# Patient Record
Sex: FEMALE | Race: BLACK OR AFRICAN AMERICAN | NOT HISPANIC OR LATINO | Employment: OTHER | URBAN - METROPOLITAN AREA
[De-identification: names, ages, dates, MRNs, and addresses within clinical notes are randomized per-mention and may not be internally consistent; named-entity substitution may affect disease eponyms.]

---

## 2017-01-02 ENCOUNTER — HOSPITAL ENCOUNTER (EMERGENCY)
Facility: HOSPITAL | Age: 62
Discharge: HOME | End: 2017-01-02
Attending: FAMILY MEDICINE
Payer: MEDICARE

## 2017-01-02 VITALS
RESPIRATION RATE: 18 BRPM | SYSTOLIC BLOOD PRESSURE: 120 MMHG | OXYGEN SATURATION: 99 % | DIASTOLIC BLOOD PRESSURE: 89 MMHG | HEIGHT: 60 IN | HEART RATE: 82 BPM | WEIGHT: 256 LBS | BODY MASS INDEX: 50.26 KG/M2 | TEMPERATURE: 98 F

## 2017-01-02 DIAGNOSIS — S92.911A CLOSED NONDISPLACED FRACTURE OF PHALANX OF TOE OF RIGHT FOOT, UNSPECIFIED TOE, INITIAL ENCOUNTER: Primary | ICD-10-CM

## 2017-01-02 DIAGNOSIS — S91.209A NAIL AVULSION, TOE, INITIAL ENCOUNTER: ICD-10-CM

## 2017-01-02 PROCEDURE — 99283 EMERGENCY DEPT VISIT LOW MDM: CPT | Mod: 25

## 2017-01-02 PROCEDURE — 63600175 PHARM REV CODE 636 W HCPCS: Performed by: FAMILY MEDICINE

## 2017-01-02 PROCEDURE — 63600175 PHARM REV CODE 636 W HCPCS

## 2017-01-02 PROCEDURE — 96372 THER/PROPH/DIAG INJ SC/IM: CPT

## 2017-01-02 RX ORDER — MORPHINE SULFATE 2 MG/ML
4 INJECTION, SOLUTION INTRAMUSCULAR; INTRAVENOUS
Status: COMPLETED | OUTPATIENT
Start: 2017-01-02 | End: 2017-01-02

## 2017-01-02 RX ORDER — ONDANSETRON 4 MG/1
4 TABLET, FILM COATED ORAL EVERY 8 HOURS PRN
Qty: 12 TABLET | Refills: 0 | Status: SHIPPED | OUTPATIENT
Start: 2017-01-02 | End: 2017-03-17 | Stop reason: SDUPTHER

## 2017-01-02 RX ORDER — CEFTRIAXONE 1 G/1
INJECTION, POWDER, FOR SOLUTION INTRAMUSCULAR; INTRAVENOUS
Status: COMPLETED
Start: 2017-01-02 | End: 2017-01-02

## 2017-01-02 RX ORDER — ONDANSETRON 2 MG/ML
4 INJECTION INTRAMUSCULAR; INTRAVENOUS
Status: COMPLETED | OUTPATIENT
Start: 2017-01-02 | End: 2017-01-02

## 2017-01-02 RX ORDER — CEFTRIAXONE 1 G/1
1 INJECTION, POWDER, FOR SOLUTION INTRAMUSCULAR; INTRAVENOUS
Status: COMPLETED | OUTPATIENT
Start: 2017-01-02 | End: 2017-01-02

## 2017-01-02 RX ORDER — CEPHALEXIN 500 MG/1
500 CAPSULE ORAL 4 TIMES DAILY
Qty: 40 CAPSULE | Refills: 0 | Status: SHIPPED | OUTPATIENT
Start: 2017-01-02 | End: 2017-01-12

## 2017-01-02 RX ORDER — HYDROCODONE BITARTRATE AND ACETAMINOPHEN 5; 325 MG/1; MG/1
1 TABLET ORAL EVERY 8 HOURS PRN
Qty: 18 TABLET | Refills: 0 | Status: SHIPPED | OUTPATIENT
Start: 2017-01-02 | End: 2017-03-16

## 2017-01-02 RX ADMIN — ONDANSETRON HYDROCHLORIDE 4 MG: 2 SOLUTION INTRAMUSCULAR; INTRAVENOUS at 07:01

## 2017-01-02 RX ADMIN — CEFTRIAXONE 1 G: 1 INJECTION, POWDER, FOR SOLUTION INTRAMUSCULAR; INTRAVENOUS at 07:01

## 2017-01-02 RX ADMIN — MORPHINE SULFATE 4 MG: 2 INJECTION, SOLUTION INTRAMUSCULAR; INTRAVENOUS at 07:01

## 2017-01-02 NOTE — ED AVS SNAPSHOT
[facility]  [address] [place] [address]               Christine Castro   2017  6:23 PM   ED    Description:  Female : 1955   Department:  [facility]           Your Care was Coordinated By:     Provider Role From To    David Rivas MD Attending Provider [phone] --      Reason for Visit     Toe Pain           Diagnoses this Visit        Comments    Closed nondisplaced fracture of phalanx of toe of right foot, unspecified toe, initial encounter    -  Primary     Nail avulsion, toe, initial encounter           ED Disposition     None           To Do List           Follow-up Information     Follow up with Alon Santiago MD In 2 days.    Specialty:  Family Medicine    Contact information:    [address] [facility] [address]  [phone]         These Medications        Disp Refills Start End    hydrocodone-acetaminophen 5-325mg (NORCO) 5-325 mg per tablet 18 tablet 0 2017     Take 1 tablet by mouth every 8 (eight) hours as needed for Pain. - Oral    [facility] [address] [place] [address] [address] Counts include 234 beds at the [facility] Ph #: [phone]       ondansetron (ZOFRAN) 4 MG tablet 12 tablet 0 2017     Take 1 tablet (4 mg total) by mouth every 8 (eight) hours as needed for Nausea. - Oral    [facility] [address] [place] [address] [facility] [address] [facility] Ph #: [phone]       cephALEXin (KEFLEX) 500 MG capsule 40 capsule 0 2017    Take 1 capsule (500 mg total) by mouth 4 (four) times daily. - Oral    [facility] [address] [place] [address] [address] Counts include 234 beds at the [facility] Ph #: [phone]         [facility]     [facility] On Call Nurse Care Line -  Assistance  Registered nurses in the [facility] provide clinical advisement, health education, appointment booking, and other advisory  services.  Call for this free service at [phone].             Medications           Message regarding Medications     Verify the changes and/or additions to your medication regime listed below are the same as discussed with your clinician today.  If any of these changes or additions are incorrect, please notify your healthcare provider.        START taking these NEW medications        Refills    hydrocodone-acetaminophen 5-325mg (NORCO) 5-325 mg per tablet 0    Sig: Take 1 tablet by mouth every 8 (eight) hours as needed for Pain.    Class: Print    Route: Oral    ondansetron (ZOFRAN) 4 MG tablet 0    Sig: Take 1 tablet (4 mg total) by mouth every 8 (eight) hours as needed for Nausea.    Class: Print    Route: Oral    cephALEXin (KEFLEX) 500 MG capsule 0    Sig: Take 1 capsule (500 mg total) by mouth 4 (four) times daily.    Class: Print    Route: Oral      These medications were administered today        Dose Freq    ondansetron injection 4 mg 4 mg ED 1 Time    Sig: Inject 4 mg into the muscle ED 1 Time.    Class: Normal    Route: Intramuscular    morphine injection 4 mg 4 mg ED 1 Time    Sig: Inject 2 mLs (4 mg total) into the muscle ED 1 Time.    Class: Normal    Route: Intramuscular    cefTRIAXone injection 1 g 1 g ED 1 Time    Sig: Inject 1 g into the muscle ED 1 Time.    Class: Normal    Route: Intramuscular    cefTRIAXone (ROCEPHIN) 1 gram injection      Notes to Pharmacy: Created by cabinet override      STOP taking these medications     oxycodone-acetaminophen (PERCOCET) 7.5-325 mg per tablet Take 1 tablet by mouth 4 (four) times daily as needed.           Verify that the below list of medications is an accurate representation of the medications you are currently taking.  If none reported, the list may be blank. If incorrect, please contact your healthcare provider. Carry this list with you in case of emergency.           Current Medications     albuterol 90 mcg/actuation inhaler Inhale 2 puffs into  the lungs every 4 (four) hours as needed for Wheezing.    buPROPion (WELLBUTRIN SR) 150 MG TBSR 12 hr tablet Take 1 tablet (150 mg total) by mouth once daily.    busPIRone (BUSPAR) 5 MG Tab Take 1 tablet (5 mg total) by mouth once daily.    cephALEXin (KEFLEX) 500 MG capsule Take 1 capsule (500 mg total) by mouth 4 (four) times daily.    diazePAM (VALIUM) 5 MG tablet TAKE 1 TABLET BY MOUTH EVERY 8 HOURS AS NEEDED    diclofenac sodium 1 % Gel APPLY 2 GRAMS EXTERNALLY TO THE AFFECTED AREA FOUR TIMES DAILY    escitalopram oxalate (LEXAPRO) 20 MG tablet TAKE 1 TABLET BY MOUTH ONCE DAILY    fluticasone-vilanterol (BREO ELLIPTA) 100-25 mcg/dose diskus inhaler Inhale 1 puff into the lungs once daily.    furosemide (LASIX) 20 MG tablet TAKE 1 TABLET BY MOUTH EVERY DAY CONTINUOUS AS NEEDED    gabapentin (NEURONTIN) 100 MG capsule Take 100 mg by mouth 3 (three) times daily.    glipiZIDE (GLUCOTROL) 2.5 MG TR24 TAKE 1 TABLET BY MOUTH DAILY WITH BREAKFAST    hydrocodone-acetaminophen 5-325mg (NORCO) 5-325 mg per tablet Take 1 tablet by mouth every 8 (eight) hours as needed for Pain.    levothyroxine (SYNTHROID) 88 MCG tablet TAKE 1 TABLET BY MOUTH BEFORE BREAKFAST    lidocaine (LIDODERM) 5 %     omeprazole (PRILOSEC) 40 MG capsule TAKE 1 CAPSULE BY MOUTH EVERY MORNING    ondansetron (ZOFRAN) 4 MG tablet Take 1 tablet (4 mg total) by mouth every 8 (eight) hours as needed for Nausea.    potassium chloride (KLOR-CON) 10 MEQ TbSR Take 2 tablets (20 mEq total) by mouth once daily.    predniSONE (DELTASONE) 10 MG tablet TAKE 1 TABLET(10 MG) BY MOUTH EVERY DAY    promethazine (PHENERGAN) 25 MG tablet TAKE 1 TABLET BY MOUTH EVERY 6 HOURS AS NEEDED FOR NAUSEA    promethazine (PHENERGAN) 25 MG tablet TAKE 1 TABLET BY MOUTH EVERY 6 HOURS AS NEEDED    quetiapine (SEROQUEL) 25 MG Tab TAKE 1 TABLET BY MOUTH EVERY EVENING    quetiapine (SEROQUEL) 50 MG tablet Take 1 tablet (50 mg total) by mouth every evening.    terbinafine HCl (LAMISIL) 1 %  cream Apply topically 2 (two) times daily.    tizanidine (ZANAFLEX) 4 MG tablet     zolpidem (AMBIEN) 10 mg Tab TAKE 1 TABLET BY MOUTH EVERY NIGHT AT BEDTIME    zolpidem (AMBIEN) 5 MG Tab TAKE 1 TABLET(5 MG) BY MOUTH EVERY EVENING           Clinical Reference Information           Your Vitals Were     BP Pulse Temp Resp Height Weight    132/101 93 98.7 °F (37.1 °C) (Oral) 18 5' (1.524 m) 116.1 kg (256 lb)    SpO2 BMI             99% 50 kg/m2         Allergies as of 1/2/2017        Reactions    Ibuprofen       Immunizations Administered on Date of Encounter - 1/2/2017     None      ED Micro, Lab, POCT     None      ED Imaging Orders     None        Discharge Instructions         Closed Toe Fracture  Your big toe is broken (fractured). This causes local pain, swelling, and bruising. This injury takes about 4 weeks to heal. Toe injuries are often treated by taping the injured toe to the next one (camelia taping). This protects the injured toe and holds it in position.    If the toenail has been severely injured, it may fall off in 1 to 2 weeks. It takes up to 12 months for a new toenail to grow back.  Home care  Follow these guidelines when caring for yourself at home:  · You may be given a cast shoe to wear to keep your toe from moving. If not, you can use a sandal or any shoe that doesnt put pressure on the injured toe until the swelling and pain go away. If using a sandal, be careful not to strike your foot against anything. Another injury could make the fracture worse. If you were given crutches, dont put full weight on the injured foot until you can do so without pain.  · Keep your foot elevated to reduce pain and swelling. When sleeping, put a pillow under the injured leg. When sitting, support the injured leg so it is level with your waist. This is very important during the first 2 days (48 hours).  · Put an ice pack on the injured area. Do this for 20 minutes every 1 to 2 hours the first day for pain relief.  You can make an ice pack by wrapping a plastic bag of ice cubes in a thin towel. As the ice melts, be careful that any cloth or paper tape doesnt get wet. Continue using the ice pack 3 to 4 times a day for the next 2 days. Then use the ice pack as needed to ease pain and swelling.  · If buddy tape was used and it becomes wet or dirty, change it. You may replace it with paper, plastic, or cloth tape. Cloth tape and paper tapes must be kept dry.  · You may use acetaminophen or ibuprofen to control pain, unless another pain medicine was prescribed. If you have chronic liver or kidney disease, talk with your health care provider before using these medicines. Also talk with your provider if youve had a stomach ulcer or GI bleeding.  · You may return to sports or physical education activities after 4 weeks, or when you can run without pain.  Follow-up care  Follow up with your health care provider in 1 week, or as advised. This is to make sure the bone is healing the way it should.  If X-rays were taken, a radiologist will look at them. You will be told of any new findings that may affect your care.  When to seek medical advice  Call your health care provider right away if any of these occur:  · Pain or swelling gets worse  · The cast cracks  · The cast and padding get wet and stays wet more than 24 hours  · Bad odor from the cast or wound fluid stains the cast  · Tightness or pressure under the cast gets worse  · Toe becomes cold, blue, numb, or tingly  · You cant move the toe  · Signs of infection: fever, redness, warmth, swelling, or drainage from the wound or cast  · Fever of 101ºF (38.3ºC) or higher, or as directed by your health care provider  © 0701-7642 [employer]. [address] [place] [address], [place] [address]. All rights reserved. This information is not intended as a substitute for professional medical care. Always follow your healthcare professional's instructions.          Your Scheduled  Appointments     Jan 04, 2017 11:00 AM CST   Physical with MD [place] [facility] ([place])    [address]  [address]  [place] [address] [phone]              [facility] Sign-Up     Activating your [facility] account is as easy as 1-2-3!     1) Visit [email or web address], select Sign Up Now, enter this activation code and your date of birth, then select Next.  [email or web address]  Expires: 1/26/2017  3:37 PM      2) Create a username and password to use when you visit [facility] in the future and select a security question in case you lose your password and select Next.    3) Enter your e-mail address and click Sign Up!    Additional Information  If you have questions, please e-mail [email or web address] or call [phone] to talk to our [facility] staff. Remember, [facility] is NOT to be used for urgent needs. For medical emergencies, dial 911.          [facility] complies with applicable Federal civil rights laws and does not discriminate on the basis of race, color, national origin, age, disability, or sex.        Language Assistance Services     ATTENTION: Language assistance services are available, free of charge. Please call [phone].      ATENCIÓN: Si habla español, tiene a lopez disposición servicios gratuitos de asistencia lingüística. Llame al [phone].     CHÚ Ý: N?u b?n nói Ti?ng Vi?t, có các d?ch v? h? tr? ngôn ng? mi?n phí dành cho b?n. G?i s? [phone].

## 2017-01-03 ENCOUNTER — TELEPHONE (OUTPATIENT)
Dept: FAMILY MEDICINE | Facility: CLINIC | Age: 62
End: 2017-01-03

## 2017-01-03 NOTE — DISCHARGE INSTRUCTIONS
Closed Toe Fracture  Your big toe is broken (fractured). This causes local pain, swelling, and bruising. This injury takes about 4 weeks to heal. Toe injuries are often treated by taping the injured toe to the next one (buddy taping). This protects the injured toe and holds it in position.    If the toenail has been severely injured, it may fall off in 1 to 2 weeks. It takes up to 12 months for a new toenail to grow back.  Home care  Follow these guidelines when caring for yourself at home:  · You may be given a cast shoe to wear to keep your toe from moving. If not, you can use a sandal or any shoe that doesnt put pressure on the injured toe until the swelling and pain go away. If using a sandal, be careful not to strike your foot against anything. Another injury could make the fracture worse. If you were given crutches, dont put full weight on the injured foot until you can do so without pain.  · Keep your foot elevated to reduce pain and swelling. When sleeping, put a pillow under the injured leg. When sitting, support the injured leg so it is level with your waist. This is very important during the first 2 days (48 hours).  · Put an ice pack on the injured area. Do this for 20 minutes every 1 to 2 hours the first day for pain relief. You can make an ice pack by wrapping a plastic bag of ice cubes in a thin towel. As the ice melts, be careful that any cloth or paper tape doesnt get wet. Continue using the ice pack 3 to 4 times a day for the next 2 days. Then use the ice pack as needed to ease pain and swelling.  · If buddy tape was used and it becomes wet or dirty, change it. You may replace it with paper, plastic, or cloth tape. Cloth tape and paper tapes must be kept dry.  · You may use acetaminophen or ibuprofen to control pain, unless another pain medicine was prescribed. If you have chronic liver or kidney disease, talk with your health care provider before using these medicines. Also talk with your  provider if youve had a stomach ulcer or GI bleeding.  · You may return to sports or physical education activities after 4 weeks, or when you can run without pain.  Follow-up care  Follow up with your health care provider in 1 week, or as advised. This is to make sure the bone is healing the way it should.  If X-rays were taken, a radiologist will look at them. You will be told of any new findings that may affect your care.  When to seek medical advice  Call your health care provider right away if any of these occur:  · Pain or swelling gets worse  · The cast cracks  · The cast and padding get wet and stays wet more than 24 hours  · Bad odor from the cast or wound fluid stains the cast  · Tightness or pressure under the cast gets worse  · Toe becomes cold, blue, numb, or tingly  · You cant move the toe  · Signs of infection: fever, redness, warmth, swelling, or drainage from the wound or cast  · Fever of 101ºF (38.3ºC) or higher, or as directed by your health care provider  © 1172-0065 [employer]. [address] [place] [address], [place] [address]. All rights reserved. This information is not intended as a substitute for professional medical care. Always follow your healthcare professional's instructions.

## 2017-01-03 NOTE — ED NOTES
Toe cleaned and covered with a bandaid. Pt provided with a small size post op shoe despite the fact that she was instructed it was too small per pt request

## 2017-01-03 NOTE — TELEPHONE ENCOUNTER
i called the pt but she could not here me, i think she answered by mistake,   She did not know i was on the line  Dr boykin advised that she probably does not need to be seen  i will discuss further with her  Pt needs to keep taped to the 2nd toe and elevate foot

## 2017-01-03 NOTE — ED PROVIDER NOTES
Encounter Date: 2017       History     Chief Complaint   Patient presents with    Toe Pain     sent from urgent care for a broken toe - right middle toe- hit it on a bedpost early this am      Review of patient's allergies indicates:   Allergen Reactions    Ibuprofen      HPI Comments: Patient had a injury to right foot and sustained fracture to right middle phalanx.  Patient also had partial nail avulsion.  Patient went to urgent care who did x-rays and send her over to ER for further management.  Patient also has been vomiting since she eat her lunch.  No abdominal pain no fever.    The history is provided by the patient.     Past Medical History   Diagnosis Date    Arthritis     Asthma     Cancer     CHF (congestive heart failure)      [facility] GENER[facility]    COPD (chronic obstructive pulmonary disease)     Coronary artery disease     Depression     Diabetes mellitus, type 2     GERD (gastroesophageal reflux disease)     Myocardial infarction      AGE 19 MIGUELANGEL WITHBABY DELIVERY    Thyroid disease      No past medical history pertinent negatives.  Past Surgical History   Procedure Laterality Date     section      Tonsillectomy      Colon surgery      Cholecystectomy      Ectopic pregnancy surgery      Tubal ligation      Ovary removed      Gastric bypass      Colonoscopy       --- repeat in 3-5 years     Family History   Problem Relation Age of Onset    Hyperlipidemia Mother     Hypertension Mother     Diabetes Mother     Hypertension Sister     Hypertension Brother     Diabetes Brother      Social History   Substance Use Topics    Smoking status: Never Smoker    Smokeless tobacco: None    Alcohol use Yes     Review of Systems   Constitutional: Negative for chills and fever.   HENT: Negative for congestion, ear pain and sore throat.    Eyes: Negative for pain.   Respiratory: Negative for cough, chest tightness, shortness of breath and wheezing.    Cardiovascular:  Negative for chest pain and palpitations.   Gastrointestinal: Positive for nausea and vomiting. Negative for abdominal distention, abdominal pain and diarrhea.   Genitourinary: Negative for dysuria and pelvic pain.   Musculoskeletal: Negative for back pain, gait problem, neck pain and neck stiffness.   Psychiatric/Behavioral: Negative for behavioral problems and confusion. The patient is not nervous/anxious.    All other systems reviewed and are negative.      Physical Exam   Initial Vitals   BP Pulse Resp Temp SpO2   01/02/17 1826 01/02/17 1826 01/02/17 1826 01/02/17 1826 01/02/17 1826   132/101 93 18 98.7 °F (37.1 °C) 99 %     Physical Exam    Nursing note and vitals reviewed.  Constitutional: She appears well-developed and well-nourished.   HENT:   Head: Normocephalic and atraumatic.   Eyes: Pupils are equal, round, and reactive to light.   Neck: Normal range of motion.   Cardiovascular: Normal rate, regular rhythm, normal heart sounds and intact distal pulses. Exam reveals no gallop and no friction rub.    No murmur heard.  Pulmonary/Chest: Breath sounds normal. No respiratory distress. She has no rhonchi. She has no rales. She exhibits no tenderness.   Abdominal: Soft. She exhibits no distension and no mass. There is no tenderness. There is no rebound and no guarding.   Musculoskeletal: Normal range of motion.        Feet:    Right middle phalanx swollen and tender.  Partial nail avulsion on the distal aspect.   Neurological: She is alert and oriented to person, place, and time. She has normal strength and normal reflexes. She displays normal reflexes. No cranial nerve deficit or sensory deficit.         ED Course   Procedures  Labs Reviewed - No data to display                            ED Course   Pt is advised to do dressing and tape toes- and continue Post -op shoe. Continue pain meds and a/b.  Clinical Impression:   The primary encounter diagnosis was Closed nondisplaced fracture of phalanx of toe of  right foot, unspecified toe, initial encounter. A diagnosis of Nail avulsion, toe, initial encounter was also pertinent to this visit.    Disposition:   Disposition: Discharged  Condition: Stable  Patient is advised to continue pain medication and postop shoe.  Advised to follow up with primary care physician and get referral to orthopedics.       David Rivas MD  01/05/17 1951

## 2017-01-06 NOTE — TELEPHONE ENCOUNTER
i left a message for the pt with details and advised her to return call if she has any further questions

## 2017-01-16 RX ORDER — DIAZEPAM 5 MG/1
TABLET ORAL
Qty: 30 TABLET | Refills: 0 | Status: SHIPPED | OUTPATIENT
Start: 2017-01-16 | End: 2017-03-30

## 2017-01-24 RX ORDER — DICLOFENAC SODIUM 10 MG/G
GEL TOPICAL
Qty: 100 G | Refills: 0 | Status: SHIPPED | OUTPATIENT
Start: 2017-01-24 | End: 2017-03-16 | Stop reason: SDUPTHER

## 2017-02-05 RX ORDER — GLIPIZIDE 2.5 MG/1
TABLET, EXTENDED RELEASE ORAL
Qty: 90 TABLET | Refills: 0 | Status: SHIPPED | OUTPATIENT
Start: 2017-02-05 | End: 2017-05-01 | Stop reason: SDUPTHER

## 2017-02-07 RX ORDER — POTASSIUM CHLORIDE 750 MG/1
TABLET, EXTENDED RELEASE ORAL
Qty: 180 TABLET | Refills: 0 | Status: SHIPPED | OUTPATIENT
Start: 2017-02-07 | End: 2017-07-09 | Stop reason: SDUPTHER

## 2017-02-07 RX ORDER — ZOLPIDEM TARTRATE 10 MG/1
TABLET ORAL
Qty: 30 TABLET | Refills: 2 | Status: SHIPPED | OUTPATIENT
Start: 2017-02-07 | End: 2017-10-18

## 2017-02-17 RX ORDER — PREDNISONE 10 MG/1
TABLET ORAL
Qty: 30 TABLET | Refills: 0 | Status: SHIPPED | OUTPATIENT
Start: 2017-02-17 | End: 2017-03-16

## 2017-03-09 ENCOUNTER — LAB VISIT (OUTPATIENT)
Dept: LAB | Facility: HOSPITAL | Age: 62
End: 2017-03-09
Attending: FAMILY MEDICINE
Payer: MEDICAID

## 2017-03-09 DIAGNOSIS — E11.9 TYPE 2 DIABETES MELLITUS WITHOUT COMPLICATION: ICD-10-CM

## 2017-03-09 LAB
CHOLEST/HDLC SERPL: 3.8 {RATIO}
HDL/CHOLESTEROL RATIO: 26.3 %
HDLC SERPL-MCNC: 205 MG/DL
HDLC SERPL-MCNC: 54 MG/DL
LDLC SERPL CALC-MCNC: 135.2 MG/DL
NONHDLC SERPL-MCNC: 151 MG/DL
TRIGL SERPL-MCNC: 79 MG/DL

## 2017-03-09 PROCEDURE — 36415 COLL VENOUS BLD VENIPUNCTURE: CPT | Mod: PO

## 2017-03-09 PROCEDURE — 80061 LIPID PANEL: CPT

## 2017-03-09 PROCEDURE — 83036 HEMOGLOBIN GLYCOSYLATED A1C: CPT | Mod: PO

## 2017-03-10 LAB
ESTIMATED AVG GLUCOSE: 108 MG/DL
HBA1C MFR BLD HPLC: 5.4 %

## 2017-03-10 RX ORDER — FUROSEMIDE 20 MG/1
TABLET ORAL
Qty: 90 TABLET | Refills: 0 | Status: SHIPPED | OUTPATIENT
Start: 2017-03-10 | End: 2017-06-08 | Stop reason: SDUPTHER

## 2017-03-13 RX ORDER — PROMETHAZINE HYDROCHLORIDE 25 MG/1
TABLET ORAL
Qty: 90 TABLET | Refills: 0 | Status: SHIPPED | OUTPATIENT
Start: 2017-03-13 | End: 2017-03-16 | Stop reason: SDUPTHER

## 2017-03-13 RX ORDER — ESCITALOPRAM OXALATE 20 MG/1
TABLET ORAL
Qty: 90 TABLET | Refills: 3 | Status: SHIPPED | OUTPATIENT
Start: 2017-03-13 | End: 2017-03-16 | Stop reason: SDUPTHER

## 2017-03-14 ENCOUNTER — OFFICE VISIT (OUTPATIENT)
Dept: GASTROENTEROLOGY | Facility: CLINIC | Age: 62
End: 2017-03-14
Payer: MEDICARE

## 2017-03-14 VITALS
WEIGHT: 243.38 LBS | SYSTOLIC BLOOD PRESSURE: 104 MMHG | HEART RATE: 95 BPM | BODY MASS INDEX: 47.78 KG/M2 | HEIGHT: 60 IN | DIASTOLIC BLOOD PRESSURE: 66 MMHG

## 2017-03-14 DIAGNOSIS — K21.9 GASTROESOPHAGEAL REFLUX DISEASE, ESOPHAGITIS PRESENCE NOT SPECIFIED: Primary | ICD-10-CM

## 2017-03-14 DIAGNOSIS — Z86.010 HISTORY OF COLON POLYPS: ICD-10-CM

## 2017-03-14 DIAGNOSIS — Z85.038 HISTORY OF COLON CANCER: ICD-10-CM

## 2017-03-14 DIAGNOSIS — R13.10 DYSPHAGIA, UNSPECIFIED TYPE: ICD-10-CM

## 2017-03-14 PROCEDURE — 99999 PR PBB SHADOW E&M-EST. PATIENT-LVL II: CPT | Mod: PBBFAC,,, | Performed by: NURSE PRACTITIONER

## 2017-03-14 PROCEDURE — 99212 OFFICE O/P EST SF 10 MIN: CPT | Mod: PBBFAC,PN | Performed by: NURSE PRACTITIONER

## 2017-03-14 PROCEDURE — 99203 OFFICE O/P NEW LOW 30 MIN: CPT | Mod: S$PBB,,, | Performed by: NURSE PRACTITIONER

## 2017-03-14 NOTE — PROGRESS NOTES
Subjective:       Patient ID: Christine Castro is a 61 y.o. female.    Chief Complaint: Other (Consult about Colonoscopy)    HPI  Has history of colon cancer and reports resection and chemotherapy in 2007.  She has had colon polyps on exam since that time.  Her last colonoscopy was over 5 years ago.  Reports rectal bleeding, bright red and dark red and noted on the bowel movement and on the toilet paper occurring over the last 3 months.    No diarrhea or constipation.    No abdominal pain today but episodic abdominal pain in the mid abdomen.    Omeprazole daily for reflux and heartburn complaints.  She reports she had surgery for reflux in the distant past with return over the last two years.  Describes nausea and vomiting and will use phenergan for relief.  She had gastric bypass in 2000.  She reports that she has never had EGD.    Reports dysphagia and has had to regurgitate for relief.      Review of Systems   Constitutional: Positive for activity change, appetite change and fatigue.   HENT: Positive for trouble swallowing.    Respiratory: Negative for shortness of breath.    Cardiovascular: Negative for chest pain.   Gastrointestinal: Positive for blood in stool and nausea. Negative for abdominal pain.   Genitourinary: Negative.    Musculoskeletal: Positive for arthralgias.   Neurological: Positive for weakness.   Psychiatric/Behavioral:        Depression       Objective:      Physical Exam   Constitutional: She is oriented to person, place, and time. She appears well-developed and well-nourished.   HENT:   Head: Normocephalic.   Eyes: No scleral icterus.   Cardiovascular: Normal rate.    Pulmonary/Chest: No respiratory distress.   Abdominal: Soft. Bowel sounds are normal. She exhibits no distension and no mass. There is no tenderness.   Musculoskeletal:   Uses a walker for assistance   Neurological: She is alert and oriented to person, place, and time.   Skin: Skin is warm and dry. She is not diaphoretic. No  pallor.   Psychiatric: She has a normal mood and affect. Her behavior is normal. Judgment and thought content normal.   Vitals reviewed.      Assessment:       1. Gastroesophageal reflux disease, esophagitis presence not specified    2. Dysphagia, unspecified type    3. History of colon cancer    4. History of colon polyps        Plan:         Christine RAMIREZ was seen today for other.    Diagnoses and all orders for this visit:    Gastroesophageal reflux disease, esophagitis presence not specified    Dysphagia, unspecified type    History of colon cancer    History of colon polyps    Other orders  -     Case request GI: ESOPHAGOGASTRODUODENOSCOPY (EGD), COLONOSCOPY         Continue omeprazole.    EGD and colonoscopy.

## 2017-03-16 ENCOUNTER — OFFICE VISIT (OUTPATIENT)
Dept: FAMILY MEDICINE | Facility: CLINIC | Age: 62
End: 2017-03-16
Payer: MEDICARE

## 2017-03-16 VITALS
SYSTOLIC BLOOD PRESSURE: 132 MMHG | HEART RATE: 96 BPM | DIASTOLIC BLOOD PRESSURE: 84 MMHG | BODY MASS INDEX: 44.06 KG/M2 | TEMPERATURE: 99 F | OXYGEN SATURATION: 98 % | HEIGHT: 60 IN | WEIGHT: 224.44 LBS

## 2017-03-16 DIAGNOSIS — G56.20 LESION OF ULNAR NERVE, UNSPECIFIED LATERALITY: Primary | ICD-10-CM

## 2017-03-16 DIAGNOSIS — E07.9 THYROID DISEASE: ICD-10-CM

## 2017-03-16 DIAGNOSIS — F32.A DEPRESSION, UNSPECIFIED DEPRESSION TYPE: ICD-10-CM

## 2017-03-16 DIAGNOSIS — I50.9 CONGESTIVE HEART FAILURE, UNSPECIFIED CONGESTIVE HEART FAILURE CHRONICITY, UNSPECIFIED CONGESTIVE HEART FAILURE TYPE: ICD-10-CM

## 2017-03-16 DIAGNOSIS — K21.9 GASTROESOPHAGEAL REFLUX DISEASE WITHOUT ESOPHAGITIS: ICD-10-CM

## 2017-03-16 DIAGNOSIS — E11.9 TYPE 2 DIABETES MELLITUS WITHOUT COMPLICATION, UNSPECIFIED LONG TERM INSULIN USE STATUS: ICD-10-CM

## 2017-03-16 DIAGNOSIS — M19.90 ARTHRITIS: ICD-10-CM

## 2017-03-16 DIAGNOSIS — M17.0 PRIMARY OSTEOARTHRITIS OF BOTH KNEES: ICD-10-CM

## 2017-03-16 PROCEDURE — 99214 OFFICE O/P EST MOD 30 MIN: CPT | Mod: S$GLB,,, | Performed by: FAMILY MEDICINE

## 2017-03-16 RX ORDER — OXYCODONE AND ACETAMINOPHEN 7.5; 325 MG/1; MG/1
TABLET ORAL
Refills: 0 | COMMUNITY
Start: 2017-03-02 | End: 2018-08-21

## 2017-03-16 NOTE — MR AVS SNAPSHOT
[place] [id number] [address]  Phone: [phone]  Fax: [phone]                  Christine Castro   3/16/2017 2:00 PM   Office Visit    Description:  Female : 1955   Provider:  Alon Santiago MD   Department:  [facility]           Reason for Visit     Follow-up           Diagnoses this Visit        Comments    Lesion of ulnar nerve, unspecified laterality    -  Primary     Depression, unspecified depression type         Thyroid disease         Congestive heart failure, unspecified congestive heart failure chronicity, unspecified congestive heart failure type         Type 2 diabetes mellitus without complication, unspecified long term insulin use status         Gastroesophageal reflux disease without esophagitis         Arthritis                To Do List           Future Appointments        Provider Department Dept Phone    3/23/2017 10:40 AM Estuardo Fortune MD [facility] [phone]      Your Future Surgeries/Procedures     2017   Surgery with Corrina Flores MD   [facility] [facility] [address] [place] [address] [phone]              Goals (5 Years of Data)     None      [facility]     [facility] Care [facility] -  Assistance  Registered nurses in the [facility] provide clinical advisement, health education, appointment booking, and other advisory services.  Call for this free service at [phone].             Medications           Message regarding Medications     Verify the changes and/or additions to your medication regime listed below are the same as discussed with your clinician today.  If any of these changes or additions are incorrect, please notify your healthcare provider.        STOP taking these medications     hydrocodone-acetaminophen 5-325mg (NORCO) 5-325 mg per tablet Take 1 tablet by mouth every 8 (eight) hours as needed  for Pain.    predniSONE (DELTASONE) 10 MG tablet TAKE 1 TABLET(10 MG) BY MOUTH EVERY DAY    terbinafine HCl (LAMISIL) 1 % cream Apply topically 2 (two) times daily.           Verify that the below list of medications is an accurate representation of the medications you are currently taking.  If none reported, the list may be blank. If incorrect, please contact your healthcare provider. Carry this list with you in case of emergency.           Current Medications     albuterol 90 mcg/actuation inhaler Inhale 2 puffs into the lungs every 4 (four) hours as needed for Wheezing.    buPROPion (WELLBUTRIN SR) 150 MG TBSR 12 hr tablet Take 1 tablet (150 mg total) by mouth once daily.    busPIRone (BUSPAR) 5 MG Tab Take 1 tablet (5 mg total) by mouth once daily.    diazePAM (VALIUM) 5 MG tablet TAKE 1 TABLET BY MOUTH EVERY 8 HOURS AS NEEDED    diclofenac sodium 1 % Gel APPLY 2 GRAMS EXTERNALLY TO THE AFFECTED AREA FOUR TIMES DAILY    escitalopram oxalate (LEXAPRO) 20 MG tablet TAKE 1 TABLET BY MOUTH ONCE DAILY    fluticasone-vilanterol (BREO ELLIPTA) 100-25 mcg/dose diskus inhaler Inhale 1 puff into the lungs once daily.    furosemide (LASIX) 20 MG tablet TAKE 1 TABLET BY MOUTH EVERY DAY CONTINUOUS AS NEEDED    gabapentin (NEURONTIN) 100 MG capsule Take 100 mg by mouth 3 (three) times daily.    glipiZIDE (GLUCOTROL) 2.5 MG TR24 TAKE 1 TABLET BY MOUTH DAILY WITH BREAKFAST    levothyroxine (SYNTHROID) 88 MCG tablet TAKE 1 TABLET BY MOUTH BEFORE BREAKFAST    lidocaine (LIDODERM) 5 % Place 1 patch onto the skin.     omeprazole (PRILOSEC) 40 MG capsule TAKE 1 CAPSULE BY MOUTH EVERY MORNING    ondansetron (ZOFRAN) 4 MG tablet Take 1 tablet (4 mg total) by mouth every 8 (eight) hours as needed for Nausea.    oxycodone-acetaminophen (PERCOCET) 7.5-325 mg per tablet TK 1 T PO  TID    potassium chloride (KLOR-CON) 10 MEQ TbSR TAKE 2 TABLETS(20 MEQ) BY MOUTH ONCE DAILY    promethazine (PHENERGAN) 25 MG tablet TAKE 1 TABLET BY MOUTH  EVERY 6 HOURS AS NEEDED FOR NAUSEA    quetiapine (SEROQUEL) 25 MG Tab TAKE 1 TABLET BY MOUTH EVERY EVENING    quetiapine (SEROQUEL) 50 MG tablet Take 1 tablet (50 mg total) by mouth every evening.    tizanidine (ZANAFLEX) 4 MG tablet Take 4 mg by mouth every 8 (eight) hours.     zolpidem (AMBIEN) 10 mg Tab TAKE 1 TABLET BY MOUTH EVERY NIGHT AT BEDTIME           Clinical Reference Information           Your Vitals Were     BP Pulse Temp Height Weight SpO2    132/84 96 98.9 °F (37.2 °C) (Oral) 5' (1.524 m) 101.8 kg (224 lb 6.9 oz) 98%    BMI                43.83 kg/m2          Blood Pressure          Most Recent Value    BP  132/84      Allergies as of 3/16/2017     Ibuprofen      Immunizations Administered on Date of Encounter - 3/16/2017     None      [facility] Sign-Up     Activating your [facility] account is as easy as 1-2-3!     1) Visit [email or web address], select Sign Up Now, enter this activation code and your date of birth, then select Next.  [email or web address]  Expires: 4/30/2017  3:19 PM      2) Create a username and password to use when you visit [facility] in the future and select a security question in case you lose your password and select Next.    3) Enter your e-mail address and click Sign Up!    Additional Information  If you have questions, please e-mail [email or web address] or call [phone] to talk to our [facility] staff. Remember, [facility] is NOT to be used for urgent needs. For medical emergencies, dial 911.         Language Assistance Services     ATTENTION: Language assistance services are available, free of charge. Please call [phone].      ATENCIÓN: Si habla yolanda, tiene a lopez disposición servicios gratuitos de asistencia lingüística. Llame al [phone].     CHÚ Ý: N?u b?n nói Ti?ng Vi?t, có các d?ch v? h? tr? ngôn ng? mi?n phí dành cho b?n. G?i s? [phone].         [facility] complies with applicable Federal civil rights laws and does not discriminate on the  basis of race, color, national origin, age, disability, or sex.

## 2017-03-17 RX ORDER — HYDROCODONE BITARTRATE AND ACETAMINOPHEN 5; 325 MG/1; MG/1
1 TABLET ORAL EVERY 6 HOURS PRN
Qty: 30 TABLET | Refills: 0 | Status: SHIPPED | OUTPATIENT
Start: 2017-03-17 | End: 2017-03-30

## 2017-03-17 RX ORDER — GABAPENTIN 100 MG/1
100 CAPSULE ORAL 3 TIMES DAILY
Qty: 90 CAPSULE | Refills: 2 | Status: SHIPPED | OUTPATIENT
Start: 2017-03-17 | End: 2017-06-03 | Stop reason: SDUPTHER

## 2017-03-17 RX ORDER — ONDANSETRON 4 MG/1
TABLET, FILM COATED ORAL
Qty: 12 TABLET | Refills: 0 | Status: SHIPPED | OUTPATIENT
Start: 2017-03-17 | End: 2018-08-31 | Stop reason: SDUPTHER

## 2017-03-19 PROCEDURE — 20610 DRAIN/INJ JOINT/BURSA W/O US: CPT | Mod: 50,S$GLB,, | Performed by: FAMILY MEDICINE

## 2017-03-19 RX ORDER — TRIAMCINOLONE ACETONIDE 40 MG/ML
40 INJECTION, SUSPENSION INTRA-ARTICULAR; INTRAMUSCULAR
Status: DISCONTINUED | OUTPATIENT
Start: 2017-03-19 | End: 2017-04-30 | Stop reason: HOSPADM

## 2017-03-19 RX ADMIN — TRIAMCINOLONE ACETONIDE 40 MG: 40 INJECTION, SUSPENSION INTRA-ARTICULAR; INTRAMUSCULAR at 01:03

## 2017-03-19 NOTE — PROCEDURES
Large Joint Aspiration/Injection  Date/Time: 3/19/2017 1:53 PM  Performed by: AMBER ROACH  Authorized by: AMBER ROACH.     Consent Done?:  Yes (Verbal)  Indications:  Pain  Procedure site marked: Yes    Timeout: Prior to procedure the correct patient, procedure, and site was verified      Location:  Knee  Site:  L knee  Needle size:  27 G  Approach:  Anterolateral  Medications:  40 mg triamcinolone acetonide 40 mg/mL  Patient tolerance:  Patient tolerated the procedure well with no immediate complications

## 2017-03-19 NOTE — PROGRESS NOTES
Patient ID: Christine Castro is a 61 y.o. female.    Chief Complaint: Follow-up (check-up)    HPI      Christine Castro is a 61 y.o. female here to discuss arthritis in her knees bilaterally.  Wishes to have injections in both knees.  Anxiety/stress-continues to use current medicines somewhat stable-having difficulty in life with multiple stressors.  CHF-no significant exacerbations-sodium discussed-not losing weight at this time  Reflux-stable  Co of left arm pain at elbow and radiating to hand in 4th and 5th finger with no loss of strength and no loss of sensation or skin changes.  No known trama.  bilat knees with pain and worse with ambulation and in the pm.                Review of Symptoms    Constitutional  Some decrease in  activity , No chills /fever   Resp  Neg hemoptysis, stridor, choking  CVS  Neg chest pain, palpitations    Physical Exam    Constitutional:  Oriented to person, place, and time.appears well-developed and well-nourished.  No distress.      HENT  Head: Normocephalic and atraumatic  Right Ear: External ear normal.   Left Ear: External ear normal.   Nose: External nose normal.   Mouth:  Moist mucus membranes.    Eyes:  Conjunctivae are normal. Right eye exhibits no discharge.  Left eye exhibits no discharge. No scleral icterus.  No periorbital edema    Cardiovascular:  Regular rate, Regular rhythm     No extrasystole  Normal S1-S2      Pulmonary/Chest:   Normal effort and breath sounds.  No wheezing, rhonchi or rales.    Musculoskeletal:  No edema. No obvious deformity No waisting       Neurological:  Alert and oriented to person, place, and time.   Coordination normal.     Skin:   Skin is warm and dry.  No diaphoresis.   No rash noted.     Psychiatric: Normal mood and affect. Behavior is normal.  Judgment and thought content normal.       Assessment / Plan:      ICD-10-CM ICD-9-CM   1. Lesion of ulnar nerve, unspecified laterality G56.20 354.2   2. Depression, unspecified depression type  F32.9 311   3. Thyroid disease E07.9 246.9   4. Congestive heart failure, unspecified congestive heart failure chronicity, unspecified congestive heart failure type I50.9 428.0   5. Type 2 diabetes mellitus without complication, unspecified long term insulin use status E11.9 250.00   6. Gastroesophageal reflux disease without esophagitis K21.9 530.81   7. Arthritis M19.90 716.90     Lesion of ulnar nerve, unspecified laterality    Depression, unspecified depression type    Thyroid disease    Congestive heart failure, unspecified congestive heart failure chronicity, unspecified congestive heart failure type    Type 2 diabetes mellitus without complication, unspecified long term insulin use status    Gastroesophageal reflux disease without esophagitis    Arthritis    Other orders  -     gabapentin (NEURONTIN) 100 MG capsule; Take 1 capsule (100 mg total) by mouth 3 (three) times daily.  Dispense: 90 capsule; Refill: 2  -     hydrocodone-acetaminophen 5-325mg (NORCO) 5-325 mg per tablet; Take 1 tablet by mouth every 6 (six) hours as needed for Pain.  Dispense: 30 tablet; Refill: 0

## 2017-03-19 NOTE — PROCEDURES
Large Joint Aspiration/Injection  Date/Time: 3/19/2017 1:52 PM  Performed by: AMBER ROACH  Authorized by: AMBER ROACH     Indications:  Pain  Procedure site marked: Yes    Timeout: Prior to procedure the correct patient, procedure, and site was verified      Location:  Knee  Site:  R knee  Prep: Patient was prepped and draped in usual sterile fashion    Needle size:  27 G  Approach:  Anteromedial  Medications:  40 mg triamcinolone acetonide 40 mg/mL  Patient tolerance:  Patient tolerated the procedure well with no immediate complications

## 2017-03-21 ENCOUNTER — TELEPHONE (OUTPATIENT)
Dept: FAMILY MEDICINE | Facility: CLINIC | Age: 62
End: 2017-03-21

## 2017-03-21 RX ORDER — PREDNISONE 5 MG/1
7.5 TABLET ORAL DAILY
Qty: 45 TABLET | Refills: 0 | Status: SHIPPED | OUTPATIENT
Start: 2017-03-21 | End: 2017-03-30

## 2017-03-21 RX ORDER — PREDNISONE 10 MG/1
TABLET ORAL
Qty: 30 TABLET | Refills: 0 | OUTPATIENT
Start: 2017-03-21

## 2017-03-21 NOTE — TELEPHONE ENCOUNTER
----- Message from Heike Tam sent at 3/21/2017  3:26 PM CDT -----  Patient is wanting test setup. Started experiencing twitching on left side, along with twitching of lips & mouth. Started on yesterday. Is still about the same today, but its worrying her

## 2017-03-23 ENCOUNTER — TELEPHONE (OUTPATIENT)
Dept: CARDIOLOGY | Facility: CLINIC | Age: 62
End: 2017-03-23

## 2017-03-23 NOTE — TELEPHONE ENCOUNTER
----- Message from Bernie Escobar sent at 3/23/2017 11:19 AM CDT -----  Contact: [phone]  Patient states she was on the phone with you and the phone disconnected, please call patient

## 2017-03-28 RX ORDER — POTASSIUM CHLORIDE 750 MG/1
TABLET, EXTENDED RELEASE ORAL
Qty: 90 TABLET | Refills: 0 | Status: SHIPPED | OUTPATIENT
Start: 2017-03-28 | End: 2017-03-30 | Stop reason: SDUPTHER

## 2017-03-30 ENCOUNTER — OFFICE VISIT (OUTPATIENT)
Dept: CARDIOLOGY | Facility: CLINIC | Age: 62
End: 2017-03-30
Payer: MEDICARE

## 2017-03-30 VITALS
HEART RATE: 85 BPM | DIASTOLIC BLOOD PRESSURE: 66 MMHG | WEIGHT: 285.5 LBS | BODY MASS INDEX: 56.05 KG/M2 | SYSTOLIC BLOOD PRESSURE: 100 MMHG | OXYGEN SATURATION: 98 % | HEIGHT: 60 IN

## 2017-03-30 DIAGNOSIS — I50.9 CONGESTIVE HEART FAILURE, UNSPECIFIED CONGESTIVE HEART FAILURE CHRONICITY, UNSPECIFIED CONGESTIVE HEART FAILURE TYPE: ICD-10-CM

## 2017-03-30 DIAGNOSIS — R07.9 CHEST PAIN, UNSPECIFIED TYPE: Primary | ICD-10-CM

## 2017-03-30 DIAGNOSIS — E07.9 THYROID DISEASE: ICD-10-CM

## 2017-03-30 DIAGNOSIS — K21.9 GASTROESOPHAGEAL REFLUX DISEASE WITHOUT ESOPHAGITIS: ICD-10-CM

## 2017-03-30 DIAGNOSIS — F32.A DEPRESSION, UNSPECIFIED DEPRESSION TYPE: ICD-10-CM

## 2017-03-30 PROCEDURE — 99213 OFFICE O/P EST LOW 20 MIN: CPT | Mod: PBBFAC,PO | Performed by: INTERNAL MEDICINE

## 2017-03-30 PROCEDURE — 99214 OFFICE O/P EST MOD 30 MIN: CPT | Mod: S$PBB,,, | Performed by: INTERNAL MEDICINE

## 2017-03-30 PROCEDURE — 99999 PR PBB SHADOW E&M-EST. PATIENT-LVL III: CPT | Mod: PBBFAC,,, | Performed by: INTERNAL MEDICINE

## 2017-03-30 NOTE — PROGRESS NOTES
Subjective:    Patient ID:  Christine Castro is a 61 y.o. female who presents for evaluation of Chest Pain      HPI  60 y/o female with hx of of asthma, colon CA, MI during delivery at 19 years old (states that she was on nitro for a time after), CHF (preserved EF) who presents for evaluation of CP.  Last seen in clinic 10/2016 after falling. She has had multiple falls and uses a walker. She denies orthopnea, PND, syncope. She has some chronic CASTANEDA. Normal 2DE earlier this year.  She has been having episode, intermittent, SS chest pressure. Pain is provoked by stress and worsens with exertion. Non radiating and resolves spontaneously.   .  Review of Systems   Constitution: Positive for weakness and malaise/fatigue.   HENT: Negative for congestion and headaches.    Eyes: Negative for blurred vision.   Cardiovascular: Positive for chest pain, dyspnea on exertion and leg swelling. Negative for claudication, cyanosis, irregular heartbeat, near-syncope, orthopnea, palpitations, paroxysmal nocturnal dyspnea and syncope.   Respiratory: Negative for shortness of breath.    Endocrine: Negative for polyuria.   Hematologic/Lymphatic: Negative for bleeding problem.   Skin: Negative for itching and rash.   Musculoskeletal: Positive for joint pain and joint swelling. Negative for muscle cramps and muscle weakness.   Gastrointestinal: Negative for abdominal pain, hematemesis, hematochezia, melena, nausea and vomiting.   Genitourinary: Negative for dysuria and hematuria.   Neurological: Negative for dizziness, focal weakness, light-headedness and loss of balance.   Psychiatric/Behavioral: Negative for depression. The patient is not nervous/anxious.         Objective:    Physical Exam   Constitutional: She is oriented to person, place, and time. She appears well-developed and well-nourished.   HENT:   Head: Normocephalic and atraumatic.   Neck: Neck supple. No JVD present.   Cardiovascular: Normal rate and regular rhythm.    Murmur  heard.   Systolic murmur is present with a grade of 2/6   Pulses:       Carotid pulses are 2+ on the right side, and 2+ on the left side.       Radial pulses are 2+ on the right side, and 2+ on the left side.        Femoral pulses are 2+ on the right side, and 2+ on the left side.       Dorsalis pedis pulses are 2+ on the right side, and 2+ on the left side.        Posterior tibial pulses are 2+ on the right side, and 2+ on the left side.   Pulmonary/Chest: Effort normal and breath sounds normal.   Abdominal: Soft. Bowel sounds are normal.   Musculoskeletal: She exhibits edema.   Neurological: She is alert and oriented to person, place, and time.   Skin: Skin is warm and dry.   Psychiatric: She has a normal mood and affect. Her behavior is normal. Thought content normal.         Assessment:       1. Chest pain, unspecified type    2. Depression, unspecified depression type    3. Congestive heart failure, unspecified congestive heart failure chronicity, unspecified congestive heart failure type    4. Thyroid disease    5. Gastroesophageal reflux disease without esophagitis      60 y/o female with hx and presentation as above. CP is new and has been increasing in frequency and based on medical hx, will order non invasive cardiac stress testing.      Plan:       -Nuclear SPECT  -f/u in 6 months

## 2017-04-06 ENCOUNTER — HOSPITAL ENCOUNTER (OUTPATIENT)
Dept: RADIOLOGY | Facility: HOSPITAL | Age: 62
Discharge: HOME | End: 2017-04-06
Attending: INTERNAL MEDICINE
Payer: COMMERCIAL

## 2017-04-06 ENCOUNTER — HOSPITAL ENCOUNTER (OUTPATIENT)
Dept: CARDIOLOGY | Facility: HOSPITAL | Age: 62
Discharge: HOME | End: 2017-04-06
Attending: INTERNAL MEDICINE
Payer: COMMERCIAL

## 2017-04-06 ENCOUNTER — HOSPITAL ENCOUNTER (OUTPATIENT)
Dept: RADIOLOGY | Facility: HOSPITAL | Age: 62
Discharge: HOME | End: 2017-04-06
Attending: INTERNAL MEDICINE
Payer: MEDICAID

## 2017-04-06 DIAGNOSIS — I50.9 CONGESTIVE HEART FAILURE, UNSPECIFIED CONGESTIVE HEART FAILURE CHRONICITY, UNSPECIFIED CONGESTIVE HEART FAILURE TYPE: ICD-10-CM

## 2017-04-06 DIAGNOSIS — R07.9 CHEST PAIN, UNSPECIFIED TYPE: ICD-10-CM

## 2017-04-06 LAB — DIASTOLIC DYSFUNCTION: NO

## 2017-04-06 PROCEDURE — 93016 CV STRESS TEST SUPVJ ONLY: CPT | Mod: ,,, | Performed by: INTERNAL MEDICINE

## 2017-04-06 PROCEDURE — 78452 HT MUSCLE IMAGE SPECT MULT: CPT | Mod: TC,PO

## 2017-04-06 PROCEDURE — 93018 CV STRESS TEST I&R ONLY: CPT | Mod: ,,, | Performed by: INTERNAL MEDICINE

## 2017-04-06 RX ORDER — REGADENOSON 0.08 MG/ML
INJECTION, SOLUTION INTRAVENOUS
Status: DISPENSED
Start: 2017-04-06 | End: 2017-04-07

## 2017-04-07 ENCOUNTER — TELEPHONE (OUTPATIENT)
Dept: CARDIOLOGY | Facility: CLINIC | Age: 62
End: 2017-04-07

## 2017-04-10 ENCOUNTER — TELEPHONE (OUTPATIENT)
Dept: CARDIOLOGY | Facility: CLINIC | Age: 62
End: 2017-04-10

## 2017-04-18 ENCOUNTER — ANESTHESIA EVENT (OUTPATIENT)
Dept: ENDOSCOPY | Facility: HOSPITAL | Age: 62
End: 2017-04-18
Payer: COMMERCIAL

## 2017-04-18 ENCOUNTER — ANESTHESIA (OUTPATIENT)
Dept: ENDOSCOPY | Facility: HOSPITAL | Age: 62
End: 2017-04-18
Payer: MEDICAID

## 2017-04-18 ENCOUNTER — HOSPITAL ENCOUNTER (OUTPATIENT)
Facility: HOSPITAL | Age: 62
Discharge: HOME | End: 2017-04-18
Attending: INTERNAL MEDICINE | Admitting: INTERNAL MEDICINE
Payer: COMMERCIAL

## 2017-04-18 ENCOUNTER — SURGERY (OUTPATIENT)
Age: 62
End: 2017-04-18

## 2017-04-18 DIAGNOSIS — Z85.038 HISTORY OF COLON CANCER: Primary | ICD-10-CM

## 2017-04-18 LAB
GLUCOSE SERPL-MCNC: 88 MG/DL (ref 70–110)
POCT GLUCOSE: 88 MG/DL (ref 70–110)

## 2017-04-18 PROCEDURE — 43239 EGD BIOPSY SINGLE/MULTIPLE: CPT | Performed by: INTERNAL MEDICINE

## 2017-04-18 PROCEDURE — 82962 GLUCOSE BLOOD TEST: CPT | Performed by: INTERNAL MEDICINE

## 2017-04-18 PROCEDURE — 88305 TISSUE EXAM BY PATHOLOGIST: CPT | Mod: 26,,, | Performed by: PATHOLOGY

## 2017-04-18 PROCEDURE — 88305 TISSUE EXAM BY PATHOLOGIST: CPT | Performed by: PATHOLOGY

## 2017-04-18 PROCEDURE — 37000008 HC ANESTHESIA 1ST 15 MINUTES: Performed by: INTERNAL MEDICINE

## 2017-04-18 PROCEDURE — 63600175 PHARM REV CODE 636 W HCPCS: Performed by: NURSE ANESTHETIST, CERTIFIED REGISTERED

## 2017-04-18 PROCEDURE — 25000003 PHARM REV CODE 250: Performed by: NURSE ANESTHETIST, CERTIFIED REGISTERED

## 2017-04-18 PROCEDURE — 43239 EGD BIOPSY SINGLE/MULTIPLE: CPT | Mod: ,,, | Performed by: INTERNAL MEDICINE

## 2017-04-18 PROCEDURE — 45380 COLONOSCOPY AND BIOPSY: CPT | Mod: PT,,, | Performed by: INTERNAL MEDICINE

## 2017-04-18 PROCEDURE — 27201012 HC FORCEPS, HOT/COLD, DISP: Performed by: INTERNAL MEDICINE

## 2017-04-18 PROCEDURE — 37000009 HC ANESTHESIA EA ADD 15 MINS: Performed by: INTERNAL MEDICINE

## 2017-04-18 PROCEDURE — 25000003 PHARM REV CODE 250: Performed by: INTERNAL MEDICINE

## 2017-04-18 PROCEDURE — 45380 COLONOSCOPY AND BIOPSY: CPT | Performed by: INTERNAL MEDICINE

## 2017-04-18 RX ORDER — LIDOCAINE HCL/PF 100 MG/5ML
SYRINGE (ML) INTRAVENOUS
Status: DISCONTINUED | OUTPATIENT
Start: 2017-04-18 | End: 2017-04-18

## 2017-04-18 RX ORDER — PROPOFOL 10 MG/ML
VIAL (ML) INTRAVENOUS CONTINUOUS PRN
Status: DISCONTINUED | OUTPATIENT
Start: 2017-04-18 | End: 2017-04-18

## 2017-04-18 RX ORDER — SODIUM CHLORIDE 9 MG/ML
INJECTION, SOLUTION INTRAVENOUS CONTINUOUS
Status: DISCONTINUED | OUTPATIENT
Start: 2017-04-18 | End: 2017-04-18 | Stop reason: HOSPADM

## 2017-04-18 RX ORDER — PROPOFOL 10 MG/ML
VIAL (ML) INTRAVENOUS
Status: DISCONTINUED | OUTPATIENT
Start: 2017-04-18 | End: 2017-04-18

## 2017-04-18 RX ADMIN — SODIUM CHLORIDE: 0.9 INJECTION, SOLUTION INTRAVENOUS at 09:04

## 2017-04-18 RX ADMIN — LIDOCAINE HYDROCHLORIDE 100 MG: 20 INJECTION, SOLUTION INTRAVENOUS at 09:04

## 2017-04-18 RX ADMIN — PROPOFOL 125 MCG/KG/MIN: 10 INJECTION, EMULSION INTRAVENOUS at 09:04

## 2017-04-18 RX ADMIN — PROPOFOL 50 MG: 10 INJECTION, EMULSION INTRAVENOUS at 09:04

## 2017-04-18 RX ADMIN — TOPICAL ANESTHETIC 1 EACH: 200 SPRAY DENTAL; PERIODONTAL at 09:04

## 2017-04-18 NOTE — ANESTHESIA POSTPROCEDURE EVALUATION
Anesthesia Post Evaluation    Patient: Christine Castro    Procedure(s) Performed: Procedure(s) (LRB):  ESOPHAGOGASTRODUODENOSCOPY (EGD) (N/A)  COLONOSCOPY (N/A)    Final Anesthesia Type: MAC  Patient location during evaluation: GI PACU  Patient participation: Yes- Able to Participate  Level of consciousness: awake and alert and oriented  Post-procedure vital signs: reviewed and stable  Pain management: adequate  Airway patency: patent  PONV status at discharge: No PONV  Anesthetic complications: no      Cardiovascular status: blood pressure returned to baseline, hemodynamically stable and stable  Respiratory status: room air, unassisted and spontaneous ventilation  Hydration status: euvolemic  Follow-up not needed.        Visit Vitals    /64 (BP Location: Left arm, Patient Position: Lying, BP Method: Automatic)    Pulse 70    Temp 37 °C (98.6 °F) (Oral)    Resp 18    Ht 5' (1.524 m)    Wt 108.9 kg (240 lb)    SpO2 100%    Breastfeeding No    BMI 46.87 kg/m2       Pain/Maggy Score: Pain Assessment Performed: Yes (4/18/2017  8:54 AM)  Presence of Pain: denies (4/18/2017  8:54 AM)

## 2017-04-18 NOTE — DISCHARGE INSTRUCTIONS
Discharge Summary/Instructions for EGD and Colonoscopy  Christine Castro  4/18/2017  Corrina Flores MD    Restrictions on Activity:    - Do not drive car or operate machinery until the day after the procedure.  - The following day: return to full activity including work.  - For 3 days: No heavy lifting, straining or running.  - Diet: Eat and drink normally unless instructed otherwise.    Treatment for Common Side Effects:  - Mild abdominal pain and bloating or excessive gas: rest, eat lightly and use a heating pad.   -Sore Throat - treat with throat lozenges, gargle with warm salt water.    Symptoms to watch for and report to your physician:  1. Severe abdominal pain.  2. Fever within 24 hours after a procedure.  3. A large amount of rectal bleeding. (A small amount of blood from the rectum is not serious, especially if hemorrhoids are present.)  4. Because air was put into your colon during the procedure, expelling large amount of air from your rectum is normal.  5. You may not have a bowel movement for 1-3 days because of the colonoscopy prep. This is normal.  6. Go directly to the emergency room if you notice any of the following:     Chills and/orfever over 101   Persistent vomiting   Severe abdominal pain, other than gas cramps   Severe chest pain   Black, tarry stools   Any bleeding - exceeding one tablespoon    If you have any questions or problems, please call your Physician:    Corrina Flores MD    Lab Results: Contact Physician's Office    If a complication or emergency situation arises and you are unable to reach your Physician - GO TO THE EMERGENCY ROOM.

## 2017-04-18 NOTE — PLAN OF CARE
PT SLEEPING. EASILY AROUSED. NO C/O PAIN, DISCOMFORT,OR N/V.  PT TO EKG MONITOR.  PT CARE ASSUMED.

## 2017-04-18 NOTE — ANESTHESIA PREPROCEDURE EVALUATION
2017  Christine Castro is a 62 y.o., female for EGD & Colonoscopy    Review of patient's allergies indicates:   Allergen Reactions    Ibuprofen      Past Medical History:   Diagnosis Date    Arthritis     Asthma     Cancer     CHF (congestive heart failure)     [facility]    COPD (chronic obstructive pulmonary disease)     Coronary artery disease     Depression     Diabetes mellitus, type 2     GERD (gastroesophageal reflux disease)     Myocardial infarction     AGE 19 MIGUELANGEL WITHBABY DELIVERY    Thyroid disease      Past Surgical History:   Procedure Laterality Date     SECTION      CHOLECYSTECTOMY      COLON SURGERY      COLONOSCOPY      --- repeat in 3-5 years    ECTOPIC PREGNANCY SURGERY      GASTRIC BYPASS      ovary removed      TONSILLECTOMY      TUBAL LIGATION       Patient Active Problem List   Diagnosis    Arthritis    GERD (gastroesophageal reflux disease)    Thyroid disease    CHF (congestive heart failure)    Depression       Anesthesia Evaluation         Review of Systems    Wt Readings from Last 3 Encounters:   17 129.5 kg (285 lb 8 oz)   17 101.8 kg (224 lb 6.9 oz)   17 110.4 kg (243 lb 6.2 oz)     Temp Readings from Last 3 Encounters:   17 37.2 °C (98.9 °F) (Oral)   17 36.7 °C (98 °F) (Oral)   16 37 °C (98.6 °F)     BP Readings from Last 3 Encounters:   17 100/66   17 132/84   17 104/66     Pulse Readings from Last 3 Encounters:   17 85   17 96   17 95     Lab Results   Component Value Date    WBC 6.02 2016    HGB 11.0 (L) 2016    HCT 34.9 (L) 2016    MCV 99 (H) 2016     2016         Physical Exam  General:  Well nourished    Airway/Jaw/Neck:  Airway Findings: Mouth Opening: Normal Tongue: Normal  General Airway Assessment: Adult   Mallampati: II  Improves to II with phonation.  TM Distance: Normal, at least 6 cm            Mental Status:  Mental Status Findings:  Cooperative         Anesthesia Plan  Type of Anesthesia, risks & benefits discussed:  Anesthesia Type:  MAC  Patient's Preference:   Intra-op Monitoring Plan:   Intra-op Monitoring Plan Comments:   Post Op Pain Control Plan:   Post Op Pain Control Plan Comments:   Induction:   IV  Beta Blocker:         Informed Consent: Patient understands risks and agrees with Anesthesia plan.  Questions answered. Anesthesia consent signed with patient.  ASA Score: 3     Day of Surgery Review of History & Physical: I have interviewed and examined the patient. I have reviewed the patient's H&P dated:  There are no significant changes.  H&P update referred to the provider.  H&P completed by Anesthesiologist.       Ready For Surgery From Anesthesia Perspective.

## 2017-04-18 NOTE — H&P
History    Present Illness: GERD, History of colon cancer    Past History:  Past Medical History:   Diagnosis Date    Arthritis     Asthma     Cancer     CHF (congestive heart failure)     [facility]    COPD (chronic obstructive pulmonary disease)     Coronary artery disease     Depression     Diabetes mellitus, type 2     GERD (gastroesophageal reflux disease)     Myocardial infarction     AGE 19 MIGUELANGEL WITHBABY DELIVERY    Thyroid disease      Past Surgical History:   Procedure Laterality Date     SECTION      CHOLECYSTECTOMY      COLON SURGERY      COLONOSCOPY      --- repeat in 3-5 years    ECTOPIC PREGNANCY SURGERY      GASTRIC BYPASS      ovary removed      TONSILLECTOMY      TUBAL LIGATION         Medcation    Current Facility-Administered Medications:     0.9%  NaCl infusion, , Intravenous, Continuous, Corrina Flores MD    Facility-Administered Medications Ordered in Other Encounters:     triamcinolone acetonide injection 40 mg, 40 mg, Intra-articular, , Alon Santiago MD, 40 mg at 17 1352    triamcinolone acetonide injection 40 mg, 40 mg, Intra-articular, , Alon Santiago MD, 40 mg at 17 1353    Allergies  Review of patient's allergies indicates:   Allergen Reactions    Ibuprofen        Physical Examination  General & Mental: NAD  Heart:regular rate and rhythm, S1, S2 normal, no murmur, click, rub or gallop  Lungs: clear to auscultation, no wheezes or rales and unlabored breathing  Abdomen: soft, non-tender, without masses or organomegaly  Extremities: extremities normal, atraumatic, no cyanosis or edema      Pre-Op Diagnosis  1. History of colon cancer        Plan  1. EGD  2. Surveillance colonoscopy, risks/benefits explained in detail

## 2017-04-18 NOTE — IP AVS SNAPSHOT
[facility]  [address]  [place] [address]  Phone: [phone]           Patient Discharge Instructions   Our goal is to set you up for success. This packet includes information on your condition, medications, and your home care.  It will help you care for yourself to prevent having to return to the hospital.     Please ask your nurse if you have any questions.      There are many details to remember when preparing to leave the hospital. Here is what you will need to do:    1. Take your medicine. If you are prescribed medications, review your Medication List on the following pages. You may have new medications to  at the pharmacy and others that you'll need to stop taking. Review the instructions for how and when to take your medications. Talk with your doctor or nurses if you are unsure of what to do.     2. Go to your follow-up appointments. Specific follow-up information is listed in the following pages. Your may be contacted by a nurse or clinical provider about future appointments. Be sure we have all of the phone numbers to reach you. Please contact your provider's office if you are unable to make an appointment.     3. Watch for warning signs. Your doctor or nurse will give you detailed warning signs to watch for and when to call for assistance. These instructions may also include educational information about your condition. If you experience any of warning signs to your health, call your doctor.               ** Verify the list of medication(s) below is accurate and up to date. Carry this with you in case of emergency. If your medications have changed, please notify your healthcare provider.             Medication List      ASK your doctor about these medications        Additional Info                      albuterol 90 mcg/actuation inhaler   Quantity:  1 Inhaler   Refills:  1   Dose:  2 puff    Instructions:  Inhale 2 puffs into the lungs every 4 (four) hours as needed for  Wheezing.     Begin Date    AM    Noon    PM    Bedtime       buPROPion 150 MG TBSR 12 hr tablet   Commonly known as:  WELLBUTRIN SR   Quantity:  90 tablet   Refills:  3   Dose:  150 mg    Instructions:  Take 1 tablet (150 mg total) by mouth once daily.     Begin Date    AM    Noon    PM    Bedtime       busPIRone 5 MG Tab   Commonly known as:  BUSPAR   Quantity:  90 tablet   Refills:  2   Dose:  5 mg    Instructions:  Take 1 tablet (5 mg total) by mouth once daily.     Begin Date    AM    Noon    PM    Bedtime       diclofenac sodium 1 % Gel   Quantity:  100 g   Refills:  3    Instructions:  APPLY 2 GRAMS EXTERNALLY TO THE AFFECTED AREA FOUR TIMES DAILY     Begin Date    AM    Noon    PM    Bedtime       escitalopram oxalate 20 MG tablet   Commonly known as:  LEXAPRO   Quantity:  90 tablet   Refills:  0    Instructions:  TAKE 1 TABLET BY MOUTH ONCE DAILY     Begin Date    AM    Noon    PM    Bedtime       fluticasone-vilanterol 100-25 mcg/dose diskus inhaler   Commonly known as:  BREO ELLIPTA   Quantity:  1 each   Refills:  3   Dose:  1 puff    Instructions:  Inhale 1 puff into the lungs once daily.     Begin Date    AM    Noon    PM    Bedtime       furosemide 20 MG tablet   Commonly known as:  LASIX   Quantity:  90 tablet   Refills:  0    Instructions:  TAKE 1 TABLET BY MOUTH EVERY DAY CONTINUOUS AS NEEDED     Begin Date    AM    Noon    PM    Bedtime       gabapentin 100 MG capsule   Commonly known as:  NEURONTIN   Quantity:  90 capsule   Refills:  2   Dose:  100 mg    Instructions:  Take 1 capsule (100 mg total) by mouth 3 (three) times daily.     Begin Date    AM    Noon    PM    Bedtime       glipiZIDE 2.5 MG Tr24   Commonly known as:  GLUCOTROL   Quantity:  90 tablet   Refills:  0    Instructions:  TAKE 1 TABLET BY MOUTH DAILY WITH BREAKFAST     Begin Date    AM    Noon    PM    Bedtime       levothyroxine 88 MCG tablet   Commonly known as:  SYNTHROID   Quantity:  90 tablet   Refills:  3    Instructions:   TAKE 1 TABLET BY MOUTH BEFORE BREAKFAST     Begin Date    AM    Noon    PM    Bedtime       lidocaine 5 %   Commonly known as:  LIDODERM   Refills:  0   Dose:  1 patch    Instructions:  Place 1 patch onto the skin.     Begin Date    AM    Noon    PM    Bedtime       omeprazole 40 MG capsule   Commonly known as:  PRILOSEC   Quantity:  90 capsule   Refills:  3    Instructions:  TAKE 1 CAPSULE BY MOUTH EVERY MORNING     Begin Date    AM    Noon    PM    Bedtime       ondansetron 4 MG tablet   Commonly known as:  ZOFRAN   Quantity:  12 tablet   Refills:  0    Instructions:  TAKE 1 TABLET BY MOUTH EVERY 8 HOURS AS NEEDED FOR NAUSEA     Begin Date    AM    Noon    PM    Bedtime       oxycodone-acetaminophen 7.5-325 mg per tablet   Commonly known as:  PERCOCET   Refills:  0    Instructions:  TK 1 T PO  TID     Begin Date    AM    Noon    PM    Bedtime       potassium chloride 10 MEQ Tbsr   Commonly known as:  KLOR-CON   Quantity:  180 tablet   Refills:  0   Comments:  **Patient requests 90 days supply**    Instructions:  TAKE 2 TABLETS(20 MEQ) BY MOUTH ONCE DAILY     Begin Date    AM    Noon    PM    Bedtime       promethazine 25 MG tablet   Commonly known as:  PHENERGAN   Quantity:  30 tablet   Refills:  0    Instructions:  TAKE 1 TABLET BY MOUTH EVERY 6 HOURS AS NEEDED FOR NAUSEA     Begin Date    AM    Noon    PM    Bedtime       quetiapine 25 MG Tab   Commonly known as:  SEROQUEL   Quantity:  30 tablet   Refills:  5    Instructions:  TAKE 1 TABLET BY MOUTH EVERY EVENING     Begin Date    AM    Noon    PM    Bedtime       zolpidem 10 mg Tab   Commonly known as:  AMBIEN   Quantity:  30 tablet   Refills:  2    Instructions:  TAKE 1 TABLET BY MOUTH EVERY NIGHT AT BEDTIME     Begin Date    AM    Noon    PM    Bedtime                  Please bring to all follow up appointments:    1. A copy of your discharge instructions.  2. All medicines you are currently taking in their original bottles.  3. Identification and insurance  card.    Please arrive 15 minutes ahead of scheduled appointment time.    Please call 24 hours in advance if you must reschedule your appointment and/or time.            Discharge Instructions       Discharge Summary/Instructions for EGD and Colonoscopy  Christine Castro  4/18/2017  Corrina Flores MD    Restrictions on Activity:    - Do not drive car or operate machinery until the day after the procedure.  - The following day: return to full activity including work.  - For 3 days: No heavy lifting, straining or running.  - Diet: Eat and drink normally unless instructed otherwise.    Treatment for Common Side Effects:  - Mild abdominal pain and bloating or excessive gas: rest, eat lightly and use a heating pad.   -Sore Throat - treat with throat lozenges, gargle with warm salt water.    Symptoms to watch for and report to your physician:  1. Severe abdominal pain.  2. Fever within 24 hours after a procedure.  3. A large amount of rectal bleeding. (A small amount of blood from the rectum is not serious, especially if hemorrhoids are present.)  4. Because air was put into your colon during the procedure, expelling large amount of air from your rectum is normal.  5. You may not have a bowel movement for 1-3 days because of the colonoscopy prep. This is normal.  6. Go directly to the emergency room if you notice any of the following:     Chills and/orfever over 101   Persistent vomiting   Severe abdominal pain, other than gas cramps   Severe chest pain   Black, tarry stools   Any bleeding - exceeding one tablespoon    If you have any questions or problems, please call your Physician:    Corrina Flores MD    Lab Results: Contact Physician's Office    If a complication or emergency situation arises and you are unable to reach your Physician - GO TO THE EMERGENCY ROOM.          Admission Information     Date & Time Provider Department CSN    4/18/2017  7:51 AM Corrina Flores MD [facility] [id number]       Care Providers     Provider Role Specialty Primary office phone    Corrina Flores MD Attending Provider Gastroenterology [phone]    Corrina Flores MD Surgeon  Gastroenterology [phone]      Your Vitals Were     BP Pulse Temp Resp Height Weight    137/63 (BP Location: Right leg, Patient Position: Lying, BP Method: Automatic) 76 98.6 °F (37 °C) (Oral) 20 5' (1.524 m) 108.9 kg (240 lb)    SpO2 BMI             100% 46.87 kg/m2         Recent Lab Values        1/7/2015 4/1/2015 12/4/2015 6/9/2016 3/9/2017              10:10 AM  2:20 PM 11:35 AM  5:57 PM  1:55 PM       A1C 5.3 5.4 5.2 5.3 5.4       Comment for A1C at  1:55 PM on 3/9/2017:  According to ADA guidelines, hemoglobin A1C <7.0% represents  optimal control in non-pregnant diabetic patients.  Different  metrics may apply to specific populations.   Standards of Medical Care in Diabetes - 2016.  For the purpose of screening for the presence of diabetes:  <5.7%     Consistent with the absence of diabetes  5.7-6.4%  Consistent with increasing risk for diabetes   (prediabetes)  >or=6.5%  Consistent with diabetes  Currently no consensus exists for use of hemoglobin A1C  for diagnosis of diabetes for children.        Pending Labs     Order Current Status    Specimen to Pathology - Surgery Collected (04/18/17 0944)      Allergies as of 4/18/2017        Reactions    Ibuprofen       [facility]     [facility] Nurse Care Line - 24/7 Assistance  Unless otherwise directed by your provider, please contact [facility], our nurse care line that is available for 24/7 assistance.     Registered nurses in the [facility] provide clinical advisement, health education, appointment booking, and other advisory services.  Call for this free service at [phone].        Advance Directives     An advance directive is a document which, in the event you are no longer able to make decisions for yourself, tells your healthcare team what kind of  treatment you do or do not want to receive, or who you would like to make those decisions for you.  If you do not currently have an advance directive, [facility] encourages you to create one.  For more information call:  [phone]), [phone] ([phone]),  or log on to [email or web address].        Language Assistance Services     ATTENTION: Language assistance services are available, free of charge. Please call [phone].      ATENCIÓN: Si habla español, tiene a lopez disposición servicios gratuitos de asistencia lingüística. Llame al [phone].     CHÚ Ý: N?u b?n nói Ti?ng Vi?t, có các d?ch v? h? tr? ngôn ng? mi?n phí dành cho b?n. G?i s? [phone].        Heart Failure Education       Heart Failure: Being Active  You have a condition called heart failure. Being active doesnt mean that you have to wear yourself out. Even a little movement each day helps to strengthen your heart. If you cant get out to exercise, you can do simple stretching and strengthening exercises at home. These are good ways to keep you well-conditioned and prevent you and your heart from becoming excessively weak.    Ideas to get you started  · Add a little movement to things you do now. Walk to mail letters. Park your car at the far end of the parking lot and walk to the store. Walk up a flight of stairs instead of taking the elevator.  · Choose activities you enjoy. You might walk, swim, or ride an exercise bike. Things like gardening and washing the car count, too. Other possibilities include: washing dishes, walking the dog, walking around the mall, and doing aerobic activities with friends.  · Join a group exercise program at a [facility], a senior center, or a community center. Or look into a hospital cardiac rehabilitation program. Ask your doctor if you qualify.  Tips to keep you going  · Get up and get dressed each day. Go to a coffee shop and read a newspaper or go somewhere that you'll be in  the presence of other active people. Youll feel more like being active.  · Make a plan. Choose one or more activities that you enjoy and that you can easily do. Then plan to do at least one each day. You might write your plan on a calendar.  · Go with a friend or a group if you like company. This can help you feel supported and stay motivated, too.  · Plan social events that you enjoy. This will keep you mentally engaged as well as physically motivated to do things you find pleasure in.  For your safety  · Talk with your healthcare provider before starting an exercise program.  · Exercise indoors when its too hot or too cold outside, or when the air quality is poor. Try walking at a shopping mall.  · Wear socks and sturdy shoes to maintain your balance and prevent falls.  · Start slowly. Do a few minutes several times a day at first. Increase your time and speed little by little.  · Stop and rest whenever you feel tired or get short of breath.  · Dont push yourself on days when you dont feel well.  Date Last Reviewed: 3/20/2016  © 8083-1287 [employer]. [address] [place] [address]. All rights reserved. This information is not intended as a substitute for professional medical care. Always follow your healthcare professional's instructions.              Heart Failure: Evaluating Your Heart  You have a condition called heart failure. To evaluate your condition, your doctor will examine you, ask questions, and do some tests. Along with looking for signs of heart failure, the doctor looks for any other health problems that may have led to heart failure. The results of your evaluation will help your doctor form a treatment plan.  Health history and physical exam  Your visit will start with a health history. Tell the doctor about any symptoms youve noticed and about all medicines you take. Then youll have a physical exam. This includes listening to your heartbeat and breathing. Yousocorro also  be checked for swelling (edema) in your legs and neck. When you have fluid buildup or fluid in the lungs, it may be called congestive heart failure.  Diagnosing heart failure     During an echocardiogram, sound waves bounce off the heart. These are converted into a picture on the screen.   The following may be done to help your doctor form a diagnosis:  · X-rays show the size and shape of your heart. These pictures can also show fluid in your lungs.  · An electrocardiogram (ECG or EKG) shows the pattern of your heartbeat. Small pads (electrodes) are placed on your chest, arms, and legs. Wires connect the pads to the ECG machine, which records your hearts electrical signals. This can give the doctor information about heart function.  · An echocardiogram uses ultrasound waves to show the structure and movement of your heart muscle. This shows how well the heart pumps. It also shows the thickness of the heart walls, and if the heart is enlarged. It is one of the most useful, non-invasive tests as it provides information about the heart's general function. This helps your doctor make treatment decisions.  · Lab tests evaluate small amounts of blood or urine for signs of problems. A BNP lab test can help diagnose and evaluate heart failure. BNP stands for B-type natriuretic peptide. The ventricles secrete more BNP when heart failure worsens. Lab tests can also provide information about metabolic dysfunction or heart dysfunction.  Your treatment plan  Based on the results of your evaluation and tests, your doctor will develop a treatment plan. This plan is designed to relieve some of your heart failure symptoms and help make you more comfortable. Your treatment plan may include:  · Medicine to help your heart work better and improve your quality of life  · Changes in what you eat and drink to help prevent fluid from backing up in your body  · Daily monitoring of your weight and heart failure symptoms to see how well your  treatment plan is working  · Exercise to help you stay healthy  · Help with quitting smoking  · Emotional and psychological support to help adjust to the changes  · Referrals to other specialists to make sure you are being treated comprehensively  Date Last Reviewed: 3/21/2016  © 6993-7711 [employer]. [address] [place] [address], [place] [address]. All rights reserved. This information is not intended as a substitute for professional medical care. Always follow your healthcare professional's instructions.              Heart Failure: Making Changes to Your Diet  You have a condition called heart failure. When you have heart failure, excess fluid is more likely to build up in your body because your heart isn't working well. This makes the heart work harder to pump blood. Fluid buildup causes symptoms such as shortness of breath and swelling (edema). This is often referred to as congestive heart failure or CHF. Controlling the amount of salt (sodium) you eat may help stop fluid from building up. Your doctor may also tell you to reduce the amount of fluid you drink.  Reading food labels    Your healthcare provider will tell you how much sodium you can eat each day. Read food labels to keep track. Keep in mind that certain foods are high in salt. These include canned, frozen, and processed foods. Check the amount of sodium in each serving. Watch out for high-sodium ingredients. These include MSG (monosodium glutamate), baking soda, and sodium phosphate.   Eating less salt  Give yourself time to get used to eating less salt. It may take a little while. Here are some tips to help:  · Take the saltshaker off the table. Replace it with salt-free herb mixes and spices.  · Eat fresh or plain frozen vegetables. These have much less salt than canned vegetables.  · Choose low-sodium snacks like sodium-free pretzels, crackers, or air-popped popcorn.  · Dont add salt to your food when youre cooking. Instead, season your  foods with pepper, lemon, garlic, or onion.  · When you eat out, ask that your food be cooked without added salt.  · Avoid eating fried foods as these often have a great deal of salt.  If youre told to limit fluids  You may need to limit how much fluid you have to help prevent swelling. This includes anything that is liquid at room temperature, such as ice cream and soup. If your doctor tells you to limit fluid, try these tips:  · Measure drinks in a measuring cup before you drink them. This will help you meet daily goals.  · Chill drinks to make them more refreshing.  · Suck on frozen lemon wedges to quench thirst.  · Only drink when youre thirsty.  · Chew sugarless gum or suck on hard candy to keep your mouth moist.  · Weigh yourself daily to know if your body's fluid content is rising.  My sodium goal  Your healthcare provider may give you a sodium goal to meet each day. This includes sodium found in food as well as salt that you add. My goal is to eat no more than ___________ mg of sodium per day.     When to call your doctor  Call your doctor right away if you have any symptoms of worsening heart failure. These can include:  · Sudden weight gain  · Increased swelling of your legs or ankles  · Trouble breathing when youre resting or at night  · Increase in the number of pillows you have to sleep on  · Chest pain, pressure, discomfort, or pain in the jaw, neck, or back   Date Last Reviewed: 3/21/2016  © 2166-6977 [employer]. [address] [place] [address], [place] [address]. All rights reserved. This information is not intended as a substitute for professional medical care. Always follow your healthcare professional's instructions.              Heart Failure: Medicines to Help Your Heart    You have a condition called heart failure (also known as congestive heart failure, or CHF). Your doctor will likely prescribe medicines for heart failure and any underlying health problems you have. Most heart  failure patients take one or more types of medicinen. Your healthcare provider will work to find the combination of medicines that works best for you.  Heart failure medicines  Here are the most common heart failure medicines:  · ACE inhibitors lower blood pressure and decrease strain on the heart. This makes it easier for the heart to pump. Angiotensin receptor blockers have similar effects. These are prescribed for some patients instead of ACE inhibitors.  · Beta-blockers relieve stress on the heart. They also improve symptoms. They may also improve the heart's pumping action over time.  · Diuretics (also called water pills) help rid your body of excess water. This can help rid your body of swelling (edema). Having less fluid to pump means your heart doesnt have to work as hard. Some diuretics make your body lose a mineral called potassium. Your doctor will tell you if you need to take supplements or eat more foods high in potassium.  · Digoxin helps your heart pump with more strength. This helps your heart pump more blood with each beat. So, more oxygen-rich blood travels to the rest of the body.  · Aldosterone antagonists help alter hormones and decrease strain on the heart.  · Hydralazine and nitrates are two separate medicines used together to treat heart failure. They may come in one combination pill. They lower blood pressure and decrease how hard the heart has to pump.  Medicines for related conditions  Controlling other heart problems helps keep heart failure under control, too. Depending on other heart problems you have, medicines may be prescribed to:  · Lower blood pressure (antihypertensives).  · Lower cholesterol levels (statins).  · Prevent blood clots (anticoagulants or aspirin).  · Keep the heartbeat steady (antiarrhythmics).  Date Last Reviewed: 3/5/2016  © 1952-3879 [employer]. [address] [place] [address], [place] [address]. All rights reserved. This information is not intended as  a substitute for professional medical care. Always follow your healthcare professional's instructions.              Heart Failure: Procedures That May Help    The heart is a muscle that pumps oxygen-rich blood to all parts of the body. When you have heart failure, the heart is not able to pump as well as it should. Blood and fluid may back up into the lungs (congestive heart failure), and some parts of the body dont get enough oxygen-rich blood to work normally. These problems lead to the symptoms of heart failure.     Certain procedures may help the heart pump better in some cases of heart failure. Some procedures are done to treat health problems that may have caused the heart failure such as coronary artery disease or heart rhythm problems. For more serious heart failure, other options are available.  Treating artery and valve problems  If you have coronary artery disease or valve disease, procedures may be done to improve blood flow. This helps the heart pump better, which can improve heart failure symptoms. First, your doctor may do a cardiac catheterization to help detect clogged blood vessels or valve damage. During this procedure, a  thin tube (catheter) in inserted into a blood vessel and guided to the heart. There a dye is injected and a special type of X-ray (angiogram) is taken of the blood vessels. Procedures to open a blocked artery or fix damaged valves can also be done using catheterization.  · Angioplasty uses a balloon-tipped instrument at the end of the catheter. The balloon is inflated to widen the narrowed artery. In many cases, a stent is expanded to further support the narrowed artery. A stent is a metal mesh tube.  · Valve surgery repairs or replacement of faulty valves can also be done during catheterization so blood can flow properly through the chambers of the heart.  Bypass surgery is another option to help treat blocked arteries. It uses a healthy blood vessel from elsewhere in the  body. The healthy blood vessel is attached above and below the blocked area so that blood can flow around the blocked artery.  Treating heart rhythm problems  A device may be placed in the chest to help a weak heart maintain a healthy, heartbeat so the heart can pump more effectively:  · Pacemaker. A pacemaker is an implanted device that regulates your heartbeat electronically. It monitors your heart's rhythm and generates a painless electric impulse that helps the heart beat in a regular rhythm. A pacemaker is programmed to meet your specific heart rhythm needs.  · Biventricular pacing/cardiac resynchronization therapy. A type of pacemaker that paces both pumping chambers of the heart at the same time to coordinate contractions and to improve the heart's function. Some people with heart failure are candidates for this therapy.  · Implantable cardioverter defibrillator. A device similar to a pacemaker that senses when the heart is beating too fast and delivers an electrical shock to convert the fast rhythm to a normal rhythm. This can be a life saving device.  In severe cases  In more serious cases of heart failure when other treatments no longer work, other options may include:  · Ventricular assist devices (VADs). These are mechanical devices used to take over the pumping function for one or both of the heart's ventricles, or pumping chambers. A VAD may be necessary when heart failure progresses to the point that medicines and other treatments no longer help. In some cases, a VAD may be used as a bridge to transplant.  · Heart transplant. This is replacing the diseased heart with a healthy one from a donor. This is an option for a few people who are very sick. A heart transplant is very serious and not an option for all patients. Your doctor can tell you more.  Date Last Reviewed: 3/20/2016  © 1765-9175 [employer]. [address] [place] [address], [place] [address]. All rights reserved. This information is  not intended as a substitute for professional medical care. Always follow your healthcare professional's instructions.              Heart Failure: Tracking Your Weight  You have a condition called heart failure. When you have heart failure, a sudden weight gain or a steady rise in weight is a warning sign that your body is retaining too much water and salt. This could mean your heart failure is getting worse. If left untreated, it can cause problems for your lungs and result in shortness of breath. Weighing yourself each day is the best way to know if youre retaining water. If your weight goes up quickly, call your doctor. You will be given instructions on how to get rid of the excess water. You will likely need medicines and to avoid salt. This will help your heart work better.  Call your doctor if you gain more than 2 pounds in 1 day, more than 5 pounds in 1 week, or whatever weight gain you were told to report by your doctor. This is often a sign of worsening heart failure and needs to be evaluated and treated. Your doctor will tell you what to do next.   Tips for weighing yourself    · Weigh yourself at the same time each morning, wearing the same clothes. Weigh yourself after urinating and before eating.  · Use the same scale each day. Make sure the numbers are easy to read. Put the scale on a flat, hard surface -- not on a rug or carpet.  · Do not stop weighing yourself. If you forget one day, weigh again the next morning.  How to use your weight chart  · Keep your weight chart near the scale. Write your weight on the chart as soon as you get off the scale.  · Fill in the month and the start date on the chart. Then write down your weight each day. Your chart will look like this:    · If you miss a day, leave the space blank. Weigh yourself the next day and write your weight in the next space.  · Take your weight chart with you when you go to see your doctor.  Date Last Reviewed: 3/20/2016  © 9074-5343 [employer]. [address] [place] [address], [place] [address]. All rights reserved. This information is not intended as a substitute for professional medical care. Always follow your healthcare professional's instructions.              Heart Failure: Warning Signs of a Flare-Up  You have a condition called heart failure. Once you have heart failure, flare-ups can happen. Below are signs that can mean your heart failure is getting worse. If you notice any of these warning signs, call your healthcare provider.  Swelling    · Your feet, ankles, or lower legs get puffier.  · You notice skin changes on your lower legs.  · Your shoes feel too tight.  · Your clothes are tighter in the waist.  · You have trouble getting rings on or off your fingers.  Shortness of breath  · You have to breathe harder even when youre doing your normal activities or when youre resting.  · You are short of breath walking up stairs or even short distances.  · You wake up at night short of breath or coughing.  · You need to use more pillows or sit up to sleep.  · You wake up tired or restless.  Other warning signs  · You feel weaker, dizzy, or more tired.  · You have chest pain or changes in your heartbeat.  · You have a cough that wont go away.  · You cant remember things or dont feel like eating.  Tracking your weight  Gaining weight is often the first warning sign that heart failure is getting worse. Gaining even a few pounds can be a sign that your body is retaining excess water and salt. Weighing yourself each day in the morning after you urinate and before you eat, is the best way to know if you're retaining water. Get a scale that is easy to read and make sure you wear the same clothes and use the same scale every time you weigh. Your healthcare provider will show you how to track your weight. Call your doctor if you gain more than 2 pounds in 1 day, 5 pounds in 1 week, or whatever weight gain you were told to report by your  doctor. This is often a sign of worsening heart failure and needs to be evaluated and treated before it compromises your breathing. Your doctor will tell you what to do next.    Date Last Reviewed: 3/15/2016  © 9599-9811 [employer]. [address] [place] [address], [place] [address]. All rights reserved. This information is not intended as a substitute for professional medical care. Always follow your healthcare professional's instructions.              [facility] Sign-Up     Activating your [facility] account is as easy as 1-2-3!     1) Visit [email or web address], select Sign Up Now, enter this activation code and your date of birth, then select Next.  [email or web address]  Expires: 4/30/2017  3:19 PM      2) Create a username and password to use when you visit [facility] in the future and select a security question in case you lose your password and select Next.    3) Enter your e-mail address and click Sign Up!    Additional Information  If you have questions, please e-mail [email or web address] or call [phone] to talk to our [facility] staff. Remember, [facility] is NOT to be used for urgent needs. For medical emergencies, dial 911.          [facility] complies with applicable Federal civil rights laws and does not discriminate on the basis of race, color, national origin, age, disability, or sex.

## 2017-04-18 NOTE — TRANSFER OF CARE
Anesthesia Transfer of Care Note    Patient: Christine Castro    Procedure(s) Performed: Procedure(s) (LRB):  ESOPHAGOGASTRODUODENOSCOPY (EGD) (N/A)  COLONOSCOPY (N/A)    Patient location: GI    Anesthesia Type: MAC    Transport from OR: Transported from OR on room air with adequate spontaneous ventilation    Post pain: adequate analgesia    Post assessment: no apparent anesthetic complications    Post vital signs: stable    Level of consciousness: awake, alert and oriented    Nausea/Vomiting: no nausea/vomiting    Complications: none          Last vitals:   Visit Vitals    /64 (BP Location: Left arm, Patient Position: Lying, BP Method: Automatic)    Pulse 70    Temp 37 °C (98.6 °F) (Oral)    Resp 18    Ht 5' (1.524 m)    Wt 108.9 kg (240 lb)    SpO2 100%    Breastfeeding No    BMI 46.87 kg/m2

## 2017-04-20 VITALS
BODY MASS INDEX: 47.12 KG/M2 | SYSTOLIC BLOOD PRESSURE: 119 MMHG | WEIGHT: 240 LBS | TEMPERATURE: 98 F | OXYGEN SATURATION: 99 % | HEART RATE: 64 BPM | RESPIRATION RATE: 20 BRPM | DIASTOLIC BLOOD PRESSURE: 99 MMHG | HEIGHT: 60 IN

## 2017-04-20 RX ORDER — ZOLPIDEM TARTRATE 5 MG/1
TABLET ORAL
Qty: 30 TABLET | Refills: 0 | Status: SHIPPED | OUTPATIENT
Start: 2017-04-20 | End: 2017-05-10 | Stop reason: SDUPTHER

## 2017-04-20 RX ORDER — PREDNISONE 5 MG/1
TABLET ORAL
Qty: 30 TABLET | Refills: 0 | OUTPATIENT
Start: 2017-04-20

## 2017-04-20 RX ORDER — BUPROPION HYDROCHLORIDE 150 MG/1
TABLET, EXTENDED RELEASE ORAL
Qty: 90 TABLET | Refills: 0 | Status: SHIPPED | OUTPATIENT
Start: 2017-04-20 | End: 2017-05-16 | Stop reason: SDUPTHER

## 2017-04-21 RX ORDER — ESCITALOPRAM OXALATE 20 MG/1
TABLET ORAL
Qty: 90 TABLET | Refills: 0 | Status: SHIPPED | OUTPATIENT
Start: 2017-04-21 | End: 2017-10-18

## 2017-04-23 RX ORDER — DICLOFENAC SODIUM 10 MG/G
GEL TOPICAL
Qty: 100 G | Refills: 0 | Status: SHIPPED | OUTPATIENT
Start: 2017-04-23 | End: 2018-02-15

## 2017-04-24 RX ORDER — PROMETHAZINE HYDROCHLORIDE 25 MG/1
TABLET ORAL
Qty: 90 TABLET | Refills: 0 | Status: SHIPPED | OUTPATIENT
Start: 2017-04-24 | End: 2017-05-16 | Stop reason: SDUPTHER

## 2017-04-24 RX ORDER — PREDNISONE 5 MG/1
5 TABLET ORAL DAILY
Qty: 30 TABLET | Refills: 1 | Status: SHIPPED | OUTPATIENT
Start: 2017-04-24 | End: 2017-07-06 | Stop reason: SDUPTHER

## 2017-05-01 RX ORDER — GLIPIZIDE 2.5 MG/1
TABLET, EXTENDED RELEASE ORAL
Qty: 90 TABLET | Refills: 2 | Status: SHIPPED | OUTPATIENT
Start: 2017-05-01 | End: 2018-01-23 | Stop reason: SDUPTHER

## 2017-05-10 RX ORDER — DICLOFENAC SODIUM 10 MG/G
GEL TOPICAL
Qty: 100 G | Refills: 2 | Status: SHIPPED | OUTPATIENT
Start: 2017-05-10 | End: 2017-05-16 | Stop reason: SDUPTHER

## 2017-05-10 RX ORDER — ZOLPIDEM TARTRATE 5 MG/1
TABLET ORAL
Qty: 30 TABLET | Refills: 0 | Status: SHIPPED | OUTPATIENT
Start: 2017-05-20 | End: 2017-05-16 | Stop reason: DRUGHIGH

## 2017-05-15 ENCOUNTER — TELEPHONE (OUTPATIENT)
Dept: GASTROENTEROLOGY | Facility: CLINIC | Age: 62
End: 2017-05-15

## 2017-05-15 NOTE — TELEPHONE ENCOUNTER
----- Message from Corrina Flores MD sent at 5/15/2017 11:34 AM CDT -----  Three tubular adenomas, 3 year colonoscopy recall. Biopsies in the distal esophagus consistent with reflux, no evidence of barretts esophagus. I would have her continue the prilosec for now, f/u in 6-8 weeks

## 2017-05-15 NOTE — TELEPHONE ENCOUNTER
Spoke with patient to inform her of results. Patient was scheduled appointment and was made aware of date, time, and location. Verbalized understanding.    ----- Message from Corrina Flores MD sent at 5/15/2017 11:34 AM CDT -----  Three tubular adenomas, 3 year colonoscopy recall. Biopsies in the distal esophagus consistent with reflux, no evidence of barretts esophagus. I would have her continue the prilosec for now, f/u in 6-8 weeks

## 2017-05-16 ENCOUNTER — OFFICE VISIT (OUTPATIENT)
Dept: FAMILY MEDICINE | Facility: CLINIC | Age: 62
End: 2017-05-16
Payer: COMMERCIAL

## 2017-05-16 VITALS
HEART RATE: 91 BPM | HEIGHT: 60 IN | BODY MASS INDEX: 47.22 KG/M2 | DIASTOLIC BLOOD PRESSURE: 78 MMHG | WEIGHT: 240.5 LBS | TEMPERATURE: 98 F | SYSTOLIC BLOOD PRESSURE: 136 MMHG | OXYGEN SATURATION: 97 %

## 2017-05-16 DIAGNOSIS — K21.9 GASTROESOPHAGEAL REFLUX DISEASE WITHOUT ESOPHAGITIS: ICD-10-CM

## 2017-05-16 DIAGNOSIS — G56.20 LESION OF ULNAR NERVE, UNSPECIFIED LATERALITY: ICD-10-CM

## 2017-05-16 DIAGNOSIS — M19.90 ARTHRITIS: ICD-10-CM

## 2017-05-16 DIAGNOSIS — E11.9 TYPE 2 DIABETES MELLITUS WITHOUT COMPLICATION, WITHOUT LONG-TERM CURRENT USE OF INSULIN: Primary | ICD-10-CM

## 2017-05-16 DIAGNOSIS — E66.9 OBESITY, UNSPECIFIED OBESITY SEVERITY, UNSPECIFIED OBESITY TYPE: ICD-10-CM

## 2017-05-16 DIAGNOSIS — M17.0 ARTHRITIS OF BOTH KNEES: ICD-10-CM

## 2017-05-16 DIAGNOSIS — F41.8 INSOMNIA SECONDARY TO DEPRESSION WITH ANXIETY: ICD-10-CM

## 2017-05-16 DIAGNOSIS — I50.9 CONGESTIVE HEART FAILURE, UNSPECIFIED CONGESTIVE HEART FAILURE CHRONICITY, UNSPECIFIED CONGESTIVE HEART FAILURE TYPE: ICD-10-CM

## 2017-05-16 DIAGNOSIS — E11.9 TYPE 2 DIABETES MELLITUS WITHOUT COMPLICATION: ICD-10-CM

## 2017-05-16 DIAGNOSIS — F51.05 INSOMNIA SECONDARY TO DEPRESSION WITH ANXIETY: ICD-10-CM

## 2017-05-16 LAB
CREAT UR-MCNC: 218 MG/DL
MICROALBUMIN UR DL<=1MG/L-MCNC: 16 UG/ML
MICROALBUMIN/CREATININE RATIO: 7.3 UG/MG

## 2017-05-16 PROCEDURE — 82570 ASSAY OF URINE CREATININE: CPT

## 2017-05-16 PROCEDURE — 99214 OFFICE O/P EST MOD 30 MIN: CPT | Mod: S$GLB,,, | Performed by: FAMILY MEDICINE

## 2017-05-16 NOTE — MR AVS SNAPSHOT
[address]  [place] [address]  Phone: [phone]  Fax: [phone]                  Christine Castro   2017 3:20 PM   Office Visit    Description:  Female : 1955   Provider:  Alon Santiago MD   Department:  [facility]           Reason for Visit     Follow-up           Diagnoses this Visit        Comments    Type 2 diabetes mellitus without complication, unspecified long term insulin use status    -  Primary     Depression, unspecified depression type         Gastroesophageal reflux disease without esophagitis         Arthritis of both knees         Congestive heart failure, unspecified congestive heart failure chronicity, unspecified congestive heart failure type         Lesion of ulnar nerve, unspecified laterality         Arthritis         Obesity, unspecified obesity severity, unspecified obesity type         Insomnia secondary to depression with anxiety                To Do List           Future Appointments        Provider Department Dept Phone    2017 1:00 PM Corrina Flores MD [facility] [phone]      Goals (5 Years of Data)     None      [facility]     [facility] On Call Nurse Care Line -  Assistance  Unless otherwise directed by your provider, please contact [facility] On-Call, our nurse care line that is available for  assistance.     Registered nurses in the [facility] On Call Center provide: appointment scheduling, clinical advisement, health education, and other advisory services.  Call: [phone] (toll free)               Medications           Message regarding Medications     Verify the changes and/or additions to your medication regime listed below are the same as discussed with your clinician today.  If any of these changes or additions are incorrect, please notify your healthcare provider.             Verify that the below list of medications is an accurate representation of the  medications you are currently taking.  If none reported, the list may be blank. If incorrect, please contact your healthcare provider. Carry this list with you in case of emergency.           Current Medications     albuterol 90 mcg/actuation inhaler Inhale 2 puffs into the lungs every 4 (four) hours as needed for Wheezing.    buPROPion (WELLBUTRIN SR) 150 MG TBSR 12 hr tablet Take 1 tablet (150 mg total) by mouth once daily.    busPIRone (BUSPAR) 5 MG Tab Take 1 tablet (5 mg total) by mouth once daily.    diclofenac sodium 1 % Gel APPLY 2 GRAMS EXTERNALLY TO AFFECTED AREA FOUR TIMES DAILY    escitalopram oxalate (LEXAPRO) 20 MG tablet TAKE 1 TABLET BY MOUTH EVERY DAY    fluticasone-vilanterol (BREO ELLIPTA) 100-25 mcg/dose diskus inhaler Inhale 1 puff into the lungs once daily.    furosemide (LASIX) 20 MG tablet TAKE 1 TABLET BY MOUTH EVERY DAY CONTINUOUS AS NEEDED    gabapentin (NEURONTIN) 100 MG capsule Take 1 capsule (100 mg total) by mouth 3 (three) times daily.    glipiZIDE (GLUCOTROL) 2.5 MG TR24 TAKE 1 TABLET BY MOUTH DAILY WITH BREAKFAST    levothyroxine (SYNTHROID) 88 MCG tablet TAKE 1 TABLET BY MOUTH BEFORE BREAKFAST    lidocaine (LIDODERM) 5 % Place 1 patch onto the skin.     omeprazole (PRILOSEC) 40 MG capsule TAKE 1 CAPSULE BY MOUTH EVERY MORNING    ondansetron (ZOFRAN) 4 MG tablet TAKE 1 TABLET BY MOUTH EVERY 8 HOURS AS NEEDED FOR NAUSEA    oxycodone-acetaminophen (PERCOCET) 7.5-325 mg per tablet TK 1 T PO  TID    potassium chloride (KLOR-CON) 10 MEQ TbSR TAKE 2 TABLETS(20 MEQ) BY MOUTH ONCE DAILY    predniSONE (DELTASONE) 5 MG tablet Take 1 tablet (5 mg total) by mouth once daily.    promethazine (PHENERGAN) 25 MG tablet TAKE 1 TABLET BY MOUTH EVERY 6 HOURS AS NEEDED FOR NAUSEA    quetiapine (SEROQUEL) 25 MG Tab TAKE 1 TABLET BY MOUTH EVERY EVENING    zolpidem (AMBIEN) 10 mg Tab TAKE 1 TABLET BY MOUTH EVERY NIGHT AT BEDTIME           Clinical Reference Information           Your Vitals Were     BP  Pulse Temp Height Weight SpO2    136/78 91 98.1 °F (36.7 °C) (Oral) 5' (1.524 m) 109.1 kg (240 lb 8.4 oz) 97%    BMI                46.97 kg/m2          Blood Pressure          Most Recent Value    BP  136/78      Allergies as of 5/16/2017     Ibuprofen      Immunizations Administered on Date of Encounter - 5/16/2017     None      Orders Placed During Today's Visit     Future Labs/Procedures Expected by Expires    Microalbumin/creatinine urine ratio  As directed 5/16/2018      [facility] Sign-Up     Activating your [facility] account is as easy as 1-2-3!     1) Visit [email or web address], select Sign Up Now, enter this activation code and your date of birth, then select Next.  [email or web address]  Expires: 6/30/2017  4:44 PM      2) Create a username and password to use when you visit [facility] in the future and select a security question in case you lose your password and select Next.    3) Enter your e-mail address and click Sign Up!    Additional Information  If you have questions, please e-mail [email or web address] or call [phone] to talk to our [facility] staff. Remember, [facility] is NOT to be used for urgent needs. For medical emergencies, dial 911.         Language Assistance Services     ATTENTION: Language assistance services are available, free of charge. Please call [phone].      ATENCIÓN: Si habla español, tiene a lopez disposición servicios gratuitos de asistencia lingüística. Llame al [phone].     [facility] Ý: N?u b?n nói Ti?ng Vi?t, có các d?ch v? h? tr? ngôn ng? mi?n phí dành cho b?n. G?i s? [phone].         [facility] complies with applicable Federal civil rights laws and does not discriminate on the basis of race, color, national origin, age, disability, or sex.

## 2017-05-17 RX ORDER — DIAZEPAM 5 MG/1
TABLET ORAL
Qty: 30 TABLET | Refills: 2 | Status: SHIPPED | OUTPATIENT
Start: 2017-05-17 | End: 2017-07-06 | Stop reason: SDUPTHER

## 2017-05-21 RX ORDER — ALBUTEROL SULFATE 90 UG/1
AEROSOL, METERED RESPIRATORY (INHALATION)
Qty: 8.5 G | Refills: 0 | Status: SHIPPED | OUTPATIENT
Start: 2017-05-21 | End: 2017-06-09 | Stop reason: SDUPTHER

## 2017-05-21 NOTE — PROGRESS NOTES
Patient ID: Christine Castro is a 62 y.o. female.    Chief Complaint: Follow-up (check-up)    HPI      Christine Castro is a 62 y.o. female here for follow-up regarding chronic diseases including depression, chronic lung disease, diabetes, hypothyroidism, chronic back pain, and recent bruise on right leg.  Patient with new bruise on right leg unsure of etiology.  No recent falls or problems.  Knees continue to bother her because of arthritis.  Patient is trying to lose weight and has done so.  It is relieving some pressure knees.  Recent injection about 2 months ago and is to follow-up with orthopedic surgeon later this month.            Review of Symptoms    Constitutional  Neg activity change, No chills /fever   Resp  Neg hemoptysis, stridor, choking  CVS  Neg chest pain, palpitations    Physical Exam    Constitutional:  Oriented to person, place, and time.appears well-developed and well-nourished.  No distress.      HENT  Head: Normocephalic and atraumatic  Right Ear: External ear normal.   Left Ear: External ear normal.   Nose: External nose normal.   Mouth:  Moist mucus membranes.    Eyes:  Conjunctivae are normal. Right eye exhibits no discharge.  Left eye exhibits no discharge. No scleral icterus.  No periorbital edema    Cardiovascular:  Regular rate, Regular rhythm     No extrasystole  Normal S1-S2      Pulmonary/Chest:   Normal effort and breath sounds.  No wheezing, rhonchi or rales.    Musculoskeletal:  No edema. No obvious deformity No waisting       Neurological:  Alert and oriented to person, place, and time.   Coordination normal.     Skin:   Skin is warm and dry.  No diaphoresis.   No rash noted.     Psychiatric: Normal mood and affect. Behavior is normal.  Judgment and thought content normal.       Assessment / Plan:      ICD-10-CM ICD-9-CM   1. Type 2 diabetes mellitus without complication, without long-term current use of insulin E11.9 250.00   2. Gastroesophageal reflux disease without  esophagitis K21.9 530.81   3. Arthritis of both knees M17.0 716.96   4. Congestive heart failure, unspecified congestive heart failure chronicity, unspecified congestive heart failure type I50.9 428.0   5. Lesion of ulnar nerve, unspecified laterality G56.20 354.2   6. Arthritis M19.90 716.90   7. Obesity, unspecified obesity severity, unspecified obesity type E66.9 278.00   8. Insomnia secondary to depression with anxiety F41.8 300.4    F51.05 327.02   9. Type 2 diabetes mellitus without complication E11.9 250.00     Type 2 diabetes mellitus without complication, without long-term current use of insulin    Gastroesophageal reflux disease without esophagitis    Arthritis of both knees    Congestive heart failure, unspecified congestive heart failure chronicity, unspecified congestive heart failure type    Lesion of ulnar nerve, unspecified laterality    Arthritis    Obesity, unspecified obesity severity, unspecified obesity type    Insomnia secondary to depression with anxiety  -     Cancel: Microalbumin/creatinine urine ratio; Future    Type 2 diabetes mellitus without complication  -     Microalbumin/creatinine urine ratio    spent over 25 min with pt most of it in discussion of her chronic diseases and management.  Also discussed back pain pain management  Anxiety-stable uses Valium periodically for anxiety

## 2017-05-23 RX ORDER — ESCITALOPRAM OXALATE 20 MG/1
TABLET ORAL
Qty: 90 TABLET | Refills: 0 | Status: SHIPPED | OUTPATIENT
Start: 2017-05-23 | End: 2017-10-05 | Stop reason: SDUPTHER

## 2017-05-25 ENCOUNTER — TELEPHONE (OUTPATIENT)
Dept: FAMILY MEDICINE | Facility: CLINIC | Age: 62
End: 2017-05-25

## 2017-05-25 PROBLEM — T85.698A MALFUNCTION OF DEVICE: Status: ACTIVE | Noted: 2017-05-25

## 2017-05-25 NOTE — TELEPHONE ENCOUNTER
----- Message from Manda Malik sent at 5/25/2017  3:41 PM CDT -----  Contact: Pt [phone]  Patient states she has a major problem, she lost 100.00 & her bottle of pain medication. Patient states she keep her pain medication and money in her buzzums in case of a robbery and some how they both fell out. Patient states she is in the process of finding another pain management doctor please send a new prescription for pain med's to [facility].

## 2017-05-28 RX ORDER — ZOLPIDEM TARTRATE 10 MG/1
TABLET ORAL
Qty: 30 TABLET | Refills: 0 | OUTPATIENT
Start: 2017-05-28

## 2017-05-30 ENCOUNTER — ANESTHESIA EVENT (OUTPATIENT)
Dept: SURGERY | Facility: HOSPITAL | Age: 62
End: 2017-05-30
Payer: COMMERCIAL

## 2017-05-30 ENCOUNTER — ANESTHESIA (OUTPATIENT)
Dept: SURGERY | Facility: HOSPITAL | Age: 62
End: 2017-05-30
Payer: COMMERCIAL

## 2017-05-30 ENCOUNTER — HOSPITAL ENCOUNTER (OUTPATIENT)
Facility: HOSPITAL | Age: 62
Discharge: HOME | End: 2017-05-30
Attending: SURGERY | Admitting: SURGERY
Payer: COMMERCIAL

## 2017-05-30 ENCOUNTER — SURGERY (OUTPATIENT)
Age: 62
End: 2017-05-30

## 2017-05-30 DIAGNOSIS — I50.9 CONGESTIVE HEART FAILURE, UNSPECIFIED CONGESTIVE HEART FAILURE CHRONICITY, UNSPECIFIED CONGESTIVE HEART FAILURE TYPE: Primary | ICD-10-CM

## 2017-05-30 DIAGNOSIS — Z85.038 HISTORY OF COLON CANCER: ICD-10-CM

## 2017-05-30 DIAGNOSIS — Z01.818 PRE-OP EXAMINATION: ICD-10-CM

## 2017-05-30 DIAGNOSIS — T85.698A MALFUNCTION OF DEVICE, INITIAL ENCOUNTER: ICD-10-CM

## 2017-05-30 LAB
ANION GAP SERPL CALC-SCNC: 9 MMOL/L
BASOPHILS # BLD AUTO: 0.02 K/UL
BASOPHILS NFR BLD: 0.3 %
BUN SERPL-MCNC: 8 MG/DL
CALCIUM SERPL-MCNC: 9 MG/DL
CHLORIDE SERPL-SCNC: 108 MMOL/L
CO2 SERPL-SCNC: 27 MMOL/L
CREAT SERPL-MCNC: 0.8 MG/DL
DIFFERENTIAL METHOD: ABNORMAL
EOSINOPHIL # BLD AUTO: 0.1 K/UL
EOSINOPHIL NFR BLD: 1.9 %
ERYTHROCYTE [DISTWIDTH] IN BLOOD BY AUTOMATED COUNT: 13.8 %
EST. GFR  (AFRICAN AMERICAN): >60 ML/MIN/1.73 M^2
EST. GFR  (NON AFRICAN AMERICAN): >60 ML/MIN/1.73 M^2
GLUCOSE SERPL-MCNC: 79 MG/DL
HCT VFR BLD AUTO: 34.8 %
HGB BLD-MCNC: 11.4 G/DL
LYMPHOCYTES # BLD AUTO: 2 K/UL
LYMPHOCYTES NFR BLD: 29.1 %
MCH RBC QN AUTO: 31.1 PG
MCHC RBC AUTO-ENTMCNC: 32.8 %
MCV RBC AUTO: 95 FL
MONOCYTES # BLD AUTO: 0.3 K/UL
MONOCYTES NFR BLD: 4.9 %
NEUTROPHILS # BLD AUTO: 4.4 K/UL
NEUTROPHILS NFR BLD: 63.5 %
PLATELET # BLD AUTO: 240 K/UL
PMV BLD AUTO: 9.2 FL
POCT GLUCOSE: 84 MG/DL (ref 70–110)
POTASSIUM SERPL-SCNC: 3.2 MMOL/L
RBC # BLD AUTO: 3.66 M/UL
SODIUM SERPL-SCNC: 144 MMOL/L
WBC # BLD AUTO: 6.88 K/UL

## 2017-05-30 PROCEDURE — 63600175 PHARM REV CODE 636 W HCPCS: Performed by: SURGERY

## 2017-05-30 PROCEDURE — 85025 COMPLETE CBC W/AUTO DIFF WBC: CPT

## 2017-05-30 PROCEDURE — 63600175 PHARM REV CODE 636 W HCPCS: Performed by: NURSE ANESTHETIST, CERTIFIED REGISTERED

## 2017-05-30 PROCEDURE — 37000009 HC ANESTHESIA EA ADD 15 MINS: Performed by: SURGERY

## 2017-05-30 PROCEDURE — 88300 SURGICAL PATH GROSS: CPT | Mod: 26,,, | Performed by: PATHOLOGY

## 2017-05-30 PROCEDURE — 36415 COLL VENOUS BLD VENIPUNCTURE: CPT

## 2017-05-30 PROCEDURE — 88300 SURGICAL PATH GROSS: CPT | Performed by: PATHOLOGY

## 2017-05-30 PROCEDURE — 36000707: Performed by: SURGERY

## 2017-05-30 PROCEDURE — 25000003 PHARM REV CODE 250: Performed by: SURGERY

## 2017-05-30 PROCEDURE — 80048 BASIC METABOLIC PNL TOTAL CA: CPT

## 2017-05-30 PROCEDURE — 36000706: Performed by: SURGERY

## 2017-05-30 PROCEDURE — 25000003 PHARM REV CODE 250: Performed by: NURSE ANESTHETIST, CERTIFIED REGISTERED

## 2017-05-30 PROCEDURE — 71000015 HC POSTOP RECOV 1ST HR: Performed by: SURGERY

## 2017-05-30 PROCEDURE — 37000008 HC ANESTHESIA 1ST 15 MINUTES: Performed by: SURGERY

## 2017-05-30 PROCEDURE — 93005 ELECTROCARDIOGRAM TRACING: CPT

## 2017-05-30 PROCEDURE — 71000016 HC POSTOP RECOV ADDL HR: Performed by: SURGERY

## 2017-05-30 RX ORDER — LIDOCAINE HYDROCHLORIDE 10 MG/ML
INJECTION, SOLUTION EPIDURAL; INFILTRATION; INTRACAUDAL; PERINEURAL
Status: DISCONTINUED | OUTPATIENT
Start: 2017-05-30 | End: 2017-05-30 | Stop reason: HOSPADM

## 2017-05-30 RX ORDER — PROPOFOL 10 MG/ML
VIAL (ML) INTRAVENOUS CONTINUOUS PRN
Status: DISCONTINUED | OUTPATIENT
Start: 2017-05-30 | End: 2017-05-30

## 2017-05-30 RX ORDER — HYDROCODONE BITARTRATE AND ACETAMINOPHEN 5; 325 MG/1; MG/1
1 TABLET ORAL EVERY 4 HOURS PRN
Status: DISCONTINUED | OUTPATIENT
Start: 2017-05-30 | End: 2017-05-30 | Stop reason: HOSPADM

## 2017-05-30 RX ORDER — MIDAZOLAM HYDROCHLORIDE 1 MG/ML
INJECTION, SOLUTION INTRAMUSCULAR; INTRAVENOUS
Status: DISCONTINUED | OUTPATIENT
Start: 2017-05-30 | End: 2017-05-30

## 2017-05-30 RX ORDER — SODIUM CHLORIDE, SODIUM LACTATE, POTASSIUM CHLORIDE, CALCIUM CHLORIDE 600; 310; 30; 20 MG/100ML; MG/100ML; MG/100ML; MG/100ML
INJECTION, SOLUTION INTRAVENOUS CONTINUOUS PRN
Status: DISCONTINUED | OUTPATIENT
Start: 2017-05-30 | End: 2017-05-30

## 2017-05-30 RX ORDER — BACITRACIN 50000 [IU]/1
INJECTION, POWDER, FOR SOLUTION INTRAMUSCULAR
Status: DISCONTINUED | OUTPATIENT
Start: 2017-05-30 | End: 2017-05-30 | Stop reason: HOSPADM

## 2017-05-30 RX ORDER — LIDOCAINE HCL/PF 100 MG/5ML
SYRINGE (ML) INTRAVENOUS
Status: DISCONTINUED | OUTPATIENT
Start: 2017-05-30 | End: 2017-05-30

## 2017-05-30 RX ORDER — CEFAZOLIN SODIUM 2 G/50ML
2 SOLUTION INTRAVENOUS
Status: COMPLETED | OUTPATIENT
Start: 2017-05-30 | End: 2017-05-30

## 2017-05-30 RX ORDER — PROPOFOL 10 MG/ML
VIAL (ML) INTRAVENOUS
Status: DISCONTINUED | OUTPATIENT
Start: 2017-05-30 | End: 2017-05-30

## 2017-05-30 RX ADMIN — BACITRACIN 50000 UNITS: 5000 INJECTION, POWDER, FOR SOLUTION INTRAMUSCULAR at 11:05

## 2017-05-30 RX ADMIN — LIDOCAINE HYDROCHLORIDE 80 MG: 20 INJECTION, SOLUTION INTRAVENOUS at 11:05

## 2017-05-30 RX ADMIN — CEFAZOLIN SODIUM 2 G: 2 SOLUTION INTRAVENOUS at 11:05

## 2017-05-30 RX ADMIN — MIDAZOLAM 2 MG: 1 INJECTION INTRAMUSCULAR; INTRAVENOUS at 11:05

## 2017-05-30 RX ADMIN — PROPOFOL 20 MG: 10 INJECTION, EMULSION INTRAVENOUS at 11:05

## 2017-05-30 RX ADMIN — LIDOCAINE HYDROCHLORIDE 20 ML: 10 INJECTION, SOLUTION EPIDURAL; INFILTRATION; INTRACAUDAL; PERINEURAL at 11:05

## 2017-05-30 RX ADMIN — PROPOFOL 100 MCG/KG/MIN: 10 INJECTION, EMULSION INTRAVENOUS at 11:05

## 2017-05-30 RX ADMIN — HYDROCODONE BITARTRATE AND ACETAMINOPHEN 1 TABLET: 5; 325 TABLET ORAL at 01:05

## 2017-05-30 RX ADMIN — SODIUM CHLORIDE, SODIUM LACTATE, POTASSIUM CHLORIDE, AND CALCIUM CHLORIDE: .6; .31; .03; .02 INJECTION, SOLUTION INTRAVENOUS at 11:05

## 2017-05-30 NOTE — DISCHARGE SUMMARY
Operative Note       Surgery Date: 5/30/2017     Surgeon(s) and Role:     * Mike Sandoval MD - Primary    Pre-op Diagnosis:  Malfunction of device, initial encounter [T85.618A]    Post-op Diagnosis: Post-Op Diagnosis Codes:     * Malfunction of device, initial encounter [T85.618A]    Procedure(s) (LRB):  REMOVAL-PORT-A-CATH (Left)    Anesthesia: Local MAC    Procedure in Detail/Findings:  dictated    Estimated Blood Loss: 5cc         Specimens     Start     Ordered    05/30/17 1121  Specimen to Pathology - Surgery  Once     port 05/30/17 1121        Implants: * No implants in log *           Disposition: PACU - hemodynamically stable.           Condition: Good    Attestation:  I performed the procedure.           Discharge Note    Admit Date: 5/30/2017    Attending Physician: Mike Sandoval MD     Discharge Physician: Mike Sandoval MD    Final Diagnosis: Post-Op Diagnosis Codes:     * Malfunction of device, initial encounter [T85.618A]    Disposition: Home or Self Care    Patient Instructions:   Current Discharge Medication List      CONTINUE these medications which have NOT CHANGED    Details   buPROPion (WELLBUTRIN SR) 150 MG TBSR 12 hr tablet Take 1 tablet (150 mg total) by mouth once daily.  Qty: 90 tablet, Refills: 3      busPIRone (BUSPAR) 5 MG Tab Take 1 tablet (5 mg total) by mouth once daily.  Qty: 90 tablet, Refills: 2      diazePAM (VALIUM) 5 MG tablet TAKE 1 TABLET BY MOUTH EVERY 8 HOURS AS NEEDED  Qty: 30 tablet, Refills: 2      !! escitalopram oxalate (LEXAPRO) 20 MG tablet TAKE 1 TABLET BY MOUTH EVERY DAY  Qty: 90 tablet, Refills: 0    Comments: **Patient requests 90 days supply**      !! escitalopram oxalate (LEXAPRO) 20 MG tablet TAKE 1 TABLET BY MOUTH EVERY DAY  Qty: 90 tablet, Refills: 0      fluticasone-vilanterol (BREO ELLIPTA) 100-25 mcg/dose diskus inhaler Inhale 1 puff into the lungs once daily.  Qty: 1 each, Refills: 3      furosemide (LASIX) 20 MG tablet TAKE 1 TABLET BY  MOUTH EVERY DAY CONTINUOUS AS NEEDED  Qty: 90 tablet, Refills: 0      gabapentin (NEURONTIN) 100 MG capsule Take 1 capsule (100 mg total) by mouth 3 (three) times daily.  Qty: 90 capsule, Refills: 2      glipiZIDE (GLUCOTROL) 2.5 MG TR24 TAKE 1 TABLET BY MOUTH DAILY WITH BREAKFAST  Qty: 90 tablet, Refills: 2      levothyroxine (SYNTHROID) 88 MCG tablet TAKE 1 TABLET BY MOUTH BEFORE BREAKFAST  Qty: 90 tablet, Refills: 3      omeprazole (PRILOSEC) 40 MG capsule TAKE 1 CAPSULE BY MOUTH EVERY MORNING  Qty: 90 capsule, Refills: 3      ondansetron (ZOFRAN) 4 MG tablet TAKE 1 TABLET BY MOUTH EVERY 8 HOURS AS NEEDED FOR NAUSEA  Qty: 12 tablet, Refills: 0      potassium chloride (KLOR-CON) 10 MEQ TbSR TAKE 2 TABLETS(20 MEQ) BY MOUTH ONCE DAILY  Qty: 180 tablet, Refills: 0    Comments: **Patient requests 90 days supply**      predniSONE (DELTASONE) 5 MG tablet Take 1 tablet (5 mg total) by mouth once daily.  Qty: 30 tablet, Refills: 1      PROAIR HFA 90 mcg/actuation inhaler INHALE 2 PUFFS BY MOUTH INTO LUNGS EVERY 4 HOURS AS NEEDED FOR WHEEZING  Qty: 8.5 g, Refills: 0      promethazine (PHENERGAN) 25 MG tablet TAKE 1 TABLET BY MOUTH EVERY 6 HOURS AS NEEDED FOR NAUSEA  Qty: 30 tablet, Refills: 0      quetiapine (SEROQUEL) 25 MG Tab TAKE 1 TABLET BY MOUTH EVERY EVENING  Qty: 30 tablet, Refills: 5      !! zolpidem (AMBIEN) 10 mg Tab TAKE 1 TABLET BY MOUTH EVERY NIGHT AT BEDTIME  Qty: 30 tablet, Refills: 2      !! zolpidem (AMBIEN) 5 MG Tab Refills: 0      diclofenac sodium 1 % Gel APPLY 2 GRAMS EXTERNALLY TO AFFECTED AREA FOUR TIMES DAILY  Qty: 100 g, Refills: 0      lidocaine (LIDODERM) 5 % Place 1 patch onto the skin.       oxycodone-acetaminophen (PERCOCET) 7.5-325 mg per tablet TK 1 T PO  TID  Refills: 0       !! - Potential duplicate medications found. Please discuss with provider.          Discharge Procedure Orders (must include Diet, Follow-up, Activity)  Regular diet , no heavy lifting , keep dressing dry ,   Discharge  Date:5/30/17

## 2017-05-30 NOTE — H&P
Patient is a 62 y.o. female presents with            Chief Complaint   Patient presents with    Follow-up       remove chemo port              Review of patient's allergies indicates:   Allergen Reactions    Ibuprofen            Current Medications           Current Outpatient Prescriptions   Medication Sig Dispense Refill    buPROPion (WELLBUTRIN SR) 150 MG TBSR 12 hr tablet Take 1 tablet (150 mg total) by mouth once daily. 90 tablet 3    busPIRone (BUSPAR) 5 MG Tab Take 1 tablet (5 mg total) by mouth once daily. 90 tablet 2    diazePAM (VALIUM) 5 MG tablet TAKE 1 TABLET BY MOUTH EVERY 8 HOURS AS NEEDED 30 tablet 2    diclofenac sodium 1 % Gel APPLY 2 GRAMS EXTERNALLY TO AFFECTED AREA FOUR TIMES DAILY 100 g 0    escitalopram oxalate (LEXAPRO) 20 MG tablet TAKE 1 TABLET BY MOUTH EVERY DAY 90 tablet 0    escitalopram oxalate (LEXAPRO) 20 MG tablet TAKE 1 TABLET BY MOUTH EVERY DAY 90 tablet 0    fluticasone-vilanterol (BREO ELLIPTA) 100-25 mcg/dose diskus inhaler Inhale 1 puff into the lungs once daily. 1 each 3    furosemide (LASIX) 20 MG tablet TAKE 1 TABLET BY MOUTH EVERY DAY CONTINUOUS AS NEEDED 90 tablet 0    gabapentin (NEURONTIN) 100 MG capsule Take 1 capsule (100 mg total) by mouth 3 (three) times daily. 90 capsule 2    glipiZIDE (GLUCOTROL) 2.5 MG TR24 TAKE 1 TABLET BY MOUTH DAILY WITH BREAKFAST 90 tablet 2    levothyroxine (SYNTHROID) 88 MCG tablet TAKE 1 TABLET BY MOUTH BEFORE BREAKFAST 90 tablet 3    lidocaine (LIDODERM) 5 % Place 1 patch onto the skin.         omeprazole (PRILOSEC) 40 MG capsule TAKE 1 CAPSULE BY MOUTH EVERY MORNING 90 capsule 3    ondansetron (ZOFRAN) 4 MG tablet TAKE 1 TABLET BY MOUTH EVERY 8 HOURS AS NEEDED FOR NAUSEA 12 tablet 0    oxycodone-acetaminophen (PERCOCET) 7.5-325 mg per tablet TK 1 T PO  TID   0    potassium chloride (KLOR-CON) 10 MEQ TbSR TAKE 2 TABLETS(20 MEQ) BY MOUTH ONCE DAILY 180 tablet 0    predniSONE (DELTASONE) 5 MG tablet Take 1 tablet (5 mg  total) by mouth once daily. 30 tablet 1    PROAIR HFA 90 mcg/actuation inhaler INHALE 2 PUFFS BY MOUTH INTO LUNGS EVERY 4 HOURS AS NEEDED FOR WHEEZING 8.5 g 0    promethazine (PHENERGAN) 25 MG tablet TAKE 1 TABLET BY MOUTH EVERY 6 HOURS AS NEEDED FOR NAUSEA 30 tablet 0    quetiapine (SEROQUEL) 25 MG Tab TAKE 1 TABLET BY MOUTH EVERY EVENING 30 tablet 5    zolpidem (AMBIEN) 10 mg Tab TAKE 1 TABLET BY MOUTH EVERY NIGHT AT BEDTIME 30 tablet 2    zolpidem (AMBIEN) 5 MG Tab     0      No current facility-administered medications for this visit.                  Past Medical History:   Diagnosis Date    Arthritis      Asthma      Cancer      CHF (congestive heart failure)       [facility]    COPD (chronic obstructive pulmonary disease)      Coronary artery disease      Depression      Diabetes mellitus, type 2      GERD (gastroesophageal reflux disease)      Myocardial infarction       AGE 19 [facility] WITHBABY DELIVERY    Thyroid disease              Past Surgical History:   Procedure Laterality Date     SECTION        CHOLECYSTECTOMY        COLON SURGERY        COLONOSCOPY         --- repeat in 3-5 years    COLONOSCOPY N/A 2017     Procedure: COLONOSCOPY;  Surgeon: Corrina Flores MD;  [facility];  Service: Endoscopy;  Laterality: N/A;    ECTOPIC PREGNANCY SURGERY        GASTRIC BYPASS        ovary removed        TONSILLECTOMY        TUBAL LIGATION                Family History   Problem Relation Age of Onset    Hyperlipidemia Mother      Hypertension Mother      Diabetes Mother      Hypertension Sister      Hypertension Brother      Diabetes Brother             Social History   Substance Use Topics    Smoking status: Never Smoker    Smokeless tobacco: Not on file    Alcohol use Yes         Review of Systems:     Review of Systems   Constitutional: Negative.    HENT: Negative.    Eyes: Negative.    Respiratory: Negative.    Cardiovascular: Negative.     Gastrointestinal: Negative.    Endocrine: Negative.    Genitourinary: Negative.    Musculoskeletal: Positive for arthralgias, gait problem, joint swelling and myalgias.   Skin: Negative.    Allergic/Immunologic: Negative.    Hematological: Negative.    Psychiatric/Behavioral: Negative.          OBJECTIVE:      Vital Signs (Most Recent)  Pulse: 73 (05/25/17 1414)  Resp: 17 (05/25/17 1414)  BP: 104/60 (05/25/17 1414)  5' (1.524 m)  109.1 kg (240 lb 8.4 oz)      Physical Exam:     Physical Exam   Constitutional: She is oriented to person, place, and time. She appears well-developed and well-nourished.   HENT:   Head: Normocephalic.   Eyes: Pupils are equal, round, and reactive to light.   Neck: Normal range of motion. Neck supple.   Cardiovascular: Normal rate, regular rhythm, normal heart sounds and intact distal pulses.  Exam reveals no gallop and no friction rub.    No murmur heard.  Pulmonary/Chest: Effort normal and breath sounds normal. No respiratory distress. She has no wheezes. She has no rales. She exhibits no tenderness.       Abdominal: Soft. Bowel sounds are normal.   Musculoskeletal: Normal range of motion. She exhibits edema and deformity.   Neurological: She is oriented to person, place, and time.   Skin: Skin is warm.         Laboratory        Diagnostic Results:        ASSESSMENT/PLAN:      Malfunction port  obesity  arthritis     PLAN:Plan   Removal today

## 2017-05-30 NOTE — ANESTHESIA PREPROCEDURE EVALUATION
2017  Christine Castro is a 62 y.o., female for EGD & Colonoscopy    Review of patient's allergies indicates:   Allergen Reactions    Ibuprofen      Past Medical History:   Diagnosis Date    Arthritis     Asthma     Cancer     CHF (congestive heart failure)     [facility]    COPD (chronic obstructive pulmonary disease)     Coronary artery disease     Depression     Diabetes mellitus, type 2     GERD (gastroesophageal reflux disease)     Myocardial infarction     AGE 19 [facility] WITHBABY DELIVERY    Thyroid disease      Past Surgical History:   Procedure Laterality Date     SECTION      CHOLECYSTECTOMY      COLON SURGERY      COLONOSCOPY      --- repeat in 3-5 years    COLONOSCOPY N/A 2017    Procedure: COLONOSCOPY;  Surgeon: Corrina Flores MD;  Location: [facility];  Service: Endoscopy;  Laterality: N/A;    ECTOPIC PREGNANCY SURGERY      GASTRIC BYPASS      ovary removed      TONSILLECTOMY      TUBAL LIGATION       Patient Active Problem List   Diagnosis    Arthritis    GERD (gastroesophageal reflux disease)    Thyroid disease    CHF (congestive heart failure)    Depression    History of colon cancer    Malfunction of device       Pre-op Assessment    I have reviewed the Patient Summary Reports.     I have reviewed the Medications.     Review of Systems  Anesthesia Hx:  No problems with previous Anesthesia   Denies Personal Hx of Anesthesia complications.   Hematology/Oncology:  Hematology Normal   Oncology Normal     Cardiovascular:   Past MI CAD   CHF    Pulmonary:   COPD    Renal/:  Renal/ Normal     Hepatic/GI:   GERD    Musculoskeletal:  Musculoskeletal Normal    Neurological:  Neurology Normal    Endocrine:   Diabetes, type 2    Psych:   Psychiatric History depression        Wt Readings from Last 3 Encounters:   17 109.1 kg (240 lb 8.4  oz)   05/16/17 109.1 kg (240 lb 8.4 oz)   04/18/17 108.9 kg (240 lb)     Temp Readings from Last 3 Encounters:   05/16/17 36.7 °C (98.1 °F) (Oral)   04/18/17 36.8 °C (98.2 °F)   03/16/17 37.2 °C (98.9 °F) (Oral)     BP Readings from Last 3 Encounters:   05/25/17 104/60   05/16/17 136/78   04/18/17 (!) 119/99     Pulse Readings from Last 3 Encounters:   05/25/17 73   05/16/17 91   04/18/17 64     Lab Results   Component Value Date    WBC 6.02 09/28/2016    HGB 11.0 (L) 09/28/2016    HCT 34.9 (L) 09/28/2016    MCV 99 (H) 09/28/2016     09/28/2016 4/6/17  EKG Conclusions:    1. The EKG portion of this study is negative for ischemia at a peak heart rate of 75 bpm (50% of predicted).   2. Blood pressure remained stable throughout the protocol  (Presenting BP: 108/74 Peak BP: 108/74).   3. No significant arrhythmias were present.   4. There were no symptoms of chest discomfort or significant dyspnea throughout the protocol.   5. Nuclear portion of this study will be reported seperately.    TTE 3/2016  CONCLUSIONS     1 - Normal left ventricular systolic function (EF 55-60%).     2 - Concentric remodeling.     3 - Normal left ventricular diastolic function.     4 - Normal right ventricular systolic function .     5 - The estimated PA systolic pressure is 28 mmHg.       Physical Exam  General:  Morbid Obesity    Airway/Jaw/Neck:  Airway Findings: Mouth Opening: Small, but > 3cm Tongue: Normal  General Airway Assessment: Adult  Mallampati: II  Improves to II with phonation.  TM Distance: Normal, at least 6 cm  Jaw/Neck Findings:  Neck ROM: Normal ROM  Neck Findings:  Short Neck      Dental:  Dental Findings: Lower Dentures, Upper Dentures        Mental Status:  Mental Status Findings:  Cooperative         Anesthesia Plan  Type of Anesthesia, risks & benefits discussed:  Anesthesia Type:  MAC, general  Patient's Preference:   Intra-op Monitoring Plan:   Intra-op Monitoring Plan Comments:   Post Op Pain Control  Plan:   Post Op Pain Control Plan Comments:   Induction:   IV  Beta Blocker:         Informed Consent: Patient understands risks and agrees with Anesthesia plan.  Questions answered. Anesthesia consent signed with patient.  ASA Score: 3     Day of Surgery Review of History & Physical: I have interviewed and examined the patient. I have reviewed the patient's H&P dated:

## 2017-05-30 NOTE — OP NOTE
DATE OF PROCEDURE:  05/30/2017    PREOPERATIVE DIAGNOSIS:  Malfunctioning port, left chest wall.    POSTOPERATIVE DIAGNOSIS:  Malfunctioning port, left chest wall.    PROCEDURE:  Removal of port, left chest wall.    SURGEON:  Mike Sandoval M.D.    ANESTHESIA:  Xylocaine 1% with IV sedation.    PROCEDURE IN DETAIL:  After satisfactory IV sedation, the patient was   positioned.  Left chest wall port area was prepped and draped in normal sterile   manner using ChloraPrep.  The area of the port was infiltrated using 1%   Xylocaine solution.  Incision was made in the same area, taken down to deep   subcutaneous tissue, subcuticular clamped and bovied, further taken down.  The   port was dissected out of the pocket, was sutured it through to the deep tissue,   which was incised.  Catheter was then retrieved from the subclavian vein.  The   vein was ligated using interrupted 3-0 Vicryl.  Wound was irrigated with   antibiotic solution.  Hemostasis satisfactorily maintained.  Wound was   approximated using 3-0 Vicryl for subcutaneous tissue.  The skin was closed   using running 4-0 nylon suture.  Sterile gauze dressing was applied.  The   instrument count, sponge count and needle count were correct.  The patient   tolerated it well.  The patient was sent to Recovery Room in stable condition.    Estimated blood loss was 10 mL and specimen removed was a port.      CALIXTO  dd: 05/30/2017 11:48:22 (CDT)  td: 05/30/2017 14:37:35 (CDT)  Doc ID   #[id number]  Job ID #[id number]    CC:

## 2017-05-30 NOTE — TRANSFER OF CARE
Anesthesia Transfer of Care Note    Patient: Christine Castro    Procedure(s) Performed: Procedure(s) (LRB):  REMOVAL-PORT-A-CATH (Left)    Patient location: OPS    Anesthesia Type: MAC    Transport from OR: Transported from OR on room air with adequate spontaneous ventilation    Post pain: adequate analgesia    Post assessment: no apparent anesthetic complications and tolerated procedure well    Post vital signs: stable    Level of consciousness: awake, alert and oriented    Nausea/Vomiting: no nausea/vomiting    Complications: none    Transfer of care protocol was followed      Last vitals:   Visit Vitals  BP (!) 146/90 (BP Location: Left arm, Patient Position: Sitting, BP Method: Automatic)   Pulse 99   Temp 37.4 °C (99.3 °F) (Oral)   Resp 18   Ht 5' (1.524 m)   Wt 109.1 kg (240 lb 8.4 oz)   SpO2 97%   Breastfeeding? No   BMI 46.97 kg/m²

## 2017-05-30 NOTE — ANESTHESIA POSTPROCEDURE EVALUATION
Anesthesia Post Evaluation    Patient: Christine Castro    Procedure(s) Performed: Procedure(s) (LRB):  REMOVAL-PORT-A-CATH (Left)    Final Anesthesia Type: MAC  Patient location during evaluation: OPS  Patient participation: Yes- Able to Participate  Level of consciousness: awake and alert and oriented  Post-procedure vital signs: reviewed and stable  Pain management: adequate  Airway patency: patent  PONV status at discharge: No PONV  Anesthetic complications: no      Cardiovascular status: blood pressure returned to baseline, hemodynamically stable and stable  Respiratory status: unassisted, spontaneous ventilation and room air  Hydration status: euvolemic  Follow-up not needed.        Visit Vitals  BP (!) 146/90 (BP Location: Left arm, Patient Position: Sitting, BP Method: Automatic)   Pulse 99   Temp 37.4 °C (99.3 °F) (Oral)   Resp 18   Ht 5' (1.524 m)   Wt 109.1 kg (240 lb 8.4 oz)   SpO2 97%   Breastfeeding? No   BMI 46.97 kg/m²       Pain/Maggy Score: Pain Assessment Performed: Yes (5/30/2017 11:13 AM)  Presence of Pain: complains of pain/discomfort (5/30/2017 11:13 AM)  Maggy Score: 10 (5/30/2017 11:13 AM)  Modified Maggy Score: 19 (5/30/2017 11:13 AM)

## 2017-05-30 NOTE — PLAN OF CARE
Discharge instructions given to patient; verbalized understanding. Discharge home via wheelchair in care of brother.

## 2017-05-31 VITALS
RESPIRATION RATE: 18 BRPM | WEIGHT: 240.5 LBS | OXYGEN SATURATION: 99 % | HEART RATE: 72 BPM | HEIGHT: 60 IN | DIASTOLIC BLOOD PRESSURE: 80 MMHG | BODY MASS INDEX: 47.22 KG/M2 | TEMPERATURE: 98 F | SYSTOLIC BLOOD PRESSURE: 142 MMHG

## 2017-06-04 RX ORDER — DIAZEPAM 5 MG/1
TABLET ORAL
Qty: 30 TABLET | Refills: 0 | OUTPATIENT
Start: 2017-06-04

## 2017-06-04 RX ORDER — PROMETHAZINE HYDROCHLORIDE 25 MG/1
TABLET ORAL
Qty: 90 TABLET | Refills: 0 | Status: SHIPPED | OUTPATIENT
Start: 2017-06-04 | End: 2018-04-17 | Stop reason: SDUPTHER

## 2017-06-04 RX ORDER — GABAPENTIN 100 MG/1
CAPSULE ORAL
Qty: 270 CAPSULE | Refills: 0 | Status: SHIPPED | OUTPATIENT
Start: 2017-06-04 | End: 2017-10-18

## 2017-06-04 RX ORDER — GABAPENTIN 100 MG/1
CAPSULE ORAL
Qty: 90 CAPSULE | Refills: 0 | Status: SHIPPED | OUTPATIENT
Start: 2017-06-04 | End: 2017-06-04 | Stop reason: SDUPTHER

## 2017-06-08 ENCOUNTER — TELEPHONE (OUTPATIENT)
Dept: FAMILY MEDICINE | Facility: CLINIC | Age: 62
End: 2017-06-08

## 2017-06-08 DIAGNOSIS — M17.0 PRIMARY OSTEOARTHRITIS OF BOTH KNEES: Primary | ICD-10-CM

## 2017-06-08 DIAGNOSIS — G89.29 OTHER CHRONIC PAIN: ICD-10-CM

## 2017-06-08 RX ORDER — FUROSEMIDE 20 MG/1
TABLET ORAL
Qty: 90 TABLET | Refills: 0 | Status: SHIPPED | OUTPATIENT
Start: 2017-06-08 | End: 2017-09-08 | Stop reason: SDUPTHER

## 2017-06-08 NOTE — TELEPHONE ENCOUNTER
----- Message from Manda Malik sent at 6/8/2017  1:20 PM CDT -----  Contact: Pt [phone]  Patient requesting a referral for pain management. Patient states she's at doctor office right now. Please fax as soon as possible         Dr. Jeff Vargas    Anesthesiology - pain medicine    [address], [place] [address]    [phone]  Fax # [phone]

## 2017-06-08 NOTE — TELEPHONE ENCOUNTER
i called pt and she requested i fax this referral to dr negro  i faxed with imaging and office notes to [phone]

## 2017-06-09 RX ORDER — ALBUTEROL SULFATE 90 UG/1
AEROSOL, METERED RESPIRATORY (INHALATION)
Qty: 8.5 G | Refills: 0 | Status: SHIPPED | OUTPATIENT
Start: 2017-06-09 | End: 2017-07-10 | Stop reason: SDUPTHER

## 2017-06-20 NOTE — TELEPHONE ENCOUNTER
----- Message from Manda Malik sent at 1/3/2017  3:37 PM CST -----  Contact: Pt [phone]  Patient states she has a broken busted toe. Patient states she went to the ER and was told she needs a follow up appointment with Dr. Beckman as soon as possible. Please advise    no

## 2017-06-30 ENCOUNTER — LAB VISIT (OUTPATIENT)
Dept: LAB | Facility: HOSPITAL | Age: 62
End: 2017-06-30
Attending: PHYSICAL MEDICINE & REHABILITATION
Payer: MEDICARE

## 2017-06-30 DIAGNOSIS — G89.4 CHRONIC PAIN SYNDROME: Primary | ICD-10-CM

## 2017-06-30 LAB
BASOPHILS # BLD AUTO: 0.02 K/UL
BASOPHILS NFR BLD: 0.3 %
CRP SERPL-MCNC: 1.28 MG/DL
DIFFERENTIAL METHOD: ABNORMAL
EOSINOPHIL # BLD AUTO: 0.3 K/UL
EOSINOPHIL NFR BLD: 3.3 %
ERYTHROCYTE [DISTWIDTH] IN BLOOD BY AUTOMATED COUNT: 12.8 %
ERYTHROCYTE [SEDIMENTATION RATE] IN BLOOD BY WESTERGREN METHOD: 54 MM/HR
HCT VFR BLD AUTO: 37.6 %
HGB BLD-MCNC: 12.4 G/DL
LYMPHOCYTES # BLD AUTO: 2.3 K/UL
LYMPHOCYTES NFR BLD: 30.7 %
MCH RBC QN AUTO: 31.3 PG
MCHC RBC AUTO-ENTMCNC: 33 %
MCV RBC AUTO: 95 FL
MONOCYTES # BLD AUTO: 0.6 K/UL
MONOCYTES NFR BLD: 7.6 %
NEUTROPHILS # BLD AUTO: 4.4 K/UL
NEUTROPHILS NFR BLD: 57.8 %
PLATELET # BLD AUTO: 293 K/UL
PMV BLD AUTO: 9.6 FL
RBC # BLD AUTO: 3.96 M/UL
WBC # BLD AUTO: 7.61 K/UL

## 2017-06-30 PROCEDURE — 86431 RHEUMATOID FACTOR QUANT: CPT | Mod: PO

## 2017-06-30 PROCEDURE — 85652 RBC SED RATE AUTOMATED: CPT

## 2017-06-30 PROCEDURE — 86140 C-REACTIVE PROTEIN: CPT | Mod: PO

## 2017-06-30 PROCEDURE — 85025 COMPLETE CBC W/AUTO DIFF WBC: CPT | Mod: PO

## 2017-06-30 PROCEDURE — 36415 COLL VENOUS BLD VENIPUNCTURE: CPT | Mod: PO

## 2017-06-30 PROCEDURE — 86038 ANTINUCLEAR ANTIBODIES: CPT | Mod: PO

## 2017-07-03 LAB
ANA SER QL IF: NORMAL
RHEUMATOID FACT SERPL-ACNC: <10 IU/ML

## 2017-07-06 RX ORDER — DIAZEPAM 5 MG/1
TABLET ORAL
Qty: 30 TABLET | Refills: 0 | Status: SHIPPED | OUTPATIENT
Start: 2017-07-06 | End: 2017-08-02 | Stop reason: SDUPTHER

## 2017-07-06 RX ORDER — ZOLPIDEM TARTRATE 5 MG/1
TABLET ORAL
Qty: 30 TABLET | Refills: 0 | Status: SHIPPED | OUTPATIENT
Start: 2017-07-06 | End: 2017-10-05 | Stop reason: SDUPTHER

## 2017-07-06 RX ORDER — PREDNISONE 5 MG/1
TABLET ORAL
Qty: 30 TABLET | Refills: 0 | Status: SHIPPED | OUTPATIENT
Start: 2017-07-06 | End: 2017-08-02 | Stop reason: SDUPTHER

## 2017-07-09 RX ORDER — POTASSIUM CHLORIDE 750 MG/1
TABLET, EXTENDED RELEASE ORAL
Qty: 180 TABLET | Refills: 0 | Status: SHIPPED | OUTPATIENT
Start: 2017-07-09 | End: 2018-02-26 | Stop reason: SDUPTHER

## 2017-07-10 RX ORDER — GABAPENTIN 100 MG/1
CAPSULE ORAL
Qty: 90 CAPSULE | Refills: 0 | Status: SHIPPED | OUTPATIENT
Start: 2017-07-10 | End: 2018-05-06 | Stop reason: SDUPTHER

## 2017-07-10 RX ORDER — ALBUTEROL SULFATE 90 UG/1
AEROSOL, METERED RESPIRATORY (INHALATION)
Qty: 8.5 G | Refills: 0 | Status: SHIPPED | OUTPATIENT
Start: 2017-07-10 | End: 2017-07-24 | Stop reason: SDUPTHER

## 2017-07-16 RX ORDER — BUPROPION HYDROCHLORIDE 150 MG/1
TABLET, EXTENDED RELEASE ORAL
Qty: 90 TABLET | Refills: 0 | Status: SHIPPED | OUTPATIENT
Start: 2017-07-16 | End: 2017-10-18 | Stop reason: SDUPTHER

## 2017-07-19 ENCOUNTER — OFFICE VISIT (OUTPATIENT)
Dept: FAMILY MEDICINE | Facility: CLINIC | Age: 62
End: 2017-07-19
Payer: MEDICARE

## 2017-07-19 ENCOUNTER — TELEPHONE (OUTPATIENT)
Dept: FAMILY MEDICINE | Facility: CLINIC | Age: 62
End: 2017-07-19

## 2017-07-19 VITALS
SYSTOLIC BLOOD PRESSURE: 134 MMHG | HEIGHT: 60 IN | OXYGEN SATURATION: 97 % | TEMPERATURE: 98 F | BODY MASS INDEX: 47.66 KG/M2 | DIASTOLIC BLOOD PRESSURE: 78 MMHG | WEIGHT: 242.75 LBS | HEART RATE: 96 BPM

## 2017-07-19 DIAGNOSIS — E07.9 THYROID DISEASE: ICD-10-CM

## 2017-07-19 DIAGNOSIS — M17.0 ARTHRITIS OF BOTH KNEES: Primary | ICD-10-CM

## 2017-07-19 DIAGNOSIS — F32.A DEPRESSION, UNSPECIFIED DEPRESSION TYPE: ICD-10-CM

## 2017-07-19 PROCEDURE — 99212 OFFICE O/P EST SF 10 MIN: CPT | Mod: S$GLB,,, | Performed by: FAMILY MEDICINE

## 2017-07-19 RX ORDER — METHYLPREDNISOLONE 4 MG/1
TABLET ORAL
Refills: 0 | COMMUNITY
Start: 2017-07-11 | End: 2017-07-19 | Stop reason: ALTCHOICE

## 2017-07-19 RX ORDER — TAPENTADOL HYDROCHLORIDE 100 MG/1
1 TABLET, FILM COATED, EXTENDED RELEASE ORAL EVERY 12 HOURS
Refills: 0 | COMMUNITY
Start: 2017-07-11 | End: 2018-05-10

## 2017-07-19 NOTE — TELEPHONE ENCOUNTER
----- Message from Manda Malik sent at 7/19/2017 11:34 AM CDT -----  Contact: Pt [phone]  Patient experiencing knee pain.  Patient requesting injection, because she will be going for the whole weekend to a religous convention and would like to be able to walk. Patient would like to come in today.

## 2017-07-23 PROCEDURE — 20610 DRAIN/INJ JOINT/BURSA W/O US: CPT | Mod: 50,S$GLB,, | Performed by: FAMILY MEDICINE

## 2017-07-23 RX ORDER — TRIAMCINOLONE ACETONIDE 40 MG/ML
40 INJECTION, SUSPENSION INTRA-ARTICULAR; INTRAMUSCULAR
Status: DISCONTINUED | OUTPATIENT
Start: 2017-07-23 | End: 2017-07-23 | Stop reason: HOSPADM

## 2017-07-23 RX ADMIN — TRIAMCINOLONE ACETONIDE 40 MG: 40 INJECTION, SUSPENSION INTRA-ARTICULAR; INTRAMUSCULAR at 12:07

## 2017-07-23 NOTE — PROGRESS NOTES
Patient ID: Christine Castro is a 62 y.o. female.    Chief Complaint: Knee Pain (both knees (wants injections))    HPI       Christine Castro is a 62 y.o. female patient with bilateral arthritis going out of town to a convention would like injections of her knees bilaterally.  Talked about her anxiety and how Valium helps reduce anxiety.  Patient without complaints of illness fever chills nausea vomiting diarrhea.  Throat stable state of health.  Patient does use Lasix periodically.    New pain management-prescribed Nucynta      Review of Symptoms    Constitutional  No change in activity, No chills fever   Resp  Neg hemoptysis, stridor, choking  CVS  Neg chest pain, palpitations    Physical Exam    Constitutional:   Oriented to person, place, and time.appears well-developed and well-nourished.   No distress.     HENT  Head: Normocephalic and atraumatic  Right Ear: External ear normal.   Left Ear: External ear normal.   Nose: External nose normal.   Mouth: Moist mucous membranes    Eyes:   Conjunctivae are normal. Right eye exhibits no discharge. Left eye exhibits no discharge. No scleral icterus. No periorbital edema    Musculoskeletal:  No edema. No obvious deformity No waisting     Neurological:  Alert and oriented to person, place, and time. Coordination normal.     Skin:   Skin is warm and dry.  No diaphoresis.   No rash noted.     Psychiatric: Normal mood and affect. Behavior is normal. Judgment and thought content normal.       Assessment / Plan:      ICD-10-CM ICD-9-CM   1. Arthritis of both knees M17.0 716.96   2. Thyroid disease E07.9 246.9   3. Depression, unspecified depression type F32.9 311     Arthritis of both knees    Thyroid disease    Depression, unspecified depression type

## 2017-07-23 NOTE — PROCEDURES
Large Joint Aspiration/Injection  Date/Time: 7/23/2017 12:04 AM  Performed by: AMBER ROACH  Authorized by: AMBER ROACH.     Consent Done?:  Yes (Verbal)  Indications:  Pain  Procedure site marked: Yes    Timeout: Prior to procedure the correct patient, procedure, and site was verified      Location:  Knee  Site:  R knee and L knee  Prep: Patient was prepped and draped in usual sterile fashion    Ultrasonic Guidance for needle placement: No  Needle size:  27 G  Medications:  40 mg triamcinolone acetonide 40 mg/mL; 40 mg triamcinolone acetonide 40 mg/mL  Aspirate amount (ml):  0  Patient tolerance:  Patient tolerated the procedure well with no immediate complications      Patient was prepped in the usual fashion-discussion of pros and cons of injected discussed-also had injections prior    Left knee first attempt laterally was unsuccessful-the anatomy is difficult to figure out.  Remove needle then reprepped and injected medially.    Right knee-first try to inject laterally (difficult-removed needle reprepped and injected medially successfully.

## 2017-07-24 RX ORDER — ALBUTEROL SULFATE 90 UG/1
AEROSOL, METERED RESPIRATORY (INHALATION)
Qty: 18 G | Refills: 0 | Status: SHIPPED | OUTPATIENT
Start: 2017-07-24 | End: 2018-09-24 | Stop reason: SDUPTHER

## 2017-08-02 ENCOUNTER — TELEPHONE (OUTPATIENT)
Dept: FAMILY MEDICINE | Facility: CLINIC | Age: 62
End: 2017-08-02

## 2017-08-02 RX ORDER — CEPHALEXIN 500 MG/1
1000 CAPSULE ORAL 2 TIMES DAILY
Qty: 28 CAPSULE | Refills: 0 | Status: SHIPPED | OUTPATIENT
Start: 2017-08-02 | End: 2017-10-18

## 2017-08-02 RX ORDER — PREDNISONE 5 MG/1
TABLET ORAL
Qty: 30 TABLET | Refills: 0 | Status: SHIPPED | OUTPATIENT
Start: 2017-08-02 | End: 2017-10-18 | Stop reason: SDUPTHER

## 2017-08-02 RX ORDER — DIAZEPAM 5 MG/1
TABLET ORAL
Qty: 30 TABLET | Refills: 0 | Status: SHIPPED | OUTPATIENT
Start: 2017-08-02 | End: 2017-09-08 | Stop reason: SDUPTHER

## 2017-08-02 NOTE — TELEPHONE ENCOUNTER
----- Message from Heike Tam sent at 8/2/2017  3:41 PM CDT -----  Patient is needing an antibiotic. Says she is starting to get those bumps again in her head. The ones with puss in them

## 2017-08-23 RX ORDER — LEVOTHYROXINE SODIUM 88 UG/1
TABLET ORAL
Qty: 90 TABLET | Refills: 3 | Status: SHIPPED | OUTPATIENT
Start: 2017-08-23 | End: 2018-03-06

## 2017-08-31 RX ORDER — QUETIAPINE FUMARATE 50 MG/1
TABLET, FILM COATED ORAL
Qty: 90 TABLET | Refills: 0 | Status: SHIPPED | OUTPATIENT
Start: 2017-08-31 | End: 2017-11-26 | Stop reason: SDUPTHER

## 2017-09-08 RX ORDER — FUROSEMIDE 20 MG/1
TABLET ORAL
Qty: 90 TABLET | Refills: 0 | Status: SHIPPED | OUTPATIENT
Start: 2017-09-08 | End: 2017-12-04 | Stop reason: SDUPTHER

## 2017-09-08 RX ORDER — DIAZEPAM 5 MG/1
5 TABLET ORAL EVERY 8 HOURS PRN
Qty: 30 TABLET | Refills: 0 | Status: SHIPPED | OUTPATIENT
Start: 2017-09-08 | End: 2017-10-05 | Stop reason: SDUPTHER

## 2017-09-29 DIAGNOSIS — E11.9 TYPE 2 DIABETES MELLITUS WITHOUT COMPLICATION: ICD-10-CM

## 2017-10-05 NOTE — TELEPHONE ENCOUNTER
Patient states her sister passed away and she's really having a hard time dealing with it.     Patient requesting prescription refills sent to [facility] [place]        diazePAM (VALIUM) 5 MG tablet  Take 1 tablet (5 mg total) by mouth every 8 (eight) hours as needed.   Normal, Disp-30 tablet, R-0    escitalopram oxalate (LEXAPRO) 20 MG tablet  Take 1 tablet (20 mg total) by mouth once daily.   Normal, Disp-90 tablet, R-0    zolpidem (AMBIEN) 5 MG Tab  Normal, Disp-30 tablet, R-0

## 2017-10-06 RX ORDER — DIAZEPAM 5 MG/1
5 TABLET ORAL EVERY 8 HOURS PRN
Qty: 30 TABLET | Refills: 0 | Status: SHIPPED | OUTPATIENT
Start: 2017-10-06 | End: 2017-11-06 | Stop reason: SDUPTHER

## 2017-10-06 RX ORDER — ZOLPIDEM TARTRATE 5 MG/1
TABLET ORAL
Qty: 30 TABLET | Refills: 0 | Status: SHIPPED | OUTPATIENT
Start: 2017-10-06 | End: 2017-11-12 | Stop reason: SDUPTHER

## 2017-10-06 RX ORDER — ESCITALOPRAM OXALATE 20 MG/1
20 TABLET ORAL DAILY
Qty: 90 TABLET | Refills: 0 | Status: SHIPPED | OUTPATIENT
Start: 2017-10-06 | End: 2018-04-17 | Stop reason: SDUPTHER

## 2017-10-18 ENCOUNTER — OFFICE VISIT (OUTPATIENT)
Dept: FAMILY MEDICINE | Facility: CLINIC | Age: 62
End: 2017-10-18
Payer: MEDICARE

## 2017-10-18 VITALS
HEART RATE: 106 BPM | SYSTOLIC BLOOD PRESSURE: 92 MMHG | BODY MASS INDEX: 44.1 KG/M2 | WEIGHT: 224.63 LBS | OXYGEN SATURATION: 97 % | HEIGHT: 60 IN | DIASTOLIC BLOOD PRESSURE: 78 MMHG | TEMPERATURE: 99 F

## 2017-10-18 DIAGNOSIS — E11.9 TYPE 2 DIABETES MELLITUS WITHOUT COMPLICATION, WITHOUT LONG-TERM CURRENT USE OF INSULIN: ICD-10-CM

## 2017-10-18 DIAGNOSIS — E07.9 THYROID DISEASE: ICD-10-CM

## 2017-10-18 DIAGNOSIS — F32.A DEPRESSION, UNSPECIFIED DEPRESSION TYPE: ICD-10-CM

## 2017-10-18 DIAGNOSIS — Z23 NEED FOR INFLUENZA VACCINATION: ICD-10-CM

## 2017-10-18 DIAGNOSIS — M17.0 PRIMARY OSTEOARTHRITIS OF BOTH KNEES: ICD-10-CM

## 2017-10-18 DIAGNOSIS — M17.0 ARTHRITIS OF BOTH KNEES: Primary | ICD-10-CM

## 2017-10-18 LAB — GLUCOSE SERPL-MCNC: 103 MG/DL (ref 70–110)

## 2017-10-18 PROCEDURE — 96372 THER/PROPH/DIAG INJ SC/IM: CPT | Mod: S$GLB,,, | Performed by: FAMILY MEDICINE

## 2017-10-18 PROCEDURE — 82948 REAGENT STRIP/BLOOD GLUCOSE: CPT | Mod: ,,, | Performed by: FAMILY MEDICINE

## 2017-10-18 PROCEDURE — 99499 UNLISTED E&M SERVICE: CPT | Mod: S$GLB,,, | Performed by: FAMILY MEDICINE

## 2017-10-18 PROCEDURE — 99214 OFFICE O/P EST MOD 30 MIN: CPT | Mod: 25,S$GLB,, | Performed by: FAMILY MEDICINE

## 2017-10-18 RX ORDER — MORPHINE SULFATE 15 MG/1
TABLET, FILM COATED, EXTENDED RELEASE ORAL
COMMUNITY
Start: 2017-10-03 | End: 2018-08-21

## 2017-10-18 RX ORDER — PREDNISONE 5 MG/1
TABLET ORAL
Qty: 30 TABLET | Refills: 0 | Status: SHIPPED | OUTPATIENT
Start: 2017-10-18 | End: 2017-11-06 | Stop reason: SDUPTHER

## 2017-10-18 RX ORDER — BUPROPION HYDROCHLORIDE 200 MG/1
200 TABLET, EXTENDED RELEASE ORAL DAILY
Qty: 60 TABLET | Refills: 2 | Status: SHIPPED | OUTPATIENT
Start: 2017-10-18 | End: 2018-03-26 | Stop reason: SDUPTHER

## 2017-10-18 RX ORDER — BUSPIRONE HYDROCHLORIDE 10 MG/1
5 TABLET ORAL DAILY
Qty: 60 TABLET | Refills: 3 | Status: SHIPPED | OUTPATIENT
Start: 2017-10-18 | End: 2018-11-26 | Stop reason: SDUPTHER

## 2017-10-18 RX ORDER — TRIAMCINOLONE ACETONIDE 40 MG/ML
40 INJECTION, SUSPENSION INTRA-ARTICULAR; INTRAMUSCULAR ONCE
Status: COMPLETED | OUTPATIENT
Start: 2017-10-18 | End: 2017-10-18

## 2017-10-18 RX ADMIN — TRIAMCINOLONE ACETONIDE 40 MG: 40 INJECTION, SUSPENSION INTRA-ARTICULAR; INTRAMUSCULAR at 04:10

## 2017-10-22 NOTE — PROGRESS NOTES
Patient ID: Christine Castro is a 62 y.o. female.    Chief Complaint: Follow-up (6 month)    HPI      Christine Castro is a 62 y.o. female here following up regarding arthritis which continues to cause her pain especially bilateral knees, CHF which is stable, reflux which is stable, depression with continues and anxiety.  Primary complaint bilateral arthritis- both knees        Review of Symptoms    Constitutional  no recent activity change, No chills /fever   Resp  Neg hemoptysis, stridor, choking  CVS  Neg chest pain, palpitations      Physical Exam    Constitutional:  Oriented to person, place, and time.appears well-developed and well-nourished.  No distress.   approximately 10 pound-weight loss     HENT  Head: Normocephalic and atraumatic  Right Ear: External ear normal.   Left Ear: External ear normal.   Nose: External nose normal.   Mouth:  Moist mucus membranes.    Eyes:  Conjunctivae are normal. Right eye exhibits no discharge.  Left eye exhibits no discharge. No scleral icterus.  No periorbital edema    Cardiovascular:  Regular rate, Regular rhythm     No extrasystole  Normal S1-S2      Pulmonary/Chest:   Normal effort and breath sounds.  No wheezing, rhonchi or rales.    Musculoskeletal:  No edema. No obvious deformity No wasting       Neurological:  Alert and oriented to person, place, and time.   Coordination normal walks with cane     Skin:   Skin is warm and dry.  No diaphoresis.   No rash noted.     Psychiatric: Normal mood and affect. Behavior is normal.  Judgment and thought content normal.       Assessment / Plan:      ICD-10-CM ICD-9-CM   1. Arthritis of both knees M17.0 716.96   2. Need for influenza vaccination Z23 V04.81   3. Thyroid disease E07.9 246.9   4. Depression, unspecified depression type F32.9 311   5. Primary osteoarthritis of both knees M17.0 715.16   6. Type 2 diabetes mellitus without complication, without long-term current use of insulin E11.9 250.00     Arthritis of both  knees  -     Comprehensive metabolic panel; Future  -     CBC auto differential; Future  -     Lipid panel; Future  -     TSH; Future  -     Hemoglobin A1c; Future  -     Microalbumin/creatinine urine ratio; Future  -     triamcinolone acetonide injection 40 mg; Inject 1 mL (40 mg total) into the muscle once.  -     predniSONE (DELTASONE) 5 MG tablet; TAKE 1 TABLET(5 MG) BY MOUTH EVERY DAY  Dispense: 30 tablet; Refill: 0    Need for influenza vaccination  -     Comprehensive metabolic panel; Future  -     CBC auto differential; Future  -     Lipid panel; Future  -     TSH; Future  -     Hemoglobin A1c; Future  -     Microalbumin/creatinine urine ratio; Future    Thyroid disease  -     Comprehensive metabolic panel; Future  -     CBC auto differential; Future  -     Lipid panel; Future  -     TSH; Future  -     Hemoglobin A1c; Future  -     Microalbumin/creatinine urine ratio; Future    Depression, unspecified depression type  -     Comprehensive metabolic panel; Future  -     CBC auto differential; Future  -     Lipid panel; Future  -     TSH; Future  -     Hemoglobin A1c; Future  -     Microalbumin/creatinine urine ratio; Future    Primary osteoarthritis of both knees  -     Comprehensive metabolic panel; Future  -     CBC auto differential; Future  -     Lipid panel; Future  -     TSH; Future  -     Hemoglobin A1c; Future  -     Microalbumin/creatinine urine ratio; Future    Type 2 diabetes mellitus without complication, without long-term current use of insulin  -     Comprehensive metabolic panel; Future  -     CBC auto differential; Future  -     Lipid panel; Future  -     TSH; Future  -     Hemoglobin A1c; Future  -     Microalbumin/creatinine urine ratio; Future  -     POCT Glucose    Other orders  -     buPROPion (WELLBUTRIN SR) 200 MG Tb12; Take 1 tablet (200 mg total) by mouth once daily.  Dispense: 60 tablet; Refill: 2  -     busPIRone (BUSPAR) 10 MG tablet; Take 0.5 tablets (5 mg total) by mouth once  daily.  Dispense: 60 tablet; Refill: 3     arthritis both knees-diclofenac gel- too early to inject nausea -- see ortho    Mood cont meds    Thyroid no changes    Dm stable  - no changes at this time

## 2017-11-06 DIAGNOSIS — M17.0 ARTHRITIS OF BOTH KNEES: ICD-10-CM

## 2017-11-06 RX ORDER — DIAZEPAM 5 MG/1
TABLET ORAL
Qty: 30 TABLET | Refills: 0 | Status: SHIPPED | OUTPATIENT
Start: 2017-11-06 | End: 2017-12-06 | Stop reason: SDUPTHER

## 2017-11-06 RX ORDER — PREDNISONE 5 MG/1
TABLET ORAL
Qty: 30 TABLET | Refills: 0 | Status: SHIPPED | OUTPATIENT
Start: 2017-11-06 | End: 2018-02-15 | Stop reason: SDUPTHER

## 2017-11-11 RX ORDER — DICLOFENAC SODIUM 10 MG/G
GEL TOPICAL
Qty: 100 G | Refills: 3 | Status: SHIPPED | OUTPATIENT
Start: 2017-11-11 | End: 2018-02-15 | Stop reason: SDUPTHER

## 2017-11-13 RX ORDER — ZOLPIDEM TARTRATE 5 MG/1
TABLET ORAL
Qty: 30 TABLET | Refills: 2 | Status: SHIPPED | OUTPATIENT
Start: 2017-11-13 | End: 2018-02-21 | Stop reason: SDUPTHER

## 2017-11-24 RX ORDER — DIAZEPAM 5 MG/1
TABLET ORAL
Qty: 30 TABLET | Refills: 0 | OUTPATIENT
Start: 2017-11-24

## 2017-11-25 DIAGNOSIS — M17.0 ARTHRITIS OF BOTH KNEES: ICD-10-CM

## 2017-11-26 RX ORDER — GABAPENTIN 100 MG/1
CAPSULE ORAL
Qty: 270 CAPSULE | Refills: 0 | Status: SHIPPED | OUTPATIENT
Start: 2017-11-26 | End: 2018-02-15

## 2017-11-26 RX ORDER — PREDNISONE 5 MG/1
TABLET ORAL
Qty: 30 TABLET | Refills: 0 | Status: SHIPPED | OUTPATIENT
Start: 2017-11-26 | End: 2017-12-21 | Stop reason: SDUPTHER

## 2017-11-26 RX ORDER — QUETIAPINE FUMARATE 50 MG/1
TABLET, FILM COATED ORAL
Qty: 90 TABLET | Refills: 0 | Status: SHIPPED | OUTPATIENT
Start: 2017-11-26 | End: 2018-02-25 | Stop reason: SDUPTHER

## 2017-12-04 RX ORDER — FUROSEMIDE 20 MG/1
TABLET ORAL
Qty: 90 TABLET | Refills: 0 | Status: SHIPPED | OUTPATIENT
Start: 2017-12-04 | End: 2018-03-01 | Stop reason: SDUPTHER

## 2017-12-06 ENCOUNTER — TELEPHONE (OUTPATIENT)
Dept: FAMILY MEDICINE | Facility: CLINIC | Age: 62
End: 2017-12-06

## 2017-12-06 RX ORDER — DIAZEPAM 5 MG/1
TABLET ORAL
Qty: 30 TABLET | Refills: 0 | Status: SHIPPED | OUTPATIENT
Start: 2017-12-06 | End: 2017-12-18 | Stop reason: SDUPTHER

## 2017-12-06 NOTE — TELEPHONE ENCOUNTER
I refilled her Valium and Ambien was filled in November 30 pills +2 refills so she should have refills

## 2017-12-06 NOTE — TELEPHONE ENCOUNTER
----- Message from Heike Tam sent at 12/6/2017  3:23 PM CST -----  Patient is requesting a medication refill.     1.  RX name:diazePAM (VALIUM) 5 MG tablet   Strength:   Quantity:   Directions: TAKE 1 TABLET(5 MG) BY MOUTH EVERY 8 HOURS AS NEEDED    2.  RX name: zolpidem (AMBIEN) 5 MG Tab  Strength:   Quantity:   Directions: TAKE 1 TABLET(5 MG) BY MOUTH EVERY EVENING    Pharmacy name: [facility]

## 2017-12-18 RX ORDER — DIAZEPAM 5 MG/1
TABLET ORAL
Qty: 30 TABLET | Refills: 0 | Status: SHIPPED | OUTPATIENT
Start: 2017-12-18 | End: 2018-01-17 | Stop reason: SDUPTHER

## 2017-12-18 NOTE — TELEPHONE ENCOUNTER
Please send to [facility]     diazePAM (VALIUM) 5 MG tablet  TAKE 1 TABLET(5 MG) BY MOUTH EVERY 8 HOURS AS NEEDED   Normal, Disp-30 tablet, R-0

## 2017-12-21 DIAGNOSIS — M17.0 ARTHRITIS OF BOTH KNEES: ICD-10-CM

## 2017-12-21 RX ORDER — PREDNISONE 5 MG/1
TABLET ORAL
Qty: 30 TABLET | Refills: 0 | Status: SHIPPED | OUTPATIENT
Start: 2017-12-21 | End: 2018-02-15

## 2017-12-21 NOTE — TELEPHONE ENCOUNTER
I cannot do it     She needs to see ortho   It is not a good idea just to inject her knee    If ortho is ok that is fine but  Not me

## 2017-12-21 NOTE — TELEPHONE ENCOUNTER
----- Message from Kavon Rajput sent at 12/21/2017  2:47 PM CST -----  Contact: self  Patient is requesting a refill on predniSONE (DELTASONE) 5 MG tablet. Also patient would like to get a steroid injection in her knee before the holidays but there is no appointments available until January.         ** do you want me to add her to tomorrow schedule?** AR

## 2017-12-26 RX ORDER — OMEPRAZOLE 40 MG/1
CAPSULE, DELAYED RELEASE ORAL
Qty: 90 CAPSULE | Refills: 0 | Status: SHIPPED | OUTPATIENT
Start: 2017-12-26 | End: 2018-03-17 | Stop reason: SDUPTHER

## 2018-01-08 RX ORDER — PREDNISONE 10 MG/1
TABLET ORAL
Qty: 30 TABLET | Refills: 0 | Status: SHIPPED | OUTPATIENT
Start: 2018-01-08 | End: 2018-02-15

## 2018-01-18 RX ORDER — DIAZEPAM 5 MG/1
TABLET ORAL
Qty: 30 TABLET | Refills: 0 | Status: SHIPPED | OUTPATIENT
Start: 2018-01-18 | End: 2018-02-03 | Stop reason: SDUPTHER

## 2018-01-23 RX ORDER — GLIPIZIDE 2.5 MG/1
TABLET, EXTENDED RELEASE ORAL
Qty: 90 TABLET | Refills: 0 | Status: SHIPPED | OUTPATIENT
Start: 2018-01-23 | End: 2018-05-06 | Stop reason: SDUPTHER

## 2018-01-31 ENCOUNTER — HOSPITAL ENCOUNTER (EMERGENCY)
Facility: HOSPITAL | Age: 63
Discharge: HOME | End: 2018-01-31
Attending: EMERGENCY MEDICINE
Payer: MEDICARE

## 2018-01-31 ENCOUNTER — TELEPHONE (OUTPATIENT)
Dept: FAMILY MEDICINE | Facility: CLINIC | Age: 63
End: 2018-01-31

## 2018-01-31 VITALS
DIASTOLIC BLOOD PRESSURE: 62 MMHG | WEIGHT: 240 LBS | SYSTOLIC BLOOD PRESSURE: 106 MMHG | HEART RATE: 64 BPM | RESPIRATION RATE: 20 BRPM | OXYGEN SATURATION: 99 % | TEMPERATURE: 98 F | BODY MASS INDEX: 47.12 KG/M2 | HEIGHT: 60 IN

## 2018-01-31 DIAGNOSIS — T50.901A ACCIDENTAL OVERDOSE, INITIAL ENCOUNTER: Primary | ICD-10-CM

## 2018-01-31 DIAGNOSIS — R41.82 ALTERED MENTAL STATUS: ICD-10-CM

## 2018-01-31 LAB
ALBUMIN SERPL BCP-MCNC: 3.5 G/DL
ALP SERPL-CCNC: 68 U/L
ALT SERPL W/O P-5'-P-CCNC: 19 U/L
AMPHET+METHAMPHET UR QL: NEGATIVE
ANION GAP SERPL CALC-SCNC: 8 MMOL/L
APAP SERPL-MCNC: <10 UG/ML
AST SERPL-CCNC: 17 U/L
BACTERIA #/AREA URNS AUTO: ABNORMAL /HPF
BARBITURATES UR QL SCN>200 NG/ML: NEGATIVE
BASOPHILS # BLD AUTO: 0.01 K/UL
BASOPHILS NFR BLD: 0.2 %
BENZODIAZ UR QL SCN>200 NG/ML: NORMAL
BILIRUB SERPL-MCNC: 0.2 MG/DL
BILIRUB UR QL STRIP: NEGATIVE
BUN SERPL-MCNC: 13 MG/DL
BZE UR QL SCN: NEGATIVE
CALCIUM SERPL-MCNC: 9.1 MG/DL
CANNABINOIDS UR QL SCN: NEGATIVE
CHLORIDE SERPL-SCNC: 108 MMOL/L
CLARITY UR REFRACT.AUTO: CLEAR
CO2 SERPL-SCNC: 28 MMOL/L
COLOR UR AUTO: YELLOW
CREAT SERPL-MCNC: 0.77 MG/DL
CREAT UR-MCNC: 48.7 MG/DL
DIFFERENTIAL METHOD: ABNORMAL
EOSINOPHIL # BLD AUTO: 0.2 K/UL
EOSINOPHIL NFR BLD: 3.6 %
ERYTHROCYTE [DISTWIDTH] IN BLOOD BY AUTOMATED COUNT: 14.1 %
EST. GFR  (AFRICAN AMERICAN): >60 ML/MIN/1.73 M^2
EST. GFR  (NON AFRICAN AMERICAN): >60 ML/MIN/1.73 M^2
ETHANOL SERPL-MCNC: <10 MG/DL
GLUCOSE SERPL-MCNC: 112 MG/DL
GLUCOSE UR QL STRIP: NEGATIVE
HCT VFR BLD AUTO: 35.3 %
HGB BLD-MCNC: 11 G/DL
HGB UR QL STRIP: NEGATIVE
KETONES UR QL STRIP: NEGATIVE
LEUKOCYTE ESTERASE UR QL STRIP: ABNORMAL
LYMPHOCYTES # BLD AUTO: 1.3 K/UL
LYMPHOCYTES NFR BLD: 27.7 %
MCH RBC QN AUTO: 30.9 PG
MCHC RBC AUTO-ENTMCNC: 31.2 G/DL
MCV RBC AUTO: 99 FL
METHADONE UR QL SCN>300 NG/ML: NEGATIVE
MICROSCOPIC COMMENT: ABNORMAL
MONOCYTES # BLD AUTO: 0.4 K/UL
MONOCYTES NFR BLD: 8.6 %
NEUTROPHILS # BLD AUTO: 2.9 K/UL
NEUTROPHILS NFR BLD: 59.7 %
NITRITE UR QL STRIP: NEGATIVE
OPIATES UR QL SCN: NORMAL
PCP UR QL SCN>25 NG/ML: NEGATIVE
PH UR STRIP: 6 [PH] (ref 5–8)
PLATELET # BLD AUTO: 162 K/UL
PMV BLD AUTO: 10.5 FL
POTASSIUM SERPL-SCNC: 3.5 MMOL/L
PROT SERPL-MCNC: 6.3 G/DL
PROT UR QL STRIP: NEGATIVE
RBC # BLD AUTO: 3.56 M/UL
RBC #/AREA URNS AUTO: 1 /HPF (ref 0–4)
SODIUM SERPL-SCNC: 144 MMOL/L
SP GR UR STRIP: 1.01 (ref 1–1.03)
TOXICOLOGY INFORMATION: NORMAL
URN SPEC COLLECT METH UR: ABNORMAL
UROBILINOGEN UR STRIP-ACNC: NEGATIVE EU/DL
WBC # BLD AUTO: 4.77 K/UL
WBC #/AREA URNS AUTO: 8 /HPF (ref 0–5)

## 2018-01-31 PROCEDURE — 99285 EMERGENCY DEPT VISIT HI MDM: CPT | Mod: 25

## 2018-01-31 PROCEDURE — 82962 GLUCOSE BLOOD TEST: CPT

## 2018-01-31 PROCEDURE — 85025 COMPLETE CBC W/AUTO DIFF WBC: CPT

## 2018-01-31 PROCEDURE — 93010 ELECTROCARDIOGRAM REPORT: CPT | Mod: ,,, | Performed by: INTERNAL MEDICINE

## 2018-01-31 PROCEDURE — 80320 DRUG SCREEN QUANTALCOHOLS: CPT

## 2018-01-31 PROCEDURE — 80053 COMPREHEN METABOLIC PANEL: CPT

## 2018-01-31 PROCEDURE — 63600175 PHARM REV CODE 636 W HCPCS: Performed by: EMERGENCY MEDICINE

## 2018-01-31 PROCEDURE — 25000003 PHARM REV CODE 250

## 2018-01-31 PROCEDURE — 96375 TX/PRO/DX INJ NEW DRUG ADDON: CPT

## 2018-01-31 PROCEDURE — 96374 THER/PROPH/DIAG INJ IV PUSH: CPT

## 2018-01-31 PROCEDURE — 93005 ELECTROCARDIOGRAM TRACING: CPT

## 2018-01-31 PROCEDURE — 80307 DRUG TEST PRSMV CHEM ANLYZR: CPT

## 2018-01-31 PROCEDURE — 81000 URINALYSIS NONAUTO W/SCOPE: CPT

## 2018-01-31 PROCEDURE — 80329 ANALGESICS NON-OPIOID 1 OR 2: CPT

## 2018-01-31 RX ORDER — NALOXONE HCL 0.4 MG/ML
0.4 VIAL (ML) INJECTION
Status: COMPLETED | OUTPATIENT
Start: 2018-01-31 | End: 2018-01-31

## 2018-01-31 RX ORDER — DEXTROSE 50 % IN WATER (D50W) INTRAVENOUS SYRINGE
25
Status: COMPLETED | OUTPATIENT
Start: 2018-01-31 | End: 2018-01-31

## 2018-01-31 RX ORDER — DEXTROSE 50 % IN WATER (D50W) INTRAVENOUS SYRINGE
Status: COMPLETED
Start: 2018-01-31 | End: 2018-01-31

## 2018-01-31 RX ADMIN — DEXTROSE 50 % IN WATER (D50W) INTRAVENOUS SYRINGE 25 G: at 02:01

## 2018-01-31 RX ADMIN — NALOXONE HYDROCHLORIDE 0.4 MG: 0.4 INJECTION, SOLUTION INTRAMUSCULAR; INTRAVENOUS; SUBCUTANEOUS at 02:01

## 2018-01-31 RX ADMIN — DEXTROSE MONOHYDRATE 25 G: 25 INJECTION, SOLUTION INTRAVENOUS at 02:01

## 2018-01-31 NOTE — ED NOTES
Pt arrival per EMS, pt was found in her car, drivers seat in a driveway, the person that called was the driveways owner, the owner denies knowing the pt. EMS found pills in the car, not labeled.  PT awakens and answers questions per a light mumble.

## 2018-01-31 NOTE — ED PROVIDER NOTES
"Encounter Date: 2018       History     Chief Complaint   Patient presents with    Altered Mental Status     Pt to ED per EMS, found with altered mental status in drivers seat of car in a driveway, call received from home owner, pt not known to homeowner per report.  EMS reports pt with "pills in the car", reports unknown pills.  Pt open eyes when spoken loudly to, but returns to sleeping state.  MD performed sternal rub.      62-year-old female brought into the emergency department by EMS after a bystander saw that she was unresponsive in her car.  She had pills all over her, and a bottle in her purse, that was identified as 50 mg of Seroquel.  Patient has prescriptions for MS Contin, Nucynta, as well as Percocet, and addition to diazepam, and Ambien.  She has a full bottle of Seroquel.  She has a history of diabetes, her blood sugar was 79 by EMS. Her blood pressure had dropped, by EMS, and she was given IV fluids.  No medications given.    On arrival here, she was given an amp of dextrose, without much improvement, however after Narcan, she became more awake. Still drowsy, but much more awake.       The history is provided by the EMS personnel and the patient.     Review of patient's allergies indicates:   Allergen Reactions    Ibuprofen      Past Medical History:   Diagnosis Date    Arthritis     Asthma     Cancer     CHF (congestive heart failure)     ST CLAUDE GENERAL    COPD (chronic obstructive pulmonary disease)     Coronary artery disease     Depression     Diabetes mellitus, type 2     GERD (gastroesophageal reflux disease)     Myocardial infarction     AGE 19 [facility] WITHBABY DELIVERY    Thyroid disease      Past Surgical History:   Procedure Laterality Date     SECTION      CHOLECYSTECTOMY      COLON SURGERY      COLONOSCOPY      --- repeat in 3-5 years    COLONOSCOPY N/A 2017    Procedure: COLONOSCOPY;  Surgeon: Corrina Flores MD;  Location: [facility];  Service: " Endoscopy;  Laterality: N/A;    ECTOPIC PREGNANCY SURGERY      GASTRIC BYPASS      ovary removed      TONSILLECTOMY      TUBAL LIGATION       Family History   Problem Relation Age of Onset    Hyperlipidemia Mother     Hypertension Mother     Diabetes Mother     Hypertension Sister     Hypertension Brother     Diabetes Brother      Social History   Substance Use Topics    Smoking status: Never Smoker    Smokeless tobacco: Not on file    Alcohol use Yes     Review of Systems   Unable to perform ROS: Mental status change   Allergic/Immunologic: Negative.    All other systems reviewed and are negative.      Physical Exam     Initial Vitals [01/31/18 1447]   BP Pulse Resp Temp SpO2   (!) 92/54 77 14 97.8 °F (36.6 °C) 100 %      MAP       66.67         Physical Exam    Nursing note and vitals reviewed.  Constitutional: She appears well-developed and well-nourished. She appears distressed.   HENT:   Head: Normocephalic and atraumatic.   Her pupils are 3 mm bilaterally.  She has no conjunctival injection.   Eyes: EOM are normal. Pupils are equal, round, and reactive to light.   Neck: Normal range of motion. Neck supple.   Cardiovascular: Normal rate.   Pulmonary/Chest: No respiratory distress.   Abdominal: Soft.   Musculoskeletal: Normal range of motion.   Neurological: She is alert and oriented to person, place, and time. She has normal strength. No cranial nerve deficit.   She is sleepy, she awakens to voice and to sternal rub.  She called asleep easily.  She does follow commands, lifts both legs, and squeezes hands, much more alert after Narcan.   Skin: Skin is warm and dry.   Her skin is warm and dry. No diaphoresis   Psychiatric: She has a normal mood and affect.         ED Course   Procedures  Labs Reviewed   CBC W/ AUTO DIFFERENTIAL - Abnormal; Notable for the following:        Result Value    RBC 3.56 (*)     Hemoglobin 11.0 (*)     Hematocrit 35.3 (*)     MCV 99 (*)     MCHC 31.2 (*)     All other  components within normal limits   COMPREHENSIVE METABOLIC PANEL - Abnormal; Notable for the following:     Glucose 112 (*)     All other components within normal limits   URINALYSIS - Abnormal; Notable for the following:     Leukocytes, UA 2+ (*)     All other components within normal limits   URINALYSIS MICROSCOPIC - Abnormal; Notable for the following:     WBC, UA 8 (*)     All other components within normal limits   DRUG SCREEN PANEL, URINE EMERGENCY   ALCOHOL,MEDICAL (ETHANOL)   ACETAMINOPHEN LEVEL             Medical Decision Making:   Initial Assessment:   61 yo F here with AMS/lethargy found in her car with many medications  Differential Diagnosis:    likely ingestion of multiple medications (has rx for BZD, narcotics, seroquel, ambien) possible intentional vs unintentional polypharmacy  She is maintaining her airway     Clinical Tests:   Lab Tests: Ordered and Reviewed  ED Management:  Patient was monitored, is still sleeping.  She is denying suicidal ideation, but still quite somnolent.  She does have a history of depression, and takes Seroquel.  Awaiting more sound a state of mind.  Patient will be signed out to Dr. Gillette, to ensure that she is alert, awake and understand the etiology of this overdose.                   ED Course as of Jan 31 1756   Wed Jan 31, 2018   1610 Still sleeping  [MG]   1631 Trying to get up and use the bedside commode  [MG]   1733 More awake, but still too sleepy to dc. Denies SI  [MG]   1751 Benzodiazepines: Presumptive Positive [MG]   1751 Opiate Scrn, Ur: Presumptive Positive [MG]      ED Course User Index  [MG] Jackie Sin MD     Clinical Impression:   The encounter diagnosis was Altered mental status.    Disposition:   Disposition: Discharged  Condition: Stable                        Kiesha Gillette MD  01/31/18 5602

## 2018-01-31 NOTE — ED NOTES
Pt ade, entered room, pt asked what time it was, informed pt of the time, pt states she was driving to  her friend. That the last thing pt stated she remembered, MD entered room to speak to the Pt

## 2018-02-01 NOTE — ED NOTES
MD does not want another IV placed. Patient AAO. Patient remains stable. No complaints at this time.

## 2018-02-01 NOTE — ED NOTES
MD stated to stop Normal Saline Fluids at this time as long a blood pressure stays above Systolic stays above 100.

## 2018-02-01 NOTE — ED NOTES
Patient told me to call the contact Maty Galvez on [employer]. Informed friend that patient was in the hospital. She is going to call brother to have him come up to the facility.

## 2018-02-01 NOTE — ED NOTES
Daughter called and was informed of the status of her mother. Mother stated it was okay to let daughter and Brother know what was going on with her.

## 2018-02-03 ENCOUNTER — TELEPHONE (OUTPATIENT)
Dept: FAMILY MEDICINE | Facility: CLINIC | Age: 63
End: 2018-02-03

## 2018-02-05 RX ORDER — DIAZEPAM 5 MG/1
TABLET ORAL
Qty: 30 TABLET | Refills: 0 | OUTPATIENT
Start: 2018-02-05

## 2018-02-05 RX ORDER — DIAZEPAM 2 MG/1
2 TABLET ORAL EVERY 12 HOURS PRN
Qty: 15 TABLET | Refills: 0 | Status: SHIPPED | OUTPATIENT
Start: 2018-02-05 | End: 2018-02-07 | Stop reason: SDUPTHER

## 2018-02-05 NOTE — TELEPHONE ENCOUNTER
Needs to come in for a visit    Saw that she went to the er for over sedation    I cut down on valium  medicine

## 2018-02-05 NOTE — TELEPHONE ENCOUNTER
I left message for the pt to rtn call to discuss meds and schedule appt for her to see dr boykin soon

## 2018-02-06 NOTE — TELEPHONE ENCOUNTER
I left another message for the pt to rtn call and I also mailed her a contact letter advising her that dr boykin would like her to call and schedule an appt soon to see him

## 2018-02-07 ENCOUNTER — TELEPHONE (OUTPATIENT)
Dept: FAMILY MEDICINE | Facility: CLINIC | Age: 63
End: 2018-02-07

## 2018-02-07 RX ORDER — DIAZEPAM 2 MG/1
2 TABLET ORAL EVERY 12 HOURS PRN
Qty: 15 TABLET | Refills: 0 | Status: SHIPPED | OUTPATIENT
Start: 2018-02-07 | End: 2018-03-19

## 2018-02-07 NOTE — TELEPHONE ENCOUNTER
I did notify the pt of this and scheduled her to see you.  It sounds like the pharmacy canceled the rx and is requesting a new on bc   You wrote   Take 1 tablet (2 mg total) by mouth every 12 (twelve) hours as needed for Anxiety. TAKE 1 TABLET(5 MG) BY MOUTH EVERY 8 HOURS AS NEEDED - Oral    Please resent with only one, the new set of instructions

## 2018-02-07 NOTE — TELEPHONE ENCOUNTER
----- Message from Heike Tam sent at 2/7/2018  1:54 PM CST -----  Pharmacy calling. Needs new Rx for diazePAM (VALIUM) 2 MG tablet. The Rx received has two sets of directions

## 2018-02-07 NOTE — TELEPHONE ENCOUNTER
----- Message from Carmelina Kraus sent at 2/7/2018 12:08 PM CST -----  Contact: The pt called  The pt returned your call from yesterday.  Stated she got in late yesterday evening.  She can be reached at [phone]

## 2018-02-07 NOTE — TELEPHONE ENCOUNTER
I want to decrease the amt of valium used bc you were found over sedated and went to the er   I am concerned about taking to much medicine.  I want to slowly wean off this medicine.

## 2018-02-15 ENCOUNTER — OFFICE VISIT (OUTPATIENT)
Dept: FAMILY MEDICINE | Facility: CLINIC | Age: 63
End: 2018-02-15
Payer: MEDICARE

## 2018-02-15 VITALS
DIASTOLIC BLOOD PRESSURE: 80 MMHG | HEART RATE: 105 BPM | SYSTOLIC BLOOD PRESSURE: 130 MMHG | BODY MASS INDEX: 40.64 KG/M2 | OXYGEN SATURATION: 96 % | TEMPERATURE: 99 F | WEIGHT: 207 LBS | HEIGHT: 60 IN

## 2018-02-15 DIAGNOSIS — I50.40 COMBINED SYSTOLIC AND DIASTOLIC CONGESTIVE HEART FAILURE, UNSPECIFIED CONGESTIVE HEART FAILURE CHRONICITY: ICD-10-CM

## 2018-02-15 DIAGNOSIS — R41.82 ALTERED MENTAL STATUS, UNSPECIFIED ALTERED MENTAL STATUS TYPE: ICD-10-CM

## 2018-02-15 DIAGNOSIS — M17.0 ARTHRITIS OF BOTH KNEES: ICD-10-CM

## 2018-02-15 DIAGNOSIS — K21.9 GASTROESOPHAGEAL REFLUX DISEASE WITHOUT ESOPHAGITIS: ICD-10-CM

## 2018-02-15 DIAGNOSIS — Z12.31 ENCOUNTER FOR SCREENING MAMMOGRAM FOR BREAST CANCER: ICD-10-CM

## 2018-02-15 DIAGNOSIS — Z00.00 ROUTINE HEALTH MAINTENANCE: Primary | ICD-10-CM

## 2018-02-15 PROCEDURE — 99396 PREV VISIT EST AGE 40-64: CPT | Mod: S$GLB,,, | Performed by: FAMILY MEDICINE

## 2018-02-15 RX ORDER — LANCETS 33 GAUGE
EACH MISCELLANEOUS
Refills: 3 | COMMUNITY
Start: 2018-01-17 | End: 2018-08-21

## 2018-02-15 RX ORDER — PREDNISONE 5 MG/1
TABLET ORAL
Qty: 30 TABLET | Refills: 3 | Status: SHIPPED | OUTPATIENT
Start: 2018-02-15 | End: 2018-05-15 | Stop reason: SDUPTHER

## 2018-02-15 RX ORDER — ATORVASTATIN CALCIUM 10 MG/1
TABLET, FILM COATED ORAL
Qty: 45 TABLET | Refills: 3 | Status: SHIPPED | OUTPATIENT
Start: 2018-02-15 | End: 2018-04-04 | Stop reason: SDUPTHER

## 2018-02-15 RX ORDER — DICLOFENAC SODIUM 10 MG/G
GEL TOPICAL
Qty: 100 G | Refills: 3 | Status: SHIPPED | OUTPATIENT
Start: 2018-02-15 | End: 2018-08-21

## 2018-02-15 NOTE — PROGRESS NOTES
Patient ID: Christine Castro is a 62 y.o. female.    Chief Complaint: Annual Exam and Medication Refill    HPI      Christine Castro is a 62 y.o. female here for annual exam.  Overall doing ok without new problems.  Arthritis to used to cause significant pain.  Use of prednisone 5 mg daily to help reduce pain of the medicines have not helped her cough side effects.  Sees rheumatologist.  Patient recently found somnolent taken to the emergency room-denies sleepiness-denies increased use of meds.  Patient with diabetes-has lost significant amount of weight intentionally.  Hypothyroidism-continue same medicines without problems.  Depression-stable no grave problems at this time.  Diabetes-wonders if she should take oral medicines IV            Review of Symptoms    Constitutional  Neg activity change, No chills /fever   Resp  Neg hemoptysis, stridor, choking  CVS  Neg chest pain, palpitations    Physical Exam    Constitutional:  Oriented to person, place, and time.appears well-developed and well-nourished.  No distress.   Obvious weight loss-carries it well  Does not fully grasp the gravity of her multiple diseases.     HENT  Head: Normocephalic and atraumatic  Right Ear: External ear normal.   Left Ear: External ear normal.   Nose: External nose normal.   Mouth:  Moist mucus membranes.    Eyes:  Conjunctivae are normal. Right eye exhibits no discharge.  Left eye exhibits no discharge. No scleral icterus.  No periorbital edema    Cardiovascular:  Regular rate, Regular rhythm     No extrasystole  Normal S1-S2      Pulmonary/Chest:   Normal effort and breath sounds.  No wheezing, rhonchi or rales.    Musculoskeletal:  No edema. No obvious deformity No wasting       Neurological:  Alert and oriented to person, place, and time.   Coordination normal uses wheelchair because of multiple skeletal problems knee pain     Skin:   Skin is warm and dry.  No diaphoresis.   No rash noted.     Psychiatric: Normal mood and affect.  Behavior is normal.  Judgment and thought content normal.       Assessment / Plan:      ICD-10-CM ICD-9-CM   1. Routine health maintenance Z00.00 V70.0   2. Encounter for screening mammogram for breast cancer Z12.31 V76.12   3. Altered mental status, unspecified altered mental status type R41.82 780.97   4. Gastroesophageal reflux disease without esophagitis K21.9 530.81   5. Arthritis of both knees M17.0 716.96     Routine health maintenance    Encounter for screening mammogram for breast cancer    Altered mental status, unspecified altered mental status type  Comments:  er visit following up  deny she was impaired      Gastroesophageal reflux disease without esophagitis    Arthritis of both knees  -     predniSONE (DELTASONE) 5 MG tablet; TAKE 1 TABLET(5 MG) BY MOUTH EVERY DAY  Dispense: 30 tablet; Refill: 3    Other orders  -     atorvastatin (LIPITOR) 10 MG tablet; One every other day  Dispense: 45 tablet; Refill: 3  -     diclofenac sodium 1 % Gel; APPLY 2 GRAMS EXTERNALLY TO THE AFFECTED AREA FOUR TIMES DAILY  Dispense: 100 g; Refill: 3     discussed reduction of Valium-mild free medicines including a medicine.    Discussed with full diabetes excellent discussed arthritis and use of prednisone sparingly  Discussed use of anxiety medicines-Valium    Recently met with oncologist- dr Moulton

## 2018-02-20 LAB
ALBUMIN SERPL-MCNC: 3.6 G/DL (ref 3.6–5.1)
ALBUMIN/GLOB SERPL: 1.3 (CALC) (ref 1–2.5)
ALP SERPL-CCNC: 67 U/L (ref 33–130)
ALT SERPL-CCNC: 8 U/L (ref 6–29)
AST SERPL-CCNC: 15 U/L (ref 10–35)
BASOPHILS # BLD AUTO: 39 CELLS/UL (ref 0–200)
BASOPHILS NFR BLD AUTO: 0.7 %
BILIRUB SERPL-MCNC: 0.3 MG/DL (ref 0.2–1.2)
BUN SERPL-MCNC: 10 MG/DL (ref 7–25)
BUN/CREAT SERPL: ABNORMAL (CALC) (ref 6–22)
CALCIUM SERPL-MCNC: 9 MG/DL (ref 8.6–10.4)
CHLORIDE SERPL-SCNC: 106 MMOL/L (ref 98–110)
CHOLEST SERPL-MCNC: 184 MG/DL
CHOLEST/HDLC SERPL: 3.6 (CALC)
CO2 SERPL-SCNC: 26 MMOL/L (ref 20–31)
CREAT SERPL-MCNC: 0.74 MG/DL (ref 0.5–0.99)
EOSINOPHIL # BLD AUTO: 160 CELLS/UL (ref 15–500)
EOSINOPHIL NFR BLD AUTO: 2.9 %
ERYTHROCYTE [DISTWIDTH] IN BLOOD BY AUTOMATED COUNT: 13.5 % (ref 11–15)
GFR SERPL CREATININE-BSD FRML MDRD: 86 ML/MIN/1.73M2
GLOBULIN SER CALC-MCNC: 2.8 G/DL (CALC) (ref 1.9–3.7)
GLUCOSE SERPL-MCNC: 71 MG/DL (ref 65–99)
HBA1C MFR BLD: 4.9 % OF TOTAL HGB
HCT VFR BLD AUTO: 34.1 % (ref 35–45)
HDLC SERPL-MCNC: 51 MG/DL
HGB BLD-MCNC: 11.2 G/DL (ref 11.7–15.5)
LDLC SERPL CALC-MCNC: 113 MG/DL (CALC)
LYMPHOCYTES # BLD AUTO: 1898 CELLS/UL (ref 850–3900)
LYMPHOCYTES NFR BLD AUTO: 34.5 %
MCH RBC QN AUTO: 30.3 PG (ref 27–33)
MCHC RBC AUTO-ENTMCNC: 32.8 G/DL (ref 32–36)
MCV RBC AUTO: 92.2 FL (ref 80–100)
MONOCYTES # BLD AUTO: 352 CELLS/UL (ref 200–950)
MONOCYTES NFR BLD AUTO: 6.4 %
NEUTROPHILS # BLD AUTO: 3053 CELLS/UL (ref 1500–7800)
NEUTROPHILS NFR BLD AUTO: 55.5 %
NONHDLC SERPL-MCNC: 133 MG/DL (CALC)
PLATELET # BLD AUTO: 294 THOUSAND/UL (ref 140–400)
PMV BLD REES-ECKER: 10 FL (ref 7.5–12.5)
POTASSIUM SERPL-SCNC: 3.1 MMOL/L (ref 3.5–5.3)
PROT SERPL-MCNC: 6.4 G/DL (ref 6.1–8.1)
RBC # BLD AUTO: 3.7 MILLION/UL (ref 3.8–5.1)
SODIUM SERPL-SCNC: 144 MMOL/L (ref 135–146)
TRIGL SERPL-MCNC: 94 MG/DL
TSH SERPL-ACNC: 5.51 MIU/L (ref 0.4–4.5)
WBC # BLD AUTO: 5.5 THOUSAND/UL (ref 3.8–10.8)

## 2018-02-21 LAB
ALBUMIN/CREAT UR: 4 MCG/MG CREAT
CREAT UR-MCNC: 234 MG/DL (ref 20–320)
MICROALBUMIN UR-MCNC: 1 MG/DL

## 2018-02-22 RX ORDER — ZOLPIDEM TARTRATE 5 MG/1
TABLET ORAL
Qty: 30 TABLET | Refills: 0 | Status: SHIPPED | OUTPATIENT
Start: 2018-02-22 | End: 2018-04-05 | Stop reason: SDUPTHER

## 2018-02-25 RX ORDER — QUETIAPINE FUMARATE 50 MG/1
TABLET, FILM COATED ORAL
Qty: 90 TABLET | Refills: 0 | Status: SHIPPED | OUTPATIENT
Start: 2018-02-25 | End: 2018-03-19

## 2018-02-26 RX ORDER — POTASSIUM CHLORIDE 750 MG/1
TABLET, EXTENDED RELEASE ORAL
Qty: 180 TABLET | Refills: 0 | Status: SHIPPED | OUTPATIENT
Start: 2018-02-26 | End: 2018-04-17

## 2018-03-01 ENCOUNTER — TELEPHONE (OUTPATIENT)
Dept: FAMILY MEDICINE | Facility: CLINIC | Age: 63
End: 2018-03-01

## 2018-03-01 DIAGNOSIS — E07.9 THYROID DISEASE: ICD-10-CM

## 2018-03-01 DIAGNOSIS — F32.A DEPRESSION, UNSPECIFIED DEPRESSION TYPE: Primary | ICD-10-CM

## 2018-03-01 DIAGNOSIS — D50.9 MICROCYTIC ANEMIA: ICD-10-CM

## 2018-03-01 RX ORDER — LEVOTHYROXINE SODIUM 100 UG/1
100 TABLET ORAL DAILY
Qty: 90 TABLET | Refills: 11 | Status: SHIPPED | OUTPATIENT
Start: 2018-03-01 | End: 2018-11-26 | Stop reason: SDUPTHER

## 2018-03-01 RX ORDER — FUROSEMIDE 20 MG/1
TABLET ORAL
Qty: 90 TABLET | Refills: 0 | Status: SHIPPED | OUTPATIENT
Start: 2018-03-01 | End: 2018-05-28 | Stop reason: SDUPTHER

## 2018-03-02 NOTE — TELEPHONE ENCOUNTER
Your lab work is good except for slight anemia and low thyroid hormone.  I want to increase her thyroid hormone to 100 µg from 88 µg.  I want to make sure you don't have any blood in her stool.  Last colonoscopy was in April 2017 normal.  I want to check your blood for iron levels ordered [facility].

## 2018-03-06 NOTE — TELEPHONE ENCOUNTER
Pt notified of lab results   She will come get fit kit and have labs drawn [facility]  I faxed lab results to dr brothers per the pt request

## 2018-03-08 ENCOUNTER — TELEPHONE (OUTPATIENT)
Dept: FAMILY MEDICINE | Facility: CLINIC | Age: 63
End: 2018-03-08

## 2018-03-08 LAB
FERRITIN SERPL-MCNC: 70 NG/ML (ref 20–288)
FERRITIN SERPL-MCNC: 72 NG/ML (ref 20–288)
IRON SATN MFR SERPL: 24 % (CALC) (ref 11–50)
IRON SERPL-MCNC: 64 MCG/DL (ref 45–160)
TIBC SERPL-MCNC: 270 MCG/DL (CALC) (ref 250–450)

## 2018-03-09 NOTE — TELEPHONE ENCOUNTER
I spoke with the pt   And advised of lab results  She completed stool sample today and will drop it off to us

## 2018-03-09 NOTE — TELEPHONE ENCOUNTER
Lab work shows normal iron levels.  Please return stool cards for occult blood.  They are time sensitive

## 2018-03-12 ENCOUNTER — HOSPITAL ENCOUNTER (EMERGENCY)
Facility: HOSPITAL | Age: 63
Discharge: HOME | End: 2018-03-12
Attending: EMERGENCY MEDICINE
Payer: MEDICARE

## 2018-03-12 VITALS
RESPIRATION RATE: 20 BRPM | WEIGHT: 198 LBS | OXYGEN SATURATION: 98 % | BODY MASS INDEX: 38.87 KG/M2 | DIASTOLIC BLOOD PRESSURE: 66 MMHG | HEART RATE: 77 BPM | SYSTOLIC BLOOD PRESSURE: 125 MMHG | HEIGHT: 60 IN | TEMPERATURE: 98 F

## 2018-03-12 DIAGNOSIS — M16.10: Primary | ICD-10-CM

## 2018-03-12 DIAGNOSIS — M17.10 ARTHRITIS OF KNEE: ICD-10-CM

## 2018-03-12 PROCEDURE — 99283 EMERGENCY DEPT VISIT LOW MDM: CPT | Mod: 25

## 2018-03-12 PROCEDURE — 96372 THER/PROPH/DIAG INJ SC/IM: CPT

## 2018-03-12 PROCEDURE — 63600175 PHARM REV CODE 636 W HCPCS: Performed by: EMERGENCY MEDICINE

## 2018-03-12 RX ORDER — KETOROLAC TROMETHAMINE 30 MG/ML
30 INJECTION, SOLUTION INTRAMUSCULAR; INTRAVENOUS
Status: COMPLETED | OUTPATIENT
Start: 2018-03-12 | End: 2018-03-12

## 2018-03-12 RX ADMIN — KETOROLAC TROMETHAMINE 30 MG: 30 INJECTION, SOLUTION INTRAMUSCULAR at 08:03

## 2018-03-13 ENCOUNTER — LAB VISIT (OUTPATIENT)
Dept: LAB | Facility: HOSPITAL | Age: 63
End: 2018-03-13
Attending: FAMILY MEDICINE
Payer: MEDICARE

## 2018-03-13 ENCOUNTER — PES CALL (OUTPATIENT)
Dept: ADMINISTRATIVE | Facility: CLINIC | Age: 63
End: 2018-03-13

## 2018-03-13 ENCOUNTER — TELEPHONE (OUTPATIENT)
Dept: FAMILY MEDICINE | Facility: CLINIC | Age: 63
End: 2018-03-13

## 2018-03-13 DIAGNOSIS — D50.9 MICROCYTIC ANEMIA: Primary | ICD-10-CM

## 2018-03-13 DIAGNOSIS — F32.A DEPRESSION, UNSPECIFIED DEPRESSION TYPE: ICD-10-CM

## 2018-03-13 DIAGNOSIS — D50.9 MICROCYTIC ANEMIA: ICD-10-CM

## 2018-03-13 DIAGNOSIS — E07.9 THYROID DISEASE: ICD-10-CM

## 2018-03-13 DIAGNOSIS — R19.5 HEME POSITIVE STOOL: ICD-10-CM

## 2018-03-13 LAB — HEMOCCULT STL QL IA: POSITIVE

## 2018-03-13 PROCEDURE — 82274 ASSAY TEST FOR BLOOD FECAL: CPT

## 2018-03-13 NOTE — ED NOTES
Pt here c/o right knee pain that radiates up lateral thigh-started today-denies any change in routine,denies trauma. No SOB observed, left eye is bloodshot-pt also concerned about this.

## 2018-03-13 NOTE — ED PROVIDER NOTES
Encounter Date: 3/12/2018       History     Chief Complaint   Patient presents with    Leg Pain     Pt to ER with c/o pain from right knee that travel up to her mid thigh on the lateral side of leg starting yesterday.  pt also has multiple other pain complaints to her joints.     62-year-old female presents emergency department complaining of right leg pain.  Patient reports gradually worsening chronic pain to the right leg.  She describes an aching and cramping pain that radiates from her knee to her right lateral hip.  It is worse with ambulation without alleviating factors.  While it is similar to pain she has experienced in the past with her chronic degenerative joint disease, she believes there is something wrong because the pain is worse.  No recent falls reported.  Denies any numbness or weakness.  No other symptoms at this time.          Review of patient's allergies indicates:   Allergen Reactions    Ibuprofen      Past Medical History:   Diagnosis Date    Arthritis     Asthma     Cancer     CHF (congestive heart failure)     ST CLAUDE GENERAL    COPD (chronic obstructive pulmonary disease)     Coronary artery disease     Depression     Diabetes mellitus, type 2     GERD (gastroesophageal reflux disease)     Myocardial infarction     AGE 19 [facility] WITHBABY DELIVERY    Thyroid disease      Past Surgical History:   Procedure Laterality Date     SECTION      CHOLECYSTECTOMY      COLON SURGERY      COLONOSCOPY      --- repeat in 3-5 years    COLONOSCOPY N/A 2017    Procedure: COLONOSCOPY;  Surgeon: Corrina Flores MD;  [facility];  Service: Endoscopy;  Laterality: N/A;    ECTOPIC PREGNANCY SURGERY      GASTRIC BYPASS      ovary removed      TONSILLECTOMY      TUBAL LIGATION       Family History   Problem Relation Age of Onset    Hyperlipidemia Mother     Hypertension Mother     Diabetes Mother     Hypertension Sister     Hypertension Brother     Diabetes  Brother      Social History   Substance Use Topics    Smoking status: Never Smoker    Smokeless tobacco: Never Used    Alcohol use Yes     Review of Systems   Constitutional: Negative for chills, fatigue and fever.   HENT: Negative for congestion, sore throat and voice change.    Eyes: Negative for photophobia, pain and redness.   Respiratory: Negative for cough, choking and shortness of breath.    Cardiovascular: Negative for chest pain, palpitations and leg swelling.   Gastrointestinal: Negative for abdominal pain, diarrhea, nausea and vomiting.   Genitourinary: Negative for dysuria, frequency and urgency.   Musculoskeletal: Negative for back pain, neck pain and neck stiffness.   Neurological: Negative for seizures, speech difficulty, light-headedness, numbness and headaches.   All other systems reviewed and are negative.      Physical Exam     Initial Vitals [03/12/18 1943]   BP Pulse Resp Temp SpO2   (!) 147/82 94 18 98.4 °F (36.9 °C) 98 %      MAP       103.67         Physical Exam    Nursing note and vitals reviewed.  Constitutional: She appears well-developed and well-nourished. No distress.   Obese   HENT:   Head: Normocephalic and atraumatic.   Oropharynx clear; dry mucous membranes   Eyes: Conjunctivae and EOM are normal. Pupils are equal, round, and reactive to light.   Neck: Normal range of motion. Neck supple. No tracheal deviation present.   Cardiovascular: Normal rate, regular rhythm, normal heart sounds and intact distal pulses.   Pulmonary/Chest: Breath sounds normal. No respiratory distress. She has no wheezes. She has no rhonchi. She has no rales.   Abdominal: Soft. Bowel sounds are normal. She exhibits no distension. There is no tenderness.   Musculoskeletal: Normal range of motion. She exhibits tenderness (Diffuse right lateral thigh tenderness including knee and hip; no point or bony tenderness, full range of motion with mild pain). She exhibits no edema.   Neurological: She is alert and  oriented to person, place, and time. She has normal strength. No cranial nerve deficit or sensory deficit.   Skin: Skin is warm and dry. Capillary refill takes less than 2 seconds.         ED Course   Procedures  Labs Reviewed - No data to display       X-Rays:   Independently Interpreted Readings:   Other Readings:  Imaging interpreted by radiologist and visualized by me:     Imaging Results          X-Ray Hip 2 or 3 views Right (Final result)  Result time 03/12/18 21:19:30   Procedure changed from X-Ray Pelvis Routine AP     Final result by Erik Demarco MD (03/12/18 21:19:30)                 Impression:       No evidence of acute fracture or malalignment the right hip.    Osteitis of the pubic symphysis.              Electronically signed by: ERIK DEMARCO MD  Date:     03/12/18  Time:    21:19              Narrative:    Exam: [id number]  03/12/18  20:54:42 [id number] ([facility]) : XR HIP 2 VIEW RIGHT    Technique:    Frontal and lateral radiographs of the right hip.    Comparison:    9/28/16    Findings:      There are postoperative changes in the left hemipelvis and in the right pelvic inlet. There is a change compared to the prior examination.    There is diffuse osteopenia. The pelvic ring appears intact. There is no evidence of fracture or dislocation of the right hip. There is osteitis and widening of the pubic symphysis. There is also joint space narrowing of the sacroiliac joints. There are degenerative changes in the lumbar spine. Similar findings were present on the prior examination.                              Medical Decision Making:   Initial Assessment:   61 y/o F presents to the ED c/o left hip and knee and leg pain  Differential Diagnosis:   Fx, dislocation, arthritis, sprain, strain, contusion  Independently Interpreted Test(s):   I have ordered and independently interpreted X-rays - see prior notes.  ED Management:  Patient given Toradol, reports feeling better at this time.  Instructed to follow-up with  primary care physician for further evaluation and management and have her primary care physician review her radiology work up, instructed on reasons to return to the emergency room.                      Clinical Impression:   The primary encounter diagnosis was Arthritis, pelvis. A diagnosis of Arthritis of knee was also pertinent to this visit.    Disposition:   Disposition: Discharged  Condition: Stable                        Lloyd Brand MD  03/12/18 8570

## 2018-03-13 NOTE — TELEPHONE ENCOUNTER
----- Message from Carmelina Kraus sent at 3/13/2018  1:49 PM CDT -----  Contact: The pt called  Went to hospital last night with leg and left eye bloodshot.  Please call her at [phone]

## 2018-03-14 RX ORDER — DIAZEPAM 5 MG/1
TABLET ORAL
Qty: 30 TABLET | Refills: 0 | OUTPATIENT
Start: 2018-03-14

## 2018-03-14 RX ORDER — DIAZEPAM 5 MG/1
TABLET ORAL
Qty: 30 TABLET | Refills: 0 | Status: SHIPPED | OUTPATIENT
Start: 2018-03-14 | End: 2018-04-05 | Stop reason: SDUPTHER

## 2018-03-14 NOTE — TELEPHONE ENCOUNTER
----- Message from Carmelina Kraus sent at 3/14/2018  3:11 PM CDT -----  Contact: The pt  Stated she was returning a call to Dr Santiago.  May be in regards to referring her to specialist.  She can be reached at [phone]

## 2018-03-14 NOTE — TELEPHONE ENCOUNTER
----- Message from Heike Tam sent at 3/14/2018  1:23 PM CDT -----  Patient is requesting a medication refill. Patient has appt scheduled next week    RX name: diazePAM (VALIUM) 2 MG tablet  Strength:   Quantity:   Directions:Take 1 tablet (2 mg total) by mouth every 12 (twelve) hours as needed for Anxiety. - Oral     Pharmacy name: [facility]

## 2018-03-15 DIAGNOSIS — Z13.5 DIABETIC RETINOPATHY SCREENING: ICD-10-CM

## 2018-03-15 NOTE — TELEPHONE ENCOUNTER
Discussed with patient heme positive stool follow-up with nurse practitioner Sim/SERENA      She has an appointment with me Monday

## 2018-03-18 RX ORDER — DIAZEPAM 5 MG/1
TABLET ORAL
Qty: 30 TABLET | Refills: 0 | OUTPATIENT
Start: 2018-03-18

## 2018-03-19 ENCOUNTER — OFFICE VISIT (OUTPATIENT)
Dept: FAMILY MEDICINE | Facility: CLINIC | Age: 63
End: 2018-03-19
Payer: MEDICARE

## 2018-03-19 VITALS
HEIGHT: 60 IN | WEIGHT: 201.63 LBS | HEART RATE: 102 BPM | DIASTOLIC BLOOD PRESSURE: 86 MMHG | TEMPERATURE: 99 F | SYSTOLIC BLOOD PRESSURE: 132 MMHG | OXYGEN SATURATION: 97 % | BODY MASS INDEX: 39.59 KG/M2

## 2018-03-19 DIAGNOSIS — R19.5 HEME POSITIVE STOOL: ICD-10-CM

## 2018-03-19 DIAGNOSIS — M19.90 ARTHRITIS: ICD-10-CM

## 2018-03-19 DIAGNOSIS — D50.9 MICROCYTIC ANEMIA: Primary | ICD-10-CM

## 2018-03-19 DIAGNOSIS — F32.A DEPRESSION, UNSPECIFIED DEPRESSION TYPE: ICD-10-CM

## 2018-03-19 DIAGNOSIS — E66.9 OBESITY, UNSPECIFIED OBESITY SEVERITY, UNSPECIFIED OBESITY TYPE: ICD-10-CM

## 2018-03-19 PROCEDURE — 99214 OFFICE O/P EST MOD 30 MIN: CPT | Mod: S$GLB,,, | Performed by: FAMILY MEDICINE

## 2018-03-19 RX ORDER — OMEPRAZOLE 40 MG/1
CAPSULE, DELAYED RELEASE ORAL
Qty: 90 CAPSULE | Refills: 0 | Status: SHIPPED | OUTPATIENT
Start: 2018-03-19 | End: 2018-06-13 | Stop reason: SDUPTHER

## 2018-03-22 ENCOUNTER — HOSPITAL ENCOUNTER (OUTPATIENT)
Dept: RADIOLOGY | Facility: HOSPITAL | Age: 63
Discharge: HOME | End: 2018-03-22
Attending: FAMILY MEDICINE
Payer: MEDICARE

## 2018-03-22 DIAGNOSIS — Z12.31 ENCOUNTER FOR SCREENING MAMMOGRAM FOR BREAST CANCER: ICD-10-CM

## 2018-03-22 PROCEDURE — 77067 SCR MAMMO BI INCL CAD: CPT | Mod: TC,PO

## 2018-03-24 NOTE — PROGRESS NOTES
Patient ID: Christine Castro is a 62 y.o. female.    Chief Complaint: Follow-up    HPI      Christine Castro is a 62 y.o. female here to discuss overall condition and disability.  Pt with cont chronic severe knee pain with use of creams and oral meds. She walks with a walker and finds it hard to do most daily activities.  She gets temporary relief with injection but short lived.  Pt mood has been elevated recently but she continues to struggle with depression and very little pleasure in life.  Dm super control.  Anemia- colonoscopy less than a year ago     Review of Symptoms    Constitutional  Neg activity change continued debility, No chills /fever   Resp  Neg hemoptysis, stridor, choking  CVS  Neg chest pain, palpitations    Physical Exam    Constitutional:  Oriented to person, place, and time.appears well-developed and well-nourished.  No distress.   Wt loss oblivious- intentional       HENT  Head: Normocephalic and atraumatic  Right Ear: External ear normal.   Left Ear: External ear normal.   Nose: External nose normal.   Mouth:  Moist mucus membranes.    Eyes:  Conjunctivae are normal. Right eye exhibits no discharge.  Left eye exhibits no discharge. No scleral icterus.  No periorbital edema    Cardiovascular:  Regular rate, Regular rhythm     No extrasystole  Normal S1-S2      Pulmonary/Chest:   Normal effort and breath sounds.  No wheezing, rhonchi or rales.    Musculoskeletal:  No edema. No obvious deformity No wasting       Neurological:  Alert and oriented to person, place, and time.   Coordination normal.     Skin:   Skin is warm and dry.  No diaphoresis.   No rash noted.     Psychiatric: Normal mood and affect. Behavior is normal.  Judgment and thought content normal.       Assessment / Plan:      ICD-10-CM ICD-9-CM   1. Microcytic anemia D50.9 280.9   2. Heme positive stool R19.5 792.1   3. Depression, unspecified depression type F32.9 311   4. Obesity, unspecified obesity severity, unspecified obesity  type E66.9 278.00   5. Arthritis M19.90 716.90     Microcytic anemia    Heme positive stool    Depression, unspecified depression type    Obesity, unspecified obesity severity, unspecified obesity type    Arthritis      Anemia discussed need to see GI  Arthritis discussed ortho and possible knee replacement since wt loss now  Depression cont meds.  Dm controled  Discussed getting wellness pap etc

## 2018-03-26 RX ORDER — BUPROPION HYDROCHLORIDE 200 MG/1
TABLET, EXTENDED RELEASE ORAL
Qty: 60 TABLET | Refills: 0 | Status: SHIPPED | OUTPATIENT
Start: 2018-03-26 | End: 2018-03-30 | Stop reason: SDUPTHER

## 2018-03-29 ENCOUNTER — OFFICE VISIT (OUTPATIENT)
Dept: GASTROENTEROLOGY | Facility: CLINIC | Age: 63
End: 2018-03-29
Payer: MEDICARE

## 2018-03-29 ENCOUNTER — LAB VISIT (OUTPATIENT)
Dept: LAB | Facility: HOSPITAL | Age: 63
End: 2018-03-29
Attending: NURSE PRACTITIONER
Payer: MEDICARE

## 2018-03-29 VITALS
DIASTOLIC BLOOD PRESSURE: 62 MMHG | HEIGHT: 60 IN | WEIGHT: 205 LBS | SYSTOLIC BLOOD PRESSURE: 102 MMHG | BODY MASS INDEX: 40.25 KG/M2 | HEART RATE: 80 BPM

## 2018-03-29 DIAGNOSIS — D64.9 ANEMIA, UNSPECIFIED TYPE: Primary | ICD-10-CM

## 2018-03-29 DIAGNOSIS — R13.10 DYSPHAGIA, UNSPECIFIED TYPE: ICD-10-CM

## 2018-03-29 DIAGNOSIS — K62.5 RECTAL BLEED: ICD-10-CM

## 2018-03-29 DIAGNOSIS — R10.12 ABDOMINAL PAIN, LUQ (LEFT UPPER QUADRANT): ICD-10-CM

## 2018-03-29 DIAGNOSIS — K21.9 GASTROESOPHAGEAL REFLUX DISEASE, ESOPHAGITIS PRESENCE NOT SPECIFIED: ICD-10-CM

## 2018-03-29 DIAGNOSIS — Z86.010 HISTORY OF COLON POLYPS: ICD-10-CM

## 2018-03-29 DIAGNOSIS — R63.4 WEIGHT LOSS: ICD-10-CM

## 2018-03-29 DIAGNOSIS — Z85.038 HISTORY OF COLON CANCER: ICD-10-CM

## 2018-03-29 DIAGNOSIS — D64.9 ANEMIA, UNSPECIFIED TYPE: ICD-10-CM

## 2018-03-29 DIAGNOSIS — R19.5 POSITIVE FIT (FECAL IMMUNOCHEMICAL TEST): ICD-10-CM

## 2018-03-29 LAB
BASOPHILS # BLD AUTO: 0.02 K/UL
BASOPHILS NFR BLD: 0.3 %
DIFFERENTIAL METHOD: ABNORMAL
EOSINOPHIL # BLD AUTO: 0.2 K/UL
EOSINOPHIL NFR BLD: 2.8 %
ERYTHROCYTE [DISTWIDTH] IN BLOOD BY AUTOMATED COUNT: 13.9 %
HCT VFR BLD AUTO: 34.2 %
HGB BLD-MCNC: 11 G/DL
LYMPHOCYTES # BLD AUTO: 1.6 K/UL
LYMPHOCYTES NFR BLD: 24.2 %
MCH RBC QN AUTO: 30.8 PG
MCHC RBC AUTO-ENTMCNC: 32.2 G/DL
MCV RBC AUTO: 96 FL
MONOCYTES # BLD AUTO: 0.5 K/UL
MONOCYTES NFR BLD: 7.2 %
NEUTROPHILS # BLD AUTO: 4.3 K/UL
NEUTROPHILS NFR BLD: 65.3 %
PLATELET # BLD AUTO: 213 K/UL
PMV BLD AUTO: 9.8 FL
RBC # BLD AUTO: 3.57 M/UL
WBC # BLD AUTO: 6.5 K/UL

## 2018-03-29 PROCEDURE — 99213 OFFICE O/P EST LOW 20 MIN: CPT | Mod: PBBFAC,PO | Performed by: NURSE PRACTITIONER

## 2018-03-29 PROCEDURE — 85025 COMPLETE CBC W/AUTO DIFF WBC: CPT

## 2018-03-29 PROCEDURE — 99213 OFFICE O/P EST LOW 20 MIN: CPT | Mod: S$PBB,,, | Performed by: NURSE PRACTITIONER

## 2018-03-29 PROCEDURE — 36415 COLL VENOUS BLD VENIPUNCTURE: CPT

## 2018-03-29 PROCEDURE — 99999 PR PBB SHADOW E&M-EST. PATIENT-LVL III: CPT | Mod: PBBFAC,,, | Performed by: NURSE PRACTITIONER

## 2018-03-29 RX ORDER — POTASSIUM CHLORIDE 750 MG/1
TABLET, EXTENDED RELEASE ORAL
Qty: 90 TABLET | Refills: 0 | Status: SHIPPED | OUTPATIENT
Start: 2018-03-29 | End: 2018-05-17 | Stop reason: SDUPTHER

## 2018-03-29 RX ORDER — POLYETHYLENE GLYCOL 3350, SODIUM SULFATE ANHYDROUS, SODIUM BICARBONATE, SODIUM CHLORIDE, POTASSIUM CHLORIDE 236; 22.74; 6.74; 5.86; 2.97 G/4L; G/4L; G/4L; G/4L; G/4L
4 POWDER, FOR SOLUTION ORAL SEE ADMIN INSTRUCTIONS
Qty: 4000 ML | Refills: 0 | Status: SHIPPED | OUTPATIENT
Start: 2018-03-29 | End: 2018-05-10

## 2018-03-29 RX ORDER — POTASSIUM CHLORIDE 750 MG/1
TABLET, EXTENDED RELEASE ORAL
Qty: 180 TABLET | Refills: 0 | OUTPATIENT
Start: 2018-03-29

## 2018-03-29 NOTE — LETTER
March 29, 2018      Alon Santiago MD  [address] [facility] [address]           [facility]  [address] [place] [id number]  Phone: [phone]          Patient: Christine Castro   MR Number: [id number]   YOB: 1955   Date of Visit: 3/29/2018       Dear Dr. Alon Santiago:    Thank you for referring Christine Castro to me for evaluation. Attached you will find relevant portions of my assessment and plan of care.    If you have questions, please do not hesitate to call me. I look forward to following Christine Castro along with you.    Sincerely,    Deepa Montemayor, [facility]    Enclosure  CC:  No Recipients    If you would like to receive this communication electronically, please contact [email or web address] or [phone] to request more information on [employer] access.    For providers and/or their staff who would like to refer a patient to [facility], please contact us through our one-stop-shop provider referral line, [facility] , at [phone].    If you feel you have received this communication in error or would no longer like to receive these types of communications, please e-mail [email or web address]

## 2018-03-29 NOTE — PROGRESS NOTES
Subjective:       Patient ID: Christine Castro is a 62 y.o. female.    Chief Complaint: Colonoscopy    HPI  Referred for anemia and positive FIT.  Of note, iron panel is negative.  Does not appear to be a microcytic anemia.   She does report noting bright red and dark red blood with bowel movements.  Will note this on the stool and on the toilet paper.    Reports irregular bowel habit with a bowel movement every 2-3 days with straining.    Left sided upper abdominal pain that is a stabbing pain.  Nausea and episodic vomiting.  Omeprazole daily with control of reflux.  Has dysphagia at the suprasternal notch that will pass with drinking water.   Has had 30 pound weight loss over the last 6 months.  She had EGD and colonoscopy last year.  EGD:  - Mildly severe chronic esophagitis. Rule out                         Camejo's esophagus. Biopsied.                        - A gastroenterostomy was found, characterized by                         healthy appearing mucosa.                        - Normal examined duodenum.  Colonoscopy:    - Diverticulosis in the sigmoid colon.                        - End-to-end colo-colonic anastomosis.                        - Three 2 to 4 mm polyps in the ascending colon,                         removed with a cold biopsy forceps. Resected and                         retrieved.  Pathology:  FINAL PATHOLOGIC DIAGNOSIS  1. Distal esophagus biopsy:  -Columnar mucosa with focal acute and chronic inflammation and reactive change  -Negative for intestinal metaplasia, dysplasia or malignancy  2. Ascending colon polyp:  -Tubular adenoma(s)    Her complaints and presentation today are similar to when I saw her last year.  She does have history of colon cancer and reports resection and chemotherapy in 2007.  She has had colon polyps on exam since that time.    She had gastric bypass in 2000.    Review of Systems   Constitutional: Positive for fatigue and unexpected weight change (30 pound weight  loss in last 6 months). Negative for activity change and appetite change.   HENT: Positive for trouble swallowing.    Respiratory: Negative for shortness of breath.    Cardiovascular: Negative for chest pain.   Gastrointestinal: Positive for abdominal pain, blood in stool, constipation and nausea.   Genitourinary: Negative.    Musculoskeletal: Positive for arthralgias.   Neurological: Positive for weakness.       Objective:      Physical Exam   Constitutional: She is oriented to person, place, and time. She appears well-developed and well-nourished. No distress.   Drowsy during exam   Cardiovascular: Normal rate.    Pulmonary/Chest: Effort normal. No respiratory distress.   Abdominal: Soft. Bowel sounds are normal. She exhibits no distension and no mass. There is tenderness in the epigastric area. There is no guarding.   Neurological: She is oriented to person, place, and time.   Skin: Skin is warm and dry. She is not diaphoretic.   Psychiatric: Judgment and thought content normal.       Assessment:       1. Anemia, unspecified type    2. Positive FIT (fecal immunochemical test)    3. Gastroesophageal reflux disease, esophagitis presence not specified    4. Abdominal pain, LUQ (left upper quadrant)    5. Rectal bleed    6. History of colon cancer    7. History of colon polyps    8. Dysphagia, unspecified type    9. Weight loss        Plan:         Christine RAMIREZ was seen today for colonoscopy.    Diagnoses and all orders for this visit:    Anemia, unspecified type  -     CBC auto differential; Future    Positive FIT (fecal immunochemical test)    Gastroesophageal reflux disease, esophagitis presence not specified    Abdominal pain, LUQ (left upper quadrant)    Rectal bleed    History of colon cancer    History of colon polyps    Dysphagia, unspecified type    Weight loss    Other orders  -     Case request GI: ESOPHAGOGASTRODUODENOSCOPY (EGD), COLONOSCOPY    I have explained the planned procedures to the patient.The  risks, benefits and alternatives of the procedure were also explained in detail. Patient verbalized understanding, all questions were answered. The patient agrees to proceed as planned.

## 2018-03-31 RX ORDER — BUPROPION HYDROCHLORIDE 200 MG/1
TABLET, EXTENDED RELEASE ORAL
Qty: 90 TABLET | Refills: 0 | Status: SHIPPED | OUTPATIENT
Start: 2018-03-31 | End: 2018-08-21

## 2018-04-03 ENCOUNTER — TELEPHONE (OUTPATIENT)
Dept: GASTROENTEROLOGY | Facility: CLINIC | Age: 63
End: 2018-04-03

## 2018-04-03 RX ORDER — DIAZEPAM 2 MG/1
TABLET ORAL
Qty: 15 TABLET | Refills: 0 | OUTPATIENT
Start: 2018-04-03

## 2018-04-04 ENCOUNTER — TELEPHONE (OUTPATIENT)
Dept: GASTROENTEROLOGY | Facility: CLINIC | Age: 63
End: 2018-04-04

## 2018-04-04 RX ORDER — ATORVASTATIN CALCIUM 10 MG/1
TABLET, FILM COATED ORAL
Qty: 45 TABLET | Refills: 0 | Status: SHIPPED | OUTPATIENT
Start: 2018-04-04 | End: 2018-11-26 | Stop reason: SDUPTHER

## 2018-04-05 RX ORDER — ZOLPIDEM TARTRATE 5 MG/1
TABLET ORAL
Qty: 30 TABLET | Refills: 0 | Status: SHIPPED | OUTPATIENT
Start: 2018-04-05 | End: 2018-08-28

## 2018-04-05 RX ORDER — DIAZEPAM 5 MG/1
TABLET ORAL
Qty: 30 TABLET | Refills: 0 | Status: SHIPPED | OUTPATIENT
Start: 2018-04-05 | End: 2018-05-07 | Stop reason: SDUPTHER

## 2018-04-05 NOTE — TELEPHONE ENCOUNTER
----- Message from Carmelina Kraus sent at 4/5/2018  4:45 PM CDT -----  Contact: Self / [phone]  Patient is requesting a medication refill.     RX name: zolpidem  Strength: 5mg  Quantity: 30 tablet  Directions:  TAKE 1 TABLET(5 MG) BY MOUTH EVERY EVENING    RX name: diazePAM  Strength: 5mg  Quantity: 30 pills  Directions: TAKE 1 TABLET(5 MG) BY MOUTH EVERY 8 HOURS AS NEEDED      [facility]      Phone number where patient can be reached: [phone]

## 2018-04-10 ENCOUNTER — SURGERY (OUTPATIENT)
Age: 63
End: 2018-04-10

## 2018-04-10 ENCOUNTER — ANESTHESIA EVENT (OUTPATIENT)
Dept: ENDOSCOPY | Facility: HOSPITAL | Age: 63
End: 2018-04-10
Payer: MEDICARE

## 2018-04-10 ENCOUNTER — HOSPITAL ENCOUNTER (OUTPATIENT)
Facility: HOSPITAL | Age: 63
Discharge: HOME | End: 2018-04-10
Attending: INTERNAL MEDICINE | Admitting: INTERNAL MEDICINE
Payer: MEDICARE

## 2018-04-10 ENCOUNTER — ANESTHESIA (OUTPATIENT)
Dept: ENDOSCOPY | Facility: HOSPITAL | Age: 63
End: 2018-04-10
Payer: MEDICARE

## 2018-04-10 DIAGNOSIS — Z85.038 HISTORY OF COLON CANCER: ICD-10-CM

## 2018-04-10 DIAGNOSIS — D64.9 ANEMIA: Primary | ICD-10-CM

## 2018-04-10 LAB — POCT GLUCOSE: 99 MG/DL (ref 70–110)

## 2018-04-10 PROCEDURE — 82962 GLUCOSE BLOOD TEST: CPT | Performed by: INTERNAL MEDICINE

## 2018-04-10 PROCEDURE — 37000008 HC ANESTHESIA 1ST 15 MINUTES: Performed by: INTERNAL MEDICINE

## 2018-04-10 PROCEDURE — 37000009 HC ANESTHESIA EA ADD 15 MINS: Performed by: INTERNAL MEDICINE

## 2018-04-10 PROCEDURE — 45380 COLONOSCOPY AND BIOPSY: CPT | Mod: ,,, | Performed by: INTERNAL MEDICINE

## 2018-04-10 PROCEDURE — 43235 EGD DIAGNOSTIC BRUSH WASH: CPT | Mod: 51,,, | Performed by: INTERNAL MEDICINE

## 2018-04-10 PROCEDURE — 45380 COLONOSCOPY AND BIOPSY: CPT | Performed by: INTERNAL MEDICINE

## 2018-04-10 PROCEDURE — 88305 TISSUE EXAM BY PATHOLOGIST: CPT | Performed by: PATHOLOGY

## 2018-04-10 PROCEDURE — 43235 EGD DIAGNOSTIC BRUSH WASH: CPT | Performed by: INTERNAL MEDICINE

## 2018-04-10 PROCEDURE — 27201012 HC FORCEPS, HOT/COLD, DISP: Performed by: INTERNAL MEDICINE

## 2018-04-10 PROCEDURE — 63600175 PHARM REV CODE 636 W HCPCS: Performed by: NURSE ANESTHETIST, CERTIFIED REGISTERED

## 2018-04-10 PROCEDURE — 88305 TISSUE EXAM BY PATHOLOGIST: CPT | Mod: 26,,, | Performed by: PATHOLOGY

## 2018-04-10 RX ORDER — SODIUM CHLORIDE 9 MG/ML
INJECTION, SOLUTION INTRAVENOUS CONTINUOUS
Status: DISCONTINUED | OUTPATIENT
Start: 2018-04-10 | End: 2018-04-10 | Stop reason: HOSPADM

## 2018-04-10 RX ORDER — LIDOCAINE HCL/PF 100 MG/5ML
SYRINGE (ML) INTRAVENOUS
Status: DISCONTINUED | OUTPATIENT
Start: 2018-04-10 | End: 2018-04-10

## 2018-04-10 RX ORDER — PROPOFOL 10 MG/ML
VIAL (ML) INTRAVENOUS CONTINUOUS PRN
Status: DISCONTINUED | OUTPATIENT
Start: 2018-04-10 | End: 2018-04-10

## 2018-04-10 RX ORDER — PROPOFOL 10 MG/ML
VIAL (ML) INTRAVENOUS
Status: DISCONTINUED | OUTPATIENT
Start: 2018-04-10 | End: 2018-04-10

## 2018-04-10 RX ADMIN — PROPOFOL 100 MG: 10 INJECTION, EMULSION INTRAVENOUS at 11:04

## 2018-04-10 RX ADMIN — LIDOCAINE HYDROCHLORIDE 100 MG: 20 INJECTION, SOLUTION INTRAVENOUS at 11:04

## 2018-04-10 RX ADMIN — PROPOFOL 150 MCG/KG/MIN: 10 INJECTION, EMULSION INTRAVENOUS at 11:04

## 2018-04-10 NOTE — ANESTHESIA PREPROCEDURE EVALUATION
04/10/2018  Christine Castro is a 63 y.o., female for EGD & Colonoscopy    Review of patient's allergies indicates:   Allergen Reactions    Ibuprofen      Past Medical History:   Diagnosis Date    Arthritis     Asthma     Cancer     CHF (congestive heart failure)     [facility]    Colon cancer     COPD (chronic obstructive pulmonary disease)     Coronary artery disease     Depression     Diabetes mellitus, type 2     GERD (gastroesophageal reflux disease)     Myocardial infarction     AGE 19 MIGUELANGEL WITHBABY DELIVERY    Thyroid disease      Past Surgical History:   Procedure Laterality Date     SECTION      CHOLECYSTECTOMY      COLON SURGERY      COLONOSCOPY      --- repeat in 3-5 years    COLONOSCOPY N/A 2017    Procedure: COLONOSCOPY;  Surgeon: Corrina Flores MD;  Location: [facility];  Service: Endoscopy;  Laterality: N/A;    ECTOPIC PREGNANCY SURGERY      GASTRIC BYPASS      ovary removed      TONSILLECTOMY      TUBAL LIGATION       Patient Active Problem List   Diagnosis    Arthritis    GERD (gastroesophageal reflux disease)    Thyroid disease    CHF (congestive heart failure)    Depression    History of colon cancer    Malfunction of device    Anemia       Anesthesia Evaluation    I have reviewed the Patient Summary Reports.     I have reviewed the Medications.     Review of Systems  Anesthesia Hx:  No problems with previous Anesthesia   Denies Personal Hx of Anesthesia complications.   Hematology/Oncology:  Hematology Normal   Oncology Normal     Cardiovascular:   Past MI CAD   CHF    Pulmonary:   COPD    Renal/:  Renal/ Normal     Hepatic/GI:   GERD    Musculoskeletal:  Musculoskeletal Normal    Neurological:  Neurology Normal    Endocrine:   Diabetes, type 2    Psych:   Psychiatric History depression        Wt Readings from Last 3  Encounters:   04/10/18 87.5 kg (193 lb)   03/29/18 93 kg (205 lb 0.4 oz)   03/19/18 91.4 kg (201 lb 9.6 oz)     Temp Readings from Last 3 Encounters:   04/10/18 37.1 °C (98.7 °F)   03/19/18 37.1 °C (98.7 °F)   03/12/18 36.9 °C (98.4 °F) (Oral)     BP Readings from Last 3 Encounters:   04/10/18 (!) 105/56   03/29/18 102/62   03/19/18 132/86     Pulse Readings from Last 3 Encounters:   04/10/18 69   03/29/18 80   03/19/18 102     Lab Results   Component Value Date    WBC 6.50 03/29/2018    HGB 11.0 (L) 03/29/2018    HCT 34.2 (L) 03/29/2018    MCV 96 03/29/2018     03/29/2018 4/6/17  EKG Conclusions:    1. The EKG portion of this study is negative for ischemia at a peak heart rate of 75 bpm (50% of predicted).   2. Blood pressure remained stable throughout the protocol  (Presenting BP: 108/74 Peak BP: 108/74).   3. No significant arrhythmias were present.   4. There were no symptoms of chest discomfort or significant dyspnea throughout the protocol.   5. Nuclear portion of this study will be reported seperately.    TTE 3/2016  CONCLUSIONS     1 - Normal left ventricular systolic function (EF 55-60%).     2 - Concentric remodeling.     3 - Normal left ventricular diastolic function.     4 - Normal right ventricular systolic function .     5 - The estimated PA systolic pressure is 28 mmHg.       Physical Exam  General:  Morbid Obesity    Airway/Jaw/Neck:  Airway Findings: Mouth Opening: Small, but > 3cm Tongue: Normal  General Airway Assessment: Adult  Mallampati: II  Improves to II with phonation.  TM Distance: Normal, at least 6 cm  Jaw/Neck Findings:  Neck ROM: Normal ROM  Neck Findings:  Short Neck      Dental:  Dental Findings: Lower Dentures, Upper Dentures        Mental Status:  Mental Status Findings:  Cooperative       31-JAN-2018 14:40:00 EKG  System- ERM ROUTINE RETRIEVAL  Normal sinus rhythm with sinus arrhythmia  Normal ECG  When compared with ECG of 30-MAY-2017  10:39,  Borderline criteria for Inferior infarct are no longer Present  Nonspecific T wave abnormality, improved in Inferior leads  Confirmed by Jose De Jesus HURTADO [facility] ([id number]) on 2/5/2018    Anesthesia Plan  Type of Anesthesia, risks & benefits discussed:  Anesthesia Type:  MAC, general  Patient's Preference:   Intra-op Monitoring Plan:   Intra-op Monitoring Plan Comments:   Post Op Pain Control Plan:   Post Op Pain Control Plan Comments:   Induction:   IV  Beta Blocker:         Informed Consent: Patient understands risks and agrees with Anesthesia plan.  Questions answered. Anesthesia consent signed with patient.  ASA Score: 3     Day of Surgery Review of History & Physical: I have interviewed and examined the patient. I have reviewed the patient's H&P dated:            Ready For Surgery From Anesthesia Perspective.

## 2018-04-10 NOTE — PROVATION PATIENT INSTRUCTIONS
Discharge Summary/Instructions after an Endoscopic Procedure  Patient Name: Christine Castro  Patient MRN: [id number]  Patient YOB: 1955  Tuesday, April 10, 2018  Corrina Flores MD  RESTRICTIONS:  During your procedure today, you received medications for sedation.  These   medications may affect your judgment, balance and coordination.  Therefore,   for 24 hours, you have the following restrictions:   - DO NOT drive a car, operate machinery, make legal/financial decisions,   sign important papers or drink alcohol.    ACTIVITY:  The following day: return to full activity including work, except no heavy   lifting, straining or running for 3 days if polyps were removed.  DIET:  Eat and drink normally unless instructed otherwise.     TREATMENT FOR COMMON SIDE EFFECTS:  - Mild abdominal pain, nausea, belching, bloating or excessive gas:  rest,   eat lightly and use a heating pad.  - Sore Throat: treat with throat lozenges and/or gargle with warm salt   water.  - Because air was used during the procedure, expelling large amounts of air   from your rectum or belching is normal.  - If a bowel prep was taken, you may not have a bowel movement for 1-3 days.    This is normal.  SYMPTOMS TO WATCH FOR AND REPORT TO YOUR PHYSICIAN:  1. Abdominal pain or bloating, other than gas cramps.  2. Chest pain.  3. Back pain.  4. Signs of infection such as: chills or fever occurring within 24 hours   after the procedure.  5. Rectal bleeding, which would show as bright red, maroon, or black stools.   (A tablespoon of blood from the rectum is not serious, especially if   hemorrhoids are present.)  6. Vomiting.  7. Weakness or dizziness.  GO DIRECTLY TO THE NEAREST EMERGENCY ROOM IF YOU HAVE ANY OF THE FOLLOWING:      Difficulty breathing              Chills and/or fever over 101 F   Persistent vomiting and/or vomiting blood   Severe abdominal pain   Severe chest pain   Black, tarry stools   Bleeding- more than one tablespoon   Any  other symptom or condition that you feel may need urgent attention  Your doctor recommends these additional instructions:  If any biopsies were taken, your doctors clinic will contact you in 1 to 2   weeks with any results.  - Discharge patient to home (via wheelchair).   - Patient has a contact number available for emergencies.  The signs and   symptoms of potential delayed complications were discussed with the   patient.  Return to normal activities tomorrow.  Written discharge   instructions were provided to the patient.   - Resume previous diet.   - Continue present medications.  For questions, problems or results please call your physician - Corrina Flores MD at Work:  [phone].  EMERGENCY PHONE NUMBER: [phone],  LAB RESULTS: [phone]  IF A COMPLICATION OR EMERGENCY SITUATION ARISES AND YOU ARE UNABLE TO REACH   YOUR PHYSICIAN - GO DIRECTLY TO THE EMERGENCY ROOM.  Corrina Flores MD  4/10/2018 11:54:34 AM  This report has been verified and signed electronically.

## 2018-04-10 NOTE — PROVATION PATIENT INSTRUCTIONS
Discharge Summary/Instructions after an Endoscopic Procedure  Patient Name: Christine Castro  Patient MRN: [id number]  Patient YOB: 1955  Tuesday, April 10, 2018  Corrina Flores MD  RESTRICTIONS:  During your procedure today, you received medications for sedation.  These   medications may affect your judgment, balance and coordination.  Therefore,   for 24 hours, you have the following restrictions:   - DO NOT drive a car, operate machinery, make legal/financial decisions,   sign important papers or drink alcohol.    ACTIVITY:  The following day: return to full activity including work, except no heavy   lifting, straining or running for 3 days if polyps were removed.  DIET:  Eat and drink normally unless instructed otherwise.     TREATMENT FOR COMMON SIDE EFFECTS:  - Mild abdominal pain, nausea, belching, bloating or excessive gas:  rest,   eat lightly and use a heating pad.  - Sore Throat: treat with throat lozenges and/or gargle with warm salt   water.  - Because air was used during the procedure, expelling large amounts of air   from your rectum or belching is normal.  - If a bowel prep was taken, you may not have a bowel movement for 1-3 days.    This is normal.  SYMPTOMS TO WATCH FOR AND REPORT TO YOUR PHYSICIAN:  1. Abdominal pain or bloating, other than gas cramps.  2. Chest pain.  3. Back pain.  4. Signs of infection such as: chills or fever occurring within 24 hours   after the procedure.  5. Rectal bleeding, which would show as bright red, maroon, or black stools.   (A tablespoon of blood from the rectum is not serious, especially if   hemorrhoids are present.)  6. Vomiting.  7. Weakness or dizziness.  GO DIRECTLY TO THE NEAREST EMERGENCY ROOM IF YOU HAVE ANY OF THE FOLLOWING:      Difficulty breathing              Chills and/or fever over 101 F   Persistent vomiting and/or vomiting blood   Severe abdominal pain   Severe chest pain   Black, tarry stools   Bleeding- more than one tablespoon   Any  other symptom or condition that you feel may need urgent attention  Your doctor recommends these additional instructions:  If any biopsies were taken, your doctors clinic will contact you in 1 to 2   weeks with any results.  - Discharge patient to home (via wheelchair).   - Patient has a contact number available for emergencies.  The signs and   symptoms of potential delayed complications were discussed with the   patient.  Return to normal activities tomorrow.  Written discharge   instructions were provided to the patient.   - Resume previous diet.   - Continue present medications.   - Await pathology results.   - Repeat colonoscopy in 5 years for surveillance.  For questions, problems or results please call your physician - Corrina Flores MD at Work:  [phone].  EMERGENCY PHONE NUMBER: [phone],  LAB RESULTS: [phone]  IF A COMPLICATION OR EMERGENCY SITUATION ARISES AND YOU ARE UNABLE TO REACH   YOUR PHYSICIAN - GO DIRECTLY TO THE EMERGENCY ROOM.  Corrina Flores MD  4/10/2018 12:20:47 PM  This report has been verified and signed electronically.

## 2018-04-10 NOTE — TRANSFER OF CARE
Anesthesia Transfer of Care Note    Patient: Christine Castro    Procedure(s) Performed: Procedure(s) (LRB):  ESOPHAGOGASTRODUODENOSCOPY (EGD) (N/A)  COLONOSCOPY golytely (N/A)    Patient location: GI    Anesthesia Type: MAC    Transport from OR: Transported from OR on room air with adequate spontaneous ventilation    Post pain: adequate analgesia    Post assessment: no apparent anesthetic complications    Post vital signs: stable    Level of consciousness: awake, alert and oriented    Nausea/Vomiting: no nausea/vomiting    Complications: none    Transfer of care protocol was followed      Last vitals:   Visit Vitals  BP (!) 105/56   Pulse 69   Temp 37.1 °C (98.7 °F)   Resp 16   Ht 5' (1.524 m)   Wt 87.5 kg (193 lb)   SpO2 100%   Breastfeeding? No   BMI 37.69 kg/m²

## 2018-04-10 NOTE — ANESTHESIA POSTPROCEDURE EVALUATION
Anesthesia Post Evaluation    Patient: Christine Castro    Procedure(s) Performed: Procedure(s) (LRB):  ESOPHAGOGASTRODUODENOSCOPY (EGD) (N/A)  COLONOSCOPY golytely (N/A)    Final Anesthesia Type: MAC  Patient location during evaluation: GI PACU  Patient participation: Yes- Able to Participate  Level of consciousness: awake and alert and oriented  Post-procedure vital signs: reviewed and stable  Pain management: adequate  Airway patency: patent  PONV status at discharge: No PONV  Anesthetic complications: no      Cardiovascular status: blood pressure returned to baseline  Respiratory status: unassisted, room air and spontaneous ventilation  Hydration status: euvolemic  Follow-up not needed.        Visit Vitals  BP (!) 105/56   Pulse 69   Temp 37.1 °C (98.7 °F)   Resp 16   Ht 5' (1.524 m)   Wt 87.5 kg (193 lb)   SpO2 100%   Breastfeeding? No   BMI 37.69 kg/m²       Pain/Maggy Score: Pain Assessment Performed: Yes (4/10/2018 11:08 AM)  Presence of Pain: complains of pain/discomfort (4/10/2018 11:08 AM)

## 2018-04-12 VITALS
HEART RATE: 70 BPM | RESPIRATION RATE: 16 BRPM | HEIGHT: 60 IN | DIASTOLIC BLOOD PRESSURE: 67 MMHG | OXYGEN SATURATION: 99 % | BODY MASS INDEX: 37.89 KG/M2 | TEMPERATURE: 99 F | WEIGHT: 193 LBS | SYSTOLIC BLOOD PRESSURE: 115 MMHG

## 2018-04-17 ENCOUNTER — TELEPHONE (OUTPATIENT)
Dept: GASTROENTEROLOGY | Facility: CLINIC | Age: 63
End: 2018-04-17

## 2018-04-17 ENCOUNTER — OFFICE VISIT (OUTPATIENT)
Dept: FAMILY MEDICINE | Facility: CLINIC | Age: 63
End: 2018-04-17
Payer: MEDICARE

## 2018-04-17 VITALS
WEIGHT: 198.88 LBS | SYSTOLIC BLOOD PRESSURE: 100 MMHG | DIASTOLIC BLOOD PRESSURE: 60 MMHG | BODY MASS INDEX: 39.05 KG/M2 | HEIGHT: 60 IN | TEMPERATURE: 98 F | OXYGEN SATURATION: 97 % | HEART RATE: 106 BPM

## 2018-04-17 DIAGNOSIS — L02.413 ABSCESS OF RIGHT ARM: Primary | ICD-10-CM

## 2018-04-17 PROCEDURE — 99212 OFFICE O/P EST SF 10 MIN: CPT | Mod: S$GLB,,, | Performed by: FAMILY MEDICINE

## 2018-04-17 RX ORDER — QUETIAPINE FUMARATE 50 MG/1
50 TABLET, FILM COATED ORAL NIGHTLY
COMMUNITY
End: 2018-05-10

## 2018-04-17 RX ORDER — LEVOTHYROXINE SODIUM 88 UG/1
TABLET ORAL
COMMUNITY
Start: 2018-04-16 | End: 2018-04-17 | Stop reason: ALTCHOICE

## 2018-04-17 RX ORDER — ESCITALOPRAM OXALATE 20 MG/1
20 TABLET ORAL DAILY
Qty: 90 TABLET | Refills: 3 | Status: SHIPPED | OUTPATIENT
Start: 2018-04-17 | End: 2018-11-26 | Stop reason: SDUPTHER

## 2018-04-17 RX ORDER — PROMETHAZINE HYDROCHLORIDE 25 MG/1
25 TABLET ORAL EVERY 6 HOURS PRN
Qty: 45 TABLET | Refills: 0 | Status: SHIPPED | OUTPATIENT
Start: 2018-04-17 | End: 2018-11-06

## 2018-04-17 NOTE — PROGRESS NOTES
Patient ID: Christine Castro is a 63 y.o. female.    Chief Complaint: Elbow Pain (right elbow)    HPI      Christine Castro is a 63 y.o. female with pain on right elbow with swelling and redness.  NO fever, chills or systemic co of acute illness.      Review of Symptoms    Constitutional  Neg activity change, No chills /fever   Resp  Neg hemoptysis, stridor, choking  CVS  Neg chest pain, palpitations    Physical Exam    Constitutional:  Oriented to person, place, and time.appears well-developed and well-nourished.  No distress.      HENT  Head: Normocephalic and atraumatic  Right Ear: External ear normal.   Left Ear: External ear normal.   Nose: External nose normal.   Mouth:  Moist mucus membranes.    Eyes:  Conjunctivae are normal. Right eye exhibits no discharge.  Left eye exhibits no discharge. No scleral icterus.  No periorbital edema    Musculoskeletal:  Walks with walker         Neurological:  Alert and oriented to person, place, and time.   Coordination normal.     Skin:   Skin is warm and dry.  No diaphoresis.   Tender raised indurated area on right lateral forearm.      Psychiatric: Normal mood and affect. Behavior is normal.  Judgment and thought content normal.       Assessment / Plan:      ICD-10-CM ICD-9-CM   1. Abscess of right arm L02.413 682.3     Abscess of right arm    Other orders  -     promethazine (PHENERGAN) 25 MG tablet; Take 1 tablet (25 mg total) by mouth every 6 (six) hours as needed.  Dispense: 45 tablet; Refill: 0  -     escitalopram oxalate (LEXAPRO) 20 MG tablet; Take 1 tablet (20 mg total) by mouth once daily.  Dispense: 90 tablet; Refill: 3

## 2018-04-17 NOTE — LETTER
April 17, 2018        Christine Castro  [address] [place] [id number]             [place] [facility]  [address]  [place] [address]  Phone: [phone] Dear Ms. Castro:    The final pathology report on the polyps removed during your recent colonoscopy did not show any cancerous growth. This type of polyp, if not removed, can potentially grow and become malignant. Fortunately, we identified and removed the polyp at a very early stage. I would recommend that you undergo a repeat colonoscopy in approximately five years.    In addition to the repeat tests indicated above, I suggest you adopt the following healthy habits to lower your risk of future polyps and colon cancer: exercise regularly, dont smoke, limit red meat in your diet, and include ample fruits and vegetables in your diet.    Follow up in clinic as needed.    Sincerely,    Corrina Flores MD

## 2018-04-17 NOTE — TELEPHONE ENCOUNTER
----- Message from Corrina Flores MD sent at 4/17/2018  1:03 PM CDT -----  Tubular adenoma, 5 year colonoscopy recall; f/u in Gi clinic

## 2018-04-18 ENCOUNTER — OFFICE VISIT (OUTPATIENT)
Dept: ORTHOPEDICS | Facility: CLINIC | Age: 63
End: 2018-04-18
Payer: MEDICARE

## 2018-04-18 ENCOUNTER — HOSPITAL ENCOUNTER (OUTPATIENT)
Dept: RADIOLOGY | Facility: HOSPITAL | Age: 63
Discharge: HOME | End: 2018-04-18
Attending: ORTHOPAEDIC SURGERY
Payer: MEDICARE

## 2018-04-18 VITALS — HEIGHT: 60 IN | WEIGHT: 198.88 LBS | BODY MASS INDEX: 39.05 KG/M2

## 2018-04-18 DIAGNOSIS — M17.0 PRIMARY OSTEOARTHRITIS OF BOTH KNEES: ICD-10-CM

## 2018-04-18 DIAGNOSIS — M25.561 PAIN IN BOTH KNEES, UNSPECIFIED CHRONICITY: Primary | ICD-10-CM

## 2018-04-18 DIAGNOSIS — M25.561 PAIN IN BOTH KNEES, UNSPECIFIED CHRONICITY: ICD-10-CM

## 2018-04-18 DIAGNOSIS — M25.562 PAIN IN BOTH KNEES, UNSPECIFIED CHRONICITY: Primary | ICD-10-CM

## 2018-04-18 DIAGNOSIS — M25.562 PAIN IN BOTH KNEES, UNSPECIFIED CHRONICITY: ICD-10-CM

## 2018-04-18 PROCEDURE — 99214 OFFICE O/P EST MOD 30 MIN: CPT | Mod: PBBFAC,25 | Performed by: ORTHOPAEDIC SURGERY

## 2018-04-18 PROCEDURE — 73564 X-RAY EXAM KNEE 4 OR MORE: CPT | Mod: 26,50,, | Performed by: RADIOLOGY

## 2018-04-18 PROCEDURE — 99999 PR PBB SHADOW E&M-EST. PATIENT-LVL IV: CPT | Mod: PBBFAC,,, | Performed by: ORTHOPAEDIC SURGERY

## 2018-04-18 PROCEDURE — 73564 X-RAY EXAM KNEE 4 OR MORE: CPT | Mod: TC,50

## 2018-04-18 PROCEDURE — 99203 OFFICE O/P NEW LOW 30 MIN: CPT | Mod: S$PBB,GC,, | Performed by: ORTHOPAEDIC SURGERY

## 2018-04-18 NOTE — PROGRESS NOTES
CHIEF COMPLAINT: Bilateral knee pain.                                                          HISTORY OF PRESENT ILLNESS:  The patient is a 63 y.o. female with DM II (HbA1c 4.9), CHF (ech from 2016 showing EF 55%), colon cancer, CVA who presents  for evaluation of her bilateral knee pain. Has difficulty ambulating and uses walker for ambulation.    Pain Duration: 8 years   Pain Quality: achy  Pain Context:unchanged  Pain Timing: constant  Pain Location:anterior  Pain Severity: severe  Modifying Factors: worse with activity, better with rest  Associated Signs and Symptoms:none    She  presents for further treatment recommendations.    She denies radicular pain or low back pain.  She denies distal paresthesias, lower extremity edema, or calf pain concerning for vascular claudication.  She has no history of discreet prior trauma.                                                                                                                       PAST MEDICAL HISTORY:    Past Medical History:   Diagnosis Date    Arthritis     Asthma     Cancer     CHF (congestive heart failure)     ST CLAUDE GENERAL    Colon cancer     COPD (chronic obstructive pulmonary disease)     Coronary artery disease     Depression     Diabetes mellitus, type 2     GERD (gastroesophageal reflux disease)     Myocardial infarction     AGE 19 [facility] WITHBABY DELIVERY    Thyroid disease                                                                                                             PAST SURGICAL HISTORY:    Past Surgical History:   Procedure Laterality Date     SECTION      CHOLECYSTECTOMY      COLON SURGERY      COLONOSCOPY      --- repeat in 3-5 years    COLONOSCOPY N/A 2017    Procedure: COLONOSCOPY;  Surgeon: Corrina Flores MD;  Location: [facility] ENDO;  Service: Endoscopy;  Laterality: N/A;    COLONOSCOPY N/A 4/10/2018    Procedure: COLONOSCOPY golytely;  Surgeon: Corrina Flores MD;  Location: [facility]  ENDO;  Service: Endoscopy;  Laterality: N/A;    ECTOPIC PREGNANCY SURGERY      GASTRIC BYPASS      ovary removed      TONSILLECTOMY      TUBAL LIGATION                                                                                                                 SOCIAL HISTORY:  Reviewed per EPIC history for tobacco or alcohol use and she   is an active  63 y.o.  female                                                                             FAMILY MEDICAL HISTORY:  family history includes Breast cancer in her cousin, maternal aunt, and maternal aunt; Diabetes in her brother and mother; Hyperlipidemia in her mother; Hypertension in her brother, mother, and sister.                                                                                                                                                           PHYSICAL EXAMINATION:                                                        GENERAL:  A well-developed, well-nourished 63 y.o. female who is alert and       oriented in no acute distress.      Gait: She comes in a wheelchair.                   EXTREMITIES:  Examination of lower extremities reveals there is no visible mass or deformity.    Left knee:  ROM     Ligamentously stable to varus/valgus stress.  Medial crepitation    Anterior and posterior drawers negative.    No pain over pes bursa.    No warmth    No erythema    Effusion Yes    Right knee:  ROM     Ligamentously stable to varus/valgus stress. Medial crepitation    Anterior and posterior drawers negative.    No pain over pes bursa.    No warmth    No erythema    Effusion Yes    The skin over both lower extremities is normal and unremarkable.  She has a  painless range of motion of the hips and ankles bilaterally.   Sensation is intact in both lower extremities.    There are no motor deficits in the lower extremities bilaterally.   Pedal pulses are palpable distally bilaterally.    She has no calf tenderness to palpation nor  edema.    AP standing, Merchant's, and lateral radiographs of the knees were ordered and personally reviewed with the patient today.  Radiographs show advanced DJD with joint space narrowing, sclerosis, and osteophytes.                                                                                 IMPRESSION: Osteoarthritis bilateral knees.                             The conservative options including NSAIDs, activity modification, physical therapy, corticosteroid injection, and viscosupplimentation were discussed.  PT ordered.  She is interested in knee replacement.  The right is more symptomatic.  The surgical process of knee replacement was discussed in detail with the patient including a detailed discussion of the procedure itself (including visual model, x-ray review, and literature review). The typical perioperative and post-operative course was discussed and perioperative risks were discussed to the patient's satisfaction.  Risks and complications discussed included but were not limited to the risks of anesthetic complications, infection, bleeding, wound healing complications, aseptic loosening, instability, limb length inequality, neurologic dysfunction including numbness,  DVT, pulmonary embolism, perioperative medical risks (cardiac, pulmonary, renal, neurologic), and death and the patient elects to proceed.   I have discussed anticoagulation with aspirin and coumadin and in low risk patients I have recommended aspirin twice a day. We will initiate pre-operative medical evaluation and clearance and set a provisional date for surgical intervention according to the patient's schedule on 7/6/18 for right total knee replacement.     Will refer to cardiology for clearance.    I have personally interviewed and evaluated the patient.  I have reviewed the resident physician's note and I concur with the findings.

## 2018-04-24 ENCOUNTER — OFFICE VISIT (OUTPATIENT)
Dept: CARDIOLOGY | Facility: CLINIC | Age: 63
End: 2018-04-24
Payer: MEDICARE

## 2018-04-24 VITALS
SYSTOLIC BLOOD PRESSURE: 102 MMHG | HEART RATE: 76 BPM | BODY MASS INDEX: 40.54 KG/M2 | OXYGEN SATURATION: 98 % | DIASTOLIC BLOOD PRESSURE: 69 MMHG | WEIGHT: 207.63 LBS

## 2018-04-24 DIAGNOSIS — E07.9 THYROID DISEASE: ICD-10-CM

## 2018-04-24 DIAGNOSIS — K21.9 GASTROESOPHAGEAL REFLUX DISEASE WITHOUT ESOPHAGITIS: ICD-10-CM

## 2018-04-24 DIAGNOSIS — F32.A DEPRESSION, UNSPECIFIED DEPRESSION TYPE: ICD-10-CM

## 2018-04-24 DIAGNOSIS — I50.9 CONGESTIVE HEART FAILURE, UNSPECIFIED HF CHRONICITY, UNSPECIFIED HEART FAILURE TYPE: Primary | ICD-10-CM

## 2018-04-24 DIAGNOSIS — D64.9 ANEMIA, UNSPECIFIED TYPE: ICD-10-CM

## 2018-04-24 DIAGNOSIS — Z85.038 HISTORY OF COLON CANCER: ICD-10-CM

## 2018-04-24 PROCEDURE — 99214 OFFICE O/P EST MOD 30 MIN: CPT | Mod: S$PBB,,, | Performed by: INTERNAL MEDICINE

## 2018-04-24 PROCEDURE — 99213 OFFICE O/P EST LOW 20 MIN: CPT | Mod: PBBFAC,PO | Performed by: INTERNAL MEDICINE

## 2018-04-24 PROCEDURE — 99999 PR PBB SHADOW E&M-EST. PATIENT-LVL III: CPT | Mod: PBBFAC,,, | Performed by: INTERNAL MEDICINE

## 2018-04-25 NOTE — PROGRESS NOTES
Subjective:    Patient ID:  Christine Castro is a 63 y.o. female who presents for follow-up of Chest Pain      HPI     62 y/o female with hx of of asthma, colon CA, MI during delivery at 19 years old (states that she was on nitro for a time after), CHF (preserved EF) who presents for f/u.   Last clinic visit was seen for evaluation of CP. Had normal nuclear SPECT.   Today she presents and is somnolent. It appears she took a pain med earlier and has slow speech and her eyes are closed.   She is doing well from a cardiac perspective. She continues to have intermittent CP which is not cardiac in origin. She denies orthopnea, PND, syncope. She has some chronic CASTANEDA. Normal 2DE in the past.  Compliant with meds. Able to accomplish ADL's (>4 METS) without significant cardiac issues, although she is limited in mobility due to leg and back pain.     Review of Systems   Constitution: Positive for weakness and malaise/fatigue.   HENT: Negative for congestion.    Eyes: Negative for blurred vision.   Cardiovascular: Positive for chest pain and dyspnea on exertion. Negative for claudication, cyanosis, irregular heartbeat, leg swelling, near-syncope, orthopnea, palpitations, paroxysmal nocturnal dyspnea and syncope.   Respiratory: Positive for shortness of breath.    Endocrine: Negative for polyuria.   Hematologic/Lymphatic: Negative for bleeding problem.   Skin: Negative for itching and rash.   Musculoskeletal: Positive for back pain, joint pain and joint swelling. Negative for muscle cramps and muscle weakness.   Gastrointestinal: Negative for abdominal pain, hematemesis, hematochezia, melena, nausea and vomiting.   Genitourinary: Negative for dysuria and hematuria.   Neurological: Negative for dizziness, focal weakness, headaches, light-headedness and loss of balance.   Psychiatric/Behavioral: Negative for depression. The patient is not nervous/anxious.         Objective:    Physical Exam   Constitutional: She is oriented to  person, place, and time. She appears well-developed and well-nourished.   HENT:   Head: Normocephalic and atraumatic.   Neck: Neck supple. No JVD present.   Cardiovascular: Normal rate, regular rhythm and normal heart sounds.    Pulses:       Carotid pulses are 2+ on the right side, and 2+ on the left side.       Radial pulses are 2+ on the right side, and 2+ on the left side.        Femoral pulses are 2+ on the right side, and 2+ on the left side.  Pulmonary/Chest: Effort normal and breath sounds normal.   Abdominal: Soft. Bowel sounds are normal.   Musculoskeletal: She exhibits no edema.   Neurological: She is alert and oriented to person, place, and time.   Skin: Skin is warm and dry.   Psychiatric: She has a normal mood and affect. Her behavior is normal. Thought content normal.         Assessment:       1. Congestive heart failure, unspecified HF chronicity, unspecified heart failure type    2. Anemia, unspecified type    3. History of colon cancer    4. Thyroid disease    5. Gastroesophageal reflux disease without esophagitis    6. Depression, unspecified depression type      62 y/o female with hx and presentation as above. Doing well from a cardiac perspective. No absolute contraindication for procedure.        Plan:       -The patient is at an acceptable risk from a cardiac perspective for upcoming noncardiac procedure

## 2018-04-26 ENCOUNTER — TELEPHONE (OUTPATIENT)
Dept: GASTROENTEROLOGY | Facility: CLINIC | Age: 63
End: 2018-04-26

## 2018-04-26 NOTE — LETTER
April 26, 2018        Christine Castro  [address] [place] [address]             [facility]  [address] [place] [address] [place]  [place] [address]  Phone: [phone] Dear Ms. Castro:    The final pathology report on the polyps removed during your recent colonoscopy did not show any cancerous growth. This type of polyp, if not removed, can potentially grow and become malignant. Fortunately, we identified and removed the polyp at a very early stage. I would recommend that you undergo a repeat colonoscopy in approximately 5 years    In addition to the repeat tests indicated above, I suggest you adopt the following healthy habits to lower your risk of future polyps and colon cancer: exercise regularly, dont smoke, limit red meat in your diet, and include ample fruits and vegetables in your diet.    Follow up in with Deepa Montemayor NP as needed.    Sincerely,    Corrina Flores MD

## 2018-04-27 PROCEDURE — 10060 I&D ABSCESS SIMPLE/SINGLE: CPT | Mod: S$GLB,,, | Performed by: FAMILY MEDICINE

## 2018-04-28 NOTE — PROCEDURES
"Incision & Drainage  Date/Time: 4/27/2018 10:19 PM  Performed by: AMBER ROACH  Authorized by: AMBER ROACH     Time out: Immediately prior to procedure a "time out" was called to verify the correct patient, procedure, equipment, support staff and site/side marked as required.    Consent Done?:  Yes (Verbal)    Type:  Abscess  Body area:  Upper extremity  Location details:  Right arm  Anesthesia:  Local infiltration  Local anesthetic: lidocaine 2% with epinephrine  Scalpel size:  11  Incision type:  Single straight  Complexity:  Simple  Drainage:  Bloody  Drainage amount:  Scant  Wound treatment:  Wound packed  Packing material:  1/4 in gauze      "

## 2018-05-05 RX ORDER — DIAZEPAM 5 MG/1
TABLET ORAL
Qty: 30 TABLET | Refills: 0 | Status: CANCELLED | OUTPATIENT
Start: 2018-05-05

## 2018-05-06 DIAGNOSIS — M17.0 ARTHRITIS OF BOTH KNEES: ICD-10-CM

## 2018-05-07 RX ORDER — PREDNISONE 10 MG/1
TABLET ORAL
Qty: 30 TABLET | Refills: 0 | OUTPATIENT
Start: 2018-05-07

## 2018-05-07 RX ORDER — ZOLPIDEM TARTRATE 5 MG/1
TABLET ORAL
Qty: 30 TABLET | Refills: 1 | OUTPATIENT
Start: 2018-05-07

## 2018-05-07 RX ORDER — ZOLPIDEM TARTRATE 5 MG/1
TABLET ORAL
Qty: 30 TABLET | Refills: 0 | Status: SHIPPED | OUTPATIENT
Start: 2018-05-07 | End: 2018-05-10

## 2018-05-07 RX ORDER — DIAZEPAM 5 MG/1
TABLET ORAL
Qty: 30 TABLET | Refills: 0 | Status: SHIPPED | OUTPATIENT
Start: 2018-05-07 | End: 2018-05-23 | Stop reason: SDUPTHER

## 2018-05-07 RX ORDER — GLIPIZIDE 2.5 MG/1
TABLET, EXTENDED RELEASE ORAL
Qty: 90 TABLET | Refills: 0 | Status: SHIPPED | OUTPATIENT
Start: 2018-05-07 | End: 2018-05-10

## 2018-05-07 RX ORDER — GABAPENTIN 100 MG/1
CAPSULE ORAL
Qty: 90 CAPSULE | Refills: 0 | Status: SHIPPED | OUTPATIENT
Start: 2018-05-07 | End: 2018-07-27 | Stop reason: SDUPTHER

## 2018-05-07 RX ORDER — PREDNISONE 5 MG/1
TABLET ORAL
Qty: 30 TABLET | Refills: 0 | Status: SHIPPED | OUTPATIENT
Start: 2018-05-07 | End: 2018-05-10

## 2018-05-07 NOTE — TELEPHONE ENCOUNTER
----- Message from Carmelina Kraus sent at 5/7/2018  3:54 PM CDT -----  Contact: Self / [phone]  Patient is requesting a medication refill.     RX name: diazePAM   Strength: 5mg  Quantity: 30 pills  Directions: TAKE 1 TABLET(5 MG) BY MOUTH EVERY 8 HOURS AS NEEDED    RX name: zolpidem  Strength: 5 mg  Quantity: 30 tablet  Directions: TAKE 1 TABLET(5 MG) BY MOUTH EVERY EVENING    Pharmacy name: [facility]      Phone number where patient can be reached: [phone]

## 2018-05-10 ENCOUNTER — OFFICE VISIT (OUTPATIENT)
Dept: FAMILY MEDICINE | Facility: CLINIC | Age: 63
End: 2018-05-10
Payer: MEDICARE

## 2018-05-10 ENCOUNTER — TELEPHONE (OUTPATIENT)
Dept: FAMILY MEDICINE | Facility: CLINIC | Age: 63
End: 2018-05-10

## 2018-05-10 VITALS
OXYGEN SATURATION: 98 % | BODY MASS INDEX: 38.52 KG/M2 | TEMPERATURE: 99 F | HEIGHT: 60 IN | WEIGHT: 196.19 LBS | HEART RATE: 100 BPM

## 2018-05-10 DIAGNOSIS — Z01.419 WELL WOMAN EXAM WITH ROUTINE GYNECOLOGICAL EXAM: ICD-10-CM

## 2018-05-10 DIAGNOSIS — M17.11 PRIMARY OSTEOARTHRITIS OF RIGHT KNEE: Primary | ICD-10-CM

## 2018-05-10 DIAGNOSIS — M17.12 PRIMARY OSTEOARTHRITIS OF LEFT KNEE: ICD-10-CM

## 2018-05-10 PROCEDURE — 99212 OFFICE O/P EST SF 10 MIN: CPT | Mod: S$GLB,,, | Performed by: FAMILY MEDICINE

## 2018-05-10 RX ORDER — LEVOTHYROXINE SODIUM 88 UG/1
TABLET ORAL
COMMUNITY
Start: 2018-05-06 | End: 2018-05-10

## 2018-05-10 NOTE — TELEPHONE ENCOUNTER
I called and left message for the pt advising her to rtn call - we can schedule her to see dr boykin to at 1140 or 120.

## 2018-05-10 NOTE — TELEPHONE ENCOUNTER
----- Message from Carmelina Kraus sent at 5/10/2018  8:19 AM CDT -----  Contact: Self / [phone]  Patient would like to be seen today.     Symptoms: Knee pain.  Would like a shot    How long has patient had these symptoms: Worse over the last couple of days.    If unable to be seen , can something be called into patient pharmacy?NO. Request appt    [facility]    Any drug allergies: None known        Are you willing to do this injection she is requesting? When to schedule her?

## 2018-05-15 DIAGNOSIS — M17.0 ARTHRITIS OF BOTH KNEES: ICD-10-CM

## 2018-05-15 RX ORDER — PREDNISONE 5 MG/1
TABLET ORAL
Qty: 30 TABLET | Refills: 3 | Status: SHIPPED | OUTPATIENT
Start: 2018-05-15 | End: 2018-10-05 | Stop reason: SDUPTHER

## 2018-05-15 NOTE — TELEPHONE ENCOUNTER
----- Message from Carmelina Efra sent at 5/15/2018 12:28 PM CDT -----  Contact: Self  Requesting a True Metrix meter kit be called in to [facility] for her.  She said the kit has everything she needs in it.      ALSO    Patient is requesting a medication refill.     RX name: predniSONE  Strength: 5 mg  Quantity: 30 pills  Directions: TAKE 1 TABLET(5 MG) BY MOUTH EVERY DAY    [facility]      Phone number where patient can be reached: [phone]

## 2018-05-16 DIAGNOSIS — M17.0 ARTHRITIS OF BOTH KNEES: ICD-10-CM

## 2018-05-17 RX ORDER — POTASSIUM CHLORIDE 750 MG/1
TABLET, EXTENDED RELEASE ORAL
Qty: 90 TABLET | Refills: 0 | Status: SHIPPED | OUTPATIENT
Start: 2018-05-17 | End: 2018-11-26 | Stop reason: SDUPTHER

## 2018-05-18 RX ORDER — PREDNISONE 5 MG/1
TABLET ORAL
Qty: 30 TABLET | Refills: 0 | OUTPATIENT
Start: 2018-05-18

## 2018-05-18 RX ORDER — ZOLPIDEM TARTRATE 5 MG/1
TABLET ORAL
Qty: 30 TABLET | Refills: 0 | Status: SHIPPED | OUTPATIENT
Start: 2018-05-18 | End: 2018-07-01 | Stop reason: SDUPTHER

## 2018-05-18 RX ORDER — PROMETHAZINE HYDROCHLORIDE 25 MG/1
25 TABLET ORAL EVERY 6 HOURS PRN
Qty: 25 TABLET | Refills: 0 | Status: SHIPPED | OUTPATIENT
Start: 2018-05-18 | End: 2018-08-21

## 2018-05-20 PROCEDURE — 20610 DRAIN/INJ JOINT/BURSA W/O US: CPT | Mod: 50,S$GLB,, | Performed by: FAMILY MEDICINE

## 2018-05-21 NOTE — PROGRESS NOTES
Patient ID: Christine Castro is a 63 y.o. female.    Chief Complaint: Knee Pain (Bilateral knee pain. )    HPI       Christine Castro is a 63 y.o. female here complaining of bilateral arthritis in her knees.  Would like injections of both knees.      Review of Symptoms    Constitutional  No change in activity, No chills fever   Resp  Neg hemoptysis, stridor, choking  CVS  Neg chest pain, palpitations    Physical Exam    Constitutional:   Oriented to person, place, and time.appears well-developed and well-nourished.   No distress.     HENT  Head: Normocephalic and atraumatic  Right Ear: External ear normal.   Left Ear: External ear normal.   Nose: External nose normal.   Mouth: Moist mucous membranes    Eyes:   Conjunctivae are normal. Right eye exhibits no discharge. Left eye exhibits no discharge. No scleral icterus. No periorbital edema    Musculoskeletal:  No edema. No obvious deformity No wasting     Neurological:  Alert and oriented to person, place, and time. Coordination normal.     Skin:   Skin is warm and dry.  No diaphoresis.   No rash noted.     Psychiatric: Normal mood and affect. Behavior is normal. Judgment and thought content normal.       Assessment / Plan:      ICD-10-CM ICD-9-CM   1. Well woman exam with routine gynecological exam Z01.419 V72.31     Well woman exam with routine gynecological exam  -     Ambulatory referral to Obstetrics / Gynecology    Other orders  -     Cancel: Ambulatory referral to Gynecology

## 2018-05-21 NOTE — PROCEDURES
Large Joint Aspiration/Injection  Date/Time: 5/20/2018 7:10 PM  Performed by: AMBER ROACH  Authorized by: AMBER ROACH     Consent Done?:  Yes (Verbal)  Indications:  Pain  Procedure site marked: Yes    Timeout: Prior to procedure the correct patient, procedure, and site was verified      Location:  Knee  Site:  L knee  Prep: Patient was prepped and draped in usual sterile fashion    Ultrasonic Guidance for needle placement: No  Needle size:  25 G  Approach:  Anterolateral

## 2018-05-21 NOTE — PROCEDURES
Large Joint Aspiration/Injection  Date/Time: 5/20/2018 7:09 PM  Performed by: AMBER ROACH  Authorized by: AMBER ROACH     Consent Done?:  Yes (Verbal)  Indications:  Pain  Procedure site marked: Yes    Timeout: Prior to procedure the correct patient, procedure, and site was verified      Location:  Knee  Site:  R knee  Prep: Patient was prepped and draped in usual sterile fashion    Ultrasonic Guidance for needle placement: No  Needle size:  25 G  Approach:  Anterolateral

## 2018-05-24 RX ORDER — DIAZEPAM 5 MG/1
TABLET ORAL
Qty: 30 TABLET | Refills: 0 | Status: SHIPPED | OUTPATIENT
Start: 2018-05-24 | End: 2018-06-23 | Stop reason: SDUPTHER

## 2018-05-28 RX ORDER — QUETIAPINE FUMARATE 50 MG/1
TABLET, FILM COATED ORAL
Qty: 90 TABLET | Refills: 0 | Status: SHIPPED | OUTPATIENT
Start: 2018-05-28 | End: 2018-08-21

## 2018-05-28 RX ORDER — FUROSEMIDE 20 MG/1
TABLET ORAL
Qty: 90 TABLET | Refills: 0 | Status: SHIPPED | OUTPATIENT
Start: 2018-05-28 | End: 2018-08-27 | Stop reason: SDUPTHER

## 2018-05-31 ENCOUNTER — TELEPHONE (OUTPATIENT)
Dept: CARDIOLOGY | Facility: CLINIC | Age: 63
End: 2018-05-31

## 2018-05-31 DIAGNOSIS — M79.606 PAIN OF LOWER EXTREMITY, UNSPECIFIED LATERALITY: Primary | ICD-10-CM

## 2018-05-31 NOTE — TELEPHONE ENCOUNTER
----- Message from Magui William RN sent at 5/31/2018  9:16 AM CDT -----  Patient is having a TKR 7/6 with Dr. Kidd under spinal anesthesia.   Anesthesia is requesting cardiac optimization.   Please advise.    Magui William RN  [facility] [place]  [id number]

## 2018-06-01 ENCOUNTER — OFFICE VISIT (OUTPATIENT)
Dept: OBSTETRICS AND GYNECOLOGY | Facility: CLINIC | Age: 63
End: 2018-06-01
Payer: MEDICARE

## 2018-06-01 VITALS
HEIGHT: 60 IN | WEIGHT: 191.13 LBS | BODY MASS INDEX: 37.53 KG/M2 | SYSTOLIC BLOOD PRESSURE: 120 MMHG | DIASTOLIC BLOOD PRESSURE: 75 MMHG

## 2018-06-01 DIAGNOSIS — Z12.4 PAP SMEAR FOR CERVICAL CANCER SCREENING: Primary | ICD-10-CM

## 2018-06-01 DIAGNOSIS — Z12.89 ENCOUNTER FOR PELVIC SCREENING FOR CANCER: ICD-10-CM

## 2018-06-01 PROCEDURE — 99999 PR PBB SHADOW E&M-EST. PATIENT-LVL IV: CPT | Mod: PBBFAC,,, | Performed by: OBSTETRICS & GYNECOLOGY

## 2018-06-01 PROCEDURE — 87624 HPV HI-RISK TYP POOLED RSLT: CPT

## 2018-06-01 PROCEDURE — 99214 OFFICE O/P EST MOD 30 MIN: CPT | Mod: PBBFAC,PO,25 | Performed by: OBSTETRICS & GYNECOLOGY

## 2018-06-01 PROCEDURE — 88175 CYTOPATH C/V AUTO FLUID REDO: CPT

## 2018-06-01 PROCEDURE — G0101 CA SCREEN;PELVIC/BREAST EXAM: HCPCS | Mod: S$PBB,,, | Performed by: OBSTETRICS & GYNECOLOGY

## 2018-06-01 PROCEDURE — G0101 CA SCREEN;PELVIC/BREAST EXAM: HCPCS | Mod: PBBFAC,PO | Performed by: OBSTETRICS & GYNECOLOGY

## 2018-06-01 NOTE — LETTER
June 1, 2018      Alon Santiago MD  [address] [facility] [address]           [facility]  [address] [facility], [address]  [address]th [address] [facility]  [place] [address]  Phone: [phone]          Patient: Christine Castro   MR Number: [id number]   YOB: 1955   Date of Visit: 6/1/2018       Dear Dr. Alon Santiago:    Thank you for referring Christine Castro to me for evaluation. Attached you will find relevant portions of my assessment and plan of care.    If you have questions, please do not hesitate to call me. I look forward to following Christine Castro along with you.    Sincerely,    Ayanna Fenton MD    Enclosure  CC:  No Recipients    If you would like to receive this communication electronically, please contact [email or web address] or [phone] to request more information on [employer] Link access.    For providers and/or their staff who would like to refer a patient to [facility], please contact us through our one-stop-shop provider referral line, [facility], at [phone].    If you feel you have received this communication in error or would no longer like to receive these types of communications, please e-mail [email or web address]

## 2018-06-01 NOTE — PROGRESS NOTES
Chief Complaint   Patient presents with    Well Woman       HISTORY OF PRESENT ILLNESS:   Christine Castro is a 63 y.o. female   () h/o colon cancer in  s/p chemo who presents for well woman exam.  No LMP recorded. Patient is postmenopausal.. Menopause at age 49  She has no complaints.   Declines STD testing.      Past Medical History:   Diagnosis Date    Arthritis     Asthma     Cancer     CHF (congestive heart failure)     ST CLAUDE GENERAL    Colon cancer     COPD (chronic obstructive pulmonary disease)     Coronary artery disease     Depression     Diabetes mellitus, type 2     GERD (gastroesophageal reflux disease)     Myocardial infarction     AGE 19 [facility] WITHBABY DELIVERY    Thyroid disease           Past Surgical History:   Procedure Laterality Date     SECTION      CHOLECYSTECTOMY      COLON SURGERY      COLONOSCOPY      --- repeat in 3-5 years    COLONOSCOPY N/A 2017    Procedure: COLONOSCOPY;  Surgeon: Corrina Flores MD;  Location: [facility] ENDO;  Service: Endoscopy;  Laterality: N/A;    COLONOSCOPY N/A 4/10/2018    Procedure: COLONOSCOPY golytely;  Surgeon: Corrina Flores MD;  Location: [facility] ENDO;  Service: Endoscopy;  Laterality: N/A;    ECTOPIC PREGNANCY SURGERY      GASTRIC BYPASS      ovary removed      TONSILLECTOMY      TUBAL LIGATION           Social History     Social History    Marital status:      Spouse name: N/A    Number of children: N/A    Years of education: N/A     Occupational History    Not on file.     Social History Main Topics    Smoking status: Never Smoker    Smokeless tobacco: Never Used    Alcohol use Yes      Comment: very rare    Drug use: No    Sexual activity: No     Other Topics Concern    Not on file     Social History Narrative    No narrative on file       Family History   Problem Relation Age of Onset    Hyperlipidemia Mother     Hypertension Mother     Diabetes Mother     Hypertension  Sister     Hypertension Brother     Diabetes Brother     Breast cancer Maternal Aunt     Breast cancer Maternal Aunt     Breast cancer Cousin          OB History    Para Term  AB Living   1 1 1         SAB TAB Ectopic Multiple Live Births                  # Outcome Date GA Lbr Walter/2nd Weight Sex Delivery Anes PTL Lv   1 Term                   COMPREHENSIVE GYN HISTORY:  PAP History: had abnormal pap at 18   Infection History: Denies STDs. Denies PID.  Benign History: Denies uterine fibroids. Denies ovarian cysts. Denies endometriosis Denies other conditions.  Cancer History: Denies cervical cancer. Denies uterine cancer or hyperplasia. Denies ovarian cancer. Denies vulvar cancer or pre-cancer. Denies vaginal cancer or pre-cancer. Denies breast cancer. Denies colon cancer.  Cycle:     ROS:  GENERAL: Denies weight gain or weight loss. Feeling well overall.   SKIN: Denies rash or lesions.   HEAD: Denies headache.   NODES: Denies enlarged lymph nodes.   CHEST: Denies shortness of breath.   ABDOMEN: No abdominal pain, constipation, diarrhea, nausea, vomiting or rectal bleeding.   URINARY: No frequency, dysuria, hematuria, or burning on urination.  REPRODUCTIVE: See HPI.   BREASTS: The patient denies pain, lumps, or nipple discharge.       /75   Ht 5' (1.524 m)   Wt 86.7 kg (191 lb 2.2 oz)   BMI 37.33 kg/m²     APPEARANCE: Well nourished, well developed, in no acute distress.  NECK: Neck symmetric without  thyromegaly.  NODES: No inguinal, cervical lymph node enlargement.  CHEST: Lungs clear to auscultation. Well healed scars from mediports   HEART: Regular rate and rhythm, no murmurs, rubs or gallops.  ABDOMEN: Soft. No tenderness or masses. No hernias. No hepatosplenomegaly. Well healed vertical incision   BREASTS: Symmetrical, large pendulous, a lot of extra tissue after weight loss,  no skin changes or visible lesions. No palpable masses, nipple discharge or adenopathy  bilaterally.  PELVIC:   VULVA: No lesions. Normal female genitalia.  URETHRAL MEATUS: Normal size and location, no lesions, no prolapse.  URETHRA: No masses, tenderness, prolapse or scarring.  VAGINA: atrophic, no discharge, no significant cystocele or rectocele.  CERVIX: No lesions and discharge.  UTERUS: Normal size, regular shape, mobile, non-tender, bladder base nontender.  ADNEXA: No masses or tenderness.  PERINEUM: Normal, mo masses    Data Reviewed:     Last MMG: Date: 3/2018 BIRADS2  Lipid profile: Date:   Lab Results   Component Value Date    CHOL 184 02/19/2018    CHOL 205 (H) 03/09/2017    CHOL 205 (H) 06/09/2016     Lab Results   Component Value Date    HDL 51 02/19/2018    HDL 54 03/09/2017    HDL 52 06/09/2016     Lab Results   Component Value Date    LDLCALC 113 (H) 02/19/2018    LDLCALC 135.2 03/09/2017    LDLCALC 137.0 06/09/2016     Lab Results   Component Value Date    TRIG 94 02/19/2018    TRIG 79 03/09/2017    TRIG 80 06/09/2016     Lab Results   Component Value Date    CHOLHDL 3.6 02/19/2018    CHOLHDL 26.3 03/09/2017    CHOLHDL 25.4 06/09/2016     TSH:   Lab Results   Component Value Date    TSH 5.51 (H) 02/19/2018     Colonoscopy Date:4/2018, repeat 5 years     1. Pap smear for cervical cancer screening    2. Encounter for pelvic screening for cancer        A/P 1. Routine gyn annual exam. s/p normal breast exam and MMG done.  Pap with HPV cotesting ordered. STD testing: GC/CT/trich, syphilis, HBV/HCV and HIV declined. Lipid Profile, needed every 5 years, up to date. Fasting glucose, needed every 3 years, up to date.   TSH, needed every 5 years, up to date.   Colonoscopy up to date.     F/u in 1 yr or PRN

## 2018-06-06 ENCOUNTER — HOSPITAL ENCOUNTER (OUTPATIENT)
Dept: PREADMISSION TESTING | Facility: HOSPITAL | Age: 63
Discharge: HOME | End: 2018-06-06

## 2018-06-06 NOTE — DISCHARGE INSTRUCTIONS
Your surgery has been scheduled for:__________________________________________    You should report to:  ____[facility], located on the [place] side of the first floor of the           [facility] ([phone])  ____[facility], located on the [facility] side of the            Second floor of the [facility] ([phone])  ____3rd Floor [facility] located on the [facility] side of the [facility] [phone]  Please Note   - Tell your doctor if you take Aspirin, products containing Aspirin, herbal medications  or blood thinners, such as Coumadin, Ticlid, or Plavix.  (Consult your provider regarding holding or stopping before surgery).  - Arrange for someone to drive you home following surgery.  You will not be allowed to leave the surgical facility alone or drive yourself home following sedation and anesthesia.  Before Surgery  - Stop taking all herbal medications 14days prior to surgery  - No Motrin/Advil (Ibuprofen) 7 days before surgery  - No Aleve (Naproxen) 7 days before surgery  - Stop Taking Asprin, products containing Asprin _____days before surgery  - Stop taking blood thinners_______days before surgery  - Refrain from drinking alcoholic beverages for 24hours before and after surgery  - Stop or limit smoking _________days before surgery  Night before Surgery  - DO NOT EAT OR DRINK ANYTHING AFTER MIDNIGHT, INCLUDING GUM, HARD CANDY, MINTS, OR CHEWING TOBACCO.  - Take a shower or bath (shower is recommended).  Bathe with Hibiclens soap or an antibacterial soap from the neck down.  If not supplied by your surgeon, hibiclens soap will need to be purchased over the counter in pharmacy.  Rinse soap off thoroughly.  - Shampoo your hair with your regular shampoo  The Day of Surgery  - Take another bath or shower with hibiclens or any antibacterial soap, to reduce the chance of infection.  - Take heart and blood  pressure medications with a small sip of water, as advised by the perioperative team.  - Do not take fluid pills  - You may brush your teeth and rinse your mouth, but do not swall any additional water.   - Do not apply perfumes, powder, body lotions or deodorant on the day of surgery.  - Nail polish should be removed.  - Do not wear makeup or moisturizer  - Wear comfortable clothes, such as a button front shirt and loose fitting pants.  - Leave all jewelry, including body piercings, and valuables at home.    - Bring any devices you will neeed after surgery such as crutches or canes.  - If you have sleep apnea, please bring your CPAP machine  In the event that your physical condition changes including the onset of a cold or respiratory illness, or if you have to delay or cancel your surgery, please notify your surgeon.Anesthesia: Regional Anesthesia    Youre scheduled for surgery. During surgery, youll receive medicine called anesthesia to keep you comfortable and pain-free. Your surgeon has decided that youll receive regional anesthesia. This sheet tells you what to expect with this type of anesthesia.  What is regional anesthesia?  Regional anesthesia numbs one region of your body. The anesthesia may be given around nerves or into veins in your arms, neck, or legs (nerve block or Whitney block). Or it may be sent into the spinal fluid (spinal anesthesia) or into the space just outside the spinal fluid (epidural anesthesia). You may also be given sedatives to help you relax.  Nerve block or [place] block  A small area of the body, such as an arm or leg, can be numbed using a nerve block or Whitney block.  · Nerve block. During a nerve block, your skin is numbed. A needle is then inserted near nerves that serve the area to be numbed. Anesthetic is sent through the needle.  · IV regional or Whitney block. For this type of block, an IV line is put into a vein. The blood flow to the area to be numbed is blocked for a short time.  Anesthetic is sent through the IV.  Spinal anesthesia  Spinal anesthesia numbs your body from about the waist down.  · Anesthetic is injected into the spinal fluid. This is a substance that surrounds the spinal cord in your spinal column. The anesthetic blocks pain traveling from the body to the brain.  · To receive the anesthetic, your skin is numbed at the injection site on your back.  · A needle is then inserted into the spinal space. Anesthetic is sent into the spinal fluid through the needle.  Epidural anesthesia  Epidural anesthesia is most commonly used during childbirth and may also be used after surgical procedures of the chest, belly, and legs.  · Anesthetic is injected into the epidural space. This is just outside the dural sac which contains the spinal fluid.  · To receive the anesthetic, your skin is numbed at the injection site on your back.  · A needle is then inserted into the epidural space. Anesthetic is sent into the epidural space through the needle.  · A small flexible catheter may be attached to the needle and left in place. This allows for continuous injections or infusions of anesthetic.  Anesthesia tools and medicines that might be near you during your procedure  · Local anesthetic. This medicine is given through a needle numbs one region of your body.  · Electrocardiography leads (electrodes). These are used to record your heart rate and rhythm.  · Blood pressure cuff. A cuff is placed on your arm to keep track of your blood pressure.  · Pulse oximeter. This small clip is placed on the end of the finger. It measures your blood oxygen level.  · Sedatives. These medicines may be given through an IV. They help to relax you and keep you comfortable. You may stay awake or sleep lightly.  · Oxygen. You may be given oxygen through a facemask.  Risks and possible complications  Regional anesthesia carries some risks. These include:  · Nausea and vomiting  · Headache  · Backache  · Decreased blood  pressure  · Allergic reaction to the anesthetic  · Ongoing numbness (rare)  · Irregular heartbeat (rare)  · Cardiac arrest (rare)   Date Last Reviewed: 12/1/2016  © 2129-2967 [employer]. [address] [place] [address], [place] [address]. All rights reserved. This information is not intended as a substitute for professional medical care. Always follow your healthcare professional's instructions.

## 2018-06-07 LAB
HPV16 AG SPEC QL: NEGATIVE
HPV16+18+H RISK 12 DNA CVX-IMP: NEGATIVE
HPV18 DNA SPEC QL NAA+PROBE: NEGATIVE

## 2018-06-07 NOTE — PROGRESS NOTES
Please send patient pap smear letter or call. Her pap was negative and negative for the HPV virus so since it has been over 20 years since she had an abnormal pap smear the new recommendations are that she will not need another one since she will be over 64 but we will still continue to see her for annuals. thanks.

## 2018-06-09 RX ORDER — BUPROPION HYDROCHLORIDE 200 MG/1
TABLET, EXTENDED RELEASE ORAL
Qty: 90 TABLET | Refills: 0 | OUTPATIENT
Start: 2018-06-09

## 2018-06-09 RX ORDER — BUPROPION HYDROCHLORIDE 200 MG/1
TABLET, EXTENDED RELEASE ORAL
Qty: 60 TABLET | Refills: 0 | Status: SHIPPED | OUTPATIENT
Start: 2018-06-09 | End: 2018-11-05

## 2018-06-09 RX ORDER — ESCITALOPRAM OXALATE 20 MG/1
TABLET ORAL
Qty: 90 TABLET | Refills: 0 | Status: SHIPPED | OUTPATIENT
Start: 2018-06-09 | End: 2018-08-21

## 2018-06-14 RX ORDER — OMEPRAZOLE 40 MG/1
CAPSULE, DELAYED RELEASE ORAL
Qty: 90 CAPSULE | Refills: 0 | Status: SHIPPED | OUTPATIENT
Start: 2018-06-14 | End: 2018-07-26 | Stop reason: SDUPTHER

## 2018-06-24 RX ORDER — DIAZEPAM 5 MG/1
TABLET ORAL
Qty: 30 TABLET | Refills: 0 | Status: SHIPPED | OUTPATIENT
Start: 2018-06-24 | End: 2018-07-07 | Stop reason: SDUPTHER

## 2018-06-25 ENCOUNTER — TELEPHONE (OUTPATIENT)
Dept: ORTHOPEDICS | Facility: CLINIC | Age: 63
End: 2018-06-25

## 2018-06-25 NOTE — TELEPHONE ENCOUNTER
----- Message from Dileep Kidd MD sent at 6/25/2018  7:30 AM CDT -----  Will need to be Sept.    ----- Message -----  From: Carla Mohr MA  Sent: 6/22/2018   1:49 PM  To: Dileep Kidd MD    Ok, because the date you gave me on your message was for her surgery was the 1st or 2nd week in August.    ----- Message -----  From: Dileep Kidd MD  Sent: 6/22/2018  12:21 PM  To: Carla Mohr MA    Correct    ----- Message -----  From: Carla Mohr MA  Sent: 6/22/2018  10:58 AM  To: Dileep Kidd MD    Hi, you are not here until the last week in August.

## 2018-06-25 NOTE — TELEPHONE ENCOUNTER
Surgery date and Ortho Pre op appointments have been change to 9/4/18 and patient has been notified as well as the Preop center.

## 2018-06-29 ENCOUNTER — OFFICE VISIT (OUTPATIENT)
Dept: FAMILY MEDICINE | Facility: CLINIC | Age: 63
End: 2018-06-29
Payer: MEDICARE

## 2018-06-29 VITALS
BODY MASS INDEX: 36.1 KG/M2 | WEIGHT: 183.88 LBS | OXYGEN SATURATION: 97 % | DIASTOLIC BLOOD PRESSURE: 58 MMHG | HEART RATE: 81 BPM | HEIGHT: 60 IN | SYSTOLIC BLOOD PRESSURE: 88 MMHG

## 2018-06-29 DIAGNOSIS — M70.61 TROCHANTERIC BURSITIS OF RIGHT HIP: Primary | ICD-10-CM

## 2018-06-29 PROCEDURE — 99213 OFFICE O/P EST LOW 20 MIN: CPT | Mod: 25,S$GLB,, | Performed by: FAMILY MEDICINE

## 2018-06-29 PROCEDURE — 20605 DRAIN/INJ JOINT/BURSA W/O US: CPT | Mod: RT,S$GLB,, | Performed by: FAMILY MEDICINE

## 2018-06-29 NOTE — PROGRESS NOTES
Chief Complaint  Chief Complaint   Patient presents with    Fall    low bp    Hip Pain    Fatigue       HPI  Christine Castro is a 63 y.o. female with multiple medical diagnoses as listed in the medical history and problem list that presents for right hip pain.    Ms. Castro fell in her kitchen 2 days ago and has been having right hip pain since then.  She has a history of osteoarthritis in both knees.  She did not hit her head and was able to get up and walk after the fall.        PAST MEDICAL HISTORY:  Past Medical History:   Diagnosis Date    Arthritis     Asthma     Cancer     CHF (congestive heart failure)     [facility]    Colon cancer     COPD (chronic obstructive pulmonary disease)     Coronary artery disease     Depression     Diabetes mellitus, type 2     GERD (gastroesophageal reflux disease)     Myocardial infarction     AGE 19 [facility]ABY DELIVERY    Thyroid disease        PAST SURGICAL HISTORY:  Past Surgical History:   Procedure Laterality Date     SECTION      CHOLECYSTECTOMY      COLON SURGERY      COLONOSCOPY      --- repeat in 3-5 years    COLONOSCOPY N/A 2017    Procedure: COLONOSCOPY;  Surgeon: Corrina Flores MD;  Location: [facility] ENDO;  Service: Endoscopy;  Laterality: N/A;    COLONOSCOPY N/A 4/10/2018    Procedure: COLONOSCOPY golytely;  Surgeon: Corrina Flores MD;  Location: [facility] ENDO;  Service: Endoscopy;  Laterality: N/A;    ECTOPIC PREGNANCY SURGERY      GASTRIC BYPASS      ovary removed      TONSILLECTOMY      TUBAL LIGATION         SOCIAL HISTORY:  Social History     Social History    Marital status:      Spouse name: N/A    Number of children: N/A    Years of education: N/A     Occupational History    Not on file.     Social History Main Topics    Smoking status: Never Smoker    Smokeless tobacco: Never Used    Alcohol use Yes      Comment: very rare    Drug use: No    Sexual activity: No     Other Topics Concern     Not on file     Social History Narrative    No narrative on file       FAMILY HISTORY:  Family History   Problem Relation Age of Onset    Hyperlipidemia Mother     Hypertension Mother     Diabetes Mother     Hypertension Sister     Hypertension Brother     Diabetes Brother     Breast cancer Maternal Aunt     Breast cancer Maternal Aunt     Breast cancer Cousin        ALLERGIES AND MEDICATIONS: updated and reviewed.  Review of patient's allergies indicates:   Allergen Reactions    Ibuprofen      Current Outpatient Prescriptions   Medication Sig Dispense Refill    atorvastatin (LIPITOR) 10 MG tablet TAKE 1 TABLET BY MOUTH EVERY OTHER DAY 45 tablet 0    buPROPion (WELLBUTRIN SR) 200 MG Tb12 TAKE 1 TABLET(200 MG) BY MOUTH EVERY DAY 90 tablet 0    busPIRone (BUSPAR) 10 MG tablet Take 0.5 tablets (5 mg total) by mouth once daily. 60 tablet 3    diazePAM (VALIUM) 5 MG tablet TAKE 1 TABLET(5 MG) BY MOUTH EVERY 8 HOURS AS NEEDED 30 tablet 0    escitalopram oxalate (LEXAPRO) 20 MG tablet Take 1 tablet (20 mg total) by mouth once daily. 90 tablet 3    furosemide (LASIX) 20 MG tablet TAKE 1 TABLET BY MOUTH DAILY AS NEEDED 90 tablet 0    gabapentin (NEURONTIN) 100 MG capsule TAKE 1 CAPSULE(100 MG) BY MOUTH THREE TIMES DAILY (Patient taking differently: TAKE 1 CAPSULE(100 MG) BY MOUTH DAILY) 90 capsule 0    L. RHAMNOSUS GG/INULIN (CULTURELLE PROBIOTICS ORAL) Take by mouth.      levothyroxine (SYNTHROID) 100 MCG tablet Take 1 tablet (100 mcg total) by mouth once daily. 90 tablet 11    morphine (MS CONTIN) 15 MG 12 hr tablet       omeprazole (PRILOSEC) 40 MG capsule TAKE 1 CAPSULE BY MOUTH EVERY MORNING 90 capsule 0    oxyCODONE-acetaminophen (PERCOCET)  mg per tablet Take 1 tablet by mouth every 8 (eight) hours as needed.  0    potassium chloride (KLOR-CON) 10 MEQ TbSR TAKE 2 TABLETS(20 MEQ) BY MOUTH EVERY DAY 90 tablet 0    predniSONE (DELTASONE) 5 MG tablet TAKE 1 TABLET(5 MG) BY MOUTH EVERY  DAY 30 tablet 3    promethazine (PHENERGAN) 25 MG tablet Take 1 tablet (25 mg total) by mouth every 6 (six) hours as needed. 45 tablet 0    TRUEPLUS LANCETS 33 gauge Misc USE TO TEST BLOOD QD  3    VENTOLIN HFA 90 mcg/actuation inhaler INHALE 2 PUFFS BY MOUTH INTO THE LUNGS EVERY 4 HOURS AS NEEDED FOR WHEEZING 18 g 0    zolpidem (AMBIEN) 5 MG Tab TAKE 1 TABLET(5 MG) BY MOUTH EVERY EVENING 30 tablet 0    buPROPion (WELLBUTRIN SR) 200 MG Tb12 TAKE 1 TABLET(200 MG) BY MOUTH EVERY DAY 60 tablet 0    diclofenac sodium 1 % Gel APPLY 2 GRAMS EXTERNALLY TO THE AFFECTED AREA FOUR TIMES DAILY 100 g 3    escitalopram oxalate (LEXAPRO) 20 MG tablet TAKE 1 TABLET BY MOUTH EVERY DAY 90 tablet 0    GAVILYTE-G 236-22.74-6.74 -5.86 gram suspension       ondansetron (ZOFRAN) 4 MG tablet TAKE 1 TABLET BY MOUTH EVERY 8 HOURS AS NEEDED FOR NAUSEA 12 tablet 0    oxycodone-acetaminophen (PERCOCET) 7.5-325 mg per tablet TK 1 T PO  TID  0    promethazine (PHENERGAN) 25 MG tablet Take 1 tablet (25 mg total) by mouth every 6 (six) hours as needed. 25 tablet 0    QUEtiapine (SEROQUEL) 50 MG tablet TAKE 1 TABLET(50 MG) BY MOUTH EVERY EVENING 90 tablet 0    zolpidem (AMBIEN) 5 MG Tab TAKE 1 TABLET(5 MG) BY MOUTH EVERY EVENING 30 tablet 0     No current facility-administered medications for this visit.          ROS  Review of Systems   Constitutional: Negative for activity change, appetite change, chills and fatigue.   HENT: Negative for congestion, ear discharge, ear pain, rhinorrhea, sinus pain, sore throat and trouble swallowing.    Eyes: Negative for photophobia, pain, redness, itching and visual disturbance.   Respiratory: Negative for cough, chest tightness, shortness of breath and wheezing.    Cardiovascular: Negative for chest pain, palpitations and leg swelling.   Gastrointestinal: Negative for abdominal distention, abdominal pain, blood in stool, diarrhea, nausea and vomiting.   Genitourinary: Negative for dysuria, pelvic  pain, vaginal bleeding, vaginal discharge and vaginal pain.   Musculoskeletal: Positive for arthralgias, back pain and myalgias. Negative for gait problem and neck pain.   Skin: Negative for color change, pallor and rash.   Neurological: Negative for dizziness, tremors, weakness, light-headedness, numbness and headaches.   Psychiatric/Behavioral: Negative for agitation, behavioral problems, confusion and sleep disturbance.           PHYSICAL EXAM  Vitals:    06/29/18 1115   BP: (!) 88/58   Pulse: 81   SpO2: 97%   Weight: 83.4 kg (183 lb 13.8 oz)   Height: 5' (1.524 m)    Body mass index is 35.91 kg/m².  Weight: 83.4 kg (183 lb 13.8 oz)   Height: 5' (152.4 cm)     Physical Exam   Constitutional: She appears well-developed and well-nourished.   HENT:   Head: Normocephalic.   Eyes: Pupils are equal, round, and reactive to light.   Neck: Normal range of motion. Neck supple.   Cardiovascular: Normal rate, regular rhythm, normal heart sounds and intact distal pulses.    Pulmonary/Chest: Effort normal and breath sounds normal.   Musculoskeletal:   Tenderness to palpation over the right trochanteric bursa.  No redness, swelling or deformity.  Able to bear weight.     Neurological: She is alert.   Skin: Skin is warm and dry.   Psychiatric: Her behavior is normal. Judgment normal.         Health Maintenance       Date Due Completion Date    TETANUS VACCINE 04/04/1972 ---    Zoster Vaccine 04/04/2014 ---    Eye Exam 07/05/2017 7/5/2016 (Done)    Override on 7/5/2016: Done    Override on 10/12/2015: Done    Influenza Vaccine 08/01/2018 10/18/2017 (Declined)    Override on 10/18/2017: Declined    Hemoglobin A1c 08/19/2018 2/19/2018    Foot Exam 02/15/2019 2/15/2018 (Done)    Override on 2/15/2018: Done    Override on 5/16/2017: Done    Lipid Panel 02/19/2019 2/19/2018    Urine Microalbumin 02/20/2019 2/20/2018    Low Dose Statin 06/14/2019 6/14/2018    Mammogram 03/22/2020 3/22/2018    Colonoscopy 04/10/2021 4/10/2018    Pap  Smear with HPV Cotest 06/01/2021 6/1/2018    Pneumococcal PPSV23 (Medium Risk) (2) 08/30/2021 8/30/2016        A steroid injection was performed at right tronchanteric bursa using no Lidocaine and 40mg of Kenalog. This was well tolerated.    Assessment & Plan      Christine RAMIREZ was seen today for fall, low bp, hip pain and fatigue.    Diagnoses and all orders for this visit:    Trochanteric bursitis of right hip     - Right trochanteric bursa injected with 40mg kenalog today.       Follow-up: No Follow-up on file.

## 2018-07-01 RX ORDER — ZOLPIDEM TARTRATE 5 MG/1
TABLET ORAL
Qty: 30 TABLET | Refills: 0 | Status: SHIPPED | OUTPATIENT
Start: 2018-07-01 | End: 2018-08-17 | Stop reason: SDUPTHER

## 2018-07-08 RX ORDER — DIAZEPAM 5 MG/1
TABLET ORAL
Qty: 30 TABLET | Refills: 0 | Status: SHIPPED | OUTPATIENT
Start: 2018-07-08 | End: 2018-07-25 | Stop reason: SDUPTHER

## 2018-07-09 ENCOUNTER — TELEPHONE (OUTPATIENT)
Dept: FAMILY MEDICINE | Facility: CLINIC | Age: 63
End: 2018-07-09

## 2018-07-09 NOTE — TELEPHONE ENCOUNTER
I left message with details  I don't see any record of anyone in the office calling Ms Castro - no recent results noted either    Dr Apodaca,   I see you saw Ms Castro on 6/29/18  Do you recall calling her last week ( see pt message below )?  Cecelia

## 2018-07-09 NOTE — TELEPHONE ENCOUNTER
----- Message from Yahaira Landeros sent at 7/9/2018  8:39 AM CDT -----  Pt states she received several calls from the office last week and is finally returning the call back. Wants to know what was it for [phone]

## 2018-07-26 RX ORDER — DIAZEPAM 5 MG/1
TABLET ORAL
Qty: 30 TABLET | Refills: 0 | Status: SHIPPED | OUTPATIENT
Start: 2018-07-26 | End: 2018-08-21

## 2018-07-26 RX ORDER — OMEPRAZOLE 40 MG/1
CAPSULE, DELAYED RELEASE ORAL
Qty: 90 CAPSULE | Refills: 0 | Status: SHIPPED | OUTPATIENT
Start: 2018-07-26 | End: 2018-09-24 | Stop reason: SDUPTHER

## 2018-07-27 NOTE — TELEPHONE ENCOUNTER
----- Message from Heike Tam sent at 7/27/2018  2:13 PM CDT -----  Patient is requesting a medication refill.     RX name: gabapentin (NEURONTIN) 100 MG capsule  Strength:   Quantity: 90 day supply (per insurance)  Directions: TAKE 1 CAPSULE(100 MG) BY MOUTH THREE TIMES DAILY    Pharmacy name: [facility]      Phone number where patient can be reached: [phone]

## 2018-07-28 RX ORDER — GABAPENTIN 100 MG/1
CAPSULE ORAL
Qty: 90 CAPSULE | Refills: 1 | Status: SHIPPED | OUTPATIENT
Start: 2018-07-28 | End: 2018-08-05

## 2018-07-29 ENCOUNTER — HOSPITAL ENCOUNTER (EMERGENCY)
Facility: HOSPITAL | Age: 63
Discharge: HOME | End: 2018-07-29
Attending: EMERGENCY MEDICINE
Payer: MEDICARE

## 2018-07-29 VITALS
HEART RATE: 64 BPM | BODY MASS INDEX: 35.34 KG/M2 | RESPIRATION RATE: 18 BRPM | DIASTOLIC BLOOD PRESSURE: 76 MMHG | OXYGEN SATURATION: 97 % | TEMPERATURE: 99 F | SYSTOLIC BLOOD PRESSURE: 113 MMHG | WEIGHT: 180 LBS | HEIGHT: 60 IN

## 2018-07-29 DIAGNOSIS — G89.29 OTHER CHRONIC PAIN: ICD-10-CM

## 2018-07-29 DIAGNOSIS — E87.6 HYPOKALEMIA: ICD-10-CM

## 2018-07-29 DIAGNOSIS — R53.83 FATIGUE: Primary | ICD-10-CM

## 2018-07-29 LAB
ALBUMIN SERPL BCP-MCNC: 2.9 G/DL
ALP SERPL-CCNC: 59 U/L
ALT SERPL W/O P-5'-P-CCNC: 15 U/L
ANION GAP SERPL CALC-SCNC: 6 MMOL/L
AST SERPL-CCNC: 19 U/L
BASOPHILS # BLD AUTO: 0.02 K/UL
BASOPHILS NFR BLD: 0.3 %
BILIRUB SERPL-MCNC: 0.3 MG/DL
BNP SERPL-MCNC: 63 PG/ML
BUN SERPL-MCNC: 7 MG/DL
CALCIUM SERPL-MCNC: 8.8 MG/DL
CHLORIDE SERPL-SCNC: 109 MMOL/L
CO2 SERPL-SCNC: 27 MMOL/L
CREAT SERPL-MCNC: 0.8 MG/DL
DIFFERENTIAL METHOD: ABNORMAL
EOSINOPHIL # BLD AUTO: 0.1 K/UL
EOSINOPHIL NFR BLD: 1.4 %
ERYTHROCYTE [DISTWIDTH] IN BLOOD BY AUTOMATED COUNT: 15 %
EST. GFR  (AFRICAN AMERICAN): >60 ML/MIN/1.73 M^2
EST. GFR  (NON AFRICAN AMERICAN): >60 ML/MIN/1.73 M^2
GLUCOSE SERPL-MCNC: 90 MG/DL
HCT VFR BLD AUTO: 33.1 %
HGB BLD-MCNC: 10.7 G/DL
LYMPHOCYTES # BLD AUTO: 1.3 K/UL
LYMPHOCYTES NFR BLD: 23.1 %
MCH RBC QN AUTO: 30.4 PG
MCHC RBC AUTO-ENTMCNC: 32.3 G/DL
MCV RBC AUTO: 94 FL
MONOCYTES # BLD AUTO: 0.3 K/UL
MONOCYTES NFR BLD: 5.4 %
NEUTROPHILS # BLD AUTO: 4 K/UL
NEUTROPHILS NFR BLD: 69.6 %
PLATELET # BLD AUTO: 219 K/UL
PMV BLD AUTO: 9.7 FL
POTASSIUM SERPL-SCNC: 3.2 MMOL/L
PROT SERPL-MCNC: 5.3 G/DL
RBC # BLD AUTO: 3.52 M/UL
SODIUM SERPL-SCNC: 142 MMOL/L
TROPONIN I SERPL DL<=0.01 NG/ML-MCNC: <0.006 NG/ML
TSH SERPL DL<=0.005 MIU/L-ACNC: 3.54 UIU/ML
WBC # BLD AUTO: 5.77 K/UL

## 2018-07-29 PROCEDURE — 85025 COMPLETE CBC W/AUTO DIFF WBC: CPT

## 2018-07-29 PROCEDURE — 84484 ASSAY OF TROPONIN QUANT: CPT

## 2018-07-29 PROCEDURE — 80053 COMPREHEN METABOLIC PANEL: CPT

## 2018-07-29 PROCEDURE — 84443 ASSAY THYROID STIM HORMONE: CPT

## 2018-07-29 PROCEDURE — 93005 ELECTROCARDIOGRAM TRACING: CPT

## 2018-07-29 PROCEDURE — 96372 THER/PROPH/DIAG INJ SC/IM: CPT

## 2018-07-29 PROCEDURE — 25000003 PHARM REV CODE 250: Performed by: NURSE PRACTITIONER

## 2018-07-29 PROCEDURE — 83880 ASSAY OF NATRIURETIC PEPTIDE: CPT

## 2018-07-29 PROCEDURE — 63600175 PHARM REV CODE 636 W HCPCS: Performed by: NURSE PRACTITIONER

## 2018-07-29 PROCEDURE — 99284 EMERGENCY DEPT VISIT MOD MDM: CPT | Mod: 25

## 2018-07-29 RX ORDER — POTASSIUM CHLORIDE 20 MEQ/1
20 TABLET, EXTENDED RELEASE ORAL
Status: COMPLETED | OUTPATIENT
Start: 2018-07-29 | End: 2018-07-29

## 2018-07-29 RX ORDER — KETOROLAC TROMETHAMINE 30 MG/ML
30 INJECTION, SOLUTION INTRAMUSCULAR; INTRAVENOUS
Status: COMPLETED | OUTPATIENT
Start: 2018-07-29 | End: 2018-07-29

## 2018-07-29 RX ADMIN — POTASSIUM CHLORIDE 20 MEQ: 1500 TABLET, EXTENDED RELEASE ORAL at 06:07

## 2018-07-29 RX ADMIN — KETOROLAC TROMETHAMINE 30 MG: 30 INJECTION, SOLUTION INTRAMUSCULAR at 06:07

## 2018-07-29 NOTE — ED NOTES
Right hip pain, left shoulder/arm/finger pain with hx of chronic pain.  Denies n/v but reports occasional loose bowels.  Reports normally takes percocet and MS contin for pain but nothing is working lately.  Pain increases with movement and ROM. Pt has had similar episodes in the past but this is worse than usual.

## 2018-07-29 NOTE — DISCHARGE INSTRUCTIONS
Take your potassium prescription as directed.  Return to the ED if your condition changes, progresses, or if you have any concerns.

## 2018-07-29 NOTE — ED PROVIDER NOTES
"Encounter Date: 2018       History     Chief Complaint   Patient presents with    Arm Pain     c/o tingling and pain to left arm for the past few days. Also c/o similar tingling to her right arm earlier today.     64yo female with pmhx of CHF, COPD, CAD, DM, GERD, thyroid disease, and history of cancer presents to the ED for fatigue.  She also reports generalized pain, especially in her left arm, for the past three days.  She denies fever, SOB, cough, CP, abd pain, and vomiting.  She is prescribed MS contin and percocet for chronic pain, but the medication is not working as well as usual.  Pt reports pain in her left arm and shoulder, worse with movement, that feels "like ants crawling."  She took her MS contin today without significant improvement.  Pt drove herself to the ED.  She reports chronic loose bowels, but no rectal bleeding. She's been drinking well.  No other complaints at this time.       The history is provided by the patient.     Review of patient's allergies indicates:   Allergen Reactions    Ibuprofen      Past Medical History:   Diagnosis Date    Arthritis     Asthma     Cancer     CHF (congestive heart failure)     [facility]    Colon cancer     COPD (chronic obstructive pulmonary disease)     Coronary artery disease     Depression     Diabetes mellitus, type 2     GERD (gastroesophageal reflux disease)     Myocardial infarction     AGE 19 MIGUELANGEL WITHBABY DELIVERY    Thyroid disease      Past Surgical History:   Procedure Laterality Date     SECTION      CHOLECYSTECTOMY      COLON SURGERY      COLONOSCOPY      --- repeat in 3-5 years    COLONOSCOPY N/A 2017    Procedure: COLONOSCOPY;  Surgeon: Corrina Flores MD;  Location: [facility];  Service: Endoscopy;  Laterality: N/A;    COLONOSCOPY N/A 4/10/2018    Procedure: COLONOSCOPY golytely;  Surgeon: Corrina Flores MD;  Location: [facility];  Service: Endoscopy;  Laterality: N/A;    ECTOPIC " PREGNANCY SURGERY      GASTRIC BYPASS      ovary removed      TONSILLECTOMY      TUBAL LIGATION       Family History   Problem Relation Age of Onset    Hyperlipidemia Mother     Hypertension Mother     Diabetes Mother     Hypertension Sister     Hypertension Brother     Diabetes Brother     Breast cancer Maternal Aunt     Breast cancer Maternal Aunt     Breast cancer Cousin      Social History   Substance Use Topics    Smoking status: Never Smoker    Smokeless tobacco: Never Used    Alcohol use Yes      Comment: very rare     Review of Systems   Constitutional: Positive for activity change and fatigue. Negative for appetite change, chills and fever.   HENT: Negative for congestion.    Respiratory: Negative for cough, shortness of breath and stridor.    Gastrointestinal: Positive for diarrhea. Negative for abdominal pain, blood in stool and vomiting.   Genitourinary: Negative for dysuria.   Musculoskeletal: Positive for arthralgias. Negative for myalgias and neck pain.   Skin: Negative for rash.   Allergic/Immunologic: Negative for immunocompromised state.   Neurological: Negative for dizziness, weakness, numbness and headaches.   Psychiatric/Behavioral: Negative for confusion.       Physical Exam     Initial Vitals [07/29/18 1556]   BP Pulse Resp Temp SpO2   124/70 95 18 99.4 °F (37.4 °C) 99 %      MAP       --         Physical Exam    Nursing note and vitals reviewed.  Constitutional: Vital signs are normal. She appears well-developed and well-nourished. She is Obese . She is active and cooperative. She is easily aroused.  Non-toxic appearance. She does not have a sickly appearance. She does not appear ill. No distress.   HENT:   Head: Normocephalic and atraumatic.   Eyes: Conjunctivae and EOM are normal.   Neck: Normal range of motion. Neck supple.   Cardiovascular: Normal rate and regular rhythm.   Pulmonary/Chest: Effort normal and breath sounds normal. She exhibits tenderness. She exhibits no  bony tenderness and no crepitus.       Abdominal: Soft. Normal appearance and bowel sounds are normal.   Neurological: She is alert, oriented to person, place, and time and easily aroused. She has normal strength. GCS eye subscore is 4. GCS verbal subscore is 5. GCS motor subscore is 6.   Skin: Skin is warm, dry and intact. No rash noted.   Psychiatric: She has a normal mood and affect. Her speech is normal and behavior is normal. Judgment and thought content normal. Cognition and memory are normal.         ED Course   Procedures  Labs Reviewed   CBC W/ AUTO DIFFERENTIAL - Abnormal; Notable for the following:        Result Value    RBC 3.52 (*)     Hemoglobin 10.7 (*)     Hematocrit 33.1 (*)     RDW 15.0 (*)     All other components within normal limits   COMPREHENSIVE METABOLIC PANEL - Abnormal; Notable for the following:     Potassium 3.2 (*)     BUN, Bld 7 (*)     Total Protein 5.3 (*)     Albumin 2.9 (*)     Anion Gap 6 (*)     All other components within normal limits   TROPONIN I   B-TYPE NATRIURETIC PEPTIDE   TSH   POCT GLUCOSE MONITORING CONTINUOUS          Imaging Results          X-Ray Chest 1 View (Final result)  Result time 07/29/18 16:45:06    Final result by Haley Fabian MD (07/29/18 16:45:06)                 Impression:      This exam is within normal limits.      Electronically signed by: Haley Fabian MD  Date:    07/29/2018  Time:    16:45             Narrative:    EXAMINATION:  XR CHEST 1 VIEW    CLINICAL HISTORY:  Other fatigue    TECHNIQUE:  Single frontal view of the chest was performed.    COMPARISON:  09/28/2016    FINDINGS:  The lungs are symmetrically inflated with no mass, nodule, pneumothorax, airspace consolidation or pleural effusion.  The cardiomediastinal silhouette, osseous and soft tissue structures are within normal limits.                                 Medical Decision Making:   Initial Assessment:   63-year-old female with past medical history of CHF, COPD, CAD,  "diabetes, chronic pain, and thyroid disease presents to the ER for generalized pain. She reports fatigue.  No fever or chills.  No chest pain or shortness of breath. No vomiting.  She is drinking without difficulty.  She reports worse pain than usual in her left arm/shoulder area with that is worse with movement.  Pt appears well, nontoxic.  Full AROM BUE with generalized tenderness to palpation. Reproducible anterior chest wall pain.   Differential Diagnosis:   Dehydration, dysrhythmia, electrolyte derangement, infection, anemia  Clinical Tests:   Lab Tests: Ordered and Reviewed  Radiological Study: Ordered and Reviewed  Medical Tests: Ordered and Reviewed  ED Management:  Labs, EKG, IM toradol  Pt reports to me that she can take toradol without any problems, and it does significantly help her chronic pain.     Pt states that she does not take her prescription potassium as prescribed.  "I just don't like taking so much medicine."    K mildly decreased, but consistent with pt's previous values.  CXR negative for acute changes.  Labs otherwise unremarkable.  Pt denies urinary symptoms and I do not suspect UTI.  I feel the pt's pain is due to an exacerbation of chronic pain.  I advised her to take her medications as prescribed.  Pt to follow up with PCP within 2 days.  I reviewed strict return precautions. In addition, pt is to return to the ED if condition changes, progresses, or if there are any concerns.  Pt verbalized understanding, compliance, and agreement with the treatment plan.                  Attending Attestation:     Physician Attestation Statement for NP/PA:   I have conducted a face to face encounter with this patient in addition to the NP/PA, due to NP/PA Request    Other NP/PA Attestation Additions:      Medical Decision Making: Patient is 64 y/o female here with left UE pain for several days.  Pain has been constant.  Denies recent injury.  She has h/o chronic pain and is on MS contin.  VSS, NAD, " nontoxic appearing.  No deformity to LUE, 2+ radial pulse and sensation intact.  ROM left shoulder elbow and wrist intact.  No edema to LUE.  LUE nontender  After complete ED evaluation, clinical impression is most consistent with LUE pain. Low suspicion for ACS as there are no ischemic EKG changes and troponin is negative.  Symptoms have been present >6 hours. Low suspicion for DVT.  At this time, I feel there is no emergent condition requiring further evaluation or admission. I believe the patient is stable for discharge from the ED. The patient and any additional family present were updated with test results, overall clinical impression, and recommended further plan of care. All questions were answered. The patient expressed understanding and agreed with current plan for discharge with recommendations to follow-up within 1 week with PCP. Strict return precautions were provided, including CP, SOB, paralysis, numbness, return/worsening of current symptoms or any other concerns.                    ED Course as of Jul 29 1856   Sun Jul 29, 2018   1638 EKG with NSR, rate 61 bpm, TWI in leads V1 and V2, Noormal ST segments. Normal conduction and normal intervals.  No STEMI  [LD]      ED Course User Index  [LD] Kimber Shea MD     Clinical Impression:   The primary encounter diagnosis was Fatigue. Diagnoses of Hypokalemia and Other chronic pain were also pertinent to this visit.      Disposition:   Disposition: Discharged  Condition: Stable                        NEIDA Muse  07/29/18 1809       Kimber Shea MD  07/29/18 2167

## 2018-07-31 ENCOUNTER — TELEPHONE (OUTPATIENT)
Dept: FAMILY MEDICINE | Facility: CLINIC | Age: 63
End: 2018-07-31

## 2018-07-31 NOTE — TELEPHONE ENCOUNTER
Discussed with dr boykin and the pt is requesting a f/u appt  I will call the pt tomorrow to schedule

## 2018-07-31 NOTE — TELEPHONE ENCOUNTER
----- Message from Linda Landeros sent at 7/31/2018  4:44 PM CDT -----  Contact: [phone]/ self  Patient would like call back regarding recent admission to ED. Please advise.

## 2018-08-04 RX ORDER — GLIPIZIDE 2.5 MG/1
TABLET, EXTENDED RELEASE ORAL
Qty: 90 TABLET | Refills: 0 | Status: SHIPPED | OUTPATIENT
Start: 2018-08-04 | End: 2018-09-24 | Stop reason: ALTCHOICE

## 2018-08-05 RX ORDER — GABAPENTIN 100 MG/1
CAPSULE ORAL
Qty: 90 CAPSULE | Refills: 0 | Status: SHIPPED | OUTPATIENT
Start: 2018-08-05 | End: 2018-09-24 | Stop reason: ALTCHOICE

## 2018-08-13 ENCOUNTER — TELEPHONE (OUTPATIENT)
Dept: CARDIOLOGY | Facility: CLINIC | Age: 63
End: 2018-08-13

## 2018-08-13 ENCOUNTER — TELEPHONE (OUTPATIENT)
Dept: FAMILY MEDICINE | Facility: CLINIC | Age: 63
End: 2018-08-13

## 2018-08-13 NOTE — TELEPHONE ENCOUNTER
Left pt message letting her know that her wei was r/s for this Thursday at 10:40. Pt advised to give a call back if she has any questions.

## 2018-08-13 NOTE — TELEPHONE ENCOUNTER
Appt has been scheduled for August 29 at 220   I left message with details for the pt - and advised her to rtn call to confirm this appt  And mailed contact letter

## 2018-08-13 NOTE — TELEPHONE ENCOUNTER
----- Message from Magui William RN sent at 8/13/2018 11:47 AM CDT -----  Patient's knee sx with Dr. Kidd was rescheduled to 9/5.   Anesthesia would like the patient to see you before surgery.  Please advise.      Magui William RN  [facility]  [phone]

## 2018-08-13 NOTE — TELEPHONE ENCOUNTER
----- Message from Magui William RN sent at 8/13/2018 11:43 AM CDT -----  Patient's knee sx with Dr. Kidd was rescheduled to 9/5.  Patient is schedule to see you for f/u. It was scheduled for 8/14 but patient cancelled and rescheduled for after sx 8/14.  Anesthesia would like the patient to see you before surgery.  Please advise.      Magui William RN  [facility]  [phone]

## 2018-08-15 ENCOUNTER — TELEPHONE (OUTPATIENT)
Dept: ORTHOPEDICS | Facility: CLINIC | Age: 63
End: 2018-08-15

## 2018-08-15 ENCOUNTER — TELEPHONE (OUTPATIENT)
Dept: PREADMISSION TESTING | Facility: HOSPITAL | Age: 63
End: 2018-08-15

## 2018-08-15 NOTE — TELEPHONE ENCOUNTER
----- Message from Rosario Schaeffer sent at 8/15/2018  2:29 PM CDT -----  Contact: Self/ [phone]  Patient would like a call back get get other date for her pre-op and surgery date.

## 2018-08-15 NOTE — TELEPHONE ENCOUNTER
----- Message from Magui William RN sent at 8/14/2018 12:59 PM CDT -----  POC appt and labs    DIANN

## 2018-08-18 RX ORDER — DIAZEPAM 2 MG/1
TABLET ORAL
Qty: 15 TABLET | Refills: 0 | Status: SHIPPED | OUTPATIENT
Start: 2018-08-18 | End: 2018-08-31 | Stop reason: SDUPTHER

## 2018-08-18 RX ORDER — DICLOFENAC SODIUM 10 MG/G
GEL TOPICAL
Qty: 100 G | Refills: 0 | Status: SHIPPED | OUTPATIENT
Start: 2018-08-18 | End: 2018-09-20 | Stop reason: SDUPTHER

## 2018-08-18 RX ORDER — ZOLPIDEM TARTRATE 5 MG/1
TABLET ORAL
Qty: 30 TABLET | Refills: 0 | Status: SHIPPED | OUTPATIENT
Start: 2018-08-18 | End: 2018-09-20 | Stop reason: SDUPTHER

## 2018-08-20 ENCOUNTER — TELEPHONE (OUTPATIENT)
Dept: ORTHOPEDICS | Facility: CLINIC | Age: 63
End: 2018-08-20

## 2018-08-20 NOTE — TELEPHONE ENCOUNTER
----- Message from Vika Soto sent at 8/20/2018 10:05 AM CDT -----  Contact: self   Pt is requesting a call back to reschedule pre-op appt. Pt can be reached at [phone].

## 2018-08-21 ENCOUNTER — HOSPITAL ENCOUNTER (OUTPATIENT)
Dept: PREADMISSION TESTING | Facility: HOSPITAL | Age: 63
Discharge: HOME | End: 2018-08-21
Attending: ANESTHESIOLOGY
Payer: MEDICARE

## 2018-08-21 RX ORDER — MORPHINE SULFATE 30 MG/1
30 TABLET, FILM COATED, EXTENDED RELEASE ORAL 2 TIMES DAILY
Status: ON HOLD | COMMUNITY
End: 2019-02-28 | Stop reason: HOSPADM

## 2018-08-21 NOTE — DISCHARGE INSTRUCTIONS
Your surgery has been scheduled for:__________________________________________    You should report to:  ____[facility], located on the [place] side of the first floor of the           [facility] ([phone])  ____[facility], located on the [facility] side of the            Second floor of the [facility] ([phone])  ____3rd Floor [facility] located on the [facility] side of the [facility] [phone]  Please Note   - Tell your doctor if you take Aspirin, products containing Aspirin, herbal medications  or blood thinners, such as Coumadin, Ticlid, or Plavix.  (Consult your provider regarding holding or stopping before surgery).  - Arrange for someone to drive you home following surgery.  You will not be allowed to leave the surgical facility alone or drive yourself home following sedation and anesthesia.  Before Surgery  - Stop taking all herbal medications 14days prior to surgery  - No Motrin/Advil (Ibuprofen) 7 days before surgery  - No Aleve (Naproxen) 7 days before surgery  - No Goody's/BC powder 7 days before surgery  - Stop Taking Asprin, products containing Asprin _____days before surgery  - Stop taking blood thinners_______days before surgery  - Refrain from drinking alcoholic beverages for 24hours before and after surgery  - Stop or limit smoking _________days before surgery  - You may take Tylenol for pain  Night before Surgery  - Take a shower or bath (shower is recommended).  Bathe with Hibiclens soap or an antibacterial soap from the neck down.  If not supplied by your surgeon, hibiclens soap will need to be purchased over the counter in pharmacy.  Rinse soap off thoroughly.  - Shampoo your hair with your regular shampoo                           Food and Beverage Instructions  1. Stop ALL solid food, gum, candy (including vitamins) 8 hours before surgery/procedure time.  2. The patient should be  ENCOURAGED to drink carbohydrate-rich clear liquids (sports drinks, clear juices) until 2 hours prior to surgery/procedure time.  3. CLEAR liquids include only water, black coffee NO creamer, clear oral rehydration drinks, clear sports drinks or clear fruit juices (no orange juice, no pulpy juices, no apple cider). Advise patients if they can read newsprint through the liquid, it qualifies as clear liquid.   4. IF IN DOUBT, drink water instead.   5. NOTHING  TO DRINK 2 hours before to arrival for surgery/procedure time. If you are told to take medication on the morning of surgery, it may be taken with a sip of water.     The Day of Surgery  - Take another bath or shower with hibiclens or any antibacterial soap, to reduce the chance of infection.  - Take heart and blood pressure medications with a small sip of water, as advised by the perioperative team.  - Do not take fluid pills  - You may brush your teeth and rinse your mouth, but do not swall any additional water.   - Do not apply perfumes, powder, body lotions or deodorant on the day of surgery.  - Nail polish should be removed.  - Do not wear makeup or moisturizer  - Wear comfortable clothes, such as a button front shirt and loose fitting pants.  - Leave all jewelry, including body piercings, and valuables at home.    - Bring any devices you will neeed after surgery such as crutches or canes.  - If you have sleep apnea, please bring your CPAP machine  In the event that your physical condition changes including the onset of a cold or respiratory illness, or if you have to delay or cancel your surgery, please notify your surgeon.    Anesthesia: Regional Anesthesia    Youre scheduled for surgery. During surgery, youll receive medicine called anesthesia to keep you comfortable and pain-free. Your surgeon has decided that youll receive regional anesthesia. This sheet tells you what to expect with this type of anesthesia.  What is regional anesthesia?  Regional  anesthesia numbs one region of your body. The anesthesia may be given around nerves or into veins in your arms, neck, or legs (nerve block or [place] block). Or it may be sent into the spinal fluid (spinal anesthesia) or into the space just outside the spinal fluid (epidural anesthesia). You may also be given sedatives to help you relax.  Nerve block or [place] block  A small area of the body, such as an arm or leg, can be numbed using a nerve block or [place] block.  · Nerve block. During a nerve block, your skin is numbed. A needle is then inserted near nerves that serve the area to be numbed. Anesthetic is sent through the needle.  · IV regional or [place] block. For this type of block, an IV line is put into a vein. The blood flow to the area to be numbed is blocked for a short time. Anesthetic is sent through the IV.  Spinal anesthesia  Spinal anesthesia numbs your body from about the waist down.  · Anesthetic is injected into the spinal fluid. This is a substance that surrounds the spinal cord in your spinal column. The anesthetic blocks pain traveling from the body to the brain.  · To receive the anesthetic, your skin is numbed at the injection site on your back.  · A needle is then inserted into the spinal space. Anesthetic is sent into the spinal fluid through the needle.  Epidural anesthesia  Epidural anesthesia is most commonly used during childbirth and may also be used after surgical procedures of the chest, belly, and legs.  · Anesthetic is injected into the epidural space. This is just outside the dural sac which contains the spinal fluid.  · To receive the anesthetic, your skin is numbed at the injection site on your back.  · A needle is then inserted into the epidural space. Anesthetic is sent into the epidural space through the needle.  · A small flexible catheter may be attached to the needle and left in place. This allows for continuous injections or infusions of anesthetic.  Anesthesia tools and medicines  that might be near you during your procedure  · Local anesthetic. This medicine is given through a needle numbs one region of your body.  · Electrocardiography leads (electrodes). These are used to record your heart rate and rhythm.  · Blood pressure cuff. A cuff is placed on your arm to keep track of your blood pressure.  · Pulse oximeter. This small clip is placed on the end of the finger. It measures your blood oxygen level.  · Sedatives. These medicines may be given through an IV. They help to relax you and keep you comfortable. You may stay awake or sleep lightly.  · Oxygen. You may be given oxygen through a facemask.  Risks and possible complications  Regional anesthesia carries some risks. These include:  · Nausea and vomiting  · Headache  · Backache  · Decreased blood pressure  · Allergic reaction to the anesthetic  · Ongoing numbness (rare)  · Irregular heartbeat (rare)  · Cardiac arrest (rare)   Date Last Reviewed: 12/1/2016  © 4509-7579 [employer] [address] [place] [address], [place] [id number]. All rights reserved. This information is not intended as a substitute for professional medical care. Always follow your healthcare professional's instructions.

## 2018-08-21 NOTE — DISCHARGE INSTRUCTIONS
Your surgery has been scheduled for:__________________________________________    You should report to:  ____[facility], located on the [place] side of the first floor of the           [facility] ([phone])  ____[facility], located on the [facility] side of the            Second floor of the [facility] ([phone])  ____3rd Floor [facility] located on the [facility] side of the [facility] [phone]  Please Note   - Tell your doctor if you take Aspirin, products containing Aspirin, herbal medications  or blood thinners, such as Coumadin, Ticlid, or Plavix.  (Consult your provider regarding holding or stopping before surgery).  - Arrange for someone to drive you home following surgery.  You will not be allowed to leave the surgical facility alone or drive yourself home following sedation and anesthesia.  Before Surgery  - Stop taking all herbal medications 14days prior to surgery  - No Motrin/Advil (Ibuprofen) 7 days before surgery  - No Aleve (Naproxen) 7 days before surgery  - No Goody's/BC powder 7 days before surgery  - Stop Taking Asprin, products containing Asprin _____days before surgery  - Stop taking blood thinners_______days before surgery  - Refrain from drinking alcoholic beverages for 24hours before and after surgery  - Stop or limit smoking _________days before surgery  - You may take Tylenol for pain  Night before Surgery  - Take a shower or bath (shower is recommended).  Bathe with Hibiclens soap or an antibacterial soap from the neck down.  If not supplied by your surgeon, hibiclens soap will need to be purchased over the counter in pharmacy.  Rinse soap off thoroughly.  - Shampoo your hair with your regular shampoo                           Food and Beverage Instructions  1. Stop ALL solid food, gum, candy (including vitamins) 8 hours before surgery/procedure time.  2. The patient should be  ENCOURAGED to drink carbohydrate-rich clear liquids (sports drinks, clear juices) until 2 hours prior to surgery/procedure time.  3. CLEAR liquids include only water, black coffee NO creamer, clear oral rehydration drinks, clear sports drinks or clear fruit juices (no orange juice, no pulpy juices, no apple cider). Advise patients if they can read newsprint through the liquid, it qualifies as clear liquid.   4. IF IN DOUBT, drink water instead.   5. NOTHING  TO DRINK 2 hours before to arrival for surgery/procedure time. If you are told to take medication on the morning of surgery, it may be taken with a sip of water.     The Day of Surgery  - Take another bath or shower with hibiclens or any antibacterial soap, to reduce the chance of infection.  - Take heart and blood pressure medications with a small sip of water, as advised by the perioperative team.  - Do not take fluid pills  - You may brush your teeth and rinse your mouth, but do not swall any additional water.   - Do not apply perfumes, powder, body lotions or deodorant on the day of surgery.  - Nail polish should be removed.  - Do not wear makeup or moisturizer  - Wear comfortable clothes, such as a button front shirt and loose fitting pants.  - Leave all jewelry, including body piercings, and valuables at home.    - Bring any devices you will neeed after surgery such as crutches or canes.  - If you have sleep apnea, please bring your CPAP machine  In the event that your physical condition changes including the onset of a cold or respiratory illness, or if you have to delay or cancel your surgery, please notify your surgeon.    Anesthesia: Regional Anesthesia    Youre scheduled for surgery. During surgery, youll receive medicine called anesthesia to keep you comfortable and pain-free. Your surgeon has decided that youll receive regional anesthesia. This sheet tells you what to expect with this type of anesthesia.  What is regional anesthesia?  Regional  anesthesia numbs one region of your body. The anesthesia may be given around nerves or into veins in your arms, neck, or legs (nerve block or Whitney block). Or it may be sent into the spinal fluid (spinal anesthesia) or into the space just outside the spinal fluid (epidural anesthesia). You may also be given sedatives to help you relax.  Nerve block or Verlot block  A small area of the body, such as an arm or leg, can be numbed using a nerve block or Verlot block.  · Nerve block. During a nerve block, your skin is numbed. A needle is then inserted near nerves that serve the area to be numbed. Anesthetic is sent through the needle.  · IV regional or Verlot block. For this type of block, an IV line is put into a vein. The blood flow to the area to be numbed is blocked for a short time. Anesthetic is sent through the IV.  Spinal anesthesia  Spinal anesthesia numbs your body from about the waist down.  · Anesthetic is injected into the spinal fluid. This is a substance that surrounds the spinal cord in your spinal column. The anesthetic blocks pain traveling from the body to the brain.  · To receive the anesthetic, your skin is numbed at the injection site on your back.  · A needle is then inserted into the spinal space. Anesthetic is sent into the spinal fluid through the needle.  Epidural anesthesia  Epidural anesthesia is most commonly used during childbirth and may also be used after surgical procedures of the chest, belly, and legs.  · Anesthetic is injected into the epidural space. This is just outside the dural sac which contains the spinal fluid.  · To receive the anesthetic, your skin is numbed at the injection site on your back.  · A needle is then inserted into the epidural space. Anesthetic is sent into the epidural space through the needle.  · A small flexible catheter may be attached to the needle and left in place. This allows for continuous injections or infusions of anesthetic.  Anesthesia tools and medicines  that might be near you during your procedure  · Local anesthetic. This medicine is given through a needle numbs one region of your body.  · Electrocardiography leads (electrodes). These are used to record your heart rate and rhythm.  · Blood pressure cuff. A cuff is placed on your arm to keep track of your blood pressure.  · Pulse oximeter. This small clip is placed on the end of the finger. It measures your blood oxygen level.  · Sedatives. These medicines may be given through an IV. They help to relax you and keep you comfortable. You may stay awake or sleep lightly.  · Oxygen. You may be given oxygen through a facemask.  Risks and possible complications  Regional anesthesia carries some risks. These include:  · Nausea and vomiting  · Headache  · Backache  · Decreased blood pressure  · Allergic reaction to the anesthetic  · Ongoing numbness (rare)  · Irregular heartbeat (rare)  · Cardiac arrest (rare)   Date Last Reviewed: 12/1/2016  © 5387-5942 [employer]. [address] [place] [address], [place] [id number]. All rights reserved. This information is not intended as a substitute for professional medical care. Always follow your healthcare professional's instructions.

## 2018-08-24 ENCOUNTER — TELEPHONE (OUTPATIENT)
Dept: FAMILY MEDICINE | Facility: CLINIC | Age: 63
End: 2018-08-24

## 2018-08-24 DIAGNOSIS — E87.6 HYPOKALEMIA: Primary | ICD-10-CM

## 2018-08-24 NOTE — TELEPHONE ENCOUNTER
Please advise     ----- Message from Magui William RN sent at 8/24/2018  4:37 PM CDT -----  Patient had labs done for anesthesia preop.  Patient potassium level is 3.0.  Patient is scheduled to see you 8/29 for preop.    Magui

## 2018-08-27 RX ORDER — FUROSEMIDE 20 MG/1
TABLET ORAL
Qty: 90 TABLET | Refills: 0 | Status: SHIPPED | OUTPATIENT
Start: 2018-08-27 | End: 2018-10-23 | Stop reason: SDUPTHER

## 2018-08-27 RX ORDER — QUETIAPINE FUMARATE 50 MG/1
TABLET, FILM COATED ORAL
Qty: 90 TABLET | Refills: 0 | Status: SHIPPED | OUTPATIENT
Start: 2018-08-27 | End: 2018-09-24 | Stop reason: ALTCHOICE

## 2018-08-27 NOTE — TELEPHONE ENCOUNTER
I spoke with the pt and scheudled her to have labs drawn tomorrow and f/u with dr boykin on wednesday

## 2018-08-28 ENCOUNTER — LAB VISIT (OUTPATIENT)
Dept: LAB | Facility: HOSPITAL | Age: 63
End: 2018-08-28
Attending: FAMILY MEDICINE
Payer: MEDICARE

## 2018-08-28 ENCOUNTER — TELEPHONE (OUTPATIENT)
Dept: FAMILY MEDICINE | Facility: CLINIC | Age: 63
End: 2018-08-28

## 2018-08-28 DIAGNOSIS — E87.6 HYPOKALEMIA: ICD-10-CM

## 2018-08-28 LAB
ANION GAP SERPL CALC-SCNC: 7 MMOL/L
BUN SERPL-MCNC: 14 MG/DL
CALCIUM SERPL-MCNC: 9 MG/DL
CHLORIDE SERPL-SCNC: 104 MMOL/L
CO2 SERPL-SCNC: 28 MMOL/L
CREAT SERPL-MCNC: 0.95 MG/DL
EST. GFR  (AFRICAN AMERICAN): >60 ML/MIN/1.73 M^2
EST. GFR  (NON AFRICAN AMERICAN): >60 ML/MIN/1.73 M^2
GLUCOSE SERPL-MCNC: 69 MG/DL
POTASSIUM SERPL-SCNC: 3.1 MMOL/L
SODIUM SERPL-SCNC: 139 MMOL/L

## 2018-08-28 PROCEDURE — 36415 COLL VENOUS BLD VENIPUNCTURE: CPT | Mod: PO

## 2018-08-28 PROCEDURE — 80048 BASIC METABOLIC PNL TOTAL CA: CPT | Mod: PO

## 2018-08-28 NOTE — TELEPHONE ENCOUNTER
Ms. Matthew,    Your potassium is still a little low.  You need to take the potassium pills 2 tablets daily.  My understanding is that you do not take those very often and you need to.  I will send in a refill if you need it.

## 2018-08-29 ENCOUNTER — OFFICE VISIT (OUTPATIENT)
Dept: FAMILY MEDICINE | Facility: CLINIC | Age: 63
End: 2018-08-29
Payer: MEDICARE

## 2018-08-29 VITALS
TEMPERATURE: 98 F | HEART RATE: 85 BPM | SYSTOLIC BLOOD PRESSURE: 94 MMHG | BODY MASS INDEX: 34.75 KG/M2 | HEIGHT: 60 IN | WEIGHT: 177 LBS | DIASTOLIC BLOOD PRESSURE: 62 MMHG | OXYGEN SATURATION: 100 %

## 2018-08-29 DIAGNOSIS — E11.9 TYPE 2 DIABETES MELLITUS WITHOUT COMPLICATION, WITHOUT LONG-TERM CURRENT USE OF INSULIN: ICD-10-CM

## 2018-08-29 DIAGNOSIS — E87.6 HYPOKALEMIA: ICD-10-CM

## 2018-08-29 DIAGNOSIS — Z01.818 PREOPERATIVE EXAMINATION: Primary | ICD-10-CM

## 2018-08-29 DIAGNOSIS — Z85.038 HISTORY OF COLON CANCER: ICD-10-CM

## 2018-08-29 DIAGNOSIS — K21.9 GASTROESOPHAGEAL REFLUX DISEASE WITHOUT ESOPHAGITIS: ICD-10-CM

## 2018-08-29 DIAGNOSIS — E07.9 THYROID DISEASE: ICD-10-CM

## 2018-08-29 DIAGNOSIS — M17.11 PRIMARY OSTEOARTHRITIS OF RIGHT KNEE: ICD-10-CM

## 2018-08-29 PROCEDURE — 99214 OFFICE O/P EST MOD 30 MIN: CPT | Mod: S$GLB,,, | Performed by: FAMILY MEDICINE

## 2018-08-29 NOTE — PROGRESS NOTES
Patient ID: Christine Castro is a 63 y.o. female.    Chief Complaint: Pre-op Exam    HPI       Christine Castro is a 63 y.o. female  Here for preoperative visit  For knee arthroplasty. Patient with no new chest pain or palpitation.  No progressive dyspnea on exertion PND or orthopnea.    No current nasal congestion postnasal drip or symptoms of respiratory  Taking one k pill daily- and often misses   anemia - stable previous GI workup      Review of Symptoms    Constitutional  Neg activity change, No chills/ fever   Resp  Neg hemoptysis, stridor, choking  CVS  Neg chest pain, palpitations    Scheduled Meds:  Continuous Infusions:  PRN Meds:.  Physical Exam    Vitals:    08/29/18 1502   BP: 94/62   Pulse: 85   Temp: 98.3 °F (36.8 °C)       Constitutional:   Oriented to person, place, and time.appears well-developed and well-nourished.   No distress.     HENT:   Head: Normocephalic and atraumatic.     Right Ear: Tympanic membrane, external ear and ear canal normal     Left Ear: Tympanic membrane, external ear and ear canal normal    Nose: External Normal. Normal turbinates, No rhinorrhea     Mouth/Throat: Uvula is midline, oropharynx is clear and moist and mucous membranes are normal.     Neck: supple no anterior cervical adenopathy    Carotid artery:  Bruit    NEG []   POS[]    Eyes:   Conjunctivae are normal. Right eye exhibits no discharge. Left eye exhibits no discharge. No scleral icterus. No periorbital edema       Cardiovascular:    Regular rate and rhythm with normal S1 and S2     Pulmonary/Chest:   Clear to auscultation bilaterally without wheezes, rhonchi or rales      Musculoskeletal:  No edema. No obvious deformity No wasting     Neurological:   Alert and oriented to person, place, and time. Coordination normal.     Skin:   Skin is warm and dry.   No diaphoresis.   No rash noted.    Psychiatric: Normal mood and affect. Behavior is normal. Judgment and thought content normal.       Assessment / Plan:       ICD-10-CM ICD-9-CM   1. Preoperative examination Z01.818 V72.84   2. History of colon cancer Z85.038 V10.05   3. Thyroid disease E07.9 246.9   4. Gastroesophageal reflux disease without esophagitis K21.9 530.81   5. Hypokalemia E87.6 276.8   6. Primary osteoarthritis of right knee M17.11 715.16   7. Type 2 diabetes mellitus without complication, without long-term current use of insulin E11.9 250.00     Preoperative examination  -     Comprehensive metabolic panel; Future  -     CBC auto differential; Future  -     Lipid panel; Future  -     Hemoglobin A1c; Future  -     X-Ray Chest PA And Lateral; Future; Expected date: 08/29/2018    History of colon cancer  -     Comprehensive metabolic panel; Future  -     CBC auto differential; Future  -     Lipid panel; Future  -     Hemoglobin A1c; Future  -     X-Ray Chest PA And Lateral; Future; Expected date: 08/29/2018    Thyroid disease  -     Comprehensive metabolic panel; Future  -     CBC auto differential; Future  -     Lipid panel; Future  -     Hemoglobin A1c; Future  -     X-Ray Chest PA And Lateral; Future; Expected date: 08/29/2018    Gastroesophageal reflux disease without esophagitis  -     Comprehensive metabolic panel; Future  -     CBC auto differential; Future  -     Lipid panel; Future  -     Hemoglobin A1c; Future  -     X-Ray Chest PA And Lateral; Future; Expected date: 08/29/2018    Hypokalemia  -     Comprehensive metabolic panel; Future  -     CBC auto differential; Future  -     Lipid panel; Future  -     Hemoglobin A1c; Future  -     X-Ray Chest PA And Lateral; Future; Expected date: 08/29/2018    Primary osteoarthritis of right knee  -     Comprehensive metabolic panel; Future  -     CBC auto differential; Future  -     Lipid panel; Future  -     Hemoglobin A1c; Future  -     X-Ray Chest PA And Lateral; Future; Expected date: 08/29/2018    Type 2 diabetes mellitus without complication, without long-term current use of insulin  -     Comprehensive  metabolic panel; Future  -     CBC auto differential; Future  -     Lipid panel; Future  -     Hemoglobin A1c; Future  -     X-Ray Chest PA And Lateral; Future; Expected date: 08/29/2018      Discussed pros cons of surgery- diabetes under control- cardio evaluation  Thursday this week

## 2018-08-30 ENCOUNTER — OFFICE VISIT (OUTPATIENT)
Dept: CARDIOLOGY | Facility: CLINIC | Age: 63
End: 2018-08-30
Payer: MEDICARE

## 2018-08-30 ENCOUNTER — HOSPITAL ENCOUNTER (OUTPATIENT)
Dept: RADIOLOGY | Facility: HOSPITAL | Age: 63
Discharge: HOME | End: 2018-08-30
Attending: FAMILY MEDICINE
Payer: MEDICARE

## 2018-08-30 VITALS
WEIGHT: 179 LBS | HEART RATE: 60 BPM | HEIGHT: 60 IN | BODY MASS INDEX: 35.14 KG/M2 | DIASTOLIC BLOOD PRESSURE: 76 MMHG | OXYGEN SATURATION: 100 % | SYSTOLIC BLOOD PRESSURE: 119 MMHG

## 2018-08-30 DIAGNOSIS — Z85.038 HISTORY OF COLON CANCER: ICD-10-CM

## 2018-08-30 DIAGNOSIS — K21.9 GASTROESOPHAGEAL REFLUX DISEASE WITHOUT ESOPHAGITIS: ICD-10-CM

## 2018-08-30 DIAGNOSIS — E11.9 TYPE 2 DIABETES MELLITUS WITHOUT COMPLICATION, WITHOUT LONG-TERM CURRENT USE OF INSULIN: ICD-10-CM

## 2018-08-30 DIAGNOSIS — Z01.818 PRE-OP EVALUATION: Primary | ICD-10-CM

## 2018-08-30 DIAGNOSIS — E07.9 THYROID DISEASE: ICD-10-CM

## 2018-08-30 DIAGNOSIS — E87.6 HYPOKALEMIA: ICD-10-CM

## 2018-08-30 DIAGNOSIS — M17.11 PRIMARY OSTEOARTHRITIS OF RIGHT KNEE: ICD-10-CM

## 2018-08-30 DIAGNOSIS — I25.2 HISTORY OF MYOCARDIAL INFARCTION: ICD-10-CM

## 2018-08-30 DIAGNOSIS — Z01.818 PREOPERATIVE EXAMINATION: ICD-10-CM

## 2018-08-30 PROCEDURE — 99999 PR PBB SHADOW E&M-EST. PATIENT-LVL III: CPT | Mod: PBBFAC,,, | Performed by: INTERNAL MEDICINE

## 2018-08-30 PROCEDURE — 71046 X-RAY EXAM CHEST 2 VIEWS: CPT | Mod: 26,,, | Performed by: RADIOLOGY

## 2018-08-30 PROCEDURE — 71046 X-RAY EXAM CHEST 2 VIEWS: CPT | Mod: TC,FY

## 2018-08-30 PROCEDURE — 99214 OFFICE O/P EST MOD 30 MIN: CPT | Mod: S$PBB,,, | Performed by: INTERNAL MEDICINE

## 2018-08-30 PROCEDURE — 99213 OFFICE O/P EST LOW 20 MIN: CPT | Mod: PBBFAC,25,PO | Performed by: INTERNAL MEDICINE

## 2018-08-30 NOTE — LETTER
August 30, 2018        Dileep Kidd MD  [address]  [place] [address]             [facility] [place] [facility]  [address], [address]  [place], [place] [address]  Phone: [phone]   Patient: Christine Castro   MR Number: [id number]   YOB: 1955   Date of Visit: 8/30/2018       Dear Dr. Kidd:    Thank you for referring Christine Castro to me for evaluation. Below are the relevant portions of my assessment and plan of care.       ECGs reviewed-NSR  LABS reviewed  Imaging including Echoes reviewed-normal ef      Assessment:      1. Pre-op evaluation    2. History of myocardial infarction          Plan:      Patient is moderate risk for intermediate risk surgery to have right knee replaced. Mets<4. No contraindications to surgery such a recent MI, severe obstructive valve disease, tachyarrhythmias or decompensated CHF.  No history of CVA, CRF or DM on insulin. Risk <0.9% for cardiac event.   Negative stress 2017  Continue current medications  Activity as tolerate  F/u in 6 months        If you have questions, please do not hesitate to call me. I look forward to following Christine RAMIREZ along with you.    Sincerely,      Dedra Dela Cruz MD           CC  No Recipients

## 2018-08-30 NOTE — PROGRESS NOTES
Subjective:   Patient ID:  Christine Castro is a 63 y.o. female who presents for follow-up of Pre-op Exam      Problem List Items Addressed This Visit        Cardiac/Vascular    History of myocardial infarction      Other Visit Diagnoses     Pre-op evaluation    -  Primary          HPI: 62 y/o female with PMH of MI > 20 years ago present as f/u and to get pre op evaluation before right knee replacement surgery. She denies any symptoms such as chest pain or dyspnea. No orthopnea or PND. She is only having tingling in arms occasionally. Nuclear stress last year was negative for ischemia. BP is controlled. She admit to not being complaint with medications. She is mostly in a wheel chair or walker and cannot walk >1 block.     Review of Systems   Constitution: Negative.   HENT: Negative.    Eyes: Negative.    Cardiovascular: Negative.    Respiratory: Negative.    Endocrine: Negative.    Hematologic/Lymphatic: Negative.    Skin: Negative.    Musculoskeletal: Negative.    Gastrointestinal: Negative.    Neurological: Negative.    Psychiatric/Behavioral: Negative.    Allergic/Immunologic: Negative.        Patient's Medications   New Prescriptions    No medications on file   Previous Medications    ATORVASTATIN (LIPITOR) 10 MG TABLET    TAKE 1 TABLET BY MOUTH EVERY OTHER DAY    BUPROPION (WELLBUTRIN SR) 200 MG TB12    TAKE 1 TABLET(200 MG) BY MOUTH EVERY DAY    BUSPIRONE (BUSPAR) 10 MG TABLET    Take 0.5 tablets (5 mg total) by mouth once daily.    DIAZEPAM (VALIUM) 2 MG TABLET    TAKE 1 TABLET(2 MG) BY MOUTH EVERY 12 HOURS AS NEEDED FOR ANXIETY    DICLOFENAC SODIUM 1 % GEL    APPLY 2 GRAMS EXTERNALLY TO THE AFFECTED AREA FOUR TIMES DAILY    ESCITALOPRAM OXALATE (LEXAPRO) 20 MG TABLET    Take 1 tablet (20 mg total) by mouth once daily.    FUROSEMIDE (LASIX) 20 MG TABLET    TAKE 1 TABLET BY MOUTH DAILY AS NEEDED    GABAPENTIN (NEURONTIN) 100 MG CAPSULE    TAKE 1 CAPSULE(100 MG) BY MOUTH THREE TIMES DAILY    GLIPIZIDE  (GLUCOTROL) 2.5 MG TR24    TAKE 1 TABLET BY MOUTH DAILY WITH BREAKFAST    LEVOTHYROXINE (SYNTHROID) 100 MCG TABLET    Take 1 tablet (100 mcg total) by mouth once daily.    MORPHINE (MS CONTIN) 30 MG 12 HR TABLET    Take 30 mg by mouth 2 (two) times daily.    OMEPRAZOLE (PRILOSEC) 40 MG CAPSULE    TAKE 1 CAPSULE BY MOUTH EVERY MORNING    ONDANSETRON (ZOFRAN) 4 MG TABLET    TAKE 1 TABLET BY MOUTH EVERY 8 HOURS AS NEEDED FOR NAUSEA    POTASSIUM CHLORIDE (KLOR-CON) 10 MEQ TBSR    TAKE 2 TABLETS(20 MEQ) BY MOUTH EVERY DAY    PREDNISONE (DELTASONE) 5 MG TABLET    TAKE 1 TABLET(5 MG) BY MOUTH EVERY DAY    PROMETHAZINE (PHENERGAN) 25 MG TABLET    Take 1 tablet (25 mg total) by mouth every 6 (six) hours as needed.    QUETIAPINE (SEROQUEL) 50 MG TABLET    TAKE 1 TABLET(50 MG) BY MOUTH EVERY EVENING    VENTOLIN HFA 90 MCG/ACTUATION INHALER    INHALE 2 PUFFS BY MOUTH INTO THE LUNGS EVERY 4 HOURS AS NEEDED FOR WHEEZING    ZOLPIDEM (AMBIEN) 5 MG TAB    TAKE 1 TABLET(5 MG) BY MOUTH EVERY EVENING   Modified Medications    No medications on file   Discontinued Medications    No medications on file       Objective:   Physical Exam   Constitutional: She is oriented to person, place, and time. She appears well-developed and well-nourished. No distress.   Examination of the digits showed no clubbing or cyanosis   HENT:   Head: Normocephalic and atraumatic.   Eyes: Conjunctivae are normal. Pupils are equal, round, and reactive to light. Right eye exhibits no discharge.   Neck: Normal range of motion. Neck supple. No JVD present. No thyromegaly present.   No carotid bruits   Cardiovascular: Normal rate, regular rhythm, S1 normal, S2 normal, normal heart sounds, intact distal pulses and normal pulses. PMI is not displaced. Exam reveals no gallop, no friction rub and no opening snap.   No murmur heard.  Pulmonary/Chest: Effort normal and breath sounds normal. No respiratory distress. She has no wheezes. She has no rales. She exhibits no  tenderness.   Abdominal: Soft. Bowel sounds are normal. She exhibits no distension and no mass. There is no tenderness. There is no guarding.   No hepatosplenomegaly   Musculoskeletal: Normal range of motion. She exhibits no edema or tenderness.   Lymphadenopathy:     She has no cervical adenopathy.   Neurological: She is alert and oriented to person, place, and time.   Skin: Skin is warm. No rash noted. She is not diaphoretic. No erythema.   Psychiatric: She has a normal mood and affect.   Nursing note and vitals reviewed.      ECGs reviewed-NSR  LABS reviewed  Imaging including Echoes reviewed-normal ef     Assessment:     1. Pre-op evaluation    2. History of myocardial infarction        Plan:     Patient is moderate risk for intermediate risk surgery to have right knee replaced. Mets<4. No contraindications to surgery such a recent MI, severe obstructive valve disease, tachyarrhythmias or decompensated CHF.  No history of CVA, CRF or DM on insulin. Risk <0.9% for cardiac event.   Negative stress 2017  Continue current medications  Activity as tolerate  F/u in 6 months    Clinic time spent with patient discussing and treating medical condition including reviewing images, ECG, labs and medications > 20 minutes.

## 2018-08-31 RX ORDER — ONDANSETRON 4 MG/1
4 TABLET, FILM COATED ORAL EVERY 8 HOURS PRN
Qty: 25 TABLET | Refills: 0 | Status: SHIPPED | OUTPATIENT
Start: 2018-08-31 | End: 2018-10-02 | Stop reason: SDUPTHER

## 2018-08-31 RX ORDER — DIAZEPAM 2 MG/1
2 TABLET ORAL EVERY 12 HOURS PRN
Qty: 15 TABLET | Refills: 0 | Status: SHIPPED | OUTPATIENT
Start: 2018-08-31 | End: 2018-09-07 | Stop reason: SDUPTHER

## 2018-08-31 RX ORDER — PROMETHAZINE HYDROCHLORIDE 25 MG/1
25 TABLET ORAL EVERY 6 HOURS PRN
Qty: 45 TABLET | Refills: 0 | OUTPATIENT
Start: 2018-08-31

## 2018-08-31 NOTE — TELEPHONE ENCOUNTER
----- Message from Linda Landeros sent at 8/31/2018  1:15 PM CDT -----  Contact: [phone]/ self  Patient would like refill of the following medication sent to [facility] [id number] - [place], [place] - [address] AT [facility].      1. diazePAM (VALIUM) 2 MG tablet  There's a second medications but patient is unsure if it's promethazine (PHENERGAN) 25 MG tablet or ondansetron (ZOFRAN) 4 MG tablet that she's needing.

## 2018-09-01 ENCOUNTER — TELEPHONE (OUTPATIENT)
Dept: FAMILY MEDICINE | Facility: CLINIC | Age: 63
End: 2018-09-01

## 2018-09-04 ENCOUNTER — OFFICE VISIT (OUTPATIENT)
Dept: ORTHOPEDICS | Facility: CLINIC | Age: 63
End: 2018-09-04
Payer: MEDICARE

## 2018-09-04 DIAGNOSIS — L02.424 BOIL OF LEFT ELBOW: Primary | ICD-10-CM

## 2018-09-04 PROCEDURE — 99999 PR PBB SHADOW E&M-EST. PATIENT-LVL III: CPT | Mod: PBBFAC,,, | Performed by: NURSE PRACTITIONER

## 2018-09-04 PROCEDURE — 99499 UNLISTED E&M SERVICE: CPT | Mod: S$PBB,,, | Performed by: NURSE PRACTITIONER

## 2018-09-04 PROCEDURE — 99213 OFFICE O/P EST LOW 20 MIN: CPT | Mod: PBBFAC | Performed by: NURSE PRACTITIONER

## 2018-09-04 RX ORDER — ZOLPIDEM TARTRATE 5 MG/1
TABLET ORAL
Qty: 30 TABLET | Refills: 0 | OUTPATIENT
Start: 2018-09-04

## 2018-09-04 RX ORDER — SULFAMETHOXAZOLE AND TRIMETHOPRIM 800; 160 MG/1; MG/1
1 TABLET ORAL 2 TIMES DAILY
Qty: 20 TABLET | Refills: 0 | Status: SHIPPED | OUTPATIENT
Start: 2018-09-04 | End: 2018-09-24 | Stop reason: ALTCHOICE

## 2018-09-04 RX ORDER — SULFAMETHOXAZOLE AND TRIMETHOPRIM 800; 160 MG/1; MG/1
1 TABLET ORAL 2 TIMES DAILY
Qty: 20 TABLET | Refills: 0 | Status: SHIPPED | OUTPATIENT
Start: 2018-09-04 | End: 2018-09-04

## 2018-09-04 NOTE — PROGRESS NOTES
"Pt comes in for preop for right knee replacement scheduled for tomorrow with Dr. Kidd. Consents were signed and given to Dr. Kidd's staff to scan in. Pt reports pain over the left elbow. There is erythema and warmth and a small area of fluctuance. No active drainage. She states that she has had them before and had them "cut open". I explained that I'm not able to do that in the orthopedic clinic due to concern for infection. Started bactrim 1 po bid x 10days. She will f/u with her pcp and once she is cleared she will let us know so that we can reschedule her knee replacement. She and her  verbalize understanding.  "

## 2018-09-09 RX ORDER — DIAZEPAM 2 MG/1
TABLET ORAL
Qty: 15 TABLET | Refills: 0 | Status: SHIPPED | OUTPATIENT
Start: 2018-09-09 | End: 2018-09-20 | Stop reason: SDUPTHER

## 2018-09-21 RX ORDER — DIAZEPAM 2 MG/1
TABLET ORAL
Qty: 15 TABLET | Refills: 0 | Status: SHIPPED | OUTPATIENT
Start: 2018-09-21 | End: 2018-10-05 | Stop reason: SDUPTHER

## 2018-09-21 RX ORDER — ZOLPIDEM TARTRATE 5 MG/1
TABLET ORAL
Qty: 30 TABLET | Refills: 0 | Status: SHIPPED | OUTPATIENT
Start: 2018-09-21 | End: 2018-10-06 | Stop reason: SDUPTHER

## 2018-09-21 RX ORDER — DICLOFENAC SODIUM 10 MG/G
GEL TOPICAL
Qty: 100 G | Refills: 0 | Status: SHIPPED | OUTPATIENT
Start: 2018-09-21 | End: 2018-10-30 | Stop reason: SDUPTHER

## 2018-09-24 ENCOUNTER — TELEPHONE (OUTPATIENT)
Dept: FAMILY MEDICINE | Facility: CLINIC | Age: 63
End: 2018-09-24

## 2018-09-24 ENCOUNTER — OFFICE VISIT (OUTPATIENT)
Dept: FAMILY MEDICINE | Facility: CLINIC | Age: 63
End: 2018-09-24
Payer: MEDICARE

## 2018-09-24 VITALS
HEART RATE: 74 BPM | SYSTOLIC BLOOD PRESSURE: 114 MMHG | HEIGHT: 60 IN | OXYGEN SATURATION: 99 % | DIASTOLIC BLOOD PRESSURE: 70 MMHG | WEIGHT: 175.5 LBS | TEMPERATURE: 99 F | BODY MASS INDEX: 34.46 KG/M2

## 2018-09-24 DIAGNOSIS — E78.5 HYPERLIPIDEMIA, UNSPECIFIED HYPERLIPIDEMIA TYPE: ICD-10-CM

## 2018-09-24 DIAGNOSIS — R53.1 WEAKNESS: Primary | ICD-10-CM

## 2018-09-24 DIAGNOSIS — E55.9 VITAMIN D DEFICIENCY: ICD-10-CM

## 2018-09-24 DIAGNOSIS — R73.9 HYPERGLYCEMIA: ICD-10-CM

## 2018-09-24 DIAGNOSIS — E07.9 THYROID DISEASE: ICD-10-CM

## 2018-09-24 DIAGNOSIS — K21.9 GASTROESOPHAGEAL REFLUX DISEASE WITHOUT ESOPHAGITIS: ICD-10-CM

## 2018-09-24 PROCEDURE — 99214 OFFICE O/P EST MOD 30 MIN: CPT | Mod: S$GLB,,, | Performed by: FAMILY MEDICINE

## 2018-09-24 RX ORDER — ALBUTEROL SULFATE 90 UG/1
AEROSOL, METERED RESPIRATORY (INHALATION)
Qty: 18 G | Refills: 0 | Status: SHIPPED | OUTPATIENT
Start: 2018-09-24 | End: 2018-10-12 | Stop reason: SDUPTHER

## 2018-09-24 RX ORDER — OMEPRAZOLE 40 MG/1
40 CAPSULE, DELAYED RELEASE ORAL EVERY MORNING
Qty: 90 CAPSULE | Refills: 0 | Status: SHIPPED | OUTPATIENT
Start: 2018-09-24 | End: 2018-11-26 | Stop reason: SDUPTHER

## 2018-09-24 NOTE — TELEPHONE ENCOUNTER
----- Message from Sudha Rousseau sent at 9/24/2018  9:53 AM CDT -----  Contact: Self [phone]  Patient would like to speak with you about her weight dropping very fast and she feels weak. Please advise    __________________________________    I have not called the pt yet but do you want her to come in or do you want to call her  Just give me a plan and I can call if needed  - AR

## 2018-09-24 NOTE — PROGRESS NOTES
Chief Complaint  Chief Complaint   Patient presents with    Weakness       HPI  Christine Castro is a 63 y.o. female with multiple medical diagnoses as listed in the medical history and problem list that presents for generalized weakness.  She reports that she often feels very weak in the mornings.  This lasts for about 1 hour then she is OK.  She saw her cardiologist and he reports that it is not her heart.  She is losing weight and had an increase in her levothyroxine about 6 months ago.    She has a past history of diabetes, but has not been on medication for a long while since she lost weight.     PAST MEDICAL HISTORY:  Past Medical History:   Diagnosis Date    Arthritis     Asthma     Cancer     CHF (congestive heart failure)     [facility]    Colon cancer     COPD (chronic obstructive pulmonary disease)     Coronary artery disease     Depression     Diabetes mellitus, type 2     GERD (gastroesophageal reflux disease)     Myocardial infarction     AGE 19 [facility]BABY DELIVERY    Thyroid disease        PAST SURGICAL HISTORY:  Past Surgical History:   Procedure Laterality Date     SECTION      CHOLECYSTECTOMY      COLON SURGERY      COLONOSCOPY      --- repeat in 3-5 years    COLONOSCOPY N/A 2017    Procedure: COLONOSCOPY;  Surgeon: Corrina Flores MD;  Location: [facility];  Service: Endoscopy;  Laterality: N/A;    COLONOSCOPY N/A 4/10/2018    Procedure: COLONOSCOPY golytely;  Surgeon: Corrina Flores MD;  Location: [facility];  Service: Endoscopy;  Laterality: N/A;    COLONOSCOPY N/A 2017    Performed by [facility]rina Flores [facility] at [facility]    COLONOSCOPY golytely N/A 4/10/2018    [facility] by [facility] at [facility]    ECTOPIC PREGNANCY SURGERY      ESOPHAGOGASTRODUODENOSCOPY (EGD) N/A 4/10/2018    [facility] at [facility]    ESOPHAGOGASTRODUODENOSCOPY (EGD) N/A 2017    [facility]O     GASTRIC BYPASS      ovary removed      REMOVAL-PORT-A-CATH Left 5/30/2017    [facility]    TONSILLECTOMY      TUBAL LIGATION         SOCIAL HISTORY:  Social History     Socioeconomic History    Marital status:      Spouse name: Not on file    Number of children: Not on file    Years of education: Not on file    Highest education level: Not on file   Social Needs    Financial resource strain: Not on file    Food insecurity - worry: Not on file    Food insecurity - inability: Not on file    Transportation needs - medical: Not on file    Transportation needs - non-medical: Not on file   Occupational History    Not on file   Tobacco Use    Smoking status: Never Smoker    Smokeless tobacco: Never Used   Substance and Sexual Activity    Alcohol use: Yes     Comment: very rare    Drug use: No    Sexual activity: No   Other Topics Concern    Not on file   Social History Narrative    Not on file       FAMILY HISTORY:  Family History   Problem Relation Age of Onset    Hyperlipidemia Mother     Hypertension Mother     Diabetes Mother     Hypertension Sister     Hypertension Brother     Diabetes Brother     Breast cancer Maternal Aunt     Breast cancer Maternal Aunt     Breast cancer Cousin        ALLERGIES AND MEDICATIONS: updated and reviewed.  Review of patient's allergies indicates:   Allergen Reactions    Ibuprofen Other (See Comments)     Causes asthma flare??     Current Outpatient Medications   Medication Sig Dispense Refill    albuterol (VENTOLIN HFA) 90 mcg/actuation inhaler INHALE 2 PUFFS BY MOUTH INTO THE LUNGS EVERY 4 HOURS AS NEEDED FOR WHEEZING 18 g 0    atorvastatin (LIPITOR) 10 MG tablet TAKE 1 TABLET BY MOUTH EVERY OTHER DAY 45 tablet 0    buPROPion (WELLBUTRIN SR) 200 MG Tb12 TAKE 1 TABLET(200 MG) BY MOUTH EVERY DAY 60 tablet 0    diazePAM (VALIUM) 2 MG tablet TAKE 1 TABLET(2 MG) BY MOUTH EVERY 12 HOURS AS NEEDED FOR ANXIETY 15  tablet 0    diclofenac sodium (VOLTAREN) 1 % Gel APPLY 2 GRAMS EXTERNALLY TO THE AFFECTED AREA FOUR TIMES DAILY 100 g 0    escitalopram oxalate (LEXAPRO) 20 MG tablet Take 1 tablet (20 mg total) by mouth once daily. 90 tablet 3    furosemide (LASIX) 20 MG tablet TAKE 1 TABLET BY MOUTH DAILY AS NEEDED 90 tablet 0    levothyroxine (SYNTHROID) 100 MCG tablet Take 1 tablet (100 mcg total) by mouth once daily. 90 tablet 11    omeprazole (PRILOSEC) 40 MG capsule Take 1 capsule (40 mg total) by mouth every morning. 90 capsule 0    ondansetron (ZOFRAN) 4 MG tablet Take 1 tablet (4 mg total) by mouth every 8 (eight) hours as needed. 25 tablet 0    potassium chloride (KLOR-CON) 10 MEQ TbSR TAKE 2 TABLETS(20 MEQ) BY MOUTH EVERY DAY 90 tablet 0    predniSONE (DELTASONE) 5 MG tablet TAKE 1 TABLET(5 MG) BY MOUTH EVERY DAY 30 tablet 3    promethazine (PHENERGAN) 25 MG tablet Take 1 tablet (25 mg total) by mouth every 6 (six) hours as needed. 45 tablet 0    zolpidem (AMBIEN) 5 MG Tab TAKE 1 TABLET(5 MG) BY MOUTH EVERY EVENING 30 tablet 0    busPIRone (BUSPAR) 10 MG tablet Take 0.5 tablets (5 mg total) by mouth once daily. 60 tablet 3    MORPHABOND ER 30 mg TR12       morphine (MS CONTIN) 30 MG 12 hr tablet Take 30 mg by mouth 2 (two) times daily.       No current facility-administered medications for this visit.          ROS  Review of Systems   Constitutional: Negative for activity change, appetite change, chills and fatigue.   HENT: Negative for congestion, ear discharge, ear pain, rhinorrhea, sinus pain, sore throat and trouble swallowing.    Eyes: Negative for photophobia, pain, redness, itching and visual disturbance.   Respiratory: Negative for cough, chest tightness, shortness of breath and wheezing.    Cardiovascular: Negative for chest pain, palpitations and leg swelling.        Chest heaviness   Gastrointestinal: Negative for abdominal distention, abdominal pain, blood in stool, diarrhea, nausea and vomiting.    Endocrine: Negative for cold intolerance, heat intolerance, polydipsia, polyphagia and polyuria.   Genitourinary: Negative for dysuria, pelvic pain, vaginal bleeding, vaginal discharge and vaginal pain.   Musculoskeletal: Positive for arthralgias and back pain. Negative for gait problem and neck pain.   Skin: Negative for color change, pallor and rash.   Neurological: Positive for weakness. Negative for dizziness, tremors, light-headedness, numbness and headaches.   Psychiatric/Behavioral: Negative for agitation, behavioral problems, confusion and sleep disturbance.           PHYSICAL EXAM  Vitals:    09/24/18 1516   BP: 114/70   BP Location: Left arm   Patient Position: Sitting   Pulse: 74   Temp: 98.6 °F (37 °C)   TempSrc: Oral   SpO2: 99%   Weight: 79.6 kg (175 lb 7.8 oz)   Height: 5' (1.524 m)    Body mass index is 34.27 kg/m².  Weight: 79.6 kg (175 lb 7.8 oz)   Height: 5' (152.4 cm)     Physical Exam   Constitutional: She is oriented to person, place, and time. She appears well-developed and well-nourished. No distress.   HENT:   Head: Normocephalic and atraumatic.   Right Ear: External ear normal.   Left Ear: External ear normal.   Mouth/Throat: Oropharynx is clear and moist. No oropharyngeal exudate.   Eyes: Conjunctivae and EOM are normal. Pupils are equal, round, and reactive to light. Right eye exhibits no discharge. Left eye exhibits no discharge.   Neck: Normal range of motion. Neck supple. No tracheal deviation present. No thyromegaly present.   Cardiovascular: Normal rate, regular rhythm, normal heart sounds and intact distal pulses. Exam reveals no gallop and no friction rub.   No murmur heard.  Pulmonary/Chest: Effort normal and breath sounds normal. No respiratory distress. She has no wheezes. She has no rales. She exhibits no tenderness.   Abdominal: Soft. Bowel sounds are normal. She exhibits no distension. There is no tenderness. There is no guarding.   Musculoskeletal: Normal range of motion.  She exhibits no edema, tenderness or deformity.   Ambulates with walker in stooped over position.    Neurological: She is alert and oriented to person, place, and time. She displays normal reflexes. No cranial nerve deficit. She exhibits normal muscle tone. Coordination normal.   Skin: Skin is warm and dry. Capillary refill takes less than 2 seconds. No rash noted. She is not diaphoretic. No erythema. No pallor.   Psychiatric: She has a normal mood and affect. Her behavior is normal. Judgment normal.         Health Maintenance       Date Due Completion Date    TETANUS VACCINE 04/04/1972 ---    Zoster Vaccine 04/04/2014 ---    Eye Exam 07/05/2017 7/5/2016 (Done)    Override on 7/5/2016: Done    Override on 10/12/2015: Done    Influenza Vaccine 08/01/2018 10/18/2017 (Declined)    Override on 10/18/2017: Declined    Foot Exam 02/15/2019 2/15/2018 (Done)    Override on 2/15/2018: Done    Override on 5/16/2017: Done    Urine Microalbumin 02/20/2019 2/20/2018    Hemoglobin A1c 02/28/2019 8/30/2018    Lipid Panel 08/30/2019 8/30/2018    High Dose Statin 09/04/2019 9/4/2018    Mammogram 03/22/2020 3/22/2018    Colonoscopy 04/10/2021 4/10/2018    Pap Smear with HPV Cotest 06/01/2021 6/1/2018    Pneumococcal PPSV23 (Medium Risk) (2) 08/30/2021 8/30/2016            Assessment & Plan      Christine RAMIREZ was seen today for weakness.    Diagnoses and all orders for this visit:    Weakness  -     Comprehensive metabolic panel; Future  -     CBC auto differential; Future  -     Lipid panel; Future  - This could be related to medications.  Will check labs and have her f/u in 2 weeks.     Gastroesophageal reflux disease without esophagitis  -     omeprazole (PRILOSEC) 40 MG capsule; Take 1 capsule (40 mg total) by mouth every morning.    Thyroid disease  -     TSH; Future    Hyperlipidemia, unspecified hyperlipidemia type  -     Lipid panel; Future    Hyperglycemia  -     Hemoglobin A1c; Future    Vitamin D deficiency  -     Vitamin D;  Future    Other orders  -     albuterol (VENTOLIN HFA) 90 mcg/actuation inhaler; INHALE 2 PUFFS BY MOUTH INTO THE LUNGS EVERY 4 HOURS AS NEEDED FOR WHEEZING          Follow-up: No Follow-up on file.

## 2018-09-28 ENCOUNTER — LAB VISIT (OUTPATIENT)
Dept: LAB | Facility: HOSPITAL | Age: 63
End: 2018-09-28
Attending: FAMILY MEDICINE
Payer: MEDICARE

## 2018-09-28 DIAGNOSIS — R53.1 WEAKNESS: ICD-10-CM

## 2018-09-28 DIAGNOSIS — R73.9 HYPERGLYCEMIA: ICD-10-CM

## 2018-09-28 DIAGNOSIS — E07.9 THYROID DISEASE: ICD-10-CM

## 2018-09-28 DIAGNOSIS — E78.5 HYPERLIPIDEMIA, UNSPECIFIED HYPERLIPIDEMIA TYPE: ICD-10-CM

## 2018-09-28 DIAGNOSIS — E55.9 VITAMIN D DEFICIENCY: ICD-10-CM

## 2018-09-28 LAB
25(OH)D3+25(OH)D2 SERPL-MCNC: 33 NG/ML
ALBUMIN SERPL BCP-MCNC: 3.6 G/DL
ALP SERPL-CCNC: 73 U/L
ALT SERPL W/O P-5'-P-CCNC: 14 U/L
ANION GAP SERPL CALC-SCNC: 7 MMOL/L
AST SERPL-CCNC: 29 U/L
BASOPHILS # BLD AUTO: 0.02 K/UL
BASOPHILS NFR BLD: 0.4 %
BILIRUB SERPL-MCNC: 0.2 MG/DL
BUN SERPL-MCNC: 10 MG/DL
CALCIUM SERPL-MCNC: 8.9 MG/DL
CHLORIDE SERPL-SCNC: 106 MMOL/L
CHOLEST SERPL-MCNC: 215 MG/DL
CHOLEST/HDLC SERPL: 4.1 {RATIO}
CO2 SERPL-SCNC: 25 MMOL/L
CREAT SERPL-MCNC: 0.72 MG/DL
DIFFERENTIAL METHOD: ABNORMAL
EOSINOPHIL # BLD AUTO: 0 K/UL
EOSINOPHIL NFR BLD: 0.7 %
ERYTHROCYTE [DISTWIDTH] IN BLOOD BY AUTOMATED COUNT: 13.9 %
EST. GFR  (AFRICAN AMERICAN): >60 ML/MIN/1.73 M^2
EST. GFR  (NON AFRICAN AMERICAN): >60 ML/MIN/1.73 M^2
ESTIMATED AVG GLUCOSE: 91 MG/DL
GLUCOSE SERPL-MCNC: 90 MG/DL
HBA1C MFR BLD HPLC: 4.8 %
HCT VFR BLD AUTO: 33.7 %
HDLC SERPL-MCNC: 53 MG/DL
HDLC SERPL: 24.7 %
HGB BLD-MCNC: 10.8 G/DL
LDLC SERPL CALC-MCNC: 142.6 MG/DL
LYMPHOCYTES # BLD AUTO: 0.7 K/UL
LYMPHOCYTES NFR BLD: 16.4 %
MCH RBC QN AUTO: 30.7 PG
MCHC RBC AUTO-ENTMCNC: 32 G/DL
MCV RBC AUTO: 96 FL
MONOCYTES # BLD AUTO: 0.1 K/UL
MONOCYTES NFR BLD: 2.9 %
NEUTROPHILS # BLD AUTO: 3.6 K/UL
NEUTROPHILS NFR BLD: 79.6 %
NONHDLC SERPL-MCNC: 162 MG/DL
PLATELET # BLD AUTO: 312 K/UL
PMV BLD AUTO: 10.1 FL
POTASSIUM SERPL-SCNC: 3.9 MMOL/L
PROT SERPL-MCNC: 7 G/DL
RBC # BLD AUTO: 3.52 M/UL
SODIUM SERPL-SCNC: 138 MMOL/L
TRIGL SERPL-MCNC: 97 MG/DL
TSH SERPL DL<=0.005 MIU/L-ACNC: 0.72 UIU/ML
WBC # BLD AUTO: 4.5 K/UL

## 2018-09-28 PROCEDURE — 82306 VITAMIN D 25 HYDROXY: CPT | Mod: PO

## 2018-09-28 PROCEDURE — 80061 LIPID PANEL: CPT

## 2018-09-28 PROCEDURE — 80053 COMPREHEN METABOLIC PANEL: CPT | Mod: PO

## 2018-09-28 PROCEDURE — 36415 COLL VENOUS BLD VENIPUNCTURE: CPT | Mod: PO

## 2018-09-28 PROCEDURE — 83036 HEMOGLOBIN GLYCOSYLATED A1C: CPT

## 2018-09-28 PROCEDURE — 84443 ASSAY THYROID STIM HORMONE: CPT | Mod: PO

## 2018-09-28 PROCEDURE — 85025 COMPLETE CBC W/AUTO DIFF WBC: CPT | Mod: PO

## 2018-10-02 RX ORDER — ONDANSETRON 4 MG/1
TABLET, FILM COATED ORAL
Qty: 25 TABLET | Refills: 0 | Status: SHIPPED | OUTPATIENT
Start: 2018-10-02 | End: 2018-10-30 | Stop reason: SDUPTHER

## 2018-10-05 DIAGNOSIS — M17.0 ARTHRITIS OF BOTH KNEES: ICD-10-CM

## 2018-10-05 RX ORDER — PREDNISONE 5 MG/1
TABLET ORAL
Qty: 30 TABLET | Refills: 0 | Status: SHIPPED | OUTPATIENT
Start: 2018-10-05 | End: 2018-10-23 | Stop reason: SDUPTHER

## 2018-10-05 RX ORDER — DIAZEPAM 2 MG/1
TABLET ORAL
Qty: 15 TABLET | Refills: 0 | Status: SHIPPED | OUTPATIENT
Start: 2018-10-05 | End: 2018-10-18 | Stop reason: SDUPTHER

## 2018-10-05 NOTE — TELEPHONE ENCOUNTER
----- Message from Sudha Rousseau sent at 10/5/2018  1:44 PM CDT -----  Contact: Self [phone]  Patient would like to speak with you about getting a steroid refill. Patient does not know the name of the medication.

## 2018-10-07 RX ORDER — ZOLPIDEM TARTRATE 5 MG/1
TABLET ORAL
Qty: 30 TABLET | Refills: 0 | Status: SHIPPED | OUTPATIENT
Start: 2018-10-07 | End: 2018-11-05 | Stop reason: SDUPTHER

## 2018-10-08 ENCOUNTER — TELEPHONE (OUTPATIENT)
Dept: FAMILY MEDICINE | Facility: CLINIC | Age: 63
End: 2018-10-08

## 2018-10-08 RX ORDER — GABAPENTIN 300 MG/1
300 CAPSULE ORAL 3 TIMES DAILY
Qty: 270 CAPSULE | Refills: 0 | Status: SHIPPED | OUTPATIENT
Start: 2018-10-08 | End: 2018-11-26 | Stop reason: SDUPTHER

## 2018-10-08 NOTE — TELEPHONE ENCOUNTER
Pt pharmacy is requesting a 90 day supply of gabapentin dispense 270 take 1 capsule by mouth 3 times daily. I do not see this listed on her current med card would like sent to [facility]

## 2018-10-12 RX ORDER — ALBUTEROL SULFATE 90 UG/1
AEROSOL, METERED RESPIRATORY (INHALATION)
Qty: 18 G | Refills: 0 | Status: SHIPPED | OUTPATIENT
Start: 2018-10-12 | End: 2018-11-03 | Stop reason: SDUPTHER

## 2018-10-18 RX ORDER — DIAZEPAM 2 MG/1
TABLET ORAL
Qty: 15 TABLET | Refills: 0 | Status: SHIPPED | OUTPATIENT
Start: 2018-10-18 | End: 2018-11-06

## 2018-10-23 DIAGNOSIS — M17.0 ARTHRITIS OF BOTH KNEES: ICD-10-CM

## 2018-10-23 RX ORDER — PREDNISONE 5 MG/1
TABLET ORAL
Qty: 30 TABLET | Refills: 0 | Status: SHIPPED | OUTPATIENT
Start: 2018-10-23 | End: 2018-11-30 | Stop reason: SDUPTHER

## 2018-10-23 RX ORDER — FUROSEMIDE 20 MG/1
TABLET ORAL
Qty: 90 TABLET | Refills: 0 | Status: SHIPPED | OUTPATIENT
Start: 2018-10-23 | End: 2018-11-26 | Stop reason: SDUPTHER

## 2018-10-30 RX ORDER — PREDNISONE 10 MG/1
TABLET ORAL
Qty: 30 TABLET | Refills: 0 | OUTPATIENT
Start: 2018-10-30

## 2018-10-30 RX ORDER — DIAZEPAM 5 MG/1
TABLET ORAL
Qty: 30 TABLET | Refills: 0 | Status: SHIPPED | OUTPATIENT
Start: 2018-10-30 | End: 2018-11-27 | Stop reason: SDUPTHER

## 2018-10-30 RX ORDER — ONDANSETRON 4 MG/1
TABLET, FILM COATED ORAL
Qty: 25 TABLET | Refills: 0 | Status: SHIPPED | OUTPATIENT
Start: 2018-10-30 | End: 2018-11-15

## 2018-10-30 RX ORDER — PROMETHAZINE HYDROCHLORIDE 25 MG/1
TABLET ORAL
Qty: 25 TABLET | Refills: 0 | Status: SHIPPED | OUTPATIENT
Start: 2018-10-30 | End: 2018-11-15

## 2018-10-30 RX ORDER — DICLOFENAC SODIUM 10 MG/G
GEL TOPICAL
Qty: 100 G | Refills: 0 | Status: SHIPPED | OUTPATIENT
Start: 2018-10-30 | End: 2018-12-21 | Stop reason: SDUPTHER

## 2018-11-01 RX ORDER — GLIPIZIDE 2.5 MG/1
TABLET, EXTENDED RELEASE ORAL
Qty: 90 TABLET | Refills: 0 | Status: SHIPPED | OUTPATIENT
Start: 2018-11-01 | End: 2018-11-26 | Stop reason: SDUPTHER

## 2018-11-05 RX ORDER — BUPROPION HYDROCHLORIDE 200 MG/1
TABLET, EXTENDED RELEASE ORAL
Qty: 90 TABLET | Refills: 0 | Status: SHIPPED | OUTPATIENT
Start: 2018-11-05 | End: 2018-11-26 | Stop reason: SDUPTHER

## 2018-11-06 ENCOUNTER — OFFICE VISIT (OUTPATIENT)
Dept: FAMILY MEDICINE | Facility: CLINIC | Age: 63
End: 2018-11-06
Payer: MEDICARE

## 2018-11-06 VITALS
HEART RATE: 86 BPM | DIASTOLIC BLOOD PRESSURE: 80 MMHG | OXYGEN SATURATION: 98 % | BODY MASS INDEX: 35.73 KG/M2 | HEIGHT: 60 IN | SYSTOLIC BLOOD PRESSURE: 120 MMHG | TEMPERATURE: 98 F | WEIGHT: 182 LBS

## 2018-11-06 DIAGNOSIS — M17.0 ARTHRITIS OF BOTH KNEES: Primary | ICD-10-CM

## 2018-11-06 DIAGNOSIS — F41.8 INSOMNIA SECONDARY TO DEPRESSION WITH ANXIETY: ICD-10-CM

## 2018-11-06 DIAGNOSIS — F51.05 INSOMNIA SECONDARY TO DEPRESSION WITH ANXIETY: ICD-10-CM

## 2018-11-06 PROCEDURE — 99213 OFFICE O/P EST LOW 20 MIN: CPT | Mod: S$GLB,,, | Performed by: FAMILY MEDICINE

## 2018-11-06 RX ORDER — ALBUTEROL SULFATE 90 UG/1
AEROSOL, METERED RESPIRATORY (INHALATION)
Qty: 18 G | Refills: 0 | Status: SHIPPED | OUTPATIENT
Start: 2018-11-06 | End: 2019-01-17 | Stop reason: SDUPTHER

## 2018-11-06 RX ORDER — ZOLPIDEM TARTRATE 5 MG/1
TABLET ORAL
Qty: 30 TABLET | Refills: 0 | Status: SHIPPED | OUTPATIENT
Start: 2018-11-06 | End: 2018-11-06 | Stop reason: SDUPTHER

## 2018-11-06 RX ORDER — ZOLPIDEM TARTRATE 5 MG/1
TABLET ORAL
Qty: 30 TABLET | Refills: 0 | Status: SHIPPED | OUTPATIENT
Start: 2018-11-06 | End: 2018-11-30 | Stop reason: SDUPTHER

## 2018-11-06 NOTE — PROGRESS NOTES
Subjective:      Patient ID: Christine Castro is a 63 y.o. female.    Chief Complaint: Knee Pain (bilt knee pain)      HPI   Multiple c/o legs hurt into knees as [facility] does; and ambien    Review of Systems   Constitutional: Negative.    HENT: Negative.    Respiratory: Negative.    Cardiovascular: Negative.    Gastrointestinal: Negative.    Endocrine: Negative.    Genitourinary: Negative.    Musculoskeletal: Negative.    Psychiatric/Behavioral: Negative.    All other systems reviewed and are negative.    Objective:     Physical Exam   Constitutional: She is oriented to person, place, and time. She appears well-developed and well-nourished.   HENT:   Head: Normocephalic.   Eyes: Conjunctivae and EOM are normal. Pupils are equal, round, and reactive to light.   Neck: Normal range of motion. Neck supple.   Cardiovascular: Normal rate, regular rhythm and normal heart sounds.   Pulmonary/Chest: Effort normal and breath sounds normal.   Musculoskeletal: Normal range of motion.   Neurological: She is alert and oriented to person, place, and time. She has normal reflexes.   Skin: Skin is warm and dry.   Nursing note and vitals reviewed.    Assessment:     1. Arthritis of both knees    2. Insomnia secondary to depression with anxiety      Plan:        Medication List           Accurate as of 11/6/18 11:59 PM. If you have any questions, ask your nurse or doctor.               CHANGE how you take these medications    diazePAM 5 MG tablet  Commonly known as:  VALIUM  TAKE 1 TABLET(5 MG) BY MOUTH EVERY 8 HOURS AS NEEDED  What changed:  Another medication with the same name was removed. Continue taking this medication, and follow the directions you see here.  Changed by:  Yordan Hernández MD     promethazine 25 MG tablet  Commonly known as:  PHENERGAN  TAKE 1 TABLET(25 MG) BY MOUTH EVERY 6 HOURS AS NEEDED  What changed:  Another medication with the same name was removed. Continue taking this medication, and follow the directions  you see here.  Changed by:  Yordan Hernández MD        CONTINUE taking these medications    albuterol 90 mcg/actuation inhaler  Commonly known as:  VENTOLIN HFA  INHALE 2 PUFFS INTO THE LUNGS EVERY 4 HOURS AS NEEDED FOR WHEEZING     atorvastatin 10 MG tablet  Commonly known as:  LIPITOR  TAKE 1 TABLET BY MOUTH EVERY OTHER DAY     buPROPion 200 MG Sr12  Commonly known as:  WELLBUTRIN SR  TAKE 1 TABLET BY MOUTH EVERY DAY     busPIRone 10 MG tablet  Commonly known as:  BUSPAR  Take 0.5 tablets (5 mg total) by mouth once daily.     diclofenac sodium 1 % Gel  Commonly known as:  VOLTAREN  APPLY 2 GRAMS EXTERNALLY TO THE AFFECTED AREA FOUR TIMES DAILY     escitalopram oxalate 20 MG tablet  Commonly known as:  LEXAPRO  Take 1 tablet (20 mg total) by mouth once daily.     furosemide 20 MG tablet  Commonly known as:  LASIX  TAKE 1 TABLET BY MOUTH DAILY AS NEEDED     gabapentin 300 MG capsule  Commonly known as:  NEURONTIN  Take 1 capsule (300 mg total) by mouth 3 (three) times daily.     glipiZIDE 2.5 MG Tr24  Commonly known as:  GLUCOTROL  TAKE 1 TABLET BY MOUTH DAILY WITH BREAKFAST     levothyroxine 100 MCG tablet  Commonly known as:  SYNTHROID  Take 1 tablet (100 mcg total) by mouth once daily.     * morphine 30 MG 12 hr tablet  Commonly known as:  MS CONTIN     * MORPHABOND ER 30 mg Tr12  Generic drug:  morphine     omeprazole 40 MG capsule  Commonly known as:  PRILOSEC  Take 1 capsule (40 mg total) by mouth every morning.     ondansetron 4 MG tablet  Commonly known as:  ZOFRAN  TAKE 1 TABLET(4 MG) BY MOUTH EVERY 8 HOURS AS NEEDED     potassium chloride 10 MEQ Tbsr  Commonly known as:  KLOR-CON  TAKE 2 TABLETS(20 MEQ) BY MOUTH EVERY DAY     predniSONE 5 MG tablet  Commonly known as:  DELTASONE  TAKE 1 TABLET(5 MG) BY MOUTH EVERY DAY     zolpidem 5 MG Tab  Commonly known as:  AMBIEN  TAKE 1 TABLET(5 MG) BY MOUTH EVERY EVENING         * This list has 2 medication(s) that are the same as other medications prescribed for you.  Read the directions carefully, and ask your doctor or other care provider to review them with you.               Where to Get Your Medications      These medications were sent to [id number] - [place], [place]  [address] [facility] AT [address]  [address] [facility] [address]    Phone:  [phone]   · zolpidem 5 MG Tab       Arthritis of both knees    Insomnia secondary to depression with anxiety    Other orders  -     zolpidem (AMBIEN) 5 MG Tab; TAKE 1 TABLET(5 MG) BY MOUTH EVERY EVENING  Dispense: 30 tablet; Refill: 0

## 2018-11-12 ENCOUNTER — TELEPHONE (OUTPATIENT)
Dept: FAMILY MEDICINE | Facility: CLINIC | Age: 63
End: 2018-11-12

## 2018-11-12 DIAGNOSIS — M17.12 PRIMARY OSTEOARTHRITIS OF LEFT KNEE: ICD-10-CM

## 2018-11-12 DIAGNOSIS — M17.11 PRIMARY OSTEOARTHRITIS OF RIGHT KNEE: ICD-10-CM

## 2018-11-12 DIAGNOSIS — M17.0 ARTHRITIS OF BOTH KNEES: Primary | ICD-10-CM

## 2018-11-12 DIAGNOSIS — M70.61 TROCHANTERIC BURSITIS OF RIGHT HIP: ICD-10-CM

## 2018-11-12 NOTE — TELEPHONE ENCOUNTER
----- Message from Heike Tam sent at 11/12/2018 10:01 AM CST -----  Patient stopped by office. Needs Rx for walker with seat.  Patient would like to get from [facility]

## 2018-11-13 ENCOUNTER — TELEPHONE (OUTPATIENT)
Dept: FAMILY MEDICINE | Facility: CLINIC | Age: 63
End: 2018-11-13

## 2018-11-13 NOTE — TELEPHONE ENCOUNTER
rx faxed to [facility]  I called and left message for the pt advising her that the rx was faxed

## 2018-11-13 NOTE — TELEPHONE ENCOUNTER
----- Message from Dena Ellison sent at 11/13/2018 12:37 PM CST -----  Contact: self/ [phone]  Patient asked to speak with you. She is at [facility] and they need you to fax a copy of her social security card and insurance card.    Please call.     Fax# [phone]

## 2018-11-15 ENCOUNTER — HOSPITAL ENCOUNTER (EMERGENCY)
Facility: HOSPITAL | Age: 63
Discharge: HOME | End: 2018-11-15
Attending: EMERGENCY MEDICINE
Payer: MEDICARE

## 2018-11-15 ENCOUNTER — TELEPHONE (OUTPATIENT)
Dept: FAMILY MEDICINE | Facility: CLINIC | Age: 63
End: 2018-11-15

## 2018-11-15 VITALS
HEART RATE: 72 BPM | BODY MASS INDEX: 35.34 KG/M2 | RESPIRATION RATE: 18 BRPM | WEIGHT: 180 LBS | TEMPERATURE: 98 F | HEIGHT: 60 IN | DIASTOLIC BLOOD PRESSURE: 58 MMHG | SYSTOLIC BLOOD PRESSURE: 108 MMHG | OXYGEN SATURATION: 98 %

## 2018-11-15 DIAGNOSIS — R11.2 NON-INTRACTABLE VOMITING WITH NAUSEA, UNSPECIFIED VOMITING TYPE: ICD-10-CM

## 2018-11-15 DIAGNOSIS — R74.8 ELEVATED LIPASE: ICD-10-CM

## 2018-11-15 DIAGNOSIS — R10.13 EPIGASTRIC PAIN: Primary | ICD-10-CM

## 2018-11-15 LAB
ALBUMIN SERPL BCP-MCNC: 3.4 G/DL
ALP SERPL-CCNC: 76 U/L
ALT SERPL W/O P-5'-P-CCNC: 19 U/L
ANION GAP SERPL CALC-SCNC: 8 MMOL/L
AST SERPL-CCNC: 20 U/L
BASOPHILS # BLD AUTO: 0.01 K/UL
BASOPHILS NFR BLD: 0.1 %
BILIRUB SERPL-MCNC: 0.2 MG/DL
BILIRUB UR QL STRIP: NEGATIVE
BUN SERPL-MCNC: 16 MG/DL
CALCIUM SERPL-MCNC: 9.2 MG/DL
CHLORIDE SERPL-SCNC: 108 MMOL/L
CLARITY UR: CLEAR
CO2 SERPL-SCNC: 23 MMOL/L
COLOR UR: YELLOW
CREAT SERPL-MCNC: 0.8 MG/DL
DIFFERENTIAL METHOD: ABNORMAL
EOSINOPHIL # BLD AUTO: 0.2 K/UL
EOSINOPHIL NFR BLD: 1.7 %
ERYTHROCYTE [DISTWIDTH] IN BLOOD BY AUTOMATED COUNT: 14.1 %
EST. GFR  (AFRICAN AMERICAN): >60 ML/MIN/1.73 M^2
EST. GFR  (NON AFRICAN AMERICAN): >60 ML/MIN/1.73 M^2
GLUCOSE SERPL-MCNC: 117 MG/DL
GLUCOSE UR QL STRIP: NEGATIVE
HCT VFR BLD AUTO: 33.5 %
HGB BLD-MCNC: 10.6 G/DL
HGB UR QL STRIP: NEGATIVE
KETONES UR QL STRIP: NEGATIVE
LEUKOCYTE ESTERASE UR QL STRIP: NEGATIVE
LIPASE SERPL-CCNC: 179 U/L
LYMPHOCYTES # BLD AUTO: 1.1 K/UL
LYMPHOCYTES NFR BLD: 11.2 %
MCH RBC QN AUTO: 30.5 PG
MCHC RBC AUTO-ENTMCNC: 31.6 G/DL
MCV RBC AUTO: 96 FL
MONOCYTES # BLD AUTO: 0.5 K/UL
MONOCYTES NFR BLD: 5 %
NEUTROPHILS # BLD AUTO: 7.7 K/UL
NEUTROPHILS NFR BLD: 82 %
NITRITE UR QL STRIP: NEGATIVE
PH UR STRIP: 6 [PH] (ref 5–8)
PLATELET # BLD AUTO: 198 K/UL
PMV BLD AUTO: 10.1 FL
POTASSIUM SERPL-SCNC: 4.1 MMOL/L
PROT SERPL-MCNC: 6.9 G/DL
PROT UR QL STRIP: NEGATIVE
RBC # BLD AUTO: 3.48 M/UL
SODIUM SERPL-SCNC: 139 MMOL/L
SP GR UR STRIP: >=1.03 (ref 1–1.03)
URN SPEC COLLECT METH UR: ABNORMAL
UROBILINOGEN UR STRIP-ACNC: NEGATIVE EU/DL
WBC # BLD AUTO: 9.43 K/UL

## 2018-11-15 PROCEDURE — 85025 COMPLETE CBC W/AUTO DIFF WBC: CPT

## 2018-11-15 PROCEDURE — 96375 TX/PRO/DX INJ NEW DRUG ADDON: CPT

## 2018-11-15 PROCEDURE — 99285 EMERGENCY DEPT VISIT HI MDM: CPT | Mod: 25

## 2018-11-15 PROCEDURE — 96374 THER/PROPH/DIAG INJ IV PUSH: CPT

## 2018-11-15 PROCEDURE — 80053 COMPREHEN METABOLIC PANEL: CPT

## 2018-11-15 PROCEDURE — 83690 ASSAY OF LIPASE: CPT

## 2018-11-15 PROCEDURE — 81003 URINALYSIS AUTO W/O SCOPE: CPT

## 2018-11-15 PROCEDURE — 25000003 PHARM REV CODE 250: Performed by: EMERGENCY MEDICINE

## 2018-11-15 PROCEDURE — 63600175 PHARM REV CODE 636 W HCPCS: Performed by: EMERGENCY MEDICINE

## 2018-11-15 PROCEDURE — 96361 HYDRATE IV INFUSION ADD-ON: CPT

## 2018-11-15 PROCEDURE — 25500020 PHARM REV CODE 255: Performed by: EMERGENCY MEDICINE

## 2018-11-15 RX ORDER — HYOSCYAMINE SULFATE 0.5 MG/ML
0.5 INJECTION, SOLUTION SUBCUTANEOUS
Status: COMPLETED | OUTPATIENT
Start: 2018-11-15 | End: 2018-11-15

## 2018-11-15 RX ORDER — KETOROLAC TROMETHAMINE 30 MG/ML
15 INJECTION, SOLUTION INTRAMUSCULAR; INTRAVENOUS
Status: COMPLETED | OUTPATIENT
Start: 2018-11-15 | End: 2018-11-15

## 2018-11-15 RX ORDER — MORPHINE SULFATE 4 MG/ML
4 INJECTION, SOLUTION INTRAMUSCULAR; INTRAVENOUS
Status: COMPLETED | OUTPATIENT
Start: 2018-11-15 | End: 2018-11-15

## 2018-11-15 RX ORDER — PROMETHAZINE HYDROCHLORIDE 25 MG/1
25 TABLET ORAL EVERY 6 HOURS PRN
Qty: 15 TABLET | Refills: 0 | Status: SHIPPED | OUTPATIENT
Start: 2018-11-15 | End: 2018-12-31

## 2018-11-15 RX ORDER — ONDANSETRON 4 MG/1
4 TABLET, FILM COATED ORAL EVERY 6 HOURS
Qty: 12 TABLET | Refills: 0 | Status: SHIPPED | OUTPATIENT
Start: 2018-11-15 | End: 2019-05-01 | Stop reason: CLARIF

## 2018-11-15 RX ORDER — HYOSCYAMINE SULFATE 0.12 MG/1
0.12 TABLET SUBLINGUAL EVERY 6 HOURS PRN
Qty: 30 TABLET | Refills: 0 | Status: SHIPPED | OUTPATIENT
Start: 2018-11-15 | End: 2019-05-01 | Stop reason: CLARIF

## 2018-11-15 RX ORDER — HYDROCODONE BITARTRATE AND ACETAMINOPHEN 5; 325 MG/1; MG/1
1 TABLET ORAL EVERY 6 HOURS PRN
Qty: 9 TABLET | Refills: 0 | Status: SHIPPED | OUTPATIENT
Start: 2018-11-15 | End: 2019-03-22

## 2018-11-15 RX ORDER — ONDANSETRON 2 MG/ML
4 INJECTION INTRAMUSCULAR; INTRAVENOUS
Status: COMPLETED | OUTPATIENT
Start: 2018-11-15 | End: 2018-11-15

## 2018-11-15 RX ADMIN — HYOSCYAMINE SULFATE 0.5 MG: 0.5 INJECTION, SOLUTION SUBCUTANEOUS at 03:11

## 2018-11-15 RX ADMIN — ONDANSETRON 4 MG: 2 INJECTION INTRAMUSCULAR; INTRAVENOUS at 04:11

## 2018-11-15 RX ADMIN — MORPHINE SULFATE 4 MG: 4 INJECTION INTRAVENOUS at 05:11

## 2018-11-15 RX ADMIN — KETOROLAC TROMETHAMINE 15 MG: 30 INJECTION, SOLUTION INTRAMUSCULAR at 05:11

## 2018-11-15 RX ADMIN — SODIUM CHLORIDE 1000 ML: 0.9 INJECTION, SOLUTION INTRAVENOUS at 04:11

## 2018-11-15 RX ADMIN — IOHEXOL 100 ML: 350 INJECTION, SOLUTION INTRAVENOUS at 04:11

## 2018-11-15 NOTE — TELEPHONE ENCOUNTER
----- Message from Heike Tam sent at 11/15/2018 10:44 AM CST -----  Patient would like a returned call from Dr Santiago. Was rushed to [facility] in [place] yesterday. Wants to discuss visit because pt has some concerns about diagnosis. Says thinks family member may have had same symptoms &  shortly thereafter

## 2018-11-15 NOTE — ED TRIAGE NOTES
Patient presents from home per [facility] with left upper abdominal pain w/ tenderness and reports multiple episodes of vomiting; symptoms began in afternoon hours; pt denies fever or diarrhea; pt rec'd zofran 8 mg pta per ems and cbg was 109; pt moaning and guarding left upper abdomen; pt reports ate brocolli and cauliflower w/ cheese yesterday and could not remember what else; last bm yesterday and reports wnl; pt had a clinic visit recently and did not have these symptoms; pt lives alone

## 2018-11-15 NOTE — ED NOTES
RX'S given to patient with med ed; pt discharged to home per ch; reviewed home care and follow up care instructions; pt stable; pt reports she feels much better

## 2018-11-15 NOTE — ED NOTES
Called by CT and notified that pt is dry heaving and vomiting, MD notified. Orders for zofran placed.

## 2018-11-15 NOTE — ED PROVIDER NOTES
Encounter Date: 11/15/2018    SCRIBE #1 NOTE: I, Brandan Singh, am scribing for, and in the presence of,  Dr. Sommer Landavrede. I have scribed the entire note.     I, Dr. Sommer Landaverde MD, personally performed the services described in this documentation. All medical record entries made by the scribe were at my direction and in my presence.  I have reviewed the chart and agree that the record reflects my personal performance and is accurate and complete. Sommer Landaverde MD.    History     Chief Complaint   Patient presents with    Abdominal Pain    Nausea    Vomiting     CHIEF COMPLAINT: Patient presents with: Abdominal Pain, N/V      HISTORY OF PRESENT ILLNESS: Christine Castro who is a 63 y.o. presents to the emergency department today via EMS with complaint of abdominal pain that began prior to arrival. Patient reports pressure and aching LUQ abdominal pain with multiple episodes of associated nausea and vomiting. She denies bloody emesis, diarrhea, fever, or sick contacts at home. Patient reports s/p colon cancer resection in .    ALLERGIES REVIEWED  MEDICATIONS REVIEWED  PMH/PSH/SOC/FH REVIEWED     Nursing/Ancillary staff note reviewed.      The history is provided by the patient.     Review of patient's allergies indicates:   Allergen Reactions    Ibuprofen Other (See Comments)     Causes asthma flare??     Past Medical History:   Diagnosis Date    Arthritis     Asthma     Cancer     CHF (congestive heart failure)     [facility]    Colon cancer     COPD (chronic obstructive pulmonary disease)     Coronary artery disease     Depression     Diabetes mellitus, type 2     GERD (gastroesophageal reflux disease)     Myocardial infarction     AGE 19 [facility] WITHBABY DELIVERY    Thyroid disease      Past Surgical History:   Procedure Laterality Date     SECTION      CHOLECYSTECTOMY      COLON SURGERY      COLONOSCOPY      --- repeat in 3-5 years    COLONOSCOPY N/A 2017     Procedure: COLONOSCOPY;  Surgeon: Corrina Flores MD;  Location: [facility];  Service: Endoscopy;  Laterality: N/A;    COLONOSCOPY N/A 4/10/2018    Procedure: COLONOSCOPY golytely;  Surgeon: Corrina Flores MD;  Location: [facility];  Service: Endoscopy;  Laterality: N/A;    COLONOSCOPY N/A 4/18/2017    Performed by Corrina Flores MD at [facility] ENDO    COLONOSCOPY golytely N/A 4/10/2018    Performed by Corrina Flores MD at [facility] ENDO    ECTOPIC PREGNANCY SURGERY      ESOPHAGOGASTRODUODENOSCOPY (EGD) N/A 4/10/2018    Performed by Corrina Flores MD at [facility] ENDO    ESOPHAGOGASTRODUODENOSCOPY (EGD) N/A 4/18/2017    Performed by Corrina Flores MD at [facility] ENDO    GASTRIC BYPASS      ovary removed      REMOVAL-PORT-A-CATH Left 5/30/2017    Performed by Mike Sandoval MD at [facility] OR    TONSILLECTOMY      TUBAL LIGATION       Family History   Problem Relation Age of Onset    Hyperlipidemia Mother     Hypertension Mother     Diabetes Mother     Hypertension Sister     Hypertension Brother     Diabetes Brother     Breast cancer Maternal Aunt     Breast cancer Maternal Aunt     Breast cancer Cousin      Social History     Tobacco Use    Smoking status: Never Smoker    Smokeless tobacco: Never Used   Substance Use Topics    Alcohol use: Yes     Comment: very rare    Drug use: No     Review of Systems   Constitutional: Negative for activity change, appetite change, chills, diaphoresis and fever.   HENT: Negative for congestion, drooling, ear pain, mouth sores, rhinorrhea, sinus pain, sore throat and trouble swallowing.    Eyes: Negative for pain and discharge.   Respiratory: Negative for cough, chest tightness, shortness of breath, wheezing and stridor.    Cardiovascular: Negative for chest pain, palpitations and leg swelling.   Gastrointestinal: Positive for abdominal pain, nausea and vomiting. Negative for abdominal distention, blood in stool, constipation and diarrhea.   Genitourinary: Negative  for difficulty urinating, dysuria, flank pain, frequency, hematuria and urgency.   Musculoskeletal: Negative for arthralgias, back pain and myalgias.   Skin: Negative for pallor, rash and wound.   Neurological: Negative for dizziness, syncope, weakness, light-headedness and numbness.   All other systems reviewed and are negative.      Physical Exam     Initial Vitals [11/15/18 0305]   BP Pulse Resp Temp SpO2   113/81 71 (!) 24 97.7 °F (36.5 °C) 96 %      MAP       --         Physical Exam    Nursing note and vitals reviewed.  Constitutional: She appears well-developed and well-nourished.   HENT:   Head: Normocephalic and atraumatic.   Right Ear: External ear normal.   Left Ear: External ear normal.   Nose: Nose normal.   Mouth/Throat: Mucous membranes are dry.   Eyes: Conjunctivae and EOM are normal. Pupils are equal, round, and reactive to light. No scleral icterus.   Neck: Normal range of motion. Neck supple. No JVD present.   Cardiovascular: Normal rate, regular rhythm, normal heart sounds and intact distal pulses. Exam reveals no gallop and no friction rub.    No murmur heard.  Pulmonary/Chest: Breath sounds normal. No stridor. No respiratory distress. She has no wheezes. She exhibits no tenderness.   Abdominal: Soft. Bowel sounds are normal. She exhibits no distension and no mass. There is tenderness in the epigastric area and left upper quadrant. There is no rebound and no guarding.   Midline scar along abdomen.   Musculoskeletal: Normal range of motion. She exhibits no edema or tenderness.   Back is nontender to palpation.    Neurological: She is alert and oriented to person, place, and time. She has normal strength. No cranial nerve deficit.   Skin: Skin is warm and dry. Capillary refill takes less than 2 seconds. No rash noted. No pallor.   Psychiatric: She has a normal mood and affect. Thought content normal.         ED Course   Procedures  Labs Reviewed   CBC W/ AUTO DIFFERENTIAL - Abnormal; Notable for  the following components:       Result Value    RBC 3.48 (*)     Hemoglobin 10.6 (*)     Hematocrit 33.5 (*)     MCHC 31.6 (*)     Gran% 82.0 (*)     Lymph% 11.2 (*)     All other components within normal limits   COMPREHENSIVE METABOLIC PANEL - Abnormal; Notable for the following components:    Glucose 117 (*)     Albumin 3.4 (*)     All other components within normal limits   LIPASE - Abnormal; Notable for the following components:    Lipase 179 (*)     All other components within normal limits   URINALYSIS - Abnormal; Notable for the following components:    Specific Gravity, UA >=1.030 (*)     All other components within normal limits           CT Abdomen Pelvis With Contrast   Final Result      1. No acute intra-abdominal/pelvic CT abnormalities to account for the reported history of epigastric pain.   2. Findings include:   *Postsurgical changes of gastric bypass and suspected partial colon resection.   *Hiatal hernia with fluid within the distal esophagus which may relate to reflux.   *Right renal cortical hypodensities, too small to accurately characterize but favored to represent cysts.   *Colonic diverticulosis without associated inflammatory changes.         Electronically signed by: Narinder Byers MD   Date:    11/15/2018   Time:    05:08        I discussed the radiology findings and the need for follow up for her incidental findings.        Medical Decision Making:   History:   Old Medical Records: I decided to obtain old medical records.  Initial Assessment:   Christine Castro presents to the emergency department today complaining of abdominal pain.  Will go ahead and obtain labs and given the extent of pain obtain imaging.  Treat symptoms and reassess.    Differential Diagnosis:   Gastroenteritis, gastritis, ulcer, cholecystitis, gallstones, pancreatitis, ileus, small bowel obstruction, appendicitis, constipation.   Clinical Tests:   Lab Tests: Ordered and Reviewed  Radiological Study: Ordered and  Reviewed  ED Management:  0415 Pt is feeling improved.     0600 I discussed the results of the workup with the pt. Christine Castro  presents to the emergency Department today with upper abdominal pain.  Their workup today does not show any signs of acute abnormality which hospitalization.  No sign of cholecystitis, by labs or imaging. Slightly elevated lipase but no signs of pancreatitis on imaging.   Unlikely to be an ileus or small bowel obstruction given the patient's presentation and physical exam and imaging studies.  The pt has remained afebrile here in the emergency department.  They are tolerating by mouth. I have discussed limited diet for the next few days and symptom control. Advancing diet as tolerated after 2 days.  At this time I'll discharge home to follow up with primary care physician for further evaluation.  If the pain continues the pt will need to see GI for further evaluation.  The patient is comfortable with this plan and comfortable going home at this time. After taking into careful account the historical factors and physical exam findings of the patient's presentation today, in conjunction with the empirical and objective data obtained on ED workup, no acute emergent medical condition has been identified. The patient appears to be low risk for an emergent medical condition and I feel it is safe and appropriate at this time for the patient to be discharged to follow-up as detailed in their discharge instructions for reevaluation and possible continued outpatient workup and management. I have discussed the specifics of the workup with the patient and the patient has verbalized understanding of the details of the workup, the diagnosis, the treatment plan, and the need for outpatient follow-up.  Although the patient has no emergent etiology today this does not preclude the development of an emergent condition so in addition, I have advised the patient that they can return to the ED and/or  activate EMS at any time with worsening of their symptoms, change of their symptoms, or with any other medical complaint.  The patient remained comfortable and stable during their visit in the ED.  Discharge and follow-up instructions discussed with the patient who expressed understanding and willingness to comply with my recommendations.     This medical record was prepared using voice dictation software. There may be phonetic errors.                            Clinical Impression:     1. Epigastric pain    2. Elevated lipase    3. Non-intractable vomiting with nausea, unspecified vomiting type                                   Sommer Landaverde MD  11/24/18 1289

## 2018-11-15 NOTE — DISCHARGE INSTRUCTIONS
Additional instructions  Followup with your primary care physician in 2-3 days. For the next few days stick to a liquid diet. Then advance your diet as tolerated.  Take all your medications as prescribed. Return to the emergency department if you have increasing pain, chest pain, difficulty breathing,  nonstop vomiting, or any other concerns. Be sure to drink plenty of fluids to stay hydrated. Get plenty of rest. Please refer to additional educational material for further instructions.

## 2018-11-20 DIAGNOSIS — E11.9 TYPE 2 DIABETES MELLITUS WITHOUT COMPLICATION, UNSPECIFIED WHETHER LONG TERM INSULIN USE: ICD-10-CM

## 2018-11-26 ENCOUNTER — OFFICE VISIT (OUTPATIENT)
Dept: FAMILY MEDICINE | Facility: CLINIC | Age: 63
End: 2018-11-26
Payer: MEDICARE

## 2018-11-26 VITALS
OXYGEN SATURATION: 99 % | WEIGHT: 184.63 LBS | BODY MASS INDEX: 36.25 KG/M2 | TEMPERATURE: 98 F | DIASTOLIC BLOOD PRESSURE: 60 MMHG | HEART RATE: 88 BPM | HEIGHT: 60 IN | SYSTOLIC BLOOD PRESSURE: 110 MMHG

## 2018-11-26 DIAGNOSIS — K85.90 ACUTE PANCREATITIS, UNSPECIFIED COMPLICATION STATUS, UNSPECIFIED PANCREATITIS TYPE: Primary | ICD-10-CM

## 2018-11-26 DIAGNOSIS — K21.9 GASTROESOPHAGEAL REFLUX DISEASE WITHOUT ESOPHAGITIS: ICD-10-CM

## 2018-11-26 DIAGNOSIS — M17.12 PRIMARY OSTEOARTHRITIS OF LEFT KNEE: ICD-10-CM

## 2018-11-26 DIAGNOSIS — E07.9 THYROID DISEASE: ICD-10-CM

## 2018-11-26 DIAGNOSIS — R53.1 WEAKNESS: ICD-10-CM

## 2018-11-26 DIAGNOSIS — E78.5 HYPERLIPIDEMIA, UNSPECIFIED HYPERLIPIDEMIA TYPE: ICD-10-CM

## 2018-11-26 DIAGNOSIS — R73.9 HYPERGLYCEMIA: ICD-10-CM

## 2018-11-26 PROCEDURE — 99214 OFFICE O/P EST MOD 30 MIN: CPT | Mod: S$GLB,,, | Performed by: FAMILY MEDICINE

## 2018-11-26 RX ORDER — BUPRENORPHINE 10 UG/H
PATCH, EXTENDED RELEASE TRANSDERMAL
COMMUNITY
Start: 2018-11-14 | End: 2019-05-01 | Stop reason: CLARIF

## 2018-11-26 RX ORDER — POTASSIUM CHLORIDE 750 MG/1
TABLET, EXTENDED RELEASE ORAL
Qty: 90 TABLET | Refills: 3 | Status: SHIPPED | OUTPATIENT
Start: 2018-11-26 | End: 2019-05-26 | Stop reason: SDUPTHER

## 2018-11-26 RX ORDER — OMEPRAZOLE 40 MG/1
40 CAPSULE, DELAYED RELEASE ORAL EVERY MORNING
Qty: 90 CAPSULE | Refills: 1 | Status: SHIPPED | OUTPATIENT
Start: 2018-11-26 | End: 2019-01-22 | Stop reason: SDUPTHER

## 2018-11-26 RX ORDER — BUSPIRONE HYDROCHLORIDE 10 MG/1
5 TABLET ORAL DAILY
Qty: 60 TABLET | Refills: 3 | Status: SHIPPED | OUTPATIENT
Start: 2018-11-26 | End: 2018-11-30

## 2018-11-26 RX ORDER — BUPROPION HYDROCHLORIDE 200 MG/1
200 TABLET, EXTENDED RELEASE ORAL DAILY
Qty: 90 TABLET | Refills: 2 | Status: SHIPPED | OUTPATIENT
Start: 2018-11-26 | End: 2019-07-02 | Stop reason: SDUPTHER

## 2018-11-26 RX ORDER — ATORVASTATIN CALCIUM 10 MG/1
10 TABLET, FILM COATED ORAL EVERY OTHER DAY
Qty: 45 TABLET | Refills: 3 | Status: ON HOLD | OUTPATIENT
Start: 2018-11-26 | End: 2019-05-20 | Stop reason: HOSPADM

## 2018-11-26 RX ORDER — OXYCODONE AND ACETAMINOPHEN 7.5; 325 MG/1; MG/1
TABLET ORAL
Status: ON HOLD | COMMUNITY
Start: 2018-11-14 | End: 2019-02-28 | Stop reason: HOSPADM

## 2018-11-26 RX ORDER — LEVOTHYROXINE SODIUM 100 UG/1
100 TABLET ORAL DAILY
Qty: 90 TABLET | Refills: 11 | Status: SHIPPED | OUTPATIENT
Start: 2018-11-26 | End: 2019-01-09 | Stop reason: SDUPTHER

## 2018-11-26 RX ORDER — GABAPENTIN 300 MG/1
300 CAPSULE ORAL 3 TIMES DAILY
Qty: 270 CAPSULE | Refills: 3 | Status: SHIPPED | OUTPATIENT
Start: 2018-11-26 | End: 2019-08-21

## 2018-11-26 RX ORDER — ESCITALOPRAM OXALATE 20 MG/1
20 TABLET ORAL DAILY
Qty: 90 TABLET | Refills: 3 | Status: SHIPPED | OUTPATIENT
Start: 2018-11-26 | End: 2019-05-28 | Stop reason: SDUPTHER

## 2018-11-26 RX ORDER — GLIPIZIDE 2.5 MG/1
2.5 TABLET, EXTENDED RELEASE ORAL DAILY
Qty: 90 TABLET | Refills: 3 | Status: ON HOLD | OUTPATIENT
Start: 2018-11-26 | End: 2019-02-28 | Stop reason: HOSPADM

## 2018-11-26 RX ORDER — FUROSEMIDE 20 MG/1
20 TABLET ORAL DAILY PRN
Qty: 90 TABLET | Refills: 1 | Status: SHIPPED | OUTPATIENT
Start: 2018-11-26 | End: 2019-05-26 | Stop reason: SDUPTHER

## 2018-11-27 RX ORDER — DIAZEPAM 5 MG/1
TABLET ORAL
Qty: 30 TABLET | Refills: 0 | Status: SHIPPED | OUTPATIENT
Start: 2018-11-27 | End: 2018-12-21 | Stop reason: SDUPTHER

## 2018-11-27 RX ORDER — DIAZEPAM 2 MG/1
TABLET ORAL
Qty: 15 TABLET | Refills: 0 | OUTPATIENT
Start: 2018-11-27

## 2018-11-30 DIAGNOSIS — M17.0 ARTHRITIS OF BOTH KNEES: ICD-10-CM

## 2018-11-30 RX ORDER — LANCETS 33 GAUGE
EACH MISCELLANEOUS
Qty: 100 EACH | Refills: 0 | Status: SHIPPED | OUTPATIENT
Start: 2018-11-30 | End: 2019-04-26

## 2018-11-30 RX ORDER — BUSPIRONE HYDROCHLORIDE 10 MG/1
TABLET ORAL
Qty: 60 TABLET | Refills: 0 | Status: SHIPPED | OUTPATIENT
Start: 2018-11-30 | End: 2019-07-22

## 2018-11-30 RX ORDER — ZOLPIDEM TARTRATE 5 MG/1
TABLET ORAL
Qty: 30 TABLET | Refills: 0 | Status: SHIPPED | OUTPATIENT
Start: 2018-11-30 | End: 2019-01-01 | Stop reason: SDUPTHER

## 2018-11-30 RX ORDER — PREDNISONE 5 MG/1
TABLET ORAL
Qty: 30 TABLET | Refills: 0 | Status: SHIPPED | OUTPATIENT
Start: 2018-11-30 | End: 2019-01-01 | Stop reason: SDUPTHER

## 2018-11-30 NOTE — TELEPHONE ENCOUNTER
----- Message from Carmelina Kraus sent at 11/30/2018  4:31 PM CST -----  Contact: Self / [phone]  Patient is requesting a medication refill.     RX name: zolpidem (AMBIEN)  Strength: 5mg  Quantity: 30 pills  Directions:  TAKE 1 TABLET(5 MG) BY MOUTH EVERY EVENING    RX name: predniSONE (DELTASONE)   Strength: 5 mg  Quantity: 30 pills  Directions: TAKE 1 TABLET(5 MG) BY MOUTH EVERY DAY    Pharm[facility] name[facility]      Phone number where patient can be reached: [phone]

## 2018-11-30 NOTE — TELEPHONE ENCOUNTER
----- Message from Dena Ellison sent at 11/30/2018  3:56 PM CST -----  Contact: Self/ [phone]  Patient called in to get prescription refill. Please call.    zolpidem (AMBIEN) 5 MG Tab    Steroid    [facility] [id number] [place] [address] [address] AT [facility]

## 2018-12-02 NOTE — PROGRESS NOTES
Patient ID: Christine Castro is a 63 y.o. female.    Chief Complaint: Follow-up (check up)    HPI      Christine Castro is a 63 y.o. female here following up on visit to the hospital with acute onset of pancreatitis which resolved fairly quickly.  Also here for continued management of chronic diseases.  Reflux-stable  Depression-stable on current medication  Arthritis-continues to suffer from diffuse arthritis but in particular bilateral knees     Review of Symptoms    Constitutional  Neg activity change, No chills /fever   Resp  Neg hemoptysis, stridor, choking  CVS  Neg chest pain, palpitations    Physical Exam    Constitutional:  Oriented to person, place, and time.appears well-developed and well-nourished.  No distress.      HENT  Head: Normocephalic and atraumatic  Right Ear: External ear normal.   Left Ear: External ear normal.   Nose: External nose normal.   Mouth:  Moist mucus membranes.    Eyes:  Conjunctivae are normal. Right eye exhibits no discharge.  Left eye exhibits no discharge. No scleral icterus.  No periorbital edema    Cardiovascular:  Regular rate and rhythm with normal S1 and S2     Pulmonary/Chest:   Clear to auscultation bilaterally without wheezes, rhonchi or rales    Abdomen-soft nontender nondistended    Musculoskeletal:  No edema. No obvious deformity No wasting       Neurological:  Alert and oriented to person, place, and time.   Coordination normal.     Skin:   Skin is warm and dry.  No diaphoresis.   No rash noted.     Psychiatric: Normal mood and affect. Behavior is normal.  Judgment and thought content normal.     Complete Blood Count  Lab Results   Component Value Date    RBC 3.48 (L) 11/15/2018    HGB 10.6 (L) 11/15/2018    HCT 33.5 (L) 11/15/2018    MCV 96 11/15/2018    MCH 30.5 11/15/2018    MCHC 31.6 (L) 11/15/2018    RDW 14.1 11/15/2018     11/15/2018    MPV 10.1 11/15/2018    GRAN 7.7 11/15/2018    GRAN 82.0 (H) 11/15/2018    LYMPH 1.1 11/15/2018    LYMPH 11.2 (L)  11/15/2018    MONO 0.5 11/15/2018    MONO 5.0 11/15/2018    EOS 0.2 11/15/2018    BASO 0.01 11/15/2018    EOSINOPHIL 1.7 11/15/2018    BASOPHIL 0.1 11/15/2018    DIFFMETHOD Automated 11/15/2018       Comprehensive Metabolic Panel  Lab Results   Component Value Date     (H) 11/15/2018    BUN 16 11/15/2018    CREATININE 0.8 11/15/2018     11/15/2018    K 4.1 11/15/2018     11/15/2018    PROT 6.9 11/15/2018    ALBUMIN 3.4 (L) 11/15/2018    BILITOT 0.2 11/15/2018    AST 20 11/15/2018    ALKPHOS 76 11/15/2018    CO2 23 11/15/2018    ALT 19 11/15/2018    ANIONGAP 8 11/15/2018    EGFRNONAA >60 11/15/2018    ESTGFRAFRICA >60 11/15/2018       TSH  Lab Results   Component Value Date    TSH 0.724 09/28/2018       Assessment / Plan:      ICD-10-CM ICD-9-CM   1. Acute pancreatitis, unspecified complication status, unspecified pancreatitis type K85.90 577.0   2. Thyroid disease E07.9 246.9   3. Hyperlipidemia, unspecified hyperlipidemia type E78.5 272.4   4. Weakness R53.1 780.79   5. Hyperglycemia R73.9 790.29   6. Primary osteoarthritis of left knee M17.12 715.16   7. Gastroesophageal reflux disease without esophagitis K21.9 530.81     Acute pancreatitis, unspecified complication status, unspecified pancreatitis type  -     Comprehensive metabolic panel; Future  -     CBC auto differential; Future  -     Lipid panel; Future  -     TSH; Future  -     Hemoglobin A1c; Future  -     Lipase; Future; Expected date: 11/26/2018    Thyroid disease  -     Comprehensive metabolic panel; Future  -     CBC auto differential; Future  -     Lipid panel; Future  -     TSH; Future  -     Hemoglobin A1c; Future  -     Lipase; Future; Expected date: 11/26/2018    Hyperlipidemia, unspecified hyperlipidemia type  -     Comprehensive metabolic panel; Future  -     CBC auto differential; Future  -     Lipid panel; Future  -     TSH; Future  -     Hemoglobin A1c; Future  -     Lipase; Future; Expected date: 11/26/2018    Weakness  -      Comprehensive metabolic panel; Future  -     CBC auto differential; Future  -     Lipid panel; Future  -     TSH; Future  -     Hemoglobin A1c; Future  -     Lipase; Future; Expected date: 11/26/2018    Hyperglycemia  -     Comprehensive metabolic panel; Future  -     CBC auto differential; Future  -     Lipid panel; Future  -     TSH; Future  -     Hemoglobin A1c; Future  -     Lipase; Future; Expected date: 11/26/2018    Primary osteoarthritis of left knee  -     Comprehensive metabolic panel; Future  -     CBC auto differential; Future  -     Lipid panel; Future  -     TSH; Future  -     Hemoglobin A1c; Future  -     Lipase; Future; Expected date: 11/26/2018    Gastroesophageal reflux disease without esophagitis  -     omeprazole (PRILOSEC) 40 MG capsule; Take 1 capsule (40 mg total) by mouth every morning.  Dispense: 90 capsule; Refill: 1    Other orders  -     Discontinue: busPIRone (BUSPAR) 10 MG tablet; Take 0.5 tablets (5 mg total) by mouth once daily.  Dispense: 60 tablet; Refill: 3  -     gabapentin (NEURONTIN) 300 MG capsule; Take 1 capsule (300 mg total) by mouth 3 (three) times daily.  Dispense: 270 capsule; Refill: 3  -     escitalopram oxalate (LEXAPRO) 20 MG tablet; Take 1 tablet (20 mg total) by mouth once daily.  Dispense: 90 tablet; Refill: 3  -     buPROPion (WELLBUTRIN SR) 200 MG SR12; Take 1 tablet (200 mg total) by mouth once daily.  Dispense: 90 tablet; Refill: 2  -     glipiZIDE (GLUCOTROL) 2.5 MG TR24; Take 1 tablet (2.5 mg total) by mouth once daily.  Dispense: 90 tablet; Refill: 3  -     atorvastatin (LIPITOR) 10 MG tablet; Take 1 tablet (10 mg total) by mouth every other day.  Dispense: 45 tablet; Refill: 3  -     furosemide (LASIX) 20 MG tablet; Take 1 tablet (20 mg total) by mouth daily as needed.  Dispense: 90 tablet; Refill: 1  -     potassium chloride (KLOR-CON) 10 MEQ TbSR; TAKE 2 TABLETS(20 MEQ) BY MOUTH EVERY DAY  Dispense: 90 tablet; Refill: 3  -     levothyroxine  (SYNTHROID) 100 MCG tablet; Take 1 tablet (100 mcg total) by mouth once daily.  Dispense: 90 tablet; Refill: 11      Pancreatitis- symptoms have resolved at this time.

## 2018-12-22 RX ORDER — DIAZEPAM 5 MG/1
TABLET ORAL
Qty: 30 TABLET | Refills: 0 | Status: SHIPPED | OUTPATIENT
Start: 2018-12-22 | End: 2019-01-10

## 2018-12-22 RX ORDER — DICLOFENAC SODIUM 10 MG/G
GEL TOPICAL
Qty: 100 G | Refills: 0 | Status: SHIPPED | OUTPATIENT
Start: 2018-12-22 | End: 2019-02-06 | Stop reason: SDUPTHER

## 2018-12-31 RX ORDER — PROMETHAZINE HYDROCHLORIDE 25 MG/1
TABLET ORAL
Qty: 25 TABLET | Refills: 0 | Status: SHIPPED | OUTPATIENT
Start: 2018-12-31 | End: 2019-01-14

## 2019-01-01 DIAGNOSIS — M17.0 ARTHRITIS OF BOTH KNEES: ICD-10-CM

## 2019-01-02 RX ORDER — ZOLPIDEM TARTRATE 5 MG/1
TABLET ORAL
Qty: 30 TABLET | Refills: 0 | Status: SHIPPED | OUTPATIENT
Start: 2019-01-02 | End: 2019-02-20 | Stop reason: SDUPTHER

## 2019-01-02 RX ORDER — PREDNISONE 5 MG/1
TABLET ORAL
Qty: 30 TABLET | Refills: 0 | Status: SHIPPED | OUTPATIENT
Start: 2019-01-02 | End: 2019-02-20 | Stop reason: SDUPTHER

## 2019-01-09 ENCOUNTER — TELEPHONE (OUTPATIENT)
Dept: FAMILY MEDICINE | Facility: CLINIC | Age: 64
End: 2019-01-09

## 2019-01-09 ENCOUNTER — LAB VISIT (OUTPATIENT)
Dept: LAB | Facility: HOSPITAL | Age: 64
End: 2019-01-09
Attending: FAMILY MEDICINE
Payer: MEDICARE

## 2019-01-09 DIAGNOSIS — Z85.038 HISTORY OF COLON CANCER: ICD-10-CM

## 2019-01-09 DIAGNOSIS — K85.90 ACUTE PANCREATITIS, UNSPECIFIED COMPLICATION STATUS, UNSPECIFIED PANCREATITIS TYPE: ICD-10-CM

## 2019-01-09 DIAGNOSIS — R53.1 WEAKNESS: ICD-10-CM

## 2019-01-09 DIAGNOSIS — K21.9 GASTROESOPHAGEAL REFLUX DISEASE WITHOUT ESOPHAGITIS: Primary | ICD-10-CM

## 2019-01-09 DIAGNOSIS — E07.9 THYROID DISEASE: ICD-10-CM

## 2019-01-09 DIAGNOSIS — R73.9 HYPERGLYCEMIA: ICD-10-CM

## 2019-01-09 DIAGNOSIS — E78.5 HYPERLIPIDEMIA, UNSPECIFIED HYPERLIPIDEMIA TYPE: ICD-10-CM

## 2019-01-09 DIAGNOSIS — M17.12 PRIMARY OSTEOARTHRITIS OF LEFT KNEE: ICD-10-CM

## 2019-01-09 LAB
ALBUMIN SERPL BCP-MCNC: 4.2 G/DL
ALP SERPL-CCNC: 86 U/L
ALT SERPL W/O P-5'-P-CCNC: 30 U/L
ANION GAP SERPL CALC-SCNC: 11 MMOL/L
AST SERPL-CCNC: 28 U/L
BASOPHILS # BLD AUTO: 0.01 K/UL
BASOPHILS NFR BLD: 0.2 %
BILIRUB SERPL-MCNC: 0.3 MG/DL
BUN SERPL-MCNC: 18 MG/DL
CALCIUM SERPL-MCNC: 9.5 MG/DL
CHLORIDE SERPL-SCNC: 104 MMOL/L
CHOLEST SERPL-MCNC: 198 MG/DL
CHOLEST/HDLC SERPL: 3.7 {RATIO}
CO2 SERPL-SCNC: 23 MMOL/L
CREAT SERPL-MCNC: 0.97 MG/DL
DIFFERENTIAL METHOD: ABNORMAL
EOSINOPHIL # BLD AUTO: 0 K/UL
EOSINOPHIL NFR BLD: 0.3 %
ERYTHROCYTE [DISTWIDTH] IN BLOOD BY AUTOMATED COUNT: 14 %
EST. GFR  (AFRICAN AMERICAN): >60 ML/MIN/1.73 M^2
EST. GFR  (NON AFRICAN AMERICAN): >60 ML/MIN/1.73 M^2
ESTIMATED AVG GLUCOSE: 97 MG/DL
GLUCOSE SERPL-MCNC: 108 MG/DL
HBA1C MFR BLD HPLC: 5 %
HCT VFR BLD AUTO: 33.9 %
HDLC SERPL-MCNC: 54 MG/DL
HDLC SERPL: 27.3 %
HGB BLD-MCNC: 10.8 G/DL
LDLC SERPL CALC-MCNC: 124.8 MG/DL
LIPASE SERPL-CCNC: 38 U/L
LYMPHOCYTES # BLD AUTO: 0.9 K/UL
LYMPHOCYTES NFR BLD: 14.6 %
MCH RBC QN AUTO: 30.6 PG
MCHC RBC AUTO-ENTMCNC: 31.9 G/DL
MCV RBC AUTO: 96 FL
MONOCYTES # BLD AUTO: 0.3 K/UL
MONOCYTES NFR BLD: 3.9 %
NEUTROPHILS # BLD AUTO: 5.2 K/UL
NEUTROPHILS NFR BLD: 80.8 %
NONHDLC SERPL-MCNC: 144 MG/DL
PLATELET # BLD AUTO: 224 K/UL
PMV BLD AUTO: 9.5 FL
POTASSIUM SERPL-SCNC: 3.9 MMOL/L
PROT SERPL-MCNC: 7.7 G/DL
RBC # BLD AUTO: 3.53 M/UL
SODIUM SERPL-SCNC: 138 MMOL/L
T4 FREE SERPL-MCNC: 1.11 NG/DL
TRIGL SERPL-MCNC: 96 MG/DL
TSH SERPL DL<=0.005 MIU/L-ACNC: <0.026 UIU/ML
WBC # BLD AUTO: 6.37 K/UL

## 2019-01-09 PROCEDURE — 36415 COLL VENOUS BLD VENIPUNCTURE: CPT | Mod: PO,ER

## 2019-01-09 PROCEDURE — 80053 COMPREHEN METABOLIC PANEL: CPT | Mod: PO,ER

## 2019-01-09 PROCEDURE — 83690 ASSAY OF LIPASE: CPT | Mod: PO,ER

## 2019-01-09 PROCEDURE — 84439 ASSAY OF FREE THYROXINE: CPT

## 2019-01-09 PROCEDURE — 84443 ASSAY THYROID STIM HORMONE: CPT | Mod: PO,ER

## 2019-01-09 PROCEDURE — 83036 HEMOGLOBIN GLYCOSYLATED A1C: CPT

## 2019-01-09 PROCEDURE — 80061 LIPID PANEL: CPT

## 2019-01-09 PROCEDURE — 85025 COMPLETE CBC W/AUTO DIFF WBC: CPT | Mod: PO,ER

## 2019-01-09 RX ORDER — LEVOTHYROXINE SODIUM 75 UG/1
75 TABLET ORAL DAILY
Qty: 90 TABLET | Refills: 11 | Status: SHIPPED | OUTPATIENT
Start: 2019-01-09 | End: 2019-04-26

## 2019-01-10 RX ORDER — DIAZEPAM 5 MG/1
TABLET ORAL
Qty: 30 TABLET | Refills: 0 | Status: SHIPPED | OUTPATIENT
Start: 2019-01-10 | End: 2019-01-24

## 2019-01-10 NOTE — TELEPHONE ENCOUNTER
Your blood work is normal except your thyroid hormone is elevated.  We should reduce the amount of Synthroid you take.  I will call in a new dose

## 2019-01-14 ENCOUNTER — HOSPITAL ENCOUNTER (EMERGENCY)
Facility: HOSPITAL | Age: 64
Discharge: HOME | End: 2019-01-14
Attending: EMERGENCY MEDICINE
Payer: MEDICARE

## 2019-01-14 VITALS
OXYGEN SATURATION: 98 % | RESPIRATION RATE: 22 BRPM | HEART RATE: 65 BPM | WEIGHT: 190 LBS | TEMPERATURE: 99 F | SYSTOLIC BLOOD PRESSURE: 105 MMHG | BODY MASS INDEX: 37.3 KG/M2 | DIASTOLIC BLOOD PRESSURE: 62 MMHG | HEIGHT: 60 IN

## 2019-01-14 DIAGNOSIS — R11.2 NAUSEA AND VOMITING, INTRACTABILITY OF VOMITING NOT SPECIFIED, UNSPECIFIED VOMITING TYPE: ICD-10-CM

## 2019-01-14 DIAGNOSIS — N30.90 CYSTITIS: Primary | ICD-10-CM

## 2019-01-14 DIAGNOSIS — R10.12: ICD-10-CM

## 2019-01-14 DIAGNOSIS — L98.9 SKIN LESION: ICD-10-CM

## 2019-01-14 LAB
ALBUMIN SERPL BCP-MCNC: 3.1 G/DL
ALP SERPL-CCNC: 77 U/L
ALT SERPL W/O P-5'-P-CCNC: 25 U/L
ANION GAP SERPL CALC-SCNC: 6 MMOL/L
AST SERPL-CCNC: 24 U/L
BACTERIA #/AREA URNS HPF: ABNORMAL /HPF
BASOPHILS # BLD AUTO: 0.01 K/UL
BASOPHILS NFR BLD: 0.1 %
BILIRUB SERPL-MCNC: 0.4 MG/DL
BILIRUB UR QL STRIP: NEGATIVE
BUN SERPL-MCNC: 8 MG/DL
CALCIUM SERPL-MCNC: 9 MG/DL
CHLORIDE SERPL-SCNC: 108 MMOL/L
CLARITY UR: CLEAR
CO2 SERPL-SCNC: 28 MMOL/L
COLOR UR: YELLOW
CREAT SERPL-MCNC: 0.8 MG/DL
DIFFERENTIAL METHOD: ABNORMAL
EOSINOPHIL # BLD AUTO: 0.1 K/UL
EOSINOPHIL NFR BLD: 1.3 %
ERYTHROCYTE [DISTWIDTH] IN BLOOD BY AUTOMATED COUNT: 14.3 %
EST. GFR  (AFRICAN AMERICAN): >60 ML/MIN/1.73 M^2
EST. GFR  (NON AFRICAN AMERICAN): >60 ML/MIN/1.73 M^2
GLUCOSE SERPL-MCNC: 93 MG/DL
GLUCOSE UR QL STRIP: NEGATIVE
HCT VFR BLD AUTO: 31.8 %
HGB BLD-MCNC: 10.2 G/DL
HGB UR QL STRIP: NEGATIVE
KETONES UR QL STRIP: NEGATIVE
LEUKOCYTE ESTERASE UR QL STRIP: ABNORMAL
LIPASE SERPL-CCNC: 9 U/L
LYMPHOCYTES # BLD AUTO: 1.1 K/UL
LYMPHOCYTES NFR BLD: 12.3 %
MCH RBC QN AUTO: 31 PG
MCHC RBC AUTO-ENTMCNC: 32.1 G/DL
MCV RBC AUTO: 97 FL
MICROSCOPIC COMMENT: ABNORMAL
MONOCYTES # BLD AUTO: 0.5 K/UL
MONOCYTES NFR BLD: 4.9 %
NEUTROPHILS # BLD AUTO: 7.5 K/UL
NEUTROPHILS NFR BLD: 81.2 %
NITRITE UR QL STRIP: POSITIVE
PH UR STRIP: 6 [PH] (ref 5–8)
PLATELET # BLD AUTO: 177 K/UL
PMV BLD AUTO: 9.3 FL
POTASSIUM SERPL-SCNC: 3.7 MMOL/L
PROT SERPL-MCNC: 6.2 G/DL
PROT UR QL STRIP: NEGATIVE
RBC # BLD AUTO: 3.29 M/UL
SODIUM SERPL-SCNC: 142 MMOL/L
SP GR UR STRIP: 1.01 (ref 1–1.03)
SQUAMOUS #/AREA URNS HPF: 0 /HPF
URN SPEC COLLECT METH UR: ABNORMAL
UROBILINOGEN UR STRIP-ACNC: NEGATIVE EU/DL
WBC # BLD AUTO: 9.26 K/UL
WBC #/AREA URNS HPF: 6 /HPF (ref 0–5)

## 2019-01-14 PROCEDURE — 99284 EMERGENCY DEPT VISIT MOD MDM: CPT | Mod: 25

## 2019-01-14 PROCEDURE — 25000003 PHARM REV CODE 250: Performed by: EMERGENCY MEDICINE

## 2019-01-14 PROCEDURE — 25000003 PHARM REV CODE 250: Performed by: PHYSICIAN ASSISTANT

## 2019-01-14 PROCEDURE — 96374 THER/PROPH/DIAG INJ IV PUSH: CPT

## 2019-01-14 PROCEDURE — 93010 EKG 12-LEAD: ICD-10-PCS | Mod: ,,, | Performed by: INTERNAL MEDICINE

## 2019-01-14 PROCEDURE — 93005 ELECTROCARDIOGRAM TRACING: CPT

## 2019-01-14 PROCEDURE — 93010 ELECTROCARDIOGRAM REPORT: CPT | Mod: ,,, | Performed by: INTERNAL MEDICINE

## 2019-01-14 PROCEDURE — 96361 HYDRATE IV INFUSION ADD-ON: CPT

## 2019-01-14 PROCEDURE — 83690 ASSAY OF LIPASE: CPT

## 2019-01-14 PROCEDURE — 85025 COMPLETE CBC W/AUTO DIFF WBC: CPT

## 2019-01-14 PROCEDURE — 81000 URINALYSIS NONAUTO W/SCOPE: CPT

## 2019-01-14 PROCEDURE — 63600175 PHARM REV CODE 636 W HCPCS: Performed by: PHYSICIAN ASSISTANT

## 2019-01-14 PROCEDURE — 80053 COMPREHEN METABOLIC PANEL: CPT

## 2019-01-14 RX ORDER — PROMETHAZINE HYDROCHLORIDE 25 MG/1
25 TABLET ORAL EVERY 6 HOURS PRN
Qty: 15 TABLET | Refills: 0 | Status: SHIPPED | OUTPATIENT
Start: 2019-01-14 | End: 2019-02-21

## 2019-01-14 RX ORDER — NITROFURANTOIN 25; 75 MG/1; MG/1
100 CAPSULE ORAL 2 TIMES DAILY
Qty: 10 CAPSULE | Refills: 0 | Status: SHIPPED | OUTPATIENT
Start: 2019-01-14 | End: 2019-01-19

## 2019-01-14 RX ORDER — ONDANSETRON 2 MG/ML
4 INJECTION INTRAMUSCULAR; INTRAVENOUS
Status: COMPLETED | OUTPATIENT
Start: 2019-01-14 | End: 2019-01-14

## 2019-01-14 RX ORDER — BACITRACIN ZINC 500 UNIT/G
OINTMENT (GRAM) TOPICAL 2 TIMES DAILY
Qty: 28 G | Refills: 0 | Status: ON HOLD | OUTPATIENT
Start: 2019-01-14 | End: 2019-05-10 | Stop reason: HOSPADM

## 2019-01-14 RX ORDER — OXYCODONE AND ACETAMINOPHEN 5; 325 MG/1; MG/1
1 TABLET ORAL
Status: COMPLETED | OUTPATIENT
Start: 2019-01-14 | End: 2019-01-14

## 2019-01-14 RX ADMIN — SODIUM CHLORIDE 1000 ML: 0.9 INJECTION, SOLUTION INTRAVENOUS at 02:01

## 2019-01-14 RX ADMIN — ONDANSETRON 4 MG: 2 INJECTION INTRAMUSCULAR; INTRAVENOUS at 02:01

## 2019-01-14 RX ADMIN — NEOMYCIN AND POLYMYXIN B SULFATES AND BACITRACIN ZINC 1 EACH: 400; 3.5; 5 OINTMENT TOPICAL at 06:01

## 2019-01-14 RX ADMIN — OXYCODONE HYDROCHLORIDE AND ACETAMINOPHEN 1 TABLET: 5; 325 TABLET ORAL at 04:01

## 2019-01-14 NOTE — ED PROVIDER NOTES
Encounter Date: 2019    SCRIBE #1 NOTE: I, Zonia Cherry, am scribing for, and in the presence of,  Dr. Lefort. I have scribed the entire note.       History     Chief Complaint   Patient presents with    Abdominal Pain     hx of pancretitis 1 mth ago, pt states she has had n/v every day for past mth      Christine Castro is a 63 y.o. female who  has a past medical history of Arthritis, Asthma, Cancer, CHF (congestive heart failure), Colon cancer, COPD (chronic obstructive pulmonary disease), Coronary artery disease, Depression, Diabetes mellitus, type 2, GERD (gastroesophageal reflux disease), Myocardial infarction, and Thyroid disease.    The patient presents to the ED due to abdominal pain. Associated symptoms include nausea, vomiting, and weakness. Per the family, the patient has not been able to keep food or water down for 4 days. She also complains of a skin ulcer on her right buttocks that formed due to the placement of a Morphine patch. Patient has a history of Pancreatitis 1 month ago.                The history is provided by the patient and a relative.     Review of patient's allergies indicates:   Allergen Reactions    Ibuprofen Other (See Comments)     Causes asthma flare??     Past Medical History:   Diagnosis Date    Arthritis     Asthma     Cancer     [facility]congestive heart failure)     [facility]    Colon cancer     COPD (chronic obstructive pulmonary disease)     Coronary artery disease     Depression     Diabetes mellitus, type 2     GERD (gastroesophageal reflux disease)     Myocardial infarction     AGE 19 MIGUELANGEL WITHBABY DELIVERY    Thyroid disease      Past Surgical History:   Procedure Laterality Date     SECTION      CHOLECYSTECTOMY      COLON SURGERY      COLONOSCOPY      --- repeat in 3-5 years    COLONOSCOPY N/A 2017    [facility]    COLONOSCOPY golytely N/A 4/10/2018    [facility] by Corrina Flores,  [facility] at [facility] ENDO    ECTOPIC PREGNANCY SURGERY      ESOPHAGOGASTRODUODENOSCOPY (EGD) N/A 4/10/2018    [facility] ENDO    ESOPHAGOGASTRODUODENOSCOPY (EGD) N/A 4/18/2017    [facility] ENDO    GASTRIC BYPASS      ovary removed      REMOVAL-PORT-A-CATH Left 5/30/2017    [facility]formed by Mike Sandoval MD at [facility] OR    TONSILLECTOMY      TUBAL LIGATION       Family History   Problem Relation Age of Onset    Hyperlipidemia Mother     Hypertension Mother     Diabetes Mother     Hypertension Sister     Hypertension Brother     Diabetes Brother     Breast cancer Maternal Aunt     Breast cancer Maternal Aunt     Breast cancer Cousin      Social History     Tobacco Use    Smoking status: Never Smoker    Smokeless tobacco: Never Used   Substance Use Topics    Alcohol use: Yes     Comment: very rare    Drug use: No     Review of Systems   Constitutional: Negative for chills and fever.   HENT: Negative for congestion, rhinorrhea and sore throat.    Eyes: Negative for redness and visual disturbance.   Respiratory: Negative for cough, shortness of breath and wheezing.    Cardiovascular: Negative for chest pain and palpitations.   Gastrointestinal: Positive for abdominal pain, nausea and vomiting. Negative for diarrhea.   Genitourinary: Negative for dysuria and hematuria.   Musculoskeletal: Negative for back pain, myalgias and neck pain.   Skin: Negative for rash.   Neurological: Negative for dizziness, weakness and light-headedness.   Psychiatric/Behavioral: Negative for confusion.       Physical Exam     Initial Vitals [01/14/19 1311]   BP Pulse Resp Temp SpO2   108/67 78 20 98.5 °F (36.9 °C) 97 %      MAP       --         Physical Exam    Nursing note and vitals reviewed.  Constitutional: She appears well-developed and well-nourished. She is not diaphoretic. No distress.   HENT:   Head: Normocephalic and atraumatic.   Mouth/Throat: Oropharynx is  clear and moist.   Eyes: Conjunctivae and EOM are normal. Pupils are equal, round, and reactive to light.   Neck: Normal range of motion. Neck supple.   Cardiovascular: Normal rate, regular rhythm and normal heart sounds. Exam reveals no gallop and no friction rub.    No murmur heard.  Pulmonary/Chest: Breath sounds normal. She has no wheezes. She has no rhonchi. She has no rales.   Abdominal: Soft. Bowel sounds are normal. There is no tenderness. There is no rebound and no guarding.   Mild lower abdominal tenderness, no CVAT   Musculoskeletal: Normal range of motion. She exhibits no edema or tenderness.   Lymphadenopathy:     She has no cervical adenopathy.   Neurological: She is alert and oriented to person, place, and time. She has normal strength.   Skin: Skin is warm and dry. Capillary refill takes less than 2 seconds. No rash noted.   1x2 superficial skin ulcer on the right buttocks.         ED Course   Procedures  Labs Reviewed   CBC W/ AUTO DIFFERENTIAL - Abnormal; Notable for the following components:       Result Value    RBC 3.29 (*)     Hemoglobin 10.2 (*)     Hematocrit 31.8 (*)     Gran% 81.2 (*)     Lymph% 12.3 (*)     All other components within normal limits   COMPREHENSIVE METABOLIC PANEL   LIPASE   URINALYSIS, REFLEX TO URINE CULTURE     EKG Readings: (Independently Interpreted)   Initial Reading: No STEMI. Rhythm: Normal Sinus Rhythm. Ectopy: No Ectopy. Conduction: Normal. ST Segments: Normal ST Segments.       Imaging Results    None          Medical Decision Making:   Differential Diagnosis:   Pancreatitis, gastritis, GP, AGE, UTI, pyelo, Aortic dz  Clinical Tests:   Lab Tests: Ordered and Reviewed  Medical Tests: Ordered and Reviewed  ED Management:  Tolerating PO, abd benign, labs reassuring, UTI present, will treat as outpatient, discussed continued follow up for chronic conditions, indications for ED return.                   ED Course as of Jan 14 1446   Mon Jan 14, 2019   1323 Sort note:  Christine Castro nontoxic/afebrile 63 y.o.  presented to the ED with c/o nausea, vomiting and abdominal pain.    Patient seen and medically screened by Physician assistant in Sort process due to ED crowding.  Appropriate tests and/or medications ordered.  Care transferred to an alternate provider when patient was placed in an Exam Room from the [facility] for physical exam, additional orders, and disposition. 1:23 PM. LC    [LC]      ED Course User Index  [LC] DOMENICO Bingham     Clinical Impression:     1. Cystitis    2. Abdominal distress in left upper quadrant    3. Nausea and vomiting, intractability of vomiting not specified, unspecified vomiting type    4. Skin lesion          Disposition:   Disposition: Discharged  Condition: Stable    Scribe attestation I, Dr. Guy Lefort, personally performed the services described in this documentation. All medical record entries made by the scribe were at my direction and in my presence. I have reviewed the chart and agree that the record reflects my personal performance and is accurate and complete. Guy Lefort, MD.  5:58 PM 01/14/2019                      Guy J. Lefort, MD  01/16/19 2055

## 2019-01-14 NOTE — ED NOTES
Pt resting on stretcher with Caregiver at bedside. On cardiac monitor. SR up and CB in reach. No distress noted. No complaints at this time.

## 2019-01-14 NOTE — ED NOTES
Assumed care of this patient.     APPEARANCE: Alert, oriented and in no acute distress.  CARDIAC: Normal rate and rhythm, no murmur heard.   PERIPHERAL VASCULAR: peripheral pulses present. Normal cap refill. No edema. Warm to touch.    RESPIRATORY:Normal rate and effort, breath sounds clear bilaterally throughout chest. Respirations are equal and unlabored no obvious signs of distress.  GASTRO: soft, bowel sounds normal, Tenderness to left upper and lower quadrant, no abdominal distention. Pt has history of obesity and lost weight. Loose skin noted to abdomen and hips.  MUSC: Limited ROM due to weakness and aging process. No bony tenderness or soft tissue tenderness. No obvious deformity.  SKIN: Skin is warm and dry, normal skin turgor, mucous membranes moist. Small stage 2 decubitus noted to right hip.   NEURO: 5/5 strength major flexors/extensors bilaterally. Sensory intact to light touch bilaterally. Kash coma scale: eyes open spontaneously-4, oriented & converses-5, obeys commands-6. No neurological abnormalities.   MENTAL STATUS: awake, alert and aware of environment.  EYE: PERRL, both eyes: pupils brisk and reactive to light. Normal size.  ENT: EARS: no obvious drainage. NOSE: no active bleeding.   Pt complains of abdominal pain with n/v. Pt has hx of Pancreatitis.

## 2019-01-16 NOTE — TELEPHONE ENCOUNTER
I called and notified the pt  But she wants to see an endocrine doctor - please put in a referral

## 2019-01-16 NOTE — TELEPHONE ENCOUNTER
I notified the pt and advised of lab results and that new rx for synthroid 75 mcg was sen tot the pharmacy    Pt was in ER on 1/14/19 for cystitis and abd pain and has hx of pancreatitis   She wants to know if you recommend she see a specialist for this

## 2019-01-17 RX ORDER — ALBUTEROL SULFATE 90 UG/1
AEROSOL, METERED RESPIRATORY (INHALATION)
Qty: 18 G | Refills: 0 | Status: SHIPPED | OUTPATIENT
Start: 2019-01-17 | End: 2019-02-08 | Stop reason: SDUPTHER

## 2019-01-17 NOTE — TELEPHONE ENCOUNTER
I LM with details for the pt   I advised referral was placed for her to see GI for the pancreatitis   I advised her to rtn call to discuss setting up this appt  I want to be sure she received the message and that she schedules this appt

## 2019-01-17 NOTE — TELEPHONE ENCOUNTER
For pancreatitis we refer people to the gastroenterologist NOT endo    I can make the referral to GI    done

## 2019-01-17 NOTE — TELEPHONE ENCOUNTER
Called patient to schedule referral to Gastroenterology, left message for patient to return my call

## 2019-01-22 DIAGNOSIS — K21.9 GASTROESOPHAGEAL REFLUX DISEASE WITHOUT ESOPHAGITIS: ICD-10-CM

## 2019-01-22 RX ORDER — OMEPRAZOLE 40 MG/1
40 CAPSULE, DELAYED RELEASE ORAL EVERY MORNING
Qty: 90 CAPSULE | Refills: 1 | Status: SHIPPED | OUTPATIENT
Start: 2019-01-22 | End: 2019-01-28 | Stop reason: SDUPTHER

## 2019-01-22 NOTE — TELEPHONE ENCOUNTER
I spoke with the pt   appt scheduled for her to see GI in [place] with dr gage   On 1/30/19 alex 401  I left copy of appt letter at the  for her to

## 2019-01-22 NOTE — TELEPHONE ENCOUNTER
Pt needs a new rx to [facility] for the omeprazole     She wasted her bottle   Of meds with some water

## 2019-01-24 RX ORDER — DIAZEPAM 2 MG/1
TABLET ORAL
Qty: 15 TABLET | Refills: 0 | Status: SHIPPED | OUTPATIENT
Start: 2019-01-24 | End: 2019-02-06 | Stop reason: SDUPTHER

## 2019-01-25 ENCOUNTER — TELEPHONE (OUTPATIENT)
Dept: FAMILY MEDICINE | Facility: CLINIC | Age: 64
End: 2019-01-25

## 2019-01-28 DIAGNOSIS — K21.9 GASTROESOPHAGEAL REFLUX DISEASE WITHOUT ESOPHAGITIS: ICD-10-CM

## 2019-01-28 RX ORDER — OMEPRAZOLE 40 MG/1
CAPSULE, DELAYED RELEASE ORAL
Qty: 90 CAPSULE | Refills: 0 | Status: SHIPPED | OUTPATIENT
Start: 2019-01-28 | End: 2019-07-02 | Stop reason: SDUPTHER

## 2019-01-29 NOTE — TELEPHONE ENCOUNTER
I Left message with details for the pt advising her that appt has been scheduled for this Thursday jan 31 at 2 pm for knee injection  I advised her to rtn call to confirm this appt

## 2019-01-30 ENCOUNTER — OFFICE VISIT (OUTPATIENT)
Dept: GASTROENTEROLOGY | Facility: CLINIC | Age: 64
End: 2019-01-30
Payer: MEDICARE

## 2019-01-30 DIAGNOSIS — R10.13 EPIGASTRIC ABDOMINAL PAIN: Primary | ICD-10-CM

## 2019-01-30 PROCEDURE — 99999 PR PBB SHADOW E&M-EST. PATIENT-LVL III: CPT | Mod: PBBFAC,,, | Performed by: INTERNAL MEDICINE

## 2019-01-30 PROCEDURE — 99214 OFFICE O/P EST MOD 30 MIN: CPT | Mod: S$PBB,,, | Performed by: INTERNAL MEDICINE

## 2019-01-30 PROCEDURE — 99999 PR PBB SHADOW E&M-EST. PATIENT-LVL III: ICD-10-PCS | Mod: PBBFAC,,, | Performed by: INTERNAL MEDICINE

## 2019-01-30 PROCEDURE — 99213 OFFICE O/P EST LOW 20 MIN: CPT | Mod: PBBFAC,PO | Performed by: INTERNAL MEDICINE

## 2019-01-30 PROCEDURE — 99214 PR OFFICE/OUTPT VISIT, EST, LEVL IV, 30-39 MIN: ICD-10-PCS | Mod: S$PBB,,, | Performed by: INTERNAL MEDICINE

## 2019-01-30 RX ORDER — DIAZEPAM 5 MG/1
TABLET ORAL
Qty: 30 TABLET | Refills: 0 | OUTPATIENT
Start: 2019-01-30

## 2019-01-30 NOTE — LETTER
January 31, 2019      Alon Santiago MD  [address] [facility] [place] [address]           [facility]  [address] [place] [id number]  Phone: [phone]          Patient: Christine Castro   MR Number: [id number]   YOB: 1955   Date of Visit: 1/30/2019       Dear Dr. Alon Santiago:    Thank you for referring Christine Castro to me for evaluation. Attached you will find relevant portions of my assessment and plan of care.    If you have questions, please do not hesitate to call me. I look forward to following Christine Castro along with you.    Sincerely,    Corrina Flores MD    Enclosure  CC:  No Recipients    If you would like to receive this communication electronically, please contact [email or web address] or [phone] to request more information on [employer] access.    For providers and/or their staff who would like to refer a patient to [facility], please contact us through our one-stop-shop provider referral line, [facility], at [phone].    If you feel you have received this communication in error or would no longer like to receive these types of communications, please e-mail [email or web address]

## 2019-01-30 NOTE — PATIENT INSTRUCTIONS
Endoscopy has been scheduled for 2/28/19 with Dr. Flores.  The lab will call two days before with arrival time.  The test will take place at [facility] [address]. Check in at the first floor at Hospital Admit Desk.  Nothing to eat or drink after midnight the night before.  Only take heart, blood pressure or seizure medications the morning of the procedure.  Do NOT take medications that contain aspirin or ibuprofen.  It is ok to take 81mg baby aspirin.  Please contact the office with any questions.

## 2019-01-30 NOTE — PROGRESS NOTES
Subjective:       Patient ID: Christine Castro is a 63 y.o. female.    Chief Complaint: Abdominal Pain    This is a 63-year-old female here for evaluation of abdominal pain.  She has noted pain intermittently for months described as episodic pain which is severe when it occurs.  His epigastric and slightly in the left upper quadrant. She presented to the emergency room and was noted to have elevated lipase.  CT did not have any evidence of peripancreatic stranding.  The pain lasts for hours at a time when it occurs.  No particular exacerbating or relieving factors or other associated symptoms.  Some difficulty with certain types of foods including breads.  She will vomit about 30 min after eating.  No chest pain, palpitations.  No unintentional weight loss.  No night sweats. No fevers or chills. No melena.    The following portions of the patient's history were reviewed and updated as appropriate: allergies, current medications, past family history, past medical history, past social history, past surgical history and problem list.    (Portions of this note were dictated using voice recognition software and may contain dictation related errors in spelling/grammar/syntax not found on text review)    HPI  Review of Systems   Constitutional: Positive for appetite change. Negative for unexpected weight change.   Respiratory: Negative for apnea and chest tightness.    Gastrointestinal: Positive for abdominal pain, nausea and vomiting.   Musculoskeletal: Positive for arthralgias and back pain.       Objective:      Physical Exam   Constitutional: She is oriented to person, place, and time. She appears well-developed and well-nourished. No distress.   Drowsy during exam   HENT:   Head: Normocephalic and atraumatic.   Eyes: Conjunctivae are normal. No scleral icterus.   Cardiovascular: Normal rate.   Pulmonary/Chest: Effort normal. No respiratory distress.   Abdominal: Soft. Bowel sounds are normal. She exhibits no  distension and no mass. There is no guarding.   Neurological: She is alert and oriented to person, place, and time.   Skin: Skin is warm and dry. She is not diaphoretic.   Psychiatric: Judgment and thought content normal.   Nursing note and vitals reviewed.      Assessment:       1. Epigastric abdominal pain        Plan:   1. EGD

## 2019-01-31 ENCOUNTER — OFFICE VISIT (OUTPATIENT)
Dept: FAMILY MEDICINE | Facility: CLINIC | Age: 64
End: 2019-01-31
Payer: MEDICARE

## 2019-01-31 VITALS
WEIGHT: 184.06 LBS | HEIGHT: 60 IN | TEMPERATURE: 99 F | BODY MASS INDEX: 36.14 KG/M2 | SYSTOLIC BLOOD PRESSURE: 120 MMHG | OXYGEN SATURATION: 99 % | HEART RATE: 94 BPM | DIASTOLIC BLOOD PRESSURE: 74 MMHG

## 2019-01-31 DIAGNOSIS — M13.862 ALLERGIC ARTHRITIS OF LEFT KNEE: ICD-10-CM

## 2019-01-31 DIAGNOSIS — M13.861 ALLERGIC ARTHRITIS OF RIGHT KNEE: Primary | ICD-10-CM

## 2019-01-31 PROCEDURE — 99212 PR OFFICE/OUTPT VISIT, EST, LEVL II, 10-19 MIN: ICD-10-PCS | Mod: 25,S$GLB,, | Performed by: FAMILY MEDICINE

## 2019-01-31 PROCEDURE — 20610 DRAIN/INJ JOINT/BURSA W/O US: CPT | Mod: 50,S$GLB,, | Performed by: FAMILY MEDICINE

## 2019-01-31 PROCEDURE — 99212 OFFICE O/P EST SF 10 MIN: CPT | Mod: 25,S$GLB,, | Performed by: FAMILY MEDICINE

## 2019-01-31 PROCEDURE — 20610 LARGE JOINT ASPIRATION/INJECTION: L KNEE: ICD-10-PCS | Mod: 50,S$GLB,, | Performed by: FAMILY MEDICINE

## 2019-01-31 RX ORDER — BACITRACIN 500 [USP'U]/G
OINTMENT TOPICAL 2 TIMES DAILY
Refills: 0 | COMMUNITY
Start: 2019-01-14 | End: 2019-03-22

## 2019-01-31 RX ORDER — OXYCODONE AND ACETAMINOPHEN 10; 325 MG/1; MG/1
TABLET ORAL EVERY 6 HOURS PRN
Status: ON HOLD | COMMUNITY
Start: 2019-01-08 | End: 2019-05-10 | Stop reason: SDUPTHER

## 2019-02-01 NOTE — PROCEDURES
Large Joint Aspiration/Injection: L knee  Date/Time: 1/31/2019 11:06 PM  Performed by: Alon Santiago MD  Authorized by: Alon Santiago MD     Consent Done?:  Yes (Verbal)  Indications:  Pain  Procedure site marked: Yes    Timeout: Prior to procedure the correct patient, procedure, and site was verified      Location:  Knee  Site:  L knee  Prep: Patient was prepped and draped in usual sterile fashion    Ultrasonic Guidance for needle placement: No  Needle size:  25 G  Approach:  Anterolateral

## 2019-02-01 NOTE — PROGRESS NOTES
Patient ID: Christine Castro is a 63 y.o. female.    Chief Complaint: Knee Pain (bilt knee pain )    HPI       Christine Castro is a 63 y.o. female here because of bilateral knee pain arthritis.  Would like injections in both knees to relieve the pain of arthritis.      Review of Symptoms    Constitutional  No change in activity, No chills fever   Resp  Neg hemoptysis, stridor, choking  CVS  Neg chest pain, palpitations    /74 (BP Location: Left arm, Patient Position: Sitting)   Pulse 94   Temp 99.3 °F (37.4 °C) (Oral)   Ht 5' (1.524 m)   Wt 83.5 kg (184 lb 1.4 oz)   SpO2 99%   BMI 35.95 kg/m²     Physical Exam    Constitutional:   Oriented to person, place, and time.appears well-developed and well-nourished.   No distress.  he    HENT  Head: Normocephalic and atraumatic  Right Ear: External ear normal.   Left Ear: External ear normal.   Nose: External nose normal.   Mouth: Moist mucous membranes      EComplete Blood Count  Lab Results   Component Value Date    RBC 3.29 (L) 01/14/2019    HGB 10.2 (L) 01/14/2019    HCT 31.8 (L) 01/14/2019    MCV 97 01/14/2019    MCH 31.0 01/14/2019    MCHC 32.1 01/14/2019    RDW 14.3 01/14/2019     01/14/2019    MPV 9.3 01/14/2019    GRAN 7.5 01/14/2019    GRAN 81.2 (H) 01/14/2019    LYMPH 1.1 01/14/2019    LYMPH 12.3 (L) 01/14/2019    MONO 0.5 01/14/2019    MONO 4.9 01/14/2019    EOS 0.1 01/14/2019    BASO 0.01 01/14/2019    EOSINOPHIL 1.3 01/14/2019    BASOPHIL 0.1 01/14/2019    DIFFMETHOD Automated 01/14/2019       Comprehensive Metabolic Panel  Lab Results   Component Value Date    GLU 93 01/14/2019    BUN 8 01/14/2019    CREATININE 0.8 01/14/2019     01/14/2019    K 3.7 01/14/2019     01/14/2019    PROT 6.2 01/14/2019    ALBUMIN 3.1 (L) 01/14/2019    BILITOT 0.4 01/14/2019    AST 24 01/14/2019    ALKPHOS 77 01/14/2019    CO2 28 01/14/2019    ALT 25 01/14/2019    ANIONGAP 6 (L) 01/14/2019    EGFRNONAA >60 01/14/2019    ESTGFRAFRICA >60 01/14/2019        TSH  Lab Results   Component Value Date    TSH <0.026 (L) 01/09/2019       Assessment / Plan:    No diagnosis found.  There are no diagnoses linked to this encounter.

## 2019-02-06 RX ORDER — DIAZEPAM 2 MG/1
TABLET ORAL
Qty: 30 TABLET | Refills: 0 | Status: SHIPPED | OUTPATIENT
Start: 2019-02-06 | End: 2019-02-20 | Stop reason: SDUPTHER

## 2019-02-06 RX ORDER — DICLOFENAC SODIUM 10 MG/G
GEL TOPICAL
Qty: 100 G | Refills: 0 | Status: SHIPPED | OUTPATIENT
Start: 2019-02-06 | End: 2019-03-04 | Stop reason: SDUPTHER

## 2019-02-08 RX ORDER — ALBUTEROL SULFATE 90 UG/1
AEROSOL, METERED RESPIRATORY (INHALATION)
Qty: 18 G | Refills: 3 | Status: SHIPPED | OUTPATIENT
Start: 2019-02-08 | End: 2022-07-08 | Stop reason: SDUPTHER

## 2019-02-20 DIAGNOSIS — M17.0 ARTHRITIS OF BOTH KNEES: ICD-10-CM

## 2019-02-20 RX ORDER — PREDNISONE 5 MG/1
TABLET ORAL
Qty: 30 TABLET | Refills: 0 | Status: SHIPPED | OUTPATIENT
Start: 2019-02-20 | End: 2019-03-31 | Stop reason: SDUPTHER

## 2019-02-20 RX ORDER — DIAZEPAM 2 MG/1
TABLET ORAL
Qty: 30 TABLET | Refills: 0 | Status: SHIPPED | OUTPATIENT
Start: 2019-02-20 | End: 2019-02-28

## 2019-02-20 RX ORDER — ZOLPIDEM TARTRATE 5 MG/1
TABLET ORAL
Qty: 30 TABLET | Refills: 0 | Status: SHIPPED | OUTPATIENT
Start: 2019-02-20 | End: 2019-03-31 | Stop reason: SDUPTHER

## 2019-02-21 RX ORDER — PROMETHAZINE HYDROCHLORIDE 25 MG/1
TABLET ORAL
Qty: 25 TABLET | Refills: 0 | Status: SHIPPED | OUTPATIENT
Start: 2019-02-21 | End: 2019-04-23 | Stop reason: SDUPTHER

## 2019-02-25 ENCOUNTER — NURSE TRIAGE (OUTPATIENT)
Dept: ADMINISTRATIVE | Facility: CLINIC | Age: 64
End: 2019-02-25

## 2019-02-25 NOTE — TELEPHONE ENCOUNTER
"Calling to Confirm appt. Then states she had a fall.    Reason for Disposition   General information question, no triage required and triager able to answer question   Shoulder pain    Answer Assessment - Initial Assessment Questions  1. MECHANISM: "How did the injury happen?"       Someone else jerked a basket  2. ONSET: "When did the injury happen?" (Minutes or hours ago)      3 days ago  3. LOCATION: "What part of the abdomen is injured?"      Left side of abdomen but all of body   4. APPEARANCE of INJURY: "What does the injury look like?"     Shoulder, back and spine and knee pain  5. PAIN: "Is there any pain?" If so, ask: "How bad is the pain?"  (e.g., Scale 1-10; or mild, moderate, severe)   - MILD - doesn't interfere with normal activities    - MODERATE - interferes with normal activities or awakens from sleep    - SEVERE - patient doesn't want to move (R/O peritonitis, internal bleeding)       Pain all over body rates 9  6. SIZE: For cuts, bruises, or swelling, ask: "How large is it?" (e.g., inches or centimeters)      Knot on her head  7. TETANUS: For any breaks in the skin, ask: "When was the last tetanus booster?"      None know  8. OTHER SYMPTOMS: "Do you have any other symptoms?"      Shoulder blade in front of chest and neck is clacking  9. PREGNANCY: "Is there any chance you are pregnant?" "When was your last menstrual period?"      no    Protocols used:  INFORMATION ONLY CALL-A-,  ABDOMINAL INJURY-A-      "

## 2019-02-26 ENCOUNTER — TELEPHONE (OUTPATIENT)
Dept: ENDOSCOPY | Facility: HOSPITAL | Age: 64
End: 2019-02-26

## 2019-02-26 ENCOUNTER — TELEPHONE (OUTPATIENT)
Dept: GASTROENTEROLOGY | Facility: CLINIC | Age: 64
End: 2019-02-26

## 2019-02-26 NOTE — TELEPHONE ENCOUNTER
----- Message from Gianna Ames sent at 2/26/2019  3:34 PM CST -----  Contact: Self  Pt is calling to provide a good telephone number to be reached at [phone].    Thank you.

## 2019-02-27 ENCOUNTER — TELEPHONE (OUTPATIENT)
Dept: ENDOSCOPY | Facility: HOSPITAL | Age: 64
End: 2019-02-27

## 2019-02-27 ENCOUNTER — TELEPHONE (OUTPATIENT)
Dept: GASTROENTEROLOGY | Facility: CLINIC | Age: 64
End: 2019-02-27

## 2019-02-27 NOTE — TELEPHONE ENCOUNTER
----- Message from Violeta Henley sent at 2/27/2019  1:03 PM CST -----  Contact: self / [phone]  Patient is returning a call from your office. Please advise

## 2019-02-27 NOTE — TELEPHONE ENCOUNTER
Left message instructing patient to call dept @ [phone] between 8am-4pm.    Arrival time given @ *[phone]

## 2019-02-28 ENCOUNTER — HOSPITAL ENCOUNTER (OUTPATIENT)
Facility: HOSPITAL | Age: 64
Discharge: HOME | End: 2019-02-28
Attending: INTERNAL MEDICINE | Admitting: INTERNAL MEDICINE
Payer: MEDICARE

## 2019-02-28 ENCOUNTER — ANESTHESIA (OUTPATIENT)
Dept: ENDOSCOPY | Facility: HOSPITAL | Age: 64
End: 2019-02-28
Payer: MEDICARE

## 2019-02-28 ENCOUNTER — ANESTHESIA EVENT (OUTPATIENT)
Dept: ENDOSCOPY | Facility: HOSPITAL | Age: 64
End: 2019-02-28
Payer: MEDICARE

## 2019-02-28 VITALS
WEIGHT: 175 LBS | SYSTOLIC BLOOD PRESSURE: 120 MMHG | BODY MASS INDEX: 35.28 KG/M2 | DIASTOLIC BLOOD PRESSURE: 73 MMHG | RESPIRATION RATE: 18 BRPM | TEMPERATURE: 99 F | OXYGEN SATURATION: 100 % | HEART RATE: 69 BPM | HEIGHT: 59 IN

## 2019-02-28 DIAGNOSIS — R10.13 EPIGASTRIC PAIN: ICD-10-CM

## 2019-02-28 PROCEDURE — 88305 TISSUE SPECIMEN TO PATHOLOGY - SURGERY: ICD-10-PCS | Mod: 26,,, | Performed by: PATHOLOGY

## 2019-02-28 PROCEDURE — 25000003 PHARM REV CODE 250: Performed by: INTERNAL MEDICINE

## 2019-02-28 PROCEDURE — 88305 TISSUE EXAM BY PATHOLOGIST: CPT | Performed by: PATHOLOGY

## 2019-02-28 PROCEDURE — 88342 TISSUE SPECIMEN TO PATHOLOGY - SURGERY: ICD-10-PCS | Mod: 26,,, | Performed by: PATHOLOGY

## 2019-02-28 PROCEDURE — 43239 EGD BIOPSY SINGLE/MULTIPLE: CPT | Performed by: INTERNAL MEDICINE

## 2019-02-28 PROCEDURE — 43239 EGD BIOPSY SINGLE/MULTIPLE: CPT | Mod: ,,, | Performed by: INTERNAL MEDICINE

## 2019-02-28 PROCEDURE — 88342 IMHCHEM/IMCYTCHM 1ST ANTB: CPT | Mod: 26,,, | Performed by: PATHOLOGY

## 2019-02-28 PROCEDURE — 88305 TISSUE EXAM BY PATHOLOGIST: CPT | Mod: 26,,, | Performed by: PATHOLOGY

## 2019-02-28 PROCEDURE — 25000003 PHARM REV CODE 250: Performed by: NURSE ANESTHETIST, CERTIFIED REGISTERED

## 2019-02-28 PROCEDURE — 63600175 PHARM REV CODE 636 W HCPCS: Performed by: NURSE ANESTHETIST, CERTIFIED REGISTERED

## 2019-02-28 PROCEDURE — 43239 PR EGD, FLEX, W/BIOPSY, SGL/MULTI: ICD-10-PCS | Mod: ,,, | Performed by: INTERNAL MEDICINE

## 2019-02-28 PROCEDURE — 37000009 HC ANESTHESIA EA ADD 15 MINS: Performed by: INTERNAL MEDICINE

## 2019-02-28 PROCEDURE — 27201012 HC FORCEPS, HOT/COLD, DISP: Performed by: INTERNAL MEDICINE

## 2019-02-28 PROCEDURE — 37000008 HC ANESTHESIA 1ST 15 MINUTES: Performed by: INTERNAL MEDICINE

## 2019-02-28 RX ORDER — SODIUM CHLORIDE 9 MG/ML
INJECTION, SOLUTION INTRAVENOUS CONTINUOUS PRN
Status: DISCONTINUED | OUTPATIENT
Start: 2019-02-28 | End: 2019-02-28

## 2019-02-28 RX ORDER — LIDOCAINE HCL/PF 100 MG/5ML
SYRINGE (ML) INTRAVENOUS
Status: DISCONTINUED | OUTPATIENT
Start: 2019-02-28 | End: 2019-02-28

## 2019-02-28 RX ORDER — DIAZEPAM 5 MG/1
TABLET ORAL
Qty: 30 TABLET | Refills: 0 | Status: SHIPPED | OUTPATIENT
Start: 2019-02-28 | End: 2019-04-28

## 2019-02-28 RX ORDER — SODIUM CHLORIDE 0.9 % (FLUSH) 0.9 %
3 SYRINGE (ML) INJECTION
Status: DISCONTINUED | OUTPATIENT
Start: 2019-02-28 | End: 2019-02-28 | Stop reason: HOSPADM

## 2019-02-28 RX ORDER — PROPOFOL 10 MG/ML
VIAL (ML) INTRAVENOUS
Status: DISCONTINUED | OUTPATIENT
Start: 2019-02-28 | End: 2019-02-28

## 2019-02-28 RX ORDER — SODIUM CHLORIDE 9 MG/ML
INJECTION, SOLUTION INTRAVENOUS CONTINUOUS
Status: DISCONTINUED | OUTPATIENT
Start: 2019-02-28 | End: 2019-02-28 | Stop reason: HOSPADM

## 2019-02-28 RX ADMIN — PROPOFOL 100 MG: 10 INJECTION, EMULSION INTRAVENOUS at 01:02

## 2019-02-28 RX ADMIN — SODIUM CHLORIDE: 9 INJECTION, SOLUTION INTRAVENOUS at 01:02

## 2019-02-28 RX ADMIN — LIDOCAINE HYDROCHLORIDE 50 MG: 20 INJECTION, SOLUTION INTRAVENOUS at 01:02

## 2019-02-28 RX ADMIN — SODIUM CHLORIDE: 0.9 INJECTION, SOLUTION INTRAVENOUS at 11:02

## 2019-02-28 NOTE — ANESTHESIA PREPROCEDURE EVALUATION
02/28/2019  Christine Castro is a 63 y.o., female for EGD under MAC    Review of patient's allergies indicates:   Allergen Reactions    Ibuprofen      Past Medical History:   Diagnosis Date    Arthritis     Asthma     Cancer     CH[facility] (congestive heart failure)     [facility]    Colon cancer     COPD (chronic obstructive pulmonary disease)     Coronary artery disease     Depression     Diabetes mellitus, type 2     GERD (gastroesophageal reflux disease)     Myocardial infarction     AGE 19 MIGUELANGEL WITHBABY DELIVERY    Thyroid disease        Patient Active Problem List   Diagnosis    Arthritis    GERD (gastroesophageal reflux disease)    Thyroid disease    CHF (congestive heart failure)    Depression    History of colon cancer    Malfunction of device    Anemia    History of myocardial infarction    Epigastric pain       Pre-op Assessment    I have reviewed the Patient Summary Reports.      I have reviewed the Medications.     Review of Systems  Anesthesia Hx:  No problems with previous Anesthesia   Denies Personal Hx of Anesthesia complications.   Hematology/Oncology:  Hematology Normal   Oncology Normal     Cardiovascular:   Past MI CAD   CHF    Pulmonary:   COPD    Renal/:  Renal/ Normal     Hepatic/GI:   GERD    Musculoskeletal:  Musculoskeletal Normal    Neurological:  Neurology Normal    Endocrine:   Diabetes, type 2    Psych:   Psychiatric History depression        Wt Readings from Last 3 Encounters:   02/28/19 79.4 kg (175 lb)   01/31/19 83.5 kg (184 lb 1.4 oz)   01/14/19 86.2 kg (190 lb)     Temp Readings from Last 3 Encounters:   02/28/19 37.1 °C (98.7 °F)   01/31/19 37.4 °C (99.3 °F) (Oral)   01/14/19 36.9 °C (98.5 °F) (Oral)     BP Readings from Last 3 Encounters:   02/28/19 119/60   01/31/19 120/74   01/14/19 105/62     Pulse Readings from Last 3 Encounters:    02/28/19 63   01/31/19 94   01/14/19 65     Lab Results   Component Value Date    WBC 9.26 01/14/2019    HGB 10.2 (L) 01/14/2019    HCT 31.8 (L) 01/14/2019    MCV 97 01/14/2019     01/14/2019       TTE 3/2016  CONCLUSIONS     1 - Normal left ventricular systolic function (EF 55-60%).     2 - Concentric remodeling.     3 - Normal left ventricular diastolic function.     4 - Normal right ventricular systolic function .     5 - The estimated PA systolic pressure is 28 mmHg.     4/2017 neg stress test      Physical Exam  General:  Obesity    Airway/Jaw/Neck:  Airway Findings: Mouth Opening: Small, but > 3cm Tongue: Normal  General Airway Assessment: Adult  Mallampati: II  Improves to II with phonation.  TM Distance: Normal, at least 6 cm  Jaw/Neck Findings:  Neck ROM: Normal ROM  Neck Findings:  Short Neck      Dental:  Dental Findings: Lower Dentures, Upper Dentures   Chest/Lungs:  Chest/Lungs Clear    Heart/Vascular:  Heart Findings: Normal       Mental Status:  Mental Status Findings:  Cooperative         Anesthesia Plan  Type of Anesthesia, risks & benefits discussed:  Anesthesia Type:  MAC, general  Patient's Preference:   Intra-op Monitoring Plan:   Intra-op Monitoring Plan Comments:   Post Op Pain Control Plan:   Post Op Pain Control Plan Comments:   Induction:   IV  Beta Blocker:         Informed Consent: Patient understands risks and agrees with Anesthesia plan.  Questions answered. Anesthesia consent signed with patient.  ASA Score: 3     Day of Surgery Review of History & Physical: I have interviewed and examined the patient. I have reviewed the patient's H&P dated:            Ready For Surgery From Anesthesia Perspective.

## 2019-02-28 NOTE — OR NURSING
Report received from SURJIT Chaves RN  Patient ready for discharge.  DC paperwork handed to patient

## 2019-02-28 NOTE — TRANSFER OF CARE
"Anesthesia Transfer of Care Note    Patient: Christine Castro    Procedure(s) Performed: Procedure(s) (LRB):  ESOPHAGOGASTRODUODENOSCOPY (EGD) (N/A)    Patient location: GI    Anesthesia Type: MAC    Transport from OR: Transported from OR on room air with adequate spontaneous ventilation    Post pain: adequate analgesia    Post assessment: no apparent anesthetic complications    Post vital signs: stable    Level of consciousness: awake and alert    Nausea/Vomiting: no nausea/vomiting    Complications: none    Transfer of care protocol was followed      Last vitals:   Visit Vitals  /60   Pulse 63   Temp 37.1 °C (98.7 °F)   Resp 16   Ht 4' 11" (1.499 m)   Wt 79.4 kg (175 lb)   SpO2 100%   Breastfeeding? No   BMI 35.35 kg/m²     "

## 2019-02-28 NOTE — PROVATION PATIENT INSTRUCTIONS
Discharge Summary/Instructions after an Endoscopic Procedure  Patient Name: Christine Castro  Patient MRN: [id number]  Patient YOB: 1955 Thursday, February 28, 2019  Corrina Flores MD  RESTRICTIONS:  During your procedure today, you received medications for sedation.  These   medications may affect your judgment, balance and coordination.  Therefore,   for 24 hours, you have the following restrictions:   - DO NOT drive a car, operate machinery, make legal/financial decisions,   sign important papers or drink alcohol.    ACTIVITY:  Today: no heavy lifting, straining or running due to procedural   sedation/anesthesia.  The following day: return to full activity including work.  DIET:  Eat and drink normally unless instructed otherwise.     TREATMENT FOR COMMON SIDE EFFECTS:  - Mild abdominal pain, nausea, belching, bloating or excessive gas:  rest,   eat lightly and use a heating pad.  - Sore Throat: treat with throat lozenges and/or gargle with warm salt   water.  - Because air was used during the procedure, expelling large amounts of air   from your rectum or belching is normal.  - If a bowel prep was taken, you may not have a bowel movement for 1-3 days.    This is normal.  SYMPTOMS TO WATCH FOR AND REPORT TO YOUR PHYSICIAN:  1. Abdominal pain or bloating, other than gas cramps.  2. Chest pain.  3. Back pain.  4. Signs of infection such as: chills or fever occurring within 24 hours   after the procedure.  5. Rectal bleeding, which would show as bright red, maroon, or black stools.   (A tablespoon of blood from the rectum is not serious, especially if   hemorrhoids are present.)  6. Vomiting.  7. Weakness or dizziness.  GO DIRECTLY TO THE NEAREST EMERGENCY ROOM IF YOU HAVE ANY OF THE FOLLOWING:      Difficulty breathing              Chills and/or fever over 101 F   Persistent vomiting and/or vomiting blood   Severe abdominal pain   Severe chest pain   Black, tarry stools   Bleeding- more than one  tablespoon   Any other symptom or condition that you feel may need urgent attention  Your doctor recommends these additional instructions:  If any biopsies were taken, your doctors clinic will contact you in 1 to 2   weeks with any results.  - Discharge patient to home (via wheelchair).   - Patient has a contact number available for emergencies.  The signs and   symptoms of potential delayed complications were discussed with the   patient.  Return to normal activities tomorrow.  Written discharge   instructions were provided to the patient.   - Resume previous diet.   - Continue present medications.   - Await pathology results.  For questions, problems or results please call your physician - Corrina Flores MD at Work:  [phone].  EMERGENCY PHONE NUMBER: [phone],  LAB RESULTS: [phone]  IF A COMPLICATION OR EMERGENCY SITUATION ARISES AND YOU ARE UNABLE TO REACH   YOUR PHYSICIAN - GO DIRECTLY TO THE EMERGENCY ROOM.  Corrina Flores MD  2/28/2019 1:30:56 PM  This report has been verified and signed electronically.  [employer]

## 2019-02-28 NOTE — ANESTHESIA POSTPROCEDURE EVALUATION
"Anesthesia Post Evaluation    Patient: Christine Castro    Procedure(s) Performed: Procedure(s) (LRB):  ESOPHAGOGASTRODUODENOSCOPY (EGD) (N/A)    Final Anesthesia Type: MAC  Patient location during evaluation: OPS  Patient participation: Yes- Able to Participate  Level of consciousness: awake and alert  Post-procedure vital signs: reviewed and stable  Airway patency: patent  PONV status at discharge: No PONV  Anesthetic complications: no      Cardiovascular status: blood pressure returned to baseline and hemodynamically stable  Respiratory status: spontaneous ventilation  Hydration status: euvolemic  Follow-up not needed.        Visit Vitals  /60   Pulse 63   Temp 37.1 °C (98.7 °F)   Resp 16   Ht 4' 11" (1.499 m)   Wt 79.4 kg (175 lb)   SpO2 100%   Breastfeeding? No   BMI 35.35 kg/m²       Pain/Maggy Score: No Data Recorded      "

## 2019-03-04 RX ORDER — DICLOFENAC SODIUM 10 MG/G
GEL TOPICAL
Qty: 100 G | Refills: 0 | Status: SHIPPED | OUTPATIENT
Start: 2019-03-04 | End: 2019-04-04 | Stop reason: SDUPTHER

## 2019-03-15 ENCOUNTER — TELEPHONE (OUTPATIENT)
Dept: GASTROENTEROLOGY | Facility: CLINIC | Age: 64
End: 2019-03-15

## 2019-03-15 NOTE — TELEPHONE ENCOUNTER
----- Message from Corrina Flores MD sent at 3/14/2019  8:59 PM CDT -----  Gastric bx with mild chronic inflammation. H.pylori negative. If still with symptoms happy to f/u in clinic

## 2019-03-19 ENCOUNTER — TELEPHONE (OUTPATIENT)
Dept: GASTROENTEROLOGY | Facility: CLINIC | Age: 64
End: 2019-03-19

## 2019-03-20 ENCOUNTER — TELEPHONE (OUTPATIENT)
Dept: FAMILY MEDICINE | Facility: CLINIC | Age: 64
End: 2019-03-20

## 2019-03-20 DIAGNOSIS — M17.0 ARTHRITIS OF BOTH KNEES: ICD-10-CM

## 2019-03-20 DIAGNOSIS — M19.90 ARTHRITIS: Primary | ICD-10-CM

## 2019-03-20 NOTE — TELEPHONE ENCOUNTER
----- Message from Laurita Ochoa sent at 3/20/2019  4:47 PM CDT -----  Contact: [phone] /self  Patient is requesting a call back regarding getting a cortisone injection on tomorrow. Both of her knees are locked and in pain. Thanks      Friday afternoon?

## 2019-03-20 NOTE — TELEPHONE ENCOUNTER
We just did that in January we need to wait a while.  This should be only a temporary fix. I suggest seeing ortho for more definitve treatment.  Frequent injections may worsen situation;

## 2019-03-22 ENCOUNTER — TELEPHONE (OUTPATIENT)
Dept: ORTHOPEDICS | Facility: CLINIC | Age: 64
End: 2019-03-22

## 2019-03-22 ENCOUNTER — TELEPHONE (OUTPATIENT)
Dept: GASTROENTEROLOGY | Facility: CLINIC | Age: 64
End: 2019-03-22

## 2019-03-22 ENCOUNTER — OFFICE VISIT (OUTPATIENT)
Dept: ORTHOPEDICS | Facility: CLINIC | Age: 64
End: 2019-03-22
Payer: MEDICARE

## 2019-03-22 VITALS — HEIGHT: 59 IN | WEIGHT: 175 LBS | BODY MASS INDEX: 35.28 KG/M2

## 2019-03-22 DIAGNOSIS — M17.0 PRIMARY OSTEOARTHRITIS OF BOTH KNEES: Primary | ICD-10-CM

## 2019-03-22 PROCEDURE — 99213 PR OFFICE/OUTPT VISIT, EST, LEVL III, 20-29 MIN: ICD-10-PCS | Mod: S$PBB,,, | Performed by: ORTHOPAEDIC SURGERY

## 2019-03-22 PROCEDURE — 99999 PR PBB SHADOW E&M-EST. PATIENT-LVL III: CPT | Mod: PBBFAC,,, | Performed by: ORTHOPAEDIC SURGERY

## 2019-03-22 PROCEDURE — 99999 PR PBB SHADOW E&M-EST. PATIENT-LVL III: ICD-10-PCS | Mod: PBBFAC,,, | Performed by: ORTHOPAEDIC SURGERY

## 2019-03-22 PROCEDURE — 99213 OFFICE O/P EST LOW 20 MIN: CPT | Mod: PBBFAC,PN | Performed by: ORTHOPAEDIC SURGERY

## 2019-03-22 PROCEDURE — 99213 OFFICE O/P EST LOW 20 MIN: CPT | Mod: S$PBB,,, | Performed by: ORTHOPAEDIC SURGERY

## 2019-03-22 NOTE — LETTER
March 22, 2019      Alon Santiago MD  [address] [facility] [address]           [facility]  [address] [place] [address]  [facility] [address]  Phone: [phone]          Patient: Christine Castro   MR Number: [id number]   YOB: 1955   Date of Visit: 3/22/2019       Dear Dr. Alon Santiago:    Thank you for referring Christine Castro to me for evaluation. Attached you will find relevant portions of my assessment and plan of care.    If you have questions, please do not hesitate to call me. I look forward to following Christine Castro along with you.    Sincerely,    Roldan Greenwood MD    Enclosure  CC:  No Recipients    If you would like to receive this communication electronically, please contact [email or web address] or [phone] to request more information on [facility] [facility] access.    For providers and/or their staff who would like to refer a patient to [facility], please contact us through our one-stop-shop provider referral line, [facility], at [phone].    If you feel you have received this communication in error or would no longer like to receive these types of communications, please e-mail [email or web address]

## 2019-03-22 NOTE — PROGRESS NOTES
Subjective:      Patient ID: Christine Castro is a 63 y.o. female.    Chief Complaint: Pain and Swelling of the Left Knee and Pain and Swelling of the Right Knee    HPI     They have experienced problems with their bilateral knee over the past Many years. The patient reports relevant history of injury/aggravation.  She has sustained a left lower extremity fractures a child.  She does not recall the details.  The left knee is more painful.  Pain is located medially and  anteriorly Associated symptoms include swelling and giving way.  Symptoms are aggravated by all walking. They have been treated with over the counter analgesics, NSAIDS, cane/walker, bracing, physitherapy, steroid injection(s) and activity modification.   Symptoms have recently worsened. Ambulation reportedly has been impaired. Self care ADLs are painful.     Review of Systems   Constitution: Negative for fever and weight loss.   HENT: Negative for congestion.    Eyes: Negative for visual disturbance.   Cardiovascular: Negative for chest pain.   Respiratory: Negative for shortness of breath.    Hematologic/Lymphatic: Negative for bleeding problem. Does not bruise/bleed easily.   Skin: Negative for poor wound healing.   Musculoskeletal: Positive for joint pain.   Gastrointestinal: Negative for abdominal pain.   Genitourinary: Negative for dysuria.   Neurological: Negative for seizures.   Psychiatric/Behavioral: Negative for altered mental status.   Allergic/Immunologic: Negative for persistent infections.         Objective:            Ortho/SPM Exam    Right knee    [unfilled]    The patient is not in acute distress.   Sclerae normal  Body habitus is normal.  Respiratory distress:  none   The patient walks with a limp.  Hip irritability  negative.   The skin over the knee is intact.  Knee effusion trace  Tendernes is located medially  Range of motion- Flexion 0 deg, Extension [id number] deg,   Ligament laxity exam:   MCL 2+   Lachman 0   Post sag  0     LCL 0  Patellar apprehension negative.  Popliteal cyst negative  Patellar crepitation present.  Flexion/pinch negative  Pulses DP present, PT present.  Motor normal 5/5 strength in all tested muscle groups.   Sensory normal    Left knee is identical    I reviewed the relevant radiographic images for the patient's condition:  Most recent knee films show complete loss of medial joint space with severe varus deformity and osteophytes bilaterally.          Assessment:       No diagnosis found.         The condition is very advanced objectively and refractory to nonsurgical care.  The left knee is more painful.  Plan:       There are no diagnoses linked to this encounter.    I explained my diagnostic impression and the reasoning behind it in detail, using layman's terms.  Models and/or pictures were used to help in the explanation.    I recommend reconstruction the more symptomatic left knee 1st.  I explained the right when can be done as soon as the patient feels comfortable after the initial procedure.    Treatment options were discussed. The surgical process of left knee replacement was discussed in detail with the patient including a detailed discussion of the procedure itself (including visual model, x-ray review, and literature review). The typical perioperative and post-operative course was discussed and perioperative risks were discussed to the patient's satisfaction.  Risks and complications discussed included but were not limited to the risks of anesthetic complications, infection, bleeding, wound healing complications, stiffness, aseptic loosening, instability, limb length inequality, neurologic dysfunction including numbness and weakness, additional surgery,  DVT, pulmonary embolism, perioperative medical risks (cardiac, pulmonary, renal, neurologic), and death and the patient elects to proceed.

## 2019-03-28 ENCOUNTER — TELEPHONE (OUTPATIENT)
Dept: FAMILY MEDICINE | Facility: CLINIC | Age: 64
End: 2019-03-28

## 2019-03-28 ENCOUNTER — HOSPITAL ENCOUNTER (OUTPATIENT)
Dept: RADIOLOGY | Facility: HOSPITAL | Age: 64
Discharge: HOME | End: 2019-03-28
Attending: ORTHOPAEDIC SURGERY
Payer: MEDICARE

## 2019-03-28 DIAGNOSIS — Z12.39 SCREENING FOR BREAST CANCER: Primary | ICD-10-CM

## 2019-03-28 DIAGNOSIS — M17.0 PRIMARY OSTEOARTHRITIS OF BOTH KNEES: ICD-10-CM

## 2019-03-28 PROCEDURE — 73564 X-RAY EXAM KNEE 4 OR MORE: CPT | Mod: TC,50,FY,PO

## 2019-03-28 NOTE — TELEPHONE ENCOUNTER
Juwan for the pt that mammogram has been ordered and she can call [phone] to schedule this appt  And / or rtn my call with any questions

## 2019-03-28 NOTE — TELEPHONE ENCOUNTER
----- Message from Laurita Ochoa sent at 3/28/2019 12:00 PM CDT -----  Contact: [phone]/self  Type:  Mammogram    Caller is requesting to schedule their annual mammogram appointment.  Order is not listed in EPIC.  Please enter order and contact patient to schedule.  Name of Caller:self  Where would they like the mammogram performed?[facility]  Would the patient rather a call back or a response via [facility]? call  Best Call Back Number:  Additional Information:

## 2019-03-31 DIAGNOSIS — M17.0 ARTHRITIS OF BOTH KNEES: ICD-10-CM

## 2019-03-31 RX ORDER — ZOLPIDEM TARTRATE 5 MG/1
TABLET ORAL
Qty: 30 TABLET | Refills: 0 | Status: SHIPPED | OUTPATIENT
Start: 2019-03-31 | End: 2019-04-24 | Stop reason: SDUPTHER

## 2019-03-31 RX ORDER — DIAZEPAM 2 MG/1
TABLET ORAL
Qty: 30 TABLET | Refills: 0 | Status: SHIPPED | OUTPATIENT
Start: 2019-03-31 | End: 2019-04-28

## 2019-03-31 RX ORDER — PREDNISONE 5 MG/1
TABLET ORAL
Qty: 30 TABLET | Refills: 0 | Status: SHIPPED | OUTPATIENT
Start: 2019-03-31 | End: 2019-04-23 | Stop reason: SDUPTHER

## 2019-04-02 ENCOUNTER — OFFICE VISIT (OUTPATIENT)
Dept: CARDIOLOGY | Facility: CLINIC | Age: 64
End: 2019-04-02
Payer: MEDICARE

## 2019-04-02 VITALS
SYSTOLIC BLOOD PRESSURE: 107 MMHG | OXYGEN SATURATION: 99 % | BODY MASS INDEX: 39.14 KG/M2 | DIASTOLIC BLOOD PRESSURE: 69 MMHG | HEART RATE: 75 BPM | WEIGHT: 193.81 LBS

## 2019-04-02 DIAGNOSIS — Z01.818 PRE-OP EVALUATION: ICD-10-CM

## 2019-04-02 DIAGNOSIS — I25.2 HISTORY OF MYOCARDIAL INFARCTION: Primary | ICD-10-CM

## 2019-04-02 DIAGNOSIS — M17.12 PRIMARY OSTEOARTHRITIS OF LEFT KNEE: Primary | ICD-10-CM

## 2019-04-02 PROCEDURE — 99213 OFFICE O/P EST LOW 20 MIN: CPT | Mod: PBBFAC,PO | Performed by: INTERNAL MEDICINE

## 2019-04-02 PROCEDURE — 99999 PR PBB SHADOW E&M-EST. PATIENT-LVL III: ICD-10-PCS | Mod: PBBFAC,,, | Performed by: INTERNAL MEDICINE

## 2019-04-02 PROCEDURE — 99214 PR OFFICE/OUTPT VISIT, EST, LEVL IV, 30-39 MIN: ICD-10-PCS | Mod: S$PBB,,, | Performed by: INTERNAL MEDICINE

## 2019-04-02 PROCEDURE — 99499 UNLISTED E&M SERVICE: CPT | Mod: S$PBB,,, | Performed by: INTERNAL MEDICINE

## 2019-04-02 PROCEDURE — 99999 PR PBB SHADOW E&M-EST. PATIENT-LVL III: CPT | Mod: PBBFAC,,, | Performed by: INTERNAL MEDICINE

## 2019-04-02 PROCEDURE — 99214 OFFICE O/P EST MOD 30 MIN: CPT | Mod: S$PBB,,, | Performed by: INTERNAL MEDICINE

## 2019-04-02 PROCEDURE — 99499 RISK ADDL DX/OHS AUDIT: ICD-10-PCS | Mod: S$PBB,,, | Performed by: INTERNAL MEDICINE

## 2019-04-02 RX ORDER — MUPIROCIN 20 MG/G
OINTMENT TOPICAL
Status: CANCELLED | OUTPATIENT
Start: 2019-04-02

## 2019-04-02 RX ORDER — ACETAMINOPHEN 325 MG/1
1000 TABLET ORAL
Status: CANCELLED | OUTPATIENT
Start: 2019-04-02 | End: 2019-04-02

## 2019-04-02 RX ORDER — SODIUM CHLORIDE 0.9 % (FLUSH) 0.9 %
10 SYRINGE (ML) INJECTION
Status: CANCELLED | OUTPATIENT
Start: 2019-04-02

## 2019-04-02 RX ORDER — PREGABALIN 50 MG/1
150 CAPSULE ORAL
Status: CANCELLED | OUTPATIENT
Start: 2019-04-02 | End: 2019-04-02

## 2019-04-02 NOTE — PROGRESS NOTES
Subjective:   Patient ID:  Christine Castro is a 63 y.o. female who presents for follow-up of No chief complaint on file.      Problem List Items Addressed This Visit        Cardiac/Vascular    History of myocardial infarction - Primary       Endocrine    BMI 39.0-39.9,adult      Other Visit Diagnoses     Pre-op evaluation              HPI: 62 y/o female with PMH of MI > 20 years ago present as f/u and to get pre op evaluation before left knee replacement surgery. She denies any symptoms such as chest pain or dyspnea. No orthopnea or PND.   Nuclear stress was negative for ischemia. BP is controlled. She admit to not being complaint with medications. She is mostly in a wheel chair or walker and cannot walk >1 block.     Review of Systems   Constitution: Negative.   HENT: Negative.    Eyes: Negative.    Cardiovascular: Negative.    Respiratory: Negative.    Endocrine: Negative.    Hematologic/Lymphatic: Negative.    Skin: Negative.    Musculoskeletal: Negative.    Gastrointestinal: Negative.    Neurological: Negative.    Psychiatric/Behavioral: Negative.    Allergic/Immunologic: Negative.        Patient's Medications   New Prescriptions    No medications on file   Previous Medications    ALBUTEROL (VENTOLIN HFA) 90 MCG/ACTUATION INHALER    INHALE 2 PUFFS INTO THE LUNGS EVERY 4 HOURS AS NEEDED FOR WHEEZING    ATORVASTATIN (LIPITOR) 10 MG TABLET    Take 1 tablet (10 mg total) by mouth every other day.    BACITRACIN 500 UNIT/GRAM OINT    Apply topically 2 (two) times daily.    BUPROPION (WELLBUTRIN SR) 200 MG SR12    Take 1 tablet (200 mg total) by mouth once daily.    BUSPIRONE (BUSPAR) 10 MG TABLET    TAKE 1/2 TABLET BY MOUTH ONCE DAILY    BUTRANS 10 MCG/HOUR PTWK        DIAZEPAM (VALIUM) 2 MG TABLET    TAKE 1 TABLET BY MOUTH EVERY 12 HOURS AS NEEDED FOR ANXIETY(DISCONTINUE 5 MG DOSE)    DIAZEPAM (VALIUM) 5 MG TABLET    TAKE 1 TABLET(5 MG) BY MOUTH EVERY 8 HOURS AS NEEDED    DICLOFENAC SODIUM (VOLTAREN) 1 % GEL     APPLY 2 GRAMS EXTERNALLY TO THE AFFECTED AREA FOUR TIMES DAILY    ESCITALOPRAM OXALATE (LEXAPRO) 20 MG TABLET    Take 1 tablet (20 mg total) by mouth once daily.    FUROSEMIDE (LASIX) 20 MG TABLET    Take 1 tablet (20 mg total) by mouth daily as needed.    GABAPENTIN (NEURONTIN) 300 MG CAPSULE    Take 1 capsule (300 mg total) by mouth 3 (three) times daily.    HYOSCYAMINE (LEVSIN/SL) 0.125 MG SUBL    Place 1 tablet (0.125 mg total) under the tongue every 6 (six) hours as needed (pain).    LEVOTHYROXINE (SYNTHROID) 75 MCG TABLET    Take 1 tablet (75 mcg total) by mouth once daily. Discontinue 100 mcg dose    OMEPRAZOLE (PRILOSEC) 40 MG CAPSULE    TAKE 1 CAPSULE(40 MG) BY MOUTH EVERY MORNING    ONDANSETRON (ZOFRAN) 4 MG TABLET    Take 1 tablet (4 mg total) by mouth every 6 (six) hours.    OXYCODONE-ACETAMINOPHEN (PERCOCET)  MG PER TABLET        POTASSIUM CHLORIDE (KLOR-CON) 10 MEQ TBSR    TAKE 2 TABLETS(20 MEQ) BY MOUTH EVERY DAY    PREDNISONE (DELTASONE) 5 MG TABLET    TAKE 1 TABLET BY MOUTH DAILY    PROMETHAZINE (PHENERGAN) 25 MG TABLET    TAKE 1 TABLET(25 MG) BY MOUTH EVERY 6 HOURS AS NEEDED    TRUEPLUS LANCETS 33 GAUGE MISC    USE TO TEST BLOOD EVERY DAY    ZOLPIDEM (AMBIEN) 5 MG TAB    TAKE 1 TABLET BY MOUTH EVERY NIGHT   Modified Medications    No medications on file   Discontinued Medications    No medications on file       Objective:   Physical Exam   Constitutional: She is oriented to person, place, and time. She appears well-developed and well-nourished. No distress.   Examination of the digits showed no clubbing or cyanosis   HENT:   Head: Normocephalic and atraumatic.   Eyes: Pupils are equal, round, and reactive to light. Conjunctivae are normal. Right eye exhibits no discharge.   Neck: Normal range of motion. Neck supple. No JVD present. No thyromegaly present.   No carotid bruits   Cardiovascular: Normal rate, regular rhythm, S1 normal, S2 normal, normal heart sounds, intact distal pulses and  normal pulses. PMI is not displaced. Exam reveals no gallop, no friction rub and no opening snap.   No murmur heard.  Pulmonary/Chest: Effort normal and breath sounds normal. No respiratory distress. She has no wheezes. She has no rales. She exhibits no tenderness.   Abdominal: Soft. Bowel sounds are normal. She exhibits no distension and no mass. There is no tenderness. There is no guarding.   No hepatosplenomegaly   Musculoskeletal: Normal range of motion. She exhibits no edema or tenderness.   Lymphadenopathy:     She has no cervical adenopathy.   Neurological: She is alert and oriented to person, place, and time.   Skin: Skin is warm. No rash noted. She is not diaphoretic. No erythema.   Psychiatric: She has a normal mood and affect.   Nursing note and vitals reviewed.      ECGs reviewed-NSR  LABS reviewed  Imaging including Echoes reviewed-normal ef     Assessment:     1. History of myocardial infarction    2. BMI 39.0-39.9,adult    3. Pre-op evaluation        Plan:     Patient is moderate risk for intermediate risk surgery to have left knee replaced. Mets<4. No contraindications to surgery such a recent MI, severe obstructive valve disease, tachyarrhythmias or decompensated CHF.  No history of CVA, CRF or DM on insulin. Risk <0.9% for cardiac event.     Negative stress 2017  Continue current medications  Activity as tolerate  F/u in 6 months

## 2019-04-02 NOTE — LETTER
April 2, 2019        Roldan Greenwood MD  [address]  [address]  [facility] [address]             [facility]  [address], [address]  [facility] [address]  Phone: [phone]   Patient: Christine Castro   MR Number: [id number]   YOB: 1955   Date of Visit: 4/2/2019       Dear Dr. Greenwood:    Thank you for referring Christine Castro to me for evaluation. Below are the relevant portions of my assessment and plan of care.     ECGs reviewed-NSR  LABS reviewed  Imaging including Echoes reviewed-normal ef      Assessment:      1. History of myocardial infarction    2. BMI 39.0-39.9,adult    3. Pre-op evaluation          Plan:      Patient is moderate risk for intermediate risk surgery to have left knee replaced. Mets<4. No contraindications to surgery such a recent MI, severe obstructive valve disease, tachyarrhythmias or decompensated CHF.  No history of CVA, CRF or DM on insulin. Risk <0.9% for cardiac event.      Negative stress 2017  Continue current medications  Activity as tolerate  F/u in 6 months         If you have questions, please do not hesitate to call me. I look forward to following Christine RAMIREZ along with you.    Sincerely,      Dedra Dela Cruz MD           CC  No Recipients

## 2019-04-03 ENCOUNTER — HOSPITAL ENCOUNTER (OUTPATIENT)
Dept: RADIOLOGY | Facility: HOSPITAL | Age: 64
Discharge: HOME | End: 2019-04-03
Attending: FAMILY MEDICINE
Payer: MEDICARE

## 2019-04-03 DIAGNOSIS — Z12.39 SCREENING FOR BREAST CANCER: ICD-10-CM

## 2019-04-03 PROCEDURE — 77067 SCR MAMMO BI INCL CAD: CPT | Mod: TC,PO

## 2019-04-04 ENCOUNTER — TELEPHONE (OUTPATIENT)
Dept: FAMILY MEDICINE | Facility: CLINIC | Age: 64
End: 2019-04-04

## 2019-04-04 RX ORDER — DICLOFENAC SODIUM 10 MG/G
GEL TOPICAL
Qty: 100 G | Refills: 0 | Status: SHIPPED | OUTPATIENT
Start: 2019-04-04 | End: 2019-04-28

## 2019-04-08 ENCOUNTER — TELEPHONE (OUTPATIENT)
Dept: FAMILY MEDICINE | Facility: CLINIC | Age: 64
End: 2019-04-08

## 2019-04-08 NOTE — TELEPHONE ENCOUNTER
----- Message from Leandra Rob sent at 4/8/2019  3:41 PM CDT -----  Contact: self, [phone]  Patient requests to be seen next week, states she has a surgery scheduled and needs to have a check up before. Requests to see her doctor only.

## 2019-04-09 ENCOUNTER — TELEPHONE (OUTPATIENT)
Dept: ORTHOPEDICS | Facility: CLINIC | Age: 64
End: 2019-04-09

## 2019-04-23 DIAGNOSIS — M17.0 ARTHRITIS OF BOTH KNEES: ICD-10-CM

## 2019-04-24 ENCOUNTER — TELEPHONE (OUTPATIENT)
Dept: INTERNAL MEDICINE | Facility: CLINIC | Age: 64
End: 2019-04-24

## 2019-04-24 DIAGNOSIS — N64.89 PENDULOUS BREAST: Primary | ICD-10-CM

## 2019-04-24 RX ORDER — PROMETHAZINE HYDROCHLORIDE 25 MG/1
TABLET ORAL
Qty: 25 TABLET | Refills: 0 | Status: SHIPPED | OUTPATIENT
Start: 2019-04-24 | End: 2019-05-28 | Stop reason: SDUPTHER

## 2019-04-24 RX ORDER — PREDNISONE 5 MG/1
TABLET ORAL
Qty: 30 TABLET | Refills: 0 | Status: SHIPPED | OUTPATIENT
Start: 2019-04-24 | End: 2019-06-18 | Stop reason: SDUPTHER

## 2019-04-24 NOTE — TELEPHONE ENCOUNTER
----- Message from Leandra Rob sent at 4/24/2019 11:37 AM CDT -----  Contact: self, [phone]  Patient requests to speak with you, states she needs you to fax referral and medical records to [phone]. States she is having cosmetic surgery. Does not have doctor information.

## 2019-04-24 NOTE — TELEPHONE ENCOUNTER
I spoke with ms betancur  She found a doctor to see her at [place] to discuss plastic surgery for breast tissue reduction  She needs a referral placed to  Dr Dileep Hood  Fax referral with medical records to [phone]  md ph # is [phone]

## 2019-04-26 ENCOUNTER — TELEPHONE (OUTPATIENT)
Dept: FAMILY MEDICINE | Facility: CLINIC | Age: 64
End: 2019-04-26

## 2019-04-26 ENCOUNTER — OFFICE VISIT (OUTPATIENT)
Dept: FAMILY MEDICINE | Facility: CLINIC | Age: 64
End: 2019-04-26
Payer: MEDICARE

## 2019-04-26 VITALS
HEART RATE: 93 BPM | HEIGHT: 59 IN | SYSTOLIC BLOOD PRESSURE: 100 MMHG | WEIGHT: 186.81 LBS | BODY MASS INDEX: 37.66 KG/M2 | TEMPERATURE: 99 F | DIASTOLIC BLOOD PRESSURE: 60 MMHG | OXYGEN SATURATION: 97 %

## 2019-04-26 DIAGNOSIS — G89.29 CHRONIC PAIN OF RIGHT KNEE: ICD-10-CM

## 2019-04-26 DIAGNOSIS — J45.909 ASTHMA, UNSPECIFIED ASTHMA SEVERITY, UNSPECIFIED WHETHER COMPLICATED, UNSPECIFIED WHETHER PERSISTENT: ICD-10-CM

## 2019-04-26 DIAGNOSIS — R11.0 NAUSEA: Primary | ICD-10-CM

## 2019-04-26 DIAGNOSIS — M25.561 CHRONIC PAIN OF RIGHT KNEE: ICD-10-CM

## 2019-04-26 PROCEDURE — 99213 OFFICE O/P EST LOW 20 MIN: CPT | Mod: 25,S$GLB,, | Performed by: FAMILY MEDICINE

## 2019-04-26 PROCEDURE — 99213 PR OFFICE/OUTPT VISIT, EST, LEVL III, 20-29 MIN: ICD-10-PCS | Mod: 25,S$GLB,, | Performed by: FAMILY MEDICINE

## 2019-04-26 PROCEDURE — 20610 LARGE JOINT ASPIRATION/INJECTION: R KNEE: ICD-10-PCS | Mod: RT,S$GLB,, | Performed by: FAMILY MEDICINE

## 2019-04-26 PROCEDURE — 20610 DRAIN/INJ JOINT/BURSA W/O US: CPT | Mod: RT,S$GLB,, | Performed by: FAMILY MEDICINE

## 2019-04-26 RX ORDER — TRIAMCINOLONE ACETONIDE 40 MG/ML
40 INJECTION, SUSPENSION INTRA-ARTICULAR; INTRAMUSCULAR
Status: DISCONTINUED | OUTPATIENT
Start: 2019-04-26 | End: 2019-04-26 | Stop reason: HOSPADM

## 2019-04-26 RX ORDER — LEVOTHYROXINE SODIUM 75 UG/1
75 TABLET ORAL DAILY
COMMUNITY
End: 2019-07-22

## 2019-04-26 RX ORDER — ONDANSETRON 4 MG/1
4 TABLET, ORALLY DISINTEGRATING ORAL EVERY 6 HOURS PRN
Qty: 20 TABLET | Refills: 1 | Status: SHIPPED | OUTPATIENT
Start: 2019-04-26 | End: 2021-03-05

## 2019-04-26 RX ADMIN — TRIAMCINOLONE ACETONIDE 40 MG: 40 INJECTION, SUSPENSION INTRA-ARTICULAR; INTRAMUSCULAR at 04:04

## 2019-04-26 NOTE — PROGRESS NOTES
Chief Complaint  Chief Complaint   Patient presents with    Pre-op Exam     lt knee pain        HPI  Christine Castro is a 64 y.o. female with multiple medical diagnoses as listed in the medical history and problem list that presents for surgical clearance and knee pain.  Christine is scheduled for left knee arthroplasty on May 8th.  She asks for steroid injection in both knees today.   I explained that we cannot inject her left knee as she is having surgery on it in 1 week.   She has a history of a Mi in the 70's that occurred during labor.  She sees Dr. Dela Cruz and he gave her cardiac clearance at her  visit.  She also has a history of asthma.  Recently she has been having more sinus symptoms and reports that she is using her albuterol inhaler more frequently.        Ms Castro complains of right knee pain as well.  She had an injection in this knee about 3 months ago and it helps.  She ambulates with a walker.      PAST MEDICAL HISTORY:  Past Medical History:   Diagnosis Date    Arthritis     Asthma     Cancer     CHF (congestive heart failure)     [facility]    Colon cancer     COPD (chronic obstructive pulmonary disease)     Coronary artery disease     Depression     Diabetes mellitus, type 2     GERD (gastroesophageal reflux disease)     Myocardial infarction     AGE 19 [facility]ABY DELIVERY    Thyroid disease        PAST SURGICAL HISTORY:  Past Surgical History:   Procedure Laterality Date     SECTION      CHOLECYSTECTOMY      COLON SURGERY      COLONOSCOPY      --- repeat in 3-5 years    COLONOSCOPY N/A 2017    [facility]    COLONOSCOPY golytely N/A 4/10/2018    [facility]    ECTOPIC PREGNANCY SURGERY      ESOPHAGOGASTRODUODENOSCOPY (EGD) N/A 2019    [facility]    ESOPHAGOGASTRODUODENOSCOPY (EGD) N/A 4/10/2018    [facility]    ESOPHAGOGASTRODUODENOSCOPY (EGD) N/A 4/18/2017    [facility]    GASTRIC BYPASS      HYSTERECTOMY      ovary removed      REMOVAL-PORT-A-CATH Left 5/30/2017    [facility] at [facility] OR    TONSILLECTOMY      TUBAL LIGATION         SOCIAL HISTORY:  Social History     Socioeconomic History    Marital status:      Spouse name: Not on file    Number of children: Not on file    Years of education: Not on file    Highest education level: Not on file   Occupational History    Not on file   Social Needs    Financial resource strain: Not on file    Food insecurity:     Worry: Not on file     Inability: Not on file    Transportation needs:     Medical: Not on file     Non-medical: Not on file   Tobacco Use    Smoking status: Never Smoker    Smokeless tobacco: Never Used   Substance and Sexual Activity    Alcohol use: Yes     Comment: very rare    Drug use: No    Sexual activity: Never   Lifestyle    Physical activity:     Days per week: Not on file     Minutes per session: Not on file    Stress: Not on file   Relationships    Social connections:     Talks on phone: Not on file     Gets together: Not on file     Attends Mosque service: Not on file     Active member of club or organization: Not on file     Attends meetings of clubs or organizations: Not on file     Relationship status: Not on file   Other Topics Concern    Not on file   Social History Narrative    Not on file       FAMILY HISTORY:  Family History   Problem Relation Age of Onset    Hyperlipidemia Mother     Hypertension Mother     Diabetes Mother     Hypertension Sister     Hypertension Brother     Diabetes Brother     Breast cancer Maternal Aunt     Breast cancer Maternal Aunt     Breast cancer Cousin        ALLERGIES AND MEDICATIONS: updated and reviewed.  Review of patient's allergies indicates:   Allergen Reactions    Ibuprofen Other (See  Comments)     Causes asthma flare??     Current Outpatient Medications   Medication Sig Dispense Refill    albuterol (VENTOLIN HFA) 90 mcg/actuation inhaler INHALE 2 PUFFS INTO THE LUNGS EVERY 4 HOURS AS NEEDED FOR WHEEZING 18 g 3    atorvastatin (LIPITOR) 10 MG tablet Take 1 tablet (10 mg total) by mouth every other day. 45 tablet 3    buPROPion (WELLBUTRIN SR) 200 MG SR12 Take 1 tablet (200 mg total) by mouth once daily. 90 tablet 2    busPIRone (BUSPAR) 10 MG tablet TAKE 1/2 TABLET BY MOUTH ONCE DAILY 60 tablet 0    BUTRANS 10 mcg/hour PTWK       diazePAM (VALIUM) 2 MG tablet TAKE 1 TABLET BY MOUTH EVERY 12 HOURS AS NEEDED FOR ANXIETY(DISCONTINUE 5 MG DOSE) 30 tablet 0    diazePAM (VALIUM) 5 MG tablet TAKE 1 TABLET(5 MG) BY MOUTH EVERY 8 HOURS AS NEEDED 30 tablet 0    diclofenac sodium (VOLTAREN) 1 % Gel APPLY 2 GRAMS EXTERNALLY TO THE AFFECTED AREA FOUR TIMES DAILY 100 g 0    escitalopram oxalate (LEXAPRO) 20 MG tablet Take 1 tablet (20 mg total) by mouth once daily. 90 tablet 3    furosemide (LASIX) 20 MG tablet Take 1 tablet (20 mg total) by mouth daily as needed. 90 tablet 1    gabapentin (NEURONTIN) 300 MG capsule Take 1 capsule (300 mg total) by mouth 3 (three) times daily. 270 capsule 3    hyoscyamine (LEVSIN/SL) 0.125 mg Subl Place 1 tablet (0.125 mg total) under the tongue every 6 (six) hours as needed (pain). 30 tablet 0    levothyroxine (SYNTHROID) 75 MCG tablet Take 75 mcg by mouth once daily.      omeprazole (PRILOSEC) 40 MG capsule TAKE 1 CAPSULE(40 MG) BY MOUTH EVERY MORNING 90 capsule 0    ondansetron (ZOFRAN) 4 MG tablet Take 1 tablet (4 mg total) by mouth every 6 (six) hours. 12 tablet 0    oxyCODONE-acetaminophen (PERCOCET)  mg per tablet       potassium chloride (KLOR-CON) 10 MEQ TbSR TAKE 2 TABLETS(20 MEQ) BY MOUTH EVERY DAY 90 tablet 3    predniSONE (DELTASONE) 5 MG tablet TAKE 1 TABLET BY MOUTH DAILY 30 tablet 0    promethazine (PHENERGAN) 25 MG tablet TAKE 1  "TABLET(25 MG) BY MOUTH EVERY 6 HOURS AS NEEDED 25 tablet 0    zolpidem (AMBIEN) 5 MG Tab TAKE 1 TABLET BY MOUTH EVERY NIGHT 30 tablet 0    bacitracin 500 unit/gram Oint Apply topically 2 (two) times daily. 28 g 0    budesonide (PULMICORT FLEXHALER) 90 mcg/actuation AePB Inhale 2 puffs (180 mcg total) into the lungs 2 (two) times daily. Controller 1 each 3    ondansetron (ZOFRAN-ODT) 4 MG TbDL Take 1 tablet (4 mg total) by mouth every 6 (six) hours as needed. 20 tablet 1     No current facility-administered medications for this visit.          ROS  Review of Systems   Constitutional: Negative for activity change, appetite change, chills and fatigue.   HENT: Negative for congestion, ear discharge, ear pain, rhinorrhea, sinus pain, sore throat and trouble swallowing.    Eyes: Negative for photophobia, pain, redness, itching and visual disturbance.   Respiratory: Negative for cough, chest tightness, shortness of breath and wheezing.    Cardiovascular: Negative for chest pain, palpitations and leg swelling.   Gastrointestinal: Negative for abdominal distention, abdominal pain, blood in stool, diarrhea, nausea and vomiting.   Genitourinary: Negative for dysuria, pelvic pain, vaginal bleeding, vaginal discharge and vaginal pain.   Musculoskeletal: Negative for arthralgias, back pain, gait problem and neck pain.   Skin: Negative for color change, pallor and rash.   Neurological: Negative for dizziness, tremors, weakness, light-headedness, numbness and headaches.   Psychiatric/Behavioral: Negative for agitation, behavioral problems, confusion and sleep disturbance.           PHYSICAL EXAM  Vitals:    04/26/19 1535   BP: 100/60   BP Location: Left arm   Patient Position: Sitting   Pulse: 93   Temp: 98.7 °F (37.1 °C)   TempSrc: Oral   SpO2: 97%   Weight: 84.8 kg (186 lb 13.4 oz)   Height: 4' 11" (1.499 m)    Body mass index is 37.74 kg/m².  Weight: 84.8 kg (186 lb 13.4 oz)   Height: 4' 11" (149.9 cm)     Physical Exam "   Constitutional: She appears well-developed and well-nourished.   HENT:   Head: Normocephalic.   Eyes: Conjunctivae are normal.   Neck: Normal range of motion. Neck supple.   Cardiovascular: Normal rate, regular rhythm and normal heart sounds.   Pulmonary/Chest: Effort normal and breath sounds normal.   Musculoskeletal:        Right knee: She exhibits decreased range of motion and deformity. She exhibits no swelling, no effusion and no ecchymosis. Tenderness found. No MCL and no LCL tenderness noted.   Neurological: She is alert.   Skin: Skin is warm and dry.   Psychiatric: Her behavior is normal.         Health Maintenance       Date Due Completion Date    Pneumococcal Vaccine (65+ Low/Medium Risk) (1 of 2 - PCV13) 05/08/2019 (Originally 4/4/2019) 8/30/2016    Foot Exam 05/08/2019 (Originally 2/15/2019) 2/15/2018 (Done)    Override on 2/15/2018: Done    Override on 5/16/2017: Done    DEXA SCAN 05/08/2019 (Originally 4/4/1994) ---    TETANUS VACCINE 06/28/2019 (Originally 4/4/1972) ---    Influenza Vaccine 07/01/2019 (Originally 8/1/2018) 10/18/2017 (Declined)    Override on 10/18/2017: Declined    Zoster Vaccine 07/31/2019 (Originally 4/4/2014) ---    Hemoglobin A1c 07/09/2019 1/9/2019    Lipid Panel 01/09/2020 1/9/2019    Urine Microalbumin 01/14/2020 1/14/2019    High Dose Statin 04/26/2020 4/26/2019    Mammogram 04/03/2021 4/3/2019    Colonoscopy 04/10/2021 4/10/2018            Assessment & Plan      Christine RAMIREZ was seen today for pre-op exam.    Diagnoses and all orders for this visit:    Nausea  -     ondansetron (ZOFRAN-ODT) 4 MG TbDL; Take 1 tablet (4 mg total) by mouth every 6 (six) hours as needed.    Asthma, unspecified asthma severity, unspecified whether complicated, unspecified whether persistent  -     budesonide (PULMICORT FLEXHALER) 90 mcg/actuation AePB; Inhale 2 puffs (180 mcg total) into the lungs 2 (two) times daily. Controller      Patient is an acceptable candidate for the proposed surgery.      Follow-up: No follow-ups on file.

## 2019-04-26 NOTE — TELEPHONE ENCOUNTER
----- Message from Laurita Ochoa sent at 4/26/2019  1:08 PM CDT -----  Contact: [phone]/self  Patient is requesting a call back. She wants to confirm if she can get steroid shots in both of her legs before she comes to the office. Thanks

## 2019-04-26 NOTE — PROCEDURES
Large Joint Aspiration/Injection: R knee  Date/Time: 4/26/2019 4:24 PM  Performed by: Francesca Apodaca DO  Authorized by: Francesca Apodaca, DO     Consent Done?:  Yes (Verbal)  Indications:  Pain  Procedure site marked: Yes    Timeout: Prior to procedure the correct patient, procedure, and site was verified      Location:  Knee  Site:  R knee  Needle size:  22 G  Medications:  40 mg triamcinolone acetonide 40 mg/mL  Patient tolerance:  Patient tolerated the procedure well with no immediate complications

## 2019-04-28 RX ORDER — DICLOFENAC SODIUM 10 MG/G
GEL TOPICAL
Qty: 100 G | Refills: 0 | Status: SHIPPED | OUTPATIENT
Start: 2019-04-28 | End: 2019-06-01 | Stop reason: SDUPTHER

## 2019-04-28 RX ORDER — DIAZEPAM 2 MG/1
TABLET ORAL
Qty: 30 TABLET | Refills: 0 | Status: ON HOLD | OUTPATIENT
Start: 2019-04-28 | End: 2019-05-10 | Stop reason: HOSPADM

## 2019-04-28 RX ORDER — ZOLPIDEM TARTRATE 5 MG/1
TABLET ORAL
Qty: 30 TABLET | Refills: 0 | Status: SHIPPED | OUTPATIENT
Start: 2019-04-28 | End: 2019-05-17 | Stop reason: SDUPTHER

## 2019-05-01 ENCOUNTER — HOSPITAL ENCOUNTER (OUTPATIENT)
Dept: PREADMISSION TESTING | Facility: HOSPITAL | Age: 64
Discharge: HOME | End: 2019-05-01
Attending: ORTHOPAEDIC SURGERY
Payer: MEDICARE

## 2019-05-01 ENCOUNTER — ANESTHESIA EVENT (OUTPATIENT)
Dept: SURGERY | Facility: HOSPITAL | Age: 64
End: 2019-05-01
Payer: MEDICARE

## 2019-05-01 RX ORDER — SODIUM CHLORIDE, SODIUM LACTATE, POTASSIUM CHLORIDE, CALCIUM CHLORIDE 600; 310; 30; 20 MG/100ML; MG/100ML; MG/100ML; MG/100ML
INJECTION, SOLUTION INTRAVENOUS CONTINUOUS
Status: CANCELLED | OUTPATIENT
Start: 2019-05-01

## 2019-05-01 RX ORDER — LIDOCAINE HYDROCHLORIDE 10 MG/ML
1 INJECTION, SOLUTION EPIDURAL; INFILTRATION; INTRACAUDAL; PERINEURAL ONCE
Status: CANCELLED | OUTPATIENT
Start: 2019-05-01 | End: 2019-05-01

## 2019-05-01 NOTE — DISCHARGE INSTRUCTIONS
Your surgery is scheduled for 5/8/19.    Please report to Outpatient Surgery Intake Office on the 2nd FLOOR at 8:15 a.m.          INSTRUCTIONS IMPORTANT!!!  ¨ Do not eat or drink after 12 midnight-including water. OK to brush teeth, no   gum, candy or mints!    ¨ Take only these medicines with a small swallow of water-morning of surgery: levothyroxine and omeprazole        ____  Do not wear makeup, including mascara.  ____  No powder, lotions or creams to surgical area.  ____  Please remove all jewelry, including piercings and leave at home.  ____  No money or valuables needed. Please leave at home.  ____  Please bring any documents given by your doctor.  ____  If going home the same day, arrange for a ride home. You will not be able to             drive if Anesthesia was used.  ____  Wear loose fitting clothing. Allow for dressings, bandages.  ____  Stop Aspirin, Ibuprofen, Motrin and Aleve at least 3-5 days before surgery, unless otherwise instructed by your doctor, or the nurse.   You MAY use Tylenol/acetaminophen until day of surgery.  ____  Wash the surgical area with Hibiclens the night before surgery, and again the             morning of surgery.  Be sure to rinse hibiclens off completely (if instructed by   nurse).  ____  If you take diabetic medication, do not take am of surgery unless instructed by Doctor.  ____  Call MD for temperature above 101 degrees or any other signs of infection such as Urinary (bladder) infection, Upper respiratory infection, skin boils, etc.  ____ Stop taking any Fish Oil supplement or any Vitamins that contain Vitamin E at least 5 days prior to surgery.  ____ Do Not wear your contact lenses the day of your procedure.  You may wear your glasses.      ____Do not shave surgical site for 3 days prior to surgery.  ____ Practice Good hand washing before, during, and after procedure.      I have read or had read and explained to me, and understand the above information.  Additional  comments or instructions:  For additional questions call [phone]      ANESTHESIA SIDE EFFECTS  -For the first 24 hours after surgery:  Do not drive, use heavy equipment, make important decisions, or drink alcohol  -It is normal to feel sleepy for several hours.  Rest until you are more awake.  -Have someone stay with you, if needed.  They can watch for problems and help keep you safe.  -Some possible post anesthesia side effects include: nausea and vomiting, sore throat and hoarseness, sleepiness, and dizziness.        Pre-Op Bathing Instructions    Before surgery, you can play an important role in your own health.    Because skin is not sterile, we need to be sure that your skin is as free of germs as possible. By following the instructions below, you can reduce the number of germs on your skin before surgery.    IMPORTANT: You will need to shower with a special soap called Hibiclens*, available at any pharmacy.  If you are allergic to Chlorhexidine (the antiseptic in Hibiclens), use an antibacterial soap such as Dial Soap for your preoperative shower.  You will shower with Hibiclens both the night before your surgery and the morning of your surgery.  Do not use Hibiclens on the head, face or genitals to avoid injury to those areas.    STEP #1: THE NIGHT BEFORE YOUR SURGERY     1. Do not shave the area of your body where your surgery will be performed.  2. Shower and wash your hair and body as usual with your normal soap and shampoo.  3. Rinse your hair and body thoroughly after you shower to remove all soap residue.  4. With your hand, apply one packet of Hibiclens soap to the surgical site.   5. Wash the site gently for five (5) minutes. Do not scrub your skin too hard.   6. Do not wash with your regular soap after Hibiclens is used.  7. Rinse your body thoroughly.  8. Pat yourself dry with a clean, soft towel.  9. Do not use lotion, cream, or powder.  10. Wear clean clothes.    STEP #2: THE MORNING OF YOUR  SURGERY     1. Repeat Step #1.    * Not to be used by people allergic to Chlorhexidine.          Total Knee Replacement  During total knee replacement surgery, your damaged knee joint is replaced with an artificial joint, called a prosthesis. This surgery almost always reduces joint pain and improves your quality of life.     The parts of the prosthesis are secured to the bones of the knee. Together they form the new joint.   Before your surgery  You will most likely arrive at the hospital on the morning of the surgery. Be sure to follow all of your doctors instructions on preparing for surgery:  · Follow any directions you are given for taking medicines or for not eating or drinking before surgery.  · At the hospital, your temperature, pulse, breathing, and blood pressure will be checked.  · An IV (intravenous) line will be started to provide fluids and medicines needed during surgery.  The surgical procedure  When the surgical team is ready, youll be taken to the operating room. There youll be given anesthesia to help you sleep through surgery, or to make you numb from the waist down. Then an incision is made on the front or side of your knee. Any damaged bone is cleaned away, and the new joint components are put into place. The incision is closed with surgical staples or stitches.  After your surgery  After surgery, youll be sent to the recovery room. When you are fully awake, youll be moved to your room. The nurses will give you medicines to ease your pain. You may have a catheter (small tube) in your bladder. A continuous passive motion machine may be used on your knee to keep it from getting stiff. A sequential compression machine may be used to prevent blood clots by gently squeezing then releasing your leg. You may be given medicine to prevent blood clots. Soon, healthcare providers will help you get up and moving.  When to call your doctor  Once at home, call your doctor if you have any of the symptoms  below:  · An increase in knee pain  · Pain or swelling in the calf or leg  · Unusual redness, heat, or drainage at the incision site  · Fever of 100.4°F (38°C) or higher  · Shaking chills  · Trouble breathing or chest pain (call 911)   Date Last Reviewed: 9/20/2015  © 6382-1044 [employer]. [address] [place] [address]. All rights reserved. This information is not intended as a substitute for professional medical care. Always follow your healthcare professional's instructions.

## 2019-05-01 NOTE — ANESTHESIA PREPROCEDURE EVALUATION
2019  Christine Castro is a 64 y.o., female scheduled for left TKA under spinal/MAC/gen on 19.    Cardiac risk stratification in Epic, 19.  PCP optimization pending.     Past Medical History:   Diagnosis Date    Arthritis     Asthma     Cancer     CHF (congestive heart failure)     [facility]    Colon cancer     COPD (chronic obstructive pulmonary disease)     Coronary artery disease     Depression     Diabetes mellitus, type 2     GERD (gastroesophageal reflux disease)     Myocardial infarction     AGE 19 [facility]BABY DELIVERY    Thyroid disease      Past Surgical History:   Procedure Laterality Date     SECTION      CHOLECYSTECTOMY      COLON SURGERY      COLONOSCOPY      --- repeat in 3-5 years    COLONOSCOPY N/A 2017    [facility]    COLONOSCOPY golytely N/A 4/10/2018    [facility]    ECTOPIC PREGNANCY SURGERY      ESOPHAGOGASTRODUODENOSCOPY (EGD) N/A 2019    [facility]    ESOPHAGOGASTRODUODENOSCOPY (EGD) N/A 4/10/2018    [facility]    ESOPHAGOGASTRODUODENOSCOPY (EGD) N/A 2017    [facility]    GASTRIC BYPASS      HYSTERECTOMY      ovary removed      REMOVAL-PORT-A-CATH Left 2017    [facility]oval [facility] at [facility] OR    TONSILLECTOMY      TUBAL LIGATION       Anesthesia Evaluation    I have reviewed the Patient Summary Reports.     I have reviewed the Medications.     Review of Systems  Anesthesia Hx:  No problems with previous Anesthesia  Denies Family Hx of Anesthesia complications.    Social:  Non-Smoker, No Alcohol Use    Hematology/Oncology:     Oncology Normal    -- Anemia:   Cardiovascular:   Past MI () CAD asymptomatic    Denies Angina.     hyperlipidemia        Pulmonary:  Chronic Obstructive Pulmonary Disease (COPD): Inhaler use is inhaled steroid use in the past, not currently. Oral/Intravenous steroid use is occasional steroid Rx. Current breathing status is optimal, free of wheezing.    Renal/:  Renal/ Normal     Hepatic/GI:   GERD Denies Liver Disease.    Musculoskeletal:  Musculoskeletal Normal    Neurological:  Neurology Normal Denies TIA.  Denies CVA. Denies Seizures.    Endocrine:   Diabetes, type 2    Psych:   depression          TTE 3/2016  CONCLUSIONS     1 - Normal left ventricular systolic function (EF 55-60%).     2 - Concentric remodeling.     3 - Normal left ventricular diastolic function.     4 - Normal right ventricular systolic function .     5 - The estimated PA systolic pressure is 28 mmHg.     4/2017 neg stress test      Physical Exam  General:  Obesity    Airway/Jaw/Neck:  Airway Findings: Mouth Opening: Small, but > 3cm Tongue: Normal  General Airway Assessment: Adult  Mallampati: II  Improves to II with phonation.  TM Distance: Normal, at least 6 cm  Jaw/Neck Findings:  Neck ROM: Normal ROM  Neck Findings:  Short Neck      Dental:  Dental Findings: Edentulous   Chest/Lungs:  Chest/Lungs Findings: Clear to auscultation, Normal Respiratory Rate     Heart/Vascular:  Heart Findings: Rate: Normal  Rhythm: Regular Rhythm  Sounds: Normal  Heart murmur: negative       Mental Status:  Mental Status Findings:  Cooperative         Anesthesia Plan  Type of Anesthesia, risks & benefits discussed:  Anesthesia Type:  MAC, general, spinal  Patient's Preference:   Intra-op Monitoring Plan: standard ASA monitors  Intra-op Monitoring Plan Comments:   Post Op Pain Control Plan: multimodal analgesia and per primary service following discharge from PACU  Post Op Pain Control Plan Comments:   Induction:   IV  Beta Blocker:         Informed Consent: Patient understands risks and agrees with Anesthesia plan.  Questions  answered. Anesthesia consent signed with patient.  ASA Score: 3     Day of Surgery Review of History & Physical:  There are no significant changes.      Anesthesia Plan Notes: Anesthesia consent to be signed prior to procedure on 5/8/19  Cardiac risk stratification in Epic, 4/2/19.  PCP optimization pending.         Ready For Surgery From Anesthesia Perspective.

## 2019-05-02 ENCOUNTER — LAB VISIT (OUTPATIENT)
Dept: LAB | Facility: HOSPITAL | Age: 64
End: 2019-05-02
Attending: NURSE PRACTITIONER
Payer: MEDICARE

## 2019-05-02 ENCOUNTER — OFFICE VISIT (OUTPATIENT)
Dept: ORTHOPEDICS | Facility: CLINIC | Age: 64
End: 2019-05-02
Payer: MEDICARE

## 2019-05-02 VITALS
BODY MASS INDEX: 37.5 KG/M2 | DIASTOLIC BLOOD PRESSURE: 67 MMHG | HEIGHT: 59 IN | SYSTOLIC BLOOD PRESSURE: 103 MMHG | WEIGHT: 186 LBS

## 2019-05-02 DIAGNOSIS — Z01.818 PREOPERATIVE TESTING: Primary | ICD-10-CM

## 2019-05-02 DIAGNOSIS — M17.12 PRIMARY OSTEOARTHRITIS OF LEFT KNEE: Primary | ICD-10-CM

## 2019-05-02 DIAGNOSIS — Z01.818 PREOPERATIVE TESTING: ICD-10-CM

## 2019-05-02 LAB
ANION GAP SERPL CALC-SCNC: 8 MMOL/L (ref 8–16)
BUN SERPL-MCNC: 17 MG/DL (ref 8–23)
CALCIUM SERPL-MCNC: 9.1 MG/DL (ref 8.7–10.5)
CHLORIDE SERPL-SCNC: 107 MMOL/L (ref 95–110)
CO2 SERPL-SCNC: 24 MMOL/L (ref 23–29)
CREAT SERPL-MCNC: 0.8 MG/DL (ref 0.5–1.4)
EST. GFR  (AFRICAN AMERICAN): >60 ML/MIN/1.73 M^2
EST. GFR  (NON AFRICAN AMERICAN): >60 ML/MIN/1.73 M^2
GLUCOSE SERPL-MCNC: 115 MG/DL (ref 70–110)
POTASSIUM SERPL-SCNC: 3.7 MMOL/L (ref 3.5–5.1)
SODIUM SERPL-SCNC: 139 MMOL/L (ref 136–145)

## 2019-05-02 PROCEDURE — 99499 UNLISTED E&M SERVICE: CPT | Mod: S$GLB,,, | Performed by: ORTHOPAEDIC SURGERY

## 2019-05-02 PROCEDURE — 36415 COLL VENOUS BLD VENIPUNCTURE: CPT

## 2019-05-02 PROCEDURE — 99999 PR PBB SHADOW E&M-EST. PATIENT-LVL III: CPT | Mod: PBBFAC,,, | Performed by: ORTHOPAEDIC SURGERY

## 2019-05-02 PROCEDURE — 99499 NO LOS: ICD-10-PCS | Mod: S$GLB,,, | Performed by: ORTHOPAEDIC SURGERY

## 2019-05-02 PROCEDURE — 80048 BASIC METABOLIC PNL TOTAL CA: CPT

## 2019-05-02 PROCEDURE — 99999 PR PBB SHADOW E&M-EST. PATIENT-LVL III: ICD-10-PCS | Mod: PBBFAC,,, | Performed by: ORTHOPAEDIC SURGERY

## 2019-05-02 NOTE — H&P
Subjective:       Patient ID: Christine Castro is a 64 y.o. female.    Chief Complaint: Pain of the Left Knee and Pre-op Exam (left knee)      Christine Castro is a 64 y.o. female with PMH significant for HTN, HLD, DM, history of MI, CHF, history of colon cancer, anemia, hypothyroidism, GERD, asthma, and depression.  She is here today for a pre-operative visit in preparation for a Left total knee arthroplasty to be performed by  Dr. Greenwood on 19.  Christine Castro has a >5 year history of Bilateral knee pain (L>R).  Pain is worse with activity and weight bearing. Patient has experienced interference of ADLs due to increased pain and decreased range of motion. Patient has failed non-operative treatment including NSAIDs, activity modification, and corticosteroid injections. Christine Castro ambulates with walker.  There has been no significant change in medical status since last visit.  She was seen and cleared by cardiology on 2019.  Additionally, she saw primary care doctor on 2019 who also cleared her for surgery.    Past Medical History:   Diagnosis Date    Arthritis     Asthma     Cancer     CHF (congestive heart failure)     [facility]    Colon cancer     COPD (chronic obstructive pulmonary disease)     Coronary artery disease     Depression     Diabetes mellitus, type 2     GERD (gastroesophageal reflux disease)     Myocardial infarction     AGE 19 MIGUELANGEL WITHBABY DELIVERY    Thyroid disease      Past Surgical History:   Procedure Laterality Date     SECTION      CHOLECYSTECTOMY      COLON SURGERY      COLONOSCOPY      --- repeat in 3-5 years    COLONOSCOPY N/A 2017    [facility]    COLONOSCOPY golytely N/A 4/10/2018    [facility]    ECTOPIC PREGNANCY SURGERY      ESOPHAGOGASTRODUODENOSCOPY (EGD) N/A 2019    [facility]     ESOPHAGOGASTRODUODENOSCOPY (EGD) N/A 4/10/2018    [facility]    ESOPHAGOGASTRODUODENOSCOPY (EGD) N/A 4/18/2017    [facility]    GASTRIC BYPASS      HYSTERECTOMY      ovary removed      REMOVAL-PORT-A-CATH Left 5/30/2017    [facility]    TONSILLECTOMY      TUBAL LIGATION       Family History   Problem Relation Age of Onset    Hyperlipidemia Mother     Hypertension Mother     Diabetes Mother     Hypertension Sister     Hypertension Brother     Diabetes Brother     Breast cancer Maternal Aunt     Breast cancer Maternal Aunt     Breast cancer Cousin      Social History     Socioeconomic History    Marital status:      Spouse name: Not on file    Number of children: Not on file    Years of education: Not on file    Highest education level: Not on file   Occupational History    Not on file   Social Needs    Financial resource strain: Not on file    Food insecurity:     Worry: Not on file     Inability: Not on file    Transportation needs:     Medical: Not on file     Non-medical: Not on file   Tobacco Use    Smoking status: Never Smoker    Smokeless tobacco: Never Used   Substance and Sexual Activity    Alcohol use: Yes     Comment: very rare    Drug use: No    Sexual activity: Never   Lifestyle    Physical activity:     Days per week: Not on file     Minutes per session: Not on file    Stress: Not on file   Relationships    Social connections:     Talks on phone: Not on file     Gets together: Not on file     Attends [employer] service: Not on file     Active member of club or organization: Not on file     Attends meetings of clubs or organizations: Not on file     Relationship status: Not on file   Other Topics Concern    Not on file   Social History Narrative    Not on file       Current Outpatient Medications   Medication Sig Dispense Refill    buPROPion (WELLBUTRIN SR)  200 MG SR12 Take 1 tablet (200 mg total) by mouth once daily. 90 tablet 2    busPIRone (BUSPAR) 10 MG tablet TAKE 1/2 TABLET BY MOUTH ONCE DAILY 60 tablet 0    diazePAM (VALIUM) 2 MG tablet TAKE 1 TABLET BY MOUTH EVERY 12 HOURS AS NEEDED FOR ANXIETY; DISCONTINUE 5 MG DOSE 30 tablet 0    diclofenac sodium (VOLTAREN) 1 % Gel APPLY 2 GRAMS EXTERNALLY TO THE AFFECTED AREA FOUR TIMES DAILY 100 g 0    escitalopram oxalate (LEXAPRO) 20 MG tablet Take 1 tablet (20 mg total) by mouth once daily. 90 tablet 3    furosemide (LASIX) 20 MG tablet Take 1 tablet (20 mg total) by mouth daily as needed. 90 tablet 1    gabapentin (NEURONTIN) 300 MG capsule Take 1 capsule (300 mg total) by mouth 3 (three) times daily. 270 capsule 3    levothyroxine (SYNTHROID) 75 MCG tablet Take 75 mcg by mouth once daily.      omeprazole (PRILOSEC) 40 MG capsule TAKE 1 CAPSULE(40 MG) BY MOUTH EVERY MORNING 90 capsule 0    ondansetron (ZOFRAN-ODT) 4 MG TbDL Take 1 tablet (4 mg total) by mouth every 6 (six) hours as needed. 20 tablet 1    oxyCODONE-acetaminophen (PERCOCET)  mg per tablet       potassium chloride (KLOR-CON) 10 MEQ TbSR TAKE 2 TABLETS(20 MEQ) BY MOUTH EVERY DAY 90 tablet 3    predniSONE (DELTASONE) 5 MG tablet TAKE 1 TABLET BY MOUTH DAILY 30 tablet 0    promethazine (PHENERGAN) 25 MG tablet TAKE 1 TABLET(25 MG) BY MOUTH EVERY 6 HOURS AS NEEDED 25 tablet 0    zolpidem (AMBIEN) 5 MG Tab TAKE 1 TABLET BY MOUTH EVERY NIGHT 30 tablet 0    albuterol (VENTOLIN HFA) 90 mcg/actuation inhaler INHALE 2 PUFFS INTO THE LUNGS EVERY 4 HOURS AS NEEDED FOR WHEEZING 18 g 3    atorvastatin (LIPITOR) 10 MG tablet Take 1 tablet (10 mg total) by mouth every other day. 45 tablet 3    bacitracin 500 unit/gram Oint Apply topically 2 (two) times daily. 28 g 0    budesonide (PULMICORT FLEXHALER) 90 mcg/actuation AePB Inhale 2 puffs (180 mcg total) into the lungs 2 (two) times daily. Controller 1 each 3     No current facility-administered  "medications for this visit.      Review of patient's allergies indicates:   Allergen Reactions    Ibuprofen Other (See Comments)     Causes asthma flare??       Review of Systems   Constitutional: Negative for chills and fever.   HENT: Negative for trouble swallowing.    Respiratory: Negative for chest tightness and shortness of breath.    Cardiovascular: Negative for chest pain and palpitations.   Gastrointestinal: Negative for abdominal pain and blood in stool.   Genitourinary: Negative for dysuria and hematuria.   Musculoskeletal: Positive for arthralgias, gait problem and joint swelling.   Skin: Negative for rash and wound.   Allergic/Immunologic: Negative for immunocompromised state.   Neurological: Positive for dizziness (orhtostatic). Negative for syncope and light-headedness.   Psychiatric/Behavioral: Negative for confusion.       Objective:      Vitals:    05/02/19 1322   BP: 103/67   BP Location: Left arm   Patient Position: Sitting   BP Method: Large (Automatic)   Weight: 84.4 kg (186 lb)   Height: 4' 11" (1.499 m)     Physical Exam   Constitutional: She is oriented to person, place, and time. She appears well-developed and well-nourished.   HENT:   Head: Normocephalic and atraumatic.   Eyes: Pupils are equal, round, and reactive to light.   Cardiovascular: Normal rate and regular rhythm. Exam reveals no gallop and no friction rub.   No murmur heard.  Pulmonary/Chest: Effort normal.   Abdominal: Soft. She exhibits no distension.   Neurological: She is alert and oriented to person, place, and time.   Skin: Skin is warm. Capillary refill takes less than 2 seconds. No rash noted. No erythema.   Psychiatric: She has a normal mood and affect.   Vitals reviewed.      Lab Review:  Pending BMP  Diagnostics Review: X-Ray: Reviewed     Assessment:       1. Primary osteoarthritis of left knee        Plan:       Patient was cleared by primary care and Cardiology.  Plan for left total knee replacement on 05/08/2019   "   Pre, gavin, and post operative procedures and expectations discussed. All questions were answered.   Christine Castro will contact us if there are any questions, concerns, or changes in medical status prior to surgery.

## 2019-05-02 NOTE — H&P (VIEW-ONLY)
Subjective:       Patient ID: Christine Castro is a 64 y.o. female.    Chief Complaint: Pain of the Left Knee and Pre-op Exam (left knee)      Christine Castro is a 64 y.o. female with PMH significant for HTN, HLD, DM, history of MI, CHF, history of colon cancer, anemia, hypothyroidism, GERD, asthma, and depression.  She is here today for a pre-operative visit in preparation for a Left total knee arthroplasty to be performed by  Dr. Greenwood on 19.  Christine Castro has a >5 year history of Bilateral knee pain (L>R).  Pain is worse with activity and weight bearing. Patient has experienced interference of ADLs due to increased pain and decreased range of motion. Patient has failed non-operative treatment including NSAIDs, activity modification, and corticosteroid injections. Christine Castro ambulates with walker.  There has been no significant change in medical status since last visit.  She was seen and cleared by cardiology on 2019.  Additionally, she saw primary care doctor on 2019 who also cleared her for surgery.    Past Medical History:   Diagnosis Date    Arthritis     Asthma     Cancer     CHF (congestive heart failure)     [facility]    Colon cancer     COPD (chronic obstructive pulmonary disease)     Coronary artery disease     Depression     Diabetes mellitus, type 2     GERD (gastroesophageal reflux disease)     Myocardial infarction     AGE 19 MIGUELANGEL WITHBABY DELIVERY    Thyroid disease      Past Surgical History:   Procedure Laterality Date     SECTION      CHOLECYSTECTOMY      COLON SURGERY      COLONOSCOPY      --- repeat in 3-5 years    COLONOSCOPY N/A 2017    [facility]    COLONOSCOPY golytely N/A 4/10/2018    [facility]    ECTOPIC PREGNANCY SURGERY      ESOPHAGOGASTRODUODENOSCOPY (EGD) N/A 2019    [facility]     ESOPHAGOGASTRODUODENOSCOPY (EGD) N/A 4/10/2018    [facility]    ESOPHAGOGASTRODUODENOSCOPY (EGD) N/A 4/18/2017    [facility]    GASTRIC BYPASS      HYSTERECTOMY      ovary removed      REMOVAL-PORT-A-CATH Left 5/30/2017    [facility]    TONSILLECTOMY      TUBAL LIGATION       Family History   Problem Relation Age of Onset    Hyperlipidemia Mother     Hypertension Mother     Diabetes Mother     Hypertension Sister     Hypertension Brother     Diabetes Brother     Breast cancer Maternal Aunt     Breast cancer Maternal Aunt     Breast cancer Cousin      Social History     Socioeconomic History    Marital status:      Spouse name: Not on file    Number of children: Not on file    Years of education: Not on file    Highest education level: Not on file   Occupational History    Not on file   Social Needs    Financial resource strain: Not on file    Food insecurity:     Worry: Not on file     Inability: Not on file    Transportation needs:     Medical: Not on file     Non-medical: Not on file   Tobacco Use    Smoking status: Never Smoker    Smokeless tobacco: Never Used   Substance and Sexual Activity    Alcohol use: Yes     Comment: very rare    Drug use: No    Sexual activity: Never   Lifestyle    Physical activity:     Days per week: Not on file     Minutes per session: Not on file    Stress: Not on file   Relationships    Social connections:     Talks on phone: Not on file     Gets together: Not on file     Attends Baptism service: Not on file     Active member of club or organization: Not on file     Attends meetings of clubs or organizations: Not on file     Relationship status: Not on file   Other Topics Concern    Not on file   Social History Narrative    Not on file       Current Outpatient Medications   Medication Sig Dispense Refill    buPROPion (WELLBUTRIN SR)  200 MG SR12 Take 1 tablet (200 mg total) by mouth once daily. 90 tablet 2    busPIRone (BUSPAR) 10 MG tablet TAKE 1/2 TABLET BY MOUTH ONCE DAILY 60 tablet 0    diazePAM (VALIUM) 2 MG tablet TAKE 1 TABLET BY MOUTH EVERY 12 HOURS AS NEEDED FOR ANXIETY; DISCONTINUE 5 MG DOSE 30 tablet 0    diclofenac sodium (VOLTAREN) 1 % Gel APPLY 2 GRAMS EXTERNALLY TO THE AFFECTED AREA FOUR TIMES DAILY 100 g 0    escitalopram oxalate (LEXAPRO) 20 MG tablet Take 1 tablet (20 mg total) by mouth once daily. 90 tablet 3    furosemide (LASIX) 20 MG tablet Take 1 tablet (20 mg total) by mouth daily as needed. 90 tablet 1    gabapentin (NEURONTIN) 300 MG capsule Take 1 capsule (300 mg total) by mouth 3 (three) times daily. 270 capsule 3    levothyroxine (SYNTHROID) 75 MCG tablet Take 75 mcg by mouth once daily.      omeprazole (PRILOSEC) 40 MG capsule TAKE 1 CAPSULE(40 MG) BY MOUTH EVERY MORNING 90 capsule 0    ondansetron (ZOFRAN-ODT) 4 MG TbDL Take 1 tablet (4 mg total) by mouth every 6 (six) hours as needed. 20 tablet 1    oxyCODONE-acetaminophen (PERCOCET)  mg per tablet       potassium chloride (KLOR-CON) 10 MEQ TbSR TAKE 2 TABLETS(20 MEQ) BY MOUTH EVERY DAY 90 tablet 3    predniSONE (DELTASONE) 5 MG tablet TAKE 1 TABLET BY MOUTH DAILY 30 tablet 0    promethazine (PHENERGAN) 25 MG tablet TAKE 1 TABLET(25 MG) BY MOUTH EVERY 6 HOURS AS NEEDED 25 tablet 0    zolpidem (AMBIEN) 5 MG Tab TAKE 1 TABLET BY MOUTH EVERY NIGHT 30 tablet 0    albuterol (VENTOLIN HFA) 90 mcg/actuation inhaler INHALE 2 PUFFS INTO THE LUNGS EVERY 4 HOURS AS NEEDED FOR WHEEZING 18 g 3    atorvastatin (LIPITOR) 10 MG tablet Take 1 tablet (10 mg total) by mouth every other day. 45 tablet 3    bacitracin 500 unit/gram Oint Apply topically 2 (two) times daily. 28 g 0    budesonide (PULMICORT FLEXHALER) 90 mcg/actuation AePB Inhale 2 puffs (180 mcg total) into the lungs 2 (two) times daily. Controller 1 each 3     No current facility-administered  "medications for this visit.      Review of patient's allergies indicates:   Allergen Reactions    Ibuprofen Other (See Comments)     Causes asthma flare??       Review of Systems   Constitutional: Negative for chills and fever.   HENT: Negative for trouble swallowing.    Respiratory: Negative for chest tightness and shortness of breath.    Cardiovascular: Negative for chest pain and palpitations.   Gastrointestinal: Negative for abdominal pain and blood in stool.   Genitourinary: Negative for dysuria and hematuria.   Musculoskeletal: Positive for arthralgias, gait problem and joint swelling.   Skin: Negative for rash and wound.   Allergic/Immunologic: Negative for immunocompromised state.   Neurological: Positive for dizziness (orhtostatic). Negative for syncope and light-headedness.   Psychiatric/Behavioral: Negative for confusion.       Objective:      Vitals:    05/02/19 1322   BP: 103/67   BP Location: Left arm   Patient Position: Sitting   BP Method: Large (Automatic)   Weight: 84.4 kg (186 lb)   Height: 4' 11" (1.499 m)     Physical Exam   Constitutional: She is oriented to person, place, and time. She appears well-developed and well-nourished.   HENT:   Head: Normocephalic and atraumatic.   Eyes: Pupils are equal, round, and reactive to light.   Cardiovascular: Normal rate and regular rhythm. Exam reveals no gallop and no friction rub.   No murmur heard.  Pulmonary/Chest: Effort normal.   Abdominal: Soft. She exhibits no distension.   Neurological: She is alert and oriented to person, place, and time.   Skin: Skin is warm. Capillary refill takes less than 2 seconds. No rash noted. No erythema.   Psychiatric: She has a normal mood and affect.   Vitals reviewed.      Lab Review:  Pending BMP  Diagnostics Review: X-Ray: Reviewed     Assessment:       1. Primary osteoarthritis of left knee        Plan:       Patient was cleared by primary care and Cardiology.  Plan for left total knee replacement on 05/08/2019   "   Pre, gavin, and post operative procedures and expectations discussed. All questions were answered.   Christine Castro will contact us if there are any questions, concerns, or changes in medical status prior to surgery.

## 2019-05-03 ENCOUNTER — TELEPHONE (OUTPATIENT)
Dept: FAMILY MEDICINE | Facility: CLINIC | Age: 64
End: 2019-05-03

## 2019-05-03 NOTE — TELEPHONE ENCOUNTER
Left a message for pt to call back in regards to her appointment today. Pt was cleared for sx last week with . Pt doesn't need the appointment.

## 2019-05-08 ENCOUNTER — HOSPITAL ENCOUNTER (OUTPATIENT)
Facility: HOSPITAL | Age: 64
Discharge: HOME | End: 2019-05-10
Attending: ORTHOPAEDIC SURGERY | Admitting: ORTHOPAEDIC SURGERY
Payer: MEDICARE

## 2019-05-08 ENCOUNTER — ANESTHESIA (OUTPATIENT)
Dept: SURGERY | Facility: HOSPITAL | Age: 64
End: 2019-05-08
Payer: MEDICARE

## 2019-05-08 DIAGNOSIS — M17.12 PRIMARY OSTEOARTHRITIS OF LEFT KNEE: ICD-10-CM

## 2019-05-08 DIAGNOSIS — Z96.652 HISTORY OF LEFT KNEE REPLACEMENT: Primary | ICD-10-CM

## 2019-05-08 LAB
GLUCOSE SERPL-MCNC: 86 MG/DL (ref 70–110)
POCT GLUCOSE: 86 MG/DL (ref 70–110)

## 2019-05-08 PROCEDURE — 27201423 OPTIME MED/SURG SUP & DEVICES STERILE SUPPLY: Performed by: ORTHOPAEDIC SURGERY

## 2019-05-08 PROCEDURE — 63600175 PHARM REV CODE 636 W HCPCS: Performed by: STUDENT IN AN ORGANIZED HEALTH CARE EDUCATION/TRAINING PROGRAM

## 2019-05-08 PROCEDURE — 36000711: Performed by: ORTHOPAEDIC SURGERY

## 2019-05-08 PROCEDURE — 94761 N-INVAS EAR/PLS OXIMETRY MLT: CPT | Mod: 59

## 2019-05-08 PROCEDURE — 01402 ANES OPN/ARTH TOT KNE ARTHRP: CPT | Performed by: ORTHOPAEDIC SURGERY

## 2019-05-08 PROCEDURE — 76942 ECHO GUIDE FOR BIOPSY: CPT | Performed by: ANESTHESIOLOGY

## 2019-05-08 PROCEDURE — 27447 TOTAL KNEE ARTHROPLASTY: CPT | Mod: AS,LT,, | Performed by: ORTHOPAEDIC SURGERY

## 2019-05-08 PROCEDURE — 25000003 PHARM REV CODE 250: Performed by: ORTHOPAEDIC SURGERY

## 2019-05-08 PROCEDURE — 25000003 PHARM REV CODE 250: Performed by: NURSE PRACTITIONER

## 2019-05-08 PROCEDURE — 71000033 HC RECOVERY, INTIAL HOUR: Performed by: ORTHOPAEDIC SURGERY

## 2019-05-08 PROCEDURE — 25000003 PHARM REV CODE 250: Performed by: STUDENT IN AN ORGANIZED HEALTH CARE EDUCATION/TRAINING PROGRAM

## 2019-05-08 PROCEDURE — 97165 OT EVAL LOW COMPLEX 30 MIN: CPT

## 2019-05-08 PROCEDURE — 97116 GAIT TRAINING THERAPY: CPT

## 2019-05-08 PROCEDURE — C1776 JOINT DEVICE (IMPLANTABLE): HCPCS | Performed by: ORTHOPAEDIC SURGERY

## 2019-05-08 PROCEDURE — 63600175 PHARM REV CODE 636 W HCPCS: Performed by: ORTHOPAEDIC SURGERY

## 2019-05-08 PROCEDURE — 64448 NJX AA&/STRD FEM NRV NFS IMG: CPT | Performed by: ANESTHESIOLOGY

## 2019-05-08 PROCEDURE — 27447 PR TOTAL KNEE ARTHROPLASTY: ICD-10-PCS | Mod: LT,,, | Performed by: ORTHOPAEDIC SURGERY

## 2019-05-08 PROCEDURE — 36000710: Performed by: ORTHOPAEDIC SURGERY

## 2019-05-08 PROCEDURE — 97535 SELF CARE MNGMENT TRAINING: CPT

## 2019-05-08 PROCEDURE — 71000039 HC RECOVERY, EACH ADD'L HOUR: Performed by: ORTHOPAEDIC SURGERY

## 2019-05-08 PROCEDURE — 27447 PR TOTAL KNEE ARTHROPLASTY: ICD-10-PCS | Mod: AS,LT,, | Performed by: ORTHOPAEDIC SURGERY

## 2019-05-08 PROCEDURE — 37000009 HC ANESTHESIA EA ADD 15 MINS: Performed by: ORTHOPAEDIC SURGERY

## 2019-05-08 PROCEDURE — 27447 TOTAL KNEE ARTHROPLASTY: CPT | Mod: LT,,, | Performed by: ORTHOPAEDIC SURGERY

## 2019-05-08 PROCEDURE — 97161 PT EVAL LOW COMPLEX 20 MIN: CPT

## 2019-05-08 PROCEDURE — 37000008 HC ANESTHESIA 1ST 15 MINUTES: Performed by: ORTHOPAEDIC SURGERY

## 2019-05-08 PROCEDURE — C1713 ANCHOR/SCREW BN/BN,TIS/BN: HCPCS | Performed by: ORTHOPAEDIC SURGERY

## 2019-05-08 DEVICE — COMPONENT PATELLA PFCSIG 32MM: Type: IMPLANTABLE DEVICE | Site: KNEE | Status: FUNCTIONAL

## 2019-05-08 DEVICE — COMPONENT FEM POST SZ 2.5 LEF: Type: IMPLANTABLE DEVICE | Site: KNEE | Status: FUNCTIONAL

## 2019-05-08 DEVICE — BASEPLATE TIBIAL CEM MOD SZ 2: Type: IMPLANTABLE DEVICE | Site: KNEE | Status: FUNCTIONAL

## 2019-05-08 DEVICE — IMPLANTABLE DEVICE: Type: IMPLANTABLE DEVICE | Site: KNEE | Status: FUNCTIONAL

## 2019-05-08 DEVICE — CEMENT BONE IMPLANT: Type: IMPLANTABLE DEVICE | Site: KNEE | Status: FUNCTIONAL

## 2019-05-08 RX ORDER — SODIUM CHLORIDE 0.9 % (FLUSH) 0.9 %
10 SYRINGE (ML) INJECTION
Status: DISCONTINUED | OUTPATIENT
Start: 2019-05-08 | End: 2019-05-10 | Stop reason: HOSPADM

## 2019-05-08 RX ORDER — BUSPIRONE HYDROCHLORIDE 5 MG/1
5 TABLET ORAL DAILY
Status: DISCONTINUED | OUTPATIENT
Start: 2019-05-08 | End: 2019-05-10 | Stop reason: HOSPADM

## 2019-05-08 RX ORDER — HYDROMORPHONE HYDROCHLORIDE 2 MG/ML
0.2 INJECTION, SOLUTION INTRAMUSCULAR; INTRAVENOUS; SUBCUTANEOUS EVERY 5 MIN PRN
Status: DISCONTINUED | OUTPATIENT
Start: 2019-05-08 | End: 2019-05-08 | Stop reason: HOSPADM

## 2019-05-08 RX ORDER — LACTULOSE 10 G/15ML
20 SOLUTION ORAL EVERY 6 HOURS PRN
Status: DISCONTINUED | OUTPATIENT
Start: 2019-05-08 | End: 2019-05-10 | Stop reason: HOSPADM

## 2019-05-08 RX ORDER — GABAPENTIN 300 MG/1
300 CAPSULE ORAL 3 TIMES DAILY
Status: DISCONTINUED | OUTPATIENT
Start: 2019-05-08 | End: 2019-05-10 | Stop reason: HOSPADM

## 2019-05-08 RX ORDER — LIDOCAINE HYDROCHLORIDE 10 MG/ML
1 INJECTION, SOLUTION EPIDURAL; INFILTRATION; INTRACAUDAL; PERINEURAL ONCE
Status: COMPLETED | OUTPATIENT
Start: 2019-05-08 | End: 2019-05-08

## 2019-05-08 RX ORDER — HYDROMORPHONE HYDROCHLORIDE 2 MG/ML
0.5 INJECTION, SOLUTION INTRAMUSCULAR; INTRAVENOUS; SUBCUTANEOUS EVERY 6 HOURS PRN
Status: DISCONTINUED | OUTPATIENT
Start: 2019-05-08 | End: 2019-05-09

## 2019-05-08 RX ORDER — MUPIROCIN 20 MG/G
OINTMENT TOPICAL
Status: DISCONTINUED | OUTPATIENT
Start: 2019-05-08 | End: 2019-05-08 | Stop reason: HOSPADM

## 2019-05-08 RX ORDER — PROPOFOL 10 MG/ML
INJECTION, EMULSION INTRAVENOUS CONTINUOUS PRN
Status: DISCONTINUED | OUTPATIENT
Start: 2019-05-08 | End: 2019-05-08

## 2019-05-08 RX ORDER — ACETAMINOPHEN 500 MG
1000 TABLET ORAL
Status: COMPLETED | OUTPATIENT
Start: 2019-05-08 | End: 2019-05-08

## 2019-05-08 RX ORDER — ALBUTEROL SULFATE 90 UG/1
1 AEROSOL, METERED RESPIRATORY (INHALATION) EVERY 4 HOURS PRN
Status: DISCONTINUED | OUTPATIENT
Start: 2019-05-08 | End: 2019-05-10 | Stop reason: HOSPADM

## 2019-05-08 RX ORDER — SODIUM CHLORIDE, SODIUM LACTATE, POTASSIUM CHLORIDE, CALCIUM CHLORIDE 600; 310; 30; 20 MG/100ML; MG/100ML; MG/100ML; MG/100ML
INJECTION, SOLUTION INTRAVENOUS CONTINUOUS PRN
Status: DISCONTINUED | OUTPATIENT
Start: 2019-05-08 | End: 2019-05-08

## 2019-05-08 RX ORDER — TRANEXAMIC ACID 100 MG/ML
1000 INJECTION, SOLUTION INTRAVENOUS
Status: COMPLETED | OUTPATIENT
Start: 2019-05-08 | End: 2019-05-08

## 2019-05-08 RX ORDER — BUPROPION HYDROCHLORIDE 100 MG/1
200 TABLET, EXTENDED RELEASE ORAL DAILY
Status: DISCONTINUED | OUTPATIENT
Start: 2019-05-08 | End: 2019-05-10 | Stop reason: HOSPADM

## 2019-05-08 RX ORDER — ESCITALOPRAM OXALATE 10 MG/1
20 TABLET ORAL DAILY
Status: DISCONTINUED | OUTPATIENT
Start: 2019-05-08 | End: 2019-05-10 | Stop reason: HOSPADM

## 2019-05-08 RX ORDER — ONDANSETRON 4 MG/1
4 TABLET, ORALLY DISINTEGRATING ORAL EVERY 6 HOURS PRN
Status: DISCONTINUED | OUTPATIENT
Start: 2019-05-08 | End: 2019-05-10 | Stop reason: HOSPADM

## 2019-05-08 RX ORDER — POLYETHYLENE GLYCOL 3350 17 G/17G
17 POWDER, FOR SOLUTION ORAL DAILY
Status: DISCONTINUED | OUTPATIENT
Start: 2019-05-08 | End: 2019-05-10 | Stop reason: HOSPADM

## 2019-05-08 RX ORDER — BISACODYL 10 MG
10 SUPPOSITORY, RECTAL RECTAL DAILY PRN
Status: DISCONTINUED | OUTPATIENT
Start: 2019-05-08 | End: 2019-05-10 | Stop reason: HOSPADM

## 2019-05-08 RX ORDER — TRANEXAMIC ACID 100 MG/ML
INJECTION, SOLUTION INTRAVENOUS
Status: DISCONTINUED | OUTPATIENT
Start: 2019-05-08 | End: 2019-05-08 | Stop reason: HOSPADM

## 2019-05-08 RX ORDER — SODIUM CHLORIDE 0.9 % (FLUSH) 0.9 %
10 SYRINGE (ML) INJECTION
Status: DISCONTINUED | OUTPATIENT
Start: 2019-05-08 | End: 2019-05-08

## 2019-05-08 RX ORDER — OXYCODONE HYDROCHLORIDE 5 MG/1
5 TABLET ORAL
Status: DISCONTINUED | OUTPATIENT
Start: 2019-05-08 | End: 2019-05-10 | Stop reason: HOSPADM

## 2019-05-08 RX ORDER — ACETAMINOPHEN 500 MG
1000 TABLET ORAL 3 TIMES DAILY
Status: DISCONTINUED | OUTPATIENT
Start: 2019-05-08 | End: 2019-05-10 | Stop reason: HOSPADM

## 2019-05-08 RX ORDER — SODIUM CHLORIDE 0.9 % (FLUSH) 0.9 %
10 SYRINGE (ML) INJECTION
Status: DISCONTINUED | OUTPATIENT
Start: 2019-05-08 | End: 2019-05-08 | Stop reason: HOSPADM

## 2019-05-08 RX ORDER — ATORVASTATIN CALCIUM 10 MG/1
10 TABLET, FILM COATED ORAL EVERY OTHER DAY
Status: DISCONTINUED | OUTPATIENT
Start: 2019-05-09 | End: 2019-05-10 | Stop reason: HOSPADM

## 2019-05-08 RX ORDER — ONDANSETRON 2 MG/ML
4 INJECTION INTRAMUSCULAR; INTRAVENOUS EVERY 6 HOURS PRN
Status: DISCONTINUED | OUTPATIENT
Start: 2019-05-08 | End: 2019-05-10 | Stop reason: HOSPADM

## 2019-05-08 RX ORDER — RAMELTEON 8 MG/1
8 TABLET ORAL NIGHTLY PRN
Status: DISCONTINUED | OUTPATIENT
Start: 2019-05-08 | End: 2019-05-10 | Stop reason: HOSPADM

## 2019-05-08 RX ORDER — SODIUM CHLORIDE, SODIUM LACTATE, POTASSIUM CHLORIDE, CALCIUM CHLORIDE 600; 310; 30; 20 MG/100ML; MG/100ML; MG/100ML; MG/100ML
INJECTION, SOLUTION INTRAVENOUS CONTINUOUS
Status: DISCONTINUED | OUTPATIENT
Start: 2019-05-08 | End: 2019-05-08

## 2019-05-08 RX ORDER — ROPIVACAINE HYDROCHLORIDE 2 MG/ML
INJECTION, SOLUTION EPIDURAL; INFILTRATION; PERINEURAL
Status: DISCONTINUED | OUTPATIENT
Start: 2019-05-08 | End: 2019-05-08

## 2019-05-08 RX ORDER — PREDNISONE 5 MG/1
5 TABLET ORAL DAILY
Status: DISCONTINUED | OUTPATIENT
Start: 2019-05-08 | End: 2019-05-10 | Stop reason: HOSPADM

## 2019-05-08 RX ORDER — SODIUM CHLORIDE 9 MG/ML
INJECTION, SOLUTION INTRAVENOUS CONTINUOUS
Status: DISCONTINUED | OUTPATIENT
Start: 2019-05-08 | End: 2019-05-08

## 2019-05-08 RX ORDER — HYDROMORPHONE HYDROCHLORIDE 2 MG/ML
0.5 INJECTION, SOLUTION INTRAMUSCULAR; INTRAVENOUS; SUBCUTANEOUS EVERY 5 MIN PRN
Status: DISCONTINUED | OUTPATIENT
Start: 2019-05-08 | End: 2019-05-08 | Stop reason: HOSPADM

## 2019-05-08 RX ORDER — PHENYLEPHRINE HYDROCHLORIDE 10 MG/ML
INJECTION INTRAVENOUS
Status: DISCONTINUED | OUTPATIENT
Start: 2019-05-08 | End: 2019-05-08

## 2019-05-08 RX ORDER — AMOXICILLIN 250 MG
1 CAPSULE ORAL 2 TIMES DAILY
Status: DISCONTINUED | OUTPATIENT
Start: 2019-05-08 | End: 2019-05-10 | Stop reason: HOSPADM

## 2019-05-08 RX ORDER — MIDAZOLAM HYDROCHLORIDE 1 MG/ML
INJECTION, SOLUTION INTRAMUSCULAR; INTRAVENOUS
Status: DISCONTINUED | OUTPATIENT
Start: 2019-05-08 | End: 2019-05-08

## 2019-05-08 RX ORDER — OXYCODONE HYDROCHLORIDE 5 MG/1
10 TABLET ORAL
Status: DISCONTINUED | OUTPATIENT
Start: 2019-05-08 | End: 2019-05-10 | Stop reason: HOSPADM

## 2019-05-08 RX ORDER — FUROSEMIDE 20 MG/1
20 TABLET ORAL DAILY
Status: DISCONTINUED | OUTPATIENT
Start: 2019-05-08 | End: 2019-05-10 | Stop reason: HOSPADM

## 2019-05-08 RX ORDER — LEVOTHYROXINE SODIUM 75 UG/1
75 TABLET ORAL
Status: DISCONTINUED | OUTPATIENT
Start: 2019-05-09 | End: 2019-05-10 | Stop reason: HOSPADM

## 2019-05-08 RX ORDER — BUPIVACAINE HYDROCHLORIDE 7.5 MG/ML
INJECTION, SOLUTION EPIDURAL; RETROBULBAR
Status: COMPLETED | OUTPATIENT
Start: 2019-05-08 | End: 2019-05-08

## 2019-05-08 RX ORDER — PANTOPRAZOLE SODIUM 40 MG/1
40 TABLET, DELAYED RELEASE ORAL DAILY
Status: DISCONTINUED | OUTPATIENT
Start: 2019-05-08 | End: 2019-05-10 | Stop reason: HOSPADM

## 2019-05-08 RX ORDER — CEFAZOLIN SODIUM 2 G/50ML
2 SOLUTION INTRAVENOUS
Status: COMPLETED | OUTPATIENT
Start: 2019-05-08 | End: 2019-05-08

## 2019-05-08 RX ORDER — ONDANSETRON 2 MG/ML
4 INJECTION INTRAMUSCULAR; INTRAVENOUS DAILY PRN
Status: DISCONTINUED | OUTPATIENT
Start: 2019-05-08 | End: 2019-05-08 | Stop reason: HOSPADM

## 2019-05-08 RX ORDER — ONDANSETRON 2 MG/ML
4 INJECTION INTRAMUSCULAR; INTRAVENOUS ONCE AS NEEDED
Status: DISCONTINUED | OUTPATIENT
Start: 2019-05-08 | End: 2019-05-08 | Stop reason: HOSPADM

## 2019-05-08 RX ORDER — SODIUM CHLORIDE 0.9 % (FLUSH) 0.9 %
5 SYRINGE (ML) INJECTION
Status: DISCONTINUED | OUTPATIENT
Start: 2019-05-08 | End: 2019-05-10 | Stop reason: HOSPADM

## 2019-05-08 RX ORDER — ASPIRIN 81 MG/1
81 TABLET ORAL DAILY
Status: DISCONTINUED | OUTPATIENT
Start: 2019-05-09 | End: 2019-05-08

## 2019-05-08 RX ORDER — POTASSIUM CHLORIDE 750 MG/1
10 TABLET, EXTENDED RELEASE ORAL 2 TIMES DAILY
Status: DISCONTINUED | OUTPATIENT
Start: 2019-05-08 | End: 2019-05-10 | Stop reason: HOSPADM

## 2019-05-08 RX ORDER — PREGABALIN 75 MG/1
150 CAPSULE ORAL
Status: COMPLETED | OUTPATIENT
Start: 2019-05-08 | End: 2019-05-08

## 2019-05-08 RX ORDER — BUDESONIDE 0.5 MG/2ML
0.25 INHALANT ORAL EVERY 12 HOURS
Status: DISCONTINUED | OUTPATIENT
Start: 2019-05-08 | End: 2019-05-10 | Stop reason: HOSPADM

## 2019-05-08 RX ADMIN — ACETAMINOPHEN 1000 MG: 500 TABLET ORAL at 09:05

## 2019-05-08 RX ADMIN — ACETAMINOPHEN 1000 MG: 500 TABLET ORAL at 04:05

## 2019-05-08 RX ADMIN — PANTOPRAZOLE SODIUM 40 MG: 40 TABLET, DELAYED RELEASE ORAL at 04:05

## 2019-05-08 RX ADMIN — BUSPIRONE HYDROCHLORIDE 5 MG: 5 TABLET ORAL at 04:05

## 2019-05-08 RX ADMIN — PROPOFOL 100 MCG/KG/MIN: 10 INJECTION, EMULSION INTRAVENOUS at 11:05

## 2019-05-08 RX ADMIN — HYDROMORPHONE HYDROCHLORIDE 0.5 MG: 2 INJECTION, SOLUTION INTRAMUSCULAR; INTRAVENOUS; SUBCUTANEOUS at 07:05

## 2019-05-08 RX ADMIN — ROPIVACAINE HYDROCHLORIDE: 2 INJECTION, SOLUTION EPIDURAL; INFILTRATION at 03:05

## 2019-05-08 RX ADMIN — STANDARDIZED SENNA CONCENTRATE AND DOCUSATE SODIUM 1 TABLET: 8.6; 5 TABLET, FILM COATED ORAL at 09:05

## 2019-05-08 RX ADMIN — ONDANSETRON 4 MG: 2 INJECTION INTRAMUSCULAR; INTRAVENOUS at 04:05

## 2019-05-08 RX ADMIN — CEFAZOLIN SODIUM 2 G: 2 SOLUTION INTRAVENOUS at 11:05

## 2019-05-08 RX ADMIN — POTASSIUM CHLORIDE 10 MEQ: 750 TABLET, EXTENDED RELEASE ORAL at 09:05

## 2019-05-08 RX ADMIN — MIDAZOLAM 2 MG: 1 INJECTION INTRAMUSCULAR; INTRAVENOUS at 10:05

## 2019-05-08 RX ADMIN — GABAPENTIN 300 MG: 300 CAPSULE ORAL at 04:05

## 2019-05-08 RX ADMIN — DEXTROSE 2 G: 50 INJECTION, SOLUTION INTRAVENOUS at 07:05

## 2019-05-08 RX ADMIN — OXYCODONE HYDROCHLORIDE 10 MG: 5 TABLET ORAL at 04:05

## 2019-05-08 RX ADMIN — MUPIROCIN: 20 OINTMENT TOPICAL at 09:05

## 2019-05-08 RX ADMIN — BUPROPION HYDROCHLORIDE 200 MG: 100 TABLET, EXTENDED RELEASE ORAL at 04:05

## 2019-05-08 RX ADMIN — PREGABALIN 150 MG: 75 CAPSULE ORAL at 09:05

## 2019-05-08 RX ADMIN — PREDNISONE 5 MG: 5 TABLET ORAL at 04:05

## 2019-05-08 RX ADMIN — SODIUM CHLORIDE, SODIUM LACTATE, POTASSIUM CHLORIDE, AND CALCIUM CHLORIDE: .6; .31; .03; .02 INJECTION, SOLUTION INTRAVENOUS at 01:05

## 2019-05-08 RX ADMIN — ESCITALOPRAM OXALATE 20 MG: 10 TABLET ORAL at 04:05

## 2019-05-08 RX ADMIN — PHENYLEPHRINE HYDROCHLORIDE 100 MCG: 10 INJECTION INTRAVENOUS at 11:05

## 2019-05-08 RX ADMIN — FUROSEMIDE 20 MG: 20 TABLET ORAL at 04:05

## 2019-05-08 RX ADMIN — LIDOCAINE HYDROCHLORIDE 10 MG: 10 INJECTION, SOLUTION EPIDURAL; INFILTRATION; INTRACAUDAL; PERINEURAL at 09:05

## 2019-05-08 RX ADMIN — TRANEXAMIC ACID 1000 MG: 100 INJECTION, SOLUTION INTRAVENOUS at 11:05

## 2019-05-08 RX ADMIN — PHENYLEPHRINE HYDROCHLORIDE 200 MCG: 10 INJECTION INTRAVENOUS at 11:05

## 2019-05-08 RX ADMIN — GABAPENTIN 300 MG: 300 CAPSULE ORAL at 09:05

## 2019-05-08 RX ADMIN — BUPIVACAINE HYDROCHLORIDE 1.6 ML: 7.5 INJECTION, SOLUTION EPIDURAL; RETROBULBAR at 11:05

## 2019-05-08 RX ADMIN — SODIUM CHLORIDE, SODIUM LACTATE, POTASSIUM CHLORIDE, AND CALCIUM CHLORIDE: .6; .31; .03; .02 INJECTION, SOLUTION INTRAVENOUS at 10:05

## 2019-05-08 RX ADMIN — OXYCODONE HYDROCHLORIDE 10 MG: 5 TABLET ORAL at 10:05

## 2019-05-08 RX ADMIN — ROPIVACAINE HYDROCHLORIDE 30 ML: 2 INJECTION, SOLUTION EPIDURAL; INFILTRATION at 01:05

## 2019-05-08 NOTE — NURSING
Anesthesia notified of patient having ON-Q pain pump ordered and needing to be attached. Anesthesia states that they will be coming to room to set up pump.

## 2019-05-08 NOTE — TRANSFER OF CARE
Anesthesia Transfer of Care Note    Patient: Christine Castro    Procedure(s) Performed: Procedure(s) (LRB):  ARTHROPLASTY, KNEE (Left)    Patient location: PACU    Anesthesia Type: spinal and MAC    Transport from OR: Transported from OR on 6-10 L/min O2 by face mask with adequate spontaneous ventilation    Post pain: adequate analgesia    Post assessment: no apparent anesthetic complications    Post vital signs: stable    Level of consciousness: awake and alert    Nausea/Vomiting: no nausea/vomiting    Complications: none    Transfer of care protocol was followed      Last vitals:   Visit Vitals  /69 (Patient Position: Sitting)   Pulse 62   Temp 36.9 °C (98.4 °F) (Skin)   Resp 18   Ht 5' (1.524 m)   Wt 79.4 kg (175 lb)   SpO2 99%   BMI 34.18 kg/m²

## 2019-05-08 NOTE — OP NOTE
INDICATION/PREOPERATIVE DIAGNOSIS: Osteoarthritis of the leftknee.    POSTOPERATIVE DIAGNOSIS: Same.    DATE OF SURGERY: 5/8/2019    NAME OF PROCEDURE:left total knee replacement.    SURGEON: Roldan Greenwood MD    ASSISTANT: DOMENICO Joaquin    IMPLANT SYSTEM: Depuy PFC Sigma PS     EBL: trace    DESCRIPTION OF OPERATION: The patient was taken to the Operating Room. spinal anesthesia was administered and preoperative antibiotics were given. A tourniquet was placed on the proximal thigh. The lower extremity was prepped and draped in the usual sterile fashion. It was exsanguinated with an Esmarch and the tourniquet was inflated to 300 mmHg. An anterior incision was made. Sharp dissection was carried down to the extensor mechanism. A medial parapatellar arthrotomy was performed. The proximal medial face of the tibia was exposed. The patella was everted. The knee was carefully flexed. The remnants of the anterior cruciate ligament and the patellofemoral ligament were released. The fat pad was resected with the anterior part of the lateral meniscus. Osteophytes around the femur were debrided. The femoral canal was entered with the drill. The alignment guide was inserted. The distal jig was applied in 7 degrees of valgus and the distal cut was made. The distal femur was measured as a size 2.5. The combination cutting block was applied in 3 degrees of external rotation. Anterior, posterior and chamfer cuts were made. The center box was then cut at the same size. The meniscal remnants were resected. The proximal tibia was exposed. The surrounding soft tissues were protected with retractors. The external jig was applied in neutral alignment and a neutral cut was made. This cut was checked and found to be satisfactory. A lamina  was inserted with the knee in flexion. The posterior compartment was debrided of osteophytes and synovitis. The tibia was then drilled and punched with the size 2 base plate in the appropriate  rotational position, aligned with the tibial tubercle. Trial implants were inserted. There was excellent range of motion and stability with a size 10 mm spacer. The patella was then exposed. It was resected using the guide leaving 15 mm of residual bone. It was then drilled for the size 32 mm button. The trial had central tracking. The trials were all removed. The knee was irrigated and dried. Antibiotic cement was mixed. The implants were cemented with the knee held in extension and the patella clamped. After the cement had dried a careful search for extra cement was made and all was removed. The knee was irrigated. The final spacer was snapped into place. The extensor mechanism was closed with interrupted #1 Vicryl. This was checked in flexion and found to be secure. The joint was injected with 1g of Transexemic acid. The subcutaneous tissue was closed with 2-0 Vicryl. The skin was closed with clips and an occlusive plastic dressing with an Ace wrap was applied. The tourniquet was released. There were no known complications. I was present for the entire procedure.    The assistance of a physician assistant was medically necessary to perform the procedure safely.

## 2019-05-08 NOTE — PLAN OF CARE
Pt meets PACU discharge criteria. pt signed out of PACU by Dr hernandez. Report called to ROSE Ochoa

## 2019-05-08 NOTE — OP NOTE
Certification of Assistant at Surgery       Surgery Date: 5/8/2019     Participating Surgeons:  Surgeon(s) and Role:     * Roldan Greenwood MD - Primary    Procedures:  Procedure(s) (LRB):  ARTHROPLASTY, KNEE (Left)    Assistant Surgeon's Certification of Necessity:  I understand that section 1842 (b) (6) (d) of the Social Security Act generally prohibits Medicare Part B reasonable charge payment for the services of assistants at surgery in teaching hospitals when qualified residents are available to furnish such services. I certify that the services for which payment is claimed were medically necessary, and that no qualified resident was available to perform the services. I further understand that these services are subject to post-payment review by the Medicare carrier.    **  Mayelin Joaquin PA-C    05/08/2019  1:22 PM

## 2019-05-08 NOTE — PLAN OF CARE
VN note: VN cued into patient's room for introduction. VN informed patient and family that VN would be working closely along side bedside nurse, PCT, and the rest of care team and making rounds throughout the shift. Patient verbalized understanding. Allowed time for questions. VN will continue to be available to patient and intervene prn.

## 2019-05-08 NOTE — PT/OT/SLP EVAL
Occupational Therapy   Evaluation/tx    Name: Christine Castro  MRN: [id number]  Admitting Diagnosis:  History of left knee replacement Day of Surgery    Recommendations:     Discharge Recommendations: home health OT, home health PT  Discharge Equipment Recommendations:  walker, rolling  Barriers to discharge:  None    Assessment:   Pt presents w/ decreased overall endurance/conditioning, balance/mobility & coordination w/ subsequent decline in (I)/safety w/ BADLs, fxnl mobility & fxnl t/f's.  OT 5x/wk to increase phys/fxnl status & maximize potential to achieve established goals for d/c-->home w/ HHOT/PT & Jr RW.    Christine Castro is a 64 y.o. female with a medical diagnosis of History of left knee replacement.  She presents with . Performance deficits affecting function: weakness, impaired endurance, gait instability, impaired functional mobilty, impaired self care skills, impaired balance, decreased lower extremity function, decreased safety awareness, pain, abnormal tone, decreased ROM, edema, impaired skin, impaired joint extensibility.      Rehab Prognosis: Good; patient would benefit from acute skilled OT services to address these deficits and reach maximum level of function.       Plan:     Patient to be seen 5 x/week to address the above listed problems via self-care/home management, therapeutic activities, therapeutic exercises  · Plan of Care Expires: 06/08/19  · Plan of Care Reviewed with: patient, spouse, daughter, grandchild(sonia), friend    Subjective     Chief Complaint: L knee OA pain  Patient/Family Comments/goals: return home    Occupational Profile:  Living Environment: w/ spouse in 2st apt w/ THE; temporary bedroom on 1st level; t/s combo w/ GBs on 2nd level  Previous level of function: MI via SPC/SW  Roles and Routines: sit/stand tub baths; light IADLs; driving  Equipment Used at Home:  cane, straight, bedside commode, bath bench, walker, standard, grab bar  Assistance upon Discharge: by fly &  neighbor    Pain/Comfort:  · Pain Rating 1: 6/10  · Location - Side 1: Left  · Location 1: knee  · Pain Addressed 1: Pre-medicate for activity, Reposition, Distraction  · Pain Rating Post-Intervention 1: 10/10    Patients cultural, spiritual, Jew conflicts given the current situation:      Objective:     Communicated with: nsg prior to session.  Patient found supine with bed alarm, SCD(Qball) upon OT entry to room.    General Precautions: Standard, fall   Orthopedic Precautions:LLE weight bearing as tolerated   Braces: N/A     Occupational Performance:    Bed Mobility:    · Patient completed Supine to Sit with stand by assistance  · Patient completed Sit to Supine with stand by assistance    Functional Mobility/Transfers:  · Patient completed Sit <> Stand Transfer with contact guard assistance  with  rolling walker   · Patient completed Toilet Transfer Step Transfer technique with contact guard assistance with  rolling walker and bedside commode over toilet  · Functional Mobility: via RW w/in room w/ CGA    Activities of Daily Living:  · Toileting: maximal assistance clothing mgmt--NO hygiene needed    Cognitive/Visual Perceptual:  Grossly WFL    Physical Exam:  BUEs WFL at 4/5    Sit balance: F+  Stand balance: F-    AMPAC 6 Click ADL:  AMPAC Total Score: 16    Treatment & Education:  Pt found in supine & agreeable to OT/PT eval/tx this PM. Pt lives w/ spouse in 2st apt w/ THE; temporary bedroom on 1st level w/ t/s combo w/ GBs on 2nd level. PLOF: MI via SPC/SW w/ all fxnl tasks incl sit/stand tub baths, light IADLs & driving. Currently, pt perf sup<>EOB w/ SBA; standing & amb via RW w/in room for ADLs w/ CGA; BSC over toilet t/f's w/ CGA & toileting w/ total A for clothing mgmt only. Edu/tx re: knee ext at rest, HEP, pain/edema mgmt, general safety techs. Pt/fly verbalized understanding.    Patient left HOB elevated with all lines intact, call button in reach, bed alarm on, nsg notified and fly  present    GOALS:   Multidisciplinary Problems     Occupational Therapy Goals        Problem: Occupational Therapy Goal    Goal Priority Disciplines Outcome Interventions   Occupational Therapy Goal     OT, PT/OT Ongoing (interventions implemented as appropriate)    Description:  Goals to be met by:      Patient will increase functional independence with ADLs by performing:    LE Dressing with Stand-by Assistance for pants/undergarments  Grooming while standing at sink with Supervision.  Toileting from toilet with Supervision for hygiene and clothing management.   Toilet transfer to toilet with Supervision.  Increased functional strength to WFL for ADLs.                      History:     Past Medical History:   Diagnosis Date    Arthritis     Asthma     Cancer     CHF (congestive heart failure)     [facility]    Colon cancer     COPD (chronic obstructive pulmonary disease)     Coronary artery disease     Depression     Diabetes mellitus, type 2     GERD (gastroesophageal reflux disease)     Myocardial infarction     AGE 19 MIGUELANGEL WITHBABY DELIVERY    Thyroid disease        Past Surgical History:   Procedure Laterality Date     SECTION      CHOLECYSTECTOMY      COLON SURGERY      COLONOSCOPY      --- repeat in 3-5 years    COLONOSCOPY N/A 2017    [facility]    COLONOSCOPY golytely N/A 4/10/2018    [facility]    ECTOPIC PREGNANCY SURGERY      ESOPHAGOGASTRODUODENOSCOPY (EGD) N/A 2019    [facility]    ESOPHAGOGASTRODUODENOSCOPY (EGD) N/A 4/10/2018    [facility]    ESOPHAGOGASTRODUODENOSCOPY (EGD) N/A 2017    [facility]    GASTRIC BYPASS      HYSTERECTOMY      ovary removed      REMOVAL-PORT-A-CATH Left 2017    [facility] at [facility] OR    TONSILLECTOMY       TUBAL LIGATION         Time Tracking:     OT Date of Treatment: 05/08/19  OT Start Time: 1520  OT Stop Time: 1604  OT Total Time (min): 44 min    Billable Minutes:Evaluation 10  Self Care/Home Management 17  Total Time 44--co-tx w/ PT    LATISHA Friedman  5/8/2019

## 2019-05-08 NOTE — PLAN OF CARE
Problem: Occupational Therapy Goal  Goal: Occupational Therapy Goal  Goals to be met by: 06/08     Patient will increase functional independence with ADLs by performing:    LE Dressing with Stand-by Assistance for pants/undergarments  Grooming while standing at sink with Supervision.  Toileting from toilet with Supervision for hygiene and clothing management.   Toilet transfer to toilet with Supervision.  Increased functional strength to WFL for ADLs.    Outcome: Ongoing (interventions implemented as appropriate)  Pt found in supine & agreeable to OT/PT eval/tx this PM. Pt lives w/ spouse in 2st apt w/ THE; temporary bedroom on 1st level w/ t/s combo w/ GBs on 2nd level. PLOF: MI via SPC/SW w/ all fxnl tasks incl sit/stand tub baths, light IADLs & driving. Currently, pt perf sup<>EOB w/ SBA; standing & amb via RW w/in room for ADLs w/ CGA; BSC over toilet t/f's w/ CGA & toileting w/ total A for clothing mgmt only. Edu/tx re: knee ext at rest, HEP, pain/edema mgmt, general safety techs. Pt/fly verbalized understanding.    Pt presents w/ decreased overall endurance/conditioning, balance/mobility & coordination w/ subsequent decline in (I)/safety w/ BADLs, fxnl mobility & fxnl t/f's.  OT 5x/wk to increase phys/fxnl status & maximize potential to achieve established goals for d/c-->home w/ HHOT/PT & Jr RW.

## 2019-05-08 NOTE — ANESTHESIA PROCEDURE NOTES
Spinal    Diagnosis: knee arthroplasty  Patient location during procedure: OR  Start time: 5/8/2019 11:05 AM  Timeout: 5/8/2019 11:05 AM  End time: 5/8/2019 11:10 AM  Staffing  Anesthesiologist: Jose Antonio Veliz MD  Resident/CRNA: Jun Espitia MD  Performed: resident/CRNA   Preanesthetic Checklist  Completed: patient identified, site marked, surgical consent, pre-op evaluation, timeout performed, IV checked, risks and benefits discussed and monitors and equipment checked  Spinal Block  Patient position: sitting  Prep: ChloraPrep  Patient monitoring: heart rate, cardiac monitor, continuous pulse ox, frequent blood pressure checks and continuous capnometry  Approach: midline  Location: L3-4  Injection technique: single shot  Needle  Needle type: pencil-tip   Needle gauge: 22 G  Needle length: 5 in  Needle localization: anatomical landmarks  Assessment  Sensory level: T10   Dermatomal levels determined by pinch or prick  Ease of block: moderate  Patient's tolerance of the procedure: comfortable throughout block and no complaints

## 2019-05-08 NOTE — PT/OT/SLP EVAL
Physical Therapy Evaluation    Patient Name:  Christine Castro   MRN:  [id number]    Recommendations:     Discharge Recommendations:  home health PT, home health OT   Discharge Equipment Recommendations: walker, rolling(arlen RW)   Barriers to discharge: None    Assessment:     Christine Castro is a 64 y.o. female admitted with a medical diagnosis of History of left knee replacement.  She presents with the following impairments/functional limitations:  weakness, impaired endurance, impaired self care skills, impaired functional mobilty, gait instability, impaired balance, decreased lower extremity function, decreased ROM, pain, edema, impaired skin. Pt ambulated 12 ft x 2 with RW and CGA. Recommending HH PT/OT upon d/c. DME needs: arlen RW.    Rehab Prognosis: Good; patient would benefit from acute skilled PT services to address these deficits and reach maximum level of function.    Recent Surgery: Procedure(s) (LRB):  ARTHROPLASTY, KNEE (Left) Day of Surgery    Plan:     During this hospitalization, patient to be seen BID to address the identified rehab impairments via gait training, therapeutic activities, therapeutic exercises and progress toward the following goals:    · Plan of Care Expires:  06/08/19    Subjective     Chief Complaint: L knee pain    Pain/Comfort:  · Pain Rating 1: 6/10  · Location - Side 1: Left  · Location - Orientation 1: generalized  · Location 1: knee  · Pain Addressed 1: Pre-medicate for activity, Reposition, Distraction, Cessation of Activity, Nurse notified  · Pain Rating Post-Intervention 1: 10/10    Patients cultural, spiritual, Yarsanism conflicts given the current situation: no    Living Environment:  Pt lives with her  in a 2 story apartment with no ESE and pt's bedroom/full bath upstairs but family has moved her bed downstairs, half bath downstairs, and pt reporting she will be taking sponge baths until she can negotiate steps safety.  Prior to admission, patients level of  "function was mod I/supervision with SPC or standard walker pending her knee pain, completed ADLs without assist, drives.  Equipment used at home: bedside commode, cane, straight, walker, standard, bath bench.  DME owned (not currently used): none.  Upon discharge, patient will have assistance from her  and her "adopted daughter" - reporting pt will have 24/7 care.    Objective:     Communicated with ROSE Ochoa prior to session.  Patient found HOB elevated with bed alarm, SCD(Q-ball)  upon PT entry to room.    General Precautions: Standard, fall   Orthopedic Precautions:LLE weight bearing as tolerated   Braces: N/A     Exams:  · Postural Exam:  Patient presented with the following abnormalities:    · -       Rounded shoulders  · Sensation:    · -       Intact  · Skin Integrity/Edema:      · -       Skin integrity: surgical incision L knee  · RLE ROM: WFL  · RLE Strength: WFL except hip flexion 4-/5  · LLE ROM: deficits: ankle DF limited (see below), knee AROM 0-60 deg  · LLE Strength: ankle DF ROM lacking ~10 deg and in IR and pt reporting this is her baseline 2/2 ankle fracture when she was 6 years old - ankle DF 2/5    Functional Mobility:  · Bed Mobility:     · Scooting: stand by assistance  · Supine to Sit: stand by assistance  · Sit to Supine: stand by assistance  · Transfers:     · Sit to Stand:  min A from bed, CGA from [facility] over toilet with rolling walker  · Toilet Transfer: contact guard assistance with  rolling walker  using  Step Transfer  · Gait: 12 ft x 2 with RW and CGA - cues for upright posture, 3-point gait pattern, and increased UE WB. Ambulates at slow hortensia but no overt LOB.      Therapeutic Activities and Exercises:  Pt ambulated to restroom then back to bed, unable to urinate at this time.  Pt and family educated on LLE positioning with elevation to promote L knee extension, LE supine exercises, gait training with RW, and safety with mobility.    AM-PAC 6 CLICK MOBILITY  Total " Score:16(Simultaneous filing. User may not have seen previous data.)     Patient left HOB elevated with all lines intact, call button in reach, bed alarm on, Rn notified and pt's family present.    GOALS:   Multidisciplinary Problems     Physical Therapy Goals        Problem: Physical Therapy Goal    Goal Priority Disciplines Outcome Goal Variances Interventions   Physical Therapy Goal     PT, PT/OT Ongoing (interventions implemented as appropriate)     Description:  Goals to be met by: 19     Patient will increase functional independence with mobility by performin. Supine <> sit with Modified [place]  2. Sit to stand transfer with Stand-by Assistance  3. Bed to chair transfer with Stand-by Assistance using Rolling Walker  4. Gait  x 50 feet with Stand-by Assistance using Rolling Walker.   5. Lower extremity exercise program x 10 reps per handout, with supervision                      History:     Past Medical History:   Diagnosis Date    Arthritis     Asthma     Cancer     CHF (congestive heart failure)     [facility]    Colon cancer     COPD (chronic obstructive pulmonary disease)     Coronary artery disease     Depression     Diabetes mellitus, type 2     GERD (gastroesophageal reflux disease)     Myocardial infarction     AGE 19 MIGUELANGEL WITHBABY DELIVERY    Thyroid disease        Past Surgical History:   Procedure Laterality Date     SECTION      CHOLECYSTECTOMY      COLON SURGERY      COLONOSCOPY      --- repeat in 3-5 years    COLONOSCOPY N/A 2017    [facility]    COLONOSCOPY golytely N/A 4/10/2018    [facility]    ECTOPIC PREGNANCY SURGERY      ESOPHAGOGASTRODUODENOSCOPY (EGD) N/A 2019    [facility]    ESOPHAGOGASTRODUODENOSCOPY (EGD) N/A 4/10/2018    [facility]    ESOPHAGOGASTRODUODENOSCOPY (EGD) N/A  4/18/2017    [facility]    GASTRIC BYPASS      HYSTERECTOMY      ovary removed      REMOVAL-PORT-A-CATH Left 5/30/2017    [facility] OR    TONSILLECTOMY      TUBAL LIGATION         Time Tracking:     PT Received On: 05/08/19  PT Start Time: 1527     PT Stop Time: 1605  PT Total Time (min): 38 min with OT    Billable Minutes: Evaluation 10 and Gait Training 13      Melvi Lo, PT  05/08/2019

## 2019-05-08 NOTE — BRIEF OP NOTE
[facility]  Brief Operative Note    SUMMARY     Surgery Date: 5/8/2019     Surgeon(s) and Role:     * Roldan Greenwood MD - Primary    Assisting Surgeon: None    Pre-op Diagnosis:  Primary osteoarthritis of left knee [M17.12]    Post-op Diagnosis:  Post-Op Diagnosis Codes:     * Primary osteoarthritis of left knee [M17.12]    Procedure(s) (LRB):  ARTHROPLASTY, KNEE (Left)    Anesthesia: Choice    Description of Procedure:  Left total knee replacement    Description of the findings of the procedure:  Severe osteoarthritis    Estimated Blood Loss: * No values recorded between 5/8/2019 11:38 AM and 5/8/2019 12:52 PM *         Specimens:   Specimen (12h ago, onward)    None

## 2019-05-08 NOTE — ANESTHESIA PROCEDURE NOTES
Peripheral Block    Patient location during procedure: post-op   Block not for primary anesthetic.  Reason for block: at surgeon's request and post-op pain management   Post-op Pain Location: L knee  Start time: 5/8/2019 1:35 PM  Timeout: 5/8/2019 1:34 PM   End time: 5/8/2019 1:45 PM  Staffing  Anesthesiologist: Jose Antonio Veliz MD  Resident/CRNA: Bekah Mehta MD  Performed: resident/CRNA   Preanesthetic Checklist  Completed: patient identified, site marked, surgical consent, pre-op evaluation, timeout performed, IV checked, risks and benefits discussed and monitors and equipment checked  Peripheral Block  Patient position: supine  Prep: ChloraPrep  Patient monitoring: heart rate, cardiac monitor, continuous pulse ox, continuous capnometry and frequent blood pressure checks  Block type: saphenous and lateral femoral cutaneous  Laterality: left  Injection technique: continuous  Needle  Needle type: Stimuplex   Needle gauge: 18 G  Needle length: 3.5 in  Needle localization: anatomical landmarks and ultrasound guidance  Catheter type: spring wound  Catheter size: 21G.   -ultrasound image captured on disc.  Assessment  Injection assessment: negative aspiration, negative parasthesia and local visualized surrounding nerve  Paresthesia pain: none  Heart rate change: no  Slow fractionated injection: yes  Additional Notes  VSS.  [facility] RN monitoring vitals throughout procedure.  Patient tolerated procedure well.       LFCN single shot + saphenous catheter

## 2019-05-08 NOTE — PLAN OF CARE
Problem: Physical Therapy Goal  Goal: Physical Therapy Goal  Goals to be met by: 19     Patient will increase functional independence with mobility by performin. Supine <> sit with Modified [place]  2. Sit to stand transfer with Stand-by Assistance  3. Bed to chair transfer with Stand-by Assistance using Rolling Walker  4. Gait  x 50 feet with Stand-by Assistance using Rolling Walker.   5. Lower extremity exercise program x 10 reps per handout, with supervision    Outcome: Ongoing (interventions implemented as appropriate)  PT evaluation completed, note to follow. Pt ambulated 12 ft x 2 with RW and CGA. Recommending HH PT/OT upon d/c. DME needs: arlen RW.

## 2019-05-08 NOTE — PLAN OF CARE
1331: timeout completed with Dr Veliz and Dr Mehta for left saphenous nerve block    1334: nerve block started; vss; no signs of discomfort noted    1338: nerve block finished; vss; pt tolerated well

## 2019-05-08 NOTE — ANESTHESIA POSTPROCEDURE EVALUATION
Anesthesia Post Evaluation    Patient: Christine Castro    Procedure(s) Performed: Procedure(s) (LRB):  ARTHROPLASTY, KNEE (Left)    Final Anesthesia Type: spinal  Patient location during evaluation: PACU  Patient participation: Yes- Able to Participate  Level of consciousness: awake and alert, oriented and awake  Post-procedure vital signs: reviewed and stable  Pain management: adequate  Airway patency: patent  PONV status at discharge: No PONV  Anesthetic complications: no      Cardiovascular status: blood pressure returned to baseline  Respiratory status: unassisted and room air  Hydration status: euvolemic  Follow-up not needed.          Vitals Value Taken Time   /75 5/8/2019  2:35 PM   Temp 36.2 °C (97.2 °F) 5/8/2019  1:30 PM   Pulse 54 5/8/2019  2:37 PM   Resp 14 5/8/2019  2:37 PM   SpO2 100 % 5/8/2019  2:37 PM   Vitals shown include unvalidated device data.      Event Time     Out of Recovery 14:45:08          Pain/Maggy Score: Pain Rating Prior to Med Admin: 7 (5/8/2019  9:26 AM)  Maggy Score: 9 (5/8/2019  2:28 PM)

## 2019-05-08 NOTE — NURSING
Patient arrived to room in stretcher with PACU nurse. Patient AAOx4 and denies any pain. IV clean, dry, and intact. VSS. Bed alarm on, bed locked and low, side rails up x2, and call bell in reach. [place] provided to patient. Family at bedside. Patient oriented to room and ellie bell usage. No acute distress noted.

## 2019-05-09 ENCOUNTER — ANESTHESIA (OUTPATIENT)
Dept: CARDIOLOGY | Facility: HOSPITAL | Age: 64
End: 2019-05-09

## 2019-05-09 ENCOUNTER — ANESTHESIA EVENT (OUTPATIENT)
Dept: CARDIOLOGY | Facility: HOSPITAL | Age: 64
End: 2019-05-09

## 2019-05-09 LAB
ANION GAP SERPL CALC-SCNC: 9 MMOL/L (ref 8–16)
BUN SERPL-MCNC: 13 MG/DL (ref 8–23)
CALCIUM SERPL-MCNC: 9.1 MG/DL (ref 8.7–10.5)
CHLORIDE SERPL-SCNC: 109 MMOL/L (ref 95–110)
CO2 SERPL-SCNC: 22 MMOL/L (ref 23–29)
CREAT SERPL-MCNC: 1 MG/DL (ref 0.5–1.4)
EST. GFR  (AFRICAN AMERICAN): >60 ML/MIN/1.73 M^2
EST. GFR  (NON AFRICAN AMERICAN): 59 ML/MIN/1.73 M^2
GLUCOSE SERPL-MCNC: 149 MG/DL (ref 70–110)
HCT VFR BLD AUTO: 35.2 % (ref 37–48.5)
HGB BLD-MCNC: 10.9 G/DL (ref 12–16)
POTASSIUM SERPL-SCNC: 3.9 MMOL/L (ref 3.5–5.1)
SODIUM SERPL-SCNC: 140 MMOL/L (ref 136–145)

## 2019-05-09 PROCEDURE — 97530 THERAPEUTIC ACTIVITIES: CPT

## 2019-05-09 PROCEDURE — 63600175 PHARM REV CODE 636 W HCPCS: Performed by: ORTHOPAEDIC SURGERY

## 2019-05-09 PROCEDURE — 97110 THERAPEUTIC EXERCISES: CPT

## 2019-05-09 PROCEDURE — 94640 AIRWAY INHALATION TREATMENT: CPT

## 2019-05-09 PROCEDURE — 25000003 PHARM REV CODE 250: Performed by: ORTHOPAEDIC SURGERY

## 2019-05-09 PROCEDURE — 36415 COLL VENOUS BLD VENIPUNCTURE: CPT

## 2019-05-09 PROCEDURE — 85014 HEMATOCRIT: CPT

## 2019-05-09 PROCEDURE — 85018 HEMOGLOBIN: CPT

## 2019-05-09 PROCEDURE — 25000242 PHARM REV CODE 250 ALT 637 W/ HCPCS: Performed by: ORTHOPAEDIC SURGERY

## 2019-05-09 PROCEDURE — 80048 BASIC METABOLIC PNL TOTAL CA: CPT

## 2019-05-09 RX ADMIN — ONDANSETRON 4 MG: 2 INJECTION INTRAMUSCULAR; INTRAVENOUS at 10:05

## 2019-05-09 RX ADMIN — LEVOTHYROXINE SODIUM 75 MCG: 75 TABLET ORAL at 06:05

## 2019-05-09 RX ADMIN — OXYCODONE HYDROCHLORIDE 10 MG: 5 TABLET ORAL at 07:05

## 2019-05-09 RX ADMIN — ONDANSETRON 4 MG: 4 TABLET, ORALLY DISINTEGRATING ORAL at 07:05

## 2019-05-09 RX ADMIN — ONDANSETRON 4 MG: 2 INJECTION INTRAMUSCULAR; INTRAVENOUS at 02:05

## 2019-05-09 RX ADMIN — PREDNISONE 5 MG: 5 TABLET ORAL at 10:05

## 2019-05-09 RX ADMIN — DEXTROSE 2 G: 50 INJECTION, SOLUTION INTRAVENOUS at 02:05

## 2019-05-09 RX ADMIN — POTASSIUM CHLORIDE 10 MEQ: 750 TABLET, EXTENDED RELEASE ORAL at 09:05

## 2019-05-09 RX ADMIN — ACETAMINOPHEN 1000 MG: 500 TABLET ORAL at 09:05

## 2019-05-09 RX ADMIN — ACETAMINOPHEN 1000 MG: 500 TABLET ORAL at 04:05

## 2019-05-09 RX ADMIN — BUDESONIDE 0.25 MG: 0.5 SUSPENSION RESPIRATORY (INHALATION) at 08:05

## 2019-05-09 RX ADMIN — OXYCODONE HYDROCHLORIDE 10 MG: 5 TABLET ORAL at 11:05

## 2019-05-09 RX ADMIN — PANTOPRAZOLE SODIUM 40 MG: 40 TABLET, DELAYED RELEASE ORAL at 09:05

## 2019-05-09 RX ADMIN — POLYETHYLENE GLYCOL 3350 17 G: 17 POWDER, FOR SOLUTION ORAL at 09:05

## 2019-05-09 RX ADMIN — BUPROPION HYDROCHLORIDE 200 MG: 100 TABLET, EXTENDED RELEASE ORAL at 09:05

## 2019-05-09 RX ADMIN — FUROSEMIDE 20 MG: 20 TABLET ORAL at 09:05

## 2019-05-09 RX ADMIN — HYDROMORPHONE HYDROCHLORIDE 0.5 MG: 2 INJECTION, SOLUTION INTRAMUSCULAR; INTRAVENOUS; SUBCUTANEOUS at 02:05

## 2019-05-09 RX ADMIN — OXYCODONE HYDROCHLORIDE 10 MG: 5 TABLET ORAL at 03:05

## 2019-05-09 RX ADMIN — RAMELTEON 8 MG: 8 TABLET, FILM COATED ORAL at 09:05

## 2019-05-09 RX ADMIN — OXYCODONE HYDROCHLORIDE 10 MG: 5 TABLET ORAL at 09:05

## 2019-05-09 RX ADMIN — STANDARDIZED SENNA CONCENTRATE AND DOCUSATE SODIUM 1 TABLET: 8.6; 5 TABLET, FILM COATED ORAL at 09:05

## 2019-05-09 RX ADMIN — GABAPENTIN 300 MG: 300 CAPSULE ORAL at 09:05

## 2019-05-09 RX ADMIN — BUSPIRONE HYDROCHLORIDE 5 MG: 5 TABLET ORAL at 09:05

## 2019-05-09 RX ADMIN — ESCITALOPRAM OXALATE 20 MG: 10 TABLET ORAL at 09:05

## 2019-05-09 RX ADMIN — OXYCODONE HYDROCHLORIDE 10 MG: 5 TABLET ORAL at 06:05

## 2019-05-09 RX ADMIN — ATORVASTATIN CALCIUM 10 MG: 10 TABLET, FILM COATED ORAL at 09:05

## 2019-05-09 RX ADMIN — GABAPENTIN 300 MG: 300 CAPSULE ORAL at 02:05

## 2019-05-09 NOTE — PT/OT/SLP PROGRESS
Physical Therapy      Patient Name:  Christine Castro   MRN:  [id number]    Patient with O.T. At this time.  Will return later.     Yuko Chinchilla, PTA

## 2019-05-09 NOTE — PT/OT/SLP PROGRESS
Physical Therapy Treatment    Patient Name:  Christine Castro   MRN:  [id number]    Recommendations:     Discharge Recommendations:  home health PT, home health OT   Discharge Equipment Recommendations: ( RW)   Barriers to discharge: None    Assessment:     Christine Castro is a 64 y.o. female admitted with a medical diagnosis of History of left knee replacement.  She presents with the following impairments/functional limitations:  weakness, impaired endurance, impaired self care skills, impaired functional mobilty, decreased lower extremity function, decreased safety awareness, pain, impaired skin, decreased ROM. Pt was attempting to lean over the left side of the bed to  a cup off the floor upon PTA entering the room.  Pt was instructed not to do this and to call nsg for assistance for increased safety.  Pt c/o increased nausea with a bout of vomiting, and nsg was notified.  Pt declined EOB secondary to nausea, but agreed to in bed therex.  Pt's  reported that pt has not rested all night or all day today, and that she has been fidgety moving constantly in bed along with not always making sense with questions she is asking him.  Pt would continue to benefit from P.T. To assist pt's return to PLOF.    Rehab Prognosis: Fair; patient would benefit from acute skilled PT services to address these deficits and reach maximum level of function.    Recent Surgery: Procedure(s) (LRB):  ARTHROPLASTY, KNEE (Left) 1 Day Post-Op    Plan:     During this hospitalization, patient to be seen BID to address the identified rehab impairments via gait training, therapeutic activities, therapeutic exercises and progress toward the following goals:    · Plan of Care Expires:  06/08/19    Subjective     Patient/Family Comments/goals: Pt agreed to tx.  Pain/Comfort:  · Pain Rating 1: 8/10  · Location - Side 1: Left  · Location - Orientation 1: generalized  · Location 1: knee  · Pain Addressed 1: Reposition, Distraction, Nurse  notified  · Pain Rating Post-Intervention 1: 10/10      Objective:     Communicated with RN (Nisha) prior to session.  Patient found supine with bed alarm, SCD upon PT entry to room.     General Precautions: Standard, fall   Orthopedic Precautions:LLE weight bearing as tolerated   Braces:       Functional Mobility:  · Bed Mobility:     · Rolling Left:  stand by assistance and contact guard assistance  · Rolling Right: stand by assistance and contact guard assistance  · Scooting: stand by assistance and up to HOB using BUEs with b/rs for assist and pushing up to HOB with RLE.      AM-PAC 6 CLICK MOBILITY  Turning over in bed (including adjusting bedclothes, sheets and blankets)?: 3  Sitting down on and standing up from a chair with arms (e.g., wheelchair, bedside commode, etc.): 3  Moving from lying on back to sitting on the side of the bed?: 3  Moving to and from a bed to a chair (including a wheelchair)?: 3  Need to walk in hospital room?: 3  Climbing 3-5 steps with a railing?: 1  Basic Mobility Total Score: 16       Therapeutic Activities and Exercises:   Supine BLE therex: AP, SAQ, heelslides, hip ABD/ADD, QS, SLRs 10 x 2 reps with Marly on L and CGA on R except for SLR Marly on R.  Ice was placed on pt's Left knee after tx.  Pt's  notified to remove ice after 15 mins.  Nsg also notified.    Patient left supine with all lines intact, call button in reach, bed alarm on and RN notified..    GOALS:   Multidisciplinary Problems     Physical Therapy Goals        Problem: Physical Therapy Goal    Goal Priority Disciplines Outcome Goal Variances Interventions   Physical Therapy Goal     PT, PT/OT Ongoing (interventions implemented as appropriate)     Description:  Goals to be met by: 19     Patient will increase functional independence with mobility by performin. Supine <> sit with Modified [place]  2. Sit to stand transfer with Stand-by Assistance  3. Bed to chair transfer with Stand-by Assistance  using Rolling Walker  4. Gait  x 50 feet with Stand-by Assistance using Rolling Walker.   5. Lower extremity exercise program x 10 reps per handout, with supervision                      Time Tracking:     PT Received On: 05/09/19  PT Start Time: 1400     PT Stop Time: 1423  PT Total Time (min): 23 min     Billable Minutes: Therapeutic Activity 8 and Therapeutic Exercise 15    Treatment Type: Treatment  PT/PTA: PTA     PTA Visit Number: 1     Yuko Chinchilla PTA  05/09/2019

## 2019-05-09 NOTE — PLAN OF CARE
Problem: Adult Inpatient Plan of Care  Goal: Plan of Care Review  Outcome: Ongoing (interventions implemented as appropriate)  The proper method of use, as well as anticipated side effects, of this aerosol treatment are discussed and demonstrated to the patient. Pt on RA with documented sats.96. Will continue to monitor.

## 2019-05-09 NOTE — PLAN OF CARE
Problem: Adult Inpatient Plan of Care  Goal: Plan of Care Review  Outcome: Ongoing (interventions implemented as appropriate)     05/09/19 0632   Plan of Care Review   Plan of Care Reviewed With patient;spouse   POC reviewed with the pt, verbalized understanding.  AAOx3, VSS.  Spouse at bedside.  Complaint of pain on left lower extremity, prn pain medication given.  No other distress or any other complaint of pain throughout night.  Surgical dressing d/c/i, pain pump in place.  PIV d/c/i.  IV antibiotic given.  Safety maintained, free of falls throughout shift.  Instructed to call for any assistance.  Will continue to monitor.

## 2019-05-09 NOTE — PLAN OF CARE
VN note: VN cued into patient's room for introduction. VN informed patient that VN would be working closely along side bedside nurse, PCT, and the rest of care team and making rounds throughout the shift. Patient verbalized understanding. Allowed time for questions. VN will continue to be available to patient and intervene prn.       05/09/19 1042   Type of Frequent Check   Type Patient Rounds;Other (see comments)  (VN Rounds)   Safety/Activity   Patient Rounds bed in low position;visualized patient   Safety Promotion/Fall Prevention instructed to call staff for mobility;side rails raised x 2   Activity Management activity adjusted per tolerance   Positioning   Body Position supine   Head of Bed (HOB) HOB at 60-90 degrees   Assessments (Pre/Post)   Level of Consciousness (AVPU) alert

## 2019-05-09 NOTE — PLAN OF CARE
POD 1 S/P left TKR    PT/OT recs- [facility]h and arlen RW  Tn informed MD    Pt reports she prefers [facility] or [facility] upon d/c. Tn will send referral when orders written.    Pt has supportive family and pt reports she has transporttaion home when d/c  Tn gave d/c folder and brochure and encouraged to call for any needs/concerns.    Future Appointments   Date Time Provider Department Center   5/30/2019  1:00 PM Roldan Greenwood MD [facility]        05/09/19 1205   Discharge Assessment   Assessment Type Discharge Planning Assessment   Confirmed/corrected address and phone number on facesheet? Yes   Assessment information obtained from? Patient   Expected Length of Stay (days) 1   Communicated expected length of stay with patient/caregiver yes   Prior to hospitilization cognitive status: Alert/Oriented   Prior to hospitalization functional status: Assistive Equipment   Current cognitive status: Alert/Oriented   Current Functional Status: Needs Assistance   Lives With spouse   Able to Return to Prior Arrangements yes   Is patient able to care for self after discharge? Unable to determine at this time (comments)   Who are your caregiver(s) and their phone number(s)? Fabian (spouse) [phone]   Patient's perception of discharge disposition home health   Readmission Within the Last 30 Days no previous admission in last 30 days   Patient currently being followed by outpatient case management? No   Patient currently receives any other outside agency services? No   Equipment Currently Used at Home bath bench;cane, straight;bedside commode;walker, standard   Do you have any problems affording any of your prescribed medications? No   Is the patient taking medications as prescribed? yes   Does the patient have transportation home? Yes   Transportation Anticipated family or friend will provide   Does the patient receive services at the [facility]? No   Discharge Plan A Home Health   Discharge Plan B Home Health;Home with  family   DME Needed Upon Discharge    ([facility]len walker)   Patient/Family in Agreement with Plan yes

## 2019-05-09 NOTE — PT/OT/SLP PROGRESS
Occupational Therapy   Treatment    Name: Christine Castro  MRN: [id number]  Admitting Diagnosis:  History of left knee replacement  1 Day Post-Op    Recommendations:     Discharge Recommendations: home health PT, home health OT  Discharge Equipment Recommendations:  (jr ANNE)  Barriers to discharge:  None    Assessment:   Pt w/ signs consistent w/ overmedication this AM as demo'ed by pt's inability to maint eye opening at all throughout course of tx. Pt also demo'ed impulsivity & extremely poor safety awareness as demo'ed by voluntarily leaning posteriorly at EOB regardless of mult v/tc's provided. Cont w/ OT per POC.    Christine Castro is a 64 y.o. female with a medical diagnosis of History of left knee replacement.  She presents with . Performance deficits affecting function are weakness, impaired endurance, gait instability, impaired functional mobilty, impaired self care skills, impaired balance, decreased coordination, decreased lower extremity function, decreased safety awareness, pain, decreased ROM, impaired skin, edema, impaired joint extensibility.     Rehab Prognosis:  Good; patient would benefit from acute skilled OT services to address these deficits and reach maximum level of function.       Plan:     Patient to be seen 5 x/week to address the above listed problems via self-care/home management, therapeutic exercises, therapeutic activities  · Plan of Care Expires: 06/08/19  · Plan of Care Reviewed with: patient, spouse, daughter, grandchild(sonia), friend    Subjective     Pain/Comfort:  · Pain Rating 1: 6/10  · Location - Side 1: Left  · Location 1: knee  · Pain Addressed 1: Pre-medicate for activity, Reposition, Distraction, Cessation of Activity, Nurse notified  · Pain Rating Post-Intervention 1: 8/10    Objective:     Communicated with: maricarmen prior to session.  Patient found HOB elevated with bed alarm(Qball) upon OT entry to room.    General Precautions: Standard, fall   Orthopedic Precautions:LLE  weight bearing as tolerated   Braces: N/A     Occupational Performance:     Bed Mobility:    · Patient completed Supine to Sit with minimum assistance  · Patient completed Sit to Supine with moderate assistance and 2 persons     Functional Mobility/Transfers:  · Patient completed Sit <> Stand Transfer with contact guard assistance and minimum assistance  with  rolling walker   · Functional Mobility: sidestep R via RW w/ Min A x 2    Activities of Daily Living:  ·       AMPA 6 Click ADL: 16    Treatment & Education:  Pt found in SF w/ foot of bed fully elevated & L knee flexed. Pt agreeable to OT this date & perf the following: sup-->EOB w/ Min A & capo'd ~15 min w/ inconsistent SB-Min A 2/2 med induced post lean & L lean. Standing via RW w/ CG-Min A to achieve & Min A to maint; sidestepping R via RW w/ Min A x 2. Pt c/o signif dizziness in standing & nsg called into room. Pt returned to supine w/ Mod A x 2. Edu/tx re: HEP, general safety techs & pain/edema mgmt. Pt verbalized understanding.     Patient left HOB elevated with all lines intact, call button in reach, bed alarm on, nsg notified and PTA presentEducation:      GOALS:   Multidisciplinary Problems     Occupational Therapy Goals        Problem: Occupational Therapy Goal    Goal Priority Disciplines Outcome Interventions   Occupational Therapy Goal     OT, PT/OT Ongoing (interventions implemented as appropriate)    Description:  Goals to be met by: 06/08     Patient will increase functional independence with ADLs by performing:    LE Dressing with Stand-by Assistance for pants/undergarments  Grooming while standing at sink with Supervision.  Toileting from toilet with Supervision for hygiene and clothing management.   Toilet transfer to toilet with Supervision.  Increased functional strength to WFL for ADLs.                      Time Tracking:     OT Date of Treatment: 05/09/19  OT Start Time: 1109  OT Stop Time: 1140  OT Total Time (min): 31 min    Billable  Minutes:Therapeutic Activity 29  Total Time 29    LATISHA Friedman  5/9/2019

## 2019-05-09 NOTE — PLAN OF CARE
Problem: Physical Therapy Goal  Goal: Physical Therapy Goal  Goals to be met by: 19     Patient will increase functional independence with mobility by performin. Supine <> sit with Modified [place]  2. Sit to stand transfer with Stand-by Assistance  3. Bed to chair transfer with Stand-by Assistance using Rolling Walker  4. Gait  x 50 feet with Stand-by Assistance using Rolling Walker.   5. Lower extremity exercise program x 10 reps per handout, with supervision     Outcome: Ongoing (interventions implemented as appropriate)  Continue working toward goals.

## 2019-05-09 NOTE — PLAN OF CARE
Problem: Occupational Therapy Goal  Goal: Occupational Therapy Goal  Goals to be met by: 06/08     Patient will increase functional independence with ADLs by performing:    LE Dressing with Stand-by Assistance for pants/undergarments  Grooming while standing at sink with Supervision.  Toileting from toilet with Supervision for hygiene and clothing management.   Toilet transfer to toilet with Supervision.  Increased functional strength to WFL for ADLs.     Outcome: Ongoing (interventions implemented as appropriate)  Pt found in SF w/ foot of bed fully elevated & L knee flexed. Pt agreeable to OT this date & perf the following: sup-->EOB w/ Min A & capo'd ~15 min w/ inconsistent SB-Min A 2/2 med induced post lean & L lean. Standing via RW w/ CG-Min A to achieve & Min A to maint; sidestepping R via RW w/ Min A x 2. Pt c/o signif dizziness in standing & nsg called into room. Pt returned to supine w/ Mod A x 2. Edu/tx re: HEP, general safety techs & pain/edema mgmt. Pt verbalized understanding.     Pt w/ signs consistent w/ overmedication this AM as demo'ed by pt's inability to maint eye opening at all throughout course of tx. Pt also demo'ed impulsivity & extremely poor safety awareness as demo'ed by voluntarily leaning posteriorly at EOB regardless of mult v/tc's provided. Cont w/ OT per POC.

## 2019-05-09 NOTE — PROGRESS NOTES
[facility]  Orthopedics  Progress Note    Patient Name: Christine Castro  MRN: [id number]  Admission Date: 5/8/2019  Hospital Length of Stay: 0 days  Attending Provider: Roldan Greenwood MD  Primary Care Provider: Alon Santiago MD  Follow-up For: Procedure(s) (LRB):  ARTHROPLASTY, KNEE (Left)    Post-Operative Day: 1 Day Post-Op  Subjective:     Principal Problem:History of left knee replacement    Principal Orthopedic Problem:  Same    Interval History:  Patient complains pain with operative site. She reports reasonable relieved after her medication.    Review of patient's allergies indicates:   Allergen Reactions    Ibuprofen Other (See Comments)     Causes asthma flare??       Current Facility-Administered Medications   Medication    acetaminophen tablet 1,000 mg    albuterol inhaler 1 puff    atorvastatin tablet 10 mg    bisacodyl suppository 10 mg    budesonide nebulizer solution 0.25 mg    buPROPion TBSR 12 hr tablet 200 mg    busPIRone tablet 5 mg    escitalopram oxalate tablet 20 mg    furosemide tablet 20 mg    gabapentin capsule 300 mg    hydromorphone (PF) injection 0.5 mg    lactulose 20 gram/30 mL solution Soln 20 g    levothyroxine tablet 75 mcg    ondansetron disintegrating tablet 4 mg    ondansetron injection 4 mg    oxyCODONE immediate release tablet 10 mg    oxyCODONE immediate release tablet 5 mg    pantoprazole EC tablet 40 mg    polyethylene glycol packet 17 g    potassium chloride SA CR tablet 10 mEq    predniSONE tablet 5 mg    ramelteon tablet 8 mg    ropivacaine (NAROPIN) 0.2% ON-Q C-BLOC 550mL (med + pump) (SAF)    senna-docusate 8.6-50 mg per tablet 1 tablet    sodium chloride 0.9% flush 10 mL    sodium chloride 0.9% flush 10 mL    sodium chloride 0.9% flush 5 mL     Objective:     Vital Signs (Most Recent):  Temp: 96.4 °F (35.8 °C) (05/09/19 0342)  Pulse: 96 (05/09/19 0829)  Resp: 14 (05/09/19 0829)  BP: 128/83 (05/09/19 0828)  SpO2: 96 %  (05/09/19 0829) Vital Signs (24h Range):  Temp:  [95.4 °F (35.2 °C)-98.4 °F (36.9 °C)] 96.4 °F (35.8 °C)  Pulse:  [48-97] 96  Resp:  [10-19] 14  SpO2:  [75 %-100 %] 96 %  BP: (104-162)/(57-89) 128/83     Weight: 87.3 kg (192 lb 7.4 oz)  Height: 5' (152.4 cm)  Body mass index is 37.59 kg/m².      Intake/Output Summary (Last 24 hours) at 5/9/2019 0916  Last data filed at 5/9/2019 0500  Gross per 24 hour   Intake 1225 ml   Output 150 ml   Net 1075 ml       Ortho/SPM Exam     Appears alert and comfortable  Dressing intact  Left lower extremity neurovascular intact    Significant Labs: All pertinent labs within the past 24 hours have been reviewed.    Significant Imaging: None    Assessment/Plan:     Active Diagnoses:    Diagnosis Date Noted POA    PRINCIPAL PROBLEM:  History of left knee replacement [Z96.652] 05/08/2019 Not Applicable    Primary osteoarthritis of left knee [M17.12] 05/08/2019 Yes      Problems Resolved During this Admission:     Normal postop day 1  Physical therapy  Discharge home when gait is stable    Roldan Greenwood MD  Orthopedics  [facility]

## 2019-05-09 NOTE — PT/OT/SLP PROGRESS
Physical Therapy Treatment    Patient Name:  Christine Castro   MRN:  [id number]    Recommendations:     Discharge Recommendations:  home health OT, home health PT   Discharge Equipment Recommendations: (arlen RW)   Barriers to discharge: None    Assessment:     Christine Castro is a 64 y.o. female admitted with a medical diagnosis of History of left knee replacement.  She presents with the following impairments/functional limitations:  weakness, impaired endurance, impaired functional mobilty, impaired self care skills, gait instability, impaired balance, decreased coordination, decreased lower extremity function, decreased safety awareness, pain, decreased upper extremity function, decreased ROM, impaired skin, orthopedic precautions. Pt was sitting EOB finishing up tx with O.T. Upon entering the room.  O.T. And RN present and stated that pt was not as stable today and c/o dizziness with standing.  Pt also had difficulty keeping eyes opened during tx, but communicated well.  Pt was no safe to leave up in a b/s chair at this time secondary to increased sleepiness with medication.  Pt would continue to benefit from P.T. To address impairments listed above.    Rehab Prognosis: Fair+; patient would benefit from acute skilled PT services to address these deficits and reach maximum level of function.    Recent Surgery: Procedure(s) (LRB):  ARTHROPLASTY, KNEE (Left) 1 Day Post-Op    Plan:     During this hospitalization, patient to be seen BID to address the identified rehab impairments via gait training, therapeutic activities, therapeutic exercises and progress toward the following goals:    · Plan of Care Expires:  06/08/19    Subjective       Patient/Family Comments/goals: Pt agreed to tx.  Pain/Comfort:  · Pain Rating 1: 8/10  · Location - Side 1: Left  · Location - Orientation 1: generalized  · Location 1: knee  · Pain Addressed 1: Pre-medicate for activity, Distraction, Nurse notified, Reposition  · Pain Rating  Post-Intervention 1: 8/10      Objective:     Communicated with RN prior to session.  Patient found sitting EOB with bed alarm upon PT entry to room.     General Precautions: Standard, fall   Orthopedic Precautions:LLE weight bearing as tolerated   Braces:       Functional Mobility:  · Bed Mobility:     · Scooting: stand by assistance and up to HOB using BUEs with b/rs and RLE to push.  · Sit to Supine: moderate assistance and of 2 persons  · Transfers:     · Sit to Stand:  contact guard assistance, minimum assistance and Luc of 1 and CGA of another for increased safety. with rolling walker  · Gait: 3 sidesteps up to HOB with Rw and Luc of 1 and CGA of 1.  Pt required assistance for RW management and stability.  VC's for sequencing.  · Balance: sitting fair/fair+, standing fair/fair- RW, gait fair- Rw      AM-PAC 6 CLICK MOBILITY  Turning over in bed (including adjusting bedclothes, sheets and blankets)?: 3  Sitting down on and standing up from a chair with arms (e.g., wheelchair, bedside commode, etc.): 3  Moving from lying on back to sitting on the side of the bed?: 3  Moving to and from a bed to a chair (including a wheelchair)?: 3  Need to walk in hospital room?: 3  Climbing 3-5 steps with a railing?: 1  Basic Mobility Total Score: 16       Therapeutic Activities and Exercises:   Supine BLE therex: AP, heelslides, SAQs, hip ABD/ADD, QS, and SLRs( Luc bilaterally).  Luc for LLE and CGA for R except for SLRs all x 10 to 15 reps.    Patient left supine with all lines intact, call button in reach, bed alarm on and RN notified..    GOALS:   Multidisciplinary Problems     Physical Therapy Goals        Problem: Physical Therapy Goal    Goal Priority Disciplines Outcome Goal Variances Interventions   Physical Therapy Goal     PT, PT/OT Ongoing (interventions implemented as appropriate)     Description:  Goals to be met by: 19     Patient will increase functional independence with mobility by performin.  Supine <> sit with Modified [place]  2. Sit to stand transfer with Stand-by Assistance  3. Bed to chair transfer with Stand-by Assistance using Rolling Walker  4. Gait  x 50 feet with Stand-by Assistance using Rolling Walker.   5. Lower extremity exercise program x 10 reps per handout, with supervision                      Time Tracking:     PT Received On: 05/09/19  PT Start Time: 1140     PT Stop Time: 1204  PT Total Time (min): 24 min     Billable Minutes: Therapeutic Activity 11 and Therapeutic Exercise 13    Treatment Type: Treatment  PT/PTA: PTA     PTA Visit Number: 1     Yuko Chinchilla PTA  05/09/2019

## 2019-05-09 NOTE — PLAN OF CARE
Cued into patient's room.  Permission received per patient to turn camera to view patient.  Introduced as VN for night shift that will be working with floor nurse and nursing assistant.  Simona RN at bedside.  Educated patient on VN's role in patient care. Plan of care reviewed with patient. Education per flowsheet.  Opportunity given for questions and questions answered.  Instructed to call for assistance.  Will cont to monitor.

## 2019-05-09 NOTE — ANESTHESIA POST-OP PAIN MANAGEMENT
Acute Pain Service Progress Note    Christine Castro is a 64 y.o., female, [id number].    Surgery:  L TKA    Post Op Day #: 1    Catheter type: on Q ball with sonoplex 21G    Infusion type: ropi 2%    Problem List:    Active Hospital Problems    Diagnosis  POA    *History of left knee replacement [Z96.652]  Not Applicable    Primary osteoarthritis of left knee [M17.12]  Yes      Resolved Hospital Problems   No resolved problems to display.       Subjective:     General appearance of NAD   Pain with rest: 6   Pain with movement: 8   Side Effects    1. Pruritis no    2. Nausea yes - improved with meds    3. Motor Blockade no    4. Sedation no    Objective:     Catheter level adductor canal   Catheter site clean, dry, intact        Vitals   Vitals:    05/09/19 0829   BP: 128/83   Pulse: 96   Resp: 14   Temp: 97        Labs    Admission on 05/08/2019   Component Date Value Ref Range Status    POCT Glucose 05/08/2019 86  70 - 110 mg/dL Final    POC Glucose 05/08/2019 86  70 - 110 MG/DL Final    Sodium 05/09/2019 140  136 - 145 mmol/L Final    Potassium 05/09/2019 3.9  3.5 - 5.1 mmol/L Final    Chloride 05/09/2019 109  95 - 110 mmol/L Final    CO2 05/09/2019 22* 23 - 29 mmol/L Final    Glucose 05/09/2019 149* 70 - 110 mg/dL Final    BUN, Bld 05/09/2019 13  8 - 23 mg/dL Final    Creatinine 05/09/2019 1.0  0.5 - 1.4 mg/dL Final    Calcium 05/09/2019 9.1  8.7 - 10.5 mg/dL Final    Anion Gap 05/09/2019 9  8 - 16 mmol/L Final    eGFR if African American 05/09/2019 >60  >60 mL/min/1.73 m^2 Final    eGFR if non African American 05/09/2019 59* >60 mL/min/1.73 m^2 Final    Hemoglobin 05/09/2019 10.9* 12.0 - 16.0 g/dL Final    Hematocrit 05/09/2019 35.2* 37.0 - 48.5 % Final        Meds   Current Facility-Administered Medications   Medication Dose Route Frequency Provider Last Rate Last Dose    acetaminophen tablet 1,000 mg  1,000 mg Oral TID Roldan Greenwood MD   1,000 mg at 05/09/19 0929    albuterol inhaler 1  puff  1 puff Inhalation Q4H PRN Roldan Greenwood MD        atorvastatin tablet 10 mg  10 mg Oral Every other day Roldan Greenwood MD   10 mg at 05/09/19 0926    bisacodyl suppository 10 mg  10 mg Rectal Daily PRN Roldan Greenwood MD        budesonide nebulizer solution 0.25 mg  0.25 mg Nebulization Q12H Roldan Greenwood MD   0.25 mg at 05/09/19 0829    buPROPion TBSR 12 hr tablet 200 mg  200 mg Oral Daily Roldan Greenwood MD   200 mg at 05/09/19 0927    busPIRone tablet 5 mg  5 mg Oral Daily Roldan Greenwood MD   5 mg at 05/09/19 0927    escitalopram oxalate tablet 20 mg  20 mg Oral Daily Roldan Greenwood MD   20 mg at 05/09/19 0927    furosemide tablet 20 mg  20 mg Oral Daily Roldan Greenwood MD   20 mg at 05/09/19 0926    gabapentin capsule 300 mg  300 mg Oral TID Roldan Greenwood MD   300 mg at 05/09/19 0927    lactulose 20 gram/30 mL solution Soln 20 g  20 g Oral Q6H PRN Roldan Greenwood MD        levothyroxine tablet 75 mcg  75 mcg Oral Before breakfast Roldan Greenwood MD   75 mcg at 05/09/19 0623    ondansetron disintegrating tablet 4 mg  4 mg Oral Q6H PRN Roldan Greenwood MD        ondansetron injection 4 mg  4 mg Intravenous Q6H PRN Roldan Greenwood MD   4 mg at 05/08/19 1646    oxyCODONE immediate release tablet 10 mg  10 mg Oral Q3H PRN Roldan Greenwood MD   10 mg at 05/09/19 0946    oxyCODONE immediate release tablet 5 mg  5 mg Oral Q3H PRN Roldan Greenwood MD        pantoprazole EC tablet 40 mg  40 mg Oral Daily Roldan Greenwood MD   40 mg at 05/09/19 0933    polyethylene glycol packet 17 g  17 g Oral Daily Roldan Greenwood MD   17 g at 05/09/19 0928    potassium chloride SA CR tablet 10 mEq  10 mEq Oral BID Roldan Greenwood MD   10 mEq at 05/09/19 0927    predniSONE tablet 5 mg  5 mg Oral Daily Roldan Greenwood MD   5 mg at 05/09/19 1014    ramelteon tablet 8 mg  8 mg Oral Nightly PRN Roldan Greenwood MD        ropivacaine (NAROPIN) 0.2% ON-Q C-BLOC 550mL (med +  pump) (SAF)   Perineural Continuous Bekah Mehta MD 8 mL/hr at 05/08/19 1540      senna-docusate 8.6-50 mg per tablet 1 tablet  1 tablet Oral BID Roldan Greenwood MD   1 tablet at 05/09/19 0969    sodium chloride 0.9% flush 10 mL  10 mL Intravenous PRN Kenan Laura MD        sodium chloride 0.9% flush 10 mL  10 mL Intravenous PRN Kenan Laura MD        sodium chloride 0.9% flush 5 mL  5 mL Intravenous PRN Roldan Greenwood MD               Assessment:     Pain control adequate    Plan:     Patient doing well, continue present treatment    Evaluator Bekah Mehta

## 2019-05-10 VITALS
HEART RATE: 101 BPM | DIASTOLIC BLOOD PRESSURE: 74 MMHG | TEMPERATURE: 99 F | HEIGHT: 60 IN | WEIGHT: 192.44 LBS | RESPIRATION RATE: 18 BRPM | SYSTOLIC BLOOD PRESSURE: 146 MMHG | BODY MASS INDEX: 37.78 KG/M2 | OXYGEN SATURATION: 98 %

## 2019-05-10 PROCEDURE — 97110 THERAPEUTIC EXERCISES: CPT

## 2019-05-10 PROCEDURE — 97116 GAIT TRAINING THERAPY: CPT

## 2019-05-10 PROCEDURE — 63600175 PHARM REV CODE 636 W HCPCS: Performed by: ORTHOPAEDIC SURGERY

## 2019-05-10 PROCEDURE — 25000003 PHARM REV CODE 250: Performed by: ORTHOPAEDIC SURGERY

## 2019-05-10 PROCEDURE — 94761 N-INVAS EAR/PLS OXIMETRY MLT: CPT

## 2019-05-10 PROCEDURE — 97530 THERAPEUTIC ACTIVITIES: CPT

## 2019-05-10 PROCEDURE — 94640 AIRWAY INHALATION TREATMENT: CPT

## 2019-05-10 RX ORDER — OXYCODONE AND ACETAMINOPHEN 10; 325 MG/1; MG/1
1 TABLET ORAL EVERY 6 HOURS PRN
Qty: 40 TABLET | Refills: 0 | Status: SHIPPED | OUTPATIENT
Start: 2019-05-10 | End: 2019-05-10 | Stop reason: SDUPTHER

## 2019-05-10 RX ORDER — OXYCODONE AND ACETAMINOPHEN 10; 325 MG/1; MG/1
1 TABLET ORAL EVERY 6 HOURS PRN
Qty: 40 TABLET | Refills: 0 | Status: SHIPPED | OUTPATIENT
Start: 2019-05-10 | End: 2019-05-23

## 2019-05-10 RX ADMIN — PREDNISONE 5 MG: 5 TABLET ORAL at 09:05

## 2019-05-10 RX ADMIN — OXYCODONE HYDROCHLORIDE 10 MG: 5 TABLET ORAL at 12:05

## 2019-05-10 RX ADMIN — FUROSEMIDE 20 MG: 20 TABLET ORAL at 09:05

## 2019-05-10 RX ADMIN — ESCITALOPRAM OXALATE 20 MG: 10 TABLET ORAL at 09:05

## 2019-05-10 RX ADMIN — POTASSIUM CHLORIDE 10 MEQ: 750 TABLET, EXTENDED RELEASE ORAL at 09:05

## 2019-05-10 RX ADMIN — BUPROPION HYDROCHLORIDE 200 MG: 100 TABLET, EXTENDED RELEASE ORAL at 09:05

## 2019-05-10 RX ADMIN — OXYCODONE HYDROCHLORIDE 10 MG: 5 TABLET ORAL at 02:05

## 2019-05-10 RX ADMIN — OXYCODONE HYDROCHLORIDE 10 MG: 5 TABLET ORAL at 09:05

## 2019-05-10 RX ADMIN — ONDANSETRON 4 MG: 4 TABLET, ORALLY DISINTEGRATING ORAL at 01:05

## 2019-05-10 RX ADMIN — STANDARDIZED SENNA CONCENTRATE AND DOCUSATE SODIUM 1 TABLET: 8.6; 5 TABLET, FILM COATED ORAL at 09:05

## 2019-05-10 RX ADMIN — OXYCODONE HYDROCHLORIDE 10 MG: 5 TABLET ORAL at 06:05

## 2019-05-10 RX ADMIN — LEVOTHYROXINE SODIUM 75 MCG: 75 TABLET ORAL at 06:05

## 2019-05-10 RX ADMIN — POLYETHYLENE GLYCOL 3350 17 G: 17 POWDER, FOR SOLUTION ORAL at 09:05

## 2019-05-10 RX ADMIN — GABAPENTIN 300 MG: 300 CAPSULE ORAL at 09:05

## 2019-05-10 RX ADMIN — PANTOPRAZOLE SODIUM 40 MG: 40 TABLET, DELAYED RELEASE ORAL at 09:05

## 2019-05-10 RX ADMIN — BUSPIRONE HYDROCHLORIDE 5 MG: 5 TABLET ORAL at 09:05

## 2019-05-10 NOTE — PLAN OF CARE
Problem: Adult Inpatient Plan of Care  Goal: Plan of Care Review  Outcome: Ongoing (interventions implemented as appropriate)  The proper method of use, as well as anticipated side effects, of this aerosol treatment are discussed and demonstrated to the patient.   Will continue to monitor.  Pt on RA with documented sats.

## 2019-05-10 NOTE — PLAN OF CARE
TN sent hh referral to [facility] and arlen walker order to [facility] via [facility] [employer].   Tn spoke to DOMENICO Glaser to inquire about d/c and Tn was informed awaitng pt to work with therapy again then will d/c.      05/10/19 1046   Post-Acute Status   Post-Acute Authorization HME;Home Health/Hospice   HME Status Referrals Sent

## 2019-05-10 NOTE — PT/OT/SLP PROGRESS
Occupational Therapy   Treatment    Name: Christine Castro  MRN: [id number]  Admitting Diagnosis:  History of left knee replacement  2 Days Post-Op    Recommendations:     Discharge Recommendations: home health OT, home health PT  Discharge Equipment Recommendations:  (jr ANNE)  Barriers to discharge:  None    Assessment:   Even though pt declined to get OOB for tx tasks this AM, she cont w/ decreased overall safety due to signif L knee pain, med induced lethargy & attempting to get up w/o phys A. Cont w/ OT per POC.    Christine Castro is a 64 y.o. female with a medical diagnosis of History of left knee replacement.  She presents with . Performance deficits affecting function are weakness, impaired endurance, gait instability, impaired functional mobilty, impaired self care skills, impaired balance, decreased coordination, decreased lower extremity function, pain, decreased safety awareness, decreased ROM, impaired skin, edema, impaired joint extensibility.     Rehab Prognosis:  Good; patient would benefit from acute skilled OT services to address these deficits and reach maximum level of function.       Plan:     Patient to be seen 5 x/week to address the above listed problems via self-care/home management, therapeutic activities, therapeutic exercises  · Plan of Care Expires: 06/08/19  · Plan of Care Reviewed with: spouse    Subjective     Pain/Comfort:  · Pain Rating 1: 10/10  · Location - Side 1: Left  · Location 1: knee  · Pain Addressed 1: Pre-medicate for activity, Nurse notified, Other (see comments)(ice pack)  · Pain Rating Post-Intervention 1: 7/10    Objective:     Communicated with: nsg prior to session.  Patient found HOB elevated with bed alarm upon OT entry to room.    General Precautions: Standard, fall   Orthopedic Precautions:LLE weight bearing as tolerated   Braces: N/A     Occupational Performance:     Bed Mobility:    · Pt declined to attempt     Functional Mobility/Transfers:  ·   · Functional  Mobility:     Activities of Daily Living:  · Pt declined to attempt      AMPAC 6 Click ADL: 16    Treatment & Education:  Pt found in SF & visibly unhappy about having to actively participate in OT this date. Pt recently returned BTB from b/s chair after PT session & declined to perf active tx tasks. Instruct/demo w/o return demo of safe/proper use of DME, edema/pain mgmt, pete LBD, car t/f's, HEP & general safety techs. Pt/spouse verbalized understanding.    Patient left HOB elevated with all lines intact, call button in reach, bed alarm on, nsg notified and spouse presentEducation:      GOALS:   Multidisciplinary Problems     Occupational Therapy Goals        Problem: Occupational Therapy Goal    Goal Priority Disciplines Outcome Interventions   Occupational Therapy Goal     OT, PT/OT Ongoing (interventions implemented as appropriate)    Description:  Goals to be met by: 06/08     Patient will increase functional independence with ADLs by performing:    LE Dressing with Stand-by Assistance for pants/undergarments  Grooming while standing at sink with Supervision.  Toileting from toilet with Supervision for hygiene and clothing management.   Toilet transfer to toilet with Supervision.  Increased functional strength to WFL for ADLs.                       Time Tracking:     OT Date of Treatment: 05/10/19  OT Start Time: 1047  OT Stop Time: 1112  OT Total Time (min): 25 min    Billable Minutes:Therapeutic Activity 25  Total Time 25    LATISHA Friedman  5/10/2019

## 2019-05-10 NOTE — NURSING
Cued into patient's room for evening rounds. Patient is alert and oriented. siderails x3 up with bed alarm activated and is on for patient safety. Patient education done on fall precautions and plan of care reviewed with patient. Verbalized understanding. Time allowed for questions and concerns about care. Will continue to be available to intervene as needed

## 2019-05-10 NOTE — NURSING
Pt discharged home with home health, transported downstairs in wheelchair with all belongings. Pt received pain meds from pharmacy. Pt has walker and requesting bedside commode to be delivered. Will call MD for order. Pt is in stable condition, ex- with her.

## 2019-05-10 NOTE — PROGRESS NOTES
.Pharmacy New Medication Education    Patient accepted medication education.    Pharmacy educated patient on name and purpose of medications and possible side effects, using the teach-back method.     Current Inpatient Medication Orders     D/C Current Inpatient Medication Orders Link Status Route Frequency PRN Reason Start End   D/C acetaminophen tablet 1,000 mg  Dispensed Oral 3 times daily  05/08 1500     D/C albuterol inhaler 1 puff  Dispensed Inhl Every 4 hours PRN Wheezing 05/08 1459     D/C atorvastatin tablet 10 mg  Dispensed Oral Every other day  05/09 0900     D/C bisacodyl suppository 10 mg  Verified Rect Daily PRN Constipation 05/08 1458     D/C budesonide nebulizer solution 0.25 mg  Dispensed NEBULIZATION Every 12 hours  05/08 1900     D/C buPROPion TBSR 12 hr tablet 200 mg  Dispensed Oral Daily  05/08 1500     D/C busPIRone tablet 5 mg  Dispensed Oral Daily  05/08 1500     D/C escitalopram oxalate tablet 20 mg  Dispensed Oral Daily  05/08 1500     D/C furosemide tablet 20 mg  Dispensed Oral Daily  05/08 1500     D/C gabapentin capsule 300 mg  Dispensed Oral 3 times daily  05/08 1500     D/C lactulose 20 gram/30 mL solution Soln 20 g  Verified Oral Every 6 hours PRN Constipation 05/08 1458     D/C levothyroxine tablet 75 mcg  Dispensed Oral Before breakfast  05/09 0600     D/C ondansetron disintegrating tablet 4 mg  Dispensed Oral Every 6 hours PRN Nausea/Vomiting (2nd choice) - use if first choice is not effective 05/08 1458     D/C ondansetron injection 4 mg  Dispensed IV Every 6 hours PRN Nausea/Vomiting (1st choice) - use as first treatment 05/08 1458     D/C oxyCODONE immediate release tablet 10 mg  Dispensed Oral Every 3 hours PRN severe pain 7-10/10 pain scale 05/08 1458     D/C oxyCODONE immediate release tablet 5 mg  Dispensed Oral Every 3 hours PRN moderate pain 4-6/10 pain scale 05/08 1458     D/C pantoprazole EC tablet 40 mg  Dispensed Oral Daily  05/08 1500     D/C polyethylene glycol  packet 17 g  Dispensed Oral Daily  05/08 1500     D/C potassium chloride SA CR tablet 10 mEq  Dispensed Oral 2 times daily  05/08 2100     D/C predniSONE tablet 5 mg  Dispensed Oral Daily  05/08 1500     D/C ramelteon tablet 8 mg  Dispensed Oral Nightly PRN Insomnia 05/08 1458     D/C ropivacaine (NAROPIN) 0.2% ON-Q C-BLOC 550mL (med + pump) (SAF)  Dispensed PN Continuous  05/08 1500     D/C senna-docusate 8.6-50 mg per tablet 1 tablet  Dispensed Oral 2 times daily  05/08 2100     D/C sodium chloride 0.9% flush 10 mL  Verified IV As needed (PRN) Line Care 05/08 1405     D/C sodium chloride 0.9% flush 10 mL  Verified IV As needed (PRN) Line Care 05/08 1405     D/C sodium chloride 0.9% flush 5 mL  Verified IV As needed (PRN) Line Care 05/08 1458        Learners of pharmacy medication education included:  Patient    Patient +/- learner response:  Verbalized Understanding, Teachback

## 2019-05-10 NOTE — PLAN OF CARE
Problem: Adult Inpatient Plan of Care  Goal: Plan of Care Review  Outcome: Ongoing (interventions implemented as appropriate)     05/10/19 0517   Plan of Care Review   Plan of Care Reviewed With patient   POC reviewed with the pt, verbalized understanding.  AAOx3, VSS.  Complaint of pain on left lower extremity, prn pain medication given.  Pt had 1x vomiting episode in the evening, prn zofran given.  No other distress or any other complaint of pain throughout night.  Surgical dressing d/c/i, pain pump in place.  PIV d/c/i.  IV antibiotic given.  Safety maintained, free of falls throughout shift.  Instructed to call for any assistance.  Will continue to monitor.

## 2019-05-10 NOTE — NURSING
"Cued into patient's room for evening VN rounds. Pt complaining of pain to left knee. States "I don't need any medicine at this time." Patient education on pain medication and frequency. Plan of care reviewed. Education done on fall precautions. Patient verbalized understanding to all instruction. Time allowed for questions and to verbalize concerns about care. Will conitnue to be available to nurse and patient to intervene as needed  "

## 2019-05-10 NOTE — DISCHARGE SUMMARY
DISCHARGE SUMMARY      Date and time of Admission: 5/8/2019  8:28 AM    Date of Discharge:5/10/2019    Discharge Diagnoses:    Active Hospital Problems    Diagnosis  POA    *History of left knee replacement [Z96.652]  Not Applicable    Primary osteoarthritis of left knee [M17.12]  Yes      Resolved Hospital Problems   No resolved problems to display.       Discharge Medications:       Medication List      CHANGE how you take these medications    oxyCODONE-acetaminophen  mg per tablet  Commonly known as:  PERCOCET  Take 1 tablet by mouth every 6 (six) hours as needed.  What changed:    · how much to take  · how to take this        CONTINUE taking these medications    albuterol 90 mcg/actuation inhaler  Commonly known as:  VENTOLIN HFA  INHALE 2 PUFFS INTO THE LUNGS EVERY 4 HOURS AS NEEDED FOR WHEEZING     atorvastatin 10 MG tablet  Commonly known as:  LIPITOR  Take 1 tablet (10 mg total) by mouth every other day.     budesonide 90 mcg/actuation Aepb  Commonly known as:  PULMICORT FLEXHALER  Inhale 2 puffs (180 mcg total) into the lungs 2 (two) times daily. Controller     buPROPion 200 MG Sr12  Commonly known as:  WELLBUTRIN SR  Take 1 tablet (200 mg total) by mouth once daily.     busPIRone 10 MG tablet  Commonly known as:  BUSPAR  TAKE 1/2 TABLET BY MOUTH ONCE DAILY     diclofenac sodium 1 % Gel  Commonly known as:  VOLTAREN  APPLY 2 GRAMS EXTERNALLY TO THE AFFECTED AREA FOUR TIMES DAILY     escitalopram oxalate 20 MG tablet  Commonly known as:  LEXAPRO  Take 1 tablet (20 mg total) by mouth once daily.     furosemide 20 MG tablet  Commonly known as:  LASIX  Take 1 tablet (20 mg total) by mouth daily as needed.     gabapentin 300 MG capsule  Commonly known as:  NEURONTIN  Take 1 capsule (300 mg total) by mouth 3 (three) times daily.     levothyroxine 75 MCG tablet  Commonly known as:  SYNTHROID     omeprazole 40 MG capsule  Commonly known as:  PRILOSEC  TAKE 1 CAPSULE(40 MG) BY MOUTH EVERY MORNING      ondansetron 4 MG Tbdl  Commonly known as:  ZOFRAN-ODT  Take 1 tablet (4 mg total) by mouth every 6 (six) hours as needed.     potassium chloride 10 MEQ Tbsr  Commonly known as:  KLOR-CON  TAKE 2 TABLETS(20 MEQ) BY MOUTH EVERY DAY     predniSONE 5 MG tablet  Commonly known as:  DELTASONE  TAKE 1 TABLET BY MOUTH DAILY     promethazine 25 MG tablet  Commonly known as:  PHENERGAN  TAKE 1 TABLET(25 MG) BY MOUTH EVERY 6 HOURS AS NEEDED     zolpidem 5 MG Tab  Commonly known as:  AMBIEN  TAKE 1 TABLET BY MOUTH EVERY NIGHT        STOP taking these medications    bacitracin 500 unit/gram Oint     diazePAM 2 MG tablet  Commonly known as:  VALIUM           Where to Get Your Medications      You can get these medications from any pharmacy    Bring a paper prescription for each of these medications  · oxyCODONE-acetaminophen  mg per tablet         Follow-up (including scheduled tests):    History of Present Illness: See H&P    Past Medical History:    Past Medical History:   Diagnosis Date    Arthritis     Asthma     Cancer     CHF (congestive heart failure)     [facility]    Colon cancer     COPD (chronic obstructive pulmonary disease)     Coronary artery disease     Depression     Diabetes mellitus, type 2     GERD (gastroesophageal reflux disease)     Myocardial infarction     AGE 19 [facility]AB[facility] DELIVERY    Thyroid disease        Allergies: Ibuprofen    Hospital Course:    The patient underwent surgery on the day of admission. The procedure was uneventful and the patient tolerated well. Post operatively the patient was hemodynamically stable and made appropriate progress in therapy. There were no evident acute postoperative complications.    Procedures:  Left total knee replacement    Discharge Physical Exam:    See progress note    Condition: Improved    Activity: WBAT    Diet: Resume preadmission diet    Disposition: Home with services

## 2019-05-10 NOTE — PLAN OF CARE
Problem: Physical Therapy Goal  Goal: Physical Therapy Goal  Goals to be met by: 19     Patient will increase functional independence with mobility by performin. Supine <> sit with Modified [place]  2. Sit to stand transfer with Stand-by Assistance  3. Bed to chair transfer with Stand-by Assistance using Rolling Walker  4. Gait  x 50 feet with Stand-by Assistance using Rolling Walker.   5. Lower extremity exercise program x 10 reps per handout, with supervision     Outcome: Ongoing (interventions implemented as appropriate)     Pt continues to work and slowly progress toward goals.

## 2019-05-10 NOTE — PLAN OF CARE
Problem: Adult Inpatient Plan of Care  Goal: Plan of Care Review  Outcome: Ongoing (interventions implemented as appropriate)     05/09/19 0944   Plan of Care Review   Plan of Care Reviewed With patient   Plan of care reviewed with patient. Verbal reported of understanding. AAOx4. Patient not on telemetry monitoring. Free of falls and fall precaution maintained. Complained of left knee pain throughout the shift. Pain medication given. Zofran IVP administer by RN for complained of N/V as ordered. No acute distress noted. Bed on lowest position. Bed alarm set. Head of bed elevated. Call bell within reach and educated for assistance. Side rails x 3 are up. Patient will be monitored overnight.

## 2019-05-10 NOTE — PLAN OF CARE
Problem: Adult Inpatient Plan of Care  Goal: Plan of Care Review  Outcome: Outcome(s) achieved Date Met: 05/10/19     05/10/19 1320   Plan of Care Review   Plan of Care Reviewed With patient;spouse   Progress improving   VSS, pt c/o left knee pain, pain med given as needed every 3 hours. Pt also c/o nausea, zofran given. Pt up to chair today with assistance, pt walked with PT today. Safety and fall precautions discussed, call light in reach. Plan for discharge home today with home health, walker and pain meds delivered to bedside.

## 2019-05-10 NOTE — DISCHARGE INSTRUCTIONS
GENERAL DISCHARGE INSTRUCTIONS:        FOLLOW UP APPOINTMENT:  Call your surgeon's office to schedule your post operative appointment, if one has not already been scheduled.     WEIGHTBEARING:  You may bear as much weight as you can tolerate on your operative leg.  Continue to use a walking device until d/c'd by your physical therapist.    PAIN MANAGEMENT:  Take your pain medication as prescribed.  Ween yourself off narcotic pain medication slowly, supplementing with acetominophen (Tylenol).  Do not exceed 3000 mg of Tylenol per day.    SURGICAL DRESSING: Do not change dressing unless if  falls off or your nurse feels that it is saturated. Replace with similar dressing, only if absolutely necessary.    DRIVING:  Do not drive until you have your follow up appointment with your surgeon.    SHOWERING:  You can shower as long as your dressing is intact and your physical therapist has instructed you on how to safely get in and out of your shower.    TO PREVENT BLOOD CLOTS: continue to take aspirin (or other medication) as instructed above.  Also continue to walk frequently throughout the day.    TO PREVENT CONSTIPATION:  continue to take stool softeners regularly for as long as you are on narcotic pain medication (oxycodone/hydrocodone). If you do not have normal bowel movement for 1-2 days, purchase an over the counter laxative, such as Milk of Magnesia, and follow the instructions on the label. Call your doctor for severe abdominal pain, vomiting or diarrhea.    To PREVENT SWELLING:  This is normal for at least 2 months after surgery. Spend time each day with your leg elevated on several pillows. Use ice as needed. KRISTIE stockings are helpful for swelling, but MAY be removed, if desired.    NOTIFY YOUR SURGEON IF: Unrelenting pain that does not improve, even after taking prescribed pain medication. Increasing drainage from the wound.      Knee Replacement Surgery, Exercise After (English) View Edit Remove  Knee  Replacement, After: Keeping Your Knee Healthy (English) View Edit Remove  Knee Replacement, After: The First Month (English) View Edit Remove  Total Knee Replacement, Discharge Instructions for (English) View Edit Remove  Acetaminophen; Oxycodone tablets (English) View Edit Remove

## 2019-05-10 NOTE — ANESTHESIA POST-OP PAIN MANAGEMENT
Acute Pain Service Progress Note    Christine Castro is a 64 y.o., female, [id number].    Surgery: L TKA    Post Op Day #: 2    Catheter type: on Q ball with sonoplex 21G    Infusion type: ropi 2%    Problem List:    Active Hospital Problems    Diagnosis  POA    *History of left knee replacement [Z96.652]  Not Applicable    Primary osteoarthritis of left knee [M17.12]  Yes      Resolved Hospital Problems   No resolved problems to display.       Subjective:     General appearance of NAD   Pain with rest: 7   Pain with movement: 9/10   Side Effects    1. Pruritis no    2. Nausea no    3. Motor Blockade no    4. Sedation no    Objective:     Catheter level adductor canal   Catheter site clean, dry, intact   Vitals   Vitals:    05/10/19 0728   BP: 131/77   Pulse: 83   Resp: 20   Temp: 37.7 °C (99.9 °F)        Labs    Admission on 05/08/2019   Component Date Value Ref Range Status    POCT Glucose 05/08/2019 86  70 - 110 mg/dL Final    POC Glucose 05/08/2019 86  70 - 110 MG/DL Final    Sodium 05/09/2019 140  136 - 145 mmol/L Final    Potassium 05/09/2019 3.9  3.5 - 5.1 mmol/L Final    Chloride 05/09/2019 109  95 - 110 mmol/L Final    CO2 05/09/2019 22* 23 - 29 mmol/L Final    Glucose 05/09/2019 149* 70 - 110 mg/dL Final    BUN, Bld 05/09/2019 13  8 - 23 mg/dL Final    Creatinine 05/09/2019 1.0  0.5 - 1.4 mg/dL Final    Calcium 05/09/2019 9.1  8.7 - 10.5 mg/dL Final    Anion Gap 05/09/2019 9  8 - 16 mmol/L Final    eGFR if African American 05/09/2019 >60  >60 mL/min/1.73 m^2 Final    eGFR if non African American 05/09/2019 59* >60 mL/min/1.73 m^2 Final    Hemoglobin 05/09/2019 10.9* 12.0 - 16.0 g/dL Final    Hematocrit 05/09/2019 35.2* 37.0 - 48.5 % Final        Meds   Current Facility-Administered Medications   Medication Dose Route Frequency Provider Last Rate Last Dose    acetaminophen tablet 1,000 mg  1,000 mg Oral TID Roldan Greenwood MD   Stopped at 05/10/19 0900    albuterol inhaler 1 puff  1 puff  Inhalation Q4H PRN Roldan Greenwood MD        atorvastatin tablet 10 mg  10 mg Oral Every other day Roldan Greenwood MD   10 mg at 05/09/19 0926    bisacodyl suppository 10 mg  10 mg Rectal Daily PRN Roldan Greenwood MD        budesonide nebulizer solution 0.25 mg  0.25 mg Nebulization Q12H Roldan Greenwood MD   0.25 mg at 05/09/19 2055    buPROPion TBSR 12 hr tablet 200 mg  200 mg Oral Daily Roldan Greenwood MD   200 mg at 05/10/19 0920    busPIRone tablet 5 mg  5 mg Oral Daily Roldan Greenwood MD   5 mg at 05/10/19 0919    escitalopram oxalate tablet 20 mg  20 mg Oral Daily Roldan Greenwood MD   20 mg at 05/10/19 0920    furosemide tablet 20 mg  20 mg Oral Daily Roldan Greenwood MD   20 mg at 05/10/19 0920    gabapentin capsule 300 mg  300 mg Oral TID Roldan Greenwood MD   300 mg at 05/10/19 0919    lactulose 20 gram/30 mL solution Soln 20 g  20 g Oral Q6H PRN Roldan Greenwood MD        levothyroxine tablet 75 mcg  75 mcg Oral Before breakfast Roldan Greenwood MD   75 mcg at 05/10/19 0625    ondansetron disintegrating tablet 4 mg  4 mg Oral Q6H PRN Roldan Greenwood MD   4 mg at 05/09/19 1920    ondansetron injection 4 mg  4 mg Intravenous Q6H PRN Roldan Greenwood MD   4 mg at 05/09/19 2205    oxyCODONE immediate release tablet 10 mg  10 mg Oral Q3H PRN Roldan Greenwood MD   10 mg at 05/10/19 0919    oxyCODONE immediate release tablet 5 mg  5 mg Oral Q3H PRN Roldan Greenwood MD        pantoprazole EC tablet 40 mg  40 mg Oral Daily Roldan Greenwood MD   40 mg at 05/10/19 0919    polyethylene glycol packet 17 g  17 g Oral Daily Roldan Greenwood MD   17 g at 05/10/19 0921    potassium chloride SA CR tablet 10 mEq  10 mEq Oral BID Roldan Greenwood MD   10 mEq at 05/10/19 0919    predniSONE tablet 5 mg  5 mg Oral Daily Roldan Greenwood MD   5 mg at 05/10/19 0927    ramelteon tablet 8 mg  8 mg Oral Nightly PRN Roldan Greenwood MD   8 mg at 05/09/19 2125    ropivacaine (NAROPIN)  0.2% ON-Q C-BLOC 550mL (med + pump) (SAF)   Perineural Continuous Bekah Mehta MD 8 mL/hr at 05/08/19 1540      senna-docusate 8.6-50 mg per tablet 1 tablet  1 tablet Oral BID Roldan Greenwood MD   1 tablet at 05/10/19 0919    sodium chloride 0.9% flush 10 mL  10 mL Intravenous PRN eKnan Laura MD        sodium chloride 0.9% flush 10 mL  10 mL Intravenous PRN Kenan Laura MD        sodium chloride 0.9% flush 5 mL  5 mL Intravenous PRN Roldan Greenwood MD             Assessment:     Pain control adequate    Plan:     Patient doing well, continue present treatment    Evaluator Bekah Mehta

## 2019-05-10 NOTE — PLAN OF CARE
Problem: Occupational Therapy Goal  Goal: Occupational Therapy Goal  Goals to be met by: 06/08     Patient will increase functional independence with ADLs by performing:    LE Dressing with Stand-by Assistance for pants/undergarments  Grooming while standing at sink with Supervision.  Toileting from toilet with Supervision for hygiene and clothing management.   Toilet transfer to toilet with Supervision.  Increased functional strength to WFL for ADLs.     Outcome: Ongoing (interventions implemented as appropriate)  Pt found in SF & visibly unhappy about having to actively participate in OT this date. Pt recently returned BTB from b/s chair after PT session & declined to perf active tx tasks. Instruct/demo w/o return demo of safe/proper use of DME, edema/pain mgmt, pete LBD, car t/f's, HEP & general safety techs. Pt/spouse verbalized understanding.    Even though pt declined to get OOB for tx tasks this AM, she cont w/ decreased overall safety due to signif L knee pain, med induced lethargy & attempting to get up w/o phys A. Cont w/ OT per POC.

## 2019-05-10 NOTE — PROGRESS NOTES
[facility]  Orthopedics  Progress Note    Patient Name: Christine Castro  MRN: [id number]  Admission Date: 5/8/2019  Hospital Length of Stay: 0 days  Attending Provider: Roldan Greenwood MD  Primary Care Provider: Alon Santiago MD  Follow-up For: Procedure(s) (LRB):  ARTHROPLASTY, KNEE (Left)    Post-Operative Day: 2 Days Post-Op  Subjective:     Principal Problem:History of left knee replacement    Principal Orthopedic Problem:  Same    Interval History:  The patient complains of pain at her operative site. Her current medications are providing for control of her symptoms according to her report.    Review of patient's allergies indicates:   Allergen Reactions    Ibuprofen Other (See Comments)     Causes asthma flare??       Current Facility-Administered Medications   Medication    acetaminophen tablet 1,000 mg    albuterol inhaler 1 puff    atorvastatin tablet 10 mg    bisacodyl suppository 10 mg    budesonide nebulizer solution 0.25 mg    buPROPion TBSR 12 hr tablet 200 mg    busPIRone tablet 5 mg    escitalopram oxalate tablet 20 mg    furosemide tablet 20 mg    gabapentin capsule 300 mg    lactulose 20 gram/30 mL solution Soln 20 g    levothyroxine tablet 75 mcg    ondansetron disintegrating tablet 4 mg    ondansetron injection 4 mg    oxyCODONE immediate release tablet 10 mg    oxyCODONE immediate release tablet 5 mg    pantoprazole EC tablet 40 mg    polyethylene glycol packet 17 g    potassium chloride SA CR tablet 10 mEq    predniSONE tablet 5 mg    ramelteon tablet 8 mg    ropivacaine (NAROPIN) 0.2% ON-Q C-BLOC 550mL (med + pump) (SAF)    senna-docusate 8.6-50 mg per tablet 1 tablet    sodium chloride 0.9% flush 10 mL    sodium chloride 0.9% flush 10 mL    sodium chloride 0.9% flush 5 mL     Objective:     Vital Signs (Most Recent):  Temp: 99.9 °F (37.7 °C) (05/10/19 0728)  Pulse: 83 (05/10/19 0728)  Resp: 20 (05/10/19 0728)  BP: 131/77 (05/10/19  0728)  SpO2: 95 % (05/10/19 0407) Vital Signs (24h Range):  Temp:  [97.1 °F (36.2 °C)-99.9 °F (37.7 °C)] 99.9 °F (37.7 °C)  Pulse:  [76-95] 83  Resp:  [16-20] 20  SpO2:  [94 %-96 %] 95 %  BP: (112-131)/(56-80) 131/77     Weight: 87.3 kg (192 lb 7.4 oz)  Height: 5' (152.4 cm)  Body mass index is 37.59 kg/m².      Intake/Output Summary (Last 24 hours) at 5/10/2019 0841  Last data filed at 5/9/2019 2300  Gross per 24 hour   Intake 625 ml   Output 300 ml   Net 325 ml       Ortho/SPM Exam   Appears comfortable  Few spots of blood and dressing  Neurovascular status intact in the left lower extremity  Significant Labs: All pertinent labs within the past 24 hours have been reviewed.  None    Significant Imaging: None    Assessment/Plan:     Active Diagnoses:    Diagnosis Date Noted POA    PRINCIPAL PROBLEM:  History of left knee replacement [Z96.652] 05/08/2019 Not Applicable    Primary osteoarthritis of left knee [M17.12] 05/08/2019 Yes      Problems Resolved During this Admission:    Given the patient's comorbidity and history of prior pain medication use, her condition is as expected. I explained that due to her tolerance to medication, complete analgesia is probably not achievable.  She may be discharged home when her gait is stable.      Roldan Greenwood MD  Orthopedics  [facility]

## 2019-05-10 NOTE — PT/OT/SLP PROGRESS
"Physical Therapy Treatment    Patient Name:  Christine Castro   MRN:  [id number]    Recommendations:     Discharge Recommendations:  home health PT, home health OT   Discharge Equipment Recommendations: (Arlen RW)   Barriers to discharge: None    Assessment:     Chritsine Castro is a 64 y.o. female admitted with a medical diagnosis of History of left knee replacement.  She presents with the following impairments/functional limitations:  weakness, impaired endurance, impaired self care skills, impaired functional mobilty, gait instability, impaired balance, decreased coordination, decreased upper extremity function, decreased lower extremity function, decreased ROM, decreased safety awareness, pain, impaired coordination, orthopedic precautions, impaired skin. Pt performed ambulation training ~25-30 ft with arlen RW requiring CGA plus constant and max verbal/tactile cueing secondary to pt demonstrating excessive trunk flexion. Pt still unsafe to ambulate without assistance. Would benefit from continued PT services to increase pt's safety awareness for all functional mobility and increase out of bed therapeutic activity and exercise addressing all impairments listed above.    Rehab Prognosis: Fair; patient would benefit from acute skilled PT services to address these deficits and reach maximum level of function.    Recent Surgery: Procedure(s) (LRB):  ARTHROPLASTY, KNEE (Left) 2 Days Post-Op    Plan:     During this hospitalization, patient to be seen BID(BID Mon thr Fri and daily Sat and Sun) to address the identified rehab impairments via gait training, therapeutic activities, therapeutic exercises and progress toward the following goals:    · Plan of Care Expires:  06/08/19    Subjective     Chief Complaint: None voiced  Patient/Family Comments/goals: "I am going home today".  Pain/Comfort:  · Pain Rating 1: 9/10  · Location - Side 1: Left  · Location - Orientation 1: generalized  · Location 1: knee  · Pain " Addressed 1: Reposition, Distraction, Cessation of Activity, Nurse notified  · Pain Rating Post-Intervention 1: 9/10      Objective:     Communicated with  nurse prior to session.  Patient found supine with bed alarm, peripheral IV, telemetry upon PT entry to room.     General Precautions: Standard, fall   Orthopedic Precautions:LLE weight bearing as tolerated   Braces: N/A     Functional Mobility:  · Bed Mobility:     · Rolling Right: stand by assistance and contact guard assistance  · Scooting: stand by assistance  · Supine to Sit: stand by assistance and  with use of bed rail  · Transfers:     · Sit to Stand:  contact guard assistance and minimum assistance with rolling walker  · Gait:  ~25-30 ft with arlen RW and CGA plus constant and max verbal cueing to decrease excessive trunk flexion.      AM-PAC 6 CLICK MOBILITY  Turning over in bed (including adjusting bedclothes, sheets and blankets)?: 3  Sitting down on and standing up from a chair with arms (e.g., wheelchair, bedside commode, etc.): 3  Moving from lying on back to sitting on the side of the bed?: 3  Moving to and from a bed to a chair (including a wheelchair)?: 3  Need to walk in hospital room?: 3  Climbing 3-5 steps with a railing?: 1  Basic Mobility Total Score: 16       Therapeutic Activities and Exercises:   Pt sat at EOB ~15 minutes with SBA/Sup. Performed seated therapeutic exercises 1 x 10 reps, LAQ's, hip add/abd, and heel/toe raises. Received gentle manual stretching to LLE hamstrings in sitting position 1 x 5 reps with an approximate 15 second hold. Ambulation training ~25-30 ft with RW, CGA, and constant/max verbal/tactile cueing to decrease excessive trunk flexion, with chair following. Pt remains unsafe to ambulate without assistance.     Patient left up in chair with all lines intact, call button in reach, chair alarm on and  nursing notified..    GOALS:   Multidisciplinary Problems     Physical Therapy Goals        Problem: Physical  Therapy Goal    Goal Priority Disciplines Outcome Goal Variances Interventions   Physical Therapy Goal     PT, PT/OT Ongoing (interventions implemented as appropriate)     Description:  Goals to be met by: 19     Patient will increase functional independence with mobility by performin. Supine <> sit with Modified [place]  2. Sit to stand transfer with Stand-by Assistance  3. Bed to chair transfer with Stand-by Assistance using Rolling Walker  4. Gait  x 50 feet with Stand-by Assistance using Rolling Walker.   5. Lower extremity exercise program x 10 reps per handout, with supervision                       Time Tracking:     PT Received On: 05/10/19  PT Start Time: 808     PT Stop Time: 838  PT Total Time (min): 30 min     Billable Minutes: Gait Training  15 and Therapeutic Exercise  15    Treatment Type: Treatment  PT/PTA: PTA     PTA Visit Number: 2     Darline Marquez, PTA  05/10/2019

## 2019-05-10 NOTE — PLAN OF CARE
Tn received call from Kaye at [facility] and arlen walker delivered to pt and paperwork completed. Pt accepted by [facility] and pt informed. Pt's spouse at bedside and can provide transportation home. Pt has d/c folder, brochure and encouraged to call for any further needs/concerns. Pt ready from CM standpoint.     05/10/19 1117   Final Note   Assessment Type Final Discharge Note   Anticipated Discharge Disposition Home-Health   What phone number can be called within the next 1-3 days to see how you are doing after discharge? [phone]   Hospital Follow Up  Appt(s) scheduled? Yes   Discharge plans and expectations educations in teach back method with documentation complete? Yes   Right Care Referral Info   Post Acute Recommendation Home-care   Referral Type    Facility Name [facility]

## 2019-05-13 ENCOUNTER — TELEPHONE (OUTPATIENT)
Dept: ORTHOPEDICS | Facility: CLINIC | Age: 64
End: 2019-05-13

## 2019-05-13 NOTE — TELEPHONE ENCOUNTER
Spoke with patient in regards to wanting pain med refill . Verbalized understanding   ----- Message from Maria De Jesus Pimentel sent at 5/13/2019  3:15 PM CDT -----  Contact: [phone]/Fannie (daughter)  Pt daughter would like a callback concerning patients knee bandage. States its urgent. Please call and advise.

## 2019-05-13 NOTE — TELEPHONE ENCOUNTER
Spoke with [phone] Dasha with [facility] in regards to patient . Gave verbal order to change dressing . Verbalized understanding     ----- Message from Trang Lara sent at 5/13/2019  1:38 PM CDT -----  Contact: [phone] Dasha with [facility]   Dasha with [facility] would like to speak with concerning pt's drainage reaching border of [facility]. Please advise.

## 2019-05-14 NOTE — TELEPHONE ENCOUNTER
The patient is not eligible for refill until her follow-up in my office.  She may leave her bandage off

## 2019-05-16 ENCOUNTER — TELEPHONE (OUTPATIENT)
Dept: ORTHOPEDICS | Facility: CLINIC | Age: 64
End: 2019-05-16

## 2019-05-16 NOTE — TELEPHONE ENCOUNTER
Spoke with Tiffany from [facility]/[phone] in regards to  stating patient has to come in for follow up before pain meds refill .  ----- Message from Mathieu Landeros sent at 5/16/2019 12:17 PM CDT -----  Contact: Tiffany from [facility]/[phone]  Tiffany called to state the patient is out of her pain medication and she just had knee surgery.    She wants to know if her caregiver can come  a prescription today.    Please call [phone] to discuss.

## 2019-05-16 NOTE — TELEPHONE ENCOUNTER
----- Message from Deepa Louise sent at 5/16/2019  1:47 PM CDT -----  Contact: Jonny Calling from [facility] [phone]  Home health nurse is calling to get orders 3 in 1 commode  fax to [phone].

## 2019-05-17 ENCOUNTER — TELEPHONE (OUTPATIENT)
Dept: ORTHOPEDICS | Facility: CLINIC | Age: 64
End: 2019-05-17

## 2019-05-17 ENCOUNTER — TELEPHONE (OUTPATIENT)
Dept: FAMILY MEDICINE | Facility: CLINIC | Age: 64
End: 2019-05-17

## 2019-05-17 RX ORDER — ZOLPIDEM TARTRATE 5 MG/1
5 TABLET ORAL NIGHTLY
Qty: 30 TABLET | Refills: 0 | Status: SHIPPED | OUTPATIENT
Start: 2019-05-17 | End: 2019-06-18 | Stop reason: SDUPTHER

## 2019-05-17 NOTE — TELEPHONE ENCOUNTER
Spoke with patient in regards to pain med refill .Verbalized understanding                                                             ----- Message from Linda Landeros sent at 5/17/2019 11:43 AM CDT -----  Contact: [phone]/ pts daughter Fannie   Called in requesting call back regarding status of refill for Rx of oxyCODONE-acetaminophen (PERCOCET)  mg per tablet. Please advise.

## 2019-05-17 NOTE — TELEPHONE ENCOUNTER
----- Message from Laurita Ochoa sent at 5/17/2019  1:01 PM CDT -----  Contact: [phone]/self  Patient had knee surgery and couldn't come in for her appointment. Would you refill  zolpidem (AMBIEN) 5 MG Tab. TAKE 1 TABLET BY MOUTH EVERY NIGHT    Her nerve medication needs to be a higher dose. It is not working. Diazepam was not in her chart. - she was taking valium 5 mg tablets       [id number] - [place], [place] [address] AT [facility] [address]    Thanks

## 2019-05-17 NOTE — TELEPHONE ENCOUNTER
I do not want to increase her dose of nerve medication    We can try other medications but do not want to increase dose

## 2019-05-18 ENCOUNTER — HOSPITAL ENCOUNTER (OUTPATIENT)
Facility: HOSPITAL | Age: 64
LOS: 1 days | Discharge: HOME HEALTH | End: 2019-05-20
Attending: FAMILY MEDICINE | Admitting: HOSPITALIST
Payer: MEDICARE

## 2019-05-18 DIAGNOSIS — I63.9 STROKE: ICD-10-CM

## 2019-05-18 DIAGNOSIS — I63.9 CEREBROVASCULAR ACCIDENT (CVA), UNSPECIFIED MECHANISM: Primary | ICD-10-CM

## 2019-05-18 DIAGNOSIS — G45.9 TIA (TRANSIENT ISCHEMIC ATTACK): ICD-10-CM

## 2019-05-18 LAB
ALBUMIN SERPL BCP-MCNC: 3 G/DL (ref 3.5–5.2)
ALP SERPL-CCNC: 68 U/L (ref 38–126)
ALT SERPL W/O P-5'-P-CCNC: 10 U/L (ref 10–44)
ANION GAP SERPL CALC-SCNC: 5 MMOL/L (ref 8–16)
AST SERPL-CCNC: 17 U/L (ref 15–46)
BASOPHILS # BLD AUTO: 0.02 K/UL (ref 0–0.2)
BASOPHILS NFR BLD: 0.3 % (ref 0–1.9)
BILIRUB SERPL-MCNC: 0.1 MG/DL (ref 0.1–1)
BUN SERPL-MCNC: 15 MG/DL (ref 7–17)
CALCIUM SERPL-MCNC: 8.7 MG/DL (ref 8.7–10.5)
CHLORIDE SERPL-SCNC: 110 MMOL/L (ref 95–110)
CO2 SERPL-SCNC: 26 MMOL/L (ref 23–29)
CREAT SERPL-MCNC: 0.9 MG/DL (ref 0.5–1.4)
DIFFERENTIAL METHOD: ABNORMAL
EOSINOPHIL # BLD AUTO: 0.2 K/UL (ref 0–0.5)
EOSINOPHIL NFR BLD: 3.8 % (ref 0–8)
ERYTHROCYTE [DISTWIDTH] IN BLOOD BY AUTOMATED COUNT: 13.3 % (ref 11.5–14.5)
EST. GFR  (AFRICAN AMERICAN): >60 ML/MIN/1.73 M^2
EST. GFR  (NON AFRICAN AMERICAN): >60 ML/MIN/1.73 M^2
GLUCOSE SERPL-MCNC: 92 MG/DL (ref 70–110)
GLUCOSE SERPL-MCNC: 99 MG/DL (ref 70–110)
HCT VFR BLD AUTO: 30.9 % (ref 37–48.5)
HGB BLD-MCNC: 9.5 G/DL (ref 12–16)
INR PPP: 1 (ref 0.8–1.2)
LYMPHOCYTES # BLD AUTO: 1.5 K/UL (ref 1–4.8)
LYMPHOCYTES NFR BLD: 25.1 % (ref 18–48)
MCH RBC QN AUTO: 30.9 PG (ref 27–31)
MCHC RBC AUTO-ENTMCNC: 30.7 G/DL (ref 32–36)
MCV RBC AUTO: 101 FL (ref 82–98)
MONOCYTES # BLD AUTO: 0.4 K/UL (ref 0.3–1)
MONOCYTES NFR BLD: 6.7 % (ref 4–15)
NEUTROPHILS # BLD AUTO: 3.9 K/UL (ref 1.8–7.7)
NEUTROPHILS NFR BLD: 63.6 % (ref 38–73)
PLATELET # BLD AUTO: 266 K/UL (ref 150–350)
PMV BLD AUTO: 8.8 FL (ref 9.2–12.9)
POTASSIUM SERPL-SCNC: 4 MMOL/L (ref 3.5–5.1)
PROT SERPL-MCNC: 5.9 G/DL (ref 6–8.4)
PROTHROMBIN TIME: 10.9 SEC (ref 9–12.5)
RBC # BLD AUTO: 3.07 M/UL (ref 4–5.4)
SODIUM SERPL-SCNC: 141 MMOL/L (ref 136–145)
TROPONIN I SERPL DL<=0.01 NG/ML-MCNC: <0.012 NG/ML (ref 0.01–0.03)
TSH SERPL DL<=0.005 MIU/L-ACNC: 0.34 UIU/ML (ref 0.4–4)
WBC # BLD AUTO: 6.09 K/UL (ref 3.9–12.7)

## 2019-05-18 PROCEDURE — 80053 COMPREHEN METABOLIC PANEL: CPT | Mod: ER

## 2019-05-18 PROCEDURE — 84439 ASSAY OF FREE THYROXINE: CPT

## 2019-05-18 PROCEDURE — 85025 COMPLETE CBC W/AUTO DIFF WBC: CPT | Mod: ER

## 2019-05-18 PROCEDURE — 84443 ASSAY THYROID STIM HORMONE: CPT | Mod: ER

## 2019-05-18 PROCEDURE — G0425 INPT/ED TELECONSULT30: HCPCS | Mod: GT,,, | Performed by: PSYCHIATRY & NEUROLOGY

## 2019-05-18 PROCEDURE — 93010 EKG 12-LEAD: ICD-10-PCS | Mod: ,,, | Performed by: INTERNAL MEDICINE

## 2019-05-18 PROCEDURE — 25000003 PHARM REV CODE 250: Mod: ER | Performed by: FAMILY MEDICINE

## 2019-05-18 PROCEDURE — 82962 GLUCOSE BLOOD TEST: CPT | Mod: ER

## 2019-05-18 PROCEDURE — G0425 PR INPT TELEHEALTH CONSULT 30M: ICD-10-PCS | Mod: GT,,, | Performed by: PSYCHIATRY & NEUROLOGY

## 2019-05-18 PROCEDURE — 99285 EMERGENCY DEPT VISIT HI MDM: CPT | Mod: 25,ER

## 2019-05-18 PROCEDURE — 84484 ASSAY OF TROPONIN QUANT: CPT | Mod: ER

## 2019-05-18 PROCEDURE — 85610 PROTHROMBIN TIME: CPT | Mod: ER

## 2019-05-18 PROCEDURE — 93010 ELECTROCARDIOGRAM REPORT: CPT | Mod: ,,, | Performed by: INTERNAL MEDICINE

## 2019-05-18 PROCEDURE — 93005 ELECTROCARDIOGRAM TRACING: CPT | Mod: ER

## 2019-05-18 RX ORDER — DIPHENHYDRAMINE HCL 25 MG
25 CAPSULE ORAL EVERY 6 HOURS PRN
Status: ON HOLD | COMMUNITY
End: 2019-05-19

## 2019-05-18 RX ORDER — ACETAMINOPHEN 500 MG
1000 TABLET ORAL
Status: COMPLETED | OUTPATIENT
Start: 2019-05-18 | End: 2019-05-18

## 2019-05-18 RX ORDER — ACETAMINOPHEN 500 MG
500 TABLET ORAL EVERY 6 HOURS PRN
Status: ON HOLD | COMMUNITY
End: 2021-12-21 | Stop reason: HOSPADM

## 2019-05-18 RX ADMIN — ACETAMINOPHEN 1000 MG: 500 TABLET ORAL at 11:05

## 2019-05-19 PROBLEM — N39.0 UTI (URINARY TRACT INFECTION): Status: ACTIVE | Noted: 2019-05-19

## 2019-05-19 PROBLEM — T85.698A MALFUNCTION OF DEVICE: Status: RESOLVED | Noted: 2017-05-25 | Resolved: 2019-05-19

## 2019-05-19 PROBLEM — Z85.038 HISTORY OF COLON CANCER: Chronic | Status: ACTIVE | Noted: 2017-04-18

## 2019-05-19 PROBLEM — Z96.652 HISTORY OF LEFT KNEE REPLACEMENT: Chronic | Status: ACTIVE | Noted: 2019-05-08

## 2019-05-19 PROBLEM — D64.9 NORMOCYTIC ANEMIA: Chronic | Status: ACTIVE | Noted: 2018-04-10

## 2019-05-19 PROBLEM — Z98.84 HISTORY OF GASTRIC BYPASS: Chronic | Status: ACTIVE | Noted: 2019-05-19

## 2019-05-19 PROBLEM — G45.9 TIA (TRANSIENT ISCHEMIC ATTACK): Status: ACTIVE | Noted: 2019-05-19

## 2019-05-19 LAB
APTT BLDCRRT: 25.6 SEC (ref 21–32)
BACTERIA #/AREA URNS HPF: ABNORMAL /HPF
BILIRUB UR QL STRIP: NEGATIVE
CHOLEST SERPL-MCNC: 164 MG/DL (ref 120–199)
CHOLEST/HDLC SERPL: 3.8 {RATIO} (ref 2–5)
CK MB SERPL-MCNC: 0.7 NG/ML (ref 0.1–6.5)
CK MB SERPL-RTO: 1.5 % (ref 0–5)
CK SERPL-CCNC: 48 U/L (ref 20–180)
CLARITY UR: ABNORMAL
COLOR UR: YELLOW
ESTIMATED AVG GLUCOSE: 103 MG/DL (ref 68–131)
FOLATE SERPL-MCNC: 10 NG/ML (ref 4–24)
GLUCOSE UR QL STRIP: NEGATIVE
HBA1C MFR BLD HPLC: 5.2 % (ref 4–5.6)
HDLC SERPL-MCNC: 43 MG/DL (ref 40–75)
HDLC SERPL: 26.2 % (ref 20–50)
HGB UR QL STRIP: NEGATIVE
INR PPP: 0.9 (ref 0.8–1.2)
KETONES UR QL STRIP: NEGATIVE
LDLC SERPL CALC-MCNC: 102.4 MG/DL (ref 63–159)
LEUKOCYTE ESTERASE UR QL STRIP: ABNORMAL
MICROSCOPIC COMMENT: ABNORMAL
NITRITE UR QL STRIP: POSITIVE
NONHDLC SERPL-MCNC: 121 MG/DL
PH UR STRIP: 6 [PH] (ref 5–8)
PROT UR QL STRIP: NEGATIVE
PROTHROMBIN TIME: 9.5 SEC (ref 9–12.5)
RBC #/AREA URNS HPF: 0 /HPF (ref 0–4)
SP GR UR STRIP: >=1.03 (ref 1–1.03)
SQUAMOUS #/AREA URNS HPF: 0 /HPF
T4 FREE SERPL-MCNC: 0.82 NG/DL (ref 0.71–1.51)
TRIGL SERPL-MCNC: 93 MG/DL (ref 30–150)
TROPONIN I SERPL DL<=0.01 NG/ML-MCNC: <0.006 NG/ML (ref 0–0.03)
URN SPEC COLLECT METH UR: ABNORMAL
UROBILINOGEN UR STRIP-ACNC: 1 EU/DL
VIT B12 SERPL-MCNC: >2000 PG/ML (ref 210–950)
WBC #/AREA URNS HPF: 10 /HPF (ref 0–5)

## 2019-05-19 PROCEDURE — 96372 THER/PROPH/DIAG INJ SC/IM: CPT

## 2019-05-19 PROCEDURE — 84484 ASSAY OF TROPONIN QUANT: CPT

## 2019-05-19 PROCEDURE — 25000003 PHARM REV CODE 250: Performed by: NURSE PRACTITIONER

## 2019-05-19 PROCEDURE — 63600175 PHARM REV CODE 636 W HCPCS: Performed by: NURSE PRACTITIONER

## 2019-05-19 PROCEDURE — 86592 SYPHILIS TEST NON-TREP QUAL: CPT

## 2019-05-19 PROCEDURE — 36415 COLL VENOUS BLD VENIPUNCTURE: CPT

## 2019-05-19 PROCEDURE — 85730 THROMBOPLASTIN TIME PARTIAL: CPT

## 2019-05-19 PROCEDURE — 82746 ASSAY OF FOLIC ACID SERUM: CPT

## 2019-05-19 PROCEDURE — 83036 HEMOGLOBIN GLYCOSYLATED A1C: CPT

## 2019-05-19 PROCEDURE — 99900035 HC TECH TIME PER 15 MIN (STAT)

## 2019-05-19 PROCEDURE — 82553 CREATINE MB FRACTION: CPT

## 2019-05-19 PROCEDURE — 92610 EVALUATE SWALLOWING FUNCTION: CPT

## 2019-05-19 PROCEDURE — 94761 N-INVAS EAR/PLS OXIMETRY MLT: CPT

## 2019-05-19 PROCEDURE — 82607 VITAMIN B-12: CPT

## 2019-05-19 PROCEDURE — 80061 LIPID PANEL: CPT

## 2019-05-19 PROCEDURE — 85610 PROTHROMBIN TIME: CPT

## 2019-05-19 PROCEDURE — 82550 ASSAY OF CK (CPK): CPT

## 2019-05-19 PROCEDURE — 97535 SELF CARE MNGMENT TRAINING: CPT

## 2019-05-19 PROCEDURE — 81000 URINALYSIS NONAUTO W/SCOPE: CPT

## 2019-05-19 PROCEDURE — 97161 PT EVAL LOW COMPLEX 20 MIN: CPT

## 2019-05-19 PROCEDURE — G0378 HOSPITAL OBSERVATION PER HR: HCPCS

## 2019-05-19 PROCEDURE — 25000003 PHARM REV CODE 250: Mod: ER | Performed by: FAMILY MEDICINE

## 2019-05-19 PROCEDURE — 97166 OT EVAL MOD COMPLEX 45 MIN: CPT

## 2019-05-19 RX ORDER — DIAZEPAM 2 MG/1
2 TABLET ORAL EVERY 12 HOURS PRN
COMMUNITY
End: 2019-05-29

## 2019-05-19 RX ORDER — BUPROPION HYDROCHLORIDE 100 MG/1
200 TABLET, EXTENDED RELEASE ORAL DAILY
Status: DISCONTINUED | OUTPATIENT
Start: 2019-05-19 | End: 2019-05-20 | Stop reason: HOSPADM

## 2019-05-19 RX ORDER — ASPIRIN 81 MG/1
81 TABLET ORAL DAILY
Status: DISCONTINUED | OUTPATIENT
Start: 2019-05-19 | End: 2019-05-20 | Stop reason: HOSPADM

## 2019-05-19 RX ORDER — OXYCODONE AND ACETAMINOPHEN 5; 325 MG/1; MG/1
1 TABLET ORAL
Status: COMPLETED | OUTPATIENT
Start: 2019-05-19 | End: 2019-05-19

## 2019-05-19 RX ORDER — PREDNISONE 5 MG/1
5 TABLET ORAL DAILY
Status: DISCONTINUED | OUTPATIENT
Start: 2019-05-19 | End: 2019-05-20 | Stop reason: HOSPADM

## 2019-05-19 RX ORDER — ESCITALOPRAM OXALATE 20 MG/1
20 TABLET ORAL DAILY
Status: DISCONTINUED | OUTPATIENT
Start: 2019-05-19 | End: 2019-05-20 | Stop reason: HOSPADM

## 2019-05-19 RX ORDER — OXYCODONE AND ACETAMINOPHEN 10; 325 MG/1; MG/1
1 TABLET ORAL EVERY 6 HOURS PRN
Status: DISCONTINUED | OUTPATIENT
Start: 2019-05-19 | End: 2019-05-19

## 2019-05-19 RX ORDER — LABETALOL HYDROCHLORIDE 5 MG/ML
10 INJECTION, SOLUTION INTRAVENOUS EVERY 4 HOURS PRN
Status: DISCONTINUED | OUTPATIENT
Start: 2019-05-19 | End: 2019-05-20 | Stop reason: HOSPADM

## 2019-05-19 RX ORDER — SODIUM CHLORIDE 0.9 % (FLUSH) 0.9 %
10 SYRINGE (ML) INJECTION
Status: DISCONTINUED | OUTPATIENT
Start: 2019-05-19 | End: 2019-05-20 | Stop reason: HOSPADM

## 2019-05-19 RX ORDER — GABAPENTIN 300 MG/1
300 CAPSULE ORAL NIGHTLY
Status: DISCONTINUED | OUTPATIENT
Start: 2019-05-19 | End: 2019-05-20 | Stop reason: HOSPADM

## 2019-05-19 RX ORDER — POLYETHYLENE GLYCOL 3350 17 G/17G
17 POWDER, FOR SOLUTION ORAL 2 TIMES DAILY PRN
Status: DISCONTINUED | OUTPATIENT
Start: 2019-05-19 | End: 2019-05-20 | Stop reason: HOSPADM

## 2019-05-19 RX ORDER — PANTOPRAZOLE SODIUM 40 MG/1
40 TABLET, DELAYED RELEASE ORAL DAILY
Status: DISCONTINUED | OUTPATIENT
Start: 2019-05-19 | End: 2019-05-20 | Stop reason: HOSPADM

## 2019-05-19 RX ORDER — CIPROFLOXACIN 500 MG/1
500 TABLET ORAL EVERY 12 HOURS
Status: DISCONTINUED | OUTPATIENT
Start: 2019-05-19 | End: 2019-05-20 | Stop reason: HOSPADM

## 2019-05-19 RX ORDER — LEVOTHYROXINE SODIUM 75 UG/1
75 TABLET ORAL
Status: DISCONTINUED | OUTPATIENT
Start: 2019-05-19 | End: 2019-05-20 | Stop reason: HOSPADM

## 2019-05-19 RX ORDER — OXYCODONE AND ACETAMINOPHEN 10; 325 MG/1; MG/1
1 TABLET ORAL EVERY 4 HOURS PRN
Status: DISCONTINUED | OUTPATIENT
Start: 2019-05-19 | End: 2019-05-20 | Stop reason: HOSPADM

## 2019-05-19 RX ORDER — BUSPIRONE HYDROCHLORIDE 5 MG/1
5 TABLET ORAL DAILY
Status: DISCONTINUED | OUTPATIENT
Start: 2019-05-19 | End: 2019-05-20 | Stop reason: HOSPADM

## 2019-05-19 RX ORDER — ONDANSETRON 8 MG/1
8 TABLET, ORALLY DISINTEGRATING ORAL EVERY 8 HOURS PRN
Status: DISCONTINUED | OUTPATIENT
Start: 2019-05-19 | End: 2019-05-20 | Stop reason: HOSPADM

## 2019-05-19 RX ORDER — RAMELTEON 8 MG/1
8 TABLET ORAL NIGHTLY PRN
Status: DISCONTINUED | OUTPATIENT
Start: 2019-05-19 | End: 2019-05-20 | Stop reason: HOSPADM

## 2019-05-19 RX ORDER — ATORVASTATIN CALCIUM 40 MG/1
40 TABLET, FILM COATED ORAL DAILY
Status: DISCONTINUED | OUTPATIENT
Start: 2019-05-19 | End: 2019-05-20 | Stop reason: HOSPADM

## 2019-05-19 RX ORDER — DIAZEPAM 2 MG/1
2 TABLET ORAL EVERY 12 HOURS PRN
Status: DISCONTINUED | OUTPATIENT
Start: 2019-05-19 | End: 2019-05-20 | Stop reason: HOSPADM

## 2019-05-19 RX ORDER — IPRATROPIUM BROMIDE AND ALBUTEROL SULFATE 2.5; .5 MG/3ML; MG/3ML
3 SOLUTION RESPIRATORY (INHALATION) EVERY 4 HOURS PRN
Status: DISCONTINUED | OUTPATIENT
Start: 2019-05-19 | End: 2019-05-20 | Stop reason: HOSPADM

## 2019-05-19 RX ORDER — ENOXAPARIN SODIUM 100 MG/ML
40 INJECTION SUBCUTANEOUS EVERY 24 HOURS
Status: DISCONTINUED | OUTPATIENT
Start: 2019-05-19 | End: 2019-05-20 | Stop reason: HOSPADM

## 2019-05-19 RX ADMIN — ENOXAPARIN SODIUM 40 MG: 100 INJECTION SUBCUTANEOUS at 06:05

## 2019-05-19 RX ADMIN — GABAPENTIN 300 MG: 300 CAPSULE ORAL at 08:05

## 2019-05-19 RX ADMIN — DIAZEPAM 2 MG: 2 TABLET ORAL at 03:05

## 2019-05-19 RX ADMIN — CIPROFLOXACIN HYDROCHLORIDE 500 MG: 500 TABLET, FILM COATED ORAL at 10:05

## 2019-05-19 RX ADMIN — OXYCODONE HYDROCHLORIDE AND ACETAMINOPHEN 1 TABLET: 10; 325 TABLET ORAL at 04:05

## 2019-05-19 RX ADMIN — CIPROFLOXACIN HYDROCHLORIDE 500 MG: 500 TABLET, FILM COATED ORAL at 08:05

## 2019-05-19 RX ADMIN — PANTOPRAZOLE SODIUM 40 MG: 40 TABLET, DELAYED RELEASE ORAL at 09:05

## 2019-05-19 RX ADMIN — BUSPIRONE HYDROCHLORIDE 5 MG: 5 TABLET ORAL at 09:05

## 2019-05-19 RX ADMIN — OXYCODONE HYDROCHLORIDE AND ACETAMINOPHEN 1 TABLET: 10; 325 TABLET ORAL at 08:05

## 2019-05-19 RX ADMIN — BUPROPION HYDROCHLORIDE 200 MG: 100 TABLET, EXTENDED RELEASE ORAL at 09:05

## 2019-05-19 RX ADMIN — ESCITALOPRAM OXALATE 20 MG: 20 TABLET, FILM COATED ORAL at 09:05

## 2019-05-19 RX ADMIN — ASPIRIN 81 MG: 81 TABLET, COATED ORAL at 09:05

## 2019-05-19 RX ADMIN — RAMELTEON 8 MG: 8 TABLET, FILM COATED ORAL at 08:05

## 2019-05-19 RX ADMIN — OXYCODONE HYDROCHLORIDE AND ACETAMINOPHEN 1 TABLET: 5; 325 TABLET ORAL at 12:05

## 2019-05-19 RX ADMIN — OXYCODONE HYDROCHLORIDE AND ACETAMINOPHEN 1 TABLET: 10; 325 TABLET ORAL at 03:05

## 2019-05-19 RX ADMIN — OXYCODONE HYDROCHLORIDE AND ACETAMINOPHEN 1 TABLET: 10; 325 TABLET ORAL at 10:05

## 2019-05-19 RX ADMIN — PREDNISONE 5 MG: 5 TABLET ORAL at 03:05

## 2019-05-19 RX ADMIN — LEVOTHYROXINE SODIUM 75 MCG: 75 TABLET ORAL at 05:05

## 2019-05-19 RX ADMIN — ATORVASTATIN CALCIUM 40 MG: 40 TABLET, FILM COATED ORAL at 09:05

## 2019-05-19 NOTE — ED NOTES
Patient c/o a headache 7 out of 10 pain and a 10 out of 10 left knee pain. Dr. Jeffery notified and will order PO Tylenol for patient.

## 2019-05-19 NOTE — ASSESSMENT & PLAN NOTE
Risk Factors: Age, HTN, hyperlipidemia, DM, Recent Knee surgery without chemical DVT prophy  --Neuro-checks Every 4 hours  --Telemetry Monitoring  --NPO until Speech Evaluation or passes VIANCA Swallow Screen  --PT/OT/ST Consults  --Transthoracic Echo with Bubble Study  --Labs: Lipid Panal, HgA1c, TSH,  --MRI/MRA of Brain and Neck  --Vascular Neurology Consult  --Permissive Hypertension

## 2019-05-19 NOTE — SUBJECTIVE & OBJECTIVE
Woke up with symptoms?: no    Recent bleeding noted: no  Does the patient take any Blood Thinners? no  Medications: Antiplatelets:  none      Past Medical History: OA, L knee surgery few days ago, HLD    Past Surgical History: any recent surgeries (within last month) L knee surgery    Family History: no relevant history    Social History: no smoking, no drinking, no drugs    Allergies: Adhesive  Latex, Natural Rubber  Sulfa (Sulfonamide Antibiotics)  Ibuprofen No known drug allergies    Review of Systems   Constitutional: Negative for diaphoresis and fever.   HENT: Negative for hearing loss, tinnitus and trouble swallowing.    Eyes: Negative for visual disturbance.   Respiratory: Negative for shortness of breath.    Cardiovascular: Negative for chest pain and palpitations.   Gastrointestinal: Negative for vomiting.   Endocrine: Negative for cold intolerance.   Genitourinary: Negative for hematuria.   Musculoskeletal: Negative for neck pain and neck stiffness.   Allergic/Immunologic: Negative for immunocompromised state.   Neurological: Positive for facial asymmetry, speech difficulty, weakness and headaches. Negative for dizziness and numbness.   Hematological: Does not bruise/bleed easily.   Psychiatric/Behavioral: Negative for agitation, behavioral problems and confusion.     Objective:   Vitals: Blood pressure 115/72, pulse 81, temperature 98.9 °F (37.2 °C), temperature source Oral, resp. rate 17, height 5' (1.524 m), weight 91 kg (200 lb 9.9 oz), SpO2 100 %. BP: 115/72, Respiratory Rate: 16 and Heart Rate: 82    CT READ: Yes  No hemmorhage. No mass effect. No early infarct signs.     Physical Exam   Constitutional: She is oriented to person, place, and time. She appears well-developed and well-nourished.   HENT:   Head: Normocephalic and atraumatic.   Eyes: Pupils are equal, round, and reactive to light. EOM are normal.   Neck: Normal range of motion.   Cardiovascular: Normal rate and regular rhythm.    Pulmonary/Chest: No respiratory distress.   Abdominal: She exhibits no mass.   Genitourinary:   Genitourinary Comments: No performed   Musculoskeletal: She exhibits no edema or deformity.   Neurological: She is alert and oriented to person, place, and time. A cranial nerve deficit is present. No sensory deficit. Coordination normal.   Skin: No rash noted. She is not diaphoretic. No erythema.   Psychiatric: She has a normal mood and affect. Her behavior is normal.   Nursing note and vitals reviewed.

## 2019-05-19 NOTE — CONSULTS
"NEUROLOGY FLOOR CONSULT    Reason for consult:  Slurred speech/TIA    Informant:  patient       Other sources of information : past medical records    CC:  "difficulty speaking/headache"    HPI:   Christine Castro is a 64 y.o. woman w/ hx of CAD, MI, DMII, colon cancer, obesity s/p gastric bypass, recent left knee replacement admitted to medicine service for TIA/stroke work up after she presented with difficulty speaking and headache, see HPI for complete details. Today she is back to near baseline with some residual subjective sensory changes, she denies prior CVA but does report similar episode previously. She was not taking any ASA prior to presentation. At the moment denies any headache or pain, still feels that her speech is not 100% back to normal, friend in the room feels that she sounds like herself. She denies tobacco use.     Patient admits to compliance most of the time    ROS: numbness, difficulty speaking, headache    Histories:     Allergies:  Adhesive; Latex, natural rubber; Sulfa (sulfonamide antibiotics); and Ibuprofen    Current Medications:    Current Facility-Administered Medications   Medication Dose Route Frequency Provider Last Rate Last Dose    albuterol-ipratropium 2.5 mg-0.5 mg/3 mL nebulizer solution 3 mL  3 mL Nebulization Q4H PRN Suma Ferrari NP        aspirin EC tablet 81 mg  81 mg Oral Daily Suma Ferrari NP   81 mg at 05/19/19 0916    atorvastatin tablet 40 mg  40 mg Oral Daily Suma Ferrari NP   40 mg at 05/19/19 0916    buPROPion TBSR 12 hr tablet 200 mg  200 mg Oral Daily Suma Ferrari NP   200 mg at 05/19/19 0915    busPIRone tablet 5 mg  5 mg Oral Daily Suma Ferrari NP   5 mg at 05/19/19 0916    ciprofloxacin HCl tablet 500 mg  500 mg Oral Q12H Betty Frausto NP   500 mg at 05/19/19 1051    enoxaparin injection 40 mg  40 mg Subcutaneous Daily Suma Ferrari NP        escitalopram oxalate tablet 20 mg  20 mg Oral Daily Suma BOWMAN" Vonda, NP   20 mg at 19 0916    gabapentin capsule 300 mg  300 mg Oral QHS Suma Ferrari NP        labetalol injection 10 mg  10 mg Intravenous Q4H PRN Suma Ferrari NP        levothyroxine tablet 75 mcg  75 mcg Oral Before breakfast Suma Ferrari NP   75 mcg at 19 0501    ondansetron disintegrating tablet 8 mg  8 mg Oral Q8H PRN Suma Ferrari NP        oxyCODONE-acetaminophen  mg per tablet 1 tablet  1 tablet Oral Q4H PRN Betty Frausto NP   1 tablet at 19 1051    pantoprazole EC tablet 40 mg  40 mg Oral Daily Suma Ferrari NP   40 mg at 19 0916    polyethylene glycol packet 17 g  17 g Oral BID PRN Suma Ferrari NP        ramelteon tablet 8 mg  8 mg Oral Nightly PRN Suma Ferrari NP        sodium chloride 0.9% flush 10 mL  10 mL Intravenous PRN Suma Ferrari NP           Past Medical/Surgical/Family History:  Medical:   Past Medical History:   Diagnosis Date    Arthritis     Asthma     CHF (congestive heart failure)     [facility]    Colon cancer     COPD (chronic obstructive pulmonary disease)     Coronary artery disease     Depression     Diabetes mellitus, type 2     GERD (gastroesophageal reflux disease)     Myocardial infarction     AGE 19 MIGUELANGEL WITHBABY DELIVERY    Thyroid disease       Surgeries:   Past Surgical History:   Procedure Laterality Date    ARTHROPLASTY, KNEE Left 2019    [facility]     SECTION      CHOLECYSTECTOMY      COLON SURGERY      COLONOSCOPY      --- repeat in 3-5 years    COLONOSCOPY N/A 2017    [facility] at [facility] ENDO    COLONOSCOPY golytely N/A 4/10/2018    [facility]    ECTOPIC PREGNANCY SURGERY      ESOPHAGOGASTRODUODENOSCOPY (EGD) N/A 2019    [facility]    ESOPHAGOGASTRODUODENOSCOPY (EGD) N/A 4/10/2018     [facility]    ESOPHAGOGASTRODUODENOSCOPY (EGD) N/A 4/18/2017    [facility] END[facility]    GASTRIC BYPASS      HYSTERECTOMY      OOPHORECTOMY      REMOVAL-PORT-A-CATH Left 5/30/2017    [facility]oval [facility] at [facility] OR    TONSILLECTOMY      TUBAL LIGATION        Family:   Family History   Problem Relation Age of Onset    Hyperlipidemia Mother     Hypertension Mother     Diabetes Mother     Stroke Mother     Hypertension Sister     Hypertension Brother     Diabetes Brother     Breast cancer Maternal Aunt     Breast cancer Maternal Aunt     Breast cancer Cousin      Social History:    Substance Abuse/Dependence History:  Lives alone, has caretaker  Denies tobacco abuse    Occupational/Employment History:  NA      Current Evaluation:     Vital Signs:   Vitals:    05/19/19 1153   BP:    Pulse: 63   Resp:    Temp:       Neurological Exam  Older woman, resting in bed, no distress, fully oriented, following requests  PERRL, EOMI, visual fields full, decreased sensation to light touch V1-V3, facial expression symmetric  5/5 motor strength with full effort  Sensation to light touch decreased left vs right arm/leg  Sensation to vibration intact at the toes   DTRs are brisk diffusely, down going toes  FTN WNL  No aphasia  No significant dysarthria appreciated  Normal affect    LABORATORY STUDIES:  Recent Results (from the past 24 hour(s))   POCT glucose    Collection Time: 05/18/19  9:49 PM   Result Value Ref Range    POC Glucose 99 70 - 110 MG/DL   CBC W/ AUTO DIFFERENTIAL    Collection Time: 05/18/19  9:53 PM   Result Value Ref Range    WBC 6.09 3.90 - 12.70 K/uL    RBC 3.07 (L) 4.00 - 5.40 M/uL    Hemoglobin 9.5 (L) 12.0 - 16.0 g/dL    Hematocrit 30.9 (L) 37.0 - 48.5 %    Mean Corpuscular Volume 101 (H) 82 - 98 fL    Mean Corpuscular Hemoglobin 30.9 27.0 - 31.0 pg    Mean Corpuscular Hemoglobin Conc 30.7 (L) 32.0 - 36.0 g/dL    RDW 13.3  11.5 - 14.5 %    Platelets 266 150 - 350 K/uL    MPV 8.8 (L) 9.2 - 12.9 fL    Gran # (ANC) 3.9 1.8 - 7.7 K/uL    Lymph # 1.5 1.0 - 4.8 K/uL    Mono # 0.4 0.3 - 1.0 K/uL    Eos # 0.2 0.0 - 0.5 K/uL    Baso # 0.02 0.00 - 0.20 K/uL    Gran% 63.6 38.0 - 73.0 %    Lymph% 25.1 18.0 - 48.0 %    Mono% 6.7 4.0 - 15.0 %    Eosinophil% 3.8 0.0 - 8.0 %    Basophil% 0.3 0.0 - 1.9 %    Differential Method Automated    Comprehensive metabolic panel    Collection Time: 05/18/19  9:53 PM   Result Value Ref Range    Sodium 141 136 - 145 mmol/L    Potassium 4.0 3.5 - 5.1 mmol/L    Chloride 110 95 - 110 mmol/L    CO2 26 23 - 29 mmol/L    Glucose 92 70 - 110 mg/dL    BUN, Bld 15 7 - 17 mg/dL    Creatinine 0.90 0.50 - 1.40 mg/dL    Calcium 8.7 8.7 - 10.5 mg/dL    Total Protein 5.9 (L) 6.0 - 8.4 g/dL    Albumin 3.0 (L) 3.5 - 5.2 g/dL    Total Bilirubin 0.1 0.1 - 1.0 mg/dL    Alkaline Phosphatase 68 38 - 126 U/L    AST 17 15 - 46 U/L    ALT 10 10 - 44 U/L    Anion Gap 5 (L) 8 - 16 mmol/L    eGFR if African American >60.0 >60 mL/min/1.73 m^2    eGFR if non African American >60.0 >60 mL/min/1.73 m^2   Protime-INR    Collection Time: 05/18/19  9:53 PM   Result Value Ref Range    Prothrombin Time 10.9 9.0 - 12.5 sec    INR 1.0 0.8 - 1.2   TSH    Collection Time: 05/18/19  9:53 PM   Result Value Ref Range    TSH 0.340 (L) 0.400 - 4.000 uIU/mL   Troponin I    Collection Time: 05/18/19  9:53 PM   Result Value Ref Range    Troponin I <0.012 0.012 - 0.034 ng/mL   T4, free    Collection Time: 05/18/19  9:53 PM   Result Value Ref Range    Free T4 0.82 0.71 - 1.51 ng/dL   Hemoglobin A1c    Collection Time: 05/19/19  5:50 AM   Result Value Ref Range    Hemoglobin A1C 5.2 4.0 - 5.6 %    Estimated Avg Glucose 103 68 - 131 mg/dL   Troponin I    Collection Time: 05/19/19  5:50 AM   Result Value Ref Range    Troponin I <0.006 0.000 - 0.026 ng/mL   CK-MB    Collection Time: 05/19/19  5:50 AM   Result Value Ref Range    CPK MB 0.7 0.1 - 6.5 ng/mL   APTT     Collection Time: 05/19/19  5:50 AM   Result Value Ref Range    aPTT 25.6 21.0 - 32.0 sec   Protime-INR    Collection Time: 05/19/19  5:50 AM   Result Value Ref Range    Prothrombin Time 9.5 9.0 - 12.5 sec    INR 0.9 0.8 - 1.2   Urinalysis, Reflex to Urine Culture Urine, Clean Catch    Collection Time: 05/19/19  9:08 AM   Result Value Ref Range    Specimen UA Urine, Clean Catch     Color, UA Yellow Yellow, Straw, Pascale    Appearance, UA Hazy (A) Clear    pH, UA 6.0 5.0 - 8.0    Specific Gravity, UA >=1.030 (A) 1.005 - 1.030    Protein, UA Negative Negative    Glucose, UA Negative Negative    Ketones, UA Negative Negative    Bilirubin (UA) Negative Negative    Occult Blood UA Negative Negative    Nitrite, UA Positive (A) Negative    Urobilinogen, UA 1.0 <2.0 EU/dL    Leukocytes, UA 1+ (A) Negative   Urinalysis Microscopic    Collection Time: 05/19/19  9:08 AM   Result Value Ref Range    RBC, UA 0 0 - 4 /hpf    WBC, UA 10 (H) 0 - 5 /hpf    Bacteria Many (A) None-Occ /hpf    Squam Epithel, UA 0 /hpf    Microscopic Comment SEE COMMENT        Thyroid normal  HgA1C%:  5.2  Vit B12: NA  Folate:  NA    RADIOLOGY STUDIES:  I have personally reviewed the images performed.     HEAD CT: normal result    BRAIN MRI not yet completed      A&P   65 y/o woman w/ hx of DMII, CAD, MI, obesity, colon cancer, left knee replacement, decreased mobility admitted for TIA work up. Nearly back to baseline, still with subjective deficits, headache resolved. Risk factors include HLD, CAD, sedentary lifestyle.    TIA vs lacunar infarct  - permissive HTN over, normotension, q 4 h neurochecks, BG<180  - continue ASA 81 mg + HIS; start plavix 75 mg for 21 days in addition if MRI brain w/ acute ischemia  - f/u MRI/MRA brain  - f/u echo  - A1c 5.2, follow up recent lipid panel, b12, folate, rpr  - avoid NSAIDs/triptans in the acute setting  - PT/OT/Speech therapy  - minimal use of sedating agents for pain  - continue treatment of any underlying  UTI/infection    We will follow with you while patient is admitted, please contact [facility] resident on call with any questions.     Patient discussed with Dr. Elam, staff MD.      Petr Oviedo MD  [facility]

## 2019-05-19 NOTE — ASSESSMENT & PLAN NOTE
Coronary artery disease involving native coronary artery of native heart  Risk Factors: Age, HTN, hyperlipidemia, DM, Recent Knee surgery without chemical DVT prophylaxis  --Neuro-checks Every 4 hours  --Telemetry Monitoring  --Passed Leoncio/Cardiac Diet  --PT/OT/ST Consults  --Transthoracic Echo with Bubble Study  --Labs: Lipid Panal, HgA1c, TSH, U/A, B-12, RPR, Folate  --MRI/MRA of Brain and Neck:Pending  --Neurology consult  --Permissive Hypertension for 48 hours  --Continue Aspirin and Atorvastatin

## 2019-05-19 NOTE — SUBJECTIVE & OBJECTIVE
Past Medical History:   Diagnosis Date    Arthritis     Asthma     Cancer     CHF (congestive heart failure)     [facility]    Colon cancer     COPD (chronic obstructive pulmonary disease)     Coronary artery disease     Depression     Diabetes mellitus, type 2     GERD (gastroesophageal reflux disease)     Myocardial infarction     AGE 19 MIGUELANGEL WITHBABY DELIVERY    Thyroid disease        Past Surgical History:   Procedure Laterality Date    ARTHROPLASTY, KNEE Left 2019    [facility]     SECTION      CHOLECYSTECTOMY      COLON SURGERY      COLONOSCOPY      --- repeat in 3-5 years    COLONOSCOPY N/A 2017    [facility] ENDO    COLONOSCOPY golytely N/A 4/10/2018    [facility] ENDO    ECTOPIC PREGNANCY SURGERY      ESOPHAGOGASTRODUODENOSCOPY (EGD) N/A 2019    [facility] END[facility]    ESOPHAGOGASTRODUODENOSCOPY (EGD) N/A 4/10/2018    [facility] END[facility]    ESOPHAGOGASTRODUODENOSCOPY (EGD) N/A 2017    [facility] at [facility] ENDO    GASTRIC BYPASS      HYSTERECTOMY      ovary removed      REMOVAL-PORT-A-CATH Left 2017    [facility] by [facility]oval [facility] at [facility] OR    TONSILLECTOMY      TUBAL LIGATION         Review of patient's allergies indicates:   Allergen Reactions    Adhesive      Adhesive tape    Latex, natural rubber      Adhesive from latex tape    Sulfa (sulfonamide antibiotics)      unknown    Ibuprofen Other (See Comments)     Causes asthma flare??       No current facility-administered medications on file prior to encounter.      Current Outpatient Medications on File Prior to Encounter   Medication Sig    acetaminophen (TYLENOL) 500 MG tablet Take 500 mg by mouth every 6 (six) hours as needed for Pain.    albuterol (VENTOLIN HFA) 90 mcg/actuation inhaler INHALE 2 PUFFS  INTO THE LUNGS EVERY 4 HOURS AS NEEDED FOR WHEEZING    atorvastatin (LIPITOR) 10 MG tablet Take 1 tablet (10 mg total) by mouth every other day.    budesonide (PULMICORT FLEXHALER) 90 mcg/actuation AePB Inhale 2 puffs (180 mcg total) into the lungs 2 (two) times daily. Controller    buPROPion (WELLBUTRIN SR) 200 MG SR12 Take 1 tablet (200 mg total) by mouth once daily.    busPIRone (BUSPAR) 10 MG tablet TAKE 1/2 TABLET BY MOUTH ONCE DAILY    diclofenac sodium (VOLTAREN) 1 % Gel APPLY 2 GRAMS EXTERNALLY TO THE AFFECTED AREA FOUR TIMES DAILY    diphenhydrAMINE (BENADRYL) 25 mg capsule Take 25 mg by mouth every 6 (six) hours as needed for Itching.    escitalopram oxalate (LEXAPRO) 20 MG tablet Take 1 tablet (20 mg total) by mouth once daily.    furosemide (LASIX) 20 MG tablet Take 1 tablet (20 mg total) by mouth daily as needed.    gabapentin (NEURONTIN) 300 MG capsule Take 1 capsule (300 mg total) by mouth 3 (three) times daily.    levothyroxine (SYNTHROID) 75 MCG tablet Take 75 mcg by mouth once daily.    omeprazole (PRILOSEC) 40 MG capsule TAKE 1 CAPSULE(40 MG) BY MOUTH EVERY MORNING    ondansetron (ZOFRAN-ODT) 4 MG TbDL Take 1 tablet (4 mg total) by mouth every 6 (six) hours as needed.    oxyCODONE-acetaminophen (PERCOCET)  mg per tablet Take 1 tablet by mouth every 6 (six) hours as needed.    potassium chloride (KLOR-CON) 10 MEQ TbSR TAKE 2 TABLETS(20 MEQ) BY MOUTH EVERY DAY (Patient taking differently: TAKE 2 TABLETS(20 MEQ) BY MOUTH EVERY DAY)    predniSONE (DELTASONE) 5 MG tablet TAKE 1 TABLET BY MOUTH DAILY    promethazine (PHENERGAN) 25 MG tablet TAKE 1 TABLET(25 MG) BY MOUTH EVERY 6 HOURS AS NEEDED    zolpidem (AMBIEN) 5 MG Tab Take 1 tablet (5 mg total) by mouth nightly.     Family History     Problem Relation (Age of Onset)    Breast cancer Maternal Aunt, Maternal Aunt, Cousin    Diabetes Mother, Brother    Hyperlipidemia Mother    Hypertension Mother, Sister, Brother    Stroke  Mother        Tobacco Use    Smoking status: Never Smoker    Smokeless tobacco: Never Used   Substance and Sexual Activity    Alcohol use: Yes     Comment: very rare    Drug use: No    Sexual activity: Never     Review of Systems   Constitutional: Negative for chills, diaphoresis and fatigue.   HENT: Negative for congestion, rhinorrhea and trouble swallowing.    Eyes: Negative for visual disturbance.   Respiratory: Negative for cough, chest tightness, shortness of breath and wheezing.    Cardiovascular: Negative for chest pain, palpitations and leg swelling.   Gastrointestinal: Negative for abdominal distention, constipation, diarrhea and nausea.   Endocrine: Negative for polyphagia and polyuria.   Genitourinary: Negative for difficulty urinating, dysuria and hematuria.   Musculoskeletal: Positive for arthralgias and gait problem (Recent Knee surgery. Uses Walker). Negative for myalgias.   Skin: Negative for color change and rash.   Neurological: Positive for speech difficulty, weakness (Left Sided) and headaches (Left sided). Negative for dizziness and numbness.   Hematological: Negative for adenopathy.   Psychiatric/Behavioral: Negative for confusion, hallucinations and suicidal ideas. The patient is not nervous/anxious.      Objective:     Vital Signs (Most Recent):  Temp: 97.1 °F (36.2 °C) (05/19/19 0447)  Pulse: 76 (05/19/19 0447)  Resp: 18 (05/19/19 0447)  BP: 99/62 (05/19/19 0447)  SpO2: 98 % (05/19/19 0033) Vital Signs (24h Range):  Temp:  [97.1 °F (36.2 °C)-99.2 °F (37.3 °C)] 97.1 °F (36.2 °C)  Pulse:  [76-82] 76  Resp:  [17-23] 18  SpO2:  [97 %-100 %] 98 %  BP: ()/(58-78) 99/62     Weight: 85.2 kg (187 lb 13.3 oz)  Body mass index is 36.68 kg/m².    Physical Exam   Constitutional: She is oriented to person, place, and time. She appears well-developed and well-nourished.   HENT:   Head: Normocephalic.   Mouth/Throat: Oropharynx is clear and moist.   Eyes: Conjunctivae are normal.   Neck: Neck  supple. No tracheal deviation present.   Cardiovascular: Normal rate, regular rhythm, normal heart sounds and intact distal pulses.   Pulmonary/Chest: Effort normal and breath sounds normal. No respiratory distress. She has no wheezes.   Abdominal: Soft. Bowel sounds are normal. She exhibits no distension. There is no tenderness.   Musculoskeletal: Normal range of motion. She exhibits tenderness (Both knees, and Left Shoulder). She exhibits no edema.   Left Knee Dressing clean, dry, and intact     Neurological: She is alert and oriented to person, place, and time.   Mild Dysarthria. No evidence of focal deficit.    Skin: Skin is warm and dry.   Psychiatric: She has a normal mood and affect. Her behavior is normal.   Nursing note and vitals reviewed.          Significant Labs:   A1C:   Recent Labs   Lab 01/09/19  1113   HGBA1C 5.0     CBC:   Recent Labs   Lab 05/18/19 2153   WBC 6.09   HGB 9.5*   HCT 30.9*        CMP:   Recent Labs   Lab 05/18/19 2153      K 4.0      CO2 26   GLU 92   BUN 15   CREATININE 0.90   CALCIUM 8.7   PROT 5.9*   ALBUMIN 3.0*   BILITOT 0.1   ALKPHOS 68   AST 17   ALT 10   ANIONGAP 5*   EGFRNONAA >60.0       Coagulation:   Recent Labs   Lab 05/18/19 2153   INR 1.0     Troponin:   Recent Labs   Lab 05/18/19 2153   TROPONINI <0.012     TSH:   Recent Labs   Lab 05/18/19 2153   TSH 0.340*       All pertinent labs within the past 24 hours have been reviewed.    Significant Imaging: I have reviewed all pertinent imaging results/findings within the past 24 hours.   Imaging Results          X-Ray Chest AP Portable (Final result)  Result time 05/18/19 22:13:30    Final result by Vu Zuniga MD (05/18/19 22:13:30)                 Impression:      No acute findings.    All CT scans at this facility use dose modulation, iterative reconstruction, and/or weight based dosing when appropriate to reduce radiation dose to as low as reasonably achievable.      Electronically signed  by: Vu Zuniga MD  Date:    05/18/2019  Time:    22:13             Narrative:    EXAMINATION:  XR CHEST AP PORTABLE    CLINICAL HISTORY:  Stroke;    COMPARISON:  08/30/2018    FINDINGS:  Lung fields are clear.    No pleural fluid or pneumothorax.  Aortic atherosclerosis.    No adenopathy is identified.    No significant osseous abnormality.                               CT Head Without Contrast (Final result)  Result time 05/18/19 21:48:03    Final result by Vu Zuniga MD (05/18/19 21:48:03)                 Impression:      No acute findings.  Findings called to Dr. Jeffery at 21:47 hours.    All CT scans at this facility are performed  using dose modulation techniques as appropriate to performed exam including the following:  automated exposure control; adjustment of mA and/or kV according to the patients size (this includes techniques or standardized protocols for targeted exams where dose is matched to indication/reason for exam: i.e. extremities or head);  iterative reconstruction technique.      Electronically signed by: Vu Zuniga MD  Date:    05/18/2019  Time:    21:48             Narrative:    EXAMINATION:  CT HEAD WITHOUT CONTRAST    CLINICAL HISTORY:  Stroke;    FINDINGS:  No intracranial hemorrhage or acute findings are demonstrated. The visualized paranasal sinuses are clear. The calvarium is intact.

## 2019-05-19 NOTE — SUBJECTIVE & OBJECTIVE
Interval History: Complaining of pain to left knee 2/2 knee surgery, notified nurse to administer medication.    Review of Systems   Constitutional: Negative for chills, fatigue and fever.   HENT: Negative for congestion, postnasal drip and rhinorrhea.    Eyes: Negative for visual disturbance.   Respiratory: Negative for chest tightness and shortness of breath.    Cardiovascular: Negative for chest pain, palpitations and leg swelling.   Gastrointestinal: Negative for abdominal distention and abdominal pain.   Genitourinary: Negative for difficulty urinating.   Musculoskeletal: Positive for arthralgias and myalgias.   Skin: Negative for color change.   Neurological: Positive for weakness (left upper extremity weakness). Negative for syncope, light-headedness and headaches.   Hematological: Does not bruise/bleed easily.   Psychiatric/Behavioral: Negative for agitation.     Objective:     Vital Signs (Most Recent):  Temp: 97.8 °F (36.6 °C) (05/19/19 1127)  Pulse: 63 (05/19/19 1153)  Resp: 18 (05/19/19 1127)  BP: (!) 106/54 (05/19/19 1127)  SpO2: 96 % (05/19/19 1224) Vital Signs (24h Range):  Temp:  [97.1 °F (36.2 °C)-99.2 °F (37.3 °C)] 97.8 °F (36.6 °C)  Pulse:  [63-82] 63  Resp:  [17-23] 18  SpO2:  [95 %-100 %] 96 %  BP: ()/(54-78) 106/54     Weight: 85.2 kg (187 lb 13.3 oz)  Body mass index is 36.68 kg/m².    Intake/Output Summary (Last 24 hours) at 5/19/2019 1318  Last data filed at 5/19/2019 1247  Gross per 24 hour   Intake 125 ml   Output 425 ml   Net -300 ml      Physical Exam    Significant Labs:   A1C:   Recent Labs   Lab 01/09/19  1113 05/19/19  0550   HGBA1C 5.0 5.2     BMP:   Recent Labs   Lab 05/18/19 2153   GLU 92      K 4.0      CO2 26   BUN 15   CREATININE 0.90   CALCIUM 8.7     CBC:   Recent Labs   Lab 05/18/19 2153   WBC 6.09   HGB 9.5*   HCT 30.9*        CMP:   Recent Labs   Lab 05/18/19  2153      K 4.0      CO2 26   GLU 92   BUN 15   CREATININE 0.90   CALCIUM  8.7   PROT 5.9*   ALBUMIN 3.0*   BILITOT 0.1   ALKPHOS 68   AST 17   ALT 10   ANIONGAP 5*   EGFRNONAA >60.0     Lipid Panel: No results for input(s): CHOL, HDL, LDLCALC, TRIG, CHOLHDL in the last 48 hours.  Magnesium: No results for input(s): MG in the last 48 hours.  Troponin:   Recent Labs   Lab 05/18/19 2153 05/19/19  0550   TROPONINI <0.012 <0.006     TSH:   Recent Labs   Lab 05/18/19 2153   TSH 0.340*     Urine Studies:   Recent Labs   Lab 05/19/19  0908   COLORU Yellow   APPEARANCEUA Hazy*   PHUR 6.0   SPECGRAV >=1.030*   PROTEINUA Negative   GLUCUA Negative   KETONESU Negative   BILIRUBINUA Negative   OCCULTUA Negative   NITRITE Positive*   UROBILINOGEN 1.0   LEUKOCYTESUR 1+*   RBCUA 0   WBCUA 10*   BACTERIA Many*   SQUAMEPITHEL 0       Significant Imaging: I have reviewed all pertinent imaging results/findings within the past 24 hours.

## 2019-05-19 NOTE — ASSESSMENT & PLAN NOTE
Primary osteoarthritis  S/P left knee arthroplasty on 5/9/19, Stables intact, no signs of infection.  Surgery done by Dr. Greenwood.    Change dressing daily  Pain Control  PT/OT  Patient has follow up appointment with Dr. Bradshaw this coming Thursday.  If patient still hospitalized will consult.

## 2019-05-19 NOTE — PLAN OF CARE
Problem: Adult Inpatient Plan of Care  Goal: Plan of Care Review  Outcome: Ongoing (interventions implemented as appropriate)  Pt received on RA.  SPO2  95%.  Pt in no apparent respiratory distress.  Will continue to monitor.

## 2019-05-19 NOTE — CONSULTS
[facility]  Vascular Neurology  Comprehensive Stroke Center  Tele-Consultation Note      Consults    Consulting Provider: SHYLA OSBORN  Current Providers  No providers found    Patient Location:  [facility] EMERGENCY DEPARTMENT Emergency Department  Spoke hospital nurse at bedside with patient assisting consultant.     Patient information was obtained from patient.         Assessment/Plan:   65 y/o with PMH significant for OA, recent L knee surgery, HLD, presents with complains of slurred speech and L sided weakness with L face droop that was noted around 1 pm today.  NIHSS 2, inconsistent exam. CTH without acute abnormality.  Unsure if she had sustained a small acute subcortical L brain infarct, but she is not a candidate for iv alteplase due to late presentation and recent L knee surgery.   Recommend MRI brain to better elucidate her syndrome.  ASA, atorvastatin. Neurology consult in am.        STROKE DOCUMENTATION     Acute Stroke Times:   Acute Stroke Times   Last Known Normal Date: 05/18/19  Last Known Normal Time: 1300  Symptom Onset Date: 05/18/19  Symptom Onset Time: 1300  Stroke Team Called Date: 05/18/19  Stroke Team Called Time: 2141  Stroke Team Arrival Date: 05/18/19  Stroke Team Arrival Time: 2147  CT Interpretation Time: 2147  Decision to Treat Time for Alteplase: (No iv alteplase)  Decision to Treat Time for IR: (No IR candidate)    NIH Scale:  Interval: baseline  1a. Level of Consciousness: 0-->Alert, keenly responsive  1b. LOC Questions: 0-->Answers both questions correctly  1c. LOC Commands: 0-->Performs both tasks correctly  2. Best Gaze: 0-->Normal  3. Visual: 0-->No visual loss  4. Facial Palsy: 0-->Normal symmetrical movements  5a. Motor Arm, Left: 0-->No drift, limb holds 90 (or 45) degrees for full 10 secs  5b. Motor Arm, Right: 0-->No drift, limb holds 90 (or 45) degrees for full 10 secs  6a. Motor Leg, Left: 0-->No drift, leg holds 30 degree position for  full 5 secs  6b. Motor Leg, Right: 1-->Drift, leg falls by the end of the 5-sec period but does not hit bed  7. Limb Ataxia: 0-->Absent  8. Sensory: 0-->Normal, no sensory loss  9. Best Language: 0-->No aphasia, normal  10. Dysarthria: 1-->Mild-to-moderate dysarthria, patient slurs at least some words and, at worst, can be understood with some difficulty  11. Extinction and Inattention (formerly Neglect): 0-->No abnormality  Total (NIH Stroke Scale): 2     Modified Talisheek Score: 0  Moriah Coma Scale:15   ABCD2 Score:    [id number] Score:   HAS -BLED Score:   ICH Score:   Hunt & Chaidez Classification:       Diagnoses: L sided weakness. L face droop. Dysarthria.   No new Assessment & Plan notes have been filed under this hospital service since the last note was generated.  Service: Vascular Neurology      Blood pressure 115/72, pulse 81, temperature 98.9 °F (37.2 °C), temperature source Oral, resp. rate 17, height 5' (1.524 m), weight 91 kg (200 lb 9.9 oz), SpO2 100 %.  Alteplase Eligible?: No  Alteplase Recommendation: Alteplase not recommended due to Outside of treatment window   Possible Interventional Revascularization Candidate? No; No significant neurological deficit    Disposition Recommendation: admit to inpatient    Subjective:     History of Present Illness: 65 y/o with PMH significant for OA, recent L knee surgery, HLD, presents with complains of slurred speech and L sided weakness with L face droop that was noted around 1 pm today. Never had similar symptoms before.  Improving.            No notes on file      Woke up with symptoms?: no    Recent bleeding noted: no  Does the patient take any Blood Thinners? no  Medications: Antiplatelets:  none      Past Medical History: OA, L knee surgery few days ago, HLD    Past Surgical History: any recent surgeries (within last month) L knee surgery    Family History: no relevant history    Social History: no smoking, no drinking, no drugs    Allergies:  Adhesive  Latex, Natural Rubber  Sulfa (Sulfonamide Antibiotics)  Ibuprofen No known drug allergies    Review of Systems   Constitutional: Negative for diaphoresis and fever.   HENT: Negative for hearing loss, tinnitus and trouble swallowing.    Eyes: Negative for visual disturbance.   Respiratory: Negative for shortness of breath.    Cardiovascular: Negative for chest pain and palpitations.   Gastrointestinal: Negative for vomiting.   Endocrine: Negative for cold intolerance.   Genitourinary: Negative for hematuria.   Musculoskeletal: Negative for neck pain and neck stiffness.   Allergic/Immunologic: Negative for immunocompromised state.   Neurological: Positive for facial asymmetry, speech difficulty, weakness and headaches. Negative for dizziness and numbness.   Hematological: Does not bruise/bleed easily.   Psychiatric/Behavioral: Negative for agitation, behavioral problems and confusion.     Objective:   Vitals: Blood pressure 115/72, pulse 81, temperature 98.9 °F (37.2 °C), temperature source Oral, resp. rate 17, height 5' (1.524 m), weight 91 kg (200 lb 9.9 oz), SpO2 100 %. BP: 115/72, Respiratory Rate: 16 and Heart Rate: 82    CT READ: Yes  No hemmorhage. No mass effect. No early infarct signs.     Physical Exam   Constitutional: She is oriented to person, place, and time. She appears well-developed and well-nourished.   HENT:   Head: Normocephalic and atraumatic.   Eyes: Pupils are equal, round, and reactive to light. EOM are normal.   Neck: Normal range of motion.   Cardiovascular: Normal rate and regular rhythm.   Pulmonary/Chest: No respiratory distress.   Abdominal: She exhibits no mass.   Genitourinary:   Genitourinary Comments: No performed   Musculoskeletal: She exhibits no edema or deformity.   Neurological: She is alert and oriented to person, place, and time. A cranial nerve deficit is present. No sensory deficit. Coordination normal.   Skin: No rash noted. She is not diaphoretic. No erythema.    Psychiatric: She has a normal mood and affect. Her behavior is normal.   Nursing note and vitals reviewed.            Recommended the emergency room physician to have a brief discussion with the patient and/or family if available regarding the risks and benefits of treatment, and to briefly document the occurrence of that discussion in his clinical encounter note.     The attending portion of this evaluation, treatment, and documentation was performed per Jose Juan Saeed MD via audiovisual.    Billing code:  (non-intervention mild to moderate stroke, TIA, some mimics)    · This patient has a critical neurological condition/illness, with some potential for high morbidity and mortality.  · There is a moderate probability for acute neurological change leading to clinical and possibly life-threatening deterioration requiring highest level of physician preparedness for urgent intervention.  · Care was coordinated with other physicians involved in the patient's care.  · Radiologic studies and laboratory data were reviewed and interpreted, and plan of care was re-assessed based on the results.  · Diagnosis, treatment options and prognosis may have been discussed with the patient and/or family members or caregiver.      In your opinion, this was a: Tier 2 Van Negative    Consult End Time: 10 pm    Jose Juan Saeed MD  Comprehensive Stroke Center  Vascular Neurology   [facility]

## 2019-05-19 NOTE — PT/OT/SLP EVAL
Speech Language Pathology Evaluation  Bedside Swallow    Patient Name:  Christine Castro   MRN:  [id number]  Admitting Diagnosis: TIA (transient ischemic attack)    Recommendations:                 General Recommendations:  Speech/language therapy  Diet recommendations:  Regular, Thin   Aspiration Precautions: Standard aspiration precautions   General Precautions: Standard, fall  Communication strategies:  none    History:     HPI: Christine Castro is a 64 year old female with a PMHx of NIDDM2, CHF (EF 55%), CAD, MI, Colon Cancer (s/p Colectomy), hypothyroid, Asthma, Arthritis, and Morbid Obesity (s/p Gastric Bypass).  She had a Left TKA with Dr. Greenwood on 5/8/2019 and was discharged home with Home Health.  She was not on chemical DVT prophylaxis.  Her PCP is Dr. Alon Beckman.      She presented to [facility] ED via EMS with left sided facial droop, slurred speech and aphasia and left sided weakness. Per EMS, s that patient's caregiver left the house at 1300 today and when she returned at 2015. Upon returning she gave patient 50mg of benadryl because patient was c/o a headache. Caregiver in room states that when she went to give patient a drink of water some of the water started draining from patient's mouth and she noticed a left sided facial droop. The patient is also complaining of left sided headache. Her symptoms improves, but may have the slightest dyrathria.  She had a negative head CT.  Her Chest X-Ray and Lab work up was unremarkable.  Tele-Stroke consult was done with Dr. Saeed who is concerned for possible  a small acute subcortical L brain infarct, but she is not a candidate for iv alteplase due to late presentation and recent L knee surgery.  She is admitted to [facility] Medicine for complete Stroke workup.     Past Medical History:   Diagnosis Date    Arthritis     Asthma     CHF (congestive heart failure)     [facility]    Colon cancer     COPD (chronic obstructive  "pulmonary disease)     Coronary artery disease     Depression     Diabetes mellitus, type 2     GERD (gastroesophageal reflux disease)     Myocardial infarction     AGE 19 MIGUELANGEL WITHBABY DELIVERY    Thyroid disease      Past Surgical History:   Procedure Laterality Date    ARTHROPLASTY, KNEE Left 2019    [facility]     SECTION      CHOLECYSTECTOMY      COLON SURGERY      COLONOSCOPY      --- repeat in 3-5 years    COLONOSCOPY N/A 2017    Performed by [facility]rina Flores [facility] at [facility] ENDO    COLONOSCOPY golytely N/A 4/10/2018    [facility]formed by [facility]rina [facility] at [facility] ENDO    ECTOPIC PREGNANCY SURGERY      ESOPHAGOGASTRODUODENOSCOPY (EGD) N/A 2019    [facility]formed by [facility] at [facility] ENDO    ESOPHAGOGASTRODUODENOSCOPY (EGD) N/A 4/10/2018    Performed by [facility] at [facility] ENDO    ESOPHAGOGASTRODUODENOSCOPY (EGD) N/A 2017    Performed by [facility] at [facility] ENDO    GASTRIC BYPASS      HYSTERECTOMY      OOPHORECTOMY      REMOVAL-PORT-A-CATH Left 2017    Performed by Mike Sandoval MD at [facility] OR    TONSILLECTOMY      TUBAL LIGATION       Chest X-Rays: Completed 19: "No acute findings."    Prior diet: regular solids/thin liquids.    Subjective     Pt awake, alert, oriented x4. Pt stated, "This place is a jailhouse." ST provided education re: stroke protocol and safety precautions being implemented. No swallowing difficulties reported with current regular solids/thin liquids PO diet. Pt's family at bedside confirms pt is at baseline for language, cognition, and swallowing. Pt and pt's family reports mild slurred speech is new.    Pain/Comfort:  · Pain Rating 1: 0/10    Objective:     Oral Musculature Evaluation  · Oral Musculature: WFL  · Dentition: upper and lower dentures, rarely or never uses dentures to eat  · Secretion Management: adequate  · Mucosal Quality: adequate  · Mandibular Strength " and Mobility: WFL  · Oral Labial Strength and Mobility: WFL  · Lingual Strength and Mobility: WFL  · Buccal Strength and Mobility: WFL  · Volitional Cough: WFL  · Volitional Swallow: WFL  · Voice Prior to PO Intake: WFL  · Oral Musculature Comments: Mild dysarthria c/b imprecise articulation of phonemes; speech intelligibility noted to be WFL    Bedside Swallow Eval:   Consistencies Assessed:  · Thin liquids, puree, regular solids     Oral Phase:   · WFL  · Prolonged mastication    Pharyngeal Phase:   · no overt clinical signs/symptoms of aspiration  · no overt clinical signs/symptoms of pharyngeal dysphagia    Treatment: SLP provided skilled education to pt re: rationale for BSE, role of SLP in POC, swallow study results, diet recs, and swallow precautions. Pt verbalized understanding and agreement of all information provided. Discussed swallow study results/recommendations with pt's RN.    Assessment:     Christine Castro is a 64 y.o. female is at baseline for cognition-language and swallowing. Pt is safe for continuation of regular solids/thin liquids PO diet. Pt does present with mild dysarthria at this time. ST will f/u.    Goals:   Multidisciplinary Problems     SLP Goals        Problem: SLP Goal    Goal Priority Disciplines Outcome   SLP Goal     SLP Ongoing (interventions implemented as appropriate)   Description:  SLP Short Term Goals:  1. Pt will repeat multisyllabic words utilizing speech strategies with 90% accuracy.  2. Pt will complete oral motor exercises x10 each to improve oral motor coordination.  3. Pt will complete speech drills with SLP with min cues to improve overall speech production.                     Plan:     · Patient to be seen:  2 x/week   · Plan of Care expires:  06/17/19  · Plan of Care reviewed with:  patient, family, other (see comments)(RN)   · SLP Follow-Up:  Yes       Discharge recommendations:  home health OT, home health speech therapy, home health PT     Time Tracking:      SLP Treatment Date:   05/19/19  Speech Start Time:  1443  Speech Stop Time:  1500     Speech Total Time (min):  17 min    Billable Minutes: Eval Swallow and Oral Function 17 minutes    Nilam Perez CCC-SLP  05/19/2019

## 2019-05-19 NOTE — PROGRESS NOTES
[facility] Medicine  Progress Note    Patient Name: Christine Castro  MRN: [id number]  Patient Class: OP- Observation   Admission Date: 5/18/2019  Length of Stay: 1 days  Attending Physician: Andrew Aguilar MD  Primary Care Provider: Alon Santiago MD        Subjective:     Principal Problem:TIA (transient ischemic attack)    HPI:  Christine Castro is a 64 year old female with a PMHx of NIDDM2, CHF (EF 55%), CAD, MI, Colon Cancer (s/p Colectomy), hypothyroid, Asthma, Arthritis, and Morbid Obesity (s/p Gastric Bypass).  She had a Left TKA with Dr. Greenwood on 5/8/2019 and was discharged home with Home Health.  She was not on chemical DVT prophylaxis.  Her PCP is Dr. Alon Beckman.     She presented to [facility] ED via EMS with left sided facial droop, slurred speech and aphasia and left sided weakness. Per EMS, s that patient's caregiver left the house at 1300 today and when she returned at 2015. Upon returning she gave patient 50mg of benadryl because patient was c/o a headache. Caregiver in room states that when she went to give patient a drink of water some of the water started draining from patient's mouth and she noticed a left sided facial droop. The patient is also complaining of left sided headache. Her symptoms improves, but may have the slightest dyrathria.  She had a negative head CT.  Her Chest X-Ray and Lab work up was unremarkable.  Tele-Stroke consult was done with Dr. Saeed who is concerned for possible  a small acute subcortical L brain infarct, but she is not a candidate for iv alteplase due to late presentation and recent L knee surgery.  She is admitted to [facility] Medicine for complete Stroke workup.     Hospital Course:  4/19/19 Pending MRI/MRA, neurology and speech consult.    Interval History: Complaining of pain to left knee 2/2 knee surgery, notified nurse to administer medication.    Review of Systems   Constitutional: Negative for chills,  fatigue and fever.   HENT: Negative for congestion, postnasal drip and rhinorrhea.    Eyes: Negative for visual disturbance.   Respiratory: Negative for chest tightness and shortness of breath.    Cardiovascular: Negative for chest pain, palpitations and leg swelling.   Gastrointestinal: Negative for abdominal distention and abdominal pain.   Genitourinary: Negative for difficulty urinating.   Musculoskeletal: Positive for arthralgias and myalgias.   Skin: Negative for color change.   Neurological: Positive for weakness (left upper extremity weakness). Negative for syncope, light-headedness and headaches.   Hematological: Does not bruise/bleed easily.   Psychiatric/Behavioral: Negative for agitation.     Objective:     Vital Signs (Most Recent):  Temp: 97.8 °F (36.6 °C) (05/19/19 1127)  Pulse: 63 (05/19/19 1153)  Resp: 18 (05/19/19 1127)  BP: (!) 106/54 (05/19/19 1127)  SpO2: 96 % (05/19/19 1224) Vital Signs (24h Range):  Temp:  [97.1 °F (36.2 °C)-99.2 °F (37.3 °C)] 97.8 °F (36.6 °C)  Pulse:  [63-82] 63  Resp:  [17-23] 18  SpO2:  [95 %-100 %] 96 %  BP: ()/(54-78) 106/54     Weight: 85.2 kg (187 lb 13.3 oz)  Body mass index is 36.68 kg/m².    Intake/Output Summary (Last 24 hours) at 5/19/2019 1318  Last data filed at 5/19/2019 1247  Gross per 24 hour   Intake 125 ml   Output 425 ml   Net -300 ml      Physical Exam    Significant Labs:   A1C:   Recent Labs   Lab 01/09/19  1113 05/19/19  0550   HGBA1C 5.0 5.2     BMP:   Recent Labs   Lab 05/18/19 2153   GLU 92      K 4.0      CO2 26   BUN 15   CREATININE 0.90   CALCIUM 8.7     CBC:   Recent Labs   Lab 05/18/19 2153   WBC 6.09   HGB 9.5*   HCT 30.9*        CMP:   Recent Labs   Lab 05/18/19 2153      K 4.0      CO2 26   GLU 92   BUN 15   CREATININE 0.90   CALCIUM 8.7   PROT 5.9*   ALBUMIN 3.0*   BILITOT 0.1   ALKPHOS 68   AST 17   ALT 10   ANIONGAP 5*   EGFRNONAA >60.0     Lipid Panel: No results for input(s): CHOL, HDL, LDLCALC,  TRIG, CHOLHDL in the last 48 hours.  Magnesium: No results for input(s): MG in the last 48 hours.  Troponin:   Recent Labs   Lab 05/18/19 2153 05/19/19  0550   TROPONINI <0.012 <0.006     TSH:   Recent Labs   Lab 05/18/19 2153   TSH 0.340*     Urine Studies:   Recent Labs   Lab 05/19/19  0908   COLORU Yellow   APPEARANCEUA Hazy*   PHUR 6.0   SPECGRAV >=1.030*   PROTEINUA Negative   GLUCUA Negative   KETONESU Negative   BILIRUBINUA Negative   OCCULTUA Negative   NITRITE Positive*   UROBILINOGEN 1.0   LEUKOCYTESUR 1+*   RBCUA 0   WBCUA 10*   BACTERIA Many*   SQUAMEPITHEL 0       Significant Imaging: I have reviewed all pertinent imaging results/findings within the past 24 hours.    Assessment/Plan:      * TIA (transient ischemic attack)  Coronary artery disease involving native coronary artery of native heart  Risk Factors: Age, HTN, hyperlipidemia, DM, Recent Knee surgery without chemical DVT prophylaxis  --Neuro-checks Every 4 hours  --Telemetry Monitoring  --Passed Leoncio/Cardiac Diet  --PT/OT/ST Consults  --Transthoracic Echo with Bubble Study  --Labs: Lipid Panal, HgA1c, TSH, U/A, B-12, RPR, Folate  --MRI/MRA of Brain and Neck:Pending  --Neurology consult  --Permissive Hypertension for 48 hours  --Continue Aspirin and Atorvastatin            History of left knee replacement  Primary osteoarthritis  S/P left knee arthroplasty on 5/9/19, Stables intact, no signs of infection.  Surgery done by Dr. Greenwood.    Change dressing daily  Pain Control  PT/OT  Patient has follow up appointment with Dr. Bradshaw this coming Thursday.  If patient still hospitalized will consult.        Normocytic anemia  Hgb 9.5  Stable. Monitor      History of colon cancer  History of gastric bypass  Noted      Depression  Mood Stable  --Continue home Lexapro, Wellbutrin and Buspar     History of congestive heart failure  Stable  No signs of decompensation.  Denies Chest Pain or Shortness of Breath.      Acquired hypothyroidism  TSH 0.340.    Continue home levothyroxine      Primary osteoarthritis          VTE Risk Mitigation (From admission, onward)        Ordered     enoxaparin injection 40 mg  Daily      05/19/19 0223     IP VTE HIGH RISK PATIENT  Once      05/19/19 0223     Place sequential compression device  Until discontinued      05/19/19 0223              Betty Frausto NP  Department of Hospital Medicine   [facility]

## 2019-05-19 NOTE — PLAN OF CARE
Problem: SLP Goal  Goal: SLP Goal  SLP Short Term Goals:  1. Pt will repeat multisyllabic words utilizing speech strategies with 90% accuracy.  2. Pt will complete oral motor exercises x10 each to improve oral motor coordination.  3. Pt will complete speech drills with SLP with min cues to improve overall speech production.   Outcome: Ongoing (interventions implemented as appropriate)  5/19/2019: Swallow study completed this PM. Pt is at baseline for cognition-language and swallowing. Pt is safe for continuation of regular solids/thin liquids PO diet. Pt does present with mild dysarthria at this time. ST will f/uKALE Ann. CCC-SLP  Speech-Language Pathologist

## 2019-05-19 NOTE — PLAN OF CARE
VN cued into patients room for rounding. VN role explained and informed pt that VN will be working alongside the bedside care team throughout the day. Pt verbalized understanding that VN is available for any questions and education, and nurse and PCT will continue hourly rounding at bedside. Fall education provided. Patient complains of knee pain, PRN medications reviewed - bedside nurse made aware. Patient family requesting bedside commode on DC - case management consult already in place. Allotted time given for questions - all questions answered. Will continue to monitor and intervene PRN.       05/19/19 1004   Type of Frequent Check   Type Other (see comments)  (VN rounds)   Safety/Activity   Patient Rounds bed in low position;visualized patient   Safety Promotion/Fall Prevention side rails raised x 2   Positioning   Body Position positioned/repositioned independently   Head of Bed (HOB) HOB elevated   Pain/Comfort/Sleep   Preferred Pain Scale number (Numeric Rating Pain Scale)   Pain Body Location knee   Pain Rating (0-10): Rest 8   Pain Rating (0-10): Activity 8   Sleep/Rest/Relaxation awake

## 2019-05-19 NOTE — PT/OT/SLP EVAL
Physical Therapy Evaluation    Patient Name:  Christine Castro   MRN:  [id number]    Recommendations:     Discharge Recommendations:  home health PT, home health OT(pt and pt's family refusing SNF placement upon d/c)   Discharge Equipment Recommendations: none   Barriers to discharge: decreased activity tolerance    Assessment:     Christine Castro is a 64 y.o. female admitted with a medical diagnosis of TIA (transient ischemic attack).  She presents with the following impairments/functional limitations:  weakness, impaired endurance, impaired self care skills, impaired functional mobilty, gait instability, impaired balance, impaired sensation, decreased lower extremity function, pain, decreased ROM, impaired skin, edema. Pt presenting with LLE pain rated 9-10/10 and weakness of L ankle DF. Pt ambulated 6 ft with further gait limited by pain. Pt and pt's family adamantly refusing SNF placement upon d/c therefore HH PT/OT upon d/c. No additional DME needs.    Rehab Prognosis: Good; patient would benefit from acute skilled PT services to address these deficits and reach maximum level of function.    Recent Surgery: * No surgery found *      Plan:     During this hospitalization, patient to be seen 6 x/week to address the identified rehab impairments via gait training, therapeutic activities, therapeutic exercises and progress toward the following goals:    · Plan of Care Expires:  06/19/19    Subjective     Chief Complaint: LLE pain  Patient/Family Comments/goals: pt reporting pain in entire LLE from thigh down  Pain/Comfort:  · Pain Rating 1: 10/10(reporting pain 9-10/10)  · Location - Side 1: Left  · Location - Orientation 1: generalized  · Location 1: knee  · Pain Addressed 1: Reposition, Distraction, Cessation of Activity, Nurse notified  · Pain Rating Post-Intervention 1: 10/10(9-10/10)    Patients cultural, spiritual, Advent conflicts given the current situation: no    Living Environment:  Pt lives with her   in a 2 story apartment with no ESE and pt's bedroom/full bathroom with tub/shower and TTB upstairs but pt has been staying on the 1st floor where there is a 1/2 bath.   Prior to admission, patients level of function was ambulating with RW with supervision and completing toileting without assist until yesterday whe she began to develop L sided weakness.  Equipment used at home: walker, rolling, bath bench, cane, straight, bedside commode, walker, standard.  DME owned (not currently used): only using RW and BSC.  Upon discharge, patient will have assistance from her family- pt's family reporting someone is almost always at home with her. When SNF was discussed pt and pt's family adamantly refusing.    Objective:     Communicated with ROSE Alonso prior to session.  Patient found sitting EOB with LLE in long sitting on bed with telemetry  upon PT entry to room.    General Precautions: Standard, fall   Orthopedic Precautions:LLE weight bearing as tolerated   Braces: N/A     Exams:  · Postural Exam:  Patient presented with the following abnormalities:    · -       Rounded shoulders  · Sensation:    · -       Impaired  pt reporting impaired sensation to LLE  · Skin Integrity/Edema:      · -       Skin integrity: surgical incision with bandage over L knee (L TKA 5/8/19)  · -       Edema: Moderate LLE  · RLE ROM: WFL  · RLE Strength: WFL except hip flexion 3+/5  · LLE ROM: WFL except knee AROm ~0-70 deg  · LLE Strength: ankle DF 3-/5 (and inverted), knee ext at least 3/5 (resistance not applied), hip flexion 3-/5    Functional Mobility:  · Bed Mobility:     · Scooting: stand by assistance  · Sit to Supine: stand by assistance  · Transfers:     · Sit to Stand:  contact guard assistance with rolling walker  · Gait: 6 ft with RW and CGA with max cues for 3-point gait pattern, upright posture, and to maintain LEs within boundaries of RW as pt ambulates with increased proximity from RW.      Therapeutic Activities and  Exercises:  Pt sitting EOB when PT entered as she just returned to [facility] with PCT assist.  Pt completed 1 stand and bout of ambulation as reported above with further gait and standing limited by pt's reports of LLE pain.  Pt requesting to return to supine.    AM-PAC 6 CLICK MOBILITY  Total Score:15     Patient left HOB elevated with all lines intact, call button in reach, bed alarm on and RN and pt's family present.    GOALS:   Multidisciplinary Problems     Physical Therapy Goals        Problem: Physical Therapy Goal    Goal Priority Disciplines Outcome Goal Variances Interventions   Physical Therapy Goal     PT, PT/OT Ongoing (interventions implemented as appropriate)     Description:  Goals to be met by: 19     Patient will increase functional independence with mobility by performin. Supine <> sit with Stand-by Assistance  2. Sit to stand transfer with Stand-by Assistance  3. Bed to chair transfer with Stand-by Assistance using Rolling Walker  4. Gait  x 50 feet with Stand-by Assistance using Rolling Walker.   5. Lower extremity exercise program x 10 reps per handout, with supervision                      History:     Past Medical History:   Diagnosis Date    Arthritis     Asthma     CHF (congestive heart failure)     [facility]    Colon cancer     COPD (chronic obstructive pulmonary disease)     Coronary artery disease     Depression     Diabetes mellitus, type 2     GERD (gastroesophageal reflux disease)     Myocardial infarction     AGE 19 MIGUELANGEL WITHBABY DELIVERY    Thyroid disease        Past Surgical History:   Procedure Laterality Date    ARTHROPLASTY, KNEE Left 2019    [facility]     SECTION      CHOLECYSTECTOMY      COLON SURGERY      COLONOSCOPY      --- repeat in 3-5 years    COLONOSCOPY N/A 2017    [facility] ENDO    COLONOSCOPY golytely N/A 4/10/2018    Performed by Corrina  CHATO [facility]O    ECTOPIC PREGNANCY SURGERY      ESOPHAGOGASTRODUODENOSCOPY (EGD) N/A 2/28/2019    [facility]    ESOPHAGOGASTRODUODENOSCOPY (EGD) N/A 4/10/2018    [facility]    ESOPHAGOGASTRODUODENOSCOPY (EGD) N/A 4/18/2017    [facility]    GASTRIC BYPASS      HYSTERECTOMY      OOPHORECTOMY      REMOVAL-PORT-A-CATH Left 5/30/2017    [facility]    TONSILLECTOMY      TUBAL LIGATION         Time Tracking:     PT Received On: 05/19/19  PT Start Time: 0855     PT Stop Time: 0914  PT Total Time (min): 19 min     Billable Minutes: Evaluation 19      Melvi Lo, PT  05/19/2019

## 2019-05-19 NOTE — ED PROVIDER NOTES
Encounter Date: 2019       History     Chief Complaint   Patient presents with    Weakness     Left sided weakness with L sided facial droop and slurred speech.  Last known normal 1300.     64-year-old female patient with weakness little lift some patient 2 weeks ago had left knee replacement surgery.  Patient was noted approximately at 1300 to be normal during that time up until about 30 min before arrival it was noted that patient had left-sided weakness left facial droop with slurred speech difficulty finding words patient otherwise had no complaints prior.  Including no chest pain no shortness of breath no dyspnea on exertion        Review of patient's allergies indicates:   Allergen Reactions    Ibuprofen Other (See Comments)     Causes asthma flare??     Past Medical History:   Diagnosis Date    Arthritis     Asthma     Cancer     CHF (congestive heart failure)     [facility]    Colon cancer     COPD (chronic obstructive pulmonary disease)     Coronary artery disease     Depression     Diabetes mellitus, type 2     GERD (gastroesophageal reflux disease)     Myocardial infarction     AGE 19 MIGUELANGEL WITHBABY DELIVERY    Thyroid disease      Past Surgical History:   Procedure Laterality Date    ARTHROPLASTY, KNEE Left 2019    [facility]     SECTION      CHOLECYSTECTOMY      COLON SURGERY      COLONOSCOPY      --- repeat in 3-5 years    COLONOSCOPY N/A 2017    [facility]    COLONOSCOPY golytely N/A 4/10/2018    [facility]    ECTOPIC PREGNANCY SURGERY      ESOPHAGOGASTRODUODENOSCOPY (EGD) N/A 2019    [facility]    ESOPHAGOGASTRODUODENOSCOPY (EGD) N/A 4/10/2018    [facility]    ESOPHAGOGASTRODUODENOSCOPY (EGD) N/A 2017    [facility]    GASTRIC  BYPASS      HYSTERECTOMY      ovary removed      REMOVAL-PORT-A-CATH Left 5/30/2017    [facility]    TONSILLECTOMY      TUBAL LIGATION       Family History   Problem Relation Age of Onset    Hyperlipidemia Mother     Hypertension Mother     Diabetes Mother     Hypertension Sister     Hypertension Brother     Diabetes Brother     Breast cancer Maternal Aunt     Breast cancer Maternal Aunt     Breast cancer Cousin      Social History     Tobacco Use    Smoking status: Never Smoker    Smokeless tobacco: Never Used   Substance Use Topics    Alcohol use: Yes     Comment: very rare    Drug use: No     Review of Systems   Constitutional: Negative for fever.   HENT: Negative for sore throat.    Respiratory: Negative for shortness of breath.    Cardiovascular: Negative for chest pain.   Gastrointestinal: Negative for nausea.   Genitourinary: Negative for dysuria.   Musculoskeletal: Negative for back pain.   Skin: Negative for rash.   Neurological: Positive for speech difficulty and weakness.   Hematological: Does not bruise/bleed easily.   All other systems reviewed and are negative.      Physical Exam     Initial Vitals   BP Pulse Resp Temp SpO2   -- -- -- -- --      MAP       --         Physical Exam    Nursing note and vitals reviewed.  Constitutional: She appears well-developed.   HENT:   Head: Normocephalic and atraumatic.   Right Ear: External ear normal.   Left Ear: External ear normal.   Nose: Nose normal.   Mouth/Throat: Oropharynx is clear and moist.   Eyes: Conjunctivae and EOM are normal. Pupils are equal, round, and reactive to light. Right eye exhibits no discharge. Left eye exhibits no discharge.   Neck: Normal range of motion. Neck supple. No tracheal deviation present.   Cardiovascular: Normal rate, regular rhythm and normal heart sounds.   No murmur heard.  Pulmonary/Chest: Breath sounds normal. No respiratory distress. She has no wheezes.   Abdominal:  Soft. Bowel sounds are normal.   Neurological: She is alert and oriented to person, place, and time.   Strength reduced left upper extremity 3/5 left lower extremity unable to test due to knee replacement 2 weeks ago right upper and lower extremity 5/5 strength patient has left-sided facial droop with aphase  and slurred speech.   Skin: Skin is warm and dry.         ED Course   Procedures  Labs Reviewed   CBC W/ AUTO DIFFERENTIAL   COMPREHENSIVE METABOLIC PANEL   PROTIME-INR   TSH   TROPONIN I   POCT GLUCOSE, HAND-HELD DEVICE          Imaging Results          CT Head Without Contrast (Final result)  Result time 05/18/19 21:48:03    Final result by Vu Zuniga MD (05/18/19 21:48:03)                 Impression:      No acute findings.  Findings called to Dr. Jeffery at 21:47 hours.    All CT scans at this facility are performed  using dose modulation techniques as appropriate to performed exam including the following:  automated exposure control; adjustment of mA and/or kV according to the patients size (this includes techniques or standardized protocols for targeted exams where dose is matched to indication/reason for exam: i.e. extremities or head);  iterative reconstruction technique.      Electronically signed by: Vu Zuniga MD  Date:    05/18/2019  Time:    21:48             Narrative:    EXAMINATION:  CT HEAD WITHOUT CONTRAST    CLINICAL HISTORY:  Stroke;    FINDINGS:  No intracranial hemorrhage or acute findings are demonstrated. The visualized paranasal sinuses are clear. The calvarium is intact.                                                      Clinical Impression:       ICD-10-CM ICD-9-CM   1. Cerebrovascular accident (CVA), unspecified mechanism I63.9 434.91   2. Stroke I63.9 434.91                                Shaun Jeffery MD  05/18/19 [id number]

## 2019-05-19 NOTE — HPI
Christine Castro is a 64 year old female with a PMHx of NIDDM2, CHF (EF 55%), CAD, MI, Colon Cancer (s/p Colectomy), hypothyroid, Asthma, Arthritis, and Morbid Obesity (s/p Gastric Bypass).  She had a Left TKA with Dr. Greenwood on 5/8/2019 and was discharged home with Home Health.  She was not on chemical DVT prophylaxis.  Her PCP is Dr. Alon Beckman.     She presented to [facility] ED via EMS with left sided facial droop, slurred speech and aphasia and left sided weakness. Per EMS, s that patient's caregiver left the house at 1300 today and when she returned at 2015. Upon returning she gave patient 50mg of benadryl because patient was c/o a headache. Caregiver in room states that when she went to give patient a drink of water some of the water started draining from patient's mouth and she noticed a left sided facial droop. The patient is also complaining of left sided headache. Her symptoms improves, but may have the slightest dyrathria.  She had a negative head CT.  Her Chest X-Ray and Lab work up was unremarkable.  Tele-Stroke consult was done with Dr. Saeed who is concerned for possible  a small acute subcortical L brain infarct, but she is not a candidate for iv alteplase due to late presentation and recent L knee surgery.  She is admitted to [facility] for complete Stroke workup.

## 2019-05-19 NOTE — HOSPITAL COURSE
4/19/19 Pending MRI/MRA, neurology and speech consult.  4/20/19 MRI/MRA showed no acute changes, echo stable, patient okay to discharge and follow up with neurology as outpatient.

## 2019-05-19 NOTE — PT/OT/SLP EVAL
Occupational Therapy   Evaluation    Name: Christine Castro  MRN: [id number]  Admitting Diagnosis:  TIA (transient ischemic attack)      Recommendations:     Discharge Recommendations: home health OT, home health PT  Discharge Equipment Recommendations:  none  Barriers to discharge:  None    Assessment:     Christine Castro is a 64 y.o. female with a medical diagnosis of TIA (transient ischemic attack).  She presents with deconditioning, LLE pain limiting LE dressing. Performance deficits affecting function: weakness, impaired endurance, impaired self care skills, impaired functional mobilty, gait instability, impaired balance, decreased coordination, decreased upper extremity function, decreased lower extremity function, decreased safety awareness, pain, impaired fine motor, edema, impaired skin, orthopedic precautions.      Rehab Prognosis: Good; patient would benefit from acute skilled OT services to address these deficits and reach maximum level of function.       Plan:     Patient to be seen 5 x/week to address the above listed problems via self-care/home management, therapeutic exercises, therapeutic activities  · Plan of Care Expires: 06/19/19  · Plan of Care Reviewed with: patient, family, caregiver    Subjective     Chief Complaint: Pt c/o L knee pain.  Patient/Family Comments/goals: To return home    Occupational Profile:  Living Environment: Pt lives with spouse in 2SH with spouse, 0STE, tub/sh with TTB on 2nd story, temporary bedroom and 1/2 bath on 1st floor.  Previous level of function: Caretaker states she provided assistance to pt for dressing, bathing, and toileting for clothing mgmt and hygiene. Pt drives though family has restricted her driving since L TKA for 6 weeks per MD orders.   Roles and Routines: light housework, cooks, shops caretaker to self, wife,   Equipment Used at Home:  walker, rolling, bath bench, cane, straight, bedside commode, walker, standard  Assistance upon Discharge:  Family, neighbor who acts as caregiver  Pain/Comfort:  · Pain Rating 1: 6/10  · Location - Side 1: Left  · Location - Orientation 1: generalized  · Location 1: knee  · Pain Rating Post-Intervention 1: (did not rate)    Patients cultural, spiritual, Buddhist conflicts given the current situation:      Objective:     Communicated with: maricarmen prior to session.  Patient found HOB elevated with bed alarm, peripheral IV, telemetry upon OT entry to room.    General Precautions: Standard, fall   Orthopedic Precautions:LLE weight bearing as tolerated   Braces: N/A     Occupational Performance:    Bed Mobility:    · Patient completed Rolling/Turning to Right with stand by assistance  · Patient completed Scooting/Bridging with stand by assistance  · Patient completed Supine to Sit with stand by assistance  · Patient completed Sit to Supine with stand by assistance    Functional Mobility/Transfers:  · Patient completed Sit <> Stand Transfer with contact guard assistance  with  rolling walker   · Patient completed Toilet Transfer Step Transfer technique with contact guard assistance with  rolling walker  Functional Mobility: Pt with fair dynamic seated and standing balance.    Activities of Daily Living:  · Lower Body Dressing: moderate assistance to don B socks and underpants  · Toileting: contact guard assistance for clothing mgmt and hygiene    Cognitive/Visual Perceptual:  Cognitive/Psychosocial Skills:     -       Oriented to: Person, Place, Time and Situation   -       Follows Commands/attention:Follows multistep  commands  -       Communication: clear/fluent  -       Memory: No Deficits noted  -       Safety awareness/insight to disability: impaired   -       Mood/Affect/Coping skills/emotional control: Appropriate to situation    Physical Exam:  Postural examination/scapula alignment:    -       Rounded shoulders  Skin integrity: Wound LLE surgical   Sensation:    -       Intact  Motor Planning:    -       WFL  Dominant  "hand:    -       R handed  Upper Extremity Range of Motion: RUE WFL; LUE minimally limited shoulder flexion ~0-150*  Upper Extremity Strength:  RUE grossly 4/5; LUE grossly 3+ to 4-/5   Strength: R hand 4/5; L hand 3+/5  Fine Motor Coordination:    -       Impaired thumb/digit opposition L hand requiring increased time   Gross motor coordination:   WFL    AMPAC 6 Click ADL:  AMPA Total Score: 19    Treatment & Education:  Pt educated on role of OT and POC.   Pt performing skills as listed above.  Pt impulsive but states that she is tryign to be safe, though she often feels like her family and medical staff "make her feel like a prisoner" and she states she does not like having a bed alarm set. Pt seems to have more capacity to perform dressing and toileting tasks but caregiver offering increased assistance stating, "Her whole left side is very weak." Pt able to perform toileting hygiene CGA with RUE, LUE grasping RW with no LOB and no stability concerns. Pt states that she sometimes drops items from L hand.  Education:    Patient left HOB elevated with all lines intact, call button in reach, bed alarm on, nsg notified and PCT and family present    GOALS:   Multidisciplinary Problems     Occupational Therapy Goals        Problem: Occupational Therapy Goal    Goal Priority Disciplines Outcome Interventions   Occupational Therapy Goal     OT, PT/OT Ongoing (interventions implemented as appropriate)    Description:  Goals to be met by: 06/19/2019      Patient will increase functional independence with ADLs by performing:    LE Dressing with Set-up Assistance.  Grooming while standing with Set-up Assistance.  Step transfer with Modified [place] and Supervision  Toilet transfer to toilet with Supervision.  Increased functional strength to WFL for self care.  Upper extremity exercise program x8 reps per handout, with assistance as needed.                      History:     Past Medical History:   Diagnosis Date "    Arthritis     Asthma     CHF (congestive heart failure)     [facility]    Colon cancer     COPD (chronic obstructive pulmonary disease)     Coronary artery disease     Depression     Diabetes mellitus, type 2     GERD (gastroesophageal reflux disease)     Myocardial infarction     AGE 19 MIGUELANGEL WITHBABY DELIVERY    Thyroid disease        Past Surgical History:   Procedure Laterality Date    ARTHROPLASTY, KNEE Left 2019    [facility] OR     SECTION      CHOLECYSTECTOMY      COLON SURGERY      COLONOSCOPY      --- repeat in 3-5 years    COLONOSCOPY N/A 2017    [facility] ENDO    COLONOSCOPY golytely N/A 4/10/2018    [facility]    ECTOPIC PREGNANCY SURGERY      ESOPHAGOGASTRODUODENOSCOPY (EGD) N/A 2019    [facility] END[facility]    ESOPHAGOGASTRODUODENOSCOPY (EGD) N/A 4/10/2018    [facility]    ESOPHAGOGASTRODUODENOSCOPY (EGD) N/A 2017    [facility] END[facility]    GASTRIC BYPASS      HYSTERECTOMY      OOPHORECTOMY      REMOVAL-PORT-A-CATH Left 2017    [facility]formed by [facility]oval [facility] at [facility] OR    TONSILLECTOMY      TUBAL LIGATION         Time Tracking:     OT Date of Treatment: 19  OT Start Time: 1050  OT Stop Time: 1126  OT Total Time (min): 36 min    Billable Minutes:Evaluation 15  Self Care/Home Management 21    Swapna Price OT  2019

## 2019-05-19 NOTE — PLAN OF CARE
Problem: Occupational Therapy Goal  Goal: Occupational Therapy Goal  Goals to be met by: 06/19/2019      Patient will increase functional independence with ADLs by performing:    LE Dressing with Set-up Assistance.  Grooming while standing with Set-up Assistance.  Step transfer with Modified [place] and Supervision  Toilet transfer to toilet with Supervision.  Increased functional strength to WFL for self care.  Upper extremity exercise program x8 reps per handout, with assistance as needed.    Outcome: Ongoing (interventions implemented as appropriate)  Pt would benefit from continued OT to address deficits in self care and functional mobility. Recommending HHOT/PT; DME needs likely none

## 2019-05-19 NOTE — H&P
[facility] Medicine  History & Physical    Patient Name: Christine Castro  MRN: [id number]  Admission Date: 5/18/2019  Attending Physician: No att. providers found   Primary Care Provider: Alon Santiago MD         Patient information was obtained from patient, caregiver / friend and ER records.     Subjective:     Principal Problem:TIA (transient ischemic attack)    Chief Complaint:   Chief Complaint   Patient presents with    Weakness     Left sided weakness with L sided facial droop and slurred speech.  Last known normal 1300.        HPI: Christine Castro is a 64 year old female with a PMHx of NIDDM2, CHF (EF 55%), CAD, MI, Colon Cancer (s/p Colectomy), hypothyroid, Asthma, Arthritis, and Morbid Obesity (s/p Gastric Bypass).  She had a Left TKA with Dr. Greenwood on 5/8/2019 and was discharged home with Home Health.  She was not on chemical DVT prophylaxis.  Her PCP is Dr. Alon Beckman.     She presented to [facility] ED via EMS with left sided facial droop, slurred speech and aphasia and left sided weakness. Per EMS, s that patient's caregiver left the house at 1300 today and when she returned at 2015. Upon returning she gave patient 50mg of benadryl because patient was c/o a headache. Caregiver in room states that when she went to give patient a drink of water some of the water started draining from patient's mouth and she noticed a left sided facial droop. The patient is also complaining of left sided headache. Her symptoms improves, but may have the slightest dyrathria.  She had a negative head CT.  Her Chest X-Ray and Lab work up was unremarkable.  Tele-Stroke consult was done with Dr. Saeed who is concerned for possible  a small acute subcortical L brain infarct, but she is not a candidate for iv alteplase due to late presentation and recent L knee surgery.  She is admitted to [facility] for complete Stroke workup.     Past Medical History:   Diagnosis  Date    Arthritis     Asthma     Cancer     CHF (congestive heart failure)     [facility]    Colon cancer     COPD (chronic obstructive pulmonary disease)     Coronary artery disease     Depression     Diabetes mellitus, type 2     GERD (gastroesophageal reflux disease)     Myocardial infarction     AGE 19 MIGUELANGEL WITHBABY DELIVERY    Thyroid disease        Past Surgical History:   Procedure Laterality Date    ARTHROPLASTY, KNEE Left 2019    [facility]     SECTION      CHOLECYSTECTOMY      COLON SURGERY      COLONOSCOPY      --- repeat in 3-5 years    COLONOSCOPY N/A 2017    [facility] ENDO    COLONOSCOPY golytely N/A 4/10/2018    [facility] at [facility] ENDO    ECTOPIC PREGNANCY SURGERY      ESOPHAGOGASTRODUODENOSCOPY (EGD) N/A 2019    [facility] ENDO    ESOPHAGOGASTRODUODENOSCOPY (EGD) N/A 4/10/2018    [facility] ENDO    ESOPHAGOGASTRODUODENOSCOPY (EGD) N/A 2017    [facility] at [facility] ENDO    GASTRIC BYPASS      HYSTERECTOMY      ovary removed      REMOVAL-PORT-A-CATH Left 2017    [facility]formed by [facility]oval [facility] at [facility] OR    TONSILLECTOMY      TUBAL LIGATION         Review of patient's allergies indicates:   Allergen Reactions    Adhesive      Adhesive tape    Latex, natural rubber      Adhesive from latex tape    Sulfa (sulfonamide antibiotics)      unknown    Ibuprofen Other (See Comments)     Causes asthma flare??       No current facility-administered medications on file prior to encounter.      Current Outpatient Medications on File Prior to Encounter   Medication Sig    acetaminophen (TYLENOL) 500 MG tablet Take 500 mg by mouth every 6 (six) hours as needed for Pain.    albuterol (VENTOLIN HFA) 90 mcg/actuation inhaler INHALE 2 PUFFS INTO THE LUNGS EVERY 4 HOURS AS  NEEDED FOR WHEEZING    atorvastatin (LIPITOR) 10 MG tablet Take 1 tablet (10 mg total) by mouth every other day.    budesonide (PULMICORT FLEXHALER) 90 mcg/actuation AePB Inhale 2 puffs (180 mcg total) into the lungs 2 (two) times daily. Controller    buPROPion (WELLBUTRIN SR) 200 MG SR12 Take 1 tablet (200 mg total) by mouth once daily.    busPIRone (BUSPAR) 10 MG tablet TAKE 1/2 TABLET BY MOUTH ONCE DAILY    diclofenac sodium (VOLTAREN) 1 % Gel APPLY 2 GRAMS EXTERNALLY TO THE AFFECTED AREA FOUR TIMES DAILY    diphenhydrAMINE (BENADRYL) 25 mg capsule Take 25 mg by mouth every 6 (six) hours as needed for Itching.    escitalopram oxalate (LEXAPRO) 20 MG tablet Take 1 tablet (20 mg total) by mouth once daily.    furosemide (LASIX) 20 MG tablet Take 1 tablet (20 mg total) by mouth daily as needed.    gabapentin (NEURONTIN) 300 MG capsule Take 1 capsule (300 mg total) by mouth 3 (three) times daily.    levothyroxine (SYNTHROID) 75 MCG tablet Take 75 mcg by mouth once daily.    omeprazole (PRILOSEC) 40 MG capsule TAKE 1 CAPSULE(40 MG) BY MOUTH EVERY MORNING    ondansetron (ZOFRAN-ODT) 4 MG TbDL Take 1 tablet (4 mg total) by mouth every 6 (six) hours as needed.    oxyCODONE-acetaminophen (PERCOCET)  mg per tablet Take 1 tablet by mouth every 6 (six) hours as needed.    potassium chloride (KLOR-CON) 10 MEQ TbSR TAKE 2 TABLETS(20 MEQ) BY MOUTH EVERY DAY (Patient taking differently: TAKE 2 TABLETS(20 MEQ) BY MOUTH EVERY DAY)    predniSONE (DELTASONE) 5 MG tablet TAKE 1 TABLET BY MOUTH DAILY    promethazine (PHENERGAN) 25 MG tablet TAKE 1 TABLET(25 MG) BY MOUTH EVERY 6 HOURS AS NEEDED    zolpidem (AMBIEN) 5 MG Tab Take 1 tablet (5 mg total) by mouth nightly.     Family History     Problem Relation (Age of Onset)    Breast cancer Maternal Aunt, Maternal Aunt, Cousin    Diabetes Mother, Brother    Hyperlipidemia Mother    Hypertension Mother, Sister, Brother    Stroke Mother        Tobacco Use    Smoking  status: Never Smoker    Smokeless tobacco: Never Used   Substance and Sexual Activity    Alcohol use: Yes     Comment: very rare    Drug use: No    Sexual activity: Never     Review of Systems   Constitutional: Negative for chills, diaphoresis and fatigue.   HENT: Negative for congestion, rhinorrhea and trouble swallowing.    Eyes: Negative for visual disturbance.   Respiratory: Negative for cough, chest tightness, shortness of breath and wheezing.    Cardiovascular: Negative for chest pain, palpitations and leg swelling.   Gastrointestinal: Negative for abdominal distention, constipation, diarrhea and nausea.   Endocrine: Negative for polyphagia and polyuria.   Genitourinary: Negative for difficulty urinating, dysuria and hematuria.   Musculoskeletal: Positive for arthralgias and gait problem (Recent Knee surgery. Uses Walker). Negative for myalgias.   Skin: Negative for color change and rash.   Neurological: Positive for speech difficulty, weakness (Left Sided) and headaches (Left sided). Negative for dizziness and numbness.   Hematological: Negative for adenopathy.   Psychiatric/Behavioral: Negative for confusion, hallucinations and suicidal ideas. The patient is not nervous/anxious.      Objective:     Vital Signs (Most Recent):  Temp: 97.1 °F (36.2 °C) (05/19/19 0447)  Pulse: 76 (05/19/19 0447)  Resp: 18 (05/19/19 0447)  BP: 99/62 (05/19/19 0447)  SpO2: 98 % (05/19/19 0033) Vital Signs (24h Range):  Temp:  [97.1 °F (36.2 °C)-99.2 °F (37.3 °C)] 97.1 °F (36.2 °C)  Pulse:  [76-82] 76  Resp:  [17-23] 18  SpO2:  [97 %-100 %] 98 %  BP: ()/(58-78) 99/62     Weight: 85.2 kg (187 lb 13.3 oz)  Body mass index is 36.68 kg/m².    Physical Exam   Constitutional: She is oriented to person, place, and time. She appears well-developed and well-nourished.   HENT:   Head: Normocephalic.   Mouth/Throat: Oropharynx is clear and moist.   Eyes: Conjunctivae are normal.   Neck: Neck supple. No tracheal deviation present.    Cardiovascular: Normal rate, regular rhythm, normal heart sounds and intact distal pulses.   Pulmonary/Chest: Effort normal and breath sounds normal. No respiratory distress. She has no wheezes.   Abdominal: Soft. Bowel sounds are normal. She exhibits no distension. There is no tenderness.   Musculoskeletal: Normal range of motion. She exhibits tenderness (Both knees, and Left Shoulder). She exhibits no edema.   Left Knee Dressing clean, dry, and intact     Neurological: She is alert and oriented to person, place, and time.   Mild Dysarthria. No evidence of focal deficit.    Skin: Skin is warm and dry.   Psychiatric: She has a normal mood and affect. Her behavior is normal.   Nursing note and vitals reviewed.          Significant Labs:   A1C:   Recent Labs   Lab 01/09/19  1113   HGBA1C 5.0     CBC:   Recent Labs   Lab 05/18/19 2153   WBC 6.09   HGB 9.5*   HCT 30.9*        CMP:   Recent Labs   Lab 05/18/19 2153      K 4.0      CO2 26   GLU 92   BUN 15   CREATININE 0.90   CALCIUM 8.7   PROT 5.9*   ALBUMIN 3.0*   BILITOT 0.1   ALKPHOS 68   AST 17   ALT 10   ANIONGAP 5*   EGFRNONAA >60.0       Coagulation:   Recent Labs   Lab 05/18/19 2153   INR 1.0     Troponin:   Recent Labs   Lab 05/18/19 2153   TROPONINI <0.012     TSH:   Recent Labs   Lab 05/18/19 2153   TSH 0.340*       All pertinent labs within the past 24 hours have been reviewed.    Significant Imaging: I have reviewed all pertinent imaging results/findings within the past 24 hours.   Imaging Results          X-Ray Chest AP Portable (Final result)  Result time 05/18/19 22:13:30    Final result by Vu Zuniga MD (05/18/19 22:13:30)                 Impression:      No acute findings.    All CT scans at this facility use dose modulation, iterative reconstruction, and/or weight based dosing when appropriate to reduce radiation dose to as low as reasonably achievable.      Electronically signed by: Vu Zuniga  MD  Date:    05/18/2019  Time:    22:13             Narrative:    EXAMINATION:  XR CHEST AP PORTABLE    CLINICAL HISTORY:  Stroke;    COMPARISON:  08/30/2018    FINDINGS:  Lung fields are clear.    No pleural fluid or pneumothorax.  Aortic atherosclerosis.    No adenopathy is identified.    No significant osseous abnormality.                               CT Head Without Contrast (Final result)  Result time 05/18/19 21:48:03    Final result by Vu Zuniga MD (05/18/19 21:48:03)                 Impression:      No acute findings.  Findings called to Dr. Jeffery at 21:47 hours.    All CT scans at this facility are performed  using dose modulation techniques as appropriate to performed exam including the following:  automated exposure control; adjustment of mA and/or kV according to the patients size (this includes techniques or standardized protocols for targeted exams where dose is matched to indication/reason for exam: i.e. extremities or head);  iterative reconstruction technique.      Electronically signed by: Vu Zuniga MD  Date:    05/18/2019  Time:    21:48             Narrative:    EXAMINATION:  CT HEAD WITHOUT CONTRAST    CLINICAL HISTORY:  Stroke;    FINDINGS:  No intracranial hemorrhage or acute findings are demonstrated. The visualized paranasal sinuses are clear. The calvarium is intact.                                  Assessment/Plan:     * TIA (transient ischemic attack)  Risk Factors: Age, HTN, hyperlipidemia, DM, Recent Knee surgery without chemical DVT prophy  --Neuro-checks Every 4 hours  --Telemetry Monitoring  --NPO until Speech Evaluation or passes VIANCA Swallow Screen  --PT/OT/ST Consults  --Transthoracic Echo with Bubble Study  --Labs: Lipid Panal, HgA1c, TSH,  --MRI/MRA of Brain and Neck  --Vascular Neurology Consult  --Permissive Hypertension            Anemia  Stable. Monitor      Depression  Mood Stable  --Continue Home Lexapro 20 mg daily, Wellbutrin  mg daily and Buspar 10 mg  daily      Thyroid disease  TSH 0.340. Continue home levothyroxine      Arthritis  S/P Left TKA 5/8/19 (Dr. Greenwood)  --Continue home pain regimen         VTE Risk Mitigation (From admission, onward)        Ordered     enoxaparin injection 40 mg  Daily      05/19/19 0223     IP VTE HIGH RISK PATIENT  Once      05/19/19 0223     Place sequential compression device  Until discontinued      05/19/19 0223             Suma Ferrari NP  Department of Hospital Medicine   [facility]

## 2019-05-19 NOTE — ED NOTES
Patient arrived to the ED via EMS c/o left sided facial droop, slurred speech and aphasia. EMS reports that patient's caregiver left the house at 1300 today and when she returned at 2015. Upon returning she gave patient 50mg of benadryl because patient was c/o a headache. Caregiver in room states that when she went to give patient a drink of water some of the water started draining from patient's mouth and she noticed a left sided facial droop. Was reported by EMS that patient had a Left knee replacement of May 8, 2019.

## 2019-05-19 NOTE — PLAN OF CARE
Problem: Physical Therapy Goal  Goal: Physical Therapy Goal  Goals to be met by: 19     Patient will increase functional independence with mobility by performin. Supine <> sit with Stand-by Assistance  2. Sit to stand transfer with Stand-by Assistance  3. Bed to chair transfer with Stand-by Assistance using Rolling Walker  4. Gait  x 50 feet with Stand-by Assistance using Rolling Walker.   5. Lower extremity exercise program x 10 reps per handout, with supervision    Outcome: Ongoing (interventions implemented as appropriate)  PT evaluation completed, note to follow. Pt presenting with LLE pain rated 9-10/10 and weakness of L ankle DF. Pt ambulated 6 ft with further gait limited by pain. Pt and pt's family adamantly refusing SNF placement upon d/c therefore HH PT/OT upon d/c. No additional DME needs.

## 2019-05-19 NOTE — PLAN OF CARE
Problem: Adult Inpatient Plan of Care  Goal: Plan of Care Review  Outcome: Ongoing (interventions implemented as appropriate)  Plan of care reviewed with patient. Voiced understanding. NSR on monitor with no red alarms noted. Valium and oxycodone administered today per orders. Worked with PT/OT/SPEECH. No new deficits noted. Weakness still noted to left side. Side rails x3, bed low, call bell within reach. Maintain bed alarm for patient safety. Patient will be monitored overnight.

## 2019-05-20 VITALS
HEIGHT: 60 IN | BODY MASS INDEX: 36.87 KG/M2 | TEMPERATURE: 98 F | OXYGEN SATURATION: 98 % | DIASTOLIC BLOOD PRESSURE: 55 MMHG | HEART RATE: 80 BPM | SYSTOLIC BLOOD PRESSURE: 104 MMHG | WEIGHT: 187.81 LBS | RESPIRATION RATE: 18 BRPM

## 2019-05-20 LAB
ALBUMIN SERPL BCP-MCNC: 2.7 G/DL (ref 3.5–5.2)
ALP SERPL-CCNC: 74 U/L (ref 55–135)
ALT SERPL W/O P-5'-P-CCNC: 7 U/L (ref 10–44)
ANION GAP SERPL CALC-SCNC: 7 MMOL/L (ref 8–16)
AORTIC ROOT ANNULUS: 2.92 CM
AORTIC VALVE CUSP SEPERATION: 1.77 CM
AST SERPL-CCNC: 16 U/L (ref 10–40)
AV INDEX (PROSTH): 0.82
AV MEAN GRADIENT: 5.23 MMHG
AV PEAK GRADIENT: 10.5 MMHG
AV VALVE AREA: 2.62 CM2
AV VELOCITY RATIO: 0.82
BASOPHILS # BLD AUTO: 0.02 K/UL (ref 0–0.2)
BASOPHILS NFR BLD: 0.4 % (ref 0–1.9)
BILIRUB SERPL-MCNC: 0.2 MG/DL (ref 0.1–1)
BSA FOR ECHO PROCEDURE: 1.96 M2
BUN SERPL-MCNC: 11 MG/DL (ref 8–23)
CALCIUM SERPL-MCNC: 9.4 MG/DL (ref 8.7–10.5)
CHLORIDE SERPL-SCNC: 105 MMOL/L (ref 95–110)
CO2 SERPL-SCNC: 27 MMOL/L (ref 23–29)
CREAT SERPL-MCNC: 0.9 MG/DL (ref 0.5–1.4)
CV ECHO LV RWT: 0.43 CM
DIFFERENTIAL METHOD: ABNORMAL
DOP CALC AO PEAK VEL: 1.62 M/S
DOP CALC AO VTI: 32.06 CM
DOP CALC LVOT AREA: 3.2 CM2
DOP CALC LVOT DIAMETER: 2.02 CM
DOP CALC LVOT PEAK VEL: 1.33 M/S
DOP CALC LVOT STROKE VOLUME: 84.15 CM3
DOP CALCLVOT PEAK VEL VTI: 26.27 CM
E WAVE DECELERATION TIME: 206.67 MSEC
E/A RATIO: 1.17
ECHO LV POSTERIOR WALL: 0.91 CM (ref 0.6–1.1)
EOSINOPHIL # BLD AUTO: 0.3 K/UL (ref 0–0.5)
EOSINOPHIL NFR BLD: 4.5 % (ref 0–8)
ERYTHROCYTE [DISTWIDTH] IN BLOOD BY AUTOMATED COUNT: 13.5 % (ref 11.5–14.5)
EST. GFR  (AFRICAN AMERICAN): >60 ML/MIN/1.73 M^2
EST. GFR  (NON AFRICAN AMERICAN): >60 ML/MIN/1.73 M^2
FRACTIONAL SHORTENING: 31 % (ref 28–44)
GLUCOSE SERPL-MCNC: 106 MG/DL (ref 70–110)
HCT VFR BLD AUTO: 30.5 % (ref 37–48.5)
HGB BLD-MCNC: 9.6 G/DL (ref 12–16)
INTERVENTRICULAR SEPTUM: 0.96 CM (ref 0.6–1.1)
IVC PROX: 1.1 CM
LA MAJOR: 5.36 CM
LA MINOR: 4.97 CM
LA WIDTH: 3.23 CM
LEFT ATRIUM SIZE: 3.54 CM
LEFT ATRIUM VOLUME INDEX: 27.6 ML/M2
LEFT ATRIUM VOLUME: 50.13 CM3
LEFT INTERNAL DIMENSION IN SYSTOLE: 2.88 CM (ref 2.1–4)
LEFT VENTRICLE DIASTOLIC VOLUME INDEX: 42.89 ML/M2
LEFT VENTRICLE DIASTOLIC VOLUME: 77.95 ML
LEFT VENTRICLE MASS INDEX: 68.5 G/M2
LEFT VENTRICLE SYSTOLIC VOLUME INDEX: 17.4 ML/M2
LEFT VENTRICLE SYSTOLIC VOLUME: 31.68 ML
LEFT VENTRICULAR INTERNAL DIMENSION IN DIASTOLE: 4.19 CM (ref 3.5–6)
LEFT VENTRICULAR MASS: 124.56 G
LYMPHOCYTES # BLD AUTO: 1.8 K/UL (ref 1–4.8)
LYMPHOCYTES NFR BLD: 32.9 % (ref 18–48)
MAGNESIUM SERPL-MCNC: 1.6 MG/DL (ref 1.6–2.6)
MCH RBC QN AUTO: 31.2 PG (ref 27–31)
MCHC RBC AUTO-ENTMCNC: 31.5 G/DL (ref 32–36)
MCV RBC AUTO: 99 FL (ref 82–98)
MONOCYTES # BLD AUTO: 0.4 K/UL (ref 0.3–1)
MONOCYTES NFR BLD: 6.5 % (ref 4–15)
MV PEAK A VEL: 0.53 M/S
MV PEAK E VEL: 0.62 M/S
NEUTROPHILS # BLD AUTO: 3 K/UL (ref 1.8–7.7)
NEUTROPHILS NFR BLD: 55 % (ref 38–73)
PHOSPHATE SERPL-MCNC: 4.3 MG/DL (ref 2.7–4.5)
PISA TR MAX VEL: 2.5 M/S
PLATELET # BLD AUTO: 272 K/UL (ref 150–350)
PMV BLD AUTO: 8.5 FL (ref 9.2–12.9)
POTASSIUM SERPL-SCNC: 4.4 MMOL/L (ref 3.5–5.1)
PROT SERPL-MCNC: 6 G/DL (ref 6–8.4)
PULM VEIN S/D RATIO: 1.24
PV PEAK D VEL: 0.55 M/S
PV PEAK S VEL: 0.68 M/S
PV PEAK VELOCITY: 0.89 CM/S
RA MAJOR: 4.29 CM
RA PRESSURE: 3 MMHG
RBC # BLD AUTO: 3.08 M/UL (ref 4–5.4)
RIGHT VENTRICULAR END-DIASTOLIC DIMENSION: 2.54 CM
RPR SER QL: NORMAL
SODIUM SERPL-SCNC: 139 MMOL/L (ref 136–145)
TR MAX PG: 25 MMHG
TV REST PULMONARY ARTERY PRESSURE: 28 MMHG
WBC # BLD AUTO: 5.53 K/UL (ref 3.9–12.7)

## 2019-05-20 PROCEDURE — 85025 COMPLETE CBC W/AUTO DIFF WBC: CPT

## 2019-05-20 PROCEDURE — 83735 ASSAY OF MAGNESIUM: CPT

## 2019-05-20 PROCEDURE — 80053 COMPREHEN METABOLIC PANEL: CPT

## 2019-05-20 PROCEDURE — 63600175 PHARM REV CODE 636 W HCPCS: Performed by: NURSE PRACTITIONER

## 2019-05-20 PROCEDURE — 97802 MEDICAL NUTRITION INDIV IN: CPT

## 2019-05-20 PROCEDURE — G0378 HOSPITAL OBSERVATION PER HR: HCPCS

## 2019-05-20 PROCEDURE — 25000003 PHARM REV CODE 250: Performed by: NURSE PRACTITIONER

## 2019-05-20 PROCEDURE — 92507 TX SP LANG VOICE COMM INDIV: CPT

## 2019-05-20 PROCEDURE — A9585 GADOBUTROL INJECTION: HCPCS | Performed by: HOSPITALIST

## 2019-05-20 PROCEDURE — 36415 COLL VENOUS BLD VENIPUNCTURE: CPT

## 2019-05-20 PROCEDURE — 84100 ASSAY OF PHOSPHORUS: CPT

## 2019-05-20 PROCEDURE — 94761 N-INVAS EAR/PLS OXIMETRY MLT: CPT

## 2019-05-20 PROCEDURE — 92523 SPEECH SOUND LANG COMPREHEN: CPT

## 2019-05-20 PROCEDURE — 25500020 PHARM REV CODE 255: Performed by: HOSPITALIST

## 2019-05-20 RX ORDER — CLOPIDOGREL BISULFATE 75 MG/1
75 TABLET ORAL DAILY
Status: DISCONTINUED | OUTPATIENT
Start: 2019-05-20 | End: 2019-05-20

## 2019-05-20 RX ORDER — GADOBUTROL 604.72 MG/ML
10 INJECTION INTRAVENOUS
Status: COMPLETED | OUTPATIENT
Start: 2019-05-20 | End: 2019-05-20

## 2019-05-20 RX ORDER — ASPIRIN 81 MG/1
81 TABLET ORAL DAILY
Refills: 0
Start: 2019-05-21 | End: 2023-07-20 | Stop reason: SDUPTHER

## 2019-05-20 RX ORDER — ATORVASTATIN CALCIUM 40 MG/1
40 TABLET, FILM COATED ORAL DAILY
Qty: 30 TABLET | Refills: 0 | Status: SHIPPED | OUTPATIENT
Start: 2019-05-21 | End: 2020-03-31 | Stop reason: SDUPTHER

## 2019-05-20 RX ORDER — CIPROFLOXACIN 500 MG/1
500 TABLET ORAL EVERY 12 HOURS
Qty: 10 TABLET | Refills: 0 | Status: SHIPPED | OUTPATIENT
Start: 2019-05-20 | End: 2019-05-25

## 2019-05-20 RX ADMIN — OXYCODONE HYDROCHLORIDE AND ACETAMINOPHEN 1 TABLET: 10; 325 TABLET ORAL at 08:05

## 2019-05-20 RX ADMIN — PREDNISONE 5 MG: 5 TABLET ORAL at 08:05

## 2019-05-20 RX ADMIN — ATORVASTATIN CALCIUM 40 MG: 40 TABLET, FILM COATED ORAL at 08:05

## 2019-05-20 RX ADMIN — LEVOTHYROXINE SODIUM 75 MCG: 75 TABLET ORAL at 05:05

## 2019-05-20 RX ADMIN — OXYCODONE HYDROCHLORIDE AND ACETAMINOPHEN 1 TABLET: 10; 325 TABLET ORAL at 02:05

## 2019-05-20 RX ADMIN — CIPROFLOXACIN HYDROCHLORIDE 500 MG: 500 TABLET, FILM COATED ORAL at 08:05

## 2019-05-20 RX ADMIN — BUPROPION HYDROCHLORIDE 200 MG: 100 TABLET, EXTENDED RELEASE ORAL at 08:05

## 2019-05-20 RX ADMIN — ASPIRIN 81 MG: 81 TABLET, COATED ORAL at 08:05

## 2019-05-20 RX ADMIN — ESCITALOPRAM OXALATE 20 MG: 20 TABLET, FILM COATED ORAL at 08:05

## 2019-05-20 RX ADMIN — BUSPIRONE HYDROCHLORIDE 5 MG: 5 TABLET ORAL at 08:05

## 2019-05-20 RX ADMIN — PANTOPRAZOLE SODIUM 40 MG: 40 TABLET, DELAYED RELEASE ORAL at 08:05

## 2019-05-20 RX ADMIN — DIAZEPAM 2 MG: 2 TABLET ORAL at 05:05

## 2019-05-20 RX ADMIN — OXYCODONE HYDROCHLORIDE AND ACETAMINOPHEN 1 TABLET: 10; 325 TABLET ORAL at 12:05

## 2019-05-20 RX ADMIN — CLOPIDOGREL BISULFATE 75 MG: 75 TABLET ORAL at 10:05

## 2019-05-20 RX ADMIN — OXYCODONE HYDROCHLORIDE AND ACETAMINOPHEN 1 TABLET: 10; 325 TABLET ORAL at 04:05

## 2019-05-20 RX ADMIN — GADOBUTROL 10 ML: 604.72 INJECTION INTRAVENOUS at 09:05

## 2019-05-20 NOTE — PT/OT/SLP EVAL
Speech Language Pathology Evaluation  Cognitive Communication  Dysarthria Therapy    Patient Name:  Christine Castro   MRN:  [id number]  Admitting Diagnosis: TIA (transient ischemic attack)    Recommendations:     Recommendations:                General Recommendations:  Speech/language therapy and Cognitive-linguistic therapy  Diet recommendations:  Regular, Thin   Aspiration Precautions: Standard aspiration precautions   General Precautions: Standard, fall  Communication strategies:  none    History:     Past Medical History:   Diagnosis Date    Arthritis     Asthma     CHF (congestive heart failure)     [facility]    Colon cancer     COPD (chronic obstructive pulmonary disease)     Coronary artery disease     Depression     Diabetes mellitus, type 2     GERD (gastroesophageal reflux disease)     Myocardial infarction     AGE 19 MIGUELANGEL WITHBABY DELIVERY    Thyroid disease        Past Surgical History:   Procedure Laterality Date    ARTHROPLASTY, KNEE Left 2019    [facility] OR     SECTION      CHOLECYSTECTOMY      COLON SURGERY      COLONOSCOPY      --- repeat in 3-5 years    COLONOSCOPY N/A 2017    [facility]formed by [facility] at [facility] ENDO    COLONOSCOPY golytely N/A 4/10/2018    [facility] by [facility] at [facility] ENDO    ECTOPIC PREGNANCY SURGERY      ESOPHAGOGASTRODUODENOSCOPY (EGD) N/A 2019    [facility] by [facility] at [facility] ENDO    ESOPHAGOGASTRODUODENOSCOPY (EGD) N/A 4/10/2018    [facility]formed by [facility] at [facility] ENDO    ESOPHAGOGASTRODUODENOSCOPY (EGD) N/A 2017    [facility]formed by [facility] at [facility] ENDO    GASTRIC BYPASS      HYSTERECTOMY      OOPHORECTOMY      REMOVAL-PORT-A-CATH Left 2017    Performed by Mike Sandoval [facility] at [facility] OR    TONSILLECTOMY      TUBAL LIGATION         Social History: Patient lives alone.     Occupation/hobbies/homemaking: retired elementary  "      Subjective     Patient see s/p arriving back from MRI. Neighbor present in room upon entry.     Objective:     Oral Musculature Evaluation  Oral Musculature: WFL  Dentition: upper and lower dentures, rarely or never uses dentures to eat  Secretion Management: adequate  Mucosal Quality: adequate  Mandibular Strength and Mobility: WFL  Oral Labial Strength and Mobility: WFL  Lingual Strength and Mobility: WFL  Buccal Strength and Mobility: WFL  Volitional Cough: WFL  Volitional Swallow: WFL  Voice Prior to PO Intake: [facility]  Oral Musculature Comments: Mild dysarthria c/b imprecise articulation of phonemes; speech intelligibility noted to be WFL     COGNITIVE STATUS:  Behavioral Observations: alert-  Memory:  · Immediate: WFL  · Short Term: impaired with functional information; patient reports increased difficulty with memory from baseline  · Long Term: WFL  Orientation: oriented x4  Attention: visitor present in room distracting patient throughout evalutation.  Problem Solving: wfl  Insight Awareness: wfl  Sequencing:   · Functional Events: wfl  · Mental Manipulation: wfl  Pragmatics: wfl  Simple Money/Time Management: wfl    LANGUAGE:   Receptive Language:  Simple y/n Questions: wfl  Complex y/n Questions: wfl  Identification: wfl  1 Step Directions: wfl  2 Step Directions: wfl  Complex Directions: wfl     Expressive Language:   Naming:   · Divergent: named 10 animals in 60 seconds with neighbor giving patient cues and talking to patient throughout trials  · Convergent: wfl  · Confrontational: wfl  Automatic Speech: wfl  Sentence Completion: wfl  Responsive Speech: wfl  Repetition: wfl  Conversation: patient reporting significant word finding difficulties in conversation; she states, "It's like the words are locked away in my head sometimes."    Motor Speech: mild dysarthria    Voice: wnl    Augmentative Alternative Communication: no    Treatment: Patietn initially seen for dysarthria. SLP provided " education on speech strategies (over articulation, slow rate, increased volume) to improve intelligibility. Patient demonstrated understanding of speech strategies in conversation. Oral motor exercises against resistance introduced, and patient demonstrated all exercises. Handout provided listing all exercises for independent completion.   Patient began reporting word finding and memory difficulties, so Ubkgmw-Jifykjxn-Zzolpirez Evaluation completed. SLP reviewed ST POC for 2-3 visits a week. Patient v/u of all discussed and is in agreement with POC.     Assessment:   Christine Castro is a 64 y.o. female with an SLP diagnosis of mild Dysarthria and mild cognitive-linguistic impairments. ST will continue to follow 2-3x a week.     Goals:   Multidisciplinary Problems     SLP Goals        Problem: SLP Goal    Goal Priority Disciplines Outcome   SLP Goal     SLP Ongoing (interventions implemented as appropriate)   Description:  SLP Short Term Goals:  1. Pt will repeat multisyllabic words utilizing speech strategies with 90% accuracy.  2. Pt will complete oral motor exercises x10 each to improve oral motor coordination.  3. Pt will complete speech drills with SLP with min cues to improve overall speech production.    4. Pt will name 13+ items in a concrete category in 60 seconds given min cues.   5. Pt will complete high level word finding activities utilizing word finding strategies with 85% acc given min cues.   6. Pt will utilize memory strategies to recall functional information with 85% acc after a 5+ minute delay given min cues.                     Plan:   · Patient to be seen:  2 x/week   · Plan of Care expires:  06/17/19  · Plan of Care reviewed with:  patient, friend   · SLP Follow-Up:  Yes       Discharge recommendations:  Discharge Facility/Level of Care Needs: home health speech therapy, home health OT, home health PT   Barriers to Discharge:  None    Time Tracking:   SLP Treatment Date:   05/20/19  Speech  Start Time:  0943  Speech Stop Time:  1000     Speech Total Time (min):  17 min    Billable Minutes: Eval 8  and Speech Therapy Individual 9    GREG Chauhan, CCC-SLP   Spectra: [phone]  05/20/2019

## 2019-05-20 NOTE — PLAN OF CARE
Recommendations    1. Continue current Cardiac diet.   2. Encourage good po intake.   Goals: Pt to meet >/= 75% of EEN/EPN.  Nutrition Goal Status: new

## 2019-05-20 NOTE — PLAN OF CARE
met with patient at bedside. Patient was accompanied by her neighbor/friend Gia Cantu [phone]. Patient was sleeping during visit.    Patient admitted for TIA. will continue to follow for any discharge needs.

## 2019-05-20 NOTE — ASSESSMENT & PLAN NOTE
Coronary artery disease involving native coronary artery of native heart  Risk Factors: Age, HTN, hyperlipidemia, DM, Recent Knee surgery without chemical DVT prophylaxis  --Neuro-checks Every 4 hours  --Transthoracic Echo with Bubble Study: Stable, EF 60%  --CT Scan: No acute changes  --MRI/MRA of Brain and Neck: No acute changes  --Neurology consult: ok to discharge on aspirin and statin, follow up in neurology clinic  --Continue Aspirin and Atorvastatin

## 2019-05-20 NOTE — PT/OT/SLP PROGRESS
Physical Therapy      Patient Name:  Christine Castro   MRN:  [id number]    Patient not seen today secondary to family member removing bandage on pt's knee and stated that PTA and OT could not work with pt until nsg comes to replace dressing.  Family also stated that pt is discharging soon. Will follow-up later if time permits.    Yuko Chinchilla, PTA

## 2019-05-20 NOTE — NURSING
VN went in and went over  the d/c instructions with the patient and her care giver.   Patient advised to start taking the newly prescribed medication.  Which were delivered to the bedside by [facility].  Patient will keep all follow up appointments and schedule any needed appointments.    Patient verbalized understanding of the D/C instructions. Education given, refer to clinical reference for education.   Waiting for her ride to get her to pick her up.

## 2019-05-20 NOTE — PLAN OF CARE
[facility] HOME HEALTH ORDERS  FACE TO FACE ENCOUNTER    Patient Name: Christine Castro  YOB: 1955    PCP: Alon Santiago MD   PCP Address: [address]  / [place] [address]  PCP Phone Number: [phone]  PCP Fax: [phone]    Encounter Date: 05/20/2019    Admit to Home Health    Diagnoses:  Active Hospital Problems    Diagnosis  POA    *TIA (transient ischemic attack) [G45.9]  Yes    History of gastric bypass [Z98.84]  Not Applicable     Chronic    UTI (urinary tract infection) [N39.0]  Yes    Coronary artery disease involving native coronary artery of native heart [I25.10]  Yes     Chronic    History of left knee replacement [Z96.652]  Not Applicable     Chronic    Normocytic anemia [D64.9]  Yes     Chronic    History of colon cancer [Z85.038]  Yes     Chronic    Depression [F32.9]  Yes    History of congestive heart failure [Z86.79]  Not Applicable     [facility]      Acquired hypothyroidism [E03.9]  Yes     Chronic    Primary osteoarthritis [M19.91]  Yes     Chronic      Resolved Hospital Problems   No resolved problems to display.       Future Appointments   Date Time Provider Department Center   5/23/2019 11:15 AM Roldan Greenwood MD [facility]     Follow-up Information     [facility].    Specialty:  Home Health Services  Why:  Home Health  Contact information:  [address]  [address]  [facility] [address]  [phone]             Roldan Greenwood MD On 5/23/2019.    Specialty:  Orthopedic Surgery  Why:  11:15 am Post-op  Contact information:  [address]  [address]  [facility] [address]  [phone]                     I have seen and examined this patient face to face today. My clinical findings that support the need for the home health skilled services and home bound status are the following:  Weakness/numbness causing balance and gait disturbance due to Weakness/Debility making it taxing to leave  home.    Allergies:  Review of patient's allergies indicates:   Allergen Reactions    Adhesive      Adhesive tape    Latex, natural rubber      Adhesive from latex tape    Sulfa (sulfonamide antibiotics)      unknown    Ibuprofen Other (See Comments)     Causes asthma flare??       Diet: cardiac diet    Activities: activity as tolerated    Labs: CBC, BMP, Mg, Phos Weekly faxed results to Alon Santiago MD    Nursing:   SN to complete comprehensive assessment including routine vital signs. Instruct on disease process and s/s of complications to report to MD. Review/verify medication list sent home with the patient at time of discharge  and instruct patient/caregiver as needed. Frequency may be adjusted depending on start of care date.    Notify MD if SBP > 160 or < 90; DBP > 90 or < 50; HR > 120 or < 50; Temp > 101;       CONSULTS:    Physical Therapy to evaluate and treat. Evaluate for home safety and equipment needs; Establish/upgrade home exercise program. Perform / instruct on therapeutic exercises, gait training, transfer training, and Range of Motion.  Occupational Therapy to evaluate and treat. Evaluate home environment for safety and equipment needs. Perform/Instruct on transfers, ADL training, ROM, and therapeutic exercises.  Aide to provide assistance with personal care, ADLs, and vital signs.        WOUND CARE ORDERS: Changed dressing to left knee daily, instruct patient and family on how to change dressings.        Medications: Review discharge medications with patient and family and provide education.      Current Discharge Medication List      START taking these medications    Details   ciprofloxacin HCl (CIPRO) 500 MG tablet Take 1 tablet (500 mg total) by mouth every 12 (twelve) hours. for 5 days  Qty: 10 tablet, Refills: 0         CONTINUE these medications which have CHANGED    Details   atorvastatin (LIPITOR) 40 MG tablet Take 1 tablet (40 mg total) by mouth once daily.  Qty: 30 tablet,  Refills: 0         CONTINUE these medications which have NOT CHANGED    Details   acetaminophen (TYLENOL) 500 MG tablet Take 500 mg by mouth every 6 (six) hours as needed for Pain.      albuterol (VENTOLIN HFA) 90 mcg/actuation inhaler INHALE 2 PUFFS INTO THE LUNGS EVERY 4 HOURS AS NEEDED FOR WHEEZING  Qty: 18 g, Refills: 3      budesonide (PULMICORT FLEXHALER) 90 mcg/actuation AePB Inhale 2 puffs (180 mcg total) into the lungs 2 (two) times daily. Controller  Qty: 1 each, Refills: 3    Associated Diagnoses: Asthma, unspecified asthma severity, unspecified whether complicated, unspecified whether persistent      buPROPion (WELLBUTRIN SR) 200 MG SR12 Take 1 tablet (200 mg total) by mouth once daily.  Qty: 90 tablet, Refills: 2      busPIRone (BUSPAR) 10 MG tablet TAKE 1/2 TABLET BY MOUTH ONCE DAILY  Qty: 60 tablet, Refills: 0      diazePAM (VALIUM) 2 MG tablet Take 2 mg by mouth every 12 (twelve) hours as needed for Anxiety.      diclofenac sodium (VOLTAREN) 1 % Gel APPLY 2 GRAMS EXTERNALLY TO THE AFFECTED AREA FOUR TIMES DAILY  Qty: 100 g, Refills: 0      escitalopram oxalate (LEXAPRO) 20 MG tablet Take 1 tablet (20 mg total) by mouth once daily.  Qty: 90 tablet, Refills: 3      furosemide (LASIX) 20 MG tablet Take 1 tablet (20 mg total) by mouth daily as needed.  Qty: 90 tablet, Refills: 1      gabapentin (NEURONTIN) 300 MG capsule Take 1 capsule (300 mg total) by mouth 3 (three) times daily.  Qty: 270 capsule, Refills: 3      levothyroxine (SYNTHROID) 75 MCG tablet Take 75 mcg by mouth once daily.      omeprazole (PRILOSEC) 40 MG capsule TAKE 1 CAPSULE(40 MG) BY MOUTH EVERY MORNING  Qty: 90 capsule, Refills: 0    Associated Diagnoses: Gastroesophageal reflux disease without esophagitis      ondansetron (ZOFRAN-ODT) 4 MG TbDL Take 1 tablet (4 mg total) by mouth every 6 (six) hours as needed.  Qty: 20 tablet, Refills: 1    Associated Diagnoses: Nausea      oxyCODONE-acetaminophen (PERCOCET)  mg per tablet Take  1 tablet by mouth every 6 (six) hours as needed.  Qty: 40 tablet, Refills: 0      potassium chloride (KLOR-CON) 10 MEQ TbSR TAKE 2 TABLETS(20 MEQ) BY MOUTH EVERY DAY  Qty: 90 tablet, Refills: 3      predniSONE (DELTASONE) 5 MG tablet TAKE 1 TABLET BY MOUTH DAILY  Qty: 30 tablet, Refills: 0    Associated Diagnoses: Arthritis of both knees      promethazine (PHENERGAN) 25 MG tablet TAKE 1 TABLET(25 MG) BY MOUTH EVERY 6 HOURS AS NEEDED  Qty: 25 tablet, Refills: 0      zolpidem (AMBIEN) 5 MG Tab Take 1 tablet (5 mg total) by mouth nightly.  Qty: 30 tablet, Refills: 0             I certify that this patient is confined to her home and needs intermittent skilled nursing care, physical therapy and occupational therapy.      SELENA Quiñones FNP-C   Nurse Practitioner- Hospitalist  Department of Hospital Medicine  [facility]

## 2019-05-20 NOTE — PT/OT/SLP PROGRESS
Occupational Therapy      Patient Name:  Christine Castro   MRN:  [id number]    Patient not seen today secondary to (caregiver removed pt dressing to L knee and asking for nurse to put on new dressing and doesnot want OT to see pt until after she gets a new dressing on her knee). Will follow-up as able.    Vickie Rashid, OT  5/20/2019

## 2019-05-20 NOTE — PLAN OF CARE
Problem: Adult Inpatient Plan of Care  Goal: Plan of Care Review  Outcome: Ongoing (interventions implemented as appropriate)  Plan of care reviewed with patient and family, understanding verbalized. Pt remains SR on tele.. L knee dressing intact. Left sided weakness and mild slurred speech noted. Neruro checks q4. BSC at bedside. Family remains at the bedside. Instructed to call for any assistance, understanding verbalized. Bed alarm on, call light in reach, fall precautions in place. Will continue to monitor.

## 2019-05-20 NOTE — CONSULTS
[facility]  Adult Nutrition  Consult Note    SUMMARY     Recommendations    1. Continue current Cardiac diet.   2. Encourage good po intake.   Goals: Pt to meet >/= 75% of EEN/EPN.  Nutrition Goal Status: new  Communication of RD Recs: (plan of care)    Reason for Assessment    Reason For Assessment: consult  Diagnosis: stroke/CVA  Relevant Medical History: weakness, CHF, GERD, CAD, MI, thyroid disease, cancer  General Information Comments: Pt with good appetite and intake while admitted as well as PTA.Regular texture per SLP recs. Denies any issues. NFPE 5/20-Pt appears nourished.  Nutrition Discharge Planning: Pt to d/c on a cardiac diet.     Nutrition Risk Screen    Nutrition Risk Screen: no indicators present    Nutrition/Diet History    Spiritual, Cultural Beliefs, Yazidi Practices, Values that Affect Care: no    Anthropometrics    Temp: 97.5 °F (36.4 °C)  Height: 5' (152.4 cm)  Height (inches): 60 in  Weight Method: Bed Scale  Weight: 85.2 kg (187 lb 13.3 oz)  Weight (lb): 187.83 lb  Ideal Body Weight (IBW), Female: 100 lb  % Ideal Body Weight, Female (lb): 200.62 lb  BMI (Calculated): 39.3  BMI Grade: 35 - 39.9 - obesity - grade II    Lab/Procedures/Meds    Pertinent Labs Reviewed: reviewed  Pertinent Labs Comments: Alb 2.7L  Pertinent Medications Reviewed: reviewed  Pertinent Medications Comments: statin, pantoprazole, prednisone    Estimated/Assessed Needs    Weight Used For Calorie Calculations: 85.2 kg (187 lb 13.3 oz)  Energy Calorie Requirements (kcal): 1647 kcal daily  Energy Need Method: [place]-St Jeor(x1.25)  Protein Requirements: 68 gm daily  Weight Used For Protein Calculations: 85.2 kg (187 lb 13.3 oz)(0.8 gm/kg)  Fluid Requirements (mL): 1 mL/kcal or per MD  Estimated Fluid Requirement Method: RDA Method  RDA Method (mL): 1647    Nutrition Prescription Ordered    Current Diet Order: Cardiac    Evaluation of Received Nutrient/Fluid Intake    I/O: -80 x 24 hrs, -280 since  admit  Comments: LBM 5/18  Tolerance: tolerating  % Intake of Estimated Energy Needs: 75 - 100 %  % Meal Intake: 75 - 100 %    Nutrition Risk    Level of Risk/Frequency of Follow-up: (f/u 1x/weekly)     Assessment and Plan    No nutrition diagnosis at this time.    Monitor and Evaluation    Food and Nutrient Intake: energy intake  Food and Nutrient Adminstration: diet order  Knowledge/Beliefs/Attitudes: food and nutrition knowledge/skill  Physical Activity and Function: nutrition-related ADLs and IADLs  Anthropometric Measurements: weight, weight change  Biochemical Data, Medical Tests and Procedures: gastrointestinal profile, lipid profile, electrolyte and renal panel, glucose/endocrine profile, inflammatory profile  Nutrition-Focused Physical Findings: overall appearance     Malnutrition Assessment    Subcutaneous Fat Loss (Final Summary): well nourished  Muscle Loss Evaluation (Final Summary): well nourished      Nutrition Follow-Up    RD Follow-up?: Yes

## 2019-05-20 NOTE — DISCHARGE SUMMARY
[facility] Hospital Medicine  Discharge Summary      Patient Name: Christine Castro  MRN: [id number]  Admission Date: 5/18/2019  Hospital Length of Stay: 1 days  Discharge Date and Time:  05/20/2019 2:58 PM  Attending Physician: Andrew Aguilar MD   Discharging Provider: Betty Frausto NP  Primary Care Provider: Alon Santiago MD      HPI:   Christine Castro is a 64 year old female with a PMHx of NIDDM2, CHF (EF 55%), CAD, MI, Colon Cancer (s/p Colectomy), hypothyroid, Asthma, Arthritis, and Morbid Obesity (s/p Gastric Bypass).  She had a Left TKA with Dr. Greenwood on 5/8/2019 and was discharged home with Home Health.  She was not on chemical DVT prophylaxis.  Her PCP is Dr. Alon Beckman.     She presented to [facility] ED via EMS with left sided facial droop, slurred speech and aphasia and left sided weakness. Per EMS, s that patient's caregiver left the house at 1300 today and when she returned at 2015. Upon returning she gave patient 50mg of benadryl because patient was c/o a headache. Caregiver in room states that when she went to give patient a drink of water some of the water started draining from patient's mouth and she noticed a left sided facial droop. The patient is also complaining of left sided headache. Her symptoms improves, but may have the slightest dyrathria.  She had a negative head CT.  Her Chest X-Ray and Lab work up was unremarkable.  Tele-Stroke consult was done with Dr. Saeed who is concerned for possible  a small acute subcortical L brain infarct, but she is not a candidate for iv alteplase due to late presentation and recent L knee surgery.  She is admitted to [facility] for complete Stroke workup.     * No surgery found *      Hospital Course:   4/19/19 Pending MRI/MRA, neurology and speech consult.  4/20/19 MRI/MRA showed no acute changes, echo stable, patient okay to discharge and follow up with neurology as outpatient.     Consults:    Consults (From admission, onward)        Status Ordering Provider     Inpatient consult to Neurology  Once     Provider:  (Not yet assigned)    Completed YAA LUCERO     Inpatient consult to Registered Dietitian/Nutritionist  Once     Provider:  (Not yet assigned)    Completed FREDRICK HOLLY     Inpatient consult to Social Work  Once     Provider:  (Not yet assigned)    Acknowledged FREDRICK HOLLY     Inpatient consult to Telemedicine-Stroke  Once     Provider:  (Not yet assigned)    Acknowledged SHYLA OSBORN     IP consult to case management/social work  Once     Provider:  (Not yet assigned)    Acknowledged FREDRICK HOLLY          * TIA (transient ischemic attack)  Coronary artery disease involving native coronary artery of native heart  Risk Factors: Age, HTN, hyperlipidemia, DM, Recent Knee surgery without chemical DVT prophylaxis  --Neuro-checks Every 4 hours  --Transthoracic Echo with Bubble Study: Stable, EF 60%  --CT Scan: No acute changes  --MRI/MRA of Brain and Neck: No acute changes  --Neurology consult: ok to discharge on aspirin and statin, follow up in neurology clinic  --Continue Aspirin and Atorvastatin            UTI (urinary tract infection)  U/A- positive  Continue cipro      History of left knee replacement  Primary osteoarthritis  S/P left knee arthroplasty on 5/9/19, Stables intact, no signs of infection.  Surgery done by Dr. Greenwood.    Change dressing daily  Pain Control  Case management to arrange home health  Patient has follow up appointment with Dr. Bradshaw this coming Thursday.     Normocytic anemia  Hgb 9.5>9.6, stable, no signs of active bleeding.      History of colon cancer  History of gastric bypass  Noted      Depression  Mood Stable  --Continue home Lexapro, Wellbutrin and Buspar     History of congestive heart failure  Stable  No signs of decompensation.  Denies Chest Pain or Shortness of Breath.      Acquired hypothyroidism  TSH 0.340.   Continue home  levothyroxine        Final Active Diagnoses:    Diagnosis Date Noted POA    PRINCIPAL PROBLEM:  TIA (transient ischemic attack) [G45.9] 05/19/2019 Yes    History of gastric bypass [Z98.84] 05/19/2019 Not Applicable     Chronic    UTI (urinary tract infection) [N39.0] 05/19/2019 Yes    Coronary artery disease involving native coronary artery of native heart [I25.10]  Yes     Chronic    History of left knee replacement [Z96.652] 05/08/2019 Not Applicable     Chronic    Normocytic anemia [D64.9] 04/10/2018 Yes     Chronic    History of colon cancer [Z85.038] 04/18/2017 Yes     Chronic    Depression [F32.9] 12/04/2015 Yes    History of congestive heart failure [Z86.79]  Not Applicable    Acquired hypothyroidism [E03.9]  Yes     Chronic    Primary osteoarthritis [M19.91]  Yes     Chronic      Problems Resolved During this Admission:       Discharged Condition: stable    Disposition: Home-Health Care [facility] [place]    Follow Up:  Follow-up Information     [facility].    Specialty:  Home Health Services  Why:  Home Health  Contact information:  [address]  [address]  [facility] [address]  [phone]             Roldan Greenwood MD On 5/23/2019.    Specialty:  Orthopedic Surgery  Why:  11:15 am Post-op  Contact information:  [address]  [address]  [facility] [address]  [phone]             Alon Santiago MD. Schedule an appointment as soon as possible for a visit in 1 week.    Specialty:  Family Medicine  Why:  For Hospital Follow-up  Contact information:  [address] [place] [address]  [phone]                 Patient Instructions:      COMMODE FOR HOME USE     Order Specific Question Answer Comments   Type: Standard    Height: 5' (1.524 m)    Weight: 85.2 kg (187 lb 13.3 oz)    Does patient have medical equipment at home? walker, rolling    Does patient have medical equipment at home? bath bench    Does patient have medical equipment at home? cane, straight    Does patient  have medical equipment at home? walker, standard    Length of need (1-99 months): 6      Ambulatory referral to Neurology   Referral Priority: Routine Referral Type: Consultation   Referral Reason: Specialty Services Required   Requested Specialty: Neurology   Number of Visits Requested: 1     Diet Cardiac     Notify your health care provider if you experience any of the following:  temperature >100.4     Notify your health care provider if you experience any of the following:  persistent nausea and vomiting or diarrhea     Notify your health care provider if you experience any of the following:  severe uncontrolled pain     Notify your health care provider if you experience any of the following:  redness, tenderness, or signs of infection (pain, swelling, redness, odor or green/yellow discharge around incision site)     Notify your health care provider if you experience any of the following:  difficulty breathing or increased cough     Notify your health care provider if you experience any of the following:  severe persistent headache     Notify your health care provider if you experience any of the following:  worsening rash     Notify your health care provider if you experience any of the following:  persistent dizziness, light-headedness, or visual disturbances     Notify your health care provider if you experience any of the following:  increased confusion or weakness     Activity as tolerated       Significant Diagnostic Studies: Labs:   BMP:   Recent Labs   Lab 05/18/19  2153 05/20/19  0617   GLU 92 106    139   K 4.0 4.4    105   CO2 26 27   BUN 15 11   CREATININE 0.90 0.9   CALCIUM 8.7 9.4   MG  --  1.6   , CMP   Recent Labs   Lab 05/18/19  2153 05/20/19  0617    139   K 4.0 4.4    105   CO2 26 27   GLU 92 106   BUN 15 11   CREATININE 0.90 0.9   CALCIUM 8.7 9.4   PROT 5.9* 6.0   ALBUMIN 3.0* 2.7*   BILITOT 0.1 0.2   ALKPHOS 68 74   AST 17 16   ALT 10 7*   ANIONGAP 5* 7*    ESTGFRAFRICA >60.0 >60   EGFRNONAA >60.0 >60   , CBC   Recent Labs   Lab 05/18/19  2153 05/20/19  0617   WBC 6.09 5.53   HGB 9.5* 9.6*   HCT 30.9* 30.5*    272   , Lipid Panel   Lab Results   Component Value Date    CHOL 164 05/19/2019    HDL 43 05/19/2019    LDLCALC 102.4 05/19/2019    TRIG 93 05/19/2019    CHOLHDL 26.2 05/19/2019    and A1C:   Recent Labs   Lab 01/09/19  1113 05/19/19  0550   HGBA1C 5.0 5.2       Pending Diagnostic Studies:     None         Medications:  Reconciled Home Medications:      Medication List      START taking these medications    ciprofloxacin HCl 500 MG tablet  Commonly known as:  CIPRO  Take 1 tablet (500 mg total) by mouth every 12 (twelve) hours. for 5 days        CHANGE how you take these medications    atorvastatin 40 MG tablet  Commonly known as:  LIPITOR  Take 1 tablet (40 mg total) by mouth once daily.  Start taking on:  5/21/2019  What changed:    · medication strength  · how much to take  · when to take this     gabapentin 300 MG capsule  Commonly known as:  NEURONTIN  Take 1 capsule (300 mg total) by mouth 3 (three) times daily.  What changed:  when to take this     potassium chloride 10 MEQ Tbsr  Commonly known as:  KLOR-CON  TAKE 2 TABLETS(20 MEQ) BY MOUTH EVERY DAY  What changed:  additional instructions        CONTINUE taking these medications    acetaminophen 500 MG tablet  Commonly known as:  TYLENOL  Take 500 mg by mouth every 6 (six) hours as needed for Pain.     albuterol 90 mcg/actuation inhaler  Commonly known as:  VENTOLIN HFA  INHALE 2 PUFFS INTO THE LUNGS EVERY 4 HOURS AS NEEDED FOR WHEEZING     budesonide 90 mcg/actuation Aepb  Commonly known as:  PULMICORT FLEXHALER  Inhale 2 puffs (180 mcg total) into the lungs 2 (two) times daily. Controller     buPROPion 200 MG Sr12  Commonly known as:  WELLBUTRIN SR  Take 1 tablet (200 mg total) by mouth once daily.     busPIRone 10 MG tablet  Commonly known as:  BUSPAR  TAKE 1/2 TABLET BY MOUTH ONCE DAILY      diazePAM 2 MG tablet  Commonly known as:  VALIUM  Take 2 mg by mouth every 12 (twelve) hours as needed for Anxiety.     diclofenac sodium 1 % Gel  Commonly known as:  VOLTAREN  APPLY 2 GRAMS EXTERNALLY TO THE AFFECTED AREA FOUR TIMES DAILY     escitalopram oxalate 20 MG tablet  Commonly known as:  LEXAPRO  Take 1 tablet (20 mg total) by mouth once daily.     furosemide 20 MG tablet  Commonly known as:  LASIX  Take 1 tablet (20 mg total) by mouth daily as needed.     levothyroxine 75 MCG tablet  Commonly known as:  SYNTHROID  Take 75 mcg by mouth once daily.     omeprazole 40 MG capsule  Commonly known as:  PRILOSEC  TAKE 1 CAPSULE(40 MG) BY MOUTH EVERY MORNING     ondansetron 4 MG Tbdl  Commonly known as:  ZOFRAN-ODT  Take 1 tablet (4 mg total) by mouth every 6 (six) hours as needed.     oxyCODONE-acetaminophen  mg per tablet  Commonly known as:  PERCOCET  Take 1 tablet by mouth every 6 (six) hours as needed.     predniSONE 5 MG tablet  Commonly known as:  DELTASONE  TAKE 1 TABLET BY MOUTH DAILY     promethazine 25 MG tablet  Commonly known as:  PHENERGAN  TAKE 1 TABLET(25 MG) BY MOUTH EVERY 6 HOURS AS NEEDED     zolpidem 5 MG Tab  Commonly known as:  AMBIEN  Take 1 tablet (5 mg total) by mouth nightly.            Indwelling Lines/Drains at time of discharge:   Lines/Drains/Airways     Epidural Line                 Perineural Analgesia/Anesthesia Assessment (using dermatomes) 05/08/19 1335 12 days                Time spent on the discharge of patient: 45 minutes  Patient was seen and examined on the date of discharge and determined to be suitable for discharge.         Betty Frausto NP  Department of Hospital Medicine  [facility]

## 2019-05-20 NOTE — ASSESSMENT & PLAN NOTE
Primary osteoarthritis  S/P left knee arthroplasty on 5/9/19, Stables intact, no signs of infection.  Surgery done by Dr. Greenwood.    Change dressing daily  Pain Control  Case management to arrange home health  Patient has follow up appointment with Dr. Bradshaw this coming Thursday.

## 2019-05-20 NOTE — NURSING
Problem: Adult Inpatient Plan of Care  Goal: Plan of Care Review  VN cued into patients room for rounding. Informed patient that I will be working alongside bedside nurse and PCT throughout the night shift. Pt verbalized understanding that VN is available for any questions and education. VN will continue to be available to patient and intervene prn. Bed Alarm in place. Call light within reach

## 2019-05-20 NOTE — PLAN OF CARE
Problem: SLP Goal  Goal: SLP Goal  SLP Short Term Goals:  1. Pt will repeat multisyllabic words utilizing speech strategies with 90% accuracy.  2. Pt will complete oral motor exercises x10 each to improve oral motor coordination.  3. Pt will complete speech drills with SLP with min cues to improve overall speech production.    4. Pt will name 13+ items in a concrete category in 60 seconds given min cues.   5. Pt will complete high level word finding activities utilizing word finding strategies with 85% acc given min cues.   6. Pt will utilize memory strategies to recall functional information with 85% acc after a 5+ minute delay given min cues.   Outcome: Ongoing (interventions implemented as appropriate)  Dnfzga-Ebnpyruw-Rjhewrhbp Evaluation completed. Goals updated.   KALE Ojeda., CCC-SLP  Spectra: [phone]  05/20/2019

## 2019-05-20 NOTE — PLAN OF CARE
BS commode delivered to room by this CM, education handout given, packet completed.  Pt has no additional needs, states she has transportation home.  [facility] accepted and will see pt tomorrow.       05/20/19 1527   Final Note   Assessment Type Final Discharge Note   Anticipated Discharge Disposition Home   Hospital Follow Up  Appt(s) scheduled? Yes   Discharge plans and expectations educations in teach back method with documentation complete? Yes   Right Care Referral Info   Post Acute Recommendation Home-care       Future Appointments   Date Time Provider Department Center   5/23/2019 11:15 AM Roldan Greenwood MD [facility] KATI Hernandez, RN    [phone]

## 2019-05-20 NOTE — PLAN OF CARE
VN note: VN cued into pt's room for introduction. Speech therapy is at the bedside.  VN informed pt that VN would be working closely along side bedside nurse, PCT, and the rest of care team and making rounds throughout the shift. Pt verbalized understanding. Allowed time for questions. Patient denies any pain or discomfort at this time.   Patient educated on safety to call before getting up, because we don't want the patient to fall and harm themselves in any way.  VN will continue to be available to patient and intervene prn.        05/20/19 0918   Type of Frequent Check   Type Patient Rounds  (VN rounding)   Safety/Activity   Patient Rounds visualized patient;call light in patient/parent reach   Safety Promotion/Fall Prevention side rails raised x 2   Safety Precautions emergency equipment at bedside   Positioning   Body Position supine   Head of Bed (HOB) HOB at 30-45 degrees   Pain/Comfort/Sleep   Preferred Pain Scale number (Numeric Rating Pain Scale)   Comfort/Acceptable Pain Level 5   Pain Body Location - Orientation lower   Pain Rating (0-10): Rest 3  (per the patient the nurse gave her meds for her pain)   Pain Rating (0-10): Activity 3        Cardiac/Telemetry Details / Alarms   Cardiac/Telemetry Monitor On Yes   Cardiac/Telemetry Audible Yes   Cardiac/Telemetry Alarms Set Yes   Assessments (Pre/Post)   Level of Consciousness (AVPU) alert

## 2019-05-20 NOTE — PLAN OF CARE
Visit with pt for d/c planning, friend present in room.  Pt lives at home with family, is current with [facility] after successful left knee replacement.  Pt still has dressing and staples, has appt Thursday for removal.  She has multiple DME at home but does not have a bedside commode.  States she needs one as she is not always able to make it to commode on time at night.  Equipment ordered, will deliver to bedside once approved.      Pt would like to make her PCP appt to accommodate with her schedule.  Aware she needs to be seen within 1 week.  Ambulatory referral to Neurology, pt will be contacted to schedule appt.       05/20/19 1193   Discharge Assessment   Assessment Type Discharge Planning Assessment   Confirmed/corrected address and phone number on facesheet? Yes   Assessment information obtained from? Patient   Communicated expected length of stay with patient/caregiver yes   Prior to hospitilization cognitive status: Alert/Oriented   Prior to hospitalization functional status: Assistive Equipment;Independent   Current cognitive status: Alert/Oriented   Lives With child(sonia), adult   Able to Return to Prior Arrangements yes   Is patient able to care for self after discharge? Yes   Readmission Within the Last 30 Days current reason for admission unrelated to previous admission   Patient currently being followed by outpatient case management? No   Patient currently receives any other outside agency services? No   Equipment Currently Used at Home walker, rolling;bath bench;cane, straight;walker, standard   Do you have any problems affording any of your prescribed medications? No   Is the patient taking medications as prescribed? yes   Does the patient have transportation home? Yes   Transportation Anticipated family or friend will provide   Does the patient receive services at the [facility]? No   Discharge Plan A Home with family;Home Health  ([facility])   DME Needed Upon Discharge  bedside commode   Patient/Family  in Agreement with Plan yes   Readmission Questionnaire   At the time of your discharge, did someone talk to you about what your health problems were? Yes   At the time of discharge, did someone talk to you about what to watch out for regarding worsening of your health problem? Yes   At the time of discharge, did someone talk to you about what to do if you experienced worsening of your health problem? Yes   At the time of discharge, did someone talk to you about which medication to take when you left the hospital and which ones to stop taking? Yes   At the time of discharge, did someone talk to you about when and where to follow up with a doctor after you left the hospital? Yes   How often do you need to have someone help you when you read instructions, pamphlets, or other written material from your doctor or pharmacy? Sometimes   Do you have problems taking your medications as prescribed? No   Do you have problems obtaining/receiving your medications? No   Does the patient have transportation to healthcare appointments? Yes   Does the patient have family/friends to help with healtcare needs after discharge? yes   Does your caregiver provide all the help you need? Yes   Are you currently feeling confused? No   Are you currently having problems thinking? No   Are you currently having memory problems? No       Future Appointments   Date Time Provider Department Center   5/23/2019 11:15 AM Roldan Greenwood MD [facility] KATI Hernandez, RN    [phone]

## 2019-05-21 NOTE — PT/OT/SLP DISCHARGE
Physical Therapy Discharge Summary    Name: Christine Castro  MRN: [id number]   Principal Problem: TIA (transient ischemic attack)     Patient Discharged from acute Physical Therapy on 19.  Please refer to prior PT noted date on 19 for functional status.     Assessment:     Patient appropriate for care in another setting.    Objective:     GOALS:   Multidisciplinary Problems     Physical Therapy Goals     Not on file          Multidisciplinary Problems (Resolved)        Problem: Physical Therapy Goal    Goal Priority Disciplines Outcome Goal Variances Interventions   Physical Therapy Goal   (Resolved)     PT, PT/OT Outcome(s) achieved     Description:  Goals to be met by: 19     Patient will increase functional independence with mobility by performin. Supine <> sit with Stand-by Assistance  2. Sit to stand transfer with Stand-by Assistance  3. Bed to chair transfer with Stand-by Assistance using Rolling Walker  4. Gait  x 50 feet with Stand-by Assistance using Rolling Walker.   5. Lower extremity exercise program x 10 reps per handout, with supervision                      Reasons for Discontinuation of Therapy Services  Transfer to alternate level of care.      Plan:     Patient Discharged to: Home with Home Health Service.    Melvi Lo, PT  2019

## 2019-05-22 ENCOUNTER — NURSE TRIAGE (OUTPATIENT)
Dept: ADMINISTRATIVE | Facility: CLINIC | Age: 64
End: 2019-05-22

## 2019-05-23 ENCOUNTER — TELEPHONE (OUTPATIENT)
Dept: ORTHOPEDICS | Facility: CLINIC | Age: 64
End: 2019-05-23

## 2019-05-23 ENCOUNTER — HOSPITAL ENCOUNTER (OUTPATIENT)
Dept: RADIOLOGY | Facility: HOSPITAL | Age: 64
Discharge: HOME | End: 2019-05-23
Attending: ORTHOPAEDIC SURGERY
Payer: MEDICARE

## 2019-05-23 ENCOUNTER — OFFICE VISIT (OUTPATIENT)
Dept: ORTHOPEDICS | Facility: CLINIC | Age: 64
End: 2019-05-23
Payer: MEDICARE

## 2019-05-23 VITALS — WEIGHT: 187 LBS | HEIGHT: 60 IN | BODY MASS INDEX: 36.71 KG/M2

## 2019-05-23 DIAGNOSIS — M17.12 PRIMARY OSTEOARTHRITIS OF LEFT KNEE: Primary | ICD-10-CM

## 2019-05-23 DIAGNOSIS — Z98.890 S/P LEFT KNEE SURGERY: ICD-10-CM

## 2019-05-23 DIAGNOSIS — M25.562 LEFT KNEE PAIN, UNSPECIFIED CHRONICITY: Primary | ICD-10-CM

## 2019-05-23 DIAGNOSIS — Z96.652 HISTORY OF LEFT KNEE REPLACEMENT: ICD-10-CM

## 2019-05-23 PROCEDURE — 99024 PR POST-OP FOLLOW-UP VISIT: ICD-10-PCS | Mod: S$GLB,,, | Performed by: ORTHOPAEDIC SURGERY

## 2019-05-23 PROCEDURE — 99024 POSTOP FOLLOW-UP VISIT: CPT | Mod: S$GLB,,, | Performed by: ORTHOPAEDIC SURGERY

## 2019-05-23 PROCEDURE — 73560 X-RAY EXAM OF KNEE 1 OR 2: CPT | Mod: 26,LT,, | Performed by: RADIOLOGY

## 2019-05-23 PROCEDURE — 99999 PR PBB SHADOW E&M-EST. PATIENT-LVL III: ICD-10-PCS | Mod: PBBFAC,,, | Performed by: ORTHOPAEDIC SURGERY

## 2019-05-23 PROCEDURE — 73560 XR KNEE 1 OR 2 VIEW LEFT: ICD-10-PCS | Mod: 26,LT,, | Performed by: RADIOLOGY

## 2019-05-23 PROCEDURE — 73560 X-RAY EXAM OF KNEE 1 OR 2: CPT | Mod: TC,PN,LT

## 2019-05-23 PROCEDURE — 99999 PR PBB SHADOW E&M-EST. PATIENT-LVL III: CPT | Mod: PBBFAC,,, | Performed by: ORTHOPAEDIC SURGERY

## 2019-05-23 RX ORDER — OXYCODONE AND ACETAMINOPHEN 10; 325 MG/1; MG/1
1 TABLET ORAL EVERY 8 HOURS PRN
Qty: 45 TABLET | Refills: 0 | Status: SHIPPED | OUTPATIENT
Start: 2019-05-23 | End: 2019-07-22

## 2019-05-23 NOTE — PROGRESS NOTES
Subjective:      Patient ID: Christine Castro is a 64 y.o. female.    Chief Complaint: Post-op Evaluation of the Left Knee      HPI:  Two weeks postop  The patient is seen for postop follow-up of left  TKA.  Pain control has been Fair  They feel that they are ambulating with a walker with minimal difficulty  Preoperative complaints include:  Postoperative pain      Current Outpatient Medications:     acetaminophen (TYLENOL) 500 MG tablet, Take 500 mg by mouth every 6 (six) hours as needed for Pain., Disp: , Rfl:     albuterol (VENTOLIN HFA) 90 mcg/actuation inhaler, INHALE 2 PUFFS INTO THE LUNGS EVERY 4 HOURS AS NEEDED FOR WHEEZING, Disp: 18 g, Rfl: 3    aspirin (ECOTRIN) 81 MG EC tablet, Take 1 tablet (81 mg total) by mouth once daily., Disp: , Rfl: 0    atorvastatin (LIPITOR) 40 MG tablet, Take 1 tablet (40 mg total) by mouth once daily., Disp: 30 tablet, Rfl: 0    buPROPion (WELLBUTRIN SR) 200 MG SR12, Take 1 tablet (200 mg total) by mouth once daily., Disp: 90 tablet, Rfl: 2    busPIRone (BUSPAR) 10 MG tablet, TAKE 1/2 TABLET BY MOUTH ONCE DAILY, Disp: 60 tablet, Rfl: 0    ciprofloxacin HCl (CIPRO) 500 MG tablet, Take 1 tablet (500 mg total) by mouth every 12 (twelve) hours. for 5 days, Disp: 10 tablet, Rfl: 0    diclofenac sodium (VOLTAREN) 1 % Gel, APPLY 2 GRAMS EXTERNALLY TO THE AFFECTED AREA FOUR TIMES DAILY, Disp: 100 g, Rfl: 0    escitalopram oxalate (LEXAPRO) 20 MG tablet, Take 1 tablet (20 mg total) by mouth once daily., Disp: 90 tablet, Rfl: 3    furosemide (LASIX) 20 MG tablet, Take 1 tablet (20 mg total) by mouth daily as needed., Disp: 90 tablet, Rfl: 1    levothyroxine (SYNTHROID) 75 MCG tablet, Take 75 mcg by mouth once daily., Disp: , Rfl:     omeprazole (PRILOSEC) 40 MG capsule, TAKE 1 CAPSULE(40 MG) BY MOUTH EVERY MORNING, Disp: 90 capsule, Rfl: 0    ondansetron (ZOFRAN-ODT) 4 MG TbDL, Take 1 tablet (4 mg total) by mouth every 6 (six) hours as needed., Disp: 20 tablet, Rfl: 1     oxyCODONE-acetaminophen (PERCOCET)  mg per tablet, Take 1 tablet by mouth every 6 (six) hours as needed., Disp: 40 tablet, Rfl: 0    potassium chloride (KLOR-CON) 10 MEQ TbSR, TAKE 2 TABLETS(20 MEQ) BY MOUTH EVERY DAY (Patient taking differently: TAKE 2 TABLETS(20 MEQ) BY MOUTH EVERY DAY), Disp: 90 tablet, Rfl: 3    predniSONE (DELTASONE) 5 MG tablet, TAKE 1 TABLET BY MOUTH DAILY, Disp: 30 tablet, Rfl: 0    promethazine (PHENERGAN) 25 MG tablet, TAKE 1 TABLET(25 MG) BY MOUTH EVERY 6 HOURS AS NEEDED, Disp: 25 tablet, Rfl: 0    zolpidem (AMBIEN) 5 MG Tab, Take 1 tablet (5 mg total) by mouth nightly., Disp: 30 tablet, Rfl: 0    budesonide (PULMICORT FLEXHALER) 90 mcg/actuation AePB, Inhale 2 puffs (180 mcg total) into the lungs 2 (two) times daily. Controller, Disp: 1 each, Rfl: 3    diazePAM (VALIUM) 2 MG tablet, Take 2 mg by mouth every 12 (twelve) hours as needed for Anxiety., Disp: , Rfl:     gabapentin (NEURONTIN) 300 MG capsule, Take 1 capsule (300 mg total) by mouth 3 (three) times daily. (Patient taking differently: Take 300 mg by mouth every evening. ), Disp: 270 capsule, Rfl: 3  Review of patient's allergies indicates:   Allergen Reactions    Adhesive      Adhesive tape    Latex, natural rubber      Adhesive from latex tape    Ibuprofen Other (See Comments)     Causes asthma flare??       Ht 5' (1.524 m)   Wt 84.8 kg (187 lb)   BMI 36.52 kg/m²     ROS        Objective:    Ortho Exam          Alert, oriented, no acute distress  Sclera-Normal  Respiratory distress-none  Gait not observed  Incision:  Clean and healing normally  Range of motion: extension- 5 degrees; flexion- 100 degrees  Valgus/varus stability- stable  Swelling-mild    Assessment:     Imaging:  Today's radiographs show well-positioned well-fixed left total knee replacement        1. Primary osteoarthritis of left knee    2. History of left knee replacement        The patient is progress is normal after the procedure. She had  severe degeneration and functional impairment in this knee prior to the procedure        Plan:          No follow-ups on file.    I explained my assessment the natural history of recovery    Continue therapy    Patient advised to use a walker at all times to prevent falls

## 2019-05-23 NOTE — TELEPHONE ENCOUNTER
Reason for Disposition   [1] Bleeding from incision AND [2] won't stop after 10 minutes of direct pressure    Protocols used: POST-OP INCISION SYMPTOMS-A-AH   Surg 5/8/admit 5/18   caretaker is calling... She had to change bandage 2x tonight .. Has drainage..   just out of hosp yest. had knee replacement on 5/8. Pt having seepage. Seeing ortho MD in am   drainage is bright red - saturated bandage and had to change bandage again. Caregiver states after holding gentle pressure incision still oozing bright red drainage. rec ED. Caller declined. Caller transferred to speak with MD on call.

## 2019-05-26 RX ORDER — POTASSIUM CHLORIDE 750 MG/1
TABLET, EXTENDED RELEASE ORAL
Qty: 90 TABLET | Refills: 3 | Status: ON HOLD | OUTPATIENT
Start: 2019-05-26 | End: 2019-11-22 | Stop reason: SDUPTHER

## 2019-05-26 RX ORDER — FUROSEMIDE 20 MG/1
TABLET ORAL
Qty: 90 TABLET | Refills: 3 | Status: SHIPPED | OUTPATIENT
Start: 2019-05-26 | End: 2020-04-18

## 2019-05-26 RX ORDER — LEVOTHYROXINE SODIUM 100 UG/1
TABLET ORAL
Qty: 90 TABLET | Refills: 3 | Status: SHIPPED | OUTPATIENT
Start: 2019-05-26 | End: 2019-08-21

## 2019-05-27 ENCOUNTER — HOSPITAL ENCOUNTER (EMERGENCY)
Facility: HOSPITAL | Age: 64
Discharge: HOME | End: 2019-05-27
Attending: EMERGENCY MEDICINE
Payer: MEDICARE

## 2019-05-27 VITALS
DIASTOLIC BLOOD PRESSURE: 66 MMHG | WEIGHT: 187 LBS | HEIGHT: 60 IN | HEART RATE: 72 BPM | RESPIRATION RATE: 16 BRPM | TEMPERATURE: 98 F | SYSTOLIC BLOOD PRESSURE: 125 MMHG | BODY MASS INDEX: 36.71 KG/M2 | OXYGEN SATURATION: 100 %

## 2019-05-27 DIAGNOSIS — T79.2XXA SEROMA DUE TO TRAUMA: Primary | ICD-10-CM

## 2019-05-27 LAB
ANION GAP SERPL CALC-SCNC: 9 MMOL/L (ref 8–16)
BASOPHILS # BLD AUTO: 0.02 K/UL (ref 0–0.2)
BASOPHILS NFR BLD: 0.6 % (ref 0–1.9)
BUN SERPL-MCNC: 11 MG/DL (ref 8–23)
CALCIUM SERPL-MCNC: 9.3 MG/DL (ref 8.7–10.5)
CHLORIDE SERPL-SCNC: 111 MMOL/L (ref 95–110)
CO2 SERPL-SCNC: 22 MMOL/L (ref 23–29)
CREAT SERPL-MCNC: 0.9 MG/DL (ref 0.5–1.4)
DIFFERENTIAL METHOD: ABNORMAL
EOSINOPHIL # BLD AUTO: 0.2 K/UL (ref 0–0.5)
EOSINOPHIL NFR BLD: 6.6 % (ref 0–8)
ERYTHROCYTE [DISTWIDTH] IN BLOOD BY AUTOMATED COUNT: 13.7 % (ref 11.5–14.5)
EST. GFR  (AFRICAN AMERICAN): >60 ML/MIN/1.73 M^2
EST. GFR  (NON AFRICAN AMERICAN): >60 ML/MIN/1.73 M^2
GLUCOSE SERPL-MCNC: 77 MG/DL (ref 70–110)
HCT VFR BLD AUTO: 32.1 % (ref 37–48.5)
HGB BLD-MCNC: 10 G/DL (ref 12–16)
LYMPHOCYTES # BLD AUTO: 1.2 K/UL (ref 1–4.8)
LYMPHOCYTES NFR BLD: 34 % (ref 18–48)
MCH RBC QN AUTO: 30.6 PG (ref 27–31)
MCHC RBC AUTO-ENTMCNC: 31.2 G/DL (ref 32–36)
MCV RBC AUTO: 98 FL (ref 82–98)
MONOCYTES # BLD AUTO: 0.3 K/UL (ref 0.3–1)
MONOCYTES NFR BLD: 9.1 % (ref 4–15)
NEUTROPHILS # BLD AUTO: 1.7 K/UL (ref 1.8–7.7)
NEUTROPHILS NFR BLD: 49.4 % (ref 38–73)
PLATELET # BLD AUTO: 321 K/UL (ref 150–350)
PMV BLD AUTO: 8.5 FL (ref 9.2–12.9)
POTASSIUM SERPL-SCNC: 3.6 MMOL/L (ref 3.5–5.1)
RBC # BLD AUTO: 3.27 M/UL (ref 4–5.4)
SODIUM SERPL-SCNC: 142 MMOL/L (ref 136–145)
WBC # BLD AUTO: 3.5 K/UL (ref 3.9–12.7)

## 2019-05-27 PROCEDURE — 63600175 PHARM REV CODE 636 W HCPCS: Performed by: EMERGENCY MEDICINE

## 2019-05-27 PROCEDURE — 96365 THER/PROPH/DIAG IV INF INIT: CPT

## 2019-05-27 PROCEDURE — 99284 EMERGENCY DEPT VISIT MOD MDM: CPT | Mod: 25

## 2019-05-27 PROCEDURE — 85025 COMPLETE CBC W/AUTO DIFF WBC: CPT

## 2019-05-27 PROCEDURE — 25000003 PHARM REV CODE 250: Performed by: EMERGENCY MEDICINE

## 2019-05-27 PROCEDURE — 80048 BASIC METABOLIC PNL TOTAL CA: CPT

## 2019-05-27 RX ORDER — CEPHALEXIN 500 MG/1
500 CAPSULE ORAL 4 TIMES DAILY
Qty: 20 CAPSULE | Refills: 0 | Status: SHIPPED | OUTPATIENT
Start: 2019-05-27 | End: 2019-06-01

## 2019-05-27 RX ORDER — HYDROCODONE BITARTRATE AND ACETAMINOPHEN 10; 325 MG/1; MG/1
1 TABLET ORAL
Status: COMPLETED | OUTPATIENT
Start: 2019-05-27 | End: 2019-05-27

## 2019-05-27 RX ADMIN — DEXTROSE 1 G: 50 INJECTION, SOLUTION INTRAVENOUS at 10:05

## 2019-05-27 RX ADMIN — HYDROCODONE BITARTRATE AND ACETAMINOPHEN 1 TABLET: 10; 325 TABLET ORAL at 10:05

## 2019-05-27 RX ADMIN — BACITRACIN ZINC, NEOMYCIN SULFATE, AND POLYMYXIN B SULFATE 1 EACH: 400; 3.5; 5 OINTMENT TOPICAL at 10:05

## 2019-05-28 NOTE — ED TRIAGE NOTES
"Pt presents to ED with caregiver complaining of "leaking" from knee after having surgery a couple weeks ago. Pt states there was small amt of bloody drainage from knee since Thursday, but today has gotten worse. Pt says the drainage is pink in color. No other complaints at this time   "

## 2019-05-28 NOTE — ED PROVIDER NOTES
Encounter Date: 2019    SCRIBE #1 NOTE: I, Michelle Ahumada, am scribing for, and in the presence of,  Dr. Phillips. I have scribed the entire note.       History     Chief Complaint   Patient presents with    Post-op Evaluation     pt had left knee surgery a couple of weeks ago. Has been having some bloody drainage from site for the past few days, but this worsened today.      Time seen by provider: 9:04 PM    This is a 64 y.o. female who presents to ED for a post-op evaluation. Patient had left knee surgery on 19 and had her david removed by Dr. Greenwood. She noticed some bloody drainage from the site since Thursday. Caregiver reports that the drainage was worse today. Patient also notes some swelling around the incision site. Denies any pus coming from site. Patient denies any nausea, vomiting, or fever. Patient does not state any concerning PMHx at this time.    The history is provided by the patient and a caregiver.     Review of patient's allergies indicates:   Allergen Reactions    Adhesive      Adhesive tape    Latex, natural rubber      Adhesive from latex tape    Ibuprofen Other (See Comments)     Causes asthma flare??     Past Medical History:   Diagnosis Date    Arthritis     Asthma     CHF (congestive heart failure)     [facility]    Colon cancer     COPD (chronic obstructive pulmonary disease)     Coronary artery disease     Depression     Diabetes mellitus, type 2     GERD (gastroesophageal reflux disease)     Myocardial infarction     AGE 19 MIGUELANGEL WITHBABY DELIVERY    Thyroid disease      Past Surgical History:   Procedure Laterality Date    ARTHROPLASTY, KNEE Left 2019    [facility]     SECTION      CHOLECYSTECTOMY      COLON SURGERY      COLONOSCOPY      --- repeat in 3-5 years    COLONOSCOPY N/A 2017    [facility] ENDO    COLONOSCOPY golytely N/A 4/10/2018     [facility]    ECTOPIC PREGNANCY SURGERY      ESOPHAGOGASTRODUODENOSCOPY (EGD) N/A 2/28/2019    [facility]O    ESOPHAGOGASTRODUODENOSCOPY (EGD) N/A 4/10/2018    [facility] ENDO    ESOPHAGOGASTRODUODENOSCOPY (EGD) N/A 4/18/2017    [facility] ENDO    GASTRIC BYPASS      HYSTERECTOMY      OOPHORECTOMY      REMOVAL-PORT-A-CATH Left 5/30/2017    [facility]    TONSILLECTOMY      TUBAL LIGATION       Family History   Problem Relation Age of Onset    Hyperlipidemia Mother     Hypertension Mother     Diabetes Mother     Stroke Mother     Hypertension Sister     Hypertension Brother     Diabetes Brother     Breast cancer Maternal Aunt     Breast cancer Maternal Aunt     Breast cancer Cousin      Social History     Tobacco Use    Smoking status: Never Smoker    Smokeless tobacco: Never Used   Substance Use Topics    Alcohol use: Yes     Comment: very rare    Drug use: No     Review of Systems   Constitutional: Negative for chills and fever.   HENT: Negative for congestion, rhinorrhea and sore throat.    Eyes: Negative for redness and visual disturbance.   Respiratory: Negative for cough, shortness of breath and wheezing.    Cardiovascular: Negative for chest pain and palpitations.   Gastrointestinal: Negative for abdominal pain, diarrhea, nausea and vomiting.   Genitourinary: Negative for dysuria and hematuria.   Musculoskeletal: Negative for back pain, myalgias and neck pain.   Skin: Negative for rash.   Neurological: Negative for dizziness, weakness and light-headedness.   Psychiatric/Behavioral: Negative for confusion.   All other systems reviewed and are negative.      Physical Exam     Initial Vitals [05/27/19 2108]   BP Pulse Resp Temp SpO2   125/66 82 20 99.1 °F (37.3 °C) 98 %      MAP       --         Physical Exam    Constitutional:  She appears well-developed and well-nourished.  Non-toxic appearance. No distress.   HENT:   Head: Normocephalic and atraumatic.   Mouth/Throat: Oropharynx is clear and moist.   Eyes: Conjunctivae and EOM are normal. Pupils are equal, round, and reactive to light.   Neck: Normal range of motion. Neck supple. No stridor present. No tracheal deviation present.   Cardiovascular: Normal rate, regular rhythm and normal heart sounds.   No murmur heard.  Pulmonary/Chest: Breath sounds normal. No respiratory distress. She has no wheezes. She has no rhonchi. She has no rales.   Abdominal: Soft. Bowel sounds are normal. There is no tenderness. There is no rebound and no guarding.   Musculoskeletal: Normal range of motion. She exhibits no edema or tenderness.   The incision looks clean dry and intact with exception of 1 area where the staple was removed with some serosanguineous drainage this is most likely a postop seroma does not look erythematous or purulence expressed   Neurological: She is alert and oriented to person, place, and time. No sensory deficit.   Skin: Skin is warm and dry. Capillary refill takes 2 to 3 seconds.   Psychiatric: She has a normal mood and affect. Her behavior is normal.         ED Course   Procedures  Labs Reviewed   CBC W/ AUTO DIFFERENTIAL - Abnormal; Notable for the following components:       Result Value    WBC 3.50 (*)     RBC 3.27 (*)     Hemoglobin 10.0 (*)     Hematocrit 32.1 (*)     Mean Corpuscular Hemoglobin Conc 31.2 (*)     MPV 8.5 (*)     Gran # (ANC) 1.7 (*)     All other components within normal limits   BASIC METABOLIC PANEL - Abnormal; Notable for the following components:    Chloride 111 (*)     CO2 22 (*)     All other components within normal limits                          Attending Attestation:             Attending ED Notes:   I, Dr. Narinder Mayo, personally performed the services described in this documentation. All medical record entries made by the scribe were at my  direction and in my presence.  I have reviewed the chart and agree that the record reflects my personal performance and is accurate and complete. Narinder Mayo MD.  9:16 PM 05/27/2019    64-year-old female status post left total knee arthroplasty on 05/08/2019 by Dr. Greenwood david were taken out a couple of days ago patient complains fluid draining from 1 of the staple sites denies any fevers sweats or chills nausea vomiting diarrhea constipation no other constitutional symptoms patient is able to fully flex and extend the knee             Clinical Impression:     1. Seroma due to trauma        Disposition:   Disposition: Discharged  Condition: Stable                        Narinder Phillips MD  05/27/19 [id number]

## 2019-05-28 NOTE — ED NOTES
Discharge delayed d/t waiting on abx to finish. Pt resting in stretcher, nadn. rr even and unlabored bilaterally, caregiver at bedside

## 2019-05-29 ENCOUNTER — TELEPHONE (OUTPATIENT)
Dept: ADMINISTRATIVE | Facility: CLINIC | Age: 64
End: 2019-05-29

## 2019-05-29 ENCOUNTER — TELEPHONE (OUTPATIENT)
Dept: ORTHOPEDICS | Facility: CLINIC | Age: 64
End: 2019-05-29

## 2019-05-29 ENCOUNTER — TELEPHONE (OUTPATIENT)
Dept: FAMILY MEDICINE | Facility: CLINIC | Age: 64
End: 2019-05-29

## 2019-05-29 RX ORDER — PROMETHAZINE HYDROCHLORIDE 25 MG/1
TABLET ORAL
Qty: 25 TABLET | Refills: 0 | Status: SHIPPED | OUTPATIENT
Start: 2019-05-29 | End: 2019-07-05 | Stop reason: SDUPTHER

## 2019-05-29 RX ORDER — ESCITALOPRAM OXALATE 20 MG/1
TABLET ORAL
Qty: 90 TABLET | Refills: 0 | Status: SHIPPED | OUTPATIENT
Start: 2019-05-29 | End: 2020-03-14

## 2019-05-29 RX ORDER — DIAZEPAM 2 MG/1
TABLET ORAL
Qty: 30 TABLET | Refills: 0 | Status: SHIPPED | OUTPATIENT
Start: 2019-05-29 | End: 2019-06-19 | Stop reason: SDUPTHER

## 2019-05-29 NOTE — TELEPHONE ENCOUNTER
Home Health SOC 05/11/2019 - 07/09/2019 with [facility] - Dr. Roldan Greenwood. Patient received SN, PT and OT services.

## 2019-05-29 NOTE — TELEPHONE ENCOUNTER
----- Message from Roldan Greenwood MD sent at 5/29/2019  4:17 PM CDT -----  Contact: Omar/Juma/[phone]  The patient should use an Ace wrap and a simple gauze dressing. This does not require prescription.  ----- Message -----  From: Monica Sanches MA  Sent: 5/29/2019   3:28 PM  To: Roldan Greenwood MD    Please advise  ----- Message -----  From: Laurita Ochoa  Sent: 5/29/2019   3:20 PM  To: Krystyna Noe Staff    [facility] Physical Therapist is reporting that patient has drainage and needs orders for island dressing and gauze pads. Thanks

## 2019-05-29 NOTE — TELEPHONE ENCOUNTER
The above pt stated that she's been having a cold with runny nose, headache and shortness and breath for a few days, stated that she's using her Asthma machine, but it's not working all together good, please advice    Thanks

## 2019-05-29 NOTE — TELEPHONE ENCOUNTER
----- Message from Violeta Henley sent at 5/29/2019  1:19 PM CDT -----  Contact: self / [phone]  Patient is requesting a call back regarding, what can she take for a cold?  Please advise

## 2019-05-30 ENCOUNTER — TELEPHONE (OUTPATIENT)
Dept: ORTHOPEDICS | Facility: CLINIC | Age: 64
End: 2019-05-30

## 2019-05-30 ENCOUNTER — OFFICE VISIT (OUTPATIENT)
Dept: ORTHOPEDICS | Facility: CLINIC | Age: 64
End: 2019-05-30
Payer: MEDICARE

## 2019-05-30 VITALS — WEIGHT: 187 LBS | HEIGHT: 60 IN | BODY MASS INDEX: 36.71 KG/M2

## 2019-05-30 DIAGNOSIS — M17.12 PRIMARY OSTEOARTHRITIS OF LEFT KNEE: ICD-10-CM

## 2019-05-30 DIAGNOSIS — Z98.890 S/P LEFT KNEE SURGERY: Primary | ICD-10-CM

## 2019-05-30 PROCEDURE — 99999 PR PBB SHADOW E&M-EST. PATIENT-LVL III: ICD-10-PCS | Mod: PBBFAC,,, | Performed by: ORTHOPAEDIC SURGERY

## 2019-05-30 PROCEDURE — 99999 PR PBB SHADOW E&M-EST. PATIENT-LVL III: CPT | Mod: PBBFAC,,, | Performed by: ORTHOPAEDIC SURGERY

## 2019-05-30 PROCEDURE — 99024 PR POST-OP FOLLOW-UP VISIT: ICD-10-PCS | Mod: S$GLB,,, | Performed by: ORTHOPAEDIC SURGERY

## 2019-05-30 PROCEDURE — 99024 POSTOP FOLLOW-UP VISIT: CPT | Mod: S$GLB,,, | Performed by: ORTHOPAEDIC SURGERY

## 2019-05-30 NOTE — PROGRESS NOTES
Subjective:      Patient ID: Christine Castro is a 64 y.o. female.    Chief Complaint: Post-op Evaluation (left knee )      HPI:  Three weeks postop  The patient is seen for postop follow-up of left  TKA.  Pain control has been satisfactory  They feel that they are ambulating easily  Preoperative complaints include:  Patient returns here for wound check.  She and her family feel that there is wound drainage.      Current Outpatient Medications:     acetaminophen (TYLENOL) 500 MG tablet, Take 500 mg by mouth every 6 (six) hours as needed for Pain., Disp: , Rfl:     aspirin (ECOTRIN) 81 MG EC tablet, Take 1 tablet (81 mg total) by mouth once daily., Disp: , Rfl: 0    atorvastatin (LIPITOR) 40 MG tablet, Take 1 tablet (40 mg total) by mouth once daily., Disp: 30 tablet, Rfl: 0    budesonide (PULMICORT FLEXHALER) 90 mcg/actuation AePB, Inhale 2 puffs (180 mcg total) into the lungs 2 (two) times daily. Controller, Disp: 1 each, Rfl: 3    buPROPion (WELLBUTRIN SR) 200 MG SR12, Take 1 tablet (200 mg total) by mouth once daily., Disp: 90 tablet, Rfl: 2    busPIRone (BUSPAR) 10 MG tablet, TAKE 1/2 TABLET BY MOUTH ONCE DAILY, Disp: 60 tablet, Rfl: 0    cephALEXin (KEFLEX) 500 MG capsule, Take 1 capsule (500 mg total) by mouth 4 (four) times daily. for 5 days, Disp: 20 capsule, Rfl: 0    diazePAM (VALIUM) 2 MG tablet, TAKE ONE TABLET BY MOUTH EVERY 12 HOURS AS NEEDED FOR ANXIETY, Disp: 30 tablet, Rfl: 0    diclofenac sodium (VOLTAREN) 1 % Gel, APPLY 2 GRAMS EXTERNALLY TO THE AFFECTED AREA FOUR TIMES DAILY, Disp: 100 g, Rfl: 0    escitalopram oxalate (LEXAPRO) 20 MG tablet, TAKE 1 TABLET(20 MG) BY MOUTH EVERY DAY, Disp: 90 tablet, Rfl: 0    furosemide (LASIX) 20 MG tablet, TAKE 1 TABLET(20 MG) BY MOUTH DAILY AS NEEDED, Disp: 90 tablet, Rfl: 3    gabapentin (NEURONTIN) 300 MG capsule, Take 1 capsule (300 mg total) by mouth 3 (three) times daily. (Patient taking differently: Take 300 mg by mouth every evening. ),  Disp: 270 capsule, Rfl: 3    levothyroxine (SYNTHROID) 100 MCG tablet, TAKE 1 TABLET BY MOUTH ONCE DAILY, Disp: 90 tablet, Rfl: 3    omeprazole (PRILOSEC) 40 MG capsule, TAKE 1 CAPSULE(40 MG) BY MOUTH EVERY MORNING, Disp: 90 capsule, Rfl: 0    oxyCODONE-acetaminophen (PERCOCET)  mg per tablet, Take 1 tablet by mouth every 8 (eight) hours as needed., Disp: 45 tablet, Rfl: 0    potassium chloride (KLOR-CON) 10 MEQ TbSR, TAKE 2 TABLETS(20 MEQ) BY MOUTH EVERY DAY, Disp: 90 tablet, Rfl: 3    predniSONE (DELTASONE) 5 MG tablet, TAKE 1 TABLET BY MOUTH DAILY, Disp: 30 tablet, Rfl: 0    zolpidem (AMBIEN) 5 MG Tab, Take 1 tablet (5 mg total) by mouth nightly., Disp: 30 tablet, Rfl: 0    albuterol (VENTOLIN HFA) 90 mcg/actuation inhaler, INHALE 2 PUFFS INTO THE LUNGS EVERY 4 HOURS AS NEEDED FOR WHEEZING, Disp: 18 g, Rfl: 3    levothyroxine (SYNTHROID) 75 MCG tablet, Take 75 mcg by mouth once daily., Disp: , Rfl:     ondansetron (ZOFRAN-ODT) 4 MG TbDL, Take 1 tablet (4 mg total) by mouth every 6 (six) hours as needed., Disp: 20 tablet, Rfl: 1    promethazine (PHENERGAN) 25 MG tablet, TAKE 1 TABLET(25 MG) BY MOUTH EVERY 6 HOURS AS NEEDED, Disp: 25 tablet, Rfl: 0  Review of patient's allergies indicates:   Allergen Reactions    Adhesive      Adhesive tape    Latex, natural rubber      Adhesive from latex tape    Ibuprofen Other (See Comments)     Causes asthma flare??       Ht 5' (1.524 m)   Wt 84.8 kg (187 lb)   BMI 36.52 kg/m²     ROS        Objective:    Ortho Exam          Alert, oriented, no acute distress  Sclera-Normal  Respiratory distress-none  Gait not observe  Incision:  Picture taken.  There are several tiny dry scabs along the incision. No redness or drainage.  Range of motion: extension- 3 degrees; flexion- 100 degrees  Valgus/varus stability- stable  Swelling-mild    Assessment:     Imaging:  None today        1. S/P left knee surgery    2. Primary osteoarthritis of left knee        The  patient's knee appears to be functioning normally for 3 weeks postop.  Her incision appears to be healing normally.  There is absolutely no evidence of any infection or wound healing problem at this time.        Plan:          No follow-ups on file.    I explained my diagnostic impression and the reasoning behind it in detail, using layman's terms.     Patient is advised not to take Keflex or other antibiotics for her knee.    Band-Aids may be used on any areas that feel irritated    Patient may shower normally    Fall precautions discussed

## 2019-06-01 ENCOUNTER — HOSPITAL ENCOUNTER (EMERGENCY)
Facility: HOSPITAL | Age: 64
Discharge: HOME | End: 2019-06-01
Attending: EMERGENCY MEDICINE
Payer: MEDICARE

## 2019-06-01 VITALS
WEIGHT: 179 LBS | OXYGEN SATURATION: 100 % | DIASTOLIC BLOOD PRESSURE: 79 MMHG | RESPIRATION RATE: 16 BRPM | BODY MASS INDEX: 35.14 KG/M2 | HEART RATE: 75 BPM | TEMPERATURE: 98 F | HEIGHT: 60 IN | SYSTOLIC BLOOD PRESSURE: 132 MMHG

## 2019-06-01 DIAGNOSIS — M25.469 KNEE SWELLING: ICD-10-CM

## 2019-06-01 LAB
ANION GAP SERPL CALC-SCNC: 12 MMOL/L (ref 8–16)
BASOPHILS # BLD AUTO: 0.02 K/UL (ref 0–0.2)
BASOPHILS NFR BLD: 0.4 % (ref 0–1.9)
BUN SERPL-MCNC: 10 MG/DL (ref 8–23)
CALCIUM SERPL-MCNC: 9.1 MG/DL (ref 8.7–10.5)
CHLORIDE SERPL-SCNC: 107 MMOL/L (ref 95–110)
CO2 SERPL-SCNC: 24 MMOL/L (ref 23–29)
CREAT SERPL-MCNC: 0.8 MG/DL (ref 0.5–1.4)
DIFFERENTIAL METHOD: ABNORMAL
EOSINOPHIL # BLD AUTO: 0.3 K/UL (ref 0–0.5)
EOSINOPHIL NFR BLD: 5.4 % (ref 0–8)
ERYTHROCYTE [DISTWIDTH] IN BLOOD BY AUTOMATED COUNT: 13.5 % (ref 11.5–14.5)
EST. GFR  (AFRICAN AMERICAN): >60 ML/MIN/1.73 M^2
EST. GFR  (NON AFRICAN AMERICAN): >60 ML/MIN/1.73 M^2
GLUCOSE SERPL-MCNC: 69 MG/DL (ref 70–110)
HCT VFR BLD AUTO: 35.9 % (ref 37–48.5)
HGB BLD-MCNC: 11.4 G/DL (ref 12–16)
LYMPHOCYTES # BLD AUTO: 1.8 K/UL (ref 1–4.8)
LYMPHOCYTES NFR BLD: 34.4 % (ref 18–48)
MCH RBC QN AUTO: 31 PG (ref 27–31)
MCHC RBC AUTO-ENTMCNC: 31.8 G/DL (ref 32–36)
MCV RBC AUTO: 98 FL (ref 82–98)
MONOCYTES # BLD AUTO: 0.3 K/UL (ref 0.3–1)
MONOCYTES NFR BLD: 6.2 % (ref 4–15)
NEUTROPHILS # BLD AUTO: 2.8 K/UL (ref 1.8–7.7)
NEUTROPHILS NFR BLD: 53.4 % (ref 38–73)
PLATELET # BLD AUTO: 275 K/UL (ref 150–350)
PMV BLD AUTO: 8.7 FL (ref 9.2–12.9)
POTASSIUM SERPL-SCNC: 3.1 MMOL/L (ref 3.5–5.1)
RBC # BLD AUTO: 3.68 M/UL (ref 4–5.4)
SODIUM SERPL-SCNC: 143 MMOL/L (ref 136–145)
WBC # BLD AUTO: 5.17 K/UL (ref 3.9–12.7)

## 2019-06-01 PROCEDURE — 85025 COMPLETE CBC W/AUTO DIFF WBC: CPT

## 2019-06-01 PROCEDURE — 99284 EMERGENCY DEPT VISIT MOD MDM: CPT

## 2019-06-01 PROCEDURE — 80048 BASIC METABOLIC PNL TOTAL CA: CPT

## 2019-06-01 RX ORDER — DICLOFENAC SODIUM 10 MG/G
GEL TOPICAL
Qty: 100 G | Refills: 0 | Status: SHIPPED | OUTPATIENT
Start: 2019-06-01 | End: 2019-07-05 | Stop reason: SDUPTHER

## 2019-06-01 NOTE — ED TRIAGE NOTES
Patient states she is 3 week post knee surgery and has noticed swelling to her knee. Patient also states there has been drainage from knee.

## 2019-06-01 NOTE — ED PROVIDER NOTES
Encounter Date: 2019    SCRIBE #1 NOTE: I, Bharathi Arthur, am scribing for, and in the presence of,  Dr. Young. I have scribed the entire note.       History     Chief Complaint   Patient presents with    Post-op Problem     Patient had left knee surgery 3 weeks ago and was seen by surgeon Dr. Greenwood this week, but family states the knee swelling has increased.     Time seen by provider: 6:23 PM    This is a 64 y.o. female who presents with complaint of wound check of left knee s/p left knee replacement on 19. She was seen in the ED on 19 for left knee swelling and prescribed Keflex; however, the patient states she was instructed by her surgeon to discontinue Keflex after a few days. Since that time, family reports increased swelling to the left knee with intermittent drainage. She also reports a low-grade fever at times during the past week. Patient does not report any other symptoms. She has a prior history of asthma, COPD, arthritis, DM, CAD, GERD, thyroid disease, MI, CHF, and colon cancer.    The history is provided by the patient and a relative.     Review of patient's allergies indicates:   Allergen Reactions    Adhesive      Adhesive tape    Latex, natural rubber      Adhesive from latex tape    Ibuprofen Other (See Comments)     Causes asthma flare??     Past Medical History:   Diagnosis Date    Arthritis     Asthma     CHF (congestive heart failure)     [facility]    Colon cancer     COPD (chronic obstructive pulmonary disease)     Coronary artery disease     Depression     Diabetes mellitus, type 2     GERD (gastroesophageal reflux disease)     Myocardial infarction     AGE 19 MIGUELANGEL WITHBABY DELIVERY    Thyroid disease      Past Surgical History:   Procedure Laterality Date    ARTHROPLASTY, KNEE Left 2019    [facility]     SECTION      CHOLECYSTECTOMY      COLON SURGERY      COLONOSCOPY      --- repeat in 3-5 years     COLONOSCOPY N/A 4/18/2017    [facility]O    COLONOSCOPY golytely N/A 4/10/2018    [facility]    ECTOPIC PREGNANCY SURGERY      ESOPHAGOGASTRODUODENOSCOPY (EGD) N/A 2/28/2019    [facility]    ESOPHAGOGASTRODUODENOSCOPY (EGD) N/A 4/10/2018    [facility] END[facility]    ESOPHAGOGASTRODUODENOSCOPY (EGD) N/A 4/18/2017    [facility] ENDO    GASTRIC BYPASS      HYSTERECTOMY      OOPHORECTOMY      REMOVAL-PORT-A-CATH Left 5/30/2017    [facility]oval [facility] OR    TONSILLECTOMY      TUBAL LIGATION       Family History   Problem Relation Age of Onset    Hyperlipidemia Mother     Hypertension Mother     Diabetes Mother     Stroke Mother     Hypertension Sister     Hypertension Brother     Diabetes Brother     Breast cancer Maternal Aunt     Breast cancer Maternal Aunt     Breast cancer Cousin      Social History     Tobacco Use    Smoking status: Never Smoker    Smokeless tobacco: Never Used   Substance Use Topics    Alcohol use: Yes     Comment: very rare    Drug use: No     Review of Systems   Constitutional: Positive for fever.   Musculoskeletal: Positive for arthralgias and joint swelling.        Left knee pain and swelling   All other systems reviewed and are negative.      Physical Exam     Initial Vitals [06/01/19 1757]   BP Pulse Resp Temp SpO2   107/65 91 18 98.5 °F (36.9 °C) 100 %      MAP       --         Physical Exam    Nursing note and vitals reviewed.  Constitutional: She appears well-developed and well-nourished. She is not diaphoretic. No distress.   No acute distress   HENT:   Head: Normocephalic and atraumatic.   Eyes: Conjunctivae and EOM are normal.   Neck: Normal range of motion. Neck supple.   Cardiovascular: Normal rate, regular rhythm, normal heart sounds and intact distal pulses.   2+ peripheral  pulses   Pulmonary/Chest: Breath sounds normal. No respiratory distress.   Abdominal:   Abdominal exam not performed.   Musculoskeletal: Normal range of motion. She exhibits no edema or tenderness.   Left knee: midline incision intact; no drainage  Full passive and active ROM  3 bandaids removed without significant wound or breakdown  There is fullness in the suprapatellar region without fluctuance.  No cellulitis or abscess  No pedal edema or calf edema   Neurological: She is alert and oriented to person, place, and time. She has normal strength. No sensory deficit.   Normal sensation  Normal strength   Skin: Skin is warm and dry. Capillary refill takes less than 2 seconds.       ED Course   Procedures  Labs Reviewed   CBC W/ AUTO DIFFERENTIAL - Abnormal; Notable for the following components:       Result Value    RBC 3.68 (*)     Hemoglobin 11.4 (*)     Hematocrit 35.9 (*)     Mean Corpuscular Hemoglobin Conc 31.8 (*)     MPV 8.7 (*)     All other components within normal limits   BASIC METABOLIC PANEL - Abnormal; Notable for the following components:    Potassium 3.1 (*)     Glucose 69 (*)     All other components within normal limits          X-Rays:   Independently Interpreted Readings:   Other Readings:  XR Left Knee: Hardware in place, Suprapatellar soft tissue edema    Imaging Results          X-Ray Knee 3 View Left (Final result)  Result time 06/01/19 19:02:09    Final result by Amarilys Jasmine MD (06/01/19 19:02:09)                 Impression:      Focal skin edema and swelling could represent hematoma or infection.    Total knee arthroplasty without fracture or migration.      Electronically signed by: Amarilys Jasmine  Date:    06/01/2019  Time:    19:02             Narrative:    EXAMINATION:  XR KNEE 3 VIEW LEFT    CLINICAL HISTORY:  Effusion, unspecified knee    TECHNIQUE:  AP, lateral, and Merchant views of the left knee were performed.    COMPARISON:  05/23/2019    FINDINGS:  Frontal, oblique and  lateral views presented.    There is an approximately 7 x 4 cm area of skin increased density and swelling in the suprapatellar region.  This likely is a skin contusion.  No soft tissue gas or foreign body or defect found.    There is a total knee arthroplasty with cemented tibial component and probably cemented femoral component without loosening or fracture or migration.  The synthetic joint spacer appears normal.  There is subjective mild decreased bone density.  No knee effusion.                              Medical Decision Making:   Initial Assessment:   The patient is concerned about an infection. She is afebrile. The wound appears well, although there may be a seroma or hematoma. I will obtain labs, XR, and discuss case with Dr. Greenwood.  Clinical Tests:   Lab Tests: Ordered and Reviewed  Radiological Study: Ordered and Reviewed                      Clinical Impression:       ICD-10-CM ICD-9-CM   1. Knee swelling M25.469 719.06     Disposition:   Disposition: Discharged  Condition: Stable      I, Dr. Madan Young, personally performed the services described in this documentation. All medical record entries made by the scribe were at my direction and in my presence.  I have reviewed the chart and agree that the record reflects my personal performance and is accurate and complete. Madan Young MD.     Donnie Young MD  06/02/19 0113

## 2019-06-03 ENCOUNTER — TELEPHONE (OUTPATIENT)
Dept: ORTHOPEDICS | Facility: CLINIC | Age: 64
End: 2019-06-03

## 2019-06-03 NOTE — TELEPHONE ENCOUNTER
----- Message from Nallely Ramos sent at 6/3/2019 12:10 PM CDT -----  Contact: Jessica with [facility]  Patient reports that she is having more swelling today compared to when she was in the ER Saturday. Says no increase in pain.  Wants guidance on PT (if they should hold off on therapy for now) and what the patient should do regarding swelling (ace bandage?)    Jessica can be reached at [phone]

## 2019-06-03 NOTE — TELEPHONE ENCOUNTER
I tried to call the number.  The voicemail is not working.      She may proceed with physical therapy without restrictions.  If the patient chooses to stop, this is fine too.

## 2019-06-07 ENCOUNTER — TELEPHONE (OUTPATIENT)
Dept: ORTHOPEDICS | Facility: CLINIC | Age: 64
End: 2019-06-07

## 2019-06-07 NOTE — TELEPHONE ENCOUNTER
----- Message from Isabela Hilliard sent at 6/7/2019  8:33 AM CDT -----  Contact: Jonny([facility])  Jonny stated that pt is ready for OPT and orders are needing to be sent to the [facility] in [place],   Jonny can be reached @ [phone]   Pt can be reached @[phone] or [phone]

## 2019-06-10 DIAGNOSIS — Z98.890 S/P LEFT KNEE SURGERY: Primary | ICD-10-CM

## 2019-06-18 DIAGNOSIS — M17.0 ARTHRITIS OF BOTH KNEES: ICD-10-CM

## 2019-06-19 RX ORDER — DIAZEPAM 2 MG/1
2 TABLET ORAL EVERY 12 HOURS PRN
Qty: 30 TABLET | Refills: 0 | Status: SHIPPED | OUTPATIENT
Start: 2019-06-19 | End: 2019-07-05 | Stop reason: SDUPTHER

## 2019-06-19 RX ORDER — DIAZEPAM 5 MG/1
TABLET ORAL
Qty: 30 TABLET | Refills: 0 | OUTPATIENT
Start: 2019-06-19

## 2019-06-19 RX ORDER — ZOLPIDEM TARTRATE 5 MG/1
TABLET ORAL
Qty: 30 TABLET | Refills: 0 | Status: SHIPPED | OUTPATIENT
Start: 2019-06-19 | End: 2019-07-05 | Stop reason: SDUPTHER

## 2019-06-19 RX ORDER — PREDNISONE 5 MG/1
TABLET ORAL
Qty: 30 TABLET | Refills: 0 | Status: SHIPPED | OUTPATIENT
Start: 2019-06-19 | End: 2019-07-02 | Stop reason: SDUPTHER

## 2019-06-19 NOTE — TELEPHONE ENCOUNTER
I spoke [facility]  Is It ok to fill the diazepam you refilled today?  bc she just filled #120 percocet on [address] from a dr azael Shoemaker - the pharmacist said he is a FP MD  She has filled percocet from him in the past but usually uses a different pharmacy to fill them    Please advise and rtn call to notify the pharmacist

## 2019-06-19 NOTE — TELEPHONE ENCOUNTER
----- Message from Sudha Rousseau sent at 6/19/2019  8:27 AM CDT -----  Contact: Lon [phone]  Pharmacy would like to speak about diazePAM (VALIUM) 2 MG tablet.

## 2019-07-02 ENCOUNTER — TELEPHONE (OUTPATIENT)
Dept: FAMILY MEDICINE | Facility: CLINIC | Age: 64
End: 2019-07-02

## 2019-07-02 ENCOUNTER — CLINICAL SUPPORT (OUTPATIENT)
Dept: REHABILITATION | Facility: HOSPITAL | Age: 64
End: 2019-07-02
Payer: MEDICARE

## 2019-07-02 ENCOUNTER — PATIENT OUTREACH (OUTPATIENT)
Dept: ADMINISTRATIVE | Facility: OTHER | Age: 64
End: 2019-07-02

## 2019-07-02 DIAGNOSIS — R26.89 GAIT, ANTALGIC: ICD-10-CM

## 2019-07-02 DIAGNOSIS — M25.662 DECREASED RANGE OF MOTION (ROM) OF LEFT KNEE: ICD-10-CM

## 2019-07-02 DIAGNOSIS — I69.398 IMPAIRED BALANCE AS LATE EFFECT OF CEREBROVASCULAR ACCIDENT (CVA): Primary | ICD-10-CM

## 2019-07-02 DIAGNOSIS — M47.896 OTHER SPONDYLOSIS, LUMBAR REGION: ICD-10-CM

## 2019-07-02 DIAGNOSIS — K21.9 GASTROESOPHAGEAL REFLUX DISEASE WITHOUT ESOPHAGITIS: ICD-10-CM

## 2019-07-02 DIAGNOSIS — R26.89 IMPAIRED BALANCE AS LATE EFFECT OF CEREBROVASCULAR ACCIDENT (CVA): Primary | ICD-10-CM

## 2019-07-02 DIAGNOSIS — M47.816 FACET HYPERTROPHY OF LUMBAR REGION: ICD-10-CM

## 2019-07-02 DIAGNOSIS — R29.898 WEAKNESS OF LEFT LOWER EXTREMITY: ICD-10-CM

## 2019-07-02 DIAGNOSIS — M17.0 ARTHRITIS OF BOTH KNEES: ICD-10-CM

## 2019-07-02 DIAGNOSIS — Z96.652 S/P TKR (TOTAL KNEE REPLACEMENT), LEFT: ICD-10-CM

## 2019-07-02 PROCEDURE — 97110 THERAPEUTIC EXERCISES: CPT | Mod: PO

## 2019-07-02 PROCEDURE — 97163 PT EVAL HIGH COMPLEX 45 MIN: CPT | Mod: PO

## 2019-07-02 RX ORDER — OMEPRAZOLE 40 MG/1
CAPSULE, DELAYED RELEASE ORAL
Qty: 90 CAPSULE | Refills: 1 | Status: SHIPPED | OUTPATIENT
Start: 2019-07-02 | End: 2020-02-24

## 2019-07-02 RX ORDER — PREDNISONE 5 MG/1
5 TABLET ORAL DAILY
Qty: 30 TABLET | Refills: 1 | Status: SHIPPED | OUTPATIENT
Start: 2019-07-02 | End: 2019-08-21

## 2019-07-02 RX ORDER — BUPROPION HYDROCHLORIDE 200 MG/1
200 TABLET, EXTENDED RELEASE ORAL DAILY
Qty: 90 TABLET | Refills: 2 | Status: SHIPPED | OUTPATIENT
Start: 2019-07-02 | End: 2020-03-02 | Stop reason: SDUPTHER

## 2019-07-02 NOTE — TELEPHONE ENCOUNTER
----- Message from Lauritanik Ochoa sent at 7/2/2019  4:23 PM CDT -----  Contact: [phone]/self  Type:  RX Refill Request    Who Called: self  Refill or New Rx:Refill  RX Name and Strength: diazePAM (VALIUM) 2 MG tablet  How is the patient currently taking it? (ex. 1XDay):Take 1 tablet (2 mg total) by mouth every 12 (twelve) hours as needed. - Oral  Is this a 30 day or 90 day RX: 30    Refill or New Rx:Refill  RX Name and Strength:zolpidem (AMBIEN) 5 MG Tab  How is the patient currently taking it? (ex. 1XDay):TAKE 1 TABLET(5 MG) BY MOUTH EVERY NIGHT  Is this a 30 day or 90 day RX:30    Refill or New Rx:  RX Name and Strength:omeprazole (PRILOSEC) 40 MG capsule  How is the patient currently taking it? (ex. 1XDay): TAKE 1 CAPSULE(40 MG) BY MOUTH EVERY MORNING  Is this a 30 day or 90 day RX:90    Refill or New Rx: Refill  RX Name and Strength:diclofenac sodium (VOLTAREN) 1 % Gel  How is the patient currently taking it? (ex. 1XDay):APPLY 2 GRAMS EXTERNALLY TO THE AFFECTED AREA FOUR TIMES DAILY  Is this a 30 day or 90 day RX:    Refill or New Rx:Refill  RX Name and Strength:predniSONE (DELTASONE) 5 MG tablet  How is the patient currently taking it? (ex. 1XDay): TAKE 1 TABLET BY MOUTH DAILY  Is this a 30 day or 90 day RX:30    buPROPion (WELLBUTRIN SR) 200 MG SR12  Take 1 tablet (200 mg total) by mouth once daily. - Oral  90/ 2 refills        Preferred Pharmacy with phone number:[facility] [id number] [facility]  Local or Mail Order: local  Ordering Provider:  Would the patient rather a call back or a response via [facility]? call  Best Call Back Number:  Additional Information:

## 2019-07-02 NOTE — PATIENT INSTRUCTIONS
Strengthening: Hip Adduction - Isometric        With ball or folded pillow between knees, squeeze knees together. Hold 5 seconds.  Repeat 10 times per set. Do 3 sets per session. Perform on days you do not attend therapy.      [email or web address].[email or web address]     Copyright © [employer]. All rights reserved.   Strengthening: Terminal Knee Extension (Supine)        Straighten left leg and bend right leg. Lift left leg 5-10 inches from surface keep the toes pointed upward. Perform 3 sets of 10 on days you do not attend therapy.   https://[employer]/626     Copyright © [employer]. All rights reserved.   Functional Quadriceps: Chair Squat        Keeping feet flat on floor, shoulder width apart, squat as low as is comfortable. Use support as necessary.  Repeat 10 times per set. Do 3 sets per session. Perform on days you do not attend therapy.      https://[employer]/736     Copyright © [employer]. All rights reserved.   Stretching: Calf - Towel        Sit with knee straight and towel or belt looped around left foot. Using the belt, flex the knee upwards towards your chest. Perform 3 sets of 10. Perform on days you do not attend therapy.      [email or web address]     Copyright © [employer]. All rights reserved.

## 2019-07-02 NOTE — PLAN OF CARE
[facility]  Physical Therapy Initial Evaluation    Name: Christine Castro  Clinic Number: [id number]    Therapy Diagnosis:   Encounter Diagnoses   Name Primary?    S/P TKR (total knee replacement), left     Weakness of left lower extremity     Gait, antalgic     Decreased range of motion (ROM) of left knee      Physician: Mayelin Joaquin PA-C    Physician Orders: PT Eval and Treat   Medical Diagnosis from Referral: S/P left knee surgery  Evaluation Date: 2019  Authorization Period Expiration: 2019  Plan of Care Expiration: 19  Visit # / Visits authorized:     Time In: 1:20 pm   Time Out: 2:10  Total Billable Time: 50 minutes    Precautions: Standard    Subjective   Date of onset: 19  History of current condition - Christine reports: having a chronic history of (B) knee pain that lead to current (L) TKR. Patient reports she was experiencing multiple falls due to poor balance and pain. Patient reports she received home health therapy following (L) TKR for 2 or 3 weeks. Christine states her (L) lateral leg feels numb at all times. Patient is currently taking tramadol/meloxicam for pain management. Patient reports using ice as needed for pain management. Christine is planning to have a TKR on her (B) knee later this year.       Medical History:   Past Medical History:   Diagnosis Date    Arthritis     Asthma     CHF (congestive heart failure)     [facility]    Colon cancer     COPD (chronic obstructive pulmonary disease)     Coronary artery disease     Depression     Diabetes mellitus, type 2     GERD (gastroesophageal reflux disease)     Myocardial infarction     AGE 19 [employer] DELIV[employer]    Thyroid disease        Surgical History:   Christine Castro  has a past surgical history that includes  section; Tonsillectomy; Colon surgery; Cholecystectomy; Ectopic pregnancy surgery; Tubal ligation; Gastric bypass; Colonoscopy; Colonoscopy  (N/A, 4/18/2017); Colonoscopy (N/A, 4/10/2018); Esophagogastroduodenoscopy (N/A, 2/28/2019); Hysterectomy; Knee Arthroplasty (Left, 5/8/2019); and Oophorectomy.    Medications:   Christine RAMIREZ has a current medication list which includes the following prescription(s): acetaminophen, albuterol, aspirin, atorvastatin, budesonide, bupropion, buspirone, diazepam, diclofenac sodium, escitalopram oxalate, furosemide, gabapentin, levothyroxine, levothyroxine, omeprazole, ondansetron, oxycodone-acetaminophen, potassium chloride, prednisone, promethazine, and zolpidem.    Allergies:   Review of patient's allergies indicates:   Allergen Reactions    Adhesive      Adhesive tape    Latex, natural rubber      Adhesive from latex tape    Ibuprofen Other (See Comments)     Causes asthma flare??        Imaging, Radiograph: Frontal, oblique and lateral views presented.    There is an approximately 7 x 4 cm area of skin increased density and swelling in the suprapatellar region.  This likely is a skin contusion.  No soft tissue gas or foreign body or defect found.    There is a total knee arthroplasty with cemented tibial component and probably cemented femoral component without loosening or fracture or migration.  The synthetic joint spacer appears normal.  There is subjective mild decreased bone density.  No knee effusion.    Prior Therapy: home health PT   Social History: lives alone  Occupation: retired  Prior Level of Function: independent   Current Level of Function: independent     Pain:  Current 8/10, worst 8/10, best 3/10   Location: left knee   Description: Aching  Aggravating Factors: Sitting, Standing, Bending, Walking, Extension and Flexing  Easing Factors: relaxation and pain medication    Pts goals: decrease pain, increase strength, ROM, and ambulation pattern.     Objective     Observation: Patient ambulated into clinic using RW and (B) UE support. Patient presents with anataglic gait pattern due to decrease flexion  during swing phase of gait. Patient R knee positioned in severe genu varus during ambulation. Incision site clean and healed. Small bright red blimished noted in the middle of the scar, however scar is intact.     Posture: rounded shoulders     Range of Motion:   Knee Left active Left Passive   Flexion 110 115   Extension -5 0       Lower Extremity Strength  Left LE    Knee extension: 4+/5   Knee flexion: 4-/5   Hip flexion: 4/5   Hip extension: NT   Hip abduction: 4/5   Hip adduction NT   Ankle dorsiflexion: 5/5   Ankle plantarflexion: 5/5     Step down test: able to step down (L)LE with (B) UE support using RW      Function:    - SLS R: unable   - SLS L: unable   - Squat: able to perform partial squat   - Sit <--> Stand: Patient unable to standing without (B) UE use   - Bed Mobility: independent     Joint Mobility: Patella mobility normal in all directions with normal capsule endfeel     Palpation: mild tenderness of incision site to moderate palpation     Sensation: intact     CMS Impairment/Limitation/Restriction for FOTO knee Survey    Therapist reviewed FOTO scores for Christine Castro on 7/2/2019.   FOTO documents entered into [employer] - see Media section.    Limitation Score: 75%  Category: Mobility    Current : CL = least 60% but < 80% impaired, limited or restricted  Goal: CK = at least 40% but < 60% impaired, limited or restricted         TREATMENT   Treatment Time In:1:20  Treatment Time Out: 2:10  Total Treatment time separate from Evaluation: 20/40 minutes    Christine received therapeutic exercises to develop strength, ROM and flexibility for 15 minutes including: review of HEP including SRL, hip adduction with pillow, heel slides, hamstring stretching, , and partial squats with walker.     Christine received the following manual therapy techniques: manual PORM were applied to the: (L) knee for 5 minutes, including: flexion and extension PROM      Home Exercises and Patient Education Provided    Education  provided:   - perform HEP and continue icing knee for swelling management    Written Home Exercises Provided: yes.  Exercises were reviewed and Christine was able to demonstrate them prior to the end of the session.  Christine demonstrated fair  understanding of the education provided.     See EMR under Patient Instructions for exercises provided 7/2/2019.    Assessment   Christine RAMIREZ is a 64 y.o. female referred to outpatient Physical Therapy with a medical diagnosis of (L) TKR . Pt presents s/p L TKR performed 5/30/19. Patient is currently ambulating using RW. Patient was ambulating with RW due to extreme pain and poor balance prior to procedure, and continues to require its use due to pain is R knee. Christine demonstrates deficits in L knee ROM, balance, strength, and flexibility. Overall pain levels are moderate at this time, however patient's main concern is her R knee. Patient presents with severe genu varus of the R knee which is increasing her pain. Gait pattern is anatalgic due to pain with ambulation of the R knee. Patient reported L knee was position in severe genu varus prior to procedure and plans to have the R TKR performed later this year. Patella mobility of the L was normal is all directions.     Pt prognosis is Fair.   Pt will benefit from skilled outpatient Physical Therapy to address the deficits stated above and in the chart below, provide pt/family education, and to maximize pt's level of independence.     Plan of care discussed with patient: Yes  Pt's spiritual, cultural and educational needs considered and patient is agreeable to the plan of care and goals as stated below:     Anticipated Barriers for therapy: patient compliance     Medical Necessity is demonstrated by the following  History  Co-morbidities and personal factors that may impact the plan of care Co-morbidities:   anxiety, CAD, depression, high BMI, history of cancer, recurrent urinary infections and GERD, hypothyroidism      Personal Factors:   education level  coping style  lifestyle     high   Examination  Body Structures and Functions, activity limitations and participation restrictions that may impact the plan of care Body Regions:   lower extremities    Body Systems:    ROM  strength  balance  gait  transfers    Participation Restrictions:   none    Activity limitations:   Learning and applying knowledge  watching  listening    General Tasks and Commands  undertaking multiple tasks    Communication  no deficits    Mobility  walking    Self care  washing oneself (bathing, drying, washing hands)  caring for body parts (brushing teeth, shaving, grooming)  dressing    Domestic Life  cooking  doing house work (cleaning house, washing dishes, laundry)    Interactions/Relationships  relating with strangers    Life Areas  informal education    Community and Social Life  recreation and leisure         high   Clinical Presentation stable and uncomplicated low   Decision Making/ Complexity Score: high     Goals:  Short Term Goals: 4 weeks   Increase PROM knee FLEX to 120 degrees in order decrease fluid and normalize gait pattern.   Increase AROM knee FLEX to 120 degrees in order to increase knee flexion during swing phase of gait and normalize gait ambulation.  Patient will be independent and complaint with HEP.     Long Term Goals: 8 weeks   Patient will be able to return to normal ADLs activity such as cooking, cleaning & laundry with min-no pain/difficulty, full knee AROM, and normal joint mobility.   Patient will be able to transfer in and out of bath tub with min-no difficulty in order to decrease use of shower chair.  Patient will be able to ascend stairs with min-no pain/difficulty using reciprocal gait pattern and 1 handrail.   Patient will be able to descend stairs with min-no pain/difficulty using reciprocal gait pattern and 1 handrail.    Patient will be able to ambulate 200+ feet for community distances with AD, 3 point gait  pattern on even and uneven surfaces.     Plan   Plan of care Certification: 7/2/2019 to 09/02/19.    Outpatient Physical Therapy 2 times weekly for 8 weeks to include the following interventions: Gait Training, Manual Therapy, Moist Heat/ Ice, Neuromuscular Re-ed, Patient Education, Therapeutic Activites and Therapeutic Exercise.     Odalys Escobar, PT, DPT

## 2019-07-02 NOTE — TELEPHONE ENCOUNTER
The Valium any Ambien or not due to be refilled why we taking messages for prescription medication when with a supposed to call the pharmacy

## 2019-07-05 ENCOUNTER — TELEPHONE (OUTPATIENT)
Dept: REHABILITATION | Facility: HOSPITAL | Age: 64
End: 2019-07-05

## 2019-07-05 RX ORDER — ZOLPIDEM TARTRATE 5 MG/1
TABLET ORAL
Qty: 30 TABLET | Refills: 0 | Status: SHIPPED | OUTPATIENT
Start: 2019-07-15 | End: 2019-08-21

## 2019-07-05 RX ORDER — DICLOFENAC SODIUM 10 MG/G
GEL TOPICAL
Qty: 100 G | Refills: 0 | Status: SHIPPED | OUTPATIENT
Start: 2019-07-05 | End: 2019-07-22 | Stop reason: SDUPTHER

## 2019-07-05 RX ORDER — DIAZEPAM 2 MG/1
2 TABLET ORAL EVERY 12 HOURS PRN
Qty: 30 TABLET | Refills: 0 | Status: SHIPPED | OUTPATIENT
Start: 2019-07-15 | End: 2019-07-22

## 2019-07-05 RX ORDER — PROMETHAZINE HYDROCHLORIDE 25 MG/1
TABLET ORAL
Qty: 25 TABLET | Refills: 0 | Status: SHIPPED | OUTPATIENT
Start: 2019-07-05 | End: 2019-08-04 | Stop reason: SDUPTHER

## 2019-07-05 NOTE — TELEPHONE ENCOUNTER
----- Message from Laurita Ochoa sent at 7/5/2019  2:15 PM CDT -----  Contact: [phone]/self  Type:  Request for PA      Who Called: self  Refill or New Rx:Refill  RX Name and Strength: diazePAM (VALIUM) 2 MG tablet  How is the patient currently taking it? (ex. 1XDay):Take 1 tablet (2 mg total) by mouth every 12 (twelve) hours as needed. - Oral  Is this a 30 day or 90 day RX: 30     Refill or New Rx:Refill  RX Name and Strength:zolpidem (AMBIEN) 5 MG Tab  How is the patient currently taking it? (ex. 1XDay):TAKE 1 TABLET(5 MG) BY MOUTH EVERY NIGHT  Is this a 30 day or 90 day RX:30     Preferred Pharmacy with phone number:[facility] [id number] [facility] [address] AT [facility]  Local or Mail Order: local  Ordering Provider:  Would the patient rather a call back or a response via [facility]? call  Best Call Back Number:  Additional Information: Pharmacy needs PA for these two rx

## 2019-07-08 ENCOUNTER — TELEPHONE (OUTPATIENT)
Dept: FAMILY MEDICINE | Facility: CLINIC | Age: 64
End: 2019-07-08

## 2019-07-08 DIAGNOSIS — G89.29 CHRONIC PAIN OF RIGHT KNEE: ICD-10-CM

## 2019-07-08 DIAGNOSIS — M17.12 PRIMARY OSTEOARTHRITIS OF LEFT KNEE: ICD-10-CM

## 2019-07-08 DIAGNOSIS — M17.0 ARTHRITIS OF BOTH KNEES: Primary | ICD-10-CM

## 2019-07-08 DIAGNOSIS — M25.561 CHRONIC PAIN OF RIGHT KNEE: ICD-10-CM

## 2019-07-08 NOTE — TELEPHONE ENCOUNTER
----- Message from Leandra Liane sent at 7/8/2019  4:11 PM CDT -----  Contact: self, [phone]  Patient requests to speak with you regarding order for walker with four wheels that has a seat.      I LM for the pt that I will fax to DME preferred by insurance  Please sign and send back

## 2019-07-09 ENCOUNTER — HOSPITAL ENCOUNTER (OUTPATIENT)
Dept: RADIOLOGY | Facility: HOSPITAL | Age: 64
Discharge: HOME | End: 2019-07-09
Attending: PHYSICAL MEDICINE & REHABILITATION
Payer: MEDICARE

## 2019-07-09 DIAGNOSIS — M47.816 FACET HYPERTROPHY OF LUMBAR REGION: ICD-10-CM

## 2019-07-09 DIAGNOSIS — M47.896 OTHER SPONDYLOSIS, LUMBAR REGION: ICD-10-CM

## 2019-07-09 DIAGNOSIS — R26.89 IMPAIRED BALANCE AS LATE EFFECT OF CEREBROVASCULAR ACCIDENT (CVA): ICD-10-CM

## 2019-07-09 DIAGNOSIS — I69.398 IMPAIRED BALANCE AS LATE EFFECT OF CEREBROVASCULAR ACCIDENT (CVA): ICD-10-CM

## 2019-07-09 PROCEDURE — 72131 CT LUMBAR SPINE W/O DYE: CPT | Mod: TC,PO

## 2019-07-16 ENCOUNTER — TELEPHONE (OUTPATIENT)
Dept: CARDIOLOGY | Facility: CLINIC | Age: 64
End: 2019-07-16

## 2019-07-16 NOTE — TELEPHONE ENCOUNTER
----- Message from Trang Lara sent at 7/15/2019  2:59 PM CDT -----  Contact: [phone]/self  Patient requesting to speak with you regarding getting a cardiac clearance for tomorrow. Please advise.

## 2019-07-16 NOTE — TELEPHONE ENCOUNTER
Claudia theodore/Dr. Nikhil Chatman  is requesting cardiac clearance for patient to have a Lumbar Ficet spine injection scheduled for today at 11:30am. Please advise fax number [phone]

## 2019-07-16 NOTE — TELEPHONE ENCOUNTER
I left a v/m for Claudia theodore/Dr. Nikhil Chatman  and asked her to call back with more info about the injection

## 2019-07-16 NOTE — TELEPHONE ENCOUNTER
----- Message from Michelle Davis sent at 7/16/2019  9:39 AM CDT -----  Contact: Claudia theodore/Dr. Nikhil Chatman  [phone] option 2  Claudia is requesting a cardiac clearance for the patient to receive an injection.  Fax# [phone]

## 2019-07-17 NOTE — TELEPHONE ENCOUNTER
----- Message from Michelle Davis sent at 7/17/2019  1:59 PM CDT -----  Contact: [phone]/self  Patient calling to speak with you about a Dr's excuse

## 2019-07-17 NOTE — TELEPHONE ENCOUNTER
I let patient know that cardiac clearance has been faxed over to the number that was provided. Patient understood

## 2019-07-17 NOTE — TELEPHONE ENCOUNTER
----- Message from Sudha Rousseau sent at 7/17/2019  3:36 PM CDT -----  Contact: Claudia [phone] option 2  Claudia would like to speak with you about needing the patient's medical clearance faxed to [phone] for surgery. Please advise.

## 2019-07-22 ENCOUNTER — DOCUMENTATION ONLY (OUTPATIENT)
Dept: REHABILITATION | Facility: HOSPITAL | Age: 64
End: 2019-07-22

## 2019-07-22 ENCOUNTER — OFFICE VISIT (OUTPATIENT)
Dept: FAMILY MEDICINE | Facility: CLINIC | Age: 64
End: 2019-07-22
Payer: MEDICARE

## 2019-07-22 VITALS
BODY MASS INDEX: 38.32 KG/M2 | SYSTOLIC BLOOD PRESSURE: 102 MMHG | HEART RATE: 113 BPM | WEIGHT: 195.19 LBS | TEMPERATURE: 98 F | DIASTOLIC BLOOD PRESSURE: 80 MMHG | OXYGEN SATURATION: 98 % | HEIGHT: 60 IN

## 2019-07-22 DIAGNOSIS — M17.12 PRIMARY OSTEOARTHRITIS OF LEFT KNEE: ICD-10-CM

## 2019-07-22 DIAGNOSIS — M17.0 ARTHRITIS OF BOTH KNEES: Primary | ICD-10-CM

## 2019-07-22 DIAGNOSIS — E03.9 HYPOTHYROIDISM (ACQUIRED): ICD-10-CM

## 2019-07-22 DIAGNOSIS — K21.9 GASTROESOPHAGEAL REFLUX DISEASE WITHOUT ESOPHAGITIS: ICD-10-CM

## 2019-07-22 DIAGNOSIS — E07.9 THYROID DISEASE: ICD-10-CM

## 2019-07-22 DIAGNOSIS — I25.10 CORONARY ARTERY DISEASE INVOLVING NATIVE CORONARY ARTERY OF NATIVE HEART WITHOUT ANGINA PECTORIS: ICD-10-CM

## 2019-07-22 DIAGNOSIS — N95.9 MENOPAUSAL AND POSTMENOPAUSAL DISORDER: ICD-10-CM

## 2019-07-22 DIAGNOSIS — E03.9 ACQUIRED HYPOTHYROIDISM: ICD-10-CM

## 2019-07-22 DIAGNOSIS — M13.862 ALLERGIC ARTHRITIS OF LEFT KNEE: ICD-10-CM

## 2019-07-22 DIAGNOSIS — M17.0 ARTHRITIS OF BOTH KNEES: ICD-10-CM

## 2019-07-22 DIAGNOSIS — F32.A DEPRESSION, UNSPECIFIED DEPRESSION TYPE: ICD-10-CM

## 2019-07-22 DIAGNOSIS — E55.9 VITAMIN D DEFICIENCY: ICD-10-CM

## 2019-07-22 DIAGNOSIS — R73.9 HYPERGLYCEMIA: ICD-10-CM

## 2019-07-22 PROCEDURE — 1101F PT FALLS ASSESS-DOCD LE1/YR: CPT | Mod: CPTII,S$GLB,, | Performed by: FAMILY MEDICINE

## 2019-07-22 PROCEDURE — 99214 OFFICE O/P EST MOD 30 MIN: CPT | Mod: S$GLB,,, | Performed by: FAMILY MEDICINE

## 2019-07-22 PROCEDURE — 99214 PR OFFICE/OUTPT VISIT, EST, LEVL IV, 30-39 MIN: ICD-10-PCS | Mod: S$GLB,,, | Performed by: FAMILY MEDICINE

## 2019-07-22 PROCEDURE — 1101F PR PT FALLS ASSESS DOC 0-1 FALLS W/OUT INJ PAST YR: ICD-10-PCS | Mod: CPTII,S$GLB,, | Performed by: FAMILY MEDICINE

## 2019-07-22 PROCEDURE — 3008F PR BODY MASS INDEX (BMI) DOCUMENTED: ICD-10-PCS | Mod: CPTII,S$GLB,, | Performed by: FAMILY MEDICINE

## 2019-07-22 PROCEDURE — 3008F BODY MASS INDEX DOCD: CPT | Mod: CPTII,S$GLB,, | Performed by: FAMILY MEDICINE

## 2019-07-22 RX ORDER — ZOLPIDEM TARTRATE 5 MG/1
TABLET ORAL
Qty: 30 TABLET | Refills: 0 | OUTPATIENT
Start: 2019-07-22

## 2019-07-22 RX ORDER — DIAZEPAM 2 MG/1
TABLET ORAL
Qty: 30 TABLET | Refills: 0 | OUTPATIENT
Start: 2019-07-22

## 2019-07-22 RX ORDER — PREDNISONE 5 MG/1
TABLET ORAL
Qty: 30 TABLET | Refills: 0 | Status: SHIPPED | OUTPATIENT
Start: 2019-07-22 | End: 2019-08-21 | Stop reason: SDUPTHER

## 2019-07-22 RX ORDER — ZOLPIDEM TARTRATE 5 MG/1
TABLET ORAL
Qty: 30 TABLET | Refills: 0 | Status: SHIPPED | OUTPATIENT
Start: 2019-08-15 | End: 2019-08-21 | Stop reason: SDUPTHER

## 2019-07-22 RX ORDER — DIAZEPAM 5 MG/1
5 TABLET ORAL EVERY 12 HOURS PRN
Qty: 30 TABLET | Refills: 0 | Status: SHIPPED | OUTPATIENT
Start: 2019-07-22 | End: 2019-08-21 | Stop reason: SDUPTHER

## 2019-07-22 RX ORDER — PREDNISONE 5 MG/1
TABLET ORAL
Qty: 30 TABLET | Refills: 0 | OUTPATIENT
Start: 2019-07-22

## 2019-07-22 RX ORDER — CALCIUM CITRATE/VITAMIN D3 200MG-6.25
TABLET ORAL
Qty: 100 STRIP | Refills: 0 | Status: SHIPPED | OUTPATIENT
Start: 2019-07-22 | End: 2021-11-17 | Stop reason: SDUPTHER

## 2019-07-22 RX ORDER — GABAPENTIN 100 MG/1
CAPSULE ORAL
Qty: 270 CAPSULE | Refills: 0 | Status: SHIPPED | OUTPATIENT
Start: 2019-07-22 | End: 2020-01-23 | Stop reason: SDUPTHER

## 2019-07-22 RX ORDER — DICLOFENAC SODIUM 10 MG/G
GEL TOPICAL
Qty: 100 G | Refills: 0 | Status: SHIPPED | OUTPATIENT
Start: 2019-07-22 | End: 2019-09-10 | Stop reason: SDUPTHER

## 2019-07-22 RX ORDER — LEVOTHYROXINE SODIUM 88 UG/1
TABLET ORAL
Qty: 90 TABLET | Refills: 0 | Status: SHIPPED | OUTPATIENT
Start: 2019-07-22 | End: 2020-03-31

## 2019-07-22 NOTE — PROGRESS NOTES
"Patient arrived on time for her 1:00 appointment and when asked how she was feeling she stated that she was having chest pains.  I asked her if she wanted me to call an ambulance and she stated "No, all I need is an aspirin and I"ll be fine."  I informed her that I could not treat her today if she was having chest pain and that she would need to go to her doctor or to the ED and she said that she was not going to do either.  Patient stated "I just came from my doctor's office and he said everything looked good".  I apologized for the fact that she came in for therapy (first visit since her initial evaluation on 7/02) but that I could not treat her if she was having chest pain.  She said she would just go home and take her aspirin.  I also informed her that she should not ignore the possible signs of a possible heart attack and again she said that she was not going to the ED but that she was going home.  "

## 2019-07-22 NOTE — PROGRESS NOTES
Patient ID: Christine Castro is a 64 y.o. female.    Chief Complaint: Follow-up    HPI      Christine Castro is a 64 y.o. female here following up on several chronic issues.  Insomnia-use of Ambien p.r.n..  Anxiety-use of Valium p.r.n..  Currently 2 mg is not relieving much of her symptoms.  Request increasing to 5 mg as she was previously.  Arthritis knees bilaterally left arthroplasty-pending right arthroplasty-use of prednisone to help with arthritic pain.  Will discontinue with right knee arthroplasty Coronary disease-stable    Vitals:    07/22/19 0925   BP: 102/80   Pulse: (!) 113   Temp: 98.4 °F (36.9 °C)   SpO2: 98%   Weight: 88.6 kg (195 lb 3.5 oz)   Height: 5' (1.524 m)            Review of Symptoms    Constitutional  positive activity change with left knee replacement, No chills /fever   Resp  Neg hemoptysis, stridor, choking  CVS  Neg chest pain, palpitations    Physical Exam    Constitutional:  Oriented to person, place, and time.appears well-developed and well-nourished.  No distress.      HENT  Head: Normocephalic and atraumatic  Right Ear: External ear normal.   Left Ear: External ear normal.   Nose: External nose normal.   Mouth:  Moist mucus membranes.    Eyes:  Conjunctivae are normal. Right eye exhibits no discharge.  Left eye exhibits no discharge. No scleral icterus.  No periorbital edema    Cardiovascular:  Regular rate and rhythm with normal S1 and S2     Pulmonary/Chest:   Clear to auscultation bilaterally without wheezes, rhonchi or rales      Musculoskeletal:  No edema. No obvious deformity No wasting  Anterior scar left knee    Ft exam-full sensation no ulcerations       Neurological:  Alert and oriented to person, place, and time.   Coordination normal.     Skin:   Skin is warm and dry.  No diaphoresis.   No rash noted.     Psychiatric: Normal mood and affect. Behavior is normal.  Judgment and thought content normal.     Complete Blood Count  Lab Results   Component Value Date    RBC  3.68 (L) 06/01/2019    HGB 11.4 (L) 06/01/2019    HCT 35.9 (L) 06/01/2019    MCV 98 06/01/2019    MCH 31.0 06/01/2019    MCHC 31.8 (L) 06/01/2019    RDW 13.5 06/01/2019     06/01/2019    MPV 8.7 (L) 06/01/2019    GRAN 2.8 06/01/2019    GRAN 53.4 06/01/2019    LYMPH 1.8 06/01/2019    LYMPH 34.4 06/01/2019    MONO 0.3 06/01/2019    MONO 6.2 06/01/2019    EOS 0.3 06/01/2019    BASO 0.02 06/01/2019    EOSINOPHIL 5.4 06/01/2019    BASOPHIL 0.4 06/01/2019    DIFFMETHOD Automated 06/01/2019       Comprehensive Metabolic Panel  Lab Results   Component Value Date    GLU 69 (L) 06/01/2019    BUN 10 06/01/2019    CREATININE 0.8 06/01/2019     06/01/2019    K 3.1 (L) 06/01/2019     06/01/2019    PROT 6.0 05/20/2019    ALBUMIN 2.7 (L) 05/20/2019    BILITOT 0.2 05/20/2019    AST 16 05/20/2019    ALKPHOS 74 05/20/2019    CO2 24 06/01/2019    ALT 7 (L) 05/20/2019    ANIONGAP 12 06/01/2019    EGFRNONAA >60 06/01/2019    ESTGFRAFRICA >60 06/01/2019       TSH  Lab Results   Component Value Date    TSH 0.340 (L) 05/18/2019       Assessment / Plan:      ICD-10-CM ICD-9-CM   1. Arthritis of both knees M17.0 716.96   2. Hyperglycemia R73.9 790.29   3. Vitamin D deficiency E55.9 268.9   4. Thyroid disease E07.9 246.9   5. Hypothyroidism (acquired) E03.9 244.9   6. Allergic arthritis of left knee M13.862 716.26   7. Acquired hypothyroidism E03.9 244.9   8. Gastroesophageal reflux disease without esophagitis K21.9 530.81   9. Primary osteoarthritis of left knee M17.12 715.16   10. Depression, unspecified depression type F32.9 311   11. Coronary artery disease involving native coronary artery of native heart without angina pectoris I25.10 414.01   12. Menopausal and postmenopausal disorder N95.9 627.9     Arthritis of both knees    Hyperglycemia  -     Comprehensive metabolic panel; Future; Expected date: 07/22/2019  -     CBC auto differential; Future; Expected date: 07/22/2019  -     Lipid panel; Future; Expected date:  07/22/2019  -     TSH; Future; Expected date: 07/22/2019  -     T4, free; Future; Expected date: 07/22/2019  -     Hemoglobin A1c; Future; Expected date: 07/22/2019  -     DXA Bone Density Spine And Hip; Future; Expected date: 07/22/2019    Vitamin D deficiency  -     Vitamin D; Future; Expected date: 07/22/2019    Thyroid disease  -     Comprehensive metabolic panel; Future; Expected date: 07/22/2019  -     CBC auto differential; Future; Expected date: 07/22/2019  -     Lipid panel; Future; Expected date: 07/22/2019  -     TSH; Future; Expected date: 07/22/2019  -     T4, free; Future; Expected date: 07/22/2019  -     Hemoglobin A1c; Future; Expected date: 07/22/2019  -     DXA Bone Density Spine And Hip; Future; Expected date: 07/22/2019    Hypothyroidism (acquired)  -     Comprehensive metabolic panel; Future; Expected date: 07/22/2019  -     CBC auto differential; Future; Expected date: 07/22/2019  -     Lipid panel; Future; Expected date: 07/22/2019  -     TSH; Future; Expected date: 07/22/2019  -     T4, free; Future; Expected date: 07/22/2019  -     Hemoglobin A1c; Future; Expected date: 07/22/2019  -     DXA Bone Density Spine And Hip; Future; Expected date: 07/22/2019    Allergic arthritis of left knee    Acquired hypothyroidism  -     Comprehensive metabolic panel; Future; Expected date: 07/22/2019  -     CBC auto differential; Future; Expected date: 07/22/2019  -     Lipid panel; Future; Expected date: 07/22/2019  -     TSH; Future; Expected date: 07/22/2019  -     T4, free; Future; Expected date: 07/22/2019  -     Hemoglobin A1c; Future; Expected date: 07/22/2019  -     DXA Bone Density Spine And Hip; Future; Expected date: 07/22/2019    Gastroesophageal reflux disease without esophagitis  -     Comprehensive metabolic panel; Future; Expected date: 07/22/2019  -     CBC auto differential; Future; Expected date: 07/22/2019  -     Lipid panel; Future; Expected date: 07/22/2019  -     TSH; Future; Expected  date: 07/22/2019  -     T4, free; Future; Expected date: 07/22/2019  -     Hemoglobin A1c; Future; Expected date: 07/22/2019  -     DXA Bone Density Spine And Hip; Future; Expected date: 07/22/2019    Primary osteoarthritis of left knee  -     Comprehensive metabolic panel; Future; Expected date: 07/22/2019  -     CBC auto differential; Future; Expected date: 07/22/2019  -     Lipid panel; Future; Expected date: 07/22/2019  -     TSH; Future; Expected date: 07/22/2019  -     T4, free; Future; Expected date: 07/22/2019  -     Hemoglobin A1c; Future; Expected date: 07/22/2019  -     DXA Bone Density Spine And Hip; Future; Expected date: 07/22/2019    Depression, unspecified depression type  -     Comprehensive metabolic panel; Future; Expected date: 07/22/2019  -     CBC auto differential; Future; Expected date: 07/22/2019  -     Lipid panel; Future; Expected date: 07/22/2019  -     TSH; Future; Expected date: 07/22/2019  -     T4, free; Future; Expected date: 07/22/2019  -     Hemoglobin A1c; Future; Expected date: 07/22/2019  -     DXA Bone Density Spine And Hip; Future; Expected date: 07/22/2019    Coronary artery disease involving native coronary artery of native heart without angina pectoris  -     Comprehensive metabolic panel; Future; Expected date: 07/22/2019  -     CBC auto differential; Future; Expected date: 07/22/2019  -     Lipid panel; Future; Expected date: 07/22/2019  -     TSH; Future; Expected date: 07/22/2019  -     T4, free; Future; Expected date: 07/22/2019  -     Hemoglobin A1c; Future; Expected date: 07/22/2019  -     DXA Bone Density Spine And Hip; Future; Expected date: 07/22/2019    Menopausal and postmenopausal disorder  -     DXA Bone Density Spine And Hip; Future; Expected date: 07/22/2019

## 2019-07-23 RX ORDER — ZOLPIDEM TARTRATE 5 MG/1
TABLET ORAL
Qty: 30 TABLET | Refills: 0 | OUTPATIENT
Start: 2019-07-23

## 2019-07-23 NOTE — TELEPHONE ENCOUNTER
Spoke with pharmacist and told them that Ms. is gypsy is no longer taking the opioid pain medication so I increased the mg of Valium at this time

## 2019-07-23 NOTE — TELEPHONE ENCOUNTER
Spoke with Ghazal [facility]odore[facility]; Patient is at pharmacy right now to  prescription for Valuim 5 mg but pharmacist states patient was prescribed Belbuca 150 mg  & Percocet by a pain management doctor recently. Pharmacist wants to know if its okay to fill Valium.Pharmacy also noted that patient was previously being prescribed valium 2.5 mg now the dosage has increased to   5 MG. Please advise       ----- Message from Maria De Jesus Pimentel sent at 7/23/2019  1:59 PM CDT -----  Contact: Ghazal @ [employer]  [phone]  Ghazal is requesting a callback, did not discuss reason. Please call.

## 2019-07-25 ENCOUNTER — CLINICAL SUPPORT (OUTPATIENT)
Dept: REHABILITATION | Facility: HOSPITAL | Age: 64
End: 2019-07-25
Payer: MEDICARE

## 2019-07-25 DIAGNOSIS — R29.898 WEAKNESS OF LEFT LOWER EXTREMITY: ICD-10-CM

## 2019-07-25 DIAGNOSIS — N28.89 OTHER SPECIFIED DISORDERS OF KIDNEY AND URETER: Primary | ICD-10-CM

## 2019-07-25 DIAGNOSIS — M25.662 DECREASED RANGE OF MOTION (ROM) OF LEFT KNEE: ICD-10-CM

## 2019-07-25 DIAGNOSIS — R26.89 GAIT, ANTALGIC: ICD-10-CM

## 2019-07-25 DIAGNOSIS — Z96.652 S/P TKR (TOTAL KNEE REPLACEMENT), LEFT: ICD-10-CM

## 2019-07-25 PROCEDURE — 97110 THERAPEUTIC EXERCISES: CPT | Mod: PO

## 2019-07-25 NOTE — PATIENT INSTRUCTIONS
Supine Hamstring Stretch    Raise your leg with belt around heel of foot. Raise leg until a stretch is felt in the back of the thigh. Keep knee straight. Hold 10 seconds.   Repeat 10 times each side per set. Do 1 sets per session. Do 2 sessions per day.        Bent Leg Lift (Hook-Lying)    Tighten stomach and slowly raise right leg from floor. Keep trunk rigid. Slowly lower and switch sides.  Repeat 10 times per set. Do 1 sets per session. Do 2 sessions per day.      Strengthening: Straight Leg Raise (Phase 1)        Tighten muscles on front of right thigh, then lift leg from surface, keeping knee locked.   Repeat 10 times per set. Do 1 sets per session. Do 2 sessions per day.     https://[employer]/614     Copyright © [employer]. All rights reserved.        Strengthening: Hip Abductor - Resisted    Lie flat with band looped around both legs above knees, and push thighs apart, ensuring right and left move together.  Repeat 10 times per set. Do 1 sets per session. Do 2 sessions per day.        Short Arc Quad        Place a large can or rolled towel under left leg. Straighten leg. Hold 1 seconds.  Repeat 10 times. Do 2 sessions per day.     https://[employer]/298       Knee  (LAQ)        Sit up tall, tighten abdominals. Straighten one leg and then relax it. Repeat with other leg.  Repeat 10 times. Do 2 sessions per day.     https://[employer]/605       Hamstring Curl: Resisted (Sitting)        Facing anchor with tubing on right ankle, leg straight out, bend knee.  Repeat 10 times per set. Do 1 sets per session. Do 2 sessions per day.      [email or web address]          Heel Raise: Bilateral (Standing)        Rise on balls of feet.  Repeat 10 times per set. Do 1 sets per session. Do 2 sessions per day.     [email or web address]         Heel Slide        Bend knee and pull heel toward buttocks. Hold 2 seconds. Return. Repeat with other knee.  Repeat 10 times. Do 2 sessions per day.      [email or web address]/371         Functional Quadriceps: Chair Squat        Keeping feet flat on floor, shoulder width apart, squat as low as is comfortable. Use support as necessary.  Repeat 10 times per set. Do 1 sets per session. Do 2 sessions per day.     https://[employer]/736     Copyright © [employer]. All rights reserved.

## 2019-07-25 NOTE — PROGRESS NOTES
"  Physical Therapy Daily Treatment Note     Name: Christine Castro  Clinic Number: [id number]    Therapy Diagnosis:   Encounter Diagnoses   Name Primary?    S/P TKR (total knee replacement), left     Weakness of left lower extremity     Gait, antalgic     Decreased range of motion (ROM) of left knee      Physician: Mayelin Joaquin PA-C    Visit Date: 7/25/2019     Physician Orders: PT Eval and Treat   Medical Diagnosis from Referral: S/P left knee surgery  Evaluation Date: 7/2/2019  Authorization Period Expiration: 12/21/2019  Plan of Care Expiration: 09/02/19  Visit # / Visits authorized: 2/ 20    Time In: 2:00  Time Out: 2:55  Total Billable Time: 25 minutes    Precautions: Standard and Fall    Subjective     Pt reports: having a lot of pain in her left knee.  Patient reports having more pain in her right knee than her left knee.  She was compliant with home exercise program.  Response to previous treatment: a little sore but no pain.  Functional change: not at this time.    Pain: 6/10  Location: left knee      Objective     Christine received therapeutic exercises to develop strength, ROM and flexibility for 45 minutes 1:1 with PTA including:      Date  07/25/19   VISIT 2/20   G CODE VISIT 2/10   POC EXP. DATE 09/02/19   VISIT AMOUNT  MEDICARE TOTAL 90.96  204.16   FACE-TO-FACE 08/02/19       TABLE:    HSS w/ strap 10 x 10"   Marching  1 x 10   SAQ 1 x 15   Heel slides 1 x 10   SLR 1 x 10   Hip abduction - supine 1 x 15 RTB   Hip adduction - supine ---   SEATED:    LAQs 1 x 15   Seated Hip Flex ---   HS curls 1 x 15 RTB   STANDING:    Heel raises 1 x 10   Mini squats 1 x 10   Hip Abd ---   Hip Flex ---   Hip Ext ---   HS Curls ---   Step Ups ---           Initials GWA 1/6       Christine received cold pack for 10 minutes to bilateral knees.      Home Exercises Provided and Patient Education Provided     Education provided:   - keep exercises in a pain free range.    Written Home Exercises Provided: " yes.  Exercises were reviewed and Christine was able to demonstrate them prior to the end of the session.  Christine demonstrated fair  understanding of the education provided.     See EMR under Patient Instructions for exercises provided 7/25/2019.    Assessment     Patient performed above exercise program with verbal cueing for proper technique and cues to keep exercises in a pain free range.    Christine is progressing well towards her goals.   Pt prognosis is Fair.     Pt will continue to benefit from skilled outpatient physical therapy to address the deficits listed in the problem list box on initial evaluation, provide pt/family education and to maximize pt's level of independence in the home and community environment.     Pt's spiritual, cultural and educational needs considered and pt agreeable to plan of care and goals.    Anticipated barriers to physical therapy: patient compliance     Goals:  Short Term Goals: 4 weeks   Increase PROM knee FLEX to 120 degrees in order decrease fluid and normalize gait pattern.   Increase AROM knee FLEX to 120 degrees in order to increase knee flexion during swing phase of gait and normalize gait ambulation.  Patient will be independent and complaint with HEP.     Long Term Goals: 8 weeks   Patient will be able to return to normal ADLs activity such as cooking, cleaning & laundry with min-no pain/difficulty, full knee AROM, and normal joint mobility.   Patient will be able to transfer in and out of bath tub with min-no difficulty in order to decrease use of shower chair.  Patient will be able to ascend stairs with min-no pain/difficulty using reciprocal gait pattern and 1 handrail.   Patient will be able to descend stairs with min-no pain/difficulty using reciprocal gait pattern and 1 handrail.    Patient will be able to ambulate 200+ feet for community distances with AD, 3 point gait pattern on even and uneven surfaces.     Plan     Continue with Plan Of Care and progress  toward PT goals.  Increase reps next visit.     Veto Camarena, PTA

## 2019-07-29 ENCOUNTER — TELEPHONE (OUTPATIENT)
Dept: REHABILITATION | Facility: HOSPITAL | Age: 64
End: 2019-07-29

## 2019-07-30 ENCOUNTER — HOSPITAL ENCOUNTER (OUTPATIENT)
Dept: RADIOLOGY | Facility: HOSPITAL | Age: 64
Discharge: HOME | End: 2019-07-30
Attending: PHYSICAL MEDICINE & REHABILITATION
Payer: MEDICARE

## 2019-07-30 DIAGNOSIS — N28.89 OTHER SPECIFIED DISORDERS OF KIDNEY AND URETER: ICD-10-CM

## 2019-07-30 PROCEDURE — 76770 US EXAM ABDO BACK WALL COMP: CPT | Mod: TC,PO

## 2019-08-01 ENCOUNTER — TELEPHONE (OUTPATIENT)
Dept: REHABILITATION | Facility: HOSPITAL | Age: 64
End: 2019-08-01

## 2019-08-04 RX ORDER — PROMETHAZINE HYDROCHLORIDE 25 MG/1
TABLET ORAL
Qty: 25 TABLET | Refills: 0 | Status: SHIPPED | OUTPATIENT
Start: 2019-08-04 | End: 2019-10-22 | Stop reason: SDUPTHER

## 2019-08-05 ENCOUNTER — TELEPHONE (OUTPATIENT)
Dept: REHABILITATION | Facility: HOSPITAL | Age: 64
End: 2019-08-05

## 2019-08-07 ENCOUNTER — PATIENT OUTREACH (OUTPATIENT)
Dept: ADMINISTRATIVE | Facility: HOSPITAL | Age: 64
End: 2019-08-07

## 2019-08-07 ENCOUNTER — TELEPHONE (OUTPATIENT)
Dept: FAMILY MEDICINE | Facility: CLINIC | Age: 64
End: 2019-08-07

## 2019-08-07 NOTE — TELEPHONE ENCOUNTER
Patient spoke with Parmjit today   She is requesting a sooner appt ( scheduled 8/21/19 )  C/o right hip and right leg pain and is requesting a joint injection  She is currently going to PT    She was given # 30 prednisone 5 mg on 7/2/19 and 7/22/19    Please advise

## 2019-08-21 ENCOUNTER — OFFICE VISIT (OUTPATIENT)
Dept: FAMILY MEDICINE | Facility: CLINIC | Age: 64
End: 2019-08-21
Payer: MEDICARE

## 2019-08-21 VITALS
HEART RATE: 94 BPM | DIASTOLIC BLOOD PRESSURE: 60 MMHG | OXYGEN SATURATION: 95 % | TEMPERATURE: 99 F | WEIGHT: 207 LBS | HEIGHT: 60 IN | BODY MASS INDEX: 40.64 KG/M2 | SYSTOLIC BLOOD PRESSURE: 110 MMHG

## 2019-08-21 DIAGNOSIS — M17.0 ARTHRITIS OF BOTH KNEES: ICD-10-CM

## 2019-08-21 PROCEDURE — 99214 OFFICE O/P EST MOD 30 MIN: CPT | Mod: S$GLB,,, | Performed by: FAMILY MEDICINE

## 2019-08-21 PROCEDURE — 3288F FALL RISK ASSESSMENT DOCD: CPT | Mod: CPTII,S$GLB,, | Performed by: FAMILY MEDICINE

## 2019-08-21 PROCEDURE — 99214 PR OFFICE/OUTPT VISIT, EST, LEVL IV, 30-39 MIN: ICD-10-PCS | Mod: S$GLB,,, | Performed by: FAMILY MEDICINE

## 2019-08-21 PROCEDURE — 3008F PR BODY MASS INDEX (BMI) DOCUMENTED: ICD-10-PCS | Mod: CPTII,S$GLB,, | Performed by: FAMILY MEDICINE

## 2019-08-21 PROCEDURE — 1100F PTFALLS ASSESS-DOCD GE2>/YR: CPT | Mod: CPTII,S$GLB,, | Performed by: FAMILY MEDICINE

## 2019-08-21 PROCEDURE — 3288F PR FALLS RISK ASSESSMENT DOCUMENTED: ICD-10-PCS | Mod: CPTII,S$GLB,, | Performed by: FAMILY MEDICINE

## 2019-08-21 PROCEDURE — 3008F BODY MASS INDEX DOCD: CPT | Mod: CPTII,S$GLB,, | Performed by: FAMILY MEDICINE

## 2019-08-21 PROCEDURE — 1100F PR PT FALLS ASSESS DOC 2+ FALLS/FALL W/INJURY/YR: ICD-10-PCS | Mod: CPTII,S$GLB,, | Performed by: FAMILY MEDICINE

## 2019-08-21 RX ORDER — DIAZEPAM 5 MG/1
5 TABLET ORAL EVERY 12 HOURS PRN
Qty: 30 TABLET | Refills: 1 | Status: SHIPPED | OUTPATIENT
Start: 2019-08-21 | End: 2019-10-10 | Stop reason: SDUPTHER

## 2019-08-21 RX ORDER — GLIPIZIDE 2.5 MG/1
TABLET, EXTENDED RELEASE ORAL
Refills: 3 | COMMUNITY
Start: 2019-07-22 | End: 2020-01-23 | Stop reason: SDUPTHER

## 2019-08-21 RX ORDER — QUETIAPINE FUMARATE 50 MG/1
TABLET, FILM COATED ORAL
Refills: 0 | COMMUNITY
Start: 2019-07-22 | End: 2019-08-21

## 2019-08-21 RX ORDER — PREDNISONE 5 MG/1
TABLET ORAL
Qty: 30 TABLET | Refills: 1 | Status: SHIPPED | OUTPATIENT
Start: 2019-08-21 | End: 2019-10-16 | Stop reason: SDUPTHER

## 2019-08-21 RX ORDER — ZOLPIDEM TARTRATE 5 MG/1
TABLET ORAL
Qty: 30 TABLET | Refills: 1 | Status: SHIPPED | OUTPATIENT
Start: 2019-08-21 | End: 2020-01-08

## 2019-08-26 NOTE — PROGRESS NOTES
Patient ID: Christine Castro is a 64 y.o. female.    Chief Complaint: Leg Pain (c/o R leg pain )    HPI       Christine Castro is a 64 y.o. female here because of right leg pain at knee.  Arthritis in the right knee future plans are to I have replacement of that right joint.  Complains of continued anxiety    Review of Symptoms    Constitutional  No change in activity, No chills fever   Resp  Neg hemoptysis, stridor, choking  CVS  Neg chest pain, palpitations    /60 (BP Location: Right arm, Patient Position: Sitting)   Pulse 94   Temp 98.5 °F (36.9 °C) (Oral)   Ht 5' (1.524 m)   Wt 93.9 kg (207 lb)   SpO2 95%   BMI 40.43 kg/m²     Physical Exam    Vitals:    08/21/19 1321   BP: 110/60   BP Location: Right arm   Patient Position: Sitting   Pulse: 94   Temp: 98.5 °F (36.9 °C)   TempSrc: Oral   SpO2: 95%   Weight: 93.9 kg (207 lb)   Height: 5' (1.524 m)       Constitutional:   Oriented to person, place, and time.appears well-developed and well-nourished.   No distress.  he    HENT  Head: Normocephalic and atraumatic  Right Ear: External ear normal.   Left Ear: External ear normal.   Nose: External nose normal.   Mouth: Moist mucous membranes    Eyes:   Conjunctivae are normal. Right eye exhibits no discharge. Left eye exhibits no discharge. No scleral icterus. No periorbital edema    Musculoskeletal:  No edema. No obvious deformity No wasting     Neurological:  Alert and oriented to person, place, and time. Coordination normal.     Skin:   Skin is warm and dry.  No diaphoresis.   No rash noted.     Psychiatric: Normal mood and affect. Behavior is normal. Judgment and thought content normal.     Complete Blood Count  Lab Results   Component Value Date    RBC 3.68 (L) 06/01/2019    HGB 11.4 (L) 06/01/2019    HCT 35.9 (L) 06/01/2019    MCV 98 06/01/2019    MCH 31.0 06/01/2019    MCHC 31.8 (L) 06/01/2019    RDW 13.5 06/01/2019     06/01/2019    MPV 8.7 (L) 06/01/2019    GRAN 2.8 06/01/2019    GRAN 53.4  06/01/2019    LYMPH 1.8 06/01/2019    LYMPH 34.4 06/01/2019    MONO 0.3 06/01/2019    MONO 6.2 06/01/2019    EOS 0.3 06/01/2019    BASO 0.02 06/01/2019    EOSINOPHIL 5.4 06/01/2019    BASOPHIL 0.4 06/01/2019    DIFFMETHOD Automated 06/01/2019       Comprehensive Metabolic Panel  Lab Results   Component Value Date    GLU 69 (L) 06/01/2019    BUN 10 06/01/2019    CREATININE 0.8 06/01/2019     06/01/2019    K 3.1 (L) 06/01/2019     06/01/2019    PROT 6.0 05/20/2019    ALBUMIN 2.7 (L) 05/20/2019    BILITOT 0.2 05/20/2019    AST 16 05/20/2019    ALKPHOS 74 05/20/2019    CO2 24 06/01/2019    ALT 7 (L) 05/20/2019    ANIONGAP 12 06/01/2019    EGFRNONAA >60 06/01/2019    ESTGFRAFRICA >60 06/01/2019       TSH  Lab Results   Component Value Date    TSH 0.340 (L) 05/18/2019       Assessment / Plan:      ICD-10-CM ICD-9-CM   1. Arthritis of both knees M17.0 716.96     Arthritis of both knees  -     predniSONE (DELTASONE) 5 MG tablet; One po daily  Dispense: 30 tablet; Refill: 1    Other orders  -     diazePAM (VALIUM) 5 MG tablet; Take 1 tablet (5 mg total) by mouth every 12 (twelve) hours as needed for Anxiety.  Dispense: 30 tablet; Refill: 1  -     zolpidem (AMBIEN) 5 MG Tab; One po qhs for sleep  Dispense: 30 tablet; Refill: 1     Arthritis right knee-left knee has been replaced-very little pain-discussed heat ice exercise-visit with orthopedic surgeon    Over 25 minutes spent in discussion-which include arthritis-use of anxiety medication-previous health issues.

## 2019-09-04 ENCOUNTER — TELEPHONE (OUTPATIENT)
Dept: ORTHOPEDICS | Facility: CLINIC | Age: 64
End: 2019-09-04

## 2019-09-04 NOTE — TELEPHONE ENCOUNTER
Returned call to pt she wanted to set up an appt with Dr Greenwood to talk about her other knee. Pt VU of appt time, date and location.

## 2019-09-04 NOTE — TELEPHONE ENCOUNTER
----- Message from Mathieu Landeros sent at 9/4/2019  8:48 AM CDT -----  Contact: patient  Patient called to speak with your office in regard to her right knee pain.    Please call [phone] to discuss today.

## 2019-09-10 RX ORDER — DICLOFENAC SODIUM 10 MG/G
GEL TOPICAL
Qty: 100 G | Refills: 0 | Status: SHIPPED | OUTPATIENT
Start: 2019-09-10 | End: 2019-10-17 | Stop reason: SDUPTHER

## 2019-09-19 ENCOUNTER — PATIENT OUTREACH (OUTPATIENT)
Dept: ADMINISTRATIVE | Facility: OTHER | Age: 64
End: 2019-09-19

## 2019-09-25 ENCOUNTER — PATIENT OUTREACH (OUTPATIENT)
Dept: ADMINISTRATIVE | Facility: OTHER | Age: 64
End: 2019-09-25

## 2019-09-26 ENCOUNTER — OFFICE VISIT (OUTPATIENT)
Dept: ORTHOPEDICS | Facility: CLINIC | Age: 64
End: 2019-09-26
Payer: MEDICARE

## 2019-09-26 ENCOUNTER — TELEPHONE (OUTPATIENT)
Dept: FAMILY MEDICINE | Facility: CLINIC | Age: 64
End: 2019-09-26

## 2019-09-26 ENCOUNTER — TELEPHONE (OUTPATIENT)
Dept: ORTHOPEDICS | Facility: CLINIC | Age: 64
End: 2019-09-26

## 2019-09-26 VITALS — HEIGHT: 60 IN | WEIGHT: 207 LBS | BODY MASS INDEX: 40.64 KG/M2

## 2019-09-26 DIAGNOSIS — M17.11 PRIMARY OSTEOARTHRITIS OF RIGHT KNEE: Primary | ICD-10-CM

## 2019-09-26 PROCEDURE — 3008F BODY MASS INDEX DOCD: CPT | Mod: CPTII,S$GLB,, | Performed by: ORTHOPAEDIC SURGERY

## 2019-09-26 PROCEDURE — 1101F PT FALLS ASSESS-DOCD LE1/YR: CPT | Mod: CPTII,S$GLB,, | Performed by: ORTHOPAEDIC SURGERY

## 2019-09-26 PROCEDURE — 99214 PR OFFICE/OUTPT VISIT, EST, LEVL IV, 30-39 MIN: ICD-10-PCS | Mod: S$GLB,,, | Performed by: ORTHOPAEDIC SURGERY

## 2019-09-26 PROCEDURE — 1101F PR PT FALLS ASSESS DOC 0-1 FALLS W/OUT INJ PAST YR: ICD-10-PCS | Mod: CPTII,S$GLB,, | Performed by: ORTHOPAEDIC SURGERY

## 2019-09-26 PROCEDURE — 3008F PR BODY MASS INDEX (BMI) DOCUMENTED: ICD-10-PCS | Mod: CPTII,S$GLB,, | Performed by: ORTHOPAEDIC SURGERY

## 2019-09-26 PROCEDURE — 99214 OFFICE O/P EST MOD 30 MIN: CPT | Mod: S$GLB,,, | Performed by: ORTHOPAEDIC SURGERY

## 2019-09-26 PROCEDURE — 99999 PR PBB SHADOW E&M-EST. PATIENT-LVL III: CPT | Mod: PBBFAC,,, | Performed by: ORTHOPAEDIC SURGERY

## 2019-09-26 PROCEDURE — 99999 PR PBB SHADOW E&M-EST. PATIENT-LVL III: ICD-10-PCS | Mod: PBBFAC,,, | Performed by: ORTHOPAEDIC SURGERY

## 2019-09-26 NOTE — TELEPHONE ENCOUNTER
----- Message from Carmelina Kraus sent at 9/26/2019  4:33 PM CDT -----  Contact: Self / [phone]  The pt just dropped in to speak personally with Dr Santiago.  States she needs to discuss a personal matter with him.  She would not disclose any further information.  Please call her at [phone]

## 2019-09-26 NOTE — PROGRESS NOTES
Subjective:      Patient ID: Christine Castro is a 64 y.o. female.    Chief Complaint: Follow-up of the Right Knee    HPI patient complains of increasing pain in her right knee. She uses a walker.  Over-the-counter analgesics her helpful to some degree.  She she feels that her left total knee feels great in her right knee is limiting her ability to walk normally.    Review of Systems   Constitution: Negative for fever and weight loss.   HENT: Negative for congestion.    Eyes: Negative for visual disturbance.   Cardiovascular: Negative for chest pain.   Respiratory: Negative for shortness of breath.    Hematologic/Lymphatic: Negative for bleeding problem. Does not bruise/bleed easily.   Skin: Negative for poor wound healing.   Musculoskeletal: Positive for joint pain.   Gastrointestinal: Negative for abdominal pain.   Genitourinary: Negative for dysuria.   Neurological: Negative for seizures.   Psychiatric/Behavioral: Negative for altered mental status.   Allergic/Immunologic: Negative for persistent infections.         Objective:      Ortho/SPM Exam      Right knee    [unfilled]    The patient is not in acute distress.   Sclerae normal  Body habitus is overweight.  Respiratory distress:  none   The patient walks with a limp.  Hip irritability  negative.   The skin over the knee is intact.  Knee effusion trace  Tendernes is located medially  Range of motion- Flexion 110 deg, Extension 5 deg,   Ligament laxity exam:   MCL 2+   Lachman 0   Post sag  0    LCL 0  Patellar apprehension negative.  Popliteal cyst negative  Patellar crepitation present.  Flexion/pinch negative  Pulses DP present, PT present.  Motor normal 5/5 strength in all tested muscle groups.   Sensory normal.    I reviewed the relevant radiographic images for the patient's condition:  Right knee radiographs early this year show complete loss of medial joint space with osteophytes and varus deformity          Assessment:       No diagnosis found.      Condition is objectively advanced with significant pain and functional impairment.  Even with a walker, the patient is uncomfortable.        Plan:       There are no diagnoses linked to this encounter.      I explained my diagnostic impression and the reasoning behind it in detail, using layman's terms.  Models and/or pictures were used to help in the explanation.    Treatment options were discussed. The surgical process of right knee replacement was discussed in detail with the patient including a detailed discussion of the procedure itself (including visual model, x-ray review, and literature review). The typical perioperative and post-operative course was discussed and perioperative risks were discussed to the patient's satisfaction.  Risks and complications discussed included but were not limited to the risks of anesthetic complications, infection, bleeding, wound healing complications, stiffness, aseptic loosening, instability, limb length inequality, neurologic dysfunction including numbness and weakness, additional surgery,  DVT, pulmonary embolism, perioperative medical risks (cardiac, pulmonary, renal, neurologic), and death and the patient elects to proceed.

## 2019-09-27 NOTE — TELEPHONE ENCOUNTER
----- Message from Swapna Galinod sent at 9/27/2019  3:10 PM CDT -----  Contact: CARYN GUEVARA [[id number]]  [phone]    Patient just missed a call from Dr. Santiago. She is waiting by the phone.

## 2019-10-02 ENCOUNTER — HOSPITAL ENCOUNTER (OUTPATIENT)
Dept: RADIOLOGY | Facility: HOSPITAL | Age: 64
Discharge: HOME | End: 2019-10-02
Attending: FAMILY MEDICINE
Payer: MEDICARE

## 2019-10-02 DIAGNOSIS — I25.10 CORONARY ARTERY DISEASE INVOLVING NATIVE CORONARY ARTERY OF NATIVE HEART WITHOUT ANGINA PECTORIS: ICD-10-CM

## 2019-10-02 DIAGNOSIS — E03.9 HYPOTHYROIDISM (ACQUIRED): ICD-10-CM

## 2019-10-02 DIAGNOSIS — K21.9 GASTROESOPHAGEAL REFLUX DISEASE WITHOUT ESOPHAGITIS: ICD-10-CM

## 2019-10-02 DIAGNOSIS — E03.9 ACQUIRED HYPOTHYROIDISM: ICD-10-CM

## 2019-10-02 DIAGNOSIS — F32.A DEPRESSION, UNSPECIFIED DEPRESSION TYPE: ICD-10-CM

## 2019-10-02 DIAGNOSIS — N95.9 MENOPAUSAL AND POSTMENOPAUSAL DISORDER: ICD-10-CM

## 2019-10-02 DIAGNOSIS — E07.9 THYROID DISEASE: ICD-10-CM

## 2019-10-02 DIAGNOSIS — M17.12 PRIMARY OSTEOARTHRITIS OF LEFT KNEE: ICD-10-CM

## 2019-10-02 DIAGNOSIS — R73.9 HYPERGLYCEMIA: ICD-10-CM

## 2019-10-02 DIAGNOSIS — M17.11 PRIMARY OSTEOARTHRITIS OF RIGHT KNEE: Primary | ICD-10-CM

## 2019-10-02 PROCEDURE — 77080 DXA BONE DENSITY AXIAL: CPT | Mod: TC,PO

## 2019-10-02 RX ORDER — MUPIROCIN 20 MG/G
OINTMENT TOPICAL
Status: CANCELLED | OUTPATIENT
Start: 2019-10-02

## 2019-10-02 RX ORDER — SODIUM CHLORIDE 0.9 % (FLUSH) 0.9 %
10 SYRINGE (ML) INJECTION
Status: CANCELLED | OUTPATIENT
Start: 2019-10-02

## 2019-10-02 RX ORDER — ACETAMINOPHEN 325 MG/1
1000 TABLET ORAL
Status: CANCELLED | OUTPATIENT
Start: 2019-10-02 | End: 2019-10-02

## 2019-10-02 RX ORDER — PREGABALIN 50 MG/1
150 CAPSULE ORAL
Status: CANCELLED | OUTPATIENT
Start: 2019-10-02 | End: 2019-10-02

## 2019-10-03 ENCOUNTER — TELEPHONE (OUTPATIENT)
Dept: FAMILY MEDICINE | Facility: CLINIC | Age: 64
End: 2019-10-03

## 2019-10-03 DIAGNOSIS — D64.9 ANEMIA, UNSPECIFIED TYPE: Primary | ICD-10-CM

## 2019-10-03 RX ORDER — ERGOCALCIFEROL 1.25 MG/1
50000 CAPSULE ORAL
Qty: 12 CAPSULE | Refills: 3 | Status: SHIPPED | OUTPATIENT
Start: 2019-10-03 | End: 2020-03-31

## 2019-10-03 RX ORDER — IBANDRONATE SODIUM 150 MG/1
150 TABLET, FILM COATED ORAL
Qty: 3 TABLET | Refills: 3 | Status: SHIPPED | OUTPATIENT
Start: 2019-10-03 | End: 2020-05-12 | Stop reason: SDUPTHER

## 2019-10-03 NOTE — TELEPHONE ENCOUNTER
LM advising patient to return call to discuss lab results and recommendations. Contact letter also mailed to patient.

## 2019-10-03 NOTE — TELEPHONE ENCOUNTER
Overall your lab work is very good.  You continue to be a little anemic.  I would like to recheck this in one month    -so lab work sent in to the [facility] system to be recheck in one month      Your DEXA scan or bone density scan shows that your bone density is low.  I would like you to take a medication once a month to help maintain your bone density-also sent in vitamin-D 99048 units to take each week-your vitamin-D levels are low-also need to take an calcium over 1200 milligrams each day.  You can get this in a supplement.

## 2019-10-03 NOTE — LETTER
October 3, 2019    Christine L Matthew  [address] [place] [address]             [facility]  [address] [facility] [address]  Phone: [phone]  Fax: [phone] Dear Mrs. Castro:    Your lab results overall is very good; but you continue to be a little anemic. I would like to recheck this in one month. Lab orders have already been sent to [facility].     Your bone density scan shows that your bone density is low.  I would like you to take a medication once a month to help maintain your bone density. I have sent in vitamin-D 69790 units to take each week. Also your vitamin-D levels are low; I recommend you start taking calcium 1200 milligrams each day. Calcium is available over-the-counter.    If you have any questions or concerns, please don't hesitate to call.    Sincerely,    Dr.Andrew Caballero

## 2019-10-10 RX ORDER — DIAZEPAM 5 MG/1
TABLET ORAL
Qty: 30 TABLET | Refills: 0 | Status: SHIPPED | OUTPATIENT
Start: 2019-10-10 | End: 2019-10-31 | Stop reason: SDUPTHER

## 2019-10-16 DIAGNOSIS — M17.0 ARTHRITIS OF BOTH KNEES: ICD-10-CM

## 2019-10-16 RX ORDER — PREDNISONE 5 MG/1
TABLET ORAL
Qty: 30 TABLET | Refills: 0 | Status: SHIPPED | OUTPATIENT
Start: 2019-10-16 | End: 2020-02-27 | Stop reason: ALTCHOICE

## 2019-10-18 RX ORDER — DICLOFENAC SODIUM 10 MG/G
GEL TOPICAL
Qty: 100 G | Refills: 5 | Status: SHIPPED | OUTPATIENT
Start: 2019-10-18 | End: 2020-05-17

## 2019-10-18 RX ORDER — GABAPENTIN 100 MG/1
CAPSULE ORAL
Qty: 270 CAPSULE | Refills: 0 | Status: SHIPPED | OUTPATIENT
Start: 2019-10-18 | End: 2020-02-12

## 2019-10-18 RX ORDER — QUETIAPINE FUMARATE 50 MG/1
TABLET, FILM COATED ORAL
Qty: 90 TABLET | Refills: 0 | Status: SHIPPED | OUTPATIENT
Start: 2019-10-18 | End: 2020-01-23 | Stop reason: SDUPTHER

## 2019-10-22 RX ORDER — PROMETHAZINE HYDROCHLORIDE 25 MG/1
TABLET ORAL
Qty: 25 TABLET | Refills: 0 | Status: SHIPPED | OUTPATIENT
Start: 2019-10-22 | End: 2019-12-29

## 2019-10-28 ENCOUNTER — PATIENT OUTREACH (OUTPATIENT)
Dept: ADMINISTRATIVE | Facility: OTHER | Age: 64
End: 2019-10-28

## 2019-10-31 ENCOUNTER — TELEPHONE (OUTPATIENT)
Dept: ORTHOPEDICS | Facility: CLINIC | Age: 64
End: 2019-10-31

## 2019-10-31 RX ORDER — DIAZEPAM 5 MG/1
TABLET ORAL
Qty: 30 TABLET | Refills: 0 | Status: SHIPPED | OUTPATIENT
Start: 2019-10-31 | End: 2019-11-30 | Stop reason: SDUPTHER

## 2019-10-31 NOTE — TELEPHONE ENCOUNTER
----- Message from Carol Ann Crawley sent at 10/31/2019  3:48 PM CDT -----  Contact: Patient   Type:  Patient Returning Call    Who Called: Christine  Who Left Message for Patient: not sure   Does the patient know what this is regarding?: Pre-op  Would the patient rather a call back or a response via [facility]? Call back  Best Call Back Number:[phone]   Additional Information: no

## 2019-11-07 ENCOUNTER — TELEPHONE (OUTPATIENT)
Dept: ORTHOPEDICS | Facility: CLINIC | Age: 64
End: 2019-11-07

## 2019-11-07 NOTE — TELEPHONE ENCOUNTER
Called pt to discuss changing her surgery day. I told her that the next available would be after the first of the year and she decided to keep the one she has. I sent a message to Gema so she can call her back to reschedule her pre op.

## 2019-11-07 NOTE — TELEPHONE ENCOUNTER
----- Message from Ciaran Gaines sent at 11/7/2019  1:05 PM CST -----  Goodafternoon ,       I spoke with Ms.Rachelle in regards for her pre-op , she stated she will like to be rescheduled maybe for December . She would like to speak with someone from the office .         Thanks,  Gema

## 2019-11-08 ENCOUNTER — TELEPHONE (OUTPATIENT)
Dept: ORTHOPEDICS | Facility: CLINIC | Age: 64
End: 2019-11-08

## 2019-11-08 ENCOUNTER — TELEPHONE (OUTPATIENT)
Dept: FAMILY MEDICINE | Facility: CLINIC | Age: 64
End: 2019-11-08

## 2019-11-08 NOTE — TELEPHONE ENCOUNTER
----- Message from Maria De Jesus Pimentel sent at 11/8/2019 11:20 AM CST -----  Contact: self  Patient is requesting a callback concerning a letter she needs from office for an extra bedroom to hold her wheelchair and other equipment. Please call [phone]-self

## 2019-11-08 NOTE — TELEPHONE ENCOUNTER
Spoke with patient; patient states she needs a letter a.s.a.p stating that she needs to stay living in her current home which is a 1 floor house with a 2   bedrooms to store her medical equipment in. Patient states she's being forced to move because housing authority states she doesn't need that much room. Patient states the section 8 authority is refusing to pay their half of her rent because of this. She was advised by [facility] to get a letter from her primary care doctor stating the need to stay in her current home. Patient state she will call back with a fax number.      ----- Message from Kinsey Masterson sent at 11/8/2019 11:30 AM CST -----  Contact: [phone] self   Pt is requesting to speak with you re: letter stating that she needs the extra room in her home. Please advise

## 2019-11-11 ENCOUNTER — TELEPHONE (OUTPATIENT)
Dept: ORTHOPEDICS | Facility: CLINIC | Age: 64
End: 2019-11-11

## 2019-11-11 NOTE — TELEPHONE ENCOUNTER
----- Message from Roldan Greenwood MD sent at 11/11/2019  8:53 AM CST -----  Contact: self   [phone]  This is not a reasonable request.  If there is a form to sign to support an application for better housing, that would be more reasonable.  ----- Message -----  From: Monica Sanches MA  Sent: 11/8/2019   1:01 PM CST  To: Roldan Greenwood MD     Ms Castro needs a letter stating that she needs a bigger place to live due to all of her medical equipment.   ----- Message -----  From: Mary Anne Ledezma  Sent: 11/8/2019  11:56 AM CST  To: Krystyna Sneed Staff    Pt is calling to request a call back, pt needs a letter for her landlord.  Pt is requesting the letter to say pt need another room to store medical equipment.    Please contact pt      Thank you!    Please send to Silvia Corado her fax is  [phone]

## 2019-11-13 ENCOUNTER — ANESTHESIA EVENT (OUTPATIENT)
Dept: SURGERY | Facility: HOSPITAL | Age: 64
End: 2019-11-13
Payer: MEDICARE

## 2019-11-14 ENCOUNTER — HOSPITAL ENCOUNTER (OUTPATIENT)
Dept: PREADMISSION TESTING | Facility: HOSPITAL | Age: 64
Discharge: HOME | End: 2019-11-14
Attending: ORTHOPAEDIC SURGERY
Payer: MEDICARE

## 2019-11-14 ENCOUNTER — CLINICAL SUPPORT (OUTPATIENT)
Dept: LAB | Facility: HOSPITAL | Age: 64
End: 2019-11-14
Attending: ORTHOPAEDIC SURGERY
Payer: MEDICARE

## 2019-11-14 VITALS
TEMPERATURE: 98 F | SYSTOLIC BLOOD PRESSURE: 130 MMHG | HEIGHT: 60 IN | WEIGHT: 207 LBS | DIASTOLIC BLOOD PRESSURE: 80 MMHG | RESPIRATION RATE: 18 BRPM | BODY MASS INDEX: 40.64 KG/M2 | OXYGEN SATURATION: 97 % | HEART RATE: 77 BPM

## 2019-11-14 DIAGNOSIS — Z01.818 PREOP EXAMINATION: ICD-10-CM

## 2019-11-14 DIAGNOSIS — Z01.818 PREOP EXAMINATION: Primary | ICD-10-CM

## 2019-11-14 PROCEDURE — 93005 ELECTROCARDIOGRAM TRACING: CPT

## 2019-11-14 RX ORDER — LIDOCAINE HYDROCHLORIDE 10 MG/ML
1 INJECTION, SOLUTION EPIDURAL; INFILTRATION; INTRACAUDAL; PERINEURAL ONCE
Status: CANCELLED | OUTPATIENT
Start: 2019-11-14 | End: 2019-11-14

## 2019-11-14 RX ORDER — SODIUM CHLORIDE, SODIUM LACTATE, POTASSIUM CHLORIDE, CALCIUM CHLORIDE 600; 310; 30; 20 MG/100ML; MG/100ML; MG/100ML; MG/100ML
INJECTION, SOLUTION INTRAVENOUS CONTINUOUS
Status: CANCELLED | OUTPATIENT
Start: 2019-11-14

## 2019-11-14 NOTE — ANESTHESIA PREPROCEDURE EVALUATION
2019  Christine Castro is a 64 y.o., female scheduled for right TKA under spinal/MAC/gen on 19.    Past Medical History:   Diagnosis Date    Arthritis     Asthma     CHF (congestive heart failure)     [facility]    Colon cancer     colon    COPD (chronic obstructive pulmonary disease)     Coronary artery disease     Depression     Diabetes mellitus, type 2     GERD (gastroesophageal reflux disease)     Myocardial infarction     AGE 19 MIGUELANGEL WITHBABY DELIVERY    Thyroid disease      Past Surgical History:   Procedure Laterality Date     SECTION      CHOLECYSTECTOMY      COLON SURGERY      COLONOSCOPY      --- repeat in 3-5 years    COLONOSCOPY N/A 2017    Procedure: COLONOSCOPY;  Surgeon: Corrina Flores MD;  Location: [facility];  Service: Endoscopy;  Laterality: N/A;    COLONOSCOPY N/A 4/10/2018    Procedure: COLONOSCOPY golytely;  Surgeon: Corrina Flores MD;  Location: [facility];  Service: Endoscopy;  Laterality: N/A;    ECTOPIC PREGNANCY SURGERY      ESOPHAGOGASTRODUODENOSCOPY N/A 2019    Procedure: ESOPHAGOGASTRODUODENOSCOPY (EGD);  Surgeon: Corrina Flores MD;  Location: [facility];  Service: Endoscopy;  Laterality: N/A;    GASTRIC BYPASS      KNEE ARTHROPLASTY Left 2019    Procedure: ARTHROPLASTY, KNEE;  Surgeon: Roldan Greenwood MD;  Location: [facility];  Service: Orthopedics;  Laterality: Left;  [place] notified    OOPHORECTOMY      TONSILLECTOMY      TUBAL LIGATION       Anesthesia Evaluation    I have reviewed the Patient Summary Reports.    I have reviewed the Nursing Notes.   I have reviewed the Medications.     Review of Systems  Anesthesia Hx:  No problems with previous Anesthesia  Denies Family Hx of Anesthesia complications.    Social:  Non-Smoker, No Alcohol Use    Hematology/Oncology:     Oncology Normal    -- Anemia:    Cardiovascular:   Past MI (1974) CAD asymptomatic   Denies Angina.     hyperlipidemia        Pulmonary:  Chronic Obstructive Pulmonary Disease (COPD): Inhaler use is inhaled steroid use in the past, not currently. Oral/Intravenous steroid use is occasional steroid Rx. Current breathing status is optimal, free of wheezing.    Renal/:  Renal/ Normal     Hepatic/GI:   GERD Denies Liver Disease.    Musculoskeletal:  Musculoskeletal Normal    Neurological:  Neurology Normal Denies TIA.  Denies CVA. Denies Seizures.    Endocrine:   Diabetes, type 2    Psych:   depression          TTE 3/2016  CONCLUSIONS     1 - Normal left ventricular systolic function (EF 55-60%).     2 - Concentric remodeling.     3 - Normal left ventricular diastolic function.     4 - Normal right ventricular systolic function .     5 - The estimated PA systolic pressure is 28 mmHg.     4/2017 neg stress test      Physical Exam  General:  Obesity    Airway/Jaw/Neck:  Airway Findings: Mouth Opening: Small, but > 3cm Tongue: Normal  General Airway Assessment: Adult  Mallampati: II  Improves to II with phonation.  TM Distance: Normal, at least 6 cm  Jaw/Neck Findings:  Neck ROM: Normal ROM  Neck Findings:  Short Neck      Dental:  Dental Findings: Edentulous   Chest/Lungs:  Chest/Lungs Findings: Clear to auscultation, Normal Respiratory Rate     Heart/Vascular:  Heart Findings: Rate: Normal  Rhythm: Regular Rhythm  Sounds: Normal  Heart murmur: negative       Mental Status:  Mental Status Findings:  Cooperative         Anesthesia Plan  Type of Anesthesia, risks & benefits discussed:  Anesthesia Type:  MAC, general, spinal  Patient's Preference:   Intra-op Monitoring Plan: standard ASA monitors  Intra-op Monitoring Plan Comments:   Post Op Pain Control Plan: multimodal analgesia  Post Op Pain Control Plan Comments:   Induction:   IV  Beta Blocker:         Informed Consent: Patient understands risks and agrees with Anesthesia plan.  Questions  answered. Anesthesia consent signed with patient.  ASA Score: 3     Day of Surgery Review of History & Physical:        Anesthesia Plan Notes: Anesthesia consent to be signed prior to procedure on 11/20/19        Ready For Surgery From Anesthesia Perspective.

## 2019-11-14 NOTE — DISCHARGE INSTRUCTIONS
Your surgery is scheduled for 11/20    Please report to Procedure Check In Room on the 2nd FLOOR at 7:30 a.m.     We will call  you 11/19 after 2pm to verify your arrival time.       INSTRUCTIONS IMPORTANT!!!  ¨ Do not eat or drink after 12 midnight-including water. OK to brush teeth, no   gum, candy or mints!    ¨ Take only these medicines with a small swallow of water-morning of surgery.  Synthroid.        ____  Proceed to [facility] on *** for additional testing.        ____  Do not wear makeup, including mascara.  ____  No powder, lotions or creams to surgical area.  ____  Please remove all jewelry, including piercings and leave at home.  ____  No money or valuables needed. Please leave at home.  ____  Please bring any documents given by your doctor.  ____  If going home the same day, arrange for a ride home. You will not be able to             drive if Anesthesia was used.  ____  Children under 18 years require a parent / guardian present the entire time             they are in surgery / recovery.  ____  Wear loose fitting clothing. Allow for dressings, bandages.  ____  Stop Aspirin, Ibuprofen, Motrin and Aleve at least 3-5 days before surgery, unless otherwise instructed by your doctor, or the nurse.   You MAY use Tylenol/acetaminophen until day of surgery.  ____  Wash the surgical area with Hibiclens the night before surgery, and again the             morning of surgery.  Be sure to rinse hibiclens off completely (if instructed by   nurse).  ____  If you take diabetic medication, do not take am of surgery unless instructed by Doctor.  ____  Call MD for temperature above 101 degrees or any other signs of infection such as Urinary (bladder) infection, Upper respiratory infection, skin boils, etc.  ____ Stop taking any Fish Oil supplement or any Vitamins that contain Vitamin E at least 5 days prior to surgery.  ____ Do Not wear your contact lenses the day of your procedure.  You may wear your  glasses.      ____Do not shave surgical site for 3 days prior to surgery.  ____ Practice Good hand washing before, during, and after procedure.      I have read or had read and explained to me, and understand the above information.  Additional comments or instructions:  For additional questions call [phone]      ANESTHESIA SIDE EFFECTS  -For the first 24 hours after surgery:  Do not drive, use heavy equipment, make important decisions, or drink alcohol  -It is normal to feel sleepy for several hours.  Rest until you are more awake.  -Have someone stay with you, if needed.  They can watch for problems and help keep you safe.  -Some possible post anesthesia side effects include: nausea and vomiting, sore throat and hoarseness, sleepiness, and dizziness.        Pre-Op Bathing Instructions    Before surgery, you can play an important role in your own health.    Because skin is not sterile, we need to be sure that your skin is as free of germs as possible. By following the instructions below, you can reduce the number of germs on your skin before surgery.    IMPORTANT: You will need to shower with a special soap called Hibiclens*, available at any pharmacy.  If you are allergic to Chlorhexidine (the antiseptic in Hibiclens), use an antibacterial soap such as Dial Soap for your preoperative shower.  You will shower with Hibiclens both the night before your surgery and the morning of your surgery.  Do not use Hibiclens on the head, face or genitals to avoid injury to those areas.    STEP #1: THE NIGHT BEFORE YOUR SURGERY     1. Do not shave the area of your body where your surgery will be performed.  2. Shower and wash your hair and body as usual with your normal soap and shampoo.  3. Rinse your hair and body thoroughly after you shower to remove all soap residue.  4. With your hand, apply one packet of Hibiclens soap to the surgical site.   5. Wash the site gently for five (5) minutes. Do not scrub your skin too hard.    6. Do not wash with your regular soap after Hibiclens is used.  7. Rinse your body thoroughly.  8. Pat yourself dry with a clean, soft towel.  9. Do not use lotion, cream, or powder.  10. Wear clean clothes.    STEP #2: THE MORNING OF YOUR SURGERY     1. Repeat Step #1.    * Not to be used by people allergic to Chlorhexidine.      Total Knee Replacement  During total knee replacement surgery, your damaged knee joint is replaced with an artificial joint, called a prosthesis. This surgery almost always reduces joint pain and improves your quality of life.     The parts of the prosthesis are secured to the bones of the knee. Together they form the new joint.   Before your surgery  You will most likely arrive at the hospital on the morning of the surgery. Be sure to follow all of your doctors instructions on preparing for surgery:  · Follow any directions you are given for taking medicines or for not eating or drinking before surgery.  · At the hospital, your temperature, pulse, breathing, and blood pressure will be checked.  · An IV (intravenous) line will be started to provide fluids and medicines needed during surgery.  The surgical procedure  When the surgical team is ready, youll be taken to the operating room. There youll be given anesthesia to help you sleep through surgery, or to make you numb from the waist down. Then an incision is made on the front or side of your knee. Any damaged bone is cleaned away, and the new joint components are put into place. The incision is closed with surgical staples or stitches.  After your surgery  After surgery, youll be sent to the recovery room. When you are fully awake, youll be moved to your room. The nurses will give you medicines to ease your pain. You may have a catheter (small tube) in your bladder. A continuous passive motion machine may be used on your knee to keep it from getting stiff. A sequential compression machine may be used to prevent blood clots by  gently squeezing then releasing your leg. You may be given medicine to prevent blood clots. Soon, healthcare providers will help you get up and moving.  When to call your doctor  Once at home, call your doctor if you have any of the symptoms below:  · An increase in knee pain  · Pain or swelling in the calf or leg  · Unusual redness, heat, or drainage at the incision site  · Fever of 100.4°F (38°C) or higher  · Shaking chills  · Trouble breathing or chest pain (call 911)   Date Last Reviewed: 9/20/2015  © 6697-8040 [employer]. [address] [place] [id number]. All rights reserved. This information is not intended as a substitute for professional medical care. Always follow your healthcare professional's instructions.        Types of Anesthesia  Your anesthesiologist is a key member of your surgical team. He or she gives you anesthetics (medications to keep you comfortable and decrease your awareness of surgery) and monitors your condition to keep you safe during surgery. You will have 1 of 3 kinds of anesthesia during your surgery.  Monitored anesthesia care (MAC)  · Often used for surgery that is short or not too invasive.  · Sedatives (medicines to relax you) are given through an IV (intravenous) line.  · The area around the surgical site is usually numbed with a local anesthetic.  · You may choose to remain awake or sleep lightly.  Regional anesthesia (sometimes called spinal epidural or yuko block)  · Often used for surgery on the arms, legs, and abdomen. It is also used during childbirth.  · A specific region of your body is numbed by injecting anesthetic near nerves, near your spine, or near the operative site.  · You may also be given sedatives through an IV line to relax you.  · With regional anesthesia, you may choose to remain awake or sleep lightly.  General anesthesia  · Often used for extensive surgery, such as on the heart, brain, or abdominal operation.  · Also used when the  patient wants to be totally asleep.  · May be given as a gas that you breathe and as medicines that are injected through an IV line.  · Because you are asleep, you feel no pain and remember nothing of the surgery.  The risks and complications of anesthesia depend on your overall health. If you are healthy, the risks are low. The risks are higher for patients with heart or lung problems. Your anesthesiologist or nurse anesthetist will discuss the risks with you.   Date Last Reviewed: 12/1/2016 © 2000-2017 [employer]. [address] [place] [id number]. All rights reserved. This information is not intended as a substitute for professional medical care. Always follow your healthcare professional's instructions.

## 2019-11-14 NOTE — PRE-PROCEDURE INSTRUCTIONS
Being admitted.    Call Gia, caretaker, with reminder. [phone].    Allergies, medical, surgical, family and psychosocial histories reviewed with patient. Periop plan of care reviewed. Preop instructions given, including medications to take and to hold. Hibiclens soap and instructions on use given. Time allotted for questions to be addressed.  Patient verbalized understanding.'

## 2019-11-15 NOTE — PRE-PROCEDURE INSTRUCTIONS
Spoke with patient regarding low calcium.  Patient had n/v day before lab draw with difficulty taking medications.  Symptoms have resolved and will continue taking supplements as directed by PCP.  Will recheck BMP DOS.

## 2019-11-20 ENCOUNTER — ANESTHESIA (OUTPATIENT)
Dept: SURGERY | Facility: HOSPITAL | Age: 64
End: 2019-11-20
Payer: MEDICARE

## 2019-11-20 ENCOUNTER — DOCUMENTATION ONLY (OUTPATIENT)
Dept: ORTHOPEDICS | Facility: HOSPITAL | Age: 64
End: 2019-11-20

## 2019-11-20 ENCOUNTER — HOSPITAL ENCOUNTER (OUTPATIENT)
Facility: HOSPITAL | Age: 64
Discharge: HOME | End: 2019-11-23
Attending: ORTHOPAEDIC SURGERY | Admitting: ORTHOPAEDIC SURGERY
Payer: MEDICARE

## 2019-11-20 DIAGNOSIS — M17.11 PRIMARY OSTEOARTHRITIS OF RIGHT KNEE: ICD-10-CM

## 2019-11-20 DIAGNOSIS — Z96.651 HISTORY OF RIGHT KNEE JOINT REPLACEMENT: ICD-10-CM

## 2019-11-20 DIAGNOSIS — M17.12 PRIMARY OSTEOARTHRITIS OF LEFT KNEE: Primary | ICD-10-CM

## 2019-11-20 PROBLEM — Z96.652 S/P TKR (TOTAL KNEE REPLACEMENT), LEFT: Status: RESOLVED | Noted: 2019-07-02 | Resolved: 2019-11-20

## 2019-11-20 PROBLEM — R29.898 WEAKNESS OF LEFT LOWER EXTREMITY: Status: RESOLVED | Noted: 2019-07-02 | Resolved: 2019-11-20

## 2019-11-20 PROBLEM — R26.89 GAIT, ANTALGIC: Status: RESOLVED | Noted: 2019-07-02 | Resolved: 2019-11-20

## 2019-11-20 PROBLEM — M25.662 DECREASED RANGE OF MOTION (ROM) OF LEFT KNEE: Status: RESOLVED | Noted: 2019-07-02 | Resolved: 2019-11-20

## 2019-11-20 LAB
ANION GAP SERPL CALC-SCNC: 7 MMOL/L (ref 8–16)
BUN SERPL-MCNC: 12 MG/DL (ref 8–23)
CALCIUM SERPL-MCNC: 9 MG/DL (ref 8.7–10.5)
CHLORIDE SERPL-SCNC: 109 MMOL/L (ref 95–110)
CO2 SERPL-SCNC: 24 MMOL/L (ref 23–29)
CREAT SERPL-MCNC: 0.8 MG/DL (ref 0.5–1.4)
EST. GFR  (AFRICAN AMERICAN): >60 ML/MIN/1.73 M^2
EST. GFR  (NON AFRICAN AMERICAN): >60 ML/MIN/1.73 M^2
GLUCOSE SERPL-MCNC: 79 MG/DL (ref 70–110)
POTASSIUM SERPL-SCNC: 4.5 MMOL/L (ref 3.5–5.1)
SODIUM SERPL-SCNC: 140 MMOL/L (ref 136–145)

## 2019-11-20 PROCEDURE — 63600175 PHARM REV CODE 636 W HCPCS: Performed by: NURSE ANESTHETIST, CERTIFIED REGISTERED

## 2019-11-20 PROCEDURE — 71000033 HC RECOVERY, INTIAL HOUR: Performed by: ORTHOPAEDIC SURGERY

## 2019-11-20 PROCEDURE — 36000710: Performed by: ORTHOPAEDIC SURGERY

## 2019-11-20 PROCEDURE — 27201423 OPTIME MED/SURG SUP & DEVICES STERILE SUPPLY: Performed by: ORTHOPAEDIC SURGERY

## 2019-11-20 PROCEDURE — 37000008 HC ANESTHESIA 1ST 15 MINUTES: Performed by: ORTHOPAEDIC SURGERY

## 2019-11-20 PROCEDURE — 25000003 PHARM REV CODE 250: Performed by: ORTHOPAEDIC SURGERY

## 2019-11-20 PROCEDURE — C1713 ANCHOR/SCREW BN/BN,TIS/BN: HCPCS | Performed by: ORTHOPAEDIC SURGERY

## 2019-11-20 PROCEDURE — 36000711: Performed by: ORTHOPAEDIC SURGERY

## 2019-11-20 PROCEDURE — 27447 PR TOTAL KNEE ARTHROPLASTY: ICD-10-PCS | Mod: AS,RT,, | Performed by: ORTHOPAEDIC SURGERY

## 2019-11-20 PROCEDURE — 27200688 HC TRAY, SPINAL-HYPER/ ISOBARIC: Performed by: HEALTH CARE PROVIDER

## 2019-11-20 PROCEDURE — 01402 ANES OPN/ARTH TOT KNE ARTHRP: CPT | Performed by: ORTHOPAEDIC SURGERY

## 2019-11-20 PROCEDURE — 94640 AIRWAY INHALATION TREATMENT: CPT

## 2019-11-20 PROCEDURE — 94761 N-INVAS EAR/PLS OXIMETRY MLT: CPT

## 2019-11-20 PROCEDURE — 63600175 PHARM REV CODE 636 W HCPCS: Performed by: HEALTH CARE PROVIDER

## 2019-11-20 PROCEDURE — C1776 JOINT DEVICE (IMPLANTABLE): HCPCS | Performed by: ORTHOPAEDIC SURGERY

## 2019-11-20 PROCEDURE — 63600175 PHARM REV CODE 636 W HCPCS: Performed by: ORTHOPAEDIC SURGERY

## 2019-11-20 PROCEDURE — 71000039 HC RECOVERY, EACH ADD'L HOUR: Performed by: ORTHOPAEDIC SURGERY

## 2019-11-20 PROCEDURE — 25000242 PHARM REV CODE 250 ALT 637 W/ HCPCS: Performed by: ORTHOPAEDIC SURGERY

## 2019-11-20 PROCEDURE — 36415 COLL VENOUS BLD VENIPUNCTURE: CPT

## 2019-11-20 PROCEDURE — 27447 TOTAL KNEE ARTHROPLASTY: CPT | Mod: RT,,, | Performed by: ORTHOPAEDIC SURGERY

## 2019-11-20 PROCEDURE — 27447 TOTAL KNEE ARTHROPLASTY: CPT | Mod: AS,RT,, | Performed by: ORTHOPAEDIC SURGERY

## 2019-11-20 PROCEDURE — 27447 PR TOTAL KNEE ARTHROPLASTY: ICD-10-PCS | Mod: RT,,, | Performed by: ORTHOPAEDIC SURGERY

## 2019-11-20 PROCEDURE — 63600175 PHARM REV CODE 636 W HCPCS: Performed by: ANESTHESIOLOGY

## 2019-11-20 PROCEDURE — 80048 BASIC METABOLIC PNL TOTAL CA: CPT

## 2019-11-20 PROCEDURE — 25000003 PHARM REV CODE 250: Performed by: HEALTH CARE PROVIDER

## 2019-11-20 PROCEDURE — 37000009 HC ANESTHESIA EA ADD 15 MINS: Performed by: ORTHOPAEDIC SURGERY

## 2019-11-20 DEVICE — IMPLANTABLE DEVICE: Type: IMPLANTABLE DEVICE | Site: KNEE | Status: FUNCTIONAL

## 2019-11-20 DEVICE — BASEPLATE TIBIAL CEM MOD SZ 2: Type: IMPLANTABLE DEVICE | Site: KNEE | Status: FUNCTIONAL

## 2019-11-20 DEVICE — CEMENT BONE IMPLANT: Type: IMPLANTABLE DEVICE | Site: KNEE | Status: FUNCTIONAL

## 2019-11-20 DEVICE — COMPONENT PATELLA PFCSIG 32MM: Type: IMPLANTABLE DEVICE | Site: KNEE | Status: FUNCTIONAL

## 2019-11-20 DEVICE — COMPONENT SIGMA FEM PS SZ2.5 R: Type: IMPLANTABLE DEVICE | Site: KNEE | Status: FUNCTIONAL

## 2019-11-20 RX ORDER — SODIUM CHLORIDE, SODIUM LACTATE, POTASSIUM CHLORIDE, CALCIUM CHLORIDE 600; 310; 30; 20 MG/100ML; MG/100ML; MG/100ML; MG/100ML
INJECTION, SOLUTION INTRAVENOUS CONTINUOUS PRN
Status: DISCONTINUED | OUTPATIENT
Start: 2019-11-20 | End: 2019-11-20

## 2019-11-20 RX ORDER — ASPIRIN 81 MG/1
81 TABLET ORAL DAILY
Status: DISCONTINUED | OUTPATIENT
Start: 2019-11-21 | End: 2019-11-23 | Stop reason: HOSPADM

## 2019-11-20 RX ORDER — LEVOTHYROXINE SODIUM 88 UG/1
88 TABLET ORAL DAILY
Status: DISCONTINUED | OUTPATIENT
Start: 2019-11-21 | End: 2019-11-23 | Stop reason: HOSPADM

## 2019-11-20 RX ORDER — SODIUM CHLORIDE, SODIUM LACTATE, POTASSIUM CHLORIDE, CALCIUM CHLORIDE 600; 310; 30; 20 MG/100ML; MG/100ML; MG/100ML; MG/100ML
INJECTION, SOLUTION INTRAVENOUS CONTINUOUS
Status: DISCONTINUED | OUTPATIENT
Start: 2019-11-20 | End: 2019-11-20

## 2019-11-20 RX ORDER — MUPIROCIN 20 MG/G
OINTMENT TOPICAL
Status: DISCONTINUED | OUTPATIENT
Start: 2019-11-20 | End: 2019-11-20 | Stop reason: HOSPADM

## 2019-11-20 RX ORDER — GABAPENTIN 100 MG/1
100 CAPSULE ORAL 3 TIMES DAILY
Status: DISCONTINUED | OUTPATIENT
Start: 2019-11-20 | End: 2019-11-23 | Stop reason: HOSPADM

## 2019-11-20 RX ORDER — HYDROMORPHONE HYDROCHLORIDE 2 MG/ML
0.5 INJECTION, SOLUTION INTRAMUSCULAR; INTRAVENOUS; SUBCUTANEOUS EVERY 5 MIN PRN
Status: DISCONTINUED | OUTPATIENT
Start: 2019-11-20 | End: 2019-11-20 | Stop reason: HOSPADM

## 2019-11-20 RX ORDER — LACTULOSE 10 G/15ML
20 SOLUTION ORAL EVERY 6 HOURS PRN
Status: DISCONTINUED | OUTPATIENT
Start: 2019-11-20 | End: 2019-11-23 | Stop reason: HOSPADM

## 2019-11-20 RX ORDER — LIDOCAINE HYDROCHLORIDE 10 MG/ML
1 INJECTION, SOLUTION EPIDURAL; INFILTRATION; INTRACAUDAL; PERINEURAL ONCE
Status: DISCONTINUED | OUTPATIENT
Start: 2019-11-20 | End: 2019-11-20 | Stop reason: HOSPADM

## 2019-11-20 RX ORDER — SODIUM CHLORIDE 9 MG/ML
INJECTION, SOLUTION INTRAVENOUS CONTINUOUS
Status: DISCONTINUED | OUTPATIENT
Start: 2019-11-20 | End: 2019-11-21

## 2019-11-20 RX ORDER — ACETAMINOPHEN 500 MG
1000 TABLET ORAL
Status: COMPLETED | OUTPATIENT
Start: 2019-11-20 | End: 2019-11-20

## 2019-11-20 RX ORDER — FENTANYL CITRATE 50 UG/ML
INJECTION, SOLUTION INTRAMUSCULAR; INTRAVENOUS
Status: DISCONTINUED | OUTPATIENT
Start: 2019-11-20 | End: 2019-11-20

## 2019-11-20 RX ORDER — HYDROMORPHONE HYDROCHLORIDE 2 MG/ML
0.2 INJECTION, SOLUTION INTRAMUSCULAR; INTRAVENOUS; SUBCUTANEOUS EVERY 5 MIN PRN
Status: DISCONTINUED | OUTPATIENT
Start: 2019-11-20 | End: 2019-11-20 | Stop reason: HOSPADM

## 2019-11-20 RX ORDER — PROPOFOL 10 MG/ML
INJECTION, EMULSION INTRAVENOUS CONTINUOUS PRN
Status: DISCONTINUED | OUTPATIENT
Start: 2019-11-20 | End: 2019-11-20

## 2019-11-20 RX ORDER — POLYETHYLENE GLYCOL 3350 17 G/17G
17 POWDER, FOR SOLUTION ORAL DAILY
Status: DISCONTINUED | OUTPATIENT
Start: 2019-11-21 | End: 2019-11-23 | Stop reason: HOSPADM

## 2019-11-20 RX ORDER — BISACODYL 10 MG
10 SUPPOSITORY, RECTAL RECTAL DAILY PRN
Status: DISCONTINUED | OUTPATIENT
Start: 2019-11-20 | End: 2019-11-23 | Stop reason: HOSPADM

## 2019-11-20 RX ORDER — ONDANSETRON 2 MG/ML
INJECTION INTRAMUSCULAR; INTRAVENOUS
Status: DISCONTINUED | OUTPATIENT
Start: 2019-11-20 | End: 2019-11-20

## 2019-11-20 RX ORDER — GLIPIZIDE 2.5 MG/1
2.5 TABLET, EXTENDED RELEASE ORAL
Status: DISCONTINUED | OUTPATIENT
Start: 2019-11-21 | End: 2019-11-23 | Stop reason: HOSPADM

## 2019-11-20 RX ORDER — PREDNISONE 5 MG/1
5 TABLET ORAL DAILY
Status: DISCONTINUED | OUTPATIENT
Start: 2019-11-21 | End: 2019-11-23 | Stop reason: HOSPADM

## 2019-11-20 RX ORDER — ESCITALOPRAM OXALATE 20 MG/1
20 TABLET ORAL DAILY
Status: DISCONTINUED | OUTPATIENT
Start: 2019-11-21 | End: 2019-11-23 | Stop reason: HOSPADM

## 2019-11-20 RX ORDER — SODIUM CHLORIDE 0.9 % (FLUSH) 0.9 %
5 SYRINGE (ML) INJECTION
Status: DISCONTINUED | OUTPATIENT
Start: 2019-11-20 | End: 2019-11-23 | Stop reason: HOSPADM

## 2019-11-20 RX ORDER — BUPROPION HYDROCHLORIDE 100 MG/1
200 TABLET, EXTENDED RELEASE ORAL DAILY
Status: DISCONTINUED | OUTPATIENT
Start: 2019-11-21 | End: 2019-11-23 | Stop reason: HOSPADM

## 2019-11-20 RX ORDER — OXYCODONE AND ACETAMINOPHEN 5; 325 MG/1; MG/1
1 TABLET ORAL
Status: DISCONTINUED | OUTPATIENT
Start: 2019-11-20 | End: 2019-11-20 | Stop reason: HOSPADM

## 2019-11-20 RX ORDER — PHENYLEPHRINE HYDROCHLORIDE 10 MG/ML
INJECTION INTRAVENOUS
Status: DISCONTINUED | OUTPATIENT
Start: 2019-11-20 | End: 2019-11-20

## 2019-11-20 RX ORDER — PREGABALIN 75 MG/1
150 CAPSULE ORAL
Status: COMPLETED | OUTPATIENT
Start: 2019-11-20 | End: 2019-11-20

## 2019-11-20 RX ORDER — ACETAMINOPHEN 500 MG
1000 TABLET ORAL 3 TIMES DAILY
Status: DISCONTINUED | OUTPATIENT
Start: 2019-11-20 | End: 2019-11-23

## 2019-11-20 RX ORDER — OXYCODONE HYDROCHLORIDE 5 MG/1
5 TABLET ORAL
Status: DISCONTINUED | OUTPATIENT
Start: 2019-11-20 | End: 2019-11-23 | Stop reason: HOSPADM

## 2019-11-20 RX ORDER — POTASSIUM CHLORIDE 750 MG/1
10 TABLET, EXTENDED RELEASE ORAL DAILY
Status: DISCONTINUED | OUTPATIENT
Start: 2019-11-21 | End: 2019-11-23 | Stop reason: HOSPADM

## 2019-11-20 RX ORDER — QUETIAPINE FUMARATE 25 MG/1
50 TABLET, FILM COATED ORAL NIGHTLY
Status: DISCONTINUED | OUTPATIENT
Start: 2019-11-20 | End: 2019-11-23 | Stop reason: HOSPADM

## 2019-11-20 RX ORDER — ONDANSETRON 2 MG/ML
4 INJECTION INTRAMUSCULAR; INTRAVENOUS EVERY 6 HOURS PRN
Status: DISCONTINUED | OUTPATIENT
Start: 2019-11-20 | End: 2019-11-23 | Stop reason: HOSPADM

## 2019-11-20 RX ORDER — AMOXICILLIN 250 MG
1 CAPSULE ORAL 2 TIMES DAILY
Status: DISCONTINUED | OUTPATIENT
Start: 2019-11-20 | End: 2019-11-23 | Stop reason: HOSPADM

## 2019-11-20 RX ORDER — PANTOPRAZOLE SODIUM 40 MG/1
40 TABLET, DELAYED RELEASE ORAL DAILY
Status: DISCONTINUED | OUTPATIENT
Start: 2019-11-21 | End: 2019-11-23 | Stop reason: HOSPADM

## 2019-11-20 RX ORDER — HYDROMORPHONE HYDROCHLORIDE 2 MG/ML
0.5 INJECTION, SOLUTION INTRAMUSCULAR; INTRAVENOUS; SUBCUTANEOUS EVERY 6 HOURS PRN
Status: DISCONTINUED | OUTPATIENT
Start: 2019-11-20 | End: 2019-11-23 | Stop reason: HOSPADM

## 2019-11-20 RX ORDER — ZOLPIDEM TARTRATE 5 MG/1
5 TABLET ORAL NIGHTLY PRN
Status: DISCONTINUED | OUTPATIENT
Start: 2019-11-20 | End: 2019-11-23 | Stop reason: HOSPADM

## 2019-11-20 RX ORDER — FUROSEMIDE 20 MG/1
20 TABLET ORAL DAILY
Status: DISCONTINUED | OUTPATIENT
Start: 2019-11-21 | End: 2019-11-23 | Stop reason: HOSPADM

## 2019-11-20 RX ORDER — TRANEXAMIC ACID 100 MG/ML
INJECTION, SOLUTION INTRAVENOUS
Status: DISCONTINUED | OUTPATIENT
Start: 2019-11-20 | End: 2019-11-20 | Stop reason: HOSPADM

## 2019-11-20 RX ORDER — ATORVASTATIN CALCIUM 40 MG/1
40 TABLET, FILM COATED ORAL DAILY
Status: DISCONTINUED | OUTPATIENT
Start: 2019-11-21 | End: 2019-11-23 | Stop reason: HOSPADM

## 2019-11-20 RX ORDER — ONDANSETRON 4 MG/1
4 TABLET, ORALLY DISINTEGRATING ORAL EVERY 6 HOURS PRN
Status: DISCONTINUED | OUTPATIENT
Start: 2019-11-20 | End: 2019-11-23 | Stop reason: HOSPADM

## 2019-11-20 RX ORDER — ALBUTEROL SULFATE 90 UG/1
1 AEROSOL, METERED RESPIRATORY (INHALATION) EVERY 4 HOURS PRN
Status: DISCONTINUED | OUTPATIENT
Start: 2019-11-20 | End: 2019-11-23 | Stop reason: HOSPADM

## 2019-11-20 RX ORDER — OXYCODONE HYDROCHLORIDE 5 MG/1
10 TABLET ORAL
Status: DISCONTINUED | OUTPATIENT
Start: 2019-11-20 | End: 2019-11-23 | Stop reason: HOSPADM

## 2019-11-20 RX ORDER — PROPOFOL 10 MG/ML
INJECTION, EMULSION INTRAVENOUS
Status: DISCONTINUED | OUTPATIENT
Start: 2019-11-20 | End: 2019-11-20

## 2019-11-20 RX ORDER — CEFAZOLIN SODIUM 2 G/50ML
2 SOLUTION INTRAVENOUS
Status: COMPLETED | OUTPATIENT
Start: 2019-11-20 | End: 2019-11-20

## 2019-11-20 RX ORDER — DIPHENHYDRAMINE HYDROCHLORIDE 50 MG/ML
25 INJECTION INTRAMUSCULAR; INTRAVENOUS EVERY 6 HOURS PRN
Status: DISCONTINUED | OUTPATIENT
Start: 2019-11-20 | End: 2019-11-20 | Stop reason: HOSPADM

## 2019-11-20 RX ORDER — CEFAZOLIN SODIUM 2 G/50ML
2 SOLUTION INTRAVENOUS
Status: COMPLETED | OUTPATIENT
Start: 2019-11-20 | End: 2019-11-21

## 2019-11-20 RX ORDER — BUPIVACAINE HYDROCHLORIDE 7.5 MG/ML
INJECTION, SOLUTION EPIDURAL; RETROBULBAR
Status: COMPLETED | OUTPATIENT
Start: 2019-11-20 | End: 2019-11-20

## 2019-11-20 RX ORDER — GLYCOPYRROLATE 0.2 MG/ML
INJECTION INTRAMUSCULAR; INTRAVENOUS
Status: DISCONTINUED | OUTPATIENT
Start: 2019-11-20 | End: 2019-11-20

## 2019-11-20 RX ORDER — SODIUM CHLORIDE 0.9 % (FLUSH) 0.9 %
10 SYRINGE (ML) INJECTION
Status: DISCONTINUED | OUTPATIENT
Start: 2019-11-20 | End: 2019-11-20

## 2019-11-20 RX ADMIN — TRANEXAMIC ACID 1000 MG: 100 INJECTION, SOLUTION INTRAVENOUS at 02:11

## 2019-11-20 RX ADMIN — PREGABALIN 150 MG: 75 CAPSULE ORAL at 11:11

## 2019-11-20 RX ADMIN — MUPIROCIN: 20 OINTMENT TOPICAL at 11:11

## 2019-11-20 RX ADMIN — QUETIAPINE 50 MG: 25 TABLET ORAL at 09:11

## 2019-11-20 RX ADMIN — SODIUM CHLORIDE, SODIUM LACTATE, POTASSIUM CHLORIDE, AND CALCIUM CHLORIDE: .6; .31; .03; .02 INJECTION, SOLUTION INTRAVENOUS at 01:11

## 2019-11-20 RX ADMIN — GLYCOPYRROLATE 0.2 MG: 0.2 INJECTION, SOLUTION INTRAMUSCULAR; INTRAVENOUS at 02:11

## 2019-11-20 RX ADMIN — BUDESONIDE 1 PUFF: 180 AEROSOL, POWDER RESPIRATORY (INHALATION) at 08:11

## 2019-11-20 RX ADMIN — SENNOSIDES AND DOCUSATE SODIUM 1 TABLET: 8.6; 5 TABLET ORAL at 09:11

## 2019-11-20 RX ADMIN — HYDROMORPHONE HYDROCHLORIDE 0.5 MG: 2 INJECTION, SOLUTION INTRAMUSCULAR; INTRAVENOUS; SUBCUTANEOUS at 09:11

## 2019-11-20 RX ADMIN — PHENYLEPHRINE HYDROCHLORIDE 100 MCG: 10 INJECTION INTRAVENOUS at 02:11

## 2019-11-20 RX ADMIN — PROPOFOL 65 MCG/KG/MIN: 10 INJECTION, EMULSION INTRAVENOUS at 02:11

## 2019-11-20 RX ADMIN — PROPOFOL 60 MG: 10 INJECTION, EMULSION INTRAVENOUS at 02:11

## 2019-11-20 RX ADMIN — BUPIVACAINE HYDROCHLORIDE 1.6 ML: 7.5 INJECTION, SOLUTION EPIDURAL; RETROBULBAR at 02:11

## 2019-11-20 RX ADMIN — ACETAMINOPHEN 1000 MG: 500 TABLET ORAL at 09:11

## 2019-11-20 RX ADMIN — ONDANSETRON 4 MG: 2 INJECTION, SOLUTION INTRAMUSCULAR; INTRAVENOUS at 04:11

## 2019-11-20 RX ADMIN — CEFAZOLIN SODIUM 2 G: 2 SOLUTION INTRAVENOUS at 02:11

## 2019-11-20 RX ADMIN — FENTANYL CITRATE 100 MCG: 50 INJECTION, SOLUTION INTRAMUSCULAR; INTRAVENOUS at 02:11

## 2019-11-20 RX ADMIN — ACETAMINOPHEN 1000 MG: 500 TABLET ORAL at 11:11

## 2019-11-20 RX ADMIN — GABAPENTIN 100 MG: 100 CAPSULE ORAL at 09:11

## 2019-11-20 RX ADMIN — HYDROMORPHONE HYDROCHLORIDE 0.5 MG: 2 INJECTION, SOLUTION INTRAMUSCULAR; INTRAVENOUS; SUBCUTANEOUS at 06:11

## 2019-11-20 RX ADMIN — CEFAZOLIN SODIUM 2 G: 2 SOLUTION INTRAVENOUS at 09:11

## 2019-11-20 RX ADMIN — SODIUM CHLORIDE: 0.9 INJECTION, SOLUTION INTRAVENOUS at 09:11

## 2019-11-20 NOTE — PROGRESS NOTES
Subjective:       Patient ID: Christine Castro is a 64 y.o. female.    Chief Complaint: No chief complaint on file.    Christine Castro is a 64 y.o. female with PMH significant for HTN, HLD, DM, history of MI, CHF, history of colon cancer, anemia, hypothyroidism, GERD, asthma, and depression. She is under going a Left total knee arthroplasty to be performed by  Dr. Greenwood on 19.  Christine Castro has a >5 year history of right knee pain.  Pain is worse with activity and weight bearing. Patient has experienced interference of ADLs due to increased pain and decreased range of motion. Patient has failed non-operative treatment including NSAIDs, activity modification, and corticosteroid injections. Christine Castro ambulates with walker.  She underwent previous TKA in May 2019. She was seen and cleared by cardiology on 2019 and primary care doctor on 2019. There has been no change in her medical status since then.     Past Medical History:   Diagnosis Date    Arthritis     Asthma     CHF (congestive heart failure)     [facility]    Colon cancer     colon    COPD (chronic obstructive pulmonary disease)     Coronary artery disease     Depression     Diabetes mellitus, type 2     GERD (gastroesophageal reflux disease)     Myocardial infarction     AGE 19 [facility]Y DELIVERY    Thyroid disease      Past Surgical History:   Procedure Laterality Date     SECTION      CHOLECYSTECTOMY      COLON SURGERY      COLONOSCOPY      --- repeat in 3-5 years    COLONOSCOPY N/A 2017    Procedure: COLONOSCOPY;  Surgeon: Corrina Flores MD;  Location: [facility] ENDO;  Service: Endoscopy;  Laterality: N/A;    COLONOSCOPY N/A 4/10/2018    Procedure: COLONOSCOPY golytely;  Surgeon: Corrina Flores MD;  Location: [facility] ENDO;  Service: Endoscopy;  Laterality: N/A;    ECTOPIC PREGNANCY SURGERY      ESOPHAGOGASTRODUODENOSCOPY N/A 2019    Procedure: ESOPHAGOGASTRODUODENOSCOPY  (EGD);  Surgeon: Corrina Flores MD;  Location: [facility] ENDO;  Service: Endoscopy;  Laterality: N/A;    GASTRIC BYPASS      KNEE ARTHROPLASTY Left 5/8/2019    Procedure: ARTHROPLASTY, KNEE;  Surgeon: Roldan Greenwood MD;  Location: [facility] OR;  Service: Orthopedics;  Laterality: Left;  Navid notified    OOPHORECTOMY      TONSILLECTOMY      TUBAL LIGATION       Family History   Problem Relation Age of Onset    Hyperlipidemia Mother     Hypertension Mother     Diabetes Mother     Stroke Mother     Hypertension Sister     Hypertension Brother     Diabetes Brother     Breast cancer Maternal Aunt     Breast cancer Maternal Aunt     Breast cancer Cousin      Social History     Socioeconomic History    Marital status:      Spouse name: Not on file    Number of children: Not on file    Years of education: Not on file    Highest education level: Not on file   Occupational History    Not on file   Social Needs    Financial resource strain: Not on file    Food insecurity:     Worry: Not on file     Inability: Not on file    Transportation needs:     Medical: Not on file     Non-medical: Not on file   Tobacco Use    Smoking status: Never Smoker    Smokeless tobacco: Never Used   Substance and Sexual Activity    Alcohol use: Yes     Comment: very rare    Drug use: No    Sexual activity: Never   Lifestyle    Physical activity:     Days per week: Not on file     Minutes per session: Not on file    Stress: Very much   Relationships    Social connections:     Talks on phone: Not on file     Gets together: Not on file     Attends Protestant service: Not on file     Active member of club or organization: Not on file     Attends meetings of clubs or organizations: Not on file     Relationship status: Not on file   Other Topics Concern    Not on file   Social History Narrative    Not on file       No current facility-administered medications for this visit.      No current outpatient medications on  file.     Facility-Administered Medications Ordered in Other Visits   Medication Dose Route Frequency Provider Last Rate Last Dose    cefazolin (ANCEF) 2 gram in dextrose 5% 50 mL IVPB (premix)  2 g Intravenous On Call Procedure Roldan Greenwood MD        lactated ringers infusion   Intravenous Continuous Cristiana Dockery NP        lidocaine (PF) 10 mg/ml (1%) injection 10 mg  1 mL Intradermal Once Cristiana Dockery NP        mupirocin 2 % ointment   Nasal On Call Procedure Roldan Greenwood MD        sodium chloride 0.9% flush 10 mL  10 mL Intravenous PRN Roldan Greenwood MD        tranexamic acid (CYKLOKAPRON) 1,000 mg in sodium chloride 0.9% 100 mL  1,000 mg Intravenous On Call Procedure Roldan Greenwood MD         Review of patient's allergies indicates:   Allergen Reactions    Adhesive      Adhesive tape    Latex, natural rubber      Adhesive from latex tape    Ibuprofen Other (See Comments)     Causes asthma flare??       Review of Systems    Objective:      There were no vitals filed for this visit.  Physical Exam   The patient is not in acute distress.   Sclerae normal  Body habitus is overweight.  Respiratory distress:  none   The patient walks with a limp.  Hip irritability  negative.   The skin over the knee is intact.  Knee effusion trace  Tendernes is located medially  Range of motion- Flexion 110 deg, Extension 5 deg,   Ligament laxity exam:   MCL 2+   Lachman 0   Post sag  0    LCL 0  Patellar apprehension negative.  Popliteal cyst negative  Patellar crepitation present.  Flexion/pinch negative  Pulses DP present, PT present.  Motor normal 5/5 strength in all tested muscle groups.   Sensory normal.GHT KNEE    Lab Review:   CBC:   Lab Results   Component Value Date    WBC 5.26 10/02/2019    RBC 3.28 (L) 10/02/2019    HGB 9.9 (L) 10/02/2019    HCT 32.3 (L) 10/02/2019     10/02/2019     BMP:   Lab Results   Component Value Date    GLU 94 11/14/2019     11/14/2019    K 4.0 11/14/2019      11/14/2019    CO2 21 (L) 11/14/2019    BUN 13 11/14/2019    CREATININE 0.8 11/14/2019    CALCIUM 7.7 (L) 11/14/2019     Diagnostics Review: X-Ray: Reviewed     Assessment:         Primary osteoarthritis of the right knee.     Plan:     Right TKA today

## 2019-11-20 NOTE — H&P (VIEW-ONLY)
Subjective:       Patient ID: Christine Castro is a 64 y.o. female.    Chief Complaint: No chief complaint on file.    Christine Castro is a 64 y.o. female with PMH significant for HTN, HLD, DM, history of MI, CHF, history of colon cancer, anemia, hypothyroidism, GERD, asthma, and depression. She is under going a Left total knee arthroplasty to be performed by  Dr. Greenwood on 19.  Christine Castro has a >5 year history of right knee pain.  Pain is worse with activity and weight bearing. Patient has experienced interference of ADLs due to increased pain and decreased range of motion. Patient has failed non-operative treatment including NSAIDs, activity modification, and corticosteroid injections. Christine Castro ambulates with walker.  She underwent previous TKA in May 2019. She was seen and cleared by cardiology on 2019 and primary care doctor on 2019. There has been no change in her medical status since then.     Past Medical History:   Diagnosis Date    Arthritis     Asthma     CHF (congestive heart failure)     [facility]    Colon cancer     colon    COPD (chronic obstructive pulmonary disease)     Coronary artery disease     Depression     Diabetes mellitus, type 2     GERD (gastroesophageal reflux disease)     Myocardial infarction     AGE 19 [facility] WITHBABY DELIVERY    Thyroid disease      Past Surgical History:   Procedure Laterality Date     SECTION      CHOLECYSTECTOMY      COLON SURGERY      COLONOSCOPY      --- repeat in 3-5 years    COLONOSCOPY N/A 2017    Procedure: COLONOSCOPY;  Surgeon: Corrina Flores MD;  Location: [facility] ENDO;  Service: Endoscopy;  Laterality: N/A;    COLONOSCOPY N/A 4/10/2018    Procedure: COLONOSCOPY golytely;  Surgeon: Corrina Flores MD;  Location: [facility] ENDO;  Service: Endoscopy;  Laterality: N/A;    ECTOPIC PREGNANCY SURGERY      ESOPHAGOGASTRODUODENOSCOPY N/A 2019    Procedure: ESOPHAGOGASTRODUODENOSCOPY  (EGD);  Surgeon: Corrina Flores MD;  Location: [facility] ENDO;  Service: Endoscopy;  Laterality: N/A;    GASTRIC BYPASS      KNEE ARTHROPLASTY Left 5/8/2019    Procedure: ARTHROPLASTY, KNEE;  Surgeon: Roldan Greenwood MD;  Location: [facility] OR;  Service: Orthopedics;  Laterality: Left;  Navid notified    OOPHORECTOMY      TONSILLECTOMY      TUBAL LIGATION       Family History   Problem Relation Age of Onset    Hyperlipidemia Mother     Hypertension Mother     Diabetes Mother     Stroke Mother     Hypertension Sister     Hypertension Brother     Diabetes Brother     Breast cancer Maternal Aunt     Breast cancer Maternal Aunt     Breast cancer Cousin      Social History     Socioeconomic History    Marital status:      Spouse name: Not on file    Number of children: Not on file    Years of education: Not on file    Highest education level: Not on file   Occupational History    Not on file   Social Needs    Financial resource strain: Not on file    Food insecurity:     Worry: Not on file     Inability: Not on file    Transportation needs:     Medical: Not on file     Non-medical: Not on file   Tobacco Use    Smoking status: Never Smoker    Smokeless tobacco: Never Used   Substance and Sexual Activity    Alcohol use: Yes     Comment: very rare    Drug use: No    Sexual activity: Never   Lifestyle    Physical activity:     Days per week: Not on file     Minutes per session: Not on file    Stress: Very much   Relationships    Social connections:     Talks on phone: Not on file     Gets together: Not on file     Attends Christianity service: Not on file     Active member of club or organization: Not on file     Attends meetings of clubs or organizations: Not on file     Relationship status: Not on file   Other Topics Concern    Not on file   Social History Narrative    Not on file       No current facility-administered medications for this visit.      No current outpatient medications on  file.     Facility-Administered Medications Ordered in Other Visits   Medication Dose Route Frequency Provider Last Rate Last Dose    cefazolin (ANCEF) 2 gram in dextrose 5% 50 mL IVPB (premix)  2 g Intravenous On Call Procedure Roldan Greenwood MD        lactated ringers infusion   Intravenous Continuous Cristiana Dockery NP        lidocaine (PF) 10 mg/ml (1%) injection 10 mg  1 mL Intradermal Once Cristiana Dockery NP        mupirocin 2 % ointment   Nasal On Call Procedure Roldan Greenwood MD        sodium chloride 0.9% flush 10 mL  10 mL Intravenous PRN Roldan Greenwood MD        tranexamic acid (CYKLOKAPRON) 1,000 mg in sodium chloride 0.9% 100 mL  1,000 mg Intravenous On Call Procedure Roldan Greenwood MD         Review of patient's allergies indicates:   Allergen Reactions    Adhesive      Adhesive tape    Latex, natural rubber      Adhesive from latex tape    Ibuprofen Other (See Comments)     Causes asthma flare??       Review of Systems    Objective:      There were no vitals filed for this visit.  Physical Exam   The patient is not in acute distress.   Sclerae normal  Body habitus is overweight.  Respiratory distress:  none   The patient walks with a limp.  Hip irritability  negative.   The skin over the knee is intact.  Knee effusion trace  Tendernes is located medially  Range of motion- Flexion 110 deg, Extension 5 deg,   Ligament laxity exam:   MCL 2+   Lachman 0   Post sag  0    LCL 0  Patellar apprehension negative.  Popliteal cyst negative  Patellar crepitation present.  Flexion/pinch negative  Pulses DP present, PT present.  Motor normal 5/5 strength in all tested muscle groups.   Sensory normal.GHT KNEE    Lab Review:   CBC:   Lab Results   Component Value Date    WBC 5.26 10/02/2019    RBC 3.28 (L) 10/02/2019    HGB 9.9 (L) 10/02/2019    HCT 32.3 (L) 10/02/2019     10/02/2019     BMP:   Lab Results   Component Value Date    GLU 94 11/14/2019     11/14/2019    K 4.0 11/14/2019      11/14/2019    CO2 21 (L) 11/14/2019    BUN 13 11/14/2019    CREATININE 0.8 11/14/2019    CALCIUM 7.7 (L) 11/14/2019     Diagnostics Review: X-Ray: Reviewed     Assessment:         Primary osteoarthritis of the right knee.     Plan:     Right TKA today

## 2019-11-20 NOTE — BRIEF OP NOTE
[facility]  Brief Operative Note    SUMMARY     Surgery Date: 11/20/2019     Surgeon(s) and Role:     * Roldan Greenwood MD - Primary    Assisting Surgeon:  tia Joaquin    Pre-op Diagnosis:  Primary osteoarthritis of right knee [M17.11]    Post-op Diagnosis:  Post-Op Diagnosis Codes:     * Primary osteoarthritis of right knee [M17.11]    Procedure(s) (LRB):  ARTHROPLASTY, KNEE (Right)    Anesthesia: Choice    Description of Procedure:  Right total knee replacement    Description of the findings of the procedure:  Severe osteoarthritis    Estimated Blood Loss: * No values recorded between 11/20/2019  2:30 PM and 11/20/2019  4:16 PM *         Specimens:   Specimen (12h ago, onward)    None

## 2019-11-20 NOTE — TRANSFER OF CARE
Anesthesia Transfer of Care Note    Patient: Christine Castro    Procedure(s) Performed: Procedure(s) (LRB):  ARTHROPLASTY, KNEE (Right)    Patient location: PACU    Anesthesia Type: MAC and spinal    Transport from OR: Transported from OR on room air with adequate spontaneous ventilation    Post pain: adequate analgesia    Post assessment: no apparent anesthetic complications    Post vital signs: stable    Level of consciousness: awake, alert and agitated    Nausea/Vomiting: no nausea/vomiting    Complications: none    Transfer of care protocol was followed      Last vitals:   Visit Vitals  /87 (BP Location: Right arm, Patient Position: Lying)   Pulse 64   Temp 36.8 °C (98.2 °F) (Skin)   Resp 16   Ht 5' (1.524 m)   Wt 93.9 kg (207 lb)   SpO2 99%   Breastfeeding? No   BMI 40.43 kg/m²

## 2019-11-20 NOTE — OP NOTE
INDICATION/PREOPERATIVE DIAGNOSIS: Osteoarthritis of the rightknee.    POSTOPERATIVE DIAGNOSIS: Same.    DATE OF SURGERY: 11/20/2019    NAME OF PROCEDURE:right total knee replacement.    SURGEON: Roldan Greenwood MD    ASSISTANT: DOMENICO Joaquin    IMPLANT SYSTEM: Depuy PFC Sigma PS     EBL: trace    DESCRIPTION OF OPERATION: The patient was taken to the Operating Room. spinal anesthesia was administered and preoperative antibiotics were given. A tourniquet was placed on the proximal thigh. The lower extremity was prepped and draped in the usual sterile fashion. It was exsanguinated with an Esmarch and the tourniquet was inflated to 300 mmHg. An anterior incision was made. Sharp dissection was carried down to the extensor mechanism. A medial parapatellar arthrotomy was performed. The proximal medial face of the tibia was exposed. The patella was everted. The knee was carefully flexed. The remnants of the anterior cruciate ligament and the patellofemoral ligament were released. The fat pad was resected with the anterior part of the lateral meniscus. Osteophytes around the femur were debrided. The femoral canal was entered with the drill. The alignment guide was inserted. The distal jig was applied in 7 degrees of valgus and the distal cut was made. The distal femur was measured as a size 2.5. The combination cutting block was applied in 3 degrees of external rotation. Anterior, posterior and chamfer cuts were made. The center box was then cut at the same size. The meniscal remnants were resected. The proximal tibia was exposed. The surrounding soft tissues were protected with retractors. The external jig was applied in neutral alignment and a neutral cut was made. This cut was checked and found to be satisfactory. A lamina  was inserted with the knee in flexion. The posterior compartment was debrided of osteophytes and synovitis. The tibia was then drilled and punched with the size 2 base plate in the appropriate  rotational position, aligned with the tibial tubercle. Trial implants were inserted. There was excellent range of motion and stability with a size 10 mm spacer. The patella was then exposed. It was resected using the guide leaving 15 mm of residual bone. It was then drilled for the size 32 mm button. The trial had central tracking. The trials were all removed. The knee was irrigated and dried. Antibiotic cement was mixed. The implants were cemented with the knee held in extension and the patella clamped. After the cement had dried a careful search for extra cement was made and all was removed. The knee was irrigated. The final spacer was snapped into place. The extensor mechanism was closed with interrupted #1 Vicryl. This was checked in flexion and found to be secure. The joint was injected with 1g of Transexemic acid. The subcutaneous tissue was closed with 2-0 Vicryl. The skin was closed with clips and an occlusive plastic dressing with an Ace wrap was applied. The tourniquet was released. There were no known complications. I was present for the entire procedure.

## 2019-11-20 NOTE — ANESTHESIA PROCEDURE NOTES
Spinal    Diagnosis: TKA surgery pain  Patient location during procedure: OR  Start time: 11/20/2019 1:51 PM  Timeout: 11/20/2019 1:50 PM  End time: 11/20/2019 2:02 PM    Staffing  Authorizing Provider: Vu Osei MD  Performing Provider: Loco Painting MD    Preanesthetic Checklist  Completed: patient identified, site marked, surgical consent, pre-op evaluation, timeout performed, IV checked, risks and benefits discussed and monitors and equipment checked  Spinal Block  Patient position: sitting  Prep: ChloraPrep  Patient monitoring: heart rate, frequent blood pressure checks and continuous pulse ox  Approach: left paramedian  Location: L3-4  Injection technique: single shot  CSF Fluid: clear free-flowing CSF  Needle  Needle type: pencil-tip   Needle gauge: 24 G  Needle length: 5 in  Additional Documentation: incremental injection  Needle localization: anatomical landmarks  Assessment  Sensory level: T8   Dermatomal levels determined by pinch or prick  Ease of block: moderate  Patient's tolerance of the procedure: comfortable throughout block

## 2019-11-20 NOTE — OP NOTE
Certification of Assistant at Surgery       Surgery Date: 11/20/2019     Participating Surgeons:  Surgeon(s) and Role:     * Roldan Greenwood MD - Primary    Procedures:  Procedure(s) (LRB):  ARTHROPLASTY, KNEE (Right)    Assistant Surgeon's Certification of Necessity:  I understand that section 1842 (b) (6) (d) of the Social Security Act generally prohibits Medicare Part B reasonable charge payment for the services of assistants at surgery in teaching hospitals when qualified residents are available to furnish such services. I certify that the services for which payment is claimed were medically necessary, and that no qualified resident was available to perform the services. I further understand that these services are subject to post-payment review by the Medicare carrier.      Alireza De Los Santos PA-C    11/20/2019  4:18 PM

## 2019-11-20 NOTE — OR NURSING
As the doctor was putting the tourniquet on in the right inguinal area the skin tore.   The patient skin is very fragile in that area.  The doctor put a Tegaderm over the area as well as a stockinet prior to putting the tourniquet on.

## 2019-11-20 NOTE — PLAN OF CARE
1629 TIME OUT PERFORMED AT  FOR RIGHT KNEE NERVE BLOCK. VSS SEE FLOW SHEET 1630 REYNALDO STARTED PROCEDURE. PATIENT TOLERATED WELL.VSS 1638 PROCEDURE COMPLETE.

## 2019-11-20 NOTE — OP NOTE
Certification of Assistant at Surgery       Surgery Date: 11/20/2019     Participating Surgeons:  Surgeon(s) and Role:     * Roldan Greenwood MD - Primary    Procedures:  Procedure(s) (LRB):  ARTHROPLASTY, KNEE (Right)    Assistant Surgeon's Certification of Necessity:  I understand that section 1842 (b) (6) (d) of the Social Security Act generally prohibits Medicare Part B reasonable charge payment for the services of assistants at surgery in teaching hospitals when qualified residents are available to furnish such services. I certify that the services for which payment is claimed were medically necessary, and that no qualified resident was available to perform the services. I further understand that these services are subject to post-payment review by the Medicare carrier.      Mayelin Joaquin PA-C    11/20/2019  4:18 PM

## 2019-11-21 LAB
ANION GAP SERPL CALC-SCNC: 10 MMOL/L (ref 8–16)
BUN SERPL-MCNC: 11 MG/DL (ref 8–23)
CALCIUM SERPL-MCNC: 9 MG/DL (ref 8.7–10.5)
CHLORIDE SERPL-SCNC: 108 MMOL/L (ref 95–110)
CO2 SERPL-SCNC: 19 MMOL/L (ref 23–29)
CREAT SERPL-MCNC: 0.9 MG/DL (ref 0.5–1.4)
EST. GFR  (AFRICAN AMERICAN): >60 ML/MIN/1.73 M^2
EST. GFR  (NON AFRICAN AMERICAN): >60 ML/MIN/1.73 M^2
GLUCOSE SERPL-MCNC: 171 MG/DL (ref 70–110)
HCT VFR BLD AUTO: 32.8 % (ref 37–48.5)
HGB BLD-MCNC: 10.5 G/DL (ref 12–16)
POTASSIUM SERPL-SCNC: 4.8 MMOL/L (ref 3.5–5.1)
SODIUM SERPL-SCNC: 137 MMOL/L (ref 136–145)

## 2019-11-21 PROCEDURE — 97165 OT EVAL LOW COMPLEX 30 MIN: CPT

## 2019-11-21 PROCEDURE — 80048 BASIC METABOLIC PNL TOTAL CA: CPT

## 2019-11-21 PROCEDURE — 36415 COLL VENOUS BLD VENIPUNCTURE: CPT

## 2019-11-21 PROCEDURE — 85014 HEMATOCRIT: CPT

## 2019-11-21 PROCEDURE — 97535 SELF CARE MNGMENT TRAINING: CPT | Mod: 59

## 2019-11-21 PROCEDURE — 97162 PT EVAL MOD COMPLEX 30 MIN: CPT

## 2019-11-21 PROCEDURE — 85018 HEMOGLOBIN: CPT

## 2019-11-21 PROCEDURE — 97530 THERAPEUTIC ACTIVITIES: CPT

## 2019-11-21 PROCEDURE — 25000003 PHARM REV CODE 250: Performed by: ORTHOPAEDIC SURGERY

## 2019-11-21 PROCEDURE — 99900035 HC TECH TIME PER 15 MIN (STAT)

## 2019-11-21 PROCEDURE — 94761 N-INVAS EAR/PLS OXIMETRY MLT: CPT

## 2019-11-21 PROCEDURE — 63600175 PHARM REV CODE 636 W HCPCS: Performed by: ORTHOPAEDIC SURGERY

## 2019-11-21 PROCEDURE — 94640 AIRWAY INHALATION TREATMENT: CPT

## 2019-11-21 RX ORDER — DIAZEPAM 5 MG/1
5 TABLET ORAL EVERY 12 HOURS PRN
Status: DISCONTINUED | OUTPATIENT
Start: 2019-11-21 | End: 2019-11-23 | Stop reason: HOSPADM

## 2019-11-21 RX ADMIN — OXYCODONE HYDROCHLORIDE 10 MG: 5 TABLET ORAL at 09:11

## 2019-11-21 RX ADMIN — ACETAMINOPHEN 1000 MG: 500 TABLET ORAL at 08:11

## 2019-11-21 RX ADMIN — CEFAZOLIN SODIUM 2 G: 2 SOLUTION INTRAVENOUS at 05:11

## 2019-11-21 RX ADMIN — ONDANSETRON 4 MG: 2 INJECTION INTRAMUSCULAR; INTRAVENOUS at 03:11

## 2019-11-21 RX ADMIN — BUDESONIDE 1 PUFF: 180 AEROSOL, POWDER RESPIRATORY (INHALATION) at 08:11

## 2019-11-21 RX ADMIN — HYDROMORPHONE HYDROCHLORIDE 0.5 MG: 2 INJECTION, SOLUTION INTRAMUSCULAR; INTRAVENOUS; SUBCUTANEOUS at 04:11

## 2019-11-21 RX ADMIN — GABAPENTIN 100 MG: 100 CAPSULE ORAL at 08:11

## 2019-11-21 RX ADMIN — ATORVASTATIN CALCIUM 40 MG: 40 TABLET, FILM COATED ORAL at 08:11

## 2019-11-21 RX ADMIN — BUPROPION HYDROCHLORIDE 200 MG: 100 TABLET, EXTENDED RELEASE ORAL at 08:11

## 2019-11-21 RX ADMIN — OXYCODONE HYDROCHLORIDE 10 MG: 5 TABLET ORAL at 04:11

## 2019-11-21 RX ADMIN — QUETIAPINE 50 MG: 25 TABLET ORAL at 08:11

## 2019-11-21 RX ADMIN — SENNOSIDES AND DOCUSATE SODIUM 1 TABLET: 8.6; 5 TABLET ORAL at 08:11

## 2019-11-21 RX ADMIN — OXYCODONE HYDROCHLORIDE 10 MG: 5 TABLET ORAL at 08:11

## 2019-11-21 RX ADMIN — PANTOPRAZOLE SODIUM 40 MG: 40 TABLET, DELAYED RELEASE ORAL at 09:11

## 2019-11-21 RX ADMIN — POTASSIUM CHLORIDE 10 MEQ: 10 TABLET, EXTENDED RELEASE ORAL at 09:11

## 2019-11-21 RX ADMIN — FUROSEMIDE 20 MG: 20 TABLET ORAL at 08:11

## 2019-11-21 RX ADMIN — OXYCODONE HYDROCHLORIDE 10 MG: 5 TABLET ORAL at 02:11

## 2019-11-21 RX ADMIN — POLYETHYLENE GLYCOL 3350 17 G: 17 POWDER, FOR SOLUTION ORAL at 09:11

## 2019-11-21 RX ADMIN — GABAPENTIN 100 MG: 100 CAPSULE ORAL at 04:11

## 2019-11-21 RX ADMIN — PREDNISONE 5 MG: 5 TABLET ORAL at 09:11

## 2019-11-21 RX ADMIN — ASPIRIN 81 MG: 81 TABLET, COATED ORAL at 08:11

## 2019-11-21 RX ADMIN — ESCITALOPRAM OXALATE 20 MG: 20 TABLET, FILM COATED ORAL at 08:11

## 2019-11-21 RX ADMIN — OXYCODONE HYDROCHLORIDE 10 MG: 5 TABLET ORAL at 11:11

## 2019-11-21 RX ADMIN — LEVOTHYROXINE SODIUM 88 MCG: 88 TABLET ORAL at 08:11

## 2019-11-21 RX ADMIN — ACETAMINOPHEN 1000 MG: 500 TABLET ORAL at 04:11

## 2019-11-21 NOTE — PT/OT/SLP EVAL
Physical Therapy Evaluation    Patient Name:  Christine Castro   MRN:  [id number]    Recommendations:     Discharge Recommendations:  nursing facility, skilled   Discharge Equipment Recommendations: (defer to SNF)   Barriers to discharge: Decreased caregiver support and impaired safety awareness    Assessment:     Christine Casrto is a 64 y.o. female admitted with a medical diagnosis of <principal problem not specified>.  She presents with the following impairments/functional limitations:  weakness, gait instability, decreased upper extremity function, decreased ROM, impaired cardiopulmonary response to activity, impaired endurance, impaired balance, decreased lower extremity function, impaired coordination, impaired joint extensibility, decreased safety awareness, impaired fine motor, impaired muscle length, impaired self care skills, impaired cognition, pain, impaired skin, orthopedic precautions, impaired functional mobilty, decreased coordination. PT initial evaluation completed. Plan of care and goals established and discussed with patient. Pt demonstrated impaired safety awareness; appears groggy c/ intermittent inappropriate speech; pt tangential c/ speech; unable to follow VC for safety c/ functional transfers and mobility.    Rehab Prognosis: Good; patient would benefit from acute skilled PT services to address these deficits and reach maximum level of function.    Recent Surgery: Procedure(s) (LRB):  ARTHROPLASTY, KNEE (Right) 1 Day Post-Op    Plan:     During this hospitalization, patient to be seen BID to address the identified rehab impairments via therapeutic activities, therapeutic exercises, gait training, neuromuscular re-education and progress toward the following goals:    · Plan of Care Expires:  12/21/19    Subjective     Chief Complaint: pain in R knee.   Patient/Family Comments/goals: pt agreed to PT POC  Pain/Comfort:  · Pain Rating 1: (did not rate)  · Location - Side 1: Right  · Location 1:  knee  · Pain Addressed 1: Pre-medicate for activity, Distraction, Reposition, Cessation of Activity, Nurse notified  · Pain Rating Post-Intervention 1: (did not rate)    Patients cultural, spiritual, Amish conflicts given the current situation: no    Living Environment:  Pt lives alone on 1st floor on 2SH; 1/2 bath access on 1st floor.  Prior to admission, patients level of function was Mod I c/ RW.  Equipment used at home: cane, straight, bedside commode, bath bench.  DME owned (not currently used): none.  Upon discharge, patient will have assistance from family.    Objective:     Communicated with RNRoman prior to session.  Patient found supine with bed alarm, telemetry  upon PT entry to room.    General Precautions: Standard, fall   Orthopedic Precautions:RLE weight bearing as tolerated   Braces: N/A     Exams:  · Cognitive Exam:  Patient is oriented to Person, Place, Time and tangential, inappropriate speech, poor safety awareness  · Gross Motor Coordination:  significantly impaired c/ functional mobility  · Postural Exam:  Patient presented with the following abnormalities:    · -       Rounded shoulders  · -       Lateral weight shift of hips  · -       Abnormal trunk flexion  · -       Habitus  · -   Reduced WB on R LE c/ functional transfers  · Skin Integrity/Edema:      · -       R TKR wound clean and dry  · RLE ROM: 0 deg ext to 90 deg; AROM  · RLE Strength: hip 2+/5; knee 3/5   · LLE ROM: WFL  · LLE Strength: 4/5    Functional Mobility:  · Bed Mobility:     · Rolling Right: stand by assistance  · Scooting: contact guard assistance  · Supine to Sit: contact guard assistance  · Transfers:     · Sit to Stand:  moderate assistance and of 2 persons with rolling walker  · Bed to Chair: moderate assistance and of 2 persons with  rolling walker  using  Step Transfer  · Gait: 2ft bed <> chair, Chair<>BSC; at mod A x2, pt demo poor WB in R LE c/ trunk flexion and impaired postural control c/ transfer/gait;  pt unable to ff VC for postural control; WBAT and safety c/ mobilityt  · Balance: dynamic gait- fair-      Therapeutic Activities and Exercises:  PT eval completed c/ progressive mobility as detailed above.   Pt educated on PT role/ POC.  Pt instructed in LE strength/AROM exercises to knee joint 0-90 deg; static quad ex in terminal extension; ankle pumps and glut squeezes c/ rest breaks as tolerated x15 reps.     AM-PAC 6 CLICK MOBILITY  Total Score:14     Patient left up in chair with all lines intact, call button in reach, chair alarm on, RN notified and . present.    GOALS:   Multidisciplinary Problems     Physical Therapy Goals        Problem: Physical Therapy Goal    Goal Priority Disciplines Outcome Goal Variances Interventions   Physical Therapy Goal     PT, PT/OT Ongoing, Progressing     Description:  Goals to be met by: 2019     Patient will increase functional independence with mobility by performin. Sit to stand transfer with Supervision  2. Bed to chair transfer with Supervision using Rolling Walker  3. Gait  x 50 feet with Modified [place] using Rolling Walker.   4. Lower extremity exercise program x15 reps per handout, with supervision                      History:     Past Medical History:   Diagnosis Date    Arthritis     Asthma     CHF (congestive heart failure)     [facility]    Colon cancer     colon    COPD (chronic obstructive pulmonary disease)     Coronary artery disease     Depression     Diabetes mellitus, type 2     GERD (gastroesophageal reflux disease)     Myocardial infarction     AGE 19 [facility] WITHBABY DELIVERY    Thyroid disease        Past Surgical History:   Procedure Laterality Date     SECTION      CHOLECYSTECTOMY      COLON SURGERY      COLONOSCOPY      --- repeat in 3-5 years    COLONOSCOPY N/A 2017    Procedure: COLONOSCOPY;  Surgeon: Corrina Flores MD;  [facility];  Service: Endoscopy;  Laterality: N/A;     COLONOSCOPY N/A 4/10/2018    Procedure: COLONOSCOPY golytely;  Surgeon: Corrina Flores MD;  Location: [facility] ENDO;  Service: Endoscopy;  Laterality: N/A;    ECTOPIC PREGNANCY SURGERY      ESOPHAGOGASTRODUODENOSCOPY N/A 2/28/2019    Procedure: ESOPHAGOGASTRODUODENOSCOPY (EGD);  Surgeon: Corrina Flores MD;  Location: [facility] ENDO;  Service: Endoscopy;  Laterality: N/A;    GASTRIC BYPASS      KNEE ARTHROPLASTY Left 5/8/2019    Procedure: ARTHROPLASTY, KNEE;  Surgeon: Roldan Greenwood MD;  Location: [facility] OR;  Service: Orthopedics;  Laterality: Left;  Navid notified    KNEE ARTHROPLASTY Right 11/20/2019    Procedure: ARTHROPLASTY, KNEE;  Surgeon: Roldan Greenwood MD;  Location: [facility] OR;  Service: Orthopedics;  Laterality: Right;  Navid notified, confirmed case Navid 11/19/19 KB [id number]    OOPHORECTOMY      TONSILLECTOMY      TUBAL LIGATION         Time Tracking:     PT Received On: 11/21/19  PT Start Time: 0956     PT Stop Time: 1043  PT Total Time (min): 47 min     Billable Minutes: Evaluation 10 and Therapeutic Activity 15      Cain Davis PT, DPT  11/21/2019

## 2019-11-21 NOTE — PLAN OF CARE
Problem: Occupational Therapy Goal  Goal: Occupational Therapy Goal  Description  Goals to be met by: 12/21     Patient will increase functional independence with ADLs by performing:    LE Dressing with Stand-by Assistance.  Grooming while standing at sink with Stand-by Assistance.  Toileting from toilet with Stand-by Assistance for hygiene and clothing management.   Toilet transfer to toilet with Stand-by Assistance.  Upper extremity exercise program x10 reps per handout, with independence.     Outcome: Ongoing, Progressing   Pt found in supine & agreeable to OT/PT co-eval/tx this AM. Pt lives alone in 2st apt w/ THE; t/s w/ GB on 2nd level, however pt sleeps on 1st level. PLOF: MI w/o amb DME w/ all fxnl tasks.  Currently, pt perf the following: sup-->CGA; standing via RW w/ Mod A x 2; t/f via RW-->b/s chair w/ Mod A x 2; t/f b/s chair<>BSC w/ Mod A x 2; toileting w/ Mod A for clothing mgmt & to maint balance. Edu/tx re: general safety techs, HEP, safe/proper RW use, pain/edema mgmt. Pt verbalized understanding. Pt left UIC w/ alarm & nsg notified.  **Pt w/ noticeable increasing lethargy, inability to maint eye opening & tangential/non-sensical talking as session progressed. Nsg notified 2/2 pt w/o recent pain med administration.     Pt presents w/ decreased overall endurance/conditioning, balance/mobility & coordination w/ subsequent decline in (I)/safety w/ BADLs, fxnl mobility & fxnl t/f's.  OT 5x/wk to increase phys/fxnl status & maximize potential to achieve established goals for d/c-->SNF.

## 2019-11-21 NOTE — PLAN OF CARE
Problem: Physical Therapy Goal  Goal: Physical Therapy Goal  Description  Goals to be met by: 2019     Patient will increase functional independence with mobility by performin. Sit to stand transfer with Supervision  2. Bed to chair transfer with Supervision using Rolling Walker  3. Gait  x 50 feet with Modified [place] using Rolling Walker.   4. Lower extremity exercise program x15 reps per handout, with supervision     Outcome: Ongoing, Progressing      PT initial evaluation completed. Plan of care and goals established and discussed with patient. Pt demonstrated impaired safety awareness; appears groggy c/ intermittent inappropriate speech; pt tangential c/ speech; unable to follow VC for safety c/ functional transfers and mobility.    Discharge Recommendation: Skilled Nursing Facility  DME Recommendation: Defer to SNF

## 2019-11-21 NOTE — PT/OT/SLP PROGRESS
"Physical Therapy Visit Attempt      Patient Name:  Christine Castro   MRN:  [id number]    120pm-125pm. PT PM visit attempted. Pt met in bed. Patient not seen today secondary to pt declining, she reports that she is just got back into bed; and that she is upset about her pain pill situation, r/o " " I feel like I've just been stripped searched". When asked abut her pain, she replied, " I'm not gonna tell you". . Will follow-up as available. ROSE Christina notified.     Cain Davis PT, DPT  11/21/2019        "

## 2019-11-21 NOTE — PLAN OF CARE
Pt vitals were stable when arrived from PACU. Pt. Was given 0.5 hydromorphone for breakthrough pain. Pain was moderately removed. Pt was started on IV fluids and antibiotics per MAR. Vitals are stable and safety maintained.

## 2019-11-21 NOTE — PT/OT/SLP EVAL
Occupational Therapy   Evaluation/tx    Name: Christine Castro  MRN: [id number]  Admitting Diagnosis:  The primary encounter diagnosis was Primary osteoarthritis of left knee. A diagnosis of Primary osteoarthritis of right knee was also pertinent to this visit. 1 Day Post-Op    Recommendations:     Discharge Recommendations: nursing facility, skilled  Discharge Equipment Recommendations:  (defer to SNF)  Barriers to discharge:  Decreased caregiver support, Inaccessible home environment    Assessment:   Pt presents w/ decreased overall endurance/conditioning, balance/mobility & coordination w/ subsequent decline in (I)/safety w/ BADLs, fxnl mobility & fxnl t/f's.  OT 5x/wk to increase phys/fxnl status & maximize potential to achieve established goals for d/c-->SNF.    Christine Castro is a 64 y.o. female with a medical diagnosis of The primary encounter diagnosis was Primary osteoarthritis of left knee. A diagnosis of Primary osteoarthritis of right knee was also pertinent to this visit.  .  She presents with . Performance deficits affecting function: weakness, impaired endurance, impaired self care skills, impaired balance, gait instability, impaired functional mobilty, decreased coordination, decreased lower extremity function, decreased ROM, pain, decreased safety awareness, impaired skin, edema, impaired joint extensibility.      Rehab Prognosis: Good; patient would benefit from acute skilled OT services to address these deficits and reach maximum level of function.       Plan:     Patient to be seen 5 x/week to address the above listed problems via self-care/home management, therapeutic activities, therapeutic exercises  · Plan of Care Expires: 12/21/19  · Plan of Care Reviewed with: patient    Subjective     Chief Complaint: R knee OA  Patient/Family Comments/goals: return home    Occupational Profile:  Living Environment: alone in 2st apt w/ THE; bedroom & 1/2 ba on 1st level; t/s on 2nd level  Previous level of  function: MI w/o amb DME  Roles and Routines: standing showers, IADLs  Equipment Used at Home:  cane, straight, walker, rolling, bedside commode, bath bench  Assistance upon Discharge: neighbor    Pain/Comfort:  · Pain Rating 1: (unstated)  · Location - Side 1: Right  · Location 1: knee  · Pain Addressed 1: Reposition, Distraction, Other (see comments)(ice pack)  · Pain Rating Post-Intervention 1: (unrated)    Patients cultural, spiritual, Scientology conflicts given the current situation:      Objective:     Communicated with: maricarmen prior to session.  Patient found supine with bed alarm upon OT entry to room.    General Precautions: Standard, fall   Orthopedic Precautions:RLE weight bearing as tolerated   Braces: N/A     Occupational Performance:    Bed Mobility:    · Patient completed Supine to Sit with contact guard assistance    Functional Mobility/Transfers:  · Patient completed Sit <> Stand Transfer with moderate assistance and of 2 persons  with  rolling walker   · Patient completed Bed <> Chair Transfer using Step Transfer technique with moderate assistance and of 2 persons with rolling walker  · Patient completed Toilet Transfer Stand Pivot technique with moderate assistance and of 2 persons with  rolling walker and bedside commode  · Functional Mobility:     Activities of Daily Living:  · Toileting: moderate assistance for clothing mgmt & static standing balance    Cognitive/Visual Perceptual:  AO4, however increasing non-sensical talking as session progressed    Physical Exam:  Deep WFL at 4+/5    Sit balance: F+  Stand balance: P    Safety awareness: POOR    AMPAC 6 Click ADL:  AMPAC Total Score: 16    Treatment & Education:  Pt found in supine & agreeable to OT/PT co-eval/tx this AM. Pt lives alone in 2st apt w/ THE; t/s w/ GB on 2nd level, however pt sleeps on 1st level. PLOF: MI w/o amb DME w/ all fxnl tasks.  Currently, pt perf the following: sup-->CGA; standing via RW w/ Mod A x 2; t/f via RW-->b/s  chair w/ Mod A x 2; t/f b/s chair<>BSC w/ Mod A x 2; toileting w/ Mod A for clothing mgmt & to maint balance. Edu/tx re: general safety techs, HEP, safe/proper RW use, pain/edema mgmt. Pt verbalized understanding. Pt left UIC w/ alarm & nsg notified.  **Pt w/ noticeable increasing lethargy, inability to maint eye opening & tangential/non-sensical talking as session progressed. Nsg notified 2/2 pt w/o recent pain med administration.     Patient left up in chair with all lines intact, call button in reach, chair alarm on and nsg notified    GOALS:   Multidisciplinary Problems     Occupational Therapy Goals        Problem: Occupational Therapy Goal    Goal Priority Disciplines Outcome Interventions   Occupational Therapy Goal     OT, PT/OT Ongoing, Progressing    Description:  Goals to be met by:      Patient will increase functional independence with ADLs by performing:    LE Dressing with Stand-by Assistance.  Grooming while standing at sink with Stand-by Assistance.  Toileting from toilet with Stand-by Assistance for hygiene and clothing management.   Toilet transfer to toilet with Stand-by Assistance.  Upper extremity exercise program x10 reps per handout, with independence.                      History:     Past Medical History:   Diagnosis Date    Arthritis     Asthma     CHF (congestive heart failure)     [facility]    Colon cancer     colon    COPD (chronic obstructive pulmonary disease)     Coronary artery disease     Depression     Diabetes mellitus, type 2     GERD (gastroesophageal reflux disease)     Myocardial infarction     AGE 19 [facility]BABY DELIVERY    Thyroid disease        Past Surgical History:   Procedure Laterality Date     SECTION      CHOLECYSTECTOMY      COLON SURGERY      COLONOSCOPY      --- repeat in 3-5 years    COLONOSCOPY N/A 2017    Procedure: COLONOSCOPY;  Surgeon: Corrina Flores MD;  Location: [facility];  Service: Endoscopy;   Laterality: N/A;    COLONOSCOPY N/A 4/10/2018    Procedure: COLONOSCOPY golytely;  Surgeon: Corrina Flores MD;  Location: [facility] ENDO;  Service: Endoscopy;  Laterality: N/A;    ECTOPIC PREGNANCY SURGERY      ESOPHAGOGASTRODUODENOSCOPY N/A 2/28/2019    Procedure: ESOPHAGOGASTRODUODENOSCOPY (EGD);  Surgeon: Corrina Flores MD;  Location: [facility] ENDO;  Service: Endoscopy;  Laterality: N/A;    GASTRIC BYPASS      KNEE ARTHROPLASTY Left 5/8/2019    Procedure: ARTHROPLASTY, KNEE;  Surgeon: Roldan Greenwood MD;  Location: [facility] OR;  Service: Orthopedics;  Laterality: Left;  Navid notified    KNEE ARTHROPLASTY Right 11/20/2019    Procedure: ARTHROPLASTY, KNEE;  Surgeon: Roldan Greenwood MD;  Location: [facility] OR;  Service: Orthopedics;  Laterality: Right;  Navid notified, confirmed case Navid 11/19/19 KB [id number]    OOPHORECTOMY      TONSILLECTOMY      TUBAL LIGATION         Time Tracking:     OT Date of Treatment: 11/21/19  OT Start Time: 0958  OT Stop Time: 1042  OT Total Time (min): 44 min    Billable Minutes:Evaluation 10  Self Care/Home Management 17  Total Time 44--co-tx w/ PT    LATISHA Friedman  11/21/2019

## 2019-11-21 NOTE — PROGRESS NOTES
[facility]  Orthopedics  Progress Note    Patient Name: Christine Castro  MRN: [id number]  Admission Date: 11/20/2019  Hospital Length of Stay: 0 days  Attending Provider: Roldan Greenwood MD  Primary Care Provider: Alon Santiago MD  Follow-up For: Procedure(s) (LRB):  ARTHROPLASTY, KNEE (Right)    Post-Operative Day: 1 Day Post-Op  Subjective:     Principal Problem:<principal problem not specified>    Principal Orthopedic Problem:  Right total knee replacement postop day 1    Interval History:  Patient indicates that she is comfortable.  Denies any specific complaints    Review of patient's allergies indicates:   Allergen Reactions    Adhesive      Adhesive tape    Latex, natural rubber      Adhesive from latex tape    Ibuprofen Other (See Comments)     Causes asthma flare??       Current Facility-Administered Medications   Medication    acetaminophen tablet 1,000 mg    albuterol inhaler 1 puff    aspirin EC tablet 81 mg    atorvastatin tablet 40 mg    bisacodyl suppository 10 mg    budesonide 180mcg inhaler 1 puff    buPROPion TBSR 12 hr tablet 200 mg    diazePAM tablet 5 mg    escitalopram oxalate tablet 20 mg    furosemide tablet 20 mg    gabapentin capsule 100 mg    glipiZIDE 24 hr tablet 2.5 mg    hydromorphone (PF) injection 0.5 mg    lactulose 20 gram/30 mL solution Soln 20 g    levothyroxine tablet 88 mcg    ondansetron disintegrating tablet 4 mg    ondansetron injection 4 mg    oxyCODONE immediate release tablet 10 mg    oxyCODONE immediate release tablet 5 mg    pantoprazole EC tablet 40 mg    polyethylene glycol packet 17 g    potassium chloride SA CR tablet 10 mEq    predniSONE tablet 5 mg    QUEtiapine tablet 50 mg    senna-docusate 8.6-50 mg per tablet 1 tablet    sodium chloride 0.9% flush 5 mL    zolpidem tablet 5 mg     Objective:     Vital Signs (Most Recent):  Temp: 96.9 °F (36.1 °C) (11/21/19 0753)  Pulse: 99 (11/21/19 0850)  Resp: 18  (11/21/19 0850)  BP: 124/89 (11/21/19 0753)  SpO2: (!) 92 % (11/21/19 0850) Vital Signs (24h Range):  Temp:  [96.9 °F (36.1 °C)-99.8 °F (37.7 °C)] 96.9 °F (36.1 °C)  Pulse:  [] 99  Resp:  [14-22] 18  SpO2:  [92 %-100 %] 92 %  BP: (102-140)/(57-89) 124/89     Weight: (!) 222.3 kg (490 lb 1.3 oz)  Height: 5' (152.4 cm)  Body mass index is 95.71 kg/m².      Intake/Output Summary (Last 24 hours) at 11/21/2019 0936  Last data filed at 11/21/2019 0510  Gross per 24 hour   Intake 500 ml   Output 1100 ml   Net -600 ml       Ortho/SPM Exam      alert and comfortable appearing   dressing was small spot of blood   right lower extremity neurovascular intact    Significant Labs: All pertinent labs within the past 24 hours have been reviewed.    Significant Imaging: I have reviewed all pertinent imaging results/findings.    Assessment/Plan:     Active Diagnoses:    Diagnosis Date Noted POA    Primary osteoarthritis of right knee [M17.11] 11/20/2019 Yes    History of right knee joint replacement [Z96.651] 11/20/2019 Not Applicable    Primary osteoarthritis [M19.91]  Yes     Chronic      Problems Resolved During this Admission:       Normal postop day 1   ambulate  with physical therapy   will evaluate suitability for discharge later today.  Given patient's general strength and mobility, she is likely to require 1 more day before safe discharge.    Roldan Greenwood MD  Orthopedics  [facility]

## 2019-11-21 NOTE — PLAN OF CARE
Therapy recs are SNF at this time. TN discussed SNF with pt in room. Pt alert and answering questions appropriately at this timePt's step daughter Monika at bedside as well but falling a sleep during SNF discussion. Pt refusing SNF upon discharge at this time. TN to follow up tomorrow in attempt to discuss again with pt and pt's step daughter. If pt continues to refuse SNF; pt can discharge with home health. Pt is still unable to provide address at this time of her sister Demi that she will be staying with upon discharge.

## 2019-11-21 NOTE — PLAN OF CARE
TN met with pt and pt's step daughter Monika Ruiz [phone] at bedside for discharge planning. Pt up to chair at this time. Pt disoriented . Per step daughter, pt will be staying with her sister Demi for 2 weeks upon discharge. Pt's step daughter Monika and sister Demi will provide assistance to pt upon discharge at friends house. Per Monika, pt has RW and BSC at home. Awaiting PT/OT eval. Pt anticipated to discharge with HH. Monika unable to recall the name of previous HH company at this time. TN to follow up with pt/pt's step daughter for updated address that she will be going to for HH purposes.     Discharge brochure and blue discharge folder given to pt. TN updated contact information on [address].       11/21/19 9930   Discharge Assessment   Assessment Type Discharge Planning Assessment   Confirmed/corrected address and phone number on facesheet? Yes   Assessment information obtained from? Patient;Caregiver   Expected Length of Stay (days) 2   Communicated expected length of stay with patient/caregiver yes   Prior to hospitilization cognitive status: Alert/Oriented   Prior to hospitalization functional status: Assistive Equipment   Current cognitive status: Not Oriented to Place;Not Oriented to Time   Current Functional Status: Assistive Equipment   Lives With alone   Able to Return to Prior Arrangements no   Is patient able to care for self after discharge? No   Who are your caregiver(s) and their phone number(s)? Monika Ruiz (step Daughter) [phone]   Patient's perception of discharge disposition home health   Readmission Within the Last 30 Days no previous admission in last 30 days   Patient currently being followed by outpatient case management? No   Patient currently receives any other outside agency services? No   Equipment Currently Used at Home bath bench;bedside commode;cane, straight;walker, rolling   Do you have any problems affording any of your prescribed medications? No   Is the  patient taking medications as prescribed? yes   Does the patient have transportation home? Yes   Transportation Anticipated family or friend will provide   Does the patient receive services at the [facility]? No   Discharge Plan A Home with family;Home Health   Discharge Plan B   (TBD)   Patient/Family in Agreement with Plan yes

## 2019-11-22 PROBLEM — M17.11 PRIMARY OSTEOARTHRITIS OF RIGHT KNEE: Status: RESOLVED | Noted: 2019-11-20 | Resolved: 2019-11-22

## 2019-11-22 PROCEDURE — 63600175 PHARM REV CODE 636 W HCPCS: Performed by: ORTHOPAEDIC SURGERY

## 2019-11-22 PROCEDURE — 97110 THERAPEUTIC EXERCISES: CPT

## 2019-11-22 PROCEDURE — 97530 THERAPEUTIC ACTIVITIES: CPT

## 2019-11-22 PROCEDURE — 94640 AIRWAY INHALATION TREATMENT: CPT

## 2019-11-22 PROCEDURE — 97535 SELF CARE MNGMENT TRAINING: CPT | Mod: 59

## 2019-11-22 PROCEDURE — 25000003 PHARM REV CODE 250: Performed by: ORTHOPAEDIC SURGERY

## 2019-11-22 PROCEDURE — 94761 N-INVAS EAR/PLS OXIMETRY MLT: CPT

## 2019-11-22 RX ORDER — OXYCODONE AND ACETAMINOPHEN 7.5; 325 MG/1; MG/1
1 TABLET ORAL EVERY 6 HOURS PRN
Qty: 40 TABLET | Refills: 0 | Status: SHIPPED | OUTPATIENT
Start: 2019-11-22 | End: 2019-12-05 | Stop reason: ALTCHOICE

## 2019-11-22 RX ORDER — POTASSIUM CHLORIDE 750 MG/1
TABLET, EXTENDED RELEASE ORAL
Qty: 90 TABLET | Refills: 0 | Status: SHIPPED | OUTPATIENT
Start: 2019-11-22 | End: 2020-01-15

## 2019-11-22 RX ADMIN — ONDANSETRON 4 MG: 4 TABLET, ORALLY DISINTEGRATING ORAL at 08:11

## 2019-11-22 RX ADMIN — HYDROMORPHONE HYDROCHLORIDE 2 MG: 2 INJECTION, SOLUTION INTRAMUSCULAR; INTRAVENOUS; SUBCUTANEOUS at 02:11

## 2019-11-22 RX ADMIN — FUROSEMIDE 20 MG: 20 TABLET ORAL at 09:11

## 2019-11-22 RX ADMIN — POTASSIUM CHLORIDE 10 MEQ: 10 TABLET, EXTENDED RELEASE ORAL at 09:11

## 2019-11-22 RX ADMIN — LEVOTHYROXINE SODIUM 88 MCG: 88 TABLET ORAL at 09:11

## 2019-11-22 RX ADMIN — HYDROMORPHONE HYDROCHLORIDE 0.5 MG: 2 INJECTION, SOLUTION INTRAMUSCULAR; INTRAVENOUS; SUBCUTANEOUS at 09:11

## 2019-11-22 RX ADMIN — ATORVASTATIN CALCIUM 40 MG: 40 TABLET, FILM COATED ORAL at 09:11

## 2019-11-22 RX ADMIN — ACETAMINOPHEN 1000 MG: 500 TABLET ORAL at 08:11

## 2019-11-22 RX ADMIN — HYDROMORPHONE HYDROCHLORIDE 0.5 MG: 2 INJECTION, SOLUTION INTRAMUSCULAR; INTRAVENOUS; SUBCUTANEOUS at 06:11

## 2019-11-22 RX ADMIN — ACETAMINOPHEN 1000 MG: 500 TABLET ORAL at 09:11

## 2019-11-22 RX ADMIN — SENNOSIDES AND DOCUSATE SODIUM 1 TABLET: 8.6; 5 TABLET ORAL at 09:11

## 2019-11-22 RX ADMIN — POLYETHYLENE GLYCOL 3350 17 G: 17 POWDER, FOR SOLUTION ORAL at 09:11

## 2019-11-22 RX ADMIN — ASPIRIN 81 MG: 81 TABLET, COATED ORAL at 09:11

## 2019-11-22 RX ADMIN — QUETIAPINE 50 MG: 25 TABLET ORAL at 08:11

## 2019-11-22 RX ADMIN — OXYCODONE HYDROCHLORIDE 10 MG: 5 TABLET ORAL at 06:11

## 2019-11-22 RX ADMIN — BUDESONIDE 1 PUFF: 180 AEROSOL, POWDER RESPIRATORY (INHALATION) at 07:11

## 2019-11-22 RX ADMIN — GABAPENTIN 100 MG: 100 CAPSULE ORAL at 08:11

## 2019-11-22 RX ADMIN — PREDNISONE 5 MG: 5 TABLET ORAL at 09:11

## 2019-11-22 RX ADMIN — BUPROPION HYDROCHLORIDE 200 MG: 100 TABLET, EXTENDED RELEASE ORAL at 09:11

## 2019-11-22 RX ADMIN — SENNOSIDES AND DOCUSATE SODIUM 1 TABLET: 8.6; 5 TABLET ORAL at 08:11

## 2019-11-22 RX ADMIN — GABAPENTIN 100 MG: 100 CAPSULE ORAL at 03:11

## 2019-11-22 RX ADMIN — BUDESONIDE 1 PUFF: 180 AEROSOL, POWDER RESPIRATORY (INHALATION) at 08:11

## 2019-11-22 RX ADMIN — ZOLPIDEM TARTRATE 5 MG: 5 TABLET ORAL at 08:11

## 2019-11-22 RX ADMIN — OXYCODONE HYDROCHLORIDE 5 MG: 5 TABLET ORAL at 08:11

## 2019-11-22 RX ADMIN — PANTOPRAZOLE SODIUM 40 MG: 40 TABLET, DELAYED RELEASE ORAL at 09:11

## 2019-11-22 RX ADMIN — ESCITALOPRAM OXALATE 20 MG: 20 TABLET, FILM COATED ORAL at 09:11

## 2019-11-22 RX ADMIN — GABAPENTIN 100 MG: 100 CAPSULE ORAL at 09:11

## 2019-11-22 RX ADMIN — OXYCODONE HYDROCHLORIDE 10 MG: 5 TABLET ORAL at 01:11

## 2019-11-22 RX ADMIN — OXYCODONE HYDROCHLORIDE 10 MG: 5 TABLET ORAL at 03:11

## 2019-11-22 NOTE — PLAN OF CARE
Pt complains of pain 10/10 but when rounding pt is asleep and snoring. Pt states that she has no relief from oxycodone 10 so hydromorphone 0.5 mg was given Pt still states pain is unbearable. Pt was given ice for right leg. Pt vitals are stable and safety maintained.

## 2019-11-22 NOTE — PLAN OF CARE
TN, DOMENICO Pandya, and Gregory ACUÑA discussed anticipated discharge plan with pt and pt's step daughter Monika. TN/Mumtaz informed pt that she is acquiring a bill the longer she remains in the hospital due to her OP Recovery status. Pt will be staying with sister Demi who has a room set up for her in her home and will be helping her upon discharge. TN spoke with pt's sister Demi Hou [phone] via speakerphone while in patient room. TN advised Demi that pt will need to walk with RW at all times and that pt is impulsive and has decreased safety awareness. TN also informed Demi that have patient follow simple commands due pt being impulsive when working with therapy at the hospital. Per LEODAN Joaquin, pt will discharge tomorrow afternoon with Home health after working with PT tomorrow morning. Pt and pt's step daughter in agreement with discharge plan.

## 2019-11-22 NOTE — PLAN OF CARE
Patient AAOx4, VSS, Patient c/o pain, managed with PRN oxycodone and IV dilaudid. Patient free from falls. Patient tolerating diet, no nausea or vomiting. Incision dressing to R knee clean, dry, and intact. Patient tolerating activity, ambulating to bedside commode with assistance. Call bell in reach. Family at bedside. Will continue to monitor.   Problem: Adult Inpatient Plan of Care  Goal: Plan of Care Review  Outcome: Ongoing, Progressing  Goal: Patient-Specific Goal (Individualization)  Outcome: Ongoing, Progressing  Goal: Absence of Hospital-Acquired Illness or Injury  Outcome: Ongoing, Progressing  Goal: Optimal Comfort and Wellbeing  Outcome: Ongoing, Progressing  Goal: Readiness for Transition of Care  Outcome: Ongoing, Progressing  Goal: Rounds/Family Conference  Outcome: Ongoing, Progressing     Problem: Fall Injury Risk  Goal: Absence of Fall and Fall-Related Injury  Outcome: Ongoing, Progressing     Problem: Infection  Goal: Infection Symptom Resolution  Outcome: Ongoing, Progressing     Problem: Bleeding (Knee Arthroplasty)  Goal: Absence of Bleeding  Outcome: Ongoing, Progressing     Problem: Bowel Elimination Impaired (Knee Arthroplasty)  Goal: Effective Bowel Elimination  Outcome: Ongoing, Progressing     Problem: Infection (Knee Arthroplasty)  Goal: Absence of Infection Signs/Symptoms  Outcome: Ongoing, Progressing     Problem: Joint Function Impaired (Knee Arthroplasty)  Goal: Optimal Functional Ability  Outcome: Ongoing, Progressing     Problem: Ongoing Anesthesia Effects (Knee Arthroplasty)  Goal: Anesthesia/Sedation Recovery  Outcome: Ongoing, Progressing     Problem: Postoperative Nausea and Vomiting (Knee Arthroplasty)  Goal: Nausea and Vomiting Relief  Outcome: Ongoing, Progressing     Problem: Pain (Knee Arthroplasty)  Goal: Acceptable Pain Control  Outcome: Ongoing, Progressing     Problem: Postoperative Urinary Retention (Knee Arthroplasty)  Goal: Effective Urinary Elimination  Outcome:  Ongoing, Progressing     Problem: Asthma Comorbidity  Goal: Maintenance of Asthma Control  Outcome: Ongoing, Progressing     Problem: COPD Comorbidity  Goal: Maintenance of COPD Symptom Control  Outcome: Ongoing, Progressing     Problem: Heart Failure Comorbidity  Goal: Maintenance of Heart Failure Symptom Control  Outcome: Ongoing, Progressing     Problem: Confusion Acute  Goal: Optimal Cognitive Function  Outcome: Ongoing, Progressing     Problem: Diabetes Comorbidity  Goal: Blood Glucose Level Within Desired Range  Outcome: Ongoing, Progressing

## 2019-11-22 NOTE — PT/OT/SLP PROGRESS
"Occupational Therapy   Treatment    Name: Christine Castro  MRN: [id number]  Admitting Diagnosis:  History of right knee joint replacement  2 Days Post-Op    Recommendations:     Discharge Recommendations: nursing facility, skilled  Discharge Equipment Recommendations:  (defer to SNF)  Barriers to discharge:  Decreased caregiver support    Assessment:   Pt agreeable to therapy /c encouragement. Pt reported "I walked with the short one already". Pt reported pain in R knee by yelling when moving. Pt noted /c decreased safety awareness and inattention to VC. Pt tolerated session well.      Christine Castro is a 64 y.o. female with a medical diagnosis of History of right knee joint replacement. Performance deficits affecting function are weakness, impaired endurance, impaired self care skills, impaired functional mobilty, gait instability, impaired balance, impaired cognition, decreased lower extremity function, pain, impaired skin, orthopedic precautions.     Rehab Prognosis:  Fair; patient would benefit from acute skilled OT services to address these deficits and reach maximum level of function.       Plan:     Patient to be seen 5 x/week to address the above listed problems via self-care/home management, therapeutic activities, therapeutic exercises  · Plan of Care Expires: 12/21/19  · Plan of Care Reviewed with: patient, friend(Simultaneous filing. User may not have seen previous data.)    Subjective     Pain/Comfort:  Pain Rating 1: (pt gave no number but expressed pain by yelling when moving R knee)    Objective:     Communicated with: nurse Jain prior to session.  Patient found up in chair with peripheral IV upon OT entry to room.    General Precautions: Standard, fall   Orthopedic Precautions:RLE WBAT  Braces: N/A     Occupational Performance:     Functional Mobility/Transfers:  · Patient completed Sit <> Stand Transfer with contact guard assistance  with  rolling walker   · Pt stance<>BSC /c CGA and VC for " "walker management  · Pt BSC<>chair /c CGA and RW    Activities of Daily Living:  · Grooming: stand by assistance pt completed G/H /c set up while seated  · Lower Body Dressing: moderate assistance pt donned mesh panties /c modA for disinterested in assisting therapy to adjust panties and pull up all the way  · Toileting: contact guard assistance pt completed pericare but required CGA for safety when transfering [place]      AMPA 6 Click ADL: 16    Treatment & Education:  Pt found up in chair. Pt willing to participate in therapy /c encouragement. Pt did not rate pain but did "express' pain by yelling when moving R knee. Pt expressed desire to use BSC. Pt sit>stand /c CGA for safety. Pt noted /c inability to maintain erect posture even when cued. Pt using RW ambulated to [facility], Pt did not heed therapy VC for safe management of RW during ambulation. Pt seated on BS successfully urinated. Pt donned mesh panties /c ModA for disinterested in assisting . Pt using RW ambulated back to chair requiring CGA for safety. Pt seated completed G/H /c set up. Pt noted /c lack of general safety awareness. Pt tolerated session well. Cont OT POC.  Patient left up in chair with chair alarm on and friend presentEducation:      GOALS:   Multidisciplinary Problems     Occupational Therapy Goals        Problem: Occupational Therapy Goal    Goal Priority Disciplines Outcome Interventions   Occupational Therapy Goal     OT, PT/OT Ongoing, Progressing    Description:  Goals to be met by: 12/21     Patient will increase functional independence with ADLs by performing:    LE Dressing with Stand-by Assistance.  Grooming while standing at sink with Stand-by Assistance.  Toileting from toilet with Stand-by Assistance for hygiene and clothing management.   Toilet transfer to toilet with Stand-by Assistance.  Upper extremity exercise program x10 reps per handout, with independence.                      Time Tracking:     OT Date of Treatment: " 11/22/19  OT Start Time: 1105  OT Stop Time: 1132  OT Total Time (min): 27 min    Billable Minutes:Self Care/Home Management 27    DONNELL Thacker  11/22/2019

## 2019-11-22 NOTE — DISCHARGE INSTRUCTIONS
GENERAL DISCHARGE INSTRUCTIONS:        FOLLOW UP APPOINTMENT:  Call your surgeon's office to schedule your post operative appointment, if one has not already been scheduled.     WEIGHTBEARING:  You may bear as much weight as you can tolerate on your operative leg.  Continue to use a walking device until d/c'd by your physical therapist.    PAIN MANAGEMENT:  Take your pain medication as prescribed.  Ween yourself off narcotic pain medication slowly, supplementing with acetominophen (Tylenol).  Do not exceed 3000 mg of Tylenol per day.    SURGICAL DRESSING: Do not change dressing unless it falls off or your nurse feels that it is saturated. Replace with similar dressing, only if absolutely necessary.    DRIVING:  Do not drive until you have your follow up appointment with your surgeon.    SHOWERING:  You can shower as long as your dressing is intact and your physical therapist has instructed you on how to safely get in and out of your shower.    TO PREVENT BLOOD CLOTS: continue to take aspirin (or other medication) as instructed above.  Also continue to walk frequently throughout the day.    TO PREVENT CONSTIPATION:  continue to take stool softeners regularly for as long as you are on narcotic pain medication (oxycodone/hydrocodone). If you do not have normal bowel movement for 1-2 days, purchase an over the counter laxative, such as Milk of Magnesia, and follow the instructions on the label. Call your doctor for severe abdominal pain, vomiting or diarrhea.    To PREVENT SWELLING:  This is normal for at least 2 months after surgery. Spend time each day with your leg elevated on several pillows. Use ice as needed. KRISTIE stockings are helpful for swelling, but MAY be removed, if desired.    NOTIFY YOUR SURGEON IF: Unrelenting pain that does not improve, even after taking prescribed pain medication. Increasing drainage from the wound.

## 2019-11-22 NOTE — PT/OT/SLP PROGRESS
Physical Therapy Treatment    Patient Name:  Christine aCstro   MRN:  [id number]    Recommendations:     Discharge Recommendations:  nursing facility, skilled   Discharge Equipment Recommendations: (defer to SNF)   Barriers to discharge: decreased mobility,endurance and strength    Assessment:     Christine Castro is a 64 y.o. female admitted with a medical diagnosis of History of right knee joint replacement.  She presents with the following impairments/functional limitations:  weakness, impaired endurance, impaired functional mobilty, gait instability, impaired balance, impaired cognition, decreased lower extremity function, impaired skin, orthopedic precautions,pt with increased sitting tolerance and requires assistance with all mobility,pt will benefit from continuing PT services upon discharge.    Rehab Prognosis: Fair; patient would benefit from acute skilled PT services to address these deficits and reach maximum level of function.    Recent Surgery: Procedure(s) (LRB):  ARTHROPLASTY, KNEE (Right) 2 Days Post-Op    Plan:     During this hospitalization, patient to be seen BID to address the identified rehab impairments via gait training, therapeutic activities, therapeutic exercises, neuromuscular re-education and progress toward the following goals:    · Plan of Care Expires:  12/21/19    Subjective     Chief Complaint: n/a  Patient/Family Comments/goals: pt states her arms are strong.  Pain/Comfort:  · Pain Rating 1: (no rating)  · Location - Side 1: Right  · Location - Orientation 1: generalized  · Location 1: knee  · Pain Addressed 1: Reposition, Distraction      Objective:     Communicated with nsg prior to session.  Patient found up in chair with (chair alarm) upon PT entry to room.     General Precautions: Standard, fall   Orthopedic Precautions:RLE weight bearing as tolerated   Braces: N/A     Functional Mobility:  · Bed Mobility:     · Sit to Supine: maximal assistance and at le's  · Transfers:      · Sit to Stand:  minimum assistance and moderate assistance with rolling walker  · Bed to Chair: moderate assistance and was from chair to bed with  rolling walker  using  Step Transfer  · Balance: fair standing balance with RW      AM-PAC 6 CLICK MOBILITY  Turning over in bed (including adjusting bedclothes, sheets and blankets)?: 3  Sitting down on and standing up from a chair with arms (e.g., wheelchair, bedside commode, etc.): 3  Moving from lying on back to sitting on the side of the bed?: 3  Moving to and from a bed to a chair (including a wheelchair)?: 2  Need to walk in hospital room?: 2  Climbing 3-5 steps with a railing?: 2  Basic Mobility Total Score: 15       Therapeutic Activities and Exercises: le supine ex's X 10-12 reps inc: ap,qs,hs,abd/add,slr with assistance on R.       Patient left supine with all lines intact, call button in reach, bed alarm on, nsg notified and friend present..    GOALS: see general POC  Multidisciplinary Problems     Physical Therapy Goals        Problem: Physical Therapy Goal    Goal Priority Disciplines Outcome Goal Variances Interventions   Physical Therapy Goal     PT, PT/OT Ongoing, Progressing     Description:  Goals to be met by: 2019     Patient will increase functional independence with mobility by performin. Sit to stand transfer with Supervision  2. Bed to chair transfer with Supervision using Rolling Walker  3. Gait  x 50 feet with Modified [place] using Rolling Walker.   4. Lower extremity exercise program x15 reps per handout, with supervision                      Time Tracking:     PT Received On: 19  PT Start Time: 1426     PT Stop Time: 1450  PT Total Time (min): 24 min     Billable Minutes: Therapeutic Activity 13 and Therapeutic Exercise 11    Treatment Type: Treatment  PT/PTA: PTA     PTA Visit Number: 1     Hasmukh Romero, PTA  2019

## 2019-11-22 NOTE — PLAN OF CARE
"  Problem: Occupational Therapy Goal  Goal: Occupational Therapy Goal  Description  Goals to be met by: 12/21     Patient will increase functional independence with ADLs by performing:    LE Dressing with Stand-by Assistance.  Grooming while standing at sink with Stand-by Assistance.  Toileting from toilet with Stand-by Assistance for hygiene and clothing management.   Toilet transfer to toilet with Stand-by Assistance.  Upper extremity exercise program x10 reps per handout, with independence.     Outcome: Ongoing, Progressing   Pt agreeable to therapy /c encouragement. Pt reported "I walked with the short one already". Pt reported pain in R knee by yelling when moving. Pt noted /c decreased safety awareness and inattention to VC. Pt tolerated session well.  "

## 2019-11-22 NOTE — NURSING
Bed remains low, locked and call bell within reach. Patient verbalized understanding to call for any needs or assistance. PT/Ot worked with patient per MD orders. PRN pain medication administered per MD orders. Will continue to monitor patient.

## 2019-11-22 NOTE — PLAN OF CARE
TN met with pt and pt's step daughter Monika in room. Pt up to chair at this time. TN discussed SNF with pt again. Pt refused. Pt wants to go home with home health. Pt still unable to provide address of the home she will be going too.

## 2019-11-22 NOTE — PLAN OF CARE
HH referral faxed to [facility]  (fax [phone].    HH referral also sent to [facility]  and [facility] .    [facility]  unable to admit new patients until after next Wednesday 11/22/19 1740   Post-Acute Status   Post-Acute Authorization Home Health/Hospice   Home Health/Hospice Status Referrals Sent

## 2019-11-22 NOTE — PT/OT/SLP PROGRESS
Physical Therapy Treatment    Patient Name:  Christine Castro   MRN:  [id number]    Recommendations:     Discharge Recommendations:  nursing facility, skilled   Discharge Equipment Recommendations: (defer to SNF)   Barriers to discharge: decreased mobility,endurance and strength    Assessment:     Christine Castro is a 64 y.o. female admitted with a medical diagnosis of History of right knee joint replacement.  She presents with the following impairments/functional limitations:  weakness, impaired endurance, impaired functional mobilty, gait instability, impaired balance, impaired cognition, decreased lower extremity function, pain, decreased ROM, impaired coordination, impaired skin, orthopedic precautions,pt with good participation and requires assistance with all mobility for safety,pt remains with decreased mobility and strength,pt will benefit from continuing PT services upon discharge.    Rehab Prognosis: Fair; patient would benefit from acute skilled PT services to address these deficits and reach maximum level of function.    Recent Surgery: Procedure(s) (LRB):  ARTHROPLASTY, KNEE (Right) 2 Days Post-Op    Plan:     During this hospitalization, patient to be seen BID to address the identified rehab impairments via gait training, therapeutic exercises, therapeutic activities, neuromuscular re-education and progress toward the following goals:    · Plan of Care Expires:  12/21/19    Subjective     Chief Complaint: n/a  Patient/Family Comments/goals: pt states she is doing her best.  Pain/Comfort:  · Pain Rating 1: (no rating)      Objective:     Communicated with nsg prior to session.  Patient found supine with bed alarm upon PT entry to room.     General Precautions: Standard, fall   Orthopedic Precautions:RLE partial weight bearing   Braces: N/A     Functional Mobility:  · Bed Mobility:     · Supine to Sit: minimum assistance and increased time  · Transfers:     · Sit to Stand:  minimum assistance with rolling  walker  · Toilet Transfer: minimum assistance with  rolling walker  using  Step Transfer  · Balance: fair standing balance with RW      AM-PAC 6 CLICK MOBILITY  Turning over in bed (including adjusting bedclothes, sheets and blankets)?: 3  Sitting down on and standing up from a chair with arms (e.g., wheelchair, bedside commode, etc.): 3  Moving from lying on back to sitting on the side of the bed?: 3  Moving to and from a bed to a chair (including a wheelchair)?: 2  Need to walk in hospital room?: 2  Climbing 3-5 steps with a railing?: 2  Basic Mobility Total Score: 15       Therapeutic Activities and Exercises: le supine ex's X 10-12 reps inc: ap,qs,hs,abd/add,slr with assistance on R. Bed-commode-chair with RW and Min/Mod A ~2-3 steps per t/f.       Patient left up in chair with all lines intact, call button in reach, chair alarm on and pct's present..    GOALS:  See general POC  Multidisciplinary Problems     Physical Therapy Goals        Problem: Physical Therapy Goal    Goal Priority Disciplines Outcome Goal Variances Interventions   Physical Therapy Goal     PT, PT/OT Ongoing, Progressing     Description:  Goals to be met by: 2019     Patient will increase functional independence with mobility by performin. Sit to stand transfer with Supervision  2. Bed to chair transfer with Supervision using Rolling Walker  3. Gait  x 50 feet with Modified [place] using Rolling Walker.   4. Lower extremity exercise program x15 reps per handout, with supervision                      Time Tracking:     PT Received On: 19  PT Start Time: 823     PT Stop Time: 08  PT Total Time (min): 34 min     Billable Minutes: Therapeutic Activity 15 and Therapeutic Exercise 9    Treatment Type: Treatment  PT/PTA: PTA     PTA Visit Number: 1     Hasmukh Romero, PTA  2019

## 2019-11-23 VITALS
DIASTOLIC BLOOD PRESSURE: 66 MMHG | OXYGEN SATURATION: 99 % | HEART RATE: 92 BPM | BODY MASS INDEX: 57.52 KG/M2 | TEMPERATURE: 98 F | SYSTOLIC BLOOD PRESSURE: 116 MMHG | WEIGHT: 293 LBS | RESPIRATION RATE: 18 BRPM | HEIGHT: 60 IN

## 2019-11-23 PROCEDURE — 25000003 PHARM REV CODE 250: Performed by: ORTHOPAEDIC SURGERY

## 2019-11-23 PROCEDURE — 97530 THERAPEUTIC ACTIVITIES: CPT

## 2019-11-23 PROCEDURE — 97116 GAIT TRAINING THERAPY: CPT | Mod: 59

## 2019-11-23 PROCEDURE — 63600175 PHARM REV CODE 636 W HCPCS: Performed by: ORTHOPAEDIC SURGERY

## 2019-11-23 PROCEDURE — 94640 AIRWAY INHALATION TREATMENT: CPT

## 2019-11-23 PROCEDURE — 94761 N-INVAS EAR/PLS OXIMETRY MLT: CPT

## 2019-11-23 RX ORDER — ACETAMINOPHEN 325 MG/1
650 TABLET ORAL EVERY 4 HOURS PRN
Status: DISCONTINUED | OUTPATIENT
Start: 2019-11-23 | End: 2019-11-23 | Stop reason: HOSPADM

## 2019-11-23 RX ADMIN — PANTOPRAZOLE SODIUM 40 MG: 40 TABLET, DELAYED RELEASE ORAL at 08:11

## 2019-11-23 RX ADMIN — OXYCODONE HYDROCHLORIDE 5 MG: 5 TABLET ORAL at 05:11

## 2019-11-23 RX ADMIN — GABAPENTIN 100 MG: 100 CAPSULE ORAL at 03:11

## 2019-11-23 RX ADMIN — LEVOTHYROXINE SODIUM 88 MCG: 88 TABLET ORAL at 08:11

## 2019-11-23 RX ADMIN — FUROSEMIDE 20 MG: 20 TABLET ORAL at 08:11

## 2019-11-23 RX ADMIN — GABAPENTIN 100 MG: 100 CAPSULE ORAL at 08:11

## 2019-11-23 RX ADMIN — POTASSIUM CHLORIDE 10 MEQ: 10 TABLET, EXTENDED RELEASE ORAL at 08:11

## 2019-11-23 RX ADMIN — ESCITALOPRAM OXALATE 20 MG: 20 TABLET, FILM COATED ORAL at 08:11

## 2019-11-23 RX ADMIN — ACETAMINOPHEN 650 MG: 325 TABLET ORAL at 06:11

## 2019-11-23 RX ADMIN — OXYCODONE HYDROCHLORIDE 5 MG: 5 TABLET ORAL at 11:11

## 2019-11-23 RX ADMIN — HYDROMORPHONE HYDROCHLORIDE 0.5 MG: 2 INJECTION, SOLUTION INTRAMUSCULAR; INTRAVENOUS; SUBCUTANEOUS at 12:11

## 2019-11-23 RX ADMIN — BUPROPION HYDROCHLORIDE 200 MG: 100 TABLET, EXTENDED RELEASE ORAL at 08:11

## 2019-11-23 RX ADMIN — HYDROMORPHONE HYDROCHLORIDE 0.5 MG: 2 INJECTION, SOLUTION INTRAMUSCULAR; INTRAVENOUS; SUBCUTANEOUS at 06:11

## 2019-11-23 RX ADMIN — PREDNISONE 5 MG: 5 TABLET ORAL at 08:11

## 2019-11-23 RX ADMIN — SENNOSIDES AND DOCUSATE SODIUM 1 TABLET: 8.6; 5 TABLET ORAL at 08:11

## 2019-11-23 RX ADMIN — ASPIRIN 81 MG: 81 TABLET, COATED ORAL at 08:11

## 2019-11-23 RX ADMIN — BUDESONIDE 1 PUFF: 180 AEROSOL, POWDER RESPIRATORY (INHALATION) at 07:11

## 2019-11-23 RX ADMIN — ATORVASTATIN CALCIUM 40 MG: 40 TABLET, FILM COATED ORAL at 08:11

## 2019-11-23 RX ADMIN — POLYETHYLENE GLYCOL 3350 17 G: 17 POWDER, FOR SOLUTION ORAL at 08:11

## 2019-11-23 NOTE — PLAN OF CARE
Patient on RA, no respiratory distress noted. The proper method of use, as well as anticipated side effects, of this metered-dose inhaler are discussed and demonstrated to the patient. Will continue to monitor.

## 2019-11-23 NOTE — PLAN OF CARE
4:50 pm, [place] received call from pt's nurse, she does not feel safe discharging the pt into the care of the friend in pt's room currently as she is supposed to drive the pt to [place], [place], the friend appears to be under the influence of medication or something. TN placed order with [facility] to provide transportation to address listed below.  time requested for as soon as possible.    · TN sent HH orders via [employer]. [facility] to see pt on Sunday 11/24/19 at relatives home: [address] [address].      No DME ordered    Future Appointments   Date Time Provider Department Center   12/5/2019  1:45 PM Roldan Greenwood MD [facility] [place]       Pt's nurse will go over medications/signs and symptoms prior to discharge       11/23/19 1641   Final Note   Assessment Type Final Discharge Note   Anticipated Discharge Disposition Home-Health  ([facility])   What phone number can be called within the next 1-3 days to see how you are doing after discharge? [phone]   Hospital Follow Up  Appt(s) scheduled? Yes   Right Care Referral Info   Post Acute Recommendation Home-care   Referral Type [facility]   [facility]     Jossy Lyons, RN Transitional Navigator  [phone]

## 2019-11-23 NOTE — NURSING
Dr. Heart notified of current vitals; new order to change tylenol from 1g po every 8 hours scheduled to 650mg po every 4 hours prn temp >100.0.       11/23/19 0524   Vital Signs   Temp (!) 101.4 °F (38.6 °C)   Temp src Oral   Pulse (!) 119   Heart Rate Source Monitor   Resp 18   /74   MAP (mmHg) 92   Patient Position Lying

## 2019-11-23 NOTE — PT/OT/SLP PROGRESS
"Physical Therapy Treatment    Patient Name:  Christine Castro   MRN:  [id number]    Recommendations:     Discharge Recommendations:  nursing facility, skilled   Discharge Equipment Recommendations: (Defer toSNF)   Barriers to discharge: impaired safety awareness and high risk for falls    Assessment:     Christine Castro is a 64 y.o. female admitted with a medical diagnosis of History of right knee joint replacement.  She presents with the following impairments/functional limitations:  impaired functional mobilty, weakness, impaired cognition, decreased safety awareness, impaired coordination, impaired endurance, gait instability, decreased coordination, pain, impaired fine motor, impaired joint extensibility, impaired balance, impaired self care skills, decreased lower extremity function, decreased ROM, orthopedic precautions, impaired muscle length. Patient is making progress towards goals; all goals addressed as appropriate. Continue with PT POC. Pt c/ poor safety awareness c/ functional mobility; Mod A for bed mobility; Min A for RW c/ gait for 15ft; again demo poor safety c/ almost 45 deg forward trunk flexion impulsively on RW to "rest her arms" multiple times. She will need 24hrs supervision/ assist if she is to go home.     Rehab Prognosis: Good; patient would benefit from acute skilled PT services to address these deficits and reach maximum level of function.    Recent Surgery: Procedure(s) (LRB):  ARTHROPLASTY, KNEE (Right) 3 Days Post-Op    Plan:     During this hospitalization, patient to be seen BID to address the identified rehab impairments via gait training, therapeutic activities, therapeutic exercises, neuromuscular re-education and progress toward the following goals:    · Plan of Care Expires:  12/21/19    Subjective     Chief Complaint: pain in R knee  Patient/Family Comments/goals: Pt agreed to PT POC  Pain/Comfort:  Pain Rating 1: 6/10  Location - Side 1: Right  Location 1: knee  Pain Addressed " "1: Pre-medicate for activity, Reposition, Distraction, Cessation of Activity, Nurse notified, Other (see comments)(ice pack)  Pain Rating Post-Intervention 1: 6/10      Objective:     Communicated with RNNathalia prior to session.  Patient found supine with bed alarm, telemetry upon PT entry to room.     General Precautions: Standard, fall   Orthopedic Precautions:RLE weight bearing as tolerated   Braces: N/A     Functional Mobility:  · Bed Mobility:     · Rolling Right: contact guard assistance  · Scooting: moderate assistance  · Supine to Sit: moderate assistance  · Transfers:     · Sit to Stand:  minimum assistance with rolling walker  · Bed to Chair: minimum assistance with  rolling walker  using  Step Transfer  · Gait:  Min A for RW c/ gait for 15ft; pt demo poor safety c/ almost 45 de forward trunk flexion impulsively on RW to "rest her arms" multiple times.   · Balance: dynmaic gait- poor+      AM-PAC 6 CLICK MOBILITY  Turning over in bed (including adjusting bedclothes, sheets and blankets)?: 3  Sitting down on and standing up from a chair with arms (e.g., wheelchair, bedside commode, etc.): 3  Moving from lying on back to sitting on the side of the bed?: 2  Moving to and from a bed to a chair (including a wheelchair)?: 3  Need to walk in hospital room?: 3  Climbing 3-5 steps with a railing?: 3  Basic Mobility Total Score: 17       Therapeutic Activities and Exercises:  Pt instructed in functional mobility as detailed above.  Pt instructed in  R LE AROM 0-90 deg via LAQ x5 2reps ea c/ rest breaks as tolerated.   Transferred to chair c/ OOB activity as tolerated 1-3hrs    Patient left up in chair with all lines intact, call button in reach, chair alarm on and RN notified..    GOALS:   Multidisciplinary Problems     Physical Therapy Goals        Problem: Physical Therapy Goal    Goal Priority Disciplines Outcome Goal Variances Interventions   Physical Therapy Goal     PT, PT/OT Ongoing, Progressing   "   Description:  Goals to be met by: 2019     Patient will increase functional independence with mobility by performin. Sit to stand transfer with Supervision  2. Bed to chair transfer with Supervision using Rolling Walker  3. Gait  x 50 feet with Modified [place] using Rolling Walker.   4. Lower extremity exercise program x15 reps per handout, with supervision                      Time Tracking:     PT Received On: 19  PT Start Time: 932     PT Stop Time: 959  PT Total Time (min): 27 min     Billable Minutes: Gait Training 17 and Therapeutic Activity 10    Treatment Type: Treatment  PT/PTA: PT     PTA Visit Number: 1     Cain Davis PT, DPT  2019

## 2019-11-23 NOTE — PLAN OF CARE
"  Problem: Physical Therapy Goal  Goal: Physical Therapy Goal  Description  Goals to be met by: 2019     Patient will increase functional independence with mobility by performin. Sit to stand transfer with Supervision  2. Bed to chair transfer with Supervision using Rolling Walker  3. Gait  x 50 feet with Modified [place] using Rolling Walker.   4. Lower extremity exercise program x15 reps per handout, with supervision     Outcome: Ongoing, Progressing     Patient is making progress towards goals; all goals addressed as appropriate. Continue with PT POC. Pt c/ poor safety awareness c/ functional mobility; Mod A for bed mobility; Min A for RW c/ gait for 15ft; again demo poor safety c/ almost 45 deg forward trunk flexion impulsively on RW to "rest her arms" multiple times. She will need 24hrs supervision/ assist if she is to go home.       "

## 2019-11-23 NOTE — PT/OT/SLP PROGRESS
"Physical Therapy Treatment    Patient Name:  Christine Castro   MRN:  [id number]    Recommendations:     Discharge Recommendations:  nursing facility, skilled   Discharge Equipment Recommendations: (Defer toSNF)   Barriers to discharge: impaired safety awareness and high risk for falls    Assessment:     Christine Castro is a 64 y.o. female admitted with a medical diagnosis of History of right knee joint replacement.  She presents with the following impairments/functional limitations:  impaired functional mobilty, weakness, impaired cognition, decreased safety awareness, impaired coordination, impaired endurance, gait instability, decreased coordination, pain, impaired fine motor, impaired joint extensibility, impaired balance, impaired self care skills, decreased lower extremity function, decreased ROM, orthopedic precautions, impaired muscle length. Patient is making progress towards goals; all goals addressed as appropriate. Continue with PT POC. Pt c/ poor safety awareness c/ functional mobility; Mod A for bed mobility; Min A for RW c/ gait for 15ft; again demo poor safety c/ almost 45 deg forward trunk flexion impulsively on RW to "rest her arms" multiple times. She will need 24hrs supervision/ assist if she is to go home.     Rehab Prognosis: {Rehab prognosis:56814}; patient would benefit from acute skilled PT services to address these deficits and reach maximum level of function.    Recent Surgery: Procedure(s) (LRB):  ARTHROPLASTY, KNEE (Right) 3 Days Post-Op    Plan:     During this hospitalization, patient to be seen BID to address the identified rehab impairments via gait training, therapeutic activities, therapeutic exercises, neuromuscular re-education and progress toward the following goals:    · Plan of Care Expires:  12/21/19    Subjective     Chief Complaint: ***  Patient/Family Comments/goals: ***  Pain/Comfort:  · Pain Rating 1: 6/10  · Location - Side 1: Right  · Location 1: knee  · Pain Addressed " 1: Pre-medicate for activity, Reposition, Distraction, Cessation of Activity, Nurse notified, Other (see comments)(ice pack)  · Pain Rating Post-Intervention 1: 6/10      Objective:     Communicated with *** prior to session.  Patient found {IP OT PATIENT LEFT POSITION [facility]:564336296} with bed alarm, telemetry upon PT entry to room.     General Precautions: Standard, fall   Orthopedic Precautions:RLE weight bearing as tolerated   Braces: N/A     Functional Mobility:  · {IP PT ACUTE FUNCTIONAL MOBILITY OHS:90647}      AM-PAC 6 CLICK MOBILITY  Turning over in bed (including adjusting bedclothes, sheets and blankets)?: 3  Sitting down on and standing up from a chair with arms (e.g., wheelchair, bedside commode, etc.): 3  Moving from lying on back to sitting on the side of the bed?: 2  Moving to and from a bed to a chair (including a wheelchair)?: 3  Need to walk in hospital room?: 3  Climbing 3-5 steps with a railing?: 3  Basic Mobility Total Score: 17       Therapeutic Activities and Exercises:   ***    Patient left {IP OT PATIENT LEFT POSITION OHS:249250774} with {Patient left with:12922}..    GOALS:   Multidisciplinary Problems     Physical Therapy Goals        Problem: Physical Therapy Goal    Goal Priority Disciplines Outcome Goal Variances Interventions   Physical Therapy Goal     PT, PT/OT Ongoing, Progressing     Description:  Goals to be met by: 2019     Patient will increase functional independence with mobility by performin. Sit to stand transfer with Supervision  2. Bed to chair transfer with Supervision using Rolling Walker  3. Gait  x 50 feet with Modified [place] using Rolling Walker.   4. Lower extremity exercise program x15 reps per handout, with supervision                      Time Tracking:     PT Received On: 19  PT Start Time: 932     PT Stop Time: 959  PT Total Time (min): 27 min     Billable Minutes: {IP PT TREATMENT BILLABLE MINUTES O[facility]:[id number]}    Treatment Type:  Treatment  PT/PTA: PT     PTA Visit Number: 1     Cain Davis, PT  11/23/2019

## 2019-11-23 NOTE — PLAN OF CARE
Pt AAOx4, friend at bedside throughout shift. Pt complains of knee pain and nausea but denies emesis and SOB. Diabetic diet tolerated well. Knee incision CDI, ice applied intermittently. Safety maintained, bed alarm on - AVASYS camera monitoring in place, will cont to monitor.

## 2019-11-23 NOTE — PROGRESS NOTES
HH orders sent via [employer] fax system to numerous HH agencies, awaiting acceptance    [facility]   Waiting on Facility  [facility]  Waiting on Facility  [facility] Declined  [facility] Declined  [facility]  Declined  [facility]  Declined  [facility]   Declined  [facility] Declined  [facility]  Waiting on Facility  [facility]   Waiting on Facility  [facility]  Declined  [facility]   Declined  [facility]   Waiting on Facility  [facility]   Waiting on Facility  [facility]   Waiting on Facility  [facility]   Declined  [facility]   Waiting on Facility

## 2019-11-24 NOTE — NURSING
"1640 Pt being discharged home in stable condition. Pt's friend at bedside. Friend states she will be driving pt to "Demi's house" who is the person who will be taking care of pt. Friend appears to be under the influence of some substance. This nurse contacted [facility] to arrange transportation for pt instead. AVS packet and script for percocet given to pt. NISHI Mesa to go over discharge instructions with pt and caregiver.   "

## 2019-11-25 PROCEDURE — G0180 MD CERTIFICATION HHA PATIENT: HCPCS | Mod: ,,, | Performed by: ORTHOPAEDIC SURGERY

## 2019-11-25 PROCEDURE — G0180 PR HOME HEALTH MD CERTIFICATION: ICD-10-PCS | Mod: ,,, | Performed by: ORTHOPAEDIC SURGERY

## 2019-11-25 NOTE — DISCHARGE SUMMARY
DISCHARGE SUMMARY      Date and time of Admission: 11/20/2019 11:05 AM    Date of Discharge:11/25/2019    Discharge Diagnoses:    Active Hospital Problems    Diagnosis  POA    *History of right knee joint replacement [Z96.651]  Not Applicable      Resolved Hospital Problems    Diagnosis Date Resolved POA    Primary osteoarthritis of right knee [M17.11] 11/22/2019 Yes    Primary osteoarthritis [M19.91] 11/22/2019 Yes     Chronic       Discharge Medications:       Medication List      START taking these medications    oxyCODONE-acetaminophen 7.5-325 mg per tablet  Commonly known as:  PERCOCET  Take 1 tablet by mouth every 6 (six) hours as needed for Pain.        CONTINUE taking these medications    acetaminophen 500 MG tablet  Commonly known as:  TYLENOL     albuterol 90 mcg/actuation inhaler  Commonly known as:  Ventolin HFA  INHALE 2 PUFFS INTO THE LUNGS EVERY 4 HOURS AS NEEDED FOR WHEEZING     aspirin 81 MG EC tablet  Commonly known as:  ECOTRIN  Take 1 tablet (81 mg total) by mouth once daily.     atorvastatin 40 MG tablet  Commonly known as:  LIPITOR  Take 1 tablet (40 mg total) by mouth once daily.     budesonide 90 mcg/actuation Aepb  Commonly known as:  PULMICORT FLEXHALER  Inhale 2 puffs (180 mcg total) into the lungs 2 (two) times daily. Controller     buPROPion 200 MG Sr12  Commonly known as:  WELLBUTRIN SR  Take 1 tablet (200 mg total) by mouth once daily.     diazePAM 5 MG tablet  Commonly known as:  VALIUM  TAKE 1 TABLET(5 MG) BY MOUTH EVERY 12 HOURS AS NEEDED FOR ANXIETY     diclofenac sodium 1 % Gel  Commonly known as:  VOLTAREN  APPLY 2 GRAMS EXTERNALLY TO THE AFFECTED AREA FOUR TIMES DAILY     ergocalciferol 50,000 unit Cap  Commonly known as:  ERGOCALCIFEROL  Take 1 capsule (50,000 Units total) by mouth every 7 days.     escitalopram oxalate 20 MG tablet  Commonly known as:  LEXAPRO  TAKE 1 TABLET(20 MG) BY MOUTH EVERY DAY     furosemide 20 MG tablet  Commonly known as:  LASIX  TAKE 1 TABLET(20  MG) BY MOUTH DAILY AS NEEDED     * gabapentin 100 MG capsule  Commonly known as:  NEURONTIN  TAKE 1 CAPSULE BY MOUTH THREE TIMES DAILY     * gabapentin 100 MG capsule  Commonly known as:  NEURONTIN  TAKE 1 CAPSULE BY MOUTH THREE TIMES DAILY     glipiZIDE 2.5 MG Tr24  Commonly known as:  GLUCOTROL     ibandronate 150 mg tablet  Commonly known as:  BONIVA  Take 1 tablet (150 mg total) by mouth every 30 days. For osteoporosis     levothyroxine 88 MCG tablet  Commonly known as:  SYNTHROID  TAKE 1 TABLET BY MOUTH DAILY BEFORE BREAKFAST     omeprazole 40 MG capsule  Commonly known as:  PRILOSEC  TAKE 1 CAPSULE(40 MG) BY MOUTH EVERY MORNING     ondansetron 4 MG Tbdl  Commonly known as:  ZOFRAN-ODT  Take 1 tablet (4 mg total) by mouth every 6 (six) hours as needed.     predniSONE 5 MG tablet  Commonly known as:  DELTASONE  TAKE 1 TABLET BY MOUTH DAILY     promethazine 25 MG tablet  Commonly known as:  PHENERGAN  TAKE 1 TABLET(25 MG) BY MOUTH EVERY 6 HOURS AS NEEDED     QUEtiapine 50 MG tablet  Commonly known as:  SEROQUEL  TAKE 1 TABLET(50 MG) BY MOUTH EVERY EVENING     True Metrix Glucose Test Strip Strp  Generic drug:  blood sugar diagnostic  USE TO TEST BLOOD SUGAR EVERY DAY     zolpidem 5 MG Tab  Commonly known as:  AMBIEN  One po qhs for sleep         * This list has 2 medication(s) that are the same as other medications prescribed for you. Read the directions carefully, and ask your doctor or other care provider to review them with you.            ASK your doctor about these medications    potassium chloride 10 MEQ Tbsr  Commonly known as:  KLOR-CON  TAKE 2 TABLETS(20 MEQ) BY MOUTH EVERY DAY  Ask about: Which instructions should I use?           Where to Get Your Medications      These medications were sent to [id number] - [facility] [address] [facility] AT [facility] [address]  [address] [facility] [address]    Phone:  [phone]   · potassium chloride 10 MEQ Tbsr     You can  get these medications from any pharmacy    Bring a paper prescription for each of these medications  · oxyCODONE-acetaminophen 7.5-325 mg per tablet         Follow-up (including scheduled tests):    History of Present Illness: See H&P    Past Medical History:    Past Medical History:   Diagnosis Date    Arthritis     Asthma     CHF (congestive heart failure)     [facility]    Colon cancer     colon    COPD (chronic obstructive pulmonary disease)     Coronary artery disease     Depression     Diabetes mellitus, type 2     GERD (gastroesophageal reflux disease)     Myocardial infarction     AGE 19 MIGUELANG[employer]BABY DELIVERY    Thyroid disease        Allergies: Adhesive; Latex, natural rubber; and Ibuprofen    Hospital Course:    The patient underwent surgery on the day of admission. The procedure was uneventful and the patient tolerated well. Post operatively the patient was hemodynamically stable and made appropriate progress in therapy. There were no evident acute postoperative complications.    Procedures:  Right total knee replacement    Discharge Physical Exam: See progress note    Condition: Improved    Activity: WBAT    Diet: Resume preadmission diet    DispositionHome with services

## 2019-11-26 ENCOUNTER — TELEPHONE (OUTPATIENT)
Dept: ORTHOPEDICS | Facility: CLINIC | Age: 64
End: 2019-11-26

## 2019-11-26 NOTE — TELEPHONE ENCOUNTER
Per Mayelin. It is too soon to write another prescription for pain. Patient just had pain mediation filled. Patient is aware.

## 2019-11-26 NOTE — TELEPHONE ENCOUNTER
----- Message from Belkis Pimentel sent at 11/26/2019 12:27 PM CST -----  Contact: Home health nurse [phone]  Home health nurse states is their anything else  can call in for patient pain medication states the script that he did write pharmacy wont let her get it until next month because she already had pain medication recently please call back to discuss

## 2019-11-28 ENCOUNTER — HOSPITAL ENCOUNTER (EMERGENCY)
Facility: HOSPITAL | Age: 64
Discharge: HOME | End: 2019-11-28
Attending: EMERGENCY MEDICINE
Payer: MEDICARE

## 2019-11-28 VITALS
HEART RATE: 92 BPM | BODY MASS INDEX: 54.97 KG/M2 | SYSTOLIC BLOOD PRESSURE: 125 MMHG | OXYGEN SATURATION: 95 % | TEMPERATURE: 99 F | HEIGHT: 60 IN | WEIGHT: 280 LBS | DIASTOLIC BLOOD PRESSURE: 58 MMHG | RESPIRATION RATE: 20 BRPM
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
## 2019-11-28 DIAGNOSIS — S80.01XA CONTUSION OF RIGHT KNEE, INITIAL ENCOUNTER: Primary | ICD-10-CM

## 2019-11-28 DIAGNOSIS — M25.561 KNEE PAIN, RIGHT: ICD-10-CM

## 2019-11-28 PROCEDURE — 99283 EMERGENCY DEPT VISIT LOW MDM: CPT | Mod: 25,ER

## 2019-11-28 PROCEDURE — 25000003 PHARM REV CODE 250: Mod: ER | Performed by: EMERGENCY MEDICINE

## 2019-11-28 RX ORDER — HYDROCODONE BITARTRATE AND ACETAMINOPHEN 5; 325 MG/1; MG/1
1 TABLET ORAL
Status: COMPLETED | OUTPATIENT
Start: 2019-11-28 | End: 2019-11-28

## 2019-11-28 RX ADMIN — HYDROCODONE BITARTRATE AND ACETAMINOPHEN 1 TABLET: 5; 325 TABLET ORAL at 09:11

## 2019-11-29 ENCOUNTER — TELEPHONE (OUTPATIENT)
Dept: ORTHOPEDICS | Facility: CLINIC | Age: 64
End: 2019-11-29

## 2019-11-29 ENCOUNTER — HOSPITAL ENCOUNTER (EMERGENCY)
Facility: HOSPITAL | Age: 64
Discharge: HOME | End: 2019-11-29
Attending: SURGERY
Payer: MEDICARE

## 2019-11-29 VITALS
BODY MASS INDEX: 53.01 KG/M2 | TEMPERATURE: 98 F | WEIGHT: 270 LBS | RESPIRATION RATE: 20 BRPM | OXYGEN SATURATION: 100 % | SYSTOLIC BLOOD PRESSURE: 112 MMHG | HEIGHT: 60 IN | HEART RATE: 89 BPM | DIASTOLIC BLOOD PRESSURE: 68 MMHG
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
## 2019-11-29 DIAGNOSIS — M25.551 PAIN OF RIGHT HIP JOINT: ICD-10-CM

## 2019-11-29 DIAGNOSIS — M25.559 HIP PAIN: ICD-10-CM

## 2019-11-29 PROCEDURE — 25000003 PHARM REV CODE 250: Mod: ER | Performed by: PHYSICIAN ASSISTANT

## 2019-11-29 PROCEDURE — 99283 EMERGENCY DEPT VISIT LOW MDM: CPT | Mod: 25,ER

## 2019-11-29 RX ORDER — HYDROCODONE BITARTRATE AND ACETAMINOPHEN 5; 325 MG/1; MG/1
1 TABLET ORAL
Status: COMPLETED | OUTPATIENT
Start: 2019-11-29 | End: 2019-11-29

## 2019-11-29 RX ADMIN — HYDROCODONE BITARTRATE AND ACETAMINOPHEN 1 TABLET: 5; 325 TABLET ORAL at 08:11

## 2019-11-29 NOTE — TELEPHONE ENCOUNTER
----- Message from Trang Lara sent at 11/29/2019 11:59 AM CST -----  Contact: Nano Woodruff   [facility] [phone]  Nano with [facility] would like to let you know pt's had a fall and experiencing some blood in her drainage. Please call.

## 2019-11-29 NOTE — ED NOTES
Pt had R knee replacement sx on 11/20/19. Pt fell yesterday and was seen in the ED. Pt reports she is now having pain from the R hip that radiates down the R leg. Pt with bandage in place to R knee. Bandage noted to have some blood under it. Bandage remains intact.

## 2019-11-29 NOTE — ED PROVIDER NOTES
Encounter Date: 2019       History     Chief Complaint   Patient presents with    Knee Pain     PT complaining of right knee pain. PT stated she fell on right knee 15 min PTA. Stated both her legs gave out. Denies hitting head.  Patient stated she had a total knee replacement  at [facility]. Patient right knee has swelling. No redness noted. Patient has dressing and knee is wrapped with ace wrap.      64-year-old female presents complaining of right knee pain. Patient reports she fell in the right knee approximately 50 min prior to arrival.  Patient states her legs just gave out.  Patient denies other injuries.  Patient recently had a total knee replacement on 2019.  No sign of infection noted to right knee surgical wound.        Review of patient's allergies indicates:   Allergen Reactions    Adhesive      Adhesive tape    Latex, natural rubber      Adhesive from latex tape    Ibuprofen Other (See Comments)     Causes asthma flare??     Past Medical History:   Diagnosis Date    Arthritis     Asthma     CHF (congestive heart failure)     [facility] GENERAL    Colon cancer     colon    COPD (chronic obstructive pulmonary disease)     Coronary artery disease     Depression     Diabetes mellitus, type 2     GERD (gastroesophageal reflux disease)     Myocardial infarction     AGE 19 MIGUELANGEL WITHBABY DELIVERY    Thyroid disease      Past Surgical History:   Procedure Laterality Date     SECTION      CHOLECYSTECTOMY      COLON SURGERY      COLONOSCOPY      --- repeat in 3-5 years    COLONOSCOPY N/A 2017    Procedure: COLONOSCOPY;  Surgeon: Corrina Flores MD;  Location: [facility] ENDO;  Service: Endoscopy;  Laterality: N/A;    COLONOSCOPY N/A 4/10/2018    Procedure: COLONOSCOPY golytely;  Surgeon: Corrina Flores MD;  Location: [facility] ENDO;  Service: Endoscopy;  Laterality: N/A;    ECTOPIC PREGNANCY SURGERY      ESOPHAGOGASTRODUODENOSCOPY N/A 2019     Procedure: ESOPHAGOGASTRODUODENOSCOPY (EGD);  Surgeon: Corrina Flores MD;  Location: [facility] ENDO;  Service: Endoscopy;  Laterality: N/A;    GASTRIC BYPASS      KNEE ARTHROPLASTY Left 5/8/2019    Procedure: ARTHROPLASTY, KNEE;  Surgeon: Roldan Greenwood MD;  Location: [facility] OR;  Service: Orthopedics;  Laterality: Left;  Navid notified    KNEE ARTHROPLASTY Right 11/20/2019    Procedure: ARTHROPLASTY, KNEE;  Surgeon: Roldan Greenwood MD;  Location: [facility] OR;  Service: Orthopedics;  Laterality: Right;  Navid notified, confirmed case Navid 11/19/19 [id number]    OOPHORECTOMY      TONSILLECTOMY      TUBAL LIGATION       Family History   Problem Relation Age of Onset    Hyperlipidemia Mother     Hypertension Mother     Diabetes Mother     Stroke Mother     Hypertension Sister     Hypertension Brother     Diabetes Brother     Breast cancer Maternal Aunt     Breast cancer Maternal Aunt     Breast cancer Cousin      Social History     Tobacco Use    Smoking status: Never Smoker    Smokeless tobacco: Never Used   Substance Use Topics    Alcohol use: Yes     Comment: very rare    Drug use: No     Comment: CBD oil sometimes     Review of Systems   Musculoskeletal: Positive for gait problem and joint swelling.   All other systems reviewed and are negative.      Physical Exam     Initial Vitals [11/28/19 2055]   BP Pulse Resp Temp SpO2   (!) 125/58 92 20 99.3 °F (37.4 °C) 95 %      MAP       --         Physical Exam    Nursing note and vitals reviewed.  Constitutional: She appears well-developed and well-nourished. She is not diaphoretic. No distress.   HENT:   Head: Normocephalic and atraumatic.   Mouth/Throat: Oropharynx is clear and moist.   Eyes: Conjunctivae and EOM are normal. Pupils are equal, round, and reactive to light.   Neck: Normal range of motion. Neck supple. No JVD present.   Cardiovascular: Normal rate, regular rhythm, normal heart sounds and intact distal pulses. Exam reveals no gallop and no  friction rub.    No murmur heard.  Pulmonary/Chest: Breath sounds normal. No respiratory distress. She has no wheezes. She has no rhonchi. She has no rales.   Abdominal: Soft. Bowel sounds are normal. She exhibits no distension and no mass. There is no tenderness. There is no rebound and no guarding.   Musculoskeletal: Normal range of motion. She exhibits tenderness. She exhibits no edema.   Right knee area tender to palpation and swollen.  No sign of infection noted.   Lymphadenopathy:     She has no cervical adenopathy.   Neurological: She is alert and oriented to person, place, and time. GCS score is 15. GCS eye subscore is 4. GCS verbal subscore is 5. GCS motor subscore is 6.   Skin: Skin is warm. Capillary refill takes less than 2 seconds. No rash noted. No erythema.   Right knee status post knee replacement with staples in place.  No sign of infection of the surgical wound.  Knee swelling is present.         ED Course   Procedures  Labs Reviewed - No data to display       Imaging Results          X-Ray Knee 3 View Right (Final result)  Result time 11/28/19 21:46:54    Final result by Keith Corado MD (11/28/19 21:46:54)                 Impression:      See above.      Electronically signed by: Keith Corado MD  Date:    11/28/2019  Time:    21:46             Narrative:    EXAMINATION:  XR KNEE 3 VIEW RIGHT    CLINICAL HISTORY:  Pain in right knee    FINDINGS:  Status post right total knee arthroplasty without complicating process.                                                                 Clinical Impression:       ICD-10-CM ICD-9-CM   1. Contusion of right knee, initial encounter S80.01XA 924.11   2. Knee pain, right M25.561 719.46   3. Accidental fall, initial encounter W19.XXXA E888.9                             Austen Leong MD  11/29/19 2001

## 2019-11-30 NOTE — ED NOTES
R knee with staples in place and incision edges intact, no drainage noted. Incision cleansed with Betadine, telfa and ABD pad placed, ace wrap applied.

## 2019-11-30 NOTE — ED NOTES
Received report from Shabnam RN  Care assumed.  Pt resting quietly on stretcher, bed low and locked with SR up. Dressing D/I.  NADN at this time  Pt states her ride is on their way.  Instruct to call with need or when her ride got here.  Pt verbalized understanding.  Will continue to monitor.

## 2019-11-30 NOTE — ED NOTES
Updated pt that I rounded on her dog which she left in her kennel in her car. I explained that her dog was fine and lifted head and was resting.   Pt thanked me for checking on her animal.

## 2019-11-30 NOTE — ED NOTES
Pt aware of discharge and is awaiting ride. Pt unsure if she can get a ride home at this time. Will administer medication once patient is able to find a ride home. Pt verbalized understanding.

## 2019-11-30 NOTE — ED PROVIDER NOTES
Encounter Date: 11/29/2019       History     Chief Complaint   Patient presents with    Hip Pain     pain to right thigh/hip s/t fall yesterday. No medications taken to tx. Pt reports sx to leg on 11/20/19. Dressing in place. +Drainage noted. Pt reports pain radiates from thigh to hip. Pt denies new pain to surgical incision.     64-year-old female presents to the emergency department for evaluation of right hip pain status post mechanical slip and fall.  She reports that she had a right total knee replacement on 11/20/2019 by Dr. Greenwood.  She reports that since that time she has had a mild achy pain in her leg.  She reports that yesterday she was getting up when she slipped and fell landing on her right side.  She reports that she was evaluated at this facility  yesterday and x-ray for any was taken.  She reports that they did not do an x-ray of her hip which is hurting her today.  She states that she misplaced her original prescription for pain medication provided by the surgeon.  She states that  evaluated her last night prescribed her some more pain medication.  She reports that the pharmacy would not fill it as it is to close to her previous prescriptions.  She reports that she would like a prescription that she can fill today.  She denies any numbness, tingling, weakness or swelling to the lower extremities. No treatment was attempted prior to arrival.        Review of patient's allergies indicates:   Allergen Reactions    Adhesive      Adhesive tape    Latex, natural rubber      Adhesive from latex tape    Ibuprofen Other (See Comments)     Causes asthma flare??     Past Medical History:   Diagnosis Date    Arthritis     Asthma     CHF (congestive heart failure)     ST CLAUDE GENERAL    Colon cancer     colon    COPD (chronic obstructive pulmonary disease)     Coronary artery disease     Depression     Diabetes mellitus, type 2     GERD (gastroesophageal reflux disease)     Myocardial  infarction     AGE 19 MIGUELANGEL WITHBABY DELIVERY    Thyroid disease      Past Surgical History:   Procedure Laterality Date     SECTION      CHOLECYSTECTOMY      COLON SURGERY      COLONOSCOPY      --- repeat in 3-5 years    COLONOSCOPY N/A 2017    Procedure: COLONOSCOPY;  Surgeon: Corrina Flores MD;  Location: [facility] ENDO;  Service: Endoscopy;  Laterality: N/A;    COLONOSCOPY N/A 4/10/2018    Procedure: COLONOSCOPY golytely;  Surgeon: Corrina Flores MD;  Location: [facility] ENDO;  Service: Endoscopy;  Laterality: N/A;    ECTOPIC PREGNANCY SURGERY      ESOPHAGOGASTRODUODENOSCOPY N/A 2019    Procedure: ESOPHAGOGASTRODUODENOSCOPY (EGD);  Surgeon: Corrina Flores MD;  Location: [facility] ENDO;  Service: Endoscopy;  Laterality: N/A;    GASTRIC BYPASS      KNEE ARTHROPLASTY Left 2019    Procedure: ARTHROPLASTY, KNEE;  Surgeon: Roldan Greenwood MD;  Location: [facility] OR;  Service: Orthopedics;  Laterality: Left;  Navid notified    KNEE ARTHROPLASTY Right 2019    Procedure: ARTHROPLASTY, KNEE;  Surgeon: Roldan Greenwood MD;  Location: [facility] OR;  Service: Orthopedics;  Laterality: Right;  Navid notified, confirmed case Navid [id number]    OOPHORECTOMY      TONSILLECTOMY      TUBAL LIGATION       Family History   Problem Relation Age of Onset    Hyperlipidemia Mother     Hypertension Mother     Diabetes Mother     Stroke Mother     Hypertension Sister     Hypertension Brother     Diabetes Brother     Breast cancer Maternal Aunt     Breast cancer Maternal Aunt     Breast cancer Cousin      Social History     Tobacco Use    Smoking status: Never Smoker    Smokeless tobacco: Never Used   Substance Use Topics    Alcohol use: Yes     Comment: very rare    Drug use: No     Comment: CBD oil sometimes     Review of Systems   Constitutional: Negative for activity change, appetite change and fever.   HENT: Negative for congestion, sore throat, trouble swallowing and voice  change.    Eyes: Negative for visual disturbance.   Respiratory: Negative for cough, chest tightness, shortness of breath and wheezing.    Cardiovascular: Negative for chest pain.   Gastrointestinal: Negative for abdominal pain, diarrhea, nausea and vomiting.   Genitourinary: Negative for decreased urine volume and dysuria.   Musculoskeletal: Positive for arthralgias. Negative for back pain, joint swelling and neck pain.   Skin: Negative for rash.   Neurological: Negative for dizziness, syncope, weakness, light-headedness and headaches.       Physical Exam     Initial Vitals [11/29/19 1657]   BP Pulse Resp Temp SpO2   116/67 89 20 98.3 °F (36.8 °C) 100 %      MAP       --         Physical Exam    Nursing note and vitals reviewed.  Constitutional: She appears well-developed and well-nourished. She is not diaphoretic. No distress.   HENT:   Head: Normocephalic and atraumatic.   Right Ear: External ear normal.   Left Ear: External ear normal.   Nose: Nose normal.   Mouth/Throat: Oropharynx is clear and moist.   Eyes: Conjunctivae and EOM are normal. Pupils are equal, round, and reactive to light.   Neck: Normal range of motion. Neck supple.   Cardiovascular: Normal rate, regular rhythm and normal heart sounds.   Pulmonary/Chest: Breath sounds normal. No respiratory distress. She has no wheezes. She has no rhonchi. She has no rales. She exhibits no tenderness.   Musculoskeletal:        Right hip: She exhibits tenderness and bony tenderness. She exhibits normal range of motion, no swelling, no crepitus, no deformity and no laceration.        Right knee: She exhibits normal range of motion, no swelling, no effusion, no ecchymosis, no erythema and no bony tenderness. Tenderness found.        Right ankle: She exhibits normal range of motion, no swelling and no ecchymosis. No tenderness.        Legs:       Right foot: There is normal range of motion and no tenderness.   Neurological: She is alert and oriented to person,  place, and time.   Skin: Skin is warm and dry.   Psychiatric: She has a normal mood and affect.         ED Course   Procedures  Labs Reviewed - No data to display       Imaging Results          X-Ray Hip 2 View Right (Final result)  Result time 11/29/19 17:42:46    Final result by Keith Corado MD (11/29/19 17:42:46)                 Impression:      See above.      Electronically signed by: Keith Corado MD  Date:    11/29/2019  Time:    17:42             Narrative:    EXAMINATION:  XR HIP 2 VIEW RIGHT    CLINICAL HISTORY:  Pain in right hip.    FINDINGS:  Comparison study 03/12/2018.    There is no hip fracture or dislocation.  Hip joint spaces are symmetric and well preserved without degenerative sequela.  The pelvic ring is intact.    Again, osteitis pubis sequela with widening of the pubic symphysis with sclerosis and subtle irregularity of the opposing margins with the left pubic body higher than the right, stable configuration.                                 Medical Decision Making:   Initial Assessment:   64-year-old female presents for evaluation of hip pain status post mechanical slip and fall.  Physical exam reveals a nontoxic-appearing male in no acute distress. Patient is afebrile vital signs within normal limits. Patient is alert and cooperative throughout exam.  Abdominal exam reveals soft abdomen, nontender to palpation.  Mild tenderness to palpation noted over the right hip.  No erythema, edema or ecchymosis noted. Large well-healing incision with sutures in place.  No wound dehiscence, surrounding erythema or edema noted. Full range of motion, sensation and peripheral pulses intact in lower extremities bilaterally.  Differential Diagnosis:   X-ray ordered to assess possible osseous injury including fracture or dislocation  Hip strain  ED Management:  X-ray report reveals no acute fracture dislocation.  No degenerative sequela noted. Wound was re bandaged in the emergency department as the bandage  was heavily soiled.  No evidence of wound dehiscence or infection noted. Instructed patient to follow up with her surgeon for re-evaluation within 3 days.  Instructed the patient to return to the emergency department immediately for any new or worsening symptoms.  Dr. Harris evaluated this patient and is in agreement with the course of treatment.                                 Clinical Impression:       ICD-10-CM ICD-9-CM   1. Fall, initial encounter W19.XXXA E888.9   2. Hip pain M25.559 719.45   3. Pain of right hip joint M25.551 719.45                             Charlee Lam PA-C  11/29/19 1812

## 2019-11-30 NOTE — ED NOTES
Rounded on pt. Pt informed me that her ride went to the wrong hospital. But they are currently on the way back here.  Instruct her to call with need. Pt verbalized understanding.

## 2019-11-30 NOTE — DISCHARGE INSTRUCTIONS
Your x-ray did not reveal any evidence of fracture dislocation.  You are instructed to follow up with Dr. Greenwood for re-evaluation within 3 days.  You are instructed to return to the emergency department immediately for any new or worsening symptoms right

## 2019-12-02 RX ORDER — DIAZEPAM 5 MG/1
TABLET ORAL
Qty: 30 TABLET | Refills: 0 | Status: SHIPPED | OUTPATIENT
Start: 2019-12-02 | End: 2020-02-12

## 2019-12-03 ENCOUNTER — EXTERNAL HOME HEALTH (OUTPATIENT)
Dept: HOME HEALTH SERVICES | Facility: HOSPITAL | Age: 64
End: 2019-12-03
Payer: MEDICARE

## 2019-12-04 ENCOUNTER — TELEPHONE (OUTPATIENT)
Dept: ORTHOPEDICS | Facility: CLINIC | Age: 64
End: 2019-12-04

## 2019-12-04 DIAGNOSIS — M17.11 PRIMARY OSTEOARTHRITIS OF RIGHT KNEE: Primary | ICD-10-CM

## 2019-12-05 ENCOUNTER — OFFICE VISIT (OUTPATIENT)
Dept: ORTHOPEDICS | Facility: CLINIC | Age: 64
End: 2019-12-05
Payer: MEDICARE

## 2019-12-05 ENCOUNTER — TELEPHONE (OUTPATIENT)
Dept: ORTHOPEDICS | Facility: CLINIC | Age: 64
End: 2019-12-05

## 2019-12-05 VITALS — HEIGHT: 60 IN | BODY MASS INDEX: 53.01 KG/M2 | WEIGHT: 270 LBS

## 2019-12-05 DIAGNOSIS — M17.11 PRIMARY OSTEOARTHRITIS OF RIGHT KNEE: Primary | ICD-10-CM

## 2019-12-05 PROCEDURE — 99999 PR PBB SHADOW E&M-EST. PATIENT-LVL IV: ICD-10-PCS | Mod: PBBFAC,,, | Performed by: ORTHOPAEDIC SURGERY

## 2019-12-05 PROCEDURE — 99024 PR POST-OP FOLLOW-UP VISIT: ICD-10-PCS | Mod: S$GLB,,, | Performed by: ORTHOPAEDIC SURGERY

## 2019-12-05 PROCEDURE — 99024 POSTOP FOLLOW-UP VISIT: CPT | Mod: S$GLB,,, | Performed by: ORTHOPAEDIC SURGERY

## 2019-12-05 PROCEDURE — 99999 PR PBB SHADOW E&M-EST. PATIENT-LVL IV: CPT | Mod: PBBFAC,,, | Performed by: ORTHOPAEDIC SURGERY

## 2019-12-05 RX ORDER — OXYCODONE AND ACETAMINOPHEN 5; 325 MG/1; MG/1
1 TABLET ORAL EVERY 8 HOURS PRN
Qty: 30 TABLET | Refills: 0 | Status: SHIPPED | OUTPATIENT
Start: 2019-12-05 | End: 2019-12-05 | Stop reason: SDUPTHER

## 2019-12-05 RX ORDER — OXYCODONE AND ACETAMINOPHEN 5; 325 MG/1; MG/1
1 TABLET ORAL EVERY 8 HOURS PRN
Qty: 30 TABLET | Refills: 0 | Status: SHIPPED | OUTPATIENT
Start: 2019-12-05 | End: 2020-02-12

## 2019-12-05 NOTE — PROGRESS NOTES
Subjective:      Patient ID: Christine Castro is a 64 y.o. female.    Chief Complaint: Post-op Evaluation of the Right Knee      HPI:  Two weeks postop  The patient is seen for postop follow-up of right  TKA.  Pain control has been unsatisfactory-patient reports losing her prescription  They feel that they are ambulating with difficulty.  Recent fall reported  Preoperative complaints include:  As above      Current Outpatient Medications:     acetaminophen (TYLENOL) 500 MG tablet, Take 500 mg by mouth every 6 (six) hours as needed for Pain., Disp: , Rfl:     albuterol (VENTOLIN HFA) 90 mcg/actuation inhaler, INHALE 2 PUFFS INTO THE LUNGS EVERY 4 HOURS AS NEEDED FOR WHEEZING, Disp: 18 g, Rfl: 3    aspirin (ECOTRIN) 81 MG EC tablet, Take 1 tablet (81 mg total) by mouth once daily., Disp: , Rfl: 0    atorvastatin (LIPITOR) 40 MG tablet, Take 1 tablet (40 mg total) by mouth once daily., Disp: 30 tablet, Rfl: 0    budesonide (PULMICORT FLEXHALER) 90 mcg/actuation AePB, Inhale 2 puffs (180 mcg total) into the lungs 2 (two) times daily. Controller, Disp: 1 each, Rfl: 3    buPROPion (WELLBUTRIN SR) 200 MG SR12, Take 1 tablet (200 mg total) by mouth once daily., Disp: 90 tablet, Rfl: 2    diazePAM (VALIUM) 5 MG tablet, TAKE 1 TABLET(5 MG) BY MOUTH EVERY 12 HOURS AS NEEDED FOR ANXIETY, Disp: 30 tablet, Rfl: 0    diclofenac sodium (VOLTAREN) 1 % Gel, APPLY 2 GRAMS EXTERNALLY TO THE AFFECTED AREA FOUR TIMES DAILY, Disp: 100 g, Rfl: 5    ergocalciferol (ERGOCALCIFEROL) 50,000 unit Cap, Take 1 capsule (50,000 Units total) by mouth every 7 days., Disp: 12 capsule, Rfl: 3    escitalopram oxalate (LEXAPRO) 20 MG tablet, TAKE 1 TABLET(20 MG) BY MOUTH EVERY DAY, Disp: 90 tablet, Rfl: 0    furosemide (LASIX) 20 MG tablet, TAKE 1 TABLET(20 MG) BY MOUTH DAILY AS NEEDED, Disp: 90 tablet, Rfl: 3    gabapentin (NEURONTIN) 100 MG capsule, TAKE 1 CAPSULE BY MOUTH THREE TIMES DAILY, Disp: 270 capsule, Rfl: 0    gabapentin  (NEURONTIN) 100 MG capsule, TAKE 1 CAPSULE BY MOUTH THREE TIMES DAILY, Disp: 270 capsule, Rfl: 0    glipiZIDE (GLUCOTROL) 2.5 MG TR24, , Disp: , Rfl: 3    ibandronate (BONIVA) 150 mg tablet, Take 1 tablet (150 mg total) by mouth every 30 days. For osteoporosis, Disp: 3 tablet, Rfl: 3    levothyroxine (SYNTHROID) 88 MCG tablet, TAKE 1 TABLET BY MOUTH DAILY BEFORE BREAKFAST, Disp: 90 tablet, Rfl: 0    omeprazole (PRILOSEC) 40 MG capsule, TAKE 1 CAPSULE(40 MG) BY MOUTH EVERY MORNING, Disp: 90 capsule, Rfl: 1    ondansetron (ZOFRAN-ODT) 4 MG TbDL, Take 1 tablet (4 mg total) by mouth every 6 (six) hours as needed., Disp: 20 tablet, Rfl: 1    oxyCODONE-acetaminophen (PERCOCET) 7.5-325 mg per tablet, Take 1 tablet by mouth every 6 (six) hours as needed for Pain., Disp: 40 tablet, Rfl: 0    potassium chloride (KLOR-CON) 10 MEQ TbSR, TAKE 2 TABLETS(20 MEQ) BY MOUTH EVERY DAY, Disp: 90 tablet, Rfl: 0    predniSONE (DELTASONE) 5 MG tablet, TAKE 1 TABLET BY MOUTH DAILY, Disp: 30 tablet, Rfl: 0    promethazine (PHENERGAN) 25 MG tablet, TAKE 1 TABLET(25 MG) BY MOUTH EVERY 6 HOURS AS NEEDED, Disp: 25 tablet, Rfl: 0    QUEtiapine (SEROQUEL) 50 MG tablet, TAKE 1 TABLET(50 MG) BY MOUTH EVERY EVENING, Disp: 90 tablet, Rfl: 0    TRUE METRIX GLUCOSE TEST STRIP Strp, USE TO TEST BLOOD SUGAR EVERY DAY, Disp: 100 strip, Rfl: 0    zolpidem (AMBIEN) 5 MG Tab, One po qhs for sleep, Disp: 30 tablet, Rfl: 1  Review of patient's allergies indicates:   Allergen Reactions    Adhesive      Adhesive tape    Latex, natural rubber      Adhesive from latex tape    Ibuprofen Other (See Comments)     Causes asthma flare??       Ht 5' (1.524 m)   Wt 122.5 kg (270 lb)   BMI 52.73 kg/m²     ROS        Objective:    Ortho Exam          Alert, oriented, no acute distress  Sclera-Normal  Respiratory distress-none  Incision:  Clean.  3-4 mm of separation in midportion.  Range of motion: extension- 0 degrees; flexion- 90 degrees  Valgus/varus  stability- stable  Swelling-mild  Distal perfusion-intact  Distal neurologic-intact    Imaging:  Recent radiographs show well-positioned well-fixed total knee replacement without fracture or complicating process    Assessment:             1. Primary osteoarthritis of right knee        There is no objective evidence of major mechanical damage to the patient's knee. Her skin is healing slowly as expected given her general health.        Plan:          No follow-ups on file.    Explained my impression.    I strongly advised the patient to use a walker at all times, avoid driving and avoid any squatting or lifting.    Percocet refill given-no phone refills    No further formal therapy is indicated at this time    Dermabond applied to the proximal portion of the incision.  Will repeat process next week in the lower part if the wound remains benign.      Wound check next week

## 2019-12-05 NOTE — TELEPHONE ENCOUNTER
----- Message from Maria De Jesus Rawls sent at 12/5/2019  2:55 PM CST -----  Contact: Pharmacy   Pharmacy was calling to verify prescriptions     Pharmacy can be reached at [phone]

## 2019-12-09 ENCOUNTER — TELEPHONE (OUTPATIENT)
Dept: ORTHOPEDICS | Facility: CLINIC | Age: 64
End: 2019-12-09

## 2019-12-09 NOTE — TELEPHONE ENCOUNTER
----- Message from Edward Nava sent at 12/9/2019 12:21 PM CST -----  Contact: Dariana theodore [facility] [phone]   Dariana is calling to report that the patient had a fall and the patient is experiencing drainage in the middle part of her knee. Please Advise.

## 2019-12-09 NOTE — TELEPHONE ENCOUNTER
Returned call to Dariana she let me know that Ms Castro fell again and there is drainage on her bandage. She said she was real dramatic and said she is in so much pain she still doesn't have her pain meds. I called told her I was going to call Mrs Castro to get her in to see Mayelin tomorrow.

## 2019-12-09 NOTE — TELEPHONE ENCOUNTER
Called Ms Castro to put her on Mayelin's schedule for tomorrow due to her falling LM on VM for her to call back ASAP.

## 2019-12-11 ENCOUNTER — TELEPHONE (OUTPATIENT)
Dept: ORTHOPEDICS | Facility: CLINIC | Age: 64
End: 2019-12-11

## 2019-12-11 NOTE — TELEPHONE ENCOUNTER
Called Ms Castro to check on her due to her falling and to remind her of her appt arben/Mayelin on tomorrow. LM on VM.

## 2019-12-12 ENCOUNTER — OFFICE VISIT (OUTPATIENT)
Dept: ORTHOPEDICS | Facility: CLINIC | Age: 64
End: 2019-12-12
Payer: MEDICARE

## 2019-12-12 VITALS — HEIGHT: 60 IN | WEIGHT: 270 LBS | TEMPERATURE: 98 F | BODY MASS INDEX: 53.01 KG/M2

## 2019-12-12 DIAGNOSIS — Z96.651 STATUS POST RIGHT KNEE REPLACEMENT: Primary | ICD-10-CM

## 2019-12-12 PROCEDURE — 99999 PR PBB SHADOW E&M-EST. PATIENT-LVL III: ICD-10-PCS | Mod: PBBFAC,,, | Performed by: ORTHOPAEDIC SURGERY

## 2019-12-12 PROCEDURE — 99999 PR PBB SHADOW E&M-EST. PATIENT-LVL III: CPT | Mod: PBBFAC,,, | Performed by: ORTHOPAEDIC SURGERY

## 2019-12-12 PROCEDURE — 99499 NO LOS: ICD-10-PCS | Mod: S$GLB,,, | Performed by: ORTHOPAEDIC SURGERY

## 2019-12-12 PROCEDURE — 99499 UNLISTED E&M SERVICE: CPT | Mod: S$GLB,,, | Performed by: ORTHOPAEDIC SURGERY

## 2019-12-12 RX ORDER — MELOXICAM 15 MG/1
15 TABLET ORAL DAILY
Refills: 0 | COMMUNITY
Start: 2019-11-13 | End: 2020-05-12

## 2019-12-12 NOTE — PROGRESS NOTES
Subjective:      Patient ID: Christine Castro is a 64 y.o. female.    Chief Complaint: Post-op Evaluation (right knee - TKA); Hip Pain (right ); and Leg Pain (right )      HPI: Christine Castro is here for a PO wound check. Pt is receiving  PT/OT and is making decent progress, but the agency has reported 2 more falls at home on to the operative knee. Overall pain control is satisfactory.     Past Medical History:   Diagnosis Date    Arthritis     Asthma     CHF (congestive heart failure)     [facility]    Colon cancer     colon    COPD (chronic obstructive pulmonary disease)     Coronary artery disease     Depression     Diabetes mellitus, type 2     GERD (gastroesophageal reflux disease)     Myocardial infarction     AGE 19 [facility]Y DELIVERY    Thyroid disease        Current Outpatient Medications:     acetaminophen (TYLENOL) 500 MG tablet, Take 500 mg by mouth every 6 (six) hours as needed for Pain., Disp: , Rfl:     aspirin (ECOTRIN) 81 MG EC tablet, Take 1 tablet (81 mg total) by mouth once daily., Disp: , Rfl: 0    atorvastatin (LIPITOR) 40 MG tablet, Take 1 tablet (40 mg total) by mouth once daily., Disp: 30 tablet, Rfl: 0    budesonide (PULMICORT FLEXHALER) 90 mcg/actuation AePB, Inhale 2 puffs (180 mcg total) into the lungs 2 (two) times daily. Controller, Disp: 1 each, Rfl: 3    buPROPion (WELLBUTRIN SR) 200 MG SR12, Take 1 tablet (200 mg total) by mouth once daily., Disp: 90 tablet, Rfl: 2    diazePAM (VALIUM) 5 MG tablet, TAKE 1 TABLET(5 MG) BY MOUTH EVERY 12 HOURS AS NEEDED FOR ANXIETY, Disp: 30 tablet, Rfl: 0    diclofenac sodium (VOLTAREN) 1 % Gel, APPLY 2 GRAMS EXTERNALLY TO THE AFFECTED AREA FOUR TIMES DAILY, Disp: 100 g, Rfl: 5    ergocalciferol (ERGOCALCIFEROL) 50,000 unit Cap, Take 1 capsule (50,000 Units total) by mouth every 7 days., Disp: 12 capsule, Rfl: 3    escitalopram oxalate (LEXAPRO) 20 MG tablet, TAKE 1 TABLET(20 MG) BY MOUTH EVERY DAY, Disp: 90  tablet, Rfl: 0    furosemide (LASIX) 20 MG tablet, TAKE 1 TABLET(20 MG) BY MOUTH DAILY AS NEEDED, Disp: 90 tablet, Rfl: 3    gabapentin (NEURONTIN) 100 MG capsule, TAKE 1 CAPSULE BY MOUTH THREE TIMES DAILY, Disp: 270 capsule, Rfl: 0    gabapentin (NEURONTIN) 100 MG capsule, TAKE 1 CAPSULE BY MOUTH THREE TIMES DAILY, Disp: 270 capsule, Rfl: 0    glipiZIDE (GLUCOTROL) 2.5 MG TR24, , Disp: , Rfl: 3    ibandronate (BONIVA) 150 mg tablet, Take 1 tablet (150 mg total) by mouth every 30 days. For osteoporosis, Disp: 3 tablet, Rfl: 3    levothyroxine (SYNTHROID) 88 MCG tablet, TAKE 1 TABLET BY MOUTH DAILY BEFORE BREAKFAST, Disp: 90 tablet, Rfl: 0    meloxicam (MOBIC) 15 MG tablet, Take 15 mg by mouth once daily., Disp: , Rfl: 0    omeprazole (PRILOSEC) 40 MG capsule, TAKE 1 CAPSULE(40 MG) BY MOUTH EVERY MORNING, Disp: 90 capsule, Rfl: 1    ondansetron (ZOFRAN-ODT) 4 MG TbDL, Take 1 tablet (4 mg total) by mouth every 6 (six) hours as needed., Disp: 20 tablet, Rfl: 1    oxyCODONE-acetaminophen (PERCOCET) 5-325 mg per tablet, Take 1 tablet by mouth every 8 (eight) hours as needed., Disp: 30 tablet, Rfl: 0    potassium chloride (KLOR-CON) 10 MEQ TbSR, TAKE 2 TABLETS(20 MEQ) BY MOUTH EVERY DAY, Disp: 90 tablet, Rfl: 0    predniSONE (DELTASONE) 5 MG tablet, TAKE 1 TABLET BY MOUTH DAILY, Disp: 30 tablet, Rfl: 0    promethazine (PHENERGAN) 25 MG tablet, TAKE 1 TABLET(25 MG) BY MOUTH EVERY 6 HOURS AS NEEDED, Disp: 25 tablet, Rfl: 0    QUEtiapine (SEROQUEL) 50 MG tablet, TAKE 1 TABLET(50 MG) BY MOUTH EVERY EVENING, Disp: 90 tablet, Rfl: 0    TRUE METRIX GLUCOSE TEST STRIP Strp, USE TO TEST BLOOD SUGAR EVERY DAY, Disp: 100 strip, Rfl: 0    zolpidem (AMBIEN) 5 MG Tab, One po qhs for sleep, Disp: 30 tablet, Rfl: 1    albuterol (VENTOLIN HFA) 90 mcg/actuation inhaler, INHALE 2 PUFFS INTO THE LUNGS EVERY 4 HOURS AS NEEDED FOR WHEEZING (Patient not taking: Reported on 12/12/2019), Disp: 18 g, Rfl: 3  Review of patient's  allergies indicates:   Allergen Reactions    Adhesive      Adhesive tape    Latex, natural rubber      Adhesive from latex tape    Ibuprofen Other (See Comments)     Causes asthma flare??       Temp 97.8 °F (36.6 °C) (Oral)   Ht 5' (1.524 m)   Wt 122.5 kg (270 lb)   BMI 52.73 kg/m²     Review of Systems   Constitution: Negative for chills and fever.   Cardiovascular: Negative for chest pain and palpitations.   Respiratory: Negative for shortness of breath and wheezing.    Skin: Negative for poor wound healing and rash.   Musculoskeletal: Positive for falls and joint pain.   Gastrointestinal: Negative for nausea and vomiting.   Neurological: Negative for seizures and tremors.   Psychiatric/Behavioral: Negative for altered mental status.         Objective:    Ortho Exam         Right KNEE:  The patient walks with walker.  Resisted SLR neg.  The skin over the knee is significant for midline incision. There is some superficial splitting at the mid incision. Healthy pink granulation tissue noted.   Knee effusion none.  Tendernes is located none.  Range of motion- Flexion 90 deg, Extension 0 deg,       Assessment:     Imaging:  No new imaging        1. Status post right knee replacement          Plan:           Patient is here for wound check after sustained 2 falls at home.  There is significant superficial splitting over the patella - there is no tracking. There is good granulation tissue noted.  Explained to the patient that this will heal fine from the inside out.  Steri-Strips applied. Aquacel applied.  Remove Aquacel at 2 weeks start showering regularly with soap and water.  Follow-up for regularly scheduled 6 week appointment.    No follow-ups on file.

## 2019-12-20 DIAGNOSIS — Z96.651 STATUS POST RIGHT KNEE REPLACEMENT: Primary | ICD-10-CM

## 2019-12-26 ENCOUNTER — TELEPHONE (OUTPATIENT)
Dept: ORTHOPEDICS | Facility: CLINIC | Age: 64
End: 2019-12-26

## 2019-12-26 NOTE — TELEPHONE ENCOUNTER
----- Message from Laurita Ochoa sent at 12/26/2019 11:44 AM CST -----  Contact: [phone]/self  Type:  Patient Returning Call    Who Called: self  Who Left Message for Patient: Monica Sanches MA    Does the patient know what this is regarding?: PT appointment  Would the patient rather a call back or a response via [facility]?  call  Best Call Back Number:   Additional Information:

## 2019-12-26 NOTE — TELEPHONE ENCOUNTER
Left message on pts vm to call us in reference to her PT. They have been trying to reach her and haven't been able to so they reached out to us to call her.

## 2019-12-26 NOTE — TELEPHONE ENCOUNTER
----- Message from Priya Carrillo sent at 12/26/2019  9:59 AM CST -----  Contact: Priya - Central Scheduling  Good Morning,     I have been trying to get in touch with Ms. Castro regarding her post surgical PT but we have been unable to reach her.  Do you have another way I can contact her?  Or could your staff possibly reach out to her so we can get her scheduled? We have already left 3 voicemails.     Thank you in advance for your help,     Priya

## 2019-12-27 DIAGNOSIS — M17.0 ARTHRITIS OF BOTH KNEES: ICD-10-CM

## 2019-12-28 RX ORDER — PREDNISONE 5 MG/1
TABLET ORAL
Qty: 30 TABLET | Refills: 0 | OUTPATIENT
Start: 2019-12-28

## 2019-12-29 RX ORDER — PROMETHAZINE HYDROCHLORIDE 25 MG/1
TABLET ORAL
Qty: 25 TABLET | Refills: 0 | Status: SHIPPED | OUTPATIENT
Start: 2019-12-29 | End: 2020-03-14

## 2019-12-29 RX ORDER — GABAPENTIN 300 MG/1
300 CAPSULE ORAL NIGHTLY
Qty: 90 CAPSULE | Refills: 2 | Status: SHIPPED | OUTPATIENT
Start: 2019-12-29 | End: 2020-05-12

## 2020-01-02 ENCOUNTER — OFFICE VISIT (OUTPATIENT)
Dept: ORTHOPEDICS | Facility: CLINIC | Age: 65
End: 2020-01-02
Payer: MEDICARE

## 2020-01-02 ENCOUNTER — HOSPITAL ENCOUNTER (OUTPATIENT)
Dept: RADIOLOGY | Facility: HOSPITAL | Age: 65
Discharge: HOME | End: 2020-01-02
Attending: ORTHOPAEDIC SURGERY
Payer: MEDICARE

## 2020-01-02 VITALS
HEIGHT: 60 IN | SYSTOLIC BLOOD PRESSURE: 118 MMHG | HEART RATE: 76 BPM | DIASTOLIC BLOOD PRESSURE: 79 MMHG | BODY MASS INDEX: 53.01 KG/M2 | WEIGHT: 270 LBS

## 2020-01-02 DIAGNOSIS — M19.019 SHOULDER ARTHRITIS: ICD-10-CM

## 2020-01-02 DIAGNOSIS — M25.512 CHRONIC LEFT SHOULDER PAIN: ICD-10-CM

## 2020-01-02 DIAGNOSIS — R29.6 FREQUENT FALLS: ICD-10-CM

## 2020-01-02 DIAGNOSIS — G89.29 CHRONIC LEFT SHOULDER PAIN: ICD-10-CM

## 2020-01-02 DIAGNOSIS — R29.898 LEG WEAKNESS, BILATERAL: ICD-10-CM

## 2020-01-02 DIAGNOSIS — M25.512 ACUTE PAIN OF LEFT SHOULDER: ICD-10-CM

## 2020-01-02 DIAGNOSIS — Z96.651 STATUS POST RIGHT KNEE REPLACEMENT: Primary | ICD-10-CM

## 2020-01-02 PROCEDURE — 99213 PR OFFICE/OUTPT VISIT, EST, LEVL III, 20-29 MIN: ICD-10-PCS | Mod: 24,25,S$GLB, | Performed by: ORTHOPAEDIC SURGERY

## 2020-01-02 PROCEDURE — 20610 DRAIN/INJ JOINT/BURSA W/O US: CPT | Mod: 79,LT,S$GLB, | Performed by: ORTHOPAEDIC SURGERY

## 2020-01-02 PROCEDURE — 99213 OFFICE O/P EST LOW 20 MIN: CPT | Mod: 24,25,S$GLB, | Performed by: ORTHOPAEDIC SURGERY

## 2020-01-02 PROCEDURE — 99024 POSTOP FOLLOW-UP VISIT: CPT | Mod: S$GLB,,, | Performed by: ORTHOPAEDIC SURGERY

## 2020-01-02 PROCEDURE — 20610 PR DRAIN/INJECT LARGE JOINT/BURSA: ICD-10-PCS | Mod: 79,LT,S$GLB, | Performed by: ORTHOPAEDIC SURGERY

## 2020-01-02 PROCEDURE — 73030 XR SHOULDER COMPLETE 2 OR MORE VIEWS LEFT: ICD-10-PCS | Mod: 26,LT,, | Performed by: RADIOLOGY

## 2020-01-02 PROCEDURE — 99999 PR PBB SHADOW E&M-EST. PATIENT-LVL III: CPT | Mod: PBBFAC,,, | Performed by: ORTHOPAEDIC SURGERY

## 2020-01-02 PROCEDURE — 99024 PR POST-OP FOLLOW-UP VISIT: ICD-10-PCS | Mod: S$GLB,,, | Performed by: ORTHOPAEDIC SURGERY

## 2020-01-02 PROCEDURE — 73030 X-RAY EXAM OF SHOULDER: CPT | Mod: 26,LT,, | Performed by: RADIOLOGY

## 2020-01-02 PROCEDURE — 73030 X-RAY EXAM OF SHOULDER: CPT | Mod: TC,PN,LT

## 2020-01-02 PROCEDURE — 99999 PR PBB SHADOW E&M-EST. PATIENT-LVL III: ICD-10-PCS | Mod: PBBFAC,,, | Performed by: ORTHOPAEDIC SURGERY

## 2020-01-02 RX ORDER — TRIAMCINOLONE ACETONIDE 40 MG/ML
40 INJECTION, SUSPENSION INTRA-ARTICULAR; INTRAMUSCULAR
Status: COMPLETED | OUTPATIENT
Start: 2020-01-02 | End: 2020-01-02

## 2020-01-02 RX ORDER — TIZANIDINE 4 MG/1
TABLET ORAL
COMMUNITY
Start: 2019-12-11 | End: 2021-03-05 | Stop reason: SDUPTHER

## 2020-01-02 RX ADMIN — TRIAMCINOLONE ACETONIDE 40 MG: 40 INJECTION, SUSPENSION INTRA-ARTICULAR; INTRAMUSCULAR at 04:01

## 2020-01-02 NOTE — PROGRESS NOTES
Subjective:      Patient ID: Christine Castro is a 64 y.o. female.    Chief Complaint: Post-op Evaluation (6 wk Right Knee s/p TKA ) and Leg Pain (bilateral )      HPI: Christine Castro is here for postoperative visit.  She is approximately 6 weeks status post right total knee replacement. Patient has sustained multiple falls since her joint replacement which has caused poor wound healing of the incision. Her last fall was yesterday.  The wound is looking very good today and healing without any sign of infection.  Patient reports knee pain is tolerable and ambulation is easy.  However, she tires easily and complains of bilateral leg weakness with associated posterior leg numbness.    Unrelated, patient also complains of chronic left shoulder pain. Pain is achy in nature and worse with certain movements.  She denies any relevant history of injury.  She denies any numbness and tingling.  She denies any significant decreased range of motion or strength. She has tried nothing for the symptoms.    Past Medical History:   Diagnosis Date    Arthritis     Asthma     CHF (congestive heart failure)     [facility]    Colon cancer     colon    COPD (chronic obstructive pulmonary disease)     Coronary artery disease     Depression     Diabetes mellitus, type 2     GERD (gastroesophageal reflux disease)     Myocardial infarction     AGE 19 [facility] DELIVERY    Thyroid disease        Current Outpatient Medications:     acetaminophen (TYLENOL) 500 MG tablet, Take 500 mg by mouth every 6 (six) hours as needed for Pain., Disp: , Rfl:     albuterol (VENTOLIN HFA) 90 mcg/actuation inhaler, INHALE 2 PUFFS INTO THE LUNGS EVERY 4 HOURS AS NEEDED FOR WHEEZING, Disp: 18 g, Rfl: 3    aspirin (ECOTRIN) 81 MG EC tablet, Take 1 tablet (81 mg total) by mouth once daily., Disp: , Rfl: 0    atorvastatin (LIPITOR) 40 MG tablet, Take 1 tablet (40 mg total) by mouth once daily., Disp: 30 tablet, Rfl: 0    budesonide  (PULMICORT FLEXHALER) 90 mcg/actuation AePB, Inhale 2 puffs (180 mcg total) into the lungs 2 (two) times daily. Controller, Disp: 1 each, Rfl: 3    buPROPion (WELLBUTRIN SR) 200 MG SR12, Take 1 tablet (200 mg total) by mouth once daily., Disp: 90 tablet, Rfl: 2    diazePAM (VALIUM) 5 MG tablet, TAKE 1 TABLET(5 MG) BY MOUTH EVERY 12 HOURS AS NEEDED FOR ANXIETY, Disp: 30 tablet, Rfl: 0    diclofenac sodium (VOLTAREN) 1 % Gel, APPLY 2 GRAMS EXTERNALLY TO THE AFFECTED AREA FOUR TIMES DAILY, Disp: 100 g, Rfl: 5    ergocalciferol (ERGOCALCIFEROL) 50,000 unit Cap, Take 1 capsule (50,000 Units total) by mouth every 7 days., Disp: 12 capsule, Rfl: 3    escitalopram oxalate (LEXAPRO) 20 MG tablet, TAKE 1 TABLET(20 MG) BY MOUTH EVERY DAY, Disp: 90 tablet, Rfl: 0    furosemide (LASIX) 20 MG tablet, TAKE 1 TABLET(20 MG) BY MOUTH DAILY AS NEEDED, Disp: 90 tablet, Rfl: 3    gabapentin (NEURONTIN) 100 MG capsule, TAKE 1 CAPSULE BY MOUTH THREE TIMES DAILY, Disp: 270 capsule, Rfl: 0    gabapentin (NEURONTIN) 100 MG capsule, TAKE 1 CAPSULE BY MOUTH THREE TIMES DAILY, Disp: 270 capsule, Rfl: 0    gabapentin (NEURONTIN) 300 MG capsule, Take 1 capsule (300 mg total) by mouth every evening., Disp: 90 capsule, Rfl: 2    glipiZIDE (GLUCOTROL) 2.5 MG TR24, , Disp: , Rfl: 3    ibandronate (BONIVA) 150 mg tablet, Take 1 tablet (150 mg total) by mouth every 30 days. For osteoporosis, Disp: 3 tablet, Rfl: 3    levothyroxine (SYNTHROID) 88 MCG tablet, TAKE 1 TABLET BY MOUTH DAILY BEFORE BREAKFAST, Disp: 90 tablet, Rfl: 0    meloxicam (MOBIC) 15 MG tablet, Take 15 mg by mouth once daily., Disp: , Rfl: 0    omeprazole (PRILOSEC) 40 MG capsule, TAKE 1 CAPSULE(40 MG) BY MOUTH EVERY MORNING, Disp: 90 capsule, Rfl: 1    oxyCODONE-acetaminophen (PERCOCET) 5-325 mg per tablet, Take 1 tablet by mouth every 8 (eight) hours as needed., Disp: 30 tablet, Rfl: 0    potassium chloride (KLOR-CON) 10 MEQ TbSR, TAKE 2 TABLETS(20 MEQ) BY MOUTH EVERY  DAY, Disp: 90 tablet, Rfl: 0    predniSONE (DELTASONE) 5 MG tablet, TAKE 1 TABLET BY MOUTH DAILY, Disp: 30 tablet, Rfl: 0    promethazine (PHENERGAN) 25 MG tablet, TAKE 1 TABLET(25 MG) BY MOUTH EVERY 6 HOURS AS NEEDED, Disp: 25 tablet, Rfl: 0    QUEtiapine (SEROQUEL) 50 MG tablet, TAKE 1 TABLET(50 MG) BY MOUTH EVERY EVENING, Disp: 90 tablet, Rfl: 0    tiZANidine (ZANAFLEX) 4 MG tablet, , Disp: , Rfl:     TRUE METRIX GLUCOSE TEST STRIP Strp, USE TO TEST BLOOD SUGAR EVERY DAY, Disp: 100 strip, Rfl: 0    zolpidem (AMBIEN) 5 MG Tab, One po qhs for sleep, Disp: 30 tablet, Rfl: 1    ondansetron (ZOFRAN-ODT) 4 MG TbDL, Take 1 tablet (4 mg total) by mouth every 6 (six) hours as needed. (Patient not taking: Reported on 1/2/2020), Disp: 20 tablet, Rfl: 1  No current facility-administered medications for this visit.   Review of patient's allergies indicates:   Allergen Reactions    Adhesive      Adhesive tape    Latex, natural rubber      Adhesive from latex tape    Ibuprofen Other (See Comments)     Causes asthma flare??       /79   Pulse 76   Ht 5' (1.524 m)   Wt 122.5 kg (270 lb)   BMI 52.73 kg/m²     Review of Systems   Constitution: Negative for chills and fever.   Cardiovascular: Negative for chest pain and palpitations.   Respiratory: Negative for shortness of breath and wheezing.    Skin: Negative for poor wound healing and rash.   Musculoskeletal: Positive for arthritis, falls, joint pain, muscle weakness and stiffness.   Gastrointestinal: Negative for nausea and vomiting.   Genitourinary: Negative for dysuria and hematuria.   Neurological: Positive for numbness. Negative for seizures and tremors.   Psychiatric/Behavioral: Negative for altered mental status.   Allergic/Immunologic: Negative for environmental allergies and persistent infections.         Objective:    Ortho Exam         RIGHT KNEE:  The patient walks with rolator.  Resisted SLR neg.  The skin over the knee is significant for healed  scar with the exception of a small punctate area that is still draining some serosanguineous fluid. Granulation tissue noted. No sign infection  Knee effusion none.  Tendernes is located none.  Range of motion- Flexion 125 deg, Extension 0 deg,   Ligament exam:              MCL intact              Lachman intact              Post sag intact              LCL intact  Patellar crepitation absent.  Pulses DP present, PT present  Motor normal 5/5 strength in all tested muscle groups.   Sensory normal    Left shoulder  Skin: No rashes or lesions on exposed areas.  Atrophy: none noted.  Tenderness to palpation:  Global.  AROM (deg): abduction- 160, flexion-170, rotation- unrestricted, painful rotation- present.  Rotator cuff: normal, Impingement test- negative.  Cross arm adduction test- negative.  Instability testing: negative.   Distal neuro: normal, normal resting muscle tone.  Pulses: Positive peripheral pulses.     Bilateral HIP EXAM  The patient is not in acute distress.   Body habitus is::obese.   The patient walks with a limp.   The skin over the hip is::intact.   There is::no local tenderness.  Range of motion- full, fluid and painless  Resisted SLR negative.  Pain with rotation negative  Sciatic tension findings negative.    GEN: Well developed, well nourished female. AAOX3. No acute distress.   Normocephalic, atraumatic.   YRIS  Breathing unlabored.  Mood and affect appropriate.       Assessment:     Imaging:  Left shoulder radiographs from today significant for osteoarthritis of the glenohumeral joint with spurring of the humeral head.        1. Status post right knee replacement    2. Chronic left shoulder pain    3. Frequent falls    4. Leg weakness, bilateral    5. Shoulder arthritis          Plan:       Patient is doing very well postop bilateral total knee replacements in terms of function and pain control.  However, she continues to experience frequent falls secondary to bilateral leg weakness and  numbness; exam is not consistent with sciatica.  I recommend evaluation with Neurology.  For the shoulder arthritis, I recommended and performed corticosteroid injection without complication. Patient tolerated well. Avoid activities that provoke pain.    Orders Placed This Encounter    X-Ray Shoulder 2 or More Views Left    Ambulatory Referral to Neurology    triamcinolone acetonide injection 40 mg     Follow up in about 6 weeks (around 2/13/2020) for Three month postop visit with Dr. Greenwood.

## 2020-01-05 DIAGNOSIS — M17.0 ARTHRITIS OF BOTH KNEES: ICD-10-CM

## 2020-01-06 RX ORDER — PREDNISONE 5 MG/1
TABLET ORAL
Qty: 30 TABLET | Refills: 0 | OUTPATIENT
Start: 2020-01-06

## 2020-01-08 RX ORDER — DIAZEPAM 2 MG/1
TABLET ORAL
Qty: 30 TABLET | Refills: 1 | Status: SHIPPED | OUTPATIENT
Start: 2020-01-08 | End: 2020-03-02 | Stop reason: SDUPTHER

## 2020-01-08 RX ORDER — ZOLPIDEM TARTRATE 5 MG/1
TABLET ORAL
Qty: 30 TABLET | Refills: 1 | Status: SHIPPED | OUTPATIENT
Start: 2020-01-08 | End: 2020-03-02 | Stop reason: SDUPTHER

## 2020-01-15 RX ORDER — POTASSIUM CHLORIDE 750 MG/1
TABLET, EXTENDED RELEASE ORAL
Qty: 180 TABLET | Refills: 0 | Status: SHIPPED | OUTPATIENT
Start: 2020-01-15 | End: 2021-03-05 | Stop reason: SDUPTHER

## 2020-01-15 RX ORDER — POTASSIUM CHLORIDE 750 MG/1
TABLET, EXTENDED RELEASE ORAL
Qty: 90 TABLET | Refills: 0 | Status: SHIPPED | OUTPATIENT
Start: 2020-01-15 | End: 2020-01-15

## 2020-01-22 ENCOUNTER — TELEPHONE (OUTPATIENT)
Dept: FAMILY MEDICINE | Facility: CLINIC | Age: 65
End: 2020-01-22

## 2020-01-22 NOTE — TELEPHONE ENCOUNTER
----- Message from Kinsey MORENODiego Masterson sent at 1/22/2020 12:07 PM CST -----  Contact: [phone] self   Pt is requesting to speak with you re: medication. Please advise         I called the pt and LM   For the pt to rtn call

## 2020-01-23 ENCOUNTER — TELEPHONE (OUTPATIENT)
Dept: ORTHOPEDICS | Facility: CLINIC | Age: 65
End: 2020-01-23

## 2020-01-23 RX ORDER — QUETIAPINE FUMARATE 50 MG/1
TABLET, FILM COATED ORAL
Qty: 90 TABLET | Refills: 0 | Status: SHIPPED | OUTPATIENT
Start: 2020-01-23 | End: 2020-03-02

## 2020-01-23 RX ORDER — GLIPIZIDE 2.5 MG/1
2.5 TABLET, EXTENDED RELEASE ORAL
Qty: 90 TABLET | Refills: 3 | Status: SHIPPED | OUTPATIENT
Start: 2020-01-23 | End: 2020-02-27

## 2020-01-23 RX ORDER — GABAPENTIN 100 MG/1
100 CAPSULE ORAL 3 TIMES DAILY
Qty: 270 CAPSULE | Refills: 0 | Status: SHIPPED | OUTPATIENT
Start: 2020-01-23 | End: 2020-02-27

## 2020-01-23 NOTE — TELEPHONE ENCOUNTER
----- Message from Laurita Ochoa sent at 1/23/2020  4:43 PM CST -----  Contact: [phone]/self  Patient is requesting a call back regarding scheduling physical therapy. Thanks

## 2020-01-26 DIAGNOSIS — M17.0 ARTHRITIS OF BOTH KNEES: ICD-10-CM

## 2020-01-26 RX ORDER — PREDNISONE 5 MG/1
TABLET ORAL
Qty: 30 TABLET | Refills: 0 | OUTPATIENT
Start: 2020-01-26

## 2020-02-10 ENCOUNTER — TELEPHONE (OUTPATIENT)
Dept: ADMINISTRATIVE | Facility: OTHER | Age: 65
End: 2020-02-10

## 2020-02-11 ENCOUNTER — PATIENT OUTREACH (OUTPATIENT)
Dept: ADMINISTRATIVE | Facility: OTHER | Age: 65
End: 2020-02-11

## 2020-02-12 ENCOUNTER — OFFICE VISIT (OUTPATIENT)
Dept: FAMILY MEDICINE | Facility: CLINIC | Age: 65
End: 2020-02-12
Payer: MEDICARE

## 2020-02-12 VITALS
SYSTOLIC BLOOD PRESSURE: 92 MMHG | TEMPERATURE: 98 F | BODY MASS INDEX: 40.6 KG/M2 | WEIGHT: 206.81 LBS | OXYGEN SATURATION: 95 % | HEIGHT: 60 IN | HEART RATE: 93 BPM | DIASTOLIC BLOOD PRESSURE: 64 MMHG

## 2020-02-12 DIAGNOSIS — E55.9 VITAMIN D DEFICIENCY: Primary | ICD-10-CM

## 2020-02-12 DIAGNOSIS — I25.10 CORONARY ARTERY DISEASE INVOLVING NATIVE CORONARY ARTERY OF NATIVE HEART WITHOUT ANGINA PECTORIS: ICD-10-CM

## 2020-02-12 DIAGNOSIS — E03.9 ACQUIRED HYPOTHYROIDISM: ICD-10-CM

## 2020-02-12 DIAGNOSIS — S49.92XS SHOULDER INJURY, LEFT, SEQUELA: ICD-10-CM

## 2020-02-12 DIAGNOSIS — J45.909 ASTHMA, UNSPECIFIED ASTHMA SEVERITY, UNSPECIFIED WHETHER COMPLICATED, UNSPECIFIED WHETHER PERSISTENT: ICD-10-CM

## 2020-02-12 DIAGNOSIS — F32.A DEPRESSION, UNSPECIFIED DEPRESSION TYPE: ICD-10-CM

## 2020-02-12 DIAGNOSIS — M13.862 ALLERGIC ARTHRITIS OF LEFT KNEE: ICD-10-CM

## 2020-02-12 PROCEDURE — 3008F PR BODY MASS INDEX (BMI) DOCUMENTED: ICD-10-PCS | Mod: CPTII,S$GLB,, | Performed by: FAMILY MEDICINE

## 2020-02-12 PROCEDURE — 1101F PT FALLS ASSESS-DOCD LE1/YR: CPT | Mod: CPTII,S$GLB,, | Performed by: FAMILY MEDICINE

## 2020-02-12 PROCEDURE — 3008F BODY MASS INDEX DOCD: CPT | Mod: CPTII,S$GLB,, | Performed by: FAMILY MEDICINE

## 2020-02-12 PROCEDURE — 1101F PR PT FALLS ASSESS DOC 0-1 FALLS W/OUT INJ PAST YR: ICD-10-PCS | Mod: CPTII,S$GLB,, | Performed by: FAMILY MEDICINE

## 2020-02-12 PROCEDURE — 99214 PR OFFICE/OUTPT VISIT, EST, LEVL IV, 30-39 MIN: ICD-10-PCS | Mod: 25,S$GLB,, | Performed by: FAMILY MEDICINE

## 2020-02-12 PROCEDURE — 96372 PR INJECTION,THERAP/PROPH/DIAG2ST, IM OR SUBCUT: ICD-10-PCS | Mod: S$GLB,,, | Performed by: FAMILY MEDICINE

## 2020-02-12 PROCEDURE — 99214 OFFICE O/P EST MOD 30 MIN: CPT | Mod: 25,S$GLB,, | Performed by: FAMILY MEDICINE

## 2020-02-12 PROCEDURE — 96372 THER/PROPH/DIAG INJ SC/IM: CPT | Mod: S$GLB,,, | Performed by: FAMILY MEDICINE

## 2020-02-12 RX ORDER — KETOROLAC TROMETHAMINE 30 MG/ML
60 INJECTION, SOLUTION INTRAMUSCULAR; INTRAVENOUS ONCE
Status: COMPLETED | OUTPATIENT
Start: 2020-02-12 | End: 2020-02-12

## 2020-02-12 RX ORDER — OXYCODONE AND ACETAMINOPHEN 7.5; 325 MG/1; MG/1
1 TABLET ORAL EVERY 12 HOURS PRN
COMMUNITY
Start: 2020-02-06 | End: 2020-08-18

## 2020-02-12 RX ADMIN — KETOROLAC TROMETHAMINE 60 MG: 30 INJECTION, SOLUTION INTRAMUSCULAR; INTRAVENOUS at 03:02

## 2020-02-12 NOTE — PROGRESS NOTES
Patient ID: Christine Castro is a 64 y.o. female.    Chief Complaint: Check up    HPI      Christine Castro is a 64 y.o. female here for follow-up on chronic diseases.  Patient with arthritis.  Arthritis is shoulders left shoulder hurting worse than right.  Patient with recent bilateral replacement of knees.  Coronary disease-stable with no chest pain or palpitations shortness of breath or dyspnea.  Hypothyroidism-no new problems.  History of TIA-no new problems  GERD-no reflux recently.  Depression-not active at this time.    Vitals:    02/12/20 1512   BP: 92/64   Pulse: 93   Temp: 98.4 °F (36.9 °C)   SpO2: 95%   Weight: 93.8 kg (206 lb 12.7 oz)   Height: 5' (1.524 m)            Review of Symptoms    Constitutional  Neg activity change, No chills /fever   Resp  Neg hemoptysis, stridor, choking  CVS  Neg chest pain, palpitations    Physical Exam    Constitutional:  Oriented to person, place, and time.appears well-developed and well-nourished.  No distress.      HENT  Head: Normocephalic and atraumatic  Right Ear: External ear normal.   Left Ear: External ear normal.   Nose: External nose normal.   Mouth:  Moist mucus membranes.    Eyes:  Conjunctivae are normal. Right eye exhibits no discharge.  Left eye exhibits no discharge. No scleral icterus.  No periorbital edema    Cardiovascular:  Regular rate and rhythm with normal S1 and S2     Pulmonary/Chest:   Clear to auscultation bilaterally without wheezes, rhonchi or rales      Musculoskeletal:  No edema. No obvious deformity No wasting       Neurological:  Alert and oriented to person, place, and time.   Coordination normal.     Skin:   Skin is warm and dry.  No diaphoresis.   No rash noted.     Psychiatric: Normal mood and affect. Behavior is normal.  Judgment and thought content normal.     Complete Blood Count  Lab Results   Component Value Date    RBC 3.28 (L) 10/02/2019    HGB 10.5 (L) 11/21/2019    HCT 32.8 (L) 11/21/2019    MCV 99 (H) 10/02/2019    MCH 30.2  10/02/2019    MCHC 30.7 (L) 10/02/2019    RDW 14.9 (H) 10/02/2019     10/02/2019    MPV 9.9 10/02/2019    GRAN 3.1 10/02/2019    GRAN 59.4 10/02/2019    LYMPH 1.4 10/02/2019    LYMPH 27.4 10/02/2019    MONO 0.3 10/02/2019    MONO 6.5 10/02/2019    EOS 0.3 10/02/2019    BASO 0.03 10/02/2019    EOSINOPHIL 6.1 10/02/2019    BASOPHIL 0.6 10/02/2019    DIFFMETHOD Automated 10/02/2019       Comprehensive Metabolic Panel  Lab Results   Component Value Date     (H) 11/21/2019    BUN 11 11/21/2019    CREATININE 0.9 11/21/2019     11/21/2019    K 4.8 11/21/2019     11/21/2019    PROT 6.1 10/02/2019    ALBUMIN 3.4 (L) 10/02/2019    BILITOT 0.2 10/02/2019    AST 23 10/02/2019    ALKPHOS 78 10/02/2019    CO2 19 (L) 11/21/2019    ALT 15 10/02/2019    ANIONGAP 10 11/21/2019    EGFRNONAA >60 11/21/2019    ESTGFRAFRICA >60 11/21/2019       TSH  Lab Results   Component Value Date    TSH 2.610 10/02/2019       Assessment / Plan:      ICD-10-CM ICD-9-CM   1. Vitamin D deficiency E55.9 268.9   2. Allergic arthritis of left knee M13.862 716.26   3. Coronary artery disease involving native coronary artery of native heart without angina pectoris I25.10 414.01   4. Depression, unspecified depression type F32.9 311   5. Acquired hypothyroidism E03.9 244.9   6. Asthma, unspecified asthma severity, unspecified whether complicated, unspecified whether persistent J45.909 493.90     Vitamin D deficiency  -     Vitamin D; Future; Expected date: 02/12/2020    Allergic arthritis of left knee    Coronary artery disease involving native coronary artery of native heart without angina pectoris  -     Comprehensive metabolic panel; Future; Expected date: 02/12/2020  -     CBC auto differential; Future; Expected date: 02/12/2020  -     Lipid panel; Future; Expected date: 02/12/2020  -     TSH; Future; Expected date: 02/12/2020  -     T4, free; Future; Expected date: 02/12/2020    Depression, unspecified depression type  -      Comprehensive metabolic panel; Future; Expected date: 02/12/2020  -     CBC auto differential; Future; Expected date: 02/12/2020  -     Lipid panel; Future; Expected date: 02/12/2020  -     TSH; Future; Expected date: 02/12/2020  -     T4, free; Future; Expected date: 02/12/2020    Acquired hypothyroidism  -     Comprehensive metabolic panel; Future; Expected date: 02/12/2020  -     CBC auto differential; Future; Expected date: 02/12/2020  -     Lipid panel; Future; Expected date: 02/12/2020  -     TSH; Future; Expected date: 02/12/2020  -     T4, free; Future; Expected date: 02/12/2020    Asthma, unspecified asthma severity, unspecified whether complicated, unspecified whether persistent  -     Comprehensive metabolic panel; Future; Expected date: 02/12/2020  -     CBC auto differential; Future; Expected date: 02/12/2020  -     Lipid panel; Future; Expected date: 02/12/2020  -     TSH; Future; Expected date: 02/12/2020  -     T4, free; Future; Expected date: 02/12/2020     Knees-bilateral discussed being active-  Shoulder bilaterally left greater than right-range-of-motion exercises heat ice-eval from orthopedic surgeon within two days already scheduled.

## 2020-02-13 ENCOUNTER — TELEPHONE (OUTPATIENT)
Dept: ORTHOPEDICS | Facility: CLINIC | Age: 65
End: 2020-02-13

## 2020-02-13 NOTE — TELEPHONE ENCOUNTER
----- Message from Violeta Henley sent at 2/13/2020  2:00 PM CST -----  Contact: self / [phone]  Patient is requesting a call back regarding, her appointment. Please advise

## 2020-02-20 ENCOUNTER — TELEPHONE (OUTPATIENT)
Dept: FAMILY MEDICINE | Facility: CLINIC | Age: 65
End: 2020-02-20

## 2020-02-20 ENCOUNTER — TELEPHONE (OUTPATIENT)
Dept: ORTHOPEDICS | Facility: CLINIC | Age: 65
End: 2020-02-20

## 2020-02-20 RX ORDER — PREDNISONE 20 MG/1
TABLET ORAL
Qty: 5 TABLET | Refills: 0 | Status: SHIPPED | OUTPATIENT
Start: 2020-02-20 | End: 2020-02-27 | Stop reason: ALTCHOICE

## 2020-02-20 NOTE — TELEPHONE ENCOUNTER
----- Message from Mathieu Landeros sent at 2/20/2020  3:02 PM CST -----  Contact: patient  Type:  Needs Medical Advice    Who Called: Christine  Would the patient rather a call back or a response via [facility]?  Call back  Best Call Back Number:  [phone]  Additional Information: This weather has her rotator cuff flaring up and she needs her steroids sent the pharmacy as soon as possible today or she can come get an injection at your office

## 2020-02-20 NOTE — TELEPHONE ENCOUNTER
----- Message from Mathieu Landeros sent at 2/20/2020  3:05 PM CST -----  Contact: patient  Type:  Needs Medical Advice    Who Called: Christine  Would the patient rather a call back or a response via [facility]?  Call back  Best Call Back Number:  [phone]  Additional Information: she just needs a call back

## 2020-02-20 NOTE — TELEPHONE ENCOUNTER
Spoke to pt and she was notified  sent a script to her pharmacy. Pt is scheduled for an appointment with her ortho doctor next week.

## 2020-02-20 NOTE — TELEPHONE ENCOUNTER
Returned call to pt she wanted to know if we had anything sooner for her to be seen. I explained to her that we don't but I can put her on a cancellation list if someone cancels she could be seen sooner.

## 2020-02-22 DIAGNOSIS — M17.0 ARTHRITIS OF BOTH KNEES: ICD-10-CM

## 2020-02-22 DIAGNOSIS — K21.9 GASTROESOPHAGEAL REFLUX DISEASE WITHOUT ESOPHAGITIS: ICD-10-CM

## 2020-02-22 RX ORDER — PREDNISONE 5 MG/1
TABLET ORAL
Qty: 30 TABLET | Refills: 0 | Status: CANCELLED | OUTPATIENT
Start: 2020-02-22

## 2020-02-22 RX ORDER — ZOLPIDEM TARTRATE 5 MG/1
TABLET ORAL
Qty: 30 TABLET | Status: CANCELLED | OUTPATIENT
Start: 2020-02-22

## 2020-02-24 RX ORDER — OMEPRAZOLE 40 MG/1
CAPSULE, DELAYED RELEASE ORAL
Qty: 90 CAPSULE | Refills: 1 | Status: SHIPPED | OUTPATIENT
Start: 2020-02-24 | End: 2020-07-30

## 2020-02-24 RX ORDER — PREDNISONE 5 MG/1
TABLET ORAL
Qty: 30 TABLET | Refills: 0 | OUTPATIENT
Start: 2020-02-24

## 2020-02-24 RX ORDER — ZOLPIDEM TARTRATE 5 MG/1
TABLET ORAL
Qty: 30 TABLET | OUTPATIENT
Start: 2020-02-24

## 2020-02-24 RX ORDER — BUSPIRONE HYDROCHLORIDE 10 MG/1
TABLET ORAL
Qty: 60 TABLET | Refills: 3 | Status: SHIPPED | OUTPATIENT
Start: 2020-02-24 | End: 2024-01-02 | Stop reason: SDUPTHER

## 2020-02-26 ENCOUNTER — LAB VISIT (OUTPATIENT)
Dept: LAB | Facility: HOSPITAL | Age: 65
End: 2020-02-26
Attending: FAMILY MEDICINE
Payer: MEDICARE

## 2020-02-26 DIAGNOSIS — E55.9 VITAMIN D DEFICIENCY: ICD-10-CM

## 2020-02-26 DIAGNOSIS — F32.A DEPRESSION, UNSPECIFIED DEPRESSION TYPE: ICD-10-CM

## 2020-02-26 DIAGNOSIS — E03.9 ACQUIRED HYPOTHYROIDISM: ICD-10-CM

## 2020-02-26 DIAGNOSIS — I25.10 CORONARY ARTERY DISEASE INVOLVING NATIVE CORONARY ARTERY OF NATIVE HEART WITHOUT ANGINA PECTORIS: ICD-10-CM

## 2020-02-26 DIAGNOSIS — J45.909 ASTHMA, UNSPECIFIED ASTHMA SEVERITY, UNSPECIFIED WHETHER COMPLICATED, UNSPECIFIED WHETHER PERSISTENT: ICD-10-CM

## 2020-02-26 LAB
25(OH)D3+25(OH)D2 SERPL-MCNC: 27 NG/ML (ref 30–96)
ALBUMIN SERPL BCP-MCNC: 4.1 G/DL (ref 3.5–5.2)
ALP SERPL-CCNC: 71 U/L (ref 38–126)
ALT SERPL W/O P-5'-P-CCNC: 30 U/L (ref 10–44)
ANION GAP SERPL CALC-SCNC: 10 MMOL/L (ref 8–16)
AST SERPL-CCNC: 51 U/L (ref 15–46)
BASOPHILS # BLD AUTO: 0.05 K/UL (ref 0–0.2)
BASOPHILS NFR BLD: 0.7 % (ref 0–1.9)
BILIRUB SERPL-MCNC: 0.2 MG/DL (ref 0.1–1)
BUN SERPL-MCNC: 20 MG/DL (ref 7–17)
CALCIUM SERPL-MCNC: 8.9 MG/DL (ref 8.7–10.5)
CHLORIDE SERPL-SCNC: 105 MMOL/L (ref 95–110)
CHOLEST SERPL-MCNC: 230 MG/DL (ref 120–199)
CHOLEST/HDLC SERPL: 4.6 {RATIO} (ref 2–5)
CO2 SERPL-SCNC: 23 MMOL/L (ref 23–29)
CREAT SERPL-MCNC: 0.93 MG/DL (ref 0.5–1.4)
DIFFERENTIAL METHOD: ABNORMAL
EOSINOPHIL # BLD AUTO: 0.5 K/UL (ref 0–0.5)
EOSINOPHIL NFR BLD: 6.6 % (ref 0–8)
ERYTHROCYTE [DISTWIDTH] IN BLOOD BY AUTOMATED COUNT: 13.3 % (ref 11.5–14.5)
EST. GFR  (AFRICAN AMERICAN): >60 ML/MIN/1.73 M^2
EST. GFR  (NON AFRICAN AMERICAN): >60 ML/MIN/1.73 M^2
GLUCOSE SERPL-MCNC: 87 MG/DL (ref 70–110)
HCT VFR BLD AUTO: 36.6 % (ref 37–48.5)
HDLC SERPL-MCNC: 50 MG/DL (ref 40–75)
HDLC SERPL: 21.7 % (ref 20–50)
HGB BLD-MCNC: 11.5 G/DL (ref 12–16)
IMM GRANULOCYTES # BLD AUTO: 0.03 K/UL (ref 0–0.04)
IMM GRANULOCYTES NFR BLD AUTO: 0.4 % (ref 0–0.5)
LDLC SERPL CALC-MCNC: 162.8 MG/DL (ref 63–159)
LYMPHOCYTES # BLD AUTO: 2.1 K/UL (ref 1–4.8)
LYMPHOCYTES NFR BLD: 30.2 % (ref 18–48)
MCH RBC QN AUTO: 30.9 PG (ref 27–31)
MCHC RBC AUTO-ENTMCNC: 31.4 G/DL (ref 32–36)
MCV RBC AUTO: 98 FL (ref 82–98)
MONOCYTES # BLD AUTO: 0.4 K/UL (ref 0.3–1)
MONOCYTES NFR BLD: 5.5 % (ref 4–15)
NEUTROPHILS # BLD AUTO: 3.9 K/UL (ref 1.8–7.7)
NEUTROPHILS NFR BLD: 56.6 % (ref 38–73)
NONHDLC SERPL-MCNC: 180 MG/DL
NRBC BLD-RTO: 0 /100 WBC
PLATELET # BLD AUTO: 255 K/UL (ref 150–350)
PMV BLD AUTO: 9.5 FL (ref 9.2–12.9)
POTASSIUM SERPL-SCNC: 4.3 MMOL/L (ref 3.5–5.1)
PROT SERPL-MCNC: 7.3 G/DL (ref 6–8.4)
RBC # BLD AUTO: 3.72 M/UL (ref 4–5.4)
SODIUM SERPL-SCNC: 138 MMOL/L (ref 136–145)
T4 FREE SERPL-MCNC: 1.03 NG/DL (ref 0.71–1.51)
TRIGL SERPL-MCNC: 86 MG/DL (ref 30–150)
TSH SERPL DL<=0.005 MIU/L-ACNC: 2.4 UIU/ML (ref 0.4–4)
WBC # BLD AUTO: 6.86 K/UL (ref 3.9–12.7)

## 2020-02-26 PROCEDURE — 36415 COLL VENOUS BLD VENIPUNCTURE: CPT | Mod: PO

## 2020-02-26 PROCEDURE — 82306 VITAMIN D 25 HYDROXY: CPT | Mod: PO

## 2020-02-26 PROCEDURE — 84439 ASSAY OF FREE THYROXINE: CPT

## 2020-02-26 PROCEDURE — 80053 COMPREHEN METABOLIC PANEL: CPT | Mod: PO

## 2020-02-26 PROCEDURE — 80061 LIPID PANEL: CPT

## 2020-02-26 PROCEDURE — 85025 COMPLETE CBC W/AUTO DIFF WBC: CPT | Mod: PO

## 2020-02-26 PROCEDURE — 84443 ASSAY THYROID STIM HORMONE: CPT | Mod: PO

## 2020-02-27 ENCOUNTER — TELEPHONE (OUTPATIENT)
Dept: FAMILY MEDICINE | Facility: CLINIC | Age: 65
End: 2020-02-27

## 2020-02-27 ENCOUNTER — OFFICE VISIT (OUTPATIENT)
Dept: ORTHOPEDICS | Facility: CLINIC | Age: 65
End: 2020-02-27
Payer: MEDICARE

## 2020-02-27 VITALS — DIASTOLIC BLOOD PRESSURE: 73 MMHG | HEART RATE: 84 BPM | SYSTOLIC BLOOD PRESSURE: 102 MMHG

## 2020-02-27 DIAGNOSIS — Z96.651 STATUS POST RIGHT KNEE REPLACEMENT: ICD-10-CM

## 2020-02-27 DIAGNOSIS — Z96.652 STATUS POST TOTAL KNEE REPLACEMENT, LEFT: ICD-10-CM

## 2020-02-27 DIAGNOSIS — M54.32 BILATERAL SCIATICA: ICD-10-CM

## 2020-02-27 DIAGNOSIS — M54.31 BILATERAL SCIATICA: ICD-10-CM

## 2020-02-27 DIAGNOSIS — M25.512 CHRONIC LEFT SHOULDER PAIN: Primary | ICD-10-CM

## 2020-02-27 DIAGNOSIS — G89.29 CHRONIC LEFT SHOULDER PAIN: Primary | ICD-10-CM

## 2020-02-27 PROCEDURE — 99214 PR OFFICE/OUTPT VISIT, EST, LEVL IV, 30-39 MIN: ICD-10-PCS | Mod: 25,S$GLB,, | Performed by: ORTHOPAEDIC SURGERY

## 2020-02-27 PROCEDURE — 20610 PR DRAIN/INJECT LARGE JOINT/BURSA: ICD-10-PCS | Mod: LT,S$GLB,, | Performed by: ORTHOPAEDIC SURGERY

## 2020-02-27 PROCEDURE — 20610 DRAIN/INJ JOINT/BURSA W/O US: CPT | Mod: LT,S$GLB,, | Performed by: ORTHOPAEDIC SURGERY

## 2020-02-27 PROCEDURE — 1101F PR PT FALLS ASSESS DOC 0-1 FALLS W/OUT INJ PAST YR: ICD-10-PCS | Mod: CPTII,S$GLB,, | Performed by: ORTHOPAEDIC SURGERY

## 2020-02-27 PROCEDURE — 1101F PT FALLS ASSESS-DOCD LE1/YR: CPT | Mod: CPTII,S$GLB,, | Performed by: ORTHOPAEDIC SURGERY

## 2020-02-27 PROCEDURE — 99999 PR PBB SHADOW E&M-EST. PATIENT-LVL III: ICD-10-PCS | Mod: PBBFAC,,, | Performed by: ORTHOPAEDIC SURGERY

## 2020-02-27 PROCEDURE — 99214 OFFICE O/P EST MOD 30 MIN: CPT | Mod: 25,S$GLB,, | Performed by: ORTHOPAEDIC SURGERY

## 2020-02-27 PROCEDURE — 99999 PR PBB SHADOW E&M-EST. PATIENT-LVL III: CPT | Mod: PBBFAC,,, | Performed by: ORTHOPAEDIC SURGERY

## 2020-02-27 RX ORDER — METHYLPREDNISOLONE 4 MG/1
TABLET ORAL
Qty: 1 PACKAGE | Refills: 1 | Status: SHIPPED | OUTPATIENT
Start: 2020-02-27 | End: 2020-03-31

## 2020-02-27 RX ORDER — TRIAMCINOLONE ACETONIDE 40 MG/ML
40 INJECTION, SUSPENSION INTRA-ARTICULAR; INTRAMUSCULAR
Status: COMPLETED | OUTPATIENT
Start: 2020-02-27 | End: 2020-02-27

## 2020-02-27 RX ADMIN — TRIAMCINOLONE ACETONIDE 40 MG: 40 INJECTION, SUSPENSION INTRA-ARTICULAR; INTRAMUSCULAR at 04:02

## 2020-02-27 NOTE — PROGRESS NOTES
Subjective:      Patient ID: Christine Castro is a 64 y.o. female.    Chief Complaint: Pain of the Right Knee      HPI: Christine Castro is here for postoperative visit.  She is approximately 3 months status post right total knee replacement. Patient has sustained multiple falls since her joint replacement which has caused poor wound healing of the incision. But today the incision looks good with only one area of scabbing and slow healing. No obvious sign of infection. She reports bilateral knee pain is significantly improved and she has returned to all activities of daily living without difficulty or pain. In fact, she participates in [employer] workup group few times a week.    At her last visit, patient complained of bilateral leg weakness and posterior leg numbness with associated falls. I referred her to Neurology for evaluation but patient did not follow-up with this.  She denies any falls since her last visit.  However, she reports symptoms have worsened.  Pain, numbness, and tingling begins lower back and radiates down the leg R>L and ends at the lateral calf.    Unrelated, she continues to complains of chronic left shoulder pain secondary to osteoarthritis. Pain is achy in nature and worse with certain movements.  She denies any relevant history of injury.  She denies any numbness and tingling.  She denies any significant decreased range of motion or strength. She was treated with corticosteroid injection almost 3 months ago with good benefit.    Past Medical History:   Diagnosis Date    Arthritis     Asthma     CHF (congestive heart failure)     [facility]    Colon cancer     colon    COPD (chronic obstructive pulmonary disease)     Coronary artery disease     Depression     Diabetes mellitus, type 2     GERD (gastroesophageal reflux disease)     Myocardial infarction     AGE 19 [facility]    Thyroid disease        Current Outpatient Medications:     acetaminophen  (TYLENOL) 500 MG tablet, Take 500 mg by mouth every 6 (six) hours as needed for Pain., Disp: , Rfl:     albuterol (VENTOLIN HFA) 90 mcg/actuation inhaler, INHALE 2 PUFFS INTO THE LUNGS EVERY 4 HOURS AS NEEDED FOR WHEEZING, Disp: 18 g, Rfl: 3    aspirin (ECOTRIN) 81 MG EC tablet, Take 1 tablet (81 mg total) by mouth once daily., Disp: , Rfl: 0    atorvastatin (LIPITOR) 40 MG tablet, Take 1 tablet (40 mg total) by mouth once daily., Disp: 30 tablet, Rfl: 0    budesonide (PULMICORT FLEXHALER) 90 mcg/actuation AePB, Inhale 2 puffs (180 mcg total) into the lungs 2 (two) times daily. Controller, Disp: 1 each, Rfl: 3    buPROPion (WELLBUTRIN SR) 200 MG SR12, Take 1 tablet (200 mg total) by mouth once daily., Disp: 90 tablet, Rfl: 2    busPIRone (BUSPAR) 10 MG tablet, TAKE 1/2 TABLET BY MOUTH ONCE DAILY, Disp: 60 tablet, Rfl: 3    diazePAM (VALIUM) 2 MG tablet, TAKE 1 TABLET(2 MG) BY MOUTH EVERY 12 HOURS AS NEEDED, Disp: 30 tablet, Rfl: 1    diclofenac sodium (VOLTAREN) 1 % Gel, APPLY 2 GRAMS EXTERNALLY TO THE AFFECTED AREA FOUR TIMES DAILY, Disp: 100 g, Rfl: 5    ergocalciferol (ERGOCALCIFEROL) 50,000 unit Cap, Take 1 capsule (50,000 Units total) by mouth every 7 days., Disp: 12 capsule, Rfl: 3    escitalopram oxalate (LEXAPRO) 20 MG tablet, TAKE 1 TABLET(20 MG) BY MOUTH EVERY DAY, Disp: 90 tablet, Rfl: 0    furosemide (LASIX) 20 MG tablet, TAKE 1 TABLET(20 MG) BY MOUTH DAILY AS NEEDED, Disp: 90 tablet, Rfl: 3    gabapentin (NEURONTIN) 300 MG capsule, Take 1 capsule (300 mg total) by mouth every evening., Disp: 90 capsule, Rfl: 2    levothyroxine (SYNTHROID) 88 MCG tablet, TAKE 1 TABLET BY MOUTH DAILY BEFORE BREAKFAST, Disp: 90 tablet, Rfl: 0    meloxicam (MOBIC) 15 MG tablet, Take 15 mg by mouth once daily., Disp: , Rfl: 0    omeprazole (PRILOSEC) 40 MG capsule, TAKE 1 CAPSULE(40 MG) BY MOUTH EVERY MORNING, Disp: 90 capsule, Rfl: 1    ondansetron (ZOFRAN-ODT) 4 MG TbDL, Take 1 tablet (4 mg total) by mouth  every 6 (six) hours as needed., Disp: 20 tablet, Rfl: 1    oxyCODONE-acetaminophen (PERCOCET) 7.5-325 mg per tablet, , Disp: , Rfl:     potassium chloride (KLOR-CON) 10 MEQ TbSR, TAKE 2 TABLETS(20 MEQ) BY MOUTH EVERY DAY, Disp: 180 tablet, Rfl: 0    promethazine (PHENERGAN) 25 MG tablet, TAKE 1 TABLET(25 MG) BY MOUTH EVERY 6 HOURS AS NEEDED, Disp: 25 tablet, Rfl: 0    QUEtiapine (SEROQUEL) 50 MG tablet, TAKE 1 TABLET(50 MG) BY MOUTH EVERY EVENING, Disp: 90 tablet, Rfl: 0    tiZANidine (ZANAFLEX) 4 MG tablet, , Disp: , Rfl:     TRUE METRIX GLUCOSE TEST STRIP Strp, USE TO TEST BLOOD SUGAR EVERY DAY, Disp: 100 strip, Rfl: 0    zolpidem (AMBIEN) 5 MG Tab, TAKE 1 TABLET BY MOUTH EVERY NIGHT AT BEDTIME FOR SLEEP, Disp: 30 tablet, Rfl: 1    gabapentin (NEURONTIN) 100 MG capsule, Take 1 capsule (100 mg total) by mouth 3 (three) times daily. (Patient not taking: Reported on 2/12/2020), Disp: 270 capsule, Rfl: 0    glipiZIDE (GLUCOTROL) 2.5 MG TR24, Take 1 tablet (2.5 mg total) by mouth daily with breakfast. (Patient not taking: Reported on 2/12/2020), Disp: 90 tablet, Rfl: 3    ibandronate (BONIVA) 150 mg tablet, Take 1 tablet (150 mg total) by mouth every 30 days. For osteoporosis (Patient not taking: Reported on 2/27/2020), Disp: 3 tablet, Rfl: 3    methylPREDNISolone (MEDROL DOSEPACK) 4 mg tablet, use as directed, Disp: 1 Package, Rfl: 1  No current facility-administered medications for this visit.   Review of patient's allergies indicates:   Allergen Reactions    Adhesive      Adhesive tape    Latex, natural rubber      Adhesive from latex tape    Ibuprofen Other (See Comments)     Causes asthma flare??       /73   Pulse 84     Review of Systems   Constitution: Negative for chills and fever.   Cardiovascular: Negative for chest pain and palpitations.   Respiratory: Negative for shortness of breath and wheezing.    Skin: Negative for poor wound healing and rash.   Musculoskeletal: Positive for  arthritis, falls, joint pain, muscle weakness and stiffness.   Gastrointestinal: Negative for nausea and vomiting.   Genitourinary: Negative for dysuria and hematuria.   Neurological: Positive for numbness. Negative for seizures and tremors.   Psychiatric/Behavioral: Negative for altered mental status.   Allergic/Immunologic: Negative for environmental allergies and persistent infections.         Objective:    Ortho Exam         RIGHT KNEE:  The patient walks slowly but without assistive devices  Resisted SLR neg.  The skin over the knee is significant for healed scar with the exception of a small area of scabbing.  No sign infection.  Knee effusion none.  Tendernes is located none.  Range of motion- Flexion 130 deg, Extension 0 deg,   Ligament exam:              MCL intact              Lachman intact              Post sag intact              LCL intact  Patellar crepitation absent.  Pulses DP present, PT present  Motor normal 5/5 strength in all tested muscle groups.   Sensory normal    Left shoulder  Skin: No rashes or lesions on exposed areas.  Atrophy: none noted.  Tenderness to palpation:  Global.  AROM (deg): abduction- 160, flexion-170, rotation- unrestricted, painful rotation- present.  Rotator cuff: normal, Impingement test- negative.  Cross arm adduction test- negative.  Instability testing: negative.   Distal neuro: normal, normal resting muscle tone.  Pulses: Positive peripheral pulses.   Unchanged    Bilateral HIP EXAM  The patient is not in acute distress.   Body habitus is::obese.   The patient walks with a limp.   The skin over the hip is::intact.   There is local tenderness over the lower spine  Range of motion- full, fluid and painless  Resisted SLR negative.  Pain with rotation negative  Sciatic tension findings negative.    GEN: Well developed, well nourished female. AAOX3. No acute distress.   Normocephalic, atraumatic.   YRIS  Breathing unlabored.  Mood and affect appropriate.        Assessment:     Imaging:  No new imaging.  Previous postop images of the bilateral knees were reviewed.  Previous left shoulder radiograph was reviewed.  Consider MRI of the lumbar spine if symptoms do not improve.        1. Chronic left shoulder pain    2. Status post right knee replacement    3. Status post total knee replacement, left    4. Bilateral sciatica          Plan:       Patient has progressed well postop bilateral total knee replacements in terms of function and pain control.  Activities as tolerated    She continues to experience bilateral leg weakness and numbness; exam is not consistent with sciatica.  I still recommend evaluation with Neurology.  But will treat her for symptomatic at this time. HEP provided explained. Medrol Dosepak with usage instructions. The symptoms persists would recommend MRI of the lumbar spine.  Again for the shoulder arthritis, I recommended and performed corticosteroid injection without complication. Patient tolerated well. Avoid activities that provoke pain.    Orders Placed This Encounter    methylPREDNISolone (MEDROL DOSEPACK) 4 mg tablet    triamcinolone acetonide injection 40 mg     Follow up in about 6 weeks (around 4/9/2020).

## 2020-02-28 RX ORDER — DIAZEPAM 2 MG/1
TABLET ORAL
Qty: 30 TABLET | OUTPATIENT
Start: 2020-02-28

## 2020-02-28 NOTE — TELEPHONE ENCOUNTER
Your labs are good overall but your vit d is low and your cholesterol is elevated.  Are you taking any vitamin-D supplements?  Are you taking the Lipitor medicine because your cholesterol is elevated more than it was last time.

## 2020-02-28 NOTE — TELEPHONE ENCOUNTER
I spoke with the pt     She has not been taking both meds  She agrees to take tem both  Send new rx to [facility] for vit d - once weekly and atorvastatin    Also she wants Ambien and valium so she can feel better and then she can do more and exercise. if she rest well and if her nerves are not bad then she can go more.

## 2020-02-29 NOTE — TELEPHONE ENCOUNTER
Looking at your medication  You have three that can be very sedating    Ambien, valium and Seroquel    You should be on either Sera quill or Ambien at night NOT both.  Seroquel will help with sleep and will increase your mood. It may contribute to weight gain.    Ambien will only help with sleep and not mood      I would consider continuing valium 2 mg with 30 every month or two for the really bad days.  YOU should NOT take it all the time.      Lastly we should consider increasing your Wellbutrin Sr to 200 mg two times a day

## 2020-03-02 RX ORDER — BUPROPION HYDROCHLORIDE 200 MG/1
200 TABLET, EXTENDED RELEASE ORAL 2 TIMES DAILY
Qty: 180 TABLET | Refills: 2 | Status: SHIPPED | OUTPATIENT
Start: 2020-03-02 | End: 2020-07-30

## 2020-03-02 RX ORDER — ZOLPIDEM TARTRATE 5 MG/1
TABLET ORAL
Qty: 30 TABLET | Refills: 1 | Status: SHIPPED | OUTPATIENT
Start: 2020-03-07 | End: 2020-04-23

## 2020-03-02 RX ORDER — DIAZEPAM 2 MG/1
TABLET ORAL
Qty: 30 TABLET | Refills: 1 | Status: SHIPPED | OUTPATIENT
Start: 2020-03-02 | End: 2020-04-03 | Stop reason: SDUPTHER

## 2020-03-02 NOTE — TELEPHONE ENCOUNTER
I spoke with the pt    She is not taking Seroquel - caused her to be frightened    I advised to increase wellbutrin 200 mg to BID ( send in new rx with the new directions )  Ambien at night   And valium prn for a couple of months for the bad days    She will call in refill for vit d to the pharmacy ( she has  Refills on her bottle )    Please refill atorvastatin

## 2020-03-02 NOTE — TELEPHONE ENCOUNTER
Message   Received: Today   Message Contents   Laurita BELLO [facility] Staff   Caller: [phone]/self (Today,  4:26 PM)             Type:  Patient Returning Call     Who Called: self   Who Left Message for Patient: Cecelia   Does the patient know what this is regarding?:   Would the patient rather a call back or a response via [facility]?  call   Best Call Back Number: [phone]/self   Additional Information:

## 2020-03-09 ENCOUNTER — TELEPHONE (OUTPATIENT)
Dept: FAMILY MEDICINE | Facility: CLINIC | Age: 65
End: 2020-03-09

## 2020-03-09 DIAGNOSIS — E11.9 TYPE 2 DIABETES MELLITUS WITHOUT COMPLICATION, WITHOUT LONG-TERM CURRENT USE OF INSULIN: Primary | ICD-10-CM

## 2020-03-09 DIAGNOSIS — R73.9 HYPERGLYCEMIA: ICD-10-CM

## 2020-03-09 RX ORDER — INSULIN PUMP SYRINGE, 3 ML
EACH MISCELLANEOUS
Qty: 1 EACH | Refills: 0 | Status: SHIPPED | OUTPATIENT
Start: 2020-03-09 | End: 2020-05-12

## 2020-03-09 NOTE — TELEPHONE ENCOUNTER
----- Message from Mathieu Landeros sent at 3/9/2020 12:37 PM CDT -----  Contact: patient  Type:  Needs Medical Advice    Who Called: Christine  Would the patient rather a call back or a response via [facility]? Call back  Best Call Back Number: [phone]  Additional Information: her sugar is over 200 and she needs to get a new testing meter;( I can send this order )  Please advise the rest -   she also has had headaches, dizzy spells and sweating

## 2020-03-10 NOTE — TELEPHONE ENCOUNTER
Reduce carbohydrates and do not eat concentrated sugars-    ordered lab work    If symptoms worsen see one of us

## 2020-03-10 NOTE — TELEPHONE ENCOUNTER
I spoke with the pt  The following symptoms have been on and off for a couple of weeks    c/o  shaky dizzy and sweating    A friend checked glucose yesterday when she got this feeling and the reading was 209    I advised her the glucometer was sent to the pharmacy and advised to start checking Bld sugar BID  And that would discuss with dr boykin and get back with her     I advised she may need to have labs drawn     She used to take DM meds

## 2020-03-11 NOTE — TELEPHONE ENCOUNTER
First time I called the patient picked up but somehow the call disconnected . I called back a second time; Left detailed message advising patient of Dr. Beckman's recommendation and lab orders.

## 2020-03-12 NOTE — TELEPHONE ENCOUNTER
I spoke with the pt   And advised her regarding her diet    She will do lab work next week    I would like to see diabetes education regarding her diet   Referral placed

## 2020-03-12 NOTE — TELEPHONE ENCOUNTER
Neal BELLO [facility] Staff   Caller: [phone]/ self (Today, 12:15 PM)             Patient called in returning your call. Please advise.

## 2020-03-14 RX ORDER — ESCITALOPRAM OXALATE 20 MG/1
TABLET ORAL
Qty: 90 TABLET | Refills: 0 | Status: SHIPPED | OUTPATIENT
Start: 2020-03-14 | End: 2020-06-16 | Stop reason: SDUPTHER

## 2020-03-14 RX ORDER — PROMETHAZINE HYDROCHLORIDE 25 MG/1
TABLET ORAL
Qty: 25 TABLET | Refills: 0 | Status: SHIPPED | OUTPATIENT
Start: 2020-03-14 | End: 2020-04-27

## 2020-03-17 ENCOUNTER — TELEPHONE (OUTPATIENT)
Dept: ORTHOPEDICS | Facility: CLINIC | Age: 65
End: 2020-03-17

## 2020-03-17 NOTE — TELEPHONE ENCOUNTER
Returned pts call she wanted to let us know that she fell a week ago and went to the . Nothing is broken but she is still having swelling. She has been elevating her leg. I told her to put ice on it also. And reminded her about her follow up appt.

## 2020-03-17 NOTE — TELEPHONE ENCOUNTER
----- Message from Steven Davalos sent at 3/17/2020 12:35 PM CDT -----  Contact: Patient  Patient called in and wanted to speak with the office regarding a previous appointment and would like a call back from the office. She can be reached at    [phone]

## 2020-03-17 NOTE — TELEPHONE ENCOUNTER
----- Message from Steven Davalos sent at 3/17/2020 12:35 PM CDT -----  Contact: Patient  Patient called in and wanted to speak with the office regarding a previous appointment and would like a call back from the office. She can be reached at    [phone]

## 2020-03-23 ENCOUNTER — TELEPHONE (OUTPATIENT)
Dept: DIABETES | Facility: CLINIC | Age: 65
End: 2020-03-23

## 2020-03-24 ENCOUNTER — TELEPHONE (OUTPATIENT)
Dept: DIABETES | Facility: CLINIC | Age: 65
End: 2020-03-24

## 2020-03-31 ENCOUNTER — OFFICE VISIT (OUTPATIENT)
Dept: FAMILY MEDICINE | Facility: CLINIC | Age: 65
End: 2020-03-31
Payer: MEDICARE

## 2020-03-31 ENCOUNTER — TELEPHONE (OUTPATIENT)
Dept: ORTHOPEDICS | Facility: CLINIC | Age: 65
End: 2020-03-31

## 2020-03-31 VITALS
OXYGEN SATURATION: 97 % | HEART RATE: 107 BPM | TEMPERATURE: 99 F | HEIGHT: 60 IN | BODY MASS INDEX: 40.52 KG/M2 | DIASTOLIC BLOOD PRESSURE: 60 MMHG | WEIGHT: 206.38 LBS | SYSTOLIC BLOOD PRESSURE: 104 MMHG

## 2020-03-31 DIAGNOSIS — F32.A DEPRESSION, UNSPECIFIED DEPRESSION TYPE: ICD-10-CM

## 2020-03-31 DIAGNOSIS — I25.10 CORONARY ARTERY DISEASE INVOLVING NATIVE CORONARY ARTERY OF NATIVE HEART WITHOUT ANGINA PECTORIS: ICD-10-CM

## 2020-03-31 DIAGNOSIS — E03.9 HYPOTHYROIDISM (ACQUIRED): ICD-10-CM

## 2020-03-31 DIAGNOSIS — E55.9 VITAMIN D DEFICIENCY: ICD-10-CM

## 2020-03-31 DIAGNOSIS — M13.862 ALLERGIC ARTHRITIS OF LEFT KNEE: ICD-10-CM

## 2020-03-31 DIAGNOSIS — E11.9 TYPE 2 DIABETES MELLITUS WITHOUT COMPLICATION, WITHOUT LONG-TERM CURRENT USE OF INSULIN: Primary | ICD-10-CM

## 2020-03-31 PROBLEM — Z85.038 HISTORY OF COLON CANCER: Chronic | Status: RESOLVED | Noted: 2017-04-18 | Resolved: 2020-03-31

## 2020-03-31 PROCEDURE — 3044F PR MOST RECENT HEMOGLOBIN A1C LEVEL <7.0%: ICD-10-PCS | Mod: CPTII,S$GLB,, | Performed by: FAMILY MEDICINE

## 2020-03-31 PROCEDURE — 1101F PT FALLS ASSESS-DOCD LE1/YR: CPT | Mod: CPTII,S$GLB,, | Performed by: FAMILY MEDICINE

## 2020-03-31 PROCEDURE — 1101F PR PT FALLS ASSESS DOC 0-1 FALLS W/OUT INJ PAST YR: ICD-10-PCS | Mod: CPTII,S$GLB,, | Performed by: FAMILY MEDICINE

## 2020-03-31 PROCEDURE — 99214 PR OFFICE/OUTPT VISIT, EST, LEVL IV, 30-39 MIN: ICD-10-PCS | Mod: S$GLB,,, | Performed by: FAMILY MEDICINE

## 2020-03-31 PROCEDURE — 3008F PR BODY MASS INDEX (BMI) DOCUMENTED: ICD-10-PCS | Mod: CPTII,S$GLB,, | Performed by: FAMILY MEDICINE

## 2020-03-31 PROCEDURE — 3008F BODY MASS INDEX DOCD: CPT | Mod: CPTII,S$GLB,, | Performed by: FAMILY MEDICINE

## 2020-03-31 PROCEDURE — 3044F HG A1C LEVEL LT 7.0%: CPT | Mod: CPTII,S$GLB,, | Performed by: FAMILY MEDICINE

## 2020-03-31 PROCEDURE — 99214 OFFICE O/P EST MOD 30 MIN: CPT | Mod: S$GLB,,, | Performed by: FAMILY MEDICINE

## 2020-03-31 RX ORDER — ERGOCALCIFEROL 1.25 MG/1
50000 CAPSULE ORAL
Qty: 12 CAPSULE | Refills: 3 | Status: SHIPPED | OUTPATIENT
Start: 2020-03-31 | End: 2022-01-03 | Stop reason: SDUPTHER

## 2020-03-31 RX ORDER — ATORVASTATIN CALCIUM 40 MG/1
40 TABLET, FILM COATED ORAL DAILY
Qty: 90 TABLET | Refills: 3 | Status: SHIPPED | OUTPATIENT
Start: 2020-03-31 | End: 2021-03-17

## 2020-03-31 RX ORDER — LEVOTHYROXINE SODIUM 100 UG/1
TABLET ORAL
COMMUNITY
Start: 2020-02-22 | End: 2020-05-24

## 2020-03-31 NOTE — PROGRESS NOTES
Patient ID: Christine Castro is a 64 y.o. female.    Chief Complaint: discuss medications    HPI      Christine Castro is a 64 y.o. female patient here to discuss medications.  Patient with very well controlled diabetes mellitus-no new problems  Patient with depression anxiety.  Continues to take medication for both.  Often has difficult time emotionally.  Vitamin-D deficiency-not taking vitamin-D  Lipidemia-not taking atorvastatin-does not know why  Reflux-controlled this time-history of bypass surgery    Complains of left shoulder pain-can get in to see orthopedic surgeon    Vitals:    03/31/20 1405   BP: 104/60   Pulse: 107   Temp: 99.1 °F (37.3 °C)   SpO2: 97%   Weight: 93.6 kg (206 lb 5.6 oz)   Height: 5' (1.524 m)            Review of Symptoms    Constitutional  Neg activity change, No chills /fever   Resp  Neg hemoptysis, stridor, choking  CVS  Neg chest pain, palpitations    Physical Exam    Constitutional:  Oriented to person, place, and time.appears well-developed and well-nourished.  No distress.      HENT  Head: Normocephalic and atraumatic  Right Ear: External ear normal.   Left Ear: External ear normal.   Nose: External nose normal.   Mouth:  Moist mucus membranes.    Eyes:  Conjunctivae are normal. Right eye exhibits no discharge.  Left eye exhibits no discharge. No scleral icterus.  No periorbital edema    Cardiovascular:  Regular rate and rhythm with normal S1 and S2     Pulmonary/Chest:   Clear to auscultation bilaterally without wheezes, rhonchi or rales      Musculoskeletal:  No edema. No obvious deformity No wasting  Holding left arm at side-mild tenderness palpation-does not me to examine it-abduction causes a lot of pain.       Neurological:  Alert and oriented to person, place, and time.   Coordination normal.     Skin:   Skin is warm and dry.  No diaphoresis.   No rash noted.     Psychiatric: Normal mood and affect. Behavior is normal.  Judgment and thought content normal.     Complete  Blood Count  Lab Results   Component Value Date    RBC 3.72 (L) 02/26/2020    HGB 11.5 (L) 02/26/2020    HCT 36.6 (L) 02/26/2020    MCV 98 02/26/2020    MCH 30.9 02/26/2020    MCHC 31.4 (L) 02/26/2020    RDW 13.3 02/26/2020     02/26/2020    MPV 9.5 02/26/2020    GRAN 3.9 02/26/2020    GRAN 56.6 02/26/2020    LYMPH 2.1 02/26/2020    LYMPH 30.2 02/26/2020    MONO 0.4 02/26/2020    MONO 5.5 02/26/2020    EOS 0.5 02/26/2020    BASO 0.05 02/26/2020    EOSINOPHIL 6.6 02/26/2020    BASOPHIL 0.7 02/26/2020    DIFFMETHOD Automated 02/26/2020       Comprehensive Metabolic Panel  Lab Results   Component Value Date    GLU 87 02/26/2020    BUN 20 (H) 02/26/2020    CREATININE 0.93 02/26/2020     02/26/2020    K 4.3 02/26/2020     02/26/2020    PROT 7.3 02/26/2020    ALBUMIN 4.1 02/26/2020    BILITOT 0.2 02/26/2020    AST 51 (H) 02/26/2020    ALKPHOS 71 02/26/2020    CO2 23 02/26/2020    ALT 30 02/26/2020    ANIONGAP 10 02/26/2020    EGFRNONAA >60.0 02/26/2020    ESTGFRAFRICA >60.0 02/26/2020       TSH  Lab Results   Component Value Date    TSH 2.400 02/26/2020       Assessment / Plan:      ICD-10-CM ICD-9-CM   1. Type 2 diabetes mellitus without complication, without long-term current use of insulin E11.9 250.00   2. Vitamin D deficiency E55.9 268.9   3. Allergic arthritis of left knee M13.862 716.26   4. Coronary artery disease involving native coronary artery of native heart without angina pectoris I25.10 414.01   5. Depression, unspecified depression type F32.9 311   6. Hypothyroidism (acquired) E03.9 244.9     Type 2 diabetes mellitus without complication, without long-term current use of insulin    Vitamin D deficiency    Allergic arthritis of left knee    Coronary artery disease involving native coronary artery of native heart without angina pectoris    Depression, unspecified depression type    Hypothyroidism (acquired)    Other orders  -     atorvastatin (LIPITOR) 40 MG tablet; Take 1 tablet (40 mg  total) by mouth once daily. For cholesterol  Dispense: 90 tablet; Refill: 3  -     ergocalciferol (ERGOCALCIFEROL) 50,000 unit Cap; Take 1 capsule (50,000 Units total) by mouth every 7 days.  Dispense: 12 capsule; Refill: 3    -vitamin-D-continue vitamin-D capsules-rewrote  Anemia-use atorvastatin-  Depression-suggested physical exercises-meditation prior-evaluation of  relationships-how she deals with problems-however family members have done so in the past-which emotions she has learned from her parents

## 2020-03-31 NOTE — TELEPHONE ENCOUNTER
----- Message from Gaby Fox sent at 3/31/2020  3:03 PM CDT -----  Type:  RX Refill Request    Who Called:  atricia    RX Name and Strength: methylPREDNISolone (MEDROL DOSEPACK) 4 mg tablet    How is the patient currently taking it? (ex. 1XDay):  Every day different     Is this a 30 day or 90 day RX:  30    Preferred Pharmacy with phone number:    [id number] [address] [facility] AT [facility] [address] [phone] (Phone)        Local or Mail Order: local    Ordering Provider: Dr Joaquin    Would the patient rather a call back or a response via [facility]?  call    Best Call Back Number: [phone]    Additional Information:  refill

## 2020-04-02 ENCOUNTER — LAB VISIT (OUTPATIENT)
Dept: LAB | Facility: HOSPITAL | Age: 65
End: 2020-04-02
Attending: FAMILY MEDICINE
Payer: MEDICARE

## 2020-04-02 ENCOUNTER — TELEPHONE (OUTPATIENT)
Dept: DIABETES | Facility: CLINIC | Age: 65
End: 2020-04-02

## 2020-04-02 DIAGNOSIS — R73.9 HYPERGLYCEMIA: ICD-10-CM

## 2020-04-02 DIAGNOSIS — E11.9 TYPE 2 DIABETES MELLITUS WITHOUT COMPLICATION, WITHOUT LONG-TERM CURRENT USE OF INSULIN: ICD-10-CM

## 2020-04-02 LAB
ANION GAP SERPL CALC-SCNC: 7 MMOL/L (ref 8–16)
BUN SERPL-MCNC: 18 MG/DL (ref 7–17)
CALCIUM SERPL-MCNC: 8.7 MG/DL (ref 8.7–10.5)
CHLORIDE SERPL-SCNC: 109 MMOL/L (ref 95–110)
CO2 SERPL-SCNC: 23 MMOL/L (ref 23–29)
CREAT SERPL-MCNC: 1.03 MG/DL (ref 0.5–1.4)
EST. GFR  (AFRICAN AMERICAN): >60 ML/MIN/1.73 M^2
EST. GFR  (NON AFRICAN AMERICAN): 57.2 ML/MIN/1.73 M^2
ESTIMATED AVG GLUCOSE: 103 MG/DL (ref 68–131)
GLUCOSE SERPL-MCNC: 160 MG/DL (ref 70–110)
HBA1C MFR BLD HPLC: 5.2 % (ref 4–5.6)
POTASSIUM SERPL-SCNC: 4.4 MMOL/L (ref 3.5–5.1)
SODIUM SERPL-SCNC: 139 MMOL/L (ref 136–145)

## 2020-04-02 PROCEDURE — 36415 COLL VENOUS BLD VENIPUNCTURE: CPT | Mod: PO

## 2020-04-02 PROCEDURE — 80048 BASIC METABOLIC PNL TOTAL CA: CPT | Mod: PO

## 2020-04-02 PROCEDURE — 83036 HEMOGLOBIN GLYCOSYLATED A1C: CPT

## 2020-04-03 ENCOUNTER — TELEPHONE (OUTPATIENT)
Dept: FAMILY MEDICINE | Facility: CLINIC | Age: 65
End: 2020-04-03

## 2020-04-03 ENCOUNTER — TELEPHONE (OUTPATIENT)
Dept: DIABETES | Facility: CLINIC | Age: 65
End: 2020-04-03

## 2020-04-03 RX ORDER — DIAZEPAM 2 MG/1
TABLET ORAL
Qty: 30 TABLET | Refills: 1 | Status: SHIPPED | OUTPATIENT
Start: 2020-04-03 | End: 2020-06-04 | Stop reason: SDUPTHER

## 2020-04-03 NOTE — TELEPHONE ENCOUNTER
Sent in medication refill-sent in 30 tablets-one month supply.  He is on several sedating medications as we talked about-including oxycodone, Ambien and Zanaflex

## 2020-04-03 NOTE — TELEPHONE ENCOUNTER
I spoke with the pt and notified results are good      The pt is requesting valium rx. Last refill 3/2/2020 #30

## 2020-04-08 ENCOUNTER — TELEPHONE (OUTPATIENT)
Dept: ORTHOPEDICS | Facility: CLINIC | Age: 65
End: 2020-04-08

## 2020-04-08 NOTE — TELEPHONE ENCOUNTER
----- Message from Edward Nava sent at 4/8/2020 10:02 AM CDT -----  Contact: [phone] / self   Patient is returning a call from your office. Please Advise.

## 2020-04-18 RX ORDER — FUROSEMIDE 20 MG/1
TABLET ORAL
Qty: 90 TABLET | Refills: 3 | Status: SHIPPED | OUTPATIENT
Start: 2020-04-18 | End: 2020-05-29 | Stop reason: SDUPTHER

## 2020-04-21 ENCOUNTER — TELEPHONE (OUTPATIENT)
Dept: DIABETES | Facility: CLINIC | Age: 65
End: 2020-04-21

## 2020-04-23 RX ORDER — ZOLPIDEM TARTRATE 5 MG/1
TABLET ORAL
Qty: 30 TABLET | Refills: 0 | Status: SHIPPED | OUTPATIENT
Start: 2020-04-23 | End: 2020-05-12 | Stop reason: SDUPTHER

## 2020-04-27 RX ORDER — PREDNISONE 20 MG/1
TABLET ORAL
Qty: 5 TABLET | Refills: 0 | OUTPATIENT
Start: 2020-04-27

## 2020-04-27 RX ORDER — PROMETHAZINE HYDROCHLORIDE 25 MG/1
TABLET ORAL
Qty: 25 TABLET | Refills: 0 | Status: SHIPPED | OUTPATIENT
Start: 2020-04-27 | End: 2020-06-04

## 2020-05-04 ENCOUNTER — TELEPHONE (OUTPATIENT)
Dept: DIABETES | Facility: CLINIC | Age: 65
End: 2020-05-04

## 2020-05-07 DIAGNOSIS — E11.9 TYPE 2 DIABETES MELLITUS WITHOUT COMPLICATION, WITHOUT LONG-TERM CURRENT USE OF INSULIN: ICD-10-CM

## 2020-05-07 RX ORDER — INSULIN PUMP SYRINGE, 3 ML
EACH MISCELLANEOUS
Qty: 1 EACH | Refills: 0 | Status: SHIPPED | OUTPATIENT
Start: 2020-05-07 | End: 2020-05-12

## 2020-05-07 NOTE — TELEPHONE ENCOUNTER
I spoke with patient. Patient agreed not to receive diabetic supplies from this company. Patient's insurance will not cover. Please send orders for a new diabetic meter and supplies to [facility]. Patient states her meter broke.       ----- Message from Mary Anne Ledezma sent at 5/7/2020 10:03 AM CDT -----  Cristian from [facility] can be reached at [phone]. Fax [phone]    Called to confirm receipt for diabetes supplies please give a call back    Thank you!

## 2020-05-08 DIAGNOSIS — E11.9 TYPE 2 DIABETES MELLITUS WITHOUT COMPLICATION: ICD-10-CM

## 2020-05-12 ENCOUNTER — OFFICE VISIT (OUTPATIENT)
Dept: FAMILY MEDICINE | Facility: CLINIC | Age: 65
End: 2020-05-12
Payer: MEDICARE

## 2020-05-12 VITALS
HEIGHT: 60 IN | DIASTOLIC BLOOD PRESSURE: 68 MMHG | WEIGHT: 200.38 LBS | BODY MASS INDEX: 39.34 KG/M2 | TEMPERATURE: 99 F | SYSTOLIC BLOOD PRESSURE: 92 MMHG | OXYGEN SATURATION: 95 % | HEART RATE: 103 BPM

## 2020-05-12 DIAGNOSIS — M19.90 ARTHRITIS: Primary | ICD-10-CM

## 2020-05-12 DIAGNOSIS — E11.9 TYPE 2 DIABETES MELLITUS WITHOUT COMPLICATION: ICD-10-CM

## 2020-05-12 DIAGNOSIS — E03.9 ACQUIRED HYPOTHYROIDISM: Chronic | ICD-10-CM

## 2020-05-12 DIAGNOSIS — Z98.84 HISTORY OF GASTRIC BYPASS: ICD-10-CM

## 2020-05-12 PROCEDURE — 99214 OFFICE O/P EST MOD 30 MIN: CPT | Mod: S$GLB,,, | Performed by: FAMILY MEDICINE

## 2020-05-12 PROCEDURE — 99499 RISK ADDL DX/OHS AUDIT: ICD-10-PCS | Mod: S$GLB,,, | Performed by: FAMILY MEDICINE

## 2020-05-12 PROCEDURE — 99499 UNLISTED E&M SERVICE: CPT | Mod: S$GLB,,, | Performed by: FAMILY MEDICINE

## 2020-05-12 PROCEDURE — 3044F HG A1C LEVEL LT 7.0%: CPT | Mod: CPTII,S$GLB,, | Performed by: FAMILY MEDICINE

## 2020-05-12 PROCEDURE — 1159F MED LIST DOCD IN RCRD: CPT | Mod: S$GLB,,, | Performed by: FAMILY MEDICINE

## 2020-05-12 PROCEDURE — 3044F PR MOST RECENT HEMOGLOBIN A1C LEVEL <7.0%: ICD-10-PCS | Mod: CPTII,S$GLB,, | Performed by: FAMILY MEDICINE

## 2020-05-12 PROCEDURE — 1101F PT FALLS ASSESS-DOCD LE1/YR: CPT | Mod: CPTII,S$GLB,, | Performed by: FAMILY MEDICINE

## 2020-05-12 PROCEDURE — 99214 PR OFFICE/OUTPT VISIT, EST, LEVL IV, 30-39 MIN: ICD-10-PCS | Mod: S$GLB,,, | Performed by: FAMILY MEDICINE

## 2020-05-12 PROCEDURE — 1159F PR MEDICATION LIST DOCUMENTED IN MEDICAL RECORD: ICD-10-PCS | Mod: S$GLB,,, | Performed by: FAMILY MEDICINE

## 2020-05-12 PROCEDURE — 1101F PR PT FALLS ASSESS DOC 0-1 FALLS W/OUT INJ PAST YR: ICD-10-PCS | Mod: CPTII,S$GLB,, | Performed by: FAMILY MEDICINE

## 2020-05-12 RX ORDER — GABAPENTIN 100 MG/1
100 CAPSULE ORAL 3 TIMES DAILY
COMMUNITY
Start: 2020-04-22 | End: 2020-07-30

## 2020-05-12 RX ORDER — BLOOD-GLUCOSE METER
EACH MISCELLANEOUS
COMMUNITY
Start: 2020-05-07 | End: 2021-11-17 | Stop reason: SDUPTHER

## 2020-05-12 RX ORDER — ZOLPIDEM TARTRATE 5 MG/1
5 TABLET ORAL NIGHTLY
Qty: 30 TABLET | Refills: 4 | Status: SHIPPED | OUTPATIENT
Start: 2020-05-12 | End: 2020-05-13 | Stop reason: SDUPTHER

## 2020-05-12 RX ORDER — PREDNISONE 5 MG/1
5 TABLET ORAL DAILY
Qty: 14 TABLET | Refills: 0 | Status: SHIPPED | OUTPATIENT
Start: 2020-05-12 | End: 2020-08-22

## 2020-05-12 RX ORDER — GLUCOSAM/CHON-MSM1/C/MANG/BOSW 500-416.6
TABLET ORAL
COMMUNITY
Start: 2020-05-04

## 2020-05-12 RX ORDER — IBANDRONATE SODIUM 150 MG/1
150 TABLET, FILM COATED ORAL
Qty: 3 TABLET | Refills: 3 | Status: SHIPPED | OUTPATIENT
Start: 2020-05-12 | End: 2020-12-17

## 2020-05-13 RX ORDER — ZOLPIDEM TARTRATE 5 MG/1
5 TABLET ORAL NIGHTLY
Qty: 30 TABLET | Refills: 4 | Status: SHIPPED | OUTPATIENT
Start: 2020-05-13 | End: 2020-07-30

## 2020-05-13 NOTE — TELEPHONE ENCOUNTER
----- Message from Mery Hernandez sent at 5/13/2020  3:54 PM CDT -----  Contact: self [phone]  Patient is calling with some problems with her medication. Please call

## 2020-05-17 RX ORDER — DICLOFENAC SODIUM 10 MG/G
GEL TOPICAL
Qty: 100 G | Refills: 5 | Status: SHIPPED | OUTPATIENT
Start: 2020-05-17 | End: 2021-01-25

## 2020-05-18 NOTE — PROGRESS NOTES
Patient ID: Christine Castro is a 65 y.o. female.    Chief Complaint: Joint Pain    HPI       Christine Castro is a 65 y.o. female here to discuss multiple joint pain.  No swelling-history of asked to arthritis.  Bilateral knee replacement due to arthritis.  Needs feeling better.  However pain in shoulders bilaterally.  Shoulder pain is the most concerning of her pain.  Plan to visit with orthopedic surgeon.  Has history of having injections which have been mild to moderately successful.  Would like to consider a course of steroids to help current pain.  She knows it is only temporary.      Review of Symptoms    Constitutional  No change in activity, No chills fever   Resp  Neg hemoptysis, stridor, choking  CVS  Neg chest pain, palpitations    BP 92/68   Pulse 103   Temp 98.8 °F (37.1 °C)   Ht 5' (1.524 m)   Wt 90.9 kg (200 lb 6.4 oz)   SpO2 95%   BMI 39.14 kg/m²     Physical Exam    Vitals:    05/12/20 1428   BP: 92/68   Pulse: 103   Temp: 98.8 °F (37.1 °C)   SpO2: 95%   Weight: 90.9 kg (200 lb 6.4 oz)   Height: 5' (1.524 m)       Constitutional:   Oriented to person, place, and time.appears well-developed and well-nourished.   No distress.      HENT  Head: Normocephalic and atraumatic  Right Ear: External ear normal.   Left Ear: External ear normal.   Nose: External nose normal.   Mouth: Moist mucous membranes    Eyes:   Conjunctivae are normal. Right eye exhibits no discharge. Left eye exhibits no discharge. No scleral icterus. No periorbital edema    Musculoskeletal:  No edema. No obvious deformity No wasting     Neurological:  Alert and oriented to person, place, and time. Coordination normal.     Skin:   Skin is warm and dry.  No diaphoresis.   No rash noted.     Psychiatric: Normal mood and affect. Behavior is normal. Judgment and thought content normal.     Complete Blood Count  Lab Results   Component Value Date    RBC 3.72 (L) 02/26/2020    HGB 11.5 (L) 02/26/2020    HCT 36.6 (L) 02/26/2020    MCV  98 02/26/2020    MCH 30.9 02/26/2020    MCHC 31.4 (L) 02/26/2020    RDW 13.3 02/26/2020     02/26/2020    MPV 9.5 02/26/2020    GRAN 3.9 02/26/2020    GRAN 56.6 02/26/2020    LYMPH 2.1 02/26/2020    LYMPH 30.2 02/26/2020    MONO 0.4 02/26/2020    MONO 5.5 02/26/2020    EOS 0.5 02/26/2020    BASO 0.05 02/26/2020    EOSINOPHIL 6.6 02/26/2020    BASOPHIL 0.7 02/26/2020    DIFFMETHOD Automated 02/26/2020       Comprehensive Metabolic Panel  Lab Results   Component Value Date     (H) 04/02/2020    BUN 18 (H) 04/02/2020    CREATININE 1.03 04/02/2020     04/02/2020    K 4.4 04/02/2020     04/02/2020    PROT 7.3 02/26/2020    ALBUMIN 4.1 02/26/2020    BILITOT 0.2 02/26/2020    AST 51 (H) 02/26/2020    ALKPHOS 71 02/26/2020    CO2 23 04/02/2020    ALT 30 02/26/2020    ANIONGAP 7 (L) 04/02/2020    EGFRNONAA 57.2 (A) 04/02/2020    ESTGFRAFRICA >60.0 04/02/2020       TSH  Lab Results   Component Value Date    TSH 2.400 02/26/2020       Assessment / Plan:      ICD-10-CM ICD-9-CM   1. Arthritis M19.90 716.90   2. Type 2 diabetes mellitus without complication, without long-term current use of insulin E11.9 250.00   3. Type 2 diabetes mellitus without complication E11.9 250.00     Arthritis    Type 2 diabetes mellitus without complication, without long-term current use of insulin    Type 2 diabetes mellitus without complication  -     Microalbumin/creatinine urine ratio    Other orders  -     Discontinue: zolpidem (AMBIEN) 5 MG Tab; Take 1 tablet (5 mg total) by mouth nightly.  Dispense: 30 tablet; Refill: 4  -     ibandronate (BONIVA) 150 mg tablet; Take 1 tablet (150 mg total) by mouth every 30 days. For osteoporosis  Dispense: 3 tablet; Refill: 3  -     predniSONE (DELTASONE) 5 MG tablet; Take 1 tablet (5 mg total) by mouth once daily.  Dispense: 14 tablet; Refill: 0     Insomnia-discussed the use of Ambien-also the concomitant use of multiple medications for sedation-patient is aware of possible  sedation-agrees to take judiciously

## 2020-05-21 DIAGNOSIS — E11.9 TYPE 2 DIABETES MELLITUS WITHOUT COMPLICATION, WITHOUT LONG-TERM CURRENT USE OF INSULIN: ICD-10-CM

## 2020-05-21 RX ORDER — CALCIUM CITRATE/VITAMIN D3 200MG-6.25
TABLET ORAL
Qty: 50 STRIP | Refills: 11 | Status: SHIPPED | OUTPATIENT
Start: 2020-05-21

## 2020-05-22 ENCOUNTER — TELEPHONE (OUTPATIENT)
Dept: ORTHOPEDICS | Facility: CLINIC | Age: 65
End: 2020-05-22

## 2020-05-22 DIAGNOSIS — E11.9 TYPE 2 DIABETES MELLITUS WITHOUT COMPLICATION: ICD-10-CM

## 2020-05-22 NOTE — TELEPHONE ENCOUNTER
I rtn miguel's call and spoke with another pharmacist    I advised I did not receive the fax  She will refax now

## 2020-05-22 NOTE — TELEPHONE ENCOUNTER
----- Message from Rachelle Smith sent at 5/22/2020  9:46 AM CDT -----  Contact: [phone]/Self   Patient is requesting to speak with nurse regarding rescheduling her appointment. Please advise.

## 2020-05-22 NOTE — TELEPHONE ENCOUNTER
----- Message from Edward Nava sent at 5/22/2020 11:43 AM CDT -----  Contact: Rashel bedoya [facility] pharm / 504#[phone]  Rashel would like to speak with your office regarding a fax he sent, states he would like to know if it was received. Please Advise.

## 2020-05-24 RX ORDER — LEVOTHYROXINE SODIUM 100 UG/1
TABLET ORAL
Qty: 90 TABLET | Refills: 3 | Status: SHIPPED | OUTPATIENT
Start: 2020-05-24 | End: 2021-03-17

## 2020-05-28 ENCOUNTER — HOSPITAL ENCOUNTER (OUTPATIENT)
Dept: RADIOLOGY | Facility: HOSPITAL | Age: 65
Discharge: HOME | End: 2020-05-28
Attending: ORTHOPAEDIC SURGERY
Payer: MEDICARE

## 2020-05-28 ENCOUNTER — OFFICE VISIT (OUTPATIENT)
Dept: ORTHOPEDICS | Facility: CLINIC | Age: 65
End: 2020-05-28
Payer: MEDICARE

## 2020-05-28 VITALS — HEIGHT: 60 IN | BODY MASS INDEX: 41.03 KG/M2 | WEIGHT: 209 LBS

## 2020-05-28 DIAGNOSIS — R29.6 FREQUENT FALLS: ICD-10-CM

## 2020-05-28 DIAGNOSIS — M25.512 CHRONIC LEFT SHOULDER PAIN: Primary | ICD-10-CM
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
## 2020-05-28 DIAGNOSIS — M19.019 SHOULDER ARTHRITIS: ICD-10-CM

## 2020-05-28 DIAGNOSIS — G89.29 CHRONIC LEFT SHOULDER PAIN: Primary | ICD-10-CM

## 2020-05-28 PROCEDURE — 99499 UNLISTED E&M SERVICE: CPT | Mod: HCNC,S$GLB,, | Performed by: ORTHOPAEDIC SURGERY

## 2020-05-28 PROCEDURE — 99499 RISK ADDL DX/OHS AUDIT: ICD-10-PCS | Mod: HCNC,S$GLB,, | Performed by: ORTHOPAEDIC SURGERY

## 2020-05-28 PROCEDURE — 1159F MED LIST DOCD IN RCRD: CPT | Mod: HCNC,S$GLB,, | Performed by: ORTHOPAEDIC SURGERY

## 2020-05-28 PROCEDURE — 1101F PR PT FALLS ASSESS DOC 0-1 FALLS W/OUT INJ PAST YR: ICD-10-PCS | Mod: HCNC,CPTII,S$GLB, | Performed by: ORTHOPAEDIC SURGERY

## 2020-05-28 PROCEDURE — 99999 PR PBB SHADOW E&M-EST. PATIENT-LVL III: ICD-10-PCS | Mod: PBBFAC,HCNC,, | Performed by: ORTHOPAEDIC SURGERY

## 2020-05-28 PROCEDURE — 99214 PR OFFICE/OUTPT VISIT, EST, LEVL IV, 30-39 MIN: ICD-10-PCS | Mod: HCNC,25,S$GLB, | Performed by: ORTHOPAEDIC SURGERY

## 2020-05-28 PROCEDURE — 99214 OFFICE O/P EST MOD 30 MIN: CPT | Mod: HCNC,25,S$GLB, | Performed by: ORTHOPAEDIC SURGERY

## 2020-05-28 PROCEDURE — 1101F PT FALLS ASSESS-DOCD LE1/YR: CPT | Mod: HCNC,CPTII,S$GLB, | Performed by: ORTHOPAEDIC SURGERY

## 2020-05-28 PROCEDURE — 73030 XR SHOULDER COMPLETE 2 OR MORE VIEWS LEFT: ICD-10-PCS | Mod: 26,HCNC,LT, | Performed by: RADIOLOGY

## 2020-05-28 PROCEDURE — 20610 DRAIN/INJ JOINT/BURSA W/O US: CPT | Mod: HCNC,LT,S$GLB, | Performed by: ORTHOPAEDIC SURGERY

## 2020-05-28 PROCEDURE — 73030 X-RAY EXAM OF SHOULDER: CPT | Mod: 26,HCNC,LT, | Performed by: RADIOLOGY

## 2020-05-28 PROCEDURE — 1159F PR MEDICATION LIST DOCUMENTED IN MEDICAL RECORD: ICD-10-PCS | Mod: HCNC,S$GLB,, | Performed by: ORTHOPAEDIC SURGERY

## 2020-05-28 PROCEDURE — 1125F PR PAIN SEVERITY QUANTIFIED, PAIN PRESENT: ICD-10-PCS | Mod: HCNC,S$GLB,, | Performed by: ORTHOPAEDIC SURGERY

## 2020-05-28 PROCEDURE — 99999 PR PBB SHADOW E&M-EST. PATIENT-LVL III: CPT | Mod: PBBFAC,HCNC,, | Performed by: ORTHOPAEDIC SURGERY

## 2020-05-28 PROCEDURE — 1125F AMNT PAIN NOTED PAIN PRSNT: CPT | Mod: HCNC,S$GLB,, | Performed by: ORTHOPAEDIC SURGERY

## 2020-05-28 PROCEDURE — 20610 PR DRAIN/INJECT LARGE JOINT/BURSA: ICD-10-PCS | Mod: HCNC,LT,S$GLB, | Performed by: ORTHOPAEDIC SURGERY

## 2020-05-28 PROCEDURE — 73030 X-RAY EXAM OF SHOULDER: CPT | Mod: TC,HCNC,PN,LT

## 2020-05-28 RX ORDER — TRIAMCINOLONE ACETONIDE 40 MG/ML
40 INJECTION, SUSPENSION INTRA-ARTICULAR; INTRAMUSCULAR
Status: COMPLETED | OUTPATIENT
Start: 2020-05-28 | End: 2020-05-28

## 2020-05-28 RX ADMIN — TRIAMCINOLONE ACETONIDE 40 MG: 40 INJECTION, SUSPENSION INTRA-ARTICULAR; INTRAMUSCULAR at 02:05

## 2020-05-28 NOTE — PROGRESS NOTES
Subjective:      Patient ID: Christine Castro is a 65 y.o. female.    Chief Complaint: Shoulder Pain (left )      HPI: Christine Castro is here in follow-up of chronic left shoulder pain secondary to advanced osteoarthritis.  Patient has been treated periodically with corticosteroid injections for symptomatic relief.  Her last injection was 3 months ago.  Patient reports injection was helpful for approximately 1 month duration.  She has pain with all ROM.  Associated symptoms include weakness and sleep disturbance.     Past Medical History:   Diagnosis Date    Arthritis     Asthma     CHF (congestive heart failure)     [facility]    Colon cancer     colon    COPD (chronic obstructive pulmonary disease)     Coronary artery disease     Depression     Diabetes mellitus, type 2     GERD (gastroesophageal reflux disease)     Myocardial infarction     AGE 19 [facility]BABY DELIVERY    Thyroid disease        Current Outpatient Medications:     acetaminophen (TYLENOL) 500 MG tablet, Take 500 mg by mouth every 6 (six) hours as needed for Pain., Disp: , Rfl:     aspirin (ECOTRIN) 81 MG EC tablet, Take 1 tablet (81 mg total) by mouth once daily., Disp: , Rfl: 0    atorvastatin (LIPITOR) 40 MG tablet, Take 1 tablet (40 mg total) by mouth once daily. For cholesterol, Disp: 90 tablet, Rfl: 3    buPROPion (WELLBUTRIN SR) 200 MG SR12, Take 1 tablet (200 mg total) by mouth 2 (two) times daily., Disp: 180 tablet, Rfl: 2    busPIRone (BUSPAR) 10 MG tablet, TAKE 1/2 TABLET BY MOUTH ONCE DAILY (Patient taking differently: 10 mg. Pt is taking 10 mg once daily), Disp: 60 tablet, Rfl: 3    diabetic supplies, miscellan. Misc, Lancets for 1-2 times a day testing    dispense brand covered by insurance t, Disp: 60 each, Rfl: 3    ergocalciferol (ERGOCALCIFEROL) 50,000 unit Cap, Take 1 capsule (50,000 Units total) by mouth every 7 days., Disp: 12 capsule, Rfl: 3    escitalopram oxalate (LEXAPRO) 20 MG tablet, TAKE 1  TABLET(20 MG) BY MOUTH EVERY DAY, Disp: 90 tablet, Rfl: 0    furosemide (LASIX) 20 MG tablet, TAKE 1 TABLET(20 MG) BY MOUTH DAILY AS NEEDED, Disp: 90 tablet, Rfl: 3    gabapentin (NEURONTIN) 100 MG capsule, Take 100 mg by mouth 3 (three) times daily. , Disp: , Rfl:     ibandronate (BONIVA) 150 mg tablet, Take 1 tablet (150 mg total) by mouth every 30 days. For osteoporosis, Disp: 3 tablet, Rfl: 3    levothyroxine (SYNTHROID) 100 MCG tablet, TAKE 1 TABLET BY MOUTH EVERY DAY, Disp: 90 tablet, Rfl: 3    omeprazole (PRILOSEC) 40 MG capsule, TAKE 1 CAPSULE(40 MG) BY MOUTH EVERY MORNING, Disp: 90 capsule, Rfl: 1    oxyCODONE-acetaminophen (PERCOCET) 7.5-325 mg per tablet, , Disp: , Rfl:     potassium chloride (KLOR-CON) 10 MEQ TbSR, TAKE 2 TABLETS(20 MEQ) BY MOUTH EVERY DAY (Patient taking differently: 10 mEq. TAKE 2 TABLETS(20 MEQ) BY MOUTH EVERY DAY), Disp: 180 tablet, Rfl: 0    tiZANidine (ZANAFLEX) 4 MG tablet, , Disp: , Rfl:     TRUE METRIX GLUCOSE METER Misc, U UTD, Disp: , Rfl:     TRUE METRIX GLUCOSE TEST STRIP Strp, USE TO TEST BLOOD SUGAR EVERY DAY, Disp: 100 strip, Rfl: 0    TRUE METRIX GLUCOSE TEST STRIP Strp, TO TEST ONCE TO TWICE DAILY, Disp: 50 strip, Rfl: 11    TRUEPLUS LANCETS 30 gauge Misc, USE TO TEST ONCE TO TWICE D, Disp: , Rfl:     zolpidem (AMBIEN) 5 MG Tab, Take 1 tablet (5 mg total) by mouth nightly., Disp: 30 tablet, Rfl: 4    albuterol (VENTOLIN HFA) 90 mcg/actuation inhaler, INHALE 2 PUFFS INTO THE LUNGS EVERY 4 HOURS AS NEEDED FOR WHEEZING (Patient not taking: Reported on 5/28/2020), Disp: 18 g, Rfl: 3    diazePAM (VALIUM) 2 MG tablet, TAKE 1 TABLET(2 MG) BY MOUTH EVERY 24 hours AS NEEDED for anxiety (Patient not taking: Reported on 5/28/2020), Disp: 30 tablet, Rfl: 1    diclofenac sodium (VOLTAREN) 1 % Gel, APPLY 2 GRAMS EXTERNALLY TO THE AFFECTED AREA FOUR TIMES DAILY (Patient not taking: Reported on 5/28/2020), Disp: 100 g, Rfl: 5    ondansetron (ZOFRAN-ODT) 4 MG TbDL, Take  1 tablet (4 mg total) by mouth every 6 (six) hours as needed. (Patient not taking: Reported on 5/28/2020), Disp: 20 tablet, Rfl: 1    predniSONE (DELTASONE) 5 MG tablet, Take 1 tablet (5 mg total) by mouth once daily. (Patient not taking: Reported on 5/28/2020), Disp: 14 tablet, Rfl: 0    promethazine (PHENERGAN) 25 MG tablet, TAKE 1 TABLET(25 MG) BY MOUTH EVERY 6 HOURS AS NEEDED (Patient not taking: Reported on 5/28/2020), Disp: 25 tablet, Rfl: 0  No current facility-administered medications for this visit.   Review of patient's allergies indicates:   Allergen Reactions    Adhesive      Adhesive tape    Latex, natural rubber      Adhesive from latex tape    Ibuprofen Other (See Comments)     Causes asthma flare??       Ht 5' (1.524 m)   Wt 94.8 kg (208 lb 15.9 oz)   BMI 40.82 kg/m²     Review of Systems   Constitution: Negative for chills and fever.   Cardiovascular: Negative for chest pain and palpitations.   Respiratory: Negative for shortness of breath and wheezing.    Skin: Negative for poor wound healing and rash.   Musculoskeletal: Positive for arthritis, joint pain, muscle weakness and stiffness.   Gastrointestinal: Negative for nausea and vomiting.   Genitourinary: Negative for dysuria and hematuria.   Neurological: Negative for seizures and tremors.   Psychiatric/Behavioral: Negative for altered mental status.   Allergic/Immunologic: Negative for environmental allergies and persistent infections.         Objective:    Ortho Exam       Left shoulder  Skin: No rashes or lesions on exposed areas.  Atrophy: none noted.  Tenderness to palpation:  Global.  AROM (deg): abduction- 90, flexion-115, rotation- unrestricted, painful rotation- present.  Rotator cuff:  Exam limited secondary to severe pain with ROM.  Cross arm adduction test- positive.  Instability testing:  Exam limited  Distal neuro: normal, normal resting muscle tone.  Pulses: Positive peripheral pulses.   Unchanged    GEN: Well developed,  well nourished elderly looking female. AAOX3. No acute distress.   Normocephalic, atraumatic.   YRIS  Breathing unlabored.  Thought content scattered.    Assessment:     Imaging:  Shoulder radiographs from today rule out any dislocation.  Again, severe osteoarthritis of the glenohumeral joint is noted.        1. Chronic left shoulder pain    2. Shoulder arthritis    3. Fall, subsequent encounter    4. Frequent falls          Plan:       There was deformity noted of left shoulder X-ray performed today to rule out any dislocation.  X-ray negative for dislocation or fracture.  I recommended for review corticosteroid injection in the left shoulder without complication.  Patient tolerated well.    Patient is not a candidate for total shoulder replacement. Her arthritis pain is complicated by frequent falls.  Patient has been urged multiple times to follow-up with neurology - she has an appointment in September.    I recommend follow-up with pain management for left shoulder nerve ablation.    Orders Placed This Encounter    X-Ray Shoulder 2 or More Views Left    Ambulatory referral/consult to Pain Clinic    triamcinolone acetonide injection 40 mg     Follow up in about 3 months (around 8/28/2020).

## 2020-05-29 ENCOUNTER — TELEPHONE (OUTPATIENT)
Dept: FAMILY MEDICINE | Facility: CLINIC | Age: 65
End: 2020-05-29

## 2020-05-29 RX ORDER — FUROSEMIDE 20 MG/1
TABLET ORAL
Qty: 90 TABLET | Refills: 0 | Status: SHIPPED | OUTPATIENT
Start: 2020-05-29 | End: 2020-06-26

## 2020-05-29 NOTE — TELEPHONE ENCOUNTER
[address] [address]    PATIENT NAME:  CHANTE MORAN  YOB: 1944  MRN:  [id number]  ATTENDING PHYSICIAN:    ROOM:  [id number]  DICTATING PHYSICIAN:  Darrin Cook M.D.  UNIT#/ACCOUNT#:  [id number]  DATE OF CONSULTATION:  11/26/2019        HISTORY OF PRESENT ILLNESS:  This is a 75-year-old female with history of  breast cancer, elevated cholesterol, osteoarthritis, TIA, hypertension,  hypothyroid, breast lumpectomy in the past, carpal tunnel release,  hysterectomy, tonsillectomy, knee replacement, possible COPD, ex-smoker.  She  was admitted in March of 2018 for pneumonia.  Saw Dr. Moy.  He noted  that she drank an ounce of alexx most nights.  Quit smoking in 1999.  Lactose intolerant.  Will not take a flu shot due to fear about allergy.  Appears, has been on prednisone before for breathing issues.  Is admitted  from the office.  Has had a cough for a week, took some antibiotics, the  patient does not know the name.  The patient does not have the pills with  her.  She said it was not a Z-Artur.  In any event, the patient is not better  with oral antibiotics and is admitted for IV antibiotics.  She was told by  Dr. Boo, whom she saw earlier today that she has \"pneumonia.\"  She is  coughing and has congestion.  She is not particularly short of breath.  No  hemoptysis.  Some white sputum.  No rigors.  Maybe, she had a feverish  feeling.  Did not take her temperature.  No rash.  She is allergic to  penicillin, this caused some type of diarrhea; and the chart says she is  allergic to ceftriaxone.  The patient does not know anything about that.  Did  not get a flu shot this year.  Did not get pneumonia shot.     LABORATORY DATA:  She had labs done here with a white count yesterday 5.9  today.  White count is 4.1, hemoglobin 12.6, and platelets 223, segs 75,  lymphs 11, BUN 13, creatinine 0.7.  Urine with  Sent in a prescription for Lasix-do not want her to take prednisone every day   2 red cells, 6-10 white cells,  6-10 epis, negative leukocyte esterase.  On her differential today, has 11  reactive lymphs.  Has a chest x-ray, 1 view portable upright, negative for  infiltrate.     Lives alone, however, lives in a community living situation, likes to play a  lot of cards and the patient states some of her card playing partners have  been coughing.  The patient also had some sore throat as well.     PHYSICAL EXAMINATION:  GENERAL:  Pleasant 75-year-old female, a bit toxic.  VITAL SIGNS:  Afebrile x5 temperatures taken.  Other vital signs stable.  HEENT:  Throat is okay.  No redness or exudates.   NECK:  No neck mass.   LUNGS:  Few scattered rhonchi and upper airway sounds.  No hypoxia.  Not on  oxygen, not visibly short of breath, not cyanotic, possibly a little bit of  prolonged expiratory phase.   HEART:  Regular S1 and S2 without murmur or tachycardia.   ABDOMINAL EXAM:  Soft, benign, nontender, nondistended.   EXTREMITIES:  No phlebitis, cords, or calf or thigh tenderness.    IMPRESSION:  Upper respiratory infection.  No evidence of pneumonia.  Probable viral.  Even 6 or 7-day duration, does not rule out viral causes.  Continue doxycycline.  Add aztreonam.  Try to get a sputum culture.  I doubt  she has aspiration, but this could be considered as well.  Respiratory  pathogen panel has been ordered.  I think it is quite possible this patient  has a respiratory virus.  We will follow in the a.m.  We will speak with Dr. Boo.       Darrin Cook M.D.    Author ID:  [id number]  MedLUCHO / RACHAELN: [id number] / Job#: [id number]  D: 11/26/2019 15:39:03  T: 11/26/2019 23:55:33

## 2020-06-03 DIAGNOSIS — Z12.31 ENCOUNTER FOR SCREENING MAMMOGRAM FOR BREAST CANCER: Primary | ICD-10-CM

## 2020-06-03 NOTE — TELEPHONE ENCOUNTER
----- Message from Michelle Davis sent at 6/3/2020  3:54 PM CDT -----  Contact: [phone]/self  Christine Castro calling requesting to speak with you regarding   Mammo orders, ( please order - I will call her to set up appt     and she's requesting refills for the followin. promethazine (PHENERGAN) 25 MG tablet  2. diazePAM (VALIUM) 2 MG tablet    PHARMACY: [id number] [place] [address] [address] [facility]

## 2020-06-04 ENCOUNTER — TELEPHONE (OUTPATIENT)
Dept: FAMILY MEDICINE | Facility: CLINIC | Age: 65
End: 2020-06-04

## 2020-06-04 RX ORDER — PROMETHAZINE HYDROCHLORIDE 25 MG/1
TABLET ORAL
Qty: 25 TABLET | Refills: 0 | Status: SHIPPED | OUTPATIENT
Start: 2020-06-04 | End: 2020-08-24

## 2020-06-04 RX ORDER — DIAZEPAM 2 MG/1
TABLET ORAL
Qty: 30 TABLET | Refills: 1 | Status: SHIPPED | OUTPATIENT
Start: 2020-06-04 | End: 2020-07-30

## 2020-06-04 NOTE — TELEPHONE ENCOUNTER
Neal BELLO [facility] Staff   Caller: [phone]/ self (Today,  3:55 PM)             Patient called in returning your call. Please advise.

## 2020-06-04 NOTE — TELEPHONE ENCOUNTER
Neal BELLO [facility] Staff   Caller: [phone]/ self (Today,  3:55 PM)             Patient called in returning your call. Please advise.

## 2020-06-10 ENCOUNTER — HOSPITAL ENCOUNTER (OUTPATIENT)
Dept: RADIOLOGY | Facility: HOSPITAL | Age: 65
Discharge: HOME | End: 2020-06-10
Attending: FAMILY MEDICINE
Payer: MEDICARE

## 2020-06-10 DIAGNOSIS — Z12.31 ENCOUNTER FOR SCREENING MAMMOGRAM FOR BREAST CANCER: ICD-10-CM

## 2020-06-10 PROCEDURE — 77067 SCR MAMMO BI INCL CAD: CPT | Mod: TC,HCNC,PO

## 2020-06-16 RX ORDER — ESCITALOPRAM OXALATE 20 MG/1
TABLET ORAL
Qty: 90 TABLET | Refills: 0 | Status: SHIPPED | OUTPATIENT
Start: 2020-06-16 | End: 2020-08-18 | Stop reason: SDUPTHER

## 2020-07-09 ENCOUNTER — PATIENT OUTREACH (OUTPATIENT)
Dept: ADMINISTRATIVE | Facility: OTHER | Age: 65
End: 2020-07-09

## 2020-07-14 ENCOUNTER — OFFICE VISIT (OUTPATIENT)
Dept: PAIN MEDICINE | Facility: CLINIC | Age: 65
End: 2020-07-14
Payer: MEDICARE

## 2020-07-14 ENCOUNTER — TELEPHONE (OUTPATIENT)
Dept: PAIN MEDICINE | Facility: CLINIC | Age: 65
End: 2020-07-14

## 2020-07-14 VITALS — WEIGHT: 209 LBS | BODY MASS INDEX: 40.82 KG/M2

## 2020-07-14 DIAGNOSIS — M19.012 PRIMARY OSTEOARTHRITIS OF LEFT SHOULDER: Primary | ICD-10-CM

## 2020-07-14 DIAGNOSIS — M19.019 SHOULDER ARTHRITIS: ICD-10-CM

## 2020-07-14 DIAGNOSIS — R29.2: ICD-10-CM

## 2020-07-14 DIAGNOSIS — M54.12 RADICULOPATHY, CERVICAL REGION: ICD-10-CM

## 2020-07-14 DIAGNOSIS — M25.612 DECREASED RANGE OF MOTION OF LEFT SHOULDER: Primary | ICD-10-CM

## 2020-07-14 PROCEDURE — 99499 UNLISTED E&M SERVICE: CPT | Mod: S$PBB,,, | Performed by: PHYSICAL MEDICINE & REHABILITATION

## 2020-07-14 PROCEDURE — 3008F BODY MASS INDEX DOCD: CPT | Mod: HCNC,CPTII,S$GLB, | Performed by: PHYSICAL MEDICINE & REHABILITATION

## 2020-07-14 PROCEDURE — 99999 PR PBB SHADOW E&M-EST. PATIENT-LVL V: CPT | Mod: PBBFAC,HCNC,, | Performed by: PHYSICAL MEDICINE & REHABILITATION

## 2020-07-14 PROCEDURE — 1101F PT FALLS ASSESS-DOCD LE1/YR: CPT | Mod: HCNC,CPTII,S$GLB, | Performed by: PHYSICAL MEDICINE & REHABILITATION

## 2020-07-14 PROCEDURE — 3008F PR BODY MASS INDEX (BMI) DOCUMENTED: ICD-10-PCS | Mod: HCNC,CPTII,S$GLB, | Performed by: PHYSICAL MEDICINE & REHABILITATION

## 2020-07-14 PROCEDURE — 1125F AMNT PAIN NOTED PAIN PRSNT: CPT | Mod: HCNC,S$GLB,, | Performed by: PHYSICAL MEDICINE & REHABILITATION

## 2020-07-14 PROCEDURE — 1159F PR MEDICATION LIST DOCUMENTED IN MEDICAL RECORD: ICD-10-PCS | Mod: HCNC,S$GLB,, | Performed by: PHYSICAL MEDICINE & REHABILITATION

## 2020-07-14 PROCEDURE — 1101F PR PT FALLS ASSESS DOC 0-1 FALLS W/OUT INJ PAST YR: ICD-10-PCS | Mod: HCNC,CPTII,S$GLB, | Performed by: PHYSICAL MEDICINE & REHABILITATION

## 2020-07-14 PROCEDURE — 1125F PR PAIN SEVERITY QUANTIFIED, PAIN PRESENT: ICD-10-PCS | Mod: HCNC,S$GLB,, | Performed by: PHYSICAL MEDICINE & REHABILITATION

## 2020-07-14 PROCEDURE — 99204 PR OFFICE/OUTPT VISIT, NEW, LEVL IV, 45-59 MIN: ICD-10-PCS | Mod: HCNC,S$GLB,, | Performed by: PHYSICAL MEDICINE & REHABILITATION

## 2020-07-14 PROCEDURE — 1159F MED LIST DOCD IN RCRD: CPT | Mod: HCNC,S$GLB,, | Performed by: PHYSICAL MEDICINE & REHABILITATION

## 2020-07-14 PROCEDURE — 99204 OFFICE O/P NEW MOD 45 MIN: CPT | Mod: HCNC,S$GLB,, | Performed by: PHYSICAL MEDICINE & REHABILITATION

## 2020-07-14 PROCEDURE — 99999 PR PBB SHADOW E&M-EST. PATIENT-LVL V: ICD-10-PCS | Mod: PBBFAC,HCNC,, | Performed by: PHYSICAL MEDICINE & REHABILITATION

## 2020-07-14 PROCEDURE — 99499 RISK ADDL DX/OHS AUDIT: ICD-10-PCS | Mod: S$PBB,,, | Performed by: PHYSICAL MEDICINE & REHABILITATION

## 2020-07-14 NOTE — TELEPHONE ENCOUNTER
Pt scheduled for 7/22/20 at 1100 am for Left Shoulder Articular Branch Block. Pt aware to check in at registration desk on the first floor of the hospital for 1000 am. Pt taking ASA and aware she can continue taking it for this procedure. Pt denied having pacemaker/ICD.

## 2020-07-14 NOTE — H&P (VIEW-ONLY)
[facility]  New Patient H&P    Referring Provider: Mayelin Joaquin Pa-c  [address] [place] [address]  [address] [place] [address]    Chief Complaint:   Chief Complaint   Patient presents with    Shoulder Pain       History of Present Illness: Christine Castro is a 65 y.o. female referred by Mayelin Joaquin for shoulder pain and potential articular nerve block and RFA.      Onset: Several months ago, pain started after a fall not clear exactly when pain started  Location: left shoulder   Radiation: left arm just past the elbow  Timing: constant  Quality: Aching, Sharp and Shooting  Exacerbating Factors: lifting  Alleviating Factors: massage  Associated Symptoms: (+) Neck pain. (+) weakness in the left arm. (+) numbness in the left arm. denies night fever/night sweats, urinary incontinence, bowel incontinence, significant weight loss, significant motor weakness and loss of sensations    Severity: Currently: 6/10   Typical Range: 6-//10     Exacerbation: 10/10     She had a steroid injection on 5/28 with orthopedics, but relief only lasted about 1 month. Left shoulder pain limits her from doing pretty much everything she states.     Pain Disability Index  Family/Home Responsibilities:: 6  Recreation:: 6  Social Activity:: 6  Occupation:: 6  Sexual Behavior:: 6  Self Care:: 6  Life-Support Activities:: 6  Pain Disability Index (PDI): 42    Previous Interventions:  - corticosteroid injections    Previous Therapies:  PT/OT: no   Surgery:    - right knee arthroplasty  Previous Medications:   - NSAIDS:  Ibuprofen (listed allergy, causes asthma flare), Tylenol  - Muscle Relaxants:  Valium, tizanidine    - TCAs:   - SNRIs:   - Topicals:   - Anticonvulsants:    - Opioids:  Percocet    Current Pain Medications:  1. Percocet 7.5/325 mg    Blood Thinners:  Aspirin    Full Medication List:    Current Outpatient Medications:     acetaminophen (TYLENOL) 500 MG tablet, Take 500 mg by mouth every 6 (six) hours as needed  for Pain., Disp: , Rfl:     albuterol (VENTOLIN HFA) 90 mcg/actuation inhaler, INHALE 2 PUFFS INTO THE LUNGS EVERY 4 HOURS AS NEEDED FOR WHEEZING, Disp: 18 g, Rfl: 3    atorvastatin (LIPITOR) 40 MG tablet, Take 1 tablet (40 mg total) by mouth once daily. For cholesterol, Disp: 90 tablet, Rfl: 3    buPROPion (WELLBUTRIN SR) 200 MG SR12, Take 1 tablet (200 mg total) by mouth 2 (two) times daily., Disp: 180 tablet, Rfl: 2    busPIRone (BUSPAR) 10 MG tablet, TAKE 1/2 TABLET BY MOUTH ONCE DAILY (Patient taking differently: 10 mg. Pt is taking 10 mg once daily), Disp: 60 tablet, Rfl: 3    diabetic supplies, miscellan. Misc, Lancets for 1-2 times a day testing    dispense brand covered by insurance t, Disp: 60 each, Rfl: 3    diazePAM (VALIUM) 2 MG tablet, TAKE 1 TABLET(2 MG) BY MOUTH EVERY 24 hours AS NEEDED for anxiety, Disp: 30 tablet, Rfl: 1    diclofenac sodium (VOLTAREN) 1 % Gel, APPLY 2 GRAMS EXTERNALLY TO THE AFFECTED AREA FOUR TIMES DAILY, Disp: 100 g, Rfl: 5    ergocalciferol (ERGOCALCIFEROL) 50,000 unit Cap, Take 1 capsule (50,000 Units total) by mouth every 7 days., Disp: 12 capsule, Rfl: 3    escitalopram oxalate (LEXAPRO) 20 MG tablet, TAKE 1 TABLET(20 MG) BY MOUTH EVERY DAY, Disp: 90 tablet, Rfl: 0    furosemide (LASIX) 20 MG tablet, TAKE 1 TABLET(20 MG) BY MOUTH DAILY AS NEEDED, Disp: 90 tablet, Rfl: 0    gabapentin (NEURONTIN) 100 MG capsule, Take 100 mg by mouth 3 (three) times daily. , Disp: , Rfl:     ibandronate (BONIVA) 150 mg tablet, Take 1 tablet (150 mg total) by mouth every 30 days. For osteoporosis, Disp: 3 tablet, Rfl: 3    levothyroxine (SYNTHROID) 100 MCG tablet, TAKE 1 TABLET BY MOUTH EVERY DAY, Disp: 90 tablet, Rfl: 3    omeprazole (PRILOSEC) 40 MG capsule, TAKE 1 CAPSULE(40 MG) BY MOUTH EVERY MORNING, Disp: 90 capsule, Rfl: 1    ondansetron (ZOFRAN-ODT) 4 MG TbDL, Take 1 tablet (4 mg total) by mouth every 6 (six) hours as needed., Disp: 20 tablet, Rfl: 1     oxyCODONE-acetaminophen (PERCOCET) 7.5-325 mg per tablet, , Disp: , Rfl:     potassium chloride (KLOR-CON) 10 MEQ TbSR, TAKE 2 TABLETS(20 MEQ) BY MOUTH EVERY DAY (Patient taking differently: 10 mEq. TAKE 2 TABLETS(20 MEQ) BY MOUTH EVERY DAY), Disp: 180 tablet, Rfl: 0    predniSONE (DELTASONE) 5 MG tablet, Take 1 tablet (5 mg total) by mouth once daily., Disp: 14 tablet, Rfl: 0    promethazine (PHENERGAN) 25 MG tablet, TAKE 1 TABLET(25 MG) BY MOUTH EVERY 6 HOURS AS NEEDED, Disp: 25 tablet, Rfl: 0    tiZANidine (ZANAFLEX) 4 MG tablet, , Disp: , Rfl:     TRUE METRIX GLUCOSE METER Misc, U UTD, Disp: , Rfl:     TRUE METRIX GLUCOSE TEST STRIP Strp, USE TO TEST BLOOD SUGAR EVERY DAY, Disp: 100 strip, Rfl: 0    TRUE METRIX GLUCOSE TEST STRIP Strp, TO TEST ONCE TO TWICE DAILY, Disp: 50 strip, Rfl: 11    TRUEPLUS LANCETS 30 gauge Misc, USE TO TEST ONCE TO TWICE D, Disp: , Rfl:     zolpidem (AMBIEN) 5 MG Tab, Take 1 tablet (5 mg total) by mouth nightly., Disp: 30 tablet, Rfl: 4    aspirin (ECOTRIN) 81 MG EC tablet, Take 1 tablet (81 mg total) by mouth once daily., Disp: , Rfl: 0     Review of Systems:  Review of Systems   Constitutional: Negative for fever and weight loss.   HENT: Negative for ear pain and tinnitus.    Eyes: Negative for pain and redness.   Respiratory: Positive for shortness of breath. Negative for cough.    Cardiovascular: Negative for chest pain and palpitations.   Gastrointestinal: Positive for heartburn. Negative for constipation.   Genitourinary: Negative.         Denies urinary incontinence. Denies urine retention.    Musculoskeletal: Positive for joint pain and neck pain. Negative for back pain.   Skin: Negative for itching and rash.   Neurological: Positive for tingling and weakness. Negative for focal weakness and seizures.   Endo/Heme/Allergies: Negative for environmental allergies. Does not bruise/bleed easily.   Psychiatric/Behavioral: Positive for depression. The patient is  nervous/anxious and has insomnia.        Allergies:  Adhesive; Latex, natural rubber; and Ibuprofen     Medical History:   has a past medical history of Arthritis, Asthma, CHF (congestive heart failure), Colon cancer, COPD (chronic obstructive pulmonary disease), Coronary artery disease, Depression, Diabetes mellitus, type 2, GERD (gastroesophageal reflux disease), Myocardial infarction, and Thyroid disease.    Surgical History:   has a past surgical history that includes  section; Tonsillectomy; Colon surgery; Cholecystectomy; Ectopic pregnancy surgery; Tubal ligation; Gastric bypass; Colonoscopy; Colonoscopy (N/A, 2017); Colonoscopy (N/A, 4/10/2018); Esophagogastroduodenoscopy (N/A, 2019); Knee Arthroplasty (Left, 2019); Oophorectomy; and Knee Arthroplasty (Right, 2019).    Family History:  family history includes Breast cancer in her cousin, maternal aunt, and maternal aunt; Diabetes in her brother and mother; Hyperlipidemia in her mother; Hypertension in her brother, mother, and sister; Stroke in her mother.    Social History:   reports that she has never smoked. She has never used smokeless tobacco. She reports current alcohol use. She reports that she does not use drugs.    Physical Exam:  Wt 94.8 kg (208 lb 15.9 oz)   BMI 40.82 kg/m²   GEN: No acute distress. Calm, comfortable  HENT: Normocephalic, atraumatic, moist mucous membranes  EYE: Anicteric sclera, non-injected.   CV: Non-diaphoretic. Regular Rate. Radial Pulses 2+.  RESP: Breathing comfortably. Chest expansion symmetric.  EXT: No clubbing, cyanosis.   SKIN: Warm, & dry to palpation. No visible rashes or lesions of exposed skin.   PSYCH: Pleasant mood and appropriate affect. Recent and remote memory intact.   GAIT:  Mod Independent, utilizing wheelchair for mobility  Neck Exam:       Inspection: No erythema, bruising. Poor posture      Palpation: (+) TTP of left cervical paraspinals      ROM: No signficant Limitation in  flexion, extension, lateral bending or rotation. Pain with left lateral rotation reproduces pain into shoulder and arm on the left      Provocative Maneuvers:  (+) Spurling's on the left  Shoulder Exam:       Inspection: No erythema, bruising.       Palpation: Diffuse TTP about left shoulder.       ROM: (+) Limited in abduction, internal rotation on the left significant. Difficult abducting to 90 today      Provocative Maneuvers: limited due to pain  Neurologic Exam:     Alert. Speech is fluent and appropriate.     Cranial Nerves: Extra-ocular movements intact. Pupils equal. No strabismus. Face Symmetric. Jaw opens midline. Uvula midline. Tongue midline. Shoulder shrug symmetric.       Strength: Limited manual muscle testing in LUE due to pain in shoulder.      Sensation:  Grossly intact to light touch in bilateral upper extremities     Reflexes: 1+ in b/l patella, biceps     Tone: No abnormality appreciated in bilateral upper or lower extremities     (+) Munoz on the left      Imagin20 X-Ray Shoulder 2 or More Views Left   The bones are intact.  There is no evidence for acute fracture or bone destruction.  There is no evidence for dislocation.  There are prominent degenerative changes of the glenohumeral joint with joint space narrowing, subchondral sclerosis, and osteophyte formation.  Soft tissues appear unremarkable.  Impression:  No evidence for acute fracture, bone destruction, or dislocation.  Prominent degenerative changes of the left glenohumeral joint.    - CT C-spine 1/21/15:  There are moderate multilevel degenerative changes.  No gross evidence of acute cervical fracture or significant subluxation.  No gross pneumothorax at the extreme lung apices.  There is a great deal of streak artifact limiting soft tissue detail.    Labs:  BMP  Lab Results   Component Value Date     2020    K 4.4 2020     2020    CO2 23 2020    BUN 18 (H) 2020    CREATININE 1.03  04/02/2020    CALCIUM 8.7 04/02/2020    ANIONGAP 7 (L) 04/02/2020    ESTGFRAFRICA >60.0 04/02/2020    EGFRNONAA 57.2 (A) 04/02/2020     Lab Results   Component Value Date    ALT 30 02/26/2020    AST 51 (H) 02/26/2020    ALKPHOS 71 02/26/2020    BILITOT 0.2 02/26/2020     Lab Results   Component Value Date     02/26/2020       Assessment:  Christine Castro is a 65 y.o. female with the following diagnoses based on history, exam, and imaging:    Problem List Items Addressed This Visit     None      Visit Diagnoses     Decreased range of motion of left shoulder    -  Primary    Relevant Orders    Ambulatory referral/consult to Physical/Occupational Therapy    Shoulder arthritis        Relevant Orders    Ambulatory referral/consult to Physical/Occupational Therapy    Radiculopathy, cervical region        Relevant Orders    MRI Cervical Spine Without Contrast    X-Ray Cervical Spine 5 View W Flex Extxt    Munoz's reflex positive        Relevant Orders    MRI Cervical Spine Without Contrast    X-Ray Cervical Spine 5 View W Flex Extxt          This is a pleasant 65 y.o. lady with history of gastric bypass, CAD with history of MRI and TIA, depression, anemia, hypothyroidism, GERD, COPD/asthma presenting with:     1. Chronic Left shoulder pain.  X-rays reveal severe osteoarthritis of the glenohumeral joint on the left.  She has significant decreased range of motion in the left shoulder.  She is not a candidate for total shoulder replacement.  She is only getting limited duration of relief from steroid injections.  2.  Neck pain and positive Munoz reflex on the left.  I do believe that the shoulder pain is primarily generated from her glenohumeral joint, but considering the positive Munoz (could be from previous TIA?), reproduction of some pain with left lateral rotation of the cervical spine, I think we should rule out cervical etiology is possibly complicating her pain    Treatment Plan:   - PT/OT/HEP:  Refer to  physical therapy for left shoulder pain  - Procedures:  Schedule for glenohumeral articular branch block possibly followed by RFA if she gets good relief of her shoulder pain  - Medications: No changes recommended at this time.  - Imaging: Reviewed.  Will order x-ray and MRI of the cervical spine to assess for etiology of positive Bobo reflex and cervical radiculopathy possibly contributing to her shoulder and arm pain  - Labs: Reviewed. Slightly elevated AST and BUN on recent labs.    Follow Up: RTC after procedure    Nilam Santiago M.D.  Interventional Pain Medicine / Physical Medicine & Rehabilitation    Disclaimer: This note was partly generated using dictation software which may occasionally result in transcription errors.

## 2020-07-14 NOTE — LETTER
July 14, 2020      Mayelin Joaquin PA-C  [address]  [address]  [facility] [address]           [place] [facility]  [address] [facility] [facility] [address]  [facility] [address]  Phone: [phone]          Patient: Christine Castro   MR Number: [id number]   YOB: 1955   Date of Visit: 7/14/2020       Dear Mayelin Joaquin:    Thank you for referring Christine Castro to me for evaluation. Attached you will find relevant portions of my assessment and plan of care.    If you have questions, please do not hesitate to call me. I look forward to following Christine Castro along with you.    Sincerely,    Nilam Santiago MD    Enclosure  CC:  No Recipients    If you would like to receive this communication electronically, please contact [email or web address] or [phone] to request more information on [employer] Link access.    For providers and/or their staff who would like to refer a patient to [facility], please contact us through our one-stop-shop provider referral line, [facility], at [phone].    If you feel you have received this communication in error or would no longer like to receive these types of communications, please e-mail [email or web address]

## 2020-07-14 NOTE — PROGRESS NOTES
[facility]  New Patient H&P    Referring Provider: Mayelin Joaquin Pa-c  [address] [place] [address]  [address] [place] [id number]    Chief Complaint:   Chief Complaint   Patient presents with    Shoulder Pain       History of Present Illness: Christine Castro is a 65 y.o. female referred by Mayelin Joaquin for shoulder pain and potential articular nerve block and RFA.      Onset: Several months ago, pain started after a fall not clear exactly when pain started  Location: left shoulder   Radiation: left arm just past the elbow  Timing: constant  Quality: Aching, Sharp and Shooting  Exacerbating Factors: lifting  Alleviating Factors: massage  Associated Symptoms: (+) Neck pain. (+) weakness in the left arm. (+) numbness in the left arm. denies night fever/night sweats, urinary incontinence, bowel incontinence, significant weight loss, significant motor weakness and loss of sensations    Severity: Currently: 6/10   Typical Range: 6-//10     Exacerbation: 10/10     She had a steroid injection on 5/28 with orthopedics, but relief only lasted about 1 month. Left shoulder pain limits her from doing pretty much everything she states.     Pain Disability Index  Family/Home Responsibilities:: 6  Recreation:: 6  Social Activity:: 6  Occupation:: 6  Sexual Behavior:: 6  Self Care:: 6  Life-Support Activities:: 6  Pain Disability Index (PDI): 42    Previous Interventions:  - corticosteroid injections    Previous Therapies:  PT/OT: no   Surgery:    - right knee arthroplasty  Previous Medications:   - NSAIDS:  Ibuprofen (listed allergy, causes asthma flare), Tylenol  - Muscle Relaxants:  Valium, tizanidine    - TCAs:   - SNRIs:   - Topicals:   - Anticonvulsants:    - Opioids:  Percocet    Current Pain Medications:  1. Percocet 7.5/325 mg    Blood Thinners:  Aspirin    Full Medication List:    Current Outpatient Medications:     acetaminophen (TYLENOL) 500 MG tablet, Take 500 mg by mouth every 6 (six) hours as needed  for Pain., Disp: , Rfl:     albuterol (VENTOLIN HFA) 90 mcg/actuation inhaler, INHALE 2 PUFFS INTO THE LUNGS EVERY 4 HOURS AS NEEDED FOR WHEEZING, Disp: 18 g, Rfl: 3    atorvastatin (LIPITOR) 40 MG tablet, Take 1 tablet (40 mg total) by mouth once daily. For cholesterol, Disp: 90 tablet, Rfl: 3    buPROPion (WELLBUTRIN SR) 200 MG SR12, Take 1 tablet (200 mg total) by mouth 2 (two) times daily., Disp: 180 tablet, Rfl: 2    busPIRone (BUSPAR) 10 MG tablet, TAKE 1/2 TABLET BY MOUTH ONCE DAILY (Patient taking differently: 10 mg. Pt is taking 10 mg once daily), Disp: 60 tablet, Rfl: 3    diabetic supplies, miscellan. Misc, Lancets for 1-2 times a day testing    dispense brand covered by insurance t, Disp: 60 each, Rfl: 3    diazePAM (VALIUM) 2 MG tablet, TAKE 1 TABLET(2 MG) BY MOUTH EVERY 24 hours AS NEEDED for anxiety, Disp: 30 tablet, Rfl: 1    diclofenac sodium (VOLTAREN) 1 % Gel, APPLY 2 GRAMS EXTERNALLY TO THE AFFECTED AREA FOUR TIMES DAILY, Disp: 100 g, Rfl: 5    ergocalciferol (ERGOCALCIFEROL) 50,000 unit Cap, Take 1 capsule (50,000 Units total) by mouth every 7 days., Disp: 12 capsule, Rfl: 3    escitalopram oxalate (LEXAPRO) 20 MG tablet, TAKE 1 TABLET(20 MG) BY MOUTH EVERY DAY, Disp: 90 tablet, Rfl: 0    furosemide (LASIX) 20 MG tablet, TAKE 1 TABLET(20 MG) BY MOUTH DAILY AS NEEDED, Disp: 90 tablet, Rfl: 0    gabapentin (NEURONTIN) 100 MG capsule, Take 100 mg by mouth 3 (three) times daily. , Disp: , Rfl:     ibandronate (BONIVA) 150 mg tablet, Take 1 tablet (150 mg total) by mouth every 30 days. For osteoporosis, Disp: 3 tablet, Rfl: 3    levothyroxine (SYNTHROID) 100 MCG tablet, TAKE 1 TABLET BY MOUTH EVERY DAY, Disp: 90 tablet, Rfl: 3    omeprazole (PRILOSEC) 40 MG capsule, TAKE 1 CAPSULE(40 MG) BY MOUTH EVERY MORNING, Disp: 90 capsule, Rfl: 1    ondansetron (ZOFRAN-ODT) 4 MG TbDL, Take 1 tablet (4 mg total) by mouth every 6 (six) hours as needed., Disp: 20 tablet, Rfl: 1     oxyCODONE-acetaminophen (PERCOCET) 7.5-325 mg per tablet, , Disp: , Rfl:     potassium chloride (KLOR-CON) 10 MEQ TbSR, TAKE 2 TABLETS(20 MEQ) BY MOUTH EVERY DAY (Patient taking differently: 10 mEq. TAKE 2 TABLETS(20 MEQ) BY MOUTH EVERY DAY), Disp: 180 tablet, Rfl: 0    predniSONE (DELTASONE) 5 MG tablet, Take 1 tablet (5 mg total) by mouth once daily., Disp: 14 tablet, Rfl: 0    promethazine (PHENERGAN) 25 MG tablet, TAKE 1 TABLET(25 MG) BY MOUTH EVERY 6 HOURS AS NEEDED, Disp: 25 tablet, Rfl: 0    tiZANidine (ZANAFLEX) 4 MG tablet, , Disp: , Rfl:     TRUE METRIX GLUCOSE METER Misc, U UTD, Disp: , Rfl:     TRUE METRIX GLUCOSE TEST STRIP Strp, USE TO TEST BLOOD SUGAR EVERY DAY, Disp: 100 strip, Rfl: 0    TRUE METRIX GLUCOSE TEST STRIP Strp, TO TEST ONCE TO TWICE DAILY, Disp: 50 strip, Rfl: 11    TRUEPLUS LANCETS 30 gauge Misc, USE TO TEST ONCE TO TWICE D, Disp: , Rfl:     zolpidem (AMBIEN) 5 MG Tab, Take 1 tablet (5 mg total) by mouth nightly., Disp: 30 tablet, Rfl: 4    aspirin (ECOTRIN) 81 MG EC tablet, Take 1 tablet (81 mg total) by mouth once daily., Disp: , Rfl: 0     Review of Systems:  Review of Systems   Constitutional: Negative for fever and weight loss.   HENT: Negative for ear pain and tinnitus.    Eyes: Negative for pain and redness.   Respiratory: Positive for shortness of breath. Negative for cough.    Cardiovascular: Negative for chest pain and palpitations.   Gastrointestinal: Positive for heartburn. Negative for constipation.   Genitourinary: Negative.         Denies urinary incontinence. Denies urine retention.    Musculoskeletal: Positive for joint pain and neck pain. Negative for back pain.   Skin: Negative for itching and rash.   Neurological: Positive for tingling and weakness. Negative for focal weakness and seizures.   Endo/Heme/Allergies: Negative for environmental allergies. Does not bruise/bleed easily.   Psychiatric/Behavioral: Positive for depression. The patient is  nervous/anxious and has insomnia.        Allergies:  Adhesive; Latex, natural rubber; and Ibuprofen     Medical History:   has a past medical history of Arthritis, Asthma, CHF (congestive heart failure), Colon cancer, COPD (chronic obstructive pulmonary disease), Coronary artery disease, Depression, Diabetes mellitus, type 2, GERD (gastroesophageal reflux disease), Myocardial infarction, and Thyroid disease.    Surgical History:   has a past surgical history that includes  section; Tonsillectomy; Colon surgery; Cholecystectomy; Ectopic pregnancy surgery; Tubal ligation; Gastric bypass; Colonoscopy; Colonoscopy (N/A, 2017); Colonoscopy (N/A, 4/10/2018); Esophagogastroduodenoscopy (N/A, 2019); Knee Arthroplasty (Left, 2019); Oophorectomy; and Knee Arthroplasty (Right, 2019).    Family History:  family history includes Breast cancer in her cousin, maternal aunt, and maternal aunt; Diabetes in her brother and mother; Hyperlipidemia in her mother; Hypertension in her brother, mother, and sister; Stroke in her mother.    Social History:   reports that she has never smoked. She has never used smokeless tobacco. She reports current alcohol use. She reports that she does not use drugs.    Physical Exam:  Wt 94.8 kg (208 lb 15.9 oz)   BMI 40.82 kg/m²   GEN: No acute distress. Calm, comfortable  HENT: Normocephalic, atraumatic, moist mucous membranes  EYE: Anicteric sclera, non-injected.   CV: Non-diaphoretic. Regular Rate. Radial Pulses 2+.  RESP: Breathing comfortably. Chest expansion symmetric.  EXT: No clubbing, cyanosis.   SKIN: Warm, & dry to palpation. No visible rashes or lesions of exposed skin.   PSYCH: Pleasant mood and appropriate affect. Recent and remote memory intact.   GAIT:  Mod Independent, utilizing wheelchair for mobility  Neck Exam:       Inspection: No erythema, bruising. Poor posture      Palpation: (+) TTP of left cervical paraspinals      ROM: No signficant Limitation in  flexion, extension, lateral bending or rotation. Pain with left lateral rotation reproduces pain into shoulder and arm on the left      Provocative Maneuvers:  (+) Spurling's on the left  Shoulder Exam:       Inspection: No erythema, bruising.       Palpation: Diffuse TTP about left shoulder.       ROM: (+) Limited in abduction, internal rotation on the left significant. Difficult abducting to 90 today      Provocative Maneuvers: limited due to pain  Neurologic Exam:     Alert. Speech is fluent and appropriate.     Cranial Nerves: Extra-ocular movements intact. Pupils equal. No strabismus. Face Symmetric. Jaw opens midline. Uvula midline. Tongue midline. Shoulder shrug symmetric.       Strength: Limited manual muscle testing in LUE due to pain in shoulder.      Sensation:  Grossly intact to light touch in bilateral upper extremities     Reflexes: 1+ in b/l patella, biceps     Tone: No abnormality appreciated in bilateral upper or lower extremities     (+) Munoz on the left      Imagin20 X-Ray Shoulder 2 or More Views Left   The bones are intact.  There is no evidence for acute fracture or bone destruction.  There is no evidence for dislocation.  There are prominent degenerative changes of the glenohumeral joint with joint space narrowing, subchondral sclerosis, and osteophyte formation.  Soft tissues appear unremarkable.  Impression:  No evidence for acute fracture, bone destruction, or dislocation.  Prominent degenerative changes of the left glenohumeral joint.    - CT C-spine 1/21/15:  There are moderate multilevel degenerative changes.  No gross evidence of acute cervical fracture or significant subluxation.  No gross pneumothorax at the extreme lung apices.  There is a great deal of streak artifact limiting soft tissue detail.    Labs:  BMP  Lab Results   Component Value Date     2020    K 4.4 2020     2020    CO2 23 2020    BUN 18 (H) 2020    CREATININE 1.03  04/02/2020    CALCIUM 8.7 04/02/2020    ANIONGAP 7 (L) 04/02/2020    ESTGFRAFRICA >60.0 04/02/2020    EGFRNONAA 57.2 (A) 04/02/2020     Lab Results   Component Value Date    ALT 30 02/26/2020    AST 51 (H) 02/26/2020    ALKPHOS 71 02/26/2020    BILITOT 0.2 02/26/2020     Lab Results   Component Value Date     02/26/2020       Assessment:  Christine Castro is a 65 y.o. female with the following diagnoses based on history, exam, and imaging:    Problem List Items Addressed This Visit     None      Visit Diagnoses     Decreased range of motion of left shoulder    -  Primary    Relevant Orders    Ambulatory referral/consult to Physical/Occupational Therapy    Shoulder arthritis        Relevant Orders    Ambulatory referral/consult to Physical/Occupational Therapy    Radiculopathy, cervical region        Relevant Orders    MRI Cervical Spine Without Contrast    X-Ray Cervical Spine 5 View W Flex Extxt    Munoz's reflex positive        Relevant Orders    MRI Cervical Spine Without Contrast    X-Ray Cervical Spine 5 View W Flex Extxt          This is a pleasant 65 y.o. lady with history of gastric bypass, CAD with history of MRI and TIA, depression, anemia, hypothyroidism, GERD, COPD/asthma presenting with:     1. Chronic Left shoulder pain.  X-rays reveal severe osteoarthritis of the glenohumeral joint on the left.  She has significant decreased range of motion in the left shoulder.  She is not a candidate for total shoulder replacement.  She is only getting limited duration of relief from steroid injections.  2.  Neck pain and positive Munoz reflex on the left.  I do believe that the shoulder pain is primarily generated from her glenohumeral joint, but considering the positive Munoz (could be from previous TIA?), reproduction of some pain with left lateral rotation of the cervical spine, I think we should rule out cervical etiology is possibly complicating her pain    Treatment Plan:   - PT/OT/HEP:  Refer to  physical therapy for left shoulder pain  - Procedures:  Schedule for glenohumeral articular branch block possibly followed by RFA if she gets good relief of her shoulder pain  - Medications: No changes recommended at this time.  - Imaging: Reviewed.  Will order x-ray and MRI of the cervical spine to assess for etiology of positive Bobo reflex and cervical radiculopathy possibly contributing to her shoulder and arm pain  - Labs: Reviewed. Slightly elevated AST and BUN on recent labs.    Follow Up: RTC after procedure    Nilam Santiago M.D.  Interventional Pain Medicine / Physical Medicine & Rehabilitation    Disclaimer: This note was partly generated using dictation software which may occasionally result in transcription errors.

## 2020-07-21 ENCOUNTER — HOSPITAL ENCOUNTER (OUTPATIENT)
Dept: RADIOLOGY | Facility: HOSPITAL | Age: 65
Discharge: HOME | End: 2020-07-21
Attending: PHYSICAL MEDICINE & REHABILITATION
Payer: MEDICARE

## 2020-07-21 DIAGNOSIS — R29.2: ICD-10-CM

## 2020-07-21 DIAGNOSIS — M54.12 RADICULOPATHY, CERVICAL REGION: ICD-10-CM

## 2020-07-21 PROCEDURE — 72052 X-RAY EXAM NECK SPINE 6/>VWS: CPT | Mod: 26,HCNC,, | Performed by: RADIOLOGY

## 2020-07-21 PROCEDURE — 72141 MRI NECK SPINE W/O DYE: CPT | Mod: 26,HCNC,, | Performed by: RADIOLOGY

## 2020-07-21 PROCEDURE — 72052 XR CERVICAL SPINE 5 VIEW WITH FLEX AND EXT: ICD-10-PCS | Mod: 26,HCNC,, | Performed by: RADIOLOGY

## 2020-07-21 PROCEDURE — 72052 X-RAY EXAM NECK SPINE 6/>VWS: CPT | Mod: TC,HCNC,FY

## 2020-07-21 PROCEDURE — 72141 MRI CERVICAL SPINE WITHOUT CONTRAST: ICD-10-PCS | Mod: 26,HCNC,, | Performed by: RADIOLOGY

## 2020-07-21 PROCEDURE — 72141 MRI NECK SPINE W/O DYE: CPT | Mod: TC,HCNC

## 2020-07-22 ENCOUNTER — TELEPHONE (OUTPATIENT)
Dept: PAIN MEDICINE | Facility: CLINIC | Age: 65
End: 2020-07-22

## 2020-07-22 NOTE — TELEPHONE ENCOUNTER
----- Message from Deepa Louise sent at 7/22/2020 10:07 AM CDT -----  Regarding: call back  Contact: [phone] or [phone]  Patient is calling to talk to nurse in regards to she is running late for her procedure. Please advice

## 2020-07-22 NOTE — TELEPHONE ENCOUNTER
----- Message from Gianna Ames sent at 7/22/2020  9:12 AM CDT -----  Regarding: Procedure  Pt is calling to inform Staff she will be a little late for her procedure this morning because she was unaware she had another appt prior to her procedure and has to drive from [place] to [place].  She wants the procedure to be pushed back some.    She can be reached at [phone].  Pt says her phone is not working properly, so please text her a message.    Thank you.

## 2020-07-22 NOTE — PROGRESS NOTES
Imaging reviewed. Multiple areas of stenosis and spondylosis. I do think this could be contributing to the shoulder and arm pain.

## 2020-07-27 ENCOUNTER — PATIENT OUTREACH (OUTPATIENT)
Dept: ADMINISTRATIVE | Facility: OTHER | Age: 65
End: 2020-07-27

## 2020-07-27 NOTE — PROGRESS NOTES
Requested updates within Care Everywhere.  Patient's chart was reviewed for overdue TIA topics.  Immunizations reconciled.    Orders placed:  Tasked appts:  Labs Linked:

## 2020-07-29 ENCOUNTER — HOSPITAL ENCOUNTER (OUTPATIENT)
Facility: HOSPITAL | Age: 65
Discharge: HOME | End: 2020-07-29
Attending: PHYSICAL MEDICINE & REHABILITATION | Admitting: PHYSICAL MEDICINE & REHABILITATION
Payer: MEDICARE

## 2020-07-29 VITALS
TEMPERATURE: 98 F | HEIGHT: 60 IN | HEART RATE: 75 BPM | SYSTOLIC BLOOD PRESSURE: 104 MMHG | RESPIRATION RATE: 18 BRPM | OXYGEN SATURATION: 100 % | BODY MASS INDEX: 39.27 KG/M2 | DIASTOLIC BLOOD PRESSURE: 63 MMHG | WEIGHT: 200 LBS

## 2020-07-29 DIAGNOSIS — G89.29 CHRONIC PAIN: ICD-10-CM

## 2020-07-29 DIAGNOSIS — K21.9 GASTROESOPHAGEAL REFLUX DISEASE WITHOUT ESOPHAGITIS: ICD-10-CM

## 2020-07-29 DIAGNOSIS — M19.012 PRIMARY OSTEOARTHRITIS OF LEFT SHOULDER: Primary | ICD-10-CM

## 2020-07-29 LAB — POCT GLUCOSE: 110 MG/DL (ref 70–110)

## 2020-07-29 PROCEDURE — 64450 PR NERVE BLOCK INJ, ANES/STEROID, OTHER PERIPHERAL: ICD-10-PCS | Mod: 59,HCNC,LT, | Performed by: PHYSICAL MEDICINE & REHABILITATION

## 2020-07-29 PROCEDURE — 25000003 PHARM REV CODE 250: Mod: HCNC | Performed by: PHYSICAL MEDICINE & REHABILITATION

## 2020-07-29 PROCEDURE — 25500020 PHARM REV CODE 255: Mod: HCNC | Performed by: PHYSICAL MEDICINE & REHABILITATION

## 2020-07-29 PROCEDURE — 64450 NJX AA&/STRD OTHER PN/BRANCH: CPT | Mod: 59,HCNC,LT, | Performed by: PHYSICAL MEDICINE & REHABILITATION

## 2020-07-29 PROCEDURE — 64417 NJX AA&/STRD AX NERVE IMG: CPT | Mod: HCNC,LT,, | Performed by: PHYSICAL MEDICINE & REHABILITATION

## 2020-07-29 PROCEDURE — 64418 PR NERVE BLOCK INJ, ANES/STEROID, SUPRASCAPULAR: ICD-10-PCS | Mod: 59,HCNC,LT, | Performed by: PHYSICAL MEDICINE & REHABILITATION

## 2020-07-29 PROCEDURE — 64418 NJX AA&/STRD SPRSCAP NRV: CPT | Mod: 59,HCNC,LT, | Performed by: PHYSICAL MEDICINE & REHABILITATION

## 2020-07-29 PROCEDURE — 64418 NJX AA&/STRD SPRSCAP NRV: CPT

## 2020-07-29 PROCEDURE — 64417 PR NERVE BLOCK INJ, ANES/STEROID, AXILLARY, INCL IMAG GUIDANCE: ICD-10-PCS | Mod: HCNC,LT,, | Performed by: PHYSICAL MEDICINE & REHABILITATION

## 2020-07-29 PROCEDURE — 64417 NJX AA&/STRD AX NERVE IMG: CPT

## 2020-07-29 PROCEDURE — 64450 NJX AA&/STRD OTHER PN/BRANCH: CPT | Mod: HCNC | Performed by: PHYSICAL MEDICINE & REHABILITATION

## 2020-07-29 RX ORDER — SODIUM CHLORIDE 9 MG/ML
500 INJECTION, SOLUTION INTRAVENOUS CONTINUOUS
Status: ACTIVE | OUTPATIENT
Start: 2020-07-29 | End: 2020-07-30

## 2020-07-29 RX ORDER — BUPIVACAINE HYDROCHLORIDE 2.5 MG/ML
INJECTION, SOLUTION EPIDURAL; INFILTRATION; INTRACAUDAL
Status: DISCONTINUED | OUTPATIENT
Start: 2020-07-29 | End: 2020-07-29 | Stop reason: HOSPADM

## 2020-07-29 RX ORDER — SODIUM BICARBONATE 1 MEQ/ML
SYRINGE (ML) INTRAVENOUS
Status: DISCONTINUED | OUTPATIENT
Start: 2020-07-29 | End: 2020-07-29 | Stop reason: HOSPADM

## 2020-07-29 RX ORDER — LIDOCAINE HYDROCHLORIDE 10 MG/ML
INJECTION INFILTRATION; PERINEURAL
Status: DISCONTINUED | OUTPATIENT
Start: 2020-07-29 | End: 2020-07-29 | Stop reason: HOSPADM

## 2020-07-29 RX ORDER — ZOLPIDEM TARTRATE 5 MG/1
TABLET ORAL
Qty: 30 TABLET | Status: CANCELLED | OUTPATIENT
Start: 2020-07-29

## 2020-07-29 RX ADMIN — SODIUM CHLORIDE 500 ML: 0.9 INJECTION, SOLUTION INTRAVENOUS at 01:07

## 2020-07-29 NOTE — DISCHARGE SUMMARY
[facility]  Discharge Note  Short Stay     Admit Date: 7/29/2020    Discharge Date: 7/29/2020     Attending Physician: Nilam Santiago M.D.    Diagnoses:  Active Hospital Problems    Diagnosis  POA    Primary osteoarthritis of left shoulder [M19.012]  Yes      Resolved Hospital Problems   No resolved problems to display.     Discharged Condition: Good     Hospital Course: Patient was admitted for an outpatient interventional pain management procedure and tolerated the procedure well with no complications.     Final Diagnoses: Same as principal problem.     Disposition: Home or Self Care     Follow up/Patient Instructions:        Reconciled Medications:     Medication List      CHANGE how you take these medications    busPIRone 10 MG tablet  Commonly known as: BUSPAR  TAKE 1/2 TABLET BY MOUTH ONCE DAILY  What changed:   · how much to take  · how to take this  · when to take this  · additional instructions     potassium chloride 10 MEQ Tbsr  Commonly known as: KLOR-CON  TAKE 2 TABLETS(20 MEQ) BY MOUTH EVERY DAY  What changed: See the new instructions.        CONTINUE taking these medications    acetaminophen 500 MG tablet  Commonly known as: TYLENOL  Take 500 mg by mouth every 6 (six) hours as needed for Pain.     albuterol 90 mcg/actuation inhaler  Commonly known as: VENTOLIN HFA  INHALE 2 PUFFS INTO THE LUNGS EVERY 4 HOURS AS NEEDED FOR WHEEZING     aspirin 81 MG EC tablet  Commonly known as: ECOTRIN  Take 1 tablet (81 mg total) by mouth once daily.     atorvastatin 40 MG tablet  Commonly known as: LIPITOR  Take 1 tablet (40 mg total) by mouth once daily. For cholesterol     buPROPion 200 MG Sr12  Commonly known as: WELLBUTRIN SR  Take 1 tablet (200 mg total) by mouth 2 (two) times daily.     diabetic supplies, miscellan. Misc  Lancets for 1-2 times a day testing    dispense brand covered by insurance t     diazePAM 2 MG tablet  Commonly known as: VALIUM  TAKE 1 TABLET(2 MG) BY MOUTH EVERY 24 hours AS  NEEDED for anxiety     diclofenac sodium 1 % Gel  Commonly known as: VOLTAREN  APPLY 2 GRAMS EXTERNALLY TO THE AFFECTED AREA FOUR TIMES DAILY     ergocalciferol 50,000 unit Cap  Commonly known as: ERGOCALCIFEROL  Take 1 capsule (50,000 Units total) by mouth every 7 days.     escitalopram oxalate 20 MG tablet  Commonly known as: LEXAPRO  TAKE 1 TABLET(20 MG) BY MOUTH EVERY DAY     furosemide 20 MG tablet  Commonly known as: LASIX  TAKE 1 TABLET(20 MG) BY MOUTH DAILY AS NEEDED     gabapentin 100 MG capsule  Commonly known as: NEURONTIN  Take 100 mg by mouth 3 (three) times daily.     ibandronate 150 mg tablet  Commonly known as: BONIVA  Take 1 tablet (150 mg total) by mouth every 30 days. For osteoporosis     levothyroxine 100 MCG tablet  Commonly known as: SYNTHROID  TAKE 1 TABLET BY MOUTH EVERY DAY     omeprazole 40 MG capsule  Commonly known as: PRILOSEC  TAKE 1 CAPSULE(40 MG) BY MOUTH EVERY MORNING     ondansetron 4 MG Tbdl  Commonly known as: ZOFRAN-ODT  Take 1 tablet (4 mg total) by mouth every 6 (six) hours as needed.     oxyCODONE-acetaminophen 7.5-325 mg per tablet  Commonly known as: PERCOCET  Take 1 tablet by mouth every 12 (twelve) hours as needed for Pain.     predniSONE 5 MG tablet  Commonly known as: DELTASONE  Take 1 tablet (5 mg total) by mouth once daily.     promethazine 25 MG tablet  Commonly known as: PHENERGAN  TAKE 1 TABLET(25 MG) BY MOUTH EVERY 6 HOURS AS NEEDED     tiZANidine 4 MG tablet  Commonly known as: ZANAFLEX     TRUE METRIX GLUCOSE METER Misc  Generic drug: blood-glucose meter  U UTD     * TRUE METRIX GLUCOSE TEST STRIP Strp  Generic drug: blood sugar diagnostic  USE TO TEST BLOOD SUGAR EVERY DAY     * TRUE METRIX GLUCOSE TEST STRIP Strp  Generic drug: blood sugar diagnostic  TO TEST ONCE TO TWICE DAILY     TRUEPLUS LANCETS 30 gauge Misc  Generic drug: lancets  USE TO TEST ONCE TO TWICE D     zolpidem 5 MG Tab  Commonly known as: AMBIEN  Take 1 tablet (5 mg total) by mouth nightly.          * This list has 2 medication(s) that are the same as other medications prescribed for you. Read the directions carefully, and ask your doctor or other care provider to review them with you.               Discharge Procedure Orders (must include Diet, Follow-up, Activity)   Ice to affected area   Order Comments: Limit to 20 minute intervals or until skin is numb to the touch.     No driving until:   Order Comments: Until following day     Notify your health care provider if you experience any of the following:  temperature >100.4     Notify your health care provider if you experience any of the following:  persistent nausea and vomiting or diarrhea     Notify your health care provider if you experience any of the following:  severe uncontrolled pain     Notify your health care provider if you experience any of the following:  redness, tenderness, or signs of infection (pain, swelling, redness, odor or green/yellow discharge around incision site)     Notify your health care provider if you experience any of the following:  difficulty breathing or increased cough     Notify your health care provider if you experience any of the following:  severe persistent headache     Notify your health care provider if you experience any of the following:  worsening rash     Notify your health care provider if you experience any of the following:  persistent dizziness, light-headedness, or visual disturbances     Notify your health care provider if you experience any of the following:  increased confusion or weakness     No dressing needed     Shower on day dressing removed (No bath)   Order Comments: Do not soak in bath/pool/hot tub day of procedure. Shower glenn Santiago M.D.  Interventional Pain Medicine / Physical Medicine & Rehabilitation

## 2020-07-29 NOTE — OP NOTE
"Suprascapular, axillary and lateral pectoral articular branch block Under Fluoroscopic Guidance:  I have reviewed the patient's medications, allergies and relevant histories prior to the procedure and no contraindications have been identified. The risks, benefits and alternatives to the procedure were discussed with the patient, and all questions regarding the procedure were answered to the patient's satisfaction. I personally obtained Christine's consent prior to the start of the procedure and the signed consent can be found in the patient's chart.                                                         Time-out was taken to identify patient, procedure, laterality, and allergies prior to starting the procedure.       Date of Service: 07/29/2020  Procedure: Left Suprascapular, axillary and lateral pectoral articular branch block   Pre-Operative Diagnosis: Chronic Left Shoulder Pain  Post-Operative Diagnosis: Chronic Left Shoulder Pain    Physician: Nilam Santiago M.D.  Assistants: None    Medications Injected:  Bupivacaine 0.25% (1 mL at each target site)  Local Anesthetic: Xylocaine 1% 10 mL with Sodium Bicarbonate 1ml.   Sedation Medications: None    Procedural Technique:   Laying in a right lateral posterior oblique position, the patient was prepped and draped in the usual sterile fashion using ChloraPrep and fenestrated drape.      The Left glenohumeral joint was determined under fluoroscopic guidance.  Local anesthetic was given by going down to the hub of the 27-gauge 1.25in needle and raising a wheel.  Next, a 25 G 3.5" spinal needle was introduced through the anesthetized skin directed to contact os inferior to the spinoglenoid notch on the most lateral border of the glenoid fossa rim.  Iodinated contrast was administered demonstrating local accumulation over the target area.  Anesthesia was produced by injecting 1 mL of bupivacaine 0.25%.     Next, the junction of the greater tubercle and the surgical neck of " "the humerus was identified and marked on the skin.  The skin and subcutaneous tissues were infiltrated with lidocaine 1% using a 25 G, 1.5" needle.  A 25 G 3.5"  spinal needle was introduced through the anesthetized skin and directed to contact os at the target location.  Iodinated contrast was administered demonstrated local accumulation of the target area.  Anesthesia was produced by injecting 1 mL of bupivacaine 0.25%.    The fluoroscope was then repositioned for an ipsilateral obliqued AP picture with cephalic tilt to increase the length of the coracoid process on the image. The Left coracoid process was visualized under fluoroscopic guidance. A 25-gauge 3.5inch needle was introduced to the anatomic location of the target site for the lateral pectoralis nerve utilizing live fluoroscopy. 1 mL of bupivacaine 0.25% was then injected slowly after negative aspiration. The patient tolerated the procedure well.  The needle was removed and bandage applied to the area.    Estimated Blood Loss:  None.  Complications:  None.     Disposition: The patient tolerated the procedure well, and there were no apparent complications. Vital signs remained stable throughout the procedure. The patient was taken to the recovery area and monitored. The patient was supplied with written discharge instructions for the procedure. If helpful, we can repeat as needed. The patient was discharged in a stable condition.    Follow-Up: We will see the patient back in 1-2 weeks or the patient may call to inform of status.     "

## 2020-07-29 NOTE — DISCHARGE INSTRUCTIONS
Home Care Instructions Pain Management:    1.  DIET:    You may resume your normal diet today.    2.  BATHING:    You may shower with luke warm water.    3.  DRESSING:    You may remove your bandage today.    4.  ACTIVITY LEVEL:      You may resume your normal activities 24 hours after your procedure.    5.  MEDICATIONS:    You may resume your normal medications today.    6.  SPECIAL INSTRUCTIONS:    No heat to the injection site for 24 hours including bath or shower, heating pad, moist heat or hot tubs.    Use an ice pack to the injection site for any pain or discomfort.  Apply ice packs for 20 minute intervals as needed.    If you have received any sedatives by mouth today, you can not drive for 12 hours.    If you have received sedation through an IV, you can not drive for 24 hours.    PLEASE CALL YOUR DOCTOR FOR THE FOLLOWIN.  Redness or swelling around the injection site.  2.  Fever of 101 degrees.  3.  Drainage (pus) from the injection site.  4.  For any continuous bleeding (some dried blood over the incision is normal.)    FOR EMERGENCIES:    If any unusual problems or difficulties occur during clinic hours, call [phone] or dial [phone].    Follow up with with your physician in 2-3 weeks.

## 2020-07-29 NOTE — PLAN OF CARE
Patient back to recovery. Denies any pain. See VS per flowsheet. Discharge instructions given to patient, verbalized understanding. Pain diary given to pt and instructed on how to use over the next 24 hours, verbalized understanding. PIV removed. 3 bandaids noted to left shoulder, all 3 clean, dry, and intact

## 2020-07-30 RX ORDER — BUPROPION HYDROCHLORIDE 200 MG/1
TABLET, EXTENDED RELEASE ORAL
Qty: 90 TABLET | Refills: 3 | Status: SHIPPED | OUTPATIENT
Start: 2020-07-30 | End: 2021-05-25 | Stop reason: SDUPTHER

## 2020-07-30 RX ORDER — GABAPENTIN 100 MG/1
CAPSULE ORAL
Qty: 270 CAPSULE | Refills: 1 | Status: SHIPPED | OUTPATIENT
Start: 2020-07-30 | End: 2021-03-10 | Stop reason: SDUPTHER

## 2020-07-30 RX ORDER — ZOLPIDEM TARTRATE 5 MG/1
TABLET ORAL
Qty: 30 TABLET | Refills: 0 | Status: SHIPPED | OUTPATIENT
Start: 2020-07-30 | End: 2020-11-11 | Stop reason: SDUPTHER

## 2020-07-30 RX ORDER — OMEPRAZOLE 40 MG/1
CAPSULE, DELAYED RELEASE ORAL
Qty: 90 CAPSULE | Refills: 1 | Status: SHIPPED | OUTPATIENT
Start: 2020-07-30 | End: 2020-12-21 | Stop reason: SDUPTHER

## 2020-07-30 RX ORDER — DIAZEPAM 2 MG/1
TABLET ORAL
Qty: 30 TABLET | Refills: 0 | Status: SHIPPED | OUTPATIENT
Start: 2020-07-30 | End: 2020-09-03

## 2020-07-31 ENCOUNTER — TELEPHONE (OUTPATIENT)
Dept: FAMILY MEDICINE | Facility: CLINIC | Age: 65
End: 2020-07-31

## 2020-07-31 NOTE — TELEPHONE ENCOUNTER
----- Message from Senia Shah sent at 7/31/2020  1:11 PM CDT -----  Type:  RX Refill Request    Who Called:  Christine   Refill or New Rx: refill  RX Name and Strength: zolpidem (AMBIEN) 5 MG Tab   How is the patient currently taking it? (ex. 1XDay): 1 x day   Is this a 30 day or 90 day RX: 30 day   Preferred Pharm[facility] with phone number: [id number] - [place], [place] - [address] [facility] AT [facility] [address] [phone] (Phone)  [phone] (Fax)  Local or Mail Order: local   Ordering Provider:    Would the patient rather a call back or a response via [employer]?  Call back   Best Call Back Number: [phone]  Additional Information:  pharm did not receive this medication     Type:  RX Refill Request    Who Called:  Christine   Refill or New Rx: refill  RX Name and Strength: diazePAM (VALIUM) 2 MG tablet  How is the patient currently taking it? (ex. 1XDay): 1 x day   Is this a 30 day or 90 day RX: 30 day   Preferred Pharmacy with phone number: [id number] - [place], [place] - [address] [facility] AT [facility] [address] [facility] [address] [phone] (Phone)  [phone] (Fax)  Local or Mail Order: local   Ordering Provider:    Would the patient rather a call back or a response via [employer]?  Call back   Best Call Back Number: [phone]  Additional Information:  pharm did not receive this medication

## 2020-08-04 ENCOUNTER — OFFICE VISIT (OUTPATIENT)
Dept: PAIN MEDICINE | Facility: CLINIC | Age: 65
End: 2020-08-04
Payer: MEDICARE

## 2020-08-04 ENCOUNTER — PATIENT OUTREACH (OUTPATIENT)
Dept: ADMINISTRATIVE | Facility: HOSPITAL | Age: 65
End: 2020-08-04

## 2020-08-04 VITALS
WEIGHT: 199.94 LBS | BODY MASS INDEX: 39.05 KG/M2 | SYSTOLIC BLOOD PRESSURE: 104 MMHG | HEART RATE: 76 BPM | DIASTOLIC BLOOD PRESSURE: 60 MMHG

## 2020-08-04 DIAGNOSIS — M19.012 PRIMARY OSTEOARTHRITIS OF LEFT SHOULDER: Primary | ICD-10-CM

## 2020-08-04 DIAGNOSIS — G89.4 CHRONIC PAIN SYNDROME: ICD-10-CM

## 2020-08-04 DIAGNOSIS — M25.612 DECREASED RANGE OF MOTION OF LEFT SHOULDER: ICD-10-CM

## 2020-08-04 DIAGNOSIS — M19.019 SHOULDER ARTHRITIS: ICD-10-CM

## 2020-08-04 DIAGNOSIS — M54.12 RADICULOPATHY, CERVICAL REGION: ICD-10-CM

## 2020-08-04 DIAGNOSIS — R29.2: ICD-10-CM

## 2020-08-04 PROCEDURE — 1125F AMNT PAIN NOTED PAIN PRSNT: CPT | Mod: HCNC,S$GLB,, | Performed by: NURSE PRACTITIONER

## 2020-08-04 PROCEDURE — 99499 UNLISTED E&M SERVICE: CPT | Mod: S$PBB,,, | Performed by: NURSE PRACTITIONER

## 2020-08-04 PROCEDURE — 1159F MED LIST DOCD IN RCRD: CPT | Mod: HCNC,S$GLB,, | Performed by: NURSE PRACTITIONER

## 2020-08-04 PROCEDURE — 1101F PR PT FALLS ASSESS DOC 0-1 FALLS W/OUT INJ PAST YR: ICD-10-PCS | Mod: HCNC,CPTII,S$GLB, | Performed by: NURSE PRACTITIONER

## 2020-08-04 PROCEDURE — 1159F PR MEDICATION LIST DOCUMENTED IN MEDICAL RECORD: ICD-10-PCS | Mod: HCNC,S$GLB,, | Performed by: NURSE PRACTITIONER

## 2020-08-04 PROCEDURE — 3008F PR BODY MASS INDEX (BMI) DOCUMENTED: ICD-10-PCS | Mod: HCNC,CPTII,S$GLB, | Performed by: NURSE PRACTITIONER

## 2020-08-04 PROCEDURE — 99499 RISK ADDL DX/OHS AUDIT: ICD-10-PCS | Mod: S$PBB,,, | Performed by: NURSE PRACTITIONER

## 2020-08-04 PROCEDURE — 1125F PR PAIN SEVERITY QUANTIFIED, PAIN PRESENT: ICD-10-PCS | Mod: HCNC,S$GLB,, | Performed by: NURSE PRACTITIONER

## 2020-08-04 PROCEDURE — 99213 PR OFFICE/OUTPT VISIT, EST, LEVL III, 20-29 MIN: ICD-10-PCS | Mod: HCNC,S$GLB,, | Performed by: NURSE PRACTITIONER

## 2020-08-04 PROCEDURE — 99213 OFFICE O/P EST LOW 20 MIN: CPT | Mod: HCNC,S$GLB,, | Performed by: NURSE PRACTITIONER

## 2020-08-04 PROCEDURE — 3008F BODY MASS INDEX DOCD: CPT | Mod: HCNC,CPTII,S$GLB, | Performed by: NURSE PRACTITIONER

## 2020-08-04 PROCEDURE — 1101F PT FALLS ASSESS-DOCD LE1/YR: CPT | Mod: HCNC,CPTII,S$GLB, | Performed by: NURSE PRACTITIONER

## 2020-08-04 PROCEDURE — 99999 PR PBB SHADOW E&M-EST. PATIENT-LVL V: ICD-10-PCS | Mod: PBBFAC,HCNC,, | Performed by: NURSE PRACTITIONER

## 2020-08-04 PROCEDURE — 99999 PR PBB SHADOW E&M-EST. PATIENT-LVL V: CPT | Mod: PBBFAC,HCNC,, | Performed by: NURSE PRACTITIONER

## 2020-08-04 NOTE — H&P (VIEW-ONLY)
[facility]  Established  H&P    Referring Provider: No referring provider defined for this encounter.    Chief Complaint:   Chief Complaint   Patient presents with    Shoulder Pain     8/4/20 - Ms. Castro returns to clinic for follow up visit reporting improved left shoulder pain.  Patient is s/p left shoulder block on 7/29/20 with 90% relief 2-3 days and now pain is returning.  Patient also reported improved functionality following the injection.  Pain intensity is currently 3/10.      History of Present Illness: Christine Castro is a 65 y.o. female referred by No ref. provider found for shoulder pain and potential articular nerve block and RFA.      Onset: Several months ago, pain started after a fall not clear exactly when pain started  Location: left shoulder   Radiation: left arm just past the elbow  Timing: constant  Quality: Aching, Sharp and Shooting  Exacerbating Factors: lifting  Alleviating Factors: massage  Associated Symptoms: (+) Neck pain. (+) weakness in the left arm. (+) numbness in the left arm. denies night fever/night sweats, urinary incontinence, bowel incontinence, significant weight loss, significant motor weakness and loss of sensations    Severity: Currently: 6/10   Typical Range: 6-//10     Exacerbation: 10/10     She had a steroid injection on 5/28 with orthopedics, but relief only lasted about 1 month. Left shoulder pain limits her from doing pretty much everything she states.     Pain Disability Index  Family/Home Responsibilities:: 3  Recreation:: 3  Social Activity:: 3  Occupation:: 3  Sexual Behavior:: 3  Self Care:: 3  Life-Support Activities:: 3  Pain Disability Index (PDI): 21    Previous Interventions:  - corticosteroid injections    Previous Therapies:  PT/OT: no   Surgery:    - right knee arthroplasty  Previous Medications:   - NSAIDS:  Ibuprofen (listed allergy, causes asthma flare), Tylenol  - Muscle Relaxants:  Valium, tizanidine    - TCAs:   - SNRIs:   - Topicals:    - Anticonvulsants:    - Opioids:  Percocet    Current Pain Medications:  1. Percocet 7.5/325 mg    Blood Thinners:  Aspirin    Full Medication List:    Current Outpatient Medications:     acetaminophen (TYLENOL) 500 MG tablet, Take 500 mg by mouth every 6 (six) hours as needed for Pain., Disp: , Rfl:     albuterol (VENTOLIN HFA) 90 mcg/actuation inhaler, INHALE 2 PUFFS INTO THE LUNGS EVERY 4 HOURS AS NEEDED FOR WHEEZING, Disp: 18 g, Rfl: 3    atorvastatin (LIPITOR) 40 MG tablet, Take 1 tablet (40 mg total) by mouth once daily. For cholesterol, Disp: 90 tablet, Rfl: 3    buPROPion (WELLBUTRIN SR) 200 MG SR12, TAKE 1 TABLET(200 MG) BY MOUTH EVERY DAY, Disp: 90 tablet, Rfl: 3    busPIRone (BUSPAR) 10 MG tablet, TAKE 1/2 TABLET BY MOUTH ONCE DAILY (Patient taking differently: 10 mg. Pt is taking 10 mg once daily), Disp: 60 tablet, Rfl: 3    diabetic supplies, miscellan. Misc, Lancets for 1-2 times a day testing    dispense brand covered by insurance t, Disp: 60 each, Rfl: 3    diazePAM (VALIUM) 2 MG tablet, TAKE 1 TABLET(2 MG) BY MOUTH EVERY 24 HOURS AS NEEDED FOR ANXIETY, Disp: 30 tablet, Rfl: 0    diclofenac sodium (VOLTAREN) 1 % Gel, APPLY 2 GRAMS EXTERNALLY TO THE AFFECTED AREA FOUR TIMES DAILY, Disp: 100 g, Rfl: 5    ergocalciferol (ERGOCALCIFEROL) 50,000 unit Cap, Take 1 capsule (50,000 Units total) by mouth every 7 days., Disp: 12 capsule, Rfl: 3    escitalopram oxalate (LEXAPRO) 20 MG tablet, TAKE 1 TABLET(20 MG) BY MOUTH EVERY DAY, Disp: 90 tablet, Rfl: 0    furosemide (LASIX) 20 MG tablet, TAKE 1 TABLET(20 MG) BY MOUTH DAILY AS NEEDED, Disp: 90 tablet, Rfl: 0    gabapentin (NEURONTIN) 100 MG capsule, TAKE 1 CAPSULE(100 MG) BY MOUTH THREE TIMES DAILY, Disp: 270 capsule, Rfl: 1    ibandronate (BONIVA) 150 mg tablet, Take 1 tablet (150 mg total) by mouth every 30 days. For osteoporosis, Disp: 3 tablet, Rfl: 3    levothyroxine (SYNTHROID) 100 MCG tablet, TAKE 1 TABLET BY MOUTH EVERY DAY, Disp: 90  tablet, Rfl: 3    omeprazole (PRILOSEC) 40 MG capsule, TAKE 1 CAPSULE(40 MG) BY MOUTH EVERY MORNING, Disp: 90 capsule, Rfl: 1    ondansetron (ZOFRAN-ODT) 4 MG TbDL, Take 1 tablet (4 mg total) by mouth every 6 (six) hours as needed., Disp: 20 tablet, Rfl: 1    oxyCODONE-acetaminophen (PERCOCET) 7.5-325 mg per tablet, Take 1 tablet by mouth every 12 (twelve) hours as needed for Pain. , Disp: , Rfl:     potassium chloride (KLOR-CON) 10 MEQ TbSR, TAKE 2 TABLETS(20 MEQ) BY MOUTH EVERY DAY (Patient taking differently: 10 mEq. TAKE 2 TABLETS(20 MEQ) BY MOUTH EVERY DAY), Disp: 180 tablet, Rfl: 0    predniSONE (DELTASONE) 5 MG tablet, Take 1 tablet (5 mg total) by mouth once daily., Disp: 14 tablet, Rfl: 0    promethazine (PHENERGAN) 25 MG tablet, TAKE 1 TABLET(25 MG) BY MOUTH EVERY 6 HOURS AS NEEDED, Disp: 25 tablet, Rfl: 0    tiZANidine (ZANAFLEX) 4 MG tablet, , Disp: , Rfl:     TRUE METRIX GLUCOSE METER Misc, U UTD, Disp: , Rfl:     TRUE METRIX GLUCOSE TEST STRIP Strp, USE TO TEST BLOOD SUGAR EVERY DAY, Disp: 100 strip, Rfl: 0    TRUE METRIX GLUCOSE TEST STRIP Strp, TO TEST ONCE TO TWICE DAILY, Disp: 50 strip, Rfl: 11    TRUEPLUS LANCETS 30 gauge Misc, USE TO TEST ONCE TO TWICE D, Disp: , Rfl:     zolpidem (AMBIEN) 5 MG Tab, TAKE 1 TABLET BY MOUTH EVERY NIGHT AT BEDTIME FOR SLEEP, Disp: 30 tablet, Rfl: 0    aspirin (ECOTRIN) 81 MG EC tablet, Take 1 tablet (81 mg total) by mouth once daily., Disp: , Rfl: 0     Review of Systems:  Review of Systems   Constitutional: Negative for fever and weight loss.   HENT: Negative for ear pain and tinnitus.    Eyes: Negative for pain and redness.   Respiratory: Positive for shortness of breath. Negative for cough.    Cardiovascular: Negative for chest pain and palpitations.   Gastrointestinal: Positive for heartburn. Negative for constipation.   Genitourinary: Negative.         Denies urinary incontinence. Denies urine retention.    Musculoskeletal: Positive for joint pain  and neck pain. Negative for back pain.   Skin: Negative for itching and rash.   Neurological: Positive for tingling and weakness. Negative for focal weakness and seizures.   Endo/Heme/Allergies: Negative for environmental allergies. Does not bruise/bleed easily.   Psychiatric/Behavioral: Positive for depression. The patient is nervous/anxious and has insomnia.        Allergies:  Adhesive; Latex, natural rubber; and Ibuprofen     Medical History:   has a past medical history of Anticoagulant long-term use, Arthritis, Asthma, CHF (congestive heart failure), Colon cancer, COPD (chronic obstructive pulmonary disease), Coronary artery disease, Depression, Diabetes mellitus, type 2, GERD (gastroesophageal reflux disease), Myocardial infarction, and Thyroid disease.    Surgical History:   has a past surgical history that includes  section; Tonsillectomy; Colon surgery; Cholecystectomy; Ectopic pregnancy surgery; Tubal ligation; Gastric bypass; Colonoscopy; Colonoscopy (N/A, 2017); Colonoscopy (N/A, 4/10/2018); Esophagogastroduodenoscopy (N/A, 2019); Knee Arthroplasty (Left, 2019); Oophorectomy; Knee Arthroplasty (Right, 2019); Abdominal surgery; Fracture surgery; Hernia repair; and Injection of anesthetic agent around genitofemoral nerve (Left, 2020).    Family History:  family history includes Breast cancer in her cousin, maternal aunt, and maternal aunt; Diabetes in her brother and mother; Hyperlipidemia in her mother; Hypertension in her brother, mother, and sister; Stroke in her mother.    Social History:   reports that she has never smoked. She has never used smokeless tobacco. She reports current alcohol use. She reports that she does not use drugs.    Physical Exam:  /60   Pulse 76   Wt 90.7 kg (199 lb 15.3 oz)   BMI 39.05 kg/m²   GEN: No acute distress. Calm, comfortable  HENT: Normocephalic, atraumatic, moist mucous membranes  EYE: Anicteric sclera, non-injected.   CV:  Non-diaphoretic. Regular Rate. Radial Pulses 2+.  RESP: Breathing comfortably. Chest expansion symmetric.  EXT: No clubbing, cyanosis.   SKIN: Warm, & dry to palpation. No visible rashes or lesions of exposed skin.   PSYCH: Pleasant mood and appropriate affect. Recent and remote memory intact.   GAIT:  Mod Independent, utilizing wheelchair for mobility  Neck Exam:       Inspection: No erythema, bruising. Poor posture      Palpation: (+) TTP of left cervical paraspinals      ROM: No signficant Limitation in flexion, extension, lateral bending or rotation. Pain with left lateral rotation reproduces pain into shoulder and arm on the left      Provocative Maneuvers:  (+) Spurling's on the left  Shoulder Exam:       Inspection: No erythema, bruising.       Palpation: Diffuse TTP about left shoulder.       ROM: (+) Limited in abduction, internal rotation on the left significant. Difficult abducting to 90 today      Provocative Maneuvers: limited due to pain  Neurologic Exam:     Alert. Speech is fluent and appropriate.     Cranial Nerves: Extra-ocular movements intact. Pupils equal. No strabismus. Face Symmetric. Jaw opens midline. Uvula midline. Tongue midline. Shoulder shrug symmetric.       Strength: Limited manual muscle testing in LUE due to pain in shoulder.      Sensation:  Grossly intact to light touch in bilateral upper extremities     Reflexes: 1+ in b/l patella, biceps     Tone: No abnormality appreciated in bilateral upper or lower extremities     (+) Munoz on the left      Imagin20 X-Ray Shoulder 2 or More Views Left   The bones are intact.  There is no evidence for acute fracture or bone destruction.  There is no evidence for dislocation.  There are prominent degenerative changes of the glenohumeral joint with joint space narrowing, subchondral sclerosis, and osteophyte formation.  Soft tissues appear unremarkable.  Impression:  No evidence for acute fracture, bone destruction, or  dislocation.  Prominent degenerative changes of the left glenohumeral joint.    - CT C-spine 1/21/15:  There are moderate multilevel degenerative changes.  No gross evidence of acute cervical fracture or significant subluxation.  No gross pneumothorax at the extreme lung apices.  There is a great deal of streak artifact limiting soft tissue detail.    Labs:  BMP  Lab Results   Component Value Date     04/02/2020    K 4.4 04/02/2020     04/02/2020    CO2 23 04/02/2020    BUN 18 (H) 04/02/2020    CREATININE 1.03 04/02/2020    CALCIUM 8.7 04/02/2020    ANIONGAP 7 (L) 04/02/2020    ESTGFRAFRICA >60.0 04/02/2020    EGFRNONAA 57.2 (A) 04/02/2020     Lab Results   Component Value Date    ALT 30 02/26/2020    AST 51 (H) 02/26/2020    ALKPHOS 71 02/26/2020    BILITOT 0.2 02/26/2020     Lab Results   Component Value Date     02/26/2020       Assessment:  Christine Castro is a 65 y.o. female with the following diagnoses based on history, exam, and imaging:    Problem List Items Addressed This Visit        Neuro    Chronic pain       Orthopedic    Primary osteoarthritis of left shoulder - Primary      Other Visit Diagnoses     Shoulder arthritis        Munoz's reflex positive        Decreased range of motion of left shoulder        Radiculopathy, cervical region              This is a pleasant 65 y.o. lady with history of gastric bypass, CAD with history of MRI and TIA, depression, anemia, hypothyroidism, GERD, COPD/asthma presenting with:     1. Chronic Left shoulder pain.  X-rays reveal severe osteoarthritis of the glenohumeral joint on the left.  She has significant decreased range of motion in the left shoulder.  She is not a candidate for total shoulder replacement.  She is only getting limited duration of relief from steroid injections.  2.  Neck pain and positive Munoz reflex on the left.  I do believe that the shoulder pain is primarily generated from her glenohumeral joint, but considering the  positive Munoz (could be from previous TIA?), reproduction of some pain with left lateral rotation of the cervical spine, I think we should rule out cervical etiology is possibly complicating her pain    08/04/[id number]-old female presents status post Left Suprascapular, axillary and lateral pectoral articular branch block for her chronic left shoulder pain patient reported 90% relief for 2-3 days she reports improved functionality and better range of motion following injection of pain score today is 3/10.  Recommended we scheduled for a little him air articular branch RFA in effort to provide her with sustained relief RFA is have been shown provide relief for 6-18 months.    Treatment Plan:   - PT/OT/HEP:  Refer to physical therapy for left shoulder pain  - Procedures:  Schedule for glenohumeral articular branch RFA.   - Medications: No changes recommended at this time.  - Imaging: Reviewed.  Will order x-ray and MRI of the cervical spine to assess for etiology of positive Bobo reflex and cervical radiculopathy possibly contributing to her shoulder and arm pain  - Labs: Reviewed. Slightly elevated AST and BUN on recent labs.    Follow Up: RTC after procedure    Grant Baker NP-C  Interventional Pain Management      Disclaimer: This note was partly generated using dictation software which may occasionally result in transcription errors.

## 2020-08-04 NOTE — PROGRESS NOTES
[facility]  Established  H&P    Referring Provider: No referring provider defined for this encounter.    Chief Complaint:   Chief Complaint   Patient presents with    Shoulder Pain     8/4/20 - Ms. Castro returns to clinic for follow up visit reporting improved left shoulder pain.  Patient is s/p left shoulder block on 7/29/20 with 90% relief 2-3 days and now pain is returning.  Patient also reported improved functionality following the injection.  Pain intensity is currently 3/10.      History of Present Illness: Christine Castro is a 65 y.o. female referred by No ref. provider found for shoulder pain and potential articular nerve block and RFA.      Onset: Several months ago, pain started after a fall not clear exactly when pain started  Location: left shoulder   Radiation: left arm just past the elbow  Timing: constant  Quality: Aching, Sharp and Shooting  Exacerbating Factors: lifting  Alleviating Factors: massage  Associated Symptoms: (+) Neck pain. (+) weakness in the left arm. (+) numbness in the left arm. denies night fever/night sweats, urinary incontinence, bowel incontinence, significant weight loss, significant motor weakness and loss of sensations    Severity: Currently: 6/10   Typical Range: 6-//10     Exacerbation: 10/10     She had a steroid injection on 5/28 with orthopedics, but relief only lasted about 1 month. Left shoulder pain limits her from doing pretty much everything she states.     Pain Disability Index  Family/Home Responsibilities:: 3  Recreation:: 3  Social Activity:: 3  Occupation:: 3  Sexual Behavior:: 3  Self Care:: 3  Life-Support Activities:: 3  Pain Disability Index (PDI): 21    Previous Interventions:  - corticosteroid injections    Previous Therapies:  PT/OT: no   Surgery:    - right knee arthroplasty  Previous Medications:   - NSAIDS:  Ibuprofen (listed allergy, causes asthma flare), Tylenol  - Muscle Relaxants:  Valium, tizanidine    - TCAs:   - SNRIs:   - Topicals:    - Anticonvulsants:    - Opioids:  Percocet    Current Pain Medications:  1. Percocet 7.5/325 mg    Blood Thinners:  Aspirin    Full Medication List:    Current Outpatient Medications:     acetaminophen (TYLENOL) 500 MG tablet, Take 500 mg by mouth every 6 (six) hours as needed for Pain., Disp: , Rfl:     albuterol (VENTOLIN HFA) 90 mcg/actuation inhaler, INHALE 2 PUFFS INTO THE LUNGS EVERY 4 HOURS AS NEEDED FOR WHEEZING, Disp: 18 g, Rfl: 3    atorvastatin (LIPITOR) 40 MG tablet, Take 1 tablet (40 mg total) by mouth once daily. For cholesterol, Disp: 90 tablet, Rfl: 3    buPROPion (WELLBUTRIN SR) 200 MG SR12, TAKE 1 TABLET(200 MG) BY MOUTH EVERY DAY, Disp: 90 tablet, Rfl: 3    busPIRone (BUSPAR) 10 MG tablet, TAKE 1/2 TABLET BY MOUTH ONCE DAILY (Patient taking differently: 10 mg. Pt is taking 10 mg once daily), Disp: 60 tablet, Rfl: 3    diabetic supplies, miscellan. Misc, Lancets for 1-2 times a day testing    dispense brand covered by insurance t, Disp: 60 each, Rfl: 3    diazePAM (VALIUM) 2 MG tablet, TAKE 1 TABLET(2 MG) BY MOUTH EVERY 24 HOURS AS NEEDED FOR ANXIETY, Disp: 30 tablet, Rfl: 0    diclofenac sodium (VOLTAREN) 1 % Gel, APPLY 2 GRAMS EXTERNALLY TO THE AFFECTED AREA FOUR TIMES DAILY, Disp: 100 g, Rfl: 5    ergocalciferol (ERGOCALCIFEROL) 50,000 unit Cap, Take 1 capsule (50,000 Units total) by mouth every 7 days., Disp: 12 capsule, Rfl: 3    escitalopram oxalate (LEXAPRO) 20 MG tablet, TAKE 1 TABLET(20 MG) BY MOUTH EVERY DAY, Disp: 90 tablet, Rfl: 0    furosemide (LASIX) 20 MG tablet, TAKE 1 TABLET(20 MG) BY MOUTH DAILY AS NEEDED, Disp: 90 tablet, Rfl: 0    gabapentin (NEURONTIN) 100 MG capsule, TAKE 1 CAPSULE(100 MG) BY MOUTH THREE TIMES DAILY, Disp: 270 capsule, Rfl: 1    ibandronate (BONIVA) 150 mg tablet, Take 1 tablet (150 mg total) by mouth every 30 days. For osteoporosis, Disp: 3 tablet, Rfl: 3    levothyroxine (SYNTHROID) 100 MCG tablet, TAKE 1 TABLET BY MOUTH EVERY DAY, Disp: 90  tablet, Rfl: 3    omeprazole (PRILOSEC) 40 MG capsule, TAKE 1 CAPSULE(40 MG) BY MOUTH EVERY MORNING, Disp: 90 capsule, Rfl: 1    ondansetron (ZOFRAN-ODT) 4 MG TbDL, Take 1 tablet (4 mg total) by mouth every 6 (six) hours as needed., Disp: 20 tablet, Rfl: 1    oxyCODONE-acetaminophen (PERCOCET) 7.5-325 mg per tablet, Take 1 tablet by mouth every 12 (twelve) hours as needed for Pain. , Disp: , Rfl:     potassium chloride (KLOR-CON) 10 MEQ TbSR, TAKE 2 TABLETS(20 MEQ) BY MOUTH EVERY DAY (Patient taking differently: 10 mEq. TAKE 2 TABLETS(20 MEQ) BY MOUTH EVERY DAY), Disp: 180 tablet, Rfl: 0    predniSONE (DELTASONE) 5 MG tablet, Take 1 tablet (5 mg total) by mouth once daily., Disp: 14 tablet, Rfl: 0    promethazine (PHENERGAN) 25 MG tablet, TAKE 1 TABLET(25 MG) BY MOUTH EVERY 6 HOURS AS NEEDED, Disp: 25 tablet, Rfl: 0    tiZANidine (ZANAFLEX) 4 MG tablet, , Disp: , Rfl:     TRUE METRIX GLUCOSE METER Misc, U UTD, Disp: , Rfl:     TRUE METRIX GLUCOSE TEST STRIP Strp, USE TO TEST BLOOD SUGAR EVERY DAY, Disp: 100 strip, Rfl: 0    TRUE METRIX GLUCOSE TEST STRIP Strp, TO TEST ONCE TO TWICE DAILY, Disp: 50 strip, Rfl: 11    TRUEPLUS LANCETS 30 gauge Misc, USE TO TEST ONCE TO TWICE D, Disp: , Rfl:     zolpidem (AMBIEN) 5 MG Tab, TAKE 1 TABLET BY MOUTH EVERY NIGHT AT BEDTIME FOR SLEEP, Disp: 30 tablet, Rfl: 0    aspirin (ECOTRIN) 81 MG EC tablet, Take 1 tablet (81 mg total) by mouth once daily., Disp: , Rfl: 0     Review of Systems:  Review of Systems   Constitutional: Negative for fever and weight loss.   HENT: Negative for ear pain and tinnitus.    Eyes: Negative for pain and redness.   Respiratory: Positive for shortness of breath. Negative for cough.    Cardiovascular: Negative for chest pain and palpitations.   Gastrointestinal: Positive for heartburn. Negative for constipation.   Genitourinary: Negative.         Denies urinary incontinence. Denies urine retention.    Musculoskeletal: Positive for joint pain  and neck pain. Negative for back pain.   Skin: Negative for itching and rash.   Neurological: Positive for tingling and weakness. Negative for focal weakness and seizures.   Endo/Heme/Allergies: Negative for environmental allergies. Does not bruise/bleed easily.   Psychiatric/Behavioral: Positive for depression. The patient is nervous/anxious and has insomnia.        Allergies:  Adhesive; Latex, natural rubber; and Ibuprofen     Medical History:   has a past medical history of Anticoagulant long-term use, Arthritis, Asthma, CHF (congestive heart failure), Colon cancer, COPD (chronic obstructive pulmonary disease), Coronary artery disease, Depression, Diabetes mellitus, type 2, GERD (gastroesophageal reflux disease), Myocardial infarction, and Thyroid disease.    Surgical History:   has a past surgical history that includes  section; Tonsillectomy; Colon surgery; Cholecystectomy; Ectopic pregnancy surgery; Tubal ligation; Gastric bypass; Colonoscopy; Colonoscopy (N/A, 2017); Colonoscopy (N/A, 4/10/2018); Esophagogastroduodenoscopy (N/A, 2019); Knee Arthroplasty (Left, 2019); Oophorectomy; Knee Arthroplasty (Right, 2019); Abdominal surgery; Fracture surgery; Hernia repair; and Injection of anesthetic agent around genitofemoral nerve (Left, 2020).    Family History:  family history includes Breast cancer in her cousin, maternal aunt, and maternal aunt; Diabetes in her brother and mother; Hyperlipidemia in her mother; Hypertension in her brother, mother, and sister; Stroke in her mother.    Social History:   reports that she has never smoked. She has never used smokeless tobacco. She reports current alcohol use. She reports that she does not use drugs.    Physical Exam:  /60   Pulse 76   Wt 90.7 kg (199 lb 15.3 oz)   BMI 39.05 kg/m²   GEN: No acute distress. Calm, comfortable  HENT: Normocephalic, atraumatic, moist mucous membranes  EYE: Anicteric sclera, non-injected.   CV:  Non-diaphoretic. Regular Rate. Radial Pulses 2+.  RESP: Breathing comfortably. Chest expansion symmetric.  EXT: No clubbing, cyanosis.   SKIN: Warm, & dry to palpation. No visible rashes or lesions of exposed skin.   PSYCH: Pleasant mood and appropriate affect. Recent and remote memory intact.   GAIT:  Mod Independent, utilizing wheelchair for mobility  Neck Exam:       Inspection: No erythema, bruising. Poor posture      Palpation: (+) TTP of left cervical paraspinals      ROM: No signficant Limitation in flexion, extension, lateral bending or rotation. Pain with left lateral rotation reproduces pain into shoulder and arm on the left      Provocative Maneuvers:  (+) Spurling's on the left  Shoulder Exam:       Inspection: No erythema, bruising.       Palpation: Diffuse TTP about left shoulder.       ROM: (+) Limited in abduction, internal rotation on the left significant. Difficult abducting to 90 today      Provocative Maneuvers: limited due to pain  Neurologic Exam:     Alert. Speech is fluent and appropriate.     Cranial Nerves: Extra-ocular movements intact. Pupils equal. No strabismus. Face Symmetric. Jaw opens midline. Uvula midline. Tongue midline. Shoulder shrug symmetric.       Strength: Limited manual muscle testing in LUE due to pain in shoulder.      Sensation:  Grossly intact to light touch in bilateral upper extremities     Reflexes: 1+ in b/l patella, biceps     Tone: No abnormality appreciated in bilateral upper or lower extremities     (+) Munoz on the left      Imagin20 X-Ray Shoulder 2 or More Views Left   The bones are intact.  There is no evidence for acute fracture or bone destruction.  There is no evidence for dislocation.  There are prominent degenerative changes of the glenohumeral joint with joint space narrowing, subchondral sclerosis, and osteophyte formation.  Soft tissues appear unremarkable.  Impression:  No evidence for acute fracture, bone destruction, or  dislocation.  Prominent degenerative changes of the left glenohumeral joint.    - CT C-spine 1/21/15:  There are moderate multilevel degenerative changes.  No gross evidence of acute cervical fracture or significant subluxation.  No gross pneumothorax at the extreme lung apices.  There is a great deal of streak artifact limiting soft tissue detail.    Labs:  BMP  Lab Results   Component Value Date     04/02/2020    K 4.4 04/02/2020     04/02/2020    CO2 23 04/02/2020    BUN 18 (H) 04/02/2020    CREATININE 1.03 04/02/2020    CALCIUM 8.7 04/02/2020    ANIONGAP 7 (L) 04/02/2020    ESTGFRAFRICA >60.0 04/02/2020    EGFRNONAA 57.2 (A) 04/02/2020     Lab Results   Component Value Date    ALT 30 02/26/2020    AST 51 (H) 02/26/2020    ALKPHOS 71 02/26/2020    BILITOT 0.2 02/26/2020     Lab Results   Component Value Date     02/26/2020       Assessment:  Christine Castro is a 65 y.o. female with the following diagnoses based on history, exam, and imaging:    Problem List Items Addressed This Visit        Neuro    Chronic pain       Orthopedic    Primary osteoarthritis of left shoulder - Primary      Other Visit Diagnoses     Shoulder arthritis        Munoz's reflex positive        Decreased range of motion of left shoulder        Radiculopathy, cervical region              This is a pleasant 65 y.o. lady with history of gastric bypass, CAD with history of MRI and TIA, depression, anemia, hypothyroidism, GERD, COPD/asthma presenting with:     1. Chronic Left shoulder pain.  X-rays reveal severe osteoarthritis of the glenohumeral joint on the left.  She has significant decreased range of motion in the left shoulder.  She is not a candidate for total shoulder replacement.  She is only getting limited duration of relief from steroid injections.  2.  Neck pain and positive Munoz reflex on the left.  I do believe that the shoulder pain is primarily generated from her glenohumeral joint, but considering the  positive Munoz (could be from previous TIA?), reproduction of some pain with left lateral rotation of the cervical spine, I think we should rule out cervical etiology is possibly complicating her pain    08/04/[id number]-bftw-old female presents status post Left Suprascapular, axillary and lateral pectoral articular branch block for her chronic left shoulder pain patient reported 90% relief for 2-3 days she reports improved functionality and better range of motion following injection of pain score today is 3/10.  Recommended we scheduled for a little him air articular branch RFA in effort to provide her with sustained relief RFA is have been shown provide relief for 6-18 months.    Treatment Plan:   - PT/OT/HEP:  Refer to physical therapy for left shoulder pain  - Procedures:  Schedule for glenohumeral articular branch RFA.   - Medications: No changes recommended at this time.  - Imaging: Reviewed.  Will order x-ray and MRI of the cervical spine to assess for etiology of positive Bobo reflex and cervical radiculopathy possibly contributing to her shoulder and arm pain  - Labs: Reviewed. Slightly elevated AST and BUN on recent labs.    Follow Up: RTC after procedure    Grant Baker NP-C  [facility]      Disclaimer: This note was partly generated using dictation software which may occasionally result in transcription errors.

## 2020-08-05 ENCOUNTER — TELEPHONE (OUTPATIENT)
Dept: PAIN MEDICINE | Facility: CLINIC | Age: 65
End: 2020-08-05

## 2020-08-05 DIAGNOSIS — M19.012 PRIMARY OSTEOARTHRITIS OF LEFT SHOULDER: Primary | ICD-10-CM

## 2020-08-05 DIAGNOSIS — M25.512 CHRONIC LEFT SHOULDER PAIN: ICD-10-CM

## 2020-08-05 DIAGNOSIS — G89.29 CHRONIC LEFT SHOULDER PAIN: ICD-10-CM

## 2020-08-05 NOTE — TELEPHONE ENCOUNTER
Pt scheduled for 8/12/20 at 0930 am for Left Suprascapular RFA. Pt aware to check in at registration desk on the first floor of the hospital for 0830 am. Pt is taking ASA and aware to hold 3 days prior to procedure. Pt denied having a pacemaker/ICD.

## 2020-08-10 ENCOUNTER — TELEPHONE (OUTPATIENT)
Dept: FAMILY MEDICINE | Facility: CLINIC | Age: 65
End: 2020-08-10

## 2020-08-10 NOTE — TELEPHONE ENCOUNTER
----- Message from Navjot Romero sent at 8/10/2020  8:26 AM CDT -----  Contact: Patient  Pt called and said that she would like to  her Medicare care this morning    Pt can be reached [phone]

## 2020-08-10 NOTE — DISCHARGE INSTRUCTIONS
Home Care Instructions Pain Management:    1.  DIET:    You may resume your normal diet today.    2.  BATHING:    You may shower with luke warm water.    3.  DRESSING:    You may remove your bandage today.    4.  ACTIVITY LEVEL:      You may resume your normal activities 24 hours after your procedure.    5.  MEDICATIONS:    You may resume your normal medications today.    6.  SPECIAL INSTRUCTIONS:    No heat to the injection site for 24 hours including bath or shower, heating pad, moist heat or hot tubs.    Use an ice pack to the injection site for any pain or discomfort.  Apply ice packs for 20 minute intervals as needed.    If you have received any sedatives by mouth today, you can not drive for 12 hours.    If you have received sedation through an IV, you can not drive for 24 hours.    PLEASE CALL YOUR DOCTOR FOR THE FOLLOWIN.  Redness or swelling around the injection site.  2.  Fever of 101 degrees.  3.  Drainage (pus) from the injection site.  4.  For any continuous bleeding (some dried blood over the incision is normal.)    FOR EMERGENCIES:    If any unusual problems or difficulties occur during clinic hours, call [phone] or dial [phone].    Follow up with with your physician in 2-3 weeks.

## 2020-08-12 ENCOUNTER — HOSPITAL ENCOUNTER (OUTPATIENT)
Facility: HOSPITAL | Age: 65
Discharge: HOME | End: 2020-08-12
Attending: PHYSICAL MEDICINE & REHABILITATION | Admitting: PHYSICAL MEDICINE & REHABILITATION
Payer: MEDICARE

## 2020-08-12 VITALS
BODY MASS INDEX: 38.68 KG/M2 | DIASTOLIC BLOOD PRESSURE: 77 MMHG | SYSTOLIC BLOOD PRESSURE: 135 MMHG | WEIGHT: 197 LBS | HEART RATE: 82 BPM | RESPIRATION RATE: 16 BRPM | HEIGHT: 60 IN | OXYGEN SATURATION: 100 % | TEMPERATURE: 98 F

## 2020-08-12 DIAGNOSIS — G89.29 CHRONIC LEFT SHOULDER PAIN: ICD-10-CM

## 2020-08-12 DIAGNOSIS — M19.012 PRIMARY OSTEOARTHRITIS OF LEFT SHOULDER: Primary | ICD-10-CM

## 2020-08-12 DIAGNOSIS — G89.29 CHRONIC PAIN: ICD-10-CM

## 2020-08-12 DIAGNOSIS — M25.512 CHRONIC LEFT SHOULDER PAIN: ICD-10-CM

## 2020-08-12 LAB — POCT GLUCOSE: 78 MG/DL (ref 70–110)

## 2020-08-12 PROCEDURE — A4649 SURGICAL SUPPLIES: HCPCS | Mod: HCNC | Performed by: PHYSICAL MEDICINE & REHABILITATION

## 2020-08-12 PROCEDURE — 99152 MOD SED SAME PHYS/QHP 5/>YRS: CPT | Mod: HCNC | Performed by: PHYSICAL MEDICINE & REHABILITATION

## 2020-08-12 PROCEDURE — 64640 INJECTION TREATMENT OF NERVE: CPT | Mod: HCNC | Performed by: PHYSICAL MEDICINE & REHABILITATION

## 2020-08-12 PROCEDURE — 64640 PR DESTRUCT BY NEURO AGENT; OTHER PERIPH NERVE: ICD-10-PCS | Mod: HCNC,LT,, | Performed by: PHYSICAL MEDICINE & REHABILITATION

## 2020-08-12 PROCEDURE — 25000003 PHARM REV CODE 250: Mod: HCNC | Performed by: PHYSICAL MEDICINE & REHABILITATION

## 2020-08-12 PROCEDURE — 63600175 PHARM REV CODE 636 W HCPCS: Mod: HCNC | Performed by: PHYSICAL MEDICINE & REHABILITATION

## 2020-08-12 PROCEDURE — 64640 INJECTION TREATMENT OF NERVE: CPT | Mod: HCNC,LT,, | Performed by: PHYSICAL MEDICINE & REHABILITATION

## 2020-08-12 PROCEDURE — 99153 MOD SED SAME PHYS/QHP EA: CPT | Mod: HCNC | Performed by: PHYSICAL MEDICINE & REHABILITATION

## 2020-08-12 RX ORDER — LIDOCAINE HYDROCHLORIDE 10 MG/ML
INJECTION INFILTRATION; PERINEURAL
Status: DISCONTINUED | OUTPATIENT
Start: 2020-08-12 | End: 2020-08-12 | Stop reason: HOSPADM

## 2020-08-12 RX ORDER — FENTANYL CITRATE 50 UG/ML
INJECTION, SOLUTION INTRAMUSCULAR; INTRAVENOUS
Status: DISCONTINUED | OUTPATIENT
Start: 2020-08-12 | End: 2020-08-12 | Stop reason: HOSPADM

## 2020-08-12 RX ORDER — SODIUM CHLORIDE 9 MG/ML
500 INJECTION, SOLUTION INTRAVENOUS CONTINUOUS
Status: ACTIVE | OUTPATIENT
Start: 2020-08-12 | End: 2020-08-13

## 2020-08-12 RX ORDER — BUPIVACAINE HYDROCHLORIDE 2.5 MG/ML
INJECTION, SOLUTION EPIDURAL; INFILTRATION; INTRACAUDAL
Status: DISCONTINUED | OUTPATIENT
Start: 2020-08-12 | End: 2020-08-12 | Stop reason: HOSPADM

## 2020-08-12 RX ORDER — LIDOCAINE HYDROCHLORIDE 20 MG/ML
INJECTION, SOLUTION EPIDURAL; INFILTRATION; INTRACAUDAL; PERINEURAL
Status: DISCONTINUED | OUTPATIENT
Start: 2020-08-12 | End: 2020-08-12 | Stop reason: HOSPADM

## 2020-08-12 RX ORDER — SODIUM BICARBONATE 1 MEQ/ML
SYRINGE (ML) INTRAVENOUS
Status: DISCONTINUED | OUTPATIENT
Start: 2020-08-12 | End: 2020-08-12 | Stop reason: HOSPADM

## 2020-08-12 RX ORDER — METHYLPREDNISOLONE ACETATE 40 MG/ML
INJECTION, SUSPENSION INTRA-ARTICULAR; INTRALESIONAL; INTRAMUSCULAR; SOFT TISSUE
Status: DISCONTINUED | OUTPATIENT
Start: 2020-08-12 | End: 2020-08-12 | Stop reason: HOSPADM

## 2020-08-12 RX ADMIN — SODIUM CHLORIDE 500 ML: 0.9 INJECTION, SOLUTION INTRAVENOUS at 09:08

## 2020-08-12 NOTE — OP NOTE
Cooled Radiofrequency Ablation of the Shoulder:  I have reviewed the patient's medications, allergies and relevant histories prior to the procedure and no contraindications have been identified. The risks, benefits and alternatives to the procedure were discussed with the patient, and all questions regarding the procedure were answered to the patient's satisfaction. I personally obtained Christine's consent prior to the start of the procedure and the signed consent can be found in the patient's chart.                                                         Time-out was taken to identify patient, procedure, laterality, and allergies prior to starting the procedure.       Date of Service: 08/12/2020  Procedure: Cooled Radiofreqiency Ablation of the articular branches of the  Left suprascapular, axillary and lateral pectoral nerves.  Pre-Operative Diagnosis: Osteoarthritis of the Left shoulder  Post-Operative Diagnosis: Osteoarthritis of the Left shoulder    Physician: Nilam Santiago M.D.  Assistants: None    Medications Injected:  2% Lidocaine (1 mL) at each site, followed by 4 mL of bupivacaine 0.25% + Depo-medrol 40 mg/mL (1 mL at each site after the ablation)  Local Anesthetic: Xylocaine 1% 10 mL with Sodium Bicarbonate 1ml.   Sedation Medications: Versed 1 mg, Fentanyl 100 mcg  Conscious sedation provided by MD and monitored by RN.   (See nurse documentation and case log for sedation time)    Procedural Technique:   Laying in a right lateral decubitus position, the patient was prepped and draped in the usual sterile fashion using ChloraPrep and fenestrated drape.  The glenohumeral joint of interest was visualized under fluoroscopic guidance.  Local anesthetic was utilized to anesthetize the skin and subcutaneous tissues in the area using a 25-gauge needle.  A 17-gauge 50 mm  Cooled RF needle with 2 mm active tip was introduced coaxially, and the needle was placed at the target sites for the above stated axillary and  suprascapular articular branches.  Placement was confirmed with a fluoroscopic view.  Motor stimulation [id number] was performed. No motor stimulation was elicited at any level at 2V with a frequency of 2Hz. After negative aspiration, 1cc of 2% lidocaine was injected at each level. Thermal RF was then conducted at each level at 80 degrees for 2:30 minutes. After the ablation, 1 cc of the mixture of 4 mL of 0.25 % bupivacaine and 40 mg depo-medrol was injected at each site.     The needle was removed and bandage applied to the area.    Then the coracoid process was visualized under fluoroscopy. Entry sites were marked over the skin and Xylocaine 1% was used to anesthetize the skin and subcutaneous tissues. A 17-gauge 50 mm  Cooled RF needle with 2 mm active tip was introduced coaxially, and the needle was placed target sites for the above stated lateral pectoral nerve articular branches.  Placement was confirmed with a fluoroscopic view     No motor stimulation was elicited at any level at 2V with a frequency of 2Hz. After negative aspiration, 1cc of 2% lidocaine was injected at the site. Thermal RF was then conducted at at 80 degrees for 2:30 minutes. 1.0 cc of the mixture of 0.25 % bupivacaine and 40 mg Depo-medrol was injected at each level.     Estimated Blood Loss:  None.  Complications:  None.     Disposition: The patient tolerated the procedure well, and there were no apparent complications. Vital signs remained stable throughout the procedure. The patient was taken to the recovery area and monitored. The patient was supplied with written discharge instructions for the procedure. If helpful, we can repeat as needed. The patient was discharged in a stable condition.    Follow-Up: We will see the patient back in 1-2 weeks or the patient may call to inform of status.

## 2020-08-12 NOTE — PLAN OF CARE
Pt OAx4. States pain 5 on a scale (0-1), but pain is much better than pre procedure. VS stable as per flowsheet. Pt transferred to lobby by tech. Dressing x2 L shoulder CDI.

## 2020-08-12 NOTE — DISCHARGE SUMMARY
[facility]  Discharge Note  Short Stay     Admit Date: 8/12/2020    Discharge Date: 8/12/2020     Attending Physician: Nilam Santiago M.D.    Diagnoses:  Active Hospital Problems    Diagnosis  POA    Chronic left shoulder pain [M25.512, G89.29]  Yes    Primary osteoarthritis of left shoulder [M19.012]  Yes      Resolved Hospital Problems   No resolved problems to display.     Discharged Condition: Good     Hospital Course: Patient was admitted for an outpatient interventional pain management procedure and tolerated the procedure well with no complications.     Final Diagnoses: Same as principal problem.     Disposition: Home or Self Care     Follow up/Patient Instructions:   Follow-up in 1-2 weeks unless otherwise instructed. May return sooner as needed.       Reconciled Medications:     Medication List      CHANGE how you take these medications    busPIRone 10 MG tablet  Commonly known as: BUSPAR  TAKE 1/2 TABLET BY MOUTH ONCE DAILY  What changed:   · how much to take  · how to take this  · when to take this  · additional instructions     potassium chloride 10 MEQ Tbsr  Commonly known as: KLOR-CON  TAKE 2 TABLETS(20 MEQ) BY MOUTH EVERY DAY  What changed: See the new instructions.        CONTINUE taking these medications    acetaminophen 500 MG tablet  Commonly known as: TYLENOL  Take 500 mg by mouth every 6 (six) hours as needed for Pain.     albuterol 90 mcg/actuation inhaler  Commonly known as: VENTOLIN HFA  INHALE 2 PUFFS INTO THE LUNGS EVERY 4 HOURS AS NEEDED FOR WHEEZING     aspirin 81 MG EC tablet  Commonly known as: ECOTRIN  Take 1 tablet (81 mg total) by mouth once daily.     atorvastatin 40 MG tablet  Commonly known as: LIPITOR  Take 1 tablet (40 mg total) by mouth once daily. For cholesterol     buPROPion 200 MG Sr12  Commonly known as: WELLBUTRIN SR  TAKE 1 TABLET(200 MG) BY MOUTH EVERY DAY     diabetic supplies, miscellan. Misc  Lancets for 1-2 times a day testing    dispense brand covered by  insurance t     diazePAM 2 MG tablet  Commonly known as: VALIUM  TAKE 1 TABLET(2 MG) BY MOUTH EVERY 24 HOURS AS NEEDED FOR ANXIETY     diclofenac sodium 1 % Gel  Commonly known as: VOLTAREN  APPLY 2 GRAMS EXTERNALLY TO THE AFFECTED AREA FOUR TIMES DAILY     ergocalciferol 50,000 unit Cap  Commonly known as: ERGOCALCIFEROL  Take 1 capsule (50,000 Units total) by mouth every 7 days.     escitalopram oxalate 20 MG tablet  Commonly known as: LEXAPRO  TAKE 1 TABLET(20 MG) BY MOUTH EVERY DAY     furosemide 20 MG tablet  Commonly known as: LASIX  TAKE 1 TABLET(20 MG) BY MOUTH DAILY AS NEEDED     gabapentin 100 MG capsule  Commonly known as: NEURONTIN  TAKE 1 CAPSULE(100 MG) BY MOUTH THREE TIMES DAILY     ibandronate 150 mg tablet  Commonly known as: BONIVA  Take 1 tablet (150 mg total) by mouth every 30 days. For osteoporosis     levothyroxine 100 MCG tablet  Commonly known as: SYNTHROID  TAKE 1 TABLET BY MOUTH EVERY DAY     omeprazole 40 MG capsule  Commonly known as: PRILOSEC  TAKE 1 CAPSULE(40 MG) BY MOUTH EVERY MORNING     ondansetron 4 MG Tbdl  Commonly known as: ZOFRAN-ODT  Take 1 tablet (4 mg total) by mouth every 6 (six) hours as needed.     oxyCODONE-acetaminophen 7.5-325 mg per tablet  Commonly known as: PERCOCET  Take 1 tablet by mouth every 12 (twelve) hours as needed for Pain.     predniSONE 5 MG tablet  Commonly known as: DELTASONE  Take 1 tablet (5 mg total) by mouth once daily.     promethazine 25 MG tablet  Commonly known as: PHENERGAN  TAKE 1 TABLET(25 MG) BY MOUTH EVERY 6 HOURS AS NEEDED     tiZANidine 4 MG tablet  Commonly known as: ZANAFLEX     TRUE METRIX GLUCOSE METER Misc  Generic drug: blood-glucose meter  U UTD     * TRUE METRIX GLUCOSE TEST STRIP Strp  Generic drug: blood sugar diagnostic  USE TO TEST BLOOD SUGAR EVERY DAY     * TRUE METRIX GLUCOSE TEST STRIP Strp  Generic drug: blood sugar diagnostic  TO TEST ONCE TO TWICE DAILY     TRUEPLUS LANCETS 30 gauge Misc  Generic drug: lancets  USE TO  TEST ONCE TO TWICE D     zolpidem 5 MG Tab  Commonly known as: AMBIEN  TAKE 1 TABLET BY MOUTH EVERY NIGHT AT BEDTIME FOR SLEEP         * This list has 2 medication(s) that are the same as other medications prescribed for you. Read the directions carefully, and ask your doctor or other care provider to review them with you.               Discharge Procedure Orders (must include Diet, Follow-up, Activity)   Ice to affected area   Order Comments: Limit to 20 minute intervals or until skin is numb to the touch.     No driving until:   Order Comments: Until following day     Notify your health care provider if you experience any of the following:  temperature >100.4     Notify your health care provider if you experience any of the following:  persistent nausea and vomiting or diarrhea     Notify your health care provider if you experience any of the following:  severe uncontrolled pain     Notify your health care provider if you experience any of the following:  redness, tenderness, or signs of infection (pain, swelling, redness, odor or green/yellow discharge around incision site)     Notify your health care provider if you experience any of the following:  difficulty breathing or increased cough     Notify your health care provider if you experience any of the following:  severe persistent headache     Notify your health care provider if you experience any of the following:  worsening rash     Notify your health care provider if you experience any of the following:  persistent dizziness, light-headedness, or visual disturbances     Notify your health care provider if you experience any of the following:  increased confusion or weakness     No dressing needed     Shower on day dressing removed (No bath)   Order Comments: Do not soak in bath/pool/hot tub day of procedure. Shower glenn Santiago M.D.  Interventional Pain Medicine / Physical Medicine & Rehabilitation

## 2020-08-12 NOTE — INTERVAL H&P NOTE
The patient has been examined and the H&P has been reviewed:     I concur with the findings and changes have been noted since the H&P was written: I had ordered the cervical MRI and x-ray previously.     The risks, benefits and alternative options to the procedure were discussed and understood by the patient/family.

## 2020-08-17 ENCOUNTER — PATIENT OUTREACH (OUTPATIENT)
Dept: ADMINISTRATIVE | Facility: OTHER | Age: 65
End: 2020-08-17

## 2020-08-17 NOTE — PROGRESS NOTES
Health Maintenance Due   Topic Date Due    Aspirin/Antiplatelet Therapy  04/04/1972    Pneumococcal Vaccine (65+ Low/Medium Risk) (1 of 2 - PCV13) 04/04/2019    Urine Microalbumin  01/14/2020    Foot Exam  07/22/2020     Updates were requested from care everywhere.  Chart was reviewed for overdue Proactive [facility] Encounters (TIA) topics (CRS, Breast Cancer Screening, Eye exam)  Health Maintenance has been updated.  LINKS immunization registry triggered.  Immunizations were reconciled.

## 2020-08-18 ENCOUNTER — OFFICE VISIT (OUTPATIENT)
Dept: FAMILY MEDICINE | Facility: CLINIC | Age: 65
End: 2020-08-18
Payer: MEDICARE

## 2020-08-18 VITALS
HEART RATE: 93 BPM | SYSTOLIC BLOOD PRESSURE: 102 MMHG | WEIGHT: 197 LBS | BODY MASS INDEX: 38.68 KG/M2 | DIASTOLIC BLOOD PRESSURE: 66 MMHG | HEIGHT: 60 IN | OXYGEN SATURATION: 98 %

## 2020-08-18 DIAGNOSIS — M19.90 ARTHRITIS: Primary | ICD-10-CM

## 2020-08-18 DIAGNOSIS — E03.9 HYPOTHYROIDISM (ACQUIRED): ICD-10-CM

## 2020-08-18 DIAGNOSIS — F32.A DEPRESSION, UNSPECIFIED DEPRESSION TYPE: ICD-10-CM

## 2020-08-18 DIAGNOSIS — E11.9 TYPE 2 DIABETES MELLITUS WITHOUT COMPLICATION, WITHOUT LONG-TERM CURRENT USE OF INSULIN: ICD-10-CM

## 2020-08-18 DIAGNOSIS — I25.10 CORONARY ARTERY DISEASE INVOLVING NATIVE CORONARY ARTERY OF NATIVE HEART WITHOUT ANGINA PECTORIS: ICD-10-CM

## 2020-08-18 DIAGNOSIS — S49.92XS SHOULDER INJURY, LEFT, SEQUELA: ICD-10-CM

## 2020-08-18 DIAGNOSIS — K21.9 GASTROESOPHAGEAL REFLUX DISEASE WITHOUT ESOPHAGITIS: ICD-10-CM

## 2020-08-18 PROCEDURE — 99214 OFFICE O/P EST MOD 30 MIN: CPT | Mod: 25,S$GLB,, | Performed by: FAMILY MEDICINE

## 2020-08-18 PROCEDURE — 99499 RISK ADDL DX/OHS AUDIT: ICD-10-PCS | Mod: S$GLB,,, | Performed by: FAMILY MEDICINE

## 2020-08-18 PROCEDURE — 99214 PR OFFICE/OUTPT VISIT, EST, LEVL IV, 30-39 MIN: ICD-10-PCS | Mod: 25,S$GLB,, | Performed by: FAMILY MEDICINE

## 2020-08-18 PROCEDURE — 1101F PR PT FALLS ASSESS DOC 0-1 FALLS W/OUT INJ PAST YR: ICD-10-PCS | Mod: CPTII,S$GLB,, | Performed by: FAMILY MEDICINE

## 2020-08-18 PROCEDURE — 1101F PT FALLS ASSESS-DOCD LE1/YR: CPT | Mod: CPTII,S$GLB,, | Performed by: FAMILY MEDICINE

## 2020-08-18 PROCEDURE — 3008F BODY MASS INDEX DOCD: CPT | Mod: CPTII,S$GLB,, | Performed by: FAMILY MEDICINE

## 2020-08-18 PROCEDURE — 3008F PR BODY MASS INDEX (BMI) DOCUMENTED: ICD-10-PCS | Mod: CPTII,S$GLB,, | Performed by: FAMILY MEDICINE

## 2020-08-18 PROCEDURE — 1159F MED LIST DOCD IN RCRD: CPT | Mod: S$GLB,,, | Performed by: FAMILY MEDICINE

## 2020-08-18 PROCEDURE — 96372 THER/PROPH/DIAG INJ SC/IM: CPT | Mod: S$GLB,,, | Performed by: FAMILY MEDICINE

## 2020-08-18 PROCEDURE — 3044F HG A1C LEVEL LT 7.0%: CPT | Mod: CPTII,S$GLB,, | Performed by: FAMILY MEDICINE

## 2020-08-18 PROCEDURE — 96372 PR INJECTION,THERAP/PROPH/DIAG2ST, IM OR SUBCUT: ICD-10-PCS | Mod: S$GLB,,, | Performed by: FAMILY MEDICINE

## 2020-08-18 PROCEDURE — 99499 UNLISTED E&M SERVICE: CPT | Mod: S$GLB,,, | Performed by: FAMILY MEDICINE

## 2020-08-18 PROCEDURE — 3044F PR MOST RECENT HEMOGLOBIN A1C LEVEL <7.0%: ICD-10-PCS | Mod: CPTII,S$GLB,, | Performed by: FAMILY MEDICINE

## 2020-08-18 PROCEDURE — 1159F PR MEDICATION LIST DOCUMENTED IN MEDICAL RECORD: ICD-10-PCS | Mod: S$GLB,,, | Performed by: FAMILY MEDICINE

## 2020-08-18 RX ORDER — ESCITALOPRAM OXALATE 20 MG/1
TABLET ORAL
Qty: 90 TABLET | Refills: 3 | Status: SHIPPED | OUTPATIENT
Start: 2020-08-18 | End: 2021-08-26

## 2020-08-18 RX ORDER — OXYCODONE AND ACETAMINOPHEN 10; 325 MG/1; MG/1
1 TABLET ORAL
COMMUNITY
Start: 2020-07-22 | End: 2021-01-20

## 2020-08-18 RX ORDER — MELOXICAM 15 MG/1
15 TABLET ORAL DAILY
COMMUNITY
Start: 2020-07-22 | End: 2021-04-19

## 2020-08-18 RX ORDER — TRIAMCINOLONE ACETONIDE 40 MG/ML
80 INJECTION, SUSPENSION INTRA-ARTICULAR; INTRAMUSCULAR ONCE
Status: COMPLETED | OUTPATIENT
Start: 2020-08-18 | End: 2020-08-18

## 2020-08-18 RX ORDER — QUETIAPINE FUMARATE 50 MG/1
TABLET, FILM COATED ORAL
COMMUNITY
Start: 2020-07-29 | End: 2021-01-08 | Stop reason: CLARIF

## 2020-08-18 RX ADMIN — TRIAMCINOLONE ACETONIDE 80 MG: 40 INJECTION, SUSPENSION INTRA-ARTICULAR; INTRAMUSCULAR at 02:08

## 2020-08-18 NOTE — PROGRESS NOTES
Patient ID: Christine Castro is a 65 y.o. female.    Chief Complaint: Shoulder Pain    HPI      Christine Castro is a 65 y.o. female patient with complaints of left shoulder pain.  She has been followed by pain management/ortho for left shoulder pain.  Given recent injection which did not help with her minutes month.  Patient arthritis in multiple joints including hands and knees bilaterally.  Knees have been replaced bilaterally.  Feels much relief since then.  Coronary disease-stable no chest pain or palpitations.  Reflux disease-stable  Hypothyroidism-no significant weight change  Diabetes mellitus no complaints at this time.    Vitals:    08/18/20 1352   BP: 102/66   Pulse: 93   SpO2: 98%   Weight: 89.4 kg (197 lb)   Height: 5' (1.524 m)            Review of Symptoms    Constitutional  Neg activity change, No chills /fever   Resp  Neg hemoptysis, stridor, choking  CVS  Neg chest pain, palpitations    Physical Exam    Constitutional:  Oriented to person, place, and time.appears well-developed and well-nourished.  No distress.      HENT  Head: Normocephalic and atraumatic  Right Ear: External ear normal.   Left Ear: External ear normal.   Nose: External nose normal.   Mouth:  Moist mucus membranes.    Eyes:  Conjunctivae are normal. Right eye exhibits no discharge.  Left eye exhibits no discharge. No scleral icterus.  No periorbital edema    Cardiovascular:  Regular rate and rhythm with normal S1 and S2     Pulmonary/Chest:   Clear to auscultation bilaterally without wheezes, rhonchi or rales      Musculoskeletal:  No edema. No obvious deformity No wasting  Arthritic changes hands bilaterally       Neurological:  Alert and oriented to person, place, and time.   Coordination normal.     Skin:   Skin is warm and dry.  No diaphoresis.   No rash noted.     Psychiatric: Normal mood and affect. Behavior is normal.  Judgment and thought content normal.     Complete Blood Count  Lab Results   Component Value Date    RBC  3.72 (L) 02/26/2020    HGB 11.5 (L) 02/26/2020    HCT 36.6 (L) 02/26/2020    MCV 98 02/26/2020    MCH 30.9 02/26/2020    MCHC 31.4 (L) 02/26/2020    RDW 13.3 02/26/2020     02/26/2020    MPV 9.5 02/26/2020    GRAN 3.9 02/26/2020    GRAN 56.6 02/26/2020    LYMPH 2.1 02/26/2020    LYMPH 30.2 02/26/2020    MONO 0.4 02/26/2020    MONO 5.5 02/26/2020    EOS 0.5 02/26/2020    BASO 0.05 02/26/2020    EOSINOPHIL 6.6 02/26/2020    BASOPHIL 0.7 02/26/2020    DIFFMETHOD Automated 02/26/2020       Comprehensive Metabolic Panel  Lab Results   Component Value Date     (H) 04/02/2020    BUN 18 (H) 04/02/2020    CREATININE 1.03 04/02/2020     04/02/2020    K 4.4 04/02/2020     04/02/2020    PROT 7.3 02/26/2020    ALBUMIN 4.1 02/26/2020    BILITOT 0.2 02/26/2020    AST 51 (H) 02/26/2020    ALKPHOS 71 02/26/2020    CO2 23 04/02/2020    ALT 30 02/26/2020    ANIONGAP 7 (L) 04/02/2020    EGFRNONAA 57.2 (A) 04/02/2020    ESTGFRAFRICA >60.0 04/02/2020       TSH  Lab Results   Component Value Date    TSH 2.400 02/26/2020       Assessment / Plan:      ICD-10-CM ICD-9-CM   1. Arthritis  M19.90 716.90   2. Type 2 diabetes mellitus without complication, without long-term current use of insulin  E11.9 250.00   3. Hypothyroidism (acquired)  E03.9 244.9   4. Depression, unspecified depression type  F32.9 311   5. Coronary artery disease involving native coronary artery of native heart without angina pectoris  I25.10 414.01   6. Shoulder injury, left, sequela  S49.92XS 908.9   7. Gastroesophageal reflux disease without esophagitis  K21.9 530.81     Arthritis  -     triamcinolone acetonide injection 80 mg    Type 2 diabetes mellitus without complication, without long-term current use of insulin    Hypothyroidism (acquired)    Depression, unspecified depression type    Coronary artery disease involving native coronary artery of native heart without angina pectoris    Shoulder injury, left, sequela    Gastroesophageal reflux  disease without esophagitis    Other orders  -     escitalopram oxalate (LEXAPRO) 20 MG tablet; TAKE 1 TABLET(20 MG) BY MOUTH EVERY DAY  Dispense: 90 tablet; Refill: 3      Anxiety stress depression-Lexapro  Arthritis multiple joints-steroid injection IM  Reflux-continue current medications  Coronary disease-stable

## 2020-08-24 NOTE — PROGRESS NOTES
Patient, Christine Castro (MRN #[id number]), presented with a recorded BMI of 38.47 kg/m^2 and a documented comorbidity(s):  - Diabetes Mellitus Type 2  to which the severe obesity is a contributing factor. This is consistent with the definition of severe obesity (BMI 35.0-39.9) with comorbidity (ICD-10 E66.01, Z68.35). The patient's severe obesity was monitored, evaluated, addressed and/or treated. This addendum to the medical record is made on 08/24/2020.

## 2020-09-03 RX ORDER — DIAZEPAM 2 MG/1
TABLET ORAL
Qty: 30 TABLET | Refills: 0 | Status: SHIPPED | OUTPATIENT
Start: 2020-09-03 | End: 2020-10-07

## 2020-09-28 ENCOUNTER — PATIENT OUTREACH (OUTPATIENT)
Dept: ADMINISTRATIVE | Facility: OTHER | Age: 65
End: 2020-09-28

## 2020-09-28 DIAGNOSIS — E11.9 DIABETES MELLITUS WITHOUT COMPLICATION: Primary | ICD-10-CM

## 2020-09-28 NOTE — PROGRESS NOTES
Health Maintenance Due   Topic Date Due    Aspirin/Antiplatelet Therapy  04/04/1972    Pneumococcal Vaccine (65+ Low/Medium Risk) (1 of 2 - PCV13) 04/04/2019    Urine Microalbumin  01/14/2020    Foot Exam  07/22/2020    Influenza Vaccine (1) 08/01/2020    Hemoglobin A1c  10/02/2020     Updates were requested from care everywhere.  Chart was reviewed for overdue Proactive [facility] Encounters (TIA) topics (CRS, Breast Cancer Screening, Eye exam)  Health Maintenance has been updated.  LINKS immunization registry triggered.  Immunizations were reconciled.  Order placed for A1c.

## 2020-09-29 ENCOUNTER — OFFICE VISIT (OUTPATIENT)
Dept: NEUROLOGY | Facility: CLINIC | Age: 65
End: 2020-09-29
Payer: MEDICARE

## 2020-09-29 VITALS
BODY MASS INDEX: 40.86 KG/M2 | HEART RATE: 88 BPM | HEIGHT: 60 IN | DIASTOLIC BLOOD PRESSURE: 80 MMHG | WEIGHT: 208.13 LBS | SYSTOLIC BLOOD PRESSURE: 124 MMHG

## 2020-09-29 DIAGNOSIS — M48.07 SPINAL STENOSIS, LUMBOSACRAL REGION: ICD-10-CM

## 2020-09-29 DIAGNOSIS — M54.9 DORSALGIA, UNSPECIFIED: ICD-10-CM

## 2020-09-29 PROCEDURE — 1101F PR PT FALLS ASSESS DOC 0-1 FALLS W/OUT INJ PAST YR: ICD-10-PCS | Mod: HCNC,CPTII,S$GLB, | Performed by: PSYCHIATRY & NEUROLOGY

## 2020-09-29 PROCEDURE — 1125F PR PAIN SEVERITY QUANTIFIED, PAIN PRESENT: ICD-10-PCS | Mod: HCNC,S$GLB,, | Performed by: PSYCHIATRY & NEUROLOGY

## 2020-09-29 PROCEDURE — 3008F PR BODY MASS INDEX (BMI) DOCUMENTED: ICD-10-PCS | Mod: HCNC,CPTII,S$GLB, | Performed by: PSYCHIATRY & NEUROLOGY

## 2020-09-29 PROCEDURE — 99999 PR PBB SHADOW E&M-EST. PATIENT-LVL III: CPT | Mod: PBBFAC,HCNC,, | Performed by: PSYCHIATRY & NEUROLOGY

## 2020-09-29 PROCEDURE — 99204 PR OFFICE/OUTPT VISIT, NEW, LEVL IV, 45-59 MIN: ICD-10-PCS | Mod: HCNC,S$GLB,, | Performed by: PSYCHIATRY & NEUROLOGY

## 2020-09-29 PROCEDURE — 99999 PR PBB SHADOW E&M-EST. PATIENT-LVL III: ICD-10-PCS | Mod: PBBFAC,HCNC,, | Performed by: PSYCHIATRY & NEUROLOGY

## 2020-09-29 PROCEDURE — 99204 OFFICE O/P NEW MOD 45 MIN: CPT | Mod: HCNC,S$GLB,, | Performed by: PSYCHIATRY & NEUROLOGY

## 2020-09-29 PROCEDURE — 1159F MED LIST DOCD IN RCRD: CPT | Mod: HCNC,S$GLB,, | Performed by: PSYCHIATRY & NEUROLOGY

## 2020-09-29 PROCEDURE — 1101F PT FALLS ASSESS-DOCD LE1/YR: CPT | Mod: HCNC,CPTII,S$GLB, | Performed by: PSYCHIATRY & NEUROLOGY

## 2020-09-29 PROCEDURE — 1125F AMNT PAIN NOTED PAIN PRSNT: CPT | Mod: HCNC,S$GLB,, | Performed by: PSYCHIATRY & NEUROLOGY

## 2020-09-29 PROCEDURE — 1159F PR MEDICATION LIST DOCUMENTED IN MEDICAL RECORD: ICD-10-PCS | Mod: HCNC,S$GLB,, | Performed by: PSYCHIATRY & NEUROLOGY

## 2020-09-29 PROCEDURE — 3008F BODY MASS INDEX DOCD: CPT | Mod: HCNC,CPTII,S$GLB, | Performed by: PSYCHIATRY & NEUROLOGY

## 2020-09-29 RX ORDER — GABAPENTIN 300 MG/1
CAPSULE ORAL
COMMUNITY
Start: 2020-09-08 | End: 2021-04-26

## 2020-09-29 NOTE — LETTER
September 29, 2020      Ketan Heart Jr., MD  [address]  [place] [address]           [facility]  [address],   [place] [address]  Phone: [phone]  Fax: [phone]          Patient: Christine Castro   MR Number: [id number]   YOB: 1955   Date of Visit: 9/29/2020       Dear Dr. Ketan Heart Jr.:    Thank you for referring Christine Castro to me for evaluation. Attached you will find relevant portions of my assessment and plan of care.    If you have questions, please do not hesitate to call me. I look forward to following Christine Castro along with you.    Sincerely,    Hiro Hinton MD    Enclosure  CC:  No Recipients    If you would like to receive this communication electronically, please contact [email or web address] or [phone] to request more information on [facility] Link access.    For providers and/or their staff who would like to refer a patient to [facility], please contact us through our one-stop-shop provider referral line, [facility], at [phone].    If you feel you have received this communication in error or would no longer like to receive these types of communications, please e-mail [email or web address]

## 2020-09-29 NOTE — PROGRESS NOTES
[facility]  [facility], [place]    Date: 9/29/20  Patient Name: Christine Castro   MRN: [id number]   PCP: Alon Santiago  Referring Provider: Ketan Heart Jr., *    Assessment:   Christine Castro is a 65 y.o. female  presenting for evaluation of bilateral lower extremity weakness, pain, gait changes and frequent falls with hyperreflexia on L and patchy sensory loss of RLE. Moving forward with MRI L spine as concern for lumbar stenosis as possible contributing cause. Further workup pending results.   Plan:     Problem List Items Addressed This Visit     None      Visit Diagnoses     Dorsalgia, unspecified        Relevant Orders    MRI Lumbar Spine Without Contrast    Spinal stenosis, lumbosacral region        Relevant Orders    MRI Lumbar Spine Without Contrast        Hiro Hinton MD  [facility]   Department of Neurology    Patient note was created using MModal Dictation.  Any errors in syntax or even information may not have been identified and edited on initial review prior to signing this note.  Subjective:   Patient seen in consultation at the request of Ketan Heart Jr., * for the evaluation of leg pain and weakness. A copy of this note will be sent to the referring physician.        HPI:   Ms. Christine Castro is a 65 y.o. female presenting for evaluation of lower extremity weakness and falls.  The patient reports that over the last several years, she has noted progressive weakness in her legs with associated burning and stinging pain radiating from her L-spine into her buttocks and down the posterior aspects of her legs bilaterally.  She states that she must lean forward when walking to partially alleviates the pain.  She notes that her legs have become weak and frequently give out on her leading to several falls.  She does note mild sensory loss largely over the dorsum of her right foot.  She denies any vertigo, loss of consciousness, or  mechanical falls.  She is now walking with a cane.  She does not feel that her gait has changed substantially since undergoing a bilateral knee replacement.  Of note, the patient has undergone a CT L-spine in 2019 which revealed grade 1 anterior listhesis of L4 on L5  (personally reviewed).    PAST MEDICAL HISTORY:  Past Medical History:   Diagnosis Date    Anticoagulant long-term use     Arthritis     Asthma     CHF (congestive heart failure)     [facility]    Colon cancer     colon    COPD (chronic obstructive pulmonary disease)     Coronary artery disease     Depression     Diabetes mellitus, type 2     GERD (gastroesophageal reflux disease)     Myocardial infarction     AGE 19 [facility]BABY DELIVERY    Thyroid disease        PAST SURGICAL HISTORY:  Past Surgical History:   Procedure Laterality Date    ABDOMINAL SURGERY       SECTION      CHOLECYSTECTOMY      COLON SURGERY      COLONOSCOPY      --- repeat in 3-5 years    COLONOSCOPY N/A 2017    Procedure: COLONOSCOPY;  Surgeon: Corrina Flores MD;  Location: [facility] ENDO;  Service: Endoscopy;  Laterality: N/A;    COLONOSCOPY N/A 4/10/2018    Procedure: COLONOSCOPY golytely;  Surgeon: Corrina Flores MD;  Location: [facility] ENDO;  Service: Endoscopy;  Laterality: N/A;    ECTOPIC PREGNANCY SURGERY      ESOPHAGOGASTRODUODENOSCOPY N/A 2019    Procedure: ESOPHAGOGASTRODUODENOSCOPY (EGD);  Surgeon: Corrina Flores MD;  Location: [facility] ENDO;  Service: Endoscopy;  Laterality: N/A;    FRACTURE SURGERY      left leg    GASTRIC BYPASS      HERNIA REPAIR      INJECTION OF ANESTHETIC AGENT AROUND GENITOFEMORAL NERVE Left 2020    Procedure: BLOCK, NERVE, LEFT SHOULDER ARTICULAR;  Surgeon: Nilam Santiago MD;  Location: [facility] PAIN MGT;  Service: Pain Management;  Laterality: Left;    KNEE ARTHROPLASTY Left 2019    Procedure: ARTHROPLASTY, KNEE;  Surgeon: Roldan Greenwood MD;  Location: [facility] OR;  Service:  Orthopedics;  Laterality: Left;  [facility] notified    KNEE ARTHROPLASTY Right 11/20/2019    Procedure: ARTHROPLASTY, KNEE;  Surgeon: Roldan Greenwood MD;  Location: [facility] OR;  Service: Orthopedics;  Laterality: Right;  Navid notified, confirmed case Navid 11/19/19 [id number]    OOPHORECTOMY      RADIOFREQUENCY THERMOCOAGULATION Left 8/12/2020    Procedure: RADIOFREQUENCY THERMAL COAGULATION--Left Suprascapular, Axillary, and Lateral Pectoral Articular Branch RFA;  Surgeon: Nilam Santiago MD;  Location: [facility] PAIN MGT;  Service: Pain Management;  Laterality: Left;    TONSILLECTOMY      TUBAL LIGATION         CURRENT MEDS:  Current Outpatient Medications   Medication Sig Dispense Refill    acetaminophen (TYLENOL) 500 MG tablet Take 500 mg by mouth every 6 (six) hours as needed for Pain.      albuterol (VENTOLIN HFA) 90 mcg/actuation inhaler INHALE 2 PUFFS INTO THE LUNGS EVERY 4 HOURS AS NEEDED FOR WHEEZING 18 g 3    atorvastatin (LIPITOR) 40 MG tablet Take 1 tablet (40 mg total) by mouth once daily. For cholesterol 90 tablet 3    buPROPion (WELLBUTRIN SR) 200 MG SR12 TAKE 1 TABLET(200 MG) BY MOUTH EVERY DAY 90 tablet 3    busPIRone (BUSPAR) 10 MG tablet TAKE 1/2 TABLET BY MOUTH ONCE DAILY (Patient taking differently: 10 mg. Pt is taking 10 mg once daily) 60 tablet 3    diabetic supplies, miscellan. Misc Lancets for 1-2 times a day testing    dispense brand covered by insurance t 60 each 3    diazePAM (VALIUM) 2 MG tablet TAKE 1 TABLET(2 MG) BY MOUTH EVERY 24 HOURS AS NEEDED FOR ANXIETY 30 tablet 0    diclofenac sodium (VOLTAREN) 1 % Gel APPLY 2 GRAMS EXTERNALLY TO THE AFFECTED AREA FOUR TIMES DAILY 100 g 5    ergocalciferol (ERGOCALCIFEROL) 50,000 unit Cap Take 1 capsule (50,000 Units total) by mouth every 7 days. 12 capsule 3    escitalopram oxalate (LEXAPRO) 20 MG tablet TAKE 1 TABLET(20 MG) BY MOUTH EVERY DAY 90 tablet 3    furosemide (LASIX) 20 MG tablet TAKE 1 TABLET(20 MG) BY MOUTH DAILY AS NEEDED 90  tablet 0    gabapentin (NEURONTIN) 100 MG capsule TAKE 1 CAPSULE(100 MG) BY MOUTH THREE TIMES DAILY 270 capsule 1    gabapentin (NEURONTIN) 300 MG capsule       ibandronate (BONIVA) 150 mg tablet Take 1 tablet (150 mg total) by mouth every 30 days. For osteoporosis 3 tablet 3    levothyroxine (SYNTHROID) 100 MCG tablet TAKE 1 TABLET BY MOUTH EVERY DAY 90 tablet 3    meloxicam (MOBIC) 15 MG tablet Take 15 mg by mouth once daily.      omeprazole (PRILOSEC) 40 MG capsule TAKE 1 CAPSULE(40 MG) BY MOUTH EVERY MORNING 90 capsule 1    ondansetron (ZOFRAN-ODT) 4 MG TbDL Take 1 tablet (4 mg total) by mouth every 6 (six) hours as needed. 20 tablet 1    oxyCODONE-acetaminophen (PERCOCET)  mg per tablet Take 1 tablet by mouth every 6 to 8 hours as needed.      potassium chloride (KLOR-CON) 10 MEQ TbSR TAKE 2 TABLETS(20 MEQ) BY MOUTH EVERY DAY (Patient taking differently: 10 mEq. TAKE 2 TABLETS(20 MEQ) BY MOUTH EVERY DAY) 180 tablet 0    promethazine (PHENERGAN) 25 MG tablet TAKE 1 TABLET(25 MG) BY MOUTH EVERY 6 HOURS AS NEEDED 25 tablet 0    QUEtiapine (SEROQUEL) 50 MG tablet       tiZANidine (ZANAFLEX) 4 MG tablet       TRUE METRIX GLUCOSE METER Misc U UTD      TRUE METRIX GLUCOSE TEST STRIP Strp USE TO TEST BLOOD SUGAR EVERY  strip 0    TRUE METRIX GLUCOSE TEST STRIP Strp TO TEST ONCE TO TWICE DAILY 50 strip 11    TRUEPLUS LANCETS 30 gauge Misc USE TO TEST ONCE TO TWICE D      zolpidem (AMBIEN) 5 MG Tab TAKE 1 TABLET BY MOUTH EVERY NIGHT AT BEDTIME FOR SLEEP 30 tablet 0    aspirin (ECOTRIN) 81 MG EC tablet Take 1 tablet (81 mg total) by mouth once daily.  0     No current facility-administered medications for this visit.        ALLERGIES:  Review of patient's allergies indicates:   Allergen Reactions    Adhesive      Adhesive tape    Latex, natural rubber      Adhesive from latex tape    Ibuprofen Other (See Comments)     Causes asthma flare??       FAMILY HISTORY:  Family History   Problem  Relation Age of Onset    Hyperlipidemia Mother     Hypertension Mother     Diabetes Mother     Stroke Mother     Hypertension Sister     Hypertension Brother     Diabetes Brother     Breast cancer Maternal Aunt     Breast cancer Maternal Aunt     Breast cancer Cousin        SOCIAL HISTORY:  Social History     Tobacco Use    Smoking status: Never Smoker    Smokeless tobacco: Never Used   Substance Use Topics    Alcohol use: Yes     Comment: very rare    Drug use: No     Comment: CBD oil sometimes       Review of Systems:  12 system review of systems is negative except for the symptoms mentioned in HPI.      Objective:     Vitals:    09/29/20 1043   BP: 124/80   BP Location: Right arm   Patient Position: Sitting   BP Method: Large (Automatic)   Pulse: 88   Weight: 94.4 kg (208 lb 1.8 oz)   Height: 5' (1.524 m)     General: NAD, well nourished   Eyes: no tearing, discharge, no erythema   ENT: moist mucous membranes of the oral cavity, nares patent    Neck: Supple, full range of motion  Cardiovascular: Warm and well perfused, pulses equal and symmetrical  Lungs: Normal work of breathing, normal chest wall excursions  Skin: No rash, lesions, or breakdown on exposed skin  Psychiatry: Mood and affect are appropriate   Abdomen: soft, non tender, non distended  Extremeties: No cyanosis, clubbing or edema.    Neurological   MENTAL STATUS: Alert and oriented to person, place, and time. Attention and concentration within normal limits. Speech without dysarthria, able to name and repeat without difficulty. Recent and remote memory within normal limits   CRANIAL NERVES: Visual fields intact. PERRL. EOMI. Facial sensation intact. Face symmetrical. Hearing grossly intact. Full shoulder shrug bilaterally. Tongue protrudes midline   SENSORY: Sensation is reduced to LT/ST over dosrum of R foot.    MOTOR: Normal bulk and tone.5/5 deltoid, biceps, triceps, interosseous, hand  bilaterally. 4/5 iliopsoas, 5/5 knee  extension/flexion, foot dorsi/plantarflexion bilaterally.    REFLEXES: Symmetric and absent on R, 2+ and brisk on L.   CEREBELLAR/COORDINATION/GAIT: Gait wide based with shortened stride, reduced arm swing on R.   Finger to nose intact. Gross motor movements smooth

## 2020-10-07 RX ORDER — DIAZEPAM 2 MG/1
TABLET ORAL
Qty: 30 TABLET | Refills: 0 | Status: SHIPPED | OUTPATIENT
Start: 2020-10-07 | End: 2020-11-11 | Stop reason: SDUPTHER

## 2020-10-07 NOTE — TELEPHONE ENCOUNTER
----- Message from Soni Soliman sent at 10/7/2020  4:04 PM CDT -----  Type:  RX Refill Request    Who Called: Christine  Refill or New Rx: refill  RX Name and Strength: diazePAM (VALIUM) 2 MG tablet  How is the patient currently taking it? (ex. 1XDay): 1XDay  Is this a 30 day or 90 day RX: 30  Preferred Pharmacy with phone number: [facility][id number] [address] AT [facility] [address] [phone] (Phone)  [phone] (Fax)  Local or Mail Order: local  Ordering Provider: Dr. Beckman  Would the patient rather a call back or a response via [facility]? Call back  Best Call Back Number: [phone]  Additional Information: pt says leave vm if she can't answer

## 2020-10-14 ENCOUNTER — HOSPITAL ENCOUNTER (OUTPATIENT)
Dept: RADIOLOGY | Facility: HOSPITAL | Age: 65
Discharge: HOME | End: 2020-10-14
Attending: PSYCHIATRY & NEUROLOGY
Payer: MEDICARE

## 2020-10-14 DIAGNOSIS — M54.9 DORSALGIA, UNSPECIFIED: ICD-10-CM

## 2020-10-14 DIAGNOSIS — M48.07 SPINAL STENOSIS, LUMBOSACRAL REGION: ICD-10-CM

## 2020-10-14 PROCEDURE — 72148 MRI LUMBAR SPINE W/O DYE: CPT | Mod: 26,HCNC,, | Performed by: RADIOLOGY

## 2020-10-14 PROCEDURE — 72148 MRI LUMBAR SPINE W/O DYE: CPT | Mod: TC,HCNC

## 2020-10-14 PROCEDURE — 72148 MRI LUMBAR SPINE WITHOUT CONTRAST: ICD-10-PCS | Mod: 26,HCNC,, | Performed by: RADIOLOGY

## 2020-10-19 ENCOUNTER — TELEPHONE (OUTPATIENT)
Dept: NEUROLOGY | Facility: CLINIC | Age: 65
End: 2020-10-19

## 2020-10-19 NOTE — TELEPHONE ENCOUNTER
----- Message from Deepa Louise sent at 10/19/2020  2:46 PM CDT -----  Regarding: call back  Contact: [phone]  Patient Returning Your Phone Call

## 2020-10-19 NOTE — TELEPHONE ENCOUNTER
----- Message from Senia Shah sent at 10/19/2020  2:06 PM CDT -----  Contact: CHRISTINE GUEVARA [[id number]]  Type:  Patient Returning Call    Who Called: Christine   Who Left Message for Patient: unknown  Does the patient know what this is regarding?: test results   Would the patient rather a call back or a response via [facility]?    Best Call Back Number: [phone]  Additional Information:  pt's cell is damaged and is unable to answer calls, no other number available

## 2020-10-20 DIAGNOSIS — M48.07 SPINAL STENOSIS, LUMBOSACRAL REGION: Primary | ICD-10-CM

## 2020-10-20 NOTE — TELEPHONE ENCOUNTER
Spoke with patient, stated she has upgraded phones and now able to accept calls.  Message sent to Dr Hinton.

## 2020-10-20 NOTE — TELEPHONE ENCOUNTER
----- Message from Deepa Louise sent at 10/20/2020  1:35 PM CDT -----  Regarding: call back  Contact: [phone]  Patient Returning Your Phone Call

## 2020-10-21 ENCOUNTER — TELEPHONE (OUTPATIENT)
Dept: NEUROSURGERY | Facility: CLINIC | Age: 65
End: 2020-10-21

## 2020-10-21 DIAGNOSIS — M54.9 DORSALGIA, UNSPECIFIED: ICD-10-CM

## 2020-10-22 ENCOUNTER — OFFICE VISIT (OUTPATIENT)
Dept: NEUROSURGERY | Facility: CLINIC | Age: 65
End: 2020-10-22
Payer: MEDICARE

## 2020-10-22 ENCOUNTER — HOSPITAL ENCOUNTER (OUTPATIENT)
Dept: RADIOLOGY | Facility: HOSPITAL | Age: 65
Discharge: HOME | End: 2020-10-22
Attending: PHYSICIAN ASSISTANT
Payer: MEDICARE

## 2020-10-22 DIAGNOSIS — M43.16 SPONDYLOLISTHESIS, LUMBAR REGION: ICD-10-CM

## 2020-10-22 DIAGNOSIS — M47.26 OTHER SPONDYLOSIS WITH RADICULOPATHY, LUMBAR REGION: Primary | ICD-10-CM

## 2020-10-22 DIAGNOSIS — M54.9 DORSALGIA, UNSPECIFIED: ICD-10-CM

## 2020-10-22 DIAGNOSIS — M54.16 LUMBAR RADICULOPATHY: ICD-10-CM

## 2020-10-22 DIAGNOSIS — M54.42 CHRONIC BILATERAL LOW BACK PAIN WITH BILATERAL SCIATICA: ICD-10-CM

## 2020-10-22 DIAGNOSIS — M51.36 DDD (DEGENERATIVE DISC DISEASE), LUMBAR: ICD-10-CM

## 2020-10-22 DIAGNOSIS — G89.29 CHRONIC BILATERAL LOW BACK PAIN WITH BILATERAL SCIATICA: ICD-10-CM

## 2020-10-22 DIAGNOSIS — M54.41 CHRONIC BILATERAL LOW BACK PAIN WITH BILATERAL SCIATICA: ICD-10-CM

## 2020-10-22 DIAGNOSIS — M48.061 SPINAL STENOSIS OF LUMBAR REGION WITHOUT NEUROGENIC CLAUDICATION: ICD-10-CM

## 2020-10-22 PROCEDURE — 72100 X-RAY EXAM L-S SPINE 2/3 VWS: CPT | Mod: 26,HCNC,, | Performed by: RADIOLOGY

## 2020-10-22 PROCEDURE — 1101F PT FALLS ASSESS-DOCD LE1/YR: CPT | Mod: HCNC,CPTII,S$GLB, | Performed by: PHYSICIAN ASSISTANT

## 2020-10-22 PROCEDURE — 72100 XR LUMBAR SPINE AP AND LAT WITH FLEX/EXT: ICD-10-PCS | Mod: 26,HCNC,, | Performed by: RADIOLOGY

## 2020-10-22 PROCEDURE — 1159F MED LIST DOCD IN RCRD: CPT | Mod: HCNC,S$GLB,, | Performed by: PHYSICIAN ASSISTANT

## 2020-10-22 PROCEDURE — 72120 X-RAY BEND ONLY L-S SPINE: CPT | Mod: TC,HCNC,PN

## 2020-10-22 PROCEDURE — 99204 PR OFFICE/OUTPT VISIT, NEW, LEVL IV, 45-59 MIN: ICD-10-PCS | Mod: HCNC,S$GLB,, | Performed by: PHYSICIAN ASSISTANT

## 2020-10-22 PROCEDURE — 99999 PR PBB SHADOW E&M-EST. PATIENT-LVL II: CPT | Mod: PBBFAC,HCNC,, | Performed by: PHYSICIAN ASSISTANT

## 2020-10-22 PROCEDURE — 72120 X-RAY BEND ONLY L-S SPINE: CPT | Mod: 26,HCNC,, | Performed by: RADIOLOGY

## 2020-10-22 PROCEDURE — 1101F PR PT FALLS ASSESS DOC 0-1 FALLS W/OUT INJ PAST YR: ICD-10-PCS | Mod: HCNC,CPTII,S$GLB, | Performed by: PHYSICIAN ASSISTANT

## 2020-10-22 PROCEDURE — 1125F AMNT PAIN NOTED PAIN PRSNT: CPT | Mod: HCNC,S$GLB,, | Performed by: PHYSICIAN ASSISTANT

## 2020-10-22 PROCEDURE — 1159F PR MEDICATION LIST DOCUMENTED IN MEDICAL RECORD: ICD-10-PCS | Mod: HCNC,S$GLB,, | Performed by: PHYSICIAN ASSISTANT

## 2020-10-22 PROCEDURE — 72120 XR LUMBAR SPINE AP AND LAT WITH FLEX/EXT: ICD-10-PCS | Mod: 26,HCNC,, | Performed by: RADIOLOGY

## 2020-10-22 PROCEDURE — 1125F PR PAIN SEVERITY QUANTIFIED, PAIN PRESENT: ICD-10-PCS | Mod: HCNC,S$GLB,, | Performed by: PHYSICIAN ASSISTANT

## 2020-10-22 PROCEDURE — 99204 OFFICE O/P NEW MOD 45 MIN: CPT | Mod: HCNC,S$GLB,, | Performed by: PHYSICIAN ASSISTANT

## 2020-10-22 PROCEDURE — 99999 PR PBB SHADOW E&M-EST. PATIENT-LVL II: ICD-10-PCS | Mod: PBBFAC,HCNC,, | Performed by: PHYSICIAN ASSISTANT

## 2020-10-22 NOTE — PROGRESS NOTES
Subjective:     Patient ID:  Christine Castro is a 65 y.o. female.    Phillip    Chief Complaint: Back pain and bilateral leg pain    HPI (Patient is not a good historian)    Christine Castro is a 65 y.o. female who presents with the above CC.  She has had low back pain for many years but it the last year it has gotten much worse.  Pain is across the low back with radiation down the bilateral posterior legs to the ankles.  Pain is constant in the low back and legs and is worse with walking and standing and better with sitting.    The back pain is worse than the leg pain.  The right leg pain is worse than the left leg pain.    Patient has had PT without relief.  States she thinks she had ESIs in the past but is not really sure.  No spine surgery.      Patient denies any recent accidents or trauma, no saddle anesthesias, and no bowel or bladder incontinence.      Review of Systems:    Review of Systems   Constitutional: Negative for chills, diaphoresis, fever, malaise/fatigue and weight loss.   HENT: Negative for congestion, ear discharge, ear pain, hearing loss, nosebleeds, sinus pain, sore throat and tinnitus.    Eyes: Negative for blurred vision, double vision, photophobia, pain, discharge and redness.   Respiratory: Negative for cough, hemoptysis, sputum production, shortness of breath, wheezing and stridor.    Cardiovascular: Negative for chest pain, palpitations, orthopnea, leg swelling and PND.   Gastrointestinal: Negative for abdominal pain, blood in stool, constipation, diarrhea, heartburn, melena, nausea and vomiting.   Genitourinary: Negative for dysuria, flank pain, frequency, hematuria and urgency.   Musculoskeletal: Positive for back pain and myalgias. Negative for falls, joint pain and neck pain.   Skin: Negative for itching and rash.   Neurological: Negative for dizziness, tingling, tremors, sensory change, speech change, seizures, loss of consciousness, weakness and headaches.   Endo/Heme/Allergies:  Negative for environmental allergies and polydipsia. Does not bruise/bleed easily.   Psychiatric/Behavioral: Negative for depression, hallucinations, memory loss and substance abuse. The patient is not nervous/anxious and does not have insomnia.          Past Medical History:   Diagnosis Date    Anticoagulant long-term use     Arthritis     Asthma     CHF (congestive heart failure)     [facility]    Colon cancer     colon    COPD (chronic obstructive pulmonary disease)     Coronary artery disease     Depression     Diabetes mellitus, type 2     GERD (gastroesophageal reflux disease)     Myocardial infarction     AGE 19 MIGUELANGEL WITHBABY DELIVERY    Thyroid disease      Past Surgical History:   Procedure Laterality Date    ABDOMINAL SURGERY       SECTION      CHOLECYSTECTOMY      COLON SURGERY      COLONOSCOPY      --- repeat in 3-5 years    COLONOSCOPY N/A 2017    Procedure: COLONOSCOPY;  Surgeon: Corrina Flores MD;  Location: [facility];  Service: Endoscopy;  Laterality: N/A;    COLONOSCOPY N/A 4/10/2018    Procedure: COLONOSCOPY golytely;  Surgeon: Corrina Flores MD;  Location: [facility] END[facility];  Service: Endoscopy;  Laterality: N/A;    ECTOPIC PREGNANCY SURGERY      ESOPHAGOGASTRODUODENOSCOPY N/A 2019    Procedure: ESOPHAGOGASTRODUODENOSCOPY (EGD);  Surgeon: Corrina Flores MD;  Location: [facility];  Service: Endoscopy;  Laterality: N/A;    FRACTURE SURGERY      left leg    GASTRIC BYPASS      HERNIA REPAIR      INJECTION OF ANESTHETIC AGENT AROUND GENITOFEMORAL NERVE Left 2020    Procedure: BLOCK, NERVE, LEFT SHOULDER ARTICULAR;  Surgeon: Nilam Santiago MD;  Location: [facility];  Service: Pain Management;  Laterality: Left;    KNEE ARTHROPLASTY Left 2019    Procedure: ARTHROPLASTY, KNEE;  Surgeon: Roldan Greenwood MD;  Location: [facility] OR;  Service: Orthopedics;  Laterality: Left;  [facility] notified    KNEE ARTHROPLASTY Right 2019    Procedure:  ARTHROPLASTY, KNEE;  Surgeon: Roldan Greenwood MD;  Location: [facility] OR;  Service: Orthopedics;  Laterality: Right;  Navid notified, confirmed case Navid 11/19/19 [id number]    OOPHORECTOMY      RADIOFREQUENCY THERMOCOAGULATION Left 8/12/2020    Procedure: RADIOFREQUENCY THERMAL COAGULATION--Left Suprascapular, Axillary, and Lateral Pectoral Articular Branch RFA;  Surgeon: Nilam Santiago MD;  Location: [facility];  Service: Pain Management;  Laterality: Left;    TONSILLECTOMY      TUBAL LIGATION       Current Outpatient Medications on File Prior to Visit   Medication Sig Dispense Refill    acetaminophen (TYLENOL) 500 MG tablet Take 500 mg by mouth every 6 (six) hours as needed for Pain.      albuterol (VENTOLIN HFA) 90 mcg/actuation inhaler INHALE 2 PUFFS INTO THE LUNGS EVERY 4 HOURS AS NEEDED FOR WHEEZING 18 g 3    atorvastatin (LIPITOR) 40 MG tablet Take 1 tablet (40 mg total) by mouth once daily. For cholesterol 90 tablet 3    buPROPion (WELLBUTRIN SR) 200 MG SR12 TAKE 1 TABLET(200 MG) BY MOUTH EVERY DAY 90 tablet 3    busPIRone (BUSPAR) 10 MG tablet TAKE 1/2 TABLET BY MOUTH ONCE DAILY (Patient taking differently: 10 mg. Pt is taking 10 mg once daily) 60 tablet 3    diabetic supplies, miscellan. Misc Lancets for 1-2 times a day testing    dispense brand covered by insurance t 60 each 3    diazePAM (VALIUM) 2 MG tablet TAKE 1 TABLET(2 MG) BY MOUTH EVERY 24 HOURS AS NEEDED FOR ANXIETY 30 tablet 0    diclofenac sodium (VOLTAREN) 1 % Gel APPLY 2 GRAMS EXTERNALLY TO THE AFFECTED AREA FOUR TIMES DAILY 100 g 5    ergocalciferol (ERGOCALCIFEROL) 50,000 unit Cap Take 1 capsule (50,000 Units total) by mouth every 7 days. 12 capsule 3    escitalopram oxalate (LEXAPRO) 20 MG tablet TAKE 1 TABLET(20 MG) BY MOUTH EVERY DAY 90 tablet 3    furosemide (LASIX) 20 MG tablet TAKE 1 TABLET(20 MG) BY MOUTH DAILY AS NEEDED 90 tablet 0    gabapentin (NEURONTIN) 100 MG capsule TAKE 1 CAPSULE(100 MG) BY MOUTH THREE TIMES  DAILY 270 capsule 1    gabapentin (NEURONTIN) 300 MG capsule       ibandronate (BONIVA) 150 mg tablet Take 1 tablet (150 mg total) by mouth every 30 days. For osteoporosis 3 tablet 3    levothyroxine (SYNTHROID) 100 MCG tablet TAKE 1 TABLET BY MOUTH EVERY DAY 90 tablet 3    meloxicam (MOBIC) 15 MG tablet Take 15 mg by mouth once daily.      omeprazole (PRILOSEC) 40 MG capsule TAKE 1 CAPSULE(40 MG) BY MOUTH EVERY MORNING 90 capsule 1    ondansetron (ZOFRAN-ODT) 4 MG TbDL Take 1 tablet (4 mg total) by mouth every 6 (six) hours as needed. 20 tablet 1    oxyCODONE-acetaminophen (PERCOCET)  mg per tablet Take 1 tablet by mouth every 6 to 8 hours as needed.      potassium chloride (KLOR-CON) 10 MEQ TbSR TAKE 2 TABLETS(20 MEQ) BY MOUTH EVERY DAY (Patient taking differently: 10 mEq. TAKE 2 TABLETS(20 MEQ) BY MOUTH EVERY DAY) 180 tablet 0    promethazine (PHENERGAN) 25 MG tablet TAKE 1 TABLET(25 MG) BY MOUTH EVERY 6 HOURS AS NEEDED 25 tablet 0    QUEtiapine (SEROQUEL) 50 MG tablet       tiZANidine (ZANAFLEX) 4 MG tablet       TRUE METRIX GLUCOSE METER Misc U UTD      TRUE METRIX GLUCOSE TEST STRIP Strp USE TO TEST BLOOD SUGAR EVERY  strip 0    TRUE METRIX GLUCOSE TEST STRIP Strp TO TEST ONCE TO TWICE DAILY 50 strip 11    TRUEPLUS LANCETS 30 gauge Misc USE TO TEST ONCE TO TWICE D      zolpidem (AMBIEN) 5 MG Tab TAKE 1 TABLET BY MOUTH EVERY NIGHT AT BEDTIME FOR SLEEP 30 tablet 0    aspirin (ECOTRIN) 81 MG EC tablet Take 1 tablet (81 mg total) by mouth once daily.  0     No current facility-administered medications on file prior to visit.      Review of patient's allergies indicates:   Allergen Reactions    Adhesive      Adhesive tape    Latex, natural rubber      Adhesive from latex tape    Ibuprofen Other (See Comments)     Causes asthma flare??     Social History     Socioeconomic History    Marital status:      Spouse name: Not on file    Number of children: Not on file    Years of  education: Not on file    Highest education level: Not on file   Occupational History    Not on file   Social Needs    Financial resource strain: Not on file    Food insecurity     Worry: Not on file     Inability: Not on file    Transportation needs     Medical: Not on file     Non-medical: Not on file   Tobacco Use    Smoking status: Never Smoker    Smokeless tobacco: Never Used   Substance and Sexual Activity    Alcohol use: Yes     Comment: very rare    Drug use: No     Comment: CBD oil sometimes    Sexual activity: Never   Lifestyle    Physical activity     Days per week: Not on file     Minutes per session: Not on file    Stress: Very much   Relationships    Social connections     Talks on phone: Not on file     Gets together: Not on file     Attends Hoahaoism service: Not on file     Active member of club or organization: Not on file     Attends meetings of clubs or organizations: Not on file     Relationship status: Not on file   Other Topics Concern    Not on file   Social History Narrative    Not on file     Family History   Problem Relation Age of Onset    Hyperlipidemia Mother     Hypertension Mother     Diabetes Mother     Stroke Mother     Hypertension Sister     Hypertension Brother     Diabetes Brother     Breast cancer Maternal Aunt     Breast cancer Maternal Aunt     Breast cancer Cousin        Objective:      Vitals:    10/22/20 1027   PainSc:   9         Physical Exam:      General:  Christine Castro is well-developed, well-nourished, appears stated age, in no acute distress, alert and oriented to person, place, and time.    Pulmonary/Chest:  Respiratory effort normal  Abdominal: Exhibits no distension  Psychiatric:  Normal mood and affect.  Behavior is normal.  Judgement and thought content normal      Musculoskeletal: (exam done in the wheelchair)    Cannot assess walking    Lumbar ROM:   No pain in lumbar flexion.  Pain in extension, left lateral bending, and right  lateral bending.    Lumbar Spine Inspection:  Normal with no surgical scars and no visible rashes.    Lumbar Spine Palpation:  Moderate tenderness to low back palpation.    SI Joint Palpation:  Moderate tenderness to bilateral SI Joint palpation.    Straight Leg Raise:  Cannot assess      Neurological:     Muscle strength against resistance:     Right Left   Hip flexion  5 / 5 5 / 5   Hip extension 5 / 5 5 / 5   Hip abduction 5 / 5 5 / 5   Hip adduction  5 / 5 5 / 5   Knee extension  5 / 5 5 / 5   Knee flexion 5 / 5 5 / 5   Dorsiflexion  5 / 5 5 / 5   EHL  5 / 5 5 / 5   Plantar flexion  5 / 5 5 / 5   Inversion of the feet 5 / 5 5 / 5   Eversion of the feet  5 / 5 5 / 5       Reflexes:     Right Left   Patellar 2+ 2+   Achilles 2+ 2+     Clonus:  Negative bilaterally    On gross examination of the bilateral upper extremities, patient has full painfree ROM with no signs of clubbing, cyanosis, edema, or weakness.       XRAY/MRI Interpretation:     Lumbar spine xrays were personally reviewed today.  No fractures.  Slight movement on flexion and extension at L3-4.  Grade one spondylolisthesis L3-4.   Grade 1-2 spondylolisthesis L4-5, and L5-S1.    Lumbar spine MRI was personally reviewed today.  Grade one spondylolisthesis L4-5 with severe central and bilateral neuroforaminal stenosis.  Bilatearl NFS L3-4.      Assessment:          1. Other spondylosis with radiculopathy, lumbar region    2. Spinal stenosis of lumbar region without neurogenic claudication    3. Chronic bilateral low back pain with bilateral sciatica    4. DDD (degenerative disc disease), lumbar    5. Lumbar radiculopathy    6. Spondylolisthesis, lumbar region            Plan:          Orders Placed This Encounter    Procedure Request Order for Pain Management     Grade 1-2 spondylolisthesis L4-5 with severe central and bilateral neuroforaminal stenosis.  Bilatearl NFS L3-4, L5-S1. Grade one spondylolisthesis L3-4.   Grade 1-2 spondylolisthesis  L5-S1.    -Patient with severe back and leg pain  -Will order L5-S1 [place] MEI with [facility] Dr. Paulk to see if we can give her some relief  -She has failed conservative management over the years  -Will refer her to Dr. Fisher to discuss surgical options      Follow-Up:  Follow up in about 1 month (around 11/22/2020). If there are any questions prior to this, the patient was instructed to contact the office.       Mayda Musa, [facility], PA-C  Neurosurgery  [facility]

## 2020-10-22 NOTE — LETTER
October 22, 2020      Hiro Hinton MD  [address]  [address]  [facility] [address]           [facility]  [address]   [facility] [address]  Phone: [phone]          Patient: Christine Castro   MR Number: [id number]   YOB: 1955   Date of Visit: 10/22/2020       Dear Dr. Hiro Hinton:    Thank you for referring Christine Castro to me for evaluation. Attached you will find relevant portions of my assessment and plan of care.    If you have questions, please do not hesitate to call me. I look forward to following Christine Castro along with you.    Sincerely,    Mayda Musa PA-C    Enclosure  CC:  No Recipients    If you would like to receive this communication electronically, please contact [email or web address] or [phone] to request more information on [facility] Link access.    For providers and/or their staff who would like to refer a patient to [facility], please contact us through our one-stop-shop provider referral line, [facility], at [phone].    If you feel you have received this communication in error or would no longer like to receive these types of communications, please e-mail [email or web address]

## 2020-10-22 NOTE — H&P (VIEW-ONLY)
Subjective:     Patient ID:  Christine Castro is a 65 y.o. female.    Phillip    Chief Complaint: Back pain and bilateral leg pain    HPI (Patient is not a good historian)    Christine Castro is a 65 y.o. female who presents with the above CC.  She has had low back pain for many years but it the last year it has gotten much worse.  Pain is across the low back with radiation down the bilateral posterior legs to the ankles.  Pain is constant in the low back and legs and is worse with walking and standing and better with sitting.    The back pain is worse than the leg pain.  The right leg pain is worse than the left leg pain.    Patient has had PT without relief.  States she thinks she had ESIs in the past but is not really sure.  No spine surgery.      Patient denies any recent accidents or trauma, no saddle anesthesias, and no bowel or bladder incontinence.      Review of Systems:    Review of Systems   Constitutional: Negative for chills, diaphoresis, fever, malaise/fatigue and weight loss.   HENT: Negative for congestion, ear discharge, ear pain, hearing loss, nosebleeds, sinus pain, sore throat and tinnitus.    Eyes: Negative for blurred vision, double vision, photophobia, pain, discharge and redness.   Respiratory: Negative for cough, hemoptysis, sputum production, shortness of breath, wheezing and stridor.    Cardiovascular: Negative for chest pain, palpitations, orthopnea, leg swelling and PND.   Gastrointestinal: Negative for abdominal pain, blood in stool, constipation, diarrhea, heartburn, melena, nausea and vomiting.   Genitourinary: Negative for dysuria, flank pain, frequency, hematuria and urgency.   Musculoskeletal: Positive for back pain and myalgias. Negative for falls, joint pain and neck pain.   Skin: Negative for itching and rash.   Neurological: Negative for dizziness, tingling, tremors, sensory change, speech change, seizures, loss of consciousness, weakness and headaches.   Endo/Heme/Allergies:  Negative for environmental allergies and polydipsia. Does not bruise/bleed easily.   Psychiatric/Behavioral: Negative for depression, hallucinations, memory loss and substance abuse. The patient is not nervous/anxious and does not have insomnia.          Past Medical History:   Diagnosis Date    Anticoagulant long-term use     Arthritis     Asthma     CHF (congestive heart failure)     [facility]    Colon cancer     colon    COPD (chronic obstructive pulmonary disease)     Coronary artery disease     Depression     Diabetes mellitus, type 2     GERD (gastroesophageal reflux disease)     Myocardial infarction     AGE 19 MIGUELANGEL WITHBABY DELIVERY    Thyroid disease      Past Surgical History:   Procedure Laterality Date    ABDOMINAL SURGERY       SECTION      CHOLECYSTECTOMY      COLON SURGERY      COLONOSCOPY      --- repeat in 3-5 years    COLONOSCOPY N/A 2017    Procedure: COLONOSCOPY;  Surgeon: Corrina Flores MD;  Location: [facility] ENDO;  Service: Endoscopy;  Laterality: N/A;    COLONOSCOPY N/A 4/10/2018    Procedure: COLONOSCOPY golytely;  Surgeon: Corrina Flores MD;  Location: [facility] ENDO;  Service: Endoscopy;  Laterality: N/A;    ECTOPIC PREGNANCY SURGERY      ESOPHAGOGASTRODUODENOSCOPY N/A 2019    Procedure: ESOPHAGOGASTRODUODENOSCOPY (EGD);  Surgeon: Corrina Flores MD;  Location: [facility] ENDO;  Service: Endoscopy;  Laterality: N/A;    FRACTURE SURGERY      left leg    GASTRIC BYPASS      HERNIA REPAIR      INJECTION OF ANESTHETIC AGENT AROUND GENITOFEMORAL NERVE Left 2020    Procedure: BLOCK, NERVE, LEFT SHOULDER ARTICULAR;  Surgeon: Nilam Santiago MD;  Location: [facility] PAIN MGT;  Service: Pain Management;  Laterality: Left;    KNEE ARTHROPLASTY Left 2019    Procedure: ARTHROPLASTY, KNEE;  Surgeon: Roldan Greenwood MD;  Location: [facility] OR;  Service: Orthopedics;  Laterality: Left;  [facility]id notified    KNEE ARTHROPLASTY Right 2019    Procedure:  ARTHROPLASTY, KNEE;  Surgeon: Roldan Greenwood MD;  Location: [facility] OR;  Service: Orthopedics;  Laterality: Right;  Navid notified, confirmed case Navid 11/19/19 [id number]    OOPHORECTOMY      RADIOFREQUENCY THERMOCOAGULATION Left 8/12/2020    Procedure: RADIOFREQUENCY THERMAL COAGULATION--Left Suprascapular, Axillary, and Lateral Pectoral Articular Branch RFA;  Surgeon: Nilam Santiago MD;  Location: [facility] PAIN MGT;  Service: Pain Management;  Laterality: Left;    TONSILLECTOMY      TUBAL LIGATION       Current Outpatient Medications on File Prior to Visit   Medication Sig Dispense Refill    acetaminophen (TYLENOL) 500 MG tablet Take 500 mg by mouth every 6 (six) hours as needed for Pain.      albuterol (VENTOLIN HFA) 90 mcg/actuation inhaler INHALE 2 PUFFS INTO THE LUNGS EVERY 4 HOURS AS NEEDED FOR WHEEZING 18 g 3    atorvastatin (LIPITOR) 40 MG tablet Take 1 tablet (40 mg total) by mouth once daily. For cholesterol 90 tablet 3    buPROPion (WELLBUTRIN SR) 200 MG SR12 TAKE 1 TABLET(200 MG) BY MOUTH EVERY DAY 90 tablet 3    busPIRone (BUSPAR) 10 MG tablet TAKE 1/2 TABLET BY MOUTH ONCE DAILY (Patient taking differently: 10 mg. Pt is taking 10 mg once daily) 60 tablet 3    diabetic supplies, miscellan. Misc Lancets for 1-2 times a day testing    dispense brand covered by insurance t 60 each 3    diazePAM (VALIUM) 2 MG tablet TAKE 1 TABLET(2 MG) BY MOUTH EVERY 24 HOURS AS NEEDED FOR ANXIETY 30 tablet 0    diclofenac sodium (VOLTAREN) 1 % Gel APPLY 2 GRAMS EXTERNALLY TO THE AFFECTED AREA FOUR TIMES DAILY 100 g 5    ergocalciferol (ERGOCALCIFEROL) 50,000 unit Cap Take 1 capsule (50,000 Units total) by mouth every 7 days. 12 capsule 3    escitalopram oxalate (LEXAPRO) 20 MG tablet TAKE 1 TABLET(20 MG) BY MOUTH EVERY DAY 90 tablet 3    furosemide (LASIX) 20 MG tablet TAKE 1 TABLET(20 MG) BY MOUTH DAILY AS NEEDED 90 tablet 0    gabapentin (NEURONTIN) 100 MG capsule TAKE 1 CAPSULE(100 MG) BY MOUTH THREE TIMES  DAILY 270 capsule 1    gabapentin (NEURONTIN) 300 MG capsule       ibandronate (BONIVA) 150 mg tablet Take 1 tablet (150 mg total) by mouth every 30 days. For osteoporosis 3 tablet 3    levothyroxine (SYNTHROID) 100 MCG tablet TAKE 1 TABLET BY MOUTH EVERY DAY 90 tablet 3    meloxicam (MOBIC) 15 MG tablet Take 15 mg by mouth once daily.      omeprazole (PRILOSEC) 40 MG capsule TAKE 1 CAPSULE(40 MG) BY MOUTH EVERY MORNING 90 capsule 1    ondansetron (ZOFRAN-ODT) 4 MG TbDL Take 1 tablet (4 mg total) by mouth every 6 (six) hours as needed. 20 tablet 1    oxyCODONE-acetaminophen (PERCOCET)  mg per tablet Take 1 tablet by mouth every 6 to 8 hours as needed.      potassium chloride (KLOR-CON) 10 MEQ TbSR TAKE 2 TABLETS(20 MEQ) BY MOUTH EVERY DAY (Patient taking differently: 10 mEq. TAKE 2 TABLETS(20 MEQ) BY MOUTH EVERY DAY) 180 tablet 0    promethazine (PHENERGAN) 25 MG tablet TAKE 1 TABLET(25 MG) BY MOUTH EVERY 6 HOURS AS NEEDED 25 tablet 0    QUEtiapine (SEROQUEL) 50 MG tablet       tiZANidine (ZANAFLEX) 4 MG tablet       TRUE METRIX GLUCOSE METER Misc U UTD      TRUE METRIX GLUCOSE TEST STRIP Strp USE TO TEST BLOOD SUGAR EVERY  strip 0    TRUE METRIX GLUCOSE TEST STRIP Strp TO TEST ONCE TO TWICE DAILY 50 strip 11    TRUEPLUS LANCETS 30 gauge Misc USE TO TEST ONCE TO TWICE D      zolpidem (AMBIEN) 5 MG Tab TAKE 1 TABLET BY MOUTH EVERY NIGHT AT BEDTIME FOR SLEEP 30 tablet 0    aspirin (ECOTRIN) 81 MG EC tablet Take 1 tablet (81 mg total) by mouth once daily.  0     No current facility-administered medications on file prior to visit.      Review of patient's allergies indicates:   Allergen Reactions    Adhesive      Adhesive tape    Latex, natural rubber      Adhesive from latex tape    Ibuprofen Other (See Comments)     Causes asthma flare??     Social History     Socioeconomic History    Marital status:      Spouse name: Not on file    Number of children: Not on file    Years of  education: Not on file    Highest education level: Not on file   Occupational History    Not on file   Social Needs    Financial resource strain: Not on file    Food insecurity     Worry: Not on file     Inability: Not on file    Transportation needs     Medical: Not on file     Non-medical: Not on file   Tobacco Use    Smoking status: Never Smoker    Smokeless tobacco: Never Used   Substance and Sexual Activity    Alcohol use: Yes     Comment: very rare    Drug use: No     Comment: CBD oil sometimes    Sexual activity: Never   Lifestyle    Physical activity     Days per week: Not on file     Minutes per session: Not on file    Stress: Very much   Relationships    Social connections     Talks on phone: Not on file     Gets together: Not on file     Attends Scientologist service: Not on file     Active member of club or organization: Not on file     Attends meetings of clubs or organizations: Not on file     Relationship status: Not on file   Other Topics Concern    Not on file   Social History Narrative    Not on file     Family History   Problem Relation Age of Onset    Hyperlipidemia Mother     Hypertension Mother     Diabetes Mother     Stroke Mother     Hypertension Sister     Hypertension Brother     Diabetes Brother     Breast cancer Maternal Aunt     Breast cancer Maternal Aunt     Breast cancer Cousin        Objective:      Vitals:    10/22/20 1027   PainSc:   9         Physical Exam:      General:  Christine Castro is well-developed, well-nourished, appears stated age, in no acute distress, alert and oriented to person, place, and time.    Pulmonary/Chest:  Respiratory effort normal  Abdominal: Exhibits no distension  Psychiatric:  Normal mood and affect.  Behavior is normal.  Judgement and thought content normal      Musculoskeletal: (exam done in the wheelchair)    Cannot assess walking    Lumbar ROM:   No pain in lumbar flexion.  Pain in extension, left lateral bending, and right  lateral bending.    Lumbar Spine Inspection:  Normal with no surgical scars and no visible rashes.    Lumbar Spine Palpation:  Moderate tenderness to low back palpation.    SI Joint Palpation:  Moderate tenderness to bilateral SI Joint palpation.    Straight Leg Raise:  Cannot assess      Neurological:     Muscle strength against resistance:     Right Left   Hip flexion  5 / 5 5 / 5   Hip extension 5 / 5 5 / 5   Hip abduction 5 / 5 5 / 5   Hip adduction  5 / 5 5 / 5   Knee extension  5 / 5 5 / 5   Knee flexion 5 / 5 5 / 5   Dorsiflexion  5 / 5 5 / 5   EHL  5 / 5 5 / 5   Plantar flexion  5 / 5 5 / 5   Inversion of the feet 5 / 5 5 / 5   Eversion of the feet  5 / 5 5 / 5       Reflexes:     Right Left   Patellar 2+ 2+   Achilles 2+ 2+     Clonus:  Negative bilaterally    On gross examination of the bilateral upper extremities, patient has full painfree ROM with no signs of clubbing, cyanosis, edema, or weakness.       XRAY/MRI Interpretation:     Lumbar spine xrays were personally reviewed today.  No fractures.  Slight movement on flexion and extension at L3-4.  Grade one spondylolisthesis L3-4.   Grade 1-2 spondylolisthesis L4-5, and L5-S1.    Lumbar spine MRI was personally reviewed today.  Grade one spondylolisthesis L4-5 with severe central and bilateral neuroforaminal stenosis.  Bilatearl NFS L3-4.      Assessment:          1. Other spondylosis with radiculopathy, lumbar region    2. Spinal stenosis of lumbar region without neurogenic claudication    3. Chronic bilateral low back pain with bilateral sciatica    4. DDD (degenerative disc disease), lumbar    5. Lumbar radiculopathy    6. Spondylolisthesis, lumbar region            Plan:          Orders Placed This Encounter    Procedure Request Order for Pain Management     Grade 1-2 spondylolisthesis L4-5 with severe central and bilateral neuroforaminal stenosis.  Bilatearl NFS L3-4, L5-S1. Grade one spondylolisthesis L3-4.   Grade 1-2 spondylolisthesis  L5-S1.    -Patient with severe back and leg pain  -Will order L5-S1 [place] MEI with [facility] Dr. Paulk to see if we can give her some relief  -She has failed conservative management over the years  -Will refer her to Dr. Fisher to discuss surgical options      Follow-Up:  Follow up in about 1 month (around 11/22/2020). If there are any questions prior to this, the patient was instructed to contact the office.       Mayda Musa, [facility], PA-C  Neurosurgery  [facility]

## 2020-10-26 ENCOUNTER — TELEPHONE (OUTPATIENT)
Dept: PAIN MEDICINE | Facility: CLINIC | Age: 65
End: 2020-10-26

## 2020-10-26 DIAGNOSIS — M54.16 LUMBAR RADICULOPATHY: Primary | ICD-10-CM

## 2020-10-26 NOTE — TELEPHONE ENCOUNTER
Pt scheduled for 11/4/20 at 1130am for [place] MEI. Pt aware to check in at registration desk on the first floor of the hospital for 1030 am. Pt is taking ASA and aware to hold of 3 days prior to procedure.  Pt denied having a pacemaker/ICD.

## 2020-10-27 ENCOUNTER — HOSPITAL ENCOUNTER (OUTPATIENT)
Dept: RADIOLOGY | Facility: HOSPITAL | Age: 65
Discharge: HOME | End: 2020-10-27
Attending: NEUROLOGICAL SURGERY
Payer: MEDICARE

## 2020-10-27 ENCOUNTER — OFFICE VISIT (OUTPATIENT)
Dept: NEUROSURGERY | Facility: CLINIC | Age: 65
End: 2020-10-27
Payer: MEDICARE

## 2020-10-27 VITALS — DIASTOLIC BLOOD PRESSURE: 77 MMHG | HEART RATE: 75 BPM | SYSTOLIC BLOOD PRESSURE: 120 MMHG

## 2020-10-27 DIAGNOSIS — M53.2X6 SPINAL INSTABILITY OF LUMBAR REGION: ICD-10-CM

## 2020-10-27 DIAGNOSIS — M48.062 LUMBAR STENOSIS WITH NEUROGENIC CLAUDICATION: ICD-10-CM

## 2020-10-27 DIAGNOSIS — M43.16 SPONDYLOLISTHESIS AT L4-L5 LEVEL: Primary | ICD-10-CM

## 2020-10-27 DIAGNOSIS — M43.8X9 SAGITTAL PLANE IMBALANCE: ICD-10-CM

## 2020-10-27 DIAGNOSIS — M43.16 SPONDYLOLISTHESIS AT L4-L5 LEVEL: ICD-10-CM

## 2020-10-27 PROCEDURE — 99999 PR PBB SHADOW E&M-EST. PATIENT-LVL II: ICD-10-PCS | Mod: PBBFAC,HCNC,, | Performed by: NEUROLOGICAL SURGERY

## 2020-10-27 PROCEDURE — 1101F PT FALLS ASSESS-DOCD LE1/YR: CPT | Mod: HCNC,CPTII,S$GLB, | Performed by: NEUROLOGICAL SURGERY

## 2020-10-27 PROCEDURE — 99214 PR OFFICE/OUTPT VISIT, EST, LEVL IV, 30-39 MIN: ICD-10-PCS | Mod: HCNC,S$GLB,, | Performed by: NEUROLOGICAL SURGERY

## 2020-10-27 PROCEDURE — 72082 X-RAY EXAM ENTIRE SPI 2/3 VW: CPT | Mod: TC,HCNC,FY

## 2020-10-27 PROCEDURE — 1101F PR PT FALLS ASSESS DOC 0-1 FALLS W/OUT INJ PAST YR: ICD-10-PCS | Mod: HCNC,CPTII,S$GLB, | Performed by: NEUROLOGICAL SURGERY

## 2020-10-27 PROCEDURE — 72082 X-RAY EXAM ENTIRE SPI 2/3 VW: CPT | Mod: 26,HCNC,, | Performed by: RADIOLOGY

## 2020-10-27 PROCEDURE — 1125F AMNT PAIN NOTED PAIN PRSNT: CPT | Mod: HCNC,S$GLB,, | Performed by: NEUROLOGICAL SURGERY

## 2020-10-27 PROCEDURE — 72082 XR SCOLIOSIS COMPLETE: ICD-10-PCS | Mod: 26,HCNC,, | Performed by: RADIOLOGY

## 2020-10-27 PROCEDURE — 1159F PR MEDICATION LIST DOCUMENTED IN MEDICAL RECORD: ICD-10-PCS | Mod: HCNC,S$GLB,, | Performed by: NEUROLOGICAL SURGERY

## 2020-10-27 PROCEDURE — 1125F PR PAIN SEVERITY QUANTIFIED, PAIN PRESENT: ICD-10-PCS | Mod: HCNC,S$GLB,, | Performed by: NEUROLOGICAL SURGERY

## 2020-10-27 PROCEDURE — 1159F MED LIST DOCD IN RCRD: CPT | Mod: HCNC,S$GLB,, | Performed by: NEUROLOGICAL SURGERY

## 2020-10-27 PROCEDURE — 99214 OFFICE O/P EST MOD 30 MIN: CPT | Mod: HCNC,S$GLB,, | Performed by: NEUROLOGICAL SURGERY

## 2020-10-27 PROCEDURE — 99499 UNLISTED E&M SERVICE: CPT | Mod: S$GLB,,, | Performed by: NEUROLOGICAL SURGERY

## 2020-10-27 PROCEDURE — 99999 PR PBB SHADOW E&M-EST. PATIENT-LVL II: CPT | Mod: PBBFAC,HCNC,, | Performed by: NEUROLOGICAL SURGERY

## 2020-10-27 PROCEDURE — 99499 RISK ADDL DX/OHS AUDIT: ICD-10-PCS | Mod: S$GLB,,, | Performed by: NEUROLOGICAL SURGERY

## 2020-10-27 NOTE — PROGRESS NOTES
NEUROSURGICAL PROGRESS NOTE    DATE OF SERVICE:  10/27/2020    ATTENDING PHYSICIAN:  Ishaan Fisher MD    SUBJECTIVE:    INTERIM HISTORY:    This is a very pleasant 65 y.o. female, who has been complaining of worsening low back pain, difficulty walking.  She reports chronic low back pain for several years.  Her symptoms have been worsening progressively over the years.  The pain has been severe and started to significantly affect her ability to walk in January 2020 after a fall while shopping.  Head she is unable to walk for prolonged period of time and has to sit otherwise her legs and buttock start hurting.  The pain is now constant but worse when she stands up.  When she stands up she has to lean forward in order to keep her balance she has to flexor hip and knees.  She has urinary urgency.  Pain is associated with numbness in bilateral buttock and posterior leg.  She is taking gabapentin with minimal pain relief.  She does not tolerate gabapentin because she is drowsy when she takes it    Low Back Pain Scale  R Low Back-Pain Score: 5  R Low Back-Pain Intensity: Pain killers give moderate relief from pain  Personal Care : It is painful to look after myself and I am slow and careful.  Lifting: Pain prevents me from lifting heavy weights, but I can manage light weights if they are conveniently positioned.  Walking: Pain prevents me walking more than 1/4 mile.  Sitting: Pain prevents me from sitting more than thirty minutes.   Low Back-Standing: I avoid standing because it increases the pain immediately   Low Back-Sleeping: Because of pain my normal nights sleep is reduced by less than three quarters  Social Life: Pain has restricted my social life, and I do not go out as often.   Low Back-Traveling: Pain restricts me to short necessary journeys under 30 minutes   Low Back-Changing Degree of Pain: My pain is gradually worsening         PAST MEDICAL HISTORY:  Active Ambulatory Problems     Diagnosis Date Noted     GERD (gastroesophageal reflux disease)     Acquired hypothyroidism     History of congestive heart failure     Depression 2015    Normocytic anemia 04/10/2018    History of myocardial infarction 2018    Epigastric pain 2019    BMI 39.0-39.9,adult 2019    Primary osteoarthritis of left knee 2019    TIA (transient ischemic attack) 2019    History of gastric bypass 2019    Coronary artery disease involving native coronary artery of native heart     UTI (urinary tract infection) 2019    History of right knee joint replacement 2019    Chronic left shoulder pain 2020    Primary osteoarthritis of left shoulder 2020     Resolved Ambulatory Problems     Diagnosis Date Noted    Primary osteoarthritis     Diabetes mellitus, type 2     History of colon cancer 2017    Malfunction of device 2017    S/P TKR (total knee replacement), left 2019    Weakness of left lower extremity 2019    Gait, antalgic 2019    Decreased range of motion (ROM) of left knee 2019    Primary osteoarthritis of right knee 2019     Past Medical History:   Diagnosis Date    Anticoagulant long-term use     Arthritis     Asthma     CHF (congestive heart failure)     Colon cancer     COPD (chronic obstructive pulmonary disease)     Coronary artery disease     Myocardial infarction     Thyroid disease        PAST SURGICAL HISTORY:  Past Surgical History:   Procedure Laterality Date    ABDOMINAL SURGERY       SECTION      CHOLECYSTECTOMY      COLON SURGERY      COLONOSCOPY      --- repeat in 3-5 years    COLONOSCOPY N/A 2017    Procedure: COLONOSCOPY;  Surgeon: Corrina Flores MD;  Location: [facility] ENDO;  Service: Endoscopy;  Laterality: N/A;    COLONOSCOPY N/A 4/10/2018    Procedure: COLONOSCOPY golytely;  Surgeon: Corrina Flores MD;  Location: [facility] ENDO;  Service: Endoscopy;  Laterality: N/A;     ECTOPIC PREGNANCY SURGERY      ESOPHAGOGASTRODUODENOSCOPY N/A 2/28/2019    Procedure: ESOPHAGOGASTRODUODENOSCOPY (EGD);  Surgeon: Corrina Flores MD;  Location: [facility] ENDO;  Service: Endoscopy;  Laterality: N/A;    FRACTURE SURGERY      left leg    GASTRIC BYPASS      HERNIA REPAIR      INJECTION OF ANESTHETIC AGENT AROUND GENITOFEMORAL NERVE Left 7/29/2020    Procedure: BLOCK, NERVE, LEFT SHOULDER ARTICULAR;  Surgeon: Nilam Santiago MD;  Location: [facility] PAIN MGT;  Service: Pain Management;  Laterality: Left;    KNEE ARTHROPLASTY Left 5/8/2019    Procedure: ARTHROPLASTY, KNEE;  Surgeon: Roldan Greenwood MD;  Location: [facility] OR;  Service: Orthopedics;  Laterality: Left;  [facility] notified    KNEE ARTHROPLASTY Right 11/20/2019    Procedure: ARTHROPLASTY, KNEE;  Surgeon: Roldan Greenwood MD;  Location: [facility] OR;  Service: Orthopedics;  Laterality: Right;  [facility] notified, confirmed case Navid 11/19/19 [id number]    OOPHORECTOMY      RADIOFREQUENCY THERMOCOAGULATION Left 8/12/2020    Procedure: RADIOFREQUENCY THERMAL COAGULATION--Left Suprascapular, Axillary, and Lateral Pectoral Articular Branch RFA;  Surgeon: Nilam Santiago MD;  Location: [facility] PAIN MGT;  Service: Pain Management;  Laterality: Left;    TONSILLECTOMY      TUBAL LIGATION         SOCIAL HISTORY:   Social History     Socioeconomic History    Marital status:      Spouse name: Not on file    Number of children: Not on file    Years of education: Not on file    Highest education level: Not on file   Occupational History    Not on file   Social Needs    Financial resource strain: Not on file    Food insecurity     Worry: Not on file     Inability: Not on file    Transportation needs     Medical: Not on file     Non-medical: Not on file   Tobacco Use    Smoking status: Never Smoker    Smokeless tobacco: Never Used   Substance and Sexual Activity    Alcohol use: Yes     Comment: very rare    Drug use: No     Comment: CBD oil sometimes     Sexual activity: Never   Lifestyle    Physical activity     Days per week: Not on file     Minutes per session: Not on file    Stress: Very much   Relationships    Social connections     Talks on phone: Not on file     Gets together: Not on file     Attends [employer] service: Not on file     Active member of club or organization: Not on file     Attends meetings of clubs or organizations: Not on file     Relationship status: Not on file   Other Topics Concern    Not on file   Social History Narrative    Not on file       FAMILY HISTORY:  Family History   Problem Relation Age of Onset    Hyperlipidemia Mother     Hypertension Mother     Diabetes Mother     Stroke Mother     Hypertension Sister     Hypertension Brother     Diabetes Brother     Breast cancer Maternal Aunt     Breast cancer Maternal Aunt     Breast cancer Cousin        CURRENTS MEDICATIONS:  Current Outpatient Medications on File Prior to Visit   Medication Sig Dispense Refill    acetaminophen (TYLENOL) 500 MG tablet Take 500 mg by mouth every 6 (six) hours as needed for Pain.      albuterol (VENTOLIN HFA) 90 mcg/actuation inhaler INHALE 2 PUFFS INTO THE LUNGS EVERY 4 HOURS AS NEEDED FOR WHEEZING 18 g 3    atorvastatin (LIPITOR) 40 MG tablet Take 1 tablet (40 mg total) by mouth once daily. For cholesterol 90 tablet 3    buPROPion (WELLBUTRIN SR) 200 MG SR12 TAKE 1 TABLET(200 MG) BY MOUTH EVERY DAY 90 tablet 3    busPIRone (BUSPAR) 10 MG tablet TAKE 1/2 TABLET BY MOUTH ONCE DAILY (Patient taking differently: 10 mg. Pt is taking 10 mg once daily) 60 tablet 3    diabetic supplies, miscellan. Misc Lancets for 1-2 times a day testing    dispense brand covered by insurance t 60 each 3    diazePAM (VALIUM) 2 MG tablet TAKE 1 TABLET(2 MG) BY MOUTH EVERY 24 HOURS AS NEEDED FOR ANXIETY 30 tablet 0    diclofenac sodium (VOLTAREN) 1 % Gel APPLY 2 GRAMS EXTERNALLY TO THE AFFECTED AREA FOUR TIMES DAILY 100 g 5    ergocalciferol  (ERGOCALCIFEROL) 50,000 unit Cap Take 1 capsule (50,000 Units total) by mouth every 7 days. 12 capsule 3    escitalopram oxalate (LEXAPRO) 20 MG tablet TAKE 1 TABLET(20 MG) BY MOUTH EVERY DAY 90 tablet 3    furosemide (LASIX) 20 MG tablet TAKE 1 TABLET(20 MG) BY MOUTH DAILY AS NEEDED 90 tablet 0    gabapentin (NEURONTIN) 100 MG capsule TAKE 1 CAPSULE(100 MG) BY MOUTH THREE TIMES DAILY 270 capsule 1    gabapentin (NEURONTIN) 300 MG capsule       ibandronate (BONIVA) 150 mg tablet Take 1 tablet (150 mg total) by mouth every 30 days. For osteoporosis 3 tablet 3    levothyroxine (SYNTHROID) 100 MCG tablet TAKE 1 TABLET BY MOUTH EVERY DAY 90 tablet 3    meloxicam (MOBIC) 15 MG tablet Take 15 mg by mouth once daily.      omeprazole (PRILOSEC) 40 MG capsule TAKE 1 CAPSULE(40 MG) BY MOUTH EVERY MORNING 90 capsule 1    ondansetron (ZOFRAN-ODT) 4 MG TbDL Take 1 tablet (4 mg total) by mouth every 6 (six) hours as needed. 20 tablet 1    oxyCODONE-acetaminophen (PERCOCET)  mg per tablet Take 1 tablet by mouth every 6 to 8 hours as needed.      potassium chloride (KLOR-CON) 10 MEQ TbSR TAKE 2 TABLETS(20 MEQ) BY MOUTH EVERY DAY (Patient taking differently: 10 mEq. TAKE 2 TABLETS(20 MEQ) BY MOUTH EVERY DAY) 180 tablet 0    promethazine (PHENERGAN) 25 MG tablet TAKE 1 TABLET(25 MG) BY MOUTH EVERY 6 HOURS AS NEEDED 25 tablet 0    QUEtiapine (SEROQUEL) 50 MG tablet       tiZANidine (ZANAFLEX) 4 MG tablet       TRUE METRIX GLUCOSE METER [facility] U UTD      TRUE METRIX GLUCOSE TEST STRIP Strp USE TO TEST BLOOD SUGAR EVERY  strip 0    TRUE METRIX GLUCOSE TEST STRIP Strp TO TEST ONCE TO TWICE DAILY 50 strip 11    TRUEPLUS LANCETS 30 gauge Misc USE TO TEST ONCE TO TWICE D      zolpidem (AMBIEN) 5 MG Tab TAKE 1 TABLET BY MOUTH EVERY NIGHT AT BEDTIME FOR SLEEP 30 tablet 0    aspirin (ECOTRIN) 81 MG EC tablet Take 1 tablet (81 mg total) by mouth once daily.  0     No current facility-administered medications on  file prior to visit.        ALLERGIES:  Review of patient's allergies indicates:   Allergen Reactions    Adhesive      Adhesive tape    Latex, natural rubber      Adhesive from latex tape    Ibuprofen Other (See Comments)     Causes asthma flare??       REVIEW OF SYSTEMS:  Review of Systems   Constitutional: Negative for diaphoresis, fever and weight loss.   Respiratory: Negative for shortness of breath.    Cardiovascular: Negative for chest pain.   Gastrointestinal: Negative for blood in stool.   Genitourinary: Negative for hematuria.   Endo/Heme/Allergies: Does not bruise/bleed easily.   All other systems reviewed and are negative.        OBJECTIVE:    PHYSICAL EXAMINATION:   Vitals:    10/27/20 1118   BP: 120/77   Pulse: 75       Physical Exam:  Vitals reviewed.    Constitutional: She appears well-developed and well-nourished.     Eyes: Pupils are equal, round, and reactive to light. Conjunctivae and EOM are normal.     Cardiovascular: Normal distal pulses and no edema.     Abdominal: Soft.     Skin: Skin displays no rash on trunk and no rash on extremities. Skin displays no lesions on trunk and no lesions on extremities.     Psych/Behavior: She is alert. She is oriented to person, place, and time. She has a normal mood and affect.     Musculoskeletal:        Neck: Range of motion is full.     Neurological:        DTRs: Tricep reflexes are 2+ on the right side and 2+ on the left side. Bicep reflexes are 2+ on the right side and 2+ on the left side. Brachioradialis reflexes are 2+ on the right side and 2+ on the left side. Patellar reflexes are 2+ on the right side and 2+ on the left side. Achilles reflexes are 0 on the right side and 0 on the left side.       Back Exam     Tenderness   The patient is experiencing tenderness in the lumbar.    Range of Motion   Extension: abnormal   Flexion: abnormal   Lateral bend right: abnormal   Lateral bend left: abnormal   Rotation right: abnormal   Rotation left: abnormal      Muscle Strength   Right Quadriceps:  5/5   Left Quadriceps:  5/5   Right Hamstrings:  5/5   Left Hamstrings:  5/5     Tests   Straight leg raise right: negative  Straight leg raise left: negative    Other   Toe walk: normal  Heel walk: normal                Neurologic Exam     Mental Status   Oriented to person, place, and time.   Speech: speech is normal   Level of consciousness: alert    Cranial Nerves   Cranial nerves II through XII intact.     CN III, IV, VI   Pupils are equal, round, and reactive to light.  Extraocular motions are normal.     Motor Exam   Muscle bulk: normal  Overall muscle tone: normal    Strength   Right deltoid: 5/5  Left deltoid: 5/5  Right biceps: 5/5  Left biceps: 5/5  Right triceps: 5/5  Left triceps: 5/5  Right wrist flexion: 5/5  Left wrist flexion: 5/5  Right wrist extension: 5/5  Left wrist extension: 5/5  Right interossei: 5/5  Left interossei: 5/5  Right iliopsoas: 5/5  Left iliopsoas: 5/5  Right quadriceps: 5/5  Left quadriceps: 5/5  Right hamstrin/5  Left hamstrin/5  Right anterior tibial: 5/5  Left anterior tibial: 5/5  Right posterior tibial: 5/5  Left posterior tibial: 5/5  Right peroneal: 5/5  Left peroneal: 5/5  Right gastroc: 5/5  Left gastroc: 5/5    Sensory Exam   Light touch normal.   Pinprick normal.     Gait, Coordination, and Reflexes     Gait  Gait: (Leaning forward with hip and knee flexion)    Coordination   Finger to nose coordination: normal    Reflexes   Right brachioradialis: 2+  Left brachioradialis: 2+  Right biceps: 2+  Left biceps: 2+  Right triceps: 2+  Left triceps: 2+  Right patellar: 2+  Left patellar: 2+  Right achilles: 0  Left achilles: 0  Right plantar: normal  Left plantar: normal  Right Munoz: absent  Left Munoz: absent  Right ankle clonus: absent  Left ankle clonus: absent        DIAGNOSTIC DATA:  I personally interpreted the following imaging:   Lumbar spine MRI shows diffuse spondylosis worse at L4-5 with grade 1  spondylolisthesis and severe spinal stenosis, anterolisthesis at L4-5 measured at 5 mm, small L3-4 and L5-S1 degenerative spondylolisthesis without significant spinal stenosis  Scoliosis film shows positive  mm, L4-5 grade 2 spondylolisthesis with 10 mm of anterolisthesis, stable L3-4 and L5-S1 spondylolisthesis compared to MRI  Pelvic incidence measured at 76°, global lumbar lordosis measured at 56 °    ASSESMENT:  This is a 65 y.o. female with     Problem List Items Addressed This Visit     None      Visit Diagnoses     Spondylolisthesis at L4-L5 level    -  Primary    Relevant Orders    X-Ray Scoliosis Complete Including Supine And Erect    Sagittal plane imbalance        Spinal instability of lumbar region        Lumbar stenosis with neurogenic claudication                PLAN:  I explained the natural history of the disease and all treatment options. I recommended a left L4-5 oblique interbody fusion with placement of hyperlordotic spacer correct sagittal plane imbalance and lumbar lordosis/pelvic incidence mismatch, L4-5 laminectomy, medial facetectomy, foraminotomy, L4-5 posterior instrumentation.     We have discussed the risks of surgery including death, coma, bleeding, infection, failure of surgery, CSF leak, nerve root injury, spinal cord injury, ureter injury, weakness, paralysis, peripheral neuropathy, malplaced hardware, migration of hardware, non-union, need for reoperation. Patient understands the risks and would like to proceed with surgery.      The patient has increase perioperative risks because of these comorbidities:  Morbid obesity with BMI of 40.64, type 2 diabetes.         Ishaan Fisher MD  Cell:[phone]

## 2020-10-29 ENCOUNTER — TELEPHONE (OUTPATIENT)
Dept: NEUROSURGERY | Facility: CLINIC | Age: 65
End: 2020-10-29

## 2020-10-29 DIAGNOSIS — M53.2X6 LUMBAR SPINE INSTABILITY: ICD-10-CM

## 2020-10-29 DIAGNOSIS — Z41.9 SURGERY, ELECTIVE: ICD-10-CM

## 2020-10-29 DIAGNOSIS — M43.16 SPONDYLOLISTHESIS OF LUMBAR REGION: Primary | ICD-10-CM

## 2020-10-29 DIAGNOSIS — M48.061 SPINAL STENOSIS, LUMBAR REGION, WITHOUT NEUROGENIC CLAUDICATION: ICD-10-CM

## 2020-10-29 DIAGNOSIS — M43.27 FUSION OF SPINE, LUMBOSACRAL REGION: ICD-10-CM

## 2020-11-03 NOTE — DISCHARGE INSTRUCTIONS
Home Care Instructions Pain Management:    1.  DIET:    You may resume your normal diet today.    2.  BATHING:    You may shower with luke warm water.    3.  DRESSING:    You may remove your bandage today.    4.  ACTIVITY LEVEL:      You may resume your normal activities 24 hours after your procedure.    5.  MEDICATIONS:    You may resume your normal medications today.    6.  SPECIAL INSTRUCTIONS:    No heat to the injection site for 24 hours including bath or shower, heating pad, moist heat or hot tubs.    Use an ice pack to the injection site for any pain or discomfort.  Apply ice packs for 20 minute intervals as needed.    If you have received any sedatives by mouth today, you can not drive for 12 hours.    If you have received sedation through an IV, you can not drive for 24 hours.    PLEASE CALL YOUR DOCTOR FOR THE FOLLOWIN.  Redness or swelling around the injection site.  2.  Fever of 101 degrees.  3.  Drainage (pus) from the injection site.  4.  For any continuous bleeding (some dried blood over the incision is normal.)    FOR EMERGENCIES:    If any unusual problems or difficulties occur during clinic hours, call [phone] or dial [phone].    Follow up with with your physician in 2-3 weeks.

## 2020-11-04 ENCOUNTER — HOSPITAL ENCOUNTER (OUTPATIENT)
Facility: HOSPITAL | Age: 65
Discharge: HOME | End: 2020-11-04
Attending: PHYSICAL MEDICINE & REHABILITATION | Admitting: ANESTHESIOLOGY
Payer: MEDICARE

## 2020-11-04 VITALS
TEMPERATURE: 98 F | BODY MASS INDEX: 36.8 KG/M2 | SYSTOLIC BLOOD PRESSURE: 122 MMHG | HEIGHT: 62 IN | OXYGEN SATURATION: 95 % | DIASTOLIC BLOOD PRESSURE: 72 MMHG | RESPIRATION RATE: 15 BRPM | HEART RATE: 68 BPM | WEIGHT: 200 LBS

## 2020-11-04 DIAGNOSIS — G89.29 CHRONIC PAIN: ICD-10-CM

## 2020-11-04 DIAGNOSIS — M54.16 LUMBAR RADICULOPATHY: Primary | ICD-10-CM

## 2020-11-04 PROCEDURE — 63600175 PHARM REV CODE 636 W HCPCS: Mod: HCNC | Performed by: PHYSICAL MEDICINE & REHABILITATION

## 2020-11-04 PROCEDURE — 62323 PR INJ LUMBAR/SACRAL, W/IMAGING GUIDANCE: ICD-10-PCS | Mod: HCNC,,, | Performed by: PHYSICAL MEDICINE & REHABILITATION

## 2020-11-04 PROCEDURE — 99152 MOD SED SAME PHYS/QHP 5/>YRS: CPT | Mod: HCNC | Performed by: PHYSICAL MEDICINE & REHABILITATION

## 2020-11-04 PROCEDURE — 25000003 PHARM REV CODE 250: Mod: HCNC | Performed by: PHYSICAL MEDICINE & REHABILITATION

## 2020-11-04 PROCEDURE — 62323 NJX INTERLAMINAR LMBR/SAC: CPT | Mod: HCNC,,, | Performed by: PHYSICAL MEDICINE & REHABILITATION

## 2020-11-04 PROCEDURE — 62323 NJX INTERLAMINAR LMBR/SAC: CPT | Mod: HCNC | Performed by: PHYSICAL MEDICINE & REHABILITATION

## 2020-11-04 PROCEDURE — 25500020 PHARM REV CODE 255: Mod: HCNC | Performed by: PHYSICAL MEDICINE & REHABILITATION

## 2020-11-04 RX ORDER — SODIUM CHLORIDE 9 MG/ML
500 INJECTION, SOLUTION INTRAVENOUS CONTINUOUS
Status: DISCONTINUED | OUTPATIENT
Start: 2020-11-04 | End: 2020-11-04 | Stop reason: HOSPADM

## 2020-11-04 RX ORDER — LIDOCAINE HYDROCHLORIDE 10 MG/ML
INJECTION INFILTRATION; PERINEURAL
Status: DISCONTINUED | OUTPATIENT
Start: 2020-11-04 | End: 2020-11-04 | Stop reason: HOSPADM

## 2020-11-04 RX ORDER — LIDOCAINE HYDROCHLORIDE 10 MG/ML
INJECTION, SOLUTION EPIDURAL; INFILTRATION; INTRACAUDAL; PERINEURAL
Status: DISCONTINUED | OUTPATIENT
Start: 2020-11-04 | End: 2020-11-04 | Stop reason: HOSPADM

## 2020-11-04 RX ORDER — FENTANYL CITRATE 50 UG/ML
INJECTION, SOLUTION INTRAMUSCULAR; INTRAVENOUS
Status: DISCONTINUED | OUTPATIENT
Start: 2020-11-04 | End: 2020-11-04 | Stop reason: HOSPADM

## 2020-11-04 RX ORDER — MIDAZOLAM HYDROCHLORIDE 1 MG/ML
INJECTION INTRAMUSCULAR; INTRAVENOUS
Status: DISCONTINUED | OUTPATIENT
Start: 2020-11-04 | End: 2020-11-04 | Stop reason: HOSPADM

## 2020-11-04 RX ORDER — SODIUM BICARBONATE 1 MEQ/ML
SYRINGE (ML) INTRAVENOUS
Status: DISCONTINUED | OUTPATIENT
Start: 2020-11-04 | End: 2020-11-04 | Stop reason: HOSPADM

## 2020-11-04 RX ORDER — SODIUM CHLORIDE 9 MG/ML
500 INJECTION, SOLUTION INTRAVENOUS CONTINUOUS
Status: ACTIVE | OUTPATIENT
Start: 2020-11-04 | End: 2021-01-12

## 2020-11-04 RX ORDER — DEXAMETHASONE SODIUM PHOSPHATE 10 MG/ML
INJECTION INTRAMUSCULAR; INTRAVENOUS
Status: DISCONTINUED | OUTPATIENT
Start: 2020-11-04 | End: 2020-11-04 | Stop reason: HOSPADM

## 2020-11-04 NOTE — PLAN OF CARE
Procedure is canceled because patient does not have a ride home.   Patient will be rescheduled and patient verbalized understanding of importance of having a ride home.

## 2020-11-04 NOTE — DISCHARGE SUMMARY
[facility]  Discharge Note  Short Stay     Admit Date: 11/4/2020    Discharge Date: 11/4/2020     Attending Physician: Nilam Santiago M.D.    Diagnoses:  Active Hospital Problems    Diagnosis  POA    Lumbar radiculopathy [M54.16]  Yes      Resolved Hospital Problems   No resolved problems to display.     Discharged Condition: Good     Hospital Course: Patient was admitted for an outpatient interventional pain management procedure and tolerated the procedure well with no complications.     Final Diagnoses: Same as principal problem.     Disposition: Home or Self Care     Follow up/Patient Instructions:   Follow-up in 1-2 weeks unless otherwise instructed. May return sooner as needed.       Reconciled Medications:     Medication List      CHANGE how you take these medications    busPIRone 10 MG tablet  Commonly known as: BUSPAR  TAKE 1/2 TABLET BY MOUTH ONCE DAILY  What changed:   · how much to take  · how to take this  · when to take this  · additional instructions     potassium chloride 10 MEQ Tbsr  Commonly known as: KLOR-CON  TAKE 2 TABLETS(20 MEQ) BY MOUTH EVERY DAY  What changed: See the new instructions.        CONTINUE taking these medications    acetaminophen 500 MG tablet  Commonly known as: TYLENOL  Take 500 mg by mouth every 6 (six) hours as needed for Pain.     albuterol 90 mcg/actuation inhaler  Commonly known as: VENTOLIN HFA  INHALE 2 PUFFS INTO THE LUNGS EVERY 4 HOURS AS NEEDED FOR WHEEZING     aspirin 81 MG EC tablet  Commonly known as: ECOTRIN  Take 1 tablet (81 mg total) by mouth once daily.     atorvastatin 40 MG tablet  Commonly known as: LIPITOR  Take 1 tablet (40 mg total) by mouth once daily. For cholesterol     buPROPion 200 MG Sr12  Commonly known as: WELLBUTRIN SR  TAKE 1 TABLET(200 MG) BY MOUTH EVERY DAY     diabetic supplies, miscellan. Misc  Lancets for 1-2 times a day testing    dispense brand covered by insurance t     diazePAM 2 MG tablet  Commonly known as: VALIUM  TAKE 1  TABLET(2 MG) BY MOUTH EVERY 24 HOURS AS NEEDED FOR ANXIETY     diclofenac sodium 1 % Gel  Commonly known as: VOLTAREN  APPLY 2 GRAMS EXTERNALLY TO THE AFFECTED AREA FOUR TIMES DAILY     ergocalciferol 50,000 unit Cap  Commonly known as: ERGOCALCIFEROL  Take 1 capsule (50,000 Units total) by mouth every 7 days.     escitalopram oxalate 20 MG tablet  Commonly known as: LEXAPRO  TAKE 1 TABLET(20 MG) BY MOUTH EVERY DAY     furosemide 20 MG tablet  Commonly known as: LASIX  TAKE 1 TABLET(20 MG) BY MOUTH DAILY AS NEEDED     * gabapentin 100 MG capsule  Commonly known as: NEURONTIN  TAKE 1 CAPSULE(100 MG) BY MOUTH THREE TIMES DAILY     * gabapentin 300 MG capsule  Commonly known as: NEURONTIN     ibandronate 150 mg tablet  Commonly known as: BONIVA  Take 1 tablet (150 mg total) by mouth every 30 days. For osteoporosis     levothyroxine 100 MCG tablet  Commonly known as: SYNTHROID  TAKE 1 TABLET BY MOUTH EVERY DAY     meloxicam 15 MG tablet  Commonly known as: MOBIC  Take 15 mg by mouth once daily.     omeprazole 40 MG capsule  Commonly known as: PRILOSEC  TAKE 1 CAPSULE(40 MG) BY MOUTH EVERY MORNING     ondansetron 4 MG Tbdl  Commonly known as: ZOFRAN-ODT  Take 1 tablet (4 mg total) by mouth every 6 (six) hours as needed.     oxyCODONE-acetaminophen  mg per tablet  Commonly known as: PERCOCET  Take 1 tablet by mouth every 6 to 8 hours as needed.     promethazine 25 MG tablet  Commonly known as: PHENERGAN  TAKE 1 TABLET(25 MG) BY MOUTH EVERY 6 HOURS AS NEEDED     QUEtiapine 50 MG tablet  Commonly known as: SEROQUEL     tiZANidine 4 MG tablet  Commonly known as: ZANAFLEX     TRUE METRIX GLUCOSE METER Misc  Generic drug: blood-glucose meter  U UTD     * TRUE METRIX GLUCOSE TEST STRIP Strp  Generic drug: blood sugar diagnostic  USE TO TEST BLOOD SUGAR EVERY DAY     * TRUE METRIX GLUCOSE TEST STRIP Strp  Generic drug: blood sugar diagnostic  TO TEST ONCE TO TWICE DAILY     TRUEPLUS LANCETS 30 gauge Misc  Generic drug:  lancets  USE TO TEST ONCE TO TWICE D     zolpidem 5 MG Tab  Commonly known as: AMBIEN  TAKE 1 TABLET BY MOUTH EVERY NIGHT AT BEDTIME FOR SLEEP         * This list has 4 medication(s) that are the same as other medications prescribed for you. Read the directions carefully, and ask your doctor or other care provider to review them with you.               Discharge Procedure Orders (must include Diet, Follow-up, Activity)   No driving until:   Order Comments: Until following day     Notify your health care provider if you experience any of the following:  temperature >100.4     Notify your health care provider if you experience any of the following:  persistent nausea and vomiting or diarrhea     Notify your health care provider if you experience any of the following:  severe uncontrolled pain     Notify your health care provider if you experience any of the following:  redness, tenderness, or signs of infection (pain, swelling, redness, odor or green/yellow discharge around incision site)     Notify your health care provider if you experience any of the following:  difficulty breathing or increased cough     Notify your health care provider if you experience any of the following:  severe persistent headache     Notify your health care provider if you experience any of the following:  worsening rash     Notify your health care provider if you experience any of the following:  persistent dizziness, light-headedness, or visual disturbances     Notify your health care provider if you experience any of the following:  increased confusion or weakness     No dressing needed       Nilam Santiago M.D.  Interventional Pain Medicine / Physical Medicine & Rehabilitation

## 2020-11-09 ENCOUNTER — TELEPHONE (OUTPATIENT)
Dept: FAMILY MEDICINE | Facility: CLINIC | Age: 65
End: 2020-11-09

## 2020-11-09 NOTE — TELEPHONE ENCOUNTER
----- Message from Mery Hernandez sent at 11/9/2020 10:16 AM CST -----  Contact: self [phone]  Patient is calling to get a appointment she said as soon as possible for personal reasons. Please call

## 2020-11-10 ENCOUNTER — TELEPHONE (OUTPATIENT)
Dept: FAMILY MEDICINE | Facility: CLINIC | Age: 65
End: 2020-11-10

## 2020-11-10 NOTE — TELEPHONE ENCOUNTER
I do not want to increase the Valium.  I do not see whether any other physicians prescribing narcotic medications in the last several months.-your on oxycodone 10 milligrams-+ Ambien 5 milligrams each night and Valium

## 2020-11-10 NOTE — TELEPHONE ENCOUNTER
Patient states she was threatened by her Pain management doctor   (Dr. Langford) because she's been seeing a [facility] doctor related to her back issues and the doctor wants to do surgery. Patient states Dr. Langford told her he would stop seeing her if she see's another doctor related to her pain issues, she better let him do all her surgery's and drop all other doctors who suggest that they should do her surgery's. Patient states she's under a lot of stress lately, her anxiety has gotten so bad until she's starting to shake. She's having a lot of financial problems and her  just went on hospice. Patient requesting to up the dosage back up to 5 mg's of valium daily.         ----- Message from Brea Hernandez sent at 11/10/2020 12:42 PM CST -----  Contact: [phone]/self  Who Called: PT  Regarding: question about surgery   Would the patient rather a call back or a response via [facility]? Call back  Best Call Back Number: [phone]  Additional Information: wants rx sent to pharmacy [facility] asked if the dosage can be upped a little

## 2020-11-11 RX ORDER — ZOLPIDEM TARTRATE 5 MG/1
5 TABLET ORAL NIGHTLY
Qty: 30 TABLET | Refills: 0 | Status: SHIPPED | OUTPATIENT
Start: 2020-11-11 | End: 2020-12-16 | Stop reason: SDUPTHER

## 2020-11-11 RX ORDER — DIAZEPAM 2 MG/1
TABLET ORAL
Qty: 30 TABLET | Refills: 0 | Status: CANCELLED | OUTPATIENT
Start: 2020-11-11

## 2020-11-11 RX ORDER — DIAZEPAM 2 MG/1
TABLET ORAL
Qty: 30 TABLET | Refills: 0 | Status: SHIPPED | OUTPATIENT
Start: 2020-11-11 | End: 2020-12-10 | Stop reason: SDUPTHER

## 2020-11-16 ENCOUNTER — PATIENT OUTREACH (OUTPATIENT)
Dept: ADMINISTRATIVE | Facility: OTHER | Age: 65
End: 2020-11-16

## 2020-11-16 ENCOUNTER — TELEPHONE (OUTPATIENT)
Dept: FAMILY MEDICINE | Facility: CLINIC | Age: 65
End: 2020-11-16

## 2020-11-16 NOTE — TELEPHONE ENCOUNTER
----- Message from Michelle Davis sent at 11/16/2020 10:06 AM CST -----  Contact: [phone]/patient  Patient requesting to speak with Dr Santiago today.   Patient had a 10 am appointment today but she would like the Dr to call her.           Thank you

## 2020-11-16 NOTE — PROGRESS NOTES
Health Maintenance Due   Topic Date Due    Aspirin/Antiplatelet Therapy  04/04/1973    Pneumococcal Vaccine (65+ Low/Medium Risk) (1 of 2 - PCV13) 04/04/2019    Urine Microalbumin  01/14/2020    Foot Exam  07/22/2020    Influenza Vaccine (1) 08/01/2020    Hemoglobin A1c  10/02/2020     Updates were requested from care everywhere.  Chart was reviewed for overdue Proactive [facility] Encounters (TIA) topics (CRS, Breast Cancer Screening, Eye exam)  Health Maintenance has been updated.  LINKS immunization registry triggered.  Immunizations were reconciled.

## 2020-11-16 NOTE — TELEPHONE ENCOUNTER
To get 's opinion about having procedure done on back. Also anxiety issue  on hospice she's very stressed out.     ----- Message from Michelle Davis sent at 11/16/2020 10:06 AM CST -----  Contact: [phone]/patient  Patient requesting to speak with Dr Santiago today.   Patient had a 10 am appointment today but she would like the Dr to call her.           Thank you

## 2020-11-18 ENCOUNTER — TELEPHONE (OUTPATIENT)
Dept: PAIN MEDICINE | Facility: CLINIC | Age: 65
End: 2020-11-18

## 2020-11-18 ENCOUNTER — OFFICE VISIT (OUTPATIENT)
Dept: PAIN MEDICINE | Facility: CLINIC | Age: 65
End: 2020-11-18
Payer: MEDICARE

## 2020-11-18 VITALS
DIASTOLIC BLOOD PRESSURE: 77 MMHG | HEART RATE: 68 BPM | SYSTOLIC BLOOD PRESSURE: 120 MMHG | BODY MASS INDEX: 36.57 KG/M2 | WEIGHT: 199.94 LBS

## 2020-11-18 DIAGNOSIS — M25.512 CHRONIC LEFT SHOULDER PAIN: ICD-10-CM

## 2020-11-18 DIAGNOSIS — R29.2: ICD-10-CM

## 2020-11-18 DIAGNOSIS — M25.612 DECREASED RANGE OF MOTION OF LEFT SHOULDER: ICD-10-CM

## 2020-11-18 DIAGNOSIS — M54.16 LUMBAR RADICULOPATHY: ICD-10-CM

## 2020-11-18 DIAGNOSIS — M54.12 RADICULOPATHY, CERVICAL REGION: ICD-10-CM

## 2020-11-18 DIAGNOSIS — G89.4 CHRONIC PAIN SYNDROME: ICD-10-CM

## 2020-11-18 DIAGNOSIS — M19.019 SHOULDER ARTHRITIS: ICD-10-CM

## 2020-11-18 DIAGNOSIS — M43.16 SPONDYLOLISTHESIS AT L4-L5 LEVEL: Primary | ICD-10-CM

## 2020-11-18 DIAGNOSIS — M51.36 DDD (DEGENERATIVE DISC DISEASE), LUMBAR: ICD-10-CM

## 2020-11-18 DIAGNOSIS — M19.012 PRIMARY OSTEOARTHRITIS OF LEFT SHOULDER: ICD-10-CM

## 2020-11-18 DIAGNOSIS — G89.29 CHRONIC LEFT SHOULDER PAIN: ICD-10-CM

## 2020-11-18 PROCEDURE — 1159F PR MEDICATION LIST DOCUMENTED IN MEDICAL RECORD: ICD-10-PCS | Mod: HCNC,S$GLB,, | Performed by: NURSE PRACTITIONER

## 2020-11-18 PROCEDURE — 3008F BODY MASS INDEX DOCD: CPT | Mod: HCNC,CPTII,S$GLB, | Performed by: NURSE PRACTITIONER

## 2020-11-18 PROCEDURE — 99999 PR PBB SHADOW E&M-EST. PATIENT-LVL IV: CPT | Mod: PBBFAC,HCNC,, | Performed by: NURSE PRACTITIONER

## 2020-11-18 PROCEDURE — 99999 PR PBB SHADOW E&M-EST. PATIENT-LVL IV: ICD-10-PCS | Mod: PBBFAC,HCNC,, | Performed by: NURSE PRACTITIONER

## 2020-11-18 PROCEDURE — 1157F PR ADVANCE CARE PLAN OR EQUIV PRESENT IN MEDICAL RECORD: ICD-10-PCS | Mod: HCNC,S$GLB,, | Performed by: NURSE PRACTITIONER

## 2020-11-18 PROCEDURE — 3008F PR BODY MASS INDEX (BMI) DOCUMENTED: ICD-10-PCS | Mod: HCNC,CPTII,S$GLB, | Performed by: NURSE PRACTITIONER

## 2020-11-18 PROCEDURE — 1159F MED LIST DOCD IN RCRD: CPT | Mod: HCNC,S$GLB,, | Performed by: NURSE PRACTITIONER

## 2020-11-18 PROCEDURE — 1157F ADVNC CARE PLAN IN RCRD: CPT | Mod: HCNC,S$GLB,, | Performed by: NURSE PRACTITIONER

## 2020-11-18 PROCEDURE — 1125F AMNT PAIN NOTED PAIN PRSNT: CPT | Mod: HCNC,S$GLB,, | Performed by: NURSE PRACTITIONER

## 2020-11-18 PROCEDURE — 99213 PR OFFICE/OUTPT VISIT, EST, LEVL III, 20-29 MIN: ICD-10-PCS | Mod: HCNC,S$GLB,, | Performed by: NURSE PRACTITIONER

## 2020-11-18 PROCEDURE — 99213 OFFICE O/P EST LOW 20 MIN: CPT | Mod: HCNC,S$GLB,, | Performed by: NURSE PRACTITIONER

## 2020-11-18 PROCEDURE — 1125F PR PAIN SEVERITY QUANTIFIED, PAIN PRESENT: ICD-10-PCS | Mod: HCNC,S$GLB,, | Performed by: NURSE PRACTITIONER

## 2020-11-18 NOTE — TELEPHONE ENCOUNTER
----- Message from Soni Soliman sent at 11/18/2020 10:53 AM CST -----  Type:  Needs Medical Advice    Who Called: Christine  Symptoms (please be specific): pt is running late to appt, passed the exit up. Can pt be seen today or does she need to reschedule   How long has patient had these symptoms:  unknown  Pharmacy name and phone #:  n/a  Would the patient rather a call back or a response via [facility]? Call back  Best Call Back Number:  [phone]  Additional Information: none

## 2020-11-18 NOTE — PROGRESS NOTES
[facility]  Established  H&P    Referring Provider: No referring provider defined for this encounter.    Chief Complaint:   Chief Complaint   Patient presents with    Low-back Pain     20 - Ms. Castro returns to clinic for follow up visit reporting improved left shoulder pain.  Patient is s/p left shoulder block on 20 with 90% relief 2-3 days and now pain is returning.  Patient also reported improved functionality following the injection.  Pain intensity is currently 3/10.      History of Present Illness: Christine Castro is a 65 y.o. female referred by No ref. provider found for shoulder pain and potential articular nerve block and RFA.      Onset: Several months ago, pain started after a fall not clear exactly when pain started  Location: left shoulder   Radiation: left arm just past the elbow  Timing: constant  Quality: Aching, Sharp and Shooting  Exacerbating Factors: lifting  Alleviating Factors: massage  Associated Symptoms: (+) Neck pain. (+) weakness in the left arm. (+) numbness in the left arm. denies night fever/night sweats, urinary incontinence, bowel incontinence, significant weight loss, significant motor weakness and loss of sensations    Severity: Currently: 6/10   Typical Range: 6-//10     Exacerbation: 1010     She had a steroid injection on  with orthopedics, but relief only lasted about 1 month. Left shoulder pain limits her from doing pretty much everything she states.     Pain Disability Index  Family/Home Responsibilities:: 5  Recreation:: 5  Social Activity:: 5  Occupation:: 5  Sexual Behavior:: 0  Self Care:: 5  Life-Support Activities:: 5  Pain Disability Index (PDI): 30       Interval updates:    20 - Ms. Castro returns to clinic for follow up visit reporting worse back pain.  Patient is s/p L5-S1 Lumbar MEI  on 20 with 50% relief for 5 days. Social: she reports to me that her   3-4 days ago from Cancer. She presents looking very tired and  sleep she was an hour late for her appt today, she presented in a W/C. Pain intensity is currently 5/10.      Previous Interventions:  - corticosteroid injections  -L5-S1 Interlaminar Epidural Steroid -11/4 50%     Previous Therapies:  PT/OT: no   Surgery:    - right knee arthroplasty  Previous Medications:   - NSAIDS:  Ibuprofen (listed allergy, causes asthma flare), Tylenol  - Muscle Relaxants:  Valium, tizanidine    - TCAs:   - SNRIs:   - Topicals:   - Anticonvulsants:    - Opioids:  Percocet    Current Pain Medications:  1. Percocet 7.5/325 mg    Blood Thinners:  Aspirin    Full Medication List:    Current Outpatient Medications:     acetaminophen (TYLENOL) 500 MG tablet, Take 500 mg by mouth every 6 (six) hours as needed for Pain., Disp: , Rfl:     albuterol (VENTOLIN HFA) 90 mcg/actuation inhaler, INHALE 2 PUFFS INTO THE LUNGS EVERY 4 HOURS AS NEEDED FOR WHEEZING, Disp: 18 g, Rfl: 3    atorvastatin (LIPITOR) 40 MG tablet, Take 1 tablet (40 mg total) by mouth once daily. For cholesterol, Disp: 90 tablet, Rfl: 3    buPROPion (WELLBUTRIN SR) 200 MG SR12, TAKE 1 TABLET(200 MG) BY MOUTH EVERY DAY, Disp: 90 tablet, Rfl: 3    busPIRone (BUSPAR) 10 MG tablet, TAKE 1/2 TABLET BY MOUTH ONCE DAILY (Patient taking differently: 10 mg. Pt is taking 10 mg once daily), Disp: 60 tablet, Rfl: 3    diabetic supplies, miscellan. Misc, Lancets for 1-2 times a day testing    dispense brand covered by insurance t, Disp: 60 each, Rfl: 3    diazePAM (VALIUM) 2 MG tablet, TAKE 1 TABLET(2 MG) BY MOUTH EVERY 24 HOURS AS NEEDED FOR ANXIETY, Disp: 30 tablet, Rfl: 0    diclofenac sodium (VOLTAREN) 1 % Gel, APPLY 2 GRAMS EXTERNALLY TO THE AFFECTED AREA FOUR TIMES DAILY, Disp: 100 g, Rfl: 5    ergocalciferol (ERGOCALCIFEROL) 50,000 unit Cap, Take 1 capsule (50,000 Units total) by mouth every 7 days., Disp: 12 capsule, Rfl: 3    escitalopram oxalate (LEXAPRO) 20 MG tablet, TAKE 1 TABLET(20 MG) BY MOUTH EVERY DAY, Disp: 90 tablet,  Rfl: 3    furosemide (LASIX) 20 MG tablet, TAKE 1 TABLET(20 MG) BY MOUTH DAILY AS NEEDED, Disp: 90 tablet, Rfl: 0    gabapentin (NEURONTIN) 100 MG capsule, TAKE 1 CAPSULE(100 MG) BY MOUTH THREE TIMES DAILY, Disp: 270 capsule, Rfl: 1    gabapentin (NEURONTIN) 300 MG capsule, , Disp: , Rfl:     ibandronate (BONIVA) 150 mg tablet, Take 1 tablet (150 mg total) by mouth every 30 days. For osteoporosis, Disp: 3 tablet, Rfl: 3    levothyroxine (SYNTHROID) 100 MCG tablet, TAKE 1 TABLET BY MOUTH EVERY DAY, Disp: 90 tablet, Rfl: 3    meloxicam (MOBIC) 15 MG tablet, Take 15 mg by mouth once daily., Disp: , Rfl:     omeprazole (PRILOSEC) 40 MG capsule, TAKE 1 CAPSULE(40 MG) BY MOUTH EVERY MORNING, Disp: 90 capsule, Rfl: 1    ondansetron (ZOFRAN-ODT) 4 MG TbDL, Take 1 tablet (4 mg total) by mouth every 6 (six) hours as needed., Disp: 20 tablet, Rfl: 1    oxyCODONE-acetaminophen (PERCOCET)  mg per tablet, Take 1 tablet by mouth every 6 to 8 hours as needed., Disp: , Rfl:     potassium chloride (KLOR-CON) 10 MEQ TbSR, TAKE 2 TABLETS(20 MEQ) BY MOUTH EVERY DAY (Patient taking differently: 10 mEq. TAKE 2 TABLETS(20 MEQ) BY MOUTH EVERY DAY), Disp: 180 tablet, Rfl: 0    promethazine (PHENERGAN) 25 MG tablet, TAKE 1 TABLET(25 MG) BY MOUTH EVERY 6 HOURS AS NEEDED, Disp: 25 tablet, Rfl: 0    QUEtiapine (SEROQUEL) 50 MG tablet, , Disp: , Rfl:     tiZANidine (ZANAFLEX) 4 MG tablet, , Disp: , Rfl:     TRUE METRIX GLUCOSE METER Misc, U UTD, Disp: , Rfl:     TRUE METRIX GLUCOSE TEST STRIP Strp, USE TO TEST BLOOD SUGAR EVERY DAY, Disp: 100 strip, Rfl: 0    TRUE METRIX GLUCOSE TEST STRIP Strp, TO TEST ONCE TO TWICE DAILY, Disp: 50 strip, Rfl: 11    TRUEPLUS LANCETS 30 gauge Misc, USE TO TEST ONCE TO TWICE D, Disp: , Rfl:     zolpidem (AMBIEN) 5 MG Tab, Take 1 tablet (5 mg total) by mouth nightly., Disp: 30 tablet, Rfl: 0    aspirin (ECOTRIN) 81 MG EC tablet, Take 1 tablet (81 mg total) by mouth once daily., Disp: , Rfl:  0  No current facility-administered medications for this visit.     Facility-Administered Medications Ordered in Other Visits:     0.9%  NaCl infusion, 500 mL, Intravenous, Continuous, Nilam Santiago MD     Review of Systems:  Review of Systems   Constitutional: Negative for fever and weight loss.   HENT: Negative for ear pain and tinnitus.    Eyes: Negative for pain and redness.   Respiratory: Positive for shortness of breath. Negative for cough.    Cardiovascular: Negative for chest pain and palpitations.   Gastrointestinal: Positive for heartburn. Negative for constipation.   Genitourinary: Negative.         Denies urinary incontinence. Denies urine retention.    Musculoskeletal: Positive for joint pain and neck pain. Negative for back pain.   Skin: Negative for itching and rash.   Neurological: Positive for tingling and weakness. Negative for focal weakness and seizures.   Endo/Heme/Allergies: Negative for environmental allergies. Does not bruise/bleed easily.   Psychiatric/Behavioral: Positive for depression. The patient is nervous/anxious and has insomnia.        Allergies:  Adhesive; Latex, natural rubber; and Ibuprofen     Medical History:   has a past medical history of Anticoagulant long-term use, Arthritis, Asthma, CHF (congestive heart failure), Colon cancer, COPD (chronic obstructive pulmonary disease), Coronary artery disease, Depression, Diabetes mellitus, type 2, GERD (gastroesophageal reflux disease), Myocardial infarction, and Thyroid disease.    Surgical History:   has a past surgical history that includes  section; Tonsillectomy; Colon surgery; Cholecystectomy; Ectopic pregnancy surgery; Tubal ligation; Gastric bypass; Colonoscopy; Colonoscopy (N/A, 2017); Colonoscopy (N/A, 4/10/2018); Esophagogastroduodenoscopy (N/A, 2019); Knee Arthroplasty (Left, 2019); Oophorectomy; Knee Arthroplasty (Right, 2019); Abdominal surgery; Fracture surgery; Hernia repair; Injection of  anesthetic agent around genitofemoral nerve (Left, 7/29/2020); Radiofrequency thermocoagulation (Left, 8/12/2020); and Epidural steroid injection into lumbar spine (N/A, 11/4/2020).    Family History:  family history includes Breast cancer in her cousin, maternal aunt, and maternal aunt; Diabetes in her brother and mother; Hyperlipidemia in her mother; Hypertension in her brother, mother, and sister; Stroke in her mother.    Social History:   reports that she has never smoked. She has never used smokeless tobacco. She reports current alcohol use. She reports that she does not use drugs.    Physical Exam:  /77   Pulse 68   Wt 90.7 kg (199 lb 15.3 oz)   BMI 36.57 kg/m²   GEN: No acute distress. Calm, comfortable  HENT: Normocephalic, atraumatic, moist mucous membranes  EYE: Anicteric sclera, non-injected.   CV: Non-diaphoretic. Regular Rate. Radial Pulses 2+.  RESP: Breathing comfortably. Chest expansion symmetric.  EXT: No clubbing, cyanosis.   SKIN: Warm, & dry to palpation. No visible rashes or lesions of exposed skin.   PSYCH: Pleasant mood and appropriate affect. Recent and remote memory intact.   GAIT:  Mod Independent, utilizing wheelchair for mobility  Neck Exam:       Inspection: No erythema, bruising. Poor posture      Palpation: (+) TTP of left cervical paraspinals      ROM: No signficant Limitation in flexion, extension, lateral bending or rotation. Pain with left lateral rotation reproduces pain into shoulder and arm on the left      Provocative Maneuvers:  (+) Spurling's on the left  Shoulder Exam:       Inspection: No erythema, bruising.       Palpation: Diffuse TTP about left shoulder.       ROM: (+) Limited in abduction, internal rotation on the left significant. Difficult abducting to 90 today      Provocative Maneuvers: limited due to pain  Neurologic Exam:     Alert. Speech is fluent and appropriate.     Cranial Nerves: Extra-ocular movements intact. Pupils equal. No strabismus. Face  Symmetric. Jaw opens midline. Uvula midline. Tongue midline. Shoulder shrug symmetric.       Strength: Limited manual muscle testing in LUE due to pain in shoulder.      Sensation:  Grossly intact to light touch in bilateral upper extremities     Reflexes: 1+ in b/l patella, biceps     Tone: No abnormality appreciated in bilateral upper or lower extremities     (+) Munoz on the left      Imagin20 X-Ray Shoulder 2 or More Views Left   The bones are intact.  There is no evidence for acute fracture or bone destruction.  There is no evidence for dislocation.  There are prominent degenerative changes of the glenohumeral joint with joint space narrowing, subchondral sclerosis, and osteophyte formation.  Soft tissues appear unremarkable.  Impression:  No evidence for acute fracture, bone destruction, or dislocation.  Prominent degenerative changes of the left glenohumeral joint.    - CT C-spine 1/21/15:  There are moderate multilevel degenerative changes.  No gross evidence of acute cervical fracture or significant subluxation.  No gross pneumothorax at the extreme lung apices.  There is a great deal of streak artifact limiting soft tissue detail.    Labs:  BMP  Lab Results   Component Value Date     2020    K 4.4 2020     2020    CO2 23 2020    BUN 18 (H) 2020    CREATININE 1.03 2020    CALCIUM 8.7 2020    ANIONGAP 7 (L) 2020    ESTGFRAFRICA >60.0 2020    EGFRNONAA 57.2 (A) 2020     Lab Results   Component Value Date    ALT 30 2020    AST 51 (H) 2020    ALKPHOS 71 2020    BILITOT 0.2 2020     Lab Results   Component Value Date     2020       Assessment:  Christine Castro is a 65 y.o. female with the following diagnoses based on history, exam, and imaging:    Problem List Items Addressed This Visit        Neuro    Chronic pain    Lumbar radiculopathy       Orthopedic    Chronic left shoulder pain     Primary osteoarthritis of left shoulder      Other Visit Diagnoses     Spondylolisthesis at L4-L5 level    -  Primary    DDD (degenerative disc disease), lumbar        Decreased range of motion of left shoulder        Munoz's reflex positive        Shoulder arthritis        Radiculopathy, cervical region              This is a pleasant 65 y.o. lady with history of gastric bypass, CAD with history of MRI and TIA, depression, anemia, hypothyroidism, GERD, COPD/asthma presenting with:     1. Chronic Left shoulder pain.  X-rays reveal severe osteoarthritis of the glenohumeral joint on the left.  She has significant decreased range of motion in the left shoulder.  She is not a candidate for total shoulder replacement.  She is only getting limited duration of relief from steroid injections.  2.  Neck pain and positive Munoz reflex on the left.  I do believe that the shoulder pain is primarily generated from her glenohumeral joint, but considering the positive Munoz (could be from previous TIA?), reproduction of some pain with left lateral rotation of the cervical spine, I think we should rule out cervical etiology is possibly complicating her pain    08/04/[id number]-xpli-old female presents status post Left Suprascapular, axillary and lateral pectoral articular branch block for her chronic left shoulder pain patient reported 90% relief for 2-3 days she reports improved functionality and better range of motion following injection of pain score today is 3/10.  Recommended we scheduled for a little him air articular branch RFA in effort to provide her with sustained relief RFA is have been shown provide relief for 6-18 months.    11/18/2020- 66 y/o female presents s/p  s/p L5-S1 Lumbar MEI  on 11/4/20 with 50% relief for 5 days and then pain returned slowly, she is s/f a left L4-5 oblique interbody fusion with placement of hyperlordotic spacer correct sagittal plane imbalance and lumbar lordosis/pelvic incidence  mismatch, L4-5 laminectomy, medial facetectomy, foraminotomy, and L4-5 posterior instrumentation with Dr. Fisher in [place]. We provided her with this injection per NSGY request to help with pain.     Treatment Plan:   - PT/OT/HEP:  None at the moment, she is doing therapy at home.   - Procedures:  None at this time.   - Medications: No changes recommended at this time.  - Imaging: Reviewed.    - Labs: Reviewed.    Follow Up: RTC after procedure    [facility]  Interventional Pain Management      Disclaimer: This note was partly generated using dictation software which may occasionally result in transcription errors.

## 2020-12-01 RX ORDER — FUROSEMIDE 20 MG/1
TABLET ORAL
Qty: 90 TABLET | Refills: 3 | Status: SHIPPED | OUTPATIENT
Start: 2020-12-01 | End: 2021-03-05 | Stop reason: SDUPTHER

## 2020-12-02 RX ORDER — ZOLPIDEM TARTRATE 5 MG/1
TABLET ORAL
Qty: 30 TABLET | OUTPATIENT
Start: 2020-12-02

## 2020-12-10 RX ORDER — PROMETHAZINE HYDROCHLORIDE 25 MG/1
TABLET ORAL
Qty: 25 TABLET | Refills: 0 | Status: SHIPPED | OUTPATIENT
Start: 2020-12-10 | End: 2020-12-11 | Stop reason: SDUPTHER

## 2020-12-10 RX ORDER — DIAZEPAM 2 MG/1
TABLET ORAL
Qty: 30 TABLET | Refills: 0 | Status: SHIPPED | OUTPATIENT
Start: 2020-12-10 | End: 2021-01-08 | Stop reason: CLARIF

## 2020-12-11 RX ORDER — PROMETHAZINE HYDROCHLORIDE 25 MG/1
TABLET ORAL
Qty: 25 TABLET | Refills: 0 | Status: SHIPPED | OUTPATIENT
Start: 2020-12-11 | End: 2021-01-29 | Stop reason: SDUPTHER

## 2020-12-14 ENCOUNTER — TELEPHONE (OUTPATIENT)
Dept: NEUROSURGERY | Facility: CLINIC | Age: 65
End: 2020-12-14

## 2020-12-14 NOTE — TELEPHONE ENCOUNTER
----- Message from Mery Hernandez sent at 12/14/2020  3:05 PM CST -----  Contact: self [phone]  Patient is calling to speak with you concerning the medication that was prescribed for her. Please call

## 2020-12-15 ENCOUNTER — TELEPHONE (OUTPATIENT)
Dept: NEUROSURGERY | Facility: CLINIC | Age: 65
End: 2020-12-15

## 2020-12-15 NOTE — TELEPHONE ENCOUNTER
Spoke to the patient she is unaware to the pharmacy that called her. Instructed this is a pre op kit and should be mailed to her. Instructed I will get number and call her back,

## 2020-12-15 NOTE — TELEPHONE ENCOUNTER
----- Message from Soni Soliman sent at 12/15/2020  3:31 PM CST -----  Type:  Needs Medical Advice    Who Called: Christine  Symptoms (please be specific): pt is requesting a call back regarding which pharmacy she has to  her medication from  How long has patient had these symptoms:  unknown  Pharmacy name and phone #:  n/a  Would the patient rather a call back or a response via [facility]? Call back  Best Call Back Number:  [phone]  Additional Information: if she does not answer, leave  with name and number of pharmacy

## 2020-12-16 ENCOUNTER — TELEPHONE (OUTPATIENT)
Dept: FAMILY MEDICINE | Facility: CLINIC | Age: 65
End: 2020-12-16

## 2020-12-16 RX ORDER — ZOLPIDEM TARTRATE 5 MG/1
5 TABLET ORAL NIGHTLY
Qty: 30 TABLET | Refills: 2 | Status: SHIPPED | OUTPATIENT
Start: 2020-12-16 | End: 2021-01-08 | Stop reason: CLARIF

## 2020-12-16 NOTE — OP NOTE
Physical Therapy Daily Progress Note      Patient: Salud Gama   : 1946  Diagnosis/ICD-10 Code:  History of total knee arthroplasty, right [Z96.651]  Referring practitioner: Panda Michel MD  Date of Initial Visit: Type: THERAPY  Noted: 2020  Today's Date: 2020  Patient seen for 22 sessions         Salud Gama reports: she is doing very well with her R knee however states the L knee has been bothering her more recently due to the cold and rainy weather that has been persistent.    Objective   See Exercise, Manual, and Modality Logs for complete treatment.     Assessment/Plan   Pt. Continues to show progressed strength and stability and minimal deviation during balance activities is noted. Pt. Still has irregular gait however is showing improved mechanics with activities.    Progress per Plan of Care           Timed:            Therapeutic Exercise:    15     mins  [id number];     Therapeutic Activity:     25     mins  [id number];       Timed Treatment:   40   mins   Total Treatment:     40   mins    Vee Bedoya PTA  Physical Therapist Assistant License #[id number]       Lumbar Interlaminar Epidural Steroid Injection Under Fluoroscopic Guidance:  I have reviewed the patient's medications, allergies and relevant histories prior to the procedure and no contraindications have been identified. The risks, benefits and alternatives to the procedure were discussed with the patient, and all questions regarding the procedure were answered to the patient's satisfaction. I personally obtained Christine's consent prior to the start of the procedure and the signed consent can be found in the patient's chart.                                                         Time-out was taken to identify patient, procedure, laterality, and allergies prior to starting the procedure.       Date of Service: 11/04/2020  Procedure: L5-S1 Interlaminar Epidural Steroid Injection under fluoroscopic guidance  Pre-Operative Diagnosis: Lumbar Radiculopathy  Post-Operative Diagnosis: Lumbar Radiculopathy    Physician: Nilam Santiago M.D.  Assistants: None    Medications Injected: Preservative-free dexamethasone 10 mg/mL with 4 mL of sterile Xylocaine-MPF 1%.  Local Anesthetic: Xylocaine 1% 10 mL with Sodium Bicarbonate 1ml.   Sedation Medications: Versed 2 mg IV, Fentanyl 50 mcg IV. Under my direct observation, intravenous moderate sedation was administered during the course of this procedure, with continuous hemodynamic monitoring including blood pressure, EKG, and pulse oximetry. Please see RN documentation of total intraservice time for moderate sedation.    Procedural Technique:   Laying in a prone position, the patient was prepped and draped in the usual sterile fashion using ChloraPrep and fenestrated drape.  Continuous hemodynamic monitoring was initiated including blood pressure, EKG, and pulse oximetry.  The interlaminar area of interest was determined under fluoroscopic guidance.  Local anesthetic was utilized to anesthetize the skin and subcutaneous tissues in the area using a 25-gauge needle.  A 4.5 inch  20-gauge Touhy needle was introduced under fluoroscopic guidance.  It met the inferior lamina. The needle was then hinged superiorly above the lamina.  Loss of resistance technique was employed while advancing the needle.  Once in the desired position, contrast dye, Omnipaque, was injected to confirm placement and that there was no vascular runoff. The medication was then injected slowly.  The needle was removed and bandage applied to the area.    Estimated Blood Loss:  None.  Complications:  None.     Disposition: The patient tolerated the procedure well, and there were no apparent complications. Vital signs remained stable throughout the procedure. The patient was taken to the recovery area and monitored. The patient was supplied with written discharge instructions for the procedure. If helpful, we can repeat as needed. The patient was discharged in a stable condition.    Follow-Up: We will see the patient back 2 weeks.

## 2020-12-17 ENCOUNTER — ANESTHESIA EVENT (OUTPATIENT)
Dept: SURGERY | Facility: HOSPITAL | Age: 65
DRG: 460 | End: 2020-12-17
Payer: MEDICARE

## 2020-12-17 RX ORDER — IBANDRONATE SODIUM 150 MG/1
TABLET, FILM COATED ORAL
Qty: 3 TABLET | Refills: 3 | Status: SHIPPED | OUTPATIENT
Start: 2020-12-17 | End: 2021-03-05 | Stop reason: SDUPTHER

## 2020-12-21 DIAGNOSIS — K21.9 GASTROESOPHAGEAL REFLUX DISEASE WITHOUT ESOPHAGITIS: ICD-10-CM

## 2020-12-21 RX ORDER — OMEPRAZOLE 40 MG/1
40 CAPSULE, DELAYED RELEASE ORAL EVERY MORNING
Qty: 90 CAPSULE | Refills: 1 | Status: SHIPPED | OUTPATIENT
Start: 2020-12-21 | End: 2021-03-05 | Stop reason: SDUPTHER

## 2021-01-05 ENCOUNTER — TELEPHONE (OUTPATIENT)
Dept: NEUROSURGERY | Facility: CLINIC | Age: 66
End: 2021-01-05

## 2021-01-07 PROCEDURE — G0180 MD CERTIFICATION HHA PATIENT: HCPCS | Mod: ,,, | Performed by: NEUROLOGICAL SURGERY

## 2021-01-07 PROCEDURE — G0180 PR HOME HEALTH MD CERTIFICATION: ICD-10-PCS | Mod: ,,, | Performed by: NEUROLOGICAL SURGERY

## 2021-01-08 ENCOUNTER — LAB VISIT (OUTPATIENT)
Dept: FAMILY MEDICINE | Facility: CLINIC | Age: 66
End: 2021-01-08
Payer: MEDICARE

## 2021-01-08 ENCOUNTER — HOSPITAL ENCOUNTER (OUTPATIENT)
Dept: PREADMISSION TESTING | Facility: HOSPITAL | Age: 66
Discharge: HOME | End: 2021-01-08
Attending: NEUROLOGICAL SURGERY
Payer: MEDICARE

## 2021-01-08 ENCOUNTER — OFFICE VISIT (OUTPATIENT)
Dept: FAMILY MEDICINE | Facility: CLINIC | Age: 66
End: 2021-01-08
Payer: MEDICARE

## 2021-01-08 VITALS
DIASTOLIC BLOOD PRESSURE: 70 MMHG | HEART RATE: 77 BPM | WEIGHT: 212 LBS | OXYGEN SATURATION: 97 % | TEMPERATURE: 98 F | HEIGHT: 60 IN | BODY MASS INDEX: 41.62 KG/M2 | SYSTOLIC BLOOD PRESSURE: 106 MMHG | RESPIRATION RATE: 16 BRPM

## 2021-01-08 VITALS
WEIGHT: 216.25 LBS | HEART RATE: 88 BPM | OXYGEN SATURATION: 96 % | BODY MASS INDEX: 42.46 KG/M2 | DIASTOLIC BLOOD PRESSURE: 60 MMHG | SYSTOLIC BLOOD PRESSURE: 120 MMHG | TEMPERATURE: 98 F | HEIGHT: 60 IN

## 2021-01-08 DIAGNOSIS — Z41.9 SURGERY, ELECTIVE: ICD-10-CM

## 2021-01-08 DIAGNOSIS — I25.10 CORONARY ARTERY DISEASE INVOLVING NATIVE CORONARY ARTERY OF NATIVE HEART WITHOUT ANGINA PECTORIS: Primary | Chronic | ICD-10-CM

## 2021-01-08 DIAGNOSIS — Z01.818 PRE-OP EXAMINATION: ICD-10-CM

## 2021-01-08 DIAGNOSIS — Z01.818 PREOP EXAMINATION: Primary | ICD-10-CM

## 2021-01-08 PROCEDURE — 1159F PR MEDICATION LIST DOCUMENTED IN MEDICAL RECORD: ICD-10-PCS | Mod: S$GLB,,, | Performed by: STUDENT IN AN ORGANIZED HEALTH CARE EDUCATION/TRAINING PROGRAM

## 2021-01-08 PROCEDURE — 93005 EKG 12-LEAD: ICD-10-PCS | Mod: S$GLB,,, | Performed by: STUDENT IN AN ORGANIZED HEALTH CARE EDUCATION/TRAINING PROGRAM

## 2021-01-08 PROCEDURE — 93010 ELECTROCARDIOGRAM REPORT: CPT | Mod: S$GLB,,, | Performed by: INTERNAL MEDICINE

## 2021-01-08 PROCEDURE — 1157F ADVNC CARE PLAN IN RCRD: CPT | Mod: S$GLB,,, | Performed by: STUDENT IN AN ORGANIZED HEALTH CARE EDUCATION/TRAINING PROGRAM

## 2021-01-08 PROCEDURE — 1157F PR ADVANCE CARE PLAN OR EQUIV PRESENT IN MEDICAL RECORD: ICD-10-PCS | Mod: S$GLB,,, | Performed by: STUDENT IN AN ORGANIZED HEALTH CARE EDUCATION/TRAINING PROGRAM

## 2021-01-08 PROCEDURE — 1101F PR PT FALLS ASSESS DOC 0-1 FALLS W/OUT INJ PAST YR: ICD-10-PCS | Mod: CPTII,S$GLB,, | Performed by: STUDENT IN AN ORGANIZED HEALTH CARE EDUCATION/TRAINING PROGRAM

## 2021-01-08 PROCEDURE — 3288F FALL RISK ASSESSMENT DOCD: CPT | Mod: CPTII,S$GLB,, | Performed by: STUDENT IN AN ORGANIZED HEALTH CARE EDUCATION/TRAINING PROGRAM

## 2021-01-08 PROCEDURE — 3008F BODY MASS INDEX DOCD: CPT | Mod: CPTII,S$GLB,, | Performed by: STUDENT IN AN ORGANIZED HEALTH CARE EDUCATION/TRAINING PROGRAM

## 2021-01-08 PROCEDURE — 1125F PR PAIN SEVERITY QUANTIFIED, PAIN PRESENT: ICD-10-PCS | Mod: S$GLB,,, | Performed by: STUDENT IN AN ORGANIZED HEALTH CARE EDUCATION/TRAINING PROGRAM

## 2021-01-08 PROCEDURE — 1125F AMNT PAIN NOTED PAIN PRSNT: CPT | Mod: S$GLB,,, | Performed by: STUDENT IN AN ORGANIZED HEALTH CARE EDUCATION/TRAINING PROGRAM

## 2021-01-08 PROCEDURE — 3008F PR BODY MASS INDEX (BMI) DOCUMENTED: ICD-10-PCS | Mod: CPTII,S$GLB,, | Performed by: STUDENT IN AN ORGANIZED HEALTH CARE EDUCATION/TRAINING PROGRAM

## 2021-01-08 PROCEDURE — 99212 PR OFFICE/OUTPT VISIT, EST, LEVL II, 10-19 MIN: ICD-10-PCS | Mod: S$GLB,,, | Performed by: STUDENT IN AN ORGANIZED HEALTH CARE EDUCATION/TRAINING PROGRAM

## 2021-01-08 PROCEDURE — 1101F PT FALLS ASSESS-DOCD LE1/YR: CPT | Mod: CPTII,S$GLB,, | Performed by: STUDENT IN AN ORGANIZED HEALTH CARE EDUCATION/TRAINING PROGRAM

## 2021-01-08 PROCEDURE — 93010 EKG 12-LEAD: ICD-10-PCS | Mod: S$GLB,,, | Performed by: INTERNAL MEDICINE

## 2021-01-08 PROCEDURE — U0003 INFECTIOUS AGENT DETECTION BY NUCLEIC ACID (DNA OR RNA); SEVERE ACUTE RESPIRATORY SYNDROME CORONAVIRUS 2 (SARS-COV-2) (CORONAVIRUS DISEASE [COVID-19]), AMPLIFIED PROBE TECHNIQUE, MAKING USE OF HIGH THROUGHPUT TECHNOLOGIES AS DESCRIBED BY CMS-2020-01-R: HCPCS | Mod: HCNC

## 2021-01-08 PROCEDURE — 93005 ELECTROCARDIOGRAM TRACING: CPT | Mod: S$GLB,,, | Performed by: STUDENT IN AN ORGANIZED HEALTH CARE EDUCATION/TRAINING PROGRAM

## 2021-01-08 PROCEDURE — 3288F PR FALLS RISK ASSESSMENT DOCUMENTED: ICD-10-PCS | Mod: CPTII,S$GLB,, | Performed by: STUDENT IN AN ORGANIZED HEALTH CARE EDUCATION/TRAINING PROGRAM

## 2021-01-08 PROCEDURE — 99212 OFFICE O/P EST SF 10 MIN: CPT | Mod: S$GLB,,, | Performed by: STUDENT IN AN ORGANIZED HEALTH CARE EDUCATION/TRAINING PROGRAM

## 2021-01-08 PROCEDURE — 1159F MED LIST DOCD IN RCRD: CPT | Mod: S$GLB,,, | Performed by: STUDENT IN AN ORGANIZED HEALTH CARE EDUCATION/TRAINING PROGRAM

## 2021-01-08 RX ORDER — GLIPIZIDE 2.5 MG/1
TABLET, EXTENDED RELEASE ORAL
COMMUNITY
Start: 2020-12-31 | End: 2021-03-17

## 2021-01-08 RX ORDER — LIDOCAINE HYDROCHLORIDE 10 MG/ML
1 INJECTION, SOLUTION EPIDURAL; INFILTRATION; INTRACAUDAL; PERINEURAL ONCE
Status: CANCELLED | OUTPATIENT
Start: 2021-01-08 | End: 2021-01-08

## 2021-01-08 RX ORDER — SODIUM CHLORIDE, SODIUM LACTATE, POTASSIUM CHLORIDE, CALCIUM CHLORIDE 600; 310; 30; 20 MG/100ML; MG/100ML; MG/100ML; MG/100ML
INJECTION, SOLUTION INTRAVENOUS CONTINUOUS
Status: CANCELLED | OUTPATIENT
Start: 2021-01-08

## 2021-01-08 RX ORDER — SCOLOPAMINE TRANSDERMAL SYSTEM 1 MG/1
1 PATCH, EXTENDED RELEASE TRANSDERMAL
Status: CANCELLED | OUTPATIENT
Start: 2021-01-08

## 2021-01-08 RX ORDER — LIDOCAINE AND PRILOCAINE 25; 25 MG/G; MG/G
CREAM TOPICAL
COMMUNITY
Start: 2020-12-30 | End: 2021-11-17

## 2021-01-10 LAB — SARS-COV-2 RNA RESP QL NAA+PROBE: NOT DETECTED

## 2021-01-11 ENCOUNTER — HOSPITAL ENCOUNTER (INPATIENT)
Facility: HOSPITAL | Age: 66
LOS: 5 days | Discharge: HOME HEALTH | DRG: 460 | End: 2021-01-16
Attending: NEUROLOGICAL SURGERY | Admitting: NEUROLOGICAL SURGERY
Payer: MEDICARE

## 2021-01-11 ENCOUNTER — ANESTHESIA (OUTPATIENT)
Dept: SURGERY | Facility: HOSPITAL | Age: 66
DRG: 460 | End: 2021-01-11
Payer: MEDICARE

## 2021-01-11 DIAGNOSIS — M43.27 FUSION OF SPINE, LUMBOSACRAL REGION: ICD-10-CM

## 2021-01-11 DIAGNOSIS — M51.36 DDD (DEGENERATIVE DISC DISEASE), LUMBAR: ICD-10-CM

## 2021-01-11 DIAGNOSIS — Z98.1 S/P LUMBAR FUSION: Primary | ICD-10-CM

## 2021-01-11 DIAGNOSIS — R05.9 COUGH: ICD-10-CM

## 2021-01-11 PROBLEM — M51.369 DDD (DEGENERATIVE DISC DISEASE), LUMBAR: Status: ACTIVE | Noted: 2021-01-11

## 2021-01-11 LAB
ANION GAP SERPL CALC-SCNC: 10 MMOL/L (ref 8–16)
BASOPHILS # BLD AUTO: ABNORMAL K/UL (ref 0–0.2)
BASOPHILS NFR BLD: 0 % (ref 0–1.9)
BUN SERPL-MCNC: 23 MG/DL (ref 8–23)
CALCIUM SERPL-MCNC: 9 MG/DL (ref 8.7–10.5)
CHLORIDE SERPL-SCNC: 105 MMOL/L (ref 95–110)
CO2 SERPL-SCNC: 23 MMOL/L (ref 23–29)
CREAT SERPL-MCNC: 1.2 MG/DL (ref 0.5–1.4)
DIFFERENTIAL METHOD: ABNORMAL
EOSINOPHIL # BLD AUTO: ABNORMAL K/UL (ref 0–0.5)
EOSINOPHIL NFR BLD: 1 % (ref 0–8)
ERYTHROCYTE [DISTWIDTH] IN BLOOD BY AUTOMATED COUNT: 13.5 % (ref 11.5–14.5)
EST. GFR  (AFRICAN AMERICAN): 54 ML/MIN/1.73 M^2
EST. GFR  (NON AFRICAN AMERICAN): 47 ML/MIN/1.73 M^2
GLUCOSE SERPL-MCNC: 101 MG/DL (ref 70–110)
HCT VFR BLD AUTO: 31.4 % (ref 37–48.5)
HGB BLD-MCNC: 9.9 G/DL (ref 12–16)
IMM GRANULOCYTES # BLD AUTO: ABNORMAL K/UL (ref 0–0.04)
IMM GRANULOCYTES NFR BLD AUTO: ABNORMAL % (ref 0–0.5)
LYMPHOCYTES # BLD AUTO: ABNORMAL K/UL (ref 1–4.8)
LYMPHOCYTES NFR BLD: 10 % (ref 18–48)
MCH RBC QN AUTO: 31.3 PG (ref 27–31)
MCHC RBC AUTO-ENTMCNC: 31.5 G/DL (ref 32–36)
MCV RBC AUTO: 99 FL (ref 82–98)
MONOCYTES # BLD AUTO: ABNORMAL K/UL (ref 0.3–1)
MONOCYTES NFR BLD: 0 % (ref 4–15)
NEUTROPHILS NFR BLD: 80 % (ref 38–73)
NEUTS BAND NFR BLD MANUAL: 9 %
NRBC BLD-RTO: 0 /100 WBC
PLATELET # BLD AUTO: 178 K/UL (ref 150–350)
PLATELET BLD QL SMEAR: ABNORMAL
PMV BLD AUTO: 9.8 FL (ref 9.2–12.9)
POCT GLUCOSE: 133 MG/DL (ref 70–110)
POTASSIUM SERPL-SCNC: 3.9 MMOL/L (ref 3.5–5.1)
RBC # BLD AUTO: 3.16 M/UL (ref 4–5.4)
SODIUM SERPL-SCNC: 138 MMOL/L (ref 136–145)
WBC # BLD AUTO: 7.08 K/UL (ref 3.9–12.7)

## 2021-01-11 PROCEDURE — C1729 CATH, DRAINAGE: HCPCS | Mod: HCNC | Performed by: NEUROLOGICAL SURGERY

## 2021-01-11 PROCEDURE — 25000003 PHARM REV CODE 250: Mod: HCNC | Performed by: PHYSICAL MEDICINE & REHABILITATION

## 2021-01-11 PROCEDURE — 11000001 HC ACUTE MED/SURG PRIVATE ROOM: Mod: HCNC

## 2021-01-11 PROCEDURE — 63600175 PHARM REV CODE 636 W HCPCS: Mod: HCNC | Performed by: NEUROLOGICAL SURGERY

## 2021-01-11 PROCEDURE — 27201423 OPTIME MED/SURG SUP & DEVICES STERILE SUPPLY: Mod: HCNC | Performed by: NEUROLOGICAL SURGERY

## 2021-01-11 PROCEDURE — 25000003 PHARM REV CODE 250: Performed by: NURSE ANESTHETIST, CERTIFIED REGISTERED

## 2021-01-11 PROCEDURE — 20930 SP BONE ALGRFT MORSEL ADD-ON: CPT | Mod: ,,, | Performed by: NEUROLOGICAL SURGERY

## 2021-01-11 PROCEDURE — 85027 COMPLETE CBC AUTOMATED: CPT | Mod: HCNC

## 2021-01-11 PROCEDURE — 25000003 PHARM REV CODE 250: Mod: HCNC | Performed by: NURSE PRACTITIONER

## 2021-01-11 PROCEDURE — 63600175 PHARM REV CODE 636 W HCPCS: Mod: HCNC | Performed by: ANESTHESIOLOGY

## 2021-01-11 PROCEDURE — 71000039 HC RECOVERY, EACH ADD'L HOUR: Mod: HCNC | Performed by: NEUROLOGICAL SURGERY

## 2021-01-11 PROCEDURE — 27800903 OPTIME MED/SURG SUP & DEVICES OTHER IMPLANTS: Mod: HCNC | Performed by: NEUROLOGICAL SURGERY

## 2021-01-11 PROCEDURE — 71000033 HC RECOVERY, INTIAL HOUR: Mod: HCNC | Performed by: NEUROLOGICAL SURGERY

## 2021-01-11 PROCEDURE — 22558 ARTHRD ANT NTRBD MIN DSC LUM: CPT | Mod: 22,,, | Performed by: NEUROLOGICAL SURGERY

## 2021-01-11 PROCEDURE — 22853 INSJ BIOMECHANICAL DEVICE: CPT | Mod: ,,, | Performed by: NEUROLOGICAL SURGERY

## 2021-01-11 PROCEDURE — C1713 ANCHOR/SCREW BN/BN,TIS/BN: HCPCS | Performed by: NEUROLOGICAL SURGERY

## 2021-01-11 PROCEDURE — 36000711: Mod: HCNC | Performed by: NEUROLOGICAL SURGERY

## 2021-01-11 PROCEDURE — 37000009 HC ANESTHESIA EA ADD 15 MINS: Mod: HCNC | Performed by: NEUROLOGICAL SURGERY

## 2021-01-11 PROCEDURE — 63047 PR LAMINEC/FACETECT/FORAMIN,LUMBAR 1 SEG: ICD-10-PCS | Mod: 51,,, | Performed by: NEUROLOGICAL SURGERY

## 2021-01-11 PROCEDURE — 85007 BL SMEAR W/DIFF WBC COUNT: CPT | Mod: HCNC

## 2021-01-11 PROCEDURE — 22558 PR ARTHRODESIS ANT INTERBODY MIN DISCECTOMY,LUMBAR: ICD-10-PCS | Mod: 22,,, | Performed by: NEUROLOGICAL SURGERY

## 2021-01-11 PROCEDURE — 20930 PR ALLOGRAFT FOR SPINE SURGERY ONLY MORSELIZED: ICD-10-PCS | Mod: ,,, | Performed by: NEUROLOGICAL SURGERY

## 2021-01-11 PROCEDURE — 80048 BASIC METABOLIC PNL TOTAL CA: CPT | Mod: HCNC

## 2021-01-11 PROCEDURE — 99900035 HC TECH TIME PER 15 MIN (STAT): Mod: HCNC

## 2021-01-11 PROCEDURE — 25000003 PHARM REV CODE 250: Mod: HCNC | Performed by: NEUROLOGICAL SURGERY

## 2021-01-11 PROCEDURE — 37000008 HC ANESTHESIA 1ST 15 MINUTES: Mod: HCNC | Performed by: NEUROLOGICAL SURGERY

## 2021-01-11 PROCEDURE — 36415 COLL VENOUS BLD VENIPUNCTURE: CPT | Mod: HCNC

## 2021-01-11 PROCEDURE — 63600175 PHARM REV CODE 636 W HCPCS: Mod: HCNC | Performed by: NURSE PRACTITIONER

## 2021-01-11 PROCEDURE — 25000003 PHARM REV CODE 250: Mod: HCNC | Performed by: ANESTHESIOLOGY

## 2021-01-11 PROCEDURE — 63600175 PHARM REV CODE 636 W HCPCS: Performed by: NURSE ANESTHETIST, CERTIFIED REGISTERED

## 2021-01-11 PROCEDURE — 22840 INSERT SPINE FIXATION DEVICE: CPT | Mod: ,,, | Performed by: NEUROLOGICAL SURGERY

## 2021-01-11 PROCEDURE — 63047 LAM FACETEC & FORAMOT LUMBAR: CPT | Mod: 51,,, | Performed by: NEUROLOGICAL SURGERY

## 2021-01-11 PROCEDURE — 22853 PR INSERT BIOMECH DEV W/INTERBODY ARTHRODESIS, EA CONTIGUOUS DEFECT: ICD-10-PCS | Mod: ,,, | Performed by: NEUROLOGICAL SURGERY

## 2021-01-11 PROCEDURE — 22840 PR POSTERIOR NON-SEGMENTAL INSTRUMENTATION: ICD-10-PCS | Mod: ,,, | Performed by: NEUROLOGICAL SURGERY

## 2021-01-11 PROCEDURE — 36000710: Mod: HCNC | Performed by: NEUROLOGICAL SURGERY

## 2021-01-11 DEVICE — IMPLANTABLE DEVICE: Type: IMPLANTABLE DEVICE | Site: ABDOMEN | Status: FUNCTIONAL

## 2021-01-11 DEVICE — SCREW SET SPINAL SINGLE INNER: Type: IMPLANTABLE DEVICE | Site: BACK | Status: FUNCTIONAL

## 2021-01-11 RX ORDER — PREGABALIN 75 MG/1
75 CAPSULE ORAL
Status: COMPLETED | OUTPATIENT
Start: 2021-01-11 | End: 2021-01-11

## 2021-01-11 RX ORDER — HYDROMORPHONE HYDROCHLORIDE 2 MG/ML
0.5 INJECTION, SOLUTION INTRAMUSCULAR; INTRAVENOUS; SUBCUTANEOUS EVERY 5 MIN PRN
Status: DISCONTINUED | OUTPATIENT
Start: 2021-01-11 | End: 2021-01-11 | Stop reason: HOSPADM

## 2021-01-11 RX ORDER — CEFAZOLIN SODIUM 2 G/50ML
2 SOLUTION INTRAVENOUS ONCE
Status: COMPLETED | OUTPATIENT
Start: 2021-01-11 | End: 2021-01-11

## 2021-01-11 RX ORDER — TRAMADOL HYDROCHLORIDE 50 MG/1
50 TABLET ORAL EVERY 6 HOURS
Status: DISCONTINUED | OUTPATIENT
Start: 2021-01-12 | End: 2021-01-12

## 2021-01-11 RX ORDER — PROPOFOL 10 MG/ML
VIAL (ML) INTRAVENOUS
Status: DISCONTINUED | OUTPATIENT
Start: 2021-01-11 | End: 2021-01-11

## 2021-01-11 RX ORDER — LIDOCAINE HYDROCHLORIDE 20 MG/ML
INJECTION INTRAVENOUS
Status: DISCONTINUED | OUTPATIENT
Start: 2021-01-11 | End: 2021-01-11

## 2021-01-11 RX ORDER — BISACODYL 10 MG
10 SUPPOSITORY, RECTAL RECTAL DAILY
Status: DISCONTINUED | OUTPATIENT
Start: 2021-01-12 | End: 2021-01-16 | Stop reason: HOSPADM

## 2021-01-11 RX ORDER — ESCITALOPRAM OXALATE 10 MG/1
20 TABLET ORAL DAILY
Status: DISCONTINUED | OUTPATIENT
Start: 2021-01-12 | End: 2021-01-16 | Stop reason: HOSPADM

## 2021-01-11 RX ORDER — ACETAMINOPHEN 325 MG/1
650 TABLET ORAL
Status: COMPLETED | OUTPATIENT
Start: 2021-01-11 | End: 2021-01-11

## 2021-01-11 RX ORDER — MUPIROCIN 20 MG/G
OINTMENT TOPICAL 2 TIMES DAILY
Status: DISCONTINUED | OUTPATIENT
Start: 2021-01-11 | End: 2021-01-13

## 2021-01-11 RX ORDER — DIPHENHYDRAMINE HYDROCHLORIDE 50 MG/ML
12.5 INJECTION INTRAMUSCULAR; INTRAVENOUS EVERY 6 HOURS PRN
Status: DISCONTINUED | OUTPATIENT
Start: 2021-01-11 | End: 2021-01-11 | Stop reason: HOSPADM

## 2021-01-11 RX ORDER — FUROSEMIDE 20 MG/1
20 TABLET ORAL DAILY
Status: DISCONTINUED | OUTPATIENT
Start: 2021-01-12 | End: 2021-01-16 | Stop reason: HOSPADM

## 2021-01-11 RX ORDER — MIDAZOLAM HYDROCHLORIDE 1 MG/ML
INJECTION, SOLUTION INTRAMUSCULAR; INTRAVENOUS
Status: DISCONTINUED | OUTPATIENT
Start: 2021-01-11 | End: 2021-01-11

## 2021-01-11 RX ORDER — AMOXICILLIN 250 MG
2 CAPSULE ORAL NIGHTLY PRN
Status: DISCONTINUED | OUTPATIENT
Start: 2021-01-11 | End: 2021-01-16 | Stop reason: HOSPADM

## 2021-01-11 RX ORDER — BUPROPION HYDROCHLORIDE 100 MG/1
200 TABLET, EXTENDED RELEASE ORAL DAILY
Status: DISCONTINUED | OUTPATIENT
Start: 2021-01-12 | End: 2021-01-16 | Stop reason: HOSPADM

## 2021-01-11 RX ORDER — GABAPENTIN 100 MG/1
100 CAPSULE ORAL 3 TIMES DAILY
Status: DISCONTINUED | OUTPATIENT
Start: 2021-01-12 | End: 2021-01-16 | Stop reason: HOSPADM

## 2021-01-11 RX ORDER — DEXAMETHASONE SODIUM PHOSPHATE 4 MG/ML
INJECTION, SOLUTION INTRA-ARTICULAR; INTRALESIONAL; INTRAMUSCULAR; INTRAVENOUS; SOFT TISSUE
Status: DISCONTINUED | OUTPATIENT
Start: 2021-01-11 | End: 2021-01-11

## 2021-01-11 RX ORDER — ONDANSETRON 2 MG/ML
INJECTION INTRAMUSCULAR; INTRAVENOUS
Status: DISCONTINUED | OUTPATIENT
Start: 2021-01-11 | End: 2021-01-11

## 2021-01-11 RX ORDER — SUCCINYLCHOLINE CHLORIDE 20 MG/ML
INJECTION INTRAMUSCULAR; INTRAVENOUS
Status: DISCONTINUED | OUTPATIENT
Start: 2021-01-11 | End: 2021-01-11

## 2021-01-11 RX ORDER — SODIUM CHLORIDE, SODIUM LACTATE, POTASSIUM CHLORIDE, CALCIUM CHLORIDE 600; 310; 30; 20 MG/100ML; MG/100ML; MG/100ML; MG/100ML
INJECTION, SOLUTION INTRAVENOUS CONTINUOUS
Status: DISCONTINUED | OUTPATIENT
Start: 2021-01-11 | End: 2021-01-12

## 2021-01-11 RX ORDER — OXYCODONE HCL 10 MG/1
10 TABLET, FILM COATED, EXTENDED RELEASE ORAL
Status: COMPLETED | OUTPATIENT
Start: 2021-01-11 | End: 2021-01-11

## 2021-01-11 RX ORDER — POTASSIUM CHLORIDE 750 MG/1
10 TABLET, EXTENDED RELEASE ORAL DAILY
Status: DISCONTINUED | OUTPATIENT
Start: 2021-01-12 | End: 2021-01-16 | Stop reason: HOSPADM

## 2021-01-11 RX ORDER — ONDANSETRON 2 MG/ML
4 INJECTION INTRAMUSCULAR; INTRAVENOUS DAILY PRN
Status: DISCONTINUED | OUTPATIENT
Start: 2021-01-11 | End: 2021-01-11 | Stop reason: HOSPADM

## 2021-01-11 RX ORDER — FENTANYL CITRATE 50 UG/ML
INJECTION, SOLUTION INTRAMUSCULAR; INTRAVENOUS
Status: DISCONTINUED | OUTPATIENT
Start: 2021-01-11 | End: 2021-01-11

## 2021-01-11 RX ORDER — ONDANSETRON 4 MG/1
4 TABLET, ORALLY DISINTEGRATING ORAL EVERY 6 HOURS PRN
Status: DISCONTINUED | OUTPATIENT
Start: 2021-01-11 | End: 2021-01-16 | Stop reason: HOSPADM

## 2021-01-11 RX ORDER — LEVOTHYROXINE SODIUM 50 UG/1
100 TABLET ORAL
Status: DISCONTINUED | OUTPATIENT
Start: 2021-01-12 | End: 2021-01-16 | Stop reason: HOSPADM

## 2021-01-11 RX ORDER — BUSPIRONE HYDROCHLORIDE 5 MG/1
5 TABLET ORAL DAILY
Status: DISCONTINUED | OUTPATIENT
Start: 2021-01-12 | End: 2021-01-16 | Stop reason: HOSPADM

## 2021-01-11 RX ORDER — CYCLOBENZAPRINE HCL 10 MG
10 TABLET ORAL
Status: COMPLETED | OUTPATIENT
Start: 2021-01-11 | End: 2021-01-11

## 2021-01-11 RX ORDER — SUCRALFATE 1 G/10ML
1 SUSPENSION ORAL EVERY 6 HOURS
Status: DISCONTINUED | OUTPATIENT
Start: 2021-01-12 | End: 2021-01-16 | Stop reason: HOSPADM

## 2021-01-11 RX ORDER — MAG HYDROX/ALUMINUM HYD/SIMETH 200-200-20
30 SUSPENSION, ORAL (FINAL DOSE FORM) ORAL
Status: DISCONTINUED | OUTPATIENT
Start: 2021-01-12 | End: 2021-01-16 | Stop reason: HOSPADM

## 2021-01-11 RX ORDER — PANTOPRAZOLE SODIUM 40 MG/1
40 TABLET, DELAYED RELEASE ORAL DAILY
Status: DISCONTINUED | OUTPATIENT
Start: 2021-01-12 | End: 2021-01-16 | Stop reason: HOSPADM

## 2021-01-11 RX ORDER — ACETAMINOPHEN 325 MG/1
650 TABLET ORAL EVERY 6 HOURS
Status: DISCONTINUED | OUTPATIENT
Start: 2021-01-12 | End: 2021-01-16 | Stop reason: HOSPADM

## 2021-01-11 RX ORDER — TIZANIDINE 4 MG/1
4 TABLET ORAL EVERY 8 HOURS PRN
Status: DISCONTINUED | OUTPATIENT
Start: 2021-01-11 | End: 2021-01-16 | Stop reason: HOSPADM

## 2021-01-11 RX ORDER — SODIUM CHLORIDE 0.9 % (FLUSH) 0.9 %
3 SYRINGE (ML) INJECTION
Status: DISCONTINUED | OUTPATIENT
Start: 2021-01-11 | End: 2021-01-11 | Stop reason: HOSPADM

## 2021-01-11 RX ORDER — BUPIVACAINE HCL/EPINEPHRINE 0.25-.0005
VIAL (ML) INJECTION
Status: DISCONTINUED | OUTPATIENT
Start: 2021-01-11 | End: 2021-01-11 | Stop reason: HOSPADM

## 2021-01-11 RX ORDER — ONDANSETRON 8 MG/1
8 TABLET, ORALLY DISINTEGRATING ORAL EVERY 6 HOURS PRN
Status: DISCONTINUED | OUTPATIENT
Start: 2021-01-11 | End: 2021-01-16 | Stop reason: HOSPADM

## 2021-01-11 RX ORDER — LIDOCAINE HYDROCHLORIDE 10 MG/ML
1 INJECTION, SOLUTION EPIDURAL; INFILTRATION; INTRACAUDAL; PERINEURAL ONCE
Status: DISCONTINUED | OUTPATIENT
Start: 2021-01-11 | End: 2021-01-11 | Stop reason: HOSPADM

## 2021-01-11 RX ORDER — HYDROMORPHONE HYDROCHLORIDE 1 MG/ML
1 INJECTION, SOLUTION INTRAMUSCULAR; INTRAVENOUS; SUBCUTANEOUS
Status: DISCONTINUED | OUTPATIENT
Start: 2021-01-11 | End: 2021-01-13

## 2021-01-11 RX ORDER — ROCURONIUM BROMIDE 10 MG/ML
INJECTION, SOLUTION INTRAVENOUS
Status: DISCONTINUED | OUTPATIENT
Start: 2021-01-11 | End: 2021-01-11

## 2021-01-11 RX ORDER — NEOSTIGMINE METHYLSULFATE 1 MG/ML
INJECTION, SOLUTION INTRAVENOUS
Status: DISCONTINUED | OUTPATIENT
Start: 2021-01-11 | End: 2021-01-11

## 2021-01-11 RX ORDER — ALBUTEROL SULFATE 90 UG/1
1 AEROSOL, METERED RESPIRATORY (INHALATION) EVERY 6 HOURS PRN
Status: DISCONTINUED | OUTPATIENT
Start: 2021-01-11 | End: 2021-01-16 | Stop reason: HOSPADM

## 2021-01-11 RX ORDER — SCOLOPAMINE TRANSDERMAL SYSTEM 1 MG/1
1 PATCH, EXTENDED RELEASE TRANSDERMAL
Status: DISCONTINUED | OUTPATIENT
Start: 2021-01-11 | End: 2021-01-11 | Stop reason: HOSPADM

## 2021-01-11 RX ORDER — VANCOMYCIN HYDROCHLORIDE 1 G/20ML
INJECTION, POWDER, LYOPHILIZED, FOR SOLUTION INTRAVENOUS
Status: DISCONTINUED | OUTPATIENT
Start: 2021-01-11 | End: 2021-01-11 | Stop reason: HOSPADM

## 2021-01-11 RX ORDER — ATORVASTATIN CALCIUM 40 MG/1
40 TABLET, FILM COATED ORAL DAILY
Status: DISCONTINUED | OUTPATIENT
Start: 2021-01-12 | End: 2021-01-16 | Stop reason: HOSPADM

## 2021-01-11 RX ORDER — MAG HYDROX/ALUMINUM HYD/SIMETH 200-200-20
30 SUSPENSION, ORAL (FINAL DOSE FORM) ORAL EVERY 4 HOURS PRN
Status: DISCONTINUED | OUTPATIENT
Start: 2021-01-11 | End: 2021-01-16 | Stop reason: HOSPADM

## 2021-01-11 RX ORDER — HEPARIN SODIUM 5000 [USP'U]/ML
5000 INJECTION, SOLUTION INTRAVENOUS; SUBCUTANEOUS EVERY 8 HOURS
Status: DISCONTINUED | OUTPATIENT
Start: 2021-01-11 | End: 2021-01-16 | Stop reason: HOSPADM

## 2021-01-11 RX ORDER — PHENYLEPHRINE HYDROCHLORIDE 10 MG/ML
INJECTION INTRAVENOUS
Status: DISCONTINUED | OUTPATIENT
Start: 2021-01-11 | End: 2021-01-11

## 2021-01-11 RX ADMIN — SUCCINYLCHOLINE CHLORIDE 120 MG: 20 INJECTION, SOLUTION INTRAMUSCULAR; INTRAVENOUS at 12:01

## 2021-01-11 RX ADMIN — PHENYLEPHRINE HYDROCHLORIDE 50 MCG: 10 INJECTION INTRAVENOUS at 02:01

## 2021-01-11 RX ADMIN — CYCLOBENZAPRINE 10 MG: 10 TABLET, FILM COATED ORAL at 10:01

## 2021-01-11 RX ADMIN — HYDROMORPHONE HYDROCHLORIDE 0.5 MG: 2 INJECTION, SOLUTION INTRAMUSCULAR; INTRAVENOUS; SUBCUTANEOUS at 05:01

## 2021-01-11 RX ADMIN — OXYCODONE HYDROCHLORIDE 10 MG: 10 TABLET, FILM COATED, EXTENDED RELEASE ORAL at 10:01

## 2021-01-11 RX ADMIN — PROMETHAZINE HYDROCHLORIDE 6.25 MG: 25 INJECTION INTRAMUSCULAR; INTRAVENOUS at 06:01

## 2021-01-11 RX ADMIN — PREGABALIN 75 MG: 75 CAPSULE ORAL at 10:01

## 2021-01-11 RX ADMIN — GLYCOPYRROLATE 0.2 MG: 0.2 INJECTION, SOLUTION INTRAMUSCULAR; INTRAVITREAL at 04:01

## 2021-01-11 RX ADMIN — HEPARIN SODIUM 5000 UNITS: 5000 INJECTION INTRAVENOUS; SUBCUTANEOUS at 11:01

## 2021-01-11 RX ADMIN — MIDAZOLAM 1 MG: 1 INJECTION INTRAMUSCULAR; INTRAVENOUS at 11:01

## 2021-01-11 RX ADMIN — PHENYLEPHRINE HYDROCHLORIDE 200 MCG: 10 INJECTION INTRAVENOUS at 12:01

## 2021-01-11 RX ADMIN — DEXAMETHASONE SODIUM PHOSPHATE 8 MG: 4 INJECTION, SOLUTION INTRA-ARTICULAR; INTRALESIONAL; INTRAMUSCULAR; INTRAVENOUS; SOFT TISSUE at 12:01

## 2021-01-11 RX ADMIN — FENTANYL CITRATE 50 MCG: 50 INJECTION, SOLUTION INTRAMUSCULAR; INTRAVENOUS at 12:01

## 2021-01-11 RX ADMIN — CEFAZOLIN SODIUM 2 G: 2 SOLUTION INTRAVENOUS at 12:01

## 2021-01-11 RX ADMIN — LIDOCAINE HYDROCHLORIDE 100 MG: 20 INJECTION, SOLUTION INTRAVENOUS at 12:01

## 2021-01-11 RX ADMIN — FENTANYL CITRATE 25 MCG: 50 INJECTION, SOLUTION INTRAMUSCULAR; INTRAVENOUS at 04:01

## 2021-01-11 RX ADMIN — ROCURONIUM BROMIDE 10 MG: 10 INJECTION, SOLUTION INTRAVENOUS at 12:01

## 2021-01-11 RX ADMIN — PHENYLEPHRINE HYDROCHLORIDE 100 MCG: 10 INJECTION INTRAVENOUS at 03:01

## 2021-01-11 RX ADMIN — PHENYLEPHRINE HYDROCHLORIDE 100 MCG: 10 INJECTION INTRAVENOUS at 12:01

## 2021-01-11 RX ADMIN — FENTANYL CITRATE 50 MCG: 50 INJECTION, SOLUTION INTRAMUSCULAR; INTRAVENOUS at 01:01

## 2021-01-11 RX ADMIN — ROCURONIUM BROMIDE 30 MG: 10 INJECTION, SOLUTION INTRAVENOUS at 12:01

## 2021-01-11 RX ADMIN — SCOPALAMINE 1 PATCH: 1 PATCH, EXTENDED RELEASE TRANSDERMAL at 10:01

## 2021-01-11 RX ADMIN — SODIUM CHLORIDE, SODIUM LACTATE, POTASSIUM CHLORIDE, AND CALCIUM CHLORIDE: .6; .31; .03; .02 INJECTION, SOLUTION INTRAVENOUS at 11:01

## 2021-01-11 RX ADMIN — SODIUM CHLORIDE, SODIUM LACTATE, POTASSIUM CHLORIDE, AND CALCIUM CHLORIDE: .6; .31; .03; .02 INJECTION, SOLUTION INTRAVENOUS at 12:01

## 2021-01-11 RX ADMIN — SODIUM CHLORIDE: 0.9 INJECTION, SOLUTION INTRAVENOUS at 12:01

## 2021-01-11 RX ADMIN — ACETAMINOPHEN 650 MG: 325 TABLET ORAL at 10:01

## 2021-01-11 RX ADMIN — PHENYLEPHRINE HYDROCHLORIDE 100 MCG: 10 INJECTION INTRAVENOUS at 01:01

## 2021-01-11 RX ADMIN — NEOSTIGMINE METHYLSULFATE 1 MG: 1 INJECTION INTRAVENOUS at 04:01

## 2021-01-11 RX ADMIN — PHENYLEPHRINE HYDROCHLORIDE 50 MCG: 10 INJECTION INTRAVENOUS at 03:01

## 2021-01-11 RX ADMIN — MUPIROCIN: 20 OINTMENT TOPICAL at 11:01

## 2021-01-11 RX ADMIN — MIDAZOLAM 1 MG: 1 INJECTION INTRAMUSCULAR; INTRAVENOUS at 12:01

## 2021-01-11 RX ADMIN — ONDANSETRON 4 MG: 2 INJECTION, SOLUTION INTRAMUSCULAR; INTRAVENOUS at 04:01

## 2021-01-11 RX ADMIN — FENTANYL CITRATE 50 MCG: 50 INJECTION, SOLUTION INTRAMUSCULAR; INTRAVENOUS at 03:01

## 2021-01-11 RX ADMIN — SUCRALFATE 1 G: 1 SUSPENSION ORAL at 11:01

## 2021-01-11 RX ADMIN — ACETAMINOPHEN 650 MG: 325 TABLET ORAL at 11:01

## 2021-01-11 RX ADMIN — SODIUM CHLORIDE, SODIUM LACTATE, POTASSIUM CHLORIDE, AND CALCIUM CHLORIDE: .6; .31; .03; .02 INJECTION, SOLUTION INTRAVENOUS at 10:01

## 2021-01-11 RX ADMIN — FENTANYL CITRATE 50 MCG: 50 INJECTION, SOLUTION INTRAMUSCULAR; INTRAVENOUS at 02:01

## 2021-01-11 RX ADMIN — SODIUM CHLORIDE: 0.9 INJECTION, SOLUTION INTRAVENOUS at 03:01

## 2021-01-11 RX ADMIN — PROPOFOL 180 MG: 10 INJECTION, EMULSION INTRAVENOUS at 12:01

## 2021-01-11 RX ADMIN — TRAMADOL HYDROCHLORIDE 50 MG: 50 TABLET, COATED ORAL at 11:01

## 2021-01-12 LAB
ANION GAP SERPL CALC-SCNC: 9 MMOL/L (ref 8–16)
BASOPHILS # BLD AUTO: 0 K/UL (ref 0–0.2)
BASOPHILS NFR BLD: 0 % (ref 0–1.9)
BUN SERPL-MCNC: 13 MG/DL (ref 8–23)
CALCIUM SERPL-MCNC: 8.4 MG/DL (ref 8.7–10.5)
CHLORIDE SERPL-SCNC: 109 MMOL/L (ref 95–110)
CO2 SERPL-SCNC: 21 MMOL/L (ref 23–29)
CREAT SERPL-MCNC: 0.8 MG/DL (ref 0.5–1.4)
DIFFERENTIAL METHOD: ABNORMAL
EOSINOPHIL # BLD AUTO: 0 K/UL (ref 0–0.5)
EOSINOPHIL NFR BLD: 0 % (ref 0–8)
ERYTHROCYTE [DISTWIDTH] IN BLOOD BY AUTOMATED COUNT: 13.6 % (ref 11.5–14.5)
EST. GFR  (AFRICAN AMERICAN): >60 ML/MIN/1.73 M^2
EST. GFR  (NON AFRICAN AMERICAN): >60 ML/MIN/1.73 M^2
GLUCOSE SERPL-MCNC: 102 MG/DL (ref 70–110)
HCT VFR BLD AUTO: 27.1 % (ref 37–48.5)
HGB BLD-MCNC: 8.4 G/DL (ref 12–16)
IMM GRANULOCYTES # BLD AUTO: 0.02 K/UL (ref 0–0.04)
IMM GRANULOCYTES NFR BLD AUTO: 0.4 % (ref 0–0.5)
LYMPHOCYTES # BLD AUTO: 0.8 K/UL (ref 1–4.8)
LYMPHOCYTES NFR BLD: 14.3 % (ref 18–48)
MCH RBC QN AUTO: 31 PG (ref 27–31)
MCHC RBC AUTO-ENTMCNC: 31 G/DL (ref 32–36)
MCV RBC AUTO: 100 FL (ref 82–98)
MONOCYTES # BLD AUTO: 0.3 K/UL (ref 0.3–1)
MONOCYTES NFR BLD: 6.2 % (ref 4–15)
NEUTROPHILS # BLD AUTO: 4.3 K/UL (ref 1.8–7.7)
NEUTROPHILS NFR BLD: 79.1 % (ref 38–73)
NRBC BLD-RTO: 0 /100 WBC
PLATELET # BLD AUTO: 154 K/UL (ref 150–350)
PMV BLD AUTO: 10.2 FL (ref 9.2–12.9)
POCT GLUCOSE: 101 MG/DL (ref 70–110)
POCT GLUCOSE: 105 MG/DL (ref 70–110)
POCT GLUCOSE: 89 MG/DL (ref 70–110)
POCT GLUCOSE: 94 MG/DL (ref 70–110)
POTASSIUM SERPL-SCNC: 4.4 MMOL/L (ref 3.5–5.1)
RBC # BLD AUTO: 2.71 M/UL (ref 4–5.4)
SODIUM SERPL-SCNC: 139 MMOL/L (ref 136–145)
WBC # BLD AUTO: 5.45 K/UL (ref 3.9–12.7)

## 2021-01-12 PROCEDURE — 97530 THERAPEUTIC ACTIVITIES: CPT

## 2021-01-12 PROCEDURE — 97535 SELF CARE MNGMENT TRAINING: CPT

## 2021-01-12 PROCEDURE — 97165 OT EVAL LOW COMPLEX 30 MIN: CPT

## 2021-01-12 PROCEDURE — 85025 COMPLETE CBC W/AUTO DIFF WBC: CPT

## 2021-01-12 PROCEDURE — 94799 UNLISTED PULMONARY SVC/PX: CPT

## 2021-01-12 PROCEDURE — 25000003 PHARM REV CODE 250: Performed by: NEUROLOGICAL SURGERY

## 2021-01-12 PROCEDURE — 36415 COLL VENOUS BLD VENIPUNCTURE: CPT

## 2021-01-12 PROCEDURE — 97161 PT EVAL LOW COMPLEX 20 MIN: CPT

## 2021-01-12 PROCEDURE — 94761 N-INVAS EAR/PLS OXIMETRY MLT: CPT | Mod: HCNC

## 2021-01-12 PROCEDURE — 63600175 PHARM REV CODE 636 W HCPCS: Mod: HCNC | Performed by: NEUROLOGICAL SURGERY

## 2021-01-12 PROCEDURE — 11000001 HC ACUTE MED/SURG PRIVATE ROOM

## 2021-01-12 PROCEDURE — 99900035 HC TECH TIME PER 15 MIN (STAT): Mod: HCNC

## 2021-01-12 PROCEDURE — 80048 BASIC METABOLIC PNL TOTAL CA: CPT

## 2021-01-12 RX ORDER — OXYCODONE HYDROCHLORIDE 5 MG/1
5 TABLET ORAL EVERY 6 HOURS PRN
Status: DISCONTINUED | OUTPATIENT
Start: 2021-01-12 | End: 2021-01-13

## 2021-01-12 RX ADMIN — GABAPENTIN 100 MG: 100 CAPSULE ORAL at 09:01

## 2021-01-12 RX ADMIN — HEPARIN SODIUM 5000 UNITS: 5000 INJECTION INTRAVENOUS; SUBCUTANEOUS at 02:01

## 2021-01-12 RX ADMIN — ALUMINUM HYDROXIDE, MAGNESIUM HYDROXIDE, AND SIMETHICONE 30 ML: 200; 200; 20 SUSPENSION ORAL at 09:01

## 2021-01-12 RX ADMIN — LEVOTHYROXINE SODIUM 100 MCG: 50 TABLET ORAL at 06:01

## 2021-01-12 RX ADMIN — ALUMINUM HYDROXIDE, MAGNESIUM HYDROXIDE, AND SIMETHICONE 30 ML: 200; 200; 20 SUSPENSION ORAL at 04:01

## 2021-01-12 RX ADMIN — PANTOPRAZOLE SODIUM 40 MG: 40 TABLET, DELAYED RELEASE ORAL at 09:01

## 2021-01-12 RX ADMIN — HEPARIN SODIUM 5000 UNITS: 5000 INJECTION INTRAVENOUS; SUBCUTANEOUS at 06:01

## 2021-01-12 RX ADMIN — GABAPENTIN 100 MG: 100 CAPSULE ORAL at 02:01

## 2021-01-12 RX ADMIN — BUSPIRONE HYDROCHLORIDE 5 MG: 5 TABLET ORAL at 09:01

## 2021-01-12 RX ADMIN — ALUMINUM HYDROXIDE, MAGNESIUM HYDROXIDE, AND SIMETHICONE 30 ML: 200; 200; 20 SUSPENSION ORAL at 11:01

## 2021-01-12 RX ADMIN — ESCITALOPRAM OXALATE 20 MG: 10 TABLET, FILM COATED ORAL at 09:01

## 2021-01-12 RX ADMIN — BUPROPION HYDROCHLORIDE 200 MG: 100 TABLET, EXTENDED RELEASE ORAL at 09:01

## 2021-01-12 RX ADMIN — SUCRALFATE 1 G: 1 SUSPENSION ORAL at 11:01

## 2021-01-12 RX ADMIN — OXYCODONE HYDROCHLORIDE 5 MG: 5 TABLET ORAL at 09:01

## 2021-01-12 RX ADMIN — TIZANIDINE 4 MG: 4 TABLET ORAL at 09:01

## 2021-01-12 RX ADMIN — HEPARIN SODIUM 5000 UNITS: 5000 INJECTION INTRAVENOUS; SUBCUTANEOUS at 09:01

## 2021-01-12 RX ADMIN — SUCRALFATE 1 G: 1 SUSPENSION ORAL at 06:01

## 2021-01-12 RX ADMIN — MUPIROCIN: 20 OINTMENT TOPICAL at 09:01

## 2021-01-12 RX ADMIN — POTASSIUM CHLORIDE 10 MEQ: 750 TABLET, EXTENDED RELEASE ORAL at 09:01

## 2021-01-12 RX ADMIN — ONDANSETRON 8 MG: 8 TABLET, ORALLY DISINTEGRATING ORAL at 04:01

## 2021-01-12 RX ADMIN — HYDROMORPHONE HYDROCHLORIDE 1 MG: 1 INJECTION, SOLUTION INTRAMUSCULAR; INTRAVENOUS; SUBCUTANEOUS at 01:01

## 2021-01-12 RX ADMIN — ALUMINUM HYDROXIDE, MAGNESIUM HYDROXIDE, AND SIMETHICONE 30 ML: 200; 200; 20 SUSPENSION ORAL at 06:01

## 2021-01-12 RX ADMIN — ACETAMINOPHEN 650 MG: 325 TABLET ORAL at 11:01

## 2021-01-12 RX ADMIN — FUROSEMIDE 20 MG: 20 TABLET ORAL at 09:01

## 2021-01-12 RX ADMIN — TRAMADOL HYDROCHLORIDE 50 MG: 50 TABLET, COATED ORAL at 06:01

## 2021-01-12 RX ADMIN — ACETAMINOPHEN 650 MG: 325 TABLET ORAL at 06:01

## 2021-01-12 RX ADMIN — ATORVASTATIN CALCIUM 40 MG: 40 TABLET, FILM COATED ORAL at 09:01

## 2021-01-13 LAB
POCT GLUCOSE: 100 MG/DL (ref 70–110)
POCT GLUCOSE: 104 MG/DL (ref 70–110)
POCT GLUCOSE: 125 MG/DL (ref 70–110)
POCT GLUCOSE: 87 MG/DL (ref 70–110)

## 2021-01-13 PROCEDURE — 94799 UNLISTED PULMONARY SVC/PX: CPT

## 2021-01-13 PROCEDURE — 94761 N-INVAS EAR/PLS OXIMETRY MLT: CPT

## 2021-01-13 PROCEDURE — 63600175 PHARM REV CODE 636 W HCPCS: Performed by: NEUROLOGICAL SURGERY

## 2021-01-13 PROCEDURE — 25000003 PHARM REV CODE 250: Performed by: NEUROLOGICAL SURGERY

## 2021-01-13 PROCEDURE — 97530 THERAPEUTIC ACTIVITIES: CPT | Mod: CQ

## 2021-01-13 PROCEDURE — 11000001 HC ACUTE MED/SURG PRIVATE ROOM

## 2021-01-13 PROCEDURE — 97110 THERAPEUTIC EXERCISES: CPT | Mod: CQ

## 2021-01-13 RX ORDER — OXYCODONE HYDROCHLORIDE 5 MG/1
10 TABLET ORAL EVERY 6 HOURS PRN
Status: DISCONTINUED | OUTPATIENT
Start: 2021-01-13 | End: 2021-01-16 | Stop reason: HOSPADM

## 2021-01-13 RX ORDER — INSULIN ASPART 100 [IU]/ML
1-10 INJECTION, SOLUTION INTRAVENOUS; SUBCUTANEOUS
Status: DISCONTINUED | OUTPATIENT
Start: 2021-01-13 | End: 2021-01-16 | Stop reason: HOSPADM

## 2021-01-13 RX ORDER — GLUCAGON 1 MG
1 KIT INJECTION
Status: DISCONTINUED | OUTPATIENT
Start: 2021-01-13 | End: 2021-01-16 | Stop reason: HOSPADM

## 2021-01-13 RX ORDER — IBUPROFEN 200 MG
24 TABLET ORAL
Status: DISCONTINUED | OUTPATIENT
Start: 2021-01-13 | End: 2021-01-16 | Stop reason: HOSPADM

## 2021-01-13 RX ORDER — IBUPROFEN 200 MG
16 TABLET ORAL
Status: DISCONTINUED | OUTPATIENT
Start: 2021-01-13 | End: 2021-01-16 | Stop reason: HOSPADM

## 2021-01-13 RX ADMIN — BISACODYL 10 MG: 10 SUPPOSITORY RECTAL at 08:01

## 2021-01-13 RX ADMIN — LEVOTHYROXINE SODIUM 100 MCG: 50 TABLET ORAL at 06:01

## 2021-01-13 RX ADMIN — SUCRALFATE 1 G: 1 SUSPENSION ORAL at 06:01

## 2021-01-13 RX ADMIN — ESCITALOPRAM OXALATE 20 MG: 10 TABLET, FILM COATED ORAL at 08:01

## 2021-01-13 RX ADMIN — PANTOPRAZOLE SODIUM 40 MG: 40 TABLET, DELAYED RELEASE ORAL at 08:01

## 2021-01-13 RX ADMIN — SUCRALFATE 1 G: 1 SUSPENSION ORAL at 05:01

## 2021-01-13 RX ADMIN — HEPARIN SODIUM 5000 UNITS: 5000 INJECTION INTRAVENOUS; SUBCUTANEOUS at 02:01

## 2021-01-13 RX ADMIN — POTASSIUM CHLORIDE 10 MEQ: 750 TABLET, EXTENDED RELEASE ORAL at 08:01

## 2021-01-13 RX ADMIN — TIZANIDINE 4 MG: 4 TABLET ORAL at 09:01

## 2021-01-13 RX ADMIN — HEPARIN SODIUM 5000 UNITS: 5000 INJECTION INTRAVENOUS; SUBCUTANEOUS at 06:01

## 2021-01-13 RX ADMIN — OXYCODONE HYDROCHLORIDE 10 MG: 5 TABLET ORAL at 09:01

## 2021-01-13 RX ADMIN — ATORVASTATIN CALCIUM 40 MG: 40 TABLET, FILM COATED ORAL at 08:01

## 2021-01-13 RX ADMIN — HEPARIN SODIUM 5000 UNITS: 5000 INJECTION INTRAVENOUS; SUBCUTANEOUS at 09:01

## 2021-01-13 RX ADMIN — BUSPIRONE HYDROCHLORIDE 5 MG: 5 TABLET ORAL at 08:01

## 2021-01-13 RX ADMIN — FUROSEMIDE 20 MG: 20 TABLET ORAL at 08:01

## 2021-01-13 RX ADMIN — ALUMINUM HYDROXIDE, MAGNESIUM HYDROXIDE, AND SIMETHICONE 30 ML: 200; 200; 20 SUSPENSION ORAL at 11:01

## 2021-01-13 RX ADMIN — ACETAMINOPHEN 650 MG: 325 TABLET ORAL at 12:01

## 2021-01-13 RX ADMIN — SUCRALFATE 1 G: 1 SUSPENSION ORAL at 12:01

## 2021-01-13 RX ADMIN — GABAPENTIN 100 MG: 100 CAPSULE ORAL at 02:01

## 2021-01-13 RX ADMIN — OXYCODONE HYDROCHLORIDE 10 MG: 5 TABLET ORAL at 03:01

## 2021-01-13 RX ADMIN — GABAPENTIN 100 MG: 100 CAPSULE ORAL at 08:01

## 2021-01-13 RX ADMIN — OXYCODONE HYDROCHLORIDE 5 MG: 5 TABLET ORAL at 08:01

## 2021-01-13 RX ADMIN — ALUMINUM HYDROXIDE, MAGNESIUM HYDROXIDE, AND SIMETHICONE 30 ML: 200; 200; 20 SUSPENSION ORAL at 05:01

## 2021-01-13 RX ADMIN — GABAPENTIN 100 MG: 100 CAPSULE ORAL at 09:01

## 2021-01-13 RX ADMIN — ALUMINUM HYDROXIDE, MAGNESIUM HYDROXIDE, AND SIMETHICONE 30 ML: 200; 200; 20 SUSPENSION ORAL at 06:01

## 2021-01-13 RX ADMIN — ALUMINUM HYDROXIDE, MAGNESIUM HYDROXIDE, AND SIMETHICONE 30 ML: 200; 200; 20 SUSPENSION ORAL at 09:01

## 2021-01-13 RX ADMIN — ACETAMINOPHEN 650 MG: 325 TABLET ORAL at 06:01

## 2021-01-13 RX ADMIN — SUCRALFATE 1 G: 1 SUSPENSION ORAL at 11:01

## 2021-01-13 RX ADMIN — OXYCODONE HYDROCHLORIDE 5 MG: 5 TABLET ORAL at 10:01

## 2021-01-13 RX ADMIN — BUPROPION HYDROCHLORIDE 200 MG: 100 TABLET, EXTENDED RELEASE ORAL at 08:01

## 2021-01-13 RX ADMIN — ACETAMINOPHEN 650 MG: 325 TABLET ORAL at 11:01

## 2021-01-14 LAB
POCT GLUCOSE: 100 MG/DL (ref 70–110)
POCT GLUCOSE: 101 MG/DL (ref 70–110)
POCT GLUCOSE: 126 MG/DL (ref 70–110)
POCT GLUCOSE: 284 MG/DL (ref 70–110)

## 2021-01-14 PROCEDURE — 97530 THERAPEUTIC ACTIVITIES: CPT | Mod: CO

## 2021-01-14 PROCEDURE — 11000001 HC ACUTE MED/SURG PRIVATE ROOM

## 2021-01-14 PROCEDURE — 99900035 HC TECH TIME PER 15 MIN (STAT)

## 2021-01-14 PROCEDURE — 94799 UNLISTED PULMONARY SVC/PX: CPT

## 2021-01-14 PROCEDURE — 25000003 PHARM REV CODE 250: Performed by: NEUROLOGICAL SURGERY

## 2021-01-14 PROCEDURE — 63600175 PHARM REV CODE 636 W HCPCS: Performed by: NEUROLOGICAL SURGERY

## 2021-01-14 PROCEDURE — 97535 SELF CARE MNGMENT TRAINING: CPT | Mod: CO

## 2021-01-14 PROCEDURE — 94761 N-INVAS EAR/PLS OXIMETRY MLT: CPT

## 2021-01-14 RX ORDER — DIAZEPAM 2 MG/1
2 TABLET ORAL ONCE
Status: COMPLETED | OUTPATIENT
Start: 2021-01-14 | End: 2021-01-14

## 2021-01-14 RX ADMIN — SUCRALFATE 1 G: 1 SUSPENSION ORAL at 12:01

## 2021-01-14 RX ADMIN — GABAPENTIN 100 MG: 100 CAPSULE ORAL at 08:01

## 2021-01-14 RX ADMIN — HEPARIN SODIUM 5000 UNITS: 5000 INJECTION INTRAVENOUS; SUBCUTANEOUS at 09:01

## 2021-01-14 RX ADMIN — FUROSEMIDE 20 MG: 20 TABLET ORAL at 09:01

## 2021-01-14 RX ADMIN — OXYCODONE HYDROCHLORIDE 10 MG: 5 TABLET ORAL at 05:01

## 2021-01-14 RX ADMIN — POTASSIUM CHLORIDE 10 MEQ: 750 TABLET, EXTENDED RELEASE ORAL at 09:01

## 2021-01-14 RX ADMIN — DIAZEPAM 2 MG: 2 TABLET ORAL at 08:01

## 2021-01-14 RX ADMIN — ALUMINUM HYDROXIDE, MAGNESIUM HYDROXIDE, AND SIMETHICONE 30 ML: 200; 200; 20 SUSPENSION ORAL at 05:01

## 2021-01-14 RX ADMIN — GABAPENTIN 100 MG: 100 CAPSULE ORAL at 09:01

## 2021-01-14 RX ADMIN — BUSPIRONE HYDROCHLORIDE 5 MG: 5 TABLET ORAL at 09:01

## 2021-01-14 RX ADMIN — SUCRALFATE 1 G: 1 SUSPENSION ORAL at 06:01

## 2021-01-14 RX ADMIN — LEVOTHYROXINE SODIUM 100 MCG: 50 TABLET ORAL at 06:01

## 2021-01-14 RX ADMIN — SUCRALFATE 1 G: 1 SUSPENSION ORAL at 05:01

## 2021-01-14 RX ADMIN — ACETAMINOPHEN 650 MG: 325 TABLET ORAL at 05:01

## 2021-01-14 RX ADMIN — ALUMINUM HYDROXIDE, MAGNESIUM HYDROXIDE, AND SIMETHICONE 30 ML: 200; 200; 20 SUSPENSION ORAL at 08:01

## 2021-01-14 RX ADMIN — ESCITALOPRAM OXALATE 20 MG: 10 TABLET, FILM COATED ORAL at 09:01

## 2021-01-14 RX ADMIN — GABAPENTIN 100 MG: 100 CAPSULE ORAL at 03:01

## 2021-01-14 RX ADMIN — PANTOPRAZOLE SODIUM 40 MG: 40 TABLET, DELAYED RELEASE ORAL at 09:01

## 2021-01-14 RX ADMIN — ALUMINUM HYDROXIDE, MAGNESIUM HYDROXIDE, AND SIMETHICONE 30 ML: 200; 200; 20 SUSPENSION ORAL at 10:01

## 2021-01-14 RX ADMIN — BUPROPION HYDROCHLORIDE 200 MG: 100 TABLET, EXTENDED RELEASE ORAL at 09:01

## 2021-01-14 RX ADMIN — ATORVASTATIN CALCIUM 40 MG: 40 TABLET, FILM COATED ORAL at 09:01

## 2021-01-14 RX ADMIN — HEPARIN SODIUM 5000 UNITS: 5000 INJECTION INTRAVENOUS; SUBCUTANEOUS at 03:01

## 2021-01-14 RX ADMIN — ACETAMINOPHEN 650 MG: 325 TABLET ORAL at 06:01

## 2021-01-14 RX ADMIN — ALUMINUM HYDROXIDE, MAGNESIUM HYDROXIDE, AND SIMETHICONE 30 ML: 200; 200; 20 SUSPENSION ORAL at 06:01

## 2021-01-14 RX ADMIN — HEPARIN SODIUM 5000 UNITS: 5000 INJECTION INTRAVENOUS; SUBCUTANEOUS at 06:01

## 2021-01-14 RX ADMIN — ACETAMINOPHEN 650 MG: 325 TABLET ORAL at 12:01

## 2021-01-14 RX ADMIN — INSULIN ASPART 6 UNITS: 100 INJECTION, SOLUTION INTRAVENOUS; SUBCUTANEOUS at 12:01

## 2021-01-15 LAB
POCT GLUCOSE: 101 MG/DL (ref 70–110)
POCT GLUCOSE: 122 MG/DL (ref 70–110)
POCT GLUCOSE: 132 MG/DL (ref 70–110)
POCT GLUCOSE: 79 MG/DL (ref 70–110)
POCT GLUCOSE: 92 MG/DL (ref 70–110)

## 2021-01-15 PROCEDURE — 25000003 PHARM REV CODE 250: Performed by: NEUROLOGICAL SURGERY

## 2021-01-15 PROCEDURE — 97530 THERAPEUTIC ACTIVITIES: CPT | Mod: CQ

## 2021-01-15 PROCEDURE — 94761 N-INVAS EAR/PLS OXIMETRY MLT: CPT

## 2021-01-15 PROCEDURE — 63600175 PHARM REV CODE 636 W HCPCS: Performed by: NEUROLOGICAL SURGERY

## 2021-01-15 PROCEDURE — 97535 SELF CARE MNGMENT TRAINING: CPT | Mod: CO

## 2021-01-15 PROCEDURE — 97116 GAIT TRAINING THERAPY: CPT | Mod: CQ

## 2021-01-15 PROCEDURE — 94799 UNLISTED PULMONARY SVC/PX: CPT

## 2021-01-15 PROCEDURE — 11000001 HC ACUTE MED/SURG PRIVATE ROOM

## 2021-01-15 RX ADMIN — GABAPENTIN 100 MG: 100 CAPSULE ORAL at 09:01

## 2021-01-15 RX ADMIN — BISACODYL 10 MG: 10 SUPPOSITORY RECTAL at 09:01

## 2021-01-15 RX ADMIN — HEPARIN SODIUM 5000 UNITS: 5000 INJECTION INTRAVENOUS; SUBCUTANEOUS at 06:01

## 2021-01-15 RX ADMIN — HEPARIN SODIUM 5000 UNITS: 5000 INJECTION INTRAVENOUS; SUBCUTANEOUS at 10:01

## 2021-01-15 RX ADMIN — POTASSIUM CHLORIDE 10 MEQ: 750 TABLET, EXTENDED RELEASE ORAL at 09:01

## 2021-01-15 RX ADMIN — HEPARIN SODIUM 5000 UNITS: 5000 INJECTION INTRAVENOUS; SUBCUTANEOUS at 02:01

## 2021-01-15 RX ADMIN — SUCRALFATE 1 G: 1 SUSPENSION ORAL at 05:01

## 2021-01-15 RX ADMIN — ALUMINUM HYDROXIDE, MAGNESIUM HYDROXIDE, AND SIMETHICONE 30 ML: 200; 200; 20 SUSPENSION ORAL at 06:01

## 2021-01-15 RX ADMIN — ATORVASTATIN CALCIUM 40 MG: 40 TABLET, FILM COATED ORAL at 09:01

## 2021-01-15 RX ADMIN — ACETAMINOPHEN 325 MG: 325 TABLET ORAL at 11:01

## 2021-01-15 RX ADMIN — TIZANIDINE 4 MG: 4 TABLET ORAL at 10:01

## 2021-01-15 RX ADMIN — ACETAMINOPHEN 650 MG: 325 TABLET ORAL at 06:01

## 2021-01-15 RX ADMIN — OXYCODONE HYDROCHLORIDE 10 MG: 5 TABLET ORAL at 11:01

## 2021-01-15 RX ADMIN — SUCRALFATE 1 G: 1 SUSPENSION ORAL at 06:01

## 2021-01-15 RX ADMIN — BUSPIRONE HYDROCHLORIDE 5 MG: 5 TABLET ORAL at 09:01

## 2021-01-15 RX ADMIN — FUROSEMIDE 20 MG: 20 TABLET ORAL at 09:01

## 2021-01-15 RX ADMIN — ESCITALOPRAM OXALATE 20 MG: 10 TABLET, FILM COATED ORAL at 09:01

## 2021-01-15 RX ADMIN — SUCRALFATE 1 G: 1 SUSPENSION ORAL at 11:01

## 2021-01-15 RX ADMIN — LEVOTHYROXINE SODIUM 100 MCG: 50 TABLET ORAL at 06:01

## 2021-01-15 RX ADMIN — ALUMINUM HYDROXIDE, MAGNESIUM HYDROXIDE, AND SIMETHICONE 30 ML: 200; 200; 20 SUSPENSION ORAL at 11:01

## 2021-01-15 RX ADMIN — GABAPENTIN 100 MG: 100 CAPSULE ORAL at 10:01

## 2021-01-15 RX ADMIN — PANTOPRAZOLE SODIUM 40 MG: 40 TABLET, DELAYED RELEASE ORAL at 09:01

## 2021-01-15 RX ADMIN — ALUMINUM HYDROXIDE, MAGNESIUM HYDROXIDE, AND SIMETHICONE 30 ML: 200; 200; 20 SUSPENSION ORAL at 10:01

## 2021-01-15 RX ADMIN — OXYCODONE HYDROCHLORIDE 10 MG: 5 TABLET ORAL at 02:01

## 2021-01-15 RX ADMIN — ACETAMINOPHEN 650 MG: 325 TABLET ORAL at 12:01

## 2021-01-15 RX ADMIN — BUPROPION HYDROCHLORIDE 200 MG: 100 TABLET, EXTENDED RELEASE ORAL at 09:01

## 2021-01-15 RX ADMIN — SUCRALFATE 1 G: 1 SUSPENSION ORAL at 12:01

## 2021-01-15 RX ADMIN — ALUMINUM HYDROXIDE, MAGNESIUM HYDROXIDE, AND SIMETHICONE 30 ML: 200; 200; 20 SUSPENSION ORAL at 04:01

## 2021-01-15 RX ADMIN — GABAPENTIN 100 MG: 100 CAPSULE ORAL at 02:01

## 2021-01-16 VITALS
WEIGHT: 218.5 LBS | DIASTOLIC BLOOD PRESSURE: 59 MMHG | OXYGEN SATURATION: 95 % | SYSTOLIC BLOOD PRESSURE: 107 MMHG | TEMPERATURE: 99 F | BODY MASS INDEX: 42.9 KG/M2 | RESPIRATION RATE: 18 BRPM | HEIGHT: 60 IN | HEART RATE: 73 BPM

## 2021-01-16 LAB
POCT GLUCOSE: 91 MG/DL (ref 70–110)
POCT GLUCOSE: 98 MG/DL (ref 70–110)

## 2021-01-16 PROCEDURE — 63600175 PHARM REV CODE 636 W HCPCS: Performed by: NEUROLOGICAL SURGERY

## 2021-01-16 PROCEDURE — 97116 GAIT TRAINING THERAPY: CPT | Mod: CQ

## 2021-01-16 PROCEDURE — 94799 UNLISTED PULMONARY SVC/PX: CPT

## 2021-01-16 PROCEDURE — 25000003 PHARM REV CODE 250: Performed by: NEUROLOGICAL SURGERY

## 2021-01-16 PROCEDURE — 97530 THERAPEUTIC ACTIVITIES: CPT | Mod: CQ

## 2021-01-16 PROCEDURE — 94761 N-INVAS EAR/PLS OXIMETRY MLT: CPT

## 2021-01-16 RX ORDER — OXYCODONE AND ACETAMINOPHEN 10; 325 MG/1; MG/1
1 TABLET ORAL EVERY 8 HOURS PRN
Qty: 60 TABLET | Refills: 0 | Status: SHIPPED | OUTPATIENT
Start: 2021-01-16 | End: 2021-01-20 | Stop reason: SDUPTHER

## 2021-01-16 RX ADMIN — SUCRALFATE 1 G: 1 SUSPENSION ORAL at 06:01

## 2021-01-16 RX ADMIN — OXYCODONE HYDROCHLORIDE 10 MG: 5 TABLET ORAL at 06:01

## 2021-01-16 RX ADMIN — ALUMINUM HYDROXIDE, MAGNESIUM HYDROXIDE, AND SIMETHICONE 30 ML: 200; 200; 20 SUSPENSION ORAL at 06:01

## 2021-01-16 RX ADMIN — ALUMINUM HYDROXIDE, MAGNESIUM HYDROXIDE, AND SIMETHICONE 30 ML: 200; 200; 20 SUSPENSION ORAL at 10:01

## 2021-01-16 RX ADMIN — BUPROPION HYDROCHLORIDE 200 MG: 100 TABLET, EXTENDED RELEASE ORAL at 10:01

## 2021-01-16 RX ADMIN — ATORVASTATIN CALCIUM 40 MG: 40 TABLET, FILM COATED ORAL at 10:01

## 2021-01-16 RX ADMIN — OXYCODONE HYDROCHLORIDE 10 MG: 5 TABLET ORAL at 02:01

## 2021-01-16 RX ADMIN — ACETAMINOPHEN 650 MG: 325 TABLET ORAL at 06:01

## 2021-01-16 RX ADMIN — BISACODYL 10 MG: 10 SUPPOSITORY RECTAL at 10:01

## 2021-01-16 RX ADMIN — PANTOPRAZOLE SODIUM 40 MG: 40 TABLET, DELAYED RELEASE ORAL at 10:01

## 2021-01-16 RX ADMIN — BUSPIRONE HYDROCHLORIDE 5 MG: 5 TABLET ORAL at 10:01

## 2021-01-16 RX ADMIN — LEVOTHYROXINE SODIUM 100 MCG: 50 TABLET ORAL at 06:01

## 2021-01-16 RX ADMIN — ACETAMINOPHEN 650 MG: 325 TABLET ORAL at 11:01

## 2021-01-16 RX ADMIN — FUROSEMIDE 20 MG: 20 TABLET ORAL at 10:01

## 2021-01-16 RX ADMIN — SUCRALFATE 1 G: 1 SUSPENSION ORAL at 11:01

## 2021-01-16 RX ADMIN — TIZANIDINE 4 MG: 4 TABLET ORAL at 10:01

## 2021-01-16 RX ADMIN — ESCITALOPRAM OXALATE 20 MG: 10 TABLET, FILM COATED ORAL at 10:01

## 2021-01-16 RX ADMIN — POTASSIUM CHLORIDE 10 MEQ: 750 TABLET, EXTENDED RELEASE ORAL at 10:01

## 2021-01-16 RX ADMIN — HEPARIN SODIUM 5000 UNITS: 5000 INJECTION INTRAVENOUS; SUBCUTANEOUS at 06:01

## 2021-01-16 RX ADMIN — GABAPENTIN 100 MG: 100 CAPSULE ORAL at 10:01

## 2021-01-20 ENCOUNTER — TELEPHONE (OUTPATIENT)
Dept: NEUROSURGERY | Facility: CLINIC | Age: 66
End: 2021-01-20

## 2021-01-20 RX ORDER — OXYCODONE AND ACETAMINOPHEN 10; 325 MG/1; MG/1
1 TABLET ORAL EVERY 8 HOURS PRN
Qty: 60 TABLET | Refills: 0 | Status: SHIPPED | OUTPATIENT
Start: 2021-01-20 | End: 2021-02-03 | Stop reason: SDUPTHER

## 2021-01-25 ENCOUNTER — EXTERNAL HOME HEALTH (OUTPATIENT)
Dept: HOME HEALTH SERVICES | Facility: HOSPITAL | Age: 66
End: 2021-01-25
Payer: MEDICARE

## 2021-01-25 RX ORDER — DICLOFENAC SODIUM 10 MG/G
GEL TOPICAL
Qty: 100 G | Refills: 5 | Status: SHIPPED | OUTPATIENT
Start: 2021-01-25 | End: 2022-01-03 | Stop reason: SDUPTHER

## 2021-01-26 ENCOUNTER — OFFICE VISIT (OUTPATIENT)
Dept: NEUROSURGERY | Facility: CLINIC | Age: 66
End: 2021-01-26
Payer: MEDICARE

## 2021-01-26 ENCOUNTER — TELEPHONE (OUTPATIENT)
Dept: NEUROSURGERY | Facility: CLINIC | Age: 66
End: 2021-01-26

## 2021-01-26 ENCOUNTER — HOSPITAL ENCOUNTER (OUTPATIENT)
Dept: RADIOLOGY | Facility: HOSPITAL | Age: 66
Discharge: HOME | End: 2021-01-26
Attending: NEUROLOGICAL SURGERY
Payer: MEDICARE

## 2021-01-26 VITALS — HEART RATE: 86 BPM | DIASTOLIC BLOOD PRESSURE: 78 MMHG | SYSTOLIC BLOOD PRESSURE: 134 MMHG

## 2021-01-26 DIAGNOSIS — M43.27 FUSION OF SPINE, LUMBOSACRAL REGION: ICD-10-CM

## 2021-01-26 DIAGNOSIS — Z98.1 S/P LUMBAR SPINAL FUSION: Primary | ICD-10-CM

## 2021-01-26 PROCEDURE — 3288F PR FALLS RISK ASSESSMENT DOCUMENTED: ICD-10-PCS | Mod: CPTII,S$GLB,, | Performed by: PHYSICIAN ASSISTANT

## 2021-01-26 PROCEDURE — 72100 XR LUMBAR SPINE AP AND LATERAL: ICD-10-PCS | Mod: 26,,, | Performed by: RADIOLOGY

## 2021-01-26 PROCEDURE — 99999 PR PBB SHADOW E&M-EST. PATIENT-LVL III: ICD-10-PCS | Mod: PBBFAC,,, | Performed by: PHYSICIAN ASSISTANT

## 2021-01-26 PROCEDURE — 1101F PR PT FALLS ASSESS DOC 0-1 FALLS W/OUT INJ PAST YR: ICD-10-PCS | Mod: CPTII,S$GLB,, | Performed by: PHYSICIAN ASSISTANT

## 2021-01-26 PROCEDURE — 72100 X-RAY EXAM L-S SPINE 2/3 VWS: CPT | Mod: TC,PN

## 2021-01-26 PROCEDURE — 99024 PR POST-OP FOLLOW-UP VISIT: ICD-10-PCS | Mod: S$GLB,,, | Performed by: PHYSICIAN ASSISTANT

## 2021-01-26 PROCEDURE — 72100 X-RAY EXAM L-S SPINE 2/3 VWS: CPT | Mod: 26,,, | Performed by: RADIOLOGY

## 2021-01-26 PROCEDURE — 99024 POSTOP FOLLOW-UP VISIT: CPT | Mod: S$GLB,,, | Performed by: PHYSICIAN ASSISTANT

## 2021-01-26 PROCEDURE — 1125F AMNT PAIN NOTED PAIN PRSNT: CPT | Mod: S$GLB,,, | Performed by: PHYSICIAN ASSISTANT

## 2021-01-26 PROCEDURE — 3288F FALL RISK ASSESSMENT DOCD: CPT | Mod: CPTII,S$GLB,, | Performed by: PHYSICIAN ASSISTANT

## 2021-01-26 PROCEDURE — 1125F PR PAIN SEVERITY QUANTIFIED, PAIN PRESENT: ICD-10-PCS | Mod: S$GLB,,, | Performed by: PHYSICIAN ASSISTANT

## 2021-01-26 PROCEDURE — 1157F PR ADVANCE CARE PLAN OR EQUIV PRESENT IN MEDICAL RECORD: ICD-10-PCS | Mod: S$GLB,,, | Performed by: PHYSICIAN ASSISTANT

## 2021-01-26 PROCEDURE — 1101F PT FALLS ASSESS-DOCD LE1/YR: CPT | Mod: CPTII,S$GLB,, | Performed by: PHYSICIAN ASSISTANT

## 2021-01-26 PROCEDURE — 1157F ADVNC CARE PLAN IN RCRD: CPT | Mod: S$GLB,,, | Performed by: PHYSICIAN ASSISTANT

## 2021-01-26 PROCEDURE — 99999 PR PBB SHADOW E&M-EST. PATIENT-LVL III: CPT | Mod: PBBFAC,,, | Performed by: PHYSICIAN ASSISTANT

## 2021-01-28 RX ORDER — ZOLPIDEM TARTRATE 5 MG/1
TABLET ORAL
Qty: 30 TABLET | Refills: 1 | Status: SHIPPED | OUTPATIENT
Start: 2021-01-28 | End: 2021-07-29

## 2021-01-29 ENCOUNTER — TELEPHONE (OUTPATIENT)
Dept: FAMILY MEDICINE | Facility: CLINIC | Age: 66
End: 2021-01-29

## 2021-01-29 ENCOUNTER — TELEPHONE (OUTPATIENT)
Dept: NEUROSURGERY | Facility: CLINIC | Age: 66
End: 2021-01-29

## 2021-01-29 RX ORDER — PROMETHAZINE HYDROCHLORIDE 25 MG/1
TABLET ORAL
Qty: 25 TABLET | Refills: 0 | Status: SHIPPED | OUTPATIENT
Start: 2021-01-29 | End: 2021-03-10 | Stop reason: SDUPTHER

## 2021-02-01 RX ORDER — DIAZEPAM 2 MG/1
2 TABLET ORAL EVERY 12 HOURS PRN
Qty: 25 TABLET | Refills: 0 | Status: SHIPPED | OUTPATIENT
Start: 2021-02-01 | End: 2021-03-10 | Stop reason: SDUPTHER

## 2021-02-02 ENCOUNTER — TELEPHONE (OUTPATIENT)
Dept: NEUROSURGERY | Facility: CLINIC | Age: 66
End: 2021-02-02

## 2021-02-03 ENCOUNTER — TELEPHONE (OUTPATIENT)
Dept: NEUROSURGERY | Facility: CLINIC | Age: 66
End: 2021-02-03

## 2021-02-03 RX ORDER — OXYCODONE AND ACETAMINOPHEN 10; 325 MG/1; MG/1
1 TABLET ORAL EVERY 8 HOURS PRN
Qty: 60 TABLET | Refills: 0 | Status: SHIPPED | OUTPATIENT
Start: 2021-02-03 | End: 2021-02-24 | Stop reason: SDUPTHER

## 2021-02-04 ENCOUNTER — OFFICE VISIT (OUTPATIENT)
Dept: NEUROSURGERY | Facility: CLINIC | Age: 66
End: 2021-02-04
Payer: MEDICARE

## 2021-02-04 ENCOUNTER — TELEPHONE (OUTPATIENT)
Dept: NEUROSURGERY | Facility: CLINIC | Age: 66
End: 2021-02-04

## 2021-02-04 VITALS — WEIGHT: 218.5 LBS | BODY MASS INDEX: 42.9 KG/M2 | TEMPERATURE: 99 F | HEIGHT: 60 IN

## 2021-02-04 DIAGNOSIS — Z98.1 S/P LUMBAR SPINAL FUSION: Primary | ICD-10-CM

## 2021-02-04 PROCEDURE — 1100F PTFALLS ASSESS-DOCD GE2>/YR: CPT | Mod: CPTII,S$GLB,, | Performed by: PHYSICIAN ASSISTANT

## 2021-02-04 PROCEDURE — 99999 PR PBB SHADOW E&M-EST. PATIENT-LVL V: ICD-10-PCS | Mod: PBBFAC,,, | Performed by: PHYSICIAN ASSISTANT

## 2021-02-04 PROCEDURE — 3288F FALL RISK ASSESSMENT DOCD: CPT | Mod: CPTII,S$GLB,, | Performed by: PHYSICIAN ASSISTANT

## 2021-02-04 PROCEDURE — 3008F PR BODY MASS INDEX (BMI) DOCUMENTED: ICD-10-PCS | Mod: CPTII,S$GLB,, | Performed by: PHYSICIAN ASSISTANT

## 2021-02-04 PROCEDURE — 1157F PR ADVANCE CARE PLAN OR EQUIV PRESENT IN MEDICAL RECORD: ICD-10-PCS | Mod: S$GLB,,, | Performed by: PHYSICIAN ASSISTANT

## 2021-02-04 PROCEDURE — 99024 PR POST-OP FOLLOW-UP VISIT: ICD-10-PCS | Mod: S$GLB,,, | Performed by: PHYSICIAN ASSISTANT

## 2021-02-04 PROCEDURE — 1125F AMNT PAIN NOTED PAIN PRSNT: CPT | Mod: S$GLB,,, | Performed by: PHYSICIAN ASSISTANT

## 2021-02-04 PROCEDURE — 1157F ADVNC CARE PLAN IN RCRD: CPT | Mod: S$GLB,,, | Performed by: PHYSICIAN ASSISTANT

## 2021-02-04 PROCEDURE — 1125F PR PAIN SEVERITY QUANTIFIED, PAIN PRESENT: ICD-10-PCS | Mod: S$GLB,,, | Performed by: PHYSICIAN ASSISTANT

## 2021-02-04 PROCEDURE — 1100F PR PT FALLS ASSESS DOC 2+ FALLS/FALL W/INJURY/YR: ICD-10-PCS | Mod: CPTII,S$GLB,, | Performed by: PHYSICIAN ASSISTANT

## 2021-02-04 PROCEDURE — 3008F BODY MASS INDEX DOCD: CPT | Mod: CPTII,S$GLB,, | Performed by: PHYSICIAN ASSISTANT

## 2021-02-04 PROCEDURE — 3288F PR FALLS RISK ASSESSMENT DOCUMENTED: ICD-10-PCS | Mod: CPTII,S$GLB,, | Performed by: PHYSICIAN ASSISTANT

## 2021-02-04 PROCEDURE — 99024 POSTOP FOLLOW-UP VISIT: CPT | Mod: S$GLB,,, | Performed by: PHYSICIAN ASSISTANT

## 2021-02-04 PROCEDURE — 99999 PR PBB SHADOW E&M-EST. PATIENT-LVL V: CPT | Mod: PBBFAC,,, | Performed by: PHYSICIAN ASSISTANT

## 2021-02-05 ENCOUNTER — TELEPHONE (OUTPATIENT)
Dept: NEUROSURGERY | Facility: CLINIC | Age: 66
End: 2021-02-05

## 2021-02-09 ENCOUNTER — DOCUMENT SCAN (OUTPATIENT)
Dept: HOME HEALTH SERVICES | Facility: HOSPITAL | Age: 66
End: 2021-02-09
Payer: MEDICARE

## 2021-02-10 ENCOUNTER — TELEPHONE (OUTPATIENT)
Dept: NEUROSURGERY | Facility: CLINIC | Age: 66
End: 2021-02-10

## 2021-02-11 ENCOUNTER — TELEPHONE (OUTPATIENT)
Dept: NEUROSURGERY | Facility: CLINIC | Age: 66
End: 2021-02-11

## 2021-02-11 ENCOUNTER — DOCUMENT SCAN (OUTPATIENT)
Dept: HOME HEALTH SERVICES | Facility: HOSPITAL | Age: 66
End: 2021-02-11
Payer: MEDICARE

## 2021-02-12 ENCOUNTER — CLINICAL SUPPORT (OUTPATIENT)
Dept: NEUROSURGERY | Facility: CLINIC | Age: 66
End: 2021-02-12
Payer: MEDICARE

## 2021-02-12 ENCOUNTER — TELEPHONE (OUTPATIENT)
Dept: FAMILY MEDICINE | Facility: CLINIC | Age: 66
End: 2021-02-12

## 2021-02-12 VITALS — SYSTOLIC BLOOD PRESSURE: 129 MMHG | HEART RATE: 72 BPM | DIASTOLIC BLOOD PRESSURE: 71 MMHG

## 2021-02-12 PROCEDURE — 99999 PR PBB SHADOW E&M-EST. PATIENT-LVL IV: ICD-10-PCS | Mod: PBBFAC,,,

## 2021-02-12 PROCEDURE — 99999 PR PBB SHADOW E&M-EST. PATIENT-LVL IV: CPT | Mod: PBBFAC,,,

## 2021-02-17 ENCOUNTER — TELEPHONE (OUTPATIENT)
Dept: NEUROSURGERY | Facility: CLINIC | Age: 66
End: 2021-02-17

## 2021-02-18 ENCOUNTER — TELEPHONE (OUTPATIENT)
Dept: NEUROSURGERY | Facility: CLINIC | Age: 66
End: 2021-02-18

## 2021-02-18 ENCOUNTER — DOCUMENT SCAN (OUTPATIENT)
Dept: HOME HEALTH SERVICES | Facility: HOSPITAL | Age: 66
End: 2021-02-18
Payer: MEDICARE

## 2021-02-24 ENCOUNTER — HOSPITAL ENCOUNTER (OUTPATIENT)
Dept: RADIOLOGY | Facility: HOSPITAL | Age: 66
Discharge: HOME | End: 2021-02-24
Attending: NEUROLOGICAL SURGERY
Payer: MEDICARE

## 2021-02-24 ENCOUNTER — TELEPHONE (OUTPATIENT)
Dept: NEUROSURGERY | Facility: CLINIC | Age: 66
End: 2021-02-24

## 2021-02-24 ENCOUNTER — OFFICE VISIT (OUTPATIENT)
Dept: NEUROSURGERY | Facility: CLINIC | Age: 66
End: 2021-02-24
Payer: MEDICARE

## 2021-02-24 VITALS — WEIGHT: 218.5 LBS | HEIGHT: 60 IN | BODY MASS INDEX: 42.9 KG/M2

## 2021-02-24 DIAGNOSIS — M43.27 FUSION OF SPINE, LUMBOSACRAL REGION: ICD-10-CM

## 2021-02-24 DIAGNOSIS — T81.89XD DELAYED SURGICAL WOUND HEALING, SUBSEQUENT ENCOUNTER: Primary | ICD-10-CM

## 2021-02-24 DIAGNOSIS — Z74.09 POOR MOBILITY: ICD-10-CM

## 2021-02-24 PROCEDURE — 3288F PR FALLS RISK ASSESSMENT DOCUMENTED: ICD-10-PCS | Mod: CPTII,S$GLB,, | Performed by: NEUROLOGICAL SURGERY

## 2021-02-24 PROCEDURE — 1101F PR PT FALLS ASSESS DOC 0-1 FALLS W/OUT INJ PAST YR: ICD-10-PCS | Mod: CPTII,S$GLB,, | Performed by: NEUROLOGICAL SURGERY

## 2021-02-24 PROCEDURE — 1125F PR PAIN SEVERITY QUANTIFIED, PAIN PRESENT: ICD-10-PCS | Mod: S$GLB,,, | Performed by: NEUROLOGICAL SURGERY

## 2021-02-24 PROCEDURE — 3288F FALL RISK ASSESSMENT DOCD: CPT | Mod: CPTII,S$GLB,, | Performed by: NEUROLOGICAL SURGERY

## 2021-02-24 PROCEDURE — 99999 PR PBB SHADOW E&M-EST. PATIENT-LVL III: CPT | Mod: PBBFAC,,, | Performed by: NEUROLOGICAL SURGERY

## 2021-02-24 PROCEDURE — 99499 RISK ADDL DX/OHS AUDIT: ICD-10-PCS | Mod: S$GLB,,, | Performed by: NEUROLOGICAL SURGERY

## 2021-02-24 PROCEDURE — 1101F PT FALLS ASSESS-DOCD LE1/YR: CPT | Mod: CPTII,S$GLB,, | Performed by: NEUROLOGICAL SURGERY

## 2021-02-24 PROCEDURE — 99999 PR PBB SHADOW E&M-EST. PATIENT-LVL III: ICD-10-PCS | Mod: PBBFAC,,, | Performed by: NEUROLOGICAL SURGERY

## 2021-02-24 PROCEDURE — 3008F PR BODY MASS INDEX (BMI) DOCUMENTED: ICD-10-PCS | Mod: CPTII,S$GLB,, | Performed by: NEUROLOGICAL SURGERY

## 2021-02-24 PROCEDURE — 99024 POSTOP FOLLOW-UP VISIT: CPT | Mod: S$GLB,,, | Performed by: NEUROLOGICAL SURGERY

## 2021-02-24 PROCEDURE — 72100 X-RAY EXAM L-S SPINE 2/3 VWS: CPT | Mod: TC,FY

## 2021-02-24 PROCEDURE — 99024 PR POST-OP FOLLOW-UP VISIT: ICD-10-PCS | Mod: S$GLB,,, | Performed by: NEUROLOGICAL SURGERY

## 2021-02-24 PROCEDURE — 72100 XR LUMBAR SPINE AP AND LATERAL: ICD-10-PCS | Mod: 26,,, | Performed by: INTERNAL MEDICINE

## 2021-02-24 PROCEDURE — 1125F AMNT PAIN NOTED PAIN PRSNT: CPT | Mod: S$GLB,,, | Performed by: NEUROLOGICAL SURGERY

## 2021-02-24 PROCEDURE — 99499 UNLISTED E&M SERVICE: CPT | Mod: S$GLB,,, | Performed by: NEUROLOGICAL SURGERY

## 2021-02-24 PROCEDURE — 1157F PR ADVANCE CARE PLAN OR EQUIV PRESENT IN MEDICAL RECORD: ICD-10-PCS | Mod: S$GLB,,, | Performed by: NEUROLOGICAL SURGERY

## 2021-02-24 PROCEDURE — 3008F BODY MASS INDEX DOCD: CPT | Mod: CPTII,S$GLB,, | Performed by: NEUROLOGICAL SURGERY

## 2021-02-24 PROCEDURE — 72100 X-RAY EXAM L-S SPINE 2/3 VWS: CPT | Mod: 26,,, | Performed by: INTERNAL MEDICINE

## 2021-02-24 PROCEDURE — 1157F ADVNC CARE PLAN IN RCRD: CPT | Mod: S$GLB,,, | Performed by: NEUROLOGICAL SURGERY

## 2021-02-24 RX ORDER — OXYCODONE AND ACETAMINOPHEN 10; 325 MG/1; MG/1
1 TABLET ORAL EVERY 8 HOURS PRN
Qty: 90 TABLET | Refills: 0 | Status: SHIPPED | OUTPATIENT
Start: 2021-02-24 | End: 2021-03-22 | Stop reason: SDUPTHER

## 2021-02-25 ENCOUNTER — TELEPHONE (OUTPATIENT)
Dept: NEUROSURGERY | Facility: CLINIC | Age: 66
End: 2021-02-25

## 2021-02-27 PROCEDURE — G0180 MD CERTIFICATION HHA PATIENT: HCPCS | Mod: ,,, | Performed by: NEUROLOGICAL SURGERY

## 2021-02-27 PROCEDURE — G0180 PR HOME HEALTH MD CERTIFICATION: ICD-10-PCS | Mod: ,,, | Performed by: NEUROLOGICAL SURGERY

## 2021-02-27 PROCEDURE — G0180 MD CERTIFICATION HHA PATIENT: HCPCS | Mod: ,,, | Performed by: FAMILY MEDICINE

## 2021-02-27 PROCEDURE — G0180 PR HOME HEALTH MD CERTIFICATION: ICD-10-PCS | Mod: ,,, | Performed by: FAMILY MEDICINE

## 2021-03-04 ENCOUNTER — TELEPHONE (OUTPATIENT)
Dept: FAMILY MEDICINE | Facility: CLINIC | Age: 66
End: 2021-03-04

## 2021-03-04 DIAGNOSIS — E11.9 TYPE 2 DIABETES MELLITUS WITHOUT COMPLICATION: ICD-10-CM

## 2021-03-05 ENCOUNTER — OFFICE VISIT (OUTPATIENT)
Dept: FAMILY MEDICINE | Facility: CLINIC | Age: 66
End: 2021-03-05
Payer: MEDICARE

## 2021-03-05 VITALS
OXYGEN SATURATION: 98 % | HEIGHT: 60 IN | WEIGHT: 205.13 LBS | TEMPERATURE: 98 F | HEART RATE: 89 BPM | SYSTOLIC BLOOD PRESSURE: 94 MMHG | BODY MASS INDEX: 40.27 KG/M2 | DIASTOLIC BLOOD PRESSURE: 72 MMHG

## 2021-03-05 DIAGNOSIS — E11.9 TYPE 2 DIABETES MELLITUS WITHOUT COMPLICATION, WITHOUT LONG-TERM CURRENT USE OF INSULIN: ICD-10-CM

## 2021-03-05 DIAGNOSIS — D64.9 NORMOCYTIC ANEMIA: ICD-10-CM

## 2021-03-05 DIAGNOSIS — K21.9 GASTROESOPHAGEAL REFLUX DISEASE WITHOUT ESOPHAGITIS: ICD-10-CM

## 2021-03-05 DIAGNOSIS — E03.9 HYPOTHYROIDISM (ACQUIRED): ICD-10-CM

## 2021-03-05 DIAGNOSIS — I25.10 CORONARY ARTERY DISEASE INVOLVING NATIVE CORONARY ARTERY OF NATIVE HEART WITHOUT ANGINA PECTORIS: Primary | ICD-10-CM

## 2021-03-05 PROCEDURE — 1159F PR MEDICATION LIST DOCUMENTED IN MEDICAL RECORD: ICD-10-PCS | Mod: S$GLB,,, | Performed by: FAMILY MEDICINE

## 2021-03-05 PROCEDURE — 3288F FALL RISK ASSESSMENT DOCD: CPT | Mod: CPTII,S$GLB,, | Performed by: FAMILY MEDICINE

## 2021-03-05 PROCEDURE — 1157F ADVNC CARE PLAN IN RCRD: CPT | Mod: S$GLB,,, | Performed by: FAMILY MEDICINE

## 2021-03-05 PROCEDURE — 3008F BODY MASS INDEX DOCD: CPT | Mod: CPTII,S$GLB,, | Performed by: FAMILY MEDICINE

## 2021-03-05 PROCEDURE — 3008F PR BODY MASS INDEX (BMI) DOCUMENTED: ICD-10-PCS | Mod: CPTII,S$GLB,, | Performed by: FAMILY MEDICINE

## 2021-03-05 PROCEDURE — 1100F PR PT FALLS ASSESS DOC 2+ FALLS/FALL W/INJURY/YR: ICD-10-PCS | Mod: CPTII,S$GLB,, | Performed by: FAMILY MEDICINE

## 2021-03-05 PROCEDURE — 3044F PR MOST RECENT HEMOGLOBIN A1C LEVEL <7.0%: ICD-10-PCS | Mod: CPTII,S$GLB,, | Performed by: FAMILY MEDICINE

## 2021-03-05 PROCEDURE — 99213 PR OFFICE/OUTPT VISIT, EST, LEVL III, 20-29 MIN: ICD-10-PCS | Mod: S$GLB,,, | Performed by: FAMILY MEDICINE

## 2021-03-05 PROCEDURE — 1100F PTFALLS ASSESS-DOCD GE2>/YR: CPT | Mod: CPTII,S$GLB,, | Performed by: FAMILY MEDICINE

## 2021-03-05 PROCEDURE — 3288F PR FALLS RISK ASSESSMENT DOCUMENTED: ICD-10-PCS | Mod: CPTII,S$GLB,, | Performed by: FAMILY MEDICINE

## 2021-03-05 PROCEDURE — 99213 OFFICE O/P EST LOW 20 MIN: CPT | Mod: S$GLB,,, | Performed by: FAMILY MEDICINE

## 2021-03-05 PROCEDURE — 1157F PR ADVANCE CARE PLAN OR EQUIV PRESENT IN MEDICAL RECORD: ICD-10-PCS | Mod: S$GLB,,, | Performed by: FAMILY MEDICINE

## 2021-03-05 PROCEDURE — 1159F MED LIST DOCD IN RCRD: CPT | Mod: S$GLB,,, | Performed by: FAMILY MEDICINE

## 2021-03-05 PROCEDURE — 3044F HG A1C LEVEL LT 7.0%: CPT | Mod: CPTII,S$GLB,, | Performed by: FAMILY MEDICINE

## 2021-03-05 RX ORDER — OMEPRAZOLE 40 MG/1
40 CAPSULE, DELAYED RELEASE ORAL EVERY MORNING
Qty: 90 CAPSULE | Refills: 1 | Status: SHIPPED | OUTPATIENT
Start: 2021-03-05 | End: 2021-05-25 | Stop reason: SDUPTHER

## 2021-03-05 RX ORDER — FUROSEMIDE 20 MG/1
TABLET ORAL
Qty: 30 TABLET | Refills: 0 | Status: SHIPPED | OUTPATIENT
Start: 2021-03-05 | End: 2021-06-19

## 2021-03-05 RX ORDER — POTASSIUM CHLORIDE 750 MG/1
TABLET, EXTENDED RELEASE ORAL
Qty: 30 TABLET | Refills: 0 | Status: SHIPPED | OUTPATIENT
Start: 2021-03-05 | End: 2021-08-15 | Stop reason: SDUPTHER

## 2021-03-05 RX ORDER — TIZANIDINE 2 MG/1
2 TABLET ORAL
Qty: 30 TABLET | Refills: 1 | Status: ON HOLD | OUTPATIENT
Start: 2021-03-05 | End: 2021-12-21 | Stop reason: HOSPADM

## 2021-03-05 RX ORDER — IBANDRONATE SODIUM 150 MG/1
TABLET, FILM COATED ORAL
Qty: 3 TABLET | Refills: 3 | Status: SHIPPED | OUTPATIENT
Start: 2021-03-05 | End: 2021-11-17 | Stop reason: SDUPTHER

## 2021-03-10 RX ORDER — DIAZEPAM 2 MG/1
2 TABLET ORAL EVERY 12 HOURS PRN
Qty: 25 TABLET | Refills: 0 | Status: SHIPPED | OUTPATIENT
Start: 2021-03-10 | End: 2021-05-04 | Stop reason: SDUPTHER

## 2021-03-10 RX ORDER — GABAPENTIN 300 MG/1
CAPSULE ORAL
Status: CANCELLED | OUTPATIENT
Start: 2021-03-10

## 2021-03-10 RX ORDER — GABAPENTIN 100 MG/1
100 CAPSULE ORAL 3 TIMES DAILY
Qty: 270 CAPSULE | Refills: 1 | Status: SHIPPED | OUTPATIENT
Start: 2021-03-10 | End: 2021-04-26

## 2021-03-10 RX ORDER — PROMETHAZINE HYDROCHLORIDE 25 MG/1
TABLET ORAL
Qty: 25 TABLET | Refills: 0 | Status: SHIPPED | OUTPATIENT
Start: 2021-03-10 | End: 2021-06-15 | Stop reason: SDUPTHER

## 2021-03-11 ENCOUNTER — EXTERNAL HOME HEALTH (OUTPATIENT)
Dept: HOME HEALTH SERVICES | Facility: HOSPITAL | Age: 66
End: 2021-03-11
Payer: MEDICARE

## 2021-03-17 RX ORDER — LEVOTHYROXINE SODIUM 100 UG/1
TABLET ORAL
Qty: 90 TABLET | Refills: 3 | Status: SHIPPED | OUTPATIENT
Start: 2021-03-17 | End: 2021-06-15

## 2021-03-17 RX ORDER — GLIPIZIDE 2.5 MG/1
TABLET, EXTENDED RELEASE ORAL
Qty: 90 TABLET | Refills: 3 | Status: SHIPPED | OUTPATIENT
Start: 2021-03-17 | End: 2023-06-18

## 2021-03-17 RX ORDER — ATORVASTATIN CALCIUM 40 MG/1
TABLET, FILM COATED ORAL
Qty: 90 TABLET | Refills: 3 | Status: SHIPPED | OUTPATIENT
Start: 2021-03-17 | End: 2022-05-26

## 2021-03-19 ENCOUNTER — LAB VISIT (OUTPATIENT)
Dept: LAB | Facility: HOSPITAL | Age: 66
End: 2021-03-19
Attending: FAMILY MEDICINE
Payer: MEDICARE

## 2021-03-19 DIAGNOSIS — E11.9 TYPE 2 DIABETES MELLITUS WITHOUT COMPLICATION: ICD-10-CM

## 2021-03-19 DIAGNOSIS — E11.9 DIABETES MELLITUS WITHOUT COMPLICATION: ICD-10-CM

## 2021-03-19 LAB
CHOLEST SERPL-MCNC: 142 MG/DL (ref 120–199)
CHOLEST/HDLC SERPL: 3.3 {RATIO} (ref 2–5)
ESTIMATED AVG GLUCOSE: 88 MG/DL (ref 68–131)
HBA1C MFR BLD: 4.7 % (ref 4–5.6)
HDLC SERPL-MCNC: 43 MG/DL (ref 40–75)
HDLC SERPL: 30.3 % (ref 20–50)
LDLC SERPL CALC-MCNC: 84 MG/DL (ref 63–159)
NONHDLC SERPL-MCNC: 99 MG/DL
TRIGL SERPL-MCNC: 75 MG/DL (ref 30–150)

## 2021-03-19 PROCEDURE — 36415 COLL VENOUS BLD VENIPUNCTURE: CPT | Mod: PO | Performed by: FAMILY MEDICINE

## 2021-03-19 PROCEDURE — 80061 LIPID PANEL: CPT | Performed by: FAMILY MEDICINE

## 2021-03-19 PROCEDURE — 83036 HEMOGLOBIN GLYCOSYLATED A1C: CPT | Performed by: FAMILY MEDICINE

## 2021-03-22 ENCOUNTER — TELEPHONE (OUTPATIENT)
Dept: NEUROSURGERY | Facility: CLINIC | Age: 66
End: 2021-03-22

## 2021-03-23 RX ORDER — OXYCODONE AND ACETAMINOPHEN 10; 325 MG/1; MG/1
1 TABLET ORAL EVERY 8 HOURS PRN
Qty: 90 TABLET | Refills: 0 | Status: SHIPPED | OUTPATIENT
Start: 2021-03-23 | End: 2021-03-23 | Stop reason: SDUPTHER

## 2021-03-23 RX ORDER — OXYCODONE AND ACETAMINOPHEN 10; 325 MG/1; MG/1
1 TABLET ORAL EVERY 8 HOURS PRN
Qty: 45 TABLET | Refills: 0 | Status: SHIPPED | OUTPATIENT
Start: 2021-03-23 | End: 2021-04-08 | Stop reason: SDUPTHER

## 2021-03-25 ENCOUNTER — TELEPHONE (OUTPATIENT)
Dept: NEUROSURGERY | Facility: CLINIC | Age: 66
End: 2021-03-25

## 2021-03-26 ENCOUNTER — HOSPITAL ENCOUNTER (OUTPATIENT)
Dept: RADIOLOGY | Facility: HOSPITAL | Age: 66
Discharge: HOME | End: 2021-03-26
Attending: NEUROLOGICAL SURGERY
Payer: MEDICARE

## 2021-03-26 DIAGNOSIS — M43.27 FUSION OF SPINE, LUMBOSACRAL REGION: ICD-10-CM

## 2021-03-26 PROCEDURE — 72131 CT LUMBAR SPINE W/O DYE: CPT | Mod: TC,PO

## 2021-03-30 ENCOUNTER — TELEPHONE (OUTPATIENT)
Dept: NEUROSURGERY | Facility: CLINIC | Age: 66
End: 2021-03-30

## 2021-03-30 DIAGNOSIS — M54.9 DORSALGIA, UNSPECIFIED: ICD-10-CM

## 2021-03-31 ENCOUNTER — TELEPHONE (OUTPATIENT)
Dept: NEUROSURGERY | Facility: CLINIC | Age: 66
End: 2021-03-31

## 2021-04-06 ENCOUNTER — DOCUMENT SCAN (OUTPATIENT)
Dept: HOME HEALTH SERVICES | Facility: HOSPITAL | Age: 66
End: 2021-04-06
Payer: MEDICARE

## 2021-04-08 ENCOUNTER — DOCUMENT SCAN (OUTPATIENT)
Dept: HOME HEALTH SERVICES | Facility: HOSPITAL | Age: 66
End: 2021-04-08
Payer: MEDICARE

## 2021-04-08 DIAGNOSIS — M43.27 FUSION OF SPINE OF LUMBOSACRAL REGION: Primary | ICD-10-CM

## 2021-04-08 RX ORDER — OXYCODONE AND ACETAMINOPHEN 10; 325 MG/1; MG/1
1 TABLET ORAL EVERY 8 HOURS PRN
Qty: 45 TABLET | Refills: 0 | Status: SHIPPED | OUTPATIENT
Start: 2021-04-08 | End: 2021-04-19 | Stop reason: SDUPTHER

## 2021-04-15 ENCOUNTER — PATIENT OUTREACH (OUTPATIENT)
Dept: ADMINISTRATIVE | Facility: OTHER | Age: 66
End: 2021-04-15

## 2021-04-19 ENCOUNTER — HOSPITAL ENCOUNTER (OUTPATIENT)
Dept: RADIOLOGY | Facility: HOSPITAL | Age: 66
Discharge: HOME | End: 2021-04-19
Attending: NEUROLOGICAL SURGERY
Payer: MEDICARE

## 2021-04-19 ENCOUNTER — OFFICE VISIT (OUTPATIENT)
Dept: NEUROSURGERY | Facility: CLINIC | Age: 66
End: 2021-04-19
Payer: MEDICARE

## 2021-04-19 VITALS — DIASTOLIC BLOOD PRESSURE: 73 MMHG | SYSTOLIC BLOOD PRESSURE: 129 MMHG | HEART RATE: 76 BPM

## 2021-04-19 DIAGNOSIS — M43.27 FUSION OF SPINE, LUMBOSACRAL REGION: ICD-10-CM

## 2021-04-19 DIAGNOSIS — M43.27 FUSION OF SPINE OF LUMBOSACRAL REGION: ICD-10-CM

## 2021-04-19 DIAGNOSIS — M47.12 CERVICAL SPONDYLOSIS WITH MYELOPATHY AND RADICULOPATHY: Primary | ICD-10-CM

## 2021-04-19 DIAGNOSIS — M47.22 CERVICAL SPONDYLOSIS WITH MYELOPATHY AND RADICULOPATHY: Primary | ICD-10-CM

## 2021-04-19 DIAGNOSIS — M43.12 ACQUIRED SPONDYLOLISTHESIS OF CERVICAL VERTEBRA: ICD-10-CM

## 2021-04-19 DIAGNOSIS — M54.9 DORSALGIA, UNSPECIFIED: ICD-10-CM

## 2021-04-19 PROCEDURE — 99999 PR PBB SHADOW E&M-EST. PATIENT-LVL IV: CPT | Mod: PBBFAC,,, | Performed by: NEUROLOGICAL SURGERY

## 2021-04-19 PROCEDURE — 1101F PT FALLS ASSESS-DOCD LE1/YR: CPT | Mod: CPTII,S$GLB,, | Performed by: NEUROLOGICAL SURGERY

## 2021-04-19 PROCEDURE — 1125F PR PAIN SEVERITY QUANTIFIED, PAIN PRESENT: ICD-10-PCS | Mod: S$GLB,,, | Performed by: NEUROLOGICAL SURGERY

## 2021-04-19 PROCEDURE — 99213 PR OFFICE/OUTPT VISIT, EST, LEVL III, 20-29 MIN: ICD-10-PCS | Mod: S$GLB,,, | Performed by: NEUROLOGICAL SURGERY

## 2021-04-19 PROCEDURE — 99213 OFFICE O/P EST LOW 20 MIN: CPT | Mod: S$GLB,,, | Performed by: NEUROLOGICAL SURGERY

## 2021-04-19 PROCEDURE — 1157F PR ADVANCE CARE PLAN OR EQUIV PRESENT IN MEDICAL RECORD: ICD-10-PCS | Mod: S$GLB,,, | Performed by: NEUROLOGICAL SURGERY

## 2021-04-19 PROCEDURE — 3288F PR FALLS RISK ASSESSMENT DOCUMENTED: ICD-10-PCS | Mod: CPTII,S$GLB,, | Performed by: NEUROLOGICAL SURGERY

## 2021-04-19 PROCEDURE — 72100 X-RAY EXAM L-S SPINE 2/3 VWS: CPT | Mod: TC,PN

## 2021-04-19 PROCEDURE — 1101F PR PT FALLS ASSESS DOC 0-1 FALLS W/OUT INJ PAST YR: ICD-10-PCS | Mod: CPTII,S$GLB,, | Performed by: NEUROLOGICAL SURGERY

## 2021-04-19 PROCEDURE — 3288F FALL RISK ASSESSMENT DOCD: CPT | Mod: CPTII,S$GLB,, | Performed by: NEUROLOGICAL SURGERY

## 2021-04-19 PROCEDURE — 1159F PR MEDICATION LIST DOCUMENTED IN MEDICAL RECORD: ICD-10-PCS | Mod: S$GLB,,, | Performed by: NEUROLOGICAL SURGERY

## 2021-04-19 PROCEDURE — 72100 XR LUMBAR SPINE AP AND LATERAL: ICD-10-PCS | Mod: 26,,, | Performed by: RADIOLOGY

## 2021-04-19 PROCEDURE — 99999 PR PBB SHADOW E&M-EST. PATIENT-LVL IV: ICD-10-PCS | Mod: PBBFAC,,, | Performed by: NEUROLOGICAL SURGERY

## 2021-04-19 PROCEDURE — 72100 X-RAY EXAM L-S SPINE 2/3 VWS: CPT | Mod: 26,,, | Performed by: RADIOLOGY

## 2021-04-19 PROCEDURE — 1159F MED LIST DOCD IN RCRD: CPT | Mod: S$GLB,,, | Performed by: NEUROLOGICAL SURGERY

## 2021-04-19 PROCEDURE — 1125F AMNT PAIN NOTED PAIN PRSNT: CPT | Mod: S$GLB,,, | Performed by: NEUROLOGICAL SURGERY

## 2021-04-19 PROCEDURE — 1157F ADVNC CARE PLAN IN RCRD: CPT | Mod: S$GLB,,, | Performed by: NEUROLOGICAL SURGERY

## 2021-04-19 RX ORDER — OXYCODONE AND ACETAMINOPHEN 10; 325 MG/1; MG/1
1 TABLET ORAL EVERY 8 HOURS PRN
Qty: 90 TABLET | Refills: 0 | Status: SHIPPED | OUTPATIENT
Start: 2021-04-19 | End: 2021-05-20 | Stop reason: SDUPTHER

## 2021-04-23 ENCOUNTER — HOSPITAL ENCOUNTER (OUTPATIENT)
Dept: RADIOLOGY | Facility: HOSPITAL | Age: 66
Discharge: HOME | End: 2021-04-23
Attending: NEUROLOGICAL SURGERY
Payer: MEDICARE

## 2021-04-23 PROCEDURE — 72131 CT LUMBAR SPINE WITHOUT CONTRAST: ICD-10-PCS | Mod: 26,,, | Performed by: RADIOLOGY

## 2021-04-23 PROCEDURE — 72131 CT LUMBAR SPINE W/O DYE: CPT | Mod: TC

## 2021-04-23 PROCEDURE — 72131 CT LUMBAR SPINE W/O DYE: CPT | Mod: 26,,, | Performed by: RADIOLOGY

## 2021-04-26 ENCOUNTER — TELEPHONE (OUTPATIENT)
Dept: NEUROSURGERY | Facility: CLINIC | Age: 66
End: 2021-04-26

## 2021-04-26 DIAGNOSIS — Z98.1 ARTHRODESIS STATUS: ICD-10-CM

## 2021-04-26 RX ORDER — GABAPENTIN 600 MG/1
600 TABLET ORAL 3 TIMES DAILY
Qty: 90 TABLET | Refills: 11 | Status: SHIPPED | OUTPATIENT
Start: 2021-04-26 | End: 2022-03-22

## 2021-04-29 ENCOUNTER — DOCUMENT SCAN (OUTPATIENT)
Dept: HOME HEALTH SERVICES | Facility: HOSPITAL | Age: 66
End: 2021-04-29
Payer: MEDICARE

## 2021-04-29 ENCOUNTER — TELEPHONE (OUTPATIENT)
Dept: NEUROSURGERY | Facility: CLINIC | Age: 66
End: 2021-04-29

## 2021-04-29 DIAGNOSIS — R52 PAIN: Primary | ICD-10-CM

## 2021-04-30 ENCOUNTER — HOSPITAL ENCOUNTER (OUTPATIENT)
Dept: RADIOLOGY | Facility: HOSPITAL | Age: 66
Discharge: HOME | End: 2021-04-30
Attending: PHYSICIAN ASSISTANT
Payer: MEDICARE

## 2021-04-30 ENCOUNTER — OFFICE VISIT (OUTPATIENT)
Dept: NEUROSURGERY | Facility: CLINIC | Age: 66
End: 2021-04-30
Payer: MEDICARE

## 2021-04-30 VITALS — HEIGHT: 61 IN | BODY MASS INDEX: 38.75 KG/M2 | WEIGHT: 205.25 LBS

## 2021-04-30 DIAGNOSIS — M43.12 SPONDYLOLISTHESIS OF CERVICAL REGION: ICD-10-CM

## 2021-04-30 DIAGNOSIS — M47.812 CERVICAL SPONDYLOSIS WITHOUT MYELOPATHY: ICD-10-CM

## 2021-04-30 DIAGNOSIS — M50.20 HNP (HERNIATED NUCLEUS PULPOSUS), CERVICAL: ICD-10-CM

## 2021-04-30 DIAGNOSIS — M54.2 NECK PAIN: Primary | ICD-10-CM

## 2021-04-30 DIAGNOSIS — M54.12 CERVICAL RADICULOPATHY: ICD-10-CM

## 2021-04-30 DIAGNOSIS — M50.30 DDD (DEGENERATIVE DISC DISEASE), CERVICAL: ICD-10-CM

## 2021-04-30 DIAGNOSIS — R52 PAIN: ICD-10-CM

## 2021-04-30 PROCEDURE — 1157F PR ADVANCE CARE PLAN OR EQUIV PRESENT IN MEDICAL RECORD: ICD-10-PCS | Mod: S$GLB,,, | Performed by: PHYSICIAN ASSISTANT

## 2021-04-30 PROCEDURE — 3008F PR BODY MASS INDEX (BMI) DOCUMENTED: ICD-10-PCS | Mod: CPTII,S$GLB,, | Performed by: PHYSICIAN ASSISTANT

## 2021-04-30 PROCEDURE — 99999 PR PBB SHADOW E&M-EST. PATIENT-LVL IV: CPT | Mod: PBBFAC,,, | Performed by: PHYSICIAN ASSISTANT

## 2021-04-30 PROCEDURE — 72050 X-RAY EXAM NECK SPINE 4/5VWS: CPT | Mod: 26,,, | Performed by: RADIOLOGY

## 2021-04-30 PROCEDURE — 99214 OFFICE O/P EST MOD 30 MIN: CPT | Mod: S$GLB,,, | Performed by: PHYSICIAN ASSISTANT

## 2021-04-30 PROCEDURE — 99999 PR PBB SHADOW E&M-EST. PATIENT-LVL IV: ICD-10-PCS | Mod: PBBFAC,,, | Performed by: PHYSICIAN ASSISTANT

## 2021-04-30 PROCEDURE — 3288F FALL RISK ASSESSMENT DOCD: CPT | Mod: CPTII,S$GLB,, | Performed by: PHYSICIAN ASSISTANT

## 2021-04-30 PROCEDURE — 99214 PR OFFICE/OUTPT VISIT, EST, LEVL IV, 30-39 MIN: ICD-10-PCS | Mod: S$GLB,,, | Performed by: PHYSICIAN ASSISTANT

## 2021-04-30 PROCEDURE — 3008F BODY MASS INDEX DOCD: CPT | Mod: CPTII,S$GLB,, | Performed by: PHYSICIAN ASSISTANT

## 2021-04-30 PROCEDURE — 72050 XR CERVICAL SPINE AP LAT WITH FLEX EXTEN: ICD-10-PCS | Mod: 26,,, | Performed by: RADIOLOGY

## 2021-04-30 PROCEDURE — 1100F PR PT FALLS ASSESS DOC 2+ FALLS/FALL W/INJURY/YR: ICD-10-PCS | Mod: CPTII,S$GLB,, | Performed by: PHYSICIAN ASSISTANT

## 2021-04-30 PROCEDURE — 72050 X-RAY EXAM NECK SPINE 4/5VWS: CPT | Mod: TC,PN

## 2021-04-30 PROCEDURE — 1159F PR MEDICATION LIST DOCUMENTED IN MEDICAL RECORD: ICD-10-PCS | Mod: S$GLB,,, | Performed by: PHYSICIAN ASSISTANT

## 2021-04-30 PROCEDURE — 1125F PR PAIN SEVERITY QUANTIFIED, PAIN PRESENT: ICD-10-PCS | Mod: S$GLB,,, | Performed by: PHYSICIAN ASSISTANT

## 2021-04-30 PROCEDURE — 1159F MED LIST DOCD IN RCRD: CPT | Mod: S$GLB,,, | Performed by: PHYSICIAN ASSISTANT

## 2021-04-30 PROCEDURE — 1100F PTFALLS ASSESS-DOCD GE2>/YR: CPT | Mod: CPTII,S$GLB,, | Performed by: PHYSICIAN ASSISTANT

## 2021-04-30 PROCEDURE — 1125F AMNT PAIN NOTED PAIN PRSNT: CPT | Mod: S$GLB,,, | Performed by: PHYSICIAN ASSISTANT

## 2021-04-30 PROCEDURE — 3288F PR FALLS RISK ASSESSMENT DOCUMENTED: ICD-10-PCS | Mod: CPTII,S$GLB,, | Performed by: PHYSICIAN ASSISTANT

## 2021-04-30 PROCEDURE — 1157F ADVNC CARE PLAN IN RCRD: CPT | Mod: S$GLB,,, | Performed by: PHYSICIAN ASSISTANT

## 2021-05-02 NOTE — ED NOTES
Patient arrived to the room from CT.   Received call with patient INR at 3.8   Advised to hold coumadin on 5/1  Take 7 mg on 5/2  Resume usual dose 5/3  Will need repeat INR this week

## 2021-05-04 RX ORDER — DIAZEPAM 2 MG/1
2 TABLET ORAL EVERY 12 HOURS PRN
Qty: 25 TABLET | Refills: 0 | Status: SHIPPED | OUTPATIENT
Start: 2021-05-04 | End: 2021-06-11

## 2021-05-17 ENCOUNTER — HOSPITAL ENCOUNTER (OUTPATIENT)
Dept: RADIOLOGY | Facility: HOSPITAL | Age: 66
Discharge: HOME | End: 2021-05-17
Attending: NEUROLOGICAL SURGERY
Payer: MEDICARE

## 2021-05-17 DIAGNOSIS — Z98.1 ARTHRODESIS STATUS: ICD-10-CM

## 2021-05-17 PROCEDURE — 72141 MRI NECK SPINE W/O DYE: CPT | Mod: TC,PO

## 2021-05-18 ENCOUNTER — TELEPHONE (OUTPATIENT)
Dept: NEUROSURGERY | Facility: CLINIC | Age: 66
End: 2021-05-18

## 2021-05-20 ENCOUNTER — TELEPHONE (OUTPATIENT)
Dept: NEUROSURGERY | Facility: CLINIC | Age: 66
End: 2021-05-20

## 2021-05-20 DIAGNOSIS — M43.27 FUSION OF SPINE OF LUMBOSACRAL REGION: ICD-10-CM

## 2021-05-20 RX ORDER — OXYCODONE AND ACETAMINOPHEN 10; 325 MG/1; MG/1
1 TABLET ORAL EVERY 8 HOURS PRN
Qty: 90 TABLET | Refills: 0 | Status: SHIPPED | OUTPATIENT
Start: 2021-05-20 | End: 2021-06-07 | Stop reason: SDUPTHER

## 2021-05-25 ENCOUNTER — OFFICE VISIT (OUTPATIENT)
Dept: FAMILY MEDICINE | Facility: CLINIC | Age: 66
End: 2021-05-25
Payer: MEDICARE

## 2021-05-25 VITALS
BODY MASS INDEX: 37.82 KG/M2 | HEIGHT: 61 IN | TEMPERATURE: 98 F | DIASTOLIC BLOOD PRESSURE: 66 MMHG | WEIGHT: 200.31 LBS | HEART RATE: 80 BPM | SYSTOLIC BLOOD PRESSURE: 100 MMHG | OXYGEN SATURATION: 96 %

## 2021-05-25 DIAGNOSIS — E11.9 TYPE 2 DIABETES MELLITUS WITHOUT COMPLICATION, WITHOUT LONG-TERM CURRENT USE OF INSULIN: Primary | ICD-10-CM

## 2021-05-25 DIAGNOSIS — G45.9 TIA (TRANSIENT ISCHEMIC ATTACK): ICD-10-CM

## 2021-05-25 DIAGNOSIS — E03.9 HYPOTHYROIDISM (ACQUIRED): ICD-10-CM

## 2021-05-25 DIAGNOSIS — Z12.31 ENCOUNTER FOR SCREENING MAMMOGRAM FOR BREAST CANCER: ICD-10-CM

## 2021-05-25 DIAGNOSIS — K21.9 GASTROESOPHAGEAL REFLUX DISEASE WITHOUT ESOPHAGITIS: ICD-10-CM

## 2021-05-25 DIAGNOSIS — D64.9 NORMOCYTIC ANEMIA: ICD-10-CM

## 2021-05-25 PROCEDURE — 1101F PT FALLS ASSESS-DOCD LE1/YR: CPT | Mod: CPTII,S$GLB,, | Performed by: FAMILY MEDICINE

## 2021-05-25 PROCEDURE — 1157F ADVNC CARE PLAN IN RCRD: CPT | Mod: S$GLB,,, | Performed by: FAMILY MEDICINE

## 2021-05-25 PROCEDURE — 1159F PR MEDICATION LIST DOCUMENTED IN MEDICAL RECORD: ICD-10-PCS | Mod: S$GLB,,, | Performed by: FAMILY MEDICINE

## 2021-05-25 PROCEDURE — 3008F BODY MASS INDEX DOCD: CPT | Mod: CPTII,S$GLB,, | Performed by: FAMILY MEDICINE

## 2021-05-25 PROCEDURE — 1159F MED LIST DOCD IN RCRD: CPT | Mod: S$GLB,,, | Performed by: FAMILY MEDICINE

## 2021-05-25 PROCEDURE — 3288F PR FALLS RISK ASSESSMENT DOCUMENTED: ICD-10-PCS | Mod: CPTII,S$GLB,, | Performed by: FAMILY MEDICINE

## 2021-05-25 PROCEDURE — 99499 RISK ADDL DX/OHS AUDIT: ICD-10-PCS | Mod: S$GLB,,, | Performed by: FAMILY MEDICINE

## 2021-05-25 PROCEDURE — 99499 UNLISTED E&M SERVICE: CPT | Mod: S$GLB,,, | Performed by: FAMILY MEDICINE

## 2021-05-25 PROCEDURE — 1157F PR ADVANCE CARE PLAN OR EQUIV PRESENT IN MEDICAL RECORD: ICD-10-PCS | Mod: S$GLB,,, | Performed by: FAMILY MEDICINE

## 2021-05-25 PROCEDURE — 99214 OFFICE O/P EST MOD 30 MIN: CPT | Mod: S$GLB,,, | Performed by: FAMILY MEDICINE

## 2021-05-25 PROCEDURE — 1125F AMNT PAIN NOTED PAIN PRSNT: CPT | Mod: S$GLB,,, | Performed by: FAMILY MEDICINE

## 2021-05-25 PROCEDURE — 3288F FALL RISK ASSESSMENT DOCD: CPT | Mod: CPTII,S$GLB,, | Performed by: FAMILY MEDICINE

## 2021-05-25 PROCEDURE — 1101F PR PT FALLS ASSESS DOC 0-1 FALLS W/OUT INJ PAST YR: ICD-10-PCS | Mod: CPTII,S$GLB,, | Performed by: FAMILY MEDICINE

## 2021-05-25 PROCEDURE — 99214 PR OFFICE/OUTPT VISIT, EST, LEVL IV, 30-39 MIN: ICD-10-PCS | Mod: S$GLB,,, | Performed by: FAMILY MEDICINE

## 2021-05-25 PROCEDURE — 1125F PR PAIN SEVERITY QUANTIFIED, PAIN PRESENT: ICD-10-PCS | Mod: S$GLB,,, | Performed by: FAMILY MEDICINE

## 2021-05-25 PROCEDURE — 3008F PR BODY MASS INDEX (BMI) DOCUMENTED: ICD-10-PCS | Mod: CPTII,S$GLB,, | Performed by: FAMILY MEDICINE

## 2021-05-25 RX ORDER — OMEPRAZOLE 40 MG/1
40 CAPSULE, DELAYED RELEASE ORAL EVERY MORNING
Qty: 90 CAPSULE | Refills: 1 | Status: SHIPPED | OUTPATIENT
Start: 2021-05-25 | End: 2021-09-04 | Stop reason: SDUPTHER

## 2021-05-25 RX ORDER — TRIAMCINOLONE ACETONIDE 1 MG/G
CREAM TOPICAL 2 TIMES DAILY
Qty: 80 G | Refills: 1 | Status: SHIPPED | OUTPATIENT
Start: 2021-05-25 | End: 2021-11-17

## 2021-05-25 RX ORDER — BUPROPION HYDROCHLORIDE 200 MG/1
200 TABLET, EXTENDED RELEASE ORAL 2 TIMES DAILY
Qty: 180 TABLET | Refills: 3 | Status: SHIPPED | OUTPATIENT
Start: 2021-05-25 | End: 2022-04-20

## 2021-06-07 ENCOUNTER — OFFICE VISIT (OUTPATIENT)
Dept: NEUROSURGERY | Facility: CLINIC | Age: 66
End: 2021-06-07
Payer: MEDICARE

## 2021-06-07 ENCOUNTER — TELEPHONE (OUTPATIENT)
Dept: NEUROSURGERY | Facility: CLINIC | Age: 66
End: 2021-06-07

## 2021-06-07 VITALS — DIASTOLIC BLOOD PRESSURE: 86 MMHG | HEART RATE: 74 BPM | SYSTOLIC BLOOD PRESSURE: 130 MMHG

## 2021-06-07 DIAGNOSIS — M48.07 SPINAL STENOSIS, LUMBOSACRAL REGION: ICD-10-CM

## 2021-06-07 DIAGNOSIS — M54.12 RADICULOPATHY, CERVICAL: ICD-10-CM

## 2021-06-07 DIAGNOSIS — Z98.1 S/P CERVICAL SPINAL FUSION: ICD-10-CM

## 2021-06-07 DIAGNOSIS — M43.27 FUSION OF SPINE OF LUMBOSACRAL REGION: ICD-10-CM

## 2021-06-07 DIAGNOSIS — S39.93XA TRAUMA OF PELVIS: ICD-10-CM

## 2021-06-07 DIAGNOSIS — M13.0 POLYARTHRITIS: Primary | ICD-10-CM

## 2021-06-07 DIAGNOSIS — M47.816 SPONDYLOSIS OF LUMBAR SPINE: Primary | ICD-10-CM

## 2021-06-07 DIAGNOSIS — M47.12 CERVICAL SPONDYLOSIS WITH MYELOPATHY: ICD-10-CM

## 2021-06-07 DIAGNOSIS — M54.16 LUMBAR RADICULOPATHY: ICD-10-CM

## 2021-06-07 PROCEDURE — 99999 PR PBB SHADOW E&M-EST. PATIENT-LVL IV: ICD-10-PCS | Mod: PBBFAC,,, | Performed by: NEUROLOGICAL SURGERY

## 2021-06-07 PROCEDURE — 99215 PR OFFICE/OUTPT VISIT, EST, LEVL V, 40-54 MIN: ICD-10-PCS | Mod: S$GLB,,, | Performed by: NEUROLOGICAL SURGERY

## 2021-06-07 PROCEDURE — 1100F PTFALLS ASSESS-DOCD GE2>/YR: CPT | Mod: CPTII,S$GLB,, | Performed by: NEUROLOGICAL SURGERY

## 2021-06-07 PROCEDURE — 3288F FALL RISK ASSESSMENT DOCD: CPT | Mod: CPTII,S$GLB,, | Performed by: NEUROLOGICAL SURGERY

## 2021-06-07 PROCEDURE — 99499 RISK ADDL DX/OHS AUDIT: ICD-10-PCS | Mod: S$GLB,,, | Performed by: NEUROLOGICAL SURGERY

## 2021-06-07 PROCEDURE — 1157F ADVNC CARE PLAN IN RCRD: CPT | Mod: S$GLB,,, | Performed by: NEUROLOGICAL SURGERY

## 2021-06-07 PROCEDURE — 3288F PR FALLS RISK ASSESSMENT DOCUMENTED: ICD-10-PCS | Mod: CPTII,S$GLB,, | Performed by: NEUROLOGICAL SURGERY

## 2021-06-07 PROCEDURE — 99999 PR PBB SHADOW E&M-EST. PATIENT-LVL IV: CPT | Mod: PBBFAC,,, | Performed by: NEUROLOGICAL SURGERY

## 2021-06-07 PROCEDURE — 1125F PR PAIN SEVERITY QUANTIFIED, PAIN PRESENT: ICD-10-PCS | Mod: S$GLB,,, | Performed by: NEUROLOGICAL SURGERY

## 2021-06-07 PROCEDURE — 1159F PR MEDICATION LIST DOCUMENTED IN MEDICAL RECORD: ICD-10-PCS | Mod: S$GLB,,, | Performed by: NEUROLOGICAL SURGERY

## 2021-06-07 PROCEDURE — 99499 UNLISTED E&M SERVICE: CPT | Mod: S$GLB,,, | Performed by: NEUROLOGICAL SURGERY

## 2021-06-07 PROCEDURE — 1125F AMNT PAIN NOTED PAIN PRSNT: CPT | Mod: S$GLB,,, | Performed by: NEUROLOGICAL SURGERY

## 2021-06-07 PROCEDURE — 1100F PR PT FALLS ASSESS DOC 2+ FALLS/FALL W/INJURY/YR: ICD-10-PCS | Mod: CPTII,S$GLB,, | Performed by: NEUROLOGICAL SURGERY

## 2021-06-07 PROCEDURE — 99215 OFFICE O/P EST HI 40 MIN: CPT | Mod: S$GLB,,, | Performed by: NEUROLOGICAL SURGERY

## 2021-06-07 PROCEDURE — 1159F MED LIST DOCD IN RCRD: CPT | Mod: S$GLB,,, | Performed by: NEUROLOGICAL SURGERY

## 2021-06-07 PROCEDURE — 1157F PR ADVANCE CARE PLAN OR EQUIV PRESENT IN MEDICAL RECORD: ICD-10-PCS | Mod: S$GLB,,, | Performed by: NEUROLOGICAL SURGERY

## 2021-06-07 RX ORDER — OXYCODONE AND ACETAMINOPHEN 10; 325 MG/1; MG/1
1 TABLET ORAL EVERY 6 HOURS PRN
Qty: 120 TABLET | Refills: 0 | Status: SHIPPED | OUTPATIENT
Start: 2021-06-07 | End: 2021-07-09

## 2021-06-11 ENCOUNTER — HOSPITAL ENCOUNTER (OUTPATIENT)
Dept: RADIOLOGY | Facility: HOSPITAL | Age: 66
Discharge: HOME | End: 2021-06-11
Attending: FAMILY MEDICINE
Payer: MEDICARE

## 2021-06-11 DIAGNOSIS — Z12.31 ENCOUNTER FOR SCREENING MAMMOGRAM FOR BREAST CANCER: ICD-10-CM

## 2021-06-11 PROCEDURE — 77067 SCR MAMMO BI INCL CAD: CPT | Mod: TC,PO

## 2021-06-15 RX ORDER — LEVOTHYROXINE SODIUM 100 UG/1
TABLET ORAL
Qty: 90 TABLET | Refills: 3 | Status: SHIPPED | OUTPATIENT
Start: 2021-06-15 | End: 2022-03-22 | Stop reason: SDUPTHER

## 2021-06-15 RX ORDER — PROMETHAZINE HYDROCHLORIDE 25 MG/1
TABLET ORAL
Qty: 25 TABLET | Refills: 0 | Status: SHIPPED | OUTPATIENT
Start: 2021-06-15 | End: 2021-07-29

## 2021-06-21 ENCOUNTER — TELEPHONE (OUTPATIENT)
Dept: NEUROSURGERY | Facility: CLINIC | Age: 66
End: 2021-06-21

## 2021-06-23 ENCOUNTER — TELEPHONE (OUTPATIENT)
Dept: NEUROSURGERY | Facility: CLINIC | Age: 66
End: 2021-06-23

## 2021-07-09 DIAGNOSIS — M43.27 FUSION OF SPINE OF LUMBOSACRAL REGION: ICD-10-CM

## 2021-07-09 RX ORDER — OXYCODONE AND ACETAMINOPHEN 10; 325 MG/1; MG/1
1 TABLET ORAL EVERY 8 HOURS PRN
Qty: 90 TABLET | Refills: 0 | Status: SHIPPED | OUTPATIENT
Start: 2021-07-09 | End: 2021-07-21

## 2021-07-09 RX ORDER — OXYCODONE AND ACETAMINOPHEN 10; 325 MG/1; MG/1
1 TABLET ORAL EVERY 6 HOURS PRN
Qty: 120 TABLET | Refills: 0 | Status: CANCELLED | OUTPATIENT
Start: 2021-07-09

## 2021-07-12 ENCOUNTER — OFFICE VISIT (OUTPATIENT)
Dept: RHEUMATOLOGY | Facility: CLINIC | Age: 66
End: 2021-07-12
Payer: MEDICARE

## 2021-07-12 ENCOUNTER — TELEPHONE (OUTPATIENT)
Dept: NEUROSURGERY | Facility: CLINIC | Age: 66
End: 2021-07-12

## 2021-07-12 VITALS
BODY MASS INDEX: 37.19 KG/M2 | DIASTOLIC BLOOD PRESSURE: 78 MMHG | HEART RATE: 73 BPM | WEIGHT: 197 LBS | HEIGHT: 61 IN | SYSTOLIC BLOOD PRESSURE: 126 MMHG

## 2021-07-12 DIAGNOSIS — M25.50 POLYARTHRALGIA: Primary | ICD-10-CM

## 2021-07-12 DIAGNOSIS — M13.0 POLYARTHRITIS: ICD-10-CM

## 2021-07-12 DIAGNOSIS — M48.07 FORAMINAL STENOSIS OF LUMBOSACRAL REGION: ICD-10-CM

## 2021-07-12 PROCEDURE — 1100F PTFALLS ASSESS-DOCD GE2>/YR: CPT | Mod: CPTII,S$GLB,, | Performed by: INTERNAL MEDICINE

## 2021-07-12 PROCEDURE — 99205 PR OFFICE/OUTPT VISIT, NEW, LEVL V, 60-74 MIN: ICD-10-PCS | Mod: S$GLB,,, | Performed by: INTERNAL MEDICINE

## 2021-07-12 PROCEDURE — 99499 UNLISTED E&M SERVICE: CPT | Mod: HCNC,S$GLB,, | Performed by: INTERNAL MEDICINE

## 2021-07-12 PROCEDURE — 1159F PR MEDICATION LIST DOCUMENTED IN MEDICAL RECORD: ICD-10-PCS | Mod: S$GLB,,, | Performed by: INTERNAL MEDICINE

## 2021-07-12 PROCEDURE — 1157F ADVNC CARE PLAN IN RCRD: CPT | Mod: S$GLB,,, | Performed by: INTERNAL MEDICINE

## 2021-07-12 PROCEDURE — 99999 PR PBB SHADOW E&M-EST. PATIENT-LVL III: ICD-10-PCS | Mod: PBBFAC,,, | Performed by: INTERNAL MEDICINE

## 2021-07-12 PROCEDURE — 1125F PR PAIN SEVERITY QUANTIFIED, PAIN PRESENT: ICD-10-PCS | Mod: S$GLB,,, | Performed by: INTERNAL MEDICINE

## 2021-07-12 PROCEDURE — 1159F MED LIST DOCD IN RCRD: CPT | Mod: S$GLB,,, | Performed by: INTERNAL MEDICINE

## 2021-07-12 PROCEDURE — 1157F PR ADVANCE CARE PLAN OR EQUIV PRESENT IN MEDICAL RECORD: ICD-10-PCS | Mod: S$GLB,,, | Performed by: INTERNAL MEDICINE

## 2021-07-12 PROCEDURE — 3008F PR BODY MASS INDEX (BMI) DOCUMENTED: ICD-10-PCS | Mod: CPTII,S$GLB,, | Performed by: INTERNAL MEDICINE

## 2021-07-12 PROCEDURE — 99205 OFFICE O/P NEW HI 60 MIN: CPT | Mod: S$GLB,,, | Performed by: INTERNAL MEDICINE

## 2021-07-12 PROCEDURE — 99999 PR PBB SHADOW E&M-EST. PATIENT-LVL III: CPT | Mod: PBBFAC,,, | Performed by: INTERNAL MEDICINE

## 2021-07-12 PROCEDURE — 1100F PR PT FALLS ASSESS DOC 2+ FALLS/FALL W/INJURY/YR: ICD-10-PCS | Mod: CPTII,S$GLB,, | Performed by: INTERNAL MEDICINE

## 2021-07-12 PROCEDURE — 3008F BODY MASS INDEX DOCD: CPT | Mod: CPTII,S$GLB,, | Performed by: INTERNAL MEDICINE

## 2021-07-12 PROCEDURE — 3288F FALL RISK ASSESSMENT DOCD: CPT | Mod: CPTII,S$GLB,, | Performed by: INTERNAL MEDICINE

## 2021-07-12 PROCEDURE — 99499 RISK ADDL DX/OHS AUDIT: ICD-10-PCS | Mod: HCNC,S$GLB,, | Performed by: INTERNAL MEDICINE

## 2021-07-12 PROCEDURE — 1125F AMNT PAIN NOTED PAIN PRSNT: CPT | Mod: S$GLB,,, | Performed by: INTERNAL MEDICINE

## 2021-07-12 PROCEDURE — 3288F PR FALLS RISK ASSESSMENT DOCUMENTED: ICD-10-PCS | Mod: CPTII,S$GLB,, | Performed by: INTERNAL MEDICINE

## 2021-07-13 ENCOUNTER — TELEPHONE (OUTPATIENT)
Dept: NEUROSURGERY | Facility: CLINIC | Age: 66
End: 2021-07-13

## 2021-07-16 ENCOUNTER — HOSPITAL ENCOUNTER (OUTPATIENT)
Dept: RADIOLOGY | Facility: HOSPITAL | Age: 66
Discharge: HOME | End: 2021-07-16
Attending: INTERNAL MEDICINE
Payer: MEDICARE

## 2021-07-16 DIAGNOSIS — M13.0 POLYARTHRITIS: ICD-10-CM

## 2021-07-16 DIAGNOSIS — M25.50 POLYARTHRALGIA: ICD-10-CM

## 2021-07-16 PROCEDURE — 77077 JOINT SURVEY SINGLE VIEW: CPT | Mod: TC,PO

## 2021-07-16 PROCEDURE — 73030 X-RAY EXAM OF SHOULDER: CPT | Mod: TC,50,FY,PO

## 2021-07-21 ENCOUNTER — OFFICE VISIT (OUTPATIENT)
Dept: NEUROSURGERY | Facility: CLINIC | Age: 66
End: 2021-07-21
Payer: MEDICARE

## 2021-07-21 ENCOUNTER — HOSPITAL ENCOUNTER (OUTPATIENT)
Dept: RADIOLOGY | Facility: HOSPITAL | Age: 66
Discharge: HOME | End: 2021-07-21
Attending: NEUROLOGICAL SURGERY
Payer: MEDICARE

## 2021-07-21 VITALS — WEIGHT: 197.06 LBS | HEIGHT: 61 IN | BODY MASS INDEX: 37.2 KG/M2

## 2021-07-21 DIAGNOSIS — M47.12 CERVICAL SPONDYLOSIS WITH MYELOPATHY: ICD-10-CM

## 2021-07-21 DIAGNOSIS — M54.16 LUMBAR RADICULOPATHY: ICD-10-CM

## 2021-07-21 DIAGNOSIS — M43.17 SPONDYLOLISTHESIS AT L5-S1 LEVEL: ICD-10-CM

## 2021-07-21 DIAGNOSIS — M43.8X9 SAGITTAL PLANE IMBALANCE: ICD-10-CM

## 2021-07-21 DIAGNOSIS — M43.27 FUSION OF SPINE OF LUMBOSACRAL REGION: ICD-10-CM

## 2021-07-21 DIAGNOSIS — Z98.1 STATUS POST LUMBAR AND LUMBOSACRAL FUSION BY ANTERIOR TECHNIQUE: Primary | ICD-10-CM

## 2021-07-21 PROCEDURE — 99999 PR PBB SHADOW E&M-EST. PATIENT-LVL IV: ICD-10-PCS | Mod: PBBFAC,,, | Performed by: NEUROLOGICAL SURGERY

## 2021-07-21 PROCEDURE — 72082 X-RAY EXAM ENTIRE SPI 2/3 VW: CPT | Mod: TC,PN

## 2021-07-21 PROCEDURE — 99214 OFFICE O/P EST MOD 30 MIN: CPT | Mod: S$GLB,,, | Performed by: NEUROLOGICAL SURGERY

## 2021-07-21 PROCEDURE — 72082 XR SCOLIOSIS COMPLETE: ICD-10-PCS | Mod: 26,,, | Performed by: RADIOLOGY

## 2021-07-21 PROCEDURE — 99214 PR OFFICE/OUTPT VISIT, EST, LEVL IV, 30-39 MIN: ICD-10-PCS | Mod: S$GLB,,, | Performed by: NEUROLOGICAL SURGERY

## 2021-07-21 PROCEDURE — 1157F PR ADVANCE CARE PLAN OR EQUIV PRESENT IN MEDICAL RECORD: ICD-10-PCS | Mod: CPTII,S$GLB,, | Performed by: NEUROLOGICAL SURGERY

## 2021-07-21 PROCEDURE — 99214 OFFICE O/P EST MOD 30 MIN: CPT | Mod: PBBFAC,PN | Performed by: NEUROLOGICAL SURGERY

## 2021-07-21 PROCEDURE — 1157F ADVNC CARE PLAN IN RCRD: CPT | Mod: CPTII,S$GLB,, | Performed by: NEUROLOGICAL SURGERY

## 2021-07-21 PROCEDURE — 99999 PR PBB SHADOW E&M-EST. PATIENT-LVL IV: CPT | Mod: PBBFAC,,, | Performed by: NEUROLOGICAL SURGERY

## 2021-07-21 PROCEDURE — 72082 X-RAY EXAM ENTIRE SPI 2/3 VW: CPT | Mod: 26,,, | Performed by: RADIOLOGY

## 2021-07-21 RX ORDER — OXYCODONE AND ACETAMINOPHEN 10; 325 MG/1; MG/1
1 TABLET ORAL EVERY 6 HOURS PRN
Qty: 120 TABLET | Refills: 0 | Status: SHIPPED | OUTPATIENT
Start: 2021-07-21 | End: 2021-08-28 | Stop reason: SDUPTHER

## 2021-07-22 DIAGNOSIS — E11.9 TYPE 2 DIABETES MELLITUS WITHOUT COMPLICATION: ICD-10-CM

## 2021-07-23 ENCOUNTER — HOSPITAL ENCOUNTER (OUTPATIENT)
Dept: RADIOLOGY | Facility: HOSPITAL | Age: 66
Discharge: HOME | End: 2021-07-23
Attending: NEUROLOGICAL SURGERY
Payer: MEDICARE

## 2021-07-23 DIAGNOSIS — S39.93XA TRAUMA OF PELVIS: ICD-10-CM

## 2021-07-23 DIAGNOSIS — M48.07 SPINAL STENOSIS, LUMBOSACRAL REGION: ICD-10-CM

## 2021-07-23 PROCEDURE — A9585 GADOBUTROL INJECTION: HCPCS | Mod: PO | Performed by: NEUROLOGICAL SURGERY

## 2021-07-23 PROCEDURE — 25500020 PHARM REV CODE 255: Mod: PO | Performed by: NEUROLOGICAL SURGERY

## 2021-07-23 PROCEDURE — 72192 CT PELVIS W/O DYE: CPT | Mod: TC,PO

## 2021-07-23 PROCEDURE — 72158 MRI LUMBAR SPINE W/O & W/DYE: CPT | Mod: TC,PO

## 2021-07-23 RX ORDER — GADOBUTROL 604.72 MG/ML
10 INJECTION INTRAVENOUS
Status: COMPLETED | OUTPATIENT
Start: 2021-07-23 | End: 2021-07-23

## 2021-07-23 RX ADMIN — GADOBUTROL 10 ML: 604.72 INJECTION INTRAVENOUS at 02:07

## 2021-07-26 ENCOUNTER — TELEPHONE (OUTPATIENT)
Dept: UROLOGY | Facility: CLINIC | Age: 66
End: 2021-07-26

## 2021-07-26 RX ORDER — FUROSEMIDE 20 MG/1
TABLET ORAL
Qty: 30 TABLET | Refills: 0 | Status: SHIPPED | OUTPATIENT
Start: 2021-07-26 | End: 2021-08-26

## 2021-07-27 ENCOUNTER — TELEPHONE (OUTPATIENT)
Dept: NEUROSURGERY | Facility: CLINIC | Age: 66
End: 2021-07-27

## 2021-08-03 ENCOUNTER — DOCUMENTATION ONLY (OUTPATIENT)
Dept: NEUROSURGERY | Facility: CLINIC | Age: 66
End: 2021-08-03

## 2021-08-04 ENCOUNTER — TELEPHONE (OUTPATIENT)
Dept: PAIN MEDICINE | Facility: CLINIC | Age: 66
End: 2021-08-04

## 2021-08-04 DIAGNOSIS — M54.16 LUMBAR RADICULOPATHY: Primary | ICD-10-CM

## 2021-08-08 DIAGNOSIS — M25.50 POLYARTHRALGIA: Primary | ICD-10-CM

## 2021-08-09 ENCOUNTER — TELEPHONE (OUTPATIENT)
Dept: RHEUMATOLOGY | Facility: CLINIC | Age: 66
End: 2021-08-09

## 2021-08-11 ENCOUNTER — TELEPHONE (OUTPATIENT)
Dept: FAMILY MEDICINE | Facility: CLINIC | Age: 66
End: 2021-08-11

## 2021-08-11 ENCOUNTER — TELEPHONE (OUTPATIENT)
Dept: PAIN MEDICINE | Facility: CLINIC | Age: 66
End: 2021-08-11

## 2021-08-13 ENCOUNTER — OFFICE VISIT (OUTPATIENT)
Dept: ORTHOPEDICS | Facility: CLINIC | Age: 66
End: 2021-08-13
Payer: MEDICARE

## 2021-08-13 ENCOUNTER — HOSPITAL ENCOUNTER (OUTPATIENT)
Dept: RADIOLOGY | Facility: HOSPITAL | Age: 66
Discharge: HOME | End: 2021-08-13
Attending: ORTHOPAEDIC SURGERY
Payer: MEDICARE

## 2021-08-13 ENCOUNTER — TELEPHONE (OUTPATIENT)
Dept: ORTHOPEDICS | Facility: CLINIC | Age: 66
End: 2021-08-13

## 2021-08-13 VITALS — HEIGHT: 61 IN | WEIGHT: 197.06 LBS | BODY MASS INDEX: 37.2 KG/M2

## 2021-08-13 DIAGNOSIS — M17.0 PRIMARY OSTEOARTHRITIS OF BOTH KNEES: ICD-10-CM

## 2021-08-13 DIAGNOSIS — M79.604 RIGHT LEG PAIN: Primary | ICD-10-CM

## 2021-08-13 DIAGNOSIS — M25.569 KNEE PAIN, UNSPECIFIED CHRONICITY, UNSPECIFIED LATERALITY: ICD-10-CM

## 2021-08-13 DIAGNOSIS — M25.569 KNEE PAIN, UNSPECIFIED CHRONICITY, UNSPECIFIED LATERALITY: Primary | ICD-10-CM

## 2021-08-13 PROCEDURE — 73590 X-RAY EXAM OF LOWER LEG: CPT | Mod: TC,PN,RT

## 2021-08-13 PROCEDURE — 3008F BODY MASS INDEX DOCD: CPT | Mod: CPTII,S$GLB,, | Performed by: ORTHOPAEDIC SURGERY

## 2021-08-13 PROCEDURE — 1160F RVW MEDS BY RX/DR IN RCRD: CPT | Mod: CPTII,S$GLB,, | Performed by: ORTHOPAEDIC SURGERY

## 2021-08-13 PROCEDURE — 3288F PR FALLS RISK ASSESSMENT DOCUMENTED: ICD-10-PCS | Mod: CPTII,S$GLB,, | Performed by: ORTHOPAEDIC SURGERY

## 2021-08-13 PROCEDURE — 1160F PR REVIEW ALL MEDS BY PRESCRIBER/CLIN PHARMACIST DOCUMENTED: ICD-10-PCS | Mod: CPTII,S$GLB,, | Performed by: ORTHOPAEDIC SURGERY

## 2021-08-13 PROCEDURE — 1125F AMNT PAIN NOTED PAIN PRSNT: CPT | Mod: CPTII,S$GLB,, | Performed by: ORTHOPAEDIC SURGERY

## 2021-08-13 PROCEDURE — 99213 PR OFFICE/OUTPT VISIT, EST, LEVL III, 20-29 MIN: ICD-10-PCS | Mod: S$GLB,,, | Performed by: ORTHOPAEDIC SURGERY

## 2021-08-13 PROCEDURE — 1100F PR PT FALLS ASSESS DOC 2+ FALLS/FALL W/INJURY/YR: ICD-10-PCS | Mod: CPTII,S$GLB,, | Performed by: ORTHOPAEDIC SURGERY

## 2021-08-13 PROCEDURE — 3288F FALL RISK ASSESSMENT DOCD: CPT | Mod: CPTII,S$GLB,, | Performed by: ORTHOPAEDIC SURGERY

## 2021-08-13 PROCEDURE — 99999 PR PBB SHADOW E&M-EST. PATIENT-LVL III: ICD-10-PCS | Mod: PBBFAC,,, | Performed by: ORTHOPAEDIC SURGERY

## 2021-08-13 PROCEDURE — 3044F HG A1C LEVEL LT 7.0%: CPT | Mod: CPTII,S$GLB,, | Performed by: ORTHOPAEDIC SURGERY

## 2021-08-13 PROCEDURE — 1100F PTFALLS ASSESS-DOCD GE2>/YR: CPT | Mod: CPTII,S$GLB,, | Performed by: ORTHOPAEDIC SURGERY

## 2021-08-13 PROCEDURE — 73590 XR TIBIA FIBULA 2 VIEW RIGHT: ICD-10-PCS | Mod: 26,RT,, | Performed by: RADIOLOGY

## 2021-08-13 PROCEDURE — 1159F PR MEDICATION LIST DOCUMENTED IN MEDICAL RECORD: ICD-10-PCS | Mod: CPTII,S$GLB,, | Performed by: ORTHOPAEDIC SURGERY

## 2021-08-13 PROCEDURE — 1159F MED LIST DOCD IN RCRD: CPT | Mod: CPTII,S$GLB,, | Performed by: ORTHOPAEDIC SURGERY

## 2021-08-13 PROCEDURE — 1157F ADVNC CARE PLAN IN RCRD: CPT | Mod: CPTII,S$GLB,, | Performed by: ORTHOPAEDIC SURGERY

## 2021-08-13 PROCEDURE — 1125F PR PAIN SEVERITY QUANTIFIED, PAIN PRESENT: ICD-10-PCS | Mod: CPTII,S$GLB,, | Performed by: ORTHOPAEDIC SURGERY

## 2021-08-13 PROCEDURE — 73590 X-RAY EXAM OF LOWER LEG: CPT | Mod: 26,RT,, | Performed by: RADIOLOGY

## 2021-08-13 PROCEDURE — 3044F PR MOST RECENT HEMOGLOBIN A1C LEVEL <7.0%: ICD-10-PCS | Mod: CPTII,S$GLB,, | Performed by: ORTHOPAEDIC SURGERY

## 2021-08-13 PROCEDURE — 3008F PR BODY MASS INDEX (BMI) DOCUMENTED: ICD-10-PCS | Mod: CPTII,S$GLB,, | Performed by: ORTHOPAEDIC SURGERY

## 2021-08-13 PROCEDURE — 1157F PR ADVANCE CARE PLAN OR EQUIV PRESENT IN MEDICAL RECORD: ICD-10-PCS | Mod: CPTII,S$GLB,, | Performed by: ORTHOPAEDIC SURGERY

## 2021-08-13 PROCEDURE — 99213 OFFICE O/P EST LOW 20 MIN: CPT | Mod: S$GLB,,, | Performed by: ORTHOPAEDIC SURGERY

## 2021-08-13 PROCEDURE — 99999 PR PBB SHADOW E&M-EST. PATIENT-LVL III: CPT | Mod: PBBFAC,,, | Performed by: ORTHOPAEDIC SURGERY

## 2021-08-15 RX ORDER — POTASSIUM CHLORIDE 750 MG/1
TABLET, EXTENDED RELEASE ORAL
Qty: 90 TABLET | Refills: 0 | Status: SHIPPED | OUTPATIENT
Start: 2021-08-15 | End: 2023-06-14 | Stop reason: SDUPTHER

## 2021-08-16 ENCOUNTER — OFFICE VISIT (OUTPATIENT)
Dept: PAIN MEDICINE | Facility: CLINIC | Age: 66
End: 2021-08-16
Payer: MEDICARE

## 2021-08-16 VITALS
BODY MASS INDEX: 37.24 KG/M2 | DIASTOLIC BLOOD PRESSURE: 68 MMHG | HEART RATE: 72 BPM | WEIGHT: 197.06 LBS | SYSTOLIC BLOOD PRESSURE: 94 MMHG

## 2021-08-16 DIAGNOSIS — M54.16 LUMBAR RADICULOPATHY: Primary | ICD-10-CM

## 2021-08-16 DIAGNOSIS — M43.16 SPONDYLOLISTHESIS AT L4-L5 LEVEL: ICD-10-CM

## 2021-08-16 DIAGNOSIS — G89.4 CHRONIC PAIN SYNDROME: ICD-10-CM

## 2021-08-16 DIAGNOSIS — M25.612 DECREASED RANGE OF MOTION OF LEFT SHOULDER: ICD-10-CM

## 2021-08-16 DIAGNOSIS — M51.36 DDD (DEGENERATIVE DISC DISEASE), LUMBAR: ICD-10-CM

## 2021-08-16 PROCEDURE — 3008F BODY MASS INDEX DOCD: CPT | Mod: CPTII,S$GLB,, | Performed by: NURSE PRACTITIONER

## 2021-08-16 PROCEDURE — 1157F ADVNC CARE PLAN IN RCRD: CPT | Mod: CPTII,S$GLB,, | Performed by: NURSE PRACTITIONER

## 2021-08-16 PROCEDURE — 1159F MED LIST DOCD IN RCRD: CPT | Mod: CPTII,S$GLB,, | Performed by: NURSE PRACTITIONER

## 2021-08-16 PROCEDURE — 3078F PR MOST RECENT DIASTOLIC BLOOD PRESSURE < 80 MM HG: ICD-10-PCS | Mod: CPTII,S$GLB,, | Performed by: NURSE PRACTITIONER

## 2021-08-16 PROCEDURE — 1125F PR PAIN SEVERITY QUANTIFIED, PAIN PRESENT: ICD-10-PCS | Mod: CPTII,S$GLB,, | Performed by: NURSE PRACTITIONER

## 2021-08-16 PROCEDURE — 3008F PR BODY MASS INDEX (BMI) DOCUMENTED: ICD-10-PCS | Mod: CPTII,S$GLB,, | Performed by: NURSE PRACTITIONER

## 2021-08-16 PROCEDURE — 1125F AMNT PAIN NOTED PAIN PRSNT: CPT | Mod: CPTII,S$GLB,, | Performed by: NURSE PRACTITIONER

## 2021-08-16 PROCEDURE — 99999 PR PBB SHADOW E&M-EST. PATIENT-LVL III: ICD-10-PCS | Mod: PBBFAC,,, | Performed by: NURSE PRACTITIONER

## 2021-08-16 PROCEDURE — 99213 OFFICE O/P EST LOW 20 MIN: CPT | Mod: S$GLB,,, | Performed by: NURSE PRACTITIONER

## 2021-08-16 PROCEDURE — 99213 PR OFFICE/OUTPT VISIT, EST, LEVL III, 20-29 MIN: ICD-10-PCS | Mod: S$GLB,,, | Performed by: NURSE PRACTITIONER

## 2021-08-16 PROCEDURE — 3044F PR MOST RECENT HEMOGLOBIN A1C LEVEL <7.0%: ICD-10-PCS | Mod: CPTII,S$GLB,, | Performed by: NURSE PRACTITIONER

## 2021-08-16 PROCEDURE — 3044F HG A1C LEVEL LT 7.0%: CPT | Mod: CPTII,S$GLB,, | Performed by: NURSE PRACTITIONER

## 2021-08-16 PROCEDURE — 99499 UNLISTED E&M SERVICE: CPT | Mod: HCNC,S$GLB,, | Performed by: NURSE PRACTITIONER

## 2021-08-16 PROCEDURE — 99999 PR PBB SHADOW E&M-EST. PATIENT-LVL III: CPT | Mod: PBBFAC,,, | Performed by: NURSE PRACTITIONER

## 2021-08-16 PROCEDURE — 99499 RISK ADDL DX/OHS AUDIT: ICD-10-PCS | Mod: HCNC,S$GLB,, | Performed by: NURSE PRACTITIONER

## 2021-08-16 PROCEDURE — 3074F SYST BP LT 130 MM HG: CPT | Mod: CPTII,S$GLB,, | Performed by: NURSE PRACTITIONER

## 2021-08-16 PROCEDURE — 1157F PR ADVANCE CARE PLAN OR EQUIV PRESENT IN MEDICAL RECORD: ICD-10-PCS | Mod: CPTII,S$GLB,, | Performed by: NURSE PRACTITIONER

## 2021-08-16 PROCEDURE — 1160F PR REVIEW ALL MEDS BY PRESCRIBER/CLIN PHARMACIST DOCUMENTED: ICD-10-PCS | Mod: CPTII,S$GLB,, | Performed by: NURSE PRACTITIONER

## 2021-08-16 PROCEDURE — 1160F RVW MEDS BY RX/DR IN RCRD: CPT | Mod: CPTII,S$GLB,, | Performed by: NURSE PRACTITIONER

## 2021-08-16 PROCEDURE — 3074F PR MOST RECENT SYSTOLIC BLOOD PRESSURE < 130 MM HG: ICD-10-PCS | Mod: CPTII,S$GLB,, | Performed by: NURSE PRACTITIONER

## 2021-08-16 PROCEDURE — 1159F PR MEDICATION LIST DOCUMENTED IN MEDICAL RECORD: ICD-10-PCS | Mod: CPTII,S$GLB,, | Performed by: NURSE PRACTITIONER

## 2021-08-16 PROCEDURE — 3078F DIAST BP <80 MM HG: CPT | Mod: CPTII,S$GLB,, | Performed by: NURSE PRACTITIONER

## 2021-08-17 ENCOUNTER — TELEPHONE (OUTPATIENT)
Dept: PAIN MEDICINE | Facility: CLINIC | Age: 66
End: 2021-08-17

## 2021-08-24 ENCOUNTER — TELEPHONE (OUTPATIENT)
Dept: PAIN MEDICINE | Facility: CLINIC | Age: 66
End: 2021-08-24

## 2021-08-25 ENCOUNTER — HOSPITAL ENCOUNTER (OUTPATIENT)
Facility: HOSPITAL | Age: 66
Discharge: HOME | End: 2021-08-25
Attending: PHYSICAL MEDICINE & REHABILITATION | Admitting: PHYSICAL MEDICINE & REHABILITATION
Payer: MEDICARE

## 2021-08-25 VITALS
SYSTOLIC BLOOD PRESSURE: 104 MMHG | DIASTOLIC BLOOD PRESSURE: 63 MMHG | TEMPERATURE: 97 F | OXYGEN SATURATION: 100 % | HEART RATE: 67 BPM | RESPIRATION RATE: 16 BRPM

## 2021-08-25 DIAGNOSIS — R52 PAIN: ICD-10-CM

## 2021-08-25 DIAGNOSIS — M54.16 LUMBAR RADICULOPATHY: Primary | ICD-10-CM

## 2021-08-25 LAB — POCT GLUCOSE: 81 MG/DL (ref 70–110)

## 2021-08-25 PROCEDURE — 64483 PR EPIDURAL INJ, ANES/STEROID, TRANSFORAMINAL, LUMB/SACR, SNGL LEVL: ICD-10-PCS | Mod: RT,,, | Performed by: PHYSICAL MEDICINE & REHABILITATION

## 2021-08-25 PROCEDURE — 64483 NJX AA&/STRD TFRM EPI L/S 1: CPT | Mod: RT,,, | Performed by: PHYSICAL MEDICINE & REHABILITATION

## 2021-08-25 PROCEDURE — 63600175 PHARM REV CODE 636 W HCPCS: Performed by: PHYSICAL MEDICINE & REHABILITATION

## 2021-08-25 PROCEDURE — 64483 NJX AA&/STRD TFRM EPI L/S 1: CPT | Performed by: PHYSICAL MEDICINE & REHABILITATION

## 2021-08-25 PROCEDURE — 99152 MOD SED SAME PHYS/QHP 5/>YRS: CPT | Performed by: PHYSICAL MEDICINE & REHABILITATION

## 2021-08-25 PROCEDURE — 25000003 PHARM REV CODE 250: Performed by: PHYSICAL MEDICINE & REHABILITATION

## 2021-08-25 PROCEDURE — 25500020 PHARM REV CODE 255: Performed by: PHYSICAL MEDICINE & REHABILITATION

## 2021-08-25 RX ORDER — LIDOCAINE HYDROCHLORIDE 10 MG/ML
INJECTION, SOLUTION EPIDURAL; INFILTRATION; INTRACAUDAL; PERINEURAL
Status: DISCONTINUED | OUTPATIENT
Start: 2021-08-25 | End: 2021-08-25 | Stop reason: HOSPADM

## 2021-08-25 RX ORDER — LIDOCAINE HYDROCHLORIDE 10 MG/ML
INJECTION INFILTRATION; PERINEURAL
Status: DISCONTINUED | OUTPATIENT
Start: 2021-08-25 | End: 2021-08-25 | Stop reason: HOSPADM

## 2021-08-25 RX ORDER — DEXAMETHASONE SODIUM PHOSPHATE 10 MG/ML
INJECTION INTRAMUSCULAR; INTRAVENOUS
Status: DISCONTINUED | OUTPATIENT
Start: 2021-08-25 | End: 2021-08-25 | Stop reason: HOSPADM

## 2021-08-25 RX ORDER — MIDAZOLAM HYDROCHLORIDE 1 MG/ML
INJECTION, SOLUTION INTRAMUSCULAR; INTRAVENOUS
Status: DISCONTINUED | OUTPATIENT
Start: 2021-08-25 | End: 2021-08-25 | Stop reason: HOSPADM

## 2021-08-25 RX ORDER — FENTANYL CITRATE 50 UG/ML
INJECTION, SOLUTION INTRAMUSCULAR; INTRAVENOUS
Status: DISCONTINUED | OUTPATIENT
Start: 2021-08-25 | End: 2021-08-25 | Stop reason: HOSPADM

## 2021-08-25 RX ORDER — SODIUM CHLORIDE 9 MG/ML
INJECTION, SOLUTION INTRAVENOUS CONTINUOUS
Status: DISCONTINUED | OUTPATIENT
Start: 2021-08-25 | End: 2021-08-25 | Stop reason: HOSPADM

## 2021-08-27 ENCOUNTER — TELEPHONE (OUTPATIENT)
Dept: NEUROSURGERY | Facility: CLINIC | Age: 66
End: 2021-08-27

## 2021-08-28 ENCOUNTER — E-VISIT (OUTPATIENT)
Dept: NEUROSURGERY | Facility: CLINIC | Age: 66
End: 2021-08-28
Payer: MEDICARE

## 2021-08-28 DIAGNOSIS — M43.27 FUSION OF SPINE OF LUMBOSACRAL REGION: ICD-10-CM

## 2021-08-28 PROCEDURE — 99443 PR PHYSICIAN TELEPHONE EVALUATION 21-30 MIN: ICD-10-PCS | Mod: 95,,, | Performed by: NEUROLOGICAL SURGERY

## 2021-08-28 PROCEDURE — 99443 PR PHYSICIAN TELEPHONE EVALUATION 21-30 MIN: CPT | Mod: 95,,, | Performed by: NEUROLOGICAL SURGERY

## 2021-08-28 RX ORDER — NALOXONE HYDROCHLORIDE 4 MG/.1ML
1 SPRAY NASAL ONCE AS NEEDED
Qty: 1 EACH | Refills: 11 | Status: SHIPPED | OUTPATIENT
Start: 2021-08-28 | End: 2021-08-28

## 2021-08-28 RX ORDER — OXYCODONE AND ACETAMINOPHEN 10; 325 MG/1; MG/1
1 TABLET ORAL EVERY 6 HOURS PRN
Qty: 120 TABLET | Refills: 0 | Status: SHIPPED | OUTPATIENT
Start: 2021-08-28 | End: 2021-09-03

## 2021-09-01 ENCOUNTER — NURSE TRIAGE (OUTPATIENT)
Dept: ADMINISTRATIVE | Facility: CLINIC | Age: 66
End: 2021-09-01

## 2021-09-02 ENCOUNTER — NURSE TRIAGE (OUTPATIENT)
Dept: ADMINISTRATIVE | Facility: CLINIC | Age: 66
End: 2021-09-02

## 2021-09-03 ENCOUNTER — NURSE TRIAGE (OUTPATIENT)
Dept: ADMINISTRATIVE | Facility: CLINIC | Age: 66
End: 2021-09-03

## 2021-09-03 DIAGNOSIS — M43.27 FUSION OF SPINE OF LUMBOSACRAL REGION: ICD-10-CM

## 2021-09-03 DIAGNOSIS — K21.9 GASTROESOPHAGEAL REFLUX DISEASE WITHOUT ESOPHAGITIS: ICD-10-CM

## 2021-09-03 RX ORDER — OXYCODONE AND ACETAMINOPHEN 10; 325 MG/1; MG/1
1 TABLET ORAL EVERY 6 HOURS PRN
Qty: 120 TABLET | Refills: 0 | Status: SHIPPED | OUTPATIENT
Start: 2021-09-03 | End: 2021-09-14 | Stop reason: SDUPTHER

## 2021-09-04 RX ORDER — OMEPRAZOLE 40 MG/1
40 CAPSULE, DELAYED RELEASE ORAL EVERY MORNING
Qty: 90 CAPSULE | Refills: 1 | Status: SHIPPED | OUTPATIENT
Start: 2021-09-04 | End: 2022-01-03 | Stop reason: SDUPTHER

## 2021-09-07 ENCOUNTER — TELEPHONE (OUTPATIENT)
Dept: NEUROSURGERY | Facility: CLINIC | Age: 66
End: 2021-09-07

## 2021-09-13 DIAGNOSIS — M43.27 FUSION OF SPINE OF LUMBOSACRAL REGION: ICD-10-CM

## 2021-09-13 RX ORDER — OXYCODONE AND ACETAMINOPHEN 10; 325 MG/1; MG/1
1 TABLET ORAL EVERY 6 HOURS PRN
Qty: 120 TABLET | Refills: 0 | Status: CANCELLED | OUTPATIENT
Start: 2021-09-13

## 2021-09-14 ENCOUNTER — TELEPHONE (OUTPATIENT)
Dept: NEUROSURGERY | Facility: CLINIC | Age: 66
End: 2021-09-14

## 2021-09-14 DIAGNOSIS — M43.27 FUSION OF SPINE OF LUMBOSACRAL REGION: ICD-10-CM

## 2021-09-14 RX ORDER — OXYCODONE AND ACETAMINOPHEN 10; 325 MG/1; MG/1
1 TABLET ORAL EVERY 6 HOURS PRN
Qty: 120 TABLET | Refills: 0 | Status: SHIPPED | OUTPATIENT
Start: 2021-09-14 | End: 2021-10-14 | Stop reason: SDUPTHER

## 2021-09-15 RX ORDER — OXYCODONE AND ACETAMINOPHEN 10; 325 MG/1; MG/1
1 TABLET ORAL EVERY 6 HOURS PRN
Qty: 120 TABLET | Refills: 0 | Status: ON HOLD | OUTPATIENT
Start: 2021-09-15 | End: 2021-12-21 | Stop reason: SDUPTHER

## 2021-10-12 ENCOUNTER — TELEPHONE (OUTPATIENT)
Dept: NEUROSURGERY | Facility: CLINIC | Age: 66
End: 2021-10-12

## 2021-10-14 DIAGNOSIS — M43.27 FUSION OF SPINE OF LUMBOSACRAL REGION: ICD-10-CM

## 2021-10-14 RX ORDER — OXYCODONE AND ACETAMINOPHEN 10; 325 MG/1; MG/1
1 TABLET ORAL EVERY 6 HOURS PRN
Qty: 120 TABLET | Refills: 0 | Status: SHIPPED | OUTPATIENT
Start: 2021-10-14 | End: 2021-11-10 | Stop reason: SDUPTHER

## 2021-10-22 ENCOUNTER — EXTERNAL HOME HEALTH (OUTPATIENT)
Dept: HOME HEALTH SERVICES | Facility: HOSPITAL | Age: 66
End: 2021-10-22
Payer: MEDICARE

## 2021-11-01 RX ORDER — ZOLPIDEM TARTRATE 5 MG/1
TABLET ORAL
Qty: 30 TABLET | Refills: 0 | Status: SHIPPED | OUTPATIENT
Start: 2021-11-01 | End: 2021-12-05

## 2021-11-01 RX ORDER — DIAZEPAM 2 MG/1
TABLET ORAL
Qty: 30 TABLET | Refills: 0 | Status: SHIPPED | OUTPATIENT
Start: 2021-11-01 | End: 2021-12-07 | Stop reason: SDUPTHER

## 2021-11-04 RX ORDER — PROMETHAZINE HYDROCHLORIDE 25 MG/1
TABLET ORAL
Qty: 25 TABLET | Refills: 0 | Status: SHIPPED | OUTPATIENT
Start: 2021-11-04 | End: 2022-01-03 | Stop reason: SDUPTHER

## 2021-11-10 DIAGNOSIS — M43.27 FUSION OF SPINE OF LUMBOSACRAL REGION: ICD-10-CM

## 2021-11-10 RX ORDER — OXYCODONE AND ACETAMINOPHEN 10; 325 MG/1; MG/1
1 TABLET ORAL EVERY 6 HOURS PRN
Qty: 120 TABLET | Refills: 0 | Status: SHIPPED | OUTPATIENT
Start: 2021-11-10 | End: 2021-12-03 | Stop reason: SDUPTHER

## 2021-11-15 ENCOUNTER — LAB VISIT (OUTPATIENT)
Dept: LAB | Facility: HOSPITAL | Age: 66
End: 2021-11-15
Attending: FAMILY MEDICINE
Payer: MEDICARE

## 2021-11-15 DIAGNOSIS — G45.9 TIA (TRANSIENT ISCHEMIC ATTACK): ICD-10-CM

## 2021-11-15 DIAGNOSIS — E11.9 TYPE 2 DIABETES MELLITUS WITHOUT COMPLICATION, WITHOUT LONG-TERM CURRENT USE OF INSULIN: ICD-10-CM

## 2021-11-15 DIAGNOSIS — E03.9 HYPOTHYROIDISM (ACQUIRED): ICD-10-CM

## 2021-11-15 DIAGNOSIS — D64.9 NORMOCYTIC ANEMIA: ICD-10-CM

## 2021-11-15 LAB
ALBUMIN SERPL BCP-MCNC: 4 G/DL (ref 3.5–5.2)
ALP SERPL-CCNC: 68 U/L (ref 38–126)
ALT SERPL W/O P-5'-P-CCNC: 14 U/L (ref 10–44)
ANION GAP SERPL CALC-SCNC: 9 MMOL/L (ref 8–16)
AST SERPL-CCNC: 26 U/L (ref 15–46)
BASOPHILS # BLD AUTO: 0.04 K/UL (ref 0–0.2)
BASOPHILS NFR BLD: 0.7 % (ref 0–1.9)
BILIRUB SERPL-MCNC: 0.3 MG/DL (ref 0.1–1)
CALCIUM SERPL-MCNC: 8.5 MG/DL (ref 8.7–10.5)
CHLORIDE SERPL-SCNC: 107 MMOL/L (ref 95–110)
CHOLEST SERPL-MCNC: 179 MG/DL (ref 120–199)
CHOLEST/HDLC SERPL: 3.8 {RATIO} (ref 2–5)
CO2 SERPL-SCNC: 26 MMOL/L (ref 23–29)
CREAT SERPL-MCNC: 1.04 MG/DL (ref 0.5–1.4)
DIFFERENTIAL METHOD: ABNORMAL
EOSINOPHIL # BLD AUTO: 0.4 K/UL (ref 0–0.5)
EOSINOPHIL NFR BLD: 7.2 % (ref 0–8)
ERYTHROCYTE [DISTWIDTH] IN BLOOD BY AUTOMATED COUNT: 13.7 % (ref 11.5–14.5)
EST. GFR  (AFRICAN AMERICAN): >60 ML/MIN/1.73 M^2
EST. GFR  (NON AFRICAN AMERICAN): 55.7 ML/MIN/1.73 M^2
GLUCOSE SERPL-MCNC: 77 MG/DL (ref 70–110)
HCT VFR BLD AUTO: 31.9 % (ref 37–48.5)
HDLC SERPL-MCNC: 47 MG/DL (ref 40–75)
HDLC SERPL: 26.3 % (ref 20–50)
HGB BLD-MCNC: 10.2 G/DL (ref 12–16)
IMM GRANULOCYTES # BLD AUTO: 0.01 K/UL (ref 0–0.04)
IMM GRANULOCYTES NFR BLD AUTO: 0.2 % (ref 0–0.5)
LDLC SERPL CALC-MCNC: 115.4 MG/DL (ref 63–159)
LYMPHOCYTES # BLD AUTO: 1.6 K/UL (ref 1–4.8)
LYMPHOCYTES NFR BLD: 29.2 % (ref 18–48)
MCH RBC QN AUTO: 31.4 PG (ref 27–31)
MCHC RBC AUTO-ENTMCNC: 32 G/DL (ref 32–36)
MCV RBC AUTO: 98 FL (ref 82–98)
MONOCYTES # BLD AUTO: 0.3 K/UL (ref 0.3–1)
MONOCYTES NFR BLD: 5.9 % (ref 4–15)
NEUTROPHILS # BLD AUTO: 3.1 K/UL (ref 1.8–7.7)
NEUTROPHILS NFR BLD: 56.8 % (ref 38–73)
NONHDLC SERPL-MCNC: 132 MG/DL
NRBC BLD-RTO: 0 /100 WBC
PLATELET # BLD AUTO: 203 K/UL (ref 150–450)
PMV BLD AUTO: 9.9 FL (ref 9.2–12.9)
POTASSIUM SERPL-SCNC: 4.2 MMOL/L (ref 3.5–5.1)
PROT SERPL-MCNC: 7.1 G/DL (ref 6–8.4)
RBC # BLD AUTO: 3.25 M/UL (ref 4–5.4)
SODIUM SERPL-SCNC: 142 MMOL/L (ref 136–145)
T4 FREE SERPL-MCNC: 0.84 NG/DL (ref 0.71–1.51)
TRIGL SERPL-MCNC: 83 MG/DL (ref 30–150)
TSH SERPL DL<=0.005 MIU/L-ACNC: 4.17 UIU/ML (ref 0.4–4)
UUN UR-MCNC: 19 MG/DL (ref 7–17)
WBC # BLD AUTO: 5.42 K/UL (ref 3.9–12.7)

## 2021-11-15 PROCEDURE — 36415 COLL VENOUS BLD VENIPUNCTURE: CPT | Mod: HCNC,PO | Performed by: FAMILY MEDICINE

## 2021-11-15 PROCEDURE — 85025 COMPLETE CBC W/AUTO DIFF WBC: CPT | Mod: HCNC,PO | Performed by: FAMILY MEDICINE

## 2021-11-15 PROCEDURE — 80061 LIPID PANEL: CPT | Mod: HCNC | Performed by: FAMILY MEDICINE

## 2021-11-15 PROCEDURE — 84443 ASSAY THYROID STIM HORMONE: CPT | Mod: HCNC,PO | Performed by: FAMILY MEDICINE

## 2021-11-15 PROCEDURE — 80053 COMPREHEN METABOLIC PANEL: CPT | Mod: HCNC,PO | Performed by: FAMILY MEDICINE

## 2021-11-15 PROCEDURE — 84439 ASSAY OF FREE THYROXINE: CPT | Mod: HCNC | Performed by: FAMILY MEDICINE

## 2021-11-17 ENCOUNTER — TELEPHONE (OUTPATIENT)
Dept: FAMILY MEDICINE | Facility: CLINIC | Age: 66
End: 2021-11-17
Payer: MEDICARE

## 2021-11-17 ENCOUNTER — OFFICE VISIT (OUTPATIENT)
Dept: FAMILY MEDICINE | Facility: CLINIC | Age: 66
End: 2021-11-17
Payer: MEDICARE

## 2021-11-17 VITALS
TEMPERATURE: 99 F | SYSTOLIC BLOOD PRESSURE: 98 MMHG | DIASTOLIC BLOOD PRESSURE: 60 MMHG | HEIGHT: 61 IN | HEART RATE: 74 BPM | BODY MASS INDEX: 39.06 KG/M2 | OXYGEN SATURATION: 95 % | WEIGHT: 206.88 LBS

## 2021-11-17 DIAGNOSIS — D64.9 NORMOCYTIC ANEMIA: ICD-10-CM

## 2021-11-17 DIAGNOSIS — Z01.818 PREOPERATIVE EXAMINATION: Primary | ICD-10-CM

## 2021-11-17 DIAGNOSIS — E03.9 HYPOTHYROIDISM (ACQUIRED): ICD-10-CM

## 2021-11-17 DIAGNOSIS — E03.9 ACQUIRED HYPOTHYROIDISM: ICD-10-CM

## 2021-11-17 DIAGNOSIS — G45.9 TIA (TRANSIENT ISCHEMIC ATTACK): ICD-10-CM

## 2021-11-17 DIAGNOSIS — Z00.00 ROUTINE HEALTH MAINTENANCE: ICD-10-CM

## 2021-11-17 DIAGNOSIS — M17.12 PRIMARY OSTEOARTHRITIS OF LEFT KNEE: ICD-10-CM

## 2021-11-17 DIAGNOSIS — I25.2 HISTORY OF MYOCARDIAL INFARCTION: ICD-10-CM

## 2021-11-17 DIAGNOSIS — I25.10 CORONARY ARTERY DISEASE INVOLVING NATIVE CORONARY ARTERY OF NATIVE HEART WITHOUT ANGINA PECTORIS: ICD-10-CM

## 2021-11-17 DIAGNOSIS — Z86.79 HISTORY OF CONGESTIVE HEART FAILURE: ICD-10-CM

## 2021-11-17 DIAGNOSIS — R73.9 HYPERGLYCEMIA: ICD-10-CM

## 2021-11-17 PROCEDURE — 1157F ADVNC CARE PLAN IN RCRD: CPT | Mod: CPTII,S$GLB,, | Performed by: FAMILY MEDICINE

## 2021-11-17 PROCEDURE — 99214 OFFICE O/P EST MOD 30 MIN: CPT | Mod: S$GLB,,, | Performed by: FAMILY MEDICINE

## 2021-11-17 PROCEDURE — 99499 RISK ADDL DX/OHS AUDIT: ICD-10-PCS | Mod: S$GLB,,, | Performed by: FAMILY MEDICINE

## 2021-11-17 PROCEDURE — 99214 PR OFFICE/OUTPT VISIT, EST, LEVL IV, 30-39 MIN: ICD-10-PCS | Mod: S$GLB,,, | Performed by: FAMILY MEDICINE

## 2021-11-17 PROCEDURE — 99499 UNLISTED E&M SERVICE: CPT | Mod: S$GLB,,, | Performed by: FAMILY MEDICINE

## 2021-11-17 PROCEDURE — 1157F PR ADVANCE CARE PLAN OR EQUIV PRESENT IN MEDICAL RECORD: ICD-10-PCS | Mod: CPTII,S$GLB,, | Performed by: FAMILY MEDICINE

## 2021-11-17 RX ORDER — FUROSEMIDE 20 MG/1
20 TABLET ORAL DAILY PRN
Qty: 30 TABLET | Refills: 3 | Status: SHIPPED | OUTPATIENT
Start: 2021-11-17 | End: 2022-07-29 | Stop reason: SDUPTHER

## 2021-11-17 RX ORDER — BLOOD-GLUCOSE METER
EACH MISCELLANEOUS
Qty: 1 EACH | Refills: 0 | Status: SHIPPED | OUTPATIENT
Start: 2021-11-17 | End: 2022-01-04

## 2021-11-17 RX ORDER — IBANDRONATE SODIUM 150 MG/1
TABLET, FILM COATED ORAL
Qty: 3 TABLET | Refills: 3 | Status: SHIPPED | OUTPATIENT
Start: 2021-11-17 | End: 2022-01-03 | Stop reason: SDUPTHER

## 2021-11-23 ENCOUNTER — ANESTHESIA EVENT (OUTPATIENT)
Dept: SURGERY | Facility: HOSPITAL | Age: 66
DRG: 471 | End: 2021-11-23
Payer: MEDICARE

## 2021-11-23 ENCOUNTER — OUTPATIENT CASE MANAGEMENT (OUTPATIENT)
Dept: ADMINISTRATIVE | Facility: OTHER | Age: 66
End: 2021-11-23
Payer: MEDICARE

## 2021-11-23 ENCOUNTER — CLINICAL SUPPORT (OUTPATIENT)
Dept: LAB | Facility: HOSPITAL | Age: 66
End: 2021-11-23
Attending: NEUROLOGICAL SURGERY
Payer: MEDICARE

## 2021-11-23 ENCOUNTER — HOSPITAL ENCOUNTER (OUTPATIENT)
Dept: RADIOLOGY | Facility: HOSPITAL | Age: 66
Discharge: HOME | End: 2021-11-23
Attending: FAMILY MEDICINE
Payer: MEDICARE

## 2021-11-23 ENCOUNTER — HOSPITAL ENCOUNTER (OUTPATIENT)
Dept: PREADMISSION TESTING | Facility: HOSPITAL | Age: 66
Discharge: HOME | End: 2021-11-23
Attending: NEUROLOGICAL SURGERY
Payer: MEDICARE

## 2021-11-23 ENCOUNTER — TELEPHONE (OUTPATIENT)
Dept: FAMILY MEDICINE | Facility: CLINIC | Age: 66
End: 2021-11-23
Payer: MEDICARE

## 2021-11-23 VITALS — OXYGEN SATURATION: 98 % | DIASTOLIC BLOOD PRESSURE: 62 MMHG | SYSTOLIC BLOOD PRESSURE: 97 MMHG | HEART RATE: 75 BPM

## 2021-11-23 DIAGNOSIS — M17.12 PRIMARY OSTEOARTHRITIS OF LEFT KNEE: ICD-10-CM

## 2021-11-23 DIAGNOSIS — G45.9 TIA (TRANSIENT ISCHEMIC ATTACK): ICD-10-CM

## 2021-11-23 DIAGNOSIS — I25.2 HISTORY OF MYOCARDIAL INFARCTION: ICD-10-CM

## 2021-11-23 DIAGNOSIS — E03.9 HYPOTHYROIDISM (ACQUIRED): ICD-10-CM

## 2021-11-23 DIAGNOSIS — R73.9 HYPERGLYCEMIA: ICD-10-CM

## 2021-11-23 DIAGNOSIS — I25.10 CORONARY ARTERY DISEASE INVOLVING NATIVE CORONARY ARTERY OF NATIVE HEART WITHOUT ANGINA PECTORIS: ICD-10-CM

## 2021-11-23 DIAGNOSIS — Z01.818 PREOPERATIVE EXAMINATION: ICD-10-CM

## 2021-11-23 DIAGNOSIS — D64.9 NORMOCYTIC ANEMIA: ICD-10-CM

## 2021-11-23 DIAGNOSIS — E03.9 ACQUIRED HYPOTHYROIDISM: ICD-10-CM

## 2021-11-23 DIAGNOSIS — Z86.79 HISTORY OF CONGESTIVE HEART FAILURE: ICD-10-CM

## 2021-11-23 PROCEDURE — 71046 X-RAY EXAM CHEST 2 VIEWS: CPT | Mod: TC,HCNC,FY

## 2021-11-23 PROCEDURE — 93010 EKG 12-LEAD: ICD-10-PCS | Mod: HCNC,,, | Performed by: INTERNAL MEDICINE

## 2021-11-23 PROCEDURE — 71046 X-RAY EXAM CHEST 2 VIEWS: CPT | Mod: 26,HCNC,, | Performed by: RADIOLOGY

## 2021-11-23 PROCEDURE — 93010 ELECTROCARDIOGRAM REPORT: CPT | Mod: HCNC,,, | Performed by: INTERNAL MEDICINE

## 2021-11-23 PROCEDURE — 93005 ELECTROCARDIOGRAM TRACING: CPT | Mod: HCNC

## 2021-11-23 PROCEDURE — 71046 XR CHEST PA AND LATERAL: ICD-10-PCS | Mod: 26,HCNC,, | Performed by: RADIOLOGY

## 2021-11-23 RX ORDER — LIDOCAINE HYDROCHLORIDE 10 MG/ML
1 INJECTION, SOLUTION EPIDURAL; INFILTRATION; INTRACAUDAL; PERINEURAL ONCE
Status: CANCELLED | OUTPATIENT
Start: 2021-11-23 | End: 2021-11-23

## 2021-11-23 RX ORDER — SODIUM CHLORIDE, SODIUM LACTATE, POTASSIUM CHLORIDE, CALCIUM CHLORIDE 600; 310; 30; 20 MG/100ML; MG/100ML; MG/100ML; MG/100ML
INJECTION, SOLUTION INTRAVENOUS CONTINUOUS
Status: CANCELLED | OUTPATIENT
Start: 2021-11-23

## 2021-11-27 ENCOUNTER — TELEPHONE (OUTPATIENT)
Dept: FAMILY MEDICINE | Facility: CLINIC | Age: 66
End: 2021-11-27
Payer: MEDICARE

## 2021-11-27 DIAGNOSIS — D64.9 NORMOCYTIC ANEMIA: Primary | ICD-10-CM

## 2021-11-27 DIAGNOSIS — I25.10 CORONARY ARTERY DISEASE INVOLVING NATIVE CORONARY ARTERY OF NATIVE HEART WITHOUT ANGINA PECTORIS: ICD-10-CM

## 2021-11-27 DIAGNOSIS — Z86.79 HISTORY OF CONGESTIVE HEART FAILURE: ICD-10-CM

## 2021-11-29 ENCOUNTER — TELEPHONE (OUTPATIENT)
Dept: FAMILY MEDICINE | Facility: CLINIC | Age: 66
End: 2021-11-29

## 2021-11-29 ENCOUNTER — TELEPHONE (OUTPATIENT)
Dept: NEUROSURGERY | Facility: CLINIC | Age: 66
End: 2021-11-29
Payer: MEDICARE

## 2021-12-02 ENCOUNTER — OUTPATIENT CASE MANAGEMENT (OUTPATIENT)
Dept: ADMINISTRATIVE | Facility: OTHER | Age: 66
End: 2021-12-02
Payer: MEDICARE

## 2021-12-03 ENCOUNTER — TELEPHONE (OUTPATIENT)
Dept: FAMILY MEDICINE | Facility: CLINIC | Age: 66
End: 2021-12-03
Payer: MEDICARE

## 2021-12-03 DIAGNOSIS — M43.27 FUSION OF SPINE OF LUMBOSACRAL REGION: ICD-10-CM

## 2021-12-03 NOTE — TELEPHONE ENCOUNTER
----- Message from Rachelle Ventura RN sent at 12/3/2021  4:33 PM CST -----  Regarding: Outpatient Care Management  I work with [facility]. I have enrolled Ms. Castro into our OPCM program. A medication reconciliation was completed. Patient reported she is not taking the following meds    Ergocalciferol 50,000 unit cap q 7 days - taking other OTC  Glipizide (Glucotrol) 2.5 mg TR 24 1 tab before breakfast - pt stated she was advised to stop med  Ibandronate (Boniva) 150 mg tab every 30 days- pt stated she would be picking up from pharmacy  Tizanidine (Zanaflex) 2 mg q 6-8 hrs prn- could not confirm taking     If you feel this is appropriate please contact the patient to further discuss      Respectfully,    Rachelle Ventura RN  [facility]  [email or web address]  Tel:[phone]

## 2021-12-05 RX ORDER — OXYCODONE AND ACETAMINOPHEN 10; 325 MG/1; MG/1
1 TABLET ORAL EVERY 6 HOURS PRN
Qty: 120 TABLET | Refills: 0 | Status: ON HOLD | OUTPATIENT
Start: 2021-12-05 | End: 2021-12-21 | Stop reason: HOSPADM

## 2021-12-07 ENCOUNTER — TELEPHONE (OUTPATIENT)
Dept: NEUROSURGERY | Facility: CLINIC | Age: 66
End: 2021-12-07
Payer: MEDICARE

## 2021-12-07 ENCOUNTER — LAB VISIT (OUTPATIENT)
Dept: LAB | Facility: HOSPITAL | Age: 66
End: 2021-12-07
Attending: FAMILY MEDICINE
Payer: MEDICARE

## 2021-12-07 DIAGNOSIS — D64.9 NORMOCYTIC ANEMIA: ICD-10-CM

## 2021-12-07 DIAGNOSIS — I25.10 CORONARY ARTERY DISEASE INVOLVING NATIVE CORONARY ARTERY OF NATIVE HEART WITHOUT ANGINA PECTORIS: ICD-10-CM

## 2021-12-07 DIAGNOSIS — Z86.79 HISTORY OF CONGESTIVE HEART FAILURE: ICD-10-CM

## 2021-12-07 LAB
ALBUMIN SERPL BCP-MCNC: 4 G/DL (ref 3.5–5.2)
ALP SERPL-CCNC: 73 U/L (ref 38–126)
ALT SERPL W/O P-5'-P-CCNC: 13 U/L (ref 10–44)
ANION GAP SERPL CALC-SCNC: 12 MMOL/L (ref 8–16)
AST SERPL-CCNC: 21 U/L (ref 15–46)
BASOPHILS # BLD AUTO: 0.05 K/UL (ref 0–0.2)
BASOPHILS NFR BLD: 1 % (ref 0–1.9)
BILIRUB SERPL-MCNC: 0.4 MG/DL (ref 0.1–1)
CALCIUM SERPL-MCNC: 9 MG/DL (ref 8.7–10.5)
CHLORIDE SERPL-SCNC: 110 MMOL/L (ref 95–110)
CO2 SERPL-SCNC: 23 MMOL/L (ref 23–29)
CREAT SERPL-MCNC: 0.86 MG/DL (ref 0.5–1.4)
DIFFERENTIAL METHOD: ABNORMAL
EOSINOPHIL # BLD AUTO: 0.3 K/UL (ref 0–0.5)
EOSINOPHIL NFR BLD: 6.3 % (ref 0–8)
ERYTHROCYTE [DISTWIDTH] IN BLOOD BY AUTOMATED COUNT: 13.7 % (ref 11.5–14.5)
EST. GFR  (AFRICAN AMERICAN): >60 ML/MIN/1.73 M^2
EST. GFR  (NON AFRICAN AMERICAN): >60 ML/MIN/1.73 M^2
GLUCOSE SERPL-MCNC: 102 MG/DL (ref 70–110)
HCT VFR BLD AUTO: 33.2 % (ref 37–48.5)
HGB BLD-MCNC: 10.5 G/DL (ref 12–16)
IMM GRANULOCYTES # BLD AUTO: 0 K/UL (ref 0–0.04)
IMM GRANULOCYTES NFR BLD AUTO: 0 % (ref 0–0.5)
LYMPHOCYTES # BLD AUTO: 1.5 K/UL (ref 1–4.8)
LYMPHOCYTES NFR BLD: 29.8 % (ref 18–48)
MCH RBC QN AUTO: 31.1 PG (ref 27–31)
MCHC RBC AUTO-ENTMCNC: 31.6 G/DL (ref 32–36)
MCV RBC AUTO: 98 FL (ref 82–98)
MONOCYTES # BLD AUTO: 0.2 K/UL (ref 0.3–1)
MONOCYTES NFR BLD: 4.1 % (ref 4–15)
NEUTROPHILS # BLD AUTO: 3 K/UL (ref 1.8–7.7)
NEUTROPHILS NFR BLD: 58.8 % (ref 38–73)
NRBC BLD-RTO: 0 /100 WBC
PLATELET # BLD AUTO: 231 K/UL (ref 150–450)
PMV BLD AUTO: 9.7 FL (ref 9.2–12.9)
POTASSIUM SERPL-SCNC: 3.4 MMOL/L (ref 3.5–5.1)
PROT SERPL-MCNC: 7.2 G/DL (ref 6–8.4)
RBC # BLD AUTO: 3.38 M/UL (ref 4–5.4)
SODIUM SERPL-SCNC: 145 MMOL/L (ref 136–145)
UUN UR-MCNC: 12 MG/DL (ref 7–17)
WBC # BLD AUTO: 5.1 K/UL (ref 3.9–12.7)

## 2021-12-07 PROCEDURE — 80053 COMPREHEN METABOLIC PANEL: CPT | Mod: HCNC,PO | Performed by: FAMILY MEDICINE

## 2021-12-07 PROCEDURE — 85025 COMPLETE CBC W/AUTO DIFF WBC: CPT | Mod: HCNC,PO | Performed by: FAMILY MEDICINE

## 2021-12-07 PROCEDURE — 36415 COLL VENOUS BLD VENIPUNCTURE: CPT | Mod: HCNC,PO | Performed by: FAMILY MEDICINE

## 2021-12-07 RX ORDER — DIAZEPAM 2 MG/1
TABLET ORAL
Qty: 30 TABLET | Refills: 0 | Status: SHIPPED | OUTPATIENT
Start: 2021-12-07 | End: 2021-12-23 | Stop reason: SDUPTHER

## 2021-12-08 ENCOUNTER — OUTPATIENT CASE MANAGEMENT (OUTPATIENT)
Dept: ADMINISTRATIVE | Facility: OTHER | Age: 66
End: 2021-12-08
Payer: MEDICARE

## 2021-12-08 ENCOUNTER — TELEPHONE (OUTPATIENT)
Dept: NEUROSURGERY | Facility: CLINIC | Age: 66
End: 2021-12-08
Payer: MEDICARE

## 2021-12-09 ENCOUNTER — TELEPHONE (OUTPATIENT)
Dept: FAMILY MEDICINE | Facility: CLINIC | Age: 66
End: 2021-12-09
Payer: MEDICARE

## 2021-12-09 RX ORDER — DIAZEPAM 2 MG/1
TABLET ORAL
Qty: 30 TABLET | Refills: 0 | OUTPATIENT
Start: 2021-12-09

## 2021-12-13 ENCOUNTER — TELEPHONE (OUTPATIENT)
Dept: NEUROSURGERY | Facility: CLINIC | Age: 66
End: 2021-12-13
Payer: MEDICARE

## 2021-12-13 ENCOUNTER — TELEPHONE (OUTPATIENT)
Dept: FAMILY MEDICINE | Facility: CLINIC | Age: 66
End: 2021-12-13
Payer: MEDICARE

## 2021-12-14 ENCOUNTER — HOSPITAL ENCOUNTER (INPATIENT)
Facility: HOSPITAL | Age: 66
LOS: 7 days | Discharge: HOME HEALTH | DRG: 471 | End: 2021-12-21
Attending: NEUROLOGICAL SURGERY | Admitting: NEUROLOGICAL SURGERY
Payer: MEDICARE

## 2021-12-14 ENCOUNTER — ANESTHESIA (OUTPATIENT)
Dept: SURGERY | Facility: HOSPITAL | Age: 66
DRG: 471 | End: 2021-12-14
Payer: MEDICARE

## 2021-12-14 ENCOUNTER — TELEPHONE (OUTPATIENT)
Dept: FAMILY MEDICINE | Facility: CLINIC | Age: 66
End: 2021-12-14
Payer: MEDICARE

## 2021-12-14 DIAGNOSIS — R05.9 COUGHING: ICD-10-CM

## 2021-12-14 DIAGNOSIS — M47.12 CERVICAL SPONDYLOSIS WITH MYELOPATHY: ICD-10-CM

## 2021-12-14 DIAGNOSIS — M43.27 FUSION OF SPINE OF LUMBOSACRAL REGION: ICD-10-CM

## 2021-12-14 DIAGNOSIS — M47.12 CERVICAL SPONDYLOSIS WITH MYELOPATHY AND RADICULOPATHY: Primary | ICD-10-CM

## 2021-12-14 DIAGNOSIS — R09.02 HYPOXIA: ICD-10-CM

## 2021-12-14 DIAGNOSIS — M47.22 CERVICAL SPONDYLOSIS WITH MYELOPATHY AND RADICULOPATHY: Primary | ICD-10-CM

## 2021-12-14 DIAGNOSIS — R05.8 SPUTUM PRODUCTION: ICD-10-CM

## 2021-12-14 LAB
ABO + RH BLD: NORMAL
BLD GP AB SCN CELLS X3 SERPL QL: NORMAL
POCT GLUCOSE: 105 MG/DL (ref 70–110)
POCT GLUCOSE: 85 MG/DL (ref 70–110)

## 2021-12-14 PROCEDURE — 36415 COLL VENOUS BLD VENIPUNCTURE: CPT | Mod: HCNC | Performed by: NEUROLOGICAL SURGERY

## 2021-12-14 PROCEDURE — 37000008 HC ANESTHESIA 1ST 15 MINUTES: Mod: HCNC | Performed by: NEUROLOGICAL SURGERY

## 2021-12-14 PROCEDURE — 36000711: Mod: HCNC | Performed by: NEUROLOGICAL SURGERY

## 2021-12-14 PROCEDURE — 22552 ARTHRD ANT NTRBD CERVICAL EA: CPT | Mod: HCNC,,, | Performed by: NEUROLOGICAL SURGERY

## 2021-12-14 PROCEDURE — 25000003 PHARM REV CODE 250: Mod: HCNC | Performed by: NEUROLOGICAL SURGERY

## 2021-12-14 PROCEDURE — 63600175 PHARM REV CODE 636 W HCPCS: Mod: HCNC | Performed by: NEUROLOGICAL SURGERY

## 2021-12-14 PROCEDURE — 86850 RBC ANTIBODY SCREEN: CPT | Mod: HCNC | Performed by: NEUROLOGICAL SURGERY

## 2021-12-14 PROCEDURE — 63600175 PHARM REV CODE 636 W HCPCS: Mod: HCNC | Performed by: NURSE ANESTHETIST, CERTIFIED REGISTERED

## 2021-12-14 PROCEDURE — 36000710: Mod: HCNC | Performed by: NEUROLOGICAL SURGERY

## 2021-12-14 PROCEDURE — 99900035 HC TECH TIME PER 15 MIN (STAT): Mod: HCNC

## 2021-12-14 PROCEDURE — 63600175 PHARM REV CODE 636 W HCPCS: Mod: HCNC | Performed by: NURSE PRACTITIONER

## 2021-12-14 PROCEDURE — 20930 PR ALLOGRAFT FOR SPINE SURGERY ONLY MORSELIZED: ICD-10-PCS | Mod: HCNC,,, | Performed by: NEUROLOGICAL SURGERY

## 2021-12-14 PROCEDURE — 94761 N-INVAS EAR/PLS OXIMETRY MLT: CPT | Mod: HCNC

## 2021-12-14 PROCEDURE — 22846 INSERT SPINE FIXATION DEVICE: CPT | Mod: 59,HCNC,, | Performed by: NEUROLOGICAL SURGERY

## 2021-12-14 PROCEDURE — 22552 PR ARTHRODESIS ANT INTERBODY INC DISCECTOMY, CERVICAL BELOW C2 EACH ADDL: ICD-10-PCS | Mod: HCNC,,, | Performed by: NEUROLOGICAL SURGERY

## 2021-12-14 PROCEDURE — 22551 PR ARTHRODESIS ANT INTERBODY INC DISCECTOMY, CERVICAL BELOW C2: ICD-10-PCS | Mod: HCNC,,, | Performed by: NEUROLOGICAL SURGERY

## 2021-12-14 PROCEDURE — 20000000 HC ICU ROOM: Mod: HCNC

## 2021-12-14 PROCEDURE — 27000221 HC OXYGEN, UP TO 24 HOURS: Mod: HCNC

## 2021-12-14 PROCEDURE — 22846 PR ANTERIOR INSTRUMENTATION 4-7 VERTEBRAL SEGMENTS: ICD-10-PCS | Mod: 59,HCNC,, | Performed by: NEUROLOGICAL SURGERY

## 2021-12-14 PROCEDURE — 20930 SP BONE ALGRFT MORSEL ADD-ON: CPT | Mod: HCNC,,, | Performed by: NEUROLOGICAL SURGERY

## 2021-12-14 PROCEDURE — 37000009 HC ANESTHESIA EA ADD 15 MINS: Mod: HCNC | Performed by: NEUROLOGICAL SURGERY

## 2021-12-14 PROCEDURE — 22853 PR INSERT BIOMECH DEV W/INTERBODY ARTHRODESIS, EA CONTIGUOUS DEFECT: ICD-10-PCS | Mod: HCNC,,, | Performed by: NEUROLOGICAL SURGERY

## 2021-12-14 PROCEDURE — 27800903 OPTIME MED/SURG SUP & DEVICES OTHER IMPLANTS: Mod: HCNC | Performed by: NEUROLOGICAL SURGERY

## 2021-12-14 PROCEDURE — 22551 ARTHRD ANT NTRBDY CERVICAL: CPT | Mod: HCNC,,, | Performed by: NEUROLOGICAL SURGERY

## 2021-12-14 PROCEDURE — C1713 ANCHOR/SCREW BN/BN,TIS/BN: HCPCS | Mod: HCNC | Performed by: NEUROLOGICAL SURGERY

## 2021-12-14 PROCEDURE — 63600175 PHARM REV CODE 636 W HCPCS: Mod: HCNC

## 2021-12-14 PROCEDURE — 22853 INSJ BIOMECHANICAL DEVICE: CPT | Mod: HCNC,,, | Performed by: NEUROLOGICAL SURGERY

## 2021-12-14 PROCEDURE — 25000003 PHARM REV CODE 250: Mod: HCNC | Performed by: NURSE ANESTHETIST, CERTIFIED REGISTERED

## 2021-12-14 PROCEDURE — 94799 UNLISTED PULMONARY SVC/PX: CPT | Mod: HCNC

## 2021-12-14 DEVICE — SCREW BONE SPINAL ANT 16MM: Type: IMPLANTABLE DEVICE | Site: SPINE CERVICAL | Status: FUNCTIONAL

## 2021-12-14 DEVICE — IMPLANTABLE DEVICE: Type: IMPLANTABLE DEVICE | Site: SPINE CERVICAL | Status: FUNCTIONAL

## 2021-12-14 DEVICE — SCREW BONE ANT CERV 16MM: Type: IMPLANTABLE DEVICE | Site: SPINE CERVICAL | Status: FUNCTIONAL

## 2021-12-14 RX ORDER — ACETAMINOPHEN 325 MG/1
650 TABLET ORAL
Status: COMPLETED | OUTPATIENT
Start: 2021-12-14 | End: 2021-12-14

## 2021-12-14 RX ORDER — DIPHENHYDRAMINE HYDROCHLORIDE 50 MG/ML
25 INJECTION INTRAMUSCULAR; INTRAVENOUS EVERY 6 HOURS PRN
Status: CANCELLED | OUTPATIENT
Start: 2021-12-14

## 2021-12-14 RX ORDER — ONDANSETRON 2 MG/ML
INJECTION INTRAMUSCULAR; INTRAVENOUS
Status: DISCONTINUED | OUTPATIENT
Start: 2021-12-14 | End: 2021-12-14

## 2021-12-14 RX ORDER — IBUPROFEN 200 MG
24 TABLET ORAL
Status: DISCONTINUED | OUTPATIENT
Start: 2021-12-14 | End: 2021-12-21 | Stop reason: HOSPADM

## 2021-12-14 RX ORDER — BUPROPION HYDROCHLORIDE 100 MG/1
200 TABLET, EXTENDED RELEASE ORAL 2 TIMES DAILY
Status: DISCONTINUED | OUTPATIENT
Start: 2021-12-14 | End: 2021-12-21 | Stop reason: HOSPADM

## 2021-12-14 RX ORDER — CEFAZOLIN SODIUM 2 G/50ML
2 SOLUTION INTRAVENOUS ONCE
Status: COMPLETED | OUTPATIENT
Start: 2021-12-14 | End: 2021-12-14

## 2021-12-14 RX ORDER — SODIUM CHLORIDE, SODIUM LACTATE, POTASSIUM CHLORIDE, CALCIUM CHLORIDE 600; 310; 30; 20 MG/100ML; MG/100ML; MG/100ML; MG/100ML
INJECTION, SOLUTION INTRAVENOUS CONTINUOUS
Status: DISCONTINUED | OUTPATIENT
Start: 2021-12-14 | End: 2021-12-15

## 2021-12-14 RX ORDER — ESCITALOPRAM OXALATE 10 MG/1
20 TABLET ORAL DAILY
Status: DISCONTINUED | OUTPATIENT
Start: 2021-12-15 | End: 2021-12-21 | Stop reason: HOSPADM

## 2021-12-14 RX ORDER — PROPOFOL 10 MG/ML
VIAL (ML) INTRAVENOUS CONTINUOUS PRN
Status: DISCONTINUED | OUTPATIENT
Start: 2021-12-14 | End: 2021-12-14

## 2021-12-14 RX ORDER — MUPIROCIN 20 MG/G
OINTMENT TOPICAL 2 TIMES DAILY
Status: DISPENSED | OUTPATIENT
Start: 2021-12-14 | End: 2021-12-19

## 2021-12-14 RX ORDER — GLUCAGON 1 MG
1 KIT INJECTION
Status: DISCONTINUED | OUTPATIENT
Start: 2021-12-14 | End: 2021-12-21 | Stop reason: HOSPADM

## 2021-12-14 RX ORDER — POTASSIUM CHLORIDE 750 MG/1
10 TABLET, EXTENDED RELEASE ORAL DAILY
Status: DISCONTINUED | OUTPATIENT
Start: 2021-12-15 | End: 2021-12-21 | Stop reason: HOSPADM

## 2021-12-14 RX ORDER — ACETAMINOPHEN 325 MG/1
650 TABLET ORAL EVERY 6 HOURS
Status: DISCONTINUED | OUTPATIENT
Start: 2021-12-15 | End: 2021-12-21 | Stop reason: HOSPADM

## 2021-12-14 RX ORDER — ALBUTEROL SULFATE 90 UG/1
1 AEROSOL, METERED RESPIRATORY (INHALATION) EVERY 4 HOURS PRN
Status: DISCONTINUED | OUTPATIENT
Start: 2021-12-14 | End: 2021-12-21 | Stop reason: HOSPADM

## 2021-12-14 RX ORDER — FENTANYL CITRATE 50 UG/ML
INJECTION, SOLUTION INTRAMUSCULAR; INTRAVENOUS
Status: DISCONTINUED | OUTPATIENT
Start: 2021-12-14 | End: 2021-12-14

## 2021-12-14 RX ORDER — ZOLPIDEM TARTRATE 5 MG/1
5 TABLET ORAL NIGHTLY
Status: DISCONTINUED | OUTPATIENT
Start: 2021-12-14 | End: 2021-12-17

## 2021-12-14 RX ORDER — HEPARIN SODIUM 5000 [USP'U]/ML
5000 INJECTION, SOLUTION INTRAVENOUS; SUBCUTANEOUS EVERY 12 HOURS
Status: DISCONTINUED | OUTPATIENT
Start: 2021-12-14 | End: 2021-12-21 | Stop reason: HOSPADM

## 2021-12-14 RX ORDER — PROPOFOL 10 MG/ML
VIAL (ML) INTRAVENOUS
Status: DISCONTINUED | OUTPATIENT
Start: 2021-12-14 | End: 2021-12-14

## 2021-12-14 RX ORDER — TIZANIDINE 2 MG/1
2 TABLET ORAL EVERY 8 HOURS PRN
Status: DISCONTINUED | OUTPATIENT
Start: 2021-12-14 | End: 2021-12-21 | Stop reason: HOSPADM

## 2021-12-14 RX ORDER — ATORVASTATIN CALCIUM 40 MG/1
40 TABLET, FILM COATED ORAL NIGHTLY
Status: DISCONTINUED | OUTPATIENT
Start: 2021-12-14 | End: 2021-12-21 | Stop reason: HOSPADM

## 2021-12-14 RX ORDER — FUROSEMIDE 20 MG/1
20 TABLET ORAL DAILY
Status: DISCONTINUED | OUTPATIENT
Start: 2021-12-15 | End: 2021-12-21 | Stop reason: HOSPADM

## 2021-12-14 RX ORDER — KETAMINE HCL IN 0.9 % NACL 50 MG/5 ML
SYRINGE (ML) INTRAVENOUS
Status: DISCONTINUED | OUTPATIENT
Start: 2021-12-14 | End: 2021-12-14

## 2021-12-14 RX ORDER — LIDOCAINE HYDROCHLORIDE 10 MG/ML
1 INJECTION, SOLUTION EPIDURAL; INFILTRATION; INTRACAUDAL; PERINEURAL ONCE
Status: DISCONTINUED | OUTPATIENT
Start: 2021-12-14 | End: 2021-12-14

## 2021-12-14 RX ORDER — PANTOPRAZOLE SODIUM 40 MG/1
40 TABLET, DELAYED RELEASE ORAL DAILY
Status: DISCONTINUED | OUTPATIENT
Start: 2021-12-15 | End: 2021-12-21 | Stop reason: HOSPADM

## 2021-12-14 RX ORDER — PROCHLORPERAZINE EDISYLATE 5 MG/ML
5 INJECTION INTRAMUSCULAR; INTRAVENOUS EVERY 6 HOURS PRN
Status: DISCONTINUED | OUTPATIENT
Start: 2021-12-14 | End: 2021-12-21 | Stop reason: HOSPADM

## 2021-12-14 RX ORDER — GABAPENTIN 300 MG/1
300 CAPSULE ORAL 2 TIMES DAILY
Status: DISCONTINUED | OUTPATIENT
Start: 2021-12-14 | End: 2021-12-21 | Stop reason: HOSPADM

## 2021-12-14 RX ORDER — HYDROMORPHONE HYDROCHLORIDE 2 MG/ML
2 INJECTION, SOLUTION INTRAMUSCULAR; INTRAVENOUS; SUBCUTANEOUS
Status: DISCONTINUED | OUTPATIENT
Start: 2021-12-14 | End: 2021-12-19

## 2021-12-14 RX ORDER — OXYCODONE HYDROCHLORIDE 5 MG/1
10 TABLET ORAL EVERY 6 HOURS PRN
Status: DISCONTINUED | OUTPATIENT
Start: 2021-12-14 | End: 2021-12-21 | Stop reason: HOSPADM

## 2021-12-14 RX ORDER — OXYCODONE HCL 10 MG/1
10 TABLET, FILM COATED, EXTENDED RELEASE ORAL
Status: COMPLETED | OUTPATIENT
Start: 2021-12-14 | End: 2021-12-14

## 2021-12-14 RX ORDER — MAG HYDROX/ALUMINUM HYD/SIMETH 200-200-20
30 SUSPENSION, ORAL (FINAL DOSE FORM) ORAL EVERY 4 HOURS PRN
Status: DISCONTINUED | OUTPATIENT
Start: 2021-12-14 | End: 2021-12-21 | Stop reason: HOSPADM

## 2021-12-14 RX ORDER — PREGABALIN 75 MG/1
75 CAPSULE ORAL
Status: COMPLETED | OUTPATIENT
Start: 2021-12-14 | End: 2021-12-14

## 2021-12-14 RX ORDER — ONDANSETRON 8 MG/1
8 TABLET, ORALLY DISINTEGRATING ORAL EVERY 6 HOURS PRN
Status: DISCONTINUED | OUTPATIENT
Start: 2021-12-14 | End: 2021-12-21 | Stop reason: HOSPADM

## 2021-12-14 RX ORDER — SUCCINYLCHOLINE CHLORIDE 20 MG/ML
INJECTION INTRAMUSCULAR; INTRAVENOUS
Status: DISCONTINUED | OUTPATIENT
Start: 2021-12-14 | End: 2021-12-14

## 2021-12-14 RX ORDER — DEXAMETHASONE SODIUM PHOSPHATE 4 MG/ML
INJECTION, SOLUTION INTRA-ARTICULAR; INTRALESIONAL; INTRAMUSCULAR; INTRAVENOUS; SOFT TISSUE
Status: DISCONTINUED | OUTPATIENT
Start: 2021-12-14 | End: 2021-12-14

## 2021-12-14 RX ORDER — SODIUM CHLORIDE, SODIUM LACTATE, POTASSIUM CHLORIDE, CALCIUM CHLORIDE 600; 310; 30; 20 MG/100ML; MG/100ML; MG/100ML; MG/100ML
INJECTION, SOLUTION INTRAVENOUS CONTINUOUS
Status: DISCONTINUED | OUTPATIENT
Start: 2021-12-14 | End: 2021-12-14

## 2021-12-14 RX ORDER — HYDROMORPHONE HYDROCHLORIDE 2 MG/ML
0.2 INJECTION, SOLUTION INTRAMUSCULAR; INTRAVENOUS; SUBCUTANEOUS EVERY 5 MIN PRN
Status: CANCELLED | OUTPATIENT
Start: 2021-12-14

## 2021-12-14 RX ORDER — HYDROMORPHONE HYDROCHLORIDE 2 MG/ML
0.5 INJECTION, SOLUTION INTRAMUSCULAR; INTRAVENOUS; SUBCUTANEOUS EVERY 5 MIN PRN
Status: CANCELLED | OUTPATIENT
Start: 2021-12-14

## 2021-12-14 RX ORDER — BUPIVACAINE HCL/EPINEPHRINE 0.5-1:200K
VIAL (ML) INJECTION
Status: DISCONTINUED | OUTPATIENT
Start: 2021-12-14 | End: 2021-12-14 | Stop reason: HOSPADM

## 2021-12-14 RX ORDER — INSULIN ASPART 100 [IU]/ML
1-10 INJECTION, SOLUTION INTRAVENOUS; SUBCUTANEOUS
Status: DISCONTINUED | OUTPATIENT
Start: 2021-12-14 | End: 2021-12-21 | Stop reason: HOSPADM

## 2021-12-14 RX ORDER — LEVOTHYROXINE SODIUM 50 UG/1
100 TABLET ORAL
Status: DISCONTINUED | OUTPATIENT
Start: 2021-12-15 | End: 2021-12-21 | Stop reason: HOSPADM

## 2021-12-14 RX ORDER — MIDAZOLAM HYDROCHLORIDE 1 MG/ML
INJECTION, SOLUTION INTRAMUSCULAR; INTRAVENOUS
Status: DISCONTINUED | OUTPATIENT
Start: 2021-12-14 | End: 2021-12-14

## 2021-12-14 RX ORDER — HYDROMORPHONE HYDROCHLORIDE 2 MG/ML
INJECTION, SOLUTION INTRAMUSCULAR; INTRAVENOUS; SUBCUTANEOUS
Status: COMPLETED
Start: 2021-12-14 | End: 2021-12-14

## 2021-12-14 RX ORDER — IBUPROFEN 200 MG
16 TABLET ORAL
Status: DISCONTINUED | OUTPATIENT
Start: 2021-12-14 | End: 2021-12-21 | Stop reason: HOSPADM

## 2021-12-14 RX ORDER — OXYCODONE AND ACETAMINOPHEN 5; 325 MG/1; MG/1
1 TABLET ORAL
Status: CANCELLED | OUTPATIENT
Start: 2021-12-14

## 2021-12-14 RX ORDER — BUSPIRONE HYDROCHLORIDE 5 MG/1
5 TABLET ORAL DAILY
Status: DISCONTINUED | OUTPATIENT
Start: 2021-12-15 | End: 2021-12-21 | Stop reason: HOSPADM

## 2021-12-14 RX ORDER — CYCLOBENZAPRINE HCL 10 MG
10 TABLET ORAL
Status: COMPLETED | OUTPATIENT
Start: 2021-12-14 | End: 2021-12-14

## 2021-12-14 RX ADMIN — KETAMINE HYDROCHLORIDE 5 MCG/KG/MIN: 50 INJECTION, SOLUTION, CONCENTRATE INTRAMUSCULAR; INTRAVENOUS at 04:12

## 2021-12-14 RX ADMIN — Medication 40 MG: at 05:12

## 2021-12-14 RX ADMIN — CEFAZOLIN SODIUM 2 G: 2 SOLUTION INTRAVENOUS at 04:12

## 2021-12-14 RX ADMIN — SODIUM CHLORIDE, SODIUM LACTATE, POTASSIUM CHLORIDE, AND CALCIUM CHLORIDE: .6; .31; .03; .02 INJECTION, SOLUTION INTRAVENOUS at 08:12

## 2021-12-14 RX ADMIN — DEXAMETHASONE SODIUM PHOSPHATE 8 MG: 4 INJECTION, SOLUTION INTRA-ARTICULAR; INTRALESIONAL; INTRAMUSCULAR; INTRAVENOUS; SOFT TISSUE at 04:12

## 2021-12-14 RX ADMIN — HYDROMORPHONE HYDROCHLORIDE 2 MG: 2 INJECTION, SOLUTION INTRAMUSCULAR; INTRAVENOUS; SUBCUTANEOUS at 09:12

## 2021-12-14 RX ADMIN — HEPARIN SODIUM 5000 UNITS: 5000 INJECTION INTRAVENOUS; SUBCUTANEOUS at 09:12

## 2021-12-14 RX ADMIN — OXYCODONE HYDROCHLORIDE 10 MG: 10 TABLET, FILM COATED, EXTENDED RELEASE ORAL at 12:12

## 2021-12-14 RX ADMIN — ONDANSETRON 8 MG: 2 INJECTION, SOLUTION INTRAMUSCULAR; INTRAVENOUS at 07:12

## 2021-12-14 RX ADMIN — PROPOFOL 150 MCG/KG/MIN: 10 INJECTION, EMULSION INTRAVENOUS at 04:12

## 2021-12-14 RX ADMIN — OXYCODONE 10 MG: 5 TABLET ORAL at 09:12

## 2021-12-14 RX ADMIN — ACETAMINOPHEN 650 MG: 325 TABLET, FILM COATED ORAL at 11:12

## 2021-12-14 RX ADMIN — SODIUM CHLORIDE, SODIUM LACTATE, POTASSIUM CHLORIDE, AND CALCIUM CHLORIDE 10 ML/HR: .6; .31; .03; .02 INJECTION, SOLUTION INTRAVENOUS at 11:12

## 2021-12-14 RX ADMIN — PROPOFOL 60 MG: 10 INJECTION, EMULSION INTRAVENOUS at 04:12

## 2021-12-14 RX ADMIN — MUPIROCIN: 20 OINTMENT TOPICAL at 09:12

## 2021-12-14 RX ADMIN — CYCLOBENZAPRINE 10 MG: 10 TABLET, FILM COATED ORAL at 12:12

## 2021-12-14 RX ADMIN — FENTANYL CITRATE 100 MCG: 50 INJECTION, SOLUTION INTRAMUSCULAR; INTRAVENOUS at 04:12

## 2021-12-14 RX ADMIN — BUPROPION HYDROCHLORIDE 200 MG: 100 TABLET, EXTENDED RELEASE ORAL at 10:12

## 2021-12-14 RX ADMIN — GABAPENTIN 300 MG: 300 CAPSULE ORAL at 10:12

## 2021-12-14 RX ADMIN — ATORVASTATIN CALCIUM 40 MG: 40 TABLET, FILM COATED ORAL at 09:12

## 2021-12-14 RX ADMIN — PREGABALIN 75 MG: 75 CAPSULE ORAL at 12:12

## 2021-12-14 RX ADMIN — SODIUM CHLORIDE, SODIUM LACTATE, POTASSIUM CHLORIDE, AND CALCIUM CHLORIDE: .6; .31; .03; .02 INJECTION, SOLUTION INTRAVENOUS at 05:12

## 2021-12-14 RX ADMIN — ACETAMINOPHEN 650 MG: 325 TABLET ORAL at 12:12

## 2021-12-14 RX ADMIN — MIDAZOLAM 2 MG: 1 INJECTION INTRAMUSCULAR; INTRAVENOUS at 04:12

## 2021-12-14 RX ADMIN — SUCCINYLCHOLINE CHLORIDE 120 MG: 20 INJECTION, SOLUTION INTRAMUSCULAR; INTRAVENOUS at 04:12

## 2021-12-14 RX ADMIN — ZOLPIDEM TARTRATE 5 MG: 5 TABLET ORAL at 09:12

## 2021-12-14 RX ADMIN — SODIUM CHLORIDE, SODIUM LACTATE, POTASSIUM CHLORIDE, AND CALCIUM CHLORIDE: .6; .31; .03; .02 INJECTION, SOLUTION INTRAVENOUS at 03:12

## 2021-12-14 NOTE — H&P
NEUROSURGICAL PROGRESS NOTE     DATE OF SERVICE:  12/14/21     ATTENDING PHYSICIAN:  Ishaan Fisher MD     SUBJECTIVE:     INTERIM HISTORY:     This is a very pleasant 66 y.o. female, who is status post left L4-5 oblique interbody fusion with posterior laminectomy and instrumentation in 01/21 for spondylolisthesis with severe stenosis. She recovered well from her surgery.  Was found to have worsening gait imbalance, bilateral hand weakness, bilateral hand numbness, left shoulder and arm pain.  Cervical spine MRI was ordered.  A few days ago she fell in the kitchen on her right hip.  She has been complaining of worsening right leg pain and hip pain since the fall.  She is not seeing a rheumatologist for her polyarthritis.             PAST MEDICAL HISTORY:       Active Ambulatory Problems     Diagnosis Date Noted    GERD (gastroesophageal reflux disease)      Acquired hypothyroidism      History of congestive heart failure      Depression 12/04/2015    Normocytic anemia 04/10/2018    History of myocardial infarction 08/30/2018    Epigastric pain 02/28/2019    BMI 39.0-39.9,adult 04/02/2019    Primary osteoarthritis of left knee 05/08/2019    TIA (transient ischemic attack) 05/19/2019    History of gastric bypass 05/19/2019    Coronary artery disease involving native coronary artery of native heart      UTI (urinary tract infection) 05/19/2019    History of right knee joint replacement 11/20/2019    Chronic left shoulder pain 07/29/2020    Primary osteoarthritis of left shoulder 07/29/2020    Chronic pain 11/04/2020    Lumbar radiculopathy 11/04/2020    DDD (degenerative disc disease), lumbar 01/11/2021           Resolved Ambulatory Problems     Diagnosis Date Noted    Primary osteoarthritis      Diabetes mellitus, type 2      History of colon cancer 04/18/2017    Malfunction of device 05/25/2017    S/P TKR (total knee replacement), left 07/02/2019    Weakness of left lower extremity  2019    Gait, antalgic 2019    Decreased range of motion (ROM) of left knee 2019    Primary osteoarthritis of right knee 2019           Past Medical History:   Diagnosis Date    Anticoagulant long-term use      Arthritis      Asthma      CHF (congestive heart failure)      Colon cancer      COPD (chronic obstructive pulmonary disease)      Coronary artery disease      Myocardial infarction      Thyroid disease           PAST SURGICAL HISTORY:        Past Surgical History:   Procedure Laterality Date    ABDOMINAL SURGERY         SECTION        CHOLECYSTECTOMY        COLON SURGERY        COLONOSCOPY         --- repeat in 3-5 years    COLONOSCOPY N/A 2017     Procedure: COLONOSCOPY;  Surgeon: Corrina Flores MD;  Location: [facility] ENDO;  Service: Endoscopy;  Laterality: N/A;    COLONOSCOPY N/A 4/10/2018     Procedure: COLONOSCOPY golytely;  Surgeon: Corrina Flores MD;  Location: [facility] ENDO;  Service: Endoscopy;  Laterality: N/A;    ECTOPIC PREGNANCY SURGERY        EPIDURAL STEROID INJECTION INTO LUMBAR SPINE N/A 2020     Procedure: Injection-steroid-epidural-lumbar--L5-S1 InterLaminar;  Surgeon: Nilam Santiago MD;  Location: [facility] PAIN MGT;  Service: Pain Management;  Laterality: N/A;    ESOPHAGOGASTRODUODENOSCOPY N/A 2019     Procedure: ESOPHAGOGASTRODUODENOSCOPY (EGD);  Surgeon: Corrina Flores MD;  Location: [facility] ENDO;  Service: Endoscopy;  Laterality: N/A;    FRACTURE SURGERY         left leg    FUSION OF SPINE WITH INSTRUMENTATION Left 2021     Procedure: FUSION, SPINE, WITH INSTRUMENTATION Stage 1 Left L4-5 OLIF DORA Stage 2 L4-5 Laminectomy, medial Facetectomy, foraminotomy, L4-5 MIS Instrumentation;  Surgeon: Ishaan Fisher MD;  Location: [facility] OR;  Service: Neurosurgery;  Laterality: Left;  Procedure: Stage 1 Left L4-5 OLIF DORA  Stage 2 L4-5 Laminectomy, medial Facetectomy, foraminotomy, L4-5 MIS Instrumentation  Surgery TIme: 4  Hrs    GASTRIC BYPASS        HERNIA REPAIR        INJECTION OF ANESTHETIC AGENT AROUND GENITOFEMORAL NERVE Left 7/29/2020     Procedure: BLOCK, NERVE, LEFT SHOULDER ARTICULAR;  Surgeon: Nilam Santiago MD;  Location: [facility] PAIN MGT;  Service: Pain Management;  Laterality: Left;    JOINT REPLACEMENT        KNEE ARTHROPLASTY Left 5/8/2019     Procedure: ARTHROPLASTY, KNEE;  Surgeon: Roldan Greenwood MD;  Location: [facility] OR;  Service: Orthopedics;  Laterality: Left;  Navid notified    KNEE ARTHROPLASTY Right 11/20/2019     Procedure: ARTHROPLASTY, KNEE;  Surgeon: Roldan Greenwood MD;  Location: [facility] OR;  Service: Orthopedics;  Laterality: Right;  Navid notified, confirmed case Navid 11/19/19 KB [id number]    OOPHORECTOMY        RADIOFREQUENCY THERMOCOAGULATION Left 8/12/2020     Procedure: RADIOFREQUENCY THERMAL COAGULATION--Left Suprascapular, Axillary, and Lateral Pectoral Articular Branch RFA;  Surgeon: Nilam Santiago MD;  Location: [facility] PAIN MGT;  Service: Pain Management;  Laterality: Left;    TONSILLECTOMY        TUBAL LIGATION             SOCIAL HISTORY:   Social History               Socioeconomic History    Marital status:        Spouse name: Not on file    Number of children: Not on file    Years of education: Not on file    Highest education level: Not on file   Occupational History    Not on file   Tobacco Use    Smoking status: Never Smoker    Smokeless tobacco: Never Used   Substance and Sexual Activity    Alcohol use: Yes       Comment: very rare    Drug use: No       Comment: CBD oil sometimes    Sexual activity: Never   Other Topics Concern    Not on file   Social History Narrative    Not on file      Social Determinants of Health          Financial Resource Strain:     Difficulty of Paying Living Expenses:    Food Insecurity:     Worried About Running Out of Food in the Last Year:     Ran Out of Food in the Last Year:    Transportation Needs:     Lack of Transportation  (Medical):     Lack of Transportation (Non-Medical):    Physical Activity:     Days of Exercise per Week:     Minutes of Exercise per Session:    Stress:     Feeling of Stress :    Social Connections:     Frequency of Communication with Friends and Family:     Frequency of Social Gatherings with Friends and Family:     Attends [employer] Services:     Active Member of Clubs or Organizations:     Attends Club or Organization Meetings:     Marital Status:             FAMILY HISTORY:        Family History   Problem Relation Age of Onset    Hyperlipidemia Mother      Hypertension Mother      Diabetes Mother      Stroke Mother      Hypertension Sister      Hypertension Brother      Diabetes Brother      Breast cancer Maternal Aunt      Breast cancer Maternal Aunt      Breast cancer Cousin           CURRENTS MEDICATIONS:         Current Outpatient Medications on File Prior to Visit   Medication Sig Dispense Refill    acetaminophen (TYLENOL) 500 MG tablet Take 500 mg by mouth every 6 (six) hours as needed for Pain.        albuterol (VENTOLIN HFA) 90 mcg/actuation inhaler INHALE 2 PUFFS INTO THE LUNGS EVERY 4 HOURS AS NEEDED FOR WHEEZING 18 g 3    atorvastatin (LIPITOR) 40 MG tablet TAKE 1 TABLET(40 MG) BY MOUTH EVERY DAY FOR CHOLESTEROL 90 tablet 3    buPROPion (WELLBUTRIN SR) 200 MG SR12 Take 1 tablet (200 mg total) by mouth 2 (two) times daily. To help with mood and pain relief 180 tablet 3    busPIRone (BUSPAR) 10 MG tablet TAKE 1/2 TABLET BY MOUTH ONCE DAILY (Patient taking differently: 10 mg. Pt is taking 10 mg once daily) 60 tablet 3    diabetic supplies, miscellan. Misc Lancets for 1-2 times a day testing    dispense brand covered by insurance t 60 each 3    diazePAM (VALIUM) 2 MG tablet Take 1 tablet (2 mg total) by mouth every 12 (twelve) hours as needed for Anxiety. 25 tablet 0    diclofenac sodium (VOLTAREN) 1 % Gel APPLY 2 GRAMS EXTERNALLY TO THE AFFECTED AREA FOUR TIMES DAILY 100 g 5     ergocalciferol (ERGOCALCIFEROL) 50,000 unit Cap Take 1 capsule (50,000 Units total) by mouth every 7 days. 12 capsule 3    escitalopram oxalate (LEXAPRO) 20 MG tablet TAKE 1 TABLET(20 MG) BY MOUTH EVERY DAY 90 tablet 3    furosemide (LASIX) 20 MG tablet Take 1 tablet by mouth daily as needed 30 tablet 0    gabapentin (NEURONTIN) 600 MG tablet Take 1 tablet (600 mg total) by mouth 3 (three) times daily. 90 tablet 11    glipiZIDE (GLUCOTROL) 2.5 MG TR24 TAKE 1 TABLET(2.5 MG) BY MOUTH DAILY WITH BREAKFAST 90 tablet 3    ibandronate (BONIVA) 150 mg tablet TAKE 1 TABLET BY MOUTH EVERY 30 DAYS FOR OSTEOPOROSIS 3 tablet 3    levothyroxine (SYNTHROID) 100 MCG tablet TAKE 1 TABLET BY MOUTH EVERY DAY 90 tablet 3    lidocaine-prilocaine (EMLA) cream          omeprazole (PRILOSEC) 40 MG capsule Take 1 capsule (40 mg total) by mouth every morning. 90 capsule 1    oxyCODONE-acetaminophen (PERCOCET)  mg per tablet Take 1 tablet by mouth every 8 (eight) hours as needed for Pain. 90 tablet 0    potassium chloride (KLOR-CON) 10 MEQ TbSR The day you take lasix 30 tablet 0    promethazine (PHENERGAN) 25 MG tablet TAKE 1 TABLET(25 MG) BY MOUTH EVERY 6 HOURS AS NEEDED 25 tablet 0    tiZANidine (ZANAFLEX) 2 MG tablet Take 1 tablet (2 mg total) by mouth every 6 to 8 hours as needed (muscle spasm). 30 tablet 1    triamcinolone acetonide 0.1% (KENALOG) 0.1 % cream Apply topically 2 (two) times daily. 80 g 1    TRUE METRIX GLUCOSE METER [facility] U UTD        TRUE METRIX GLUCOSE TEST STRIP Strp USE TO TEST BLOOD SUGAR EVERY  strip 0    TRUE METRIX GLUCOSE TEST STRIP Strp TO TEST ONCE TO TWICE DAILY 50 strip 11    TRUEPLUS LANCETS 30 gauge Misc USE TO TEST ONCE TO TWICE D        zolpidem (AMBIEN) 5 MG Tab TAKE 1 TABLET BY MOUTH EVERY DAY AT NIGHT 30 tablet 1    aspirin (ECOTRIN) 81 MG EC tablet Take 1 tablet (81 mg total) by mouth once daily.   0      No current facility-administered medications on file prior to  visit.         ALLERGIES:        Review of patient's allergies indicates:   Allergen Reactions    Adhesive         Adhesive tape    Latex, natural rubber         Adhesive from latex tape    Ibuprofen Other (See Comments)       Causes asthma flare??         REVIEW OF SYSTEMS:  Review of Systems   Constitutional: Positive for weight loss. Negative for diaphoresis and fever.   Respiratory: Negative for shortness of breath.    Cardiovascular: Negative for chest pain.   Gastrointestinal: Negative for blood in stool.   Genitourinary: Negative for hematuria.   Endo/Heme/Allergies: Does not bruise/bleed easily.   All other systems reviewed and are negative.           OBJECTIVE:     PHYSICAL EXAMINATION:       Vitals:     06/07/21 1129   BP: 130/86   Pulse: 74         Physical Exam:  Vitals reviewed.     Constitutional: She appears well-developed and well-nourished.      Eyes: Pupils are equal, round, and reactive to light. Conjunctivae and EOM are normal.      Cardiovascular: Normal distal pulses and no edema.      Abdominal: Soft.      Skin: Skin displays no rash on trunk and no rash on extremities. Skin displays no lesions on trunk and no lesions on extremities.     Psych/Behavior: She is alert. She is oriented to person, place, and time. She has a normal mood and affect.     Musculoskeletal:        Neck: Range of motion is limited.     Neurological:        DTRs: Tricep reflexes are 1+ on the right side and 0 on the left side. Bicep reflexes are 3+ on the right side and 3+ on the left side. Brachioradialis reflexes are 3+ on the right side and 3+ on the left side. Patellar reflexes are 1+ on the right side and 1+ on the left side. Achilles reflexes are 1+ on the right side and 1+ on the left side.         Back Exam      Muscle Strength   Right Quadriceps:  5/5   Left Quadriceps:  5/5   Right Hamstrings:  5/5   Left Hamstrings:  5/5                        Neurologic Exam      Mental Status   Oriented to person, place,  and time.   Speech: speech is normal   Level of consciousness: alert     Cranial Nerves   Cranial nerves II through XII intact.      CN III, IV, VI   Pupils are equal, round, and reactive to light.  Extraocular motions are normal.      Motor Exam   Muscle bulk: normal  Overall muscle tone: increased     Strength   Right deltoid: 5/5  Left deltoid: 5/5  Right biceps: 5/5  Left biceps: 5/5  Right triceps: 5/5  Left triceps: 4/5  Right wrist flexion: 5/5  Left wrist flexion: 5/5  Right wrist extension: 5/5  Left wrist extension: 5/5  Right interossei: 4/5  Left interossei: 4/5  Right iliopsoas: 4/5  Left iliopsoas: 4/5  Right quadriceps: 5/5  Left quadriceps: 5/5  Right hamstrin/5  Left hamstrin/5  Right anterior tibial: 5/5  Left anterior tibial: 5/5  Right posterior tibial: 5/5  Left posterior tibial: 5/5  Right peroneal: 5/5  Left peroneal: 5/5  Right gastroc: 5/5  Left gastroc: 5/5     Sensory Exam   Light touch normal.   Pinprick normal.      Gait, Coordination, and Reflexes      Gait  Gait: spastic     Coordination   Finger to nose coordination: normal     Reflexes   Right brachioradialis: 3+  Left brachioradialis: 3+  Right biceps: 3+  Left biceps: 3+  Right triceps: 1+  Left triceps: 0  Right patellar: 1+  Left patellar: 1+  Right achilles: 1+  Left achilles: 1+  Right plantar: normal  Left plantar: normal  Right Munoz: present  Left Munoz: present  Right ankle clonus: absent  Left ankle clonus: absent           DIAGNOSTIC DATA:  I personally interpreted the following imaging:   X-ray of the cervical spine shows C3-4 spondylolisthesis, C4-5 insitu fusion of the vertebral body  Cervical spine MRI  shows C3-4 severe spinal stenosis, C5-6 moderate spinal stenosis, C6-7 left severe foraminal stenosis caused by disc herniation  CT of the lumbar spine 2021 shows good positioning of hardware, good decompression at L4-5, SI joint degenerative changes bilaterally, degenerative changes involving the  SI joint with sclerosis and vacuum phenomenon worse on the left side     ASSESMENT:  This is a 66 y.o. female with          Problem List Items Addressed This Visit      None               Visit Diagnoses      Polyarthritis    -  Primary     Relevant Orders     Ambulatory referral/consult to Rheumatology     Creatinine, serum     Spinal stenosis, lumbosacral region         Relevant Orders     MRI Lumbar Spine W WO Contrast     Creatinine, serum     Trauma of pelvis         Relevant Orders     CT Pelvis Without Contrast     Creatinine, serum                PLAN:  I explained the natural history of the disease and all treatment options. I recommended a C3-4, C5-6, C6-7 anterior diskectomy and instrumented fusion to prevent worsening myelopathy and radiculopathy.  She has left C7 radiculopathy with motor weakness.  She has worsening gait imbalance and hand function weakness.          We have discussed the risks of surgery including death, coma, bleeding, infection, failure of surgery, CSF leak, nerve root injury, spinal cord injury, ureter injury, weakness, paralysis, peripheral neuropathy, malplaced hardware, migration of hardware, non-union, need for reoperation. Patient understands the risks and would like to proceed with surgery.        The patient has increase perioperative risks because of these comorbidities: type 2 diabetes.

## 2021-12-15 LAB
ANION GAP SERPL CALC-SCNC: 8 MMOL/L (ref 8–16)
BASOPHILS # BLD AUTO: 0 K/UL (ref 0–0.2)
BASOPHILS NFR BLD: 0 % (ref 0–1.9)
BUN SERPL-MCNC: 11 MG/DL (ref 8–23)
CALCIUM SERPL-MCNC: 8.5 MG/DL (ref 8.7–10.5)
CHLORIDE SERPL-SCNC: 110 MMOL/L (ref 95–110)
CO2 SERPL-SCNC: 20 MMOL/L (ref 23–29)
CREAT SERPL-MCNC: 0.8 MG/DL (ref 0.5–1.4)
DIFFERENTIAL METHOD: ABNORMAL
EOSINOPHIL # BLD AUTO: 0 K/UL (ref 0–0.5)
EOSINOPHIL NFR BLD: 0 % (ref 0–8)
ERYTHROCYTE [DISTWIDTH] IN BLOOD BY AUTOMATED COUNT: 13.8 % (ref 11.5–14.5)
EST. GFR  (AFRICAN AMERICAN): >60 ML/MIN/1.73 M^2
EST. GFR  (NON AFRICAN AMERICAN): >60 ML/MIN/1.73 M^2
GLUCOSE SERPL-MCNC: 127 MG/DL (ref 70–110)
HCT VFR BLD AUTO: 29.7 % (ref 37–48.5)
HGB BLD-MCNC: 9.4 G/DL (ref 12–16)
IMM GRANULOCYTES # BLD AUTO: 0.01 K/UL (ref 0–0.04)
IMM GRANULOCYTES NFR BLD AUTO: 0.2 % (ref 0–0.5)
LYMPHOCYTES # BLD AUTO: 0.4 K/UL (ref 1–4.8)
LYMPHOCYTES NFR BLD: 8.7 % (ref 18–48)
MCH RBC QN AUTO: 30.9 PG (ref 27–31)
MCHC RBC AUTO-ENTMCNC: 31.6 G/DL (ref 32–36)
MCV RBC AUTO: 98 FL (ref 82–98)
MONOCYTES # BLD AUTO: 0.1 K/UL (ref 0.3–1)
MONOCYTES NFR BLD: 1.2 % (ref 4–15)
NEUTROPHILS # BLD AUTO: 4.4 K/UL (ref 1.8–7.7)
NEUTROPHILS NFR BLD: 89.9 % (ref 38–73)
NRBC BLD-RTO: 0 /100 WBC
PLATELET # BLD AUTO: 181 K/UL (ref 150–450)
PMV BLD AUTO: 9.1 FL (ref 9.2–12.9)
POCT GLUCOSE: 116 MG/DL (ref 70–110)
POCT GLUCOSE: 119 MG/DL (ref 70–110)
POCT GLUCOSE: 129 MG/DL (ref 70–110)
POCT GLUCOSE: 136 MG/DL (ref 70–110)
POCT GLUCOSE: 138 MG/DL (ref 70–110)
POTASSIUM SERPL-SCNC: 4.3 MMOL/L (ref 3.5–5.1)
RBC # BLD AUTO: 3.04 M/UL (ref 4–5.4)
SODIUM SERPL-SCNC: 138 MMOL/L (ref 136–145)
WBC # BLD AUTO: 4.94 K/UL (ref 3.9–12.7)

## 2021-12-15 PROCEDURE — 92610 EVALUATE SWALLOWING FUNCTION: CPT | Mod: HCNC

## 2021-12-15 PROCEDURE — 94761 N-INVAS EAR/PLS OXIMETRY MLT: CPT | Mod: HCNC

## 2021-12-15 PROCEDURE — 99024 POSTOP FOLLOW-UP VISIT: CPT | Mod: HCNC,,, | Performed by: NEUROLOGICAL SURGERY

## 2021-12-15 PROCEDURE — 27000221 HC OXYGEN, UP TO 24 HOURS: Mod: HCNC

## 2021-12-15 PROCEDURE — 80048 BASIC METABOLIC PNL TOTAL CA: CPT | Mod: HCNC | Performed by: NEUROLOGICAL SURGERY

## 2021-12-15 PROCEDURE — 94799 UNLISTED PULMONARY SVC/PX: CPT | Mod: HCNC

## 2021-12-15 PROCEDURE — 63600175 PHARM REV CODE 636 W HCPCS: Mod: HCNC | Performed by: NEUROLOGICAL SURGERY

## 2021-12-15 PROCEDURE — 97161 PT EVAL LOW COMPLEX 20 MIN: CPT | Mod: HCNC

## 2021-12-15 PROCEDURE — 11000001 HC ACUTE MED/SURG PRIVATE ROOM: Mod: HCNC

## 2021-12-15 PROCEDURE — 99900035 HC TECH TIME PER 15 MIN (STAT): Mod: HCNC

## 2021-12-15 PROCEDURE — 97530 THERAPEUTIC ACTIVITIES: CPT | Mod: HCNC

## 2021-12-15 PROCEDURE — 36415 COLL VENOUS BLD VENIPUNCTURE: CPT | Mod: HCNC | Performed by: NEUROLOGICAL SURGERY

## 2021-12-15 PROCEDURE — 25000003 PHARM REV CODE 250: Mod: HCNC | Performed by: NEUROLOGICAL SURGERY

## 2021-12-15 PROCEDURE — 25000003 PHARM REV CODE 250: Mod: HCNC | Performed by: PHYSICIAN ASSISTANT

## 2021-12-15 PROCEDURE — 97165 OT EVAL LOW COMPLEX 30 MIN: CPT | Mod: HCNC

## 2021-12-15 PROCEDURE — 99024 PR POST-OP FOLLOW-UP VISIT: ICD-10-PCS | Mod: HCNC,,, | Performed by: NEUROLOGICAL SURGERY

## 2021-12-15 PROCEDURE — 63600175 PHARM REV CODE 636 W HCPCS: Mod: HCNC | Performed by: PHYSICIAN ASSISTANT

## 2021-12-15 PROCEDURE — 97535 SELF CARE MNGMENT TRAINING: CPT | Mod: HCNC

## 2021-12-15 PROCEDURE — 85025 COMPLETE CBC W/AUTO DIFF WBC: CPT | Mod: HCNC | Performed by: NEUROLOGICAL SURGERY

## 2021-12-15 RX ADMIN — HYDROMORPHONE HYDROCHLORIDE 2 MG: 2 INJECTION, SOLUTION INTRAMUSCULAR; INTRAVENOUS; SUBCUTANEOUS at 04:12

## 2021-12-15 RX ADMIN — BUSPIRONE HYDROCHLORIDE 5 MG: 5 TABLET ORAL at 08:12

## 2021-12-15 RX ADMIN — BUPROPION HYDROCHLORIDE 200 MG: 100 TABLET, EXTENDED RELEASE ORAL at 09:12

## 2021-12-15 RX ADMIN — ATORVASTATIN CALCIUM 40 MG: 40 TABLET, FILM COATED ORAL at 09:12

## 2021-12-15 RX ADMIN — LEVOTHYROXINE SODIUM 100 MCG: 50 TABLET ORAL at 05:12

## 2021-12-15 RX ADMIN — PANTOPRAZOLE SODIUM 40 MG: 40 TABLET, DELAYED RELEASE ORAL at 08:12

## 2021-12-15 RX ADMIN — ACETAMINOPHEN 650 MG: 325 TABLET, FILM COATED ORAL at 11:12

## 2021-12-15 RX ADMIN — SODIUM CHLORIDE, SODIUM LACTATE, POTASSIUM CHLORIDE, AND CALCIUM CHLORIDE: .6; .31; .03; .02 INJECTION, SOLUTION INTRAVENOUS at 08:12

## 2021-12-15 RX ADMIN — POTASSIUM CHLORIDE 10 MEQ: 750 TABLET, EXTENDED RELEASE ORAL at 08:12

## 2021-12-15 RX ADMIN — GABAPENTIN 300 MG: 300 CAPSULE ORAL at 11:12

## 2021-12-15 RX ADMIN — GABAPENTIN 300 MG: 300 CAPSULE ORAL at 09:12

## 2021-12-15 RX ADMIN — ESCITALOPRAM OXALATE 20 MG: 10 TABLET ORAL at 08:12

## 2021-12-15 RX ADMIN — ZOLPIDEM TARTRATE 5 MG: 5 TABLET ORAL at 09:12

## 2021-12-15 RX ADMIN — HYDROMORPHONE HYDROCHLORIDE 2 MG: 2 INJECTION, SOLUTION INTRAMUSCULAR; INTRAVENOUS; SUBCUTANEOUS at 03:12

## 2021-12-15 RX ADMIN — ACETAMINOPHEN 650 MG: 325 TABLET, FILM COATED ORAL at 05:12

## 2021-12-15 RX ADMIN — HEPARIN SODIUM 5000 UNITS: 5000 INJECTION INTRAVENOUS; SUBCUTANEOUS at 09:12

## 2021-12-15 RX ADMIN — OXYCODONE 10 MG: 5 TABLET ORAL at 11:12

## 2021-12-15 RX ADMIN — MUPIROCIN: 20 OINTMENT TOPICAL at 08:12

## 2021-12-15 RX ADMIN — HYDROMORPHONE HYDROCHLORIDE 2 MG: 2 INJECTION, SOLUTION INTRAMUSCULAR; INTRAVENOUS; SUBCUTANEOUS at 11:12

## 2021-12-15 RX ADMIN — OXYCODONE 10 MG: 5 TABLET ORAL at 03:12

## 2021-12-15 RX ADMIN — FUROSEMIDE 20 MG: 20 TABLET ORAL at 08:12

## 2021-12-15 RX ADMIN — MUPIROCIN: 20 OINTMENT TOPICAL at 09:12

## 2021-12-15 RX ADMIN — OXYCODONE 10 MG: 5 TABLET ORAL at 05:12

## 2021-12-15 RX ADMIN — BUPROPION HYDROCHLORIDE 200 MG: 100 TABLET, EXTENDED RELEASE ORAL at 08:12

## 2021-12-15 RX ADMIN — HYDROMORPHONE HYDROCHLORIDE 2 MG: 2 INJECTION, SOLUTION INTRAMUSCULAR; INTRAVENOUS; SUBCUTANEOUS at 08:12

## 2021-12-15 RX ADMIN — HEPARIN SODIUM 5000 UNITS: 5000 INJECTION INTRAVENOUS; SUBCUTANEOUS at 08:12

## 2021-12-15 NOTE — PLAN OF CARE
Problem: Physical Therapy Goal  Goal: Physical Therapy Goal  Description: Goals to be met by: 1/15/2022     Patient will increase functional independence with mobility by performin. Supine <> sit with Minimal Assistance  2. Sit to stand transfer with Minimal Assistance  3. Bed to chair transfer with Minimal Assistance using Rolling Walker  4. Gait  x 50 feet with Minimal Assistance using Rolling Walker.   5. Lower extremity exercise program x 10 reps per handout, with supervision    Outcome: Ongoing, Progressing   Patient tolerated bed level evaluation; full report to follow; pt hypotensive throughout session; will cont with POC; ongoing assessment for d/c recommendations-most likely post acute placement.

## 2021-12-15 NOTE — OP NOTE
Date of surgery 12/14/2021    Preop diagnosis  1. Cervical spondylosis with myelopathy and radiculopathy at C3-4, C5-6, C6-7  2. C4-5 in Situ fusion     Postop diagnosis  Same    Surgery  1. C3-4, C5-6, C6-7 anterior diskectomy and interbody fusion using interbody spacer, Depuis, and allograft DBM  2. C3-C7 anterior instrumentation using a separate plate Depuis  3. Neural monitoring using MEP, SSEP, EMG  4. Fluoroscopy    Surgeon    Ishaan Fisher MD    Assistance surgeon    None    Indication      This is a very pleasant 66 y.o. female, who is status post left L4-5 oblique interbody fusion with posterior laminectomy and instrumentation in 01/21 for spondylolisthesis with severe stenosis. She recovered well from her surgery.  Was found to have worsening gait imbalance, bilateral hand weakness, bilateral hand numbness, left shoulder and arm pain.  Cervical spine MRI was ordered.  A few days ago she fell in the kitchen on her right hip.  She has been complaining of worsening right leg pain and hip pain since the fall.  She is not seeing a rheumatologist for her polyarthritis.      Procedure  The patient was intubated under general anesthesia and positioned supine on the Slider bed, the head resting on the horseshoe head millard.  Alarcon-Wells tongs were placed and 5 lb of traction.  All pressure points were carefully padded.  The anterior cervical area was prepped and draped in typical sterile fashion.  Using fluoroscopy we planned a oblique incision from C3-C7 on the right side.  Local anesthesia with 0.5 Marcaine with epi.  The skin was incised an orthostatic retractor was positioned.  The platysma muscle was divided sharply.  We then dissected the corridor between the carotid artery and the esophagus medially.  The prevertebral space we dissected bluntly between the 2 longus colli.  We then placed a retractor at C6-7.  Good level was confirmed with fluoroscopy.  We divided the annulus under microscopic  visualization.  All disc material was scraped from the endplates.  The uncovertebral joints as well as the posterior osteophytes were drilled.  The PLL was divided and resected the piecemeal fashion to decompress the dura.  After using serial trial we placed a final 8 mm large spacer filled with DBM in the disc space.  The same procedure was performed at C5-6 with a 9 mm spacer.  The same procedure was performed at C3-4 with a 8 mm spacer.  All spacer were filled with DBM.  We then placed a anterior plate and fixated the plate with 16 mm variable screws in C3, C4, C6 and 16 mm fixed screws in C7.  The locking mechanism was engaged.  Irrigation.  Hemostasis.  We left a Hemovac drain in the surgical side and all the drain inferiorly to the left side.  The drain was fixated with 2 nylon.  The platysma muscle was closed with interrupted 3-0 Vicryl.  The dermal layer was closed with inverted interrupted 3-0 Vicryl.  The skin was closed with staples.  The incision was dressed.  No complication.  Blood loss was estimated at 100 cc.

## 2021-12-15 NOTE — PT/OT/SLP EVAL
Speech Language Pathology Evaluation  Bedside Swallow  Patient Education       Patient Name:  Christine Castro   MRN:  [id number]  Admitting Diagnosis: Cervical spondylosis with myelopathy    Recommendations:                 General Recommendations:  monitor PO intake and ensure safety and modify diet textures as able.   Diet recommendations:  Puree, Thin   Swallow Precautions:  1. UPRIGHT for all meals at 90 degree and for all PO solid intake   2. Pureed Tray/ Thin Liquids  3. Straws ok/Cup swallows preferred to regular volume   4. Alternate sips/bites  5. Eat  slowly  6. Assist for feeding set-up   7. Whole medications one by one should follow with 3 - 6oz fluid, then STAY upright at least 30min  8. Close monitoring needed for any overt s/s of aspiration (coughing/choking, throat clearing, wet/gurgly vocal quality, nasal emission, watery eyes, spike in fever, reddened face, reduced O2 sats, etc)     General Precautions: Standard, fall  Communication strategies:  none    History:   INTERIM HISTORY:     This is a very pleasant 66 y.o. female, who is status post left L4-5 oblique interbody fusion with posterior laminectomy and instrumentation in 01/21 for spondylolisthesis with severe stenosis. She recovered well from her surgery.  Was found to have worsening gait imbalance, bilateral hand weakness, bilateral hand numbness, left shoulder and arm pain.  Cervical spine MRI was ordered.  A few days ago she fell in the kitchen on her right hip.  She has been complaining of worsening right leg pain and hip pain since the fall.  She is not seeing a rheumatologist for her polyarthritis.        Past Medical History:   Diagnosis Date    Anticoagulant long-term use     Arthritis     Asthma     CHF (congestive heart failure)     ST CLAUDE GENERAL    Colon cancer     colon    COPD (chronic obstructive pulmonary disease)     Coronary artery disease     Depression     Diabetes mellitus, type 2     GERD (gastroesophageal  reflux disease)     Myocardial infarction     AGE 19 [facility] WITHBABY DELIVERY    Thyroid disease        Past Surgical History:   Procedure Laterality Date    ABDOMINAL SURGERY       SECTION      CHOLECYSTECTOMY      COLON SURGERY      COLONOSCOPY      --- repeat in 3-5 years    COLONOSCOPY N/A 2017    Procedure: COLONOSCOPY;  Surgeon: Corrina Flores MD;  Location: [facility] ENDO;  Service: Endoscopy;  Laterality: N/A;    COLONOSCOPY N/A 4/10/2018    Procedure: COLONOSCOPY golytely;  Surgeon: Corrina Flores MD;  Location: [facility] ENDO;  Service: Endoscopy;  Laterality: N/A;    ECTOPIC PREGNANCY SURGERY      EPIDURAL STEROID INJECTION INTO LUMBAR SPINE N/A 2020    Procedure: Injection-steroid-epidural-lumbar--L5-S1 InterLaminar;  Surgeon: Nilam Santiago MD;  Location: [facility] PAIN T;  Service: Pain Management;  Laterality: N/A;    ESOPHAGOGASTRODUODENOSCOPY N/A 2019    Procedure: ESOPHAGOGASTRODUODENOSCOPY (EGD);  Surgeon: Corrina Flores MD;  Location: [facility] ENDO;  Service: Endoscopy;  Laterality: N/A;    FRACTURE SURGERY      left leg    FUSION OF SPINE WITH INSTRUMENTATION Left 2021    Procedure: FUSION, SPINE, WITH INSTRUMENTATION Stage 1 Left L4-5 OLIF DORA Stage 2 L4-5 Laminectomy, medial Facetectomy, foraminotomy, L4-5 MIS Instrumentation;  Surgeon: Ishaan Fisher MD;  Location: [facility] OR;  Service: Neurosurgery;  Laterality: Left;  Procedure: Stage 1 Left L4-5 OLIF DORA  Stage 2 L4-5 Laminectomy, medial Facetectomy, foraminotomy, L4-5 MIS Instrumentation  Surgery TIme: 4 Hrs    GASTRIC BYPASS      HERNIA REPAIR      INJECTION OF ANESTHETIC AGENT AROUND GENITOFEMORAL NERVE Left 2020    Procedure: BLOCK, NERVE, LEFT SHOULDER ARTICULAR;  Surgeon: Nilam Santiago MD;  Location: [facility] PAIN MGT;  Service: Pain Management;  Laterality: Left;    JOINT REPLACEMENT      KNEE ARTHROPLASTY Left 2019    Procedure: ARTHROPLASTY, KNEE;  Surgeon: Roldan Greenwood MD;   "Location: [facility] OR;  Service: Orthopedics;  Laterality: Left;  Navid notified    KNEE ARTHROPLASTY Right 11/20/2019    Procedure: ARTHROPLASTY, KNEE;  Surgeon: Roldan Greenwood MD;  Location: [facility] OR;  Service: Orthopedics;  Laterality: Right;  Navid notified, confirmed case Navid 11/19/19 [id number]    OOPHORECTOMY      RADIOFREQUENCY THERMOCOAGULATION Left 8/12/2020    Procedure: RADIOFREQUENCY THERMAL COAGULATION--Left Suprascapular, Axillary, and Lateral Pectoral Articular Branch RFA;  Surgeon: Nilam Santiago MD;  Location: [facility] PAIN MGT;  Service: Pain Management;  Laterality: Left;    TONSILLECTOMY      TRANSFORAMINAL EPIDURAL INJECTION OF STEROID Right 8/25/2021    Procedure: Injection,steroid,epidural,transforaminal approach; Levels: L5;  Surgeon: Nilam Santiago MD;  Location: [facility] PAIN MGT;  Service: Pain Management;  Laterality: Right;  No pacemaker. Patient is diabetic. Patient is taking Aspirin but can continue per Dr. Santiago.     TUBAL LIGATION         Social History: Patient lives at home.     Prior Intubation HX:  Intubated for surgery only     Modified Barium Swallow: none per EMR     Chest X-Rays:The lungs are well expanded and clear.   No focal airspace disease.   No pleural effusion, no pneumothorax.    Prior diet: pt reports no difficulty swallowing prior to surgery    Subjective   ST orders received this date, pt is s/p ACDF procedure.   Pt found in ICU, wearing aspen collar and eating jello and lemon ice from tray.  Pt is awake and oriented to self and place.   She is inquiring about her purse, money and cell phone  cord.   RN is aware and making phone calls to find out location of her  Belongings.    Patient goals: "I just need to find my stuff."     Pain/Comfort:  · Pain Rating 1: 6/10  · Location 1:  (neck pain)  · Pain Addressed 1: Nurse notified,Reposition    Respiratory Status: Nasal cannula    Objective:   Pt seen this date for swallow eval in ICU..  Pt was cooperative for PO " trials.     Oral Musculature Evaluation  · Oral Musculature: general weakness  · Dentition: uses dentures to eat,edentulous  · Secretion Management: adequate  · Mucosal Quality: good  · Mandibular Strength and Mobility:  (fair)  · Oral Labial Strength and Mobility:  (fair)  · Lingual Strength and Mobility:  (fair)  · Buccal Strength and Mobility: decreased tone  · Volitional Cough: elicited  · Volitional Swallow: multiple swallows present  · Voice Prior to PO Intake: raspy/low volume noted    Bedside Swallow Eval:   Consistencies Assessed:  · Thin liquids tsp of water, cup and straw sips   · Puree pudding by tsp  · Soft solids diced peaches      Oral Phase:   · Slow oral transit time   · Poor mastication of soft solids due to missing dentition  · Reports needs dentures for soft solids  · Good labial seal  · Adequate oral motor control     Pharyngeal Phase:   · no overt clinical signs/symptoms of aspiration  · no overt clinical signs/symptoms of pharyngeal dysphagia  · multiple spontaneous swallows   · No audible coughing or choking present with PO trials     Compensatory Strategies  · Slow rate  · Alternate sips and bites  · Whole meds one by one     Treatment: Provided education to pt on ACDF and effect on swallowing anatomy, discussed multiple swallows, slow rate of intake and not talking while eating. Discussed swelling associated post surgery. Pt verbalized understanding of information.     Assessment:     Christine Castro is a 66 y.o. female admitted with Cervical spondylosis with myelopathy with an SLP diagnosis of mild dysphagia related to recent ACDF.       Goals:   Multidisciplinary Problems     SLP Goals     Not on file                Plan:     · Patient to be seen:  3 x/week   · Plan of Care expires:  01/13/22  · Plan of Care reviewed with:  patient   · SLP Follow-Up:  Yes       Discharge recommendations:   (pending therapy recs)   Barriers to Discharge:  none    Time Tracking:     SLP Treatment Date:    12/15/21  Speech Start Time:  0920  Speech Stop Time:  0938     Speech Total Time (min):  18 min    Billable Minutes: Eval Swallow and Oral Function 9 and Self Care/Home Management Training 9    12/15/2021

## 2021-12-15 NOTE — PT/OT/SLP EVAL
Occupational Therapy   Evaluation    Name: Christine Castro  MRN: [id number]  Admitting Diagnosis:  Cervical spondylosis with myelopathy  Recent Surgery: Procedure(s) (LRB):  DECOMPRESSION AND FUSION, SPINE, CERVICAL, ANTERIOR APPROACH C3-4 C5- 6 C6-7 ACDF (N/A) 1 Day Post-Op    Recommendations:     Discharge Recommendations: other (see comments) (TBD)  Discharge Equipment Recommendations:  other (see comments) (TBD)  Barriers to discharge:  Decreased caregiver support    Assessment:     Christine Castro is a 66 y.o. female with a medical diagnosis of Cervical spondylosis with myelopathy.  She presents with performance deficits affecting function: weakness,impaired endurance,impaired sensation,impaired self care skills,impaired functional mobilty,gait instability,impaired balance,decreased coordination,pain,decreased safety awareness,decreased lower extremity function,decreased upper extremity function,decreased ROM,impaired coordination,impaired fine motor,impaired skin,orthopedic precautions.      Rehab Prognosis: Good; patient would benefit from acute skilled OT services to address these deficits and reach maximum level of function.       Plan:     Patient to be seen 5 x/week to address the above listed problems via self-care/home management,therapeutic activities,therapeutic exercises  · Plan of Care Expires:    · Plan of Care Reviewed with: patient    Subjective     Chief Complaint: weakness, neck pain  Patient/Family Comments/goals: get stronger    Occupational Profile:  Living Environment: Lives alone in [facility] ramp access, T/S   Previous level of function: mod A from neighbor for ADLs and mobility  Roles and Routines:   Equipment Used at Home:  cane, quad,cane, straight,bedside commode,bath bench,walker, rolling  Assistance upon Discharge: neighbor Kelsey    Pain/Comfort:  · Pain Rating 1: 6/10  · Location - Orientation 1: anterior  · Location 1: neck  · Pain Addressed 1: Reposition,Distraction,Cessation of  Activity,Nurse notified  · Pain Rating Post-Intervention 1: other (see comments) (did not rate)    Patients cultural, spiritual, Zoroastrianism conflicts given the current situation: no    Objective:     Communicated with: nurse prior to session.  Patient found HOB elevated with blood pressure cuff,cuellar catheter,hemovac,oxygen,telemetry,SCD,pulse ox (continuous),peripheral IV upon OT entry to room.    General Precautions: Standard, fall   Orthopedic Precautions:spinal precautions   Braces: Aspen collar  Respiratory Status: Nasal cannula, flow 1 L/min    Occupational Performance:    Bed Mobility:    · Patient completed Rolling/Turning to Left with  minimum assistance  · Patient completed Rolling/Turning to Right with minimum assistance  · Patient completed Scooting/Bridging with minimum assistance of 2 to HOB    Functional Mobility/Transfers:  · NT  · Functional Mobility: NT    Activities of Daily Living:  · NT    Cognitive/Visual Perceptual:  Drowsy, decreased command following  Oriented x self, month, year, situation  Confused on location  Visual hallucinations of dog in the room    Physical Exam:  BUE sensation grossly intact  RUE AROM grossly WFL, strength grossly 3/5 to 3+/5  LUE upon formal assessment-no functional AROM of shoulder, elbow flex ~10, elbow ext ~30, no wrist ext, wrist flex WFL , however pt able to move LUE volitionally. By obervation LUE grossly 2/5  Decreased  strength bilaterally  Impaired coordination    AMPAC 6 Click ADL:  AMPAC Total Score: 8    Treatment & Education:  OT eval performed, report to follow.  Pt with decreased command following limiting eval. Pt noted to have BP of 90/56 at start of session with lowest bp noted of 86/55. Performed rolling L and R and scooted up to HOB. Required increased stimulation to maintain wakefulness. Encouraged pt to perform AROM as able, reviewed spinal precautions.  Education:    Patient left HOB elevated with all lines intact, call button in reach  and nurse notified    GOALS:   Multidisciplinary Problems     Occupational Therapy Goals        Problem: Occupational Therapy Goal    Goal Priority Disciplines Outcome Interventions   Occupational Therapy Goal     OT, PT/OT Ongoing, Progressing    Description: Goals to be met by: 1/15/22     Patient will increase functional independence with ADLs by performing:    Feeding with Modified [place].  UE Dressing with Minimal Assistance.  LE Dressing with Moderate Assistance.  Grooming while seated with Stand-by Assistance.  Toileting from toilet with Minimal Assistance for hygiene and clothing management.   Supine to sit with Minimal Assistance.  Toilet transfer to toilet with Minimal Assistance.  Upper extremity exercise program x10 reps per handout, with supervision.                     History:     Past Medical History:   Diagnosis Date    Anticoagulant long-term use     Arthritis     Asthma     CHF (congestive heart failure)     ST CLAUDE GENERAL    Colon cancer     colon    COPD (chronic obstructive pulmonary disease)     Coronary artery disease     Depression     Diabetes mellitus, type 2     GERD (gastroesophageal reflux disease)     Myocardial infarction     AGE 19 [facility] WITHBABY DELIVERY    Thyroid disease        Past Surgical History:   Procedure Laterality Date    ABDOMINAL SURGERY       SECTION      CHOLECYSTECTOMY      COLON SURGERY      COLONOSCOPY      --- repeat in 3-5 years    COLONOSCOPY N/A 2017    Procedure: COLONOSCOPY;  Surgeon: Corrina Flores MD;  Location: [facility] ENDO;  Service: Endoscopy;  Laterality: N/A;    COLONOSCOPY N/A 4/10/2018    Procedure: COLONOSCOPY golytely;  Surgeon: Corrina Flores MD;  Location: [facility] ENDO;  Service: Endoscopy;  Laterality: N/A;    ECTOPIC PREGNANCY SURGERY      EPIDURAL STEROID INJECTION INTO LUMBAR SPINE N/A 2020    Procedure: Injection-steroid-epidural-lumbar--L5-S1 InterLaminar;  Surgeon: Nilam Santiago MD;   Location: [facility] PAIN MGT;  Service: Pain Management;  Laterality: N/A;    ESOPHAGOGASTRODUODENOSCOPY N/A 2/28/2019    Procedure: ESOPHAGOGASTRODUODENOSCOPY (EGD);  Surgeon: Corrina Flores MD;  Location: [facility] ENDO;  Service: Endoscopy;  Laterality: N/A;    FRACTURE SURGERY      left leg    FUSION OF SPINE WITH INSTRUMENTATION Left 1/11/2021    Procedure: FUSION, SPINE, WITH INSTRUMENTATION Stage 1 Left L4-5 OLIF DORA Stage 2 L4-5 Laminectomy, medial Facetectomy, foraminotomy, L4-5 MIS Instrumentation;  Surgeon: Ishaan Fisher MD;  Location: [facility] OR;  Service: Neurosurgery;  Laterality: Left;  Procedure: Stage 1 Left L4-5 OLIF DORA  Stage 2 L4-5 Laminectomy, medial Facetectomy, foraminotomy, L4-5 MIS Instrumentation  Surgery TIme: 4 Hrs    GASTRIC BYPASS      HERNIA REPAIR      INJECTION OF ANESTHETIC AGENT AROUND GENITOFEMORAL NERVE Left 7/29/2020    Procedure: BLOCK, NERVE, LEFT SHOULDER ARTICULAR;  Surgeon: Nilam Santiago MD;  Location: [facility] PAIN MGT;  Service: Pain Management;  Laterality: Left;    JOINT REPLACEMENT      KNEE ARTHROPLASTY Left 5/8/2019    Procedure: ARTHROPLASTY, KNEE;  Surgeon: Roldan Greenwood MD;  Location: [facility] OR;  Service: Orthopedics;  Laterality: Left;  Navid notified    KNEE ARTHROPLASTY Right 11/20/2019    Procedure: ARTHROPLASTY, KNEE;  Surgeon: Roldan Greenwood MD;  Location: [facility] OR;  Service: Orthopedics;  Laterality: Right;  Navid notified, confirmed case Navid 11/19/19 KB [id number]    OOPHORECTOMY      RADIOFREQUENCY THERMOCOAGULATION Left 8/12/2020    Procedure: RADIOFREQUENCY THERMAL COAGULATION--Left Suprascapular, Axillary, and Lateral Pectoral Articular Branch RFA;  Surgeon: Nilam Santiago MD;  Location: [facility] PAIN MGT;  Service: Pain Management;  Laterality: Left;    TONSILLECTOMY      TRANSFORAMINAL EPIDURAL INJECTION OF STEROID Right 8/25/2021    Procedure: Injection,steroid,epidural,transforaminal approach; Levels: L5;  Surgeon: Nilam Santiago MD;  Location:  [facility] PAIN MGT;  Service: Pain Management;  Laterality: Right;  No pacemaker. Patient is diabetic. Patient is taking Aspirin but can continue per Dr. Santiago.     TUBAL LIGATION         Time Tracking:     OT Date of Treatment: 12/15/21  OT Start Time: 1034  OT Stop Time: 1105  OT Total Time (min): 31 min    Billable Minutes:Evaluation 15  Therapeutic Activity 8    12/15/2021

## 2021-12-15 NOTE — PROGRESS NOTES
Spoke with Mauricio, patient's neighbor and caregiver. She has patient's belongings and will bring it back to the hospital today when she comes to visit.

## 2021-12-15 NOTE — PLAN OF CARE
Pt still drowsy but alert and oriented x4. Afebrile during night. VSS, remains on 1 LNC for comfort. PRN pain medications given with moderate relief. On continuous LR at 100 cc/hr.  cc via cuellar. Incisional CDI, accordion drain output 80 cc overnight. Accu checks performed, no SSI given. Passed aspiration/swallow test, tolerating PO meds with sips of water. POC reviewed with pt. CHG bath given. Skin and safety precautions in place. WCTM

## 2021-12-15 NOTE — PLAN OF CARE
OT laura performed, report to follow    Ongoing assessment, however anticipate the need for post acute placement      Problem: Occupational Therapy Goal  Goal: Occupational Therapy Goal  Description: Goals to be met by: 1/15/22     Patient will increase functional independence with ADLs by performing:    Feeding with Modified [place].  UE Dressing with Minimal Assistance.  LE Dressing with Moderate Assistance.  Grooming while seated with Stand-by Assistance.  Toileting from toilet with Minimal Assistance for hygiene and clothing management.   Supine to sit with Minimal Assistance.  Toilet transfer to toilet with Minimal Assistance.  Upper extremity exercise program x10 reps per handout, with supervision.    Outcome: Ongoing, Progressing

## 2021-12-15 NOTE — PROGRESS NOTES
[facility] - Intensive Care  Neurosurgery  Progress Note    Subjective:         History of Present Illness: Pain in the neck into the left upper arm.  No right arm pain.    Post-Op Info:  Procedure(s) (LRB):  DECOMPRESSION AND FUSION, SPINE, CERVICAL, ANTERIOR APPROACH C3-4 C5- 6 C6-7 ACDF (N/A)   1 Day Post-Op      Medications:  Continuous Infusions:   lactated ringers 100 mL/hr at 12/15/21 0902     Scheduled Meds:   acetaminophen  650 mg Oral Q6H    atorvastatin  40 mg Oral QHS    buPROPion  200 mg Oral BID    busPIRone  5 mg Oral Daily    EScitalopram oxalate  20 mg Oral Daily    furosemide  20 mg Oral Daily    gabapentin  300 mg Oral BID    heparin (porcine)  5,000 Units Subcutaneous Q12H    levothyroxine  100 mcg Oral Before breakfast    mupirocin   Nasal BID    pantoprazole  40 mg Oral Daily    potassium chloride SA  10 mEq Oral Daily    zolpidem  5 mg Oral QHS     PRN Meds:albuterol, aluminum-magnesium hydroxide-simethicone, dextrose 50%, dextrose 50%, glucagon (human recombinant), glucose, glucose, HYDROmorphone, insulin aspart U-100, ondansetron, oxyCODONE, prochlorperazine, tiZANidine     Review of Systems  Objective:     Weight: 92.5 kg (203 lb 14.8 oz)  Body mass index is 39.83 kg/m².  Vital Signs (Most Recent):  Temp: 97.9 °F (36.6 °C) (12/15/21 0730)  Pulse: 98 (12/15/21 0900)  Resp: (!) 24 (12/15/21 0900)  BP: (!) 103/58 (12/15/21 0900)  SpO2: 100 % (12/15/21 0900) Vital Signs (24h Range):  Temp:  [97.9 °F (36.6 °C)-98.5 °F (36.9 °C)] 97.9 °F (36.6 °C)  Pulse:  [] 98  Resp:  [10-38] 24  SpO2:  [92 %-100 %] 100 %  BP: ()/(52-95) 103/58     Date 12/15/21 0700 - 12/16/21 0659   Shift 8421-0038 7708-8316 5534-8149 24 Hour Total   INTAKE   I.V.(mL/kg) 87.8(0.9)   87.8(0.9)   Shift Total(mL/kg) 87.8(0.9)   87.8(0.9)   OUTPUT   Urine(mL/kg/hr) 180   180   Shift Total(mL/kg) 180(1.9)   180(1.9)   Weight (kg) 92.5 92.5 92.5 92.5                        Closed/Suction Drain 12/14/21 2876  "Right;Anterior Neck Accordion 10 Fr. (Active)   Site Description Unable to view 12/15/21 0329   Dressing Type Gauze;Transparent (Tegaderm) 12/15/21 0329   Dressing Status Clean;Dry;Intact 12/15/21 0329   Dressing Intervention Integrity maintained 12/15/21 0329   Drainage Sanguineous 12/15/21 0329   Status To bulb suction 12/15/21 0329   Output (mL) 80 mL 12/14/21 2303            Urethral Catheter 12/14/21 1645 Straight-tip;Non-latex 16 Fr. (Active)   Site Assessment Clean;Intact 12/15/21 0329   Collection Container Urimeter 12/15/21 0329   Securement Method secured to top of thigh w/ adhesive device 12/15/21 0329   Catheter Care Performed yes 12/14/21 2020   Reason for Continuing Urinary Catheterization Critically ill in ICU and requiring hourly monitoring of intake/output 12/15/21 0329   CAUTI Prevention Bundle StatLock in place w 1" slack;Intact seal between catheter & drainage tubing;Drainage bag/urimeter off the floor;Sheeting clip in use;No dependent loops or kinks;Drainage bag/urimeter not overfilled (<2/3 full);Drainage bag/urimeter below bladder 12/15/21 0329   Output (mL) 180 mL 12/15/21 0800       Neurosurgery Physical Exam     General: well developed, well nourished, no distress  Mental Status: Awake, Alert, Oriented X3.Thought content appropriate  GCS: Motor: 6/Verbal: 5/Eyes: 4 GCS Total: 15    Motor Strength:  Strength  Deltoids Triceps Biceps Wrist Extension Wrist Flexion Hand    Upper: R 5/5 4/5 5/5 5/5 5/5 4/5    L 4/5 4/5 5/5 5/5 5/5 4/5     HF KF KE DF PF EHL   Lower: R 5/5 5/5 5/5 5/5 5/5 5/5    L 5/5 5/5 5/5 5/5 5/5 5/5       Munoz: absent on the right.  Present on the left  Clonus: absent B/L  Drain- 80        Significant Labs:  Recent Labs   Lab 12/15/21  0329   *      K 4.3      CO2 20*   BUN 11   CREATININE 0.8   CALCIUM 8.5*     Recent Labs   Lab 12/15/21  0329   WBC 4.94   HGB 9.4*   HCT 29.7*        No results for input(s): LABPT, INR, APTT in the last 48 " hours.  Microbiology Results (last 7 days)     ** No results found for the last 168 hours. **            Assessment/Plan:     Active Diagnoses:    Diagnosis Date Noted POA    PRINCIPAL PROBLEM:  Cervical spondylosis with myelopathy [M47.12] 12/14/2021 Yes      Problems Resolved During this Admission:     A/P:  POD #1 C3-4, C5-6, and C6-7 ACDF    -Neurologically stable  -PT/OT  -OOB ambulating with assistance  -Pureed diet per ST recs  -DC IV  -DC cuellar  -cspine xrays when able    All of the above discussed and reviewed with Dr. Fisher.      HUONG LarryC  Neurosurgery  [place] - Intensive Care

## 2021-12-15 NOTE — NURSING
"Pt admitted to ICU room 569. Pt arrived with a simple face mask on 6 liters O2, cuellar,and accordion drain. Connected to cardiac monitor and oriented to room and unit. MD notified of arrival. Pt still drowsy but alert and oriented x4. Unable to take temperature, applied leno hugger on. Restarted LR at 100 cc/hr. Neck dressing CDI. Per Dr Fisher ok to give 2 mg of dilaudid and administer PO meds. Also per MD keep aspen neck collar on at all times and HOB at 30 degrees or higher.     Pt has no belongings, stated "Mauricio has my purse and my money". Phone brought by PACU RN. Per pt OK to call Mauricio to update on pt status.     0106 Update  Mauricio updated on pt status. Stated will come bring the purse tomorrow. Notified pt about personal belongings being taken by security. All questions answered.  "

## 2021-12-15 NOTE — EICU
e-ICU admit note         Reason for ICU admission:    Cervical spondylosis with myelopathy and radiculopathy at C3-4, C5-6, C6    Cervical spine fusion, C4-5      HPI:    ( as per NS note)    65 yo female, who is status post left L4-5 oblique interbody fusion with posterior laminectomy and instrumentation in 01/21 for spondylolisthesis with severe stenosis. She recovered well from her surgery.      Was found to have worsening gait imbalance, bilateral hand weakness, bilateral hand numbness, left shoulder and arm pain.    Cervical spine MRI 2021 shows C3-4 severe spinal stenosis, C5-6 moderate spinal stenosis, C6-7 left severe foraminal stenosis caused by disc herniation    Today underwent C3-4, C5-6, C6-7 anterior diskectomy and interbody fusion           PMx:    GERD  hypothyroidism    congestive heart failure    Depression   myocardial infarction   Primary osteoarthritis of left knee  TIA    gastric bypass  Coronary artery disease     History of right knee joint replacement   Primary osteoarthritis of left shoulder  Lumbar radiculopathy   DDD  lumbar              Home Meds:    oxyCODONE-acetaminophen   albuterol   albuterol  aspirin  atorvastatin  buPROPion  busPIRone   diclofenac sodium   ergocalciferol     EScitalopram  furosemide  gabapentin   glipiZIDE     ibandronate     levothyroxine  omeprazole  potassium chloride  promethazine   tiZANidine              Significant labs:    K 3.4  Hb 10                Significant images           I viewed the pt via camera at   9:15 pm:    140/70, 77 sinus, 100%, R 18    Resting comfortably    NAD    IV at 100ml/hr                                Assessment/Plan:    Cervical spondylosis with myelopathy and radiculopathy at C3-4, C5-6, C6  Cervical spine fusion, C4-5  Hydromorphone prn      Hypothyroid  Synthroid    HLD  Statin    GERD  PPI    CHF  Lasix    DM  SSI    Depression/anxiety  Buprione  Buspirone      DVT ppx  Heparin sc

## 2021-12-15 NOTE — NURSING
Received patient from ICU per patient bed assisted by Nurse Sue.  Patient is A&Ox3, follow commands, Juan.  Patient has on C-collar.  Noted dressing to anterior neck intact with small amount of dry blood.  Hemovac drain intact, patent, to suction to frontal neck area.  No c/o pain or discomfort at the present time.  Assessment concurrent with previous assessment.  NAD noted.  Patient care assumed.

## 2021-12-15 NOTE — NURSING
"Pt's friend/ neighbor Mauricio stated that pt's valuables: aj  was taken by security. Stated she had to leave hospital because "they told her to". And "that zoë got her stuff". When asked to specify which zoë Mauricio replied "the man".     Security called for pt's valuables. Notified that there are no valuables with patient's name on it. Security will double check and notify RN again.       3007 Update  Informed by security that Sirisha, a family member has the belongings.     "

## 2021-12-15 NOTE — PLAN OF CARE
Pt seen in ICU for swallow eval, pt may initiate pureed diet and thin liquids, boost added as oral supplement to diet texture. Pt is limited on diet textures due to no dentures and states they have been missing. Pt is aware she cannot masticate soft solids without dentition.

## 2021-12-15 NOTE — NURSING TRANSFER
Nursing Transfer Note      12/15/2021     Reason patient is being transferred: transfer in level of care    Transfer To: 528    Transfer via bed    Transfer with 2L NC to O2    Transported by transporter and RN    Medicines sent: ointment     Any special needs or follow-up needed: n/a    Chart send with patient: Yes    Notified: friend at bedside previously    Patient reassessed at:12/15 @ 1448    Upon arrival to floor: patient oriented to room, call bell in reach and bed in lowest position  Yadi nurse at bedside

## 2021-12-16 ENCOUNTER — PATIENT OUTREACH (OUTPATIENT)
Dept: ADMINISTRATIVE | Facility: OTHER | Age: 66
End: 2021-12-16
Payer: MEDICARE

## 2021-12-16 LAB
POCT GLUCOSE: 120 MG/DL (ref 70–110)
POCT GLUCOSE: 126 MG/DL (ref 70–110)
POCT GLUCOSE: 139 MG/DL (ref 70–110)
POCT GLUCOSE: 160 MG/DL (ref 70–110)

## 2021-12-16 PROCEDURE — 25000003 PHARM REV CODE 250: Mod: HCNC | Performed by: PHYSICIAN ASSISTANT

## 2021-12-16 PROCEDURE — 94799 UNLISTED PULMONARY SVC/PX: CPT | Mod: HCNC

## 2021-12-16 PROCEDURE — 97530 THERAPEUTIC ACTIVITIES: CPT | Mod: HCNC

## 2021-12-16 PROCEDURE — 30200315 PPD INTRADERMAL TEST REV CODE 302: Mod: HCNC | Performed by: NEUROLOGICAL SURGERY

## 2021-12-16 PROCEDURE — 86580 TB INTRADERMAL TEST: CPT | Mod: HCNC | Performed by: NEUROLOGICAL SURGERY

## 2021-12-16 PROCEDURE — 63600175 PHARM REV CODE 636 W HCPCS: Mod: HCNC | Performed by: PHYSICIAN ASSISTANT

## 2021-12-16 PROCEDURE — 99900035 HC TECH TIME PER 15 MIN (STAT): Mod: HCNC

## 2021-12-16 PROCEDURE — 27000221 HC OXYGEN, UP TO 24 HOURS: Mod: HCNC

## 2021-12-16 PROCEDURE — 27000646 HC AEROBIKA DEVICE: Mod: HCNC

## 2021-12-16 PROCEDURE — 92526 ORAL FUNCTION THERAPY: CPT | Mod: HCNC

## 2021-12-16 PROCEDURE — 11000001 HC ACUTE MED/SURG PRIVATE ROOM: Mod: HCNC

## 2021-12-16 PROCEDURE — 94664 DEMO&/EVAL PT USE INHALER: CPT | Mod: HCNC

## 2021-12-16 PROCEDURE — 94761 N-INVAS EAR/PLS OXIMETRY MLT: CPT | Mod: HCNC

## 2021-12-16 RX ADMIN — ACETAMINOPHEN 650 MG: 325 TABLET, FILM COATED ORAL at 05:12

## 2021-12-16 RX ADMIN — BUPROPION HYDROCHLORIDE 200 MG: 100 TABLET, EXTENDED RELEASE ORAL at 08:12

## 2021-12-16 RX ADMIN — ZOLPIDEM TARTRATE 5 MG: 5 TABLET ORAL at 08:12

## 2021-12-16 RX ADMIN — MUPIROCIN: 20 OINTMENT TOPICAL at 09:12

## 2021-12-16 RX ADMIN — GABAPENTIN 300 MG: 300 CAPSULE ORAL at 08:12

## 2021-12-16 RX ADMIN — TUBERCULIN PURIFIED PROTEIN DERIVATIVE 5 UNITS: 5 INJECTION, SOLUTION INTRADERMAL at 08:12

## 2021-12-16 RX ADMIN — ATORVASTATIN CALCIUM 40 MG: 40 TABLET, FILM COATED ORAL at 08:12

## 2021-12-16 RX ADMIN — HEPARIN SODIUM 5000 UNITS: 5000 INJECTION INTRAVENOUS; SUBCUTANEOUS at 09:12

## 2021-12-16 RX ADMIN — OXYCODONE 10 MG: 5 TABLET ORAL at 11:12

## 2021-12-16 RX ADMIN — HEPARIN SODIUM 5000 UNITS: 5000 INJECTION INTRAVENOUS; SUBCUTANEOUS at 08:12

## 2021-12-16 RX ADMIN — PANTOPRAZOLE SODIUM 40 MG: 40 TABLET, DELAYED RELEASE ORAL at 08:12

## 2021-12-16 RX ADMIN — ACETAMINOPHEN 650 MG: 325 TABLET, FILM COATED ORAL at 06:12

## 2021-12-16 RX ADMIN — LEVOTHYROXINE SODIUM 100 MCG: 50 TABLET ORAL at 05:12

## 2021-12-16 RX ADMIN — ESCITALOPRAM OXALATE 20 MG: 10 TABLET ORAL at 09:12

## 2021-12-16 RX ADMIN — HYDROMORPHONE HYDROCHLORIDE 2 MG: 2 INJECTION, SOLUTION INTRAMUSCULAR; INTRAVENOUS; SUBCUTANEOUS at 09:12

## 2021-12-16 RX ADMIN — ONDANSETRON 8 MG: 8 TABLET, ORALLY DISINTEGRATING ORAL at 04:12

## 2021-12-16 RX ADMIN — FUROSEMIDE 20 MG: 20 TABLET ORAL at 09:12

## 2021-12-16 RX ADMIN — ACETAMINOPHEN 650 MG: 325 TABLET, FILM COATED ORAL at 11:12

## 2021-12-16 RX ADMIN — OXYCODONE 10 MG: 5 TABLET ORAL at 04:12

## 2021-12-16 RX ADMIN — BUSPIRONE HYDROCHLORIDE 5 MG: 5 TABLET ORAL at 09:12

## 2021-12-16 RX ADMIN — POTASSIUM CHLORIDE 10 MEQ: 750 TABLET, EXTENDED RELEASE ORAL at 09:12

## 2021-12-16 RX ADMIN — OXYCODONE 10 MG: 5 TABLET ORAL at 06:12

## 2021-12-16 RX ADMIN — GABAPENTIN 300 MG: 300 CAPSULE ORAL at 09:12

## 2021-12-16 NOTE — PT/OT/SLP EVAL
Physical Therapy Evaluation    Patient Name:  Christine Castro   MRN:  [id number]    Recommendations:     Discharge Recommendations:   (ongoing assessment for d/c recommendations; most likely post acute placement)   Discharge Equipment Recommendations:  (TBD)   Barriers to discharge: Decreased caregiver support    Assessment:     Christine Castro is a 66 y.o. female admitted with a medical diagnosis of Cervical spondylosis with myelopathy.  She presents with the following impairments/functional limitations:  weakness,impaired endurance,impaired self care skills,impaired functional mobilty,gait instability,impaired balance,decreased upper extremity function,decreased coordination,impaired cognition,decreased lower extremity function,decreased safety awareness,impaired skin,impaired cardiopulmonary response to activity,orthopedic precautions Patient tolerated bed level evaluation; pt hypotensive throughout session; notable LUE weakness; will cont with POC; ongoing assessment for d/c recommendations-most likely post acute placement..    Rehab Prognosis: Good; patient would benefit from acute skilled PT services to address these deficits and reach maximum level of function.    Recent Surgery: Procedure(s) (LRB):  DECOMPRESSION AND FUSION, SPINE, CERVICAL, ANTERIOR APPROACH C3-4 C5- 6 C6-7 ACDF (N/A) 1 Day Post-Op    Plan:     During this hospitalization, patient to be seen daily to address the identified rehab impairments via gait training,therapeutic activities,therapeutic exercises,neuromuscular re-education and progress toward the following goals:    · Plan of Care Expires:  01/15/22    Subjective     Chief Complaint: neck pain, UE weakness  Patient/Family Comments/goals: to get stronger  Pain/Comfort:  · Pain Rating 1: 6/10  · Location - Orientation 1: anterior  · Location 1: neck  · Pain Addressed 1: Reposition,Distraction,Cessation of Activity,Nurse notified  · Pain Rating Post-Intervention 1:  (did not  rate)    Patients cultural, spiritual, Tenriism conflicts given the current situation: no    Living Environment:  Lives alone in [facility] ramp access, T/S   Previous level of function: mod A from neighbor for ADLs and mobility  Roles and Routines:   Equipment Used at Home:  cane, quad,cane, straight,bedside commode,bath bench,walker, rolling  Assistance upon Discharge: neighbor Kelsey      Objective:     Communicated with nurse prior to session.  Patient found HOB elevated with SCD,telemetry,blood pressure cuff,peripheral IV,cuellar catheter,pulse ox (continuous)  upon PT entry to room.    General Precautions: Standard, fall   Orthopedic Precautions:spinal precautions   Braces: Aspen collar  Respiratory Status: Nasal cannula, flow 1 L/min    Exams:  · Cognitive Exam:  Patient is oriented to Person and month, year, situation; confused as to location; pt drowsy, needing repetition for command following; having hallucinations of dog in room  · Gross Motor Coordination:  WFL for LEs  · Sensation: grossly intact  · Skin Integrity/Edema:      · -       Skin integrity: incision anterior neck bandaged  · RLE ROM: WFL  · RLE Strength: WFL  · LLE ROM: WFL proximally moderately limited L ankle  · LLE Strength: WFL proximally; active ankle df, ev mod limited, little to no toe ext    Functional Mobility:  · Bed Mobility:     · Rolling Left:  minimum assistance  · Rolling Right: minimum assistance  · Scooting: minimum assistance, of 2 persons and to HOB    Therapeutic Activities and Exercises:   Pt educated in role of PT/POC; eval performed; pt with HOTN; initial BP 90/65 dropping to as low as 86/55 during session; educated in spinal precautions; VCs/TCs to maintain wakefulness; advised in AROM as tolerated to BLEs/UEs.    AM-PAC 6 CLICK MOBILITY  Total Score:8     Patient left HOB elevated with all lines intact, call button in reach, nurse notified and   present.    GOALS:   Multidisciplinary Problems     Physical Therapy Goals         Problem: Physical Therapy Goal    Goal Priority Disciplines Outcome Goal Variances Interventions   Physical Therapy Goal     PT, PT/OT Ongoing, Progressing     Description: Goals to be met by: 1/15/2022     Patient will increase functional independence with mobility by performin. Supine <> sit with Minimal Assistance  2. Sit to stand transfer with Minimal Assistance  3. Bed to chair transfer with Minimal Assistance using Rolling Walker  4. Gait  x 50 feet with Minimal Assistance using Rolling Walker.   5. Lower extremity exercise program x 10 reps per handout, with supervision                     History:     Past Medical History:   Diagnosis Date    Anticoagulant long-term use     Arthritis     Asthma     CHF (congestive heart failure)     [facility]    Colon cancer     colon    COPD (chronic obstructive pulmonary disease)     Coronary artery disease     Depression     Diabetes mellitus, type 2     GERD (gastroesophageal reflux disease)     Myocardial infarction     AGE 19 [facility]BABY DELIVERY    Thyroid disease        Past Surgical History:   Procedure Laterality Date    ABDOMINAL SURGERY       SECTION      CHOLECYSTECTOMY      COLON SURGERY      COLONOSCOPY      --- repeat in 3-5 years    COLONOSCOPY N/A 2017    Procedure: COLONOSCOPY;  Surgeon: Corrina Flores MD;  Location: [facility] ENDO;  Service: Endoscopy;  Laterality: N/A;    COLONOSCOPY N/A 4/10/2018    Procedure: COLONOSCOPY golytely;  Surgeon: Corrina Flores MD;  Location: [facility] ENDO;  Service: Endoscopy;  Laterality: N/A;    ECTOPIC PREGNANCY SURGERY      EPIDURAL STEROID INJECTION INTO LUMBAR SPINE N/A 2020    Procedure: Injection-steroid-epidural-lumbar--L5-S1 InterLaminar;  Surgeon: Nilam Santiago MD;  Location: [facility] PAIN MGT;  Service: Pain Management;  Laterality: N/A;    ESOPHAGOGASTRODUODENOSCOPY N/A 2019    Procedure: ESOPHAGOGASTRODUODENOSCOPY (EGD);  Surgeon: Corrina REIS  MD Mark;  Location: [facility] ENDO;  Service: Endoscopy;  Laterality: N/A;    FRACTURE SURGERY      left leg    FUSION OF SPINE WITH INSTRUMENTATION Left 1/11/2021    Procedure: FUSION, SPINE, WITH INSTRUMENTATION Stage 1 Left L4-5 OLIF DORA Stage 2 L4-5 Laminectomy, medial Facetectomy, foraminotomy, L4-5 MIS Instrumentation;  Surgeon: Ishaan Fisher MD;  Location: [facility] OR;  Service: Neurosurgery;  Laterality: Left;  Procedure: Stage 1 Left L4-5 OLIF DORA  Stage 2 L4-5 Laminectomy, medial Facetectomy, foraminotomy, L4-5 MIS Instrumentation  Surgery TIme: 4 Hrs    GASTRIC BYPASS      HERNIA REPAIR      INJECTION OF ANESTHETIC AGENT AROUND GENITOFEMORAL NERVE Left 7/29/2020    Procedure: BLOCK, NERVE, LEFT SHOULDER ARTICULAR;  Surgeon: Nilam Santiago MD;  Location: [facility] PAIN MGT;  Service: Pain Management;  Laterality: Left;    JOINT REPLACEMENT      KNEE ARTHROPLASTY Left 5/8/2019    Procedure: ARTHROPLASTY, KNEE;  Surgeon: Roldan Greenwood MD;  Location: [facility] OR;  Service: Orthopedics;  Laterality: Left;  Navid notified    KNEE ARTHROPLASTY Right 11/20/2019    Procedure: ARTHROPLASTY, KNEE;  Surgeon: Roldan Greenwood MD;  Location: [facility] OR;  Service: Orthopedics;  Laterality: Right;  Navid notified, confirmed case Navid 11/19/19 [id number]    OOPHORECTOMY      RADIOFREQUENCY THERMOCOAGULATION Left 8/12/2020    Procedure: RADIOFREQUENCY THERMAL COAGULATION--Left Suprascapular, Axillary, and Lateral Pectoral Articular Branch RFA;  Surgeon: Nilam Santiago MD;  Location: [facility] PAIN MGT;  Service: Pain Management;  Laterality: Left;    TONSILLECTOMY      TRANSFORAMINAL EPIDURAL INJECTION OF STEROID Right 8/25/2021    Procedure: Injection,steroid,epidural,transforaminal approach; Levels: L5;  Surgeon: Nilam Santiago MD;  Location: [facility] PAIN MGT;  Service: Pain Management;  Laterality: Right;  No pacemaker. Patient is diabetic. Patient is taking Aspirin but can continue per Dr. Santiago.     TUBAL LIGATION          Time Tracking:     PT Received On: 12/15/21  PT Start Time: 1035     PT Stop Time: 1103  PT Total Time (min): 28 min with OT    Billable Minutes: Evaluation 15      12/15/2021

## 2021-12-16 NOTE — PLAN OF CARE
Pt is awake and alert. Disoriented to situation. Pt frequently tearful and becomes upset when people walking by her room do not stop to talk to her. Pt has c/o pain. PRN pain medication given. Pt assessment completed and documented. Medication given per MAR. Rounding completed per protocol. Will continue to monitor.         Problem: Adult Inpatient Plan of Care  Goal: Plan of Care Review  Outcome: Ongoing, Progressing  Goal: Patient-Specific Goal (Individualized)  Outcome: Ongoing, Progressing  Goal: Absence of Hospital-Acquired Illness or Injury  Outcome: Ongoing, Progressing  Goal: Optimal Comfort and Wellbeing  Outcome: Ongoing, Progressing  Goal: Readiness for Transition of Care  Outcome: Ongoing, Progressing     Problem: Bariatric Environmental Safety  Goal: Safety Maintained with Care  Outcome: Ongoing, Progressing     Problem: Infection  Goal: Absence of Infection Signs and Symptoms  Outcome: Ongoing, Progressing     Problem: Fall Injury Risk  Goal: Absence of Fall and Fall-Related Injury  Outcome: Ongoing, Progressing     Problem: Skin Injury Risk Increased  Goal: Skin Health and Integrity  Outcome: Ongoing, Progressing     Problem: Bleeding (Surgery Nonspecified)  Goal: Absence of Bleeding  Outcome: Ongoing, Progressing     Problem: Bowel Motility Impaired (Surgery Nonspecified)  Goal: Effective Bowel Elimination  Outcome: Ongoing, Progressing     Problem: Fluid and Electrolyte Imbalance (Surgery Nonspecified)  Goal: Fluid and Electrolyte Balance  Outcome: Ongoing, Progressing     Problem: Glycemic Control Impaired (Surgery Nonspecified)  Goal: Blood Glucose Level Within Targeted Range  Outcome: Ongoing, Progressing     Problem: Infection (Surgery Nonspecified)  Goal: Absence of Infection Signs and Symptoms  Outcome: Ongoing, Progressing     Problem: Ongoing Anesthesia Effects (Surgery Nonspecified)  Goal: Anesthesia/Sedation Recovery  Outcome: Ongoing, Progressing     Problem: Pain (Surgery  Nonspecified)  Goal: Acceptable Pain Control  Outcome: Ongoing, Progressing     Problem: Postoperative Nausea and Vomiting (Surgery Nonspecified)  Goal: Nausea and Vomiting Relief  Outcome: Ongoing, Progressing     Problem: Postoperative Urinary Retention (Surgery Nonspecified)  Goal: Effective Urinary Elimination  Outcome: Ongoing, Progressing     Problem: Respiratory Compromise (Surgery Nonspecified)  Goal: Effective Oxygenation and Ventilation  Outcome: Ongoing, Progressing

## 2021-12-16 NOTE — PT/OT/SLP PROGRESS
"Speech Language Pathology Treatment    Patient Name:  Christine Castro   MRN:  [id number]  Admitting Diagnosis: Cervical spondylosis with myelopathy    Recommendations:     Diet recommendations:  Puree, Thin   Swallow Precautions:  1. UPRIGHT for all meals at 90 degree and for all PO solid intake   2. Pureed Tray/ Thin Liquids  3. Single straw sips (slowly)/Cup swallows preferred to regular volume   4. Alternate sips/bites  5. Eat  slowly  6. Assist for feeding set-up   7. Whole medications one by one should follow with 3 - 6oz fluid, then STAY upright at least 30min  8. Close monitoring needed for any overt s/s of aspiration (coughing/choking, throat clearing, wet/gurgly vocal quality, nasal emission, watery eyes, spike in fever, reddened face, reduced O2 sats, etc)     General Precautions: Standard, fall, quick/jerky head movements   Communication strategies:  none                Subjective     Pt seen in room for diet follow-up. Pt had returned from radiology for xray.  Patient goals: "You should have been here this morning!"      Pain/Comfort:  · Pain Rating 1: 3/10  · Location 1:  (sore throat)  · Pain Addressed 1: Nurse notified    Respiratory Status: Room air    Objective:     Has the patient been evaluated by SLP for swallowing?   Yes  Keep patient NPO? No   Current Respiratory Status:    Room air     Swallowing: Pt seen in room, she needed to be re-positioned in bed due to slouchingin bed. Pt noted to make jerky movements by throwing herself from side to side in bed. PCT and SLP in room and explained to pt that she needed to be careful with flopping around and watch drain. Pt also complained that aspen collar was too tight on neck. SLP loosed it slightly and patient was repositioned in bed, she was able to use her legs to help push her self up and she was placed at midline of bed. Pt did note to throw head back against pillows. Pt remains impulsive with movements.   Pt asking for more pain meds. Pt was given " apple juice via straw and applesauce via tsp bites. Pt able to feed self and needed verbal cues to take smaller bites and perform double swallows before taking next bite. Aspiration precautions were reviewed with pt at length during session. Discussed being upright for meals and not being reclined back in bed. Also discussed maintaining aspen collar on for now for safety reasons and not jerking head around while eating.   Nurse notified that pt was requesting pain meds due to neck discomfort. She reports minimal throat pain when swallowing.   Pt left upright in bed, call bell within reach,she requested her purse to be placed in her lap.       Assessment:     Christine Castro is a 66 y.o. female admitted with Cervical spondylosis with myelopathy, POD #2 ACDF with an SLP diagnosis of very mild dysphagia at this time which is improving, she is consuming pureed and thin liquid diet with no complaints.      Goals:   Multidisciplinary Problems     SLP Goals     Not on file                Plan:       · Plan of Care expires:  01/13/22  · Plan of Care reviewed with:  patient   · SLP Follow-Up:  No       Discharge recommendations:   (TBD)   Barriers to Discharge:  none    Time Tracking:     SLP Treatment Date:   12/16/21  Speech Start Time:  1030  Speech Stop Time:  1046     Speech Total Time (min):  16 min    Billable Minutes: Treatment Swallowing Dysfunction 16    12/16/2021

## 2021-12-16 NOTE — PROGRESS NOTES
NEUROSURGICAL POST-OPERATIVE PROGRESS NOTE    DATE OF SERVICE:  12/16/2021      ATTENDING PHYSICIAN:  Ishaan Fisher MD    SUBJECTIVE:    INTERIM HISTORY:    This is a very pleasant 66 y.o. y.o. female, who is status post day 2 C3-C7 anterior fusion.  Started to have respiratory secretions, brownish overnight.  No shortness of breath.  Diet was progressed by speech therapy yesterday.                 OBJECTIVE:    PHYSICAL EXAMINATION:   Vitals:    12/16/21 0440   BP:    Pulse:    Resp: 18   Temp:        Neurosurgery Physical Exam    Ortho Exam    Neurologic Exam    Dressing CDI  Neck soft    DIAGNOSTIC DATA:    X-ray of the cervical spine pending  ASSESMENT:    This is a 66 y.o. female who is s/p day 2 C3-7 ACDF, upper respiratory increased secretions.     Problem List Items Addressed This Visit        Neuro    * (Principal) Cervical spondylosis with myelopathy    Relevant Orders    العراق[place] catheter - discontinue      Other Visit Diagnoses     Cervical spondylosis with myelopathy and radiculopathy    -  Primary    Relevant Orders    Admit to Inpatient (Completed)    Vital signs    Turn cough deep breathe    Oral Care    Neurovascular checks    Intake and output    Place sequential compression device    Chlorohexidine Gluconate Bath    Incentive spirometry    Pulse Oximetry Q4H    PT evaluate and treat    OT evaluate and treat    Transfer patient (Completed)    PT assistance with ambulation    Drain - full suction (Completed)    Hemovac drain (Completed)    Keep Aspen brace on at all times (Completed)    Advance diet as tolerated to Regular diet    CBC auto differential (Completed)    Basic metabolic panel (Completed)    X-Ray Cervical Spine AP And Lateral    POCT glucose    If any glucose result is less than 50 or greater than 400:    If 2nd result is less than 50 or greater than 400:    Do not admin Aspart correction between scheduled prandial Aspart    Recheck Blood Glucose:    SLP Evaluate and treat    Transfer  patient (Completed)    X-Ray Chest PA And Lateral          PLAN:    Respiratory therapy  Chest xr, cervical xr  Ok to mobilize out of bed          Ishaan Fisher MD  Pager: [phone]

## 2021-12-16 NOTE — PLAN OF CARE
Progressing toward goals, however may need SNF at discharge      Problem: Occupational Therapy Goal  Goal: Occupational Therapy Goal  Description: Goals to be met by: 1/15/22     Patient will increase functional independence with ADLs by performing:    Feeding with Modified Saunders.  UE Dressing with Minimal Assistance.  LE Dressing with Moderate Assistance.  Grooming while seated with Stand-by Assistance.  Toileting from toilet with Minimal Assistance for hygiene and clothing management.   Supine to sit with Minimal Assistance.  Toilet transfer to toilet with Minimal Assistance.  Upper extremity exercise program x10 reps per handout, with supervision.    Outcome: Ongoing, Progressing

## 2021-12-16 NOTE — PLAN OF CARE
Problem: Physical Therapy Goal  Goal: Physical Therapy Goal  Description: Goals to be met by: 1/15/2022     Patient will increase functional independence with mobility by performin. Supine <> sit with Minimal Assistance  2. Sit to stand transfer with Minimal Assistance  3. Bed to chair transfer with Minimal Assistance using Rolling Walker  4. Gait  x 50 feet with Minimal Assistance using Rolling Walker.   5. Lower extremity exercise program x 10 reps per handout, with supervision    Outcome: Ongoing, Progressing   Patient progressing toward goals; may benefit from SNF placement

## 2021-12-16 NOTE — PT/OT/SLP PROGRESS
Occupational Therapy   Treatment    Name: Christine Castro  MRN: [id number]  Admitting Diagnosis:  Cervical spondylosis with myelopathy  2 Days Post-Op    Recommendations:     Discharge Recommendations: nursing facility, skilled  Discharge Equipment Recommendations:  other (see comments) (TBD)  Barriers to discharge:  Decreased caregiver support    Assessment:     Christine Castro is a 66 y.o. female with a medical diagnosis of Cervical spondylosis with myelopathy.  She presents with performance deficits affecting function are weakness,impaired endurance,impaired functional mobilty,impaired self care skills,impaired sensation,gait instability,impaired balance,impaired cognition,pain,decreased safety awareness,decreased lower extremity function,decreased upper extremity function,impaired cardiopulmonary response to activity,impaired fine motor,decreased ROM,orthopedic precautions.     Rehab Prognosis:  Good; patient would benefit from acute skilled OT services to address these deficits and reach maximum level of function.       Plan:     Patient to be seen 5 x/week to address the above listed problems via self-care/home management,therapeutic activities,therapeutic exercises  · Plan of Care Expires: 01/15/22  · Plan of Care Reviewed with: patient    Subjective     Pain/Comfort:  · Pain Rating 1: other (see comments) (did not rate)  · Pain Rating Post-Intervention 1: other (see comments) (did not rate)    Objective:     Communicated with: nurse prior to session.  Patient found HOB elevated with bed alarm,peripheral IV,oxygen,hemovac,cuellar catheter upon OT entry to room.    General Precautions: Standard, fall,respiratory   Orthopedic Precautions:spinal precautions   Braces: Aspen collar  Respiratory Status: Nasal cannula, flow 2 L/min     Occupational Performance:     Bed Mobility:    · Patient completed Rolling/Turning to Left with  stand by assistance  · Patient completed Scooting/Bridging with minimum assistance and 2  "persons  · Patient completed Supine to Sit with minimum assistance  · Patient completed Sit to Supine with moderate assistance     Functional Mobility/Transfers:  · Patient completed Sit <> Stand Transfer with minimum assistance and of 2 persons  with  hand-held assist   · Functional Mobility: unable to maintain standing 2/2 inability to maintain quad contraction(knees buckling)     [facility] 6 Click ADL: 15    Treatment & Education:  · Pt tearful upon therapy arrival.  Pt moved to sit EOB CGA-min A.  EOB sitting CGA to total assistance--pt with "weeble wobble" appearance; frequently resting all body weight on therapist standing in front of her.  Pt had removed O2, SPO2 85% on room air, placed NC @ 2L returned to 90s.  BP checked-112/87. Attempted sit to stand however unable to maintain standing 2/2 inability to maintain quad contraction(knees buckling) Performed lateral scooting to HOB min A of 2 w/HHA.  Returned to supine mod A for BLE assistance.      Patient left HOB elevated with all lines intact, call button in reach, bed alarm onEducation:   and nurse informed.    GOALS:   Multidisciplinary Problems     Occupational Therapy Goals        Problem: Occupational Therapy Goal    Goal Priority Disciplines Outcome Interventions   Occupational Therapy Goal     OT, PT/OT Ongoing, Progressing    Description: Goals to be met by: 1/15/22     Patient will increase functional independence with ADLs by performing:    Feeding with Modified [place].  UE Dressing with Minimal Assistance.  LE Dressing with Moderate Assistance.  Grooming while seated with Stand-by Assistance.  Toileting from toilet with Minimal Assistance for hygiene and clothing management.   Supine to sit with Minimal Assistance.  Toilet transfer to toilet with Minimal Assistance.  Upper extremity exercise program x10 reps per handout, with supervision.                     Time Tracking:     OT Date of Treatment: 12/16/21  OT Start Time: 1120  OT Stop Time: " 1143  OT Total Time (min): 23 min    Billable Minutes:Therapeutic Activity 12               12/16/2021

## 2021-12-16 NOTE — PT/OT/SLP PROGRESS
Physical Therapy Treatment    Patient Name:  Christine Castro   MRN:  [id number]    Recommendations:     Discharge Recommendations:  nursing facility, skilled   Discharge Equipment Recommendations:  (TBD)   Barriers to discharge: Decreased caregiver support    Assessment:     Christine Castro is a 66 y.o. female admitted with a medical diagnosis of Cervical spondylosis with myelopathy.  She presents with the following impairments/functional limitations:  weakness,impaired endurance,impaired self care skills,impaired functional mobilty,gait instability,impaired balance,pain,decreased safety awareness,decreased lower extremity function,decreased upper extremity function,impaired cognition,impaired fine motor,impaired cardiopulmonary response to activity .    Rehab Prognosis: Good; patient would benefit from acute skilled PT services to address these deficits and reach maximum level of function.    Recent Surgery: Procedure(s) (LRB):  DECOMPRESSION AND FUSION, SPINE, CERVICAL, ANTERIOR APPROACH C3-4 C5- 6 C6-7 ACDF (N/A) 4 Days Post-Op    Plan:     During this hospitalization, patient to be seen daily to address the identified rehab impairments via gait training,therapeutic activities,therapeutic exercises,neuromuscular re-education and progress toward the following goals:    · Plan of Care Expires:  01/15/22    Subjective     Pain/Comfort:  · Pain Rating 1: other (see comments) (did not rate)  · Location 1: neck  · Pain Addressed 1: Pre-medicate for activity      Objective:     Communicated with nurse prior to session.  Patient found HOB elevated with bed alarm,peripheral IV,oxygen,hemovac,cuellar catheter upon PT entry to room.     General Precautions: Standard, fall,respiratory   Orthopedic Precautions:spinal precautions   Braces: Aspen collar  Respiratory Status: Nasal cannula, flow 2 L/min     Functional Mobility:  · Bed Mobility:     · Rolling Left: contact guard assistance  · Scooting: contact guard  assistance  · Supine to Sit: contact guard assistance  · Transfers:     · Sit to Stand:  contact guard assistance  · Toilet transfer: contact guard assistance and minimal assistance with use of rolling walker  · Gait: Patient amb 8ft to bathroom and 20ft from bathroom to bedside chair on the opposite side of the bed; pt with forward trunk flexion, slow hortensia, fair step length; VCs for upright posture and keeping RW close    AM-PAC 6 CLICK MOBILITY   Total Score: 11    Therapeutic Activities and Exercises:   Patient drowsy appearing, keeping eyes closed while talking but able became more alert and interactive; pt requested to go to bathroom; pt required min-mod VCs/TCs 2/2 decreased safety awareness during session for all tasks; pt performed sit<>stand, toilet transfer and amb as described above; pt toileted self and after sitting in chair, used bath wipes to clean up and changed gown with Luc    Patient left HOB elevated with all lines intact, call button in reach and bed alarm on..    GOALS:   Multidisciplinary Problems     Physical Therapy Goals        Problem: Physical Therapy Goal    Goal Priority Disciplines Outcome Goal Variances Interventions   Physical Therapy Goal     PT, PT/OT Ongoing, Progressing     Description: Goals to be met by: 1/15/2022     Patient will increase functional independence with mobility by performin. Supine <> sit with Minimal Assistance  2. Sit to stand transfer with Minimal Assistance  3. Bed to chair transfer with Minimal Assistance using Rolling Walker  4. Gait  x 50 feet with Minimal Assistance using Rolling Walker.   5. Lower extremity exercise program x 10 reps per handout, with supervision                     Time Tracking:     PT Received On: 21  PT Start Time: 1120     PT Stop Time: 1143  PT Total Time (min): 23 min with OT    Billable Minutes: Therapeutic Activity 11       PT/PTA: PT     PTA Visit Number: 0     2021

## 2021-12-16 NOTE — PLAN OF CARE
Pt seen this date in room for direct dysphagia/meal follow up. Pt tolerated all of breakfast with no issues noted. Pt is very jerky, tends to throw herself around in bed when repositioning in bed. Warned pt to be careful with her jerky movements and warned her to keep aspen collar in place.

## 2021-12-16 NOTE — NURSING
Pt has productive cough with brown tinged sputum. Suction set up at bedside and pt educated on how to use a yanker to suction mouth. Return demo perform and satisfactory. MD consulted and POC reviewed. No new orders. MD will assess pt on rounds. Will continue to monitor.

## 2021-12-16 NOTE — PLAN OF CARE
Problem: Adult Inpatient Plan of Care  Goal: Patient-Specific Goal (Individualized)  Outcome: Ongoing, Progressing     Problem: Adult Inpatient Plan of Care  Goal: Plan of Care Review  Outcome: Ongoing, Progressing     Problem: Bariatric Environmental Safety  Goal: Safety Maintained with Care  Outcome: Ongoing, Progressing     Problem: Bleeding (Surgery Nonspecified)  Goal: Absence of Bleeding  Outcome: Ongoing, Progressing

## 2021-12-17 ENCOUNTER — OUTPATIENT CASE MANAGEMENT (OUTPATIENT)
Dept: ADMINISTRATIVE | Facility: OTHER | Age: 66
End: 2021-12-17
Payer: MEDICARE

## 2021-12-17 PROBLEM — J69.0 ASPIRATION PNEUMONIA OF RIGHT LOWER LOBE: Status: ACTIVE | Noted: 2021-12-17

## 2021-12-17 LAB
ANION GAP SERPL CALC-SCNC: 7 MMOL/L (ref 8–16)
BUN SERPL-MCNC: 15 MG/DL (ref 8–23)
CALCIUM SERPL-MCNC: 8.2 MG/DL (ref 8.7–10.5)
CHLORIDE SERPL-SCNC: 109 MMOL/L (ref 95–110)
CO2 SERPL-SCNC: 23 MMOL/L (ref 23–29)
CREAT SERPL-MCNC: 0.8 MG/DL (ref 0.5–1.4)
DELSYS: ABNORMAL
ERYTHROCYTE [DISTWIDTH] IN BLOOD BY AUTOMATED COUNT: 13.7 % (ref 11.5–14.5)
EST. GFR  (AFRICAN AMERICAN): >60 ML/MIN/1.73 M^2
EST. GFR  (NON AFRICAN AMERICAN): >60 ML/MIN/1.73 M^2
GLUCOSE SERPL-MCNC: 89 MG/DL (ref 70–110)
HCO3 UR-SCNC: 27.8 MMOL/L (ref 24–28)
HCT VFR BLD AUTO: 25.3 % (ref 37–48.5)
HCT VFR BLD CALC: 24 %PCV (ref 36–54)
HGB BLD-MCNC: 8 G/DL
HGB BLD-MCNC: 8 G/DL (ref 12–16)
MCH RBC QN AUTO: 31.5 PG (ref 27–31)
MCHC RBC AUTO-ENTMCNC: 31.6 G/DL (ref 32–36)
MCV RBC AUTO: 100 FL (ref 82–98)
PCO2 BLDA: 51.9 MMHG (ref 35–45)
PH SMN: 7.34 [PH] (ref 7.35–7.45)
PLATELET # BLD AUTO: 146 K/UL (ref 150–450)
PMV BLD AUTO: 9.5 FL (ref 9.2–12.9)
PO2 BLDA: 103 MMHG (ref 80–100)
POC BE: 2 MMOL/L
POC SATURATED O2: 97 % (ref 95–100)
POC TCO2: 29 MMOL/L (ref 23–27)
POCT GLUCOSE: 100 MG/DL (ref 70–110)
POCT GLUCOSE: 114 MG/DL (ref 70–110)
POCT GLUCOSE: 94 MG/DL (ref 70–110)
POCT GLUCOSE: 98 MG/DL (ref 70–110)
POTASSIUM BLD-SCNC: 4 MMOL/L (ref 3.5–5.1)
POTASSIUM SERPL-SCNC: 4.1 MMOL/L (ref 3.5–5.1)
RBC # BLD AUTO: 2.54 M/UL (ref 4–5.4)
SAMPLE: ABNORMAL
SITE: ABNORMAL
SODIUM BLD-SCNC: 142 MMOL/L (ref 136–145)
SODIUM SERPL-SCNC: 139 MMOL/L (ref 136–145)
WBC # BLD AUTO: 5 K/UL (ref 3.9–12.7)

## 2021-12-17 PROCEDURE — 27000646 HC AEROBIKA DEVICE: Mod: HCNC

## 2021-12-17 PROCEDURE — 94664 DEMO&/EVAL PT USE INHALER: CPT | Mod: HCNC

## 2021-12-17 PROCEDURE — 97116 GAIT TRAINING THERAPY: CPT | Mod: HCNC

## 2021-12-17 PROCEDURE — 80048 BASIC METABOLIC PNL TOTAL CA: CPT | Mod: HCNC

## 2021-12-17 PROCEDURE — 11000001 HC ACUTE MED/SURG PRIVATE ROOM: Mod: HCNC

## 2021-12-17 PROCEDURE — 85027 COMPLETE CBC AUTOMATED: CPT | Mod: HCNC

## 2021-12-17 PROCEDURE — 63600175 PHARM REV CODE 636 W HCPCS: Mod: HCNC

## 2021-12-17 PROCEDURE — 27000221 HC OXYGEN, UP TO 24 HOURS: Mod: HCNC

## 2021-12-17 PROCEDURE — 36415 COLL VENOUS BLD VENIPUNCTURE: CPT | Mod: HCNC

## 2021-12-17 PROCEDURE — 94799 UNLISTED PULMONARY SVC/PX: CPT | Mod: HCNC

## 2021-12-17 PROCEDURE — 99900035 HC TECH TIME PER 15 MIN (STAT): Mod: HCNC

## 2021-12-17 PROCEDURE — 94761 N-INVAS EAR/PLS OXIMETRY MLT: CPT | Mod: HCNC

## 2021-12-17 PROCEDURE — 63600175 PHARM REV CODE 636 W HCPCS: Mod: HCNC | Performed by: PHYSICIAN ASSISTANT

## 2021-12-17 PROCEDURE — 25000003 PHARM REV CODE 250: Mod: HCNC | Performed by: PHYSICIAN ASSISTANT

## 2021-12-17 PROCEDURE — 25000003 PHARM REV CODE 250: Mod: HCNC | Performed by: NURSE PRACTITIONER

## 2021-12-17 RX ORDER — NALOXONE HCL 0.4 MG/ML
0.4 VIAL (ML) INJECTION
Status: DISCONTINUED | OUTPATIENT
Start: 2021-12-17 | End: 2021-12-21 | Stop reason: HOSPADM

## 2021-12-17 RX ORDER — ALBUTEROL SULFATE 2.5 MG/.5ML
2.5 SOLUTION RESPIRATORY (INHALATION)
Status: DISCONTINUED | OUTPATIENT
Start: 2021-12-17 | End: 2021-12-21 | Stop reason: HOSPADM

## 2021-12-17 RX ORDER — LEVOFLOXACIN 5 MG/ML
750 INJECTION, SOLUTION INTRAVENOUS
Status: DISCONTINUED | OUTPATIENT
Start: 2021-12-17 | End: 2021-12-20

## 2021-12-17 RX ORDER — SODIUM CHLORIDE FOR INHALATION 3 %
4 VIAL, NEBULIZER (ML) INHALATION
Status: DISCONTINUED | OUTPATIENT
Start: 2021-12-17 | End: 2021-12-21 | Stop reason: HOSPADM

## 2021-12-17 RX ORDER — ZOLPIDEM TARTRATE 5 MG/1
5 TABLET ORAL NIGHTLY PRN
Status: DISCONTINUED | OUTPATIENT
Start: 2021-12-17 | End: 2021-12-21 | Stop reason: HOSPADM

## 2021-12-17 RX ADMIN — FUROSEMIDE 20 MG: 20 TABLET ORAL at 08:12

## 2021-12-17 RX ADMIN — LEVOFLOXACIN 750 MG: 750 INJECTION, SOLUTION INTRAVENOUS at 08:12

## 2021-12-17 RX ADMIN — ESCITALOPRAM OXALATE 20 MG: 10 TABLET ORAL at 08:12

## 2021-12-17 RX ADMIN — OXYCODONE 10 MG: 5 TABLET ORAL at 08:12

## 2021-12-17 RX ADMIN — PANTOPRAZOLE SODIUM 40 MG: 40 TABLET, DELAYED RELEASE ORAL at 08:12

## 2021-12-17 RX ADMIN — HEPARIN SODIUM 5000 UNITS: 5000 INJECTION INTRAVENOUS; SUBCUTANEOUS at 08:12

## 2021-12-17 RX ADMIN — GABAPENTIN 300 MG: 300 CAPSULE ORAL at 08:12

## 2021-12-17 RX ADMIN — ZOLPIDEM TARTRATE 5 MG: 5 TABLET ORAL at 10:12

## 2021-12-17 RX ADMIN — ATORVASTATIN CALCIUM 40 MG: 40 TABLET, FILM COATED ORAL at 08:12

## 2021-12-17 RX ADMIN — ACETAMINOPHEN 650 MG: 325 TABLET, FILM COATED ORAL at 11:12

## 2021-12-17 RX ADMIN — ACETAMINOPHEN 650 MG: 325 TABLET, FILM COATED ORAL at 05:12

## 2021-12-17 RX ADMIN — BUPROPION HYDROCHLORIDE 200 MG: 100 TABLET, EXTENDED RELEASE ORAL at 08:12

## 2021-12-17 RX ADMIN — MUPIROCIN: 20 OINTMENT TOPICAL at 08:12

## 2021-12-17 RX ADMIN — POTASSIUM CHLORIDE 10 MEQ: 750 TABLET, EXTENDED RELEASE ORAL at 08:12

## 2021-12-17 RX ADMIN — BUSPIRONE HYDROCHLORIDE 5 MG: 5 TABLET ORAL at 08:12

## 2021-12-17 RX ADMIN — ACETAMINOPHEN 650 MG: 325 TABLET, FILM COATED ORAL at 12:12

## 2021-12-17 NOTE — SUBJECTIVE & OBJECTIVE
Past Medical History:   Diagnosis Date    Anticoagulant long-term use     Arthritis     Asthma     CHF (congestive heart failure)     [facility]    Colon cancer     colon    COPD (chronic obstructive pulmonary disease)     Coronary artery disease     Depression     Diabetes mellitus, type 2     GERD (gastroesophageal reflux disease)     Myocardial infarction     AGE 19 MIGUELANGEL WITHBABY DELIVERY    Thyroid disease        Past Surgical History:   Procedure Laterality Date    ABDOMINAL SURGERY       SECTION      CHOLECYSTECTOMY      COLON SURGERY      COLONOSCOPY      --- repeat in 3-5 years    COLONOSCOPY N/A 2017    Procedure: COLONOSCOPY;  Surgeon: Corrina Flores MD;  Location: [facility] ENDO;  Service: Endoscopy;  Laterality: N/A;    COLONOSCOPY N/A 4/10/2018    Procedure: COLONOSCOPY golytely;  Surgeon: Corrina Flores MD;  Location: [facility] ENDO;  Service: Endoscopy;  Laterality: N/A;    ECTOPIC PREGNANCY SURGERY      EPIDURAL STEROID INJECTION INTO LUMBAR SPINE N/A 2020    Procedure: Injection-steroid-epidural-lumbar--L5-S1 InterLaminar;  Surgeon: Nilam Santiago MD;  Location: [facility] PAIN MGT;  Service: Pain Management;  Laterality: N/A;    ESOPHAGOGASTRODUODENOSCOPY N/A 2019    Procedure: ESOPHAGOGASTRODUODENOSCOPY (EGD);  Surgeon: Corrina Flores MD;  Location: [facility] ENDO;  Service: Endoscopy;  Laterality: N/A;    FRACTURE SURGERY      left leg    FUSION OF SPINE WITH INSTRUMENTATION Left 2021    Procedure: FUSION, SPINE, WITH INSTRUMENTATION Stage 1 Left L4-5 OLIF DORA Stage 2 L4-5 Laminectomy, medial Facetectomy, foraminotomy, L4-5 MIS Instrumentation;  Surgeon: Ishaan Fisher MD;  Location: [facility] OR;  Service: Neurosurgery;  Laterality: Left;  Procedure: Stage 1 Left L4-5 OLIF DORA  Stage 2 L4-5 Laminectomy, medial Facetectomy, foraminotomy, L4-5 MIS Instrumentation  Surgery TIme: 4 Hrs    GASTRIC BYPASS      HERNIA REPAIR      INJECTION OF  ANESTHETIC AGENT AROUND GENITOFEMORAL NERVE Left 7/29/2020    Procedure: BLOCK, NERVE, LEFT SHOULDER ARTICULAR;  Surgeon: Nilam Santiago MD;  Location: [facility] PAIN MGT;  Service: Pain Management;  Laterality: Left;    JOINT REPLACEMENT      KNEE ARTHROPLASTY Left 5/8/2019    Procedure: ARTHROPLASTY, KNEE;  Surgeon: Roldan Greenwood MD;  Location: [facility] OR;  Service: Orthopedics;  Laterality: Left;  Navid notified    KNEE ARTHROPLASTY Right 11/20/2019    Procedure: ARTHROPLASTY, KNEE;  Surgeon: Roldan Greenwood MD;  Location: [facility] OR;  Service: Orthopedics;  Laterality: Right;  Navid notified, confirmed case Navid 11/19/19 [id number]    OOPHORECTOMY      RADIOFREQUENCY THERMOCOAGULATION Left 8/12/2020    Procedure: RADIOFREQUENCY THERMAL COAGULATION--Left Suprascapular, Axillary, and Lateral Pectoral Articular Branch RFA;  Surgeon: Nilam Santiago MD;  Location: [facility] PAIN MGT;  Service: Pain Management;  Laterality: Left;    TONSILLECTOMY      TRANSFORAMINAL EPIDURAL INJECTION OF STEROID Right 8/25/2021    Procedure: Injection,steroid,epidural,transforaminal approach; Levels: L5;  Surgeon: Nilam Santiago MD;  Location: [facility] PAIN MGT;  Service: Pain Management;  Laterality: Right;  No pacemaker. Patient is diabetic. Patient is taking Aspirin but can continue per Dr. Santiago.     TUBAL LIGATION         Review of patient's allergies indicates:   Allergen Reactions    Adhesive      Adhesive tape    Latex, natural rubber      Adhesive from latex tape    Ibuprofen Other (See Comments)     Causes asthma flare??       No current facility-administered medications on file prior to encounter.     Current Outpatient Medications on File Prior to Encounter   Medication Sig    acetaminophen (TYLENOL) 500 MG tablet Take 500 mg by mouth every 6 (six) hours as needed for Pain.    albuterol (VENTOLIN HFA) 90 mcg/actuation inhaler INHALE 2 PUFFS INTO THE LUNGS EVERY 4 HOURS AS NEEDED FOR WHEEZING    aspirin (ECOTRIN) 81 MG EC  tablet Take 1 tablet (81 mg total) by mouth once daily.    atorvastatin (LIPITOR) 40 MG tablet TAKE 1 TABLET(40 MG) BY MOUTH EVERY DAY FOR CHOLESTEROL    buPROPion (WELLBUTRIN SR) 200 MG SR12 Take 1 tablet (200 mg total) by mouth 2 (two) times daily. To help with mood and pain relief    busPIRone (BUSPAR) 10 MG tablet TAKE 1/2 TABLET BY MOUTH ONCE DAILY (Patient taking differently: 10 mg. Pt is taking 10 mg once daily)    diabetic supplies, miscellan. Misc Lancets for 1-2 times a day testing    dispense brand covered by insurance t (Patient taking differently: Lancets for 1-2 times a day testing    dispense brand covered by insurance t)    diclofenac sodium (VOLTAREN) 1 % Gel APPLY 2 GRAMS EXTERNALLY TO THE AFFECTED AREA FOUR TIMES DAILY    ergocalciferol (ERGOCALCIFEROL) 50,000 unit Cap Take 1 capsule (50,000 Units total) by mouth every 7 days. (Patient not taking: Reported on 12/3/2021)    gabapentin (NEURONTIN) 600 MG tablet Take 1 tablet (600 mg total) by mouth 3 (three) times daily.    glipiZIDE (GLUCOTROL) 2.5 MG TR24 TAKE 1 TABLET(2.5 MG) BY MOUTH DAILY WITH BREAKFAST (Patient not taking: Reported on 12/3/2021)    tiZANidine (ZANAFLEX) 2 MG tablet Take 1 tablet (2 mg total) by mouth every 6 to 8 hours as needed (muscle spasm).    TRUE METRIX GLUCOSE TEST STRIP Strp TO TEST ONCE TO TWICE DAILY    TRUEPLUS LANCETS 30 gauge Misc USE TO TEST ONCE TO TWICE D     Family History     Problem Relation (Age of Onset)    Breast cancer Maternal Aunt, Maternal Aunt, Cousin    Diabetes Mother, Brother    Hyperlipidemia Mother    Hypertension Mother, Sister, Brother    Stroke Mother        Tobacco Use    Smoking status: Never Smoker    Smokeless tobacco: Never Used   Substance and Sexual Activity    Alcohol use: Yes     Comment: very rare    Drug use: No     Comment: CBD oil sometimes    Sexual activity: Never     Review of Systems   Constitutional: Negative for chills, diaphoresis and fever.   HENT:  Negative for hearing loss and sore throat.    Eyes: Negative for visual disturbance.   Respiratory: Positive for cough and shortness of breath.    Cardiovascular: Negative for chest pain and leg swelling.   Gastrointestinal: Negative for abdominal pain, diarrhea, nausea and vomiting.   Genitourinary: Negative for difficulty urinating and flank pain.   Musculoskeletal: Negative for back pain.   Skin: Negative for rash and wound.   Neurological: Negative for dizziness, weakness and headaches.   Psychiatric/Behavioral: Negative for agitation and confusion. The patient is not nervous/anxious.      Objective:     Vital Signs (Most Recent):  Temp: 98.4 °F (36.9 °C) (12/17/21 1555)  Pulse: 82 (12/17/21 1555)  Resp: 18 (12/17/21 1555)  BP: 116/62 (12/17/21 1555)  SpO2: 100 % (12/17/21 1555) Vital Signs (24h Range):  Temp:  [97.2 °F (36.2 °C)-99.7 °F (37.6 °C)] 98.4 °F (36.9 °C)  Pulse:  [82-99] 82  Resp:  [18-20] 18  SpO2:  [95 %-100 %] 100 %  BP: ()/(52-81) 116/62     Weight: 103 kg (227 lb 1.2 oz)  Body mass index is 44.35 kg/m².    Physical Exam  Vitals and nursing note reviewed.   Constitutional:       General: She is not in acute distress.     Appearance: She is not ill-appearing.   HENT:      Head: Normocephalic and atraumatic.      Mouth/Throat:      Mouth: Mucous membranes are moist.   Eyes:      Extraocular Movements: Extraocular movements intact.      Pupils: Pupils are equal, round, and reactive to light.   Neck:      Comments: C- collar in place  Surgical incision with staples RUSTY  Cardiovascular:      Rate and Rhythm: Normal rate and regular rhythm.      Pulses: Normal pulses.      Heart sounds: Normal heart sounds. No murmur heard.  No friction rub. No gallop.    Pulmonary:      Effort: Pulmonary effort is normal. No respiratory distress.      Breath sounds: Examination of the right-middle field reveals rhonchi. Examination of the left-middle field reveals rhonchi. Examination of the right-lower field  reveals decreased breath sounds. Examination of the left-lower field reveals decreased breath sounds. Decreased breath sounds, wheezing and rhonchi present.   Abdominal:      General: Bowel sounds are normal. There is no distension.      Palpations: Abdomen is soft.      Tenderness: There is no abdominal tenderness. There is no guarding.   Musculoskeletal:         General: No swelling.      Cervical back: Normal range of motion and neck supple.      Right lower leg: No edema.      Left lower leg: No edema.   Skin:     General: Skin is warm and dry.      Capillary Refill: Capillary refill takes less than 2 seconds.      Findings: No bruising, erythema or rash.   Neurological:      General: No focal deficit present.      Mental Status: She is lethargic.      GCS: GCS eye subscore is 3. GCS verbal subscore is 4. GCS motor subscore is 6.   Psychiatric:         Mood and Affect: Mood normal.         Speech: Speech is delayed.         Behavior: Behavior is cooperative.         Significant Labs:   All pertinent labs within the past 24 hours have been reviewed.  A1C:   Recent Labs   Lab 11/23/21  1330   HGBA1C 5.1     CBC:   Recent Labs   Lab 12/17/21  1538   WBC 5.00   HGB 8.0*   HCT 25.3*   *       Significant Imaging: I have reviewed all pertinent imaging results/findings within the past 24 hours.

## 2021-12-17 NOTE — PLAN OF CARE
Pt is AAOX 3 somewhat tearful today,medications per mar, medicated with prn pain med for complaints of pain at 7/10 today, cuellar catheter and non patent iv sites dc today.

## 2021-12-17 NOTE — CONSULTS
[facility]  Consult Note    Patient Name: Christine Castro  MRN: [id number]  Admission Date: 2021  Hospital Length of Stay: 3 days  Attending Physician: Ishaan Fisher MD   Primary Care Provider: Alon Santiago MD           Patient information was obtained from patient, ER records and Nursing.     Consults  Subjective:     Principal Problem: Cervical spondylosis with myelopathy    Chief Complaint: No chief complaint on file.       HPI: Ms. Christine Castro is a 67 y/o female with PMHx of lumbar radiculopathy, degenerative disc disease (DDD), CAD, normocytic anemia, GERD, acquired hypothyroidism, obesity, and depression admitted to [facility] for cervical spondylosis with myelopathy s/p C3-C7 anterior diskectomy and interbody fusion on 21. Patient started having a productive cough with white thick sputum yesterday. Per nursing, patient have coughing spells when she eats. SLP was consulted and following. CXR revealed Interval development of patchy opacity in the right lower lobe.  Findings may represent pneumonia, atelectasis, aspiration, or hemorrhage. Hospital medicine consulted for hypoxia and abnormal cxr finding.       Past Medical History:   Diagnosis Date    Anticoagulant long-term use     Arthritis     Asthma     CHF (congestive heart failure)     [facility]    Colon cancer     colon    COPD (chronic obstructive pulmonary disease)     Coronary artery disease     Depression     Diabetes mellitus, type 2     GERD (gastroesophageal reflux disease)     Myocardial infarction     AGE 19 [facility] WITHBABY DELIVERY    Thyroid disease        Past Surgical History:   Procedure Laterality Date    ABDOMINAL SURGERY       SECTION      CHOLECYSTECTOMY      COLON SURGERY      COLONOSCOPY      --- repeat in 3-5 years    COLONOSCOPY N/A 2017    Procedure: COLONOSCOPY;  Surgeon: Corrina Flores MD;  Location: [facility];  Service: Endoscopy;   Laterality: N/A;    COLONOSCOPY N/A 4/10/2018    Procedure: COLONOSCOPY golytely;  Surgeon: Corrina Flores MD;  Location: [facility] ENDO;  Service: Endoscopy;  Laterality: N/A;    ECTOPIC PREGNANCY SURGERY      EPIDURAL STEROID INJECTION INTO LUMBAR SPINE N/A 11/4/2020    Procedure: Injection-steroid-epidural-lumbar--L5-S1 InterLaminar;  Surgeon: Nilam Santiago MD;  Location: [facility] PAIN MGT;  Service: Pain Management;  Laterality: N/A;    ESOPHAGOGASTRODUODENOSCOPY N/A 2/28/2019    Procedure: ESOPHAGOGASTRODUODENOSCOPY (EGD);  Surgeon: Corrina Flores MD;  Location: [facility] ENDO;  Service: Endoscopy;  Laterality: N/A;    FRACTURE SURGERY      left leg    FUSION OF SPINE WITH INSTRUMENTATION Left 1/11/2021    Procedure: FUSION, SPINE, WITH INSTRUMENTATION Stage 1 Left L4-5 OLIF DORA Stage 2 L4-5 Laminectomy, medial Facetectomy, foraminotomy, L4-5 MIS Instrumentation;  Surgeon: Ishaan Fisher MD;  Location: [facility] OR;  Service: Neurosurgery;  Laterality: Left;  Procedure: Stage 1 Left L4-5 OLIF DORA  Stage 2 L4-5 Laminectomy, medial Facetectomy, foraminotomy, L4-5 MIS Instrumentation  Surgery TIme: 4 Hrs    GASTRIC BYPASS      HERNIA REPAIR      INJECTION OF ANESTHETIC AGENT AROUND GENITOFEMORAL NERVE Left 7/29/2020    Procedure: BLOCK, NERVE, LEFT SHOULDER ARTICULAR;  Surgeon: Nilam Santiago MD;  Location: [facility] PAIN MGT;  Service: Pain Management;  Laterality: Left;    JOINT REPLACEMENT      KNEE ARTHROPLASTY Left 5/8/2019    Procedure: ARTHROPLASTY, KNEE;  Surgeon: Roldan Greenwood MD;  Location: [facility] OR;  Service: Orthopedics;  Laterality: Left;  Navid notified    KNEE ARTHROPLASTY Right 11/20/2019    Procedure: ARTHROPLASTY, KNEE;  Surgeon: Roldan Greenwood MD;  Location: [facility] OR;  Service: Orthopedics;  Laterality: Right;  Navid notified, confirmed case Navid 11/19/19 [id number]    OOPHORECTOMY      RADIOFREQUENCY THERMOCOAGULATION Left 8/12/2020    Procedure: RADIOFREQUENCY THERMAL COAGULATION--Left  Suprascapular, Axillary, and Lateral Pectoral Articular Branch RFA;  Surgeon: Nilam Santiago MD;  Location: [facility] PAIN MGT;  Service: Pain Management;  Laterality: Left;    TONSILLECTOMY      TRANSFORAMINAL EPIDURAL INJECTION OF STEROID Right 8/25/2021    Procedure: Injection,steroid,epidural,transforaminal approach; Levels: L5;  Surgeon: Nilam Santiago MD;  Location: [facility] PAIN MGT;  Service: Pain Management;  Laterality: Right;  No pacemaker. Patient is diabetic. Patient is taking Aspirin but can continue per Dr. Santiago.     TUBAL LIGATION         Review of patient's allergies indicates:   Allergen Reactions    Adhesive      Adhesive tape    Latex, natural rubber      Adhesive from latex tape    Ibuprofen Other (See Comments)     Causes asthma flare??       No current facility-administered medications on file prior to encounter.     Current Outpatient Medications on File Prior to Encounter   Medication Sig    acetaminophen (TYLENOL) 500 MG tablet Take 500 mg by mouth every 6 (six) hours as needed for Pain.    albuterol (VENTOLIN HFA) 90 mcg/actuation inhaler INHALE 2 PUFFS INTO THE LUNGS EVERY 4 HOURS AS NEEDED FOR WHEEZING    aspirin (ECOTRIN) 81 MG EC tablet Take 1 tablet (81 mg total) by mouth once daily.    atorvastatin (LIPITOR) 40 MG tablet TAKE 1 TABLET(40 MG) BY MOUTH EVERY DAY FOR CHOLESTEROL    buPROPion (WELLBUTRIN SR) 200 MG SR12 Take 1 tablet (200 mg total) by mouth 2 (two) times daily. To help with mood and pain relief    busPIRone (BUSPAR) 10 MG tablet TAKE 1/2 TABLET BY MOUTH ONCE DAILY (Patient taking differently: 10 mg. Pt is taking 10 mg once daily)    diabetic supplies, miscellan. Misc Lancets for 1-2 times a day testing    dispense brand covered by insurance t (Patient taking differently: Lancets for 1-2 times a day testing    dispense brand covered by insurance t)    diclofenac sodium (VOLTAREN) 1 % Gel APPLY 2 GRAMS EXTERNALLY TO THE AFFECTED AREA FOUR TIMES DAILY     ergocalciferol (ERGOCALCIFEROL) 50,000 unit Cap Take 1 capsule (50,000 Units total) by mouth every 7 days. (Patient not taking: Reported on 12/3/2021)    gabapentin (NEURONTIN) 600 MG tablet Take 1 tablet (600 mg total) by mouth 3 (three) times daily.    glipiZIDE (GLUCOTROL) 2.5 MG TR24 TAKE 1 TABLET(2.5 MG) BY MOUTH DAILY WITH BREAKFAST (Patient not taking: Reported on 12/3/2021)    tiZANidine (ZANAFLEX) 2 MG tablet Take 1 tablet (2 mg total) by mouth every 6 to 8 hours as needed (muscle spasm).    TRUE METRIX GLUCOSE TEST STRIP Strp TO TEST ONCE TO TWICE DAILY    TRUEPLUS LANCETS 30 gauge Misc USE TO TEST ONCE TO TWICE D     Family History     Problem Relation (Age of Onset)    Breast cancer Maternal Aunt, Maternal Aunt, Cousin    Diabetes Mother, Brother    Hyperlipidemia Mother    Hypertension Mother, Sister, Brother    Stroke Mother        Tobacco Use    Smoking status: Never Smoker    Smokeless tobacco: Never Used   Substance and Sexual Activity    Alcohol use: Yes     Comment: very rare    Drug use: No     Comment: CBD oil sometimes    Sexual activity: Never     Review of Systems   Constitutional: Negative for chills, diaphoresis and fever.   HENT: Negative for hearing loss and sore throat.    Eyes: Negative for visual disturbance.   Respiratory: Positive for cough and shortness of breath.    Cardiovascular: Negative for chest pain and leg swelling.   Gastrointestinal: Negative for abdominal pain, diarrhea, nausea and vomiting.   Genitourinary: Negative for difficulty urinating and flank pain.   Musculoskeletal: Negative for back pain.   Skin: Negative for rash and wound.   Neurological: Negative for dizziness, weakness and headaches.   Psychiatric/Behavioral: Negative for agitation and confusion. The patient is not nervous/anxious.      Objective:     Vital Signs (Most Recent):  Temp: 98.4 °F (36.9 °C) (12/17/21 1555)  Pulse: 82 (12/17/21 1555)  Resp: 18 (12/17/21 1555)  BP: 116/62 (12/17/21  1555)  SpO2: 100 % (12/17/21 1555) Vital Signs (24h Range):  Temp:  [97.2 °F (36.2 °C)-99.7 °F (37.6 °C)] 98.4 °F (36.9 °C)  Pulse:  [82-99] 82  Resp:  [18-20] 18  SpO2:  [95 %-100 %] 100 %  BP: ()/(52-81) 116/62     Weight: 103 kg (227 lb 1.2 oz)  Body mass index is 44.35 kg/m².    Physical Exam  Vitals and nursing note reviewed.   Constitutional:       General: She is not in acute distress.     Appearance: She is not ill-appearing.   HENT:      Head: Normocephalic and atraumatic.      Mouth/Throat:      Mouth: Mucous membranes are moist.   Eyes:      Extraocular Movements: Extraocular movements intact.      Pupils: Pupils are equal, round, and reactive to light.   Neck:      Comments: C- collar in place  Surgical incision with staples RUSTY  Cardiovascular:      Rate and Rhythm: Normal rate and regular rhythm.      Pulses: Normal pulses.      Heart sounds: Normal heart sounds. No murmur heard.  No friction rub. No gallop.    Pulmonary:      Effort: Pulmonary effort is normal. No respiratory distress.      Breath sounds: Examination of the right-middle field reveals rhonchi. Examination of the left-middle field reveals rhonchi. Examination of the right-lower field reveals decreased breath sounds. Examination of the left-lower field reveals decreased breath sounds. Decreased breath sounds, wheezing and rhonchi present.   Abdominal:      General: Bowel sounds are normal. There is no distension.      Palpations: Abdomen is soft.      Tenderness: There is no abdominal tenderness. There is no guarding.   Musculoskeletal:         General: No swelling.      Cervical back: Normal range of motion and neck supple.      Right lower leg: No edema.      Left lower leg: No edema.   Skin:     General: Skin is warm and dry.      Capillary Refill: Capillary refill takes less than 2 seconds.      Findings: No bruising, erythema or rash.   Neurological:      General: No focal deficit present.      Mental Status: She is lethargic.       GCS: GCS eye subscore is 3. GCS verbal subscore is 4. GCS motor subscore is 6.   Psychiatric:         Mood and Affect: Mood normal.         Speech: Speech is delayed.         Behavior: Behavior is cooperative.         Significant Labs:   All pertinent labs within the past 24 hours have been reviewed.  A1C:   Recent Labs   Lab 11/23/21  1330   HGBA1C 5.1     CBC:   Recent Labs   Lab 12/17/21  1538   WBC 5.00   HGB 8.0*   HCT 25.3*   *       Significant Imaging: I have reviewed all pertinent imaging results/findings within the past 24 hours.    Assessment/Plan:     * Cervical spondylosis with myelopathy  -Management per neurosurgery      Aspiration pneumonia of right lower lobe  -CXR revealed Interval development of patchy opacity in the right lower lobe.Findings may represent pneumonia, atelectasis, aspiration, or hemorrhage  -likely aspiration PNA 2/2 to dysphagia, SLP following  -Noted to be on 5L NC with SpO2 100%, patient was weaned to 2L NC with Spo2 100%  -Supplemental oxygen PRN for SpO2 <90%; wean as needed  -Continue pulmonary hygiene  -Continuous cardiac monitoring  -CBC and BMP  -Respiratory culture pending  -Will start levoquin 750mg IV x5 days  -Aspiration precautions  -Abg x1      Coronary artery disease involving native coronary artery of native heart  Continue ASA and Statin  Monitor for s/s of angina/ ACS  Continue to monitor on telemetry    Normocytic anemia  Patient's anemia is currently controlled. Has not received any PRBCs to date.. Etiology likely d/t surgery; patient is s/p anterior diskectomy and interbody fusion.  Current CBC reviewed-   Lab Results   Component Value Date    HGB 8.0 (L) 12/17/2021    HCT 25.3 (L) 12/17/2021     Monitor serial CBC and transfuse if patient becomes hemodynamically unstable, symptomatic or H/H drops below 7/21.         Major depressive disorder  -Continue home regimen of bupropion and escitalopram oxalate      Acquired hypothyroidism  Patient has  chronic hypothyroidism. TFTs reviewed-   Lab Results   Component Value Date    TSH 1.469 11/23/2021    Will continue chronic levothyroxine and adjust for and clinical changes.          VTE Risk Mitigation (From admission, onward)         Ordered     heparin (porcine) injection 5,000 Units  Every 12 hours         12/14/21 2019     IP VTE HIGH RISK PATIENT  Once         12/14/21 2019     Place sequential compression device  Until discontinued         12/14/21 2019                    Thank you for your consult. We will follow-up with patient. Please contact us if you have any additional questions.        Case discussed with attending. Attestation to follow. Please appreciate.         Jossy Prakash DNP, ACNPC-AG, CCRN  Hospitalist  Department of Hospital Medicine  [facility]   [phone]

## 2021-12-17 NOTE — ASSESSMENT & PLAN NOTE
Patient's anemia is currently controlled. Has not received any PRBCs to date.. Etiology likely d/t surgery; patient is s/p anterior diskectomy and interbody fusion.  Current CBC reviewed-   Lab Results   Component Value Date    HGB 8.0 (L) 12/17/2021    HCT 25.3 (L) 12/17/2021     Monitor serial CBC and transfuse if patient becomes hemodynamically unstable, symptomatic or H/H drops below 7/21.

## 2021-12-17 NOTE — NURSING
Pt incisional drain and dressing removed by PA at bedside visit today,incision open to air with staples in tact with out drainage at present.

## 2021-12-17 NOTE — PROGRESS NOTES
[facility]  [facility]  Progress Note    Subjective:         History of Present Illness: Neck pain and left upper arm pain.  Has been coughing and spitting up phlegm.    Post-Op Info:  Procedure(s) (LRB):  DECOMPRESSION AND FUSION, SPINE, CERVICAL, ANTERIOR APPROACH C3-4 C5- 6 C6-7 ACDF (N/A)   3 Days Post-Op      Medications:  Continuous Infusions:  Scheduled Meds:   acetaminophen  650 mg Oral Q6H    atorvastatin  40 mg Oral QHS    buPROPion  200 mg Oral BID    busPIRone  5 mg Oral Daily    EScitalopram oxalate  20 mg Oral Daily    furosemide  20 mg Oral Daily    gabapentin  300 mg Oral BID    heparin (porcine)  5,000 Units Subcutaneous Q12H    levothyroxine  100 mcg Oral Before breakfast    mupirocin   Nasal BID    pantoprazole  40 mg Oral Daily    potassium chloride SA  10 mEq Oral Daily    zolpidem  5 mg Oral QHS     PRN Meds:albuterol, aluminum-magnesium hydroxide-simethicone, dextrose 50%, dextrose 50%, glucagon (human recombinant), glucose, glucose, HYDROmorphone, insulin aspart U-100, ondansetron, oxyCODONE, prochlorperazine, tiZANidine     Review of Systems  Objective:     Weight: 103 kg (227 lb 1.2 oz)  Body mass index is 44.35 kg/m².  Vital Signs (Most Recent):  Temp: 98.2 °F (36.8 °C) (12/17/21 0714)  Pulse: 90 (12/17/21 0714)  Resp: 20 (12/17/21 0818)  BP: 133/62 (12/17/21 0714)  SpO2: 100 % (12/17/21 0714) Vital Signs (24h Range):  Temp:  [97.2 °F (36.2 °C)-99.7 °F (37.6 °C)] 98.2 °F (36.8 °C)  Pulse:  [90-99] 90  Resp:  [18-20] 20  SpO2:  [92 %-100 %] 100 %  BP: ()/(52-62) 133/62                          Closed/Suction Drain 12/14/21 1918 Right;Anterior Neck Accordion 10 Fr. (Active)   Site Description Unable to view 12/16/21 1930   Dressing Type Gauze;Transparent (Tegaderm) 12/16/21 1930   Dressing Status Clean;Dry;Intact 12/16/21 1930   Dressing Intervention Integrity maintained 12/16/21 1930   Drainage Serosanguineous 12/16/21 1930   Status To bulb suction 12/16/21 1930    Output (mL) 0 mL 12/17/21 0600       Neurosurgery Physical Exam     General: well developed, well nourished, no distress  Mental Status: Awake, Alert, Oriented X3.Thought content appropriate  GCS: Motor: 6/Verbal: 5/Eyes: 4 GCS Total: 15    Motor Strength:  Strength  Deltoids Triceps Biceps Wrist Extension Wrist Flexion Hand    Upper: R 5/5 5/5 5/5 5/5 5/5 5/5    L 4/5 5/5 5/5 5/5 5/5 5/5     HF KF KE DF PF EHL   Lower: R 5/5 5/5 5/5 5/5 5/5 5/5    L 5/5 5/5 5/5 Does not move 5/5 5/5       Munoz: absent on the right.  Positive on the right  Clonus: absent B/L  -Incision-CDI  Drain- 20    e    Significant Labs:  No results for input(s): GLU, NA, K, CL, CO2, BUN, CREATININE, CALCIUM, MG in the last 48 hours.  No results for input(s): WBC, HGB, HCT, PLT in the last 48 hours.  No results for input(s): LABPT, INR, APTT in the last 48 hours.  Microbiology Results (last 7 days)     ** No results found for the last 168 hours. **          Significant Diagnostics:  cspine xrays show good hardware placement.    Chest xray results    Impression:     Interval development of patchy opacity in the right lower lobe.  Findings may represent pneumonia, atelectasis, aspiration, or hemorrhage.  Follow-up exam is recommended 6 weeks after treatment.        Electronically signed by: Devin Escobar MD  Date:                                            12/16/2021  Time:                                           10:03    Assessment/Plan:     Active Diagnoses:    Diagnosis Date Noted POA    PRINCIPAL PROBLEM:  Cervical spondylosis with myelopathy [M47.12] 12/14/2021 Yes      Problems Resolved During this Admission:        A/P:  POD #3 C3-4, C5-6, and C6-7 ACDF     -Neurologically stable  -PT/OT  -OOB ambulating with assistance  -Pureed diet per ST recs  -Dced drain  -will consult hospital medicine for chest xray finding, hypoxia, coughing  -pending SNF placement     All of the above discussed and reviewed with   Phillip.        Mayda Musa PA-C  Neurosurgery  [place] - Intensive Care  Mayda Musa PA-C  Neurosurgery  [facility] Surg

## 2021-12-17 NOTE — PLAN OF CARE
Pt progressing toward goals    Cont POC    Problem: Occupational Therapy Goal  Goal: Occupational Therapy Goal  Description: Goals to be met by: 1/15/22     Patient will increase functional independence with ADLs by performing:    Feeding with Modified [place].  UE Dressing with Minimal Assistance.  LE Dressing with Moderate Assistance.  Grooming while seated with Stand-by Assistance.  Toileting from toilet with Minimal Assistance for hygiene and clothing management. -met 12/17  Supine to sit with Minimal Assistance.-met 12/17  Toilet transfer to toilet with Minimal Assistance.-met 12/17  Upper extremity exercise program x10 reps per handout, with supervision.    Outcome: Ongoing, Progressing

## 2021-12-17 NOTE — HPI
Ms. Christine Castro is a 67 y/o female with PMHx of lumbar radiculopathy, degenerative disc disease (DDD), CAD, normocytic anemia, GERD, acquired hypothyroidism, obesity, and depression admitted to [facility] for cervical spondylosis with myelopathy s/p C3-C7 anterior diskectomy and interbody fusion on 12/14/21. Patient started having a productive cough with white thick sputum yesterday. Per nursing, patient have coughing spells when she eats. SLP was consulted and following. CXR revealed Interval development of patchy opacity in the right lower lobe.  Findings may represent pneumonia, atelectasis, aspiration, or hemorrhage. Hospital medicine consulted for hypoxia and abnormal cxr finding.

## 2021-12-17 NOTE — PT/OT/SLP PROGRESS
Occupational Therapy   Treatment    Name: Christine Castro  MRN: [id number]  Admitting Diagnosis:  Cervical spondylosis with myelopathy  3 Days Post-Op    Recommendations:     Discharge Recommendations: nursing facility, skilled  Discharge Equipment Recommendations:  other (see comments) (TBD)  Barriers to discharge:  Decreased caregiver support    Assessment:     Christine Castro is a 66 y.o. female with a medical diagnosis of Cervical spondylosis with myelopathy.  She presents with performance deficits affecting function are weakness,impaired endurance,impaired self care skills,impaired functional mobilty,gait instability,impaired balance,decreased upper extremity function,decreased lower extremity function,pain,decreased safety awareness,edema,impaired cardiopulmonary response to activity,decreased ROM.     Rehab Prognosis:  Good; patient would benefit from acute skilled OT services to address these deficits and reach maximum level of function.       Plan:     Patient to be seen 5 x/week to address the above listed problems via self-care/home management,therapeutic activities,therapeutic exercises  · Plan of Care Expires: 01/15/22  · Plan of Care Reviewed with: patient    Subjective     Pain/Comfort:  · Pain Rating 1: other (see comments) (did not rate)  · Location 1: neck  · Pain Addressed 1: Pre-medicate for activity    Objective:     Communicated with: nurse prior to session.  Patient found HOB elevated with bed alarm,peripheral IV,oxygen upon OT entry to room.    General Precautions: Standard, aspiration,fall,respiratory   Orthopedic Precautions:spinal precautions   Braces: Aspen collar  Respiratory Status: Nasal cannula, flow 2 L/min     Occupational Performance:     Bed Mobility:    · Patient completed Rolling/Turning to Left with  contact guard assistance  · Patient completed Scooting/Bridging with contact guard assistance  · Patient completed Supine to Sit with contact guard assistance     Functional  Mobility/Transfers:  · Patient completed Sit <> Stand Transfer with contact guard assistance  with  rolling walker   · Patient completed Toilet Transfer Step Transfer technique with contact guard assistance and minimum assistance with  rolling walker  · Functional Mobility: Min A w/RW for RW mgmt in room    Activities of Daily Living:  · Upper Body Dressing: minimum assistance for hospital gown mgmt  · Toileting: contact guard assistance and minimum assistance        AMPAC 6 Click ADL: 17    Treatment & Education:  Pt requesting to go to the bathroom.  Moved to sit EOB, requires min-mod cues to improve safety during all tasks 2/2 decreased safety awareness, forward flexion in standing. Fxnl mobility bathroom to BS chair as above, seated rest break then changed gown min A, freshened up with bath wipes min A.    Patient left up in chair with all lines intact, call button in reach, chair alarm on and nurse notifiedEducation:      GOALS:   Multidisciplinary Problems     Occupational Therapy Goals        Problem: Occupational Therapy Goal    Goal Priority Disciplines Outcome Interventions   Occupational Therapy Goal     OT, PT/OT Ongoing, Progressing    Description: Goals to be met by: 1/15/22     Patient will increase functional independence with ADLs by performing:    Feeding with Modified [place].  UE Dressing with Minimal Assistance.  LE Dressing with Moderate Assistance.  Grooming while seated with Stand-by Assistance.  Toileting from toilet with Minimal Assistance for hygiene and clothing management. -met 12/17  Supine to sit with Minimal Assistance.-met 12/17  Toilet transfer to toilet with Minimal Assistance.-met 12/17  Upper extremity exercise program x10 reps per handout, with supervision.                     Time Tracking:     OT Date of Treatment: 12/17/21  OT Start Time: 0908  OT Stop Time: 0941  OT Total Time (min): 33 min w/PT    Billable Minutes:Self Care/Home Management 15                12/17/2021

## 2021-12-17 NOTE — PLAN OF CARE
TN met with pt - neck brace in place   pt is awake, alert but difficult - appears to be twitching in pain      pt lives alone with her dog - she reports her friend Kelsey assists as needed #'s from phone:  [phone];   [phone] - no answer at either #  TN called listed contact - friend  Maty Galvez  [phone].   per Ms. Galvez - pt has brothers and sisters but they are not in contact with her      explained to pt that her condition won't allow her to return to home without 24 hour care - TN sent referrals to SNF per [facility] to [facility].    Ms. Galvez agrees that pt won't have 24 hour help at home and will need therapy before returning to home.         12/16/21 6574   Discharge Planning   Assessment Type Discharge Planning Brief Assessment   Resource/Environmental Concerns other (see comments)  (limited  support)   Support Systems Friends/neighbors   Current Living Arrangements home/apartment/condo   Patient/Family Anticipates Transition to other (see comments)  (per therapy - pt needs snf at d/c)   Patient/Family Anticipated Services at Transition skilled nursing   Discharge Plan A Skilled Nursing Facility

## 2021-12-17 NOTE — ASSESSMENT & PLAN NOTE
Patient has chronic hypothyroidism. TFTs reviewed-   Lab Results   Component Value Date    TSH 1.469 11/23/2021    Will continue chronic levothyroxine and adjust for and clinical changes.

## 2021-12-17 NOTE — PLAN OF CARE
Hospital Medicine consulted for pt   response from [facility] - they are interested in accepting pt   MD informed - TN will continue with placement efforts Mon 12/20 12/17/21 5140   Post-Acute Status   Post-Acute Authorization Placement   Post-Acute Placement Status Referrals Sent   Discharge Plan   Discharge Plan A Skilled Nursing Facility

## 2021-12-17 NOTE — PLAN OF CARE
Elvsi called [facility] at [phone]. Elvis spoke with Monica and completed LOCET. Elvis faxed PASRR and awaiting 142 from state.        12/17/21 1040   Post-Acute Status   Post-Acute Authorization Placement   Post-Acute Placement Status Pending [facility] direction/certification   Discharge Plan   Discharge Plan A Skilled Nursing Facility

## 2021-12-17 NOTE — ASSESSMENT & PLAN NOTE
-CXR revealed Interval development of patchy opacity in the right lower lobe.Findings may represent pneumonia, atelectasis, aspiration, or hemorrhage  -likely aspiration PNA 2/2 to dysphagia, SLP following  -Noted to be on 5L NC with SpO2 100%, patient was weaned to 2L NC with Spo2 100%  -Supplemental oxygen PRN for SpO2 <90%; wean as needed  -Continue pulmonary hygiene  -Continuous cardiac monitoring  -CBC and BMP  -Respiratory culture pending  -Will start levoquin 750mg IV x5 days  -Aspiration precautions  -Abg x1

## 2021-12-18 LAB
POCT GLUCOSE: 106 MG/DL (ref 70–110)
POCT GLUCOSE: 121 MG/DL (ref 70–110)
POCT GLUCOSE: 91 MG/DL (ref 70–110)
POCT GLUCOSE: 91 MG/DL (ref 70–110)
TB INDURATION 48 - 72 HR READ: 0 MM

## 2021-12-18 PROCEDURE — 63600175 PHARM REV CODE 636 W HCPCS: Mod: HCNC

## 2021-12-18 PROCEDURE — 27000221 HC OXYGEN, UP TO 24 HOURS: Mod: HCNC

## 2021-12-18 PROCEDURE — 97116 GAIT TRAINING THERAPY: CPT | Mod: HCNC,CQ

## 2021-12-18 PROCEDURE — 94799 UNLISTED PULMONARY SVC/PX: CPT | Mod: HCNC

## 2021-12-18 PROCEDURE — 27000646 HC AEROBIKA DEVICE: Mod: HCNC

## 2021-12-18 PROCEDURE — 63600175 PHARM REV CODE 636 W HCPCS: Mod: HCNC | Performed by: PHYSICIAN ASSISTANT

## 2021-12-18 PROCEDURE — 94760 N-INVAS EAR/PLS OXIMETRY 1: CPT | Mod: HCNC

## 2021-12-18 PROCEDURE — 99900035 HC TECH TIME PER 15 MIN (STAT): Mod: HCNC

## 2021-12-18 PROCEDURE — 94761 N-INVAS EAR/PLS OXIMETRY MLT: CPT | Mod: HCNC

## 2021-12-18 PROCEDURE — 25000003 PHARM REV CODE 250: Mod: HCNC | Performed by: NURSE PRACTITIONER

## 2021-12-18 PROCEDURE — 97530 THERAPEUTIC ACTIVITIES: CPT | Mod: HCNC,CQ

## 2021-12-18 PROCEDURE — 94664 DEMO&/EVAL PT USE INHALER: CPT | Mod: HCNC

## 2021-12-18 PROCEDURE — 25000003 PHARM REV CODE 250: Mod: HCNC | Performed by: PHYSICIAN ASSISTANT

## 2021-12-18 PROCEDURE — 11000001 HC ACUTE MED/SURG PRIVATE ROOM: Mod: HCNC

## 2021-12-18 RX ORDER — LEVOFLOXACIN 5 MG/ML
750 INJECTION, SOLUTION INTRAVENOUS
Status: DISCONTINUED | OUTPATIENT
Start: 2021-12-18 | End: 2021-12-18

## 2021-12-18 RX ADMIN — OXYCODONE 10 MG: 5 TABLET ORAL at 05:12

## 2021-12-18 RX ADMIN — BUPROPION HYDROCHLORIDE 200 MG: 100 TABLET, EXTENDED RELEASE ORAL at 10:12

## 2021-12-18 RX ADMIN — BUSPIRONE HYDROCHLORIDE 5 MG: 5 TABLET ORAL at 10:12

## 2021-12-18 RX ADMIN — ACETAMINOPHEN 650 MG: 325 TABLET, FILM COATED ORAL at 04:12

## 2021-12-18 RX ADMIN — ACETAMINOPHEN 650 MG: 325 TABLET, FILM COATED ORAL at 05:12

## 2021-12-18 RX ADMIN — LEVOFLOXACIN 750 MG: 750 INJECTION, SOLUTION INTRAVENOUS at 11:12

## 2021-12-18 RX ADMIN — GABAPENTIN 300 MG: 300 CAPSULE ORAL at 10:12

## 2021-12-18 RX ADMIN — HEPARIN SODIUM 5000 UNITS: 5000 INJECTION INTRAVENOUS; SUBCUTANEOUS at 10:12

## 2021-12-18 RX ADMIN — LEVOTHYROXINE SODIUM 100 MCG: 50 TABLET ORAL at 05:12

## 2021-12-18 RX ADMIN — POTASSIUM CHLORIDE 10 MEQ: 750 TABLET, EXTENDED RELEASE ORAL at 10:12

## 2021-12-18 RX ADMIN — OXYCODONE 10 MG: 5 TABLET ORAL at 10:12

## 2021-12-18 RX ADMIN — ESCITALOPRAM OXALATE 20 MG: 10 TABLET ORAL at 10:12

## 2021-12-18 RX ADMIN — ATORVASTATIN CALCIUM 40 MG: 40 TABLET, FILM COATED ORAL at 10:12

## 2021-12-18 RX ADMIN — FUROSEMIDE 20 MG: 20 TABLET ORAL at 10:12

## 2021-12-18 RX ADMIN — OXYCODONE 10 MG: 5 TABLET ORAL at 04:12

## 2021-12-18 RX ADMIN — ACETAMINOPHEN 650 MG: 325 TABLET, FILM COATED ORAL at 01:12

## 2021-12-18 RX ADMIN — MUPIROCIN: 20 OINTMENT TOPICAL at 10:12

## 2021-12-18 RX ADMIN — PANTOPRAZOLE SODIUM 40 MG: 40 TABLET, DELAYED RELEASE ORAL at 10:12

## 2021-12-18 RX ADMIN — ZOLPIDEM TARTRATE 5 MG: 5 TABLET ORAL at 10:12

## 2021-12-18 NOTE — PLAN OF CARE
Problem: Physical Therapy Goal  Goal: Physical Therapy Goal  Description: Goals to be met by: 1/15/2022     Patient will increase functional independence with mobility by performin. Supine <> sit with Minimal Assistance  2. Sit to stand transfer with Minimal Assistance  3. Bed to chair transfer with Minimal Assistance using Rolling Walker  4. Gait  x 50 feet with Minimal Assistance using Rolling Walker.   5. Lower extremity exercise program x 10 reps per handout, with supervision    Outcome: Ongoing, Progressing   Pt continues to work toward all goals.  Pt very tearful when entered room and required calming techniques prior to movement. Able to perform 2 ambulation training trials ~6-8 ft and ~15-20 ft with RW, Aspen collar donned, requiring min A plus constant verbal cueing to stay within RW boundaries.

## 2021-12-18 NOTE — PLAN OF CARE
Problem: Adult Inpatient Plan of Care  Goal: Plan of Care Review  Outcome: Ongoing, Progressing     Problem: Glycemic Control Impaired (Surgery Nonspecified)  Goal: Blood Glucose Level Within Targeted Range  Outcome: Ongoing, Progressing     Problem: Pain (Surgery Nonspecified)  Goal: Acceptable Pain Control  Outcome: Ongoing, Progressing     Problem: Bleeding (Surgery Nonspecified)  Goal: Absence of Bleeding  Outcome: Met     Problem: Fluid and Electrolyte Imbalance (Surgery Nonspecified)  Goal: Fluid and Electrolyte Balance  Outcome: Met     Problem: Ongoing Anesthesia Effects (Surgery Nonspecified)  Goal: Anesthesia/Sedation Recovery  Outcome: Met     Problem: Postoperative Nausea and Vomiting (Surgery Nonspecified)  Goal: Nausea and Vomiting Relief  Outcome: Met     Problem: Postoperative Urinary Retention (Surgery Nonspecified)  Goal: Effective Urinary Elimination  Outcome: Met     Problem: Respiratory Compromise (Surgery Nonspecified)  Goal: Effective Oxygenation and Ventilation  Outcome: Met     VIRTUAL NURSE:  Labs, notes, orders, and careplan reviewed.

## 2021-12-18 NOTE — PT/OT/SLP PROGRESS
"Physical Therapy Treatment    Patient Name:  Christine Castro   MRN:  [id number]    Recommendations:     Discharge Recommendations:  nursing facility, skilled   Discharge Equipment Recommendations:  (TBD)   Barriers to discharge: Decreased caregiver support    Assessment:     Christine Castro is a 66 y.o. female admitted with a medical diagnosis of Cervical spondylosis with myelopathy.  She presents with the following impairments/functional limitations:  weakness,impaired endurance,impaired functional mobilty,gait instability,impaired balance,decreased coordination,decreased upper extremity function,decreased lower extremity function,decreased safety awareness,pain,decreased ROM,impaired coordination,impaired skin,edema,orthopedic precautions. Pt stated her neck pain was 10/10 with left arm pain prior to any movement. Able to perform 2 ambulation training trials ~6-8 ft and ~15-20 ft with RW, Aspen collar donned, requiring min A. Would benefit from continued PT services to increase pt's out of bed therapeutic activity and exercises.    Rehab Prognosis:  Fair+; patient would benefit from acute skilled PT services to address these deficits and reach maximum level of function.    Recent Surgery: Procedure(s) (LRB):  DECOMPRESSION AND FUSION, SPINE, CERVICAL, ANTERIOR APPROACH C3-4 C5- 6 C6-7 ACDF (N/A) 4 Days Post-Op    Plan:     During this hospitalization, patient to be seen daily to address the identified rehab impairments via gait training,therapeutic activities,therapeutic exercises,neuromuscular re-education and progress toward the following goals:    · Plan of Care Expires:  01/15/22    Subjective     Chief Complaint: "Nobody is being nice to me like last time".  Patient/Family Comments/goals: "My neck and into my left arm hurt a lot".  Pain/Comfort:  · Pain Rating 1: 10/10  · Location - Orientation 1: generalized  · Location 1: neck  · Pain Addressed 1: Reposition,Pre-medicate for activity,Distraction,Cessation of " Activity  · Pain Rating Post-Intervention 1: 10/10      Objective:     Communicated with nurse prior to session.  Patient found supine with bed alarm,peripheral IV upon PT entry to room.     General Precautions: Standard, aspiration,fall,respiratory   Orthopedic Precautions:spinal precautions   Braces: Aspen collar  Respiratory Status: Room air     Functional Mobility:  · Bed Mobility:     · Rolling Left:  contact guard assistance  · Scooting: stand by assistance and contact guard assistance  · Supine to Sit: contact guard assistance  · Transfers:     · Sit to Stand:  contact guard assistance with rolling walker  · Gait:  ~6-8 ft and ~15-20 ft with RW, Aspen collar donned, requiring min A plus constant verbal cueing to stay within RW boundaries.      AM-PAC 6 CLICK MOBILITY  Turning over in bed (including adjusting bedclothes, sheets and blankets)?: 3  Sitting down on and standing up from a chair with arms (e.g., wheelchair, bedside commode, etc.): 3  Moving from lying on back to sitting on the side of the bed?: 3  Moving to and from a bed to a chair (including a wheelchair)?: 3  Need to walk in hospital room?: 2  Climbing 3-5 steps with a railing?: 1  Basic Mobility Total Score: 15       Therapeutic Activities and Exercises:   Pt tearful when entered room, stated pain in her neck was 10/10 and into her left arm. Required calming techniques prior to movement. Able to perform 2 ambulation trials ~6-8 ft and ~15-20 ft with RW, Aspen collar donned, requiring min A plus verbal cueing to stay within RW boundaries as pt pushed RW very far ahead of her. Demonstrates an increased trunk flexion which pt does not correct with verbal or tactile cueing. At 1st trial pt ambulated to bathroom and when finished able to complete self hygiene.     Patient left up in chair with all lines intact, call button in reach, chair alarm on and  nurse notified..    GOALS:   Multidisciplinary Problems     Physical Therapy Goals        Problem:  Physical Therapy Goal    Goal Priority Disciplines Outcome Goal Variances Interventions   Physical Therapy Goal     PT, PT/OT Ongoing, Progressing     Description: Goals to be met by: 1/15/2022     Patient will increase functional independence with mobility by performin. Supine <> sit with Minimal Assistance  2. Sit to stand transfer with Minimal Assistance  3. Bed to chair transfer with Minimal Assistance using Rolling Walker  4. Gait  x 50 feet with Minimal Assistance using Rolling Walker.   5. Lower extremity exercise program x 10 reps per handout, with supervision                     Time Tracking:     PT Received On: 21  PT Start Time: 904     PT Stop Time: 935  PT Total Time (min): 31 min     Billable Minutes: Gait Training   15 and Therapeutic Activity  16    Treatment Type: Treatment  PT/PTA: PTA     PTA Visit Number: 1     2021

## 2021-12-18 NOTE — SUBJECTIVE & OBJECTIVE
Interval History: awake and alert, family by bedside. Requesting to be discharge    Review of Systems   Constitutional: Negative for chills, diaphoresis and fever.   Respiratory: Positive for cough. Negative for shortness of breath.    Cardiovascular: Negative for chest pain and leg swelling.   Gastrointestinal: Negative for abdominal pain, diarrhea, nausea and vomiting.   Genitourinary: Negative for difficulty urinating and flank pain.   Musculoskeletal: Negative for back pain.   Skin: Negative for rash and wound.   Neurological: Negative for dizziness, weakness and headaches.   Psychiatric/Behavioral: Negative for agitation and confusion. The patient is not nervous/anxious.      Objective:     Vital Signs (Most Recent):  Temp: 97.9 °F (36.6 °C) (12/18/21 1211)  Pulse: 84 (12/18/21 1211)  Resp: 18 (12/18/21 1211)  BP: 92/64 (12/18/21 1211)  SpO2: 95 % (12/18/21 1351) Vital Signs (24h Range):  Temp:  [97.9 °F (36.6 °C)-98.4 °F (36.9 °C)] 97.9 °F (36.6 °C)  Pulse:  [80-88] 84  Resp:  [18-20] 18  SpO2:  [93 %-100 %] 95 %  BP: ()/(51-70) 92/64     Weight: 103 kg (227 lb 1.2 oz)  Body mass index is 44.35 kg/m².    Intake/Output Summary (Last 24 hours) at 12/18/2021 1517  Last data filed at 12/18/2021 0136  Gross per 24 hour   Intake 150.3 ml   Output --   Net 150.3 ml      Physical Exam  Vitals and nursing note reviewed.   Constitutional:       General: She is not in acute distress.     Appearance: She is not ill-appearing.   HENT:      Head: Normocephalic and atraumatic.   Neck:      Comments: C- collar in place  Surgical incision with staples RUSTY  Cardiovascular:      Rate and Rhythm: Normal rate and regular rhythm.      Pulses: Normal pulses.      Heart sounds: Normal heart sounds. No murmur heard.  No friction rub. No gallop.    Pulmonary:      Effort: Pulmonary effort is normal. No respiratory distress.      Breath sounds: Examination of the right-lower field reveals decreased breath sounds. Examination of  the left-lower field reveals decreased breath sounds. Decreased breath sounds present. No wheezing or rhonchi.   Abdominal:      General: Bowel sounds are normal. There is no distension.      Palpations: Abdomen is soft.      Tenderness: There is no abdominal tenderness. There is no guarding.   Musculoskeletal:         General: No swelling.      Cervical back: Normal range of motion and neck supple.      Right lower leg: No edema.      Left lower leg: No edema.   Skin:     General: Skin is warm and dry.      Capillary Refill: Capillary refill takes less than 2 seconds.      Findings: No bruising, erythema or rash.   Neurological:      General: No focal deficit present.      GCS: GCS eye subscore is 3. GCS verbal subscore is 4. GCS motor subscore is 6.   Psychiatric:         Mood and Affect: Mood normal.         Speech: Speech is delayed.         Behavior: Behavior is cooperative.         Significant Labs: All pertinent labs within the past 24 hours have been reviewed.    Significant Imaging: I have reviewed all pertinent imaging results/findings within the past 24 hours.

## 2021-12-18 NOTE — PLAN OF CARE
Scheduled medications administered per MD order. Prn medication administered for pain. Afebrile. Blood glucose monitored. Cervical collar on. Oral suction at bedside. KAMARI monitor at bedside. Safety maintained.

## 2021-12-18 NOTE — PROGRESS NOTES
[facility] Medicine  Progress Note    Patient Name: Christine Castro  MRN: [id number]  Patient Class: IP- Inpatient   Admission Date: 12/14/2021  Length of Stay: 4 days  Attending Physician: Ishaan Fisher MD  Primary Care Provider: Alon Santiago MD        Subjective:     Principal Problem:Cervical spondylosis with myelopathy        HPI:  Ms. Christine Castro is a 67 y/o female with PMHx of lumbar radiculopathy, degenerative disc disease (DDD), CAD, normocytic anemia, GERD, acquired hypothyroidism, obesity, and depression admitted to [facility] for cervical spondylosis with myelopathy s/p C3-C7 anterior diskectomy and interbody fusion on 12/14/21. Patient started having a productive cough with white thick sputum yesterday. Per nursing, patient have coughing spells when she eats. SLP was consulted and following. CXR revealed Interval development of patchy opacity in the right lower lobe.  Findings may represent pneumonia, atelectasis, aspiration, or hemorrhage. Hospital medicine consulted for hypoxia and abnormal cxr finding.       Overview/Hospital Course:  No notes on file    Interval History: awake and alert, family by bedside. Requesting to be discharge    Review of Systems   Constitutional: Negative for chills, diaphoresis and fever.   Respiratory: Positive for cough. Negative for shortness of breath.    Cardiovascular: Negative for chest pain and leg swelling.   Gastrointestinal: Negative for abdominal pain, diarrhea, nausea and vomiting.   Genitourinary: Negative for difficulty urinating and flank pain.   Musculoskeletal: Negative for back pain.   Skin: Negative for rash and wound.   Neurological: Negative for dizziness, weakness and headaches.   Psychiatric/Behavioral: Negative for agitation and confusion. The patient is not nervous/anxious.      Objective:     Vital Signs (Most Recent):  Temp: 97.9 °F (36.6 °C) (12/18/21 1211)  Pulse: 84 (12/18/21 1211)  Resp: 18 (12/18/21 1211)  BP: 92/64  (12/18/21 1211)  SpO2: 95 % (12/18/21 1351) Vital Signs (24h Range):  Temp:  [97.9 °F (36.6 °C)-98.4 °F (36.9 °C)] 97.9 °F (36.6 °C)  Pulse:  [80-88] 84  Resp:  [18-20] 18  SpO2:  [93 %-100 %] 95 %  BP: ()/(51-70) 92/64     Weight: 103 kg (227 lb 1.2 oz)  Body mass index is 44.35 kg/m².    Intake/Output Summary (Last 24 hours) at 12/18/2021 1517  Last data filed at 12/18/2021 0136  Gross per 24 hour   Intake 150.3 ml   Output --   Net 150.3 ml      Physical Exam  Vitals and nursing note reviewed.   Constitutional:       General: She is not in acute distress.     Appearance: She is not ill-appearing.   HENT:      Head: Normocephalic and atraumatic.   Neck:      Comments: C- collar in place  Surgical incision with staples RUSTY  Cardiovascular:      Rate and Rhythm: Normal rate and regular rhythm.      Pulses: Normal pulses.      Heart sounds: Normal heart sounds. No murmur heard.  No friction rub. No gallop.    Pulmonary:      Effort: Pulmonary effort is normal. No respiratory distress.      Breath sounds: Examination of the right-lower field reveals decreased breath sounds. Examination of the left-lower field reveals decreased breath sounds. Decreased breath sounds present. No wheezing or rhonchi.   Abdominal:      General: Bowel sounds are normal. There is no distension.      Palpations: Abdomen is soft.      Tenderness: There is no abdominal tenderness. There is no guarding.   Musculoskeletal:         General: No swelling.      Cervical back: Normal range of motion and neck supple.      Right lower leg: No edema.      Left lower leg: No edema.   Skin:     General: Skin is warm and dry.      Capillary Refill: Capillary refill takes less than 2 seconds.      Findings: No bruising, erythema or rash.   Neurological:      General: No focal deficit present.      GCS: GCS eye subscore is 3. GCS verbal subscore is 4. GCS motor subscore is 6.   Psychiatric:         Mood and Affect: Mood normal.         Speech: Speech  is delayed.         Behavior: Behavior is cooperative.         Significant Labs: All pertinent labs within the past 24 hours have been reviewed.    Significant Imaging: I have reviewed all pertinent imaging results/findings within the past 24 hours.      Assessment/Plan:      * Cervical spondylosis with myelopathy  -Management per neurosurgery      Aspiration pneumonia of right lower lobe  -CXR revealed Interval development of patchy opacity in the right lower lobe.Findings may represent pneumonia, atelectasis, aspiration, or hemorrhage  -likely aspiration PNA 2/2 to dysphagia, SLP following  -Noted to be on 5L NC with SpO2 100%, patient was weaned to 2L NC with Spo2 100%  -Supplemental oxygen PRN for SpO2 <90%; wean as needed  -Continue pulmonary hygiene  -Continuous cardiac monitoring  -CBC and BMP  -Respiratory culture  continue levoquin 750mg IV x5 days  -Aspiration precautions  -Abg x1      Coronary artery disease involving native coronary artery of native heart  Continue ASA and Statin  Monitor for s/s of angina/ ACS  Continue to monitor on telemetry    Normocytic anemia  Patient's anemia is currently controlled. Has not received any PRBCs to date.. Etiology likely d/t surgery; patient is s/p anterior diskectomy and interbody fusion.  Current CBC reviewed-   Lab Results   Component Value Date    HGB 8.0 (L) 12/17/2021    HCT 25.3 (L) 12/17/2021     Monitor serial CBC and transfuse if patient becomes hemodynamically unstable, symptomatic or H/H drops below 7/21.         Major depressive disorder  -Continue home regimen of bupropion and escitalopram oxalate      Acquired hypothyroidism  Patient has chronic hypothyroidism. TFTs reviewed-   Lab Results   Component Value Date    TSH 1.469 11/23/2021    Will continue chronic levothyroxine and adjust for and clinical changes.        VTE Risk Mitigation (From admission, onward)         Ordered     heparin (porcine) injection 5,000 Units  Every 12 hours          12/14/21 2019     IP VTE HIGH RISK PATIENT  Once         12/14/21 2019     Place sequential compression device  Until discontinued         12/14/21 2019                Discharge Planning   [place]:      Code Status: Prior   Is the patient medically ready for discharge?:     Reason for patient still in hospital (select all that apply): Patient trending condition  Discharge Plan A: Skilled Nursing Facility              Fatoumata Suresh MD  Department of Hospital Medicine   [facility]

## 2021-12-18 NOTE — ASSESSMENT & PLAN NOTE
-CXR revealed Interval development of patchy opacity in the right lower lobe.Findings may represent pneumonia, atelectasis, aspiration, or hemorrhage  -likely aspiration PNA 2/2 to dysphagia, SLP following  -Noted to be on 5L NC with SpO2 100%, patient was weaned to 2L NC with Spo2 100%  -Supplemental oxygen PRN for SpO2 <90%; wean as needed  -Continue pulmonary hygiene  -Continuous cardiac monitoring  -CBC and BMP  -Respiratory culture  continue levoquin 750mg IV x5 days  -Aspiration precautions  -Abg x1

## 2021-12-18 NOTE — PROGRESS NOTES
[facility] Surg  Neurosurgery  Progress Note    Subjective:     History of Present Illness:  Patient with neck pain and left upper arm pain.  Some difficulty swallowing.  [facility] medicine saw and placed on levofloxacin for possible aspiration pneumonia.    Post-Op Info:  Procedure(s) (LRB):  DECOMPRESSION AND FUSION, SPINE, CERVICAL, ANTERIOR APPROACH C3-4 C5- 6 C6-7 ACDF (N/A)   4 Days Post-Op      Medications:  Continuous Infusions:  Scheduled Meds:   acetaminophen  650 mg Oral Q6H    atorvastatin  40 mg Oral QHS    buPROPion  200 mg Oral BID    busPIRone  5 mg Oral Daily    EScitalopram oxalate  20 mg Oral Daily    furosemide  20 mg Oral Daily    gabapentin  300 mg Oral BID    heparin (porcine)  5,000 Units Subcutaneous Q12H    levoFLOXacin  750 mg Intravenous Q24H    levothyroxine  100 mcg Oral Before breakfast    mupirocin   Nasal BID    pantoprazole  40 mg Oral Daily    potassium chloride SA  10 mEq Oral Daily     PRN Meds:albuterol, albuterol sulfate, aluminum-magnesium hydroxide-simethicone, dextrose 50%, dextrose 50%, glucagon (human recombinant), glucose, glucose, HYDROmorphone, insulin aspart U-100, naloxone, ondansetron, oxyCODONE, prochlorperazine, sodium chloride 3%, tiZANidine, zolpidem     Review of Systems  Objective:     Weight: 103 kg (227 lb 1.2 oz)  Body mass index is 44.35 kg/m².  Vital Signs (Most Recent):  Temp: 98.2 °F (36.8 °C) (12/18/21 0816)  Pulse: 82 (12/18/21 0816)  Resp: 18 (12/18/21 0816)  BP: (!) 101/51 (12/18/21 0816)  SpO2: 95 % (12/18/21 0816) Vital Signs (24h Range):  Temp:  [98 °F (36.7 °C)-98.4 °F (36.9 °C)] 98.2 °F (36.8 °C)  Pulse:  [80-92] 82  Resp:  [18-20] 18  SpO2:  [93 %-100 %] 95 %  BP: (101-137)/(51-81) 101/51                          Neurosurgery Physical Exam     General: well developed, well nourished, no distress  Mental Status: Awake, Alert, Oriented X3.Thought content appropriate  GCS: Motor: 6/Verbal: 5/Eyes: 4 GCS Total: 15    Motor  Strength:  Strength  Deltoids Triceps Biceps Wrist Extension Wrist Flexion Hand    Upper: R 5/5 5/5 5/5 5/5 5/5 5/5    L 4/5 5/5 5/5 5/5 5/5 5/5     HF KF KE DF PF EHL   Lower: R 5/5 5/5 5/5 5/5 5/5 5/5    L 5/5 5/5 5/5 5/5 5/5 5/5       Munoz: absent B/L  Clonus: absent B/L  Incision-CDI        Significant Labs:  Recent Labs   Lab 12/17/21  1538   GLU 89      K 4.1      CO2 23   BUN 15   CREATININE 0.8   CALCIUM 8.2*     Recent Labs   Lab 12/17/21  1538 12/17/21  1648   WBC 5.00  --    HGB 8.0*  --    HCT 25.3* 24*   *  --      No results for input(s): LABPT, INR, APTT in the last 48 hours.  Microbiology Results (last 7 days)     Procedure Component Value Units Date/Time    Culture, Respiratory with Gram Stain [[id number]]     Order Status: No result Specimen: Respiratory           Significant Diagnostics:  cspine xrays show good hardware placement    Assessment/Plan:     Active Diagnoses:    Diagnosis Date Noted POA    PRINCIPAL PROBLEM:  Cervical spondylosis with myelopathy [M47.12] 12/14/2021 Yes    Aspiration pneumonia of right lower lobe [J69.0] 12/17/2021 No    Coronary artery disease involving native coronary artery of native heart [I25.10]  Yes     Chronic    Normocytic anemia [D64.9] 04/10/2018 Yes     Chronic    Major depressive disorder [F32.9] 12/04/2015 Yes    Acquired hypothyroidism [E03.9]  Yes     Chronic      Problems Resolved During this Admission:       A/P:  POD #4 C3-4, C5-6, and C6-7 ACDF     -Neurologically stable  -PT/OT  -OOB ambulating with assistance  -Pureed diet per [facility] medicine recs  -pending SNF placement     All of the above discussed and reviewed with Dr. Fisher.        HUONG LarryC  Neurosurgery  [place] - Intensive Care

## 2021-12-18 NOTE — CARE UPDATE
Sputum cup placed bedside and explained to pt how the sample needs to be collected. Explained it is important to test sample, and pt verbalized understanding. Upon leaving the room, pt asked for a coke and told me she would pay for it. I expressed I would have to ask the nurse and pt became hysterical. Will continue to monitor.

## 2021-12-18 NOTE — PLAN OF CARE
Problem: Physical Therapy Goal  Goal: Physical Therapy Goal  Description: Goals to be met by: 1/15/2022     Patient will increase functional independence with mobility by performin. Supine <> sit with Minimal Assistance  2. Sit to stand transfer with Minimal Assistance  3. Bed to chair transfer with Minimal Assistance using Rolling Walker  4. Gait  x 50 feet with Minimal Assistance using Rolling Walker.   5. Lower extremity exercise program x 10 reps per handout, with supervision    Outcome: Ongoing, Progressing  Patient progressing toward goals; pt amb 8ft with Luc to bathroom and ~20 ft from bathroom to bedside chair on opposite side of bed; will cont with POC.

## 2021-12-19 LAB
POCT GLUCOSE: 171 MG/DL (ref 70–110)
POCT GLUCOSE: 87 MG/DL (ref 70–110)
POCT GLUCOSE: 93 MG/DL (ref 70–110)
POCT GLUCOSE: 95 MG/DL (ref 70–110)

## 2021-12-19 PROCEDURE — 11000001 HC ACUTE MED/SURG PRIVATE ROOM: Mod: HCNC

## 2021-12-19 PROCEDURE — 94799 UNLISTED PULMONARY SVC/PX: CPT | Mod: HCNC

## 2021-12-19 PROCEDURE — 63600175 PHARM REV CODE 636 W HCPCS: Mod: HCNC | Performed by: FAMILY MEDICINE

## 2021-12-19 PROCEDURE — 27000646 HC AEROBIKA DEVICE: Mod: HCNC

## 2021-12-19 PROCEDURE — 87070 CULTURE OTHR SPECIMN AEROBIC: CPT | Mod: HCNC

## 2021-12-19 PROCEDURE — 94664 DEMO&/EVAL PT USE INHALER: CPT | Mod: HCNC

## 2021-12-19 PROCEDURE — 25000003 PHARM REV CODE 250: Mod: HCNC | Performed by: NURSE PRACTITIONER

## 2021-12-19 PROCEDURE — 27000221 HC OXYGEN, UP TO 24 HOURS: Mod: HCNC

## 2021-12-19 PROCEDURE — 94760 N-INVAS EAR/PLS OXIMETRY 1: CPT | Mod: HCNC

## 2021-12-19 PROCEDURE — 94761 N-INVAS EAR/PLS OXIMETRY MLT: CPT | Mod: HCNC

## 2021-12-19 PROCEDURE — 87205 SMEAR GRAM STAIN: CPT | Mod: HCNC

## 2021-12-19 PROCEDURE — 25000003 PHARM REV CODE 250: Mod: HCNC | Performed by: PHYSICIAN ASSISTANT

## 2021-12-19 PROCEDURE — 63600175 PHARM REV CODE 636 W HCPCS: Mod: HCNC | Performed by: PHYSICIAN ASSISTANT

## 2021-12-19 PROCEDURE — 97530 THERAPEUTIC ACTIVITIES: CPT | Mod: HCNC,CQ

## 2021-12-19 PROCEDURE — 63600175 PHARM REV CODE 636 W HCPCS: Mod: HCNC

## 2021-12-19 PROCEDURE — 99900035 HC TECH TIME PER 15 MIN (STAT): Mod: HCNC

## 2021-12-19 RX ORDER — DOCUSATE SODIUM 100 MG/1
100 CAPSULE, LIQUID FILLED ORAL DAILY
Status: DISCONTINUED | OUTPATIENT
Start: 2021-12-19 | End: 2021-12-21 | Stop reason: HOSPADM

## 2021-12-19 RX ORDER — DIPHENHYDRAMINE HCL 25 MG
25 CAPSULE ORAL EVERY 6 HOURS PRN
Status: DISCONTINUED | OUTPATIENT
Start: 2021-12-19 | End: 2021-12-21 | Stop reason: HOSPADM

## 2021-12-19 RX ORDER — HYDROMORPHONE HYDROCHLORIDE 2 MG/ML
2 INJECTION, SOLUTION INTRAMUSCULAR; INTRAVENOUS; SUBCUTANEOUS EVERY 6 HOURS PRN
Status: DISCONTINUED | OUTPATIENT
Start: 2021-12-19 | End: 2021-12-21 | Stop reason: HOSPADM

## 2021-12-19 RX ADMIN — FUROSEMIDE 20 MG: 20 TABLET ORAL at 09:12

## 2021-12-19 RX ADMIN — HYDROMORPHONE HYDROCHLORIDE 2 MG: 2 INJECTION, SOLUTION INTRAMUSCULAR; INTRAVENOUS; SUBCUTANEOUS at 11:12

## 2021-12-19 RX ADMIN — HYDROMORPHONE HYDROCHLORIDE 2 MG: 2 INJECTION, SOLUTION INTRAMUSCULAR; INTRAVENOUS; SUBCUTANEOUS at 05:12

## 2021-12-19 RX ADMIN — MUPIROCIN: 20 OINTMENT TOPICAL at 09:12

## 2021-12-19 RX ADMIN — LEVOTHYROXINE SODIUM 100 MCG: 50 TABLET ORAL at 06:12

## 2021-12-19 RX ADMIN — OXYCODONE 10 MG: 5 TABLET ORAL at 09:12

## 2021-12-19 RX ADMIN — ACETAMINOPHEN 650 MG: 325 TABLET, FILM COATED ORAL at 11:12

## 2021-12-19 RX ADMIN — ACETAMINOPHEN 650 MG: 325 TABLET, FILM COATED ORAL at 05:12

## 2021-12-19 RX ADMIN — DIPHENHYDRAMINE HYDROCHLORIDE 25 MG: 25 CAPSULE ORAL at 10:12

## 2021-12-19 RX ADMIN — GABAPENTIN 300 MG: 300 CAPSULE ORAL at 08:12

## 2021-12-19 RX ADMIN — ATORVASTATIN CALCIUM 40 MG: 40 TABLET, FILM COATED ORAL at 08:12

## 2021-12-19 RX ADMIN — ACETAMINOPHEN 650 MG: 325 TABLET, FILM COATED ORAL at 06:12

## 2021-12-19 RX ADMIN — ESCITALOPRAM OXALATE 20 MG: 10 TABLET ORAL at 09:12

## 2021-12-19 RX ADMIN — ACETAMINOPHEN 650 MG: 325 TABLET, FILM COATED ORAL at 12:12

## 2021-12-19 RX ADMIN — LEVOFLOXACIN 750 MG: 750 INJECTION, SOLUTION INTRAVENOUS at 08:12

## 2021-12-19 RX ADMIN — POTASSIUM CHLORIDE 10 MEQ: 750 TABLET, EXTENDED RELEASE ORAL at 09:12

## 2021-12-19 RX ADMIN — BUPROPION HYDROCHLORIDE 200 MG: 100 TABLET, EXTENDED RELEASE ORAL at 09:12

## 2021-12-19 RX ADMIN — GABAPENTIN 300 MG: 300 CAPSULE ORAL at 09:12

## 2021-12-19 RX ADMIN — PANTOPRAZOLE SODIUM 40 MG: 40 TABLET, DELAYED RELEASE ORAL at 09:12

## 2021-12-19 RX ADMIN — HEPARIN SODIUM 5000 UNITS: 5000 INJECTION INTRAVENOUS; SUBCUTANEOUS at 09:12

## 2021-12-19 RX ADMIN — HEPARIN SODIUM 5000 UNITS: 5000 INJECTION INTRAVENOUS; SUBCUTANEOUS at 08:12

## 2021-12-19 RX ADMIN — BUSPIRONE HYDROCHLORIDE 5 MG: 5 TABLET ORAL at 09:12

## 2021-12-19 RX ADMIN — DOCUSATE SODIUM 100 MG: 100 CAPSULE ORAL at 08:12

## 2021-12-19 RX ADMIN — BUPROPION HYDROCHLORIDE 200 MG: 100 TABLET, EXTENDED RELEASE ORAL at 08:12

## 2021-12-19 NOTE — PLAN OF CARE
PT A/O X 4. Plan of care reviewed with patient. Pt verbalized understanding. Placed on telemetry per orders. PT medicated per MAR. Safety precautions in place. Instructed to use call light for assistance. Call light in reach.

## 2021-12-19 NOTE — PT/OT/SLP PROGRESS
Physical Therapy Treatment    Patient Name:  Christine Castro   MRN:  [id number]    Recommendations:     Discharge Recommendations:  nursing facility, skilled   Discharge Equipment Recommendations:  (TBD)   Barriers to discharge: Decreased caregiver support    Assessment:     Christine Castro is a 66 y.o. female admitted with a medical diagnosis of Cervical spondylosis with myelopathy.  She presents with the following impairments/functional limitations:  weakness,impaired balance,impaired endurance,impaired cardiopulmonary response to activity,decreased coordination,decreased lower extremity function,gait instability,impaired functional mobilty,orthopedic precautions.    Rehab Prognosis: Good; patient would benefit from acute skilled PT services to address these deficits and reach maximum level of function.    Recent Surgery: Procedure(s) (LRB):  DECOMPRESSION AND FUSION, SPINE, CERVICAL, ANTERIOR APPROACH C3-4 C5- 6 C6-7 ACDF (N/A) 5 Days Post-Op    Plan:     During this hospitalization, patient to be seen daily to address the identified rehab impairments via gait training,therapeutic activities,therapeutic exercises,neuromuscular re-education and progress toward the following goals:    · Plan of Care Expires:  01/15/22    Subjective     Chief Complaint: Pt with no complaints at this time.   Patient/Family Comments/goals:   Pain/Comfort:  · Pain Rating 1:  (pt did not rate)      Objective:     Patient found HOB elevated with bed alarm,peripheral IV upon PT entry to room.     General Precautions: Standard, aspiration,fall,respiratory   Orthopedic Precautions:spinal precautions   Braces: Aspen collar  Respiratory Status: Room air     Functional Mobility:  · Bed Mobility:     · Supine to Sit: contact guard assistance  · Sit to Supine: contact guard assistance  · Transfers:     · Sit to Stand:  contact guard assistance with rolling walker  · Gait: Ambulation not attempted to pt noting having increased dizziness once  standing which made it unsafe for gait training.   · Balance: Pt with good sitting balance and fair standing balance.       AM-PAC 6 CLICK MOBILITY  Turning over in bed (including adjusting bedclothes, sheets and blankets)?: 3  Sitting down on and standing up from a chair with arms (e.g., wheelchair, bedside commode, etc.): 3  Moving from lying on back to sitting on the side of the bed?: 3  Moving to and from a bed to a chair (including a wheelchair)?: 3  Need to walk in hospital room?: 2  Climbing 3-5 steps with a railing?: 1  Basic Mobility Total Score: 15       Therapeutic Activities and Exercises:       Patient left HOB elevated with all lines intact and call button in reach..    GOALS:   Multidisciplinary Problems     Physical Therapy Goals        Problem: Physical Therapy Goal    Goal Priority Disciplines Outcome Goal Variances Interventions   Physical Therapy Goal     PT, PT/OT Ongoing, Progressing     Description: Goals to be met by: 1/15/2022     Patient will increase functional independence with mobility by performin. Supine <> sit with Minimal Assistance  2. Sit to stand transfer with Minimal Assistance  3. Bed to chair transfer with Minimal Assistance using Rolling Walker  4. Gait  x 50 feet with Minimal Assistance using Rolling Walker.   5. Lower extremity exercise program x 10 reps per handout, with supervision                     Time Tracking:     PT Received On: 21  PT Start Time: 1413     PT Stop Time: 1433  PT Total Time (min): 20 min     Billable Minutes: Therapeutic Activity 20    Treatment Type: Treatment  PT/PTA: PTA     PTA Visit Number: 2     2021

## 2021-12-19 NOTE — PLAN OF CARE
Medications given per mar. New IV access placed, other one came out. Hampton collar in place overnight. Patient became confused after sleep medication and pain medication was given. Patient trying to get out of bed. Safety maintained, KAMARI, tele sitter at bedside and bed alarm in use.

## 2021-12-19 NOTE — PROGRESS NOTES
[facility]  Neurosurgery  Progress Note    Subjective:     History of Present Illness:  Patient with neck pain and left upper arm pain.  Still with some swallowing difficulty.    Post-Op Info:  Procedure(s) (LRB):  DECOMPRESSION AND FUSION, SPINE, CERVICAL, ANTERIOR APPROACH C3-4 C5- 6 C6-7 ACDF (N/A)   5 Days Post-Op      Medications:  Continuous Infusions:  Scheduled Meds:   acetaminophen  650 mg Oral Q6H    atorvastatin  40 mg Oral QHS    buPROPion  200 mg Oral BID    busPIRone  5 mg Oral Daily    EScitalopram oxalate  20 mg Oral Daily    furosemide  20 mg Oral Daily    gabapentin  300 mg Oral BID    heparin (porcine)  5,000 Units Subcutaneous Q12H    levoFLOXacin  750 mg Intravenous Q24H    levothyroxine  100 mcg Oral Before breakfast    mupirocin   Nasal BID    pantoprazole  40 mg Oral Daily    potassium chloride SA  10 mEq Oral Daily     PRN Meds:albuterol, albuterol sulfate, aluminum-magnesium hydroxide-simethicone, dextrose 50%, dextrose 50%, glucagon (human recombinant), glucose, glucose, HYDROmorphone, insulin aspart U-100, naloxone, ondansetron, oxyCODONE, prochlorperazine, sodium chloride 3%, tiZANidine, zolpidem     Review of Systems  Objective:     Weight: 103.3 kg (227 lb 11.8 oz)  Body mass index is 44.48 kg/m².  Vital Signs (Most Recent):  Temp: 98 °F (36.7 °C) (12/19/21 1203)  Pulse: 73 (12/19/21 1203)  Resp: 18 (12/19/21 1203)  BP: 91/62 (12/19/21 1203)  SpO2: 100 % (12/19/21 1203) Vital Signs (24h Range):  Temp:  [97.6 °F (36.4 °C)-98.8 °F (37.1 °C)] 98 °F (36.7 °C)  Pulse:  [] 73  Resp:  [16-18] 18  SpO2:  [86 %-100 %] 100 %  BP: ()/(54-76) 91/62                          Neurosurgery Physical Exam     General: well developed, well nourished, no distress  Mental Status: Awake, Alert, Oriented X3.Thought content appropriate  GCS: Motor: 6/Verbal: 5/Eyes: 4 GCS Total: 15     Motor Strength:  Strength   Deltoids Triceps Biceps Wrist Extension Wrist Flexion Hand     Upper: R 5/5 5/5 5/5 5/5 5/5 5/5     L 4/5 5/5 5/5 5/5 5/5 5/5       HF KF KE DF PF EHL   Lower: R 5/5 5/5 5/5 5/5 5/5 5/5     L 5/5 5/5 5/5 5/5 5/5 5/5         Munoz: absent on the right.  Present on the left.  Clonus: absent B/L  Incision-CDI             Significant Labs:  Recent Labs   Lab 12/17/21  1538   GLU 89      K 4.1      CO2 23   BUN 15   CREATININE 0.8   CALCIUM 8.2*     Recent Labs   Lab 12/17/21  1538 12/17/21  1648   WBC 5.00  --    HGB 8.0*  --    HCT 25.3* 24*   *  --      No results for input(s): LABPT, INR, APTT in the last 48 hours.  Microbiology Results (last 7 days)     Procedure Component Value Units Date/Time    Culture, Respiratory with Gram Stain [[id number]]     Order Status: No result Specimen: Respiratory           Significant Diagnostics:  Significant Diagnostics:  cspine xrays show good hardware placement       Assessment/Plan:     Active Diagnoses:    Diagnosis Date Noted POA    PRINCIPAL PROBLEM:  Cervical spondylosis with myelopathy [M47.12] 12/14/2021 Yes    Aspiration pneumonia of right lower lobe [J69.0] 12/17/2021 No    Coronary artery disease involving native coronary artery of native heart [I25.10]  Yes     Chronic    Normocytic anemia [D64.9] 04/10/2018 Yes     Chronic    Major depressive disorder [F32.9] 12/04/2015 Yes    Acquired hypothyroidism [E03.9]  Yes     Chronic      Problems Resolved During this Admission:     A/P:  POD #5 C3-4, C5-6, and C6-7 ACDF     -Neurologically stable  -PT/OT  -OOB ambulating with assistance  -Pureed diet per [facility] medicine recs  -pending SNF placement     All of the above discussed and reviewed with Dr. Fisher.       Mayda Musa PASaschaC  Neurosurgery  [facility]

## 2021-12-20 DIAGNOSIS — M47.12 CERVICAL SPONDYLOSIS WITH MYELOPATHY: Primary | ICD-10-CM

## 2021-12-20 LAB
POCT GLUCOSE: 106 MG/DL (ref 70–110)
POCT GLUCOSE: 119 MG/DL (ref 70–110)
POCT GLUCOSE: 86 MG/DL (ref 70–110)
POCT GLUCOSE: 92 MG/DL (ref 70–110)

## 2021-12-20 PROCEDURE — 25000003 PHARM REV CODE 250: Mod: HCNC | Performed by: NURSE PRACTITIONER

## 2021-12-20 PROCEDURE — 11000001 HC ACUTE MED/SURG PRIVATE ROOM: Mod: HCNC

## 2021-12-20 PROCEDURE — 25000003 PHARM REV CODE 250: Mod: HCNC | Performed by: FAMILY MEDICINE

## 2021-12-20 PROCEDURE — 63600175 PHARM REV CODE 636 W HCPCS: Mod: HCNC | Performed by: FAMILY MEDICINE

## 2021-12-20 PROCEDURE — 63600175 PHARM REV CODE 636 W HCPCS: Mod: HCNC | Performed by: PHYSICIAN ASSISTANT

## 2021-12-20 PROCEDURE — 25000003 PHARM REV CODE 250: Mod: HCNC | Performed by: PHYSICIAN ASSISTANT

## 2021-12-20 PROCEDURE — 94668 MNPJ CHEST WALL SBSQ: CPT | Mod: HCNC

## 2021-12-20 PROCEDURE — 94799 UNLISTED PULMONARY SVC/PX: CPT | Mod: HCNC

## 2021-12-20 PROCEDURE — 99900035 HC TECH TIME PER 15 MIN (STAT): Mod: HCNC

## 2021-12-20 PROCEDURE — 97116 GAIT TRAINING THERAPY: CPT | Mod: HCNC,CQ

## 2021-12-20 PROCEDURE — 94761 N-INVAS EAR/PLS OXIMETRY MLT: CPT | Mod: HCNC

## 2021-12-20 PROCEDURE — 97530 THERAPEUTIC ACTIVITIES: CPT | Mod: HCNC,CQ

## 2021-12-20 RX ORDER — LEVOFLOXACIN 750 MG/1
750 TABLET ORAL DAILY
Status: COMPLETED | OUTPATIENT
Start: 2021-12-20 | End: 2021-12-21

## 2021-12-20 RX ADMIN — BUSPIRONE HYDROCHLORIDE 5 MG: 5 TABLET ORAL at 09:12

## 2021-12-20 RX ADMIN — BUPROPION HYDROCHLORIDE 200 MG: 100 TABLET, EXTENDED RELEASE ORAL at 09:12

## 2021-12-20 RX ADMIN — OXYCODONE 10 MG: 5 TABLET ORAL at 05:12

## 2021-12-20 RX ADMIN — FUROSEMIDE 20 MG: 20 TABLET ORAL at 09:12

## 2021-12-20 RX ADMIN — HEPARIN SODIUM 5000 UNITS: 5000 INJECTION INTRAVENOUS; SUBCUTANEOUS at 09:12

## 2021-12-20 RX ADMIN — HYDROMORPHONE HYDROCHLORIDE 2 MG: 2 INJECTION, SOLUTION INTRAMUSCULAR; INTRAVENOUS; SUBCUTANEOUS at 01:12

## 2021-12-20 RX ADMIN — DOCUSATE SODIUM 100 MG: 100 CAPSULE ORAL at 09:12

## 2021-12-20 RX ADMIN — ATORVASTATIN CALCIUM 40 MG: 40 TABLET, FILM COATED ORAL at 09:12

## 2021-12-20 RX ADMIN — PANTOPRAZOLE SODIUM 40 MG: 40 TABLET, DELAYED RELEASE ORAL at 09:12

## 2021-12-20 RX ADMIN — ESCITALOPRAM OXALATE 20 MG: 10 TABLET ORAL at 09:12

## 2021-12-20 RX ADMIN — ACETAMINOPHEN 650 MG: 325 TABLET, FILM COATED ORAL at 12:12

## 2021-12-20 RX ADMIN — ZOLPIDEM TARTRATE 5 MG: 5 TABLET ORAL at 09:12

## 2021-12-20 RX ADMIN — LEVOFLOXACIN 750 MG: 750 TABLET, FILM COATED ORAL at 05:12

## 2021-12-20 RX ADMIN — LEVOTHYROXINE SODIUM 100 MCG: 50 TABLET ORAL at 05:12

## 2021-12-20 RX ADMIN — GABAPENTIN 300 MG: 300 CAPSULE ORAL at 09:12

## 2021-12-20 RX ADMIN — POTASSIUM CHLORIDE 10 MEQ: 750 TABLET, EXTENDED RELEASE ORAL at 09:12

## 2021-12-20 RX ADMIN — ACETAMINOPHEN 650 MG: 325 TABLET, FILM COATED ORAL at 05:12

## 2021-12-20 RX ADMIN — OXYCODONE 10 MG: 5 TABLET ORAL at 09:12

## 2021-12-20 RX ADMIN — HYDROMORPHONE HYDROCHLORIDE 2 MG: 2 INJECTION, SOLUTION INTRAMUSCULAR; INTRAVENOUS; SUBCUTANEOUS at 09:12

## 2021-12-20 NOTE — SUBJECTIVE & OBJECTIVE
Interval History: awake and alert,      Appreciates SLP.   Continue Levaquin and supplemental oxygen  Planning for SNF placement today. Okay to switch to PO Levaquin at discharge    Review of Systems   Constitutional: Negative for chills, diaphoresis and fever.   Respiratory: Negative for cough and shortness of breath.    Cardiovascular: Negative for chest pain and leg swelling.   Gastrointestinal: Negative for abdominal pain, diarrhea, nausea and vomiting.   Genitourinary: Negative for difficulty urinating and flank pain.   Musculoskeletal: Negative for back pain.   Skin: Negative for rash and wound.   Neurological: Negative for dizziness, weakness and headaches.   Psychiatric/Behavioral: Negative for agitation and confusion. The patient is not nervous/anxious.      Objective:     Vital Signs (Most Recent):  Temp: 98.3 °F (36.8 °C) (12/20/21 1126)  Pulse: 78 (12/20/21 1157)  Resp: 18 (12/20/21 1301)  BP: 108/62 (12/20/21 1126)  SpO2: 97 % (12/20/21 1144) Vital Signs (24h Range):  Temp:  [97.5 °F (36.4 °C)-98.6 °F (37 °C)] 98.3 °F (36.8 °C)  Pulse:  [71-93] 78  Resp:  [18] 18  SpO2:  [91 %-100 %] 97 %  BP: ()/(54-67) 108/62     Weight: 103.9 kg (229 lb 0.9 oz)  Body mass index is 44.73 kg/m².    Intake/Output Summary (Last 24 hours) at 12/20/2021 1400  Last data filed at 12/20/2021 1200  Gross per 24 hour   Intake 929.49 ml   Output 650 ml   Net 279.49 ml      Physical Exam  Vitals and nursing note reviewed.   Constitutional:       General: She is not in acute distress.     Appearance: She is not ill-appearing.   HENT:      Head: Normocephalic and atraumatic.   Neck:      Comments: C- collar in place  Surgical incision with staples RUSTY  Cardiovascular:      Rate and Rhythm: Normal rate and regular rhythm.      Pulses: Normal pulses.      Heart sounds: Normal heart sounds. No murmur heard.  No friction rub. No gallop.    Pulmonary:      Effort: Pulmonary effort is normal. No respiratory distress.      Breath  sounds: Examination of the right-lower field reveals decreased breath sounds. Examination of the left-lower field reveals decreased breath sounds. Decreased breath sounds present. No wheezing or rhonchi.   Abdominal:      General: Bowel sounds are normal. There is no distension.      Palpations: Abdomen is soft.      Tenderness: There is no abdominal tenderness. There is no guarding.   Musculoskeletal:         General: No swelling.      Cervical back: Normal range of motion and neck supple.      Right lower leg: No edema.      Left lower leg: No edema.   Skin:     General: Skin is warm and dry.      Capillary Refill: Capillary refill takes less than 2 seconds.      Findings: No bruising, erythema or rash.   Neurological:      General: No focal deficit present.      GCS: GCS eye subscore is 3. GCS verbal subscore is 4. GCS motor subscore is 6.   Psychiatric:         Mood and Affect: Mood normal.         Speech: Speech is delayed.         Behavior: Behavior is cooperative.         Significant Labs: All pertinent labs within the past 24 hours have been reviewed.    Significant Imaging: I have reviewed all pertinent imaging results/findings within the past 24 hours.

## 2021-12-20 NOTE — PLAN OF CARE
Problem: Adult Inpatient Plan of Care  Goal: Plan of Care Review  Outcome: Ongoing, Progressing     AAOX4.  Respirations even and unlabored; breath sounds with crackles.  Coughing up green sputum; collected and sent to lab.  O2 3.5L NC with saturation 95%. Afebrile.  TELE:  SR 80s without ectopy.  Denies n/v and sob.  C/o neck pain; alternating oxycodone and dilaudid prn.  Anterior neck incision closed with staples, RUSTY, without redness, swelling, or drainage noted.  Apsen collar in place; mepilex on chest under collar for pressure sore prevention.  Sore noted to right chin; left RUSTY.  C/o itching; NP notified and benadryl received.  Abd obese; bowel sounds active.  Last bowel movement was 12/14; notified NP and patient received colace.  Tolerating po.  Ambulating with standby assistance.  Education per flowsheet.  Bed alarm on; bed awareness on.  AVASYS at bedside.  Fall precaution sign on door; fall precaution armband on.  Fall precaution socks on.  Instructed to call for assistance.  Will cont to monitor.

## 2021-12-20 NOTE — PLAN OF CARE
Problem: Physical Therapy Goal  Goal: Physical Therapy Goal  Description: Goals to be met by: 1/15/2022     Patient will increase functional independence with mobility by performin. Supine <> sit with Minimal Assistance  2. Sit to stand transfer with Minimal Assistance  3. Bed to chair transfer with Minimal Assistance using Rolling Walker  4. Gait  x 50 feet with Minimal Assistance using Rolling Walker.   5. Lower extremity exercise program x 10 reps per handout, with supervision    Outcome: Ongoing, Progressing

## 2021-12-20 NOTE — PROGRESS NOTES
[facility]  Neurosurgery  Progress Note    Subjective:       History of Present Illness: Patient with neck the and left arm pain.  Patient wants to go home with HHPT since her friend will be staying with her now.    Post-Op Info:  Procedure(s) (LRB):  DECOMPRESSION AND FUSION, SPINE, CERVICAL, ANTERIOR APPROACH C3-4 C5- 6 C6-7 ACDF (N/A)   6 Days Post-Op      Medications:  Continuous Infusions:  Scheduled Meds:   acetaminophen  650 mg Oral Q6H    atorvastatin  40 mg Oral QHS    buPROPion  200 mg Oral BID    busPIRone  5 mg Oral Daily    docusate sodium  100 mg Oral Daily    EScitalopram oxalate  20 mg Oral Daily    furosemide  20 mg Oral Daily    gabapentin  300 mg Oral BID    heparin (porcine)  5,000 Units Subcutaneous Q12H    levoFLOXacin  750 mg Intravenous Q24H    levothyroxine  100 mcg Oral Before breakfast    pantoprazole  40 mg Oral Daily    potassium chloride SA  10 mEq Oral Daily     PRN Meds:albuterol, albuterol sulfate, aluminum-magnesium hydroxide-simethicone, dextrose 50%, dextrose 50%, diphenhydrAMINE, glucagon (human recombinant), glucose, glucose, HYDROmorphone, insulin aspart U-100, naloxone, ondansetron, oxyCODONE, prochlorperazine, sodium chloride 3%, tiZANidine, zolpidem     Review of Systems  Objective:     Weight: 103.9 kg (229 lb 0.9 oz)  Body mass index is 44.73 kg/m².  Vital Signs (Most Recent):  Temp: 98.3 °F (36.8 °C) (12/20/21 1126)  Pulse: 78 (12/20/21 1157)  Resp: 18 (12/20/21 1301)  BP: 108/62 (12/20/21 1126)  SpO2: 97 % (12/20/21 1144) Vital Signs (24h Range):  Temp:  [97.5 °F (36.4 °C)-98.6 °F (37 °C)] 98.3 °F (36.8 °C)  Pulse:  [71-93] 78  Resp:  [18] 18  SpO2:  [91 %-100 %] 97 %  BP: ()/(54-67) 108/62     Date 12/20/21 0700 - 12/21/21 0659   Shift 6668-7117 7172-1024 7905-4300 24 Hour Total   INTAKE   P.O. 580   580   IV Piggyback 149.5   149.5   Shift Total(mL/kg) 729.5(7)   729.5(7)   OUTPUT   Urine(mL/kg/hr) 250   250   Shift Total(mL/kg) 250(2.4)    250(2.4)   Weight (kg) 103.9 103.9 103.9 103.9                        Neurosurgery Physical Exam   General: well developed, well nourished, no distress  Mental Status: Awake, Alert, Oriented X3.Thought content appropriate  GCS: Motor: 6/Verbal: 5/Eyes: 4 GCS Total: 15     Motor Strength:  Strength   Deltoids Triceps Biceps Wrist Extension Wrist Flexion Hand    Upper: R 5/5 5/5 5/5 5/5 5/5 5/5     L 4/5 5/5 5/5 5/5 5/5 5/5       HF KF KE DF PF EHL   Lower: R 5/5 5/5 5/5 5/5 5/5 5/5     L 5/5 5/5 5/5 5/5 5/5 5/5         Munoz: absent on the right.  Present on the left.  Clonus: absent B/L  Incision-CDI             Significant Labs:  No results for input(s): GLU, NA, K, CL, CO2, BUN, CREATININE, CALCIUM, MG in the last 48 hours.  No results for input(s): WBC, HGB, HCT, PLT in the last 48 hours.  No results for input(s): LABPT, INR, APTT in the last 48 hours.  Microbiology Results (last 7 days)     Procedure Component Value Units Date/Time    Culture, Respiratory with Gram Stain [[id number]] Collected: 12/19/21 2117    Order Status: Completed Specimen: Respiratory from Sputum, Expectorated Updated: 12/20/21 1241     Gram Stain (Respiratory) <10 epithelial cells per low power field.     Gram Stain (Respiratory) Many Gram positive cocci     Gram Stain (Respiratory) Rare Gram negative diplococci     Gram Stain (Respiratory) Rare Gram positive rods          Significant Diagnostics:  cspine xrays show good hardware placement       Assessment/Plan:     Active Diagnoses:    Diagnosis Date Noted POA    PRINCIPAL PROBLEM:  Cervical spondylosis with myelopathy [M47.12] 12/14/2021 Yes    Aspiration pneumonia of right lower lobe [J69.0] 12/17/2021 No    Coronary artery disease involving native coronary artery of native heart [I25.10]  Yes     Chronic    Normocytic anemia [D64.9] 04/10/2018 Yes     Chronic    Major depressive disorder [F32.9] 12/04/2015 Yes    Acquired hypothyroidism [E03.9]  Yes     Chronic      Problems  Resolved During this Admission:     A/P:  POD #6 C3-4, C5-6, and C6-7 ACDF     -Neurologically stable  -PT/OT  -OOB ambulating with assistance  -Pureed diet per  recs  -[facility] medicine recs.    -pending SNF placement versus going home with home health  -Will touch base with Hospital medicine regarding need to be on IV antibiotics until wednesday     All of the above discussed and reviewed with Dr. Fisher.     HUONG LarryC  Neurosurgery  [place] [facility]

## 2021-12-20 NOTE — PROGRESS NOTES
[facility] Medicine  Progress Note    Patient Name: Christine Castro  MRN: [id number]  Patient Class: IP- Inpatient   Admission Date: 12/14/2021  Length of Stay: 5 days  Attending Physician: Ishaan Fisher MD  Primary Care Provider: Alon Santiago MD        Subjective:     Principal Problem:Cervical spondylosis with myelopathy        HPI:  Ms. Christine Castro is a 67 y/o female with PMHx of lumbar radiculopathy, degenerative disc disease (DDD), CAD, normocytic anemia, GERD, acquired hypothyroidism, obesity, and depression admitted to [facility] for cervical spondylosis with myelopathy s/p C3-C7 anterior diskectomy and interbody fusion on 12/14/21. Patient started having a productive cough with white thick sputum yesterday. Per nursing, patient have coughing spells when she eats. SLP was consulted and following. CXR revealed Interval development of patchy opacity in the right lower lobe.  Findings may represent pneumonia, atelectasis, aspiration, or hemorrhage. Hospital medicine consulted for hypoxia and abnormal cxr finding.       Overview/Hospital Course:  No notes on file    Interval History: awake and alert, family by bedside. With no complaint.   Appreciates SLP.   Continue Levaquin and supplemental oxygen  Planning for SNF placement tomorrow    Review of Systems   Constitutional: Negative for chills, diaphoresis and fever.   Respiratory: Positive for cough. Negative for shortness of breath.    Cardiovascular: Negative for chest pain and leg swelling.   Gastrointestinal: Negative for abdominal pain, diarrhea, nausea and vomiting.   Genitourinary: Negative for difficulty urinating and flank pain.   Musculoskeletal: Negative for back pain.   Skin: Negative for rash and wound.   Neurological: Negative for dizziness, weakness and headaches.   Psychiatric/Behavioral: Negative for agitation and confusion. The patient is not nervous/anxious.      Objective:     Vital Signs (Most Recent):  Temp: 97.5 °F  (36.4 °C) (12/19/21 1616)  Pulse: 76 (12/19/21 1616)  Resp: 18 (12/19/21 1721)  BP: 104/62 (12/19/21 1616)  SpO2: (!) 93 % (12/19/21 1616) Vital Signs (24h Range):  Temp:  [97.5 °F (36.4 °C)-98.2 °F (36.8 °C)] 97.5 °F (36.4 °C)  Pulse:  [] 76  Resp:  [16-18] 18  SpO2:  [86 %-100 %] 93 %  BP: ()/(54-76) 104/62     Weight: 103.3 kg (227 lb 11.8 oz)  Body mass index is 44.48 kg/m².    Intake/Output Summary (Last 24 hours) at 12/19/2021 1831  Last data filed at 12/19/2021 0611  Gross per 24 hour   Intake 527.77 ml   Output --   Net 527.77 ml      Physical Exam  Vitals and nursing note reviewed.   Constitutional:       General: She is not in acute distress.     Appearance: She is not ill-appearing.   HENT:      Head: Normocephalic and atraumatic.   Neck:      Comments: C- collar in place  Surgical incision with staples RUSTY  Cardiovascular:      Rate and Rhythm: Normal rate and regular rhythm.      Pulses: Normal pulses.      Heart sounds: Normal heart sounds. No murmur heard.  No friction rub. No gallop.    Pulmonary:      Effort: Pulmonary effort is normal. No respiratory distress.      Breath sounds: Examination of the right-lower field reveals decreased breath sounds. Examination of the left-lower field reveals decreased breath sounds. Decreased breath sounds present. No wheezing or rhonchi.   Abdominal:      General: Bowel sounds are normal. There is no distension.      Palpations: Abdomen is soft.      Tenderness: There is no abdominal tenderness. There is no guarding.   Musculoskeletal:         General: No swelling.      Cervical back: Normal range of motion and neck supple.      Right lower leg: No edema.      Left lower leg: No edema.   Skin:     General: Skin is warm and dry.      Capillary Refill: Capillary refill takes less than 2 seconds.      Findings: No bruising, erythema or rash.   Neurological:      General: No focal deficit present.      GCS: GCS eye subscore is 3. GCS verbal subscore is 4.  GCS motor subscore is 6.   Psychiatric:         Mood and Affect: Mood normal.         Speech: Speech is delayed.         Behavior: Behavior is cooperative.         Significant Labs: All pertinent labs within the past 24 hours have been reviewed.    Significant Imaging: I have reviewed all pertinent imaging results/findings within the past 24 hours.      Assessment/Plan:      * Cervical spondylosis with myelopathy  -Management per neurosurgery      Aspiration pneumonia of right lower lobe  -CXR revealed Interval development of patchy opacity in the right lower lobe.Findings may represent pneumonia, atelectasis, aspiration, or hemorrhage  -likely aspiration PNA 2/2 to dysphagia, SLP following  -Noted to be on 5L NC with SpO2 100%, patient was weaned to 2L NC with Spo2 100%  -Supplemental oxygen PRN for SpO2 <90%; wean as needed  -Continue pulmonary hygiene  -Continuous cardiac monitoring  -CBC and BMP  -Respiratory culture  continue levoquin 750mg IV x5 days  -Aspiration precautions  -Abg x1      Coronary artery disease involving native coronary artery of native heart  Continue ASA and Statin  Monitor for s/s of angina/ ACS  Continue to monitor on telemetry    Normocytic anemia  Patient's anemia is currently controlled. Has not received any PRBCs to date.. Etiology likely d/t surgery; patient is s/p anterior diskectomy and interbody fusion.  Current CBC reviewed-   Lab Results   Component Value Date    HGB 8.0 (L) 12/17/2021    HCT 25.3 (L) 12/17/2021     Monitor serial CBC and transfuse if patient becomes hemodynamically unstable, symptomatic or H/H drops below 7/21.         Major depressive disorder  -Continue home regimen of bupropion and escitalopram oxalate      Acquired hypothyroidism  Patient has chronic hypothyroidism. TFTs reviewed-   Lab Results   Component Value Date    TSH 1.469 11/23/2021    Will continue chronic levothyroxine and adjust for and clinical changes.          VTE Risk Mitigation (From  admission, onward)         Ordered     heparin (porcine) injection 5,000 Units  Every 12 hours         12/14/21 2019     IP VTE HIGH RISK PATIENT  Once         12/14/21 2019     Place sequential compression device  Until discontinued         12/14/21 2019                Discharge Planning   [place]:      Code Status: Prior   Is the patient medically ready for discharge?:     Reason for patient still in hospital (select all that apply): Patient trending condition  Discharge Plan A: Skilled Nursing Facility              Fatoumata Suresh MD  Department of Hospital Medicine   [facility] Surg

## 2021-12-20 NOTE — PROGRESS NOTES
[facility] Medicine  Progress Note    Patient Name: Christine Castro  MRN: [id number]  Patient Class: IP- Inpatient   Admission Date: 12/14/2021  Length of Stay: 6 days  Attending Physician: Ishaan Fisher MD  Primary Care Provider: Alon Santiago MD        Subjective:     Principal Problem:Cervical spondylosis with myelopathy        HPI:  Ms. Christine Castro is a 67 y/o female with PMHx of lumbar radiculopathy, degenerative disc disease (DDD), CAD, normocytic anemia, GERD, acquired hypothyroidism, obesity, and depression admitted to [facility] for cervical spondylosis with myelopathy s/p C3-C7 anterior diskectomy and interbody fusion on 12/14/21. Patient started having a productive cough with white thick sputum yesterday. Per nursing, patient have coughing spells when she eats. SLP was consulted and following. CXR revealed Interval development of patchy opacity in the right lower lobe.  Findings may represent pneumonia, atelectasis, aspiration, or hemorrhage. Hospital medicine consulted for hypoxia and abnormal cxr finding.       Overview/Hospital Course:  No notes on file    Interval History: awake and alert,      Appreciates SLP.   Continue Levaquin and supplemental oxygen  Planning for SNF placement today. Okay to switch to PO Levaquin at discharge    Review of Systems   Constitutional: Negative for chills, diaphoresis and fever.   Respiratory: Negative for cough and shortness of breath.    Cardiovascular: Negative for chest pain and leg swelling.   Gastrointestinal: Negative for abdominal pain, diarrhea, nausea and vomiting.   Genitourinary: Negative for difficulty urinating and flank pain.   Musculoskeletal: Negative for back pain.   Skin: Negative for rash and wound.   Neurological: Negative for dizziness, weakness and headaches.   Psychiatric/Behavioral: Negative for agitation and confusion. The patient is not nervous/anxious.      Objective:     Vital Signs (Most Recent):  Temp: 98.3 °F (36.8  °C) (12/20/21 1126)  Pulse: 78 (12/20/21 1157)  Resp: 18 (12/20/21 1301)  BP: 108/62 (12/20/21 1126)  SpO2: 97 % (12/20/21 1144) Vital Signs (24h Range):  Temp:  [97.5 °F (36.4 °C)-98.6 °F (37 °C)] 98.3 °F (36.8 °C)  Pulse:  [71-93] 78  Resp:  [18] 18  SpO2:  [91 %-100 %] 97 %  BP: ()/(54-67) 108/62     Weight: 103.9 kg (229 lb 0.9 oz)  Body mass index is 44.73 kg/m².    Intake/Output Summary (Last 24 hours) at 12/20/2021 1400  Last data filed at 12/20/2021 1200  Gross per 24 hour   Intake 929.49 ml   Output 650 ml   Net 279.49 ml      Physical Exam  Vitals and nursing note reviewed.   Constitutional:       General: She is not in acute distress.     Appearance: She is not ill-appearing.   HENT:      Head: Normocephalic and atraumatic.   Neck:      Comments: C- collar in place  Surgical incision with staples RUSTY  Cardiovascular:      Rate and Rhythm: Normal rate and regular rhythm.      Pulses: Normal pulses.      Heart sounds: Normal heart sounds. No murmur heard.  No friction rub. No gallop.    Pulmonary:      Effort: Pulmonary effort is normal. No respiratory distress.      Breath sounds: Examination of the right-lower field reveals decreased breath sounds. Examination of the left-lower field reveals decreased breath sounds. Decreased breath sounds present. No wheezing or rhonchi.   Abdominal:      General: Bowel sounds are normal. There is no distension.      Palpations: Abdomen is soft.      Tenderness: There is no abdominal tenderness. There is no guarding.   Musculoskeletal:         General: No swelling.      Cervical back: Normal range of motion and neck supple.      Right lower leg: No edema.      Left lower leg: No edema.   Skin:     General: Skin is warm and dry.      Capillary Refill: Capillary refill takes less than 2 seconds.      Findings: No bruising, erythema or rash.   Neurological:      General: No focal deficit present.      GCS: GCS eye subscore is 3. GCS verbal subscore is 4. GCS motor  subscore is 6.   Psychiatric:         Mood and Affect: Mood normal.         Speech: Speech is delayed.         Behavior: Behavior is cooperative.         Significant Labs: All pertinent labs within the past 24 hours have been reviewed.    Significant Imaging: I have reviewed all pertinent imaging results/findings within the past 24 hours.      Assessment/Plan:      * Cervical spondylosis with myelopathy  -Management per neurosurgery      Aspiration pneumonia of right lower lobe  -CXR revealed Interval development of patchy opacity in the right lower lobe.Findings may represent pneumonia, atelectasis, aspiration, or hemorrhage  -likely aspiration PNA 2/2 to dysphagia, SLP following  -Noted to be on 5L NC with SpO2 100%, patient was weaned to 2L NC with Spo2 100%  -Supplemental oxygen PRN for SpO2 <90%; wean as needed  -Continue pulmonary hygiene  -Continuous cardiac monitoring  -CBC and BMP  -Respiratory culture  continue levoquin 750mg IV x5 days  -Aspiration precautions  -Abg x1      Coronary artery disease involving native coronary artery of native heart  Continue ASA and Statin  Monitor for s/s of angina/ ACS  Continue to monitor on telemetry    Normocytic anemia  Patient's anemia is currently controlled. Has not received any PRBCs to date.. Etiology likely d/t surgery; patient is s/p anterior diskectomy and interbody fusion.  Current CBC reviewed-   Lab Results   Component Value Date    HGB 8.0 (L) 12/17/2021    HCT 25.3 (L) 12/17/2021     Monitor serial CBC and transfuse if patient becomes hemodynamically unstable, symptomatic or H/H drops below 7/21.         Major depressive disorder  -Continue home regimen of bupropion and escitalopram oxalate      Acquired hypothyroidism  Patient has chronic hypothyroidism. TFTs reviewed-   Lab Results   Component Value Date    TSH 1.469 11/23/2021    Will continue chronic levothyroxine and adjust for and clinical changes.          VTE Risk Mitigation (From admission,  onward)         Ordered     heparin (porcine) injection 5,000 Units  Every 12 hours         12/14/21 2019     IP VTE HIGH RISK PATIENT  Once         12/14/21 2019     Place sequential compression device  Until discontinued         12/14/21 2019                Discharge Planning   [place]:      Code Status: Prior   Is the patient medically ready for discharge?:     Reason for patient still in hospital (select all that apply): Patient trending condition  Discharge Plan A: Home,Home Health          Fatoumata Suresh MD  Department of Hospital Medicine   [facility] S[facility]

## 2021-12-20 NOTE — PLAN OF CARE
TN rec'd a call from Carri at [facility] -- she is able to take pt   TN met with pt in the presence of MD --- pt emphatically states she wants to d/c to home - not to a facility -- She advised TN that her friend Mikala Escobar   [phone]  will be able to take care of her 24hr/day.      TN called Ms. Escobar in the presence of pt - Ms. Escobar advised TN that she will stay with pt for at least 3 wks.    She told TN that she is physically able to  assist pt with bathing; personal care, meals, etc.  Pt wants [facility] at d/c.      States she already has a RW, [facility], TTB and BSC.     Per pt - family members will transport her to home.   TN called Carri at [facility]   [phone];   p  # .              12/20/21 1337   Post-Acute Status   Post-Acute Authorization Home Health   Post-Acute Placement Status Pending medical clearance/testing   Home Health Status Referrals Sent   Discharge Plan   Discharge Plan A Home;Home Health

## 2021-12-20 NOTE — SUBJECTIVE & OBJECTIVE
Interval History: awake and alert, family by bedside. With no complaint.   Appreciates SLP.   Continue Levaquin and supplemental oxygen  Planning for SNF placement tomorrow    Review of Systems   Constitutional: Negative for chills, diaphoresis and fever.   Respiratory: Positive for cough. Negative for shortness of breath.    Cardiovascular: Negative for chest pain and leg swelling.   Gastrointestinal: Negative for abdominal pain, diarrhea, nausea and vomiting.   Genitourinary: Negative for difficulty urinating and flank pain.   Musculoskeletal: Negative for back pain.   Skin: Negative for rash and wound.   Neurological: Negative for dizziness, weakness and headaches.   Psychiatric/Behavioral: Negative for agitation and confusion. The patient is not nervous/anxious.      Objective:     Vital Signs (Most Recent):  Temp: 97.5 °F (36.4 °C) (12/19/21 1616)  Pulse: 76 (12/19/21 1616)  Resp: 18 (12/19/21 1721)  BP: 104/62 (12/19/21 1616)  SpO2: (!) 93 % (12/19/21 1616) Vital Signs (24h Range):  Temp:  [97.5 °F (36.4 °C)-98.2 °F (36.8 °C)] 97.5 °F (36.4 °C)  Pulse:  [] 76  Resp:  [16-18] 18  SpO2:  [86 %-100 %] 93 %  BP: ()/(54-76) 104/62     Weight: 103.3 kg (227 lb 11.8 oz)  Body mass index is 44.48 kg/m².    Intake/Output Summary (Last 24 hours) at 12/19/2021 1831  Last data filed at 12/19/2021 0611  Gross per 24 hour   Intake 527.77 ml   Output --   Net 527.77 ml      Physical Exam  Vitals and nursing note reviewed.   Constitutional:       General: She is not in acute distress.     Appearance: She is not ill-appearing.   HENT:      Head: Normocephalic and atraumatic.   Neck:      Comments: C- collar in place  Surgical incision with staples RUSTY  Cardiovascular:      Rate and Rhythm: Normal rate and regular rhythm.      Pulses: Normal pulses.      Heart sounds: Normal heart sounds. No murmur heard.  No friction rub. No gallop.    Pulmonary:      Effort: Pulmonary effort is normal. No respiratory distress.       Breath sounds: Examination of the right-lower field reveals decreased breath sounds. Examination of the left-lower field reveals decreased breath sounds. Decreased breath sounds present. No wheezing or rhonchi.   Abdominal:      General: Bowel sounds are normal. There is no distension.      Palpations: Abdomen is soft.      Tenderness: There is no abdominal tenderness. There is no guarding.   Musculoskeletal:         General: No swelling.      Cervical back: Normal range of motion and neck supple.      Right lower leg: No edema.      Left lower leg: No edema.   Skin:     General: Skin is warm and dry.      Capillary Refill: Capillary refill takes less than 2 seconds.      Findings: No bruising, erythema or rash.   Neurological:      General: No focal deficit present.      GCS: GCS eye subscore is 3. GCS verbal subscore is 4. GCS motor subscore is 6.   Psychiatric:         Mood and Affect: Mood normal.         Speech: Speech is delayed.         Behavior: Behavior is cooperative.         Significant Labs: All pertinent labs within the past 24 hours have been reviewed.    Significant Imaging: I have reviewed all pertinent imaging results/findings within the past 24 hours.

## 2021-12-20 NOTE — PT/OT/SLP PROGRESS
Physical Therapy Treatment    Patient Name:  Christine Castro   MRN:  [id number]    Recommendations:     Discharge Recommendations:  nursing facility, skilled   Discharge Equipment Recommendations:  (TBD)   Barriers to discharge: decreased mobility,strength and endurance    Assessment:     Christine Castro is a 66 y.o. female admitted with a medical diagnosis of Cervical spondylosis with myelopathy.  She presents with the following impairments/functional limitations:  weakness,impaired endurance,impaired functional mobilty,gait instability,impaired balance,decreased lower extremity function,decreased safety awareness,decreased ROM,impaired coordination,orthopedic precautions,pt with good participation and will benefit from SNF upon discharge to address above functional limitations.    Rehab Prognosis: Fair; patient would benefit from acute skilled PT services to address these deficits and reach maximum level of function.    Recent Surgery: Procedure(s) (LRB):  DECOMPRESSION AND FUSION, SPINE, CERVICAL, ANTERIOR APPROACH C3-4 C5- 6 C6-7 ACDF (N/A) 6 Days Post-Op    Plan:     During this hospitalization, patient to be seen daily to address the identified rehab impairments via gait training,therapeutic activities,therapeutic exercises,neuromuscular re-education and progress toward the following goals:    · Plan of Care Expires:  01/15/22    Subjective     Chief Complaint: n/a  Patient/Family Comments/goals: pt agreeable to rx.  Pain/Comfort:  · Pain Rating 1:  (no c/o's)      Objective:     Communicated with nsg prior to session.  Patient found supine with   upon PT entry to room.     General Precautions: Standard, aspiration,fall,respiratory   Orthopedic Precautions:spinal precautions   Braces: Aspen collar  Respiratory Status: Room air     Functional Mobility:  · Bed Mobility:     · Supine to Sit: supervision  · Sit to Supine: supervision  · Transfers:     · Sit to Stand:  stand by assistance with rolling  walker  · Gait: amb ~60' with RW and CGA with [place] collar donned  · Balance: fair standing balance with RW      AM-PAC 6 CLICK MOBILITY  Turning over in bed (including adjusting bedclothes, sheets and blankets)?: 3  Sitting down on and standing up from a chair with arms (e.g., wheelchair, bedside commode, etc.): 3  Moving from lying on back to sitting on the side of the bed?: 3  Moving to and from a bed to a chair (including a wheelchair)?: 3  Need to walk in hospital room?: 2       Therapeutic Activities and Exercises: pt sat EOB ~10 mins with S.      Patient left supine with all lines intact, call button in reach and bed alarm on..    GOALS:   Multidisciplinary Problems     Physical Therapy Goals        Problem: Physical Therapy Goal    Goal Priority Disciplines Outcome Goal Variances Interventions   Physical Therapy Goal     PT, PT/OT Ongoing, Progressing     Description: Goals to be met by: 1/15/2022     Patient will increase functional independence with mobility by performin. Supine <> sit with Minimal Assistance  2. Sit to stand transfer with Minimal Assistance  3. Bed to chair transfer with Minimal Assistance using Rolling Walker  4. Gait  x 50 feet with Minimal Assistance using Rolling Walker.   5. Lower extremity exercise program x 10 reps per handout, with supervision                     Time Tracking:     PT Received On: 21  PT Start Time: 1334     PT Stop Time: 1358  PT Total Time (min): 24 min     Billable Minutes: Gait Training 15 and Therapeutic Activity 9    Treatment Type: Treatment  PT/PTA: PTA     PTA Visit Number: 3     2021

## 2021-12-20 NOTE — PLAN OF CARE
[place] [facility]    HOME HEALTH ORDERS  FACE TO FACE ENCOUNTER    Patient Name: Christine Castro  YOB: 1955    PCP: Alon Santiago MD   PCP Address: [address] / [place] [address]  PCP Phone Number: [phone]  PCP Fax: [phone]       Encounter Date: 12/20/2021    Admit to Home Health    Diagnoses:  Active Hospital Problems    Diagnosis  POA    *Cervical spondylosis with myelopathy [M47.12]  Yes    Aspiration pneumonia of right lower lobe [J69.0]  No    Coronary artery disease involving native coronary artery of native heart [I25.10]  Yes     Chronic    Normocytic anemia [D64.9]  Yes     Chronic    Major depressive disorder [F32.9]  Yes    Acquired hypothyroidism [E03.9]  Yes     Chronic      Resolved Hospital Problems   No resolved problems to display.       Future Appointments   Date Time Provider Department Center   12/29/2021 10:00 AM NURSE SCHEDULE, [facility] NEUROSURGERY [facility]UROSU [facility]   12/29/2021 11:00 AM [facility] ORTHO CLINIC LIMIT 350LBS [facility]   1/25/2022  2:00 PM [facility] ORTHO CLINIC LIMIT 350LBS [facility]   1/25/2022  2:30 PM Mayda Musa PA-C [facility]           I have seen and examined this patient face to face today. My clinical findings that support the need for the home health skilled services and home bound status are the following:  Weakness/numbness causing balance and gait disturbance due to Weakness/Debility and Surgery making it taxing to leave home.  Requiring assistive device to leave home due to unsteady gait caused by  Weakness/Debility and Surgery.  Medical restrictions requiring assistance of another human to leave home due to  Unstable ambulation, Decreased range of motions in extremities, Post surgery monitoring and Wound care needs.    Allergies:  Review of patient's allergies indicates:   Allergen Reactions    Adhesive      Adhesive tape    Latex, natural rubber      Adhesive from latex  tape    Ibuprofen Other (See Comments)     Causes asthma flare??       Diet: pureed diet. Thin liquids    Activities: activity as tolerated    Nursing:   SN to complete comprehensive assessment including routine vital signs. Instruct on disease process and s/s of complications to report to MD. Review/verify medication list sent home with the patient at time of discharge  and instruct patient/caregiver as needed. Frequency may be adjusted depending on start of care date.    Notify MD if SBP > 160 or < 90; DBP > 90 or < 50; HR > 120 or < 50; Temp > 101; Other:         CONSULTS:    Physical Therapy to evaluate and treat. Evaluate for home safety and equipment needs; Establish/upgrade home exercise program. Perform / instruct on therapeutic exercises, gait training, transfer training, and Range of Motion.  Occupational Therapy to evaluate and treat. Evaluate home environment for safety and equipment needs. Perform/Instruct on transfers, ADL training, ROM, and therapeutic exercises.  Aide to provide assistance with personal care, ADLs, and vital signs.          I certify that this patient is confined to her home and needs intermittent skilled nursing care, physical therapy, and occupational therapy.    Mayda Musa [facility], PA-C  Neurosurgery  [facility]  12/20/2021

## 2021-12-21 ENCOUNTER — PATIENT OUTREACH (OUTPATIENT)
Dept: ADMINISTRATIVE | Facility: OTHER | Age: 66
End: 2021-12-21
Payer: MEDICARE

## 2021-12-21 VITALS
WEIGHT: 222.25 LBS | SYSTOLIC BLOOD PRESSURE: 137 MMHG | TEMPERATURE: 98 F | OXYGEN SATURATION: 95 % | DIASTOLIC BLOOD PRESSURE: 70 MMHG | RESPIRATION RATE: 18 BRPM | BODY MASS INDEX: 43.63 KG/M2 | HEIGHT: 60 IN | HEART RATE: 87 BPM

## 2021-12-21 LAB
POCT GLUCOSE: 108 MG/DL (ref 70–110)
POCT GLUCOSE: 185 MG/DL (ref 70–110)

## 2021-12-21 PROCEDURE — 94761 N-INVAS EAR/PLS OXIMETRY MLT: CPT | Mod: HCNC

## 2021-12-21 PROCEDURE — 99900035 HC TECH TIME PER 15 MIN (STAT): Mod: HCNC

## 2021-12-21 PROCEDURE — 63600175 PHARM REV CODE 636 W HCPCS: Mod: HCNC | Performed by: FAMILY MEDICINE

## 2021-12-21 PROCEDURE — 25000003 PHARM REV CODE 250: Mod: HCNC | Performed by: FAMILY MEDICINE

## 2021-12-21 PROCEDURE — 25000003 PHARM REV CODE 250: Mod: HCNC | Performed by: NURSE PRACTITIONER

## 2021-12-21 PROCEDURE — 94668 MNPJ CHEST WALL SBSQ: CPT | Mod: HCNC

## 2021-12-21 PROCEDURE — 25000003 PHARM REV CODE 250: Mod: HCNC | Performed by: PHYSICIAN ASSISTANT

## 2021-12-21 PROCEDURE — 97530 THERAPEUTIC ACTIVITIES: CPT | Mod: HCNC,CQ

## 2021-12-21 PROCEDURE — 94799 UNLISTED PULMONARY SVC/PX: CPT | Mod: HCNC

## 2021-12-21 PROCEDURE — 63600175 PHARM REV CODE 636 W HCPCS: Mod: HCNC | Performed by: PHYSICIAN ASSISTANT

## 2021-12-21 RX ORDER — OXYCODONE AND ACETAMINOPHEN 10; 325 MG/1; MG/1
1 TABLET ORAL EVERY 6 HOURS PRN
Qty: 60 TABLET | Refills: 0 | Status: SHIPPED | OUTPATIENT
Start: 2021-12-21 | End: 2021-12-23 | Stop reason: SDUPTHER

## 2021-12-21 RX ORDER — METHOCARBAMOL 750 MG/1
750 TABLET, FILM COATED ORAL 3 TIMES DAILY PRN
Qty: 60 TABLET | Refills: 0 | Status: SHIPPED | OUTPATIENT
Start: 2021-12-21

## 2021-12-21 RX ADMIN — ESCITALOPRAM OXALATE 20 MG: 10 TABLET ORAL at 08:12

## 2021-12-21 RX ADMIN — DOCUSATE SODIUM 100 MG: 100 CAPSULE ORAL at 08:12

## 2021-12-21 RX ADMIN — PANTOPRAZOLE SODIUM 40 MG: 40 TABLET, DELAYED RELEASE ORAL at 08:12

## 2021-12-21 RX ADMIN — HEPARIN SODIUM 5000 UNITS: 5000 INJECTION INTRAVENOUS; SUBCUTANEOUS at 08:12

## 2021-12-21 RX ADMIN — POTASSIUM CHLORIDE 10 MEQ: 750 TABLET, EXTENDED RELEASE ORAL at 08:12

## 2021-12-21 RX ADMIN — OXYCODONE 10 MG: 5 TABLET ORAL at 12:12

## 2021-12-21 RX ADMIN — ACETAMINOPHEN 650 MG: 325 TABLET, FILM COATED ORAL at 12:12

## 2021-12-21 RX ADMIN — BUSPIRONE HYDROCHLORIDE 5 MG: 5 TABLET ORAL at 08:12

## 2021-12-21 RX ADMIN — GABAPENTIN 300 MG: 300 CAPSULE ORAL at 08:12

## 2021-12-21 RX ADMIN — OXYCODONE 10 MG: 5 TABLET ORAL at 05:12

## 2021-12-21 RX ADMIN — LEVOTHYROXINE SODIUM 100 MCG: 50 TABLET ORAL at 05:12

## 2021-12-21 RX ADMIN — HYDROMORPHONE HYDROCHLORIDE 2 MG: 2 INJECTION, SOLUTION INTRAMUSCULAR; INTRAVENOUS; SUBCUTANEOUS at 08:12

## 2021-12-21 RX ADMIN — FUROSEMIDE 20 MG: 20 TABLET ORAL at 08:12

## 2021-12-21 RX ADMIN — BUPROPION HYDROCHLORIDE 200 MG: 100 TABLET, EXTENDED RELEASE ORAL at 08:12

## 2021-12-21 RX ADMIN — LEVOFLOXACIN 750 MG: 750 TABLET, FILM COATED ORAL at 08:12

## 2021-12-21 RX ADMIN — ACETAMINOPHEN 650 MG: 325 TABLET, FILM COATED ORAL at 05:12

## 2021-12-21 NOTE — PLAN OF CARE
TN met with and friend Kelsey  [phone] prior to d/c   TN confirmed with friend/caregiver Mikala Escobar  - she is aware that pt is discharging to home today and will be able to care for her.     pt has all dme;  set up with [facility]  per pt request - pt choice form signed   Ms. Cole will transport pt to home.       Follow-up With  Details  Why  Contact Info   [facility]  [address]  [address]  [facility] [address]  [phone]   Alon Santiago MD  On 1/3/2022  9:40 am - you are on a wait list for a sooner apt   [address] [facility] [address]  [phone]   [facility] ODC XR-A LIMIT 350 LBS  On 12/29/2021  10:00 am   [address] [address]   [place] - Neurosurgery  On 12/29/2021  11:00 am   [address]  [facility] [id number] [phone]        12/21/21 1640   Final Note   Assessment Type Final Discharge Note   Anticipated Discharge Disposition Home-Health  ([facility])   What phone number can be called within the next 1-3 days to see how you are doing after discharge? [phone]   Hospital Resources/Appts/Education Provided Appointments scheduled and added to AVS;Post-Acute resouces added to AVS  ([facility])   Post-Acute Status   Post-Acute Authorization Home [facility]   Home Health Status Set-up Complete/Auth obtained   Patient choice form signed by patient/caregiver   (pt choice form signed - pt preference)   Discharge Delays None known at this time

## 2021-12-21 NOTE — DISCHARGE INSTRUCTIONS
Please follow ONLY the instructions that are checked below.    Activity Restrictions:  [x]  Return to work will be determined on an individual basis.  [x]  No lifting greater than 10 pounds.  [x]  Avoid bending and twisting the area of your surgery more than 45 degrees from neutral position in any direction.  [x]  No driving or operating machinery:  [x]  until cleared by your surgeon.  [x]  while taking narcotic pain medications or muscle relaxants.  []  No cervical collar, soft collar, or lumbar brace required.  [x]  Wear your brace at all times.  []  Wear your brace at all times except when flat in bed.  []  Wear brace for comfort only.  [x]  Increase ambulation over the next 2 weeks so that you are walking 2 miles per day at 2 weeks post-operatively.  [x]  Walk on paved surfaces only. It is okay to walk up and down stairs while holding onto a side rail.  [x]  No sexual activity for 2-3 weeks.    Discharge Medication/Follow-up:  [x]  Please refer to discharge medication reconciliation form.  [x]  Do not take ANY non-steroidal anti-inflammatory drugs (NSAIDS), including the following: ibuprofen, naprosyn, Aleve, Advil, Indocin, Mobic, or Celebrex for:  []  4 weeks  []  8 weeks  [x]  6 months  [x]  Prescriptions for appropriate medication will be given upon discharge.   [x]  Pain control:             [x]  Muscle relaxer:            [x]  Take docusate (Colace 100 mg): take one capsule a day as needed for constipation. You can get this over the counter.  [x]  Follow-up appointment:  [x]  10-14 days post-op for wound check by physician assistant/nurse  [x]  4-6 weeks with MD:  [x]  with x-rays  []  without x-rays  []  An appointment will be mailed to you.    Wound Care:  []  Remove dressing or bandaid in    days.  [x]  No bandage required. Keep your incision open to the air.  [x]  You may shower on the 2nd day after your surgery. Have the force of water hit you opposite from the incision. Pat the incision dry after  your shower; do not scrub the incision.  []  You cannot take a bath until 8 weeks after surgery.  []  Apply bacitracin to incision twice a day for    more days.    Call your doctor or go to the Emergency Room for any signs of infection, including: increased redness, drainage, pain, or fever (temperature ?101.5 for 24 hours). Call your doctor or go to the Emergency Room if there are any localized neurological changes; problems with speech, vision, numbness, tingling, weakness, or severe headache; or for other concerns.    Special Instructions:  [x]  No use of tobacco products.  [x]  Diet: Please eat a pureed diet and advance to diabetic as tolerated  []  Other diet:              Specific physician instructions:           Physicians need 3 days' notice for pain medicine to be refilled. Pain medicine will only be refilled between 8 AM and 5 PM, Monday through Friday, due to Food and Drug Administration regulation of documentation.    If you have any questions about this form, please call [phone].    Form No. 59805 (Revised 10/31/2013)  Spinal Fusion Discharge Instructions (English) View Edit Remove  Methocarbamol, ADULT (English) View Edit Remove

## 2021-12-21 NOTE — PROGRESS NOTES
Future Appointments   Date Time Provider Department Center   12/29/2021 10:00 AM NURSE SCHEDULE, [facility] NEUROSURGERY [facility] NEUROSU [facility]   12/29/2021 11:00 AM [facility] ORTHO CLINIC LIMIT 350LBS [facility]   1/25/2022  2:00 PM [facility] ORTHO CLINIC LIMIT 350LBS [facility]   1/25/2022  2:30 PM Mayda Musa PA-C [facility]

## 2021-12-21 NOTE — PLAN OF CARE
[place] [facility]     HOME HEALTH ORDERS  FACE TO FACE ENCOUNTER     Patient Name: Christine Castro  YOB: 1955     PCP: Alon Santiago MD   PCP Address: [address] / [place] [address]  PCP Phone Number: [phone]  PCP Fax: [phone]        Encounter Date: 12/21/2021     Admit to Home Health     Diagnoses:  Active Hospital Problems     Diagnosis   POA    *Cervical spondylosis with myelopathy [M47.12]   Yes    Aspiration pneumonia of right lower lobe [J69.0]   No    Coronary artery disease involving native coronary artery of native heart [I25.10]   Yes       Chronic    Normocytic anemia [D64.9]   Yes       Chronic    Major depressive disorder [F32.9]   Yes    Acquired hypothyroidism [E03.9]   Yes       Chronic       Resolved Hospital Problems   No resolved problems to display.                Future Appointments   Date Time Provider Department Center   12/29/2021 10:00 AM NURSE SCHEDULE, [facility] NEUROSURGERY [facility]U [facility]   12/29/2021 11:00 AM [facility] ORTHO CLINIC LIMIT 350LBS [facility]   1/25/2022  2:00 PM [facility] ORTHO CLINIC LIMIT 350LBS [facility]   1/25/2022  2:30 PM Mayda Musa PA-C [facility]               I have seen and examined this patient face to face today. My clinical findings that support the need for the home health skilled services and home bound status are the following:  Weakness/numbness causing balance and gait disturbance due to Weakness/Debility and Surgery making it taxing to leave home.  Requiring assistive device to leave home due to unsteady gait caused by  Weakness/Debility and Surgery.  Medical restrictions requiring assistance of another human to leave home due to  Unstable ambulation, Decreased range of motions in extremities, Post surgery monitoring and Wound care needs.     Allergies:        Review of patient's allergies indicates:   Allergen Reactions    Adhesive         Adhesive tape    Latex,  natural rubber         Adhesive from latex tape    Ibuprofen Other (See Comments)       Causes asthma flare??         Diet: pureed diet. Thin liquids.  Advance to diabetic diet as tolerated     Activities: activity as tolerated     Nursing:   SN to complete comprehensive assessment including routine vital signs. Instruct on disease process and s/s of complications to report to MD. Review/verify medication list sent home with the patient at time of discharge  and instruct patient/caregiver as needed. Frequency may be adjusted depending on start of care date.     Notify MD if SBP > 160 or < 90; DBP > 90 or < 50; HR > 120 or < 50; Temp > 101; Other:           CONSULTS:    Physical Therapy to evaluate and treat. Evaluate for home safety and equipment needs; Establish/upgrade home exercise program. Perform / instruct on therapeutic exercises, gait training, transfer training, and Range of Motion.  Occupational Therapy to evaluate and treat. Evaluate home environment for safety and equipment needs. Perform/Instruct on transfers, ADL training, ROM, and therapeutic exercises.  Aide to provide assistance with personal care, ADLs, and vital signs.      Medications: Review discharge medications with patient and family and provide education.      Current Discharge Medication List      START taking these medications    Details   methocarbamoL (ROBAXIN) 750 MG Tab Take 1 tablet (750 mg total) by mouth 3 (three) times daily as needed (muscle spasms).  Qty: 60 tablet, Refills: 0         CONTINUE these medications which have CHANGED    Details   oxyCODONE-acetaminophen (PERCOCET)  mg per tablet Take 1 tablet by mouth every 6 (six) hours as needed for Pain.  Qty: 60 tablet, Refills: 0    Comments: Quantity prescribed more than 7 day supply? Yes, quantity medically necessary  Associated Diagnoses: Fusion of spine of lumbosacral region         CONTINUE these medications which have NOT CHANGED    Details   albuterol (VENTOLIN  HFA) 90 mcg/actuation inhaler INHALE 2 PUFFS INTO THE LUNGS EVERY 4 HOURS AS NEEDED FOR WHEEZING  Qty: 18 g, Refills: 3      aspirin (ECOTRIN) 81 MG EC tablet Take 1 tablet (81 mg total) by mouth once daily.  Refills: 0      atorvastatin (LIPITOR) 40 MG tablet TAKE 1 TABLET(40 MG) BY MOUTH EVERY DAY FOR CHOLESTEROL  Qty: 90 tablet, Refills: 3      !! blood sugar diagnostic (TRUE METRIX GLUCOSE TEST STRIP) Strp USE TO TEST BLOOD SUGAR EVERY DAY  Qty: 100 strip, Refills: 0      buPROPion (WELLBUTRIN SR) 200 MG SR12 Take 1 tablet (200 mg total) by mouth 2 (two) times daily. To help with mood and pain relief  Qty: 180 tablet, Refills: 3      busPIRone (BUSPAR) 10 MG tablet TAKE 1/2 TABLET BY MOUTH ONCE DAILY  Qty: 60 tablet, Refills: 3      diabetic supplies, miscellan. Misc Lancets for 1-2 times a day testing    dispense brand covered by insurance t  Qty: 60 each, Refills: 3    Associated Diagnoses: Type 2 diabetes mellitus without complication, without long-term current use of insulin      diazePAM (VALIUM) 2 MG tablet TAKE 1 TABLET(2 MG) BY MOUTH EVERY 24 HOURS AS NEEDED FOR ANXIETY  Qty: 30 tablet, Refills: 0      diclofenac sodium (VOLTAREN) 1 % Gel APPLY 2 GRAMS EXTERNALLY TO THE AFFECTED AREA FOUR TIMES DAILY  Qty: 100 g, Refills: 5      ergocalciferol (ERGOCALCIFEROL) 50,000 unit Cap Take 1 capsule (50,000 Units total) by mouth every 7 days.  Qty: 12 capsule, Refills: 3      EScitalopram oxalate (LEXAPRO) 20 MG tablet TAKE 1 TABLET(20 MG) BY MOUTH EVERY DAY  Qty: 90 tablet, Refills: 3      furosemide (LASIX) 20 MG tablet Take 1 tablet (20 mg total) by mouth daily as needed.  Qty: 30 tablet, Refills: 3      gabapentin (NEURONTIN) 600 MG tablet Take 1 tablet (600 mg total) by mouth 3 (three) times daily.  Qty: 90 tablet, Refills: 11      glipiZIDE (GLUCOTROL) 2.5 MG TR24 TAKE 1 TABLET(2.5 MG) BY MOUTH DAILY WITH BREAKFAST  Qty: 90 tablet, Refills: 3      ibandronate (BONIVA) 150 mg tablet TAKE 1 TABLET BY MOUTH  EVERY 30 DAYS FOR OSTEOPOROSIS  Qty: 3 tablet, Refills: 3      levothyroxine (SYNTHROID) 100 MCG tablet TAKE 1 TABLET BY MOUTH EVERY DAY  Qty: 90 tablet, Refills: 3      omeprazole (PRILOSEC) 40 MG capsule Take 1 capsule (40 mg total) by mouth every morning.  Qty: 90 capsule, Refills: 1    Associated Diagnoses: Gastroesophageal reflux disease without esophagitis      potassium chloride (KLOR-CON) 10 MEQ TbSR The day you take lasix  Qty: 90 tablet, Refills: 0    Comments: **Patient requests 90 days supply**      promethazine (PHENERGAN) 25 MG tablet TAKE 1 TABLET(25 MG) BY MOUTH EVERY 6 HOURS AS NEEDED  Qty: 25 tablet, Refills: 0      TRUE METRIX GLUCOSE METER Misc U UTD  Qty: 1 each, Refills: 0      !! TRUE METRIX GLUCOSE TEST STRIP Strp TO TEST ONCE TO TWICE DAILY  Qty: 50 strip, Refills: 11    Comments: **Patient requests 90 days supply**  Associated Diagnoses: Type 2 diabetes mellitus without complication, without long-term current use of insulin      TRUEPLUS LANCETS 30 gauge Misc USE TO TEST ONCE TO TWICE D      zolpidem (AMBIEN) 5 MG Tab TAKE 1 TABLET(5 MG) BY MOUTH EVERY NIGHT  Qty: 30 tablet, Refills: 2       !! - Potential duplicate medications found. Please discuss with provider.      STOP taking these medications       acetaminophen (TYLENOL) 500 MG tablet Comments:   Reason for Stopping:         tiZANidine (ZANAFLEX) 2 MG tablet Comments:   Reason for Stopping:               I certify that this patient is confined to her home and needs intermittent skilled nursing care, physical therapy and occupational therapy.    Mayda Musa, [facility], PA-C  Neurosurgery  [facility]  12/21/2021

## 2021-12-21 NOTE — PT/OT/SLP PROGRESS
Physical Therapy Treatment    Patient Name:  Christine Castro   MRN:  [id number]    Recommendations:     Discharge Recommendations:   (pt going home with HH services,refused SNF)   Discharge Equipment Recommendations: none   Barriers to discharge: decreased mobility,strength and endurance    Assessment:     Christine Castro is a 66 y.o. female admitted with a medical diagnosis of Cervical spondylosis with myelopathy.  She presents with the following impairments/functional limitations:  weakness,impaired endurance,impaired functional mobilty,gait instability,impaired balance,decreased lower extremity function,decreased safety awareness,decreased ROM,impaired coordination,impaired skin,orthopedic precautions,pt is being discharged home today and will be receiving HH services to address above functional limitations.    Rehab Prognosis: Good; patient would benefit from acute skilled PT services to address these deficits and reach maximum level of function.    Recent Surgery: Procedure(s) (LRB):  DECOMPRESSION AND FUSION, SPINE, CERVICAL, ANTERIOR APPROACH C3-4 C5- 6 C6-7 ACDF (N/A) 7 Days Post-Op    Plan:     During this hospitalization, patient to be seen daily to address the identified rehab impairments via gait training,therapeutic activities,neuromuscular re-education,therapeutic exercises and progress toward the following goals:    · Plan of Care Expires:  01/15/22    Subjective     Chief Complaint: n/a  Patient/Family Comments/goals: pt is going home.  Pain/Comfort:  · Pain Rating 1: 0/10      Objective:     Communicated with nsg prior to session.  Patient found supine with bed alarm,peripheral IV upon PT entry to room.     General Precautions: Standard, aspiration,fall,respiratory   Orthopedic Precautions:spinal precautions   Braces: Aspen collar  Respiratory Status: Room air     Functional Mobility:  · Gait: n/a      AM-PAC 6 CLICK MOBILITY  Turning over in bed (including adjusting bedclothes, sheets and  blankets)?: 3  Sitting down on and standing up from a chair with arms (e.g., wheelchair, bedside commode, etc.): 3  Moving from lying on back to sitting on the side of the bed?: 3  Moving to and from a bed to a chair (including a wheelchair)?: 3  Need to walk in hospital room?: 2  Climbing 3-5 steps with a railing?: 2  Basic Mobility Total Score: 16       Therapeutic Activities and Exercises: issued  HEP and reviewed pt safety upon being discharged home.       Patient left supine with all lines intact, call button in reach, bed alarm on and nsg notified..    GOALS: see general POC  Multidisciplinary Problems     Physical Therapy Goals     Not on file          Multidisciplinary Problems (Resolved)        Problem: Physical Therapy Goal    Goal Priority Disciplines Outcome Goal Variances Interventions   Physical Therapy Goal   (Resolved)     PT, PT/OT Met     Description: Goals to be met by: 1/15/2022     Patient will increase functional independence with mobility by performin. Supine <> sit with Minimal Assistance  2. Sit to stand transfer with Minimal Assistance  3. Bed to chair transfer with Minimal Assistance using Rolling Walker  4. Gait  x 50 feet with Minimal Assistance using Rolling Walker.   5. Lower extremity exercise program x 10 reps per handout, with supervision                     Time Tracking:     PT Received On: 21  PT Start Time: 1410     PT Stop Time: 1420  PT Total Time (min): 10 min     Billable Minutes: Therapeutic Activity 10    Treatment Type: Treatment  PT/PTA: PTA     PTA Visit Number: 4     2021

## 2021-12-21 NOTE — PLAN OF CARE
Discharge orders noted. AVS prepared with medication list, importance of medication compliance, follow up appointments, diet, home care instructions, treatment plan, self management, and when to seek medical attention. Detailed clinical reference list attached. AVS printed and handed to patient by bedside nurse. VN reviewed discharge instructions with patient and family members using teachback method.  Allowed time for questions, all questions answered.  Patient verbalized complete understanding of discharge instructions and voices no concerns.      Discharge instructions complete.  Bedside delivery complete.  Transport wheelchair requested.  Bedside nurse notified.

## 2021-12-21 NOTE — PLAN OF CARE
VN note: VN completed AVS and attachments and notified bedside nurse, Jefry. Will cont to be available and intervene prn.

## 2021-12-21 NOTE — PROGRESS NOTES
[facility] Surg  Neurosurgery  Progress Note    Subjective:         History of Present Illness: Neck and left arm pain. Swallowing improving.        Post-Op Info:  Procedure(s) (LRB):  DECOMPRESSION AND FUSION, SPINE, CERVICAL, ANTERIOR APPROACH C3-4 C5- 6 C6-7 ACDF (N/A)   7 Days Post-Op      Medications:  Continuous Infusions:  Scheduled Meds:   acetaminophen  650 mg Oral Q6H    atorvastatin  40 mg Oral QHS    buPROPion  200 mg Oral BID    busPIRone  5 mg Oral Daily    docusate sodium  100 mg Oral Daily    EScitalopram oxalate  20 mg Oral Daily    furosemide  20 mg Oral Daily    gabapentin  300 mg Oral BID    heparin (porcine)  5,000 Units Subcutaneous Q12H    levothyroxine  100 mcg Oral Before breakfast    pantoprazole  40 mg Oral Daily    potassium chloride SA  10 mEq Oral Daily     PRN Meds:albuterol, albuterol sulfate, aluminum-magnesium hydroxide-simethicone, dextrose 50%, dextrose 50%, diphenhydrAMINE, glucagon (human recombinant), glucose, glucose, HYDROmorphone, insulin aspart U-100, naloxone, ondansetron, oxyCODONE, prochlorperazine, sodium chloride 3%, tiZANidine, zolpidem     Review of Systems  Objective:     Weight: 100.8 kg (222 lb 3.6 oz)  Body mass index is 43.4 kg/m².  Vital Signs (Most Recent):  Temp: 98.3 °F (36.8 °C) (12/21/21 0806)  Pulse: 77 (12/21/21 0806)  Resp: 18 (12/21/21 0844)  BP: (!) 121/56 (12/21/21 0806)  SpO2: 98 % (12/21/21 0415) Vital Signs (24h Range):  Temp:  [98.2 °F (36.8 °C)-99 °F (37.2 °C)] 98.3 °F (36.8 °C)  Pulse:  [71-86] 77  Resp:  [18-20] 18  SpO2:  [93 %-100 %] 98 %  BP: ()/(54-78) 121/56     Date 12/21/21 0700 - 12/22/21 0659   Shift 9811-6382 9490-6580 1649-8365 24 Hour Total   INTAKE   P.O. 240   240   Shift Total(mL/kg) 240(2.4)   240(2.4)   OUTPUT   Shift Total(mL/kg)       Weight (kg) 100.8 100.8 100.8 100.8                        Neurosurgery Physical Exam     General: well developed, well nourished, no distress  Mental Status: Awake, Alert,  Oriented X3.Thought content appropriate  GCS: Motor: 6/Verbal: 5/Eyes: 4 GCS Total: 15     Motor Strength:  Strength   Deltoids Triceps Biceps Wrist Extension Wrist Flexion Hand    Upper: R 5/5 5/5 5/5 5/5 5/5 5/5     L 4/5 5/5 5/5 5/5 5/5 5/5       HF KF KE DF PF EHL   Lower: R 5/5 5/5 5/5 5/5 5/5 5/5     L 5/5 5/5 5/5 5/5 5/5 5/5         Munoz: absent on the right.  Present on the left.  Clonus: absent B/L  Incision-CDI           Significant Labs:  No results for input(s): GLU, NA, K, CL, CO2, BUN, CREATININE, CALCIUM, MG in the last 48 hours.  No results for input(s): WBC, HGB, HCT, PLT in the last 48 hours.  No results for input(s): LABPT, INR, APTT in the last 48 hours.  Microbiology Results (last 7 days)     Procedure Component Value Units Date/Time    Culture, Respiratory with Gram Stain [[id number]] Collected: 12/19/21 2117    Order Status: Completed Specimen: Respiratory from Sputum, Expectorated Updated: 12/21/21 0916     Respiratory Culture Normal respiratory marli     Gram Stain (Respiratory) <10 epithelial cells per low power field.     Gram Stain (Respiratory) Many Gram positive cocci     Gram Stain (Respiratory) Rare Gram negative diplococci     Gram Stain (Respiratory) Rare Gram positive rods          Significant Diagnostics:  cspine xrays show good hardware placement    Assessment/Plan:     Active Diagnoses:    Diagnosis Date Noted POA    PRINCIPAL PROBLEM:  Cervical spondylosis with myelopathy [M47.12] 12/14/2021 Yes    Aspiration pneumonia of right lower lobe [J69.0] 12/17/2021 No    Coronary artery disease involving native coronary artery of native heart [I25.10]  Yes     Chronic    Normocytic anemia [D64.9] 04/10/2018 Yes     Chronic    Major depressive disorder [F32.9] 12/04/2015 Yes    Acquired hypothyroidism [E03.9]  Yes     Chronic      Problems Resolved During this Admission:     A/P:  POD # C3-4, C5-6, and C6-7 ACDF     -Neurologically stable  -PT/OT  -OOB ambulating with  assistance  -Pureed diet per [facility] recs.    -DC home with home health today per Dr. Fisher    All of the above discussed and reviewed with Dr. Fisher.       Mayda Musa PA-C  Neurosurgery  [facility] Surg

## 2021-12-21 NOTE — PLAN OF CARE
Problem: Adult Inpatient Plan of Care  Goal: Plan of Care Review  Outcome: Ongoing, Progressing  Goal: Patient-Specific Goal (Individualized)  Outcome: Ongoing, Progressing  Goal: Absence of Hospital-Acquired Illness or Injury  Outcome: Ongoing, Progressing  Goal: Optimal Comfort and Wellbeing  Outcome: Ongoing, Progressing  Goal: Readiness for Transition of Care  Outcome: Ongoing, Progressing     Problem: Skin Injury Risk Increased  Goal: Skin Health and Integrity  Outcome: Ongoing, Progressing     Problem: Fall Injury Risk  Goal: Absence of Fall and Fall-Related Injury  Outcome: Ongoing, Progressing     Problem: Bowel Motility Impaired (Surgery Nonspecified)  Goal: Effective Bowel Elimination  Outcome: Ongoing, Progressing  Pt AAO x3. VSS. NAD. Pain controlled with prn meds. Surgical site clean, dry and intact. Safety maintained. Will continue to monitor.

## 2021-12-22 ENCOUNTER — PATIENT OUTREACH (OUTPATIENT)
Dept: ADMINISTRATIVE | Facility: OTHER | Age: 66
End: 2021-12-22
Payer: MEDICARE

## 2021-12-22 LAB
BACTERIA SPEC AEROBE CULT: NORMAL
BACTERIA SPEC AEROBE CULT: NORMAL
GRAM STN SPEC: NORMAL

## 2021-12-23 ENCOUNTER — OUTPATIENT CASE MANAGEMENT (OUTPATIENT)
Dept: ADMINISTRATIVE | Facility: OTHER | Age: 66
End: 2021-12-23
Payer: MEDICARE

## 2021-12-23 DIAGNOSIS — M43.27 FUSION OF SPINE OF LUMBOSACRAL REGION: ICD-10-CM

## 2021-12-23 PROCEDURE — G0180 PR HOME HEALTH MD CERTIFICATION: ICD-10-PCS | Mod: ,,, | Performed by: NEUROLOGICAL SURGERY

## 2021-12-23 PROCEDURE — G0180 MD CERTIFICATION HHA PATIENT: HCPCS | Mod: ,,, | Performed by: NEUROLOGICAL SURGERY

## 2021-12-23 RX ORDER — ZOLPIDEM TARTRATE 5 MG/1
5 TABLET ORAL NIGHTLY
Qty: 30 TABLET | Refills: 2 | Status: SHIPPED | OUTPATIENT
Start: 2021-12-23 | End: 2022-06-26

## 2021-12-23 RX ORDER — DIAZEPAM 2 MG/1
TABLET ORAL
Qty: 30 TABLET | Refills: 0 | Status: SHIPPED | OUTPATIENT
Start: 2021-12-23 | End: 2022-01-03 | Stop reason: SDUPTHER

## 2021-12-23 NOTE — DISCHARGE SUMMARY
[facility] Surg  Neurosurgery  Discharge Summary      Patient Name: Christine Castro  MRN: [id number]  Admission Date: 12/14/2021  Hospital Length of Stay: 7 days  Discharge Date and Time: 12/21/2021  3:52 PM  Attending Physician: Dr. Ishaan Fisher  Discharging Provider: Mayda Muas PA-C  Primary Care Provider: Alon Santiago MD         Procedure(s) (LRB):  DECOMPRESSION AND FUSION, SPINE, CERVICAL, ANTERIOR APPROACH C3-4 C5- 6 C6-7 ACDF (N/A)     Hospital Course:  Patient had the above surgery.  She had neck pain and left upper arm pain to the elbow postoperatively which she had preoperatively.  She has had some difficulty swallowing.  She was coughing and producing a lot of phlegm.  Hospital medicine was consulted and she was started on levofloxacin IV for 5 days for possible pneumonia.  Patient was originally scheduled to go to skilled nursing facility but preferred to go home with home health.  After her antibiotic course was finished she was discharged home with home health on postop day 7 in stable condition.  She will follow-up at 2 weeks postop with x-rays for the wound check and 6 weeks postop follow-up with x-rays.      Significant Diagnostic Studies: cervical spine xrays showed good hardware placement    Pending Diagnostic Studies:     None        Final Active Diagnoses:    Diagnosis Date Noted POA    PRINCIPAL PROBLEM:  Cervical spondylosis with myelopathy [M47.12] 12/14/2021 Yes    Aspiration pneumonia of right lower lobe [J69.0] 12/17/2021 No    Coronary artery disease involving native coronary artery of native heart [I25.10]  Yes     Chronic    Normocytic anemia [D64.9] 04/10/2018 Yes     Chronic    Major depressive disorder [F32.9] 12/04/2015 Yes    Acquired hypothyroidism [E03.9]  Yes     Chronic      Problems Resolved During this Admission:      Discharged Condition: good    Disposition: Home-Health Care [facility]    Follow Up:   Follow-up Information     [facility]    Specialty: Home Health Services  Why: Home Health  Contact information:  [address]  [address]  [place] [address]  [phone]             Alon Santiago MD On 1/3/2022.    Specialty: Family Medicine  Why: 9:40 am   - you are on a wait list for a sooner apt   Contact information:  [address] [place] [address]  [phone]             [facility] ODC XR-A LIMIT 350 LBS On 12/29/2021.    Specialty: Radiology  Why: 10:00 am   Contact information:  [address] [facility] [address]             [facility] On 12/29/2021.    Specialty: Neurosurgery  Why: 11:00 am     Contact information:  [address] [facility] [address]  [facility] [address] [phone]  Additional information:  Please park in [address] C or D and use [address]. Take Medical Office Bldg. elevators.                     Patient Instructions:   No discharge procedures on file.  Medications:  Reconciled Home Medications:      Medication List      START taking these medications    methocarbamoL 750 MG Tab  Commonly known as: ROBAXIN  Take 1 tablet (750 mg total) by mouth 3 (three) times daily as needed (muscle spasms).        CHANGE how you take these medications    busPIRone 10 MG tablet  Commonly known as: BUSPAR  TAKE 1/2 TABLET BY MOUTH ONCE DAILY  What changed:   · how much to take  · how to take this  · when to take this  · additional instructions     oxyCODONE-acetaminophen  mg per tablet  Commonly known as: PERCOCET  Take 1 tablet by mouth every 6 (six) hours as needed for Pain.  What changed: Another medication with the same name was removed. Continue taking this medication, and follow the directions you see here.        CONTINUE taking these medications    albuterol 90 mcg/actuation inhaler  Commonly known as: VENTOLIN HFA  INHALE 2 PUFFS INTO THE LUNGS EVERY 4 HOURS AS NEEDED FOR WHEEZING     aspirin 81 MG EC tablet  Commonly known as: ECOTRIN  Take 1 tablet (81 mg total) by mouth once daily.      atorvastatin 40 MG tablet  Commonly known as: LIPITOR  TAKE 1 TABLET(40 MG) BY MOUTH EVERY DAY FOR CHOLESTEROL     buPROPion 200 MG Sr12  Commonly known as: WELLBUTRIN SR  Take 1 tablet (200 mg total) by mouth 2 (two) times daily. To help with mood and pain relief     diabetic supplies, miscellan. Misc  Lancets for 1-2 times a day testing    dispense brand covered by insurance t     diazePAM 2 MG tablet  Commonly known as: VALIUM  TAKE 1 TABLET(2 MG) BY MOUTH EVERY 24 HOURS AS NEEDED FOR ANXIETY     diclofenac sodium 1 % Gel  Commonly known as: VOLTAREN  APPLY 2 GRAMS EXTERNALLY TO THE AFFECTED AREA FOUR TIMES DAILY     ergocalciferol 50,000 unit Cap  Commonly known as: ERGOCALCIFEROL  Take 1 capsule (50,000 Units total) by mouth every 7 days.     EScitalopram oxalate 20 MG tablet  Commonly known as: LEXAPRO  TAKE 1 TABLET(20 MG) BY MOUTH EVERY DAY     furosemide 20 MG tablet  Commonly known as: LASIX  Take 1 tablet (20 mg total) by mouth daily as needed.     gabapentin 600 MG tablet  Commonly known as: NEURONTIN  Take 1 tablet (600 mg total) by mouth 3 (three) times daily.     glipiZIDE 2.5 MG Tr24  Commonly known as: GLUCOTROL  TAKE 1 TABLET(2.5 MG) BY MOUTH DAILY WITH BREAKFAST     ibandronate 150 mg tablet  Commonly known as: BONIVA  TAKE 1 TABLET BY MOUTH EVERY 30 DAYS FOR OSTEOPOROSIS     levothyroxine 100 MCG tablet  Commonly known as: SYNTHROID  TAKE 1 TABLET BY MOUTH EVERY DAY     omeprazole 40 MG capsule  Commonly known as: PRILOSEC  Take 1 capsule (40 mg total) by mouth every morning.     potassium chloride 10 MEQ Tbsr  Commonly known as: KLOR-CON  The day you take lasix     promethazine 25 MG tablet  Commonly known as: PHENERGAN  TAKE 1 TABLET(25 MG) BY MOUTH EVERY 6 HOURS AS NEEDED     TRUE METRIX GLUCOSE METER Misc  Generic drug: blood-glucose meter  U UTD     * TRUE METRIX GLUCOSE TEST STRIP Strp  Generic drug: blood sugar diagnostic  TO TEST ONCE TO TWICE DAILY     * blood sugar diagnostic  Strp  Commonly known as: TRUE METRIX GLUCOSE TEST STRIP  USE TO TEST BLOOD SUGAR EVERY DAY     TRUEPLUS LANCETS 30 gauge Misc  Generic drug: lancets  USE TO TEST ONCE TO TWICE D     zolpidem 5 MG Tab  Commonly known as: AMBIEN  TAKE 1 TABLET(5 MG) BY MOUTH EVERY NIGHT         * This list has 2 medication(s) that are the same as other medications prescribed for you. Read the directions carefully, and ask your doctor or other care provider to review them with you.            STOP taking these medications    acetaminophen 500 MG tablet  Commonly known as: TYLENOL     tiZANidine 2 MG tablet  Commonly known as: ZANAFLEX            Mayda Musa PA-C  Neurosurgery  [facility]

## 2021-12-26 RX ORDER — OXYCODONE AND ACETAMINOPHEN 10; 325 MG/1; MG/1
1 TABLET ORAL EVERY 6 HOURS PRN
Qty: 60 TABLET | Refills: 0 | Status: SHIPPED | OUTPATIENT
Start: 2021-12-26 | End: 2022-01-19 | Stop reason: SDUPTHER

## 2021-12-27 ENCOUNTER — TELEPHONE (OUTPATIENT)
Dept: NEUROSURGERY | Facility: CLINIC | Age: 66
End: 2021-12-27
Payer: MEDICARE

## 2021-12-29 ENCOUNTER — CLINICAL SUPPORT (OUTPATIENT)
Dept: NEUROSURGERY | Facility: CLINIC | Age: 66
End: 2021-12-29
Payer: MEDICARE

## 2021-12-29 ENCOUNTER — HOSPITAL ENCOUNTER (OUTPATIENT)
Dept: RADIOLOGY | Facility: HOSPITAL | Age: 66
Discharge: HOME | End: 2021-12-29
Attending: NEUROLOGICAL SURGERY
Payer: MEDICARE

## 2021-12-29 VITALS — SYSTOLIC BLOOD PRESSURE: 132 MMHG | DIASTOLIC BLOOD PRESSURE: 74 MMHG | HEART RATE: 76 BPM

## 2021-12-29 DIAGNOSIS — Z98.1 S/P CERVICAL SPINAL FUSION: ICD-10-CM

## 2021-12-29 PROCEDURE — 99999 PR PBB SHADOW E&M-EST. PATIENT-LVL III: ICD-10-PCS | Mod: PBBFAC,HCNC,,

## 2021-12-29 PROCEDURE — 72040 X-RAY EXAM NECK SPINE 2-3 VW: CPT | Mod: 26,HCNC,, | Performed by: RADIOLOGY

## 2021-12-29 PROCEDURE — 72040 XR CERVICAL SPINE AP LATERAL: ICD-10-PCS | Mod: 26,HCNC,, | Performed by: RADIOLOGY

## 2021-12-29 PROCEDURE — 72040 X-RAY EXAM NECK SPINE 2-3 VW: CPT | Mod: TC,HCNC,PN

## 2021-12-29 PROCEDURE — 99999 PR PBB SHADOW E&M-EST. PATIENT-LVL III: CPT | Mod: PBBFAC,HCNC,,

## 2021-12-30 NOTE — PROGRESS NOTES
Pt is on a long distance phone call; requests call back later today.    3:38p  2nd Attempt to complete SW follow-up for Outpatient Care Management. Unable to leave message; mailbox is full. OPCM RN notified.

## 2022-01-03 ENCOUNTER — OFFICE VISIT (OUTPATIENT)
Dept: FAMILY MEDICINE | Facility: CLINIC | Age: 67
End: 2022-01-03
Payer: MEDICARE

## 2022-01-03 VITALS
HEART RATE: 82 BPM | BODY MASS INDEX: 38.24 KG/M2 | DIASTOLIC BLOOD PRESSURE: 70 MMHG | HEIGHT: 60 IN | WEIGHT: 194.75 LBS | TEMPERATURE: 99 F | OXYGEN SATURATION: 98 % | SYSTOLIC BLOOD PRESSURE: 106 MMHG

## 2022-01-03 DIAGNOSIS — K21.9 GASTROESOPHAGEAL REFLUX DISEASE WITHOUT ESOPHAGITIS: ICD-10-CM

## 2022-01-03 DIAGNOSIS — M94.0 COSTOCHONDRITIS: Primary | ICD-10-CM

## 2022-01-03 PROCEDURE — 3074F SYST BP LT 130 MM HG: CPT | Mod: CPTII,S$GLB,, | Performed by: FAMILY MEDICINE

## 2022-01-03 PROCEDURE — 3008F PR BODY MASS INDEX (BMI) DOCUMENTED: ICD-10-PCS | Mod: CPTII,S$GLB,, | Performed by: FAMILY MEDICINE

## 2022-01-03 PROCEDURE — 99214 OFFICE O/P EST MOD 30 MIN: CPT | Mod: S$GLB,,, | Performed by: FAMILY MEDICINE

## 2022-01-03 PROCEDURE — 3008F BODY MASS INDEX DOCD: CPT | Mod: CPTII,S$GLB,, | Performed by: FAMILY MEDICINE

## 2022-01-03 PROCEDURE — 1125F AMNT PAIN NOTED PAIN PRSNT: CPT | Mod: CPTII,S$GLB,, | Performed by: FAMILY MEDICINE

## 2022-01-03 PROCEDURE — 1111F DSCHRG MED/CURRENT MED MERGE: CPT | Mod: CPTII,S$GLB,, | Performed by: FAMILY MEDICINE

## 2022-01-03 PROCEDURE — 3078F DIAST BP <80 MM HG: CPT | Mod: CPTII,S$GLB,, | Performed by: FAMILY MEDICINE

## 2022-01-03 PROCEDURE — 1111F PR DISCHARGE MEDS RECONCILED W/ CURRENT OUTPATIENT MED LIST: ICD-10-PCS | Mod: CPTII,S$GLB,, | Performed by: FAMILY MEDICINE

## 2022-01-03 PROCEDURE — 3074F PR MOST RECENT SYSTOLIC BLOOD PRESSURE < 130 MM HG: ICD-10-PCS | Mod: CPTII,S$GLB,, | Performed by: FAMILY MEDICINE

## 2022-01-03 PROCEDURE — 1157F PR ADVANCE CARE PLAN OR EQUIV PRESENT IN MEDICAL RECORD: ICD-10-PCS | Mod: CPTII,S$GLB,, | Performed by: FAMILY MEDICINE

## 2022-01-03 PROCEDURE — 99214 PR OFFICE/OUTPT VISIT, EST, LEVL IV, 30-39 MIN: ICD-10-PCS | Mod: S$GLB,,, | Performed by: FAMILY MEDICINE

## 2022-01-03 PROCEDURE — 3288F PR FALLS RISK ASSESSMENT DOCUMENTED: ICD-10-PCS | Mod: CPTII,S$GLB,, | Performed by: FAMILY MEDICINE

## 2022-01-03 PROCEDURE — 1125F PR PAIN SEVERITY QUANTIFIED, PAIN PRESENT: ICD-10-PCS | Mod: CPTII,S$GLB,, | Performed by: FAMILY MEDICINE

## 2022-01-03 PROCEDURE — 1159F PR MEDICATION LIST DOCUMENTED IN MEDICAL RECORD: ICD-10-PCS | Mod: CPTII,S$GLB,, | Performed by: FAMILY MEDICINE

## 2022-01-03 PROCEDURE — 1159F MED LIST DOCD IN RCRD: CPT | Mod: CPTII,S$GLB,, | Performed by: FAMILY MEDICINE

## 2022-01-03 PROCEDURE — 1157F ADVNC CARE PLAN IN RCRD: CPT | Mod: CPTII,S$GLB,, | Performed by: FAMILY MEDICINE

## 2022-01-03 PROCEDURE — 1101F PR PT FALLS ASSESS DOC 0-1 FALLS W/OUT INJ PAST YR: ICD-10-PCS | Mod: CPTII,S$GLB,, | Performed by: FAMILY MEDICINE

## 2022-01-03 PROCEDURE — 1101F PT FALLS ASSESS-DOCD LE1/YR: CPT | Mod: CPTII,S$GLB,, | Performed by: FAMILY MEDICINE

## 2022-01-03 PROCEDURE — 3078F PR MOST RECENT DIASTOLIC BLOOD PRESSURE < 80 MM HG: ICD-10-PCS | Mod: CPTII,S$GLB,, | Performed by: FAMILY MEDICINE

## 2022-01-03 PROCEDURE — 3288F FALL RISK ASSESSMENT DOCD: CPT | Mod: CPTII,S$GLB,, | Performed by: FAMILY MEDICINE

## 2022-01-03 RX ORDER — PROMETHAZINE HYDROCHLORIDE 25 MG/1
TABLET ORAL
Qty: 25 TABLET | Refills: 0 | Status: SHIPPED | OUTPATIENT
Start: 2022-01-03 | End: 2022-04-04

## 2022-01-03 RX ORDER — DIAZEPAM 2 MG/1
TABLET ORAL
Qty: 30 TABLET | Refills: 2 | Status: SHIPPED | OUTPATIENT
Start: 2022-01-03 | End: 2022-04-20 | Stop reason: SDUPTHER

## 2022-01-03 RX ORDER — OMEPRAZOLE 40 MG/1
40 CAPSULE, DELAYED RELEASE ORAL EVERY MORNING
Qty: 90 CAPSULE | Refills: 1 | Status: SHIPPED | OUTPATIENT
Start: 2022-01-03 | End: 2022-04-20 | Stop reason: SDUPTHER

## 2022-01-03 RX ORDER — IBANDRONATE SODIUM 150 MG/1
TABLET, FILM COATED ORAL
Qty: 3 TABLET | Refills: 3 | Status: SHIPPED | OUTPATIENT
Start: 2022-01-03 | End: 2022-07-08 | Stop reason: SDUPTHER

## 2022-01-03 RX ORDER — ERGOCALCIFEROL 1.25 MG/1
50000 CAPSULE ORAL
Qty: 12 CAPSULE | Refills: 3 | Status: SHIPPED | OUTPATIENT
Start: 2022-01-03 | End: 2023-10-02

## 2022-01-03 RX ORDER — DICLOFENAC SODIUM 10 MG/G
GEL TOPICAL
Qty: 300 G | Refills: 5 | Status: SHIPPED | OUTPATIENT
Start: 2022-01-03 | End: 2022-12-26

## 2022-01-03 NOTE — PROGRESS NOTES
Patient ID: Christine Castro is a 66 y.o. female.    Chief Complaint: Hospital Follow Up and Chest Pain    HPI      Christine Castro is a 66 y.o. female here for follow up post operatively. Doing well overall with no new problems except chest pain.  Pain on and off since discharge.  Pain in the center of chest that worsens with movment and raising hands up above her head.  NO nausea or sweating asso with this.  NO SOB with this.    Would like refills of medication for stress and arthritis.        Vitals:    01/03/22 1009   BP: 106/70   BP Location: Right arm   Patient Position: Sitting   Pulse: 82   Temp: 98.7 °F (37.1 °C)   TempSrc: Oral   SpO2: 98%   Weight: 88.3 kg (194 lb 12.4 oz)   Height: 5' (1.524 m)            Review of Symptoms    Constitutional  Neg activity change, No chills /fever   Resp  Neg hemoptysis, stridor, choking  CVS  Neg chest pain, palpitations    Physical Exam    Constitutional:  Oriented to person, place, and time.appears well-developed and well-nourished.  No distress.      HENT  Head: Normocephalic and atraumatic  Right Ear: External ear normal.   Left Ear: External ear normal.   Nose: External nose normal.   Mouth:  Moist mucus membranes.    Eyes:  Conjunctivae are normal. Right eye exhibits no discharge.  Left eye exhibits no discharge. No scleral icterus.  No periorbital edema    Cardiovascular:  Regular rate and rhythm with normal S1 and S2     Pulmonary/Chest:   Clear to auscultation bilaterally without wheezes, rhonchi or rales      Musculoskeletal:  No edema. No obvious deformity No wasting  Tenderness to palpation chest wall sternum left side       Neurological:  Alert and oriented to person, place, and time.   Coordination normal.     Skin:   Skin is warm and dry.  No diaphoresis.   No rash noted.     Psychiatric: Normal mood and affect. Behavior is normal.  Judgment and thought content normal.     Complete Blood Count  Lab Results   Component Value Date    RBC 2.54 (L)  12/17/2021    HGB 8.0 (L) 12/17/2021    HCT 24 (L) 12/17/2021     (H) 12/17/2021    MCH 31.5 (H) 12/17/2021    MCHC 31.6 (L) 12/17/2021    RDW 13.7 12/17/2021     (L) 12/17/2021    MPV 9.5 12/17/2021    GRAN 4.4 12/15/2021    GRAN 89.9 (H) 12/15/2021    LYMPH 0.4 (L) 12/15/2021    LYMPH 8.7 (L) 12/15/2021    MONO 0.1 (L) 12/15/2021    MONO 1.2 (L) 12/15/2021    EOS 0.0 12/15/2021    BASO 0.00 12/15/2021    EOSINOPHIL 0.0 12/15/2021    BASOPHIL 0.0 12/15/2021    DIFFMETHOD Automated 12/15/2021       Comprehensive Metabolic Panel  Lab Results   Component Value Date    GLU 89 12/17/2021    BUN 15 12/17/2021    CREATININE 0.8 12/17/2021     12/17/2021    K 4.1 12/17/2021     12/17/2021    PROT 7.2 12/07/2021    ALBUMIN 4.0 12/07/2021    BILITOT 0.4 12/07/2021    AST 21 12/07/2021    ALKPHOS 73 12/07/2021    CO2 23 12/17/2021    ALT 13 12/07/2021    ANIONGAP 7 (L) 12/17/2021    EGFRNONAA >60 12/17/2021    ESTGFRAFRICA >60 12/17/2021       TSH  Lab Results   Component Value Date    TSH 1.469 11/23/2021       Assessment / Plan:      ICD-10-CM ICD-9-CM   1. Costochondritis  M94.0 733.6   2. Gastroesophageal reflux disease without esophagitis  K21.9 530.81     Costochondritis    Gastroesophageal reflux disease without esophagitis  -     omeprazole (PRILOSEC) 40 MG capsule; Take 1 capsule (40 mg total) by mouth every morning.  Dispense: 90 capsule; Refill: 1    Other orders  -     ibandronate (BONIVA) 150 mg tablet; TAKE 1 TABLET BY MOUTH EVERY 30 DAYS FOR OSTEOPOROSIS  Dispense: 3 tablet; Refill: 3  -     promethazine (PHENERGAN) 25 MG tablet; TAKE 1 TABLET(25 MG) BY MOUTH EVERY 6 HOURS AS NEEDED  Dispense: 25 tablet; Refill: 0  -     ergocalciferol (ERGOCALCIFEROL) 50,000 unit Cap; Take 1 capsule (50,000 Units total) by mouth every 7 days.  Dispense: 12 capsule; Refill: 3  -     diazePAM (VALIUM) 2 MG tablet; TAKE 1 TABLET(2 MG) BY MOUTH EVERY 24 HOURS AS NEEDED FOR ANXIETY  Dispense: 30 tablet;  Refill: 2  -     diclofenac sodium (VOLTAREN) 1 % Gel; APPLY 2 GRAMS EXTERNALLY TO THE AFFECTED AREA FOUR TIMES DAILY  Dispense: 300 g; Refill: 5      Discussed anxiety arthritis recent surgery medication use-over 25 minute spent with patient

## 2022-01-03 NOTE — PROGRESS NOTES
3rd follow up attempt.  LCSW mailed letter with contact information requesting a return call. If pt hasn't reached out by 1/10/22 this LCSW will close.

## 2022-01-04 RX ORDER — BLOOD-GLUCOSE METER
EACH MISCELLANEOUS
Qty: 1 EACH | Refills: 0 | Status: SHIPPED | OUTPATIENT
Start: 2022-01-04 | End: 2022-09-01

## 2022-01-04 NOTE — TELEPHONE ENCOUNTER
No new care gaps identified.  Powered by [employer]. Reference number: [id number].   1/04/2022 2:20:20 PM CST

## 2022-01-05 NOTE — TELEPHONE ENCOUNTER
Refill Authorization Note   Christine Castro  is requesting a refill authorization.  Brief Assessment and Rationale for Refill:  Approve     Medication Therapy Plan:       Medication Reconciliation Completed: No   Comments:   --->Care Gap information included below if applicable.       Requested Prescriptions   Pending Prescriptions Disp Refills    TRUE METRIX GO GLUCOSE METER Misc [Pharmacy Med Name: TRUE METRIX GO BLOOD GLUCOSE METER] 1 each 0     Sig: USE AS DIRECTED       Endocrinology: Diabetes - Supplies Passed - 1/4/2022  2:19 PM        Passed - Patient is at least 18 years old        Passed - Valid encounter within last 15 months     Recent Visits  Date Type Provider Dept   01/03/22 Office Visit Alon Santiago MD [facility]   11/17/21 Office Visit Alon Santiago MD [facility]   05/25/21 Office Visit Alon Santiago MD [facility]   03/05/21 Office Visit Alon Santiago MD [facility]   08/18/20 Office Visit Alon Santiago MD [facility]   05/12/20 Office Visit Alon Santiago MD [facility]   03/31/20 Office Visit Alon Santiago MD [facility]   02/12/20 Office Visit Alon Santiago MD [facility]   Showing recent visits within past 720 days and meeting all other requirements  Future Appointments  No visits were found meeting these conditions.  Showing future appointments within next 150 days and meeting all other requirements      Future Appointments              In 3 weeks [facility] LIMIT [facility]    In 3 weeks [facility] - Neurosurgery, [facility]                Passed - HBA1C within 1080 days     Lab Results   Component Value Date    LABA1C 4.9 02/19/2018    HGBA1C 5.1 11/23/2021    HGBA1C 4.7 03/19/2021    HGBA1C 5.2 04/02/2020                  Appointments  past 12m or future 3m with PCP    Date Provider   Last Visit    1/3/2022 Alon Santiago MD   Next Visit   Visit date not found Alon Santiago MD   ED visits in past 90 days: 0     Note composed:6:20 PM 01/04/2022

## 2022-01-10 NOTE — PROGRESS NOTES
LCSW mailed letter after 2nd attempt for SW follow-up with contact information requesting a return call and pt has not reached out to this LCSW.  LCSW will close case and notified OPCM RN.

## 2022-01-12 ENCOUNTER — EXTERNAL HOME HEALTH (OUTPATIENT)
Dept: HOME HEALTH SERVICES | Facility: HOSPITAL | Age: 67
End: 2022-01-12
Payer: MEDICARE

## 2022-01-19 DIAGNOSIS — M43.27 FUSION OF SPINE OF LUMBOSACRAL REGION: ICD-10-CM

## 2022-01-19 NOTE — TELEPHONE ENCOUNTER
----- Message from Silvia Vargas sent at 1/19/2022  4:43 PM CST -----  Contact: [phone]  Type:  RX Refill Request    Who Called:  pt   Refill or New Rx: refill  RX Name and Strength: oxyCODONE-acetaminophen (PERCOCET)  mg per tablet  How is the patient currently taking it? (ex. 1XDay):   Is this a 30 day or 90 day RX: 30 days   Preferred Pharmacy with phone number: [facility] [phone]  Local or Mail Order: local   Ordering Provider: Phillip   Would the patient rather a call back or a response via [facility]?  Call back   Best Call Back Number: [phone]  Additional Information:

## 2022-01-20 RX ORDER — OXYCODONE AND ACETAMINOPHEN 10; 325 MG/1; MG/1
1 TABLET ORAL EVERY 6 HOURS PRN
Qty: 60 TABLET | Refills: 0 | Status: SHIPPED | OUTPATIENT
Start: 2022-01-20 | End: 2022-02-04 | Stop reason: SDUPTHER

## 2022-01-24 ENCOUNTER — TELEPHONE (OUTPATIENT)
Dept: NEUROSURGERY | Facility: CLINIC | Age: 67
End: 2022-01-24
Payer: MEDICARE

## 2022-01-24 NOTE — TELEPHONE ENCOUNTER
----- Message from Isabelle Ron MA sent at 1/21/2022  4:14 PM CST -----    ----- Message -----  From: Becca Denton  Sent: 1/21/2022  11:21 AM CST  To: Phillip Quiros Staff    Type:  Patient Requesting call back    Who Called Christine Castro  Would the patient rather a call back or a response via [facility]? Deepak back  Best Call Back Number [phone]  Additional Information:  Patient Requesting call back regarding RX called in was insufficient needs correcting per quantity.

## 2022-01-25 ENCOUNTER — HOSPITAL ENCOUNTER (OUTPATIENT)
Dept: RADIOLOGY | Facility: HOSPITAL | Age: 67
Discharge: HOME | End: 2022-01-25
Attending: NEUROLOGICAL SURGERY
Payer: MEDICARE

## 2022-01-25 ENCOUNTER — OFFICE VISIT (OUTPATIENT)
Dept: NEUROSURGERY | Facility: CLINIC | Age: 67
End: 2022-01-25
Payer: MEDICARE

## 2022-01-25 VITALS — WEIGHT: 194.69 LBS | HEIGHT: 60 IN | BODY MASS INDEX: 38.22 KG/M2

## 2022-01-25 DIAGNOSIS — Z98.1 S/P CERVICAL SPINAL FUSION: Primary | ICD-10-CM

## 2022-01-25 DIAGNOSIS — Z98.1 S/P CERVICAL SPINAL FUSION: ICD-10-CM

## 2022-01-25 PROCEDURE — 3288F FALL RISK ASSESSMENT DOCD: CPT | Mod: HCNC,CPTII,S$GLB, | Performed by: PHYSICIAN ASSISTANT

## 2022-01-25 PROCEDURE — 72040 X-RAY EXAM NECK SPINE 2-3 VW: CPT | Mod: 26,HCNC,, | Performed by: RADIOLOGY

## 2022-01-25 PROCEDURE — 1159F MED LIST DOCD IN RCRD: CPT | Mod: HCNC,CPTII,S$GLB, | Performed by: PHYSICIAN ASSISTANT

## 2022-01-25 PROCEDURE — 72040 X-RAY EXAM NECK SPINE 2-3 VW: CPT | Mod: TC,HCNC,PN

## 2022-01-25 PROCEDURE — 99999 PR PBB SHADOW E&M-EST. PATIENT-LVL IV: CPT | Mod: PBBFAC,HCNC,, | Performed by: PHYSICIAN ASSISTANT

## 2022-01-25 PROCEDURE — 72040 XR CERVICAL SPINE AP LATERAL: ICD-10-PCS | Mod: 26,HCNC,, | Performed by: RADIOLOGY

## 2022-01-25 PROCEDURE — 99024 PR POST-OP FOLLOW-UP VISIT: ICD-10-PCS | Mod: HCNC,S$GLB,, | Performed by: PHYSICIAN ASSISTANT

## 2022-01-25 PROCEDURE — 1159F PR MEDICATION LIST DOCUMENTED IN MEDICAL RECORD: ICD-10-PCS | Mod: HCNC,CPTII,S$GLB, | Performed by: PHYSICIAN ASSISTANT

## 2022-01-25 PROCEDURE — 1157F ADVNC CARE PLAN IN RCRD: CPT | Mod: HCNC,CPTII,S$GLB, | Performed by: PHYSICIAN ASSISTANT

## 2022-01-25 PROCEDURE — 1160F RVW MEDS BY RX/DR IN RCRD: CPT | Mod: HCNC,CPTII,S$GLB, | Performed by: PHYSICIAN ASSISTANT

## 2022-01-25 PROCEDURE — 3008F BODY MASS INDEX DOCD: CPT | Mod: HCNC,CPTII,S$GLB, | Performed by: PHYSICIAN ASSISTANT

## 2022-01-25 PROCEDURE — 3008F PR BODY MASS INDEX (BMI) DOCUMENTED: ICD-10-PCS | Mod: HCNC,CPTII,S$GLB, | Performed by: PHYSICIAN ASSISTANT

## 2022-01-25 PROCEDURE — 1125F PR PAIN SEVERITY QUANTIFIED, PAIN PRESENT: ICD-10-PCS | Mod: HCNC,CPTII,S$GLB, | Performed by: PHYSICIAN ASSISTANT

## 2022-01-25 PROCEDURE — 1125F AMNT PAIN NOTED PAIN PRSNT: CPT | Mod: HCNC,CPTII,S$GLB, | Performed by: PHYSICIAN ASSISTANT

## 2022-01-25 PROCEDURE — 1100F PR PT FALLS ASSESS DOC 2+ FALLS/FALL W/INJURY/YR: ICD-10-PCS | Mod: HCNC,CPTII,S$GLB, | Performed by: PHYSICIAN ASSISTANT

## 2022-01-25 PROCEDURE — 1157F PR ADVANCE CARE PLAN OR EQUIV PRESENT IN MEDICAL RECORD: ICD-10-PCS | Mod: HCNC,CPTII,S$GLB, | Performed by: PHYSICIAN ASSISTANT

## 2022-01-25 PROCEDURE — 1160F PR REVIEW ALL MEDS BY PRESCRIBER/CLIN PHARMACIST DOCUMENTED: ICD-10-PCS | Mod: HCNC,CPTII,S$GLB, | Performed by: PHYSICIAN ASSISTANT

## 2022-01-25 PROCEDURE — 99999 PR PBB SHADOW E&M-EST. PATIENT-LVL IV: ICD-10-PCS | Mod: PBBFAC,HCNC,, | Performed by: PHYSICIAN ASSISTANT

## 2022-01-25 PROCEDURE — 3288F PR FALLS RISK ASSESSMENT DOCUMENTED: ICD-10-PCS | Mod: HCNC,CPTII,S$GLB, | Performed by: PHYSICIAN ASSISTANT

## 2022-01-25 PROCEDURE — 1100F PTFALLS ASSESS-DOCD GE2>/YR: CPT | Mod: HCNC,CPTII,S$GLB, | Performed by: PHYSICIAN ASSISTANT

## 2022-01-25 PROCEDURE — 99024 POSTOP FOLLOW-UP VISIT: CPT | Mod: HCNC,S$GLB,, | Performed by: PHYSICIAN ASSISTANT

## 2022-01-25 NOTE — PROGRESS NOTES
Subjective:     Patient ID:  Christine Castro is a 66 y.o. female.    Phillip    Chief Complaint:  6 week postop follow-up               Date of surgery 12/14/2021     Preop diagnosis  1. Cervical spondylosis with myelopathy and radiculopathy at C3-4, C5-6, C6-7  2. C4-5 in Situ fusion      Postop diagnosis  Same     Surgery  1. C3-4, C5-6, C6-7 anterior diskectomy and interbody fusion using interbody spacer, Depuis, and allograft DBM  2. C3-C7 anterior instrumentation using a separate plate Depuis  3. Neural monitoring using MEP, SSEP, EMG  4. Fluoroscopy     Surgeon     Ishaan Fisher MD     Assistance surgeon     None            HPI patient is a poor historian    Christine Castro is a 66 y.o. female who presents for follow up.  Patient states that she has neck pain.  She also has left arm pain to the hand.  No right arm pain.  She has been having home health physical therapy come and work with her.  She feels like her walking and ambulation has improved.  She is still having some difficulty swallowing but this is improving.  She has been wearing her neck brace.  She has been using the bone growth stimulator.    She is still taking oxycodone.    Patient denies any recent accidents or trauma, no saddle anesthesias, and no bowel or bladder incontinence.    Review of Systems:  Constitution: Negative for chills, fever, night sweats and weight loss.   Musculoskeletal: Negative for falls.   Gastrointestinal: Negative for bowel incontinence, nausea and vomiting.   Genitourinary: Negative for bladder incontinence.   Neurological: Negative for disturbances in coordination and loss of balance.      Objective:      Vitals:    01/25/22 1456   Weight: 88.3 kg (194 lb 10.7 oz)   Height: 5' (1.524 m)   PainSc:   5         Physical Exam:  Exam done in the chair      General:  Christine Castro is well-developed, well-nourished, appears stated age, in no acute distress, alert and oriented to person, place, and  time.    Pulmonary/Chest:  Respiratory effort normal  Abdominal: Exhibits no distension  Psychiatric:  Normal mood and affect.  Behavior is normal.  Judgement and thought content normal      Musculoskeletal:    Cervical Spine Inspection:  Healed anterior surgical scars with no visible rashes.      Neurological:    Muscle strength against resistance:     Right Left   Deltoid  5 / 5 5 / 5   Biceps  5 / 5 5 / 5   Triceps 5 / 5 5 / 5   Wrist flexion  5 / 5 5 / 5   Wrist extension 5 / 5 5 / 5   Finger abduction 5 / 5 5 / 5   Thumb opposition 5 / 5 5 / 5   Handgrip 5 / 5 5 / 5   Hip flexion  5 / 5 5 / 5   Hip extension 5 / 5 5 / 5   Hip abduction 5 / 5 5 / 5   Hip adduction 5/ 5 5 / 5   Knee extension  5 / 5 5 / 5   Knee flexion  5 / 5 5 / 5   Dorsiflexion  5 / 5 5 / 5        Plantar flexion  5 / 5 5 / 5                 Reflexes:    Munoz: Negative on the right.  Positive on the left.    On gross examination of the bilateral lower extremities, patient has full painfree ROM with no signs of clubbing, cyanosis, edema, and weakness.      XRAY Interpretation:     Cervical spine xrays were personally reviewed today.  No fractures.  Hardware intact      Assessment:          1. S/P cervical spinal fusion            Plan:             Patient is 6 weeks postop C3-7 ACDF.  Continue wearing neck brace.  Continue working with home health physical therapy.  Swallowing is improving.    She is requesting the Dr. Fisher prescribed a 1 month prescription of the Percocet instead of 15 days.  I will send a message on.    Follow-up with Dr. Fisher in 6 weeks for the 3 month postop follow-up with x-rays.      Follow-Up:  Follow up in about 6 weeks (around 3/8/2022). If there are any questions prior to this, the patient was instructed to contact the office.       Mayda Musa, [facility], PASaschaC  Neurosurgery  [facility]  01/25/2022

## 2022-01-27 ENCOUNTER — DOCUMENT SCAN (OUTPATIENT)
Dept: HOME HEALTH SERVICES | Facility: HOSPITAL | Age: 67
End: 2022-01-27
Payer: MEDICARE

## 2022-01-28 NOTE — PLAN OF CARE
Problem: Adult Inpatient Plan of Care  Goal: Plan of Care Review  Outcome: Ongoing (interventions implemented as appropriate)  Plan of care reviewed with patient and family, understanding verbalized. Pt remains SR on tele. VSS. Pt c/o 6/10 headache and 9/10 L knee pain, MD notified and new orders given.. L knee dressing c/d/i. Left sided weakness and slurred speech noted. VIANCA passed, MD okayed clear liquids. Neruro checks q4. BSC at bedside. Family remains at the bedside. Aspiration precautions initiated.  Instructed to call for any assistance, understanding verbalized. Bed alarm on, call light in reach, fall precautions in place. Will continue to monitor.         details…

## 2022-01-31 ENCOUNTER — TELEPHONE (OUTPATIENT)
Dept: NEUROSURGERY | Facility: CLINIC | Age: 67
End: 2022-01-31

## 2022-01-31 ENCOUNTER — DOCUMENT SCAN (OUTPATIENT)
Dept: HOME HEALTH SERVICES | Facility: HOSPITAL | Age: 67
End: 2022-01-31
Payer: MEDICARE

## 2022-01-31 NOTE — TELEPHONE ENCOUNTER
----- Message from Maryam Mendenhall sent at 1/31/2022  3:40 PM CST -----  Type:  Needs Medical Advice    Who Called: self  Reason:Speak with nurse but wouldn't say why   Would the patient rather a call back or a response via [facility]? call  Best Call Back Number: [phone]  Additional Information: none

## 2022-02-04 DIAGNOSIS — M43.27 FUSION OF SPINE OF LUMBOSACRAL REGION: ICD-10-CM

## 2022-02-04 NOTE — TELEPHONE ENCOUNTER
----- Message from Bear Zavaleta sent at 2/4/2022  3:26 PM CST -----  Contact: Pt  Type:  RX Refill Request    Who Called: pt  Refill or New Rx refill  RX Name and Strength:oxyCODONE-acetaminophen (PERCOCET)  mg per tablet  How is the patient currently taking it? (ex. 1XDay):1  Is this a 30 day or 90 day RX:60  Preferred Pharmacy with phone number:[id number] - [place] [id number]  [facility]  Local or Mail Order:local  Ordering Provider:Dr Fisher  Would the patient rather a call back or a response via [facility]? Call   Best Call Back Number:[phone]  Additional Information:         Pt would like a call back pt stated she has been calling all day

## 2022-02-05 RX ORDER — OXYCODONE AND ACETAMINOPHEN 10; 325 MG/1; MG/1
1 TABLET ORAL EVERY 6 HOURS PRN
Qty: 60 TABLET | Refills: 0 | Status: SHIPPED | OUTPATIENT
Start: 2022-02-05 | End: 2022-02-22 | Stop reason: SDUPTHER

## 2022-02-08 ENCOUNTER — HOSPITAL ENCOUNTER (OUTPATIENT)
Dept: RADIOLOGY | Facility: HOSPITAL | Age: 67
Discharge: HOME | End: 2022-02-08
Attending: ORTHOPAEDIC SURGERY
Payer: MEDICARE

## 2022-02-08 DIAGNOSIS — M17.0 PRIMARY OSTEOARTHRITIS OF BOTH KNEES: ICD-10-CM

## 2022-02-08 DIAGNOSIS — M79.604 RIGHT LEG PAIN: ICD-10-CM

## 2022-02-08 PROCEDURE — 73564 X-RAY EXAM KNEE 4 OR MORE: CPT | Mod: TC,50,HCNC,FY,PO

## 2022-02-16 RX ORDER — CALCIUM CITRATE/VITAMIN D3 200MG-6.25
TABLET ORAL
Qty: 100 STRIP | Refills: 3 | Status: SHIPPED | OUTPATIENT
Start: 2022-02-16

## 2022-02-16 NOTE — TELEPHONE ENCOUNTER
No new care gaps identified.  Powered by [employer] by [employer]. Reference number: [id number].   2/15/2022 8:28:32 PM CST

## 2022-02-16 NOTE — TELEPHONE ENCOUNTER
Refill Routing Note   Medication(s) are not appropriate for processing by [facility] for the following reason(s):      - Clarification of instructions    [facility] action(s):  Defer       Medication Therapy Plan: Duplicate orders on med list for once daily and up to twice daily testing  --->Care Gap information included in message below if applicable.   Medication reconciliation completed: No   Automatic Epic Generated Protocol Data:        Requested Prescriptions   Pending Prescriptions Disp Refills    TRUE METRIX GLUCOSE TEST STRIP Strp [Pharmacy Med Name: TRUE METRIX BLOOD GLUCOSE TEST STRP] 100 strip 3     Sig: TEST BLOOD SUGAR EVERY DAY       Endocrinology: Diabetes - Supplies Passed - 2/15/2022  8:27 PM        Passed - Patient is at least 18 years old        Passed - Valid encounter within last 15 months     Recent Visits  Date Type Provider Dept   01/03/22 Office Visit Alon Santiago MD [facility]   11/17/21 Office Visit Alon Santiago MD [facility]   05/25/21 Office Visit Alon Santiago MD [facility]   03/05/21 Office Visit Alon Santiago MD [facility]   08/18/20 Office Visit Alon Santiago MD [facility]   05/12/20 Office Visit Alon Santiago MD [facility]   03/31/20 Office Visit Alon Santiago MD [facility]   Showing recent visits within past 720 days and meeting all other requirements  Future Appointments  No visits were found meeting these conditions.  Showing future appointments within next 150 days and meeting all other requirements      Future Appointments              In 3 weeks [facility] XR-A LIMIT 350 L[facility]    In 3 weeks MD [facility]                Passed - HBA1C within 1080 days     Lab Results   Component Value Date    LABA1C 4.9 02/19/2018    HGBA1C 5.1 11/23/2021    HGBA1C 4.7 03/19/2021    HGBA1C 5.2  04/02/2020                    Appointments  past 12m or future 3m with PCP    Date Provider   Last Visit   1/3/2022 Alon Santiago MD   Next Visit   Visit date not found Alon Santiago MD   ED visits in past 90 days: 0        Note composed:6:11 AM 02/16/2022

## 2022-02-22 DIAGNOSIS — M43.27 FUSION OF SPINE OF LUMBOSACRAL REGION: ICD-10-CM

## 2022-02-22 NOTE — TELEPHONE ENCOUNTER
----- Message from Silvia Vargas sent at 2/22/2022 10:19 AM CST -----  Contact: [phone]  Type:  RX Refill Request    Who Called:  pt   Refill or New Rx: refill  RX Name and Strength: oxyCODONE-acetaminophen (PERCOCET)  mg per tablet  How is the patient currently taking it? (ex. 1XDay):  Is this a 30 day or 90 day RX: 30 days   Preferred Pharmacy with phone number: [facility] [phone]  Local or Mail Order: local   Ordering Provider: Phillip   Would the patient rather a call back or a response via [facility]?  Call,back   Best Call Back Number: [phone]  Additional Information:

## 2022-02-23 RX ORDER — OXYCODONE AND ACETAMINOPHEN 10; 325 MG/1; MG/1
1 TABLET ORAL EVERY 6 HOURS PRN
Qty: 60 TABLET | Refills: 0 | Status: SHIPPED | OUTPATIENT
Start: 2022-02-23 | End: 2022-03-07 | Stop reason: SDUPTHER

## 2022-03-07 DIAGNOSIS — M43.27 FUSION OF SPINE OF LUMBOSACRAL REGION: ICD-10-CM

## 2022-03-07 NOTE — TELEPHONE ENCOUNTER
----- Message from Deepa Louise sent at 3/7/2022  1:49 PM CST -----  Regarding: call back  Contact: [phone]  Type:  RX Refill Request    Who Called: PT   Refill or New Rx: Refills   RX Name and Strength: oxyCODONE-acetaminophen (PERCOCET)  mg per tablet 60 tablet   How is the patient currently taking it? (ex. 1XDay): Take 1 tablet by mouth every 6 (six) hours as needed for Pain. - Oral  Is this a 30 day or 90 day RX: 60  Preferred Pharmacy with phone number: [facility] #[id number] [address] AT [facility] [address]   Phone:  [phone]  Fax:  [phone]

## 2022-03-08 RX ORDER — OXYCODONE AND ACETAMINOPHEN 10; 325 MG/1; MG/1
1 TABLET ORAL EVERY 6 HOURS PRN
Qty: 60 TABLET | Refills: 0 | Status: SHIPPED | OUTPATIENT
Start: 2022-03-08 | End: 2022-03-14 | Stop reason: SDUPTHER

## 2022-03-14 ENCOUNTER — HOSPITAL ENCOUNTER (OUTPATIENT)
Dept: RADIOLOGY | Facility: HOSPITAL | Age: 67
Discharge: HOME | End: 2022-03-14
Attending: NEUROLOGICAL SURGERY
Payer: MEDICARE

## 2022-03-14 ENCOUNTER — OFFICE VISIT (OUTPATIENT)
Dept: NEUROSURGERY | Facility: CLINIC | Age: 67
End: 2022-03-14
Payer: MEDICARE

## 2022-03-14 VITALS — WEIGHT: 194.69 LBS | HEIGHT: 60 IN | BODY MASS INDEX: 38.22 KG/M2

## 2022-03-14 DIAGNOSIS — Z98.1 S/P CERVICAL SPINAL FUSION: ICD-10-CM

## 2022-03-14 DIAGNOSIS — M54.9 DORSALGIA, UNSPECIFIED: ICD-10-CM

## 2022-03-14 DIAGNOSIS — R26.89 IMPAIRED GAIT AND MOBILITY: ICD-10-CM

## 2022-03-14 DIAGNOSIS — G95.9 CERVICAL MYELOPATHY: Primary | ICD-10-CM

## 2022-03-14 DIAGNOSIS — M43.27 FUSION OF SPINE OF LUMBOSACRAL REGION: ICD-10-CM

## 2022-03-14 PROCEDURE — 72040 X-RAY EXAM NECK SPINE 2-3 VW: CPT | Mod: 26,,, | Performed by: RADIOLOGY

## 2022-03-14 PROCEDURE — 1125F AMNT PAIN NOTED PAIN PRSNT: CPT | Mod: CPTII,S$GLB,, | Performed by: NEUROLOGICAL SURGERY

## 2022-03-14 PROCEDURE — 3008F PR BODY MASS INDEX (BMI) DOCUMENTED: ICD-10-PCS | Mod: CPTII,S$GLB,, | Performed by: NEUROLOGICAL SURGERY

## 2022-03-14 PROCEDURE — 3288F PR FALLS RISK ASSESSMENT DOCUMENTED: ICD-10-PCS | Mod: CPTII,S$GLB,, | Performed by: NEUROLOGICAL SURGERY

## 2022-03-14 PROCEDURE — 99024 POSTOP FOLLOW-UP VISIT: CPT | Mod: S$GLB,,, | Performed by: NEUROLOGICAL SURGERY

## 2022-03-14 PROCEDURE — 99024 PR POST-OP FOLLOW-UP VISIT: ICD-10-PCS | Mod: S$GLB,,, | Performed by: NEUROLOGICAL SURGERY

## 2022-03-14 PROCEDURE — 3288F FALL RISK ASSESSMENT DOCD: CPT | Mod: CPTII,S$GLB,, | Performed by: NEUROLOGICAL SURGERY

## 2022-03-14 PROCEDURE — 1159F MED LIST DOCD IN RCRD: CPT | Mod: CPTII,S$GLB,, | Performed by: NEUROLOGICAL SURGERY

## 2022-03-14 PROCEDURE — 1100F PTFALLS ASSESS-DOCD GE2>/YR: CPT | Mod: CPTII,S$GLB,, | Performed by: NEUROLOGICAL SURGERY

## 2022-03-14 PROCEDURE — 1125F PR PAIN SEVERITY QUANTIFIED, PAIN PRESENT: ICD-10-PCS | Mod: CPTII,S$GLB,, | Performed by: NEUROLOGICAL SURGERY

## 2022-03-14 PROCEDURE — 99499 UNLISTED E&M SERVICE: CPT | Mod: S$GLB,,, | Performed by: NEUROLOGICAL SURGERY

## 2022-03-14 PROCEDURE — 1159F PR MEDICATION LIST DOCUMENTED IN MEDICAL RECORD: ICD-10-PCS | Mod: CPTII,S$GLB,, | Performed by: NEUROLOGICAL SURGERY

## 2022-03-14 PROCEDURE — 72040 X-RAY EXAM NECK SPINE 2-3 VW: CPT | Mod: TC,PN

## 2022-03-14 PROCEDURE — 1157F ADVNC CARE PLAN IN RCRD: CPT | Mod: CPTII,S$GLB,, | Performed by: NEUROLOGICAL SURGERY

## 2022-03-14 PROCEDURE — 3008F BODY MASS INDEX DOCD: CPT | Mod: CPTII,S$GLB,, | Performed by: NEUROLOGICAL SURGERY

## 2022-03-14 PROCEDURE — 99999 PR PBB SHADOW E&M-EST. PATIENT-LVL IV: CPT | Mod: PBBFAC,,, | Performed by: NEUROLOGICAL SURGERY

## 2022-03-14 PROCEDURE — 99499 RISK ADDL DX/OHS AUDIT: ICD-10-PCS | Mod: S$GLB,,, | Performed by: NEUROLOGICAL SURGERY

## 2022-03-14 PROCEDURE — 72040 XR CERVICAL SPINE AP LATERAL: ICD-10-PCS | Mod: 26,,, | Performed by: RADIOLOGY

## 2022-03-14 PROCEDURE — 99999 PR PBB SHADOW E&M-EST. PATIENT-LVL IV: ICD-10-PCS | Mod: PBBFAC,,, | Performed by: NEUROLOGICAL SURGERY

## 2022-03-14 PROCEDURE — 1100F PR PT FALLS ASSESS DOC 2+ FALLS/FALL W/INJURY/YR: ICD-10-PCS | Mod: CPTII,S$GLB,, | Performed by: NEUROLOGICAL SURGERY

## 2022-03-14 PROCEDURE — 1157F PR ADVANCE CARE PLAN OR EQUIV PRESENT IN MEDICAL RECORD: ICD-10-PCS | Mod: CPTII,S$GLB,, | Performed by: NEUROLOGICAL SURGERY

## 2022-03-14 RX ORDER — OXYCODONE AND ACETAMINOPHEN 10; 325 MG/1; MG/1
1 TABLET ORAL EVERY 6 HOURS PRN
Qty: 60 TABLET | Refills: 0 | Status: SHIPPED | OUTPATIENT
Start: 2022-03-14 | End: 2022-04-04 | Stop reason: SDUPTHER

## 2022-03-14 NOTE — PROGRESS NOTES
NEUROSURGICAL PROGRESS NOTE    DATE OF SERVICE:  03/14/2022    ATTENDING PHYSICIAN:  Ishaan Fisher MD    SUBJECTIVE:    INTERIM HISTORY:    This is a very pleasant 66 y.o. female, who is status post 3 months C3-C7 anterior fusion for spondylosis with myelopathy.  She is also more than 1 year status post L4-5 oblique interbody fusion with posterior instrumentation for grade 2 spondylolisthesis with severe stenosis.  Since her cervical fusion surgery she has been compliant with wearing her neck brace and using her bone growth stimulator.  She does not report difficulty with swallowing.  She had some neck pain on the right side.  She also has left shoulder pain that is chronic.  She is complaining mainly finger pains in bilateral hands and worsening osteoarthritis changes.  When she stands up she has difficulty standing up upright.  She does not have difficulty bending forward.  She reports new onset of right buttock and lateral thigh pain that started about 6 months after lumbar fusion surgery.  No new onset of motor weakness numbness or sphincter dysfunction symptoms in the lower extremities, she has completed home health physical therapy.  She takes Percocet 10 mg 3 times daily for severe pain.              PAST MEDICAL HISTORY:  Active Ambulatory Problems     Diagnosis Date Noted    Acquired hypothyroidism     History of congestive heart failure     Major depressive disorder 12/04/2015    Normocytic anemia 04/10/2018    History of myocardial infarction 08/30/2018    Epigastric pain 02/28/2019    BMI 39.0-39.9,adult 04/02/2019    Primary osteoarthritis of left knee 05/08/2019    TIA (transient ischemic attack) 05/19/2019    History of gastric bypass 05/19/2019    Coronary artery disease involving native coronary artery of native heart     UTI (urinary tract infection) 05/19/2019    History of right knee joint replacement 11/20/2019    Chronic left shoulder pain 07/29/2020    Primary osteoarthritis  of left shoulder 2020    Chronic pain 2020    Lumbar radiculopathy 2020    DDD (degenerative disc disease), lumbar 2021    Pain 2021    Cervical spondylosis with myelopathy 2021    Aspiration pneumonia of right lower lobe 2021     Resolved Ambulatory Problems     Diagnosis Date Noted    Primary osteoarthritis     Diabetes mellitus, type 2     History of colon cancer 2017    Malfunction of device 2017    S/P TKR (total knee replacement), left 2019    Weakness of left lower extremity 2019    Gait, antalgic 2019    Decreased range of motion (ROM) of left knee 2019    Primary osteoarthritis of right knee 2019     Past Medical History:   Diagnosis Date    Anticoagulant long-term use     Arthritis     Asthma     CHF (congestive heart failure)     Colon cancer     COPD (chronic obstructive pulmonary disease)     Coronary artery disease     Depression     GERD (gastroesophageal reflux disease)     Myocardial infarction     Thyroid disease        PAST SURGICAL HISTORY:  Past Surgical History:   Procedure Laterality Date    ABDOMINAL SURGERY       SECTION      CHOLECYSTECTOMY      COLON SURGERY      COLONOSCOPY      --- repeat in 3-5 years    COLONOSCOPY N/A 2017    Procedure: COLONOSCOPY;  Surgeon: Corrina Flores MD;  Location: [facility] END[facility];  Service: Endoscopy;  Laterality: N/A;    COLONOSCOPY N/A 4/10/2018    Procedure: COLONOSCOPY golytely;  Surgeon: Corrina Flores MD;  Location: [facility];  Service: Endoscopy;  Laterality: N/A;    DECOMPRESSION OF CERVICAL SPINE BY ANTERIOR APPROACH WITH FUSION N/A 2021    Procedure: DECOMPRESSION AND FUSION, SPINE, CERVICAL, ANTERIOR APPROACH C3-4 C5- 6 C6-7 ACDF;  Surgeon: Ishaan Fisher MD;  Location: [facility] OR;  Service: Neurosurgery;  Laterality: N/A;    ECTOPIC PREGNANCY SURGERY      EPIDURAL STEROID INJECTION INTO LUMBAR SPINE N/A  11/4/2020    Procedure: Injection-steroid-epidural-lumbar--L5-S1 InterLaminar;  Surgeon: Nilam Santiago MD;  Location: [facility]GT;  Service: Pain Management;  Laterality: N/A;    ESOPHAGOGASTRODUODENOSCOPY N/A 2/28/2019    Procedure: ESOPHAGOGASTRODUODENOSCOPY (EGD);  Surgeon: Corrina Flores MD;  Location: [facility] ENDO;  Service: Endoscopy;  Laterality: N/A;    FRACTURE SURGERY      left leg    FUSION OF SPINE WITH INSTRUMENTATION Left 1/11/2021    Procedure: FUSION, SPINE, WITH INSTRUMENTATION Stage 1 Left L4-5 OLIF DORA Stage 2 L4-5 Laminectomy, medial Facetectomy, foraminotomy, L4-5 MIS Instrumentation;  Surgeon: Ishaan Fisher MD;  Location: [facility] OR;  Service: Neurosurgery;  Laterality: Left;  Procedure: Stage 1 Left L4-5 OLIF DORA  Stage 2 L4-5 Laminectomy, medial Facetectomy, foraminotomy, L4-5 MIS Instrumentation  Surgery TIme: 4 Hrs    GASTRIC BYPASS      HERNIA REPAIR      INJECTION OF ANESTHETIC AGENT AROUND GENITOFEMORAL NERVE Left 7/29/2020    Procedure: BLOCK, NERVE, LEFT SHOULDER ARTICULAR;  Surgeon: Nilam Santiago MD;  Location: [facility];  Service: Pain Management;  Laterality: Left;    JOINT REPLACEMENT      KNEE ARTHROPLASTY Left 5/8/2019    Procedure: ARTHROPLASTY, KNEE;  Surgeon: Roldan Greenwood MD;  Location: [facility] OR;  Service: Orthopedics;  Laterality: Left;  Navid notified    KNEE ARTHROPLASTY Right 11/20/2019    Procedure: ARTHROPLASTY, KNEE;  Surgeon: Roldan Greenwood MD;  Location: [facility] OR;  Service: Orthopedics;  Laterality: Right;  Navid notified, confirmed case Navid 11/19/19 [id number]    OOPHORECTOMY      RADIOFREQUENCY THERMOCOAGULATION Left 8/12/2020    Procedure: RADIOFREQUENCY THERMAL COAGULATION--Left Suprascapular, Axillary, and Lateral Pectoral Articular Branch RFA;  Surgeon: Nilam Santiago MD;  Location: [facility];  Service: Pain Management;  Laterality: Left;    TONSILLECTOMY      TRANSFORAMINAL EPIDURAL INJECTION OF STEROID Right 8/25/2021     Procedure: Injection,steroid,epidural,transforaminal approach; Levels: L5;  Surgeon: Nilam Santiago MD;  [facility];  Service: Pain Management;  Laterality: Right;  No pacemaker. Patient is diabetic. Patient is taking Aspirin but can continue per Dr. Santiago.     TUBAL LIGATION         SOCIAL HISTORY:   Social History     Socioeconomic History    Marital status:    Tobacco Use    Smoking status: Never Smoker    Smokeless tobacco: Never Used   Substance and Sexual Activity    Alcohol use: Yes     Comment: very rare    Drug use: No     Comment: CBD oil sometimes    Sexual activity: Never     Social Determinants of Health     Financial Resource Strain: Medium Risk    Difficulty of Paying Living Expenses: Somewhat hard   Food Insecurity: Food Insecurity Present    Worried About Running Out of Food in the Last Year: Sometimes true    Ran Out of Food in the Last Year: Never true   Transportation Needs: No Transportation Needs    Lack of Transportation (Medical): No    Lack of Transportation (Non-Medical): No   Stress: No Stress Concern Present    Feeling of Stress : Only a little   Social Connections: Unknown    Frequency of Communication with Friends and Family: Three times a week    Frequency of Social Gatherings with Friends and Family: Once a week    Attends Religion Services: More than 4 times per year    Active Member of Clubs or Organizations: No    Attends Club or Organization Meetings: Never   Housing Stability: Low Risk     Unable to Pay for Housing in the Last Year: No    Number of Places Lived in the Last Year: 1    Unstable Housing in the Last Year: No       FAMILY HISTORY:  Family History   Problem Relation Age of Onset    Hyperlipidemia Mother     Hypertension Mother     Diabetes Mother     Stroke Mother     Hypertension Sister     Hypertension Brother     Diabetes Brother     Breast cancer Maternal Aunt     Breast cancer Maternal Aunt     Breast cancer  Cousin        CURRENTS MEDICATIONS:  Current Outpatient Medications on File Prior to Visit   Medication Sig Dispense Refill    albuterol (VENTOLIN HFA) 90 mcg/actuation inhaler INHALE 2 PUFFS INTO THE LUNGS EVERY 4 HOURS AS NEEDED FOR WHEEZING 18 g 3    atorvastatin (LIPITOR) 40 MG tablet TAKE 1 TABLET(40 MG) BY MOUTH EVERY DAY FOR CHOLESTEROL 90 tablet 3    buPROPion (WELLBUTRIN SR) 200 MG SR12 Take 1 tablet (200 mg total) by mouth 2 (two) times daily. To help with mood and pain relief 180 tablet 3    busPIRone (BUSPAR) 10 MG tablet TAKE 1/2 TABLET BY MOUTH ONCE DAILY (Patient taking differently: 10 mg. Pt is taking 10 mg once daily) 60 tablet 3    diabetic supplies, miscellan. Misc Lancets for 1-2 times a day testing    dispense brand covered by insurance t (Patient taking differently: Lancets for 1-2 times a day testing    dispense brand covered by insurance t) 60 each 3    diazePAM (VALIUM) 2 MG tablet TAKE 1 TABLET(2 MG) BY MOUTH EVERY 24 HOURS AS NEEDED FOR ANXIETY 30 tablet 2    diclofenac sodium (VOLTAREN) 1 % Gel APPLY 2 GRAMS EXTERNALLY TO THE AFFECTED AREA FOUR TIMES DAILY 300 g 5    ergocalciferol (ERGOCALCIFEROL) 50,000 unit Cap Take 1 capsule (50,000 Units total) by mouth every 7 days. 12 capsule 3    EScitalopram oxalate (LEXAPRO) 20 MG tablet TAKE 1 TABLET(20 MG) BY MOUTH EVERY DAY 90 tablet 3    furosemide (LASIX) 20 MG tablet Take 1 tablet (20 mg total) by mouth daily as needed. 30 tablet 3    gabapentin (NEURONTIN) 600 MG tablet Take 1 tablet (600 mg total) by mouth 3 (three) times daily. 90 tablet 11    glipiZIDE (GLUCOTROL) 2.5 MG TR24 TAKE 1 TABLET(2.5 MG) BY MOUTH DAILY WITH BREAKFAST 90 tablet 3    ibandronate (BONIVA) 150 mg tablet TAKE 1 TABLET BY MOUTH EVERY 30 DAYS FOR OSTEOPOROSIS 3 tablet 3    levothyroxine (SYNTHROID) 100 MCG tablet TAKE 1 TABLET BY MOUTH EVERY DAY 90 tablet 3    methocarbamoL (ROBAXIN) 750 MG Tab Take 1 tablet (750 mg total) by mouth 3 (three) times  daily as needed (muscle spasms). 60 tablet 0    omeprazole (PRILOSEC) 40 MG capsule Take 1 capsule (40 mg total) by mouth every morning. 90 capsule 1    potassium chloride (KLOR-CON) 10 MEQ TbSR The day you take lasix 90 tablet 0    promethazine (PHENERGAN) 25 MG tablet TAKE 1 TABLET(25 MG) BY MOUTH EVERY 6 HOURS AS NEEDED 25 tablet 0    TRUE METRIX GLUCOSE TEST STRIP Strp TO TEST ONCE TO TWICE DAILY 50 strip 11    TRUE METRIX GLUCOSE TEST STRIP Strp TEST BLOOD SUGAR EVERY  strip 3    TRUE METRIX GO GLUCOSE METER Misc USE AS DIRECTED 1 each 0    TRUEPLUS LANCETS 30 gauge Misc USE TO TEST ONCE TO TWICE D      zolpidem (AMBIEN) 5 MG Tab Take 1 tablet (5 mg total) by mouth every evening. 30 tablet 2    [DISCONTINUED] oxyCODONE-acetaminophen (PERCOCET)  mg per tablet Take 1 tablet by mouth every 6 (six) hours as needed for Pain. 60 tablet 0    aspirin (ECOTRIN) 81 MG EC tablet Take 1 tablet (81 mg total) by mouth once daily. (Patient not taking: Reported on 1/3/2022)  0     No current facility-administered medications on file prior to visit.       ALLERGIES:  Review of patient's allergies indicates:   Allergen Reactions    Adhesive      Adhesive tape    Latex, natural rubber      Adhesive from latex tape    Ibuprofen Other (See Comments)     Causes asthma flare??       REVIEW OF SYSTEMS:  Review of Systems   Constitutional: Negative for diaphoresis, fever and weight loss.   Respiratory: Negative for shortness of breath.    Cardiovascular: Negative for chest pain.   Gastrointestinal: Negative for blood in stool.   Genitourinary: Negative for hematuria.   Endo/Heme/Allergies: Does not bruise/bleed easily.   All other systems reviewed and are negative.        OBJECTIVE:    PHYSICAL EXAMINATION:   There were no vitals filed for this visit.    Physical Exam:  Vitals reviewed.    Constitutional: She appears well-developed and well-nourished.     Eyes: Pupils are equal, round, and reactive to light.  Conjunctivae and EOM are normal.     Cardiovascular: Normal distal pulses and no edema.     Abdominal: Soft.     Skin: Skin displays no rash on trunk and no rash on extremities. Skin displays no lesions on trunk and no lesions on extremities.     Psych/Behavior: She is alert. She is oriented to person, place, and time. She has a normal mood and affect.     Musculoskeletal:        Neck: Range of motion is limited.     Neurological:        DTRs: Tricep reflexes are 1+ on the right side and 1+ on the left side. Bicep reflexes are 1+ on the right side and 1+ on the left side. Brachioradialis reflexes are 1+ on the right side and 1+ on the left side. Patellar reflexes are 0 on the right side and 0 on the left side. Achilles reflexes are 0 on the right side and 0 on the left side.       Back Exam     Tenderness   The patient is experiencing tenderness in the lumbar.    Range of Motion   Extension: abnormal   Flexion: normal   Lateral bend right: normal   Lateral bend left: normal   Rotation right: normal   Rotation left: normal     Muscle Strength   Right Quadriceps:  5/5   Left Quadriceps:  5/5   Right Hamstrings:  5/5   Left Hamstrings:  5/5     Tests   Straight leg raise right: negative  Straight leg raise left: negative    Comments:  She is able to stand upright with assistance and can lay on the wall with her shoulder against a wall without bending her hips or knees.  However this demands a lot of effort and causes low back pain            SI joint:   Palpation at the right and left SI joints not painful  ANNA test is negative bilaterally  Gaenslen test is negative bilaterally  Thigh thrust test is negative bilaterally    Neurologic Exam     Mental Status   Oriented to person, place, and time.   Speech: speech is normal   Level of consciousness: alert    Cranial Nerves   Cranial nerves II through XII intact.     CN III, IV, VI   Pupils are equal, round, and reactive to light.  Extraocular motions are normal.      Motor Exam   Muscle bulk: normal  Overall muscle tone: normal    Strength   Right deltoid: 5/5  Left deltoid: 5/5  Right biceps: 5/5  Left biceps: 5/5  Right triceps: 5/5  Left triceps: 5/5  Right wrist flexion: 5/5  Left wrist flexion: 5/5  Right wrist extension: 5/5  Left wrist extension: 5/5  Right interossei: 4/5  Left interossei: 4/5  Right iliopsoas: 5/5  Left iliopsoas: 5/5  Right quadriceps: 5/5  Left quadriceps: 5/5  Right hamstrin/5  Left hamstrin/5  Right anterior tibial: 5/5  Left anterior tibial: 4/5  Right posterior tibial: 5/5  Left posterior tibial: 5/5  Right peroneal: 5/5  Left peroneal: 4/5  Right gastroc: 5/5  Left gastroc: 5/5    Sensory Exam   Light touch normal.   Pinprick normal.     Gait, Coordination, and Reflexes     Gait  Gait: (unsteady, leaning forward)    Coordination   Finger to nose coordination: normal    Reflexes   Right brachioradialis: 1+  Left brachioradialis: 1+  Right biceps: 1+  Left biceps: 1+  Right triceps: 1+  Left triceps: 1+  Right patellar: 0  Left patellar: 0  Right achilles: 0  Left achilles: 0  Right plantar: normal  Left plantar: normal  Right Munoz: absent  Left Munoz: absent  Right ankle clonus: absent  Left ankle clonus: absent        DIAGNOSTIC DATA:  I personally interpreted the following imaging:   Cervical x-ray today shows good positioning of hardware, no lucency around hardware, no subsidence of interbody spacer, good cervical alignment  Recent scoliosis film was showing a new onset of L5-S1 spondylolisthesis, possible new pars fracture at L5-S1      ASSESMENT:  This is a 66 y.o. female with     Problem List Items Addressed This Visit    None     Visit Diagnoses     Cervical myelopathy    -  Primary    Relevant Orders    MOTORIZED SCOOTER FOR HOME USE    Dorsalgia, unspecified        Relevant Orders    CT Lumbar Spine Without Contrast    Impaired gait and mobility        Relevant Orders    MOTORIZED SCOOTER FOR HOME USE    Fusion of spine  of lumbosacral region        Relevant Medications    oxyCODONE-acetaminophen (PERCOCET)  mg per tablet            PLAN:  Will order lumbar spine CT scan to rule out L5 pars fracture to explain the new L5-S1 spondylolisthesis in to evaluate consolidation of the fusion at the L4-5  Ordering motorized scooter for home use due to significant gait and mobility impairment  Refill Percocet  Follow-up in 6 weeks  All questions answered  More than 50% of the time was spent on discussing conservative management treatments (medication, physical therapy exercises) and possible interventions (spinal injections and surgical procedures). Care coordination was discussed.      40 minutes      Ishaan Fisher MD  Cell:[phone]

## 2022-03-18 ENCOUNTER — TELEPHONE (OUTPATIENT)
Dept: FAMILY MEDICINE | Facility: CLINIC | Age: 67
End: 2022-03-18
Payer: MEDICARE

## 2022-03-18 NOTE — TELEPHONE ENCOUNTER
----- Message from Rachelle Smith sent at 3/18/2022 10:34 AM CDT -----  Contact: [phone]/ Self  Type: Requesting to speak with nurse    Who Called: Pt   Regarding: discuss current health problems    Would the patient rather a call back or a response via [facility]? Call back  Best Call Back Number: [phone]  Additional Information: n/a

## 2022-03-21 ENCOUNTER — HOSPITAL ENCOUNTER (OUTPATIENT)
Dept: RADIOLOGY | Facility: HOSPITAL | Age: 67
Discharge: HOME | End: 2022-03-21
Attending: NEUROLOGICAL SURGERY
Payer: MEDICARE

## 2022-03-21 DIAGNOSIS — M54.9 DORSALGIA, UNSPECIFIED: ICD-10-CM

## 2022-03-21 PROCEDURE — 72131 CT LUMBAR SPINE W/O DYE: CPT | Mod: TC,PO

## 2022-03-22 ENCOUNTER — OFFICE VISIT (OUTPATIENT)
Dept: FAMILY MEDICINE | Facility: CLINIC | Age: 67
End: 2022-03-22
Payer: MEDICARE

## 2022-03-22 VITALS
HEIGHT: 60 IN | DIASTOLIC BLOOD PRESSURE: 76 MMHG | BODY MASS INDEX: 38.39 KG/M2 | TEMPERATURE: 99 F | OXYGEN SATURATION: 95 % | SYSTOLIC BLOOD PRESSURE: 120 MMHG | WEIGHT: 195.56 LBS | HEART RATE: 97 BPM

## 2022-03-22 DIAGNOSIS — I25.10 CORONARY ARTERY DISEASE INVOLVING NATIVE CORONARY ARTERY OF NATIVE HEART WITHOUT ANGINA PECTORIS: ICD-10-CM

## 2022-03-22 DIAGNOSIS — Z98.84 HISTORY OF GASTRIC BYPASS: ICD-10-CM

## 2022-03-22 DIAGNOSIS — E03.9 ACQUIRED HYPOTHYROIDISM: ICD-10-CM

## 2022-03-22 DIAGNOSIS — E03.9 HYPOTHYROIDISM (ACQUIRED): ICD-10-CM

## 2022-03-22 DIAGNOSIS — R73.9 HYPERGLYCEMIA: Primary | ICD-10-CM

## 2022-03-22 DIAGNOSIS — D64.9 NORMOCYTIC ANEMIA: ICD-10-CM

## 2022-03-22 DIAGNOSIS — G45.9 TIA (TRANSIENT ISCHEMIC ATTACK): ICD-10-CM

## 2022-03-22 PROCEDURE — 1157F ADVNC CARE PLAN IN RCRD: CPT | Mod: CPTII,S$GLB,, | Performed by: FAMILY MEDICINE

## 2022-03-22 PROCEDURE — 3288F PR FALLS RISK ASSESSMENT DOCUMENTED: ICD-10-PCS | Mod: CPTII,S$GLB,, | Performed by: FAMILY MEDICINE

## 2022-03-22 PROCEDURE — 99499 UNLISTED E&M SERVICE: CPT | Mod: S$GLB,,, | Performed by: FAMILY MEDICINE

## 2022-03-22 PROCEDURE — 99499 RISK ADDL DX/OHS AUDIT: ICD-10-PCS | Mod: S$GLB,,, | Performed by: FAMILY MEDICINE

## 2022-03-22 PROCEDURE — 1125F AMNT PAIN NOTED PAIN PRSNT: CPT | Mod: CPTII,S$GLB,, | Performed by: FAMILY MEDICINE

## 2022-03-22 PROCEDURE — 3008F PR BODY MASS INDEX (BMI) DOCUMENTED: ICD-10-PCS | Mod: CPTII,S$GLB,, | Performed by: FAMILY MEDICINE

## 2022-03-22 PROCEDURE — 1100F PTFALLS ASSESS-DOCD GE2>/YR: CPT | Mod: CPTII,S$GLB,, | Performed by: FAMILY MEDICINE

## 2022-03-22 PROCEDURE — 99214 PR OFFICE/OUTPT VISIT, EST, LEVL IV, 30-39 MIN: ICD-10-PCS | Mod: S$GLB,,, | Performed by: FAMILY MEDICINE

## 2022-03-22 PROCEDURE — 3074F SYST BP LT 130 MM HG: CPT | Mod: CPTII,S$GLB,, | Performed by: FAMILY MEDICINE

## 2022-03-22 PROCEDURE — 3288F FALL RISK ASSESSMENT DOCD: CPT | Mod: CPTII,S$GLB,, | Performed by: FAMILY MEDICINE

## 2022-03-22 PROCEDURE — 1159F MED LIST DOCD IN RCRD: CPT | Mod: CPTII,S$GLB,, | Performed by: FAMILY MEDICINE

## 2022-03-22 PROCEDURE — 3078F PR MOST RECENT DIASTOLIC BLOOD PRESSURE < 80 MM HG: ICD-10-PCS | Mod: CPTII,S$GLB,, | Performed by: FAMILY MEDICINE

## 2022-03-22 PROCEDURE — 3008F BODY MASS INDEX DOCD: CPT | Mod: CPTII,S$GLB,, | Performed by: FAMILY MEDICINE

## 2022-03-22 PROCEDURE — 1100F PR PT FALLS ASSESS DOC 2+ FALLS/FALL W/INJURY/YR: ICD-10-PCS | Mod: CPTII,S$GLB,, | Performed by: FAMILY MEDICINE

## 2022-03-22 PROCEDURE — 1157F PR ADVANCE CARE PLAN OR EQUIV PRESENT IN MEDICAL RECORD: ICD-10-PCS | Mod: CPTII,S$GLB,, | Performed by: FAMILY MEDICINE

## 2022-03-22 PROCEDURE — 1125F PR PAIN SEVERITY QUANTIFIED, PAIN PRESENT: ICD-10-PCS | Mod: CPTII,S$GLB,, | Performed by: FAMILY MEDICINE

## 2022-03-22 PROCEDURE — 1159F PR MEDICATION LIST DOCUMENTED IN MEDICAL RECORD: ICD-10-PCS | Mod: CPTII,S$GLB,, | Performed by: FAMILY MEDICINE

## 2022-03-22 PROCEDURE — 3078F DIAST BP <80 MM HG: CPT | Mod: CPTII,S$GLB,, | Performed by: FAMILY MEDICINE

## 2022-03-22 PROCEDURE — 3074F PR MOST RECENT SYSTOLIC BLOOD PRESSURE < 130 MM HG: ICD-10-PCS | Mod: CPTII,S$GLB,, | Performed by: FAMILY MEDICINE

## 2022-03-22 PROCEDURE — 99214 OFFICE O/P EST MOD 30 MIN: CPT | Mod: S$GLB,,, | Performed by: FAMILY MEDICINE

## 2022-03-22 RX ORDER — GABAPENTIN 100 MG/1
100 CAPSULE ORAL 3 TIMES DAILY
Qty: 90 CAPSULE | Refills: 11 | Status: SHIPPED | OUTPATIENT
Start: 2022-03-22 | End: 2022-09-01

## 2022-03-22 RX ORDER — GABAPENTIN 100 MG/1
CAPSULE ORAL
COMMUNITY
Start: 2022-01-06 | End: 2022-03-22

## 2022-03-22 RX ORDER — VALACYCLOVIR HYDROCHLORIDE 1 G/1
1000 TABLET, FILM COATED ORAL 3 TIMES DAILY
Qty: 21 TABLET | Refills: 0 | Status: SHIPPED | OUTPATIENT
Start: 2022-03-22 | End: 2022-07-08

## 2022-03-22 NOTE — PROGRESS NOTES
Patient ID: Christine Castro is a 66 y.o. female.    Chief Complaint: Arm Pain, Shoulder Pain, red bumps, and Fall    HPI      Christine Castro is a 66 y.o. female place of left arm and shoulder pain.  And there are bumps on the arms.  She had pain there prior to the rupture of the lobes.  Feels like a burning type pain.  No loss of function or use of that left arm.  Asthma-no new problems.  CHF-coronary disease no shortness of breath chest pain or palpitations        Vitals:    03/22/22 1412   BP: 120/76   BP Location: Left arm   Patient Position: Sitting   Pulse: 97   Temp: 99.4 °F (37.4 °C)   TempSrc: Oral   SpO2: 95%   Weight: 88.7 kg (195 lb 8.8 oz)   Height: 5' (1.524 m)            Review of Symptoms      Physical Exam    Constitutional:  Oriented to person, place, and time.appears well-developed and well-nourished.  No distress.      HENT  Head: Normocephalic and atraumatic  Right Ear: External ear normal.   Left Ear: External ear normal.   Nose: External nose normal.   Mouth:  Moist mucus membranes.    Eyes:  Conjunctivae are normal. Right eye exhibits no discharge.  Left eye exhibits no discharge. No scleral icterus.  No periorbital edema    Cardiovascular:  Regular rate and rhythm with normal S1 and S2     Pulmonary/Chest:   Clear to auscultation bilaterally without wheezes, rhonchi or rales      Musculoskeletal:  No edema. No obvious deformity No wasting       Neurological:  Alert and oriented to person, place, and time.   Coordination normal.     Skin:   Skin is warm and dry.  No diaphoresis.   No rash noted.   Erythematous patches with early what appeared to be vesicles in a linear pattern along left arm starting from the humerus or the thumb.    Psychiatric: Normal mood and affect. Behavior is normal.  Judgment and thought content normal.     Complete Blood Count  Lab Results   Component Value Date    RBC 2.54 (L) 12/17/2021    HGB 8.0 (L) 12/17/2021    HCT 24 (L) 12/17/2021     (H) 12/17/2021     MCH 31.5 (H) 12/17/2021    MCHC 31.6 (L) 12/17/2021    RDW 13.7 12/17/2021     (L) 12/17/2021    MPV 9.5 12/17/2021    GRAN 4.4 12/15/2021    GRAN 89.9 (H) 12/15/2021    LYMPH 0.4 (L) 12/15/2021    LYMPH 8.7 (L) 12/15/2021    MONO 0.1 (L) 12/15/2021    MONO 1.2 (L) 12/15/2021    EOS 0.0 12/15/2021    BASO 0.00 12/15/2021    EOSINOPHIL 0.0 12/15/2021    BASOPHIL 0.0 12/15/2021    DIFFMETHOD Automated 12/15/2021       Comprehensive Metabolic Panel  Lab Results   Component Value Date    GLU 89 12/17/2021    BUN 15 12/17/2021    CREATININE 0.8 12/17/2021     12/17/2021    K 4.1 12/17/2021     12/17/2021    PROT 7.2 12/07/2021    ALBUMIN 4.0 12/07/2021    BILITOT 0.4 12/07/2021    AST 21 12/07/2021    ALKPHOS 73 12/07/2021    CO2 23 12/17/2021    ALT 13 12/07/2021    ANIONGAP 7 (L) 12/17/2021    EGFRNONAA >60 12/17/2021    ESTGFRAFRICA >60 12/17/2021       TSH  Lab Results   Component Value Date    TSH 1.469 11/23/2021       Assessment / Plan:      ICD-10-CM ICD-9-CM   1. Hyperglycemia  R73.9 790.29   2. TIA (transient ischemic attack)  G45.9 435.9   3. Hypothyroidism (acquired)  E03.9 244.9   4. Coronary artery disease involving native coronary artery of native heart without angina pectoris  I25.10 414.01   5. Acquired hypothyroidism  E03.9 244.9   6. Normocytic anemia  D64.9 285.9   7. History of gastric bypass  Z98.84 V45.86     Hyperglycemia  -     Comprehensive Metabolic Panel; Future  -     CBC Auto Differential; Future  -     Lipid Panel; Future  -     TSH; Future  -     Hemoglobin A1C; Future  -     T4, Free; Future; Expected date: 03/22/2022    TIA (transient ischemic attack)  -     Comprehensive Metabolic Panel; Future  -     CBC Auto Differential; Future  -     Lipid Panel; Future  -     TSH; Future  -     Hemoglobin A1C; Future  -     T4, Free; Future; Expected date: 03/22/2022    Hypothyroidism (acquired)  -     Comprehensive Metabolic Panel; Future  -     CBC Auto Differential;  Future  -     Lipid Panel; Future  -     TSH; Future  -     Hemoglobin A1C; Future  -     T4, Free; Future; Expected date: 03/22/2022    Coronary artery disease involving native coronary artery of native heart without angina pectoris  -     Comprehensive Metabolic Panel; Future  -     CBC Auto Differential; Future  -     Lipid Panel; Future  -     TSH; Future  -     Hemoglobin A1C; Future  -     T4, Free; Future; Expected date: 03/22/2022    Acquired hypothyroidism  -     Comprehensive Metabolic Panel; Future  -     CBC Auto Differential; Future  -     Lipid Panel; Future  -     TSH; Future  -     Hemoglobin A1C; Future  -     T4, Free; Future; Expected date: 03/22/2022    Normocytic anemia  -     Comprehensive Metabolic Panel; Future  -     CBC Auto Differential; Future  -     Lipid Panel; Future  -     TSH; Future  -     Hemoglobin A1C; Future  -     T4, Free; Future; Expected date: 03/22/2022    History of gastric bypass  -     Comprehensive Metabolic Panel; Future  -     CBC Auto Differential; Future  -     Lipid Panel; Future  -     TSH; Future  -     Hemoglobin A1C; Future  -     T4, Free; Future; Expected date: 03/22/2022    Other orders  -     gabapentin (NEURONTIN) 100 MG capsule; Take 1 capsule (100 mg total) by mouth 3 (three) times daily.  Dispense: 90 capsule; Refill: 11  -     valACYclovir (VALTREX) 1000 MG tablet; Take 1 tablet (1,000 mg total) by mouth 3 (three) times daily.  Dispense: 21 tablet; Refill: 0      Ordered lab work for follow-up follow with me in one month shingles-discussed not going [facility] and chemotherapy mucoid otherwise those have not had chicken pox.

## 2022-03-23 ENCOUNTER — PATIENT OUTREACH (OUTPATIENT)
Dept: ADMINISTRATIVE | Facility: OTHER | Age: 67
End: 2022-03-23

## 2022-03-23 NOTE — PROGRESS NOTES
[facility] to [facility] to search and facilitate stable housing; pt requested rep call back at 3pm to speak; Rep could not reach pt at 3pm, she left a message and will f/u with her at another time.

## 2022-03-23 NOTE — PROGRESS NOTES
CHW - Initial Contact    This Community Health Worker completed the Social Determinant of Health questionnaire with MRN [id number] over the phone today.    Pt identified barriers of most importance are: Housing   Referrals to community agencies completed with patient/caregiver consent outside of [facility] include: None   Referrals were put through [facility] - Yes   Support and Services: None  Other information discussed the patient needs / wants help with: Pt's wants assistance finding better housing. Pt's current living situation is not suitable for a person her age and with her health conditions.   Follow up required: Yes  Follow up Scheduled: 04/01/22

## 2022-04-04 ENCOUNTER — TELEPHONE (OUTPATIENT)
Dept: ORTHOPEDICS | Facility: CLINIC | Age: 67
End: 2022-04-04
Payer: MEDICARE

## 2022-04-04 DIAGNOSIS — M17.0 PRIMARY OSTEOARTHRITIS OF BOTH KNEES: Primary | ICD-10-CM

## 2022-04-04 DIAGNOSIS — M43.27 FUSION OF SPINE OF LUMBOSACRAL REGION: ICD-10-CM

## 2022-04-04 RX ORDER — PROMETHAZINE HYDROCHLORIDE 25 MG/1
TABLET ORAL
Qty: 25 TABLET | Refills: 0 | Status: SHIPPED | OUTPATIENT
Start: 2022-04-04 | End: 2022-05-27

## 2022-04-04 NOTE — TELEPHONE ENCOUNTER
Care Due:                  Date            Visit Type   Department     Provider  --------------------------------------------------------------------------------                                EP -                              PRIMARY      [facility] FAMILY  Last Visit: 03-      CARE ([facility])   MEDICINE       Alon Santiago                               -                              PRIMARY      [facility] FAM[facility]  Next Visit: 04-      CARE ([facility])   [facility]       Alon Santiago                                                            Last  Test          Frequency    Reason                     Performed    Due Date  --------------------------------------------------------------------------------    HBA1C.......  6 months...  glipiZIDE................  11- 05-    Powered by [employer]. Reference number: [id number].   4/04/2022 3:56:15 PM CDT

## 2022-04-04 NOTE — TELEPHONE ENCOUNTER
----- Message from Kat Fischer sent at 4/4/2022  4:14 PM CDT -----  Type:  RX Refill Request    Who Called: pt  Refill or New Rx:refill  RX Name and Strength:oxyCODONE-acetaminophen (PERCOCET)  mg per tablet  How is the patient currently taking it? (ex. 1XDay):Take 1 tablet by mouth every 6 (six) hours as needed for Pain  Is this a 30 day or 90 day RX:60  Preferred Pharmacy with phone number:[id number] - [place] [id number]  [address] [facility]   Phone: [phone]  Fax:  [phone]        Local or Mail Order:local  Ordering Provider:Dr Fisher  Would the patient rather a call back or a response via [facility]? call  Best Call Back Number:[phone]  Additional Information: pt is currently out of this medication and has no refills listed pt has a appt to [facility] on 05/09/22 pt needs a 15 day supply

## 2022-04-04 NOTE — TELEPHONE ENCOUNTER
----- Message from Kat Fischer sent at 4/4/2022  4:20 PM CDT -----  Type:  Needs Medical Advice    Who Called: pt  Symptoms (please be specific): pt have questions about a knee surgery she had 2 yrs ago      Would the patient rather a call back or a response via [facility]? call  Best Call Back Number: [phone]  Additional Information:

## 2022-04-05 RX ORDER — OXYCODONE AND ACETAMINOPHEN 10; 325 MG/1; MG/1
1 TABLET ORAL EVERY 6 HOURS PRN
Qty: 60 TABLET | Refills: 0 | Status: SHIPPED | OUTPATIENT
Start: 2022-04-05 | End: 2022-04-21 | Stop reason: SDUPTHER

## 2022-04-08 NOTE — PROGRESS NOTES
[facility] closed pt's case (04/07/22) due to being unable to reach her to make arrangements to assist with housing stability.

## 2022-04-13 NOTE — PROGRESS NOTES
CHW - Follow Up    This Community Health Worker completed a follow up visit with [facility]OR [id number] over the phone today.  Pt/Caregiver reported: Patient states her living situation is unchanged and would still like assistance with moving.  Community Health Worker provided: Submitted a new referral request on patients behalf through [employer]  to assist with finding resources to help patient move   Follow up required: Yes  Follow-up Outreach - Due: 4/22/2022

## 2022-04-18 NOTE — PROGRESS NOTES
[facility] and provide resources via text. Patient stated she requested a scooter to assist her with getting around easier and was instructed to follow up with her provider if unable to secure scooter represenitive will assist.

## 2022-04-20 ENCOUNTER — OFFICE VISIT (OUTPATIENT)
Dept: FAMILY MEDICINE | Facility: CLINIC | Age: 67
End: 2022-04-20
Payer: MEDICARE

## 2022-04-20 VITALS
DIASTOLIC BLOOD PRESSURE: 62 MMHG | HEIGHT: 60 IN | TEMPERATURE: 99 F | BODY MASS INDEX: 38.2 KG/M2 | HEART RATE: 72 BPM | WEIGHT: 194.56 LBS | OXYGEN SATURATION: 98 % | SYSTOLIC BLOOD PRESSURE: 86 MMHG

## 2022-04-20 DIAGNOSIS — Z98.84 HISTORY OF GASTRIC BYPASS: Chronic | ICD-10-CM

## 2022-04-20 DIAGNOSIS — Z86.79 HISTORY OF CONGESTIVE HEART FAILURE: ICD-10-CM

## 2022-04-20 DIAGNOSIS — F32.9 MAJOR DEPRESSIVE DISORDER, REMISSION STATUS UNSPECIFIED, UNSPECIFIED WHETHER RECURRENT: ICD-10-CM

## 2022-04-20 DIAGNOSIS — Z96.651 HISTORY OF RIGHT KNEE JOINT REPLACEMENT: ICD-10-CM

## 2022-04-20 DIAGNOSIS — M54.16 LUMBAR RADICULOPATHY: ICD-10-CM

## 2022-04-20 DIAGNOSIS — Z12.31 ENCOUNTER FOR SCREENING MAMMOGRAM FOR BREAST CANCER: ICD-10-CM

## 2022-04-20 DIAGNOSIS — R10.13 EPIGASTRIC PAIN: ICD-10-CM

## 2022-04-20 DIAGNOSIS — K21.9 GASTROESOPHAGEAL REFLUX DISEASE WITHOUT ESOPHAGITIS: Primary | ICD-10-CM

## 2022-04-20 DIAGNOSIS — G89.29 OTHER CHRONIC PAIN: ICD-10-CM

## 2022-04-20 PROCEDURE — 1157F ADVNC CARE PLAN IN RCRD: CPT | Mod: CPTII,S$GLB,, | Performed by: FAMILY MEDICINE

## 2022-04-20 PROCEDURE — 99499 UNLISTED E&M SERVICE: CPT | Mod: S$GLB,,, | Performed by: FAMILY MEDICINE

## 2022-04-20 PROCEDURE — 1157F PR ADVANCE CARE PLAN OR EQUIV PRESENT IN MEDICAL RECORD: ICD-10-PCS | Mod: CPTII,S$GLB,, | Performed by: FAMILY MEDICINE

## 2022-04-20 PROCEDURE — 99214 OFFICE O/P EST MOD 30 MIN: CPT | Mod: S$GLB,,, | Performed by: FAMILY MEDICINE

## 2022-04-20 PROCEDURE — 3288F PR FALLS RISK ASSESSMENT DOCUMENTED: ICD-10-PCS | Mod: CPTII,S$GLB,, | Performed by: FAMILY MEDICINE

## 2022-04-20 PROCEDURE — 3078F PR MOST RECENT DIASTOLIC BLOOD PRESSURE < 80 MM HG: ICD-10-PCS | Mod: CPTII,S$GLB,, | Performed by: FAMILY MEDICINE

## 2022-04-20 PROCEDURE — 3008F PR BODY MASS INDEX (BMI) DOCUMENTED: ICD-10-PCS | Mod: CPTII,S$GLB,, | Performed by: FAMILY MEDICINE

## 2022-04-20 PROCEDURE — 99214 PR OFFICE/OUTPT VISIT, EST, LEVL IV, 30-39 MIN: ICD-10-PCS | Mod: S$GLB,,, | Performed by: FAMILY MEDICINE

## 2022-04-20 PROCEDURE — 3288F FALL RISK ASSESSMENT DOCD: CPT | Mod: CPTII,S$GLB,, | Performed by: FAMILY MEDICINE

## 2022-04-20 PROCEDURE — 3074F PR MOST RECENT SYSTOLIC BLOOD PRESSURE < 130 MM HG: ICD-10-PCS | Mod: CPTII,S$GLB,, | Performed by: FAMILY MEDICINE

## 2022-04-20 PROCEDURE — 3044F PR MOST RECENT HEMOGLOBIN A1C LEVEL <7.0%: ICD-10-PCS | Mod: CPTII,S$GLB,, | Performed by: FAMILY MEDICINE

## 2022-04-20 PROCEDURE — 1159F PR MEDICATION LIST DOCUMENTED IN MEDICAL RECORD: ICD-10-PCS | Mod: CPTII,S$GLB,, | Performed by: FAMILY MEDICINE

## 2022-04-20 PROCEDURE — 1159F MED LIST DOCD IN RCRD: CPT | Mod: CPTII,S$GLB,, | Performed by: FAMILY MEDICINE

## 2022-04-20 PROCEDURE — 3078F DIAST BP <80 MM HG: CPT | Mod: CPTII,S$GLB,, | Performed by: FAMILY MEDICINE

## 2022-04-20 PROCEDURE — 1101F PT FALLS ASSESS-DOCD LE1/YR: CPT | Mod: CPTII,S$GLB,, | Performed by: FAMILY MEDICINE

## 2022-04-20 PROCEDURE — 1125F PR PAIN SEVERITY QUANTIFIED, PAIN PRESENT: ICD-10-PCS | Mod: CPTII,S$GLB,, | Performed by: FAMILY MEDICINE

## 2022-04-20 PROCEDURE — 3074F SYST BP LT 130 MM HG: CPT | Mod: CPTII,S$GLB,, | Performed by: FAMILY MEDICINE

## 2022-04-20 PROCEDURE — 1101F PR PT FALLS ASSESS DOC 0-1 FALLS W/OUT INJ PAST YR: ICD-10-PCS | Mod: CPTII,S$GLB,, | Performed by: FAMILY MEDICINE

## 2022-04-20 PROCEDURE — 99499 RISK ADDL DX/OHS AUDIT: ICD-10-PCS | Mod: S$GLB,,, | Performed by: FAMILY MEDICINE

## 2022-04-20 PROCEDURE — 3044F HG A1C LEVEL LT 7.0%: CPT | Mod: CPTII,S$GLB,, | Performed by: FAMILY MEDICINE

## 2022-04-20 PROCEDURE — 3008F BODY MASS INDEX DOCD: CPT | Mod: CPTII,S$GLB,, | Performed by: FAMILY MEDICINE

## 2022-04-20 PROCEDURE — 1125F AMNT PAIN NOTED PAIN PRSNT: CPT | Mod: CPTII,S$GLB,, | Performed by: FAMILY MEDICINE

## 2022-04-20 RX ORDER — OMEPRAZOLE 40 MG/1
40 CAPSULE, DELAYED RELEASE ORAL EVERY MORNING
Qty: 90 CAPSULE | Refills: 1 | Status: SHIPPED | OUTPATIENT
Start: 2022-04-20 | End: 2022-09-01

## 2022-04-20 RX ORDER — BUPROPION HYDROCHLORIDE 150 MG/1
150 TABLET ORAL DAILY
Qty: 30 TABLET | Refills: 11 | Status: SHIPPED | OUTPATIENT
Start: 2022-04-20 | End: 2022-07-08 | Stop reason: SDUPTHER

## 2022-04-20 RX ORDER — DIAZEPAM 5 MG/1
TABLET ORAL
Qty: 30 TABLET | Refills: 1 | Status: SHIPPED | OUTPATIENT
Start: 2022-04-20 | End: 2022-07-08 | Stop reason: SDUPTHER

## 2022-04-20 NOTE — PROGRESS NOTES
Patient ID: Christine Castro is a 67 y.o. female.    Chief Complaint: Follow-up, Shoulder Pain, and Generalized Body Aches    HPI      Christine Castro is a 67 y.o. female he complains of multiple physical planes including shoulder pain and generalized body ache.  Discussed previous orthopedic surgeries low blood pressure which is asymptomatic.  Diabetes which is well controlled.  Reflux-controlled on Prilosec.  Anxiety-stable on current medication however still needs to use Valium.  Would like to increase the Valium because of not getting enough relief.  Multiple stresses in life.  Also complains of insomnia.    Vitals:    04/20/22 1450 04/20/22 1516 04/20/22 1517   BP: (!) 80/58 (!) 88/60 (!) 86/62   BP Location: Right arm Right arm Left arm   Patient Position: Sitting     Pulse: 72     Temp: 98.5 °F (36.9 °C)     TempSrc: Oral     SpO2: 98%     Weight: 88.3 kg (194 lb 8.9 oz)     Height: 5' (1.524 m)              Review of Symptoms      Physical Exam    Constitutional:  Oriented to person, place, and time.appears well-developed and well-nourished.  No distress.      HENT  Head: Normocephalic and atraumatic  Right Ear: External ear normal.   Left Ear: External ear normal.   Nose: External nose normal.   Mouth:  Moist mucus membranes.    Eyes:  Conjunctivae are normal. Right eye exhibits no discharge.  Left eye exhibits no discharge. No scleral icterus.  No periorbital edema    Cardiovascular:  Regular rate and rhythm with normal S1 and S2     Pulmonary/Chest:   Clear to auscultation bilaterally without wheezes, rhonchi or rales      Musculoskeletal:  No edema. No obvious deformity No wasting       Neurological:  Alert and oriented to person, place, and time.   Coordination normal.     Skin:   Skin is warm and dry.  No diaphoresis.   No rash noted.     Psychiatric: Normal mood and affect. Behavior is normal.  Judgment and thought content normal.     Complete Blood Count  Lab Results   Component Value Date    RBC  3.38 (L) 04/21/2022    HGB 10.0 (L) 04/21/2022    HCT 31.7 (L) 04/21/2022    MCV 94 04/21/2022    MCH 29.6 04/21/2022    MCHC 31.5 (L) 04/21/2022    RDW 15.1 (H) 04/21/2022     04/21/2022    MPV 10.6 04/21/2022    GRAN 3.1 04/21/2022    GRAN 63.0 04/21/2022    LYMPH 1.3 04/21/2022    LYMPH 27.0 04/21/2022    MONO 0.2 (L) 04/21/2022    MONO 3.9 (L) 04/21/2022    EOS 0.3 04/21/2022    BASO 0.03 04/21/2022    EOSINOPHIL 5.3 04/21/2022    BASOPHIL 0.6 04/21/2022    DIFFMETHOD Automated 04/21/2022       Comprehensive Metabolic Panel  Lab Results   Component Value Date     (H) 04/21/2022    BUN 14 04/21/2022    CREATININE 0.69 04/21/2022     04/21/2022    K 3.4 (L) 04/21/2022     04/21/2022    PROT 6.9 04/21/2022    ALBUMIN 3.8 04/21/2022    BILITOT 0.2 04/21/2022    AST 25 04/21/2022    ALKPHOS 72 04/21/2022    CO2 21 (L) 04/21/2022    ALT 19 04/21/2022    ANIONGAP 12 04/21/2022    EGFRNONAA >60.0 04/21/2022    ESTGFRAFRICA >60.0 04/21/2022       TSH  Lab Results   Component Value Date    TSH 1.250 04/21/2022       Assessment / Plan:      ICD-10-CM ICD-9-CM   1. Gastroesophageal reflux disease without esophagitis  K21.9 530.81   2. Encounter for screening mammogram for breast cancer  Z12.31 V76.12   3. Other chronic pain  G89.29 338.29   4. Epigastric pain  R10.13 789.06   5. History of gastric bypass  Z98.84 V45.86   6. History of congestive heart failure  Z86.79 V12.59   7. History of right knee joint replacement  Z96.651 V43.65   8. Lumbar radiculopathy  M54.16 724.4   9. Major depressive disorder, remission status unspecified, unspecified whether recurrent  F32.9 296.20     Gastroesophageal reflux disease without esophagitis  -     omeprazole (PRILOSEC) 40 MG capsule; Take 1 capsule (40 mg total) by mouth every morning.  Dispense: 90 capsule; Refill: 1    Encounter for screening mammogram for breast cancer  -     Mammo Digital Screening Bilat w/ [facility]; Future; Expected date:  04/20/2022    Other chronic pain    Epigastric pain    History of gastric bypass    History of congestive heart failure    History of right knee joint replacement    Lumbar radiculopathy    Major depressive disorder, remission status unspecified, unspecified whether recurrent    Other orders  -     buPROPion (WELLBUTRIN XL) 150 MG TB24 tablet; Take 1 tablet (150 mg total) by mouth once daily.  Dispense: 30 tablet; Refill: 11  -     diazePAM (VALIUM) 5 MG tablet; TAKE1  BY MOUTH EVERY 24 HOURS AS NEEDED FOR ANXIETY  Dispense: 30 tablet; Refill: 1      Do not take lasix   NOT taking now    Not taking any bp meds  Dm not taking meds either.    Mood stable     Reduce salt and fluids    Discussed weight loss with reduction calories including reduction amount of soft soft drinks two drinks.

## 2022-04-21 ENCOUNTER — LAB VISIT (OUTPATIENT)
Dept: LAB | Facility: HOSPITAL | Age: 67
End: 2022-04-21
Attending: FAMILY MEDICINE
Payer: MEDICARE

## 2022-04-21 DIAGNOSIS — M43.27 FUSION OF SPINE OF LUMBOSACRAL REGION: ICD-10-CM

## 2022-04-21 DIAGNOSIS — Z98.84 HISTORY OF GASTRIC BYPASS: ICD-10-CM

## 2022-04-21 DIAGNOSIS — I25.10 CORONARY ARTERY DISEASE INVOLVING NATIVE CORONARY ARTERY OF NATIVE HEART WITHOUT ANGINA PECTORIS: ICD-10-CM

## 2022-04-21 DIAGNOSIS — D64.9 NORMOCYTIC ANEMIA: ICD-10-CM

## 2022-04-21 DIAGNOSIS — R73.9 HYPERGLYCEMIA: ICD-10-CM

## 2022-04-21 DIAGNOSIS — E03.9 HYPOTHYROIDISM (ACQUIRED): ICD-10-CM

## 2022-04-21 DIAGNOSIS — G45.9 TIA (TRANSIENT ISCHEMIC ATTACK): ICD-10-CM

## 2022-04-21 DIAGNOSIS — E03.9 ACQUIRED HYPOTHYROIDISM: ICD-10-CM

## 2022-04-21 LAB
ALBUMIN SERPL BCP-MCNC: 3.8 G/DL (ref 3.5–5.2)
ALP SERPL-CCNC: 72 U/L (ref 38–126)
ALT SERPL W/O P-5'-P-CCNC: 19 U/L (ref 10–44)
ANION GAP SERPL CALC-SCNC: 12 MMOL/L (ref 8–16)
AST SERPL-CCNC: 25 U/L (ref 15–46)
BASOPHILS # BLD AUTO: 0.03 K/UL (ref 0–0.2)
BASOPHILS NFR BLD: 0.6 % (ref 0–1.9)
BILIRUB SERPL-MCNC: 0.2 MG/DL (ref 0.1–1)
CALCIUM SERPL-MCNC: 8.4 MG/DL (ref 8.7–10.5)
CHLORIDE SERPL-SCNC: 109 MMOL/L (ref 95–110)
CHOLEST SERPL-MCNC: 157 MG/DL (ref 120–199)
CHOLEST/HDLC SERPL: 3.7 {RATIO} (ref 2–5)
CO2 SERPL-SCNC: 21 MMOL/L (ref 23–29)
CREAT SERPL-MCNC: 0.69 MG/DL (ref 0.5–1.4)
DIFFERENTIAL METHOD: ABNORMAL
EOSINOPHIL # BLD AUTO: 0.3 K/UL (ref 0–0.5)
EOSINOPHIL NFR BLD: 5.3 % (ref 0–8)
ERYTHROCYTE [DISTWIDTH] IN BLOOD BY AUTOMATED COUNT: 15.1 % (ref 11.5–14.5)
EST. GFR  (AFRICAN AMERICAN): >60 ML/MIN/1.73 M^2
EST. GFR  (NON AFRICAN AMERICAN): >60 ML/MIN/1.73 M^2
ESTIMATED AVG GLUCOSE: 105 MG/DL (ref 68–131)
GLUCOSE SERPL-MCNC: 166 MG/DL (ref 70–110)
HBA1C MFR BLD: 5.3 % (ref 4–5.6)
HCT VFR BLD AUTO: 31.7 % (ref 37–48.5)
HDLC SERPL-MCNC: 42 MG/DL (ref 40–75)
HDLC SERPL: 26.8 % (ref 20–50)
HGB BLD-MCNC: 10 G/DL (ref 12–16)
IMM GRANULOCYTES # BLD AUTO: 0.01 K/UL (ref 0–0.04)
IMM GRANULOCYTES NFR BLD AUTO: 0.2 % (ref 0–0.5)
LDLC SERPL CALC-MCNC: 102.2 MG/DL (ref 63–159)
LYMPHOCYTES # BLD AUTO: 1.3 K/UL (ref 1–4.8)
LYMPHOCYTES NFR BLD: 27 % (ref 18–48)
MCH RBC QN AUTO: 29.6 PG (ref 27–31)
MCHC RBC AUTO-ENTMCNC: 31.5 G/DL (ref 32–36)
MCV RBC AUTO: 94 FL (ref 82–98)
MONOCYTES # BLD AUTO: 0.2 K/UL (ref 0.3–1)
MONOCYTES NFR BLD: 3.9 % (ref 4–15)
NEUTROPHILS # BLD AUTO: 3.1 K/UL (ref 1.8–7.7)
NEUTROPHILS NFR BLD: 63 % (ref 38–73)
NONHDLC SERPL-MCNC: 115 MG/DL
NRBC BLD-RTO: 0 /100 WBC
PLATELET # BLD AUTO: 197 K/UL (ref 150–450)
PMV BLD AUTO: 10.6 FL (ref 9.2–12.9)
POTASSIUM SERPL-SCNC: 3.4 MMOL/L (ref 3.5–5.1)
PROT SERPL-MCNC: 6.9 G/DL (ref 6–8.4)
RBC # BLD AUTO: 3.38 M/UL (ref 4–5.4)
SODIUM SERPL-SCNC: 142 MMOL/L (ref 136–145)
T4 FREE SERPL-MCNC: 0.87 NG/DL (ref 0.71–1.51)
TRIGL SERPL-MCNC: 64 MG/DL (ref 30–150)
TSH SERPL DL<=0.005 MIU/L-ACNC: 1.25 UIU/ML (ref 0.4–4)
UUN UR-MCNC: 14 MG/DL (ref 7–17)
WBC # BLD AUTO: 4.89 K/UL (ref 3.9–12.7)

## 2022-04-21 PROCEDURE — 80053 COMPREHEN METABOLIC PANEL: CPT | Mod: PO | Performed by: FAMILY MEDICINE

## 2022-04-21 PROCEDURE — 85025 COMPLETE CBC W/AUTO DIFF WBC: CPT | Mod: PO | Performed by: FAMILY MEDICINE

## 2022-04-21 PROCEDURE — 83036 HEMOGLOBIN GLYCOSYLATED A1C: CPT | Performed by: FAMILY MEDICINE

## 2022-04-21 PROCEDURE — 84443 ASSAY THYROID STIM HORMONE: CPT | Mod: PO | Performed by: FAMILY MEDICINE

## 2022-04-21 PROCEDURE — 80061 LIPID PANEL: CPT | Performed by: FAMILY MEDICINE

## 2022-04-21 PROCEDURE — 36415 COLL VENOUS BLD VENIPUNCTURE: CPT | Mod: PO | Performed by: FAMILY MEDICINE

## 2022-04-21 PROCEDURE — 84439 ASSAY OF FREE THYROXINE: CPT | Performed by: FAMILY MEDICINE

## 2022-04-21 RX ORDER — OXYCODONE AND ACETAMINOPHEN 10; 325 MG/1; MG/1
1 TABLET ORAL EVERY 6 HOURS PRN
Qty: 60 TABLET | Refills: 0 | Status: SHIPPED | OUTPATIENT
Start: 2022-04-21 | End: 2022-05-04 | Stop reason: SDUPTHER

## 2022-04-21 NOTE — PROGRESS NOTES
Patient request:     - Home Health Services   - Electric Scooter         (Both patient requests were sent to Dr. Santiago via in basket to submit to patients insurance company for further approval.)

## 2022-04-21 NOTE — TELEPHONE ENCOUNTER
----- Message from Michelle Davis sent at 4/21/2022  3:57 PM CDT -----  Contact: patient/  [phone]  Refill request for oxyCODONE-acetaminophen (PERCOCET)  mg per tablet  Pharmacy: [facility] #[id number] [address] AT [facility]

## 2022-04-23 ENCOUNTER — TELEPHONE (OUTPATIENT)
Dept: FAMILY MEDICINE | Facility: CLINIC | Age: 67
End: 2022-04-23
Payer: MEDICARE

## 2022-04-24 PROBLEM — J69.0 ASPIRATION PNEUMONIA OF RIGHT LOWER LOBE: Status: RESOLVED | Noted: 2021-12-17 | Resolved: 2022-04-24

## 2022-04-24 NOTE — TELEPHONE ENCOUNTER
Overall your lab work is good-you have slight anemia however it is improving from four months ago.

## 2022-04-25 NOTE — TELEPHONE ENCOUNTER
Spoke with and informed her of labs results pt was still concerned about her bp reading form her visit and wanted to have her bp checked placed pt on nurse schedule tomorrow for bp check

## 2022-04-25 NOTE — TELEPHONE ENCOUNTER
If she does not have a blood pressure cuff to monitor at home then she can come in to have blood pressure taken.

## 2022-04-28 NOTE — PROGRESS NOTES
CHW - Case Closure    This Community Health Worker spoke to BEVERLY Bowling over the phone today.   Pt/Caregiver reported: Patient has received contact information from [facility] regarding resources patient requested assistance with  Pt/Caregiver denied any additional needs at this time and agrees with episode closure at this time.  Provided BEVERLY Bowling with Community Health Worker's contact information and encouraged him/her to contact this Community Health Worker if additional needs arise.

## 2022-05-02 ENCOUNTER — TELEPHONE (OUTPATIENT)
Dept: FAMILY MEDICINE | Facility: CLINIC | Age: 67
End: 2022-05-02

## 2022-05-02 DIAGNOSIS — R29.898 WEAKNESS OF BOTH LEGS: Primary | ICD-10-CM

## 2022-05-02 NOTE — TELEPHONE ENCOUNTER
----- Message from Bear Zavaleta sent at 5/2/2022  3:27 PM CDT -----  Contact: pt  Type:  Patient Returning Call    Who Called:Pt   Would the patient rather a call back or a response via [facility]? Call   Best Call Back Number: [phone]  Additional Information:     Requesting orders for motorized scooter

## 2022-05-04 DIAGNOSIS — M43.27 FUSION OF SPINE OF LUMBOSACRAL REGION: ICD-10-CM

## 2022-05-04 NOTE — TELEPHONE ENCOUNTER
----- Message from Angie Sommer sent at 5/4/2022 10:30 AM CDT -----  Contact: pt  Type:  RX Refill Request    Who Called:  pt  Refill or New Rx: refill   RX Name and Strength: oxyCODONE-acetaminophen (PERCOCET)  mg per tablet  How is the patient currently taking it? (ex. 1XDay): Take 1 tablet by mouth every 6 (six) hours as needed for Pain.   Is this a 30 day or 90 day RX: 60  Preferred Pharmacy with phone number: [id number] [address] [facility]   Phone: [phone]  Fax:  [phone]        Local or Mail Order: local   Ordering Provider: Dr. Fisher  Would the patient rather a call back or a response via [facility]?  Call   Best Call Back Number: [phone]  Additional Information:

## 2022-05-05 RX ORDER — OXYCODONE AND ACETAMINOPHEN 10; 325 MG/1; MG/1
1 TABLET ORAL EVERY 6 HOURS PRN
Qty: 60 TABLET | Refills: 0 | Status: SHIPPED | OUTPATIENT
Start: 2022-05-05 | End: 2022-05-16 | Stop reason: SDUPTHER

## 2022-05-06 ENCOUNTER — TELEPHONE (OUTPATIENT)
Dept: NEUROSURGERY | Facility: CLINIC | Age: 67
End: 2022-05-06
Payer: MEDICARE

## 2022-05-06 ENCOUNTER — PATIENT OUTREACH (OUTPATIENT)
Dept: ADMINISTRATIVE | Facility: OTHER | Age: 67
End: 2022-05-06
Payer: MEDICARE

## 2022-05-06 DIAGNOSIS — Z98.1 S/P CERVICAL SPINAL FUSION: Primary | ICD-10-CM

## 2022-05-06 NOTE — PROGRESS NOTES
Health Maintenance Due   Topic Date Due    Sign Pain Contract  Never done    Complete Opioid Risk Tool  Never done    Urine Drug Screen  Never done    Foot Exam  07/22/2020    COVID-19 Vaccine (3 - Booster for Moderna series) 10/05/2021     Updates were requested from care everywhere.  Chart was reviewed for overdue [facility] Encounters (TIA) topics (CRS, Breast Cancer Screening, Eye exam)  Health Maintenance has been updated.  LINKS immunization registry triggered.  Immunizations were reconciled.

## 2022-05-16 ENCOUNTER — TELEPHONE (OUTPATIENT)
Dept: NEUROSURGERY | Facility: CLINIC | Age: 67
End: 2022-05-16
Payer: MEDICARE

## 2022-05-16 DIAGNOSIS — M43.27 FUSION OF SPINE OF LUMBOSACRAL REGION: ICD-10-CM

## 2022-05-16 RX ORDER — OXYCODONE AND ACETAMINOPHEN 10; 325 MG/1; MG/1
1 TABLET ORAL EVERY 6 HOURS PRN
Qty: 60 TABLET | Refills: 0 | Status: SHIPPED | OUTPATIENT
Start: 2022-05-16 | End: 2022-06-02 | Stop reason: SDUPTHER

## 2022-05-16 NOTE — TELEPHONE ENCOUNTER
----- Message from Mayda Santana sent at 5/16/2022  9:53 AM CDT -----  Type:  Sooner Apoointment Request    Caller is requesting a sooner appointment.  Caller declined first available appointment listed below.  Caller will not accept being placed on the waitlist and is requesting a message be sent to doctor.  Name of Caller: Pt   When is the first available appointment? 08/29   Symptoms: (C) 4mth ACDF after CT  Would the patient rather a call back or a response via [facility]?  Yes   Best Call Back Number: [phone]  Additional Information:  pt called to check on her appointment with Dr Fisher... advise pt she missed her appt... pt wants to reschedule..

## 2022-05-23 ENCOUNTER — TELEPHONE (OUTPATIENT)
Dept: NEUROSURGERY | Facility: CLINIC | Age: 67
End: 2022-05-23
Payer: MEDICARE

## 2022-05-23 NOTE — TELEPHONE ENCOUNTER
----- Message from Kaye Flood sent at 5/23/2022  2:47 PM CDT -----  Needs advice from nurse:      Who Called:pt  Regarding: patient states she has been falling a lot with the walker and needs to be seen before July 8th/next available  Would the patient rather a call back or VIA [facility]?  Best Call Back number:[phone]  Additional Info:

## 2022-05-26 ENCOUNTER — HOSPITAL ENCOUNTER (OUTPATIENT)
Dept: RADIOLOGY | Facility: HOSPITAL | Age: 67
Discharge: HOME | End: 2022-05-26
Attending: NEUROLOGICAL SURGERY
Payer: MEDICARE

## 2022-05-26 ENCOUNTER — OFFICE VISIT (OUTPATIENT)
Dept: NEUROSURGERY | Facility: CLINIC | Age: 67
End: 2022-05-26
Payer: MEDICARE

## 2022-05-26 ENCOUNTER — TELEPHONE (OUTPATIENT)
Dept: NEUROSURGERY | Facility: CLINIC | Age: 67
End: 2022-05-26
Payer: MEDICARE

## 2022-05-26 VITALS
DIASTOLIC BLOOD PRESSURE: 79 MMHG | SYSTOLIC BLOOD PRESSURE: 120 MMHG | BODY MASS INDEX: 38.09 KG/M2 | HEART RATE: 78 BPM | HEIGHT: 60 IN | WEIGHT: 194 LBS

## 2022-05-26 DIAGNOSIS — M54.17 LUMBOSACRAL RADICULOPATHY AT L5: ICD-10-CM

## 2022-05-26 DIAGNOSIS — M43.17 SPONDYLOLISTHESIS, LUMBOSACRAL REGION: Primary | ICD-10-CM

## 2022-05-26 DIAGNOSIS — Z98.1 STATUS POST CERVICAL SPINAL FUSION: ICD-10-CM

## 2022-05-26 DIAGNOSIS — Z98.1 S/P CERVICAL SPINAL FUSION: Primary | ICD-10-CM

## 2022-05-26 DIAGNOSIS — Z98.1 STATUS POST LUMBAR SPINAL FUSION: ICD-10-CM

## 2022-05-26 DIAGNOSIS — Z98.1 S/P CERVICAL SPINAL FUSION: ICD-10-CM

## 2022-05-26 DIAGNOSIS — M48.07 SPINAL STENOSIS, LUMBOSACRAL REGION: ICD-10-CM

## 2022-05-26 DIAGNOSIS — M48.07 FORAMINAL STENOSIS OF LUMBOSACRAL REGION: ICD-10-CM

## 2022-05-26 PROCEDURE — 99214 OFFICE O/P EST MOD 30 MIN: CPT | Mod: S$GLB,,, | Performed by: NEUROLOGICAL SURGERY

## 2022-05-26 PROCEDURE — 1125F AMNT PAIN NOTED PAIN PRSNT: CPT | Mod: CPTII,S$GLB,, | Performed by: NEUROLOGICAL SURGERY

## 2022-05-26 PROCEDURE — 3008F BODY MASS INDEX DOCD: CPT | Mod: CPTII,S$GLB,, | Performed by: NEUROLOGICAL SURGERY

## 2022-05-26 PROCEDURE — 3074F SYST BP LT 130 MM HG: CPT | Mod: CPTII,S$GLB,, | Performed by: NEUROLOGICAL SURGERY

## 2022-05-26 PROCEDURE — 3008F PR BODY MASS INDEX (BMI) DOCUMENTED: ICD-10-PCS | Mod: CPTII,S$GLB,, | Performed by: NEUROLOGICAL SURGERY

## 2022-05-26 PROCEDURE — 72040 X-RAY EXAM NECK SPINE 2-3 VW: CPT | Mod: TC,PN

## 2022-05-26 PROCEDURE — 99999 PR PBB SHADOW E&M-EST. PATIENT-LVL V: ICD-10-PCS | Mod: PBBFAC,,, | Performed by: NEUROLOGICAL SURGERY

## 2022-05-26 PROCEDURE — 3078F PR MOST RECENT DIASTOLIC BLOOD PRESSURE < 80 MM HG: ICD-10-PCS | Mod: CPTII,S$GLB,, | Performed by: NEUROLOGICAL SURGERY

## 2022-05-26 PROCEDURE — 3288F FALL RISK ASSESSMENT DOCD: CPT | Mod: CPTII,S$GLB,, | Performed by: NEUROLOGICAL SURGERY

## 2022-05-26 PROCEDURE — 1157F PR ADVANCE CARE PLAN OR EQUIV PRESENT IN MEDICAL RECORD: ICD-10-PCS | Mod: CPTII,S$GLB,, | Performed by: NEUROLOGICAL SURGERY

## 2022-05-26 PROCEDURE — 3044F HG A1C LEVEL LT 7.0%: CPT | Mod: CPTII,S$GLB,, | Performed by: NEUROLOGICAL SURGERY

## 2022-05-26 PROCEDURE — 3044F PR MOST RECENT HEMOGLOBIN A1C LEVEL <7.0%: ICD-10-PCS | Mod: CPTII,S$GLB,, | Performed by: NEUROLOGICAL SURGERY

## 2022-05-26 PROCEDURE — 72040 XR CERVICAL SPINE AP LATERAL: ICD-10-PCS | Mod: 26,,, | Performed by: RADIOLOGY

## 2022-05-26 PROCEDURE — 99999 PR PBB SHADOW E&M-EST. PATIENT-LVL V: CPT | Mod: PBBFAC,,, | Performed by: NEUROLOGICAL SURGERY

## 2022-05-26 PROCEDURE — 1101F PT FALLS ASSESS-DOCD LE1/YR: CPT | Mod: CPTII,S$GLB,, | Performed by: NEUROLOGICAL SURGERY

## 2022-05-26 PROCEDURE — 1159F MED LIST DOCD IN RCRD: CPT | Mod: CPTII,S$GLB,, | Performed by: NEUROLOGICAL SURGERY

## 2022-05-26 PROCEDURE — 72040 X-RAY EXAM NECK SPINE 2-3 VW: CPT | Mod: 26,,, | Performed by: RADIOLOGY

## 2022-05-26 PROCEDURE — 1125F PR PAIN SEVERITY QUANTIFIED, PAIN PRESENT: ICD-10-PCS | Mod: CPTII,S$GLB,, | Performed by: NEUROLOGICAL SURGERY

## 2022-05-26 PROCEDURE — 1101F PR PT FALLS ASSESS DOC 0-1 FALLS W/OUT INJ PAST YR: ICD-10-PCS | Mod: CPTII,S$GLB,, | Performed by: NEUROLOGICAL SURGERY

## 2022-05-26 PROCEDURE — 3078F DIAST BP <80 MM HG: CPT | Mod: CPTII,S$GLB,, | Performed by: NEUROLOGICAL SURGERY

## 2022-05-26 PROCEDURE — 1157F ADVNC CARE PLAN IN RCRD: CPT | Mod: CPTII,S$GLB,, | Performed by: NEUROLOGICAL SURGERY

## 2022-05-26 PROCEDURE — 1159F PR MEDICATION LIST DOCUMENTED IN MEDICAL RECORD: ICD-10-PCS | Mod: CPTII,S$GLB,, | Performed by: NEUROLOGICAL SURGERY

## 2022-05-26 PROCEDURE — 99214 PR OFFICE/OUTPT VISIT, EST, LEVL IV, 30-39 MIN: ICD-10-PCS | Mod: S$GLB,,, | Performed by: NEUROLOGICAL SURGERY

## 2022-05-26 PROCEDURE — 3288F PR FALLS RISK ASSESSMENT DOCUMENTED: ICD-10-PCS | Mod: CPTII,S$GLB,, | Performed by: NEUROLOGICAL SURGERY

## 2022-05-26 PROCEDURE — 3074F PR MOST RECENT SYSTOLIC BLOOD PRESSURE < 130 MM HG: ICD-10-PCS | Mod: CPTII,S$GLB,, | Performed by: NEUROLOGICAL SURGERY

## 2022-05-26 RX ORDER — ATORVASTATIN CALCIUM 40 MG/1
TABLET, FILM COATED ORAL
Qty: 90 TABLET | Refills: 3 | Status: SHIPPED | OUTPATIENT
Start: 2022-05-26 | End: 2023-07-05

## 2022-05-26 NOTE — TELEPHONE ENCOUNTER
No new care gaps identified.  [facility] Embedded Care Gaps. Reference number: [id number]. 5/26/2022   5:57:56 AM MELYT

## 2022-05-26 NOTE — PROGRESS NOTES
NEUROSURGICAL PROGRESS NOTE    DATE OF SERVICE:  05/26/2022    ATTENDING PHYSICIAN:  Ishaan Fisher MD    SUBJECTIVE:  This is a very pleasant 66 y.o. female, who is status post 3 months C3-C7 anterior fusion for spondylosis with myelopathy.  She is also more than 1 year status post L4-5 oblique interbody fusion with posterior instrumentation for grade 2 spondylolisthesis with severe stenosis.  Since her cervical fusion surgery she has been compliant with wearing her neck brace and using her bone growth stimulator.  She does not report difficulty with swallowing.  She had some neck pain on the right side.  She also has left shoulder pain that is chronic.  She is complaining mainly finger pains in bilateral hands and worsening osteoarthritis changes.  When she stands up she has difficulty standing up upright.  She does not have difficulty bending forward.  She reports new onset of right buttock and lateral thigh pain that started about 6 months after lumbar fusion surgery.  No new onset of motor weakness numbness or sphincter dysfunction symptoms in the lower extremities, she has completed home health physical therapy.  She takes Percocet 10 mg 3 times daily for severe pain.    INTERIM HISTORY:    She is now more than 5 months status post C3-C7 anterior fusion.  She reports some neck pain arm pain.  Mild difficulty swallowing.  Her main issue is the pain in the lumbosacral area radiating towards the right buttock and posterolateral leg in the L5 distribution.  She has left chronic dorsiflexion weakness.  She has difficulty walking for prolonged period of time.  She is status post 1 year and 4 months L4-5 oblique interbody fusion with laminectomy with posterior instrumentation for spondylolisthesis with severe spinal stenosis.  Postoperative lumbar spine MRI was showing a new L5-S1 spondylolisthesis with bilateral foraminal stenosis.  She has developed lumbar dynamic instability at L5-S1.  Pain is severe when she  stand ups and walk and relieved by lying down.              PAST MEDICAL HISTORY:  Active Ambulatory Problems     Diagnosis Date Noted    Acquired hypothyroidism     History of congestive heart failure     Major depressive disorder 12/04/2015    Normocytic anemia 04/10/2018    History of myocardial infarction 08/30/2018    Epigastric pain 02/28/2019    BMI 39.0-39.9,adult 04/02/2019    Primary osteoarthritis of left knee 05/08/2019    TIA (transient ischemic attack) 05/19/2019    History of gastric bypass 05/19/2019    Coronary artery disease involving native coronary artery of native heart     UTI (urinary tract infection) 05/19/2019    History of right knee joint replacement 11/20/2019    Chronic left shoulder pain 07/29/2020    Primary osteoarthritis of left shoulder 07/29/2020    Chronic pain 11/04/2020    Lumbar radiculopathy 11/04/2020    DDD (degenerative disc disease), lumbar 01/11/2021    Pain 08/25/2021    Cervical spondylosis with myelopathy 12/14/2021     Resolved Ambulatory Problems     Diagnosis Date Noted    Primary osteoarthritis     Diabetes mellitus, type 2     History of colon cancer 04/18/2017    Malfunction of device 05/25/2017    S/P TKR (total knee replacement), left 07/02/2019    Weakness of left lower extremity 07/02/2019    Gait, antalgic 07/02/2019    Decreased range of motion (ROM) of left knee 07/02/2019    Primary osteoarthritis of right knee 11/20/2019    Aspiration pneumonia of right lower lobe 12/17/2021     Past Medical History:   Diagnosis Date    Anticoagulant long-term use     Arthritis     Asthma     CHF (congestive heart failure)     Colon cancer     COPD (chronic obstructive pulmonary disease)     Coronary artery disease     Depression     GERD (gastroesophageal reflux disease)     Myocardial infarction     Thyroid disease        PAST SURGICAL HISTORY:  Past Surgical History:   Procedure Laterality Date    ABDOMINAL SURGERY        SECTION      CHOLECYSTECTOMY      COLON SURGERY      COLONOSCOPY      --- repeat in 3-5 years    COLONOSCOPY N/A 2017    Procedure: COLONOSCOPY;  Surgeon: Corrina Flores MD;  Location: [facility] ENDO;  Service: Endoscopy;  Laterality: N/A;    COLONOSCOPY N/A 4/10/2018    Procedure: COLONOSCOPY golytely;  Surgeon: Corrina Flores MD;  Location: [facility] ENDO;  Service: Endoscopy;  Laterality: N/A;    DECOMPRESSION OF CERVICAL SPINE BY ANTERIOR APPROACH WITH FUSION N/A 2021    Procedure: DECOMPRESSION AND FUSION, SPINE, CERVICAL, ANTERIOR APPROACH C3-4 C5- 6 C6-7 ACDF;  Surgeon: Ishaan Fisher MD;  Location: [facility] OR;  Service: Neurosurgery;  Laterality: N/A;    ECTOPIC PREGNANCY SURGERY      EPIDURAL STEROID INJECTION INTO LUMBAR SPINE N/A 2020    Procedure: Injection-steroid-epidural-lumbar--L5-S1 InterLaminar;  Surgeon: Nilam Santiago MD;  Location: [facility] PAIN MGT;  Service: Pain Management;  Laterality: N/A;    ESOPHAGOGASTRODUODENOSCOPY N/A 2019    Procedure: ESOPHAGOGASTRODUODENOSCOPY (EGD);  Surgeon: Corrina Flores MD;  Location: [facility] ENDO;  Service: Endoscopy;  Laterality: N/A;    FRACTURE SURGERY      left leg    FUSION OF SPINE WITH INSTRUMENTATION Left 2021    Procedure: FUSION, SPINE, WITH INSTRUMENTATION Stage 1 Left L4-5 OLIF DORA Stage 2 L4-5 Laminectomy, medial Facetectomy, foraminotomy, L4-5 MIS Instrumentation;  Surgeon: Ishaan Fisher MD;  Location: [facility] OR;  Service: Neurosurgery;  Laterality: Left;  Procedure: Stage 1 Left L4-5 OLIF DORA  Stage 2 L4-5 Laminectomy, medial Facetectomy, foraminotomy, L4-5 MIS Instrumentation  Surgery TIme: 4 Hrs    GASTRIC BYPASS      HERNIA REPAIR      INJECTION OF ANESTHETIC AGENT AROUND GENITOFEMORAL NERVE Left 2020    Procedure: BLOCK, NERVE, LEFT SHOULDER ARTICULAR;  Surgeon: Nilam Santiago MD;  Location: [facility] PAIN MGT;  Service: Pain Management;  Laterality: Left;    JOINT REPLACEMENT      KNEE  ARTHROPLASTY Left 5/8/2019    Procedure: ARTHROPLASTY, KNEE;  Surgeon: Roldan Greenwood MD;  Location: [facility] OR;  Service: Orthopedics;  Laterality: Left;  Navid notified    KNEE ARTHROPLASTY Right 11/20/2019    Procedure: ARTHROPLASTY, KNEE;  Surgeon: Roldan Greenwood MD;  Location: [facility] OR;  Service: Orthopedics;  Laterality: Right;  Navid notified, confirmed case Navid 11/19/19 [id number]    OOPHORECTOMY      RADIOFREQUENCY THERMOCOAGULATION Left 8/12/2020    Procedure: RADIOFREQUENCY THERMAL COAGULATION--Left Suprascapular, Axillary, and Lateral Pectoral Articular Branch RFA;  Surgeon: Nilam Santiago MD;  Location: [facility] PAIN MGT;  Service: Pain Management;  Laterality: Left;    TONSILLECTOMY      TRANSFORAMINAL EPIDURAL INJECTION OF STEROID Right 8/25/2021    Procedure: Injection,steroid,epidural,transforaminal approach; Levels: L5;  Surgeon: Nilam Santiago MD;  Location: [facility] PAIN MGT;  Service: Pain Management;  Laterality: Right;  No pacemaker. Patient is diabetic. Patient is taking Aspirin but can continue per Dr. Santiago.     TUBAL LIGATION         SOCIAL HISTORY:   Social History     Socioeconomic History    Marital status:    Tobacco Use    Smoking status: Never Smoker    Smokeless tobacco: Never Used   Substance and Sexual Activity    Alcohol use: Yes     Comment: very rare    Drug use: No     Comment: CBD oil sometimes    Sexual activity: Never     Social Determinants of Health     Financial Resource Strain: High Risk    Difficulty of Paying Living Expenses: Hard   Food Insecurity: Food Insecurity Present    Worried About Running Out of Food in the Last Year: Sometimes true    Ran Out of Food in the Last Year: Never true   Transportation Needs: No Transportation Needs    Lack of Transportation (Medical): No    Lack of Transportation (Non-Medical): No   Physical Activity: Inactive    Days of Exercise per Week: 0 days    Minutes of Exercise per Session: 0 min   Stress: No Stress  Concern Present    Feeling of Stress : Only a little   Social Connections: Socially Isolated    Frequency of Communication with Friends and Family: Never    Frequency of Social Gatherings with Friends and Family: Never    Attends [employer] Services: Never    Active Member of Clubs or Organizations: No    Attends Club or Organization Meetings: Never    Marital Status:    Housing Stability: High Risk    Unable to Pay for Housing in the Last Year: Yes    Number of Places Lived in the Last Year: 1    Unstable Housing in the Last Year: No       FAMILY HISTORY:  Family History   Problem Relation Age of Onset    Hyperlipidemia Mother     Hypertension Mother     Diabetes Mother     Stroke Mother     Hypertension Sister     Hypertension Brother     Diabetes Brother     Breast cancer Maternal Aunt     Breast cancer Maternal Aunt     Breast cancer Cousin        CURRENTS MEDICATIONS:  Current Outpatient Medications on File Prior to Visit   Medication Sig Dispense Refill    albuterol (VENTOLIN HFA) 90 mcg/actuation inhaler INHALE 2 PUFFS INTO THE LUNGS EVERY 4 HOURS AS NEEDED FOR WHEEZING 18 g 3    buPROPion (WELLBUTRIN XL) 150 MG TB24 tablet Take 1 tablet (150 mg total) by mouth once daily. 30 tablet 11    busPIRone (BUSPAR) 10 MG tablet TAKE 1/2 TABLET BY MOUTH ONCE DAILY (Patient taking differently: 10 mg. Pt is taking 10 mg once daily) 60 tablet 3    diabetic supplies, miscellan. Misc Lancets for 1-2 times a day testing    dispense brand covered by insurance t (Patient taking differently: Lancets for 1-2 times a day testing    dispense brand covered by insurance t) 60 each 3    diazePAM (VALIUM) 5 MG tablet TAKE1  BY MOUTH EVERY 24 HOURS AS NEEDED FOR ANXIETY 30 tablet 1    diclofenac sodium (VOLTAREN) 1 % Gel APPLY 2 GRAMS EXTERNALLY TO THE AFFECTED AREA FOUR TIMES DAILY 300 g 5    ergocalciferol (ERGOCALCIFEROL) 50,000 unit Cap Take 1 capsule (50,000 Units total) by mouth every 7 days. 12  capsule 3    EScitalopram oxalate (LEXAPRO) 20 MG tablet TAKE 1 TABLET(20 MG) BY MOUTH EVERY DAY 90 tablet 3    furosemide (LASIX) 20 MG tablet Take 1 tablet (20 mg total) by mouth daily as needed. 30 tablet 3    gabapentin (NEURONTIN) 100 MG capsule Take 1 capsule (100 mg total) by mouth 3 (three) times daily. 90 capsule 11    glipiZIDE (GLUCOTROL) 2.5 MG TR24 TAKE 1 TABLET(2.5 MG) BY MOUTH DAILY WITH BREAKFAST 90 tablet 3    ibandronate (BONIVA) 150 mg tablet TAKE 1 TABLET BY MOUTH EVERY 30 DAYS FOR OSTEOPOROSIS 3 tablet 3    levothyroxine (SYNTHROID) 100 MCG tablet Take 1 tablet (100 mcg total) by mouth once daily. 90 tablet 3    methocarbamoL (ROBAXIN) 750 MG Tab Take 1 tablet (750 mg total) by mouth 3 (three) times daily as needed (muscle spasms). 60 tablet 0    omeprazole (PRILOSEC) 40 MG capsule Take 1 capsule (40 mg total) by mouth every morning. 90 capsule 1    oxyCODONE-acetaminophen (PERCOCET)  mg per tablet Take 1 tablet by mouth every 6 (six) hours as needed for Pain. 60 tablet 0    potassium chloride (KLOR-CON) 10 MEQ TbSR The day you take lasix 90 tablet 0    promethazine (PHENERGAN) 25 MG tablet TAKE 1 TABLET(25 MG) BY MOUTH EVERY 6 HOURS AS NEEDED 25 tablet 0    TRUE METRIX GLUCOSE TEST STRIP Strp TO TEST ONCE TO TWICE DAILY 50 strip 11    TRUE METRIX GLUCOSE TEST STRIP Strp TEST BLOOD SUGAR EVERY  strip 3    TRUE METRIX GO GLUCOSE METER Misc USE AS DIRECTED 1 each 0    TRUEPLUS LANCETS 30 gauge Misc USE TO TEST ONCE TO TWICE D      valACYclovir (VALTREX) 1000 MG tablet Take 1 tablet (1,000 mg total) by mouth 3 (three) times daily. 21 tablet 0    zolpidem (AMBIEN) 5 MG Tab Take 1 tablet (5 mg total) by mouth every evening. 30 tablet 2    aspirin (ECOTRIN) 81 MG EC tablet Take 1 tablet (81 mg total) by mouth once daily.  0    [DISCONTINUED] atorvastatin (LIPITOR) 40 MG tablet TAKE 1 TABLET(40 MG) BY MOUTH EVERY DAY FOR CHOLESTEROL 90 tablet 3     No current  facility-administered medications on file prior to visit.       ALLERGIES:  Review of patient's allergies indicates:   Allergen Reactions    Adhesive      Adhesive tape    Latex, natural rubber      Adhesive from latex tape    Ibuprofen Other (See Comments)     Causes asthma flare??       REVIEW OF SYSTEMS:  Review of Systems   Constitutional: Negative for diaphoresis, fever and weight loss.   Respiratory: Negative for shortness of breath.    Cardiovascular: Negative for chest pain.   Gastrointestinal: Negative for blood in stool.   Genitourinary: Negative for hematuria.   Endo/Heme/Allergies: Does not bruise/bleed easily.   All other systems reviewed and are negative.        OBJECTIVE:    PHYSICAL EXAMINATION:   Vitals:    05/26/22 1330   BP: 120/79   Pulse: 78       Physical Exam:  Vitals reviewed.    Constitutional: She appears well-developed and well-nourished.     Eyes: Pupils are equal, round, and reactive to light. Conjunctivae and EOM are normal.     Cardiovascular: Normal distal pulses and no edema.     Abdominal: Soft.     Skin: Skin displays no rash on trunk and no rash on extremities. Skin displays no lesions on trunk and no lesions on extremities.     Psych/Behavior: She is alert. She is oriented to person, place, and time. She has a normal mood and affect.     Musculoskeletal:        Neck: Range of motion is limited.     Neurological:        DTRs: Tricep reflexes are 2+ on the right side and 2+ on the left side. Bicep reflexes are 2+ on the right side and 2+ on the left side. Brachioradialis reflexes are 2+ on the right side and 2+ on the left side. Patellar reflexes are 2+ on the right side and 2+ on the left side. Achilles reflexes are 0 on the right side and 0 on the left side.       Back Exam     Muscle Strength   Right Quadriceps:  5/5   Left Quadriceps:  5/5   Right Hamstrings:  5/5   Left Hamstrings:  5/5                 Neurologic Exam     Mental Status   Oriented to person, place, and time.    Speech: speech is normal   Level of consciousness: alert    Cranial Nerves   Cranial nerves II through XII intact.     CN III, IV, VI   Pupils are equal, round, and reactive to light.  Extraocular motions are normal.     Motor Exam   Muscle bulk: normal  Overall muscle tone: normal    Strength   Right deltoid: 5/5  Left deltoid: 5/5  Right biceps: 5/5  Left biceps: 5/5  Right triceps: 5/5  Left triceps: 5/5  Right wrist flexion: 5/5  Left wrist flexion: 5/5  Right wrist extension: 5/5  Left wrist extension: 5/5  Right interossei: 5/5  Left interossei: 5/5  Right iliopsoas: 5/5  Left iliopsoas: 5/5  Right quadriceps: 5/5  Left quadriceps: 5/5  Right hamstrin/5  Left hamstrin/5  Right anterior tibial: 5/5  Left anterior tibial: 4/5  Right posterior tibial: 5/5  Left posterior tibial: 5/5  Right peroneal: 5/5  Left peroneal: 4/5  Right gastroc: 5/5  Left gastroc: 5/5    Sensory Exam   Light touch normal.   Pinprick normal.     Gait, Coordination, and Reflexes     Gait  Gait: (Leaning forward)    Coordination   Finger to nose coordination: normal    Reflexes   Right brachioradialis: 2+  Left brachioradialis: 2+  Right biceps: 2+  Left biceps: 2+  Right triceps: 2+  Left triceps: 2+  Right patellar: 2+  Left patellar: 2+  Right achilles: 0  Left achilles: 0  Right plantar: normal  Left plantar: normal  Right Munoz: absent  Left Munoz: absent  Right ankle clonus: absent  Left ankle clonus: absent        DIAGNOSTIC DATA:  I personally interpreted the following imaging:   Lumbar spine MRI 2021 was showing good decompression at L4-5, L5-S1 new onset of degenerative disc disease with grade 1 spondylolisthesis and bilateral foraminal stenosis, this was not present in the lumbar spine a MRI prior to her lumbar fusion.  Scoliosis film is showing sagittal plane imbalance with hypopnea considered ends and new L5-S1 grade 1-2 spondylolisthesis  Today cervical x-ray shows good positioning of hardware, no  complication, no subsidence, good cervical alignment    ASSESMENT:  This is a 67 y.o. female with     Problem List Items Addressed This Visit    None     Visit Diagnoses     Spondylolisthesis, lumbosacral region    -  Primary    Relevant Orders    Procedure Order to Pain Management    Spinal stenosis, lumbosacral region        Relevant Orders    MRI Lumbar Spine Without Contrast    Procedure Order to Pain Management    Lumbosacral radiculopathy at L5        Relevant Orders    Procedure Order to Pain Management    Foraminal stenosis of lumbosacral region        Status post lumbar spinal fusion        Status post cervical spinal fusion                PLAN:  Status post C5 3-7 fusion, status post L4-5 fusion, new L5-S1 degenerative spondylolisthesis with foraminal stenosis causing L5 radiculopathy, L5-S1 dynamic instability  Patient is recovering from her cervical fusion.  However she has difficulty walking due to her L5-S1 pathology and instability in sagittal plane imbalance.  She also has radiculopathy mainly felt on the right side.    She is a candidate for a revision of her lumbar surgery with posterior facetectomy at L4-5 and L5-S1, posterior interbody fusion at L5-S1, L4-S1 posterior segmental instrumentation and sacral pelvic fixation with the goals of treating the L5-S1 instability, decompressing the nerve root at L5-S1, offering improved lumbopelvic parameters and sagittal balance.  The patient is contemplating surgery.  I think she can benefit from a bilateral L5-S1 transforaminal epidural steroid injection in the meantime.  I will repeat a lumbar spine MRI.  Follow-up in 3 months with me.  She will also follow-up with Dr. Martin in PM&R           Ishaan Fisher MD  Cell:[phone]

## 2022-05-26 NOTE — TELEPHONE ENCOUNTER
Refill Authorization Note   Christine Castro  is requesting a refill authorization.  Brief Assessment and Rationale for Refill:  Approve     Medication Therapy Plan:       Medication Reconciliation Completed: No   Comments:     No Care Gaps recommended.     Note composed:10:46 AM 05/26/2022

## 2022-05-27 RX ORDER — PROMETHAZINE HYDROCHLORIDE 25 MG/1
TABLET ORAL
Qty: 25 TABLET | Refills: 0 | Status: SHIPPED | OUTPATIENT
Start: 2022-05-27 | End: 2022-05-27 | Stop reason: SDUPTHER

## 2022-05-27 NOTE — TELEPHONE ENCOUNTER
No new care gaps identified.  [facility] Embedded Care Gaps. Reference number: [id number]. 5/27/2022   3:10:03 PM CDT

## 2022-05-27 NOTE — TELEPHONE ENCOUNTER
----- Message from Kat Fischer sent at 5/27/2022  3:04 PM CDT -----  Type:  RX Refill Request    Who Called: pt  Refill or New Rx:refill  RX Name and Strength:promethazine (PHENERGAN) 25 MG  How is the patient currently taking it? (ex. 1XDay):TAKE 1 TABLET(25 MG) BY MOUTH EVERY 6 HOURS AS NEEDED  Is this a 30 day or 90 day RX:25 tablets  Preferred Pharmacy with phone number:[id number] - [address] [facility]   Phone: [phone]  Fax:  [phone]        Local or Mail Order:local  Ordering Provider:Dr Santiago  Would the patient rather a call back or a response via [facility]? Call  best Call Back Number:[phone] (  Additional Information:

## 2022-05-27 NOTE — TELEPHONE ENCOUNTER
----- Message from Carmelina Efra sent at 5/27/2022  3:45 PM CDT -----  Regarding: rx request  Contact: self  The pt is requesting Promethizine to help with her stomach.  She says you know all about it.  Its the only thing that will help.  [facility] pharmacy/  Any questions, she can be reached at [phone]

## 2022-05-27 NOTE — TELEPHONE ENCOUNTER
No new care gaps identified.  [facility] Embedded Care Gaps. Reference number: [id number]. 5/27/2022   3:01:42 PM CDT

## 2022-05-28 RX ORDER — PROMETHAZINE HYDROCHLORIDE 25 MG/1
TABLET ORAL
Qty: 25 TABLET | Refills: 0 | Status: SHIPPED | OUTPATIENT
Start: 2022-05-28 | End: 2022-09-14

## 2022-05-31 ENCOUNTER — TELEPHONE (OUTPATIENT)
Dept: PAIN MEDICINE | Facility: CLINIC | Age: 67
End: 2022-05-31
Payer: MEDICARE

## 2022-05-31 ENCOUNTER — TELEPHONE (OUTPATIENT)
Dept: FAMILY MEDICINE | Facility: CLINIC | Age: 67
End: 2022-05-31
Payer: MEDICARE

## 2022-05-31 DIAGNOSIS — M54.16 LUMBAR RADICULOPATHY: Primary | ICD-10-CM

## 2022-05-31 NOTE — TELEPHONE ENCOUNTER
----- Message from Isabelle Ron MA sent at 5/31/2022  3:45 PM CDT -----  Regarding: Blood thinner clearance  Good afternoon,       We are requesting clearance to hold Blood Thinners: Aspirin for 5 days. Pt is scheduled for Caudal MEI with Dr Santiago on 6/08.     Please advise  Thanks,   ER         Does the patient need to see you?

## 2022-05-31 NOTE — TELEPHONE ENCOUNTER
Please schedule caudal MEI with f/u 2-3 weeks afterwards with Grant.      THanks,   KP         Pt has been scheduled for procedure - reviewed instructions.   Denies pacemaker, antibiotics, not diabetic   ASA- 5 day hold will send clearance.     ER

## 2022-06-01 ENCOUNTER — TELEPHONE (OUTPATIENT)
Dept: PAIN MEDICINE | Facility: CLINIC | Age: 67
End: 2022-06-01
Payer: MEDICARE

## 2022-06-01 NOTE — TELEPHONE ENCOUNTER
Pt notified that she can hold ASA     Last 6/03 and restart 6/09    Pt voiced understanding/   ER

## 2022-06-01 NOTE — TELEPHONE ENCOUNTER
----- Message from Alon Santiago MD sent at 5/31/2022  5:40 PM CDT -----  Regarding: RE: Blood thinner clearance  Ok to hold the asa  ----- Message -----  From: Isabelle Ron MA  Sent: 5/31/2022   3:46 PM CDT  To: Alon Santiago MD, #  Subject: Blood thinner clearance                          Good afternoon,       We are requesting clearance to hold Blood Thinners: Aspirin for 5 days. Pt is scheduled for Caudal MEI with Dr Santiago on 6/08.     Please advise  Thanks,   ER

## 2022-06-02 ENCOUNTER — TELEPHONE (OUTPATIENT)
Dept: PAIN MEDICINE | Facility: CLINIC | Age: 67
End: 2022-06-02
Payer: MEDICARE

## 2022-06-02 DIAGNOSIS — M43.27 FUSION OF SPINE OF LUMBOSACRAL REGION: ICD-10-CM

## 2022-06-02 NOTE — TELEPHONE ENCOUNTER
----- Message from Odalys Conde sent at 6/2/2022 12:36 PM CDT -----  Contact: [phone]/Self  Who Called: PT  Regarding: injection time 06/08/22  Would the patient rather a call back or a response via [facility]? Call back  Best Call Back Number: [phone]  Additional Information: N/a

## 2022-06-02 NOTE — TELEPHONE ENCOUNTER
----- Message from Odalys Conde sent at 6/2/2022 12:25 PM CDT -----  Contact: [phone]/Self  Who Called: PT  Regarding: Requesting refill on oxyCODONE-acetaminophen (PERCOCET)  mg per tablet  Would the patient rather a call back or a response via [facility]? Call back  Best Call Back Number: [phone]  Additional Information[id number] [phone]

## 2022-06-03 RX ORDER — OXYCODONE AND ACETAMINOPHEN 10; 325 MG/1; MG/1
1 TABLET ORAL EVERY 6 HOURS PRN
Qty: 60 TABLET | Refills: 0 | Status: SHIPPED | OUTPATIENT
Start: 2022-06-03 | End: 2022-06-17 | Stop reason: SDUPTHER

## 2022-06-03 NOTE — TELEPHONE ENCOUNTER
----- Message from Diana Powell sent at 6/3/2022  9:07 AM CDT -----  Contact: [phone]  Type:  Needs Medical Advice    Who Called: Pt   Reason for call: regarding a Refill on oxyCODONE-acetaminophen (PERCOCET)  mg per tablet  Pharmacy name and phone #:  [facility] #[id number] - [address] [facility] AT [facility]  Would the patient rather a call back or a response via [facility]?  Call back  Best Call Back Number: [phone]

## 2022-06-07 ENCOUNTER — TELEPHONE (OUTPATIENT)
Dept: PAIN MEDICINE | Facility: CLINIC | Age: 67
End: 2022-06-07
Payer: MEDICARE

## 2022-06-10 ENCOUNTER — TELEPHONE (OUTPATIENT)
Dept: PAIN MEDICINE | Facility: CLINIC | Age: 67
End: 2022-06-10
Payer: MEDICARE

## 2022-06-10 NOTE — TELEPHONE ENCOUNTER
----- Message from Alida Pimentel sent at 6/10/2022 10:27 AM CDT -----  Type:  Needs Medical Advice    Who Called: patient  Would the patient rather a call back or a response via [facility]? call  Best Call Back Number: [phone]  Additional Information: reschedule procedure

## 2022-06-14 ENCOUNTER — TELEPHONE (OUTPATIENT)
Dept: PAIN MEDICINE | Facility: CLINIC | Age: 67
End: 2022-06-14
Payer: MEDICARE

## 2022-06-15 ENCOUNTER — HOSPITAL ENCOUNTER (EMERGENCY)
Facility: HOSPITAL | Age: 67
Discharge: HOME | End: 2022-06-15
Attending: EMERGENCY MEDICINE
Payer: MEDICARE

## 2022-06-15 ENCOUNTER — TELEPHONE (OUTPATIENT)
Dept: PAIN MEDICINE | Facility: CLINIC | Age: 67
End: 2022-06-15
Payer: MEDICARE

## 2022-06-15 VITALS
HEIGHT: 60 IN | BODY MASS INDEX: 37.5 KG/M2 | RESPIRATION RATE: 18 BRPM | SYSTOLIC BLOOD PRESSURE: 110 MMHG | DIASTOLIC BLOOD PRESSURE: 70 MMHG | TEMPERATURE: 98 F | WEIGHT: 191 LBS | HEART RATE: 77 BPM | OXYGEN SATURATION: 100 %
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
## 2022-06-15 DIAGNOSIS — S30.0XXA CONTUSION OF COCCYX, INITIAL ENCOUNTER: ICD-10-CM

## 2022-06-15 DIAGNOSIS — S39.92XA INJURY OF LOW BACK, INITIAL ENCOUNTER: ICD-10-CM

## 2022-06-15 PROBLEM — R29.6 FREQUENT FALLS: Status: ACTIVE | Noted: 2022-06-15

## 2022-06-15 PROCEDURE — 96374 THER/PROPH/DIAG INJ IV PUSH: CPT

## 2022-06-15 PROCEDURE — 63600175 PHARM REV CODE 636 W HCPCS: Performed by: EMERGENCY MEDICINE

## 2022-06-15 PROCEDURE — 99285 EMERGENCY DEPT VISIT HI MDM: CPT | Mod: 25

## 2022-06-15 RX ORDER — NALOXONE HCL 0.4 MG/ML
0.4 VIAL (ML) INJECTION
Status: COMPLETED | OUTPATIENT
Start: 2022-06-15 | End: 2022-06-15

## 2022-06-15 RX ADMIN — NALXONE HYDROCHLORIDE 0.4 MG: 0.4 INJECTION INTRAMUSCULAR; INTRAVENOUS; SUBCUTANEOUS at 05:06

## 2022-06-15 NOTE — ED NOTES
Pt in room 2 from triage. Pt appears drowsy and having difficulty staying awake. Pt is aaox4. Pt reports she was going to the clinic next door for an injection and had a fall. Pt denies any medication use today. Head to toe assessment completed, plan of care reviewed, call bell within reach.

## 2022-06-15 NOTE — ED NOTES
Pt given discharge and follow up instructions. Pt verbalized understanding and ambulated out of ER in stable condition with steady gait.

## 2022-06-15 NOTE — TELEPHONE ENCOUNTER
----- Message from Bear Zavaleta sent at 6/15/2022  8:51 AM CDT -----  Type:  Patient Returning Call    Who Called:pt   Would the patient rather a call back or a response via [facility]?  Call   Best Call Back Number: [phone]  Additional Information:     Pt would like to know what time she has to come in for procedure

## 2022-06-15 NOTE — ED PROVIDER NOTES
Encounter Date: 6/15/2022    SCRIBE #1 NOTE: I, Bartolome Downing , am scribing for, and in the presence of, Parker Bello MD.       History     Chief Complaint   Patient presents with    Fall     Sent from pain clinic for evaluation of fall with AMS. The pt. Was going to have injection today and got lost. She reports falling 3 times today and struck the rt. Side of her face during one fall. C/o tailbone pain. She reports intermittent dizziness for weeks and dizzy today with fall. Pt. Is drowsy and falling asleep in triage. A/O x4     Patient is a a 67 year old female who has a past medical history of Anticoagulant long-term use, Arthritis, Asthma, CHF (congestive heart failure), Colon cancer, COPD (chronic obstructive pulmonary disease), Coronary artery disease, Depression, Diabetes mellitus, type 2, GERD (gastroesophageal reflux disease), Myocardial infarction, and Thyroid disease.     The patient presents to the Emergency Department with Fall.   Symptoms are associated dizziness and weakness.  Pt denies LOC.  Patient reports she fell three times today and reports bumping her face with the table when picking up something. She underwent an Injection-steroid-epidural-caudal today, 6/15.  Patient reports her legs felt weak and gave out. She states she landed on her tailbone.               The history is provided by the patient.     Review of patient's allergies indicates:   Allergen Reactions    Adhesive      Adhesive tape    Latex, natural rubber      Adhesive from latex tape    Ibuprofen Other (See Comments)     Causes asthma flare??     Past Medical History:   Diagnosis Date    Anticoagulant long-term use     Arthritis     Asthma     CHF (congestive heart failure)     [facility]    Colon cancer     colon    COPD (chronic obstructive pulmonary disease)     Coronary artery disease     Depression     Diabetes mellitus, type 2     GERD (gastroesophageal reflux disease)     Myocardial  infarction     AGE 19 MIGUELANGEL WITHBABY DELIVERY    Thyroid disease      Past Surgical History:   Procedure Laterality Date    ABDOMINAL SURGERY       SECTION      CHOLECYSTECTOMY      COLON SURGERY      COLONOSCOPY      --- repeat in 3-5 years    COLONOSCOPY N/A 2017    Procedure: COLONOSCOPY;  Surgeon: Corrina Flores MD;  Location: [facility] ENDO;  Service: Endoscopy;  Laterality: N/A;    COLONOSCOPY N/A 4/10/2018    Procedure: COLONOSCOPY golytely;  Surgeon: Corrina Flores MD;  Location: [facility] ENDO;  Service: Endoscopy;  Laterality: N/A;    DECOMPRESSION OF CERVICAL SPINE BY ANTERIOR APPROACH WITH FUSION N/A 2021    Procedure: DECOMPRESSION AND FUSION, SPINE, CERVICAL, ANTERIOR APPROACH C3-4 C5- 6 C6-7 ACDF;  Surgeon: Ishaan Fisher MD;  Location: [facility] OR;  Service: Neurosurgery;  Laterality: N/A;    ECTOPIC PREGNANCY SURGERY      EPIDURAL STEROID INJECTION INTO LUMBAR SPINE N/A 2020    Procedure: Injection-steroid-epidural-lumbar--L5-S1 InterLaminar;  Surgeon: Nilam Santiago MD;  Location: [facility] PAIN MGT;  Service: Pain Management;  Laterality: N/A;    ESOPHAGOGASTRODUODENOSCOPY N/A 2019    Procedure: ESOPHAGOGASTRODUODENOSCOPY (EGD);  Surgeon: Corrina Flores MD;  Location: [facility];  Service: Endoscopy;  Laterality: N/A;    FRACTURE SURGERY      left leg    FUSION OF SPINE WITH INSTRUMENTATION Left 2021    Procedure: FUSION, SPINE, WITH INSTRUMENTATION Stage 1 Left L4-5 OLIF DORA Stage 2 L4-5 Laminectomy, medial Facetectomy, foraminotomy, L4-5 MIS Instrumentation;  Surgeon: Ishaan Fisher MD;  Location: [facility] OR;  Service: Neurosurgery;  Laterality: Left;  Procedure: Stage 1 Left L4-5 OLIF DORA  Stage 2 L4-5 Laminectomy, medial Facetectomy, foraminotomy, L4-5 MIS Instrumentation  Surgery TIme: 4 Hrs    GASTRIC BYPASS      HERNIA REPAIR      INJECTION OF ANESTHETIC AGENT AROUND GENITOFEMORAL NERVE Left 2020    Procedure: BLOCK, NERVE, LEFT SHOULDER  ARTICULAR;  Surgeon: Nilam Santiago MD;  Location: [facility] PAIN MGT;  Service: Pain Management;  Laterality: Left;    JOINT REPLACEMENT      KNEE ARTHROPLASTY Left 5/8/2019    Procedure: ARTHROPLASTY, KNEE;  Surgeon: Roldan Greenwood MD;  Location: [facility] OR;  Service: Orthopedics;  Laterality: Left;  [facility] notified    KNEE ARTHROPLASTY Right 11/20/2019    Procedure: ARTHROPLASTY, KNEE;  Surgeon: Roldan Greenwood MD;  Location: [facility] OR;  Service: Orthopedics;  Laterality: Right;  [facility] notified, confirmed case Navid 11/19/19 KB [id number]    OOPHORECTOMY      RADIOFREQUENCY THERMOCOAGULATION Left 8/12/2020    Procedure: RADIOFREQUENCY THERMAL COAGULATION--Left Suprascapular, Axillary, and Lateral Pectoral Articular Branch RFA;  Surgeon: Nilam Santiago MD;  Location: [facility] PAIN MGT;  Service: Pain Management;  Laterality: Left;    TONSILLECTOMY      TRANSFORAMINAL EPIDURAL INJECTION OF STEROID Right 8/25/2021    Procedure: Injection,steroid,epidural,transforaminal approach; Levels: L5;  Surgeon: Nilam Santiago MD;  Location: [facility] PAIN MGT;  Service: Pain Management;  Laterality: Right;  No pacemaker. Patient is diabetic. Patient is taking Aspirin but can continue per Dr. Santiago.     TUBAL LIGATION       Family History   Problem Relation Age of Onset    Hyperlipidemia Mother     Hypertension Mother     Diabetes Mother     Stroke Mother     Hypertension Sister     Hypertension Brother     Diabetes Brother     Breast cancer Maternal Aunt     Breast cancer Maternal Aunt     Breast cancer Cousin      Social History     Tobacco Use    Smoking status: Never Smoker    Smokeless tobacco: Never Used   Substance Use Topics    Alcohol use: Yes     Comment: very rare    Drug use: No     Comment: CBD oil sometimes     Review of Systems   Constitutional: Positive for activity change.   Musculoskeletal: Positive for back pain.        Coccyx Pain    Neurological: Positive for dizziness.        Tingling       Physical  Exam     Initial Vitals [06/15/22 1547]   BP Pulse Resp Temp SpO2   (!) 152/73 73 20 98 °F (36.7 °C) 97 %      MAP       --         Physical Exam    Nursing note and vitals reviewed.  Constitutional: She appears well-developed and well-nourished. She is not diaphoretic. No distress.   HENT:   Head: Normocephalic and atraumatic.   Mouth/Throat: Oropharynx is clear and moist.   Eyes: Conjunctivae are normal.   Cardiovascular: Normal rate, regular rhythm and intact distal pulses.   Pulmonary/Chest: No respiratory distress.   Musculoskeletal:         General: Normal range of motion.     Neurological: She has normal strength.   Somnolent but easily arousable, oriented x3   Skin: Skin is warm and dry. Capillary refill takes less than 2 seconds. No rash noted. No erythema.   Psychiatric: She has a normal mood and affect.         ED Course   Procedures  Labs Reviewed - No data to display       Imaging Results          CT Lumbar Spine Without Contrast (Final result)  Result time 06/15/22 18:02:41    Final result by Cecilio Ochoa DO (06/15/22 18:02:41)                 Impression:      Postoperative changes of L4-L5 spinal fusion.  Multilevel degenerative changes of the lumbar spine, similar in appearance to prior CT from 03/21/2022.  No acute osseous abnormality or hardware complication.      Electronically signed by: Cecilio Ochoa  Date:    06/15/2022  Time:    18:02             Narrative:    EXAMINATION:  CT LUMBAR SPINE WITHOUT CONTRAST    CLINICAL HISTORY:  Low back pain, trauma;    TECHNIQUE:  Axial, sagittal, and coronal reformations are obtained through the lumbar spine without intravenous contrast.    COMPARISON:  CT lumbar spine from 03/21/2022.    FINDINGS:  Alignment: Normal.    Vertebrae: There are postoperative changes of L4-L5 spinal fusion and discectomy.  Hardware appears intact.  Continued mild lucency about the left stabilizing nitin as extends into the sacral ala, similar to prior.  Otherwise no  significant hardware loosening.  There is no acute fracture or subluxation of the lumbar spine. Vertebral body heights are maintained.    Discs: There is mild disc height loss and vacuum disc phenomenon at the level of L5-S1.  At the remaining non operative levels, the disc heights are maintained.    Degenerative changes: There are multilevel degenerative changes of the lumbar spine, not significantly changed in comparison with CT from 03/21/2022.  Continued mild to moderate spinal canal stenosis and moderate bilateral neural foraminal narrowing bilaterally at the level of L3-L4.  Severe bilateral neural foraminal narrowing at the level of L5-S1.  Degenerative sclerosis, joint space narrowing, and vacuum disc phenomenon of the bilateral sacroiliac joints is noted.    Miscellaneous: Cholecystectomy clips are noted.  There are multiple surgical clips noted in the epigastric region., the left retroperitoneal space, and the left upper quadrant and lower mesentery.  Simple exophytic cyst arising from the right kidney midpole.                                 Medications   naloxone 0.4 mg/mL injection 0.4 mg (0.4 mg Intravenous Given 6/15/22 1706)     Medical Decision Making:   Initial Assessment:   Patient presents s/p fall landing on her buttocks, complaining of pain in that area as well as low back with radiation of tingling down her legs intermittently.  No urinary incontinence or fecal incontinence.  Differential Diagnosis:   Contusion, fracture, subluxation  ED Management:  Ct lumbar spine.  After chart review, patient noted to be chronic opiates, as well as benzodiazepines and muscle relaxers and gabapentin.  Will give Narcan to see if she responds.          Scribe Attestation:   Scribe #1: I performed the above scribed service and the documentation accurately describes the services I performed. I attest to the accuracy of the note.    Attending Attestation:             Attending ED Notes:   Patient more awake on  re-evaluation after review of CT report.  She did not get an immediate response at of Narcan however.  This might be due to polypharmacy versus small dose of Narcan.  Nevertheless I do not believe she is encephalopathic, more somnolent from multiple medications that she is taking for pain.  She exhibits logical thought processes and is conversant when awake. Stable for dc.               Clinical Impression:   Final diagnoses:  [W19.XXXA] Fall, initial encounter (Primary)  [S30.0XXA] Contusion of coccyx, initial encounter  [S39.92XA] Injury of low back, initial encounter          ED Disposition Condition    Discharge Stable        ED Prescriptions     None        Follow-up Information     Follow up With Specialties Details Why Contact Info    Alon Santiago MD Family Medicine Schedule an appointment as soon as possible for a visit   [address] [place] [address] [phone]                I, Dr. Parker Bello, personally performed the services described in this documentation.   All medical record entries made by the scribe were at my direction and in my presence.   I have reviewed the chart and agree that the record is accurate and complete.   Parker Bello MD.        Parker Bello MD  06/15/22 0078

## 2022-06-16 ENCOUNTER — TELEPHONE (OUTPATIENT)
Dept: PAIN MEDICINE | Facility: CLINIC | Age: 67
End: 2022-06-16
Payer: MEDICARE

## 2022-06-16 ENCOUNTER — TELEPHONE (OUTPATIENT)
Dept: FAMILY MEDICINE | Facility: CLINIC | Age: 67
End: 2022-06-16
Payer: MEDICARE

## 2022-06-16 DIAGNOSIS — M43.27 FUSION OF SPINE OF LUMBOSACRAL REGION: ICD-10-CM

## 2022-06-16 DIAGNOSIS — M54.16 LUMBAR RADICULOPATHY: Primary | ICD-10-CM

## 2022-06-16 RX ORDER — DIAZEPAM 5 MG/1
TABLET ORAL
Qty: 30 TABLET | Refills: 1 | Status: CANCELLED | OUTPATIENT
Start: 2022-06-16

## 2022-06-16 RX ORDER — OXYCODONE AND ACETAMINOPHEN 10; 325 MG/1; MG/1
1 TABLET ORAL EVERY 6 HOURS PRN
Qty: 60 TABLET | Refills: 0 | Status: CANCELLED | OUTPATIENT
Start: 2022-06-16

## 2022-06-16 RX ORDER — ZOLPIDEM TARTRATE 5 MG/1
5 TABLET ORAL NIGHTLY
Qty: 30 TABLET | Refills: 2 | Status: CANCELLED | OUTPATIENT
Start: 2022-06-16

## 2022-06-16 NOTE — TELEPHONE ENCOUNTER
----- Message from Maria De Jesus Rawls sent at 6/16/2022  1:40 PM CDT -----  Regarding: Medication Refill  Contact: Patient  Type:  RX Refill Request    Who Called: Patient   Refill or New Rx: Refill   RX Name and Strength:oxyCODONE-acetaminophen (PERCOCET)  mg per tablet  How is the patient currently taking it? (ex. 1XDay):Take 1 tablet by mouth every 6 (six) hours as needed for Pain. - Oral  Is this a 30 day or 90 day RX: 30 day   Preferred Pharmacy with phone number:[facility] #[id number] - [address] [facility]   Phone:  [phone]  Local or Mail Order: Local   Ordering Provider: Phillip   Would the patient rather a call back or a response via [facility]? Call back   Best Call Back Number:[phone]  Additional Information: Please Assist

## 2022-06-16 NOTE — TELEPHONE ENCOUNTER
----- Message from Maria De Jesus Rawls sent at 6/16/2022  1:47 PM CDT -----  Regarding: Medical Assistance  Patient is requesting a call back regarding ED visit on 06/15/2022   Patient is requesting a call back to discuss with staff   Patient stated she would also like to discuss medication refills   Please Assist     Patient can be reached at [phone]

## 2022-06-16 NOTE — TELEPHONE ENCOUNTER
Patient rescheduled for Caudal MEI on 6/29/2022 at 11:20 am. Patient notified. Voiced understanding.

## 2022-06-16 NOTE — TELEPHONE ENCOUNTER
Spoke to patient. Patient stated that she fell 3 times yesterday and went to the ED. Patient states that she hurts all over and that she needs a refill on her medication.     Would you like to see this patient for a follow up and if so please advise as to when you would like us to schedule her?

## 2022-06-16 NOTE — TELEPHONE ENCOUNTER
----- Message from Maria De Jesus Rawls sent at 6/16/2022  1:44 PM CDT -----  Regarding: Reschedule Appt  Contact: Patient  Patient is requesting a call back to reschedule appt that was on 06/15/2022   Patient stated she was sent to the ER due to fall and did receive injection   Please Assist     Patient can be reached at [phone]

## 2022-06-17 DIAGNOSIS — M43.27 FUSION OF SPINE OF LUMBOSACRAL REGION: ICD-10-CM

## 2022-06-17 RX ORDER — OXYCODONE AND ACETAMINOPHEN 10; 325 MG/1; MG/1
1 TABLET ORAL EVERY 6 HOURS PRN
Qty: 60 TABLET | Refills: 0 | Status: SHIPPED | OUTPATIENT
Start: 2022-06-17 | End: 2022-06-29 | Stop reason: SDUPTHER

## 2022-06-17 NOTE — TELEPHONE ENCOUNTER
I am not sure was going on-ER record shows that she was sleepy when she went there-I do not want to prescribe additional medications that may sedate her.  Please feel her out-I am not in clinic-she can seek urgent care attention if need be.

## 2022-06-23 NOTE — DISCHARGE INSTRUCTIONS
Home Care Instructions Pain Management:    1.  DIET:    You may resume your normal diet today.    2.  BATHING:    You may shower with luke warm water.    3.  DRESSING:    You may remove your bandage today.    4.  ACTIVITY LEVEL:      You may resume your normal activities 24 hours after your procedure.    5.  MEDICATIONS:    You may resume your normal medications today.    6.  SPECIAL INSTRUCTIONS:    No heat to the injection site for 24 hours including bath or shower, heating pad, moist heat or hot tubs.    Use an ice pack to the injection site for any pain or discomfort.  Apply ice packs for 20 minute intervals as needed.    If you have received any sedatives by mouth today, you can not drive for 12 hours.    If you have received sedation through an IV, you can not drive for 24 hours.    PLEASE CALL YOUR DOCTOR FOR THE FOLLOWIN.  Redness or swelling around the injection site.  2.  Fever of 101 degrees.  3.  Drainage (pus) from the injection site.  4.  For any continuous bleeding (some dried blood over the incision is normal.)    FOR EMERGENCIES:    If any unusual problems or difficulties occur during clinic hours, call [phone] or dial [phone].    Follow up with with your physician in 2-3 weeks.

## 2022-06-25 NOTE — TELEPHONE ENCOUNTER
No new care gaps identified.  [facility] Embedded Care Gaps. Reference number: [id number]. 6/24/2022   9:40:17 PM CDT

## 2022-06-26 RX ORDER — ZOLPIDEM TARTRATE 5 MG/1
TABLET ORAL
Qty: 30 TABLET | Refills: 1 | Status: SHIPPED | OUTPATIENT
Start: 2022-06-26 | End: 2022-07-29 | Stop reason: SDUPTHER

## 2022-06-27 ENCOUNTER — TELEPHONE (OUTPATIENT)
Dept: PAIN MEDICINE | Facility: CLINIC | Age: 67
End: 2022-06-27
Payer: MEDICARE

## 2022-06-27 NOTE — TELEPHONE ENCOUNTER
Spoke with patient. Advised patient arrival time for procedure scheduled with Dr. Santiago on 6/29/2022 is 10:20 am. Voiced understanding.

## 2022-06-27 NOTE — TELEPHONE ENCOUNTER
----- Message from Ulisesjohn Franz sent at 6/27/2022  1:48 PM CDT -----  Contact: pt  Type: Requesting to speak with nurse        Who Called: PT  Regarding: would like a call back regarding her appointment needed for Injection-steroid-epidural-caudal   Would the patient rather a call back or a response via [facility]? Call back  Best Call Back Number: [phone]  Additional Information:

## 2022-06-28 ENCOUNTER — TELEPHONE (OUTPATIENT)
Dept: ORTHOPEDICS | Facility: CLINIC | Age: 67
End: 2022-06-28
Payer: MEDICARE

## 2022-06-28 ENCOUNTER — TELEPHONE (OUTPATIENT)
Dept: PAIN MEDICINE | Facility: CLINIC | Age: 67
End: 2022-06-28
Payer: MEDICARE

## 2022-06-28 RX ORDER — DIAZEPAM 5 MG/1
TABLET ORAL
Qty: 30 TABLET | Refills: 1 | OUTPATIENT
Start: 2022-06-28

## 2022-06-28 NOTE — TELEPHONE ENCOUNTER
----- Message from Mayda Santana sent at 6/28/2022  2:32 PM CDT -----  Type:  Patient  Call    Who Called: Pt   Would the patient rather a call back or a response via [facility]? Call back   Best Call Back Number: [phone]  Additional Information:  pt wants a call from DR Santiago     Pt is requesting refill of diazepam

## 2022-06-28 NOTE — TELEPHONE ENCOUNTER
----- Message from Mayda Santana sent at 6/28/2022  2:34 PM CDT -----  Type:  Patient Returning Call    Who Called: Pt   Would the patient rather a call back or a response via [facility]? Call back   Best Call Back Number: [phone]  Additional Information:  about her right leg and orders for a a mobility scooter

## 2022-06-28 NOTE — TELEPHONE ENCOUNTER
No new care gaps identified.  [facility] Embedded Care Gaps. Reference number: [id number]. 6/28/2022   2:35:54 PM CDT

## 2022-06-28 NOTE — TELEPHONE ENCOUNTER
----- Message from Mayda Santana sent at 6/28/2022  2:34 PM CDT -----  Type:  Patient Returning Call    Who Called: Pt   Would the patient rather a call back or a response via [facility]? Call back   Best Call Back Number: [phone]  Additional Information:  about her right leg and orders for a a mobility scooter

## 2022-06-28 NOTE — TELEPHONE ENCOUNTER
----- Message from Mayda Santana sent at 6/28/2022  2:29 PM CDT -----  Type:  RX Refill Request    Who Called: PT   Refill or New Rx: refill   RX Name and Strength: diazePAM (VALIUM) 5 MG tablet  How is the patient currently taking it? (ex. 1XDay): as needed   Is this a 30 day or 90 day RX: 30   Preferred Pharmacy with phone number: [facility] #[id number] [address] [facility]   Phone: [phone]  Fax:  [phone]  Would the patient rather a call back or a response via [facility]?call back   Best Call Back Number: [phone]  Additional Information:

## 2022-06-29 ENCOUNTER — HOSPITAL ENCOUNTER (OUTPATIENT)
Facility: HOSPITAL | Age: 67
Discharge: HOME | End: 2022-06-29
Attending: PHYSICAL MEDICINE & REHABILITATION | Admitting: PHYSICAL MEDICINE & REHABILITATION
Payer: MEDICARE

## 2022-06-29 VITALS
SYSTOLIC BLOOD PRESSURE: 145 MMHG | WEIGHT: 190 LBS | RESPIRATION RATE: 14 BRPM | BODY MASS INDEX: 37.3 KG/M2 | HEART RATE: 67 BPM | OXYGEN SATURATION: 100 % | DIASTOLIC BLOOD PRESSURE: 63 MMHG | HEIGHT: 60 IN | TEMPERATURE: 97 F

## 2022-06-29 DIAGNOSIS — M54.16 LUMBAR RADICULOPATHY: Primary | ICD-10-CM

## 2022-06-29 DIAGNOSIS — R52 PAIN: ICD-10-CM

## 2022-06-29 DIAGNOSIS — M43.27 FUSION OF SPINE OF LUMBOSACRAL REGION: ICD-10-CM

## 2022-06-29 LAB — POCT GLUCOSE: 82 MG/DL (ref 70–110)

## 2022-06-29 PROCEDURE — 25000003 PHARM REV CODE 250: Performed by: PHYSICAL MEDICINE & REHABILITATION

## 2022-06-29 PROCEDURE — 62323 NJX INTERLAMINAR LMBR/SAC: CPT | Mod: ,,, | Performed by: PHYSICAL MEDICINE & REHABILITATION

## 2022-06-29 PROCEDURE — 62323 PR INJ LUMBAR/SACRAL, W/IMAGING GUIDANCE: ICD-10-PCS | Mod: ,,, | Performed by: PHYSICAL MEDICINE & REHABILITATION

## 2022-06-29 PROCEDURE — 63600175 PHARM REV CODE 636 W HCPCS: Performed by: PHYSICAL MEDICINE & REHABILITATION

## 2022-06-29 PROCEDURE — 25500020 PHARM REV CODE 255: Performed by: PHYSICAL MEDICINE & REHABILITATION

## 2022-06-29 PROCEDURE — 62323 NJX INTERLAMINAR LMBR/SAC: CPT | Performed by: PHYSICAL MEDICINE & REHABILITATION

## 2022-06-29 RX ORDER — LIDOCAINE HYDROCHLORIDE 20 MG/ML
INJECTION, SOLUTION EPIDURAL; INFILTRATION; INTRACAUDAL; PERINEURAL
Status: DISCONTINUED | OUTPATIENT
Start: 2022-06-29 | End: 2022-06-29 | Stop reason: HOSPADM

## 2022-06-29 RX ORDER — LIDOCAINE HYDROCHLORIDE 10 MG/ML
INJECTION INFILTRATION; PERINEURAL
Status: DISCONTINUED | OUTPATIENT
Start: 2022-06-29 | End: 2022-06-29 | Stop reason: HOSPADM

## 2022-06-29 RX ORDER — DEXAMETHASONE SODIUM PHOSPHATE 4 MG/ML
INJECTION, SOLUTION INTRA-ARTICULAR; INTRALESIONAL; INTRAMUSCULAR; INTRAVENOUS; SOFT TISSUE
Status: DISCONTINUED | OUTPATIENT
Start: 2022-06-29 | End: 2022-06-29 | Stop reason: HOSPADM

## 2022-06-29 RX ORDER — LIDOCAINE HYDROCHLORIDE 10 MG/ML
INJECTION, SOLUTION EPIDURAL; INFILTRATION; INTRACAUDAL; PERINEURAL
Status: DISCONTINUED | OUTPATIENT
Start: 2022-06-29 | End: 2022-06-29 | Stop reason: HOSPADM

## 2022-06-29 NOTE — H&P
HPI  Patient presenting for Procedure(s) (LRB):  Injection-steroid-epidural-caudal (N/A)     Patient on Anti-coagulation Yes Aspirin    No health changes since previous encounter. No changes in pain since procedure was scheduled at previous visit. Denies any fevers or infections.     No current facility-administered medications on file prior to encounter.     Current Outpatient Medications on File Prior to Encounter   Medication Sig Dispense Refill    atorvastatin (LIPITOR) 40 MG tablet TAKE 1 TABLET(40 MG) BY MOUTH EVERY DAY FOR CHOLESTEROL 90 tablet 3    buPROPion (WELLBUTRIN XL) 150 MG TB24 tablet Take 1 tablet (150 mg total) by mouth once daily. 30 tablet 11    busPIRone (BUSPAR) 10 MG tablet TAKE 1/2 TABLET BY MOUTH ONCE DAILY (Patient taking differently: 10 mg. Pt is taking 10 mg once daily) 60 tablet 3    diazePAM (VALIUM) 5 MG tablet TAKE1  BY MOUTH EVERY 24 HOURS AS NEEDED FOR ANXIETY 30 tablet 1    EScitalopram oxalate (LEXAPRO) 20 MG tablet TAKE 1 TABLET(20 MG) BY MOUTH EVERY DAY 90 tablet 3    gabapentin (NEURONTIN) 100 MG capsule Take 1 capsule (100 mg total) by mouth 3 (three) times daily. 90 capsule 11    ibandronate (BONIVA) 150 mg tablet TAKE 1 TABLET BY MOUTH EVERY 30 DAYS FOR OSTEOPOROSIS 3 tablet 3    levothyroxine (SYNTHROID) 100 MCG tablet Take 1 tablet (100 mcg total) by mouth once daily. 90 tablet 3    methocarbamoL (ROBAXIN) 750 MG Tab Take 1 tablet (750 mg total) by mouth 3 (three) times daily as needed (muscle spasms). 60 tablet 0    omeprazole (PRILOSEC) 40 MG capsule Take 1 capsule (40 mg total) by mouth every morning. 90 capsule 1    potassium chloride (KLOR-CON) 10 MEQ TbSR The day you take lasix 90 tablet 0    albuterol (VENTOLIN HFA) 90 mcg/actuation inhaler INHALE 2 PUFFS INTO THE LUNGS EVERY 4 HOURS AS NEEDED FOR WHEEZING 18 g 3    aspirin (ECOTRIN) 81 MG EC tablet Take 1 tablet (81 mg total) by mouth once daily.  0    diabetic supplies, miscellan. Misc Lancets for  1-2 times a day testing    dispense brand covered by insurance t (Patient taking differently: Lancets for 1-2 times a day testing    dispense brand covered by insurance t) 60 each 3    diclofenac sodium (VOLTAREN) 1 % Gel APPLY 2 GRAMS EXTERNALLY TO THE AFFECTED AREA FOUR TIMES DAILY 300 g 5    ergocalciferol (ERGOCALCIFEROL) 50,000 unit Cap Take 1 capsule (50,000 Units total) by mouth every 7 days. 12 capsule 3    furosemide (LASIX) 20 MG tablet Take 1 tablet (20 mg total) by mouth daily as needed. 30 tablet 3    glipiZIDE (GLUCOTROL) 2.5 MG TR24 TAKE 1 TABLET(2.5 MG) BY MOUTH DAILY WITH BREAKFAST 90 tablet 3    promethazine (PHENERGAN) 25 MG tablet TAKE 1 TABLET(25 MG) BY MOUTH EVERY 6 HOURS AS NEEDED 25 tablet 0    TRUE METRIX GLUCOSE TEST STRIP Strp TO TEST ONCE TO TWICE DAILY 50 strip 11    TRUE METRIX GLUCOSE TEST STRIP Strp TEST BLOOD SUGAR EVERY  strip 3    TRUE METRIX GO GLUCOSE METER Misc USE AS DIRECTED 1 each 0    TRUEPLUS LANCETS 30 gauge Misc USE TO TEST ONCE TO TWICE D      valACYclovir (VALTREX) 1000 MG tablet Take 1 tablet (1,000 mg total) by mouth 3 (three) times daily. 21 tablet 0     Past Medical History:   Diagnosis Date    Anticoagulant long-term use     Arthritis     Asthma     CHF (congestive heart failure)     [facility]    Colon cancer     colon    COPD (chronic obstructive pulmonary disease)     Coronary artery disease     Depression     Diabetes mellitus, type 2     GERD (gastroesophageal reflux disease)     Myocardial infarction     AGE 19 MIGUELANGEL WITHBABY DELIVERY    Thyroid disease      Past Surgical History:   Procedure Laterality Date    ABDOMINAL SURGERY       SECTION      CHOLECYSTECTOMY      COLON SURGERY      COLONOSCOPY      --- repeat in 3-5 years    COLONOSCOPY N/A 2017    Procedure: COLONOSCOPY;  Surgeon: Corrina Flores MD;  Location: [facility];  Service: Endoscopy;  Laterality: N/A;    COLONOSCOPY N/A 4/10/2018     Procedure: COLONOSCOPY golytely;  Surgeon: Corrina Flores MD;  Location: [facility] ENDO;  Service: Endoscopy;  Laterality: N/A;    DECOMPRESSION OF CERVICAL SPINE BY ANTERIOR APPROACH WITH FUSION N/A 12/14/2021    Procedure: DECOMPRESSION AND FUSION, SPINE, CERVICAL, ANTERIOR APPROACH C3-4 C5- 6 C6-7 ACDF;  Surgeon: Ishaan Fisher MD;  Location: [facility] OR;  Service: Neurosurgery;  Laterality: N/A;    ECTOPIC PREGNANCY SURGERY      EPIDURAL STEROID INJECTION INTO LUMBAR SPINE N/A 11/4/2020    Procedure: Injection-steroid-epidural-lumbar--L5-S1 InterLaminar;  Surgeon: Nilam Santiago MD;  Location: [facility] PAIN MGT;  Service: Pain Management;  Laterality: N/A;    ESOPHAGOGASTRODUODENOSCOPY N/A 2/28/2019    Procedure: ESOPHAGOGASTRODUODENOSCOPY (EGD);  Surgeon: Corrina Flores MD;  Location: [facility] ENDO;  Service: Endoscopy;  Laterality: N/A;    FRACTURE SURGERY      left leg    FUSION OF SPINE WITH INSTRUMENTATION Left 1/11/2021    Procedure: FUSION, SPINE, WITH INSTRUMENTATION Stage 1 Left L4-5 OLIF DORA Stage 2 L4-5 Laminectomy, medial Facetectomy, foraminotomy, L4-5 MIS Instrumentation;  Surgeon: Ishaan Fisher MD;  Location: [facility];  Service: Neurosurgery;  Laterality: Left;  Procedure: Stage 1 Left L4-5 OLIF DORA  Stage 2 L4-5 Laminectomy, medial Facetectomy, foraminotomy, L4-5 MIS Instrumentation  Surgery TIme: 4 Hrs    GASTRIC BYPASS      HERNIA REPAIR      INJECTION OF ANESTHETIC AGENT AROUND GENITOFEMORAL NERVE Left 7/29/2020    Procedure: BLOCK, NERVE, LEFT SHOULDER ARTICULAR;  Surgeon: Nilam Santiago MD;  Location: [facility] PAIN [facility]GT;  Service: Pain Management;  Laterality: Left;    JOINT REPLACEMENT      KNEE ARTHROPLASTY Left 5/8/2019    Procedure: ARTHROPLASTY, KNEE;  Surgeon: Roldan Greenwood MD;  Location: [facility];  Service: Orthopedics;  Laterality: Left;  [facility]id notified    KNEE ARTHROPLASTY Right 11/20/2019    Procedure: ARTHROPLASTY, KNEE;  Surgeon: Roldan Greenwood MD;  Location: [facility] OR;   Service: Orthopedics;  Laterality: Right;  Navid notified, confirmed case Navid 11/19/19 KB [id number]    OOPHORECTOMY      RADIOFREQUENCY THERMOCOAGULATION Left 8/12/2020    Procedure: RADIOFREQUENCY THERMAL COAGULATION--Left Suprascapular, Axillary, and Lateral Pectoral Articular Branch RFA;  Surgeon: Nilam Santiago MD;  Location: [facility] PAIN MGT;  Service: Pain Management;  Laterality: Left;    TONSILLECTOMY      TRANSFORAMINAL EPIDURAL INJECTION OF STEROID Right 8/25/2021    Procedure: Injection,steroid,epidural,transforaminal approach; Levels: L5;  Surgeon: Nilam Santiago MD;  Location: [facility] PAIN MGT;  Service: Pain Management;  Laterality: Right;  No pacemaker. Patient is diabetic. Patient is taking Aspirin but can continue per Dr. Santiago.     TUBAL LIGATION       Review of patient's allergies indicates:   Allergen Reactions    Adhesive      Adhesive tape    Latex, natural rubber      Adhesive from latex tape    Ibuprofen Other (See Comments)     Causes asthma flare??      No current facility-administered medications for this encounter.       PMHx, PSHx, Allergies, Medications reviewed in epic    ROS negative except pain complaints in HPI    OBJECTIVE:    /73   Pulse 74   Temp 97.2 °F (36.2 °C) (Skin)   Resp 18   Ht 5' (1.524 m)   Wt 86.2 kg (190 lb)   SpO2 98%   Breastfeeding No   BMI 37.11 kg/m²     PHYSICAL EXAMINATION:    GENERAL: Well appearing, in no acute distress, alert and oriented.  PSYCH:  Mood and affect appropriate.  SKIN: Skin color, texture, turgor normal in procedure area, no rashes or lesions which will impact the procedure.  CV: RRR with palpation of the radial artery.  PULM: No evidence of respiratory difficulty, symmetric chest rise. Clear to auscultation.  NEURO: Alert. Oriented. Speech fluent and appropriate. Moving all extremities.    Plan:    Proceed with procedure as planned Procedure(s) (LRB):  Injection-steroid-epidural-caudal (N/A)    Jarad MURPHY  Jonathan  06/29/2022

## 2022-06-29 NOTE — PLAN OF CARE
Pt denies pain or discomfort @ this time. Discharge instructions reviewed with patient, with time allotted for questions. Pt verbalizes understanding of instructions and states they have no further questions @ this time. Procedure site is clean, dry and intact. Band-aids in place. Vital signs are stable. No acute distress noted. Staff is at bedside to assist patient. Wheeled to discharge area. Home with family in private vehicle.

## 2022-06-29 NOTE — DISCHARGE SUMMARY
[facility]  Discharge Note  Short Stay     Admit Date: 6/29/2022    Discharge Date: 6/29/2022     Attending Physician: Nilam Santiago M.D.    Diagnoses:  Active Hospital Problems    Diagnosis  POA    *Lumbar radiculopathy [M54.16]  Yes      Resolved Hospital Problems   No resolved problems to display.     Discharged Condition: Good     Hospital Course: Patient was admitted for an outpatient interventional pain management procedure and tolerated the procedure well with no complications.     Final Diagnoses: Same as principal problem.     Disposition: Home or Self Care     Follow up/Patient Instructions:   Follow-up in 1-2 weeks unless otherwise instructed. May return sooner as needed.       Reconciled Medications:     Medication List      CHANGE how you take these medications    busPIRone 10 MG tablet  Commonly known as: BUSPAR  TAKE 1/2 TABLET BY MOUTH ONCE DAILY  What changed:   · how much to take  · how to take this  · when to take this  · additional instructions        CONTINUE taking these medications    albuterol 90 mcg/actuation inhaler  Commonly known as: VENTOLIN HFA  INHALE 2 PUFFS INTO THE LUNGS EVERY 4 HOURS AS NEEDED FOR WHEEZING     aspirin 81 MG EC tablet  Commonly known as: ECOTRIN  Take 1 tablet (81 mg total) by mouth once daily.     atorvastatin 40 MG tablet  Commonly known as: LIPITOR  TAKE 1 TABLET(40 MG) BY MOUTH EVERY DAY FOR CHOLESTEROL     buPROPion 150 MG TB24 tablet  Commonly known as: WELLBUTRIN XL  Take 1 tablet (150 mg total) by mouth once daily.     diabetic supplies, miscellan. Misc  Lancets for 1-2 times a day testing    dispense brand covered by insurance t     diazePAM 5 MG tablet  Commonly known as: VALIUM  TAKE1  BY MOUTH EVERY 24 HOURS AS NEEDED FOR ANXIETY     diclofenac sodium 1 % Gel  Commonly known as: VOLTAREN  APPLY 2 GRAMS EXTERNALLY TO THE AFFECTED AREA FOUR TIMES DAILY     ergocalciferol 50,000 unit Cap  Commonly known as: ERGOCALCIFEROL  Take 1 capsule (50,000  Units total) by mouth every 7 days.     EScitalopram oxalate 20 MG tablet  Commonly known as: LEXAPRO  TAKE 1 TABLET(20 MG) BY MOUTH EVERY DAY     furosemide 20 MG tablet  Commonly known as: LASIX  Take 1 tablet (20 mg total) by mouth daily as needed.     gabapentin 100 MG capsule  Commonly known as: NEURONTIN  Take 1 capsule (100 mg total) by mouth 3 (three) times daily.     ibandronate 150 mg tablet  Commonly known as: BONIVA  TAKE 1 TABLET BY MOUTH EVERY 30 DAYS FOR OSTEOPOROSIS     levothyroxine 100 MCG tablet  Commonly known as: SYNTHROID  Take 1 tablet (100 mcg total) by mouth once daily.     methocarbamoL 750 MG Tab  Commonly known as: ROBAXIN  Take 1 tablet (750 mg total) by mouth 3 (three) times daily as needed (muscle spasms).     omeprazole 40 MG capsule  Commonly known as: PRILOSEC  Take 1 capsule (40 mg total) by mouth every morning.     oxyCODONE-acetaminophen  mg per tablet  Commonly known as: PERCOCET  Take 1 tablet by mouth every 6 (six) hours as needed for Pain.     potassium chloride 10 MEQ Tbsr  Commonly known as: KLOR-CON  The day you take lasix     promethazine 25 MG tablet  Commonly known as: PHENERGAN  TAKE 1 TABLET(25 MG) BY MOUTH EVERY 6 HOURS AS NEEDED     * TRUE METRIX GLUCOSE TEST STRIP Strp  Generic drug: blood sugar diagnostic  TO TEST ONCE TO TWICE DAILY     * TRUE METRIX GLUCOSE TEST STRIP Strp  Generic drug: blood sugar diagnostic  TEST BLOOD SUGAR EVERY DAY     TRUE METRIX GO GLUCOSE METER Misc  Generic drug: blood-glucose meter  USE AS DIRECTED     TRUEPLUS LANCETS 30 gauge Misc  Generic drug: lancets  USE TO TEST ONCE TO TWICE D     valACYclovir 1000 MG tablet  Commonly known as: VALTREX  Take 1 tablet (1,000 mg total) by mouth 3 (three) times daily.     zolpidem 5 MG Tab  Commonly known as: AMBIEN  TAKE 1 TABLET(5 MG) BY MOUTH EVERY NIGHT         * This list has 2 medication(s) that are the same as other medications prescribed for you. Read the directions carefully, and  ask your doctor or other care provider to review them with you.            ASK your doctor about these medications    glipiZIDE 2.5 MG Tr24  Commonly known as: GLUCOTROL  TAKE 1 TABLET(2.5 MG) BY MOUTH DAILY WITH BREAKFAST           Discharge Procedure Orders (must include Diet, Follow-up, Activity)   Ice to affected area   Order Comments: 20 minutes of ice or until area numb to the touch if area is sore 2-3 times per day as needed     No driving until:   Order Comments: Until following day     No dressing needed     Notify your health care provider if you experience any of the following:  temperature >100.4     Notify your health care provider if you experience any of the following:  persistent nausea and vomiting or diarrhea     Notify your health care provider if you experience any of the following:  severe uncontrolled pain     Notify your health care provider if you experience any of the following:  redness, tenderness, or signs of infection (pain, swelling, redness, odor or green/yellow discharge around incision site)     Notify your health care provider if you experience any of the following:  difficulty breathing or increased cough     Notify your health care provider if you experience any of the following:  severe persistent headache     Notify your health care provider if you experience any of the following:  worsening rash     Notify your health care provider if you experience any of the following:  persistent dizziness, light-headedness, or visual disturbances     Notify your health care provider if you experience any of the following:  increased confusion or weakness     Shower on day dressing removed (No bath)       Nilam Santiago M.D.  Interventional Pain Medicine / Physical Medicine & Rehabilitation

## 2022-06-29 NOTE — TELEPHONE ENCOUNTER
----- Message from Rachelle Barnard sent at 6/29/2022 10:51 AM CDT -----  Contact: [phone]  Type:  RX Refill Request    Who Called: pt called   Refill or New Rx:refill  RX Name and Strength:oxyCODONE-acetaminophen (PERCOCET)  mg per tablet  How is the patient currently taking it? (ex. 1XDay)  Is this a 30 day or 90 day RX:30 days  Preferred Pharmacy with phone number:[id number] [place] [id number] AT [facility]  Local or Mail Order:local  Ordering Provider:Dr. Fisher  Would the patient rather a call back or a response via [facility]? Call back  Best Call Back Number:[phone]  Additional Information:

## 2022-06-29 NOTE — OP NOTE
Caudal Epidural Steroid Injection Under Fluoroscopic Guidance:  I have reviewed the patient's medications, allergies and relevant histories prior to the procedure and no contraindications have been identified. The risks, benefits and alternatives to the procedure were discussed with the patient, and all questions regarding the procedure were answered to the patient's satisfaction. I personally obtained Christine's consent prior to the start of the procedure and the signed consent can be found in the patient's chart.                                                         Time-out was taken to identify patient, procedure, laterality, and allergies prior to starting the procedure.       Date of Service: 06/29/2022  Procedure: Caudal epidural steroid injection under fluoroscopy with insertion of flexible catheter  Pre-Operative Diagnosis: Lumbar Radiculopathy  Post-Operative Diagnosis: Lumbar Radiculopathy    Physician: Nilam Santiago M.D.  Assistants: Jarad Castillo M.D.      Medications Injected: Preservative-free dexamethasone 10mg/mL, and preservative free Lidocaine 1% MPF 5mL.  Local Anesthetic: Xylocaine 1% 10 mL.   Sedation Medications: None.     Procedural Technique: Laying in the prone position, the patient was prepped and draped in the usual sterile fashion using ChloraPrep and fenestrated drape.  Appropriate anatomic landmarks were determined including the superior LS-spine and sacral hiatus.  Local anesthetic was given by raising a wheel and going down to the periosteum.  A 3.5 in 16-gauge spinal needle was introduced thru the sacral hiatus.  The flexible catheter threaded through to the desired levels. Omnipaque was injected after negative aspiration to confirm placement in the appropriate area and that there was no vascular runoff.  The medication was then injected slowly.  Needle was removed and bandage applied.     Estimated Blood Loss:  None.  Complications:  None.     Disposition: The patient tolerated  the procedure well, and there were no apparent complications. Vital signs remained stable throughout the procedure. The patient was taken to the recovery area and monitored after the procedure. The patient was given written discharge instructions for the procedure. If helpful, we can repeat as needed. The patient was discharged in a stable condition.    Follow-Up: We will see the patient back in 1-2 weeks or the patient may call to inform of status.

## 2022-06-30 RX ORDER — OXYCODONE AND ACETAMINOPHEN 10; 325 MG/1; MG/1
1 TABLET ORAL EVERY 6 HOURS PRN
Qty: 60 TABLET | Refills: 0 | Status: SHIPPED | OUTPATIENT
Start: 2022-06-30 | End: 2022-07-13 | Stop reason: SDUPTHER

## 2022-07-08 ENCOUNTER — OFFICE VISIT (OUTPATIENT)
Dept: FAMILY MEDICINE | Facility: CLINIC | Age: 67
End: 2022-07-08
Payer: MEDICARE

## 2022-07-08 VITALS
BODY MASS INDEX: 38.79 KG/M2 | DIASTOLIC BLOOD PRESSURE: 60 MMHG | SYSTOLIC BLOOD PRESSURE: 100 MMHG | HEIGHT: 60 IN | OXYGEN SATURATION: 98 % | TEMPERATURE: 98 F | HEART RATE: 75 BPM | WEIGHT: 197.56 LBS

## 2022-07-08 DIAGNOSIS — M43.27 FUSION OF SPINE OF LUMBOSACRAL REGION: ICD-10-CM

## 2022-07-08 DIAGNOSIS — E11.9 TYPE 2 DIABETES MELLITUS WITHOUT COMPLICATION, WITHOUT LONG-TERM CURRENT USE OF INSULIN: ICD-10-CM

## 2022-07-08 DIAGNOSIS — R29.898 WEAKNESS OF BOTH LEGS: Primary | ICD-10-CM

## 2022-07-08 DIAGNOSIS — R73.9 HYPERGLYCEMIA: ICD-10-CM

## 2022-07-08 PROCEDURE — 1157F PR ADVANCE CARE PLAN OR EQUIV PRESENT IN MEDICAL RECORD: ICD-10-PCS | Mod: CPTII,S$GLB,, | Performed by: FAMILY MEDICINE

## 2022-07-08 PROCEDURE — 3044F HG A1C LEVEL LT 7.0%: CPT | Mod: CPTII,S$GLB,, | Performed by: FAMILY MEDICINE

## 2022-07-08 PROCEDURE — 1125F PR PAIN SEVERITY QUANTIFIED, PAIN PRESENT: ICD-10-PCS | Mod: CPTII,S$GLB,, | Performed by: FAMILY MEDICINE

## 2022-07-08 PROCEDURE — 3074F PR MOST RECENT SYSTOLIC BLOOD PRESSURE < 130 MM HG: ICD-10-PCS | Mod: CPTII,S$GLB,, | Performed by: FAMILY MEDICINE

## 2022-07-08 PROCEDURE — 3008F BODY MASS INDEX DOCD: CPT | Mod: CPTII,S$GLB,, | Performed by: FAMILY MEDICINE

## 2022-07-08 PROCEDURE — 99214 PR OFFICE/OUTPT VISIT, EST, LEVL IV, 30-39 MIN: ICD-10-PCS | Mod: S$GLB,,, | Performed by: FAMILY MEDICINE

## 2022-07-08 PROCEDURE — 99499 UNLISTED E&M SERVICE: CPT | Mod: S$GLB,,, | Performed by: FAMILY MEDICINE

## 2022-07-08 PROCEDURE — 1100F PTFALLS ASSESS-DOCD GE2>/YR: CPT | Mod: CPTII,S$GLB,, | Performed by: FAMILY MEDICINE

## 2022-07-08 PROCEDURE — 1100F PR PT FALLS ASSESS DOC 2+ FALLS/FALL W/INJURY/YR: ICD-10-PCS | Mod: CPTII,S$GLB,, | Performed by: FAMILY MEDICINE

## 2022-07-08 PROCEDURE — 1159F MED LIST DOCD IN RCRD: CPT | Mod: CPTII,S$GLB,, | Performed by: FAMILY MEDICINE

## 2022-07-08 PROCEDURE — 1125F AMNT PAIN NOTED PAIN PRSNT: CPT | Mod: CPTII,S$GLB,, | Performed by: FAMILY MEDICINE

## 2022-07-08 PROCEDURE — 3078F PR MOST RECENT DIASTOLIC BLOOD PRESSURE < 80 MM HG: ICD-10-PCS | Mod: CPTII,S$GLB,, | Performed by: FAMILY MEDICINE

## 2022-07-08 PROCEDURE — 1159F PR MEDICATION LIST DOCUMENTED IN MEDICAL RECORD: ICD-10-PCS | Mod: CPTII,S$GLB,, | Performed by: FAMILY MEDICINE

## 2022-07-08 PROCEDURE — 99214 OFFICE O/P EST MOD 30 MIN: CPT | Mod: S$GLB,,, | Performed by: FAMILY MEDICINE

## 2022-07-08 PROCEDURE — 99499 RISK ADDL DX/OHS AUDIT: ICD-10-PCS | Mod: S$GLB,,, | Performed by: FAMILY MEDICINE

## 2022-07-08 PROCEDURE — 3074F SYST BP LT 130 MM HG: CPT | Mod: CPTII,S$GLB,, | Performed by: FAMILY MEDICINE

## 2022-07-08 PROCEDURE — 3288F PR FALLS RISK ASSESSMENT DOCUMENTED: ICD-10-PCS | Mod: CPTII,S$GLB,, | Performed by: FAMILY MEDICINE

## 2022-07-08 PROCEDURE — 3288F FALL RISK ASSESSMENT DOCD: CPT | Mod: CPTII,S$GLB,, | Performed by: FAMILY MEDICINE

## 2022-07-08 PROCEDURE — 3044F PR MOST RECENT HEMOGLOBIN A1C LEVEL <7.0%: ICD-10-PCS | Mod: CPTII,S$GLB,, | Performed by: FAMILY MEDICINE

## 2022-07-08 PROCEDURE — 3008F PR BODY MASS INDEX (BMI) DOCUMENTED: ICD-10-PCS | Mod: CPTII,S$GLB,, | Performed by: FAMILY MEDICINE

## 2022-07-08 PROCEDURE — 3078F DIAST BP <80 MM HG: CPT | Mod: CPTII,S$GLB,, | Performed by: FAMILY MEDICINE

## 2022-07-08 PROCEDURE — 1157F ADVNC CARE PLAN IN RCRD: CPT | Mod: CPTII,S$GLB,, | Performed by: FAMILY MEDICINE

## 2022-07-08 RX ORDER — IBANDRONATE SODIUM 150 MG/1
TABLET, FILM COATED ORAL
Qty: 3 TABLET | Refills: 3 | Status: SHIPPED | OUTPATIENT
Start: 2022-07-08 | End: 2022-07-20

## 2022-07-08 RX ORDER — DIAZEPAM 5 MG/1
TABLET ORAL
Qty: 30 TABLET | Refills: 1 | Status: SHIPPED | OUTPATIENT
Start: 2022-07-08 | End: 2022-08-29

## 2022-07-08 RX ORDER — SODIUM, POTASSIUM,MAG SULFATES 17.5-3.13G
SOLUTION, RECONSTITUTED, ORAL ORAL
COMMUNITY
Start: 2022-07-01

## 2022-07-08 RX ORDER — BUPROPION HYDROCHLORIDE 300 MG/1
300 TABLET ORAL DAILY
Qty: 90 TABLET | Refills: 3 | Status: SHIPPED | OUTPATIENT
Start: 2022-07-08 | End: 2023-07-08

## 2022-07-08 RX ORDER — ALBUTEROL SULFATE 90 UG/1
AEROSOL, METERED RESPIRATORY (INHALATION)
Qty: 18 G | Refills: 3 | Status: SHIPPED | OUTPATIENT
Start: 2022-07-08 | End: 2023-10-02

## 2022-07-10 NOTE — PROGRESS NOTES
Patient ID: Christine Castro is a 67 y.o. female.    Chief Complaint: Dizziness, Fall, and Blood Sugar Problem    HPI      Christine Castro is a 67 y.o. female here with complaints of pain in both knees and legs.  States that occasionally because of the pain and debility she has fallen.  No complaints of specific injury from fall.  No head trauma.  No loss of consciousness or fall.  She request motorized scooter because of debility gait disturbance and weakness.  Diabetes mellitus-no complaints of new problems.  Insomnia-uses Ambien p.r.n..    Anxiety-uses Valium p.r.n..  Patient with a continued anxiety not getting this relieved the fully especially due to all the stress she is under.    Vitals:    07/08/22 1440   BP: 100/60   BP Location: Right arm   Patient Position: Sitting   Pulse: 75   Temp: 98 °F (36.7 °C)   SpO2: 98%   Weight: 89.6 kg (197 lb 8.5 oz)   Height: 5' (1.524 m)            Review of Symptoms      Physical Exam    Constitutional:  Oriented to person, place, and time.appears well-developed and well-nourished.  No distress.      HENT  Head: Normocephalic and atraumatic  Right Ear: External ear normal.   Left Ear: External ear normal.   Nose: External nose normal.   Mouth:  Moist mucus membranes.    Eyes:  Conjunctivae are normal. Right eye exhibits no discharge.  Left eye exhibits no discharge. No scleral icterus.  No periorbital edema    Cardiovascular:  Regular rate and rhythm with normal S1 and S2     Pulmonary/Chest:   Clear to auscultation bilaterally without wheezes, rhonchi or rales      Musculoskeletal:  No edema. No obvious deformity No wasting       Neurological:  Alert and oriented to person, place, and time.   Difficulty rising from a seated position-walks with cane flexed at lumbar region.    Skin:   Skin is warm and dry.  No diaphoresis.   No rash noted.     Psychiatric: Normal mood and affect. Behavior is normal.  Judgment and thought content normal.     Complete Blood Count  Lab  Results   Component Value Date    RBC 3.38 (L) 04/21/2022    HGB 10.0 (L) 04/21/2022    HCT 31.7 (L) 04/21/2022    MCV 94 04/21/2022    MCH 29.6 04/21/2022    MCHC 31.5 (L) 04/21/2022    RDW 15.1 (H) 04/21/2022     04/21/2022    MPV 10.6 04/21/2022    GRAN 3.1 04/21/2022    GRAN 63.0 04/21/2022    LYMPH 1.3 04/21/2022    LYMPH 27.0 04/21/2022    MONO 0.2 (L) 04/21/2022    MONO 3.9 (L) 04/21/2022    EOS 0.3 04/21/2022    BASO 0.03 04/21/2022    EOSINOPHIL 5.3 04/21/2022    BASOPHIL 0.6 04/21/2022    DIFFMETHOD Automated 04/21/2022       Comprehensive Metabolic Panel  Lab Results   Component Value Date     (H) 04/21/2022    BUN 14 04/21/2022    CREATININE 0.69 04/21/2022     04/21/2022    K 3.4 (L) 04/21/2022     04/21/2022    PROT 6.9 04/21/2022    ALBUMIN 3.8 04/21/2022    BILITOT 0.2 04/21/2022    AST 25 04/21/2022    ALKPHOS 72 04/21/2022    CO2 21 (L) 04/21/2022    ALT 19 04/21/2022    ANIONGAP 12 04/21/2022    EGFRNONAA >60.0 04/21/2022    ESTGFRAFRICA >60.0 04/21/2022       TSH  Lab Results   Component Value Date    TSH 1.250 04/21/2022       Assessment / Plan:      ICD-10-CM ICD-9-CM   1. Weakness of both legs  R29.898 729.89   2. Fusion of spine of lumbosacral region  M43.27 724.6   3. Hyperglycemia  R73.9 790.29   4. Type 2 diabetes mellitus without complication, without long-term current use of insulin  E11.9 250.00     Weakness of both legs  -     Ambulatory referral/consult to Physical Medicine Rehab; Future; Expected date: 07/15/2022  -     Hemoglobin A1C; Standing  -     Comprehensive Metabolic Panel; Future; Expected date: 07/08/2022  -     Lipid Panel; Future; Expected date: 07/08/2022  -     CBC Auto Differential; Future; Expected date: 07/08/2022    Fusion of spine of lumbosacral region  -     Ambulatory referral/consult to Physical Medicine Rehab; Future; Expected date: 07/15/2022  -     Hemoglobin A1C; Standing  -     Comprehensive Metabolic Panel; Future; Expected date:  07/08/2022  -     Lipid Panel; Future; Expected date: 07/08/2022  -     CBC Auto Differential; Future; Expected date: 07/08/2022    Hyperglycemia  -     Hemoglobin A1C; Standing  -     Comprehensive Metabolic Panel; Future; Expected date: 07/08/2022  -     Lipid Panel; Future; Expected date: 07/08/2022  -     CBC Auto Differential; Future; Expected date: 07/08/2022    Type 2 diabetes mellitus without complication, without long-term current use of insulin  -     Hemoglobin A1C; Standing  -     Comprehensive Metabolic Panel; Future; Expected date: 07/08/2022  -     Lipid Panel; Future; Expected date: 07/08/2022  -     CBC Auto Differential; Future; Expected date: 07/08/2022    Other orders  -     albuterol (VENTOLIN HFA) 90 mcg/actuation inhaler; INHALE 2 PUFFS INTO THE LUNGS EVERY 4 HOURS AS NEEDED FOR WHEEZING  Dispense: 18 g; Refill: 3  -     diazePAM (VALIUM) 5 MG tablet; TAKE1  BY MOUTH EVERY 24 HOURS AS NEEDED FOR ANXIETY  Dispense: 30 tablet; Refill: 1  -     ibandronate (BONIVA) 150 mg tablet; TAKE 1 TABLET BY MOUTH EVERY 30 DAYS FOR OSTEOPOROSIS  Dispense: 3 tablet; Refill: 3  -     buPROPion (WELLBUTRIN XL) 300 MG 24 hr tablet; Take 1 tablet (300 mg total) by mouth once daily.  Dispense: 90 tablet; Refill: 3

## 2022-07-13 DIAGNOSIS — M43.27 FUSION OF SPINE OF LUMBOSACRAL REGION: ICD-10-CM

## 2022-07-13 NOTE — TELEPHONE ENCOUNTER
----- Message from Khris Li sent at 7/12/2022  4:59 PM CDT -----  Contact: pt.  .Type:  Needs Medical Advice    Who Called: pt    Pharmacy name and phone #:  [id number] - [place] [address] AT [facility] [address]   Phone: [phone]  Fax:  [phone]  Would the patient rather a call back or a response via [facility]? Call back  Best Call Back Number: [phone]   Additional Information: Pt. Needs a refill on her oxyCODONE-acetaminophen (PERCOCET)  mg per tablet

## 2022-07-14 RX ORDER — OXYCODONE AND ACETAMINOPHEN 10; 325 MG/1; MG/1
1 TABLET ORAL EVERY 6 HOURS PRN
Qty: 60 TABLET | Refills: 0 | Status: SHIPPED | OUTPATIENT
Start: 2022-07-14 | End: 2022-07-27 | Stop reason: SDUPTHER

## 2022-07-18 ENCOUNTER — PATIENT OUTREACH (OUTPATIENT)
Dept: ADMINISTRATIVE | Facility: OTHER | Age: 67
End: 2022-07-18
Payer: MEDICARE

## 2022-07-18 NOTE — PROGRESS NOTES
[facility]W - Outreach Attempt    Community Health Worker left a voicemail message for follow up outreach attempt to contact MRN [id number] regarding resources and follow up check in.   Community Health Worker to attempt to contact MRN [id number] on 07/22/22

## 2022-07-20 ENCOUNTER — OFFICE VISIT (OUTPATIENT)
Dept: PAIN MEDICINE | Facility: CLINIC | Age: 67
End: 2022-07-20
Payer: MEDICARE

## 2022-07-20 VITALS
SYSTOLIC BLOOD PRESSURE: 109 MMHG | HEART RATE: 76 BPM | DIASTOLIC BLOOD PRESSURE: 71 MMHG | BODY MASS INDEX: 38.58 KG/M2 | WEIGHT: 197.56 LBS

## 2022-07-20 DIAGNOSIS — Z98.1 STATUS POST LUMBAR SPINAL FUSION: ICD-10-CM

## 2022-07-20 DIAGNOSIS — M19.012 PRIMARY OSTEOARTHRITIS OF LEFT SHOULDER: ICD-10-CM

## 2022-07-20 DIAGNOSIS — M54.16 LUMBAR RADICULOPATHY: Primary | ICD-10-CM

## 2022-07-20 DIAGNOSIS — G89.29 CHRONIC LEFT SHOULDER PAIN: ICD-10-CM

## 2022-07-20 DIAGNOSIS — M81.0 OSTEOPOROSIS, UNSPECIFIED OSTEOPOROSIS TYPE, UNSPECIFIED PATHOLOGICAL FRACTURE PRESENCE: Primary | ICD-10-CM

## 2022-07-20 DIAGNOSIS — M51.36 DDD (DEGENERATIVE DISC DISEASE), LUMBAR: ICD-10-CM

## 2022-07-20 DIAGNOSIS — G89.4 CHRONIC PAIN SYNDROME: ICD-10-CM

## 2022-07-20 DIAGNOSIS — M19.019 SHOULDER ARTHRITIS: ICD-10-CM

## 2022-07-20 DIAGNOSIS — M54.12 RADICULOPATHY, CERVICAL REGION: ICD-10-CM

## 2022-07-20 DIAGNOSIS — M43.16 SPONDYLOLISTHESIS AT L4-L5 LEVEL: ICD-10-CM

## 2022-07-20 DIAGNOSIS — M25.612 DECREASED RANGE OF MOTION OF LEFT SHOULDER: ICD-10-CM

## 2022-07-20 DIAGNOSIS — M25.512 CHRONIC LEFT SHOULDER PAIN: ICD-10-CM

## 2022-07-20 PROCEDURE — 1159F PR MEDICATION LIST DOCUMENTED IN MEDICAL RECORD: ICD-10-PCS | Mod: CPTII,S$GLB,, | Performed by: NURSE PRACTITIONER

## 2022-07-20 PROCEDURE — 3044F HG A1C LEVEL LT 7.0%: CPT | Mod: CPTII,S$GLB,, | Performed by: NURSE PRACTITIONER

## 2022-07-20 PROCEDURE — 1157F PR ADVANCE CARE PLAN OR EQUIV PRESENT IN MEDICAL RECORD: ICD-10-PCS | Mod: CPTII,S$GLB,, | Performed by: NURSE PRACTITIONER

## 2022-07-20 PROCEDURE — 1125F PR PAIN SEVERITY QUANTIFIED, PAIN PRESENT: ICD-10-PCS | Mod: CPTII,S$GLB,, | Performed by: NURSE PRACTITIONER

## 2022-07-20 PROCEDURE — 99214 OFFICE O/P EST MOD 30 MIN: CPT | Mod: S$GLB,,, | Performed by: NURSE PRACTITIONER

## 2022-07-20 PROCEDURE — 99499 RISK ADDL DX/OHS AUDIT: ICD-10-PCS | Mod: S$GLB,,, | Performed by: NURSE PRACTITIONER

## 2022-07-20 PROCEDURE — 3008F BODY MASS INDEX DOCD: CPT | Mod: CPTII,S$GLB,, | Performed by: NURSE PRACTITIONER

## 2022-07-20 PROCEDURE — 3074F PR MOST RECENT SYSTOLIC BLOOD PRESSURE < 130 MM HG: ICD-10-PCS | Mod: CPTII,S$GLB,, | Performed by: NURSE PRACTITIONER

## 2022-07-20 PROCEDURE — 3078F PR MOST RECENT DIASTOLIC BLOOD PRESSURE < 80 MM HG: ICD-10-PCS | Mod: CPTII,S$GLB,, | Performed by: NURSE PRACTITIONER

## 2022-07-20 PROCEDURE — 1157F ADVNC CARE PLAN IN RCRD: CPT | Mod: CPTII,S$GLB,, | Performed by: NURSE PRACTITIONER

## 2022-07-20 PROCEDURE — 99499 UNLISTED E&M SERVICE: CPT | Mod: S$GLB,,, | Performed by: NURSE PRACTITIONER

## 2022-07-20 PROCEDURE — 3044F PR MOST RECENT HEMOGLOBIN A1C LEVEL <7.0%: ICD-10-PCS | Mod: CPTII,S$GLB,, | Performed by: NURSE PRACTITIONER

## 2022-07-20 PROCEDURE — 3008F PR BODY MASS INDEX (BMI) DOCUMENTED: ICD-10-PCS | Mod: CPTII,S$GLB,, | Performed by: NURSE PRACTITIONER

## 2022-07-20 PROCEDURE — 1125F AMNT PAIN NOTED PAIN PRSNT: CPT | Mod: CPTII,S$GLB,, | Performed by: NURSE PRACTITIONER

## 2022-07-20 PROCEDURE — 3074F SYST BP LT 130 MM HG: CPT | Mod: CPTII,S$GLB,, | Performed by: NURSE PRACTITIONER

## 2022-07-20 PROCEDURE — 99999 PR PBB SHADOW E&M-EST. PATIENT-LVL IV: ICD-10-PCS | Mod: PBBFAC,,, | Performed by: NURSE PRACTITIONER

## 2022-07-20 PROCEDURE — 99999 PR PBB SHADOW E&M-EST. PATIENT-LVL IV: CPT | Mod: PBBFAC,,, | Performed by: NURSE PRACTITIONER

## 2022-07-20 PROCEDURE — 1159F MED LIST DOCD IN RCRD: CPT | Mod: CPTII,S$GLB,, | Performed by: NURSE PRACTITIONER

## 2022-07-20 PROCEDURE — 99214 PR OFFICE/OUTPT VISIT, EST, LEVL IV, 30-39 MIN: ICD-10-PCS | Mod: S$GLB,,, | Performed by: NURSE PRACTITIONER

## 2022-07-20 PROCEDURE — 3078F DIAST BP <80 MM HG: CPT | Mod: CPTII,S$GLB,, | Performed by: NURSE PRACTITIONER

## 2022-07-20 RX ORDER — ALENDRONATE SODIUM 70 MG/1
70 TABLET ORAL
Qty: 4 TABLET | Refills: 11 | Status: SHIPPED | OUTPATIENT
Start: 2022-07-20 | End: 2023-03-21

## 2022-07-20 NOTE — PROGRESS NOTES
DEXA scan ordered to follow-up on bone density scan done three years ago    Secondly, Boniva edema once a month medication is not covered by insurance so we sent in a new medication called Fosamax to take each week.

## 2022-07-20 NOTE — PROGRESS NOTES
"[facility]  Established  Clinic Visit     Referring Provider: No referring provider defined for this encounter.    Chief Complaint:   Chief Complaint   Patient presents with    Back Pain    Leg Pain     Right > Left      History of Present Illness: Christine Castro is a 67 y.o. female referred by No ref. provider found for shoulder pain and potential articular nerve block and RFA.         Per Dr. [facility]' [facility] note on 07/21/2021     This is a very pleasant 66 y.o. female, who is status post 6 months s/p L4-5 OLIF with instrumentation, laminectomy for degenerative spondylolisthesis.  At her last appointment she was found to have cervical myelopathy.  She has been complaining of worsening gait imbalance, difficulty walking, bilateral hand weakness and numbness.  She has fallen a few times.  We order CT of the pelvis and lumbar spine MRI has not been done yet.  She still has some right leg pain and low back pain.  She has a tendency to lean forward.  She is using a walker.  Compared to before surgery she is able to walk better and has improved functional status but she still has severe pain    Onset: Several months ago, pain started after a fall not clear exactly when pain started  Location: left shoulder   Radiation: left arm just past the elbow  Timing: constant  Quality: Aching, Sharp and Shooting  Exacerbating Factors: lifting  Alleviating Factors: massage  Associated Symptoms: (+) Neck pain. (+) weakness in the left arm. (+) numbness in the left arm. denies night fever/night sweats, urinary incontinence, bowel incontinence, significant weight loss, significant motor weakness and loss of sensations    Severity: Currently: 6/10   Typical Range: 6-//10     Exacerbation: 10/10        Interval updates:   7/20/2022- 66 y/o female presents s/p Caudal MEI on 6/29/2022 she is reporting 30% relief of her low back pain. She presents in a w/c she looks very tired when asked she responds " I didn't sleep last " "night, I am having some personal problems with my granddaughter" she also endorses continued pain in her low back with radiating pain in her right leg.  She denies any recent incident or trauma, denies any bowel bladder dysfunction, denies any saddle anesthesia denies any profound weakness.        8/16/2021- Christine Castro is a 67 y.o. female who  has a past medical history of Anticoagulant long-term use, Arthritis, Asthma, CHF (congestive heart failure), Colon cancer, COPD (chronic obstructive pulmonary disease), Coronary artery disease, Depression, Diabetes mellitus, type 2, GERD (gastroesophageal reflux disease), Myocardial infarction, and Thyroid disease.  Mrs. Castro  Presents to clinic today in a wheelchair complaining of continued low back and right leg pain, she was recently seen by Neurosurgery Dr. Fisher and he ordered a right L5-S1 TFESI with pain management. She is scheduled for this injection on 08/25 with Dr. Santiago.   I explained that she is already scheduled for injection to help with low back and right leg pain she didn't seem to know/ understand that she was already  scheduled for this procedure,  once I explained she seemed to be okay discussed that we will make sure she is set for her procedure.      Previous Interventions:  -06/29/2022  Caudal epidural steroid injection-30%   - corticosteroid injections  -L5-S1 Interlaminar Epidural Steroid -11/4 50%     Previous Therapies:  PT/OT: no   Surgery:    - right knee arthroplasty  Previous Medications:   - NSAIDS:  Ibuprofen (listed allergy, causes asthma flare), Tylenol  - Muscle Relaxants:  Valium, tizanidine    - TCAs:   - SNRIs:   - Topicals:   - Anticonvulsants:    - Opioids:  Percocet    Current Pain Medications:  1. Percocet 7.5/325 mg    Blood Thinners:  Aspirin    Full Medication List:    Current Outpatient Medications:     albuterol (VENTOLIN HFA) 90 mcg/actuation inhaler, INHALE 2 PUFFS INTO THE LUNGS EVERY 4 HOURS AS NEEDED FOR WHEEZING, " Disp: 18 g, Rfl: 3    alendronate (FOSAMAX) 70 MG tablet, Take 1 tablet (70 mg total) by mouth every 7 days. Dc Boniva BC not covered, Disp: 4 tablet, Rfl: 11    aspirin (ECOTRIN) 81 MG EC tablet, Take 1 tablet (81 mg total) by mouth once daily., Disp: , Rfl: 0    atorvastatin (LIPITOR) 40 MG tablet, TAKE 1 TABLET(40 MG) BY MOUTH EVERY DAY FOR CHOLESTEROL, Disp: 90 tablet, Rfl: 3    buPROPion (WELLBUTRIN XL) 300 MG 24 hr tablet, Take 1 tablet (300 mg total) by mouth once daily., Disp: 90 tablet, Rfl: 3    busPIRone (BUSPAR) 10 MG tablet, TAKE 1/2 TABLET BY MOUTH ONCE DAILY (Patient taking differently: 10 mg. Pt is taking 10 mg once daily), Disp: 60 tablet, Rfl: 3    diabetic supplies, miscellan. Misc, Lancets for 1-2 times a day testing    dispense brand covered by insurance t (Patient taking differently: Lancets for 1-2 times a day testing    dispense brand covered by insurance t), Disp: 60 each, Rfl: 3    diazePAM (VALIUM) 5 MG tablet, TAKE1  BY MOUTH EVERY 24 HOURS AS NEEDED FOR ANXIETY, Disp: 30 tablet, Rfl: 1    diclofenac sodium (VOLTAREN) 1 % Gel, APPLY 2 GRAMS EXTERNALLY TO THE AFFECTED AREA FOUR TIMES DAILY, Disp: 300 g, Rfl: 5    ergocalciferol (ERGOCALCIFEROL) 50,000 unit Cap, Take 1 capsule (50,000 Units total) by mouth every 7 days., Disp: 12 capsule, Rfl: 3    EScitalopram oxalate (LEXAPRO) 20 MG tablet, TAKE 1 TABLET(20 MG) BY MOUTH EVERY DAY, Disp: 90 tablet, Rfl: 3    furosemide (LASIX) 20 MG tablet, Take 1 tablet (20 mg total) by mouth daily as needed., Disp: 30 tablet, Rfl: 3    gabapentin (NEURONTIN) 100 MG capsule, Take 1 capsule (100 mg total) by mouth 3 (three) times daily., Disp: 90 capsule, Rfl: 11    glipiZIDE (GLUCOTROL) 2.5 MG TR24, TAKE 1 TABLET(2.5 MG) BY MOUTH DAILY WITH BREAKFAST, Disp: 90 tablet, Rfl: 3    levothyroxine (SYNTHROID) 100 MCG tablet, Take 1 tablet (100 mcg total) by mouth once daily., Disp: 90 tablet, Rfl: 3    methocarbamoL (ROBAXIN) 750 MG Tab, Take 1  tablet (750 mg total) by mouth 3 (three) times daily as needed (muscle spasms). (Patient not taking: Reported on 7/8/2022), Disp: 60 tablet, Rfl: 0    omeprazole (PRILOSEC) 40 MG capsule, Take 1 capsule (40 mg total) by mouth every morning., Disp: 90 capsule, Rfl: 1    oxyCODONE-acetaminophen (PERCOCET)  mg per tablet, Take 1 tablet by mouth every 6 (six) hours as needed for Pain., Disp: 60 tablet, Rfl: 0    potassium chloride (KLOR-CON) 10 MEQ TbSR, The day you take lasix, Disp: 90 tablet, Rfl: 0    promethazine (PHENERGAN) 25 MG tablet, TAKE 1 TABLET(25 MG) BY MOUTH EVERY 6 HOURS AS NEEDED, Disp: 25 tablet, Rfl: 0    SUPREP BOWEL PREP KIT 17.5-3.13-1.6 gram SolR, use as directed, Disp: , Rfl:     TRUE METRIX GLUCOSE TEST STRIP Strp, TO TEST ONCE TO TWICE DAILY, Disp: 50 strip, Rfl: 11    TRUE METRIX GLUCOSE TEST STRIP Strp, TEST BLOOD SUGAR EVERY DAY, Disp: 100 strip, Rfl: 3    TRUE METRIX GO GLUCOSE METER Misc, USE AS DIRECTED, Disp: 1 each, Rfl: 0    TRUEPLUS LANCETS 30 gauge Misc, USE TO TEST ONCE TO TWICE D, Disp: , Rfl:     zolpidem (AMBIEN) 5 MG Tab, TAKE 1 TABLET(5 MG) BY MOUTH EVERY NIGHT, Disp: 30 tablet, Rfl: 1     Review of Systems:  Review of Systems   Constitutional: Negative for fever and weight loss.   HENT: Negative for ear pain and tinnitus.    Eyes: Negative for pain and redness.   Respiratory: Positive for shortness of breath. Negative for cough.    Cardiovascular: Negative for chest pain and palpitations.   Gastrointestinal: Positive for heartburn. Negative for constipation.   Genitourinary: Negative.         Denies urinary incontinence. Denies urine retention.    Musculoskeletal: Positive for joint pain and neck pain. Negative for back pain.   Skin: Negative for itching and rash.   Neurological: Positive for tingling and weakness. Negative for focal weakness and seizures.   Endo/Heme/Allergies: Negative for environmental allergies. Does not bruise/bleed easily.    Psychiatric/Behavioral: Positive for depression. The patient is nervous/anxious and has insomnia.        Allergies:  Adhesive; Latex, natural rubber; and Ibuprofen     Medical History:   has a past medical history of Anticoagulant long-term use, Arthritis, Asthma, CHF (congestive heart failure), Colon cancer, COPD (chronic obstructive pulmonary disease), Coronary artery disease, Depression, Diabetes mellitus, type 2, GERD (gastroesophageal reflux disease), Myocardial infarction, and Thyroid disease.    Surgical History:   has a past surgical history that includes  section; Tonsillectomy; Colon surgery; Cholecystectomy; Ectopic pregnancy surgery; Tubal ligation; Gastric bypass; Colonoscopy; Colonoscopy (N/A, 2017); Colonoscopy (N/A, 4/10/2018); Esophagogastroduodenoscopy (N/A, 2019); Knee Arthroplasty (Left, 2019); Oophorectomy; Knee Arthroplasty (Right, 2019); Abdominal surgery; Fracture surgery; Hernia repair; Injection of anesthetic agent around genitofemoral nerve (Left, 2020); Radiofrequency thermocoagulation (Left, 2020); Epidural steroid injection into lumbar spine (N/A, 2020); Joint replacement; Fusion of spine with instrumentation (Left, 2021); Transforaminal epidural injection of steroid (Right, 2021); Decompression of cervical spine by anterior approach with fusion (N/A, 2021); and Caudal epidural steroid injection (N/A, 2022).    Family History:  family history includes Breast cancer in her cousin, maternal aunt, and maternal aunt; Diabetes in her brother and mother; Hyperlipidemia in her mother; Hypertension in her brother, mother, and sister; Stroke in her mother.    Social History:   reports that she has never smoked. She has never used smokeless tobacco. She reports current alcohol use. She reports that she does not use drugs.    Physical Exam:  There were no vitals taken for this visit.  GEN: No acute distress. Calm, comfortable  HENT:  Normocephalic, atraumatic, moist mucous membranes  EYE: Anicteric sclera, non-injected.   CV: Non-diaphoretic. Regular Rate. Radial Pulses 2+.  RESP: Breathing comfortably. Chest expansion symmetric.  EXT: No clubbing, cyanosis.   SKIN: Warm, & dry to palpation. No visible rashes or lesions of exposed skin.   PSYCH: Pleasant mood and appropriate affect. Recent and remote memory intact.   GAIT:  Mod Independent, utilizing wheelchair for mobility  Neck Exam:       Inspection: No erythema, bruising. Poor posture      Palpation: (+) TTP of left cervical paraspinals      ROM: No signficant Limitation in flexion, extension, lateral bending or rotation. Pain with left lateral rotation reproduces pain into shoulder and arm on the left      Provocative Maneuvers:  (+) Spurling's on the left  Shoulder Exam:       Inspection: No erythema, bruising.       Palpation: Diffuse TTP about left shoulder.       ROM: (+) Limited in abduction, internal rotation on the left significant. Difficult abducting to 90 today      Provocative Maneuvers: limited due to pain  Neurologic Exam:     Alert. Speech is fluent and appropriate.     Cranial Nerves: Extra-ocular movements intact. Pupils equal. No strabismus. Face Symmetric. Jaw opens midline. Uvula midline. Tongue midline. Shoulder shrug symmetric.       Strength: Limited manual muscle testing in LUE due to pain in shoulder.      Sensation:  Grossly intact to light touch in bilateral upper extremities     Reflexes: 1+ in b/l patella, biceps     Tone: No abnormality appreciated in bilateral upper or lower extremities     (+) Munoz on the left      Imagin20 X-Ray Shoulder 2 or More Views Left   The bones are intact.  There is no evidence for acute fracture or bone destruction.  There is no evidence for dislocation.  There are prominent degenerative changes of the glenohumeral joint with joint space narrowing, subchondral sclerosis, and osteophyte formation.  Soft tissues appear  unremarkable.  Impression:  No evidence for acute fracture, bone destruction, or dislocation.  Prominent degenerative changes of the left glenohumeral joint.    - CT C-spine 1/21/15:  There are moderate multilevel degenerative changes.  No gross evidence of acute cervical fracture or significant subluxation.  No gross pneumothorax at the extreme lung apices.  There is a great deal of streak artifact limiting soft tissue detail.    Labs:  BMP  Lab Results   Component Value Date     04/21/2022    K 3.4 (L) 04/21/2022     04/21/2022    CO2 21 (L) 04/21/2022    BUN 14 04/21/2022    CREATININE 0.69 04/21/2022    CALCIUM 8.4 (L) 04/21/2022    ANIONGAP 12 04/21/2022    ESTGFRAFRICA >60.0 04/21/2022    EGFRNONAA >60.0 04/21/2022     Lab Results   Component Value Date    ALT 19 04/21/2022    AST 25 04/21/2022    ALKPHOS 72 04/21/2022    BILITOT 0.2 04/21/2022     Lab Results   Component Value Date     04/21/2022       Assessment:  Christine Castro is a 67 y.o. female with the following diagnoses based on history, exam, and imaging:    Problem List Items Addressed This Visit        Neuro    Lumbar radiculopathy - Primary      Other Visit Diagnoses     Spondylolisthesis at L4-L5 level              This is a pleasant 67 y.o. lady with history of gastric bypass, CAD with history of MRI and TIA, depression, anemia, hypothyroidism, GERD, COPD/asthma presenting with:     1. Chronic Left shoulder pain.  X-rays reveal severe osteoarthritis of the glenohumeral joint on the left.  She has significant decreased range of motion in the left shoulder.  She is not a candidate for total shoulder replacement.  She is only getting limited duration of relief from steroid injections.  2.  Neck pain and positive Munoz reflex on the left.  I do believe that the shoulder pain is primarily generated from her glenohumeral joint, but considering the positive Munoz (could be from previous TIA?), reproduction of some pain with  left lateral rotation of the cervical spine, I think we should rule out cervical etiology is possibly complicating her pain    08/04[birth year][id number]-[id number]-old female presents status post Left Suprascapular, axillary and lateral pectoral articular branch block for her chronic left shoulder pain patient reported 90% relief for 2-3 days she reports improved functionality and better range of motion following injection of pain score today is 3/10.  Recommended we scheduled for a little him air articular branch RFA in effort to provide her with sustained relief RFA is have been shown provide relief for 6-18 months.    11/18/2020- 64 y/o female presents s/p  s/p L5-S1 Lumbar MEI  on 11/4/20 with 50% relief for 5 days and then pain returned slowly, she is s/f a left L4-5 oblique interbody fusion with placement of hyperlordotic spacer correct sagittal plane imbalance and lumbar lordosis/pelvic incidence mismatch, L4-5 laminectomy, medial facetectomy, foraminotomy, and L4-5 posterior instrumentation with Dr. Fisher in Jan. We provided her with this injection per NSGY request to help with pain.     8/16/2021- Christine Castro is a 67 y.o. female who  has a past medical history of Anticoagulant long-term use, Arthritis, Asthma, CHF (congestive heart failure), Colon cancer, COPD (chronic obstructive pulmonary disease), Coronary artery disease, Depression, Diabetes mellitus, type 2, GERD (gastroesophageal reflux disease), Myocardial infarction, and Thyroid disease.  By history and examination this patient has chronic low back pain with radiculopathy.  The underlying cause cause is L5-S1 spondylolisthesis with foraminal stenosis facet arthritis, degenerative disc disease, foraminal stenosis and central canal stenosis.  Pathology is confirmed by imaging.  We discussed the underlying diagnoses and multiple treatment options including non-opioid medications, opioid medications, injections, physical therapy, home exercise, activity modification  / rest and weight loss.  The risks and benefits of each treatment option were discussed and all questions were answered.      7/20/2022- 67-year-old female with a history of chronic low back pain and bilateral leg pain right greater than left.  She does have a history of a lumbar fusion and laminectomy per Dr. Fisher, recent caudal provided her with 30% relief her and I discussed that I will consult her to physical therapy to help with lumbar stabilization and muscular strengthening as his shin exam today she appears to be weak in her lower extremities and unstable.  Today we discussed I will recommend physical therapy patient agreed and verbalized understanding.      Treatment Plan:   - PT/OT/HEP:  Consult to physical therapy today  - Procedures:  None at this time.   - Medications: No changes recommended at this time.  - Imaging: Reviewed.    - Labs: Reviewed.    Follow Up: RTC p PT to see Dr. Pamela Baker, NP-C  [facility]      Disclaimer: This note was partly generated using dictation software which may occasionally result in transcription errors.

## 2022-07-22 RX ORDER — BUPROPION HYDROCHLORIDE 200 MG/1
TABLET, EXTENDED RELEASE ORAL
Qty: 180 TABLET | Refills: 3 | OUTPATIENT
Start: 2022-07-22

## 2022-07-22 NOTE — TELEPHONE ENCOUNTER
Care Due:                  Date            Visit Type   Department     Provider  --------------------------------------------------------------------------------                                EP -                              PRIMARY      [facility] FAMILY  Last Visit: 07-      CARE ([facility])   MEDICINE       Alon Santiago                               -                              PRIMARY      [facility] FAMILY  Next Visit: 01-      CARE ([facility])   MEDICINE       Alon Santiago                                                            Last  Test          Frequency    Reason                     Performed    Due Date  --------------------------------------------------------------------------------    HBA1C.......  6 months...  glipiZIDE................  04-   10-    [facility] Embedded Care Gaps. Reference number: [id number]. 7/22/2022   6:00:25 AM CDT

## 2022-07-22 NOTE — TELEPHONE ENCOUNTER
Quick DC. Inappropriate Request    Refill Authorization Note   Christine Castro  is requesting a refill authorization.  Brief Assessment and Rationale for Refill:  Quick Discontinue  Medication Therapy Plan:  FOV; PATIENT IS ON WELLBUTRIN XL 300MG AS OF 7/8/22    Medication Reconciliation Completed:  No    Medication-related problems identified:   Dose adjustment  Requires labs   Comments:     Note composed:11:32 AM 07/22/2022

## 2022-07-22 NOTE — TELEPHONE ENCOUNTER
Refill Decision Note   Christine Castro  is requesting a refill authorization.  Brief Assessment and Rationale for Refill:  Quick Discontinue    -Medication-Related Problems Identified:   Dose adjustment  Requires labs  Medication Therapy Plan:  FOV; PATIENT IS ON WELLBUTRIN XL 300MG AS OF 7/8/22    Medication Reconciliation Completed: No   Comments:     Provider Staff:     Action is required for this patient.   Please see care gap opportunities below in Care Due Message.     Thanks!  [facility]     Appointments      Date Provider   Last Visit   7/8/2022 Alon Santiago MD   Next Visit   1/9/2023 Alon Santiago MD     Note composed:1:45 PM 07/22/2022           Note composed:1:44 PM 07/22/2022

## 2022-07-27 ENCOUNTER — HOSPITAL ENCOUNTER (OUTPATIENT)
Dept: RADIOLOGY | Facility: HOSPITAL | Age: 67
Discharge: HOME | End: 2022-07-27
Attending: FAMILY MEDICINE
Payer: MEDICARE

## 2022-07-27 ENCOUNTER — OFFICE VISIT (OUTPATIENT)
Dept: PHYSICAL MEDICINE AND REHAB | Facility: CLINIC | Age: 67
End: 2022-07-27
Payer: MEDICARE

## 2022-07-27 VITALS
WEIGHT: 183 LBS | BODY MASS INDEX: 35.93 KG/M2 | HEIGHT: 60 IN | SYSTOLIC BLOOD PRESSURE: 120 MMHG | DIASTOLIC BLOOD PRESSURE: 71 MMHG | HEART RATE: 65 BPM

## 2022-07-27 DIAGNOSIS — R53.81 DEBILITY: ICD-10-CM

## 2022-07-27 DIAGNOSIS — M43.27 FUSION OF SPINE OF LUMBOSACRAL REGION: ICD-10-CM

## 2022-07-27 DIAGNOSIS — Z98.1 STATUS POST CERVICAL SPINAL FUSION: ICD-10-CM

## 2022-07-27 DIAGNOSIS — R06.09 DOE (DYSPNEA ON EXERTION): ICD-10-CM

## 2022-07-27 DIAGNOSIS — M54.42 CHRONIC BILATERAL LOW BACK PAIN WITH BILATERAL SCIATICA: ICD-10-CM

## 2022-07-27 DIAGNOSIS — R29.6 FREQUENT FALLS: ICD-10-CM

## 2022-07-27 DIAGNOSIS — M54.41 CHRONIC BILATERAL LOW BACK PAIN WITH BILATERAL SCIATICA: ICD-10-CM

## 2022-07-27 DIAGNOSIS — M81.0 OSTEOPOROSIS, UNSPECIFIED OSTEOPOROSIS TYPE, UNSPECIFIED PATHOLOGICAL FRACTURE PRESENCE: ICD-10-CM

## 2022-07-27 DIAGNOSIS — Z12.31 ENCOUNTER FOR SCREENING MAMMOGRAM FOR BREAST CANCER: ICD-10-CM

## 2022-07-27 DIAGNOSIS — G89.29 CHRONIC BILATERAL LOW BACK PAIN WITH BILATERAL SCIATICA: ICD-10-CM

## 2022-07-27 DIAGNOSIS — M12.812 LEFT ROTATOR CUFF TEAR ARTHROPATHY: ICD-10-CM

## 2022-07-27 DIAGNOSIS — R29.898 WEAKNESS OF BOTH LEGS: ICD-10-CM

## 2022-07-27 DIAGNOSIS — Z86.79 HISTORY OF CONGESTIVE HEART FAILURE: ICD-10-CM

## 2022-07-27 DIAGNOSIS — M75.102 LEFT ROTATOR CUFF TEAR ARTHROPATHY: ICD-10-CM

## 2022-07-27 DIAGNOSIS — M15.9 GENERALIZED OSTEOARTHRITIS: ICD-10-CM

## 2022-07-27 DIAGNOSIS — R26.9 GAIT DISORDER: Primary | ICD-10-CM

## 2022-07-27 DIAGNOSIS — M21.70 ACQUIRED LEG LENGTH DISCREPANCY: ICD-10-CM

## 2022-07-27 DIAGNOSIS — Z98.1 STATUS POST LUMBAR SPINAL FUSION: ICD-10-CM

## 2022-07-27 PROCEDURE — 1125F AMNT PAIN NOTED PAIN PRSNT: CPT | Mod: CPTII,S$GLB,, | Performed by: PHYSICAL MEDICINE & REHABILITATION

## 2022-07-27 PROCEDURE — 99204 OFFICE O/P NEW MOD 45 MIN: CPT | Mod: S$GLB,,, | Performed by: PHYSICAL MEDICINE & REHABILITATION

## 2022-07-27 PROCEDURE — 3044F PR MOST RECENT HEMOGLOBIN A1C LEVEL <7.0%: ICD-10-PCS | Mod: CPTII,S$GLB,, | Performed by: PHYSICAL MEDICINE & REHABILITATION

## 2022-07-27 PROCEDURE — 3078F PR MOST RECENT DIASTOLIC BLOOD PRESSURE < 80 MM HG: ICD-10-PCS | Mod: CPTII,S$GLB,, | Performed by: PHYSICAL MEDICINE & REHABILITATION

## 2022-07-27 PROCEDURE — 3074F SYST BP LT 130 MM HG: CPT | Mod: CPTII,S$GLB,, | Performed by: PHYSICAL MEDICINE & REHABILITATION

## 2022-07-27 PROCEDURE — 3008F BODY MASS INDEX DOCD: CPT | Mod: CPTII,S$GLB,, | Performed by: PHYSICAL MEDICINE & REHABILITATION

## 2022-07-27 PROCEDURE — 1125F PR PAIN SEVERITY QUANTIFIED, PAIN PRESENT: ICD-10-PCS | Mod: CPTII,S$GLB,, | Performed by: PHYSICAL MEDICINE & REHABILITATION

## 2022-07-27 PROCEDURE — 99999 PR PBB SHADOW E&M-EST. PATIENT-LVL III: ICD-10-PCS | Mod: PBBFAC,,, | Performed by: PHYSICAL MEDICINE & REHABILITATION

## 2022-07-27 PROCEDURE — 3008F PR BODY MASS INDEX (BMI) DOCUMENTED: ICD-10-PCS | Mod: CPTII,S$GLB,, | Performed by: PHYSICAL MEDICINE & REHABILITATION

## 2022-07-27 PROCEDURE — 77063 BREAST TOMOSYNTHESIS BI: CPT | Mod: TC,PO

## 2022-07-27 PROCEDURE — 99999 PR PBB SHADOW E&M-EST. PATIENT-LVL III: CPT | Mod: PBBFAC,,, | Performed by: PHYSICAL MEDICINE & REHABILITATION

## 2022-07-27 PROCEDURE — 77080 DXA BONE DENSITY AXIAL: CPT | Mod: TC,PO

## 2022-07-27 PROCEDURE — 3044F HG A1C LEVEL LT 7.0%: CPT | Mod: CPTII,S$GLB,, | Performed by: PHYSICAL MEDICINE & REHABILITATION

## 2022-07-27 PROCEDURE — 1157F ADVNC CARE PLAN IN RCRD: CPT | Mod: CPTII,S$GLB,, | Performed by: PHYSICAL MEDICINE & REHABILITATION

## 2022-07-27 PROCEDURE — 99204 PR OFFICE/OUTPT VISIT, NEW, LEVL IV, 45-59 MIN: ICD-10-PCS | Mod: S$GLB,,, | Performed by: PHYSICAL MEDICINE & REHABILITATION

## 2022-07-27 PROCEDURE — 77067 SCR MAMMO BI INCL CAD: CPT | Mod: TC,PO

## 2022-07-27 PROCEDURE — 1157F PR ADVANCE CARE PLAN OR EQUIV PRESENT IN MEDICAL RECORD: ICD-10-PCS | Mod: CPTII,S$GLB,, | Performed by: PHYSICAL MEDICINE & REHABILITATION

## 2022-07-27 PROCEDURE — 3074F PR MOST RECENT SYSTOLIC BLOOD PRESSURE < 130 MM HG: ICD-10-PCS | Mod: CPTII,S$GLB,, | Performed by: PHYSICAL MEDICINE & REHABILITATION

## 2022-07-27 PROCEDURE — 3078F DIAST BP <80 MM HG: CPT | Mod: CPTII,S$GLB,, | Performed by: PHYSICAL MEDICINE & REHABILITATION

## 2022-07-27 RX ORDER — OXYCODONE AND ACETAMINOPHEN 10; 325 MG/1; MG/1
1 TABLET ORAL EVERY 6 HOURS PRN
Qty: 60 TABLET | Refills: 0 | Status: SHIPPED | OUTPATIENT
Start: 2022-07-27 | End: 2022-07-29

## 2022-07-27 NOTE — TELEPHONE ENCOUNTER
----- Message from Diana Powell sent at 7/27/2022 12:00 PM CDT -----  Contact: [phone]  Type:  Needs Medical Advice    Who Called: Pt   Reason for call; regarding a Refill on oxyCODONE-acetaminophen (PERCOCET)  mg per tablet  Pharmacy name and phone #:  [facility][id number] [address] [facility] AT [facility]  Would the patient rather a call back or a response via [facility]? Call back  Best Call Back Number: [phone]

## 2022-07-27 NOTE — PROGRESS NOTES
Subjective:       Patient ID: Christine Castro is a 67 y.o. female.    Chief Complaint: No chief complaint on file.      HPI      Mrs. Castro is a 67-year-old black female with multiple medical problems who is presenting to the Physical Medicine clinic for evaluation for a power mobility device.  Her past medical history significant for HTN, DM, CAD status post MI, CHF, hypothyroidism, TIAs, status post gastric bypass, generalized osteoarthritis, status bilateral TKA, leg length discrepancy (left leg 1-2 inches shorter), chronic low back pain with bilateral radiculopathy status post multiple epidural steroid injections and status post L4-5 laminectomy and fusion in 01/2021, chronic neck pain with myelopathy, status post C3-C7 ACDF in 12/2021, osteoporosis, debility, frequent falls .    The patient lives alone in a single-story home with ramp access.  Some of her friends check on her.  She is independent with feeding herself but has trouble getting to the kitchen.  She is independent with toileting but has trouble getting to the bathroom.  She is independent with bathing using a transfer tub bench.  She can ambulate with a rolling walker or Rollator walker about 10 ft.  She is restricted by shortness of breath, leg weakness, chronic low back pain bilateral sciatica (up to 10/10), leg length discrepancy since her youth and impaired balance.  She reports history of falls occasionally few times per day with the minor injury so far.  She cannot propel a manual wheelchair due to shortness of breath, bilateral upper extremity weakness, chronic left shoulder pain (9-10/10) status post arthroscopic procedure, bilateral hand pain (7-8/10).  She was able to use a scooter in department stores without significant problems.    Past Medical History:   Diagnosis Date    Anticoagulant long-term use     Arthritis     Asthma     CHF (congestive heart failure)     [facility]    Colon cancer     colon    COPD (chronic  obstructive pulmonary disease)     Coronary artery disease     Depression     Diabetes mellitus, type 2     GERD (gastroesophageal reflux disease)     Myocardial infarction     AGE 19 MIGUELANGEL WITHBABY DELIVERY    Thyroid disease         Review of patient's allergies indicates:   Allergen Reactions    Adhesive      Adhesive tape    Latex, natural rubber      Adhesive from latex tape    Ibuprofen Other (See Comments)     Causes asthma flare??        Review of Systems   Constitutional: Positive for fatigue. Negative for chills and fever.   Eyes: Positive for visual disturbance.   Respiratory: Positive for shortness of breath.    Cardiovascular: Negative for chest pain.   Gastrointestinal: Negative for nausea and vomiting.   Genitourinary: Positive for difficulty urinating.   Musculoskeletal: Positive for arthralgias, back pain and neck pain. Negative for gait problem.   Neurological: Positive for dizziness, weakness and headaches.   Psychiatric/Behavioral: Negative for behavioral problems.             Objective:      Physical Exam  Vitals reviewed.   Constitutional:       General: She is not in acute distress.     Appearance: She is well-developed.      Comments: Coming to the clinic in a manual wheelchair propelled by friend.     HENT:      Head: Normocephalic and atraumatic.   Eyes:      Extraocular Movements: Extraocular movements intact.   Cardiovascular:      Rate and Rhythm: Normal rate and regular rhythm.      Heart sounds: Normal heart sounds.   Pulmonary:      Effort: Pulmonary effort is normal.      Breath sounds: Normal breath sounds.   Abdominal:      Palpations: Abdomen is soft.   Musculoskeletal:      Cervical back: Normal range of motion.      Comments: BUE:  ROM:   RUE: full.   LUE: decreased at shoulder.  +ve severe Heberden's & Maral's nodes.  Strength:    RUE: 3/5 at shoulder abduction, 4 elbow flexion, 4 elbow extension, 4 hand .   LUE: 2+/5 at shoulder abduction, 4 elbow flexion, 4  elbow extension, 4 hand .  Sensation to pinprick:   RUE: intact.   LUE: intact.  DTR:    RUE: +1 biceps, +1 triceps.   LUE:  +1 biceps, +1 triceps.      BLE:  Knees:   RLE: healed TKA.    LLE: Healed TKA.  Strength:    RLE: 4-/5 at hip flexion, 4 knee extension, 4 ankle DF, 4 ankle PF.   LLE: 4-/5 at hip flexion, 4 knee extension, 4 ankle DF,  4 ankle PF.  Sensation to pinprick:     RLE: decreased in stocking distribuion.     LLE: decreased in stocking distribuion.  DTR:     RLE: +1 knee, +1 ankle.    LLE: +1 knee, +1 ankle.  SLR (sitting):      RLE: +ve.      LLE: -ve.     +ve mild tenderness over lumbar spine.     Skin:     General: Skin is warm.   Neurological:      Mental Status: She is alert and oriented to person, place, and time.   Psychiatric:         Mood and Affect: Mood normal.         Behavior: Behavior normal.         Thought Content: Thought content normal.         Assessment:       1. Gait disorder    2. Chronic bilateral low back pain with bilateral sciatica    3. Status post lumbar spinal fusion (L4-L5, 1/2021)    4. Weakness of both legs    5. Status post cervical spinal fusion (ACDF at C3-C7, 12/2021)    6. Left rotator cuff tear arthropathy    7. Generalized osteoarthritis    8. Acquired leg length discrepancy    9. History of congestive heart failure    10. CASTANEDA (dyspnea on exertion)    11. Osteoporosis, unspecified osteoporosis type, unspecified pathological fracture presence    12. Frequent falls    13. Debility        Summary/Plan:             - The patient was seen today for mobility evaluation for a power mobility device due to significant impairment at home.  - The patient has multifactorial gait impairment.  - The patient is not able to ambulate safely at home.  - The patient is unable to use a walker functional distances due to CASTANEDA because of CHF, bilateral lower extremity weakness, chronic low back pain with bilateral radiculopathy.  - The patient is unable to use an  optimally-configured manual wheelchair at home due to do it because of CHF, bilateral upper extremity weakness worse on the left, left rotator cuff arthropathy, bilateral hand pain due to advanced OA.  - The patient has history of falls, which could be detrimental to her health especially due to osteoporosis.  - The patient has intact cognition and should be able to use a power mobility device well at home.  - A prescription for an electric scooter was generated.  - The patient has enough upper & lower extremity strength to be able to transfer to and from the power mobility device.  - The patient has enough range of motion & strength in BUE to allow functional operation of the tiller.  - The patient has good trunk balance and should be able to maintain a safe posture while operating a power mobility device.  - This will allow the patient to go safely to the kitchen, dining room or living room for feeding & socialization.  It should also help with energy conservation and reducing the risk of falls.  - The patient is to return the Physical Medicine/Mobility clinic prn.          This note was partly generated with [employer] voice recognition software. I apologize for any possible typographical errors.

## 2022-07-29 ENCOUNTER — TELEPHONE (OUTPATIENT)
Dept: FAMILY MEDICINE | Facility: CLINIC | Age: 67
End: 2022-07-29
Payer: MEDICARE

## 2022-07-29 DIAGNOSIS — M43.27 FUSION OF SPINE OF LUMBOSACRAL REGION: ICD-10-CM

## 2022-07-29 RX ORDER — ZOLPIDEM TARTRATE 5 MG/1
5 TABLET ORAL NIGHTLY
Qty: 30 TABLET | Refills: 0 | Status: SHIPPED | OUTPATIENT
Start: 2022-07-29 | End: 2022-10-09

## 2022-07-29 RX ORDER — FUROSEMIDE 20 MG/1
20 TABLET ORAL DAILY PRN
Qty: 30 TABLET | Refills: 3 | Status: SHIPPED | OUTPATIENT
Start: 2022-07-29 | End: 2022-09-25

## 2022-07-29 NOTE — TELEPHONE ENCOUNTER
----- Message from Ulises Franz sent at 7/29/2022  1:55 PM CDT -----  Contact: pt  Type: Requesting to speak with nurse        Who Called: PT  Regarding: oxyCODONE-acetaminophen (PERCOCET)  mg per tablet  Would the patient rather a call back or a response via [facility]? Call back  Best Call Back Number: [phone]  Additional Information: states that [facility] doesn't have the medication   [facility] [phone] [address], [place] [address]

## 2022-07-29 NOTE — TELEPHONE ENCOUNTER
----- Message from Antonina Maza, Patient Care Assistant sent at 7/29/2022  3:10 PM CDT -----  Type:  RX Refill Request  Who Called:  pt  Refill or New Rx: refill  RX Name and Strength: zolpidem (AMBIEN) 5 MG Tab  How is the patient currently taking it? (ex. 1XDay): TAKE 1 TABLET(5 MG) BY MOUTH EVERY NIGHT  Is this a 30 day or 90 day RX: 90  Preferred Pharmacy with phone number: [id number] - [address] [facility]   Phone:  [phone]  Fax:  [phone]  Local or Mail Order: local  Ordering Provider: [facility]  Would the patient rather a call back or a response via [facility]?  Please call  Best Call Back Number: [phone]   Additional Information:  Patient would like a call back regarding a pain shot     Type:  RX Refill Request  Refill or New Rx: refill  RX Name and Strength: furosemide (LASIX) 20 MG tablet  How is the patient currently taking it? (ex. 1XDay): Take 1 tablet (20 mg total) by mouth daily as needed  Is this a 30 day or 90 day RX: 90

## 2022-07-29 NOTE — TELEPHONE ENCOUNTER
See below   Why are the phone people wasting time with all the verbiage   Tell the pt to call the pharmacy for refill it makes it easier and less chance of error

## 2022-08-01 ENCOUNTER — TELEPHONE (OUTPATIENT)
Dept: NEUROSURGERY | Facility: CLINIC | Age: 67
End: 2022-08-01
Payer: MEDICARE

## 2022-08-01 RX ORDER — OXYCODONE AND ACETAMINOPHEN 10; 325 MG/1; MG/1
1 TABLET ORAL EVERY 6 HOURS PRN
Qty: 60 TABLET | Refills: 0 | Status: SHIPPED | OUTPATIENT
Start: 2022-08-01 | End: 2022-08-03 | Stop reason: SDUPTHER

## 2022-08-01 NOTE — TELEPHONE ENCOUNTER
----- Message from Laurita Ochoa sent at 8/1/2022 11:05 AM CDT -----  Regarding: advice  Contact: [phone]  Patient is requesting a call back regarding about being in serious pain.   Would the patient rather a call back or a response via [facility]?    Best Call Back Number:  [phone]  Additional Information:

## 2022-08-03 DIAGNOSIS — M43.27 FUSION OF SPINE OF LUMBOSACRAL REGION: ICD-10-CM

## 2022-08-03 RX ORDER — OXYCODONE AND ACETAMINOPHEN 10; 325 MG/1; MG/1
1 TABLET ORAL EVERY 6 HOURS PRN
Qty: 60 TABLET | Refills: 0 | Status: SHIPPED | OUTPATIENT
Start: 2022-08-03 | End: 2022-09-01 | Stop reason: SDUPTHER

## 2022-08-03 RX ORDER — OXYCODONE AND ACETAMINOPHEN 10; 325 MG/1; MG/1
1 TABLET ORAL EVERY 6 HOURS PRN
Qty: 60 TABLET | Refills: 0 | Status: CANCELLED | OUTPATIENT
Start: 2022-08-03

## 2022-08-03 NOTE — TELEPHONE ENCOUNTER
----- Message from Bear Zavaleta sent at 8/3/2022  2:06 PM CDT -----  Type:  RX Refill Request    Who Called: Pt   Refill or New Rx:refill  RX Name and Strength: oxyCODONE-acetaminophen (PERCOCET)  mg per tablet  How is the patient currently taking it? (ex. 1XDay):1  Is this a 30 day or 90 day RX:60  Preferred Pharmacy with phone number: [id number] [address]  Local or Mail Order:Local  Ordering Provider:Phillip  Would the patient rather a call back or a response via [facility]? Call back  Best Call Back Number: [phone]  Additional Information:     Pt stated she [facility] stated they did not receive it

## 2022-08-04 ENCOUNTER — PATIENT OUTREACH (OUTPATIENT)
Dept: ADMINISTRATIVE | Facility: OTHER | Age: 67
End: 2022-08-04
Payer: MEDICARE

## 2022-08-04 NOTE — PROGRESS NOTES
CHW - Follow Up    This Community Health Worker completed a follow up visit with MRN [id number] over the phone today.  Pt/Caregiver reported: Patient is having trouble paying her light bill. Patient is past due and does not have the money to cover costs.   Community Health Worker provided: Patient was given the numbers for [facility] to seek assistance with light bill. Patient was also encouraged to call [employer] and ask for a payment plan for current bill that is due as [employer] is discontinuing disconnects and late fee's due to customer's increasing light bills in the state.   Follow up required: Yes   Follow-up Outreach - Due: 8/5/2022

## 2022-08-05 ENCOUNTER — PATIENT OUTREACH (OUTPATIENT)
Dept: ADMINISTRATIVE | Facility: OTHER | Age: 67
End: 2022-08-05
Payer: MEDICARE

## 2022-08-15 ENCOUNTER — TELEPHONE (OUTPATIENT)
Dept: PHYSICAL MEDICINE AND REHAB | Facility: CLINIC | Age: 67
End: 2022-08-15
Payer: MEDICARE

## 2022-08-15 NOTE — TELEPHONE ENCOUNTER
----- Message from Cristiana Estrella sent at 8/15/2022  9:44 AM CDT -----  Regarding: Questions and concerns  Contact: [phone]  Patient Christine is calling. Patient would like to speak with the office. Patient would like to know when she needs to be seen again. Please call patient at [phone]    Thank You

## 2022-08-15 NOTE — TELEPHONE ENCOUNTER
Called Mrs Castro with no answer.  Patient appointment was for, mobility evaluation for a power mobility device due to significant impairment at home.    Mrs Castro is to return to Physical Medicine/Mobility clinic prn.

## 2022-08-26 ENCOUNTER — TELEPHONE (OUTPATIENT)
Dept: NEUROSURGERY | Facility: CLINIC | Age: 67
End: 2022-08-26
Payer: MEDICARE

## 2022-08-26 NOTE — TELEPHONE ENCOUNTER
----- Message from Emanuel Morocho sent at 8/26/2022  8:01 AM CDT -----  Contact: [phone]  Who Called: PT  Regarding: speak with nurse , missed call from office . Pt says she is not satisfied about   Would the patient rather a call back or a response via [facility]? Call back  Best Call Back Number: [phone]  Additional Information: having trouble with her neck.

## 2022-08-26 NOTE — TELEPHONE ENCOUNTER
Returned pt call.Informed pt of lack of appointment availability with . Pt states she will keep appointment with Mayda for 1 Sep 2022.

## 2022-09-01 ENCOUNTER — TELEPHONE (OUTPATIENT)
Dept: NEUROSURGERY | Facility: CLINIC | Age: 67
End: 2022-09-01
Payer: MEDICARE

## 2022-09-01 ENCOUNTER — OFFICE VISIT (OUTPATIENT)
Dept: NEUROSURGERY | Facility: CLINIC | Age: 67
End: 2022-09-01
Payer: MEDICARE

## 2022-09-01 ENCOUNTER — HOSPITAL ENCOUNTER (OUTPATIENT)
Dept: RADIOLOGY | Facility: HOSPITAL | Age: 67
Discharge: HOME | End: 2022-09-01
Attending: NEUROLOGICAL SURGERY
Payer: MEDICARE

## 2022-09-01 VITALS — WEIGHT: 183 LBS | HEIGHT: 60 IN | BODY MASS INDEX: 35.93 KG/M2

## 2022-09-01 DIAGNOSIS — Z98.1 S/P CERVICAL SPINAL FUSION: ICD-10-CM

## 2022-09-01 DIAGNOSIS — M43.12 SPONDYLOLISTHESIS OF CERVICAL REGION: ICD-10-CM

## 2022-09-01 DIAGNOSIS — M54.42 CHRONIC BILATERAL LOW BACK PAIN WITH BILATERAL SCIATICA: Primary | ICD-10-CM

## 2022-09-01 DIAGNOSIS — M47.26 OTHER SPONDYLOSIS WITH RADICULOPATHY, LUMBAR REGION: ICD-10-CM

## 2022-09-01 DIAGNOSIS — M54.16 LUMBAR RADICULOPATHY: ICD-10-CM

## 2022-09-01 DIAGNOSIS — M54.41 CHRONIC BILATERAL LOW BACK PAIN WITH BILATERAL SCIATICA: Primary | ICD-10-CM

## 2022-09-01 DIAGNOSIS — M48.062 SPINAL STENOSIS, LUMBAR REGION WITH NEUROGENIC CLAUDICATION: ICD-10-CM

## 2022-09-01 DIAGNOSIS — M51.36 DDD (DEGENERATIVE DISC DISEASE), LUMBAR: ICD-10-CM

## 2022-09-01 DIAGNOSIS — Z98.1 S/P LUMBAR SPINAL FUSION: ICD-10-CM

## 2022-09-01 DIAGNOSIS — G89.29 CHRONIC BILATERAL LOW BACK PAIN WITH BILATERAL SCIATICA: Primary | ICD-10-CM

## 2022-09-01 DIAGNOSIS — M43.16 SPONDYLOLISTHESIS, LUMBAR REGION: ICD-10-CM

## 2022-09-01 DIAGNOSIS — M43.27 FUSION OF SPINE OF LUMBOSACRAL REGION: ICD-10-CM

## 2022-09-01 PROCEDURE — 72040 XR CERVICAL SPINE AP LATERAL: ICD-10-PCS | Mod: 26,,, | Performed by: RADIOLOGY

## 2022-09-01 PROCEDURE — 99999 PR PBB SHADOW E&M-EST. PATIENT-LVL IV: CPT | Mod: PBBFAC,,, | Performed by: PHYSICIAN ASSISTANT

## 2022-09-01 PROCEDURE — 3044F HG A1C LEVEL LT 7.0%: CPT | Mod: CPTII,S$GLB,, | Performed by: PHYSICIAN ASSISTANT

## 2022-09-01 PROCEDURE — 1157F ADVNC CARE PLAN IN RCRD: CPT | Mod: CPTII,S$GLB,, | Performed by: PHYSICIAN ASSISTANT

## 2022-09-01 PROCEDURE — 1159F MED LIST DOCD IN RCRD: CPT | Mod: CPTII,S$GLB,, | Performed by: PHYSICIAN ASSISTANT

## 2022-09-01 PROCEDURE — 1159F PR MEDICATION LIST DOCUMENTED IN MEDICAL RECORD: ICD-10-PCS | Mod: CPTII,S$GLB,, | Performed by: PHYSICIAN ASSISTANT

## 2022-09-01 PROCEDURE — 1157F PR ADVANCE CARE PLAN OR EQUIV PRESENT IN MEDICAL RECORD: ICD-10-PCS | Mod: CPTII,S$GLB,, | Performed by: PHYSICIAN ASSISTANT

## 2022-09-01 PROCEDURE — 1160F PR REVIEW ALL MEDS BY PRESCRIBER/CLIN PHARMACIST DOCUMENTED: ICD-10-PCS | Mod: CPTII,S$GLB,, | Performed by: PHYSICIAN ASSISTANT

## 2022-09-01 PROCEDURE — 1125F AMNT PAIN NOTED PAIN PRSNT: CPT | Mod: CPTII,S$GLB,, | Performed by: PHYSICIAN ASSISTANT

## 2022-09-01 PROCEDURE — 3288F FALL RISK ASSESSMENT DOCD: CPT | Mod: CPTII,S$GLB,, | Performed by: PHYSICIAN ASSISTANT

## 2022-09-01 PROCEDURE — 3044F PR MOST RECENT HEMOGLOBIN A1C LEVEL <7.0%: ICD-10-PCS | Mod: CPTII,S$GLB,, | Performed by: PHYSICIAN ASSISTANT

## 2022-09-01 PROCEDURE — 72040 X-RAY EXAM NECK SPINE 2-3 VW: CPT | Mod: 26,,, | Performed by: RADIOLOGY

## 2022-09-01 PROCEDURE — 3008F PR BODY MASS INDEX (BMI) DOCUMENTED: ICD-10-PCS | Mod: CPTII,S$GLB,, | Performed by: PHYSICIAN ASSISTANT

## 2022-09-01 PROCEDURE — 99999 PR PBB SHADOW E&M-EST. PATIENT-LVL IV: ICD-10-PCS | Mod: PBBFAC,,, | Performed by: PHYSICIAN ASSISTANT

## 2022-09-01 PROCEDURE — 1101F PT FALLS ASSESS-DOCD LE1/YR: CPT | Mod: CPTII,S$GLB,, | Performed by: PHYSICIAN ASSISTANT

## 2022-09-01 PROCEDURE — 1125F PR PAIN SEVERITY QUANTIFIED, PAIN PRESENT: ICD-10-PCS | Mod: CPTII,S$GLB,, | Performed by: PHYSICIAN ASSISTANT

## 2022-09-01 PROCEDURE — 99214 OFFICE O/P EST MOD 30 MIN: CPT | Mod: S$GLB,,, | Performed by: PHYSICIAN ASSISTANT

## 2022-09-01 PROCEDURE — 1101F PR PT FALLS ASSESS DOC 0-1 FALLS W/OUT INJ PAST YR: ICD-10-PCS | Mod: CPTII,S$GLB,, | Performed by: PHYSICIAN ASSISTANT

## 2022-09-01 PROCEDURE — 3008F BODY MASS INDEX DOCD: CPT | Mod: CPTII,S$GLB,, | Performed by: PHYSICIAN ASSISTANT

## 2022-09-01 PROCEDURE — 99214 PR OFFICE/OUTPT VISIT, EST, LEVL IV, 30-39 MIN: ICD-10-PCS | Mod: S$GLB,,, | Performed by: PHYSICIAN ASSISTANT

## 2022-09-01 PROCEDURE — 1160F RVW MEDS BY RX/DR IN RCRD: CPT | Mod: CPTII,S$GLB,, | Performed by: PHYSICIAN ASSISTANT

## 2022-09-01 PROCEDURE — 3288F PR FALLS RISK ASSESSMENT DOCUMENTED: ICD-10-PCS | Mod: CPTII,S$GLB,, | Performed by: PHYSICIAN ASSISTANT

## 2022-09-01 PROCEDURE — 72040 X-RAY EXAM NECK SPINE 2-3 VW: CPT | Mod: TC,FY

## 2022-09-01 RX ORDER — OXYCODONE AND ACETAMINOPHEN 10; 325 MG/1; MG/1
1 TABLET ORAL EVERY 6 HOURS PRN
Qty: 60 TABLET | Refills: 0 | Status: SHIPPED | OUTPATIENT
Start: 2022-09-01 | End: 2022-09-16 | Stop reason: SDUPTHER

## 2022-09-01 NOTE — TELEPHONE ENCOUNTER
----- Message from Didier Gilmore sent at 8/31/2022  5:53 PM CDT -----  .Type:  RX Refill Request    Who Called: self  Refill or New Rx:refill  RX Name and Strength:oxyCODONE-acetaminophen (PERCOCET)  mg per tablet  How is the patient currently taking it? (ex. 1XDay):  Is this a 30 day or 90 day RX: 60 tablet  Preferred Pharmacy with phone number:[facility][id number] [address] [facility]   Phone:  [phone]  Fax:  [phone]    Local or Mail Order:local  Ordering Provider:Ishaan Fisher MD  Would the patient rather a call back or a response via [facility]? Call back   Best Call Back Number:[phone]   Additional Information:

## 2022-09-01 NOTE — PROGRESS NOTES
Subjective:     Patient ID:  Christine Castro is a 67 y.o. female.    Phillip    Chief Complaint:  Neck pain and left arm pain.  Back pain and bilateral leg pain    HPI    Christine Castro is a 67 y.o. female who presents for follow up.  Patient states that she still has some neck pain.  More in the front of the neck.  She has pain in the left arm with radiation to the whole hand.  Her worst pain is in her low back with radiation down the right lateral leg.  She has some pain in the left leg but it is worse than the right leg.  She did have an episode of increased neck pain about 3 weeks ago but this resolved.  She has some tingling in the right foot.      She had the injection with pain management which helped for 1 week with her back and leg pain but then returned.      She saw Dr. Martin for scooter evaluation.    She walks with a walker at home and lives by herself.  She does try to do things on her own.  She does have falls at times.    Patient denies any recent accidents or trauma, no saddle anesthesias, and no bowel or bladder incontinence.    Patient has difficulty with balance or gait, difficulty tying shoes or buttoning clothes, is dropping things, has difficulty opening containers.    Review of Systems:  Constitution: Negative for chills, fever, night sweats and weight loss.   Musculoskeletal: Negative for falls.   Gastrointestinal: Negative for bowel incontinence, nausea and vomiting.   Genitourinary: Negative for bladder incontinence.   Neurological: Negative for disturbances in coordination and loss of balance.      Objective:      Vitals:    09/01/22 1342   Weight: 83 kg (182 lb 15.7 oz)   Height: 5' (1.524 m)   PainSc:   8       Physical Exam: Exam done in the chair      General:  Christine Castro is well-developed, well-nourished, appears stated age, in no acute distress, alert and oriented to person, place, and time.    Pulmonary/Chest:  Respiratory effort normal  Abdominal: Exhibits no  "distension  Psychiatric:  Normal mood and affect.  Behavior is normal.  Judgement and thought content normal      Musculoskeletal:        Cervical ROM:   Mild pain in cervical spine flexion, extension, left rotation, and right rotation, left lateral bending, and right lateral bending.    Cervical Spine Inspection:  Healed anterior surgical scars with no visible rashes.    Lumbar Spine Inspection:  Healed surgical scars with no visible rashes.    Neurological:     Muscle strength against resistance:     Right Left   Deltoid  5 / 5 3 / 5   Biceps  5 / 5 5 / 5   Triceps 5 / 5 4 / 5   Wrist flexion  5 / 5 5 / 5   Wrist extension 5 / 5 5 / 5   Finger abduction 5 / 5 5 / 5   Thumb opposition 5 / 5 5 / 5   Handgrip 4 / 5 4 / 5   Hip flexion  5 / 5 5 / 5   Hip extension 5 / 5 5 / 5   Hip abduction 5 / 5 5 / 5   Hip adduction 5/ 5 5 / 5   Knee extension  5 / 5 5 / 5   Knee flexion  5 / 5 5 / 5   Dorsiflexion  5 / 5 0 / 5   EHL  5 / 5 3 / 5   Plantar flexion  5 / 5 4 / 5   Inversion of the feet 5 / 5 4 / 5   Eversion of the feet 5 / 5 4 / 5       Reflexes:     Right Left   Triceps 2+ 2+   Biceps 3+ 3+   Brachioradialis 3+ 3+   Patellar 2+ 2+   Achilles 2+ 2+     Babinski: Negative bilaterally  Clonus:  Negative bilaterally  Munoz: Negative on the right. Positive on the left.    On gross examination of the bilateral upper and lower extremities, patient has no signs of clubbing, cyanosis, or edema.       XRAY Interpretation:     Cervical spine xrays were personally reviewed today.  No fractures.  Hardware intact.  Grade one spondylolisthesis at C7-T1.      Per Dr. Fisher note from 05/2022  "DIAGNOSTIC DATA:  I personally interpreted the following imaging:   Lumbar spine MRI July 2021 was showing good decompression at L4-5, L5-S1 new onset of degenerative disc disease with grade 1 spondylolisthesis and bilateral foraminal stenosis, this was not present in the lumbar spine a MRI prior to her lumbar fusion.  Scoliosis film is " "showing sagittal plane imbalance with hypopnea considered ends and new L5-S1 grade 1-2 spondylolisthesis  Lumbar spine xrays were personally reviewed today. No fractures.  No movement on flexion and extension."      Assessment:          1. Chronic bilateral low back pain with bilateral sciatica    2. Other spondylosis with radiculopathy, lumbar region    3. DDD (degenerative disc disease), lumbar    4. Lumbar radiculopathy    5. Spinal stenosis, lumbar region with neurogenic claudication    6. Spondylolisthesis, lumbar region    7. S/P lumbar spinal fusion    8. S/P cervical spinal fusion    9. Spondylolisthesis of cervical region            Plan:               Patient would like to schedule follow-up with Dr. Fisher in 3 months to discuss further lumbar spine surgery she discussed with him last time at her last visit.  She will get MRI lumbar spine before this appointment.      Follow-Up:  Follow up in about 3 months (around 12/1/2022). If there are any questions prior to this, the patient was instructed to contact the office.       Mayda Musa [facility], PA-C  Neurosurgery  [facility]  09/01/2022        "

## 2022-09-15 ENCOUNTER — TELEPHONE (OUTPATIENT)
Dept: NEUROSURGERY | Facility: CLINIC | Age: 67
End: 2022-09-15
Payer: MEDICARE

## 2022-09-15 NOTE — TELEPHONE ENCOUNTER
Spoke w/ patient. She request refill. Informed of medication refill policy. Informed  will refill as soon as possible.

## 2022-09-15 NOTE — TELEPHONE ENCOUNTER
----- Message from Maritza Dinh sent at 9/15/2022  2:27 PM CDT -----  Regarding: medication  Type:  Patient Returning Call    Who Called:patient     Who Left Message for Patient:n/a    Does the patient know what this is regarding?:patient would like update on when her rx will be filled.  1. oxyCODONE-acetaminophen (PERCOCET)  mg per tablet    Would the patient rather a call back or a response via [facility]? Call back     Best Call Back Number:[phone]  Additional Information: patients is trying to get rx before weekend comes. Says she can come in to get rx as well.

## 2022-09-16 ENCOUNTER — TELEPHONE (OUTPATIENT)
Dept: NEUROSURGERY | Facility: CLINIC | Age: 67
End: 2022-09-16
Payer: MEDICARE

## 2022-09-16 DIAGNOSIS — M43.27 FUSION OF SPINE OF LUMBOSACRAL REGION: ICD-10-CM

## 2022-09-16 RX ORDER — OXYCODONE AND ACETAMINOPHEN 10; 325 MG/1; MG/1
1 TABLET ORAL EVERY 8 HOURS PRN
Qty: 90 TABLET | Refills: 0 | Status: SHIPPED | OUTPATIENT
Start: 2022-09-16 | End: 2022-10-05 | Stop reason: SDUPTHER

## 2022-09-16 NOTE — TELEPHONE ENCOUNTER
Pt continues to request refill of medication. Have spoken w/ her multiple times and continue to remind her of clinic refill policy. Have sent message and request to . Informed pt he has not refilled it yet and I will notify her when he does.

## 2022-09-16 NOTE — TELEPHONE ENCOUNTER
----- Message from Kaye Flood sent at 9/16/2022  9:37 AM CDT -----  Needs advice from nurse:      Who Called:pt  Regarding:would like refill on oxyCODONE-acetaminophen (PERCOCET)  mg per tablet  Take 1 tablet by mouth every 6 (six) hours as needed for Pain    [facility] #[id number] [address] [place] [facility] (Ph: [phone])  Would the patient rather a call back or VIA [facility]?  Best Call Back number:[phone]  Additional Info:

## 2022-09-19 ENCOUNTER — TELEPHONE (OUTPATIENT)
Dept: PHYSICAL MEDICINE AND REHAB | Facility: CLINIC | Age: 67
End: 2022-09-19
Payer: MEDICARE

## 2022-09-19 NOTE — TELEPHONE ENCOUNTER
----- Message from Kolby Hinton sent at 9/19/2022  2:34 PM CDT -----  Regarding: advise-scooter  Contact: PT @ [phone]  Pt is calling to speak to someone in the office to discuss her scooter status. Pt states that the last time she saw Dr. Martin, he was working on getting her a scooter and that was at the end of July. I did advise pt that she has an appt tomorrow and will be able to f/u with him then. Thanks.

## 2022-09-23 ENCOUNTER — OFFICE VISIT (OUTPATIENT)
Dept: PHYSICAL MEDICINE AND REHAB | Facility: CLINIC | Age: 67
End: 2022-09-23
Payer: MEDICARE

## 2022-09-23 VITALS
WEIGHT: 196.75 LBS | SYSTOLIC BLOOD PRESSURE: 116 MMHG | BODY MASS INDEX: 38.63 KG/M2 | HEIGHT: 60 IN | DIASTOLIC BLOOD PRESSURE: 79 MMHG | HEART RATE: 79 BPM

## 2022-09-23 DIAGNOSIS — M54.42 CHRONIC BILATERAL LOW BACK PAIN WITH BILATERAL SCIATICA: ICD-10-CM

## 2022-09-23 DIAGNOSIS — R06.09 DOE (DYSPNEA ON EXERTION): ICD-10-CM

## 2022-09-23 DIAGNOSIS — Z98.1 STATUS POST LUMBAR SPINAL FUSION: ICD-10-CM

## 2022-09-23 DIAGNOSIS — R53.81 DEBILITY: ICD-10-CM

## 2022-09-23 DIAGNOSIS — M21.70 ACQUIRED LEG LENGTH DISCREPANCY: ICD-10-CM

## 2022-09-23 DIAGNOSIS — M81.0 OSTEOPOROSIS, UNSPECIFIED OSTEOPOROSIS TYPE, UNSPECIFIED PATHOLOGICAL FRACTURE PRESENCE: ICD-10-CM

## 2022-09-23 DIAGNOSIS — Z86.79 HISTORY OF CONGESTIVE HEART FAILURE: ICD-10-CM

## 2022-09-23 DIAGNOSIS — R29.6 FREQUENT FALLS: ICD-10-CM

## 2022-09-23 DIAGNOSIS — M12.812 LEFT ROTATOR CUFF TEAR ARTHROPATHY: ICD-10-CM

## 2022-09-23 DIAGNOSIS — M54.41 CHRONIC BILATERAL LOW BACK PAIN WITH BILATERAL SCIATICA: ICD-10-CM

## 2022-09-23 DIAGNOSIS — Z98.1 STATUS POST CERVICAL SPINAL FUSION: ICD-10-CM

## 2022-09-23 DIAGNOSIS — R26.9 GAIT DISORDER: Primary | ICD-10-CM

## 2022-09-23 DIAGNOSIS — G89.29 CHRONIC BILATERAL LOW BACK PAIN WITH BILATERAL SCIATICA: ICD-10-CM

## 2022-09-23 DIAGNOSIS — R29.898 WEAKNESS OF BOTH LEGS: ICD-10-CM

## 2022-09-23 DIAGNOSIS — M15.9 GENERALIZED OSTEOARTHRITIS: ICD-10-CM

## 2022-09-23 DIAGNOSIS — M75.102 LEFT ROTATOR CUFF TEAR ARTHROPATHY: ICD-10-CM

## 2022-09-23 PROCEDURE — 1125F PR PAIN SEVERITY QUANTIFIED, PAIN PRESENT: ICD-10-PCS | Mod: CPTII,S$GLB,, | Performed by: PHYSICAL MEDICINE & REHABILITATION

## 2022-09-23 PROCEDURE — 3288F FALL RISK ASSESSMENT DOCD: CPT | Mod: CPTII,S$GLB,, | Performed by: PHYSICAL MEDICINE & REHABILITATION

## 2022-09-23 PROCEDURE — 99999 PR PBB SHADOW E&M-EST. PATIENT-LVL IV: CPT | Mod: PBBFAC,,, | Performed by: PHYSICAL MEDICINE & REHABILITATION

## 2022-09-23 PROCEDURE — 99499 UNLISTED E&M SERVICE: CPT | Mod: S$GLB,,, | Performed by: PHYSICAL MEDICINE & REHABILITATION

## 2022-09-23 PROCEDURE — 3078F PR MOST RECENT DIASTOLIC BLOOD PRESSURE < 80 MM HG: ICD-10-PCS | Mod: CPTII,S$GLB,, | Performed by: PHYSICAL MEDICINE & REHABILITATION

## 2022-09-23 PROCEDURE — 3044F PR MOST RECENT HEMOGLOBIN A1C LEVEL <7.0%: ICD-10-PCS | Mod: CPTII,S$GLB,, | Performed by: PHYSICAL MEDICINE & REHABILITATION

## 2022-09-23 PROCEDURE — 1100F PTFALLS ASSESS-DOCD GE2>/YR: CPT | Mod: CPTII,S$GLB,, | Performed by: PHYSICAL MEDICINE & REHABILITATION

## 2022-09-23 PROCEDURE — 1125F AMNT PAIN NOTED PAIN PRSNT: CPT | Mod: CPTII,S$GLB,, | Performed by: PHYSICAL MEDICINE & REHABILITATION

## 2022-09-23 PROCEDURE — 3074F PR MOST RECENT SYSTOLIC BLOOD PRESSURE < 130 MM HG: ICD-10-PCS | Mod: CPTII,S$GLB,, | Performed by: PHYSICAL MEDICINE & REHABILITATION

## 2022-09-23 PROCEDURE — 1157F PR ADVANCE CARE PLAN OR EQUIV PRESENT IN MEDICAL RECORD: ICD-10-PCS | Mod: CPTII,S$GLB,, | Performed by: PHYSICAL MEDICINE & REHABILITATION

## 2022-09-23 PROCEDURE — 99213 OFFICE O/P EST LOW 20 MIN: CPT | Mod: S$GLB,,, | Performed by: PHYSICAL MEDICINE & REHABILITATION

## 2022-09-23 PROCEDURE — 1159F PR MEDICATION LIST DOCUMENTED IN MEDICAL RECORD: ICD-10-PCS | Mod: CPTII,S$GLB,, | Performed by: PHYSICAL MEDICINE & REHABILITATION

## 2022-09-23 PROCEDURE — 99999 PR PBB SHADOW E&M-EST. PATIENT-LVL IV: ICD-10-PCS | Mod: PBBFAC,,, | Performed by: PHYSICAL MEDICINE & REHABILITATION

## 2022-09-23 PROCEDURE — 1100F PR PT FALLS ASSESS DOC 2+ FALLS/FALL W/INJURY/YR: ICD-10-PCS | Mod: CPTII,S$GLB,, | Performed by: PHYSICAL MEDICINE & REHABILITATION

## 2022-09-23 PROCEDURE — 1159F MED LIST DOCD IN RCRD: CPT | Mod: CPTII,S$GLB,, | Performed by: PHYSICAL MEDICINE & REHABILITATION

## 2022-09-23 PROCEDURE — 3074F SYST BP LT 130 MM HG: CPT | Mod: CPTII,S$GLB,, | Performed by: PHYSICAL MEDICINE & REHABILITATION

## 2022-09-23 PROCEDURE — 1157F ADVNC CARE PLAN IN RCRD: CPT | Mod: CPTII,S$GLB,, | Performed by: PHYSICAL MEDICINE & REHABILITATION

## 2022-09-23 PROCEDURE — 3078F DIAST BP <80 MM HG: CPT | Mod: CPTII,S$GLB,, | Performed by: PHYSICAL MEDICINE & REHABILITATION

## 2022-09-23 PROCEDURE — 3008F BODY MASS INDEX DOCD: CPT | Mod: CPTII,S$GLB,, | Performed by: PHYSICAL MEDICINE & REHABILITATION

## 2022-09-23 PROCEDURE — 3288F PR FALLS RISK ASSESSMENT DOCUMENTED: ICD-10-PCS | Mod: CPTII,S$GLB,, | Performed by: PHYSICAL MEDICINE & REHABILITATION

## 2022-09-23 PROCEDURE — 99213 PR OFFICE/OUTPT VISIT, EST, LEVL III, 20-29 MIN: ICD-10-PCS | Mod: S$GLB,,, | Performed by: PHYSICAL MEDICINE & REHABILITATION

## 2022-09-23 PROCEDURE — 3008F PR BODY MASS INDEX (BMI) DOCUMENTED: ICD-10-PCS | Mod: CPTII,S$GLB,, | Performed by: PHYSICAL MEDICINE & REHABILITATION

## 2022-09-23 PROCEDURE — 3044F HG A1C LEVEL LT 7.0%: CPT | Mod: CPTII,S$GLB,, | Performed by: PHYSICAL MEDICINE & REHABILITATION

## 2022-09-23 NOTE — PROGRESS NOTES
Subjective:       Patient ID: Christine Castro is a 67 y.o. female.    Chief Complaint: No chief complaint on file.      HPI    Mrs. Castro is a 67-year-old black female with multiple medical problems who is presenting to the Physical Medicine clinic for evaluation for a power mobility device.  Her past medical history significant for HTN, DM, CAD status post MI, CHF, hypothyroidism, TIAs, status post gastric bypass, generalized osteoarthritis, status bilateral TKA, leg length discrepancy (left leg 1-2 inches shorter), chronic low back pain with bilateral radiculopathy status post multiple epidural steroid injections and status post L4-5 laminectomy and fusion in 01/2021, chronic neck pain with myelopathy, status post C3-C7 ACDF in 12/2021, osteoporosis, debility, frequent falls . She had a mobility evaluation done on 07/27/2022 but apparently the paperwork did not go through.  She is coming today to review today evaluation and resubmit the paperwork.    The patient lives alone in a single-story home with ramp access.  Some of her friends check on her few times per week.  She is independent with feeding herself but has trouble getting to the kitchen.  She is independent with toileting but has trouble getting to the bathroom.  She is independent with bathing using a transfer tub bench.  She can ambulate with a rolling walker or Rollator walker about 10 ft.  She is restricted by shortness of breath, leg weakness, chronic low back pain bilateral sciatica (up to 10/10), leg length discrepancy since her youth and impaired balance.  She reports history of falls occasionally few times per week with minor injuries so far.  She cannot propel a manual wheelchair due to shortness of breath, bilateral upper extremity weakness, chronic left shoulder pain (9-10/10) status post arthroscopic procedure, bilateral hand pain (7-8/10).  She was able to use a scooter in department stores without significant problems.    Past Medical  History:   Diagnosis Date    Anticoagulant long-term use     Arthritis     Asthma     CHF (congestive heart failure)     [facility]    Colon cancer     colon    COPD (chronic obstructive pulmonary disease)     Coronary artery disease     Depression     Diabetes mellitus, type 2     GERD (gastroesophageal reflux disease)     Myocardial infarction     AGE 19 [facility] WITHBABY DELIVERY    Thyroid disease         Review of patient's allergies indicates:   Allergen Reactions    Adhesive      Adhesive tape    Latex, natural rubber      Adhesive from latex tape    Ibuprofen Other (See Comments)     Causes asthma flare??        Review of Systems   Constitutional:  Positive for fatigue. Negative for chills and fever.   Eyes:  Positive for visual disturbance.   Respiratory:  Positive for shortness of breath.    Cardiovascular:  Negative for chest pain.   Gastrointestinal:  Negative for nausea and vomiting.   Genitourinary:  Positive for difficulty urinating.   Musculoskeletal:  Positive for arthralgias, back pain and neck pain. Negative for gait problem.   Neurological:  Positive for dizziness, weakness and headaches.   Psychiatric/Behavioral:  Negative for behavioral problems.            Objective:      Physical Exam  Vitals reviewed.   Constitutional:       General: She is not in acute distress.     Appearance: She is well-developed.      Comments: Coming to the clinic in a manual wheelchair propelled by friend.     HENT:      Head: Normocephalic and atraumatic.   Eyes:      Extraocular Movements: Extraocular movements intact.   Cardiovascular:      Rate and Rhythm: Normal rate and regular rhythm.      Heart sounds: Normal heart sounds.   Pulmonary:      Effort: Pulmonary effort is normal.      Breath sounds: Normal breath sounds.   Abdominal:      Palpations: Abdomen is soft.   Musculoskeletal:      Cervical back: Normal range of motion.      Comments: BUE:  ROM:   RUE: full.   LUE: decreased at shoulder (30 deg  active abduction).  +ve severe Heberden's & Maral's nodes.  Strength:    RUE: 3/5 at shoulder abduction, 4 elbow flexion, 4 elbow extension, 4 hand .   LUE: 2/5 at shoulder abduction, 4 elbow flexion, 4 elbow extension, 4 hand .  Sensation to pinprick:   RUE: intact.   LUE: intact.  DTR:    RUE: +1 biceps, +1 triceps.   LUE:  +1 biceps, +1 triceps.      BLE:  Knees:   RLE: healed TKA.    LLE: Healed TKA.  Strength:    RLE: 4-/5 at hip flexion, 4 knee extension, 4 ankle DF, 4 ankle PF.   LLE: 4-/5 at hip flexion, 4 knee extension, 4 ankle DF,  4 ankle PF.  Sensation to pinprick:     RLE: decreased in stocking distribuion.     LLE: decreased in stocking distribuion.  DTR:     RLE: +1 knee, +1 ankle.    LLE: +2 knee, +1 ankle.  SLR (sitting):      RLE: -ve.      LLE: +ve.     +ve mild tenderness over lumbar spine.     Skin:     General: Skin is warm.   Neurological:      Mental Status: She is alert and oriented to person, place, and time.   Psychiatric:         Mood and Affect: Mood normal.         Behavior: Behavior normal.         Thought Content: Thought content normal.       Assessment:       1. Gait disorder    2. Chronic bilateral low back pain with bilateral sciatica    3. Status post lumbar spinal fusion (L4-L5, 1/2021)    4. Weakness of both legs    5. Status post cervical spinal fusion (ACDF at C3-C7, 12/2021)    6. Left rotator cuff tear arthropathy    7. Generalized osteoarthritis    8. Acquired leg length discrepancy    9. History of congestive heart failure    10. CASTANEDA (dyspnea on exertion)    11. Osteoporosis, unspecified osteoporosis type, unspecified pathological fracture presence    12. Frequent falls    13. Debility        Summary/Plan:             - The patient was seen today for mobility evaluation for a power mobility device due to significant impairment at home.  - The patient has multifactorial gait impairment.  - The patient is not able to ambulate safely at home.  - The patient is  unable to use a walker functional distances due to CASTANEDA because of CHF, bilateral lower extremity weakness, chronic low back pain with bilateral radiculopathy.  - The patient is unable to use an optimally-configured manual wheelchair at home due to do it because of CHF, bilateral upper extremity weakness worse on the left, left rotator cuff arthropathy, bilateral hand pain due to advanced OA.  - The patient has history of falls, which could be detrimental to her health especially due to osteoporosis.  - The patient has intact cognition and should be able to use a power mobility device well at home.  - A prescription for an electric scooter was generated.  - The patient has enough upper & lower extremity strength to be able to transfer to and from the power mobility device.  - The patient has enough range of motion & strength in BUE to allow functional operation of the tiller.  - The patient has good trunk balance and should be able to maintain a safe posture while operating a power mobility device.  - This will allow the patient to go safely to the kitchen, dining room or living room for feeding & socialization.  It should also help with energy conservation and reducing the risk of falls.  - The patient is to return the Physical Medicine/Mobility clinic prn.          This note was partly generated with [employer] voice recognition software. I apologize for any possible typographical errors.

## 2022-09-28 LAB — CRC RECOMMENDATION EXT: NORMAL

## 2022-10-05 DIAGNOSIS — M43.27 FUSION OF SPINE OF LUMBOSACRAL REGION: ICD-10-CM

## 2022-10-05 RX ORDER — OXYCODONE AND ACETAMINOPHEN 10; 325 MG/1; MG/1
1 TABLET ORAL EVERY 8 HOURS PRN
Qty: 90 TABLET | Refills: 0 | Status: SHIPPED | OUTPATIENT
Start: 2022-10-05 | End: 2022-10-16 | Stop reason: SDUPTHER

## 2022-10-06 ENCOUNTER — TELEPHONE (OUTPATIENT)
Dept: PHYSICAL MEDICINE AND REHAB | Facility: CLINIC | Age: 67
End: 2022-10-06
Payer: MEDICARE

## 2022-10-08 NOTE — TELEPHONE ENCOUNTER
No new care gaps identified.  [facility] Embedded Care Gaps. Reference number: [id number]. 10/08/2022   4:59:28 PM CDT

## 2022-10-09 RX ORDER — ZOLPIDEM TARTRATE 5 MG/1
TABLET ORAL
Qty: 30 TABLET | Refills: 3 | Status: SHIPPED | OUTPATIENT
Start: 2022-10-09 | End: 2023-01-24 | Stop reason: SDUPTHER

## 2022-10-09 RX ORDER — DIAZEPAM 5 MG/1
TABLET ORAL
Qty: 30 TABLET | Refills: 3 | Status: SHIPPED | OUTPATIENT
Start: 2022-10-09 | End: 2023-01-24 | Stop reason: SDUPTHER

## 2022-10-11 ENCOUNTER — TELEPHONE (OUTPATIENT)
Dept: PHYSICAL MEDICINE AND REHAB | Facility: CLINIC | Age: 67
End: 2022-10-11
Payer: MEDICARE

## 2022-10-11 NOTE — TELEPHONE ENCOUNTER
I called the patient without answer.  I left a voicemail indicating that her mobility evaluation was done on 09/23/2022 and all the paperwork with them been faxed to the wheelchair vendor the same with the next day.  She can check with the wheelchair vendor to get updates on her scooter.

## 2022-10-11 NOTE — TELEPHONE ENCOUNTER
----- Message from Brenda Sparks sent at 10/11/2022 12:31 PM CDT -----  Regarding: questions  Contact: [phone]  Pt Questions    Questions:Pt calling to speak with the Dr about her wheelchair   Call Back number: [phone]

## 2022-10-16 ENCOUNTER — NURSE TRIAGE (OUTPATIENT)
Dept: ADMINISTRATIVE | Facility: CLINIC | Age: 67
End: 2022-10-16
Payer: MEDICARE

## 2022-10-16 DIAGNOSIS — M43.27 FUSION OF SPINE OF LUMBOSACRAL REGION: ICD-10-CM

## 2022-10-16 RX ORDER — OXYCODONE AND ACETAMINOPHEN 10; 325 MG/1; MG/1
1 TABLET ORAL EVERY 8 HOURS PRN
Qty: 90 TABLET | Refills: 0 | Status: SHIPPED | OUTPATIENT
Start: 2022-10-16 | End: 2022-11-15 | Stop reason: SDUPTHER

## 2022-10-16 NOTE — TELEPHONE ENCOUNTER
Reason for Disposition   [1] Caller has URGENT medicine question about med that PCP or specialist prescribed AND [2] triager unable to answer question    Additional Information   Negative: [1] Intentional drug overdose AND [2] suicidal thoughts or ideas   Negative: Drug overdose and triager unable to answer question   Negative: Caller requesting a renewal or refill of a medicine patient is currently taking   Negative: Caller requesting information unrelated to medicine   Negative: Caller requesting information about COVID-19 Vaccine   Negative: Caller requesting information about Emergency Contraception   Negative: Caller requesting information about Combined Birth Control Pills   Negative: Caller requesting information about Progestin Birth Control Pills   Negative: Caller requesting information about Post-Op pain or medicines   Negative: Caller requesting a prescription antibiotic (such as Penicillin) for Strep throat and has a positive culture result   Negative: Caller requesting a prescription anti-viral med (such as Tamiflu) and has influenza (flu) symptoms   Negative: Immunization reaction suspected   Negative: Rash while taking a medicine or within 3 days of stopping it   Negative: [1] Asthma and [2] having symptoms of asthma (cough, wheezing, etc.)   Negative: [1] Symptom of illness (e.g., headache, abdominal pain, earache, vomiting) AND [2] more than mild   Negative: Breastfeeding questions about mother's medicines and diet   Negative: MORE THAN A DOUBLE DOSE of a prescription or over-the-counter (OTC) drug   Negative: [1] DOUBLE DOSE (an extra dose or lesser amount) of prescription drug AND [2] any symptoms (e.g., dizziness, nausea, pain, sleepiness)   Negative: [1] DOUBLE DOSE (an extra dose or lesser amount) of over-the-counter (OTC) drug AND [2] any symptoms (e.g., dizziness, nausea, pain, sleepiness)   Negative: Took another person's prescription drug   Negative: [1] Prescription not at pharmacy AND  [2] was prescribed by PCP recently (Exception: triager has access to EMR and prescription is recorded there. Go to Home Care and confirm for pharmacy.)   Negative: [1] Pharmacy calling with prescription question AND [2] triager unable to answer question   Negative: [1] DOUBLE DOSE (an extra dose or lesser amount) of prescription drug AND [2] NO symptoms (Exception: a double dose of antibiotics)   Negative: Diabetes drug error or overdose (e.g., took wrong type of insulin or took extra dose)    Protocols used: Medication Question Call-A-  pt called re meds. had surg done on spine and knees. [facility] doesn't have pain med in stock. Spoke with dr thompson re above. MD states she cannot escribe med and rec pt contact office in am. Urgent office message sent. If having severe uncontrolled pain go to ED. Pt notified. Call back with questions

## 2022-10-24 ENCOUNTER — PATIENT OUTREACH (OUTPATIENT)
Dept: ADMINISTRATIVE | Facility: OTHER | Age: 67
End: 2022-10-24
Payer: MEDICARE

## 2022-10-24 NOTE — PROGRESS NOTES
CHW - Follow Up    This Community Health Worker completed a follow up visit with BEVERLY [id number] over the phone today.  Pt/Caregiver reported: Patient stated she needs assistance with her light bill and her lights were disconnected for almost 1 week leaving her unable to contact CHW regarding assistance. Patient requested to be referred to OPCM.   Community Health Worker provided: Patient was provided contact information for [facility] and [facility] for assistance with utilities. Patient's insurance covers OPCM services, patient has been referred for OPCM (high risk) assessment.   Follow up required: Yes, Utility assistance update   [id number]: 10/25/22

## 2022-10-25 ENCOUNTER — TELEPHONE (OUTPATIENT)
Dept: FAMILY MEDICINE | Facility: CLINIC | Age: 67
End: 2022-10-25
Payer: MEDICARE

## 2022-10-25 ENCOUNTER — PATIENT OUTREACH (OUTPATIENT)
Dept: ADMINISTRATIVE | Facility: OTHER | Age: 67
End: 2022-10-25
Payer: MEDICARE

## 2022-10-25 DIAGNOSIS — R29.6 FREQUENT FALLS: Primary | ICD-10-CM

## 2022-10-25 NOTE — PROGRESS NOTES
"CHW - Follow Up    This Community Health Worker completed a follow up visit with N [id number] over the phone today.  Pt/Caregiver reported: Patient stated she received assistance for her light bill from  [facility] and [facility] last month. Due to company policy patient will not qualify for assistance this month. CHW recommended patient try to set up a payment plan with [employer] and opt for the "Level Billing" program to keep her monthly bill at a reasonable amount due to it being based off your monthly energy usage. Patient stated she is unable to get on a payment plan due to her being past due on her bill. Patient will look into the "Level Billing" program once she pays her bill.  Community Health Worker provided: [facility]. Patient was instructed to wait for them to contact her. Patient was also referred to [facility] to get on their waiting list for housing  Follow up required: Yes, Utility and housing update   Follow-up Outreach - Due: 10/28/2022      "

## 2022-10-25 NOTE — TELEPHONE ENCOUNTER
"----- Message from Rodriguez Manrique sent at 10/25/2022  1:35 PM CDT -----  Regarding: OPCM Referral Request  Dr Santiago,    This patient is eligible for Outpatient Complex Care Management Services and may benefit from the nursing and social work services that the program provides. If you agree, please place an ambulatory referral/consult to " Outpatient Case Management" (ref 158)    Thank you,  TESS Frias       "

## 2022-10-28 ENCOUNTER — OUTPATIENT CASE MANAGEMENT (OUTPATIENT)
Dept: ADMINISTRATIVE | Facility: OTHER | Age: 67
End: 2022-10-28
Payer: MEDICARE

## 2022-10-28 NOTE — LETTER
October 28, 2022    Christine Castro  [address] [place] [address]             [facility] [address] [place] [address] Dear Ms. Christine Castro,    I work with [facility] Outpatient Case Management Department. We received a referral from your primary care provider Dr. Beckman to call you to discuss your medical history. These services are free of charge and are offered to [facility] patients who have recently been discharged from any of our facilities or who have complex medical conditions that may require the skill of a nurse to assist with management.    I am a Registered Nurse who specializes in connecting patients with available medical and financial resources as well as addressing any educational needs that may be indicated.    I attempted to reach you by telephone, but I was unsuccessful. Please call our department so that we can go over some questions with you regarding your health.    The Outpatient Case Management Department can be reached at [phone] from 8:00 AM to 4:30 PM on Monday through Friday. [facility] also has a program where a nurse is available 24/7 to answer questions or provide medical advice. Their number is [phone].      Kind Regards,    Maria De Jesus Busby, RN  Outpatient Case Management  [phone]

## 2022-10-28 NOTE — PROGRESS NOTES
Outpatient Care Management  Patient Not Qualified    Patient: Christine Castro  MRN:  [id number]  Date of Service:  11/10/2022  Completed by:  Maria De Jesus Busby RN    Chief Complaint   Patient presents with    OPCM Chart Review     10/28/22    OPCM RN First Assessment Attempt     10/28/22    OPCM RN Second Assessment Attempt     11/1/22    OPCM RN Third Assessment Attempt     11/2/22, 11/3/22    Case Closure     11/10/22       Patient Summary     Program:  OPCM High Risk  Qualification Status:  No  Reason Not Qualified:  Unable to reach      11/10/22 No response from patient. Notified PCP. Closing case.    11/3/22 4th attempt to complete initial assessment for Outpatient Care Management: Left message for patient requesting a return call. Will close case if patient does not respond.     11/2/22 3rd attempt to complete initial assessment for Outpatient Care Management: Left message for patient requesting a return call. Letter has been sent to patient with OPC contact information. Will close case if patient does not respond.     11/1/22 2nd attempt to complete initial assessment for Outpatient Care Management: Left message for patient requesting a return call. Will attempt to contact patient again at a later date.     10/28/22 1st attempt to complete initial assessment for [facility] Outpatient Care Management: Left message for patient requesting a return call. Letter has been sent to patient with OPC contact information. Will attempt to contact patient again at a later date.

## 2022-11-01 ENCOUNTER — PATIENT OUTREACH (OUTPATIENT)
Dept: ADMINISTRATIVE | Facility: OTHER | Age: 67
End: 2022-11-01
Payer: MEDICARE

## 2022-11-01 NOTE — PROGRESS NOTES
CHW - Follow Up    This Community Health Worker completed a follow up visit with BEVERLY [id number] over the phone today.  Pt/Caregiver reported: Patient did not report any changes or additional needs   Community Health Worker provided: Patient was reminded to go to [facility] for assistance with her light bill.   Follow up required: Yes, Assistance update  Follow-up Outreach - Due: 11/4/2022

## 2022-11-02 ENCOUNTER — PATIENT OUTREACH (OUTPATIENT)
Dept: ADMINISTRATIVE | Facility: OTHER | Age: 67
End: 2022-11-02
Payer: MEDICARE

## 2022-11-02 NOTE — PROGRESS NOTES
CHW - Follow Up    This Community Health Worker completed a follow up visit with BEVERLY [id number] over the phone today.  Pt/Caregiver reported: Patient stated she is going to [facility] today for assistance with her light bill. Patient believes she has already received assistance from them already and may not qualify. Patient stated she will update CHW once she speaks to them.   Community Health Worker provided: No additional resources provided at this time.   Follow up required: Yes, Assistance outcome  Follow-up Outreach - Due: 11/4/2022

## 2022-11-03 ENCOUNTER — TELEPHONE (OUTPATIENT)
Dept: NEUROSURGERY | Facility: CLINIC | Age: 67
End: 2022-11-03
Payer: MEDICARE

## 2022-11-03 NOTE — TELEPHONE ENCOUNTER
----- Message from Cecelia Bates MA sent at 11/1/2022  9:46 PM CDT -----  Contact: pt    ----- Message -----  From: Khris Li  Sent: 11/1/2022   2:44 PM CDT  To: Phillip Quiros Staff    .Type:  Needs Medical Advice    Who Called: pt    Would the patient rather a call back or a response via [facility]? Call back  Best Call Back Number: [phone]   Additional Information: Pt. Is requesting a call back from the nurse she has some questions.

## 2022-11-04 ENCOUNTER — PATIENT OUTREACH (OUTPATIENT)
Dept: ADMINISTRATIVE | Facility: OTHER | Age: 67
End: 2022-11-04
Payer: MEDICARE

## 2022-11-04 ENCOUNTER — TELEPHONE (OUTPATIENT)
Dept: NEUROSURGERY | Facility: CLINIC | Age: 67
End: 2022-11-04
Payer: MEDICARE

## 2022-11-04 NOTE — PROGRESS NOTES
CHW - Case Closure    This Community Health Worker spoke to MRN [id number] over the phone today.   Pt/Caregiver reported: Patient has been provided all available resources in her area for utility assistance. Patient was unable to receive assistance from [facility] due to receiving assistance within the last couple of months. Patient was also unable to receive assistance from the [employer] due to the lack of funding at this time. Patient was encouraged to get on a payment plan with [employer] to avoid accruing additional late payments and/or disconnection, patient declined. Patient also stated she is on a section 8 housing program and would like to move due to issues with her landlord and being unhappy with living conditions, CHW recommended patient transfer her voucher to another [place] with more housing options, patient declined. CHW offered to put patient on the waiting list for available senior housing in another [place], patient declined due to not wanting to drive. No additional resources available. Patient was encouraged to contact CHW with any needs in the future. Patient's case will be closed at this time.   Pt/Caregiver denied any additional needs at this time and agrees with episode closure at this time.  Provided MRN [id number] with Community Health Worker's contact information and encouraged him/her to contact this Community Health Worker if additional needs arise.

## 2022-11-04 NOTE — TELEPHONE ENCOUNTER
----- Message from Maryam Mendenhall sent at 11/3/2022  1:42 PM CDT -----  Type:  Needs Medical Advice    Who Called: self  Reason:returning call  Would the patient rather a call back or a response via [facility]? call  Best Call Back Number: [phone]  Additional Information: rosalina

## 2022-11-15 DIAGNOSIS — M43.27 FUSION OF SPINE OF LUMBOSACRAL REGION: ICD-10-CM

## 2022-11-15 RX ORDER — OXYCODONE AND ACETAMINOPHEN 10; 325 MG/1; MG/1
1 TABLET ORAL EVERY 8 HOURS PRN
Qty: 90 TABLET | Refills: 0 | Status: SHIPPED | OUTPATIENT
Start: 2022-11-15 | End: 2022-12-13 | Stop reason: SDUPTHER

## 2022-11-15 NOTE — TELEPHONE ENCOUNTER
----- Message from Kat Fischer sent at 11/15/2022  8:09 AM CST -----  Type:  RX Refill Request    Who Called: pt  Refill or New Rx:refill  RX Name and Strength:oxyCODONE-acetaminophen (PERCOCET)  mg per tablet  How is the patient currently taking it? (ex. 1XDay):Take 1 tablet by mouth every 8 (eight) hours as needed for Pain  Is this a 30 day or 90 day RX:90  Preferred Pharmacy with phone number:[facility][id number] - [facility] [address] [facility] AT [facility] [address]  [address] [facility] [address]  Phone: [phone] Fax: [phone]      Local or Mail Order:local  Ordering Provider:Dr Fisher  Would the patient rather a call back or a response via [facility]? call  Best Call Back Number:[phone]  Additional Information: Notes to Pharmacy: Quantity prescribed more than 7 day supply? Yes, quantity medically necessary

## 2022-11-21 ENCOUNTER — TELEPHONE (OUTPATIENT)
Dept: FAMILY MEDICINE | Facility: CLINIC | Age: 67
End: 2022-11-21
Payer: MEDICARE

## 2022-11-21 NOTE — TELEPHONE ENCOUNTER
I would stretch legs and feet two to three times a day    Nails turing black is likely due to fungal infection show them to me at our regularly scheduled visit

## 2022-11-21 NOTE — TELEPHONE ENCOUNTER
----- Message from Laurita cOhoa sent at 11/21/2022  4:11 PM CST -----  Regarding: advice/appointment  Contact: [phone]  Type:  Needs Medical Advice    Who Called:  self   Symptoms (please be specific):  her toenails are turning black and toes are numb. She is also  feels achy and in pain and her feet feels tight.   How long has patient had these symptoms:   2 weeks   Pharmacy name and phone #:    [facility] #[id number] - [place] [address] [facility];  Would the patient rather a call back or a response via [facility]?  Call   Best Call Back Number:  [phone]  Additional Information:  she would like to come to the office

## 2022-12-05 ENCOUNTER — TELEPHONE (OUTPATIENT)
Dept: FAMILY MEDICINE | Facility: CLINIC | Age: 67
End: 2022-12-05
Payer: MEDICARE

## 2022-12-05 ENCOUNTER — TELEPHONE (OUTPATIENT)
Dept: NEUROSURGERY | Facility: CLINIC | Age: 67
End: 2022-12-05
Payer: MEDICARE

## 2022-12-05 DIAGNOSIS — L60.8 DEFORMITY OF TOENAIL: Primary | ICD-10-CM

## 2022-12-05 NOTE — TELEPHONE ENCOUNTER
----- Message from Laurita Ochoa sent at 12/5/2022 10:24 AM CST -----  Regarding: sooner  Contact: [phone]  Type:  Patient Returning Call/sooner     Who Called: self   Who Left Message for Patient: Desiree Fox MA   Does the patient know what this is regarding?: her toenails are turning black and toes are numb. She is also  feels achy and in pain and her feet feels tight.   Would the patient rather a call back or a response via [facility]?  Call   Best Call Back Number: [phone]  Additional Information:

## 2022-12-05 NOTE — TELEPHONE ENCOUNTER
Returned call to pt. Informed  has the flu and that is why her appt is being rescheduled. Left previous message to inform she would receive call back once we know  availability.

## 2022-12-05 NOTE — TELEPHONE ENCOUNTER
Probably neuropathy-I would make an upcoming appointment probably sometime in January.  If it bothers you significantly I will go to urgent care

## 2022-12-06 ENCOUNTER — TELEPHONE (OUTPATIENT)
Dept: NEUROSURGERY | Facility: CLINIC | Age: 67
End: 2022-12-06
Payer: MEDICARE

## 2022-12-06 NOTE — TELEPHONE ENCOUNTER
----- Message from Taye Ramirez sent at 12/6/2022 10:26 AM CST -----  .Type:  Patient Returning Call    Who Called:Pt  Who Left Message for Patient:Pascale  Would the patient rather a call back or a response via [facility]? call  Best Call Back Number:[phone]

## 2022-12-07 ENCOUNTER — TELEPHONE (OUTPATIENT)
Dept: FAMILY MEDICINE | Facility: CLINIC | Age: 67
End: 2022-12-07
Payer: MEDICARE

## 2022-12-13 DIAGNOSIS — M43.27 FUSION OF SPINE OF LUMBOSACRAL REGION: ICD-10-CM

## 2022-12-13 RX ORDER — OXYCODONE AND ACETAMINOPHEN 10; 325 MG/1; MG/1
1 TABLET ORAL EVERY 8 HOURS PRN
Qty: 90 TABLET | Refills: 0 | Status: SHIPPED | OUTPATIENT
Start: 2022-12-13 | End: 2023-01-10 | Stop reason: SDUPTHER

## 2022-12-14 ENCOUNTER — OFFICE VISIT (OUTPATIENT)
Dept: NEUROSURGERY | Facility: CLINIC | Age: 67
End: 2022-12-14
Payer: MEDICARE

## 2022-12-14 VITALS
HEIGHT: 60 IN | SYSTOLIC BLOOD PRESSURE: 115 MMHG | WEIGHT: 196.88 LBS | HEART RATE: 68 BPM | DIASTOLIC BLOOD PRESSURE: 77 MMHG | BODY MASS INDEX: 38.65 KG/M2

## 2022-12-14 DIAGNOSIS — M54.42 CHRONIC BILATERAL LOW BACK PAIN WITH BILATERAL SCIATICA: ICD-10-CM

## 2022-12-14 DIAGNOSIS — Z98.1 S/P CERVICAL SPINAL FUSION: ICD-10-CM

## 2022-12-14 DIAGNOSIS — G89.29 CHRONIC BILATERAL LOW BACK PAIN WITH BILATERAL SCIATICA: ICD-10-CM

## 2022-12-14 DIAGNOSIS — M54.41 CHRONIC BILATERAL LOW BACK PAIN WITH BILATERAL SCIATICA: ICD-10-CM

## 2022-12-14 DIAGNOSIS — Z98.1 S/P LUMBAR SPINAL FUSION: Primary | ICD-10-CM

## 2022-12-14 PROCEDURE — 3074F PR MOST RECENT SYSTOLIC BLOOD PRESSURE < 130 MM HG: ICD-10-PCS | Mod: HCNC,CPTII,S$GLB, | Performed by: PHYSICIAN ASSISTANT

## 2022-12-14 PROCEDURE — 99999 PR PBB SHADOW E&M-EST. PATIENT-LVL V: ICD-10-PCS | Mod: PBBFAC,HCNC,, | Performed by: PHYSICIAN ASSISTANT

## 2022-12-14 PROCEDURE — 99999 PR PBB SHADOW E&M-EST. PATIENT-LVL V: CPT | Mod: PBBFAC,HCNC,, | Performed by: PHYSICIAN ASSISTANT

## 2022-12-14 PROCEDURE — 3078F DIAST BP <80 MM HG: CPT | Mod: HCNC,CPTII,S$GLB, | Performed by: PHYSICIAN ASSISTANT

## 2022-12-14 PROCEDURE — 3288F FALL RISK ASSESSMENT DOCD: CPT | Mod: HCNC,CPTII,S$GLB, | Performed by: PHYSICIAN ASSISTANT

## 2022-12-14 PROCEDURE — 3008F PR BODY MASS INDEX (BMI) DOCUMENTED: ICD-10-PCS | Mod: HCNC,CPTII,S$GLB, | Performed by: PHYSICIAN ASSISTANT

## 2022-12-14 PROCEDURE — 99214 PR OFFICE/OUTPT VISIT, EST, LEVL IV, 30-39 MIN: ICD-10-PCS | Mod: HCNC,S$GLB,, | Performed by: PHYSICIAN ASSISTANT

## 2022-12-14 PROCEDURE — 1125F PR PAIN SEVERITY QUANTIFIED, PAIN PRESENT: ICD-10-PCS | Mod: HCNC,CPTII,S$GLB, | Performed by: PHYSICIAN ASSISTANT

## 2022-12-14 PROCEDURE — 1160F PR REVIEW ALL MEDS BY PRESCRIBER/CLIN PHARMACIST DOCUMENTED: ICD-10-PCS | Mod: HCNC,CPTII,S$GLB, | Performed by: PHYSICIAN ASSISTANT

## 2022-12-14 PROCEDURE — 3008F BODY MASS INDEX DOCD: CPT | Mod: HCNC,CPTII,S$GLB, | Performed by: PHYSICIAN ASSISTANT

## 2022-12-14 PROCEDURE — 1157F ADVNC CARE PLAN IN RCRD: CPT | Mod: HCNC,CPTII,S$GLB, | Performed by: PHYSICIAN ASSISTANT

## 2022-12-14 PROCEDURE — 99214 OFFICE O/P EST MOD 30 MIN: CPT | Mod: HCNC,S$GLB,, | Performed by: PHYSICIAN ASSISTANT

## 2022-12-14 PROCEDURE — 1157F PR ADVANCE CARE PLAN OR EQUIV PRESENT IN MEDICAL RECORD: ICD-10-PCS | Mod: HCNC,CPTII,S$GLB, | Performed by: PHYSICIAN ASSISTANT

## 2022-12-14 PROCEDURE — 1160F RVW MEDS BY RX/DR IN RCRD: CPT | Mod: HCNC,CPTII,S$GLB, | Performed by: PHYSICIAN ASSISTANT

## 2022-12-14 PROCEDURE — 1159F PR MEDICATION LIST DOCUMENTED IN MEDICAL RECORD: ICD-10-PCS | Mod: HCNC,CPTII,S$GLB, | Performed by: PHYSICIAN ASSISTANT

## 2022-12-14 PROCEDURE — 3044F PR MOST RECENT HEMOGLOBIN A1C LEVEL <7.0%: ICD-10-PCS | Mod: HCNC,CPTII,S$GLB, | Performed by: PHYSICIAN ASSISTANT

## 2022-12-14 PROCEDURE — 1100F PR PT FALLS ASSESS DOC 2+ FALLS/FALL W/INJURY/YR: ICD-10-PCS | Mod: HCNC,CPTII,S$GLB, | Performed by: PHYSICIAN ASSISTANT

## 2022-12-14 PROCEDURE — 1125F AMNT PAIN NOTED PAIN PRSNT: CPT | Mod: HCNC,CPTII,S$GLB, | Performed by: PHYSICIAN ASSISTANT

## 2022-12-14 PROCEDURE — 1159F MED LIST DOCD IN RCRD: CPT | Mod: HCNC,CPTII,S$GLB, | Performed by: PHYSICIAN ASSISTANT

## 2022-12-14 PROCEDURE — 3074F SYST BP LT 130 MM HG: CPT | Mod: HCNC,CPTII,S$GLB, | Performed by: PHYSICIAN ASSISTANT

## 2022-12-14 PROCEDURE — 3044F HG A1C LEVEL LT 7.0%: CPT | Mod: HCNC,CPTII,S$GLB, | Performed by: PHYSICIAN ASSISTANT

## 2022-12-14 PROCEDURE — 1100F PTFALLS ASSESS-DOCD GE2>/YR: CPT | Mod: HCNC,CPTII,S$GLB, | Performed by: PHYSICIAN ASSISTANT

## 2022-12-14 PROCEDURE — 3078F PR MOST RECENT DIASTOLIC BLOOD PRESSURE < 80 MM HG: ICD-10-PCS | Mod: HCNC,CPTII,S$GLB, | Performed by: PHYSICIAN ASSISTANT

## 2022-12-14 PROCEDURE — 3288F PR FALLS RISK ASSESSMENT DOCUMENTED: ICD-10-PCS | Mod: HCNC,CPTII,S$GLB, | Performed by: PHYSICIAN ASSISTANT

## 2022-12-14 NOTE — PROGRESS NOTES
Subjective:     Patient ID:  Christine Castro is a 67 y.o. female.    Phillip    Chief Complaint:  Neck and left arm pain back bilateral leg pain    HPI    Christine Castro is a 67 y.o. female who presents for follow up.  Patient has had both cervical and lumbar fusions in the past.  Patient states she is had 3 falls in 3 days with the last fall 2 days ago.  She fell off the toilet and fell on her right side hit her head.  She is had increased neck and left arm pain and back and bilateral leg pain specifically more the right lateral leg to the ankle since the fall.  She is been walking with a walker at home.      She was supposed to visit with Dr. Carter to discuss further lumbar surgical options but has not yet seen him.    Patient denies any recent accidents or trauma, no saddle anesthesias, and no bowel or bladder incontinence.      Review of Systems:  Constitution: Negative for chills, fever, night sweats and weight loss.   Musculoskeletal: Negative for falls.   Gastrointestinal: Negative for bowel incontinence, nausea and vomiting.   Genitourinary: Negative for bladder incontinence.   Neurological: Negative for disturbances in coordination and loss of balance.      Objective:      Vitals:    12/14/22 1419   BP: 115/77   Pulse: 68   Weight: 89.3 kg (196 lb 13.9 oz)   Height: 5' (1.524 m)   PainSc: 10-Worst pain ever   PainLoc: Back       Physical Exam: Exam done in the wheelchair      General:  Christine Castro is well-developed, well-nourished, appears stated age, in no acute distress, alert and oriented to person, place, and time.    Pulmonary/Chest:  Respiratory effort normal  Abdominal: Exhibits no distension  Psychiatric:  Normal mood and affect.  Behavior is normal.  Judgement and thought content normal      Musculoskeletal:    Patient is able to walk heel to toe without difficulty.  Patient is able to walk on heels and toes without difficulty.    Cervical ROM:   Pain in cervical spine flexion, extension,  left rotation, and right rotation, left lateral bending, and right lateral bending.    Cervical Spine Palpation:  No tenderness to cervical spine palpation.    Lumbar ROM:   Pain in lumbar flexion, extension, left lateral bending, and right lateral bending.    Lumbar Spine Palpation:  No tenderness to low back palpation.        Neurological:     Muscle strength against resistance:     Right Left   Deltoid  5 / 5 4 / 5   Biceps  5 / 5 5 / 5   Triceps 5 / 5 5 / 5   Wrist flexion  5 / 5 5 / 5   Wrist extension 5 / 5 5 / 5   Finger abduction 5 / 5 5 / 5   Thumb opposition 5 / 5 5 / 5   Handgrip 5 / 5 4 / 5   Hip flexion  5 / 5 5 / 5   Hip extension 5 / 5 5 / 5   Hip abduction 5 / 5 5 / 5   Hip adduction 5/ 5 5 / 5   Knee extension  5 / 5 5 / 5   Knee flexion  5 / 5 5 / 5   Dorsiflexion  5 / 5 4 / 5   EHL  5 / 5 4 / 5   Plantar flexion  5 / 5 5 / 5   Inversion of the feet 5 / 5 5 / 5   Eversion of the feet 5 / 5 5 / 5       Reflexes:     Right Left   Triceps 2+ 2+   Biceps 2+ 2+   Brachioradialis 2+ 2+   Patellar 2+ 2+   Achilles 2+ 2+     Clonus:  Negative bilaterally  Munoz: Negative on the right.  Positive on the left.    On gross examination of the bilateral upper and lower extremities, patient has no signs of clubbing, cyanosis, or edema.       No new imaging to review      Assessment:          1. S/P lumbar spinal fusion    2. S/P cervical spinal fusion    3. Chronic bilateral low back pain with bilateral sciatica            Plan:          Orders Placed This Encounter    X-Ray Lumbar Spine Ap And Lateral    X-Ray Cervical Spine AP And Lateral    MRI Lumbar Spine Without Contrast       Will get updated lumbar spine and cervical spine x-rays with her recent falls.  Will get updated MRI lumbar spine.      Her appointment with Dr. Fisher needs rescheduled to discuss lumbar spine surgery options.    Follow-Up:  Follow up in about 1 month (around 1/14/2023). If there are any questions prior to this, the patient was  instructed to contact the office.       Mayda Musa [facility], PA-C  Neurosurgery  [facility]  12/14/2022

## 2022-12-15 ENCOUNTER — TELEPHONE (OUTPATIENT)
Dept: NEUROSURGERY | Facility: CLINIC | Age: 67
End: 2022-12-15
Payer: MEDICARE

## 2022-12-15 NOTE — TELEPHONE ENCOUNTER
----- Message from Chandu Stark MA sent at 12/14/2022  3:53 PM CST -----  Can you make pt a follow up appointment with  in a couple of weeks per Mayda

## 2023-01-06 RX ORDER — BLOOD-GLUCOSE METER
EACH MISCELLANEOUS
Qty: 1 EACH | Refills: 0 | Status: SHIPPED | OUTPATIENT
Start: 2023-01-06 | End: 2023-06-18

## 2023-01-07 NOTE — TELEPHONE ENCOUNTER
No new care gaps identified.  [facility] Embedded Care Gaps. Reference number: [id number]. 1/06/2023   6:58:54 PM CST

## 2023-01-07 NOTE — TELEPHONE ENCOUNTER
Refill Decision Note   Christine Castro  is requesting a refill authorization.  Brief Assessment and Rationale for Refill:  Approve     Medication Therapy Plan:       Medication Reconciliation Completed: No   Comments:     No Care Gaps recommended.     Note composed:7:47 PM 01/06/2023

## 2023-01-10 DIAGNOSIS — M43.27 FUSION OF SPINE OF LUMBOSACRAL REGION: ICD-10-CM

## 2023-01-10 NOTE — TELEPHONE ENCOUNTER
----- Message from Paola Landeros sent at 1/10/2023 10:19 AM CST -----  Type:  RX Refill Request    Who Called: Pt   Refill or New Rx:refill   RX Name and Strength:oxyCODONE-acetaminophen (PERCOCET)  mg per tablet  How is the patient currently taking it? (ex. 1XDay):Route: Take 1 tablet by mouth every 8 (eight) hours as needed for Pain. - Oral  Is this a 30 day or 90 day RX:30  Preferred Pharmacy with phone number:[facility]  [phone]   Local or Mail Order:Local   Ordering Provider:Dr. Fisher   Would the patient rather a call back or a response via [facility]? Yes   Best Call Back Number:[phone]  Additional Information:

## 2023-01-13 ENCOUNTER — HOSPITAL ENCOUNTER (OUTPATIENT)
Dept: RADIOLOGY | Facility: HOSPITAL | Age: 68
Discharge: HOME | End: 2023-01-13
Attending: PHYSICIAN ASSISTANT
Payer: MEDICARE

## 2023-01-13 ENCOUNTER — TELEPHONE (OUTPATIENT)
Dept: NEUROSURGERY | Facility: CLINIC | Age: 68
End: 2023-01-13
Payer: MEDICARE

## 2023-01-13 DIAGNOSIS — Z98.1 S/P LUMBAR SPINAL FUSION: ICD-10-CM

## 2023-01-13 DIAGNOSIS — M54.42 CHRONIC BILATERAL LOW BACK PAIN WITH BILATERAL SCIATICA: ICD-10-CM

## 2023-01-13 DIAGNOSIS — M54.41 CHRONIC BILATERAL LOW BACK PAIN WITH BILATERAL SCIATICA: ICD-10-CM

## 2023-01-13 DIAGNOSIS — G89.29 CHRONIC BILATERAL LOW BACK PAIN WITH BILATERAL SCIATICA: ICD-10-CM

## 2023-01-13 DIAGNOSIS — Z98.1 S/P CERVICAL SPINAL FUSION: ICD-10-CM

## 2023-01-13 DIAGNOSIS — M43.27 FUSION OF SPINE OF LUMBOSACRAL REGION: ICD-10-CM

## 2023-01-13 PROCEDURE — 72040 X-RAY EXAM NECK SPINE 2-3 VW: CPT | Mod: TC,FY,PO

## 2023-01-13 PROCEDURE — 72040 XR CERVICAL SPINE AP LATERAL: ICD-10-PCS | Mod: 26,,, | Performed by: STUDENT IN AN ORGANIZED HEALTH CARE EDUCATION/TRAINING PROGRAM

## 2023-01-13 PROCEDURE — 72148 MRI LUMBAR SPINE WITHOUT CONTRAST: ICD-10-PCS | Mod: 26,,, | Performed by: RADIOLOGY

## 2023-01-13 PROCEDURE — 72040 X-RAY EXAM NECK SPINE 2-3 VW: CPT | Mod: 26,,, | Performed by: STUDENT IN AN ORGANIZED HEALTH CARE EDUCATION/TRAINING PROGRAM

## 2023-01-13 PROCEDURE — 72100 X-RAY EXAM L-S SPINE 2/3 VWS: CPT | Mod: TC,FY,PO

## 2023-01-13 PROCEDURE — 72100 XR LUMBAR SPINE AP AND LATERAL: ICD-10-PCS | Mod: 26,,, | Performed by: STUDENT IN AN ORGANIZED HEALTH CARE EDUCATION/TRAINING PROGRAM

## 2023-01-13 PROCEDURE — 72100 X-RAY EXAM L-S SPINE 2/3 VWS: CPT | Mod: 26,,, | Performed by: STUDENT IN AN ORGANIZED HEALTH CARE EDUCATION/TRAINING PROGRAM

## 2023-01-13 PROCEDURE — 72148 MRI LUMBAR SPINE W/O DYE: CPT | Mod: 26,,, | Performed by: RADIOLOGY

## 2023-01-13 PROCEDURE — 72148 MRI LUMBAR SPINE W/O DYE: CPT | Mod: TC,PO

## 2023-01-13 RX ORDER — OXYCODONE AND ACETAMINOPHEN 10; 325 MG/1; MG/1
1 TABLET ORAL EVERY 8 HOURS PRN
Qty: 90 TABLET | Refills: 0 | Status: SHIPPED | OUTPATIENT
Start: 2023-01-13 | End: 2023-02-10 | Stop reason: SDUPTHER

## 2023-01-13 NOTE — TELEPHONE ENCOUNTER
----- Message from Hortenciafrancy Colonard sent at 1/13/2023  3:29 PM CST -----  Regarding: SAME DAY REFILL REQUEST  Contact: Patient  Type:  RX Refill Request    Who Called: Patient    Refill or New Rx: refill     RX Name and Strength: oxyCODONE-acetaminophen (PERCOCET)  mg per tablet    How is the patient currently taking it? (ex. 1XDay)    Is this a 30 day or 90 day RX:    Preferred Pharmacy with phone number:   [id number] - [facility] [address] [facility] AT [facility] [address]  [address] [facility] [address]  Phone: [phone] Fax: [phone]      Would the patient rather a call back or a response via [facility]? call    Best Call Back Number: [phone]     Additional Information: states she has been calling for refill; needs before the weekend; requesting an immediate call back for an update; please advise

## 2023-01-13 NOTE — TELEPHONE ENCOUNTER
----- Message from Laurita Ochoa sent at 1/13/2023 10:34 AM CST -----  Regarding: refill  Contact: [phone]  Type:  RX Refill Request    Who Called:  self   Refill or New Rx: rx   RX Name and Strength: oxyCODONE-acetaminophen (PERCOCET)  mg per tablet  How is the patient currently taking it? (ex. 1XDay): : Take 1 tablet by mouth every 8 (eight) hours as needed for Pain. - Oral  Is this a 30 day or 90 day RX: 90 tablets   Preferred Pharmacy with phone number: [facility][id number] [address] [facility]  Local or Mail Order: local   Ordering Provider:   Would the patient rather a call back or a response via [facility]?  call  Best Call Back Number: [phone]  Additional Information:  this is her 3rd request. Should she come to the office to  her rx?

## 2023-01-15 DIAGNOSIS — M53.3 SACROILIAC JOINT DYSFUNCTION OF BOTH SIDES: Primary | ICD-10-CM

## 2023-01-19 RX ORDER — OXYCODONE AND ACETAMINOPHEN 10; 325 MG/1; MG/1
1 TABLET ORAL EVERY 8 HOURS PRN
Qty: 90 TABLET | Refills: 0 | OUTPATIENT
Start: 2023-01-19

## 2023-01-20 ENCOUNTER — HOSPITAL ENCOUNTER (OUTPATIENT)
Dept: RADIOLOGY | Facility: HOSPITAL | Age: 68
Discharge: HOME | End: 2023-01-20
Attending: NEUROLOGICAL SURGERY
Payer: MEDICARE

## 2023-01-20 DIAGNOSIS — M53.3 SACROILIAC JOINT DYSFUNCTION OF BOTH SIDES: ICD-10-CM

## 2023-01-20 PROCEDURE — 72192 CT PELVIS W/O DYE: CPT | Mod: TC,PO

## 2023-01-20 PROCEDURE — 72192 CT PELVIS WITHOUT CONTRAST: ICD-10-PCS | Mod: 26,,, | Performed by: STUDENT IN AN ORGANIZED HEALTH CARE EDUCATION/TRAINING PROGRAM

## 2023-01-20 PROCEDURE — 72192 CT PELVIS W/O DYE: CPT | Mod: 26,,, | Performed by: STUDENT IN AN ORGANIZED HEALTH CARE EDUCATION/TRAINING PROGRAM

## 2023-01-23 ENCOUNTER — TELEPHONE (OUTPATIENT)
Dept: NEUROSURGERY | Facility: CLINIC | Age: 68
End: 2023-01-23

## 2023-01-23 ENCOUNTER — OFFICE VISIT (OUTPATIENT)
Dept: NEUROSURGERY | Facility: CLINIC | Age: 68
End: 2023-01-23
Payer: MEDICARE

## 2023-01-23 VITALS — HEIGHT: 60 IN | BODY MASS INDEX: 38.65 KG/M2 | WEIGHT: 196.88 LBS

## 2023-01-23 DIAGNOSIS — M53.3 SACROILIAC JOINT DYSFUNCTION OF BOTH SIDES: Primary | ICD-10-CM

## 2023-01-23 DIAGNOSIS — M53.3 SI (SACROILIAC) JOINT DYSFUNCTION: ICD-10-CM

## 2023-01-23 DIAGNOSIS — M51.36 LUMBAR ADJACENT SEGMENT DISEASE WITH SPONDYLOLISTHESIS: ICD-10-CM

## 2023-01-23 DIAGNOSIS — G95.9 CHRONIC MYELOPATHY: ICD-10-CM

## 2023-01-23 DIAGNOSIS — Z98.1 STATUS POST LUMBAR SPINAL FUSION: ICD-10-CM

## 2023-01-23 DIAGNOSIS — Z98.1 STATUS POST CERVICAL SPINAL FUSION: Primary | ICD-10-CM

## 2023-01-23 DIAGNOSIS — M43.16 LUMBAR ADJACENT SEGMENT DISEASE WITH SPONDYLOLISTHESIS: ICD-10-CM

## 2023-01-23 PROCEDURE — 1159F PR MEDICATION LIST DOCUMENTED IN MEDICAL RECORD: ICD-10-PCS | Mod: CPTII,S$GLB,, | Performed by: NEUROLOGICAL SURGERY

## 2023-01-23 PROCEDURE — 1157F ADVNC CARE PLAN IN RCRD: CPT | Mod: CPTII,S$GLB,, | Performed by: NEUROLOGICAL SURGERY

## 2023-01-23 PROCEDURE — 1159F MED LIST DOCD IN RCRD: CPT | Mod: CPTII,S$GLB,, | Performed by: NEUROLOGICAL SURGERY

## 2023-01-23 PROCEDURE — 99214 OFFICE O/P EST MOD 30 MIN: CPT | Mod: S$GLB,,, | Performed by: NEUROLOGICAL SURGERY

## 2023-01-23 PROCEDURE — 3288F FALL RISK ASSESSMENT DOCD: CPT | Mod: CPTII,S$GLB,, | Performed by: NEUROLOGICAL SURGERY

## 2023-01-23 PROCEDURE — 3008F PR BODY MASS INDEX (BMI) DOCUMENTED: ICD-10-PCS | Mod: CPTII,S$GLB,, | Performed by: NEUROLOGICAL SURGERY

## 2023-01-23 PROCEDURE — 1125F AMNT PAIN NOTED PAIN PRSNT: CPT | Mod: CPTII,S$GLB,, | Performed by: NEUROLOGICAL SURGERY

## 2023-01-23 PROCEDURE — 99999 PR PBB SHADOW E&M-EST. PATIENT-LVL IV: CPT | Mod: PBBFAC,,, | Performed by: NEUROLOGICAL SURGERY

## 2023-01-23 PROCEDURE — 1157F PR ADVANCE CARE PLAN OR EQUIV PRESENT IN MEDICAL RECORD: ICD-10-PCS | Mod: CPTII,S$GLB,, | Performed by: NEUROLOGICAL SURGERY

## 2023-01-23 PROCEDURE — 99499 RISK ADDL DX/OHS AUDIT: ICD-10-PCS | Mod: S$GLB,,, | Performed by: NEUROLOGICAL SURGERY

## 2023-01-23 PROCEDURE — 3008F BODY MASS INDEX DOCD: CPT | Mod: CPTII,S$GLB,, | Performed by: NEUROLOGICAL SURGERY

## 2023-01-23 PROCEDURE — 99214 PR OFFICE/OUTPT VISIT, EST, LEVL IV, 30-39 MIN: ICD-10-PCS | Mod: S$GLB,,, | Performed by: NEUROLOGICAL SURGERY

## 2023-01-23 PROCEDURE — 1100F PTFALLS ASSESS-DOCD GE2>/YR: CPT | Mod: CPTII,S$GLB,, | Performed by: NEUROLOGICAL SURGERY

## 2023-01-23 PROCEDURE — 1125F PR PAIN SEVERITY QUANTIFIED, PAIN PRESENT: ICD-10-PCS | Mod: CPTII,S$GLB,, | Performed by: NEUROLOGICAL SURGERY

## 2023-01-23 PROCEDURE — 3288F PR FALLS RISK ASSESSMENT DOCUMENTED: ICD-10-PCS | Mod: CPTII,S$GLB,, | Performed by: NEUROLOGICAL SURGERY

## 2023-01-23 PROCEDURE — 1100F PR PT FALLS ASSESS DOC 2+ FALLS/FALL W/INJURY/YR: ICD-10-PCS | Mod: CPTII,S$GLB,, | Performed by: NEUROLOGICAL SURGERY

## 2023-01-23 PROCEDURE — 99499 UNLISTED E&M SERVICE: CPT | Mod: S$GLB,,, | Performed by: NEUROLOGICAL SURGERY

## 2023-01-23 PROCEDURE — 99999 PR PBB SHADOW E&M-EST. PATIENT-LVL IV: ICD-10-PCS | Mod: PBBFAC,,, | Performed by: NEUROLOGICAL SURGERY

## 2023-01-23 NOTE — PROGRESS NOTES
NEUROSURGICAL PROGRESS NOTE    DATE OF SERVICE:  01/23/2023    ATTENDING PHYSICIAN:  Ishaan Fisher MD    SUBJECTIVE:    INTERIM HISTORY:    This is a very pleasant 67 y.o. female, 1 year and 1 month status post C3-C7 anterior instrumented fusion for cervical spondylosis with myelopathy.  She does not complain of significant neck pain at this time.  She is dropping things at occasion.  She is mainly complaining of the lumbosacral pain.  She is unable to stand for more than a few minutes.  She tends to lean forward and falls due to the severe pain.  She takes Percocet 10 mg 3 times daily.  Today in clinic she appears quite drowsy.  No new onset of motor weakness or numbness.              PAST MEDICAL HISTORY:  Active Ambulatory Problems     Diagnosis Date Noted    Acquired hypothyroidism     History of congestive heart failure     Major depressive disorder 12/04/2015    Normocytic anemia 04/10/2018    History of myocardial infarction 08/30/2018    Epigastric pain 02/28/2019    BMI 39.0-39.9,adult 04/02/2019    Primary osteoarthritis of left knee 05/08/2019    TIA (transient ischemic attack) 05/19/2019    History of gastric bypass 05/19/2019    Coronary artery disease involving native coronary artery of native heart     UTI (urinary tract infection) 05/19/2019    History of right knee joint replacement 11/20/2019    Chronic left shoulder pain 07/29/2020    Primary osteoarthritis of left shoulder 07/29/2020    Chronic pain 11/04/2020    Lumbar radiculopathy 11/04/2020    DDD (degenerative disc disease), lumbar 01/11/2021    Pain 08/25/2021    Cervical spondylosis with myelopathy 12/14/2021    Frequent falls 06/15/2022     Resolved Ambulatory Problems     Diagnosis Date Noted    Primary osteoarthritis     Diabetes mellitus, type 2     History of colon cancer 04/18/2017    Malfunction of device 05/25/2017    S/P TKR (total knee replacement), left 07/02/2019    Weakness of left lower extremity 07/02/2019    Gait,  antalgic 2019    Decreased range of motion (ROM) of left knee 2019    Primary osteoarthritis of right knee 2019    Aspiration pneumonia of right lower lobe 2021     Past Medical History:   Diagnosis Date    Anticoagulant long-term use     Arthritis     Asthma     CHF (congestive heart failure)     Colon cancer     COPD (chronic obstructive pulmonary disease)     Coronary artery disease     Depression     GERD (gastroesophageal reflux disease)     Myocardial infarction     Thyroid disease        PAST SURGICAL HISTORY:  Past Surgical History:   Procedure Laterality Date    ABDOMINAL SURGERY      CAUDAL EPIDURAL STEROID INJECTION N/A 2022    Procedure: Injection-steroid-epidural-caudal;  Surgeon: Nilam Santiago MD;  Location: [facility];  Service: Pain Management;  Laterality: N/A;     SECTION      CHOLECYSTECTOMY      COLON SURGERY      COLONOSCOPY      --- repeat in 3-5 years    COLONOSCOPY N/A 2017    Procedure: COLONOSCOPY;  Surgeon: Corrina Flores MD;  Location: [facility];  Service: Endoscopy;  Laterality: N/A;    COLONOSCOPY N/A 4/10/2018    Procedure: COLONOSCOPY golytely;  Surgeon: Corrina Flores MD;  Location: [facility];  Service: Endoscopy;  Laterality: N/A;    DECOMPRESSION OF CERVICAL SPINE BY ANTERIOR APPROACH WITH FUSION N/A 2021    Procedure: DECOMPRESSION AND FUSION, SPINE, CERVICAL, ANTERIOR APPROACH C3-4 C5- 6 C6-7 ACDF;  Surgeon: Ishaan Fisher MD;  Location: [facility];  Service: Neurosurgery;  Laterality: N/A;    ECTOPIC PREGNANCY SURGERY      EPIDURAL STEROID INJECTION INTO LUMBAR SPINE N/A 2020    Procedure: Injection-steroid-epidural-lumbar--L5-S1 InterLaminar;  Surgeon: Nilam Santiago MD;  Location: [facility];  Service: Pain Management;  Laterality: N/A;    ESOPHAGOGASTRODUODENOSCOPY N/A 2019    Procedure: ESOPHAGOGASTRODUODENOSCOPY (EGD);  Surgeon: Corrina Flores MD;  Location: [facility];  Service: Endoscopy;   Laterality: N/A;    FRACTURE SURGERY      left leg    FUSION OF SPINE WITH INSTRUMENTATION Left 1/11/2021    Procedure: FUSION, SPINE, WITH INSTRUMENTATION Stage 1 Left L4-5 OLIF DORA Stage 2 L4-5 Laminectomy, medial Facetectomy, foraminotomy, L4-5 MIS Instrumentation;  Surgeon: Ishaan Fisher MD;  Location: [facility];  Service: Neurosurgery;  Laterality: Left;  Procedure: Stage 1 Left L4-5 OLIF DORA  Stage 2 L4-5 Laminectomy, medial Facetectomy, foraminotomy, L4-5 MIS Instrumentation  Surgery TIme: 4 Hrs    GASTRIC BYPASS      HERNIA REPAIR      INJECTION OF ANESTHETIC AGENT AROUND GENITOFEMORAL NERVE Left 7/29/2020    Procedure: BLOCK, NERVE, LEFT SHOULDER ARTICULAR;  Surgeon: Nilam Santiago MD;  Location: [facility];  Service: Pain Management;  Laterality: Left;    JOINT REPLACEMENT      KNEE ARTHROPLASTY Left 5/8/2019    Procedure: ARTHROPLASTY, KNEE;  Surgeon: Roldan Greenwood MD;  Location: [facility] OR;  Service: Orthopedics;  Laterality: Left;  Navid notified    KNEE ARTHROPLASTY Right 11/20/2019    Procedure: ARTHROPLASTY, KNEE;  Surgeon: Roldan Greenwood MD;  Location: [facility];  Service: Orthopedics;  Laterality: Right;  Navid notified, confirmed case Navid 11/19/19 [id number]    OOPHORECTOMY      RADIOFREQUENCY THERMOCOAGULATION Left 8/12/2020    Procedure: RADIOFREQUENCY THERMAL COAGULATION--Left Suprascapular, Axillary, and Lateral Pectoral Articular Branch RFA;  Surgeon: Nilam Santiago MD;  Location: [facility];  Service: Pain Management;  Laterality: Left;    TONSILLECTOMY      TRANSFORAMINAL EPIDURAL INJECTION OF STEROID Right 8/25/2021    Procedure: Injection,steroid,epidural,transforaminal approach; Levels: L5;  Surgeon: Nilam Santiago MD;  Location: [facility];  Service: Pain Management;  Laterality: Right;  No pacemaker. Patient is diabetic. Patient is taking Aspirin but can continue per Dr. Santiago.     TUBAL LIGATION         SOCIAL HISTORY:   Social History     Socioeconomic History     Marital status:    Tobacco Use    Smoking status: Never     Passive exposure: Never    Smokeless tobacco: Never   Substance and Sexual Activity    Alcohol use: Yes     Comment: very rare    Drug use: No     Comment: CBD oil sometimes    Sexual activity: Never     Social Determinants of Health     Financial Resource Strain: High Risk    Difficulty of Paying Living Expenses: Hard   Physical Activity: Inactive    Days of Exercise per Week: 0 days    Minutes of Exercise per Session: 0 min   Social Connections: Socially Isolated    Frequency of Communication with Friends and Family: Never    Frequency of Social Gatherings with Friends and Family: Never    Attends Restoration Services: Never    Active Member of Clubs or Organizations: No    Attends Club or Organization Meetings: Never    Marital Status:    Housing Stability: High Risk    Unable to Pay for Housing in the Last Year: Yes    Number of Places Lived in the Last Year: 1    Unstable Housing in the Last Year: No       FAMILY HISTORY:  Family History   Problem Relation Age of Onset    Hyperlipidemia Mother     Hypertension Mother     Diabetes Mother     Stroke Mother     Hypertension Sister     Hypertension Brother     Diabetes Brother     Breast cancer Maternal Aunt     Breast cancer Maternal Aunt     Breast cancer Cousin        CURRENTS MEDICATIONS:  Current Outpatient Medications on File Prior to Visit   Medication Sig Dispense Refill    albuterol (VENTOLIN HFA) 90 mcg/actuation inhaler INHALE 2 PUFFS INTO THE LUNGS EVERY 4 HOURS AS NEEDED FOR WHEEZING 18 g 3    alendronate (FOSAMAX) 70 MG tablet Take 1 tablet (70 mg total) by mouth every 7 days. Dc Boniva BC not covered 4 tablet 11    atorvastatin (LIPITOR) 40 MG tablet TAKE 1 TABLET(40 MG) BY MOUTH EVERY DAY FOR CHOLESTEROL 90 tablet 3    buPROPion (WELLBUTRIN XL) 300 MG 24 hr tablet Take 1 tablet (300 mg total) by mouth once daily. 90 tablet 3    busPIRone (BUSPAR) 10 MG tablet TAKE 1/2 TABLET BY  MOUTH ONCE DAILY (Patient taking differently: 10 mg. Pt is taking 10 mg once daily) 60 tablet 3    diabetic supplies, miscellan. Misc Lancets for 1-2 times a day testing    dispense brand covered by insurance t (Patient taking differently: Lancets for 1-2 times a day testing    dispense brand covered by insurance t) 60 each 3    diazePAM (VALIUM) 5 MG tablet TAKE 1 TABLET BY MOUTH EVERY 24 HOURS AS NEEDED FOR ANXIETY 30 tablet 3    diclofenac sodium (VOLTAREN) 1 % Gel APPLY 2 GRAMS TOPICALLY TO THE AFFECTED AREA FOUR TIMES DAILY 300 g 5    ergocalciferol (ERGOCALCIFEROL) 50,000 unit Cap Take 1 capsule (50,000 Units total) by mouth every 7 days. 12 capsule 3    EScitalopram oxalate (LEXAPRO) 20 MG tablet TAKE 1 TABLET(20 MG) BY MOUTH EVERY DAY 90 tablet 3    furosemide (LASIX) 20 MG tablet TAKE 1 TABLET(20 MG) BY MOUTH DAILY AS NEEDED 90 tablet 2    gabapentin (NEURONTIN) 100 MG capsule TAKE 1 CAPSULE(100 MG) BY MOUTH THREE TIMES DAILY 270 capsule 0    glipiZIDE (GLUCOTROL) 2.5 MG TR24 TAKE 1 TABLET(2.5 MG) BY MOUTH DAILY WITH BREAKFAST 90 tablet 3    levothyroxine (SYNTHROID) 100 MCG tablet Take 1 tablet (100 mcg total) by mouth once daily. 90 tablet 3    methocarbamoL (ROBAXIN) 750 MG Tab Take 1 tablet (750 mg total) by mouth 3 (three) times daily as needed (muscle spasms). 60 tablet 0    omeprazole (PRILOSEC) 40 MG capsule Take 1 capsule (40 mg total) by mouth every morning. 90 capsule 2    oxyCODONE-acetaminophen (PERCOCET)  mg per tablet Take 1 tablet by mouth every 8 (eight) hours as needed for Pain. 90 tablet 0    potassium chloride (KLOR-CON) 10 MEQ TbSR The day you take lasix 90 tablet 0    promethazine (PHENERGAN) 25 MG tablet TAKE 1 TABLET(25 MG) BY MOUTH EVERY 6 HOURS AS NEEDED 25 tablet 0    SUPREP BOWEL PREP KIT 17.5-3.13-1.6 gram SolR use as directed      TRUE METRIX GLUCOSE METER Misc USE AS DIRECTED 1 each 0    TRUE METRIX GLUCOSE TEST STRIP Strp TO TEST ONCE TO TWICE DAILY 50 strip 11    TRUE  METRIX GLUCOSE TEST STRIP Strp TEST BLOOD SUGAR EVERY  strip 3    TRUEPLUS LANCETS 30 gauge Misc USE TO TEST ONCE TO TWICE D      zolpidem (AMBIEN) 5 MG Tab TAKE 1 TABLET(5 MG) BY MOUTH EVERY EVENING 30 tablet 3    aspirin (ECOTRIN) 81 MG EC tablet Take 1 tablet (81 mg total) by mouth once daily.  0     No current facility-administered medications on file prior to visit.       ALLERGIES:  Review of patient's allergies indicates:   Allergen Reactions    Adhesive      Adhesive tape    Latex, natural rubber      Adhesive from latex tape    Ibuprofen Other (See Comments)     Causes asthma flare??       REVIEW OF SYSTEMS:  Review of Systems   Constitutional:  Negative for diaphoresis, fever and weight loss.   Respiratory:  Negative for shortness of breath.    Cardiovascular:  Negative for chest pain.   Gastrointestinal:  Negative for blood in stool.   Genitourinary:  Negative for hematuria.   Endo/Heme/Allergies:  Does not bruise/bleed easily.   All other systems reviewed and are negative.      OBJECTIVE:    PHYSICAL EXAMINATION:   There were no vitals filed for this visit.    Physical Exam:  Vitals reviewed.    Constitutional: She appears well-developed and well-nourished.     Eyes: Pupils are equal, round, and reactive to light. Conjunctivae and EOM are normal.     Cardiovascular: Normal distal pulses and no edema.     Abdominal: Soft.     Skin: Skin displays no rash on trunk and no rash on extremities. Skin displays no lesions on trunk and no lesions on extremities.     Psych/Behavior: She is alert. She is oriented to person, place, and time. She has a normal mood and affect.     Musculoskeletal:        Neck: Range of motion is full.     Neurological:        DTRs: Tricep reflexes are 2+ on the right side and 2+ on the left side. Bicep reflexes are 2+ on the right side and 2+ on the left side. Brachioradialis reflexes are 2+ on the right side and 2+ on the left side. Patellar reflexes are 2+ on the right side  and 2+ on the left side. Achilles reflexes are 0 on the right side and 0 on the left side.     Back Exam     Muscle Strength   Right Quadriceps:  5/5   Left Quadriceps:  5/5   Right Hamstrings:  5/5   Left Hamstrings:  5/5           SI joint:   Exam was not performed because patient has severe pain with mobilization and difficulty mobilizing to the exam table safely.    Neurologic Exam     Mental Status   Oriented to person, place, and time.   Speech: speech is normal   Level of consciousness: alert    Cranial Nerves   Cranial nerves II through XII intact.     CN III, IV, VI   Pupils are equal, round, and reactive to light.  Extraocular motions are normal.     Motor Exam   Muscle bulk: normal  Overall muscle tone: normal    Strength   Right deltoid: 5/5  Left deltoid: 5/5  Right biceps: 5/5  Left biceps: 5/5  Right triceps: 5/5  Left triceps: 5/5  Right wrist flexion: 5/5  Left wrist flexion: 5/5  Right wrist extension: 5/5  Left wrist extension: 5/5  Right interossei: 5/5  Left interossei: 5/5  Right iliopsoas: 5/5  Left iliopsoas: 5/5  Right quadriceps: 5/5  Left quadriceps: 5/5  Right hamstrin/5  Left hamstrin/5  Right anterior tibial: 5/5  Left anterior tibial: 5/5  Right posterior tibial: 5/5  Left posterior tibial: 5/5  Right peroneal: 5/5  Left peroneal: 5/5  Right gastroc: 5/5  Left gastroc: 5/5    Sensory Exam   Light touch normal.   Pinprick normal.     Gait, Coordination, and Reflexes     Reflexes   Right brachioradialis: 2+  Left brachioradialis: 2+  Right biceps: 2+  Left biceps: 2+  Right triceps: 2+  Left triceps: 2+  Right patellar: 2+  Left patellar: 2+  Right achilles: 0  Left achilles: 0  Right plantar: normal  Left plantar: normal  Right Munoz: absent  Left Munoz: absent  Right ankle clonus: absent  Left ankle clonus: absent      DIAGNOSTIC DATA:  I personally interpreted the following imaging:   2023 CT of the pelvis shows consolidation of the fusion at L4-5, L5-S1  degenerative spondylolisthesis with severe facet arthropathy measured at 12 mm.  Severe bilateral SI joint degeneration with vacuum phenomenon   Lumbar MRI from January 2023 shows mild stenosis at L3-4, mild stenosis at L4-5 and L5-S1, severe bilateral foraminal stenosis at L5-S1.      Lumbar x-ray January 2023 shows grade 1 degenerative spondylolisthesis at L5-S1 measured at 13 mm, global lumbar lordosis measured at 63°  Cervical x-ray January 2023 shows consolidation of the fusion from C3-C7    ASSESMENT:  This is a 67 y.o. female with     Problem List Items Addressed This Visit    None  Visit Diagnoses       Status post cervical spinal fusion    -  Primary    Chronic myelopathy        Status post lumbar spinal fusion        Lumbar adjacent segment disease with spondylolisthesis        SI (sacroiliac) joint dysfunction                  PLAN:  Patient has symptomatic adjacent segment disease at L5-S1 with severe SI joint dysfunction and degeneration on her CT of the pelvis.  This is affecting her ability to stand walk and be functional.  She has a BMI of 38.  She has narcotic dependence.  Today in clinic she appears quite drowsy.  I explained to her that a revision of her lumbar fusion and extending the fusion down to the S1 with a sacral pelvic fixation and SI joint fusion can be attempted but there would be some significant risks.  I would prefer at this time to proceed with a SI joint block and bilateral SI joint steroid injection to treat the patient pain and to diagnose her SI joint dysfunction.  Patient is unable to do physical therapy at this time due to not being able to perform the exercises due to body habitus and poor tolerance to exercise.    More than 50% of the time was spent on discussing conservative management treatments (medication, physical therapy exercises) and possible interventions (spinal injections and surgical procedures). Care coordination was discussed.    30 minutes        Ishaan EVANS  Phillip HURTADO  Cell:[phone]

## 2023-01-24 RX ORDER — GABAPENTIN 100 MG/1
100 CAPSULE ORAL 3 TIMES DAILY
Qty: 270 CAPSULE | Refills: 0 | Status: SHIPPED | OUTPATIENT
Start: 2023-01-24 | End: 2023-06-08 | Stop reason: SDUPTHER

## 2023-01-24 RX ORDER — ZOLPIDEM TARTRATE 5 MG/1
5 TABLET ORAL NIGHTLY
Qty: 30 TABLET | Refills: 3 | Status: SHIPPED | OUTPATIENT
Start: 2023-01-24 | End: 2023-01-30 | Stop reason: SDUPTHER

## 2023-01-24 RX ORDER — DIAZEPAM 5 MG/1
TABLET ORAL
Qty: 30 TABLET | Refills: 3 | Status: SHIPPED | OUTPATIENT
Start: 2023-01-24 | End: 2023-01-30 | Stop reason: SDUPTHER

## 2023-01-24 NOTE — TELEPHONE ENCOUNTER
"----- Message from Kaye Flood sent at 1/24/2023 11:32 AM CST -----  Needs advice from nurse:      Who Called:pt  Regarding:"needs a paper sent to [facility] giving them the ok to refill my prescriptions"  gabapentin (NEURONTIN) 100 MG capsule  TAKE 1 CAPSULE(100 MG) BY MOUTH THREE TIMES DAILY    zolpidem (AMBIEN) 5 MG Tab  TAKE 1 TABLET(5 MG) BY MOUTH EVERY EVENING    diazePAM (VALIUM) 5 MG tablet  TAKE 1 TABLET BY MOUTH EVERY 24 HOURS AS NEEDED FOR ANXIETY      Would the patient rather a call back or [facility]IA [facility]?  Best Call Back number:[phone]  Additional Info:[facility]      "

## 2023-01-24 NOTE — TELEPHONE ENCOUNTER
Care Due:                  Date            Visit Type   Department     Provider  --------------------------------------------------------------------------------                                EP -                              PRIMARY      [facility] FAMILY  Last Visit: 07-      CARE ([facility])   MEDICINE       Alon Santiago                               -                              PRIMARY      [facility] FAMILY  Next Visit: 02-      CARE ([facility])   [facility]       Alon Santiago                                                            Last  Test          Frequency    Reason                     Performed    Due Date  --------------------------------------------------------------------------------    CMP.........  12 months..  atorvastatin, glipiZIDE..  04- 04-    Lipid Panel.  12 months..  atorvastatin.............  04- 04-    TSH.........  12 months..  levothyroxine............  04- 04-    [facility] Embedded Care Gaps. Reference number: [id number]. 1/24/2023   12:02:20 PM CST

## 2023-01-25 NOTE — TELEPHONE ENCOUNTER
----- Message from Khris Li sent at 1/25/2023 10:14 AM CST -----  Contact: pt  .Type:  Needs Medical Advice    Who Called: pt  Pharmacy name and phone #:  [id number] - [place] [facility] [address] AT [facility] [address]  Phone: [phone]  Fax:  [phone]  Would the patient rather a call back or a response via [facility]?  Call back  Best Call Back Number: [phone]   Additional Information:  Pt. Needs her refill sent to the local pharmacy she does not use mail  order.    diazePAM (VALIUM) 5 MG tablet  zolpidem (AMBIEN) 5 MG Tab  gabapentin (NEURONTIN) 100 MG capsule

## 2023-01-26 RX ORDER — DIAZEPAM 5 MG/1
TABLET ORAL
Qty: 30 TABLET | Refills: 3 | OUTPATIENT
Start: 2023-01-26

## 2023-01-26 RX ORDER — ZOLPIDEM TARTRATE 5 MG/1
TABLET ORAL
Qty: 30 TABLET | OUTPATIENT
Start: 2023-01-26

## 2023-01-26 RX ORDER — DIAZEPAM 2 MG/1
TABLET ORAL
Qty: 30 TABLET | OUTPATIENT
Start: 2023-01-26

## 2023-01-26 RX ORDER — GABAPENTIN 100 MG/1
100 CAPSULE ORAL 3 TIMES DAILY
Qty: 270 CAPSULE | Refills: 0 | OUTPATIENT
Start: 2023-01-26

## 2023-01-26 RX ORDER — ZOLPIDEM TARTRATE 5 MG/1
5 TABLET ORAL NIGHTLY
Qty: 30 TABLET | Refills: 3 | OUTPATIENT
Start: 2023-01-26

## 2023-01-26 NOTE — TELEPHONE ENCOUNTER
No new care gaps identified.  [facility] Embedded Care Gaps. Reference number: [id number]. 1/25/2023   8:50:08 PM CST

## 2023-01-30 RX ORDER — ZOLPIDEM TARTRATE 5 MG/1
5 TABLET ORAL NIGHTLY
Qty: 30 TABLET | Refills: 3 | Status: SHIPPED | OUTPATIENT
Start: 2023-01-30 | End: 2023-04-12 | Stop reason: SDUPTHER

## 2023-01-30 RX ORDER — DIAZEPAM 5 MG/1
TABLET ORAL
Qty: 30 TABLET | Refills: 3 | Status: SHIPPED | OUTPATIENT
Start: 2023-01-30 | End: 2023-04-12 | Stop reason: SDUPTHER

## 2023-01-30 NOTE — TELEPHONE ENCOUNTER
----- Message from Carmelina Kraus sent at 1/30/2023  9:09 AM CST -----  Regarding: refills  Contact: walked in  The refills for the Ambien and the Valium were sent to the mail order in error.  She need this sent to the [facility] here in [place].  [facility].

## 2023-01-30 NOTE — TELEPHONE ENCOUNTER
No new care gaps identified.  [facility] Embedded Care Gaps. Reference number: [id number]. 1/30/2023   9:23:12 AM CST

## 2023-02-01 ENCOUNTER — PATIENT OUTREACH (OUTPATIENT)
Dept: ADMINISTRATIVE | Facility: OTHER | Age: 68
End: 2023-02-01
Payer: MEDICARE

## 2023-02-01 NOTE — PROGRESS NOTES
CHW - Initial Contact    This Community Health Worker completed the Social Determinant of Health questionnaire with MRN [id number] over the phone today.    Pt identified barriers of most importance are: General Assistance   Referrals to community agencies completed with patient/caregiver consent outside of [facility] include: No  Referrals were put through [facility] - No  Support and Services: None   Other information discussed the patient needs / wants help with: Patient stated she needs assistance with getting a copy of her birth certificate for her (section 8) housing re certification. Patient was refrred to the [facility] for immediate assistance but if she is unable to go make it there CHW will assist patient with completing the application over the phone. Patient agreed.   Follow up required: Yes, birth certificate   Follow-up Outreach - Due: 2/3/2023

## 2023-02-06 ENCOUNTER — TELEPHONE (OUTPATIENT)
Dept: NEUROSURGERY | Facility: CLINIC | Age: 68
End: 2023-02-06
Payer: MEDICARE

## 2023-02-06 ENCOUNTER — PATIENT OUTREACH (OUTPATIENT)
Dept: ADMINISTRATIVE | Facility: OTHER | Age: 68
End: 2023-02-06
Payer: MEDICARE

## 2023-02-06 NOTE — PROGRESS NOTES
CHW - Follow Up    This Community Health Worker completed a follow up visit with BEVERLY [id number] over the phone today.  Pt/Caregiver reported: Patient requested assistance with getting a birth certificate and information for the HCVP home  program through [place] [place] [employer].  Community Health Worker provided: CHW assisted patient with purchasing her birth certificate online. Patient was mailed information for the HCVP home buyers program along with her receipt from her birth certificate purchase.   Follow up required: Yes  Follow-up Outreach - Due: 2/17/2023

## 2023-02-06 NOTE — TELEPHONE ENCOUNTER
Returned pt's call in regards to her receiving a letter in the mail about her procedure getting denied. I stated to pt that her insurance denied her procedure even after  sent in more notes for her to get the injection approved. I stated to pt that  would like for her to see Mayda so she can perform an SI joint exam for her. Pt stated the best time and day for her is in the evening and on a Friday. I requested to pt that she can come this Friday 2/10 at 230 om. Pt stated that's fine and voiced understanding

## 2023-02-10 ENCOUNTER — OFFICE VISIT (OUTPATIENT)
Dept: NEUROSURGERY | Facility: CLINIC | Age: 68
End: 2023-02-10
Payer: MEDICARE

## 2023-02-10 VITALS
HEART RATE: 72 BPM | SYSTOLIC BLOOD PRESSURE: 125 MMHG | BODY MASS INDEX: 38.65 KG/M2 | HEIGHT: 60 IN | DIASTOLIC BLOOD PRESSURE: 79 MMHG | WEIGHT: 196.88 LBS

## 2023-02-10 DIAGNOSIS — M53.3 SACROILIAC JOINT DYSFUNCTION OF BOTH SIDES: Primary | ICD-10-CM

## 2023-02-10 DIAGNOSIS — M43.27 FUSION OF SPINE OF LUMBOSACRAL REGION: ICD-10-CM

## 2023-02-10 DIAGNOSIS — M51.36 LUMBAR ADJACENT SEGMENT DISEASE WITH SPONDYLOLISTHESIS: ICD-10-CM

## 2023-02-10 DIAGNOSIS — Z98.1 S/P LUMBAR SPINAL FUSION: ICD-10-CM

## 2023-02-10 DIAGNOSIS — M43.16 LUMBAR ADJACENT SEGMENT DISEASE WITH SPONDYLOLISTHESIS: ICD-10-CM

## 2023-02-10 PROCEDURE — 1160F PR REVIEW ALL MEDS BY PRESCRIBER/CLIN PHARMACIST DOCUMENTED: ICD-10-PCS | Mod: CPTII,S$GLB,, | Performed by: PHYSICIAN ASSISTANT

## 2023-02-10 PROCEDURE — 3078F DIAST BP <80 MM HG: CPT | Mod: CPTII,S$GLB,, | Performed by: PHYSICIAN ASSISTANT

## 2023-02-10 PROCEDURE — 1157F ADVNC CARE PLAN IN RCRD: CPT | Mod: CPTII,S$GLB,, | Performed by: PHYSICIAN ASSISTANT

## 2023-02-10 PROCEDURE — 3008F PR BODY MASS INDEX (BMI) DOCUMENTED: ICD-10-PCS | Mod: CPTII,S$GLB,, | Performed by: PHYSICIAN ASSISTANT

## 2023-02-10 PROCEDURE — 3078F PR MOST RECENT DIASTOLIC BLOOD PRESSURE < 80 MM HG: ICD-10-PCS | Mod: CPTII,S$GLB,, | Performed by: PHYSICIAN ASSISTANT

## 2023-02-10 PROCEDURE — 1125F AMNT PAIN NOTED PAIN PRSNT: CPT | Mod: CPTII,S$GLB,, | Performed by: PHYSICIAN ASSISTANT

## 2023-02-10 PROCEDURE — 1125F PR PAIN SEVERITY QUANTIFIED, PAIN PRESENT: ICD-10-PCS | Mod: CPTII,S$GLB,, | Performed by: PHYSICIAN ASSISTANT

## 2023-02-10 PROCEDURE — 1157F PR ADVANCE CARE PLAN OR EQUIV PRESENT IN MEDICAL RECORD: ICD-10-PCS | Mod: CPTII,S$GLB,, | Performed by: PHYSICIAN ASSISTANT

## 2023-02-10 PROCEDURE — 99999 PR PBB SHADOW E&M-EST. PATIENT-LVL V: CPT | Mod: PBBFAC,,, | Performed by: PHYSICIAN ASSISTANT

## 2023-02-10 PROCEDURE — 1100F PR PT FALLS ASSESS DOC 2+ FALLS/FALL W/INJURY/YR: ICD-10-PCS | Mod: CPTII,S$GLB,, | Performed by: PHYSICIAN ASSISTANT

## 2023-02-10 PROCEDURE — 99214 PR OFFICE/OUTPT VISIT, EST, LEVL IV, 30-39 MIN: ICD-10-PCS | Mod: S$GLB,,, | Performed by: PHYSICIAN ASSISTANT

## 2023-02-10 PROCEDURE — 1100F PTFALLS ASSESS-DOCD GE2>/YR: CPT | Mod: CPTII,S$GLB,, | Performed by: PHYSICIAN ASSISTANT

## 2023-02-10 PROCEDURE — 3288F FALL RISK ASSESSMENT DOCD: CPT | Mod: CPTII,S$GLB,, | Performed by: PHYSICIAN ASSISTANT

## 2023-02-10 PROCEDURE — 3288F PR FALLS RISK ASSESSMENT DOCUMENTED: ICD-10-PCS | Mod: CPTII,S$GLB,, | Performed by: PHYSICIAN ASSISTANT

## 2023-02-10 PROCEDURE — 1160F RVW MEDS BY RX/DR IN RCRD: CPT | Mod: CPTII,S$GLB,, | Performed by: PHYSICIAN ASSISTANT

## 2023-02-10 PROCEDURE — 3074F PR MOST RECENT SYSTOLIC BLOOD PRESSURE < 130 MM HG: ICD-10-PCS | Mod: CPTII,S$GLB,, | Performed by: PHYSICIAN ASSISTANT

## 2023-02-10 PROCEDURE — 99214 OFFICE O/P EST MOD 30 MIN: CPT | Mod: S$GLB,,, | Performed by: PHYSICIAN ASSISTANT

## 2023-02-10 PROCEDURE — 99999 PR PBB SHADOW E&M-EST. PATIENT-LVL V: ICD-10-PCS | Mod: PBBFAC,,, | Performed by: PHYSICIAN ASSISTANT

## 2023-02-10 PROCEDURE — 3008F BODY MASS INDEX DOCD: CPT | Mod: CPTII,S$GLB,, | Performed by: PHYSICIAN ASSISTANT

## 2023-02-10 PROCEDURE — 1159F MED LIST DOCD IN RCRD: CPT | Mod: CPTII,S$GLB,, | Performed by: PHYSICIAN ASSISTANT

## 2023-02-10 PROCEDURE — 1159F PR MEDICATION LIST DOCUMENTED IN MEDICAL RECORD: ICD-10-PCS | Mod: CPTII,S$GLB,, | Performed by: PHYSICIAN ASSISTANT

## 2023-02-10 PROCEDURE — 3074F SYST BP LT 130 MM HG: CPT | Mod: CPTII,S$GLB,, | Performed by: PHYSICIAN ASSISTANT

## 2023-02-10 RX ORDER — OXYCODONE AND ACETAMINOPHEN 10; 325 MG/1; MG/1
1 TABLET ORAL EVERY 8 HOURS PRN
Qty: 90 TABLET | Refills: 0 | Status: SHIPPED | OUTPATIENT
Start: 2023-02-13 | End: 2023-03-07 | Stop reason: SDUPTHER

## 2023-02-10 RX ORDER — OXYCODONE AND ACETAMINOPHEN 10; 325 MG/1; MG/1
1 TABLET ORAL EVERY 8 HOURS PRN
Qty: 90 TABLET | Refills: 0 | Status: CANCELLED | OUTPATIENT
Start: 2023-02-10

## 2023-02-10 NOTE — PROGRESS NOTES
Subjective:     Patient ID:  Christine Castro is a 67 y.o. female.    Phillip    Chief Complaint: Back and bilateral leg pain    HPI    Christine Castro is a 67 y.o. female who presents for follow up. Patient continues to have back pain and bilateral posterior leg pain to the feet.  Worse with activity.  Better with sitting.    Patient denies any recent accidents or trauma, no saddle anesthesias, and no bowel or bladder incontinence.      Review of Systems:  Constitution: Negative for chills, fever, night sweats and weight loss.   Musculoskeletal: Negative for falls.   Gastrointestinal: Negative for bowel incontinence, nausea and vomiting.   Genitourinary: Negative for bladder incontinence.   Neurological: Negative for disturbances in coordination and loss of balance.      Objective:      Vitals:    02/10/23 1615   Weight: 89.3 kg (196 lb 13.9 oz)   Height: 5' (1.524 m)   PainSc:   5   PainLoc: Back         Physical Exam:      General:  Christine Castro is well-developed, well-nourished, appears stated age, in no acute distress, alert and oriented to person, place, and time.    Pulmonary/Chest:  Respiratory effort normal  Abdominal: Exhibits no distension  Psychiatric:  Normal mood and affect.  Behavior is normal.  Judgement and thought content normal      Musculoskeletal:    Lumbar ROM:   No pain in lumbar flexion.  Pain in extension, left lateral bending, and right lateral bending.    Lumbar Spine Inspection:  Normal with no surgical scars with no visible rashes.    Lumbar Spine Palpation:  Moderate tenderness to low back palpation.    SI Joint Palpation:  Moderate tenderness to bilateral SI Joint palpation.    Straight Leg Raise:  Positive right and left SLR.    SI Joint Tests    Positive bilateral ANNA Test  Positive bilateral Gaenslen's Test  Positive bilateral Thigh Thrust Test  Positive bilateral Distraction Test        Neurological:  Alert and oriented to person, place, and time    Muscle strength against  "resistance:     Right Left   Hip flexion  5 / 5 5 / 5   Hip extension 5 / 5 5 / 5   Hip abduction 5 / 5 5 / 5   Hip adduction  5 / 5 5 / 5   Knee extension  5 / 5 5 / 5   Knee flexion 5 / 5 5 / 5   Dorsiflexion  5 / 5 4 / 5   EHL  5 / 5 0 / 5   Plantar flexion  5 / 5 5 / 5   Inversion of the feet 5 / 5 5 / 5   Eversion of the feet  5 / 5 5 / 5       Reflexes:     Right Left   Patellar 2+ 2+   Achilles 2+ 2+     Clonus:  Negative bilaterally    On gross examination of the bilateral upper extremities, patient has full painfree ROM with no signs of clubbing, cyanosis, edema, or weakness.     DIAGNOSTIC DATA: (Per Dr. Fisher clinic note from 01/23/2023)    "I personally interpreted the following imaging:   January 2023 CT of the pelvis shows consolidation of the fusion at L4-5, L5-S1 degenerative spondylolisthesis with severe facet arthropathy measured at 12 mm.  Severe bilateral SI joint degeneration with vacuum phenomenon   Lumbar MRI from January 2023 shows mild stenosis at L3-4, mild stenosis at L4-5 and L5-S1, severe bilateral foraminal stenosis at L5-S1.       Lumbar x-ray January 2023 shows grade 1 degenerative spondylolisthesis at L5-S1 measured at 13 mm, global lumbar lordosis measured at 63°  Cervical x-ray January 2023 shows consolidation of the fusion from C3-C7."         Assessment:          1. Sacroiliac joint dysfunction of both sides    2. Lumbar adjacent segment disease with spondylolisthesis    3. S/P lumbar spinal fusion            Plan:             Symptomatic adjacent segment disease at L5-S1 with severe SI joint dysfunction and degeneration on her CT of the pelvis.      Plan for SI joint block and bilateral SI joint steroid injection to treat the patient pain and to diagnose her SI joint dysfunction.    Follow-Up:  Follow up if symptoms worsen or fail to improve. If there are any questions prior to this, the patient was instructed to contact the office.       Mayda Musa, [facility], " JEF  Neurosurgery  [facility] [place]  02/10/2023

## 2023-02-17 ENCOUNTER — OFFICE VISIT (OUTPATIENT)
Dept: FAMILY MEDICINE | Facility: CLINIC | Age: 68
End: 2023-02-17
Payer: MEDICARE

## 2023-02-17 ENCOUNTER — PATIENT OUTREACH (OUTPATIENT)
Dept: ADMINISTRATIVE | Facility: OTHER | Age: 68
End: 2023-02-17
Payer: MEDICARE

## 2023-02-17 VITALS
BODY MASS INDEX: 41.14 KG/M2 | SYSTOLIC BLOOD PRESSURE: 106 MMHG | DIASTOLIC BLOOD PRESSURE: 70 MMHG | TEMPERATURE: 98 F | OXYGEN SATURATION: 95 % | HEART RATE: 81 BPM | WEIGHT: 209.56 LBS | HEIGHT: 60 IN

## 2023-02-17 DIAGNOSIS — R10.9 ABDOMINAL WALL PAIN: ICD-10-CM

## 2023-02-17 DIAGNOSIS — G89.29 CHRONIC LOW BACK PAIN WITHOUT SCIATICA, UNSPECIFIED BACK PAIN LATERALITY: ICD-10-CM

## 2023-02-17 DIAGNOSIS — M54.50 CHRONIC LOW BACK PAIN WITHOUT SCIATICA, UNSPECIFIED BACK PAIN LATERALITY: ICD-10-CM

## 2023-02-17 DIAGNOSIS — R41.3 OTHER AMNESIA: Primary | ICD-10-CM

## 2023-02-17 PROCEDURE — 99215 PR OFFICE/OUTPT VISIT, EST, LEVL V, 40-54 MIN: ICD-10-PCS | Mod: S$GLB,,, | Performed by: FAMILY MEDICINE

## 2023-02-17 PROCEDURE — 3074F SYST BP LT 130 MM HG: CPT | Mod: CPTII,S$GLB,, | Performed by: FAMILY MEDICINE

## 2023-02-17 PROCEDURE — 1125F PR PAIN SEVERITY QUANTIFIED, PAIN PRESENT: ICD-10-PCS | Mod: CPTII,S$GLB,, | Performed by: FAMILY MEDICINE

## 2023-02-17 PROCEDURE — 1157F PR ADVANCE CARE PLAN OR EQUIV PRESENT IN MEDICAL RECORD: ICD-10-PCS | Mod: CPTII,S$GLB,, | Performed by: FAMILY MEDICINE

## 2023-02-17 PROCEDURE — 1159F MED LIST DOCD IN RCRD: CPT | Mod: CPTII,S$GLB,, | Performed by: FAMILY MEDICINE

## 2023-02-17 PROCEDURE — 3288F PR FALLS RISK ASSESSMENT DOCUMENTED: ICD-10-PCS | Mod: CPTII,S$GLB,, | Performed by: FAMILY MEDICINE

## 2023-02-17 PROCEDURE — 3008F PR BODY MASS INDEX (BMI) DOCUMENTED: ICD-10-PCS | Mod: CPTII,S$GLB,, | Performed by: FAMILY MEDICINE

## 2023-02-17 PROCEDURE — 3078F DIAST BP <80 MM HG: CPT | Mod: CPTII,S$GLB,, | Performed by: FAMILY MEDICINE

## 2023-02-17 PROCEDURE — 1159F PR MEDICATION LIST DOCUMENTED IN MEDICAL RECORD: ICD-10-PCS | Mod: CPTII,S$GLB,, | Performed by: FAMILY MEDICINE

## 2023-02-17 PROCEDURE — 1125F AMNT PAIN NOTED PAIN PRSNT: CPT | Mod: CPTII,S$GLB,, | Performed by: FAMILY MEDICINE

## 2023-02-17 PROCEDURE — 99215 OFFICE O/P EST HI 40 MIN: CPT | Mod: S$GLB,,, | Performed by: FAMILY MEDICINE

## 2023-02-17 PROCEDURE — 3288F FALL RISK ASSESSMENT DOCD: CPT | Mod: CPTII,S$GLB,, | Performed by: FAMILY MEDICINE

## 2023-02-17 PROCEDURE — 3078F PR MOST RECENT DIASTOLIC BLOOD PRESSURE < 80 MM HG: ICD-10-PCS | Mod: CPTII,S$GLB,, | Performed by: FAMILY MEDICINE

## 2023-02-17 PROCEDURE — 1157F ADVNC CARE PLAN IN RCRD: CPT | Mod: CPTII,S$GLB,, | Performed by: FAMILY MEDICINE

## 2023-02-17 PROCEDURE — 3074F PR MOST RECENT SYSTOLIC BLOOD PRESSURE < 130 MM HG: ICD-10-PCS | Mod: CPTII,S$GLB,, | Performed by: FAMILY MEDICINE

## 2023-02-17 PROCEDURE — 1100F PTFALLS ASSESS-DOCD GE2>/YR: CPT | Mod: CPTII,S$GLB,, | Performed by: FAMILY MEDICINE

## 2023-02-17 PROCEDURE — 1100F PR PT FALLS ASSESS DOC 2+ FALLS/FALL W/INJURY/YR: ICD-10-PCS | Mod: CPTII,S$GLB,, | Performed by: FAMILY MEDICINE

## 2023-02-17 PROCEDURE — 3008F BODY MASS INDEX DOCD: CPT | Mod: CPTII,S$GLB,, | Performed by: FAMILY MEDICINE

## 2023-02-17 RX ORDER — IBANDRONATE SODIUM 150 MG/1
150 TABLET, FILM COATED ORAL
COMMUNITY
Start: 2022-11-09 | End: 2023-03-21

## 2023-02-17 NOTE — PROGRESS NOTES
CHW - Follow Up    This Community Health Worker completed a follow up visit with MRN [id number] in person today.  Pt/Caregiver reported: Patient received the HCVP program information in the mail but does not know what she did with the paperwork  Community Health Worker provided: Patient was provided the paperwork again during her clinic visit.  Follow up required: No  No future outreach task assigned

## 2023-02-27 ENCOUNTER — TELEPHONE (OUTPATIENT)
Dept: FAMILY MEDICINE | Facility: CLINIC | Age: 68
End: 2023-02-27
Payer: MEDICARE

## 2023-02-27 DIAGNOSIS — M25.552 LEFT HIP PAIN: Primary | ICD-10-CM

## 2023-02-27 DIAGNOSIS — M25.551 RIGHT HIP PAIN: ICD-10-CM

## 2023-02-27 NOTE — TELEPHONE ENCOUNTER
----- Message from Katie Lomeli sent at 2/27/2023  3:28 PM CST -----  Type:  Needs Medical Advice    Who Called:  pt  Symptoms (please be specific):  severe pain -   How long has patient had these symptoms:  late Friday  Pharmacy name and phone #:    Would the patient rather a call back or a response via [facility]? call  Best Call Back Number: [phone]  Additional Information: pt fell and hurt r hip/ leg - has cat scan sched tomorrow wants to know if she can get an xray

## 2023-02-27 NOTE — PROGRESS NOTES
Patient ID: Christine Castro is a 67 y.o. female.    Chief Complaint: Fall, Follow-up, Abdominal Pain, and Back Pain    HPI      Christine Castro is a 67 y.o. female here for routine follow-up.  Patient had a fall complains of abdominal pain and back pain.  Abdominal pain is on the wall of her abdomen as well as her back pain is lower back.  She does not have any loss of strength or sensation.  She is being followed by others for her chronic muscle skeletal pain.  For  Anxiety-continues take Valium p.r.n.-has a lot issues and burdens she is dealing with.  Complains that sometimes she has amnesia-where she forgets about different days and times.  Vitals:    02/17/23 1401   BP: 106/70   BP Location: Right arm   Patient Position: Sitting   Pulse: 81   Temp: 97.7 °F (36.5 °C)   TempSrc: Oral   SpO2: 95%   Weight: 95.1 kg (209 lb 8.8 oz)   Height: 5' (1.524 m)            Review of Symptoms      Physical Exam    Constitutional:  Oriented to person, place, and time.appears well-developed and well-nourished.  No distress.      HENT  Head: Normocephalic and atraumatic  Right Ear: External ear normal.   Left Ear: External ear normal.   Nose: External nose normal.   Mouth:  Moist mucus membranes.    Eyes:  Conjunctivae are normal. Right eye exhibits no discharge.  Left eye exhibits no discharge. No scleral icterus.  No periorbital edema    Cardiovascular:  Regular rate and rhythm with normal S1 and S2     Pulmonary/Chest:   Clear to auscultation bilaterally without wheezes, rhonchi or rales      Musculoskeletal:  No edema. No obvious deformity No wasting  Palpation of abdominal wall and back reveals no point tenderness.       Neurological:  Alert and oriented to person, place, and time.  No evidence that she is having short-term memory loss at this time.  Coordination normal.     Skin:   Skin is warm and dry.  No diaphoresis.   No rash noted.     Psychiatric: Normal mood and affect. Behavior is normal.  Judgment and thought  content normal.     Complete Blood Count  Lab Results   Component Value Date    RBC 3.38 (L) 04/21/2022    HGB 10.0 (L) 04/21/2022    HCT 31.7 (L) 04/21/2022    MCV 94 04/21/2022    MCH 29.6 04/21/2022    MCHC 31.5 (L) 04/21/2022    RDW 15.1 (H) 04/21/2022     04/21/2022    MPV 10.6 04/21/2022    GRAN 3.1 04/21/2022    GRAN 63.0 04/21/2022    LYMPH 1.3 04/21/2022    LYMPH 27.0 04/21/2022    MONO 0.2 (L) 04/21/2022    MONO 3.9 (L) 04/21/2022    EOS 0.3 04/21/2022    BASO 0.03 04/21/2022    EOSINOPHIL 5.3 04/21/2022    BASOPHIL 0.6 04/21/2022    DIFFMETHOD Automated 04/21/2022       Comprehensive Metabolic Panel  No results found for: GLU, BUN, CREATININE, NA, K, CL, PROT, ALBUMIN, BILITOT, AST, ALKPHOS, CO2, ALT, ANIONGAP, EGFRNONAA, ESTGFRAFRICA    TSH  No results found for: TSH    Assessment / Plan:      ICD-10-CM ICD-9-CM   1. Other amnesia  R41.3 780.93   2. Abdominal wall pain  R10.9 789.00   3. Chronic low back pain without sciatica, unspecified back pain laterality  M54.50 724.2    G89.29 338.29     Other amnesia  -     CT Head Without Contrast; Future; Expected date: 02/17/2023    Abdominal wall pain    Chronic low back pain without sciatica, unspecified back pain laterality        Reassurance-CT of head    Muscle skeletal pain from fall-Tylenol heat ice    Spent over 40 minutes in discussion

## 2023-02-27 NOTE — TELEPHONE ENCOUNTER
Pt states she had a fall on her left side and her whole leg is hurting up to her thigh and down to her Ankle and foot.Pt states she has a CT and lab done at [facility] on tomorrow and want to know if you can add a Xray to that please advise.I did inform pt she may have to go to ER.

## 2023-02-28 ENCOUNTER — HOSPITAL ENCOUNTER (OUTPATIENT)
Dept: RADIOLOGY | Facility: HOSPITAL | Age: 68
Discharge: HOME | End: 2023-02-28
Attending: FAMILY MEDICINE
Payer: MEDICARE

## 2023-02-28 DIAGNOSIS — M25.551 RIGHT HIP PAIN: ICD-10-CM

## 2023-02-28 DIAGNOSIS — R41.3 OTHER AMNESIA: ICD-10-CM

## 2023-02-28 PROCEDURE — 73502 XR HIP WITH PELVIS WHEN PERFORMED, 2 OR 3  VIEWS RIGHT: ICD-10-PCS | Mod: 26,RT,, | Performed by: RADIOLOGY

## 2023-02-28 PROCEDURE — 70450 CT HEAD WITHOUT CONTRAST: ICD-10-PCS | Mod: 26,,, | Performed by: RADIOLOGY

## 2023-02-28 PROCEDURE — 70450 CT HEAD/BRAIN W/O DYE: CPT | Mod: TC,PO

## 2023-02-28 PROCEDURE — 73502 X-RAY EXAM HIP UNI 2-3 VIEWS: CPT | Mod: 26,RT,, | Performed by: RADIOLOGY

## 2023-02-28 PROCEDURE — 70450 CT HEAD/BRAIN W/O DYE: CPT | Mod: 26,,, | Performed by: RADIOLOGY

## 2023-02-28 PROCEDURE — 73502 X-RAY EXAM HIP UNI 2-3 VIEWS: CPT | Mod: TC,FY,PO,RT

## 2023-02-28 NOTE — TELEPHONE ENCOUNTER
Added x-ray of left hip-hip knee and ankle hurt and your able to walk unlikely to have a fracture.  You could have a fracture of your hip and not know it.

## 2023-03-07 DIAGNOSIS — M43.27 FUSION OF SPINE OF LUMBOSACRAL REGION: ICD-10-CM

## 2023-03-07 NOTE — TELEPHONE ENCOUNTER
----- Message from Delio Mcfarlane sent at 3/7/2023  3:57 PM CST -----  Contact: Pt  .Type:  RX Refill Request    Who Called: Pt  Refill or New Rx:refill  RX Name and Strength:oxyCODONE-acetaminophen (PERCOCET)  mg per tablet  Preferred Pharmacy with phone number:[facility][id number] [place] [id number]  [address] [facility]  Local or Mail Order:local  Ordering Provider:[facility]  Would the patient rather a call back or a response via [facility]? call  Best Call Back Number:[phone]  Additional Information:

## 2023-03-08 ENCOUNTER — TELEPHONE (OUTPATIENT)
Dept: FAMILY MEDICINE | Facility: CLINIC | Age: 68
End: 2023-03-08
Payer: MEDICARE

## 2023-03-08 RX ORDER — OXYCODONE AND ACETAMINOPHEN 10; 325 MG/1; MG/1
1 TABLET ORAL EVERY 8 HOURS PRN
Qty: 90 TABLET | Refills: 0 | Status: SHIPPED | OUTPATIENT
Start: 2023-03-08 | End: 2023-03-16 | Stop reason: SDUPTHER

## 2023-03-08 NOTE — TELEPHONE ENCOUNTER
----- Message from Deepa Louise sent at 3/8/2023  2:26 PM CST -----  Regarding: Call back  Contact: [facility] [phone]  Type:  Pharmacy Calling to Clarify an RX    Name of Caller: Angela   Pharmacy Name:  [facility] [phone]  Prescription Name: zolpidem (AMBIEN) 5 MG Tab 30 tablet, diazePAM (VALIUM) 5 MG tablet 30 tablet, oxyCODONE-acetaminophen (PERCOCET)  mg per tablet 90 tablet   What do they need to clarify?: needs to clarify and to be aware that patient is taking these medication   Best Call Back Number:   Additional Information:          99.2

## 2023-03-16 ENCOUNTER — TELEPHONE (OUTPATIENT)
Dept: NEUROSURGERY | Facility: CLINIC | Age: 68
End: 2023-03-16
Payer: MEDICARE

## 2023-03-16 DIAGNOSIS — M43.27 FUSION OF SPINE OF LUMBOSACRAL REGION: ICD-10-CM

## 2023-03-16 RX ORDER — OXYCODONE AND ACETAMINOPHEN 10; 325 MG/1; MG/1
1 TABLET ORAL EVERY 8 HOURS PRN
Qty: 90 TABLET | Refills: 0 | Status: SHIPPED | OUTPATIENT
Start: 2023-03-16 | End: 2023-04-17 | Stop reason: SDUPTHER

## 2023-03-16 NOTE — TELEPHONE ENCOUNTER
Had  spoke with Maty at [facility] on 03/08 already to let her know that you are aware that pt is taking all three meds. Maty agreed and verbalize.fyi.

## 2023-03-16 NOTE — TELEPHONE ENCOUNTER
Spoke w/ pharmacy to see why Oxycodone Rx could not be filled. Pharmacist states yesterday was too soon to fill and she is concerned that the patient is also getting Rx for Ambien & Valium from PCP. Let her know we are aware of the patient's medication list and she knows not to take them at the same time.     Spoke w/ patient and let her know of conversation with Pharmacist and let her know her Rx will be filled.     Sending message to PCP about pharmacist medication interaction concern.

## 2023-03-16 NOTE — TELEPHONE ENCOUNTER
----- Message from Natalie Hinton sent at 3/16/2023  8:11 AM CDT -----  Type:  RX Refill Request    Who Called: Pt  Refill or New Rx:  RX Name and Strength:oxyCODONE-acetaminophen (PERCOCET)  mg per tablet  How is the patient currently taking it? (ex. 1XDay):Take 1 tablet by mouth every 8 (eight) hours as needed for Pain. -   Is this a 30 day or 90 day RX:90  Preferred Pharmacy with phone number:[facility] #[id number] [place] [id number]  [address] [facility] [address]   Phone: [phone]  Fax:  [phone]  Local or Mail Order:local  Ordering Provider:: Ishaan Fisher  Would the patient rather a call back or a response via [facility]? call  Best Call Back Number:[phone]  Additional Information: please call pt regarding this medication she is currently having a problem getting medication

## 2023-03-21 RX ORDER — IBANDRONATE SODIUM 150 MG/1
TABLET, FILM COATED ORAL
Qty: 3 TABLET | Refills: 1 | Status: SHIPPED | OUTPATIENT
Start: 2023-03-21 | End: 2023-10-02

## 2023-03-21 NOTE — PROGRESS NOTES
Requested updates within Care Everywhere.  Patient's chart was reviewed for overdue TIA topics.  Immunizations reconciled.    Orders placed:  Tasked appts:  Labs Linked:     Immediate family member

## 2023-04-10 ENCOUNTER — TELEPHONE (OUTPATIENT)
Dept: FAMILY MEDICINE | Facility: CLINIC | Age: 68
End: 2023-04-10
Payer: MEDICARE

## 2023-04-10 ENCOUNTER — PATIENT OUTREACH (OUTPATIENT)
Dept: ADMINISTRATIVE | Facility: OTHER | Age: 68
End: 2023-04-10
Payer: MEDICARE

## 2023-04-10 DIAGNOSIS — R29.6 FREQUENT FALLS: Primary | ICD-10-CM

## 2023-04-10 DIAGNOSIS — M25.551 RIGHT HIP PAIN: ICD-10-CM

## 2023-04-10 DIAGNOSIS — G89.29 CHRONIC LOW BACK PAIN WITHOUT SCIATICA, UNSPECIFIED BACK PAIN LATERALITY: ICD-10-CM

## 2023-04-10 DIAGNOSIS — M25.552 LEFT HIP PAIN: ICD-10-CM

## 2023-04-10 DIAGNOSIS — M54.50 CHRONIC LOW BACK PAIN WITHOUT SCIATICA, UNSPECIFIED BACK PAIN LATERALITY: ICD-10-CM

## 2023-04-10 NOTE — PROGRESS NOTES
CHW - Follow Up    This Community Health Worker completed a follow up visit with MRHECTOR [id number] over the phone today.  Pt/Caregiver reported: Patient requested assistance with in home care/cleaning and outpatient physical therapy.   Community Health Worker provided: Patient was provided the contact information for the [facility] Community Choice Waiver program. Message was sent to provider regarding order request for outpatient physical therapy   Follow up required: No  No future outreach task assigned

## 2023-04-10 NOTE — TELEPHONE ENCOUNTER
----- Message from Rodriguez Manrique sent at 4/10/2023  8:43 AM CDT -----  Regarding: Patient Request  Patient is requesting an outpatient order for physical therapy be put in. Patient states she is wobbly on her feet and afraid she will fall again.

## 2023-04-12 RX ORDER — ZOLPIDEM TARTRATE 5 MG/1
5 TABLET ORAL NIGHTLY
Qty: 30 TABLET | Refills: 3 | Status: SHIPPED | OUTPATIENT
Start: 2023-04-12 | End: 2023-09-01

## 2023-04-12 RX ORDER — DIAZEPAM 5 MG/1
TABLET ORAL
Qty: 30 TABLET | Refills: 3 | Status: SHIPPED | OUTPATIENT
Start: 2023-04-12 | End: 2023-10-02

## 2023-04-12 NOTE — TELEPHONE ENCOUNTER
----- Message from Carmelina Kraus sent at 4/12/2023  4:54 PM CDT -----  Regarding: refill  Contact: self/ walked in  Need refills.    Lon Rubio    zolpidem (AMBIEN) 5 MG Tab  diazePAM (VALIUM) 5 MG tablet

## 2023-04-12 NOTE — TELEPHONE ENCOUNTER
No new care gaps identified.  [facility] Embedded Care Gaps. Reference number: [id number]. 4/12/2023   5:06:47 PM CDT

## 2023-04-13 NOTE — TELEPHONE ENCOUNTER
No answer and unable to LM    Can you just order the PT outpatient to [facility] and then they will call her to set it up as we cant  Reach the pt to confirm location

## 2023-04-17 DIAGNOSIS — M43.27 FUSION OF SPINE OF LUMBOSACRAL REGION: ICD-10-CM

## 2023-04-18 RX ORDER — OXYCODONE AND ACETAMINOPHEN 10; 325 MG/1; MG/1
1 TABLET ORAL EVERY 8 HOURS PRN
Qty: 90 TABLET | Refills: 0 | Status: SHIPPED | OUTPATIENT
Start: 2023-04-18 | End: 2023-05-16 | Stop reason: SDUPTHER

## 2023-04-25 ENCOUNTER — PATIENT OUTREACH (OUTPATIENT)
Dept: ADMINISTRATIVE | Facility: OTHER | Age: 68
End: 2023-04-25

## 2023-04-25 NOTE — PROGRESS NOTES
CHW - Outreach Attempt    Community Health Worker left a voicemail message for 1st follow up attempt to contact MRN [id number] regarding: Patient follow up- Assistance needed  Community Health Worker to attempt to contact MRN [id number] on: 04/28/23

## 2023-04-28 ENCOUNTER — PATIENT OUTREACH (OUTPATIENT)
Dept: ADMINISTRATIVE | Facility: OTHER | Age: 68
End: 2023-04-28
Payer: MEDICARE

## 2023-04-28 NOTE — PROGRESS NOTES
CHW - Follow Up    This Community Health Worker completed a follow up visit with BEVERLY [id number] over the phone today.  Pt/Caregiver reported: No updates reported   Community Health Worker provided: Patient was encouraged to contact me if any issues arise  Follow up required: No  No future outreach task assigned

## 2023-04-28 NOTE — PROGRESS NOTES
CHW - Outreach Attempt    Community Health Worker left a voicemail message for 2nd follow up attempt to contact MRN [id number] regarding: General follow up- Assistance needed  Community Health Worker to attempt to contact MRN [id number] on: 05/05/23

## 2023-05-02 ENCOUNTER — PATIENT OUTREACH (OUTPATIENT)
Dept: ADMINISTRATIVE | Facility: OTHER | Age: 68
End: 2023-05-02
Payer: MEDICARE

## 2023-05-02 NOTE — PROGRESS NOTES
CHW - Follow Up    This Community Health Worker completed a follow up visit with BEVERLY [id number] over the phone today.  Pt/Caregiver reported: Patient states she may need assistance with getting a traffic  to help get some things removed from her driving record. Patient was offered 3 's numbers in her area, which she declined. Patient stated she will contact the library to see if they offer any assistance with free legal advice because she cannot afford [place] at this time. Patient also stated she was not taking her medicine today due to her feeling sick and weak. Patient denied needing to go to urgent care or the ED. Patient was encouraged to eat something small and hydrate, patient stated she will eat some crackers and drink iced tea. I instructed patient to go to the urgent care or ED if she feels worse.   Community Health Worker provided: No resources provided.   Follow up required: No  No future outreach task assigned

## 2023-05-03 ENCOUNTER — HOSPITAL ENCOUNTER (EMERGENCY)
Facility: HOSPITAL | Age: 68
Discharge: HOME | End: 2023-05-03
Attending: EMERGENCY MEDICINE
Payer: MEDICARE

## 2023-05-03 VITALS
HEART RATE: 64 BPM | DIASTOLIC BLOOD PRESSURE: 70 MMHG | WEIGHT: 190 LBS | SYSTOLIC BLOOD PRESSURE: 110 MMHG | RESPIRATION RATE: 18 BRPM | TEMPERATURE: 99 F | OXYGEN SATURATION: 98 % | HEIGHT: 60 IN | BODY MASS INDEX: 37.3 KG/M2

## 2023-05-03 DIAGNOSIS — M50.30 DDD (DEGENERATIVE DISC DISEASE), CERVICAL: ICD-10-CM

## 2023-05-03 DIAGNOSIS — S00.81XA ABRASION OF FACE, INITIAL ENCOUNTER: ICD-10-CM
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
## 2023-05-03 DIAGNOSIS — M25.519 SHOULDER PAIN: ICD-10-CM

## 2023-05-03 DIAGNOSIS — R93.89 ABNORMAL FINDING ON CT SCAN: ICD-10-CM

## 2023-05-03 PROCEDURE — 63600175 PHARM REV CODE 636 W HCPCS: Mod: ER | Performed by: PHYSICIAN ASSISTANT

## 2023-05-03 PROCEDURE — 25000003 PHARM REV CODE 250: Mod: ER | Performed by: PHYSICIAN ASSISTANT

## 2023-05-03 PROCEDURE — 99285 EMERGENCY DEPT VISIT HI MDM: CPT | Mod: 25,ER

## 2023-05-03 PROCEDURE — 90471 IMMUNIZATION ADMIN: CPT | Mod: ER | Performed by: PHYSICIAN ASSISTANT

## 2023-05-03 PROCEDURE — 90715 TDAP VACCINE 7 YRS/> IM: CPT | Mod: ER | Performed by: PHYSICIAN ASSISTANT

## 2023-05-03 RX ORDER — MUPIROCIN 20 MG/G
1 OINTMENT TOPICAL
Status: COMPLETED | OUTPATIENT
Start: 2023-05-03 | End: 2023-05-03

## 2023-05-03 RX ADMIN — MUPIROCIN 22 G: 20 OINTMENT TOPICAL at 04:05

## 2023-05-03 RX ADMIN — TETANUS TOXOID, REDUCED DIPHTHERIA TOXOID AND ACELLULAR PERTUSSIS VACCINE, ADSORBED 0.5 ML: 5; 2.5; 8; 8; 2.5 SUSPENSION INTRAMUSCULAR at 04:05

## 2023-05-03 NOTE — ED NOTES
"Pt states was at court "all day" yesterday and drove home, states felt dizzy and fell onto concrete getting out of car.  Denies LOC.    "

## 2023-05-03 NOTE — ED PROVIDER NOTES
Encounter Date: 5/3/2023       History     Chief Complaint   Patient presents with    Fall     Pt states she was getting out of her car became dizzy missed the step and fell onto the concrete. Abrasion noted to face. Not currenlty on blood thinners.      Patient is a 68-year-old female with history of thyroid disease, diabetes, coronary artery disease, COPD, CHF, asthma, arthritis who presents to the emergency department after a fall.  Patient reports yesterday she was in court all day.  She reports she did not eat or drink much.  She reports when she got home and attempted to get out of her car, she felt very weak.  She reports she lost her balance falling onto her face.  She denies loss of consciousness or altered mental status.  She denies nausea or vomiting.  She reports she has an abrasion to her nose.  She reports pain with palpation over the area.  She reports left shoulder pain.  She reports neck pain.  She reports tenderness over her frontal head.  Her chart does indicate she is on chronic anticoagulation which she declines.  She is not up-to-date on her tetanus shot.    The history is provided by the patient.   Review of patient's allergies indicates:   Allergen Reactions    Adhesive      Adhesive tape    Latex, natural rubber      Adhesive from latex tape    Ibuprofen Other (See Comments)     Causes asthma flare??     Past Medical History:   Diagnosis Date    Anticoagulant long-term use     Arthritis     Asthma     CHF (congestive heart failure)     [facility]    Colon cancer     colon    COPD (chronic obstructive pulmonary disease)     Coronary artery disease     Depression     Diabetes mellitus, type 2     GERD (gastroesophageal reflux disease)     Myocardial infarction     AGE 19 [facility] WITHBABY DELIVERY    Thyroid disease      Past Surgical History:   Procedure Laterality Date    ABDOMINAL SURGERY      CAUDAL EPIDURAL STEROID INJECTION N/A 6/29/2022    Procedure:  Injection-steroid-epidural-caudal;  Surgeon: Nilam Santiago MD;  Location: [facility] PAIN T;  Service: Pain Management;  Laterality: N/A;     SECTION      CHOLECYSTECTOMY      COLON SURGERY      COLONOSCOPY      --- repeat in 3-5 years    COLONOSCOPY N/A 2017    Procedure: COLONOSCOPY;  Surgeon: Corrina Flores MD;  Location: [facility] ENDO;  Service: Endoscopy;  Laterality: N/A;    COLONOSCOPY N/A 4/10/2018    Procedure: COLONOSCOPY golytely;  Surgeon: Corrina Flores MD;  Location: [facility];  Service: Endoscopy;  Laterality: N/A;    DECOMPRESSION OF CERVICAL SPINE BY ANTERIOR APPROACH WITH FUSION N/A 2021    Procedure: DECOMPRESSION AND FUSION, SPINE, CERVICAL, ANTERIOR APPROACH C3-4 C5- 6 C6-7 ACDF;  Surgeon: Ishaan Fisher MD;  Location: [facility];  Service: Neurosurgery;  Laterality: N/A;    ECTOPIC PREGNANCY SURGERY      EPIDURAL STEROID INJECTION INTO LUMBAR SPINE N/A 2020    Procedure: Injection-steroid-epidural-lumbar--L5-S1 InterLaminar;  Surgeon: Nilam Santiago MD;  Location: [facility] PAIN T;  Service: Pain Management;  Laterality: N/A;    ESOPHAGOGASTRODUODENOSCOPY N/A 2019    Procedure: ESOPHAGOGASTRODUODENOSCOPY (EGD);  Surgeon: Corrina Flores MD;  Location: [facility];  Service: Endoscopy;  Laterality: N/A;    FRACTURE SURGERY      left leg    FUSION OF SPINE WITH INSTRUMENTATION Left 2021    Procedure: FUSION, SPINE, WITH INSTRUMENTATION Stage 1 Left L4-5 OLIF DORA Stage 2 L4-5 Laminectomy, medial Facetectomy, foraminotomy, L4-5 MIS Instrumentation;  Surgeon: Ishana Fisher MD;  Location: [facility];  Service: Neurosurgery;  Laterality: Left;  Procedure: Stage 1 Left L4-5 OLIF DORA  Stage 2 L4-5 Laminectomy, medial Facetectomy, foraminotomy, L4-5 MIS Instrumentation  Surgery TIme: 4 Hrs    GASTRIC BYPASS      HERNIA REPAIR      INJECTION OF ANESTHETIC AGENT AROUND GENITOFEMORAL NERVE Left 2020    Procedure: BLOCK, NERVE, LEFT SHOULDER ARTICULAR;  Surgeon: Nilam  BHAVANA Santiago MD;  Location: [facility] PAIN MGT;  Service: Pain Management;  Laterality: Left;    JOINT REPLACEMENT      KNEE ARTHROPLASTY Left 5/8/2019    Procedure: ARTHROPLASTY, KNEE;  Surgeon: Roldan Greenwood MD;  Location: [facility] OR;  Service: Orthopedics;  Laterality: Left;  Navid notified    KNEE ARTHROPLASTY Right 11/20/2019    Procedure: ARTHROPLASTY, KNEE;  Surgeon: Roldan Greenwood MD;  Location: [facility] OR;  Service: Orthopedics;  Laterality: Right;  Navid notified, confirmed case Navid 11/19/19 [id number]    OOPHORECTOMY      RADIOFREQUENCY THERMOCOAGULATION Left 8/12/2020    Procedure: RADIOFREQUENCY THERMAL COAGULATION--Left Suprascapular, Axillary, and Lateral Pectoral Articular Branch RFA;  Surgeon: Nilam Santiago MD;  Location: [facility] PAIN MGT;  Service: Pain Management;  Laterality: Left;    TONSILLECTOMY      TRANSFORAMINAL EPIDURAL INJECTION OF STEROID Right 8/25/2021    Procedure: Injection,steroid,epidural,transforaminal approach; Levels: L5;  Surgeon: Nilam Santiago MD;  Location: [facility] PAIN MGT;  Service: Pain Management;  Laterality: Right;  No pacemaker. Patient is diabetic. Patient is taking Aspirin but can continue per Dr. Santiago.     TUBAL LIGATION       Family History   Problem Relation Age of Onset    Hyperlipidemia Mother     Hypertension Mother     Diabetes Mother     Stroke Mother     Hypertension Sister     Hypertension Brother     Diabetes Brother     Breast cancer Maternal Aunt     Breast cancer Maternal Aunt     Breast cancer Cousin      Social History     Tobacco Use    Smoking status: Never     Passive exposure: Never    Smokeless tobacco: Never   Substance Use Topics    Alcohol use: Yes     Comment: very rare    Drug use: No     Comment: CBD oil sometimes     Review of Systems   Constitutional:  Negative for activity change, appetite change, chills, fatigue and fever.   HENT:  Negative for congestion, ear discharge, ear pain, postnasal drip, rhinorrhea, sore throat and trouble swallowing.     Respiratory:  Negative for cough and shortness of breath.    Cardiovascular:  Negative for chest pain.   Gastrointestinal:  Negative for abdominal pain, blood in stool, constipation, diarrhea, nausea and vomiting.   Genitourinary:  Negative for dysuria, flank pain and hematuria.   Musculoskeletal:  Positive for neck pain. Negative for back pain.        Left shoulder pain   Skin:  Positive for wound.   Neurological:  Negative for dizziness, weakness, light-headedness and headaches.     Physical Exam     Initial Vitals [05/03/23 1542]   BP Pulse Resp Temp SpO2   108/68 68 17 99 °F (37.2 °C) 97 %      MAP       --         Physical Exam    Nursing note and vitals reviewed.  Constitutional: She appears well-developed and well-nourished. She is not diaphoretic.  Non-toxic appearance. No distress.   HENT:   Head: Normocephalic. Head is with abrasion. Head is without raccoon's eyes and without Wilhelm's sign.       Right Ear: External ear normal.   Left Ear: External ear normal.   Nose: Nose normal. No nasal septal hematoma.   Mouth/Throat: Uvula is midline, oropharynx is clear and moist and mucous membranes are normal. No oropharyngeal exudate.   Eyes: Conjunctivae and EOM are normal. Pupils are equal, round, and reactive to light.   Neck:   Normal range of motion.  Cardiovascular:  Normal rate and regular rhythm.           Pulmonary/Chest: Breath sounds normal. No respiratory distress. She has no wheezes. She has no rhonchi. She has no rales. She exhibits no tenderness.   Abdominal: Abdomen is soft. Bowel sounds are normal. There is no abdominal tenderness.   Musculoskeletal:      Left shoulder: Tenderness and bony tenderness present. No swelling or deformity. Decreased range of motion. Normal pulse.      Cervical back: Normal range of motion. Spinous process tenderness and muscular tenderness present.     Lymphadenopathy:     She has no cervical adenopathy.   Neurological: She is alert and oriented to person, place, and  time. She has normal strength. GCS score is 15. GCS eye subscore is 4. GCS verbal subscore is 5. GCS motor subscore is 6.   Skin: Skin is warm and dry. Capillary refill takes less than 2 seconds.   Psychiatric: She has a normal mood and affect.       ED Course   Procedures  Labs Reviewed - No data to display       Imaging Results              X-Ray Shoulder 2 or More Views Left (Final result)  Result time 05/03/23 18:00:34   Procedure changed from X-Ray Shoulder Trauma Left     Final result by Devin Hess MD (05/03/23 18:00:34)                   Impression:      Severe degenerative change of left shoulder without acute abnormality identified.      Electronically signed by: Devin Hess  Date:    05/03/2023  Time:    18:00               Narrative:    EXAMINATION:  XR SHOULDER COMPLETE 2 OR MORE VIEWS LEFT    CLINICAL HISTORY:  pain;Pain in unspecified shoulder    TECHNIQUE:  Two or three views of the left shoulder were performed.    COMPARISON:  Multiple priors.    FINDINGS:  No fracture.  No traumatic malalignment.  No osseous destructive process.  Severe degenerative change of the left shoulder.                                        CT Cervical Spine Without Contrast (Final result)  Result time 05/03/23 17:32:20      Final result by Devin Hess MD (05/03/23 17:32:20)                   Impression:      No fracture or traumatic malalignment of the cervical spine.    Postsurgical and degenerative changes.    Air-fluid level in the esophagus which increases the risk for aspiration.    This report was flagged in Epic as containing an incidental finding.    All CT scans at this facility are performed  using dose modulation techniques as appropriate to performed exam including the following:  automated exposure control; adjustment of mA and/or kV according to the patients size (this includes techniques or standardized protocols for targeted exams where dose is matched to indication/reason for exam: i.e.  extremities or head);  iterative reconstruction technique.      Electronically signed by: Devin Hess  Date:    05/03/2023  Time:    17:32               Narrative:    EXAMINATION:  CT CERVICAL SPINE WITHOUT CONTRAST    CLINICAL HISTORY:  Neck trauma (Age >= 65y);    TECHNIQUE:  Low dose axial CT images through the cervical spine, with sagittal and coronal reformations.  Contrast was not administered.    COMPARISON:  Multiple priors.    FINDINGS:  C3-C6 cervical fusion.  Question mild lucency about the C6 screws.  The vertebral bodies are normal in height and morphology without evidence of fracture or osseous destructive process.  Normal sagittal alignment is preserved.    Moderate degenerative changes scattered throughout the cervical spine.    Limited evaluation of the intraspinal contents demonstrates no hematoma or mass.Paraspinal soft tissues exhibit no acute abnormalities.    Air-fluid level in the esophagus which increases the risk for aspiration.                                       CT Maxillofacial Without Contrast (Final result)  Result time 05/03/23 17:39:29      Final result by Devin Hess MD (05/03/23 17:39:29)                   Impression:      No evidence of an acute displaced fracture.    Soft tissue injury.    All CT scans at this facility are performed  using dose modulation techniques as appropriate to performed exam including the following:  automated exposure control; adjustment of mA and/or kV according to the patients size (this includes techniques or standardized protocols for targeted exams where dose is matched to indication/reason for exam: i.e. extremities or head);  iterative reconstruction technique.      Electronically signed by: Devin Hess  Date:    05/03/2023  Time:    17:39               Narrative:    EXAMINATION:  CT MAXILLOFACIAL WITHOUT CONTRAST    CLINICAL HISTORY:  Facial trauma, blunt;    TECHNIQUE:  Low dose CT images throughout the region of the facial bones.   Axial, sagittal and coronal reformations were obtained.  Contrast was not administered.    COMPARISON:  None    FINDINGS:  Facial soft tissue injury.  The globes and intraorbital contents are within normal limits.  No orbital fracture.    The remainder of the facial bones appear intact without evidence of an acute displaced fracture.  No osseous destructive lesions.    Temporomandibular joints appropriately position without evidence of dislocation.    Paranasal sinuses essentially clear.  Mastoids are clear.    Limited intracranial evaluation is unremarkable.                                       CT Head Without Contrast (Final result)  Result time 05/03/23 17:17:46      Final result by Devin Hess MD (05/03/23 17:17:46)                   Impression:      No acute intracranial CT abnormality.  Correlation and further evaluation as warranted.    All CT scans at this facility are performed  using dose modulation techniques as appropriate to performed exam including the following:  automated exposure control; adjustment of mA and/or kV according to the patients size (this includes techniques or standardized protocols for targeted exams where dose is matched to indication/reason for exam: i.e. extremities or head);  iterative reconstruction technique.      Electronically signed by: Devin Hess  Date:    05/03/2023  Time:    17:17               Narrative:    EXAMINATION:  CT HEAD WITHOUT CONTRAST    CLINICAL HISTORY:  Facial trauma, blunt;    TECHNIQUE:  Low dose axial CT images obtained throughout the head without intravenous contrast. Sagittal and coronal reconstructions were performed.    COMPARISON:  Multiple priors.    FINDINGS:  Intracranial compartment:    Ventricles and sulci are normal in size for age without evidence of hydrocephalus. No extra-axial blood or fluid collections.    Moderate microvascular ischemic change.  No parenchymal mass, hemorrhage, edema or major vascular distribution  infarct.    Skull/extracranial contents (limited evaluation): No fracture. Mastoid air cells and paranasal sinuses are essentially clear.                                       Medications   Tdap (BOOSTRIX) vaccine injection 0.5 mL (0.5 mLs Intramuscular Given 5/3/23 1638)   mupirocin 2 % ointment 22 g (22 g Topical (Top) Given 5/3/23 1639)     Medical Decision Making:   Initial Assessment:   Urgent evaluation of a 68-year-old female who presents to the emergency department after a fall.  Fall was late yesterday evening.  She does have abrasion noted to nose.  No septal hematoma.  Bony tenderness to nose.  Bony tenderness over the frontal region with no step-offs or crepitus.  Midline tenderness of cervical spine.  Left-sided paraspinal tenderness of cervical spine.  Bony tenderness of left shoulder.  Head CT, CT maxillofacial, left shoulder imaging will be obtained.  Tetanus will be updated.  Wound will be cleaned and Bactroban will be applied.  ED Management:  6:08 PM  Imaging of shoulder shows severe degenerative changes with no acute osseous injury.  CT cervical spine shows no acute osseous injury but there is degenerative changes.  Incidental finding of air-fluid level in the esophagus which increases the risk for aspiration.  I will have her follow up with GI for this.  CT maxillofacial and head reveal no acute abnormalities.  Patient is given head injury precautions.  She is advised to follow up with GI.  She is advised follow up with PCP.  She is advised to follow up with Ortho for her shoulder.  She is advised to return to the emergency department with any worsening symptoms or concerns.                        Clinical Impression:   Final diagnoses:  [M25.519] Shoulder pain  [W19.XXXA] Fall, initial encounter (Primary)  [M50.30] DDD (degenerative disc disease), cervical  [S00.81XA] Abrasion of face, initial encounter  [R93.89] Abnormal finding on CT scan        ED Disposition Condition    Discharge Stable           ED Prescriptions    None       Follow-up Information    None          Nisha Lee PA-C  05/03/23 3572

## 2023-05-03 NOTE — DISCHARGE INSTRUCTIONS
Your images of your face head and neck show no acute problems.  You do have some arthritis.  Your shoulder x-ray shows no broken bones.  It is important that you follow up with Ortho for your shoulder.  You should also see your primary care doctor to ensure the wound on your face is healing appropriately.  We did update your tetanus today.  Incidentally, on the CT of your neck, we do see an air-fluid level in your neck which could just mean you have a hiatal hernia.  You should follow up with GI.  You will be sore over the next couple of days.  You can take Tylenol.  You can rotate heat and ice.  Return to the emergency department with any worsening symptoms or concerns.  Thank you for allowing me to take care of you today.

## 2023-05-04 ENCOUNTER — PATIENT OUTREACH (OUTPATIENT)
Dept: ADMINISTRATIVE | Facility: OTHER | Age: 68
End: 2023-05-04
Payer: MEDICARE

## 2023-05-04 NOTE — PROGRESS NOTES
CHW - Follow Up    This Community Health Worker completed a follow up visit with BEVERLY [id number] over the phone today.  Pt/Caregiver reported: Patient stated she was in the E.D last night for dehydration and dizziness and needs to schedule a follow up with her PCP. Patient stated she is still having issues with her landlord and complains of rodents (mice) and damages to the apartment from over 1 year ago that have not been addressed and are getting worse, patient is still being required to pay rent. Patient is on the Section 8 Housing program and states her  will not address any issues with her home because she is friends with the patient's landlord.   Community Health Worker provided: ED follow up with PCP scheduled, patient has been educated on importance of hydrated and fall prevention. Appointment reminder has been mailed to the address on file. Patient was encouraged to contact the Section 8 Housing program and have her concerns escalated to a supervisor/manager. Patient was also provided the contact information for [facility] [employer] ([phone]) for possible assistance with her landlord. Patient was encouraged to call me if any additional assistance is needed. Patient agreed.   Follow up required: Yes  Follow up- Outreach-Due:  05/08/23

## 2023-05-08 ENCOUNTER — PATIENT OUTREACH (OUTPATIENT)
Dept: ADMINISTRATIVE | Facility: OTHER | Age: 68
End: 2023-05-08
Payer: MEDICARE

## 2023-05-08 NOTE — PROGRESS NOTES
CHW - Outreach Attempt    Community Health Worker left a voicemail message for 1st follow up attempt to contact MRN [id number] regarding: Update on  resource provided- Housing update  Community Health Worker to attempt to contact MRN [id number] on: 05/12/23

## 2023-05-11 ENCOUNTER — PATIENT OUTREACH (OUTPATIENT)
Dept: ADMINISTRATIVE | Facility: OTHER | Age: 68
End: 2023-05-11
Payer: MEDICARE

## 2023-05-11 NOTE — PROGRESS NOTES
CHW - Outreach Attempt    Community Health Worker left a voicemail message for follow up attempt to contact MRN [id number] regarding: General follow up- Housing update   Community Health Worker to attempt to contact MRN [id number] on:

## 2023-05-12 ENCOUNTER — PATIENT OUTREACH (OUTPATIENT)
Dept: ADMINISTRATIVE | Facility: OTHER | Age: 68
End: 2023-05-12
Payer: MEDICARE

## 2023-05-12 NOTE — PROGRESS NOTES
CHW - Outreach Attempt    Community Health Worker left a voicemail message for follow up attempt to contact MRN [id number] regarding: Housing update   Community Health Worker to attempt to contact MRN [id number] on: 05/19/23

## 2023-05-15 ENCOUNTER — TELEPHONE (OUTPATIENT)
Dept: FAMILY MEDICINE | Facility: CLINIC | Age: 68
End: 2023-05-15

## 2023-05-15 NOTE — TELEPHONE ENCOUNTER
----- Message from Katie Lomeli sent at 5/15/2023  3:20 PM CDT -----  Type:  Needs Medical Advice    Who Called:  pt  Symptoms (please be specific):    How long has patient had these symptoms:    Pharmacy name and phone #:    Would the patient rather a call back or a response via [facility]? call  Best Call Back Number: [phone]  Additional Information: pt missed her appt today. She has r/s for July but wants something sooner     Patient was in the ER on 5/3/23  I LM for the pt to rtn call to discuss r/s her appt

## 2023-05-16 DIAGNOSIS — M43.27 FUSION OF SPINE OF LUMBOSACRAL REGION: ICD-10-CM

## 2023-05-16 RX ORDER — OXYCODONE AND ACETAMINOPHEN 10; 325 MG/1; MG/1
1 TABLET ORAL EVERY 8 HOURS PRN
Qty: 90 TABLET | Refills: 0 | Status: SHIPPED | OUTPATIENT
Start: 2023-05-16 | End: 2023-06-14 | Stop reason: SDUPTHER

## 2023-05-19 ENCOUNTER — PATIENT OUTREACH (OUTPATIENT)
Dept: ADMINISTRATIVE | Facility: OTHER | Age: 68
End: 2023-05-19
Payer: MEDICARE

## 2023-05-19 NOTE — PROGRESS NOTES
CHW - Outreach Attempt    Community Health Worker left a voicemail message for 4th follow up attempt to contact MRN [id number] regarding: Housing update   Community Health Worker to attempt to contact MRN [id number] on: 05/26/23

## 2023-05-26 ENCOUNTER — PATIENT OUTREACH (OUTPATIENT)
Dept: ADMINISTRATIVE | Facility: OTHER | Age: 68
End: 2023-05-26
Payer: MEDICARE

## 2023-05-26 RX ORDER — BUPROPION HYDROCHLORIDE 150 MG/1
TABLET ORAL
Qty: 30 TABLET | Refills: 11 | Status: SHIPPED | OUTPATIENT
Start: 2023-05-26 | End: 2023-06-14 | Stop reason: SDUPTHER

## 2023-05-26 NOTE — TELEPHONE ENCOUNTER
No care due was identified.  [facility] Embedded Care Due Messages. Reference number: [id number].   5/25/2023 8:40:09 PM CDT

## 2023-05-26 NOTE — PROGRESS NOTES
CHW - Outreach Attempt    Community Health Worker left a voicemail message for 5th follow up attempt to contact MRN [id number] regarding: Housing update   Community Health Worker to attempt to contact MRN [id number] on:

## 2023-06-02 ENCOUNTER — PATIENT OUTREACH (OUTPATIENT)
Dept: ADMINISTRATIVE | Facility: OTHER | Age: 68
End: 2023-06-02
Payer: MEDICARE

## 2023-06-02 NOTE — PROGRESS NOTES
CHW - Follow Up    This Community Health Worker completed a follow up visit with MRN [id number] over the phone today.  Pt/Caregiver reported: Patient is requesting assistance with locating independent living facilities in either [facility]. Patient is also requested an appointment reminder, and information regarding the programs available through the [facility].  Community Health Worker provided: All requested information is being sent to patient in the mail. Mail will be sent to the address on file.   Follow up required: Yes, resource update   Follow-up Outreach - Due: 6/9/2023

## 2023-06-02 NOTE — PROGRESS NOTES
CHW - Outreach Attempt    Community Health Worker left a voicemail message for follow up attempt to contact MRN [id number] regarding: Housing update  Community Health Worker to attempt to contact MRN [id number] on:

## 2023-06-08 RX ORDER — GABAPENTIN 100 MG/1
100 CAPSULE ORAL 3 TIMES DAILY
Qty: 270 CAPSULE | Refills: 0 | Status: SHIPPED | OUTPATIENT
Start: 2023-06-08 | End: 2023-09-06

## 2023-06-08 NOTE — TELEPHONE ENCOUNTER
----- Message from Natalie Hinton sent at 6/8/2023 11:33 AM CDT -----  Type:  RX Refill Request    Who Called: Pt  Refill or New Rx:refill  RX Name and Strength:gabapentin (NEURONTIN) 100 MG capsule  How is the patient currently taking it? (ex. 1XDay):- Route: Take 1 capsule (100 mg total) by mouth 3 (three) times daily. - Oral  Is this a 30 day or 90 day RX:270  Preferred Pharmacy with phone number:[id number] [address] [facility] [address]   Phone: [phone]  Fax:  [phone]  Local or Mail Order:local  Ordering Provider:ST. OLIVERIO CLARK  Would the patient rather a call back or a response via [facility]? call  Best Call Back Number:[phone]  Additional Information: pt has another medication to fill she does not know the name ples call

## 2023-06-08 NOTE — TELEPHONE ENCOUNTER
No care due was identified.  [facility] Embedded Care Due Messages. Reference number: [id number].   6/08/2023 11:44:46 AM CDT

## 2023-06-09 ENCOUNTER — PATIENT OUTREACH (OUTPATIENT)
Dept: ADMINISTRATIVE | Facility: OTHER | Age: 68
End: 2023-06-09
Payer: MEDICARE

## 2023-06-09 RX ORDER — PROMETHAZINE HYDROCHLORIDE 25 MG/1
TABLET ORAL
Qty: 25 TABLET | Refills: 0 | Status: SHIPPED | OUTPATIENT
Start: 2023-06-09 | End: 2023-09-19 | Stop reason: SDUPTHER

## 2023-06-09 NOTE — TELEPHONE ENCOUNTER
----- Message from Rodriguez Manrique sent at 6/9/2023  2:33 PM CDT -----  Regarding: Patient Request  Patient is requesting a refill on her promethazine (PHENERGAN) 25 MG tablets

## 2023-06-09 NOTE — TELEPHONE ENCOUNTER
No care due was identified.  [facility] Embedded Care Due Messages. Reference number: [id number].   6/09/2023 2:36:15 PM CDT

## 2023-06-09 NOTE — PROGRESS NOTES
CHW - Follow Up    This Community Health Worker completed a follow up visit with BEVERLY [id number] over the phone today.  Pt/Caregiver reported: Patient stated she has not received the appointment reminder and home health documents in the mail, I informed patient she should receive them within the next few days. Patient also requested a refill on her pain medication.  Community Health Worker provided: Patient's PCP was sent a message regarding medication refill.   Follow up required: Yes, general update   Follow-up Outreach - Due: 6/16/2023

## 2023-06-12 ENCOUNTER — CLINICAL SUPPORT (OUTPATIENT)
Dept: REHABILITATION | Facility: HOSPITAL | Age: 68
End: 2023-06-12
Payer: MEDICARE

## 2023-06-12 DIAGNOSIS — R29.6 FREQUENT FALLS: ICD-10-CM

## 2023-06-12 DIAGNOSIS — G89.29 CHRONIC LOW BACK PAIN WITHOUT SCIATICA, UNSPECIFIED BACK PAIN LATERALITY: ICD-10-CM

## 2023-06-12 DIAGNOSIS — R29.898 WEAKNESS OF BOTH LOWER EXTREMITIES: ICD-10-CM

## 2023-06-12 DIAGNOSIS — M54.50 CHRONIC LOW BACK PAIN WITHOUT SCIATICA, UNSPECIFIED BACK PAIN LATERALITY: ICD-10-CM

## 2023-06-12 DIAGNOSIS — Z91.81 AT HIGH RISK FOR FALLS: ICD-10-CM

## 2023-06-12 PROCEDURE — 97162 PT EVAL MOD COMPLEX 30 MIN: CPT | Mod: PO

## 2023-06-12 PROCEDURE — 97530 THERAPEUTIC ACTIVITIES: CPT | Mod: PO

## 2023-06-13 PROBLEM — R29.898 WEAKNESS OF BOTH LOWER EXTREMITIES: Status: ACTIVE | Noted: 2023-06-13

## 2023-06-13 PROBLEM — Z91.81 AT HIGH RISK FOR FALLS: Status: ACTIVE | Noted: 2023-06-13

## 2023-06-13 NOTE — PLAN OF CARE
[facility]   Physical Therapy Initial Evaluation     Date: 6/12/2023   Name: Christine Castro  Clinic Number: [id number]    Therapy Diagnosis:   Encounter Diagnoses   Name Primary?    Frequent falls     Chronic low back pain without sciatica, unspecified back pain laterality     Weakness of both lower extremities     At high risk for falls      Physician: Alon Santiago MD    Physician Orders: PT Eval and Treat   Medical Diagnosis from Referral:   R29.6 (ICD-10-CM) - Frequent falls   M54.50,G89.29 (ICD-10-CM) - Chronic low back pain without sciatica, unspecified back pain laterality     Evaluation Date: 6/12/2023  Authorization Period Expiration: 04/13/2024  Plan of Care Expiration: 8/12/2023  Progress Note Due: 7/12/2023  Visit # / Visits authorized: 1/ 1   FOTO: 0/5    Precautions: Standard and Diabetes     Time In: 2:30 pm  Time Out: 3:00 pm  Total Appointment Time (timed & untimed codes): 30 minutes      SUBJECTIVE     Date of onset: Chronic    History of current condition - Christine reports: I had my big toe removed from my L foot when I was a child, and I have had balance problems since then. I also had a total knee replacement on both knees. I am also having low back pain, and I think I am having problems with my sciatic nerve. The nerve pain comes down both legs R>L. My L leg is shorter than my R, and I found that out according to my X-rays.    Falls: multiple falls approximately 15-20 falls over the past year. I have fallen in the yard due to pipes in the yard; fallen in the kitchen due to 2 steps; fall anywhere. I fall a lot of times out of nowhere without warning.      Imaging, None available    Prior Therapy: Yes, and it kind of helped  Social History: lives alone, has a puppy that comes to be with me when I have fallen, neighbor checks on me sometimes  DME: Straight cane, Small based Quad cane, Tub bench, Shower chair, and RW; motorized chair   Home Environment: 2 stairs  in the kitchen   Exercise Routine / History: Yes, twice a week strengthening exercises, with some walking everyday   Family Present at time of Eval: No   Occupation: retired  Prior Level of Function: Modified Independent (SPC/quad cane; RW)  Current Level of Function: Modified Independent (SPC/quad cane; RW)    Pain:  Current 5-6/10, worst 10/10, best 4/10   Location: Low back; BLE R>L  Description: Aching, Throbbing, and Tingling  Aggravating Factors: Walking, Night Time, and Getting out of bed/chair  Easing Factors: pain medication and hot bath; laying down    Patients goals: be more independent     Medical History:   Past Medical History:   Diagnosis Date    Anticoagulant long-term use     Arthritis     Asthma     CHF (congestive heart failure)     [facility]    Colon cancer     colon    COPD (chronic obstructive pulmonary disease)     Coronary artery disease     Depression     Diabetes mellitus, type 2     GERD (gastroesophageal reflux disease)     Myocardial infarction     AGE 19 [facility]BABY DELIVERY    Thyroid disease        Surgical History:   Christine Castro  has a past surgical history that includes  section; Tonsillectomy; Colon surgery; Cholecystectomy; Ectopic pregnancy surgery; Tubal ligation; Gastric bypass; Colonoscopy; Colonoscopy (N/A, 2017); Colonoscopy (N/A, 4/10/2018); Esophagogastroduodenoscopy (N/A, 2019); Knee Arthroplasty (Left, 2019); Oophorectomy; Knee Arthroplasty (Right, 2019); Abdominal surgery; Fracture surgery; Hernia repair; Injection of anesthetic agent around genitofemoral nerve (Left, 2020); Radiofrequency thermocoagulation (Left, 2020); Epidural steroid injection into lumbar spine (N/A, 2020); Joint replacement; Fusion of spine with instrumentation (Left, 2021); Transforaminal epidural injection of steroid (Right, 2021); Decompression of cervical spine by anterior approach with fusion (N/A, 2021); and Caudal  epidural steroid injection (N/A, 2022).    Medications:   Christine has a current medication list which includes the following prescription(s): albuterol, aspirin, atorvastatin, bupropion, bupropion, buspirone, diabetic supplies, miscellan., diazepam, diclofenac sodium, ergocalciferol, escitalopram oxalate, furosemide, gabapentin, glipizide, ibandronate, levothyroxine, methocarbamol, omeprazole, oxycodone-acetaminophen, potassium chloride, promethazine, suprep bowel prep kit, true metrix glucose meter, true metrix glucose test strip, true metrix glucose test strip, trueplus lancets, and zolpidem.    Allergies:   Review of patient's allergies indicates:   Allergen Reactions    Adhesive      Adhesive tape    Latex, natural rubber      Adhesive from latex tape    Ibuprofen Other (See Comments)     Causes asthma flare??          OBJECTIVE     Observation: Pt is a 67 yo F presenting to therapy in no apparent distress with expressed neurogenic pain displaying significant postural deficits.    Gait: Ambulates with significant anterior lean and decreased DF toe clearance on LLE with SPC (inappropriate AD, with recommendation for usage of rollator of RW)    Transfers: Challenges requiring significant anterior lean (decrease hip flexion) and challenges attaining and maintaining hip extension    Strength:  Hip Left Right   Flexion NT NT   Abduction NT NT   Adduction NT NT   Extension NT NT   External Rot NT NT   Internal Rot NT NT     Knee Left Right   Extension NT NT   Flexion NT NT     Ankle Left Right   Dorsiflexion NT NT   Plantarflexion NT NT   Inversion NT NT   Eversion NT NT       Special Tests:  TU.62s (w SPC)  Single Leg Stance: unable   30 seconds sit to stand: 3x w BUE support, significant anterior leaning (hip flexor weakness) and decreased eccentric quad control        Limitation/Restriction for FOTO Lumbar Spine Survey    Therapist reviewed FOTO scores for Christine Castro on 2023.   FOTO documents  entered into EPIC - see Media section.    Limitation Score: 34%         TREATMENT     Total Treatment time (time-based codes) separate from Evaluation: 10 minutes      Christine received the treatments listed below:      therapeutic exercises to develop strength, endurance, ROM, flexibility, posture, and core stabilization for 0 minutes including:  NuStep  QS  SAQ  SLR  Hip ABD  Hip ADD  LAQ  Heel raises  Toe raises    neuromuscular re-education activities to improve: Balance, Kinesthetic, Proprioception, and Posture for 0 minutes. The following activities were included:  Bridges  Scap squeezes  Tandem stance  NBOS on stable EC  NBOS on foam EO  TB rows  TB lat pull downs    gait training to improve functional mobility and safety for 0 minutes, including:  Ambulation with RW    therapeutic activities to improve functional performance for 10  minutes, including:  Instructions for HEP      PATIENT EDUCATION AND HOME EXERCISES     Education provided:   - importance of consistent performance of HEP  - role of PT/PTA    Written Home Exercises Provided: yes. Exercises were reviewed and Christine was able to demonstrate them prior to the end of the session.  Christine demonstrated good  understanding of the education provided. See EMR under Patient Instructions for exercises provided during therapy sessions.    ASSESSMENT     Christine is a 68 y.o. female referred to outpatient Physical Therapy with a medical diagnosis of R29.6 (ICD-10-CM) - Frequent falls and M54.50,G89.29 (ICD-10-CM) - Chronic low back pain without sciatica, unspecified back pain laterality. Patient presents with moderate levels of low back and BLE pain R>L. She displays neurogenic pain, likely from her lumbosacral spine as well as significant BLE strength deficits throughout based on performance of transfers and ambulation. Additionally, she continues to be at significant risk for falls with balance deficits and improper AD usage. She is currently utilizing  a SPC, and PT educates pt that she needs an AD that can provide increased support due to her deficits and increased risk for falls with usage of improper device.    Patient prognosis is Fair.   Patient will benefit from skilled outpatient Physical Therapy to address the deficits stated above and in the chart below, provide patient /family education, and to maximize patient's level of independence.     Plan of care discussed with patient: Yes  Patient's spiritual, cultural and educational needs considered and patient is agreeable to the plan of care and goals as stated below:     Anticipated Barriers for therapy: None    Medical Necessity is demonstrated by the following  History  Co-morbidities and personal factors that may impact the plan of care Co-morbidities:   Past Medical History:   Diagnosis Date    Anticoagulant long-term use     Arthritis     Asthma     CHF (congestive heart failure)     [facility]    Colon cancer     colon    COPD (chronic obstructive pulmonary disease)     Coronary artery disease     Depression     Diabetes mellitus, type 2     GERD (gastroesophageal reflux disease)     Myocardial infarction     AGE 19 [facility]BABY DELIVERY    Thyroid disease        Personal Factors:   no deficits     high   Examination  Body Structures and Functions, activity limitations and participation restrictions that may impact the plan of care Body Regions:   back  lower extremities    Body Systems:    ROM  Strength  Gait  Transfers  Transitions  Balance    Participation Restrictions:   None    Activity limitations:   Learning and applying knowledge  no deficits    General Tasks and Commands  no deficits    Communication  no deficits    Mobility  lifting and carrying objects  moving around using equipment (WC)  using transportation (bus, train, plane, car)  driving (bike, car, motorcycle)    Self care  no deficits    Domestic Life  shopping  cooking  doing house work (cleaning house, washing dishes,  laundry)    Interactions/Relationships  no deficits    Life Areas  no deficits    Community and Social Life  community life  recreation and leisure         moderate   Clinical Presentation evolving clinical presentation with changing clinical characteristics moderate   Decision Making/ Complexity Score: moderate     Goals:  Short Term Goals: 4 weeks   This patient will be consistent with performance of HEP in order to be able to display independence for maintenance of outcomes achieved with therapy. In progress, not met  This patient will improve balance using the least restrictive AD in order to increase safety and decrease incidence of falls. In progress, not met  This patient will improve FOTO Lumbar Spine Survey score to 45% with impairment of 55% in order to improve functional ability to perform self care and household activities with improved safety. In progress, not met    Long Term Goals: 8 weeks   This patient will be able to consistently perform updated HEP in order to be able to display independence for maintenance of outcomes achieved with therapy. In progress, not met  This patient will display decreased risk for falls with ability to perform 30s chair raise test 8x w no UE support. In progress, not met  This patient will be able to tolerate SLS on each leg for at least 30s with no more than 1UE support at minimal LOB in order to improve balance with various functional activities. In progress, not met  This patient will improve FOTO Lumbar Spine Survey score to 55% with impairment of 45% in order to improve functional ability to perform self care and household activities with improved safety. In progress, not met     PLAN   Plan of care Certification: 6/12/2023 to 8/12/2023.    Outpatient Physical Therapy 2 times weekly for 8 weeks to include the following interventions: Gait Training, Manual Therapy, Moist Heat/ Ice, Neuromuscular Re-ed, Patient Education, Therapeutic Activities, and Therapeutic  Exercise.     Nallely Mathias, PT      I CERTIFY THE NEED FOR THESE SERVICES FURNISHED UNDER THIS PLAN OF TREATMENT AND WHILE UNDER MY CARE   Physician's comments:     Physician's Signature: ___________________________________________________

## 2023-06-14 DIAGNOSIS — M43.27 FUSION OF SPINE OF LUMBOSACRAL REGION: ICD-10-CM

## 2023-06-14 NOTE — TELEPHONE ENCOUNTER
No care due was identified.  [facility] Embedded Care Due Messages. Reference number: [id number].   6/14/2023 4:04:29 PM CDT

## 2023-06-14 NOTE — TELEPHONE ENCOUNTER
----- Message from Gerson Quick sent at 6/14/2023  3:46 PM CDT -----  Type:  Patient Returning Call    Who Called:pt  Who Left Message for Patient:  Does the patient know what this is regarding?:rx   Would the patient rather a call back or a response via [facility]? call  Best Call Back Number:[phone]  Additional Information: pt needs na refill on her potassium chloride (KLOR-CON) 10 MEQ TbSR,   buPROPion (WELLBUTRIN XL) 150 MG TB24 tablet       Send to [id number] - [place] [address] [facility] AT [facility] OF [facility] [address]   Phone: [phone]    Please give pt an call once the prescription has been called in

## 2023-06-15 RX ORDER — OXYCODONE AND ACETAMINOPHEN 10; 325 MG/1; MG/1
1 TABLET ORAL EVERY 8 HOURS PRN
Qty: 90 TABLET | Refills: 0 | Status: SHIPPED | OUTPATIENT
Start: 2023-06-15 | End: 2023-06-23 | Stop reason: SDUPTHER

## 2023-06-15 RX ORDER — BUPROPION HYDROCHLORIDE 150 MG/1
150 TABLET ORAL DAILY
Qty: 30 TABLET | Refills: 11 | Status: SHIPPED | OUTPATIENT
Start: 2023-06-15 | End: 2023-07-20 | Stop reason: SDUPTHER

## 2023-06-15 RX ORDER — POTASSIUM CHLORIDE 750 MG/1
TABLET, EXTENDED RELEASE ORAL
Qty: 90 TABLET | Refills: 0 | Status: SHIPPED | OUTPATIENT
Start: 2023-06-15 | End: 2023-09-15

## 2023-06-16 ENCOUNTER — PATIENT OUTREACH (OUTPATIENT)
Dept: ADMINISTRATIVE | Facility: OTHER | Age: 68
End: 2023-06-16
Payer: MEDICARE

## 2023-06-16 NOTE — PROGRESS NOTES
CHW - Follow Up    This Community Health Worker completed a follow up visit with BEVERLY [id number] over the phone today.  Pt/Caregiver reported: Patient stated she received the documents in the mail and will contact the [place] Dept of Health to verify if she meets the requirements for the home health senior program.Patient stated she will update me once she speaks to someone.   Community Health Worker provided: No assistance provided at this time  Follow up required: No  No future outreach task assigned

## 2023-06-18 RX ORDER — BLOOD-GLUCOSE METER
EACH MISCELLANEOUS
Qty: 1 EACH | Refills: 0 | Status: SHIPPED | OUTPATIENT
Start: 2023-06-18 | End: 2023-10-02

## 2023-06-18 RX ORDER — GLIPIZIDE 2.5 MG/1
TABLET, EXTENDED RELEASE ORAL
Qty: 90 TABLET | Refills: 3 | Status: SHIPPED | OUTPATIENT
Start: 2023-06-18 | End: 2023-11-17 | Stop reason: SDUPTHER

## 2023-06-18 NOTE — TELEPHONE ENCOUNTER
No care due was identified.  [facility] Embedded Care Due Messages. Reference number: [id number].   6/17/2023 8:38:10 PM CDT

## 2023-06-21 ENCOUNTER — TELEPHONE (OUTPATIENT)
Dept: NEUROSURGERY | Facility: CLINIC | Age: 68
End: 2023-06-21
Payer: MEDICARE

## 2023-06-21 NOTE — TELEPHONE ENCOUNTER
Returned pt's call, pt stated that the Oxycodone stated that  sent in is not in stock at [facility] and pt stated that she has had multiple falls since her last visit with us. I  stated to pt that I will let  know and for the pt to call around to other pharmacies to see who has the Oxycodone in stock and give us a call back. Pt voiced understanding

## 2023-06-22 ENCOUNTER — TELEPHONE (OUTPATIENT)
Dept: FAMILY MEDICINE | Facility: CLINIC | Age: 68
End: 2023-06-22
Payer: MEDICARE

## 2023-06-22 NOTE — TELEPHONE ENCOUNTER
----- Message from Delio Mcfarlane sent at 6/22/2023 11:29 AM CDT -----  Contact: [facility]  .Type:  Pharmacy Calling to Clarify an RX    Pharmacy Name:[facility]  Prescription Name:Tests Strips  What do they need to clarify?:directions  Best Call Back Number:[phone]  Additional Information:

## 2023-06-23 DIAGNOSIS — M43.27 FUSION OF SPINE OF LUMBOSACRAL REGION: ICD-10-CM

## 2023-06-26 RX ORDER — OXYCODONE AND ACETAMINOPHEN 10; 325 MG/1; MG/1
1 TABLET ORAL EVERY 8 HOURS PRN
Qty: 90 TABLET | Refills: 0 | Status: SHIPPED | OUTPATIENT
Start: 2023-06-26 | End: 2023-07-20 | Stop reason: SDUPTHER

## 2023-07-05 RX ORDER — ATORVASTATIN CALCIUM 40 MG/1
TABLET, FILM COATED ORAL
Qty: 90 TABLET | Refills: 3 | Status: SHIPPED | OUTPATIENT
Start: 2023-07-05

## 2023-07-05 NOTE — TELEPHONE ENCOUNTER
Care Due:                  Date            Visit Type   Department     Provider  --------------------------------------------------------------------------------                                EP -                              PRIMARY      [facility] FAMILY  Last Visit: 02-      CARE ([facility])   MEDICINE       Alon Santiago                              EP -                              PRIMARY      [facility] FAMILY  Next Visit: 07-      CARE ([facility])   MEDICINE       Alon Santiago                                                            Last  Test          Frequency    Reason                     Performed    Due Date  --------------------------------------------------------------------------------    HBA1C.......  6 months...  glipiZIDE................  04-   10-    [facility] Embedded Care Due Messages. Reference number: [id number].   7/05/2023 8:45:44 AM CDT

## 2023-07-06 ENCOUNTER — TELEPHONE (OUTPATIENT)
Dept: FAMILY MEDICINE | Facility: CLINIC | Age: 68
End: 2023-07-06
Payer: MEDICARE

## 2023-07-06 NOTE — TELEPHONE ENCOUNTER
----- Message from Natalie Hinton sent at 7/6/2023  4:36 PM CDT -----  Type:  Patient Returning Call    Who Called:pt  Who Left Message for Patient:office  Does the patient know what this is regarding?:unknown  Would the patient rather a call back or a response via [facility]? call  Best Call Back Number:[phone]  Additional Information:

## 2023-07-20 ENCOUNTER — OFFICE VISIT (OUTPATIENT)
Dept: FAMILY MEDICINE | Facility: CLINIC | Age: 68
End: 2023-07-20
Payer: MEDICARE

## 2023-07-20 VITALS
TEMPERATURE: 98 F | HEIGHT: 60 IN | BODY MASS INDEX: 40.6 KG/M2 | OXYGEN SATURATION: 98 % | WEIGHT: 206.81 LBS | DIASTOLIC BLOOD PRESSURE: 88 MMHG | SYSTOLIC BLOOD PRESSURE: 132 MMHG | HEART RATE: 90 BPM

## 2023-07-20 DIAGNOSIS — E11.9 TYPE 2 DIABETES MELLITUS WITHOUT COMPLICATION, WITHOUT LONG-TERM CURRENT USE OF INSULIN: ICD-10-CM

## 2023-07-20 DIAGNOSIS — Z12.31 ENCOUNTER FOR SCREENING MAMMOGRAM FOR BREAST CANCER: Primary | ICD-10-CM

## 2023-07-20 DIAGNOSIS — C18.9 MALIGNANT NEOPLASM OF COLON, UNSPECIFIED PART OF COLON: ICD-10-CM

## 2023-07-20 DIAGNOSIS — E66.01 MORBID (SEVERE) OBESITY DUE TO EXCESS CALORIES: ICD-10-CM

## 2023-07-20 DIAGNOSIS — M54.50 CHRONIC LOW BACK PAIN WITHOUT SCIATICA, UNSPECIFIED BACK PAIN LATERALITY: ICD-10-CM

## 2023-07-20 DIAGNOSIS — I70.0 AORTIC ATHEROSCLEROSIS: ICD-10-CM

## 2023-07-20 DIAGNOSIS — M43.27 FUSION OF SPINE OF LUMBOSACRAL REGION: ICD-10-CM

## 2023-07-20 DIAGNOSIS — R29.6 FREQUENT FALLS: ICD-10-CM

## 2023-07-20 DIAGNOSIS — M06.09 RHEUMATOID ARTHRITIS WITHOUT RHEUMATOID FACTOR, MULTIPLE SITES: ICD-10-CM

## 2023-07-20 DIAGNOSIS — G89.29 CHRONIC LOW BACK PAIN WITHOUT SCIATICA, UNSPECIFIED BACK PAIN LATERALITY: ICD-10-CM

## 2023-07-20 DIAGNOSIS — M25.551 RIGHT HIP PAIN: ICD-10-CM

## 2023-07-20 DIAGNOSIS — I50.9 HEART FAILURE, UNSPECIFIED HF CHRONICITY, UNSPECIFIED HEART FAILURE TYPE: ICD-10-CM

## 2023-07-20 PROCEDURE — 1159F PR MEDICATION LIST DOCUMENTED IN MEDICAL RECORD: ICD-10-PCS | Mod: CPTII,S$GLB,, | Performed by: FAMILY MEDICINE

## 2023-07-20 PROCEDURE — 3079F PR MOST RECENT DIASTOLIC BLOOD PRESSURE 80-89 MM HG: ICD-10-PCS | Mod: CPTII,S$GLB,, | Performed by: FAMILY MEDICINE

## 2023-07-20 PROCEDURE — 1159F MED LIST DOCD IN RCRD: CPT | Mod: CPTII,S$GLB,, | Performed by: FAMILY MEDICINE

## 2023-07-20 PROCEDURE — 3288F FALL RISK ASSESSMENT DOCD: CPT | Mod: CPTII,S$GLB,, | Performed by: FAMILY MEDICINE

## 2023-07-20 PROCEDURE — 3008F PR BODY MASS INDEX (BMI) DOCUMENTED: ICD-10-PCS | Mod: CPTII,S$GLB,, | Performed by: FAMILY MEDICINE

## 2023-07-20 PROCEDURE — 1100F PTFALLS ASSESS-DOCD GE2>/YR: CPT | Mod: CPTII,S$GLB,, | Performed by: FAMILY MEDICINE

## 2023-07-20 PROCEDURE — 1157F ADVNC CARE PLAN IN RCRD: CPT | Mod: CPTII,S$GLB,, | Performed by: FAMILY MEDICINE

## 2023-07-20 PROCEDURE — 99213 OFFICE O/P EST LOW 20 MIN: CPT | Mod: S$GLB,,, | Performed by: FAMILY MEDICINE

## 2023-07-20 PROCEDURE — 3075F PR MOST RECENT SYSTOLIC BLOOD PRESS GE 130-139MM HG: ICD-10-PCS | Mod: CPTII,S$GLB,, | Performed by: FAMILY MEDICINE

## 2023-07-20 PROCEDURE — 3075F SYST BP GE 130 - 139MM HG: CPT | Mod: CPTII,S$GLB,, | Performed by: FAMILY MEDICINE

## 2023-07-20 PROCEDURE — 3288F PR FALLS RISK ASSESSMENT DOCUMENTED: ICD-10-PCS | Mod: CPTII,S$GLB,, | Performed by: FAMILY MEDICINE

## 2023-07-20 PROCEDURE — 1100F PR PT FALLS ASSESS DOC 2+ FALLS/FALL W/INJURY/YR: ICD-10-PCS | Mod: CPTII,S$GLB,, | Performed by: FAMILY MEDICINE

## 2023-07-20 PROCEDURE — 99213 PR OFFICE/OUTPT VISIT, EST, LEVL III, 20-29 MIN: ICD-10-PCS | Mod: S$GLB,,, | Performed by: FAMILY MEDICINE

## 2023-07-20 PROCEDURE — 3079F DIAST BP 80-89 MM HG: CPT | Mod: CPTII,S$GLB,, | Performed by: FAMILY MEDICINE

## 2023-07-20 PROCEDURE — 1157F PR ADVANCE CARE PLAN OR EQUIV PRESENT IN MEDICAL RECORD: ICD-10-PCS | Mod: CPTII,S$GLB,, | Performed by: FAMILY MEDICINE

## 2023-07-20 PROCEDURE — 1125F AMNT PAIN NOTED PAIN PRSNT: CPT | Mod: CPTII,S$GLB,, | Performed by: FAMILY MEDICINE

## 2023-07-20 PROCEDURE — 3008F BODY MASS INDEX DOCD: CPT | Mod: CPTII,S$GLB,, | Performed by: FAMILY MEDICINE

## 2023-07-20 PROCEDURE — 1125F PR PAIN SEVERITY QUANTIFIED, PAIN PRESENT: ICD-10-PCS | Mod: CPTII,S$GLB,, | Performed by: FAMILY MEDICINE

## 2023-07-20 RX ORDER — BUPROPION HYDROCHLORIDE 300 MG/1
300 TABLET ORAL DAILY
Qty: 90 TABLET | Refills: 3 | Status: SHIPPED | OUTPATIENT
Start: 2023-07-20 | End: 2023-10-02

## 2023-07-20 RX ORDER — ASPIRIN 81 MG/1
81 TABLET ORAL DAILY
Refills: 0
Start: 2023-07-20 | End: 2024-07-19

## 2023-07-20 NOTE — TELEPHONE ENCOUNTER
Returned patient phone call, patient agreed to appt date and time set for 10/2 with x-rays prior to.

## 2023-07-20 NOTE — PROGRESS NOTES
Patient ID: Christine Castro is a 68 y.o. female.    Chief Complaint: Follow-up    HPI      Christine Castro is a 68 y.o. female     Vitals:    07/20/23 0809   BP: (!) 142/80   BP Location: Left arm   Patient Position: Sitting   Pulse: 90   Temp: 97.9 °F (36.6 °C)   SpO2: 98%   Weight: 93.8 kg (206 lb 12.7 oz)   Height: 5' (1.524 m)            Review of Symptoms      Physical Exam    Constitutional:  Oriented to person, place, and time.appears well-developed and well-nourished.  No distress.      HENT  Head: Normocephalic and atraumatic  Right Ear: External ear normal.   Left Ear: External ear normal.   Nose: External nose normal.   Mouth:  Moist mucus membranes.    Eyes:  Conjunctivae are normal. Right eye exhibits no discharge.  Left eye exhibits no discharge. No scleral icterus.  No periorbital edema    Cardiovascular:  Regular rate and rhythm with normal S1 and S2     Pulmonary/Chest:   Clear to auscultation bilaterally without wheezes, rhonchi or rales      Musculoskeletal:  No edema. No obvious deformity No wasting       Neurological:  Alert and oriented to person, place, and time.   Coordination normal.     Skin:   Skin is warm and dry.  No diaphoresis.   No rash noted.     Psychiatric: Normal mood and affect. Behavior is normal.  Judgment and thought content normal.     Complete Blood Count  Lab Results   Component Value Date    RBC 3.42 (L) 02/28/2023    HGB 10.5 (L) 02/28/2023    HCT 32.7 (L) 02/28/2023    MCV 96 02/28/2023    MCH 30.7 02/28/2023    MCHC 32.1 02/28/2023    RDW 13.2 02/28/2023     02/28/2023    MPV 9.8 02/28/2023    GRAN 3.8 02/28/2023    GRAN 63.2 02/28/2023    LYMPH 1.6 02/28/2023    LYMPH 26.7 02/28/2023    MONO 0.3 02/28/2023    MONO 5.7 02/28/2023    EOS 0.2 02/28/2023    BASO 0.04 02/28/2023    EOSINOPHIL 3.5 02/28/2023    BASOPHIL 0.7 02/28/2023    DIFFMETHOD Automated 02/28/2023       Comprehensive Metabolic Panel  Lab Results   Component Value Date     02/28/2023     BUN 17 02/28/2023    CREATININE 1.25 02/28/2023     02/28/2023    K 4.1 02/28/2023     02/28/2023    PROT 7.4 02/28/2023    ALBUMIN 4.4 02/28/2023    BILITOT 0.3 02/28/2023    AST 31 02/28/2023    ALKPHOS 83 02/28/2023    CO2 21 (L) 02/28/2023    ALT 23 02/28/2023    ANIONGAP 9 02/28/2023       TSH  No results found for: TSH    Assessment / Plan:      ICD-10-CM ICD-9-CM   1. Encounter for screening mammogram for breast cancer  Z12.31 V76.12   2. Frequent falls  R29.6 V15.88   3. Chronic low back pain without sciatica, unspecified back pain laterality  M54.50 724.2    G89.29 338.29   4. Right hip pain  M25.551 719.45   5. Malignant neoplasm of colon, unspecified part of colon  C18.9 153.9   6. Morbid (severe) obesity due to excess calories  E66.01 278.01   7. Rheumatoid arthritis without rheumatoid factor, multiple sites  M06.09 714.0   8. Heart failure, unspecified HF chronicity, unspecified heart failure type  I50.9 428.9   9. Aortic atherosclerosis  I70.0 440.0     Encounter for screening mammogram for breast cancer  -     Mammo Digital Screening Bilat w/ [facility]; Future; Expected date: 01/20/2024    Frequent falls    Chronic low back pain without sciatica, unspecified back pain laterality    Right hip pain    Malignant neoplasm of colon, unspecified part of colon    Morbid (severe) obesity due to excess calories    Rheumatoid arthritis without rheumatoid factor, multiple sites    Heart failure, unspecified HF chronicity, unspecified heart failure type    Aortic atherosclerosis      Cancer has been cured  resolved  Heart failure stable now  Ra chronic stable

## 2023-07-21 RX ORDER — OXYCODONE AND ACETAMINOPHEN 10; 325 MG/1; MG/1
1 TABLET ORAL EVERY 8 HOURS PRN
Qty: 90 TABLET | Refills: 0 | Status: SHIPPED | OUTPATIENT
Start: 2023-07-21 | End: 2023-08-22 | Stop reason: SDUPTHER

## 2023-08-22 DIAGNOSIS — M43.27 FUSION OF SPINE OF LUMBOSACRAL REGION: ICD-10-CM

## 2023-08-22 RX ORDER — OXYCODONE AND ACETAMINOPHEN 10; 325 MG/1; MG/1
1 TABLET ORAL EVERY 8 HOURS PRN
Qty: 90 TABLET | Refills: 0 | Status: SHIPPED | OUTPATIENT
Start: 2023-08-22 | End: 2023-09-19 | Stop reason: SDUPTHER

## 2023-08-31 NOTE — TELEPHONE ENCOUNTER
No care due was identified.  [facility] Embedded Care Due Messages. Reference number: [id number].   8/30/2023 7:39:50 PM CDT

## 2023-09-01 RX ORDER — ZOLPIDEM TARTRATE 5 MG/1
5 TABLET ORAL NIGHTLY
Qty: 30 TABLET | Refills: 0 | Status: SHIPPED | OUTPATIENT
Start: 2023-09-01 | End: 2023-10-02

## 2023-09-18 NOTE — TELEPHONE ENCOUNTER
No care due was identified.  [facility] Embedded Care Due Messages. Reference number: [id number].   9/18/2023 10:55:52 AM CDT

## 2023-09-18 NOTE — TELEPHONE ENCOUNTER
----- Message from Bear Zavaleta sent at 9/18/2023 10:51 AM CDT -----  Type:  RX Refill Request    Who Called: pt   Refill or New Rx:refill  RX Name and Strength:  Preferred Pharmacy with phone number:[facility] - [place], [place] - [address]  Local or Mail Order:local   Ordering Provider:st bravo   Would the patient rather a call back or a response via [facility]? Call   Best Call Back Number:[phone]  Additional Information:     Please be advised pt stated she feels very weak/ wobbly   Nausea/ stomach hurts

## 2023-09-19 ENCOUNTER — HOSPITAL ENCOUNTER (EMERGENCY)
Facility: HOSPITAL | Age: 68
Discharge: HOME | End: 2023-09-19
Attending: EMERGENCY MEDICINE
Payer: MEDICARE

## 2023-09-19 ENCOUNTER — TELEPHONE (OUTPATIENT)
Dept: FAMILY MEDICINE | Facility: CLINIC | Age: 68
End: 2023-09-19
Payer: MEDICARE

## 2023-09-19 VITALS
TEMPERATURE: 98 F | HEART RATE: 79 BPM | SYSTOLIC BLOOD PRESSURE: 112 MMHG | BODY MASS INDEX: 56.93 KG/M2 | HEIGHT: 60 IN | WEIGHT: 290 LBS | DIASTOLIC BLOOD PRESSURE: 78 MMHG | RESPIRATION RATE: 19 BRPM | OXYGEN SATURATION: 99 %

## 2023-09-19 DIAGNOSIS — M15.9 OSTEOARTHRITIS OF MULTIPLE JOINTS, UNSPECIFIED OSTEOARTHRITIS TYPE: ICD-10-CM

## 2023-09-19 DIAGNOSIS — G89.29 OTHER CHRONIC PAIN: ICD-10-CM

## 2023-09-19 DIAGNOSIS — S60.211A CONTUSION OF RIGHT WRIST, INITIAL ENCOUNTER: Primary | ICD-10-CM

## 2023-09-19 DIAGNOSIS — M43.27 FUSION OF SPINE OF LUMBOSACRAL REGION: ICD-10-CM
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
## 2023-09-19 PROCEDURE — 29125 APPL SHORT ARM SPLINT STATIC: CPT | Mod: RT,ER

## 2023-09-19 PROCEDURE — 99283 EMERGENCY DEPT VISIT LOW MDM: CPT | Mod: ER

## 2023-09-19 RX ORDER — PROMETHAZINE HYDROCHLORIDE 25 MG/1
TABLET ORAL
Qty: 25 TABLET | Refills: 0 | Status: SHIPPED | OUTPATIENT
Start: 2023-09-19 | End: 2023-12-04 | Stop reason: SDUPTHER

## 2023-09-19 RX ORDER — PROMETHAZINE HYDROCHLORIDE 25 MG/1
TABLET ORAL
Qty: 25 TABLET | Refills: 0 | OUTPATIENT
Start: 2023-09-19

## 2023-09-19 NOTE — ED NOTES
Review of patient's allergies indicates:   Allergen Reactions    Adhesive      Adhesive tape    Latex, natural rubber      Adhesive from latex tape    Ibuprofen Other (See Comments)     Causes asthma flare??        Patient has verified the spelling of the name and  on armband.   APPEARANCE: Patient is alert, calm, oriented x 4, and does not appear distressed.  SKIN: Skin is normal for race, warm, and dry. Normal skin turgor and mucous membranes moist.  CARDIAC: Normal rate and rhythm, no murmur heard.   RESPIRATORY:Normal rate and effort. Breath sounds clear bilaterally throughout chest. Respirations are equal and unlabored.    GASTRO: Bowel sounds normal, abdomen is soft, no tenderness, and no abdominal distention.  MUSCLE: Full ROM. C/o pain to right outer leg with a hx of sciatica.  No obvious deformity. Reports slipping and falling last night while walking up her walkway.   PERIPHERAL VASCULAR: peripheral pulses present. Normal cap refill. No edema. Warm to touch.  NEURO: 5/5 strength major flexors/extensors bilaterally. Sensory intact to light touch bilaterally. Kash coma scale: eyes open spontaneously-4, oriented & converses-5, obeys commands-6. No neurological abnormalities.   MENTAL STATUS: awake, alert and aware of environment.  EYE: No overt deficits noted. No drainage. Sclera WNL  ENT: EARS: no obvious drainage. NOSE: no active bleeding. THROAT: no redness or swelling.  : Voids without complication per patient

## 2023-09-19 NOTE — TELEPHONE ENCOUNTER
----- Message from Gina Canas sent at 9/19/2023  9:31 AM CDT -----  Type:  Fall     Who Called: Pt  Symptoms (please be specific): had a fall injured right arm   How long has patient had these symptoms:  yesterday  Would the patient rather a call back or a response via [facility]? Call   Best Call Back Number: [phone]  Additional Information: Please be advised pt stated she really doesn't want to go to the emd   Caller would like to know if her medication was sent on 09/18 to [facility]

## 2023-09-19 NOTE — DISCHARGE INSTRUCTIONS
You didn't break anything, but you have arthritis that makes your pain worse after falling. If you can tolerate it, you can take Tylenol 1 gram every 8 hours, and ibuprofen 800 mg every 8 hours; this means you can take pain medicine once every 4 hours.  Please see your doctor within the next week.

## 2023-09-19 NOTE — ED PROVIDER NOTES
Emergency Department Encounter  Provider Note  Encounter Date: 2023    Patient Name: Christine Castro  MRN: [id number]    History of Present Illness   HPI  History of Present Illness:    Chief Complaint:   Chief Complaint   Patient presents with    Fall     Fell yesterday, pain to right arm, right hip, and left shoulder, denies loc        68-year-old female presenting with mechanical slip and fall on her right side yesterday, complaining of right shoulder, right wrist pain.  She is also complaining of left shoulder pain.  Denies head strike.  Ambulated afterwards.  States that she is using a walker but also has a wheelchair.    The following PMH/PSH/SocHx/FamHx has been reviewed by myself:    Past Medical History:   Diagnosis Date    Anticoagulant long-term use     Arthritis     Asthma     CHF (congestive heart failure)     [facility]    Colon cancer     colon    COPD (chronic obstructive pulmonary disease)     Coronary artery disease     Depression     Diabetes mellitus, type 2     GERD (gastroesophageal reflux disease)     Myocardial infarction     AGE 19 MIGUELANGEL WITHBABY DELIVERY    Thyroid disease      Past Surgical History:   Procedure Laterality Date    ABDOMINAL SURGERY      CAUDAL EPIDURAL STEROID INJECTION N/A 2022    Procedure: Injection-steroid-epidural-caudal;  Surgeon: Nilam Santiago MD;  Location: [facility] PAIN MGT;  Service: Pain Management;  Laterality: N/A;     SECTION      CHOLECYSTECTOMY      COLON SURGERY      COLONOSCOPY      --- repeat in 3-5 years    COLONOSCOPY N/A 2017    Procedure: COLONOSCOPY;  Surgeon: Corrina Flores MD;  Location: [facility] ENDO;  Service: Endoscopy;  Laterality: N/A;    COLONOSCOPY N/A 4/10/2018    Procedure: COLONOSCOPY golytely;  Surgeon: Corrina Flores MD;  Location: [facility] ENDO;  Service: Endoscopy;  Laterality: N/A;    DECOMPRESSION OF CERVICAL SPINE BY ANTERIOR APPROACH WITH FUSION N/A 2021    Procedure: DECOMPRESSION AND FUSION,  SPINE, CERVICAL, ANTERIOR APPROACH C3-4 C5- 6 C6-7 ACDF;  Surgeon: Ishaan Fisher MD;  Location: [facility] OR;  Service: Neurosurgery;  Laterality: N/A;    ECTOPIC PREGNANCY SURGERY      EPIDURAL STEROID INJECTION INTO LUMBAR SPINE N/A 11/4/2020    Procedure: Injection-steroid-epidural-lumbar--L5-S1 InterLaminar;  Surgeon: Nilam Santiago MD;  Location: [facility] PAIN MGT;  Service: Pain Management;  Laterality: N/A;    ESOPHAGOGASTRODUODENOSCOPY N/A 2/28/2019    Procedure: ESOPHAGOGASTRODUODENOSCOPY (EGD);  Surgeon: Corrina Flores MD;  Location: [facility] ENDO;  Service: Endoscopy;  Laterality: N/A;    FRACTURE SURGERY      left leg    FUSION OF SPINE WITH INSTRUMENTATION Left 1/11/2021    Procedure: FUSION, SPINE, WITH INSTRUMENTATION Stage 1 Left L4-5 OLIF DORA Stage 2 L4-5 Laminectomy, medial Facetectomy, foraminotomy, L4-5 MIS Instrumentation;  Surgeon: Ishaan Fisher MD;  Location: [facility] OR;  Service: Neurosurgery;  Laterality: Left;  Procedure: Stage 1 Left L4-5 OLIF DORA  Stage 2 L4-5 Laminectomy, medial Facetectomy, foraminotomy, L4-5 MIS Instrumentation  Surgery TIme: 4 Hrs    GASTRIC BYPASS      HERNIA REPAIR      INJECTION OF ANESTHETIC AGENT AROUND GENITOFEMORAL NERVE Left 7/29/2020    Procedure: BLOCK, NERVE, LEFT SHOULDER ARTICULAR;  Surgeon: Nilam Santiago MD;  Location: [facility] PAIN MGT;  Service: Pain Management;  Laterality: Left;    JOINT REPLACEMENT      KNEE ARTHROPLASTY Left 5/8/2019    Procedure: ARTHROPLASTY, KNEE;  Surgeon: Roldan Greenwood MD;  Location: [facility] OR;  Service: Orthopedics;  Laterality: Left;  [facility] notified    KNEE ARTHROPLASTY Right 11/20/2019    Procedure: ARTHROPLASTY, KNEE;  Surgeon: Roldan Greenwood MD;  Location: [facility] OR;  Service: Orthopedics;  Laterality: Right;  Navid notified, confirmed case Navid 11/19/19 [id number]    OOPHORECTOMY      RADIOFREQUENCY THERMOCOAGULATION Left 8/12/2020    Procedure: RADIOFREQUENCY THERMAL COAGULATION--Left Suprascapular, Axillary, and Lateral  Pectoral Articular Branch RFA;  Surgeon: Nilam Santiago MD;  Location: [facility] PAIN MGT;  Service: Pain Management;  Laterality: Left;    TONSILLECTOMY      TRANSFORAMINAL EPIDURAL INJECTION OF STEROID Right 8/25/2021    Procedure: Injection,steroid,epidural,transforaminal approach; Levels: L5;  Surgeon: Nilam Santiago MD;  Location: [facility] PAIN MGT;  Service: Pain Management;  Laterality: Right;  No pacemaker. Patient is diabetic. Patient is taking Aspirin but can continue per Dr. Santiago.     TUBAL LIGATION       Social History     Tobacco Use    Smoking status: Never     Passive exposure: Never    Smokeless tobacco: Never   Substance Use Topics    Alcohol use: Yes     Comment: very rare    Drug use: No     Comment: CBD oil sometimes     Family History   Problem Relation Age of Onset    Hyperlipidemia Mother     Hypertension Mother     Diabetes Mother     Stroke Mother     Hypertension Sister     Hypertension Brother     Diabetes Brother     Breast cancer Maternal Aunt     Breast cancer Maternal Aunt     Breast cancer Cousin        Allergies reviewed:  Review of patient's allergies indicates:   Allergen Reactions    Adhesive      Adhesive tape    Latex, natural rubber      Adhesive from latex tape    Ibuprofen Other (See Comments)     Causes asthma flare??       Medications reviewed:  Medication List with Changes/Refills   Current Medications    ALBUTEROL (VENTOLIN HFA) 90 MCG/ACTUATION INHALER    INHALE 2 PUFFS INTO THE LUNGS EVERY 4 HOURS AS NEEDED FOR WHEEZING    ASPIRIN (ECOTRIN) 81 MG EC TABLET    Take 1 tablet (81 mg total) by mouth once daily.    ATORVASTATIN (LIPITOR) 40 MG TABLET    TAKE 1 TABLET(40 MG) BY MOUTH EVERY DAY FOR CHOLESTEROL    BUPROPION (WELLBUTRIN XL) 300 MG 24 HR TABLET    Take 1 tablet (300 mg total) by mouth once daily.    BUSPIRONE (BUSPAR) 10 MG TABLET    TAKE 1/2 TABLET BY MOUTH ONCE DAILY    DIABETIC SUPPLIES, MISCELLAN. MISC    Lancets for 1-2 times a day testing    dispense brand  covered by insurance t    DIAZEPAM (VALIUM) 5 MG TABLET    TAKE 1 TABLET BY MOUTH EVERY 24 HOURS AS NEEDED FOR ANXIETY Strength: 5 mg    DICLOFENAC SODIUM (VOLTAREN) 1 % GEL    APPLY 2 GRAMS TOPICALLY TO THE AFFECTED AREA FOUR TIMES DAILY    ERGOCALCIFEROL (ERGOCALCIFEROL) 50,000 UNIT CAP    Take 1 capsule (50,000 Units total) by mouth every 7 days.    ESCITALOPRAM OXALATE (LEXAPRO) 20 MG TABLET    TAKE 1 TABLET(20 MG) BY MOUTH EVERY DAY    FUROSEMIDE (LASIX) 20 MG TABLET    TAKE 1 TABLET(20 MG) BY MOUTH DAILY AS NEEDED    GABAPENTIN (NEURONTIN) 100 MG CAPSULE    TAKE 1 CAPSULE(100 MG) BY MOUTH THREE TIMES DAILY    GLIPIZIDE (GLUCOTROL) 2.5 MG TR24    TAKE 1 TABLET(2.5 MG) BY MOUTH DAILY WITH BREAKFAST    IBANDRONATE (BONIVA) 150 MG TABLET    TAKE 1 TABLET BY MOUTH EVERY 30 DAYS FOR OSTEOPOROSIS    LEVOTHYROXINE (SYNTHROID) 100 MCG TABLET    TAKE 1 TABLET(100 MCG) BY MOUTH EVERY DAY    METHOCARBAMOL (ROBAXIN) 750 MG TAB    Take 1 tablet (750 mg total) by mouth 3 (three) times daily as needed (muscle spasms).    OMEPRAZOLE (PRILOSEC) 40 MG CAPSULE    Take 1 capsule (40 mg total) by mouth every morning.    OXYCODONE-ACETAMINOPHEN (PERCOCET)  MG PER TABLET    Take 1 tablet by mouth every 8 (eight) hours as needed for Pain.    POTASSIUM CHLORIDE (KLOR-CON) 10 MEQ TBSR    TAKE 1 TABLET BY MOUTH EVERY DAY WHEN YOU TAKE LASIX    PROMETHAZINE (PHENERGAN) 25 MG TABLET    TAKE 1 TABLET(25 MG) BY MOUTH EVERY 6 HOURS AS NEEDED    SUPREP BOWEL PREP KIT 17.5-3.13-1.6 GRAM SOLR    use as directed    TRUE METRIX GLUCOSE METER MISC    USE AS DIRECTED    TRUE METRIX GLUCOSE TEST STRIP STRP    TO TEST ONCE TO TWICE DAILY    TRUE METRIX GLUCOSE TEST STRIP STRP    TEST BLOOD SUGAR EVERY DAY    TRUEPLUS LANCETS 30 GAUGE MISC    USE TO TEST ONCE TO TWICE D    ZOLPIDEM (AMBIEN) 5 MG TAB    TAKE 1 TABLET(5 MG) BY MOUTH EVERY EVENING       ROS  Review of Systems:    Constitutional:  Negative for fever.   HENT:  Negative for sore  throat.    Eyes:  Negative for redness.   Respiratory:  Negative for shortness of breath.    Cardiovascular:  Negative for chest pain.   Gastrointestinal:  Negative for nausea.   Genitourinary:  Negative for dysuria.   Musculoskeletal:  + for back pain.   Skin:  Negative for rash.   Neurological:  Negative for weakness.   Hematological:  Does not bruise/bleed easily.   Psychiatric/Behavioral:  The patient is not nervous/anxious.      Physical Exam   Physical Exam    Initial Vitals [09/19/23 1431]   BP Pulse Resp Temp SpO2   (!) 87/53 80 20 98.3 °F (36.8 °C) 100 %      MAP       --           Triage vital signs reviewed.    Constitutional: Well-nourished, well-developed. Not in acute distress.  HENT: Normocephalic, atraumatic. Moist mucous membranes.  Eyes: No conjunctival injection.  Resp: Normal respiratory effort, breathing unlabored.  Cardio: Regular rate and rhythm.  GI: Abdomen non-distended.   MSK: R wrist swelling full ROM. NV intact. Pain at R and L shoulders with minimal ROM due to pain. No hip pain with palpation. Bilateral knee ttp, full ROM.   Skin: Warm and dry. No rashes or lesions noted. Loose skin BUE.   Neuro: Awake and alert. Moves all extremities.    ED Course   Procedures    Medical Decision Making    History Acquisition     The history is provided by the patient.     Review of prior external/non ED notes:  clinic notes 7/2023, stable chronic issues    Differential Diagnoses   Based on available information and initial assessment, the working Differential Diagnosis includes, but is not limited to:  Fracture, dislocation, compartment syndrome, nerve injury/palsy, vascular injury, DVT, rhabdomyolysis, hemarthrosis, septic joint, cellulitis, bursitis, muscle strain, ligament tear/sprain, laceration, foreign body, abrasion, soft tissue contusion, osteoarthritis.    EKG   EKG ordered and independently reviewed by me:       Labs   Lab tests ordered and independently reviewed by me:    Labs Reviewed -  No data to display    Imaging   Imaging ordered and independently reviewed by me:   Imaging Results              X-Ray Wrist Complete Right (Final result)  Result time 09/19/23 15:36:17      Final result by Aj Walton MD (09/19/23 15:36:17)                   Impression:      Degenerative changes as above, no acute finding.      Electronically signed by: Aj Walton  Date:    09/19/2023  Time:    15:36               Narrative:    EXAMINATION:  XR WRIST COMPLETE 3 VIEWS RIGHT    CLINICAL HISTORY:  Unspecified fall, initial encounter    TECHNIQUE:  PA, lateral, and oblique views of the right wrist were performed.    COMPARISON:  None    FINDINGS:  No acute fracture or dislocation.  Sclerosis and subchondral cyst formation of the lunate where it articulates with the distal ulna.  Scapholunate interval is maintained.  No acute fracture or dislocation.                                       X-Ray Shoulder Complete 2 View Right (Final result)  Result time 09/19/23 15:33:29      Final result by Aj Walton MD (09/19/23 15:33:29)                   Impression:      Marked glenohumeral degenerative changes.  No acute finding.      Electronically signed by: Aj Walton  Date:    09/19/2023  Time:    15:33               Narrative:    EXAMINATION:  XR SHOULDER COMPLETE 2 OR MORE VIEWS RIGHT    CLINICAL HISTORY:  Unspecified fall, initial encounter    TECHNIQUE:  Two or three views of the right shoulder were performed.    COMPARISON:  July 16, 2021.    FINDINGS:  Marked glenohumeral degenerative changes with subchondral sclerosis and cyst formation.  Subacromial space is maintained.  Acromioclavicular joint is maintained.  No acute fracture.                                       X-Ray Shoulder 2 or More Views Left (Final result)  Result time 09/19/23 15:35:23      Final result by Aj Walton MD (09/19/23 15:35:23)                   Impression:      Severe left shoulder degenerative changes.  No acute  finding.      Electronically signed by: Aj Walton  Date:    09/19/2023  Time:    15:35               Narrative:    EXAMINATION:  XR SHOULDER COMPLETE 2 OR MORE VIEWS LEFT    CLINICAL HISTORY:  fall;    TECHNIQUE:  Two or three views of the left shoulder were performed.    COMPARISON:  May 3, 2023.    FINDINGS:  Marked degenerative changes with similar abnormal positioning to comparison exam.  No displaced fracture.  No dislocation.  Significant osteophytosis                                         Additional Consideration   Christine Castro  presents to the emergency Department today with pain after falling.  Patient ambulatory.  In no acute distress, talking on the phone.  Plan for x-rays of the bilateral shoulders, right wrist.  No pain at the hips or lower extremities that is new.  Denies head strike.  No signs of head trauma.    Additional testing considered but not indicated during the course of this workup: further imaging and labwork, not indicated  Co-morbidities/chronic illness/exacerbation of chronic illness considered which impacted clinical decision making: obesity, chronic arthritis and pain  Procedures done in the ED or plan for the OR: No  Social determinants of care considered during development of treatment plan include: Decreased medical literacy  Discussion of management or test interpretation with external provider: No  DNR discussion: No    The patient's list of active medications and allergies as documented per RN staff has been reviewed.  Medications given in the ED and/or prescribed: Medications - No data to display          ED Course as of 09/19/23 1651   Tue Sep 19, 2023   1650 X-Ray Wrist Complete Right  No fx [CS]   1650 X-Ray Shoulder 2 or More Views Left  DJD, no fx [CS]   1650 X-Ray Shoulder Complete 2 View Right  No fx [CS]   1651 Patient informed of findings, and she is grateful that she has no fractures.  States that she has chronic pain and was multiple falls, no head strike.   Informed that she probably should try to use a wheelchair if she is falling so much.  Will give a wrist splint, discharged to follow-up with orthopedics. [CS]      ED Course User Index  [CS] Eloisa Carrera MD       Explanation of disposition: Discharge    Clinical Impression:     1. Contusion of right wrist, initial encounter    2. Fall    3. Osteoarthritis of multiple joints, unspecified osteoarthritis type    4. Other chronic pain         All results from the workup were reviewed with the patient/family in detail. I discussed with the patient and/or the family/caregiver that today's evaluation in the ED does not suggest any emergent or life-threatening medical conditions that would require hospitalization or immediate intervention beyond what was provided in the ED. I believe the patient is safe for discharge.  However, a reassuring evaluation in the ED does not preclude the presence or development of a serious or life-threatening condition. As such, strict return precautions were discussed with the patient expressing understanding of instructions, and all questions answered. The patient/family communicates understanding of all this information and all remaining questions and concerns were addressed at this time.    The patient/family has been provided with verbal and printed direction regarding our final diagnosis(es) as well as instructions regarding use of OTC and/or Rx medications intended to manage the patient's aforementioned conditions including:  ED Prescriptions    None       The patient's condition does not warrant review of the  and prescription of controlled substances.      ED Disposition Condition    Discharge Stable               Eloisa Carrera MD  09/19/23 [id number]

## 2023-09-20 RX ORDER — OXYCODONE AND ACETAMINOPHEN 10; 325 MG/1; MG/1
1 TABLET ORAL EVERY 8 HOURS PRN
Qty: 90 TABLET | Refills: 0 | Status: SHIPPED | OUTPATIENT
Start: 2023-09-20 | End: 2023-10-02

## 2023-09-21 NOTE — TELEPHONE ENCOUNTER
No care due was identified.  [facility] Embedded Care Due Messages. Reference number: [id number].   9/20/2023 7:40:27 PM CDT

## 2023-10-02 ENCOUNTER — HOSPITAL ENCOUNTER (OUTPATIENT)
Dept: RADIOLOGY | Facility: HOSPITAL | Age: 68
Discharge: HOME | End: 2023-10-02
Attending: NEUROLOGICAL SURGERY
Payer: MEDICARE

## 2023-10-02 ENCOUNTER — OFFICE VISIT (OUTPATIENT)
Dept: NEUROSURGERY | Facility: CLINIC | Age: 68
End: 2023-10-02
Payer: MEDICARE

## 2023-10-02 ENCOUNTER — TELEPHONE (OUTPATIENT)
Dept: NEUROSURGERY | Facility: CLINIC | Age: 68
End: 2023-10-02
Payer: MEDICARE

## 2023-10-02 VITALS
BODY MASS INDEX: 56.91 KG/M2 | WEIGHT: 289.88 LBS | HEIGHT: 60 IN | HEART RATE: 79 BPM | SYSTOLIC BLOOD PRESSURE: 112 MMHG | DIASTOLIC BLOOD PRESSURE: 78 MMHG

## 2023-10-02 DIAGNOSIS — M43.27 FUSION OF SPINE OF LUMBOSACRAL REGION: Primary | ICD-10-CM

## 2023-10-02 DIAGNOSIS — M43.27 FUSION OF SPINE OF LUMBOSACRAL REGION: ICD-10-CM

## 2023-10-02 DIAGNOSIS — M48.061 LUMBAR SPINAL STENOSIS DUE TO ADJACENT SEGMENT DISEASE AFTER FUSION PROCEDURE: ICD-10-CM

## 2023-10-02 DIAGNOSIS — M19.212 SECONDARY OSTEOARTHRITIS OF LEFT SHOULDER DUE TO ROTATOR CUFF ARTHROPATHY: Primary | ICD-10-CM

## 2023-10-02 DIAGNOSIS — M53.2X8 INSTABILITY OF BOTH SACROILIAC JOINTS: ICD-10-CM

## 2023-10-02 DIAGNOSIS — M51.36 LUMBAR ADJACENT SEGMENT DISEASE WITH SPONDYLOLISTHESIS: ICD-10-CM

## 2023-10-02 DIAGNOSIS — M43.16 LUMBAR ADJACENT SEGMENT DISEASE WITH SPONDYLOLISTHESIS: ICD-10-CM

## 2023-10-02 DIAGNOSIS — M53.2X6 LUMBAR SPINE INSTABILITY: ICD-10-CM

## 2023-10-02 DIAGNOSIS — M51.36 LUMBAR SPINAL STENOSIS DUE TO ADJACENT SEGMENT DISEASE AFTER FUSION PROCEDURE: ICD-10-CM

## 2023-10-02 PROCEDURE — 72040 X-RAY EXAM NECK SPINE 2-3 VW: CPT | Mod: 26,,, | Performed by: STUDENT IN AN ORGANIZED HEALTH CARE EDUCATION/TRAINING PROGRAM

## 2023-10-02 PROCEDURE — 1125F AMNT PAIN NOTED PAIN PRSNT: CPT | Mod: CPTII,S$GLB,, | Performed by: NEUROLOGICAL SURGERY

## 2023-10-02 PROCEDURE — 1125F PR PAIN SEVERITY QUANTIFIED, PAIN PRESENT: ICD-10-PCS | Mod: CPTII,S$GLB,, | Performed by: NEUROLOGICAL SURGERY

## 2023-10-02 PROCEDURE — 99999 PR PBB SHADOW E&M-EST. PATIENT-LVL IV: CPT | Mod: PBBFAC,,, | Performed by: NEUROLOGICAL SURGERY

## 2023-10-02 PROCEDURE — 3074F PR MOST RECENT SYSTOLIC BLOOD PRESSURE < 130 MM HG: ICD-10-PCS | Mod: CPTII,S$GLB,, | Performed by: NEUROLOGICAL SURGERY

## 2023-10-02 PROCEDURE — 1101F PR PT FALLS ASSESS DOC 0-1 FALLS W/OUT INJ PAST YR: ICD-10-PCS | Mod: CPTII,S$GLB,, | Performed by: NEUROLOGICAL SURGERY

## 2023-10-02 PROCEDURE — 1157F PR ADVANCE CARE PLAN OR EQUIV PRESENT IN MEDICAL RECORD: ICD-10-PCS | Mod: CPTII,S$GLB,, | Performed by: NEUROLOGICAL SURGERY

## 2023-10-02 PROCEDURE — 99214 PR OFFICE/OUTPT VISIT, EST, LEVL IV, 30-39 MIN: ICD-10-PCS | Mod: S$GLB,,, | Performed by: NEUROLOGICAL SURGERY

## 2023-10-02 PROCEDURE — 1101F PT FALLS ASSESS-DOCD LE1/YR: CPT | Mod: CPTII,S$GLB,, | Performed by: NEUROLOGICAL SURGERY

## 2023-10-02 PROCEDURE — 72040 X-RAY EXAM NECK SPINE 2-3 VW: CPT | Mod: TC,FY

## 2023-10-02 PROCEDURE — 3078F DIAST BP <80 MM HG: CPT | Mod: CPTII,S$GLB,, | Performed by: NEUROLOGICAL SURGERY

## 2023-10-02 PROCEDURE — 1157F ADVNC CARE PLAN IN RCRD: CPT | Mod: CPTII,S$GLB,, | Performed by: NEUROLOGICAL SURGERY

## 2023-10-02 PROCEDURE — 72100 X-RAY EXAM L-S SPINE 2/3 VWS: CPT | Mod: 26,,, | Performed by: RADIOLOGY

## 2023-10-02 PROCEDURE — 99999 PR PBB SHADOW E&M-EST. PATIENT-LVL IV: ICD-10-PCS | Mod: PBBFAC,,, | Performed by: NEUROLOGICAL SURGERY

## 2023-10-02 PROCEDURE — 3008F PR BODY MASS INDEX (BMI) DOCUMENTED: ICD-10-PCS | Mod: CPTII,S$GLB,, | Performed by: NEUROLOGICAL SURGERY

## 2023-10-02 PROCEDURE — 3288F PR FALLS RISK ASSESSMENT DOCUMENTED: ICD-10-PCS | Mod: CPTII,S$GLB,, | Performed by: NEUROLOGICAL SURGERY

## 2023-10-02 PROCEDURE — 99214 OFFICE O/P EST MOD 30 MIN: CPT | Mod: S$GLB,,, | Performed by: NEUROLOGICAL SURGERY

## 2023-10-02 PROCEDURE — 72100 XR LUMBAR SPINE AP AND LATERAL: ICD-10-PCS | Mod: 26,,, | Performed by: RADIOLOGY

## 2023-10-02 PROCEDURE — 72100 X-RAY EXAM L-S SPINE 2/3 VWS: CPT | Mod: TC,FY

## 2023-10-02 PROCEDURE — 1159F MED LIST DOCD IN RCRD: CPT | Mod: CPTII,S$GLB,, | Performed by: NEUROLOGICAL SURGERY

## 2023-10-02 PROCEDURE — 3078F PR MOST RECENT DIASTOLIC BLOOD PRESSURE < 80 MM HG: ICD-10-PCS | Mod: CPTII,S$GLB,, | Performed by: NEUROLOGICAL SURGERY

## 2023-10-02 PROCEDURE — 3288F FALL RISK ASSESSMENT DOCD: CPT | Mod: CPTII,S$GLB,, | Performed by: NEUROLOGICAL SURGERY

## 2023-10-02 PROCEDURE — 1159F PR MEDICATION LIST DOCUMENTED IN MEDICAL RECORD: ICD-10-PCS | Mod: CPTII,S$GLB,, | Performed by: NEUROLOGICAL SURGERY

## 2023-10-02 PROCEDURE — 3074F SYST BP LT 130 MM HG: CPT | Mod: CPTII,S$GLB,, | Performed by: NEUROLOGICAL SURGERY

## 2023-10-02 PROCEDURE — 72040 XR CERVICAL SPINE AP LATERAL: ICD-10-PCS | Mod: 26,,, | Performed by: STUDENT IN AN ORGANIZED HEALTH CARE EDUCATION/TRAINING PROGRAM

## 2023-10-02 PROCEDURE — 3008F BODY MASS INDEX DOCD: CPT | Mod: CPTII,S$GLB,, | Performed by: NEUROLOGICAL SURGERY

## 2023-10-02 RX ORDER — ERGOCALCIFEROL 1.25 MG/1
50000 CAPSULE ORAL
Qty: 12 CAPSULE | Refills: 3 | Status: SHIPPED | OUTPATIENT
Start: 2023-10-02

## 2023-10-02 RX ORDER — ZOLPIDEM TARTRATE 5 MG/1
5 TABLET ORAL NIGHTLY
Qty: 30 TABLET | Refills: 1 | Status: SHIPPED | OUTPATIENT
Start: 2023-10-02 | End: 2023-12-04 | Stop reason: SDUPTHER

## 2023-10-02 RX ORDER — GABAPENTIN 100 MG/1
100 CAPSULE ORAL 3 TIMES DAILY
Qty: 90 CAPSULE | Refills: 3 | Status: SHIPPED | OUTPATIENT
Start: 2023-10-02 | End: 2023-11-17 | Stop reason: SDUPTHER

## 2023-10-02 RX ORDER — IBANDRONATE SODIUM 150 MG/1
TABLET, FILM COATED ORAL
Qty: 3 TABLET | Refills: 1 | Status: SHIPPED | OUTPATIENT
Start: 2023-10-02

## 2023-10-02 RX ORDER — DIAZEPAM 5 MG/1
TABLET ORAL
Qty: 30 TABLET | Refills: 2 | Status: SHIPPED | OUTPATIENT
Start: 2023-10-02 | End: 2023-12-04 | Stop reason: SDUPTHER

## 2023-10-02 RX ORDER — ALBUTEROL SULFATE 90 UG/1
AEROSOL, METERED RESPIRATORY (INHALATION)
Qty: 18 G | Refills: 3 | Status: SHIPPED | OUTPATIENT
Start: 2023-10-02

## 2023-10-02 RX ORDER — OXYCODONE AND ACETAMINOPHEN 10; 325 MG/1; MG/1
1 TABLET ORAL EVERY 6 HOURS PRN
Qty: 120 TABLET | Refills: 0 | Status: SHIPPED | OUTPATIENT
Start: 2023-10-02 | End: 2023-10-25 | Stop reason: SDUPTHER

## 2023-10-02 RX ORDER — BLOOD-GLUCOSE METER
EACH MISCELLANEOUS
Qty: 1 EACH | Refills: 0 | Status: SHIPPED | OUTPATIENT
Start: 2023-10-02

## 2023-10-02 RX ORDER — FUROSEMIDE 20 MG/1
TABLET ORAL
Qty: 30 TABLET | Refills: 3 | Status: SHIPPED | OUTPATIENT
Start: 2023-10-02

## 2023-10-02 RX ORDER — BUPROPION HYDROCHLORIDE 300 MG/1
300 TABLET ORAL
Qty: 90 TABLET | Refills: 3 | Status: SHIPPED | OUTPATIENT
Start: 2023-10-02 | End: 2023-12-04 | Stop reason: SDUPTHER

## 2023-10-02 NOTE — PROGRESS NOTES
NEUROSURGICAL PROGRESS NOTE    DATE OF SERVICE:  10/02/2023    ATTENDING PHYSICIAN:  Ishaan Fisher MD    SUBJECTIVE:  01/27/23  This is a very pleasant 67 y.o. female, 1 year and 1 month status post C3-C7 anterior instrumented fusion for cervical spondylosis with myelopathy.  She does not complain of significant neck pain at this time.  She is dropping things at occasion.  She is mainly complaining of the lumbosacral pain.  She is unable to stand for more than a few minutes.  She tends to lean forward and falls due to the severe pain.  She takes Percocet 10 mg 3 times daily.  Today in clinic she appears quite drowsy.  No new onset of motor weakness or numbness.    INTERIM HISTORY:    She is unable to stand upright.  When she walks she leans forward.  She has severe low back pain radiating down her right leg more than left leg.  No new onset of motor weakness or sphincter dysfunction symptoms.  She is also complaining of severe shoulder pain.  Pain can reach 10 out of 10.  She is taking Percocet 10 mg 3 times daily.              PAST MEDICAL HISTORY:  Active Ambulatory Problems     Diagnosis Date Noted    Acquired hypothyroidism     History of congestive heart failure     Major depressive disorder 12/04/2015    Normocytic anemia 04/10/2018    History of myocardial infarction 08/30/2018    Epigastric pain 02/28/2019    BMI 39.0-39.9,adult 04/02/2019    Primary osteoarthritis of left knee 05/08/2019    TIA (transient ischemic attack) 05/19/2019    History of gastric bypass 05/19/2019    Coronary artery disease involving native coronary artery of native heart     UTI (urinary tract infection) 05/19/2019    History of right knee joint replacement 11/20/2019    Chronic left shoulder pain 07/29/2020    Primary osteoarthritis of left shoulder 07/29/2020    Chronic pain 11/04/2020    Lumbar radiculopathy 11/04/2020    DDD (degenerative disc disease), lumbar 01/11/2021    Pain 08/25/2021    Cervical spondylosis with  myelopathy 2021    Frequent falls 06/15/2022    Weakness of both lower extremities 2023    At high risk for falls 2023    Malignant neoplasm of colon, unspecified part of colon 2023    Morbid (severe) obesity due to excess calories 2023    Rheumatoid arthritis without rheumatoid factor, multiple sites 2023    Heart failure, unspecified HF chronicity, unspecified heart failure type 2023    Aortic atherosclerosis 2023     Resolved Ambulatory Problems     Diagnosis Date Noted    Primary osteoarthritis     Diabetes mellitus, type 2     History of colon cancer 2017    Malfunction of device 2017    S/P TKR (total knee replacement), left 2019    Weakness of left lower extremity 2019    Gait, antalgic 2019    Decreased range of motion (ROM) of left knee 2019    Primary osteoarthritis of right knee 2019    Aspiration pneumonia of right lower lobe 2021     Past Medical History:   Diagnosis Date    Anticoagulant long-term use     Arthritis     Asthma     CHF (congestive heart failure)     Colon cancer     COPD (chronic obstructive pulmonary disease)     Coronary artery disease     Depression     GERD (gastroesophageal reflux disease)     Myocardial infarction     Thyroid disease        PAST SURGICAL HISTORY:  Past Surgical History:   Procedure Laterality Date    ABDOMINAL SURGERY      CAUDAL EPIDURAL STEROID INJECTION N/A 2022    Procedure: Injection-steroid-epidural-caudal;  Surgeon: Nilam Santiago MD;  Location: [facility] PAIN MGT;  Service: Pain Management;  Laterality: N/A;     SECTION      CHOLECYSTECTOMY      COLON SURGERY      COLONOSCOPY      --- repeat in 3-5 years    COLONOSCOPY N/A 2017    Procedure: COLONOSCOPY;  Surgeon: Corrina Flores MD;  Location: [facility] ENDO;  Service: Endoscopy;  Laterality: N/A;    COLONOSCOPY N/A 4/10/2018    Procedure: COLONOSCOPY golytely;  Surgeon: Corrina Flores MD;   Location: [facility] ENDO;  Service: Endoscopy;  Laterality: N/A;    DECOMPRESSION OF CERVICAL SPINE BY ANTERIOR APPROACH WITH FUSION N/A 12/14/2021    Procedure: DECOMPRESSION AND FUSION, SPINE, CERVICAL, ANTERIOR APPROACH C3-4 C5- 6 C6-7 ACDF;  Surgeon: Ishaan Fisher MD;  Location: [facility] OR;  Service: Neurosurgery;  Laterality: N/A;    ECTOPIC PREGNANCY SURGERY      EPIDURAL STEROID INJECTION INTO LUMBAR SPINE N/A 11/4/2020    Procedure: Injection-steroid-epidural-lumbar--L5-S1 InterLaminar;  Surgeon: Nilam Santiago MD;  Location: [facility] PAIN MGT;  Service: Pain Management;  Laterality: N/A;    ESOPHAGOGASTRODUODENOSCOPY N/A 2/28/2019    Procedure: ESOPHAGOGASTRODUODENOSCOPY (EGD);  Surgeon: Corrina Flores MD;  Location: [facility] ENDO;  Service: Endoscopy;  Laterality: N/A;    FRACTURE SURGERY      left leg    FUSION OF SPINE WITH INSTRUMENTATION Left 1/11/2021    Procedure: FUSION, SPINE, WITH INSTRUMENTATION Stage 1 Left L4-5 OLIF DORA Stage 2 L4-5 Laminectomy, medial Facetectomy, foraminotomy, L4-5 MIS Instrumentation;  Surgeon: Ishaan Fisher MD;  Location: [facility] OR;  Service: Neurosurgery;  Laterality: Left;  Procedure: Stage 1 Left L4-5 OLIF DORA  Stage 2 L4-5 Laminectomy, medial Facetectomy, foraminotomy, L4-5 MIS Instrumentation  Surgery TIme: 4 Hrs    GASTRIC BYPASS      HERNIA REPAIR      INJECTION OF ANESTHETIC AGENT AROUND GENITOFEMORAL NERVE Left 7/29/2020    Procedure: BLOCK, NERVE, LEFT SHOULDER ARTICULAR;  Surgeon: Nilam Santiago MD;  Location: [facility] PAIN T;  Service: Pain Management;  Laterality: Left;    JOINT REPLACEMENT      KNEE ARTHROPLASTY Left 5/8/2019    Procedure: ARTHROPLASTY, KNEE;  Surgeon: Roldan Greenwood MD;  Location: [facility] OR;  Service: Orthopedics;  Laterality: Left;  Navid notified    KNEE ARTHROPLASTY Right 11/20/2019    Procedure: ARTHROPLASTY, KNEE;  Surgeon: Roldan Greenwood MD;  Location: [facility] OR;  Service: Orthopedics;  Laterality: Right;  Navid notified, confirmed case Navid  11/19/19 [id number]    OOPHORECTOMY      RADIOFREQUENCY THERMOCOAGULATION Left 8/12/2020    Procedure: RADIOFREQUENCY THERMAL COAGULATION--Left Suprascapular, Axillary, and Lateral Pectoral Articular Branch RFA;  Surgeon: Nilam Santiago MD;  [facility] [place] [facility];  Service: Pain Management;  Laterality: Left;    TONSILLECTOMY      TRANSFORAMINAL EPIDURAL INJECTION OF STEROID Right 8/25/2021    Procedure: Injection,steroid,epidural,transforaminal approach; Levels: L5;  Surgeon: Nilam Santiago MD;  [facility] [place] [facility];  Service: Pain Management;  Laterality: Right;  No pacemaker. Patient is diabetic. Patient is taking Aspirin but can continue per Dr. Santiago.     TUBAL LIGATION         SOCIAL HISTORY:   Social History     Socioeconomic History    Marital status:    Tobacco Use    Smoking status: Never     Passive exposure: Never    Smokeless tobacco: Never   Substance and Sexual Activity    Alcohol use: Yes     Comment: very rare    Drug use: No     Comment: CBD oil sometimes    Sexual activity: Never     Social Determinants of Health     Financial Resource Strain: Medium Risk (5/4/2023)    Overall Financial Resource Strain (CARDIA)     Difficulty of Paying Living Expenses: Somewhat hard   Food Insecurity: No Food Insecurity (5/4/2023)    Hunger Vital Sign     Worried About Running Out of Food in the Last Year: Never true     Ran Out of Food in the Last Year: Never true   Transportation Needs: No Transportation Needs (5/4/2023)    PRAPARE - Transportation     Lack of Transportation (Medical): No     Lack of Transportation (Non-Medical): No   Physical Activity: Inactive (5/4/2023)    Exercise Vital Sign     Days of Exercise per Week: 0 days     Minutes of Exercise per Session: 0 min   Stress: No Stress Concern Present (5/4/2023)    [employer] [place] [employer] - Occupational Stress Questionnaire     Feeling of Stress : Not at all   Social Connections: Socially Isolated (5/4/2023)    Social  Connection and Isolation Panel [NHANES]     Frequency of Communication with Friends and Family: Once a week     Frequency of Social Gatherings with Friends and Family: Never     Attends Scientologist Services: Never     Active Member of Clubs or Organizations: No     Attends Club or Organization Meetings: Never     Marital Status:    Housing Stability: Low Risk  (5/4/2023)    Housing Stability Vital Sign     Unable to Pay for Housing in the Last Year: No     Number of Places Lived in the Last Year: 1     Unstable Housing in the Last Year: No       FAMILY HISTORY:  Family History   Problem Relation Age of Onset    Hyperlipidemia Mother     Hypertension Mother     Diabetes Mother     Stroke Mother     Hypertension Sister     Hypertension Brother     Diabetes Brother     Breast cancer Maternal Aunt     Breast cancer Maternal Aunt     Breast cancer Cousin        CURRENTS MEDICATIONS:  Current Outpatient Medications on File Prior to Visit   Medication Sig Dispense Refill    albuterol (VENTOLIN HFA) 90 mcg/actuation inhaler INHALE 2 PUFFS INTO THE LUNGS EVERY 4 HOURS AS NEEDED FOR WHEEZING 18 g 3    aspirin (ECOTRIN) 81 MG EC tablet Take 1 tablet (81 mg total) by mouth once daily.  0    atorvastatin (LIPITOR) 40 MG tablet TAKE 1 TABLET(40 MG) BY MOUTH EVERY DAY FOR CHOLESTEROL 90 tablet 3    buPROPion (WELLBUTRIN XL) 300 MG 24 hr tablet Take 1 tablet (300 mg total) by mouth once daily. 90 tablet 3    busPIRone (BUSPAR) 10 MG tablet TAKE 1/2 TABLET BY MOUTH ONCE DAILY (Patient taking differently: 10 mg. Pt is taking 10 mg once daily) 60 tablet 3    diabetic supplies, miscellan. Misc Lancets for 1-2 times a day testing    dispense brand covered by insurance t (Patient taking differently: Lancets for 1-2 times a day testing    dispense brand covered by insurance t) 60 each 3    diazePAM (VALIUM) 5 MG tablet TAKE 1 TABLET BY MOUTH EVERY 24 HOURS AS NEEDED FOR ANXIETY Strength: 5 mg 30 tablet 3    diclofenac sodium  (VOLTAREN) 1 % Gel APPLY 2 GRAMS TOPICALLY TO THE AFFECTED AREA FOUR TIMES DAILY 300 g 5    ergocalciferol (ERGOCALCIFEROL) 50,000 unit Cap Take 1 capsule (50,000 Units total) by mouth every 7 days. 12 capsule 3    EScitalopram oxalate (LEXAPRO) 20 MG tablet TAKE 1 TABLET(20 MG) BY MOUTH EVERY DAY 90 tablet 3    furosemide (LASIX) 20 MG tablet TAKE 1 TABLET(20 MG) BY MOUTH DAILY AS NEEDED 90 tablet 2    gabapentin (NEURONTIN) 100 MG capsule TAKE 1 CAPSULE(100 MG) BY MOUTH THREE TIMES DAILY 270 capsule 3    glipiZIDE (GLUCOTROL) 2.5 MG TR24 TAKE 1 TABLET(2.5 MG) BY MOUTH DAILY WITH BREAKFAST 90 tablet 3    ibandronate (BONIVA) 150 mg tablet TAKE 1 TABLET BY MOUTH EVERY 30 DAYS FOR OSTEOPOROSIS 3 tablet 1    levothyroxine (SYNTHROID) 100 MCG tablet TAKE 1 TABLET(100 MCG) BY MOUTH EVERY DAY 90 tablet 0    methocarbamoL (ROBAXIN) 750 MG Tab Take 1 tablet (750 mg total) by mouth 3 (three) times daily as needed (muscle spasms). 60 tablet 0    omeprazole (PRILOSEC) 40 MG capsule Take 1 capsule (40 mg total) by mouth every morning. 90 capsule 2    oxyCODONE-acetaminophen (PERCOCET)  mg per tablet Take 1 tablet by mouth every 8 (eight) hours as needed for Pain. 90 tablet 0    potassium chloride (KLOR-CON) 10 MEQ TbSR TAKE 1 TABLET BY MOUTH EVERY DAY WHEN YOU TAKE LASIX 90 tablet 0    promethazine (PHENERGAN) 25 MG tablet TAKE 1 TABLET(25 MG) BY MOUTH EVERY 6 HOURS AS NEEDED 25 tablet 0    TRUE METRIX GLUCOSE METER Misc USE AS DIRECTED 1 each 0    TRUE METRIX GLUCOSE TEST STRIP Strp TO TEST ONCE TO TWICE DAILY 50 strip 11    TRUE METRIX GLUCOSE TEST STRIP Strp TEST BLOOD SUGAR EVERY  strip 3    TRUEPLUS LANCETS 30 gauge Misc USE TO TEST ONCE TO TWICE D      zolpidem (AMBIEN) 5 MG Tab TAKE 1 TABLET(5 MG) BY MOUTH EVERY EVENING 30 tablet 0    SUPREP BOWEL PREP KIT 17.5-3.13-1.6 gram SolR use as directed       No current facility-administered medications on file prior to visit.       ALLERGIES:  Review of patient's  allergies indicates:   Allergen Reactions    Adhesive      Adhesive tape    Latex, natural rubber      Adhesive from latex tape    Ibuprofen Other (See Comments)     Causes asthma flare??       REVIEW OF SYSTEMS:  Review of Systems   Constitutional:  Negative for diaphoresis, fever and weight loss.   Respiratory:  Negative for shortness of breath.    Cardiovascular:  Negative for chest pain.   Gastrointestinal:  Negative for blood in stool.   Genitourinary:  Negative for hematuria.   Endo/Heme/Allergies:  Does not bruise/bleed easily.   All other systems reviewed and are negative.        OBJECTIVE:    PHYSICAL EXAMINATION:   Vitals:    10/02/23 1433   BP: 112/78   Pulse: 79       Physical Exam:  Vitals reviewed.    Constitutional: She appears well-developed and well-nourished.     Eyes: Pupils are equal, round, and reactive to light. Conjunctivae and EOM are normal.     Cardiovascular: Normal distal pulses and no edema.     Abdominal: Soft.     Skin: Skin displays no rash on trunk and no rash on extremities. Skin displays no lesions on trunk and no lesions on extremities.     Psych/Behavior: She is alert. She is oriented to person, place, and time. She has a normal mood and affect.     Musculoskeletal:        Neck: Range of motion is limited.     Neurological:        DTRs: Tricep reflexes are 2+ on the right side and 2+ on the left side. Bicep reflexes are 2+ on the right side and 2+ on the left side. Brachioradialis reflexes are 2+ on the right side and 2+ on the left side. Patellar reflexes are 2+ on the right side and 2+ on the left side. Achilles reflexes are 2+ on the right side and 2+ on the left side.       Back Exam     Muscle Strength   Right Quadriceps:  5/5   Left Quadriceps:  5/5   Right Hamstrings:  5/5   Left Hamstrings:  5/5     Comments:  When standing she is unable to stand with her shoulder against the wall.  Hip flexion and knee flexion as she tries to stand upright.            SI joint:    Palpation at the right and left SI joints not painful  ANNA test is negative bilaterally  Gaenslen test is negative bilaterally  Thigh thrust test is negative bilaterally    Neurologic Exam     Mental Status   Oriented to person, place, and time.   Speech: speech is normal   Level of consciousness: alert    Cranial Nerves   Cranial nerves II through XII intact.     CN III, IV, VI   Pupils are equal, round, and reactive to light.  Extraocular motions are normal.     Motor Exam   Muscle bulk: normal  Overall muscle tone: normal    Strength   Right deltoid: 5/5  Left deltoid: 5/5  Right biceps: 5/5  Left biceps: 5/5  Right triceps: 5/5  Left triceps: 5/5  Right wrist flexion: 5/5  Left wrist flexion: 5/5  Right wrist extension: 5/5  Left wrist extension: 5/5  Right interossei: 5/5  Left interossei: 5/5  Right iliopsoas: 5/5  Left iliopsoas: 5/5  Right quadriceps: 5/5  Left quadriceps: 5/5  Right hamstrin/5  Left hamstrin/5  Right anterior tibial: 5/5  Left anterior tibial: 5/5  Right posterior tibial: 5/5  Left posterior tibial: 5/5  Right peroneal: 5/5  Left peroneal: 5/5  Right gastroc: 5/5  Left gastroc: 5/5    Sensory Exam   Light touch normal.   Pinprick normal.     Gait, Coordination, and Reflexes     Gait  Gait: normal    Coordination   Finger to nose coordination: normal  Tandem walking coordination: normal    Reflexes   Right brachioradialis: 2+  Left brachioradialis: 2+  Right biceps: 2+  Left biceps: 2+  Right triceps: 2+  Left triceps: 2+  Right patellar: 2+  Left patellar: 2+  Right achilles: 2+  Left achilles: 2+  Right plantar: normal  Left plantar: normal  Right Munoz: absent  Left Munoz: absent  Right ankle clonus: absent  Left ankle clonus: absent        DIAGNOSTIC DATA:  I personally interpreted the following imaging:   Lumbar spine MRI 2023 shows L4-5 fusion, L5-S1 degenerative spondylolisthesis with foraminal and lateral recess stenosis, L3-4 moderate stenosis  Lumbar x-ray  today shows grade 1-2 L5-S1 spondylolisthesis  Preoperative scoliosis film was showing a pelvic incidence of 71°    ASSESMENT:  This is a 68 y.o. female with     Problem List Items Addressed This Visit    None  Visit Diagnoses       Secondary osteoarthritis of left shoulder due to rotator cuff arthropathy    -  Primary    Relevant Orders    Ambulatory referral/consult to Orthopedics    Lumbar adjacent segment disease with spondylolisthesis        Lumbar spinal stenosis due to adjacent segment disease after fusion procedure        Lumbar spine instability        Instability of both sacroiliac joints                  PLAN:  Complete workup with scoliosis film    I explained the natural history of the disease and all treatment options. I recommended a removal of L4-5 instrumentation, L3-S1 posterior segmental instrumentation using Globus Creo system, L3-4 and L5-S1 transforaminal interbody fusion with placement of hyperlordotic spacer Globus Altera cages, L3-S1 posterolateral arthrodesis using autograft harvested from the same incision allograft DBM, sacral pelvic fixation, open SI joint fusion [employer] system.     We have discussed the risks of surgery including death, coma, bleeding, infection, failure of surgery, CSF leak, nerve root injury, spinal cord injury, ureter injury, weakness, paralysis, peripheral neuropathy, malplaced hardware, migration of hardware, non-union, need for reoperation. Patient understands the risks and would like to proceed with surgery.    The patient has increase perioperative risks because of these comorbidities:  Morbid obesity BMI over 40, essential hypertension.         Ishaan Fisher MD  Cell:[phone]

## 2023-10-05 ENCOUNTER — PATIENT OUTREACH (OUTPATIENT)
Dept: ADMINISTRATIVE | Facility: HOSPITAL | Age: 68
End: 2023-10-05
Payer: MEDICARE

## 2023-10-05 NOTE — LETTER
AUTHORIZATION FOR RELEASE OF   CONFIDENTIAL INFORMATION    Dr Reeder,    We are seeing Christine Castro, date of birth 1955, in the clinic at [facility]. Alon Santiago MD is the patient's PCP. Christine Castro has an outstanding lab/procedure at the time we reviewed her chart. In order to help keep her health information updated, she has authorized us to request the following medical record(s):                            ( X )  COLONOSCOPY             Please fax records to [facility], [place], Andrew J., [place], [phone]    If you have any questions, please contact Nguyen at [phone].             Patient Name: Christine Castro  : 1955  Patient Phone #: [phone]

## 2023-10-06 ENCOUNTER — PATIENT OUTREACH (OUTPATIENT)
Dept: ADMINISTRATIVE | Facility: HOSPITAL | Age: 68
End: 2023-10-06
Payer: MEDICARE

## 2023-10-09 ENCOUNTER — TELEPHONE (OUTPATIENT)
Dept: FAMILY MEDICINE | Facility: CLINIC | Age: 68
End: 2023-10-09
Payer: MEDICARE

## 2023-10-10 ENCOUNTER — TELEPHONE (OUTPATIENT)
Dept: ORTHOPEDICS | Facility: CLINIC | Age: 68
End: 2023-10-10
Payer: MEDICARE

## 2023-10-11 NOTE — TELEPHONE ENCOUNTER
Care Due:                  Date            Visit Type   Department     Provider  --------------------------------------------------------------------------------                                EP -                              PRIMARY      [facility] FAM[facility]  Last Visit: 07-      CARE ([facility])   MEDICINE       Alon Santiago                              EP -                              PRIMARY      [facility] FAMILY  Next Visit: 10-      CARE ([facility])   [facility]       Alon Santiago                                                            Last  Test          Frequency    Reason                     Performed    Due Date  --------------------------------------------------------------------------------    Mg Level....  12 months..  ibandronate..............  Not Found    Overdue    Phosphate...  12 months..  ibandronate..............  Not Found    Overdue    Vitamin D...  12 months..  ibandronate..............  Not Found    Overdue    [employer] Embedded Care Due Messages. Reference number: [id number].   10/11/2023 6:11:36 PM CDT

## 2023-10-12 ENCOUNTER — TELEPHONE (OUTPATIENT)
Dept: ORTHOPEDICS | Facility: CLINIC | Age: 68
End: 2023-10-12
Payer: MEDICARE

## 2023-10-12 RX ORDER — DIAZEPAM 5 MG/1
TABLET ORAL
Qty: 30 TABLET | OUTPATIENT
Start: 2023-10-12

## 2023-10-12 RX ORDER — FUROSEMIDE 20 MG/1
TABLET ORAL
Qty: 90 TABLET | OUTPATIENT
Start: 2023-10-12

## 2023-10-12 RX ORDER — ZOLPIDEM TARTRATE 5 MG/1
5 TABLET ORAL NIGHTLY
Qty: 30 TABLET | OUTPATIENT
Start: 2023-10-12

## 2023-10-12 NOTE — TELEPHONE ENCOUNTER
----- Message from Gabriela Blackwood sent at 10/12/2023  4:31 PM CDT -----  Type:  Needs Medical Advice    Who Called: pt  Would the patient rather a call back or a response via [facility]? call  Best Call Back Number: [phone]  Additional Information: pt called to check appointment and didn't realize she missed it 10/11 would really like to be seen soon states she needs this appt badly she is old and in pain would like a call from office

## 2023-10-13 ENCOUNTER — TELEPHONE (OUTPATIENT)
Dept: NEUROSURGERY | Facility: CLINIC | Age: 68
End: 2023-10-13
Payer: MEDICARE

## 2023-10-13 DIAGNOSIS — M48.061 SPINAL STENOSIS OF LUMBAR REGION, UNSPECIFIED WHETHER NEUROGENIC CLAUDICATION PRESENT: ICD-10-CM

## 2023-10-13 DIAGNOSIS — M43.16 SPONDYLOLISTHESIS OF LUMBAR REGION: Primary | ICD-10-CM

## 2023-10-16 DIAGNOSIS — K21.9 GASTROESOPHAGEAL REFLUX DISEASE WITHOUT ESOPHAGITIS: ICD-10-CM

## 2023-10-17 RX ORDER — OMEPRAZOLE 40 MG/1
40 CAPSULE, DELAYED RELEASE ORAL EVERY MORNING
Qty: 90 CAPSULE | Refills: 2 | Status: SHIPPED | OUTPATIENT
Start: 2023-10-17 | End: 2023-10-27 | Stop reason: SDUPTHER

## 2023-10-17 NOTE — TELEPHONE ENCOUNTER
No care due was identified.  [facility] Embedded Care Due Messages. Reference number: [id number].   10/16/2023 8:44:41 PM CDT

## 2023-10-24 ENCOUNTER — TELEPHONE (OUTPATIENT)
Dept: FAMILY MEDICINE | Facility: CLINIC | Age: 68
End: 2023-10-24

## 2023-10-24 NOTE — TELEPHONE ENCOUNTER
----- Message from Kaye Flood sent at 10/24/2023 11:55 AM CDT -----  Needs advice from nurse:      Who Called:pt  Regarding:pt fell and injured hip/missed today would like to come in sooner than 10/31  Would the patient rather a call back or VIA [facility]?  Best Call Back number: [phone]  Additional Info:    Ok to add on Friday?

## 2023-10-25 ENCOUNTER — TELEPHONE (OUTPATIENT)
Dept: FAMILY MEDICINE | Facility: CLINIC | Age: 68
End: 2023-10-25
Payer: MEDICARE

## 2023-10-25 DIAGNOSIS — M43.27 FUSION OF SPINE OF LUMBOSACRAL REGION: ICD-10-CM

## 2023-10-25 NOTE — TELEPHONE ENCOUNTER
"Spoke to pt and offered her a appt for this Friday. She declined. Pt stated " all I want is to come in and get a shot in the hip. My hip hurts so bad." I asked if she is hurting that bad to go to ER or UC pt declined. Please advise.  "

## 2023-10-25 NOTE — TELEPHONE ENCOUNTER
----- Message from Khris Li sent at 10/25/2023  3:14 PM CDT -----  Contact: Pt   .Type:  Sooner Apoointment Request    Caller is requesting a sooner appointment.  Caller declined first available appointment listed below.  Caller will not accept being placed on the waitlist and is requesting a message be sent to doctor.  Name of Caller:pt  When is the first available appointment?10/31/2023  Symptoms: leg pain  Would the patient rather a call back or a response via [facility]?  Call  back  Best Call Back Number:[phone]  Additional Information:  Pt. states she is pain

## 2023-10-26 NOTE — TELEPHONE ENCOUNTER
Returned pt's call, pt Is calling to request when will her prescription be ready. I stated to pt that we sent her refill request to  and we are waiting for him to sign it. Pt voiced understanding

## 2023-10-27 DIAGNOSIS — K21.9 GASTROESOPHAGEAL REFLUX DISEASE WITHOUT ESOPHAGITIS: ICD-10-CM

## 2023-10-27 RX ORDER — OMEPRAZOLE 40 MG/1
40 CAPSULE, DELAYED RELEASE ORAL EVERY MORNING
Qty: 90 CAPSULE | Refills: 2 | Status: SHIPPED | OUTPATIENT
Start: 2023-10-27

## 2023-10-27 RX ORDER — OXYCODONE AND ACETAMINOPHEN 10; 325 MG/1; MG/1
1 TABLET ORAL EVERY 6 HOURS PRN
Qty: 120 TABLET | Refills: 0 | Status: SHIPPED | OUTPATIENT
Start: 2023-10-27 | End: 2023-11-08 | Stop reason: SDUPTHER

## 2023-10-27 NOTE — TELEPHONE ENCOUNTER
No care due was identified.  [facility] Embedded Care Due Messages. Reference number: [id number].   10/27/2023 10:57:29 AM CDT

## 2023-10-27 NOTE — TELEPHONE ENCOUNTER
----- Message from Bear Zavaleta sent at 10/27/2023 10:42 AM CDT -----  Type:  RX Refill Request    Who Called: pt  Refill or New Rx: Refill  RX Name and Strength:omeprazole (PRILOSEC) 40 MG capsule  Preferred Pharmacy with phone number:[id number] [address] [facility]  Local or Mail Order:Local  Ordering Provider:[facility]  Would the patient rather a call back or a response via [facility]? Call  Best Call Back Number:[phone]  Additional Information: please be advised pharmacy stated they do not have it

## 2023-10-31 ENCOUNTER — OFFICE VISIT (OUTPATIENT)
Dept: FAMILY MEDICINE | Facility: CLINIC | Age: 68
End: 2023-10-31
Payer: MEDICARE

## 2023-10-31 VITALS
HEART RATE: 87 BPM | TEMPERATURE: 98 F | HEIGHT: 60 IN | SYSTOLIC BLOOD PRESSURE: 104 MMHG | BODY MASS INDEX: 40.72 KG/M2 | DIASTOLIC BLOOD PRESSURE: 70 MMHG | OXYGEN SATURATION: 98 % | WEIGHT: 207.44 LBS

## 2023-10-31 DIAGNOSIS — M19.012 ARTHRITIS OF LEFT SHOULDER REGION: ICD-10-CM

## 2023-10-31 DIAGNOSIS — Z98.84 HISTORY OF GASTRIC BYPASS: Chronic | ICD-10-CM

## 2023-10-31 DIAGNOSIS — I25.10 CORONARY ARTERY DISEASE INVOLVING NATIVE CORONARY ARTERY OF NATIVE HEART WITHOUT ANGINA PECTORIS: Chronic | ICD-10-CM

## 2023-10-31 DIAGNOSIS — G45.9 TIA (TRANSIENT ISCHEMIC ATTACK): ICD-10-CM

## 2023-10-31 DIAGNOSIS — M54.16 LUMBAR RADICULOPATHY: ICD-10-CM

## 2023-10-31 DIAGNOSIS — Z91.81 AT HIGH RISK FOR FALLS: ICD-10-CM

## 2023-10-31 DIAGNOSIS — E11.9 TYPE 2 DIABETES MELLITUS WITHOUT COMPLICATION, WITHOUT LONG-TERM CURRENT USE OF INSULIN: Primary | ICD-10-CM

## 2023-10-31 DIAGNOSIS — M19.012 PRIMARY OSTEOARTHRITIS OF LEFT SHOULDER: ICD-10-CM

## 2023-10-31 DIAGNOSIS — I25.2 HISTORY OF MYOCARDIAL INFARCTION: ICD-10-CM

## 2023-10-31 DIAGNOSIS — M70.61 TROCHANTERIC BURSITIS OF RIGHT HIP: ICD-10-CM

## 2023-10-31 PROCEDURE — 20610 DRAIN/INJ JOINT/BURSA W/O US: CPT | Mod: 50,S$GLB,, | Performed by: FAMILY MEDICINE

## 2023-10-31 PROCEDURE — 3288F PR FALLS RISK ASSESSMENT DOCUMENTED: ICD-10-PCS | Mod: CPTII,S$GLB,, | Performed by: FAMILY MEDICINE

## 2023-10-31 PROCEDURE — 1100F PR PT FALLS ASSESS DOC 2+ FALLS/FALL W/INJURY/YR: ICD-10-PCS | Mod: CPTII,S$GLB,, | Performed by: FAMILY MEDICINE

## 2023-10-31 PROCEDURE — 3074F SYST BP LT 130 MM HG: CPT | Mod: CPTII,S$GLB,, | Performed by: FAMILY MEDICINE

## 2023-10-31 PROCEDURE — 3078F DIAST BP <80 MM HG: CPT | Mod: CPTII,S$GLB,, | Performed by: FAMILY MEDICINE

## 2023-10-31 PROCEDURE — 1157F ADVNC CARE PLAN IN RCRD: CPT | Mod: CPTII,S$GLB,, | Performed by: FAMILY MEDICINE

## 2023-10-31 PROCEDURE — 1159F PR MEDICATION LIST DOCUMENTED IN MEDICAL RECORD: ICD-10-PCS | Mod: CPTII,S$GLB,, | Performed by: FAMILY MEDICINE

## 2023-10-31 PROCEDURE — 99213 PR OFFICE/OUTPT VISIT, EST, LEVL III, 20-29 MIN: ICD-10-PCS | Mod: 25,S$GLB,, | Performed by: FAMILY MEDICINE

## 2023-10-31 PROCEDURE — 3008F BODY MASS INDEX DOCD: CPT | Mod: CPTII,S$GLB,, | Performed by: FAMILY MEDICINE

## 2023-10-31 PROCEDURE — 1100F PTFALLS ASSESS-DOCD GE2>/YR: CPT | Mod: CPTII,S$GLB,, | Performed by: FAMILY MEDICINE

## 2023-10-31 PROCEDURE — 3074F PR MOST RECENT SYSTOLIC BLOOD PRESSURE < 130 MM HG: ICD-10-PCS | Mod: CPTII,S$GLB,, | Performed by: FAMILY MEDICINE

## 2023-10-31 PROCEDURE — 1125F PR PAIN SEVERITY QUANTIFIED, PAIN PRESENT: ICD-10-PCS | Mod: CPTII,S$GLB,, | Performed by: FAMILY MEDICINE

## 2023-10-31 PROCEDURE — 3288F FALL RISK ASSESSMENT DOCD: CPT | Mod: CPTII,S$GLB,, | Performed by: FAMILY MEDICINE

## 2023-10-31 PROCEDURE — 99213 OFFICE O/P EST LOW 20 MIN: CPT | Mod: 25,S$GLB,, | Performed by: FAMILY MEDICINE

## 2023-10-31 PROCEDURE — 1159F MED LIST DOCD IN RCRD: CPT | Mod: CPTII,S$GLB,, | Performed by: FAMILY MEDICINE

## 2023-10-31 PROCEDURE — 3078F PR MOST RECENT DIASTOLIC BLOOD PRESSURE < 80 MM HG: ICD-10-PCS | Mod: CPTII,S$GLB,, | Performed by: FAMILY MEDICINE

## 2023-10-31 PROCEDURE — 3008F PR BODY MASS INDEX (BMI) DOCUMENTED: ICD-10-PCS | Mod: CPTII,S$GLB,, | Performed by: FAMILY MEDICINE

## 2023-10-31 PROCEDURE — 1157F PR ADVANCE CARE PLAN OR EQUIV PRESENT IN MEDICAL RECORD: ICD-10-PCS | Mod: CPTII,S$GLB,, | Performed by: FAMILY MEDICINE

## 2023-10-31 PROCEDURE — 20610 LARGE JOINT ASPIRATION/INJECTION: L SUBACROMIAL BURSA: ICD-10-PCS | Mod: 50,S$GLB,, | Performed by: FAMILY MEDICINE

## 2023-10-31 PROCEDURE — 1160F RVW MEDS BY RX/DR IN RCRD: CPT | Mod: CPTII,S$GLB,, | Performed by: FAMILY MEDICINE

## 2023-10-31 PROCEDURE — 1125F AMNT PAIN NOTED PAIN PRSNT: CPT | Mod: CPTII,S$GLB,, | Performed by: FAMILY MEDICINE

## 2023-10-31 PROCEDURE — 1160F PR REVIEW ALL MEDS BY PRESCRIBER/CLIN PHARMACIST DOCUMENTED: ICD-10-PCS | Mod: CPTII,S$GLB,, | Performed by: FAMILY MEDICINE

## 2023-10-31 RX ADMIN — TRIAMCINOLONE ACETONIDE 40 MG: 40 INJECTION, SUSPENSION INTRA-ARTICULAR; INTRAMUSCULAR at 02:10

## 2023-10-31 NOTE — PROGRESS NOTES
Patient ID: Christine Castro is a 68 y.o. female.    Chief Complaint: Follow-up    HPI      Christine Castro is a 68 y.o. female here following up on diabetes mellitus history of TIA-history of colon cancer-no new problems regarding these.  Complains of severe left shoulder pain from arthritis as well as right hip pain.  Would like injections both sides.    Vitals:    10/31/23 1417   BP: 104/70   Pulse: 87   Temp: 98 °F (36.7 °C)   TempSrc: Oral   SpO2: 98%   Weight: 94.1 kg (207 lb 7.3 oz)   Height: 5' (1.524 m)            Review of Symptoms      Physical Exam    Constitutional:  Oriented to person, place, and time.appears well-developed and well-nourished.  No distress.      HENT  Head: Normocephalic and atraumatic  Right Ear: External ear normal.   Left Ear: External ear normal.   Nose: External nose normal.   Mouth:  Moist mucus membranes.    Eyes:  Conjunctivae are normal. Right eye exhibits no discharge.  Left eye exhibits no discharge. No scleral icterus.  No periorbital edema    Cardiovascular:  Regular rate and rhythm with normal S1 and S2     Pulmonary/Chest:   Clear to auscultation bilaterally without wheezes, rhonchi or rales      Musculoskeletal:  No edema. No obvious deformity No wasting       Neurological:  Alert and oriented to person, place, and time.   Coordination normal.     Skin:   Skin is warm and dry.  No diaphoresis.   No rash noted.     Psychiatric: Normal mood and affect. Behavior is normal.  Judgment and thought content normal.     Complete Blood Count  Lab Results   Component Value Date    RBC 3.42 (L) 02/28/2023    HGB 10.5 (L) 02/28/2023    HCT 32.7 (L) 02/28/2023    MCV 96 02/28/2023    MCH 30.7 02/28/2023    MCHC 32.1 02/28/2023    RDW 13.2 02/28/2023     02/28/2023    MPV 9.8 02/28/2023    GRAN 3.8 02/28/2023    GRAN 63.2 02/28/2023    LYMPH 1.6 02/28/2023    LYMPH 26.7 02/28/2023    MONO 0.3 02/28/2023    MONO 5.7 02/28/2023    EOS 0.2 02/28/2023    BASO 0.04 02/28/2023     "EOSINOPHIL 3.5 02/28/2023    BASOPHIL 0.7 02/28/2023    DIFFMETHOD Automated 02/28/2023       Comprehensive Metabolic Panel  No results found for: "GLU", "BUN", "CREATININE", "NA", "K", "CL", "PROT", "ALBUMIN", "BILITOT", "AST", "ALKPHOS", "CO2", "ALT", "ANIONGAP", "EGFRNONAA", "ESTGFRAFRICA"    TSH  No results found for: "TSH"    Assessment / Plan:      ICD-10-CM ICD-9-CM   1. Type 2 diabetes mellitus without complication, without long-term current use of insulin  E11.9 250.00   2. At high risk for falls  Z91.81 V15.88   3. Lumbar radiculopathy  M54.16 724.4   4. Coronary artery disease involving native coronary artery of native heart without angina pectoris  I25.10 414.01   5. Primary osteoarthritis of left shoulder  M19.012 715.11   6. BMI 39.0-39.9,adult  Z68.39 V85.39   7. TIA (transient ischemic attack)  G45.9 435.9   8. History of gastric bypass  Z98.84 V45.86   9. History of myocardial infarction  I25.2 412     Type 2 diabetes mellitus without complication, without long-term current use of insulin    At high risk for falls    Lumbar radiculopathy    Coronary artery disease involving native coronary artery of native heart without angina pectoris    Primary osteoarthritis of left shoulder    BMI 39.0-39.9,adult    TIA (transient ischemic attack)    History of gastric bypass    History of myocardial infarction      Right trochanter  Left shoulder      "

## 2023-11-05 RX ORDER — TRIAMCINOLONE ACETONIDE 40 MG/ML
40 INJECTION, SUSPENSION INTRA-ARTICULAR; INTRAMUSCULAR
Status: DISCONTINUED | OUTPATIENT
Start: 2023-10-31 | End: 2023-11-05 | Stop reason: HOSPADM

## 2023-11-05 NOTE — PROCEDURES
Large Joint Aspiration/Injection: R greater trochanteric bursa    Date/Time: 10/31/2023 2:00 PM    Performed by: Alon Santiago MD  Authorized by: Alon Santiago MD    Indications:  Pain  Timeout: prior to procedure the correct patient, procedure, and site was verified    Anesthesia:  Local infiltration  Location:  Hip  Site:  R greater trochanteric bursa  Medications:  40 mg triamcinolone acetonide 40 mg/mL  Patient tolerance:  Patient tolerated the procedure well with no immediate complications

## 2023-11-05 NOTE — PROCEDURES
Large Joint Aspiration/Injection: L subacromial bursa    Date/Time: 10/31/2023 2:00 PM    Performed by: Alon Santiago MD  Authorized by: Alon Santiago MD    Indications:  Arthritis and pain  Site marked: the procedure site was marked    Timeout: prior to procedure the correct patient, procedure, and site was verified    Prep: patient was prepped and draped in usual sterile fashion      Local anesthesia used?: Yes    Anesthesia:  Local infiltration  Local anesthetic:  Lidocaine 2% without epinephrine    Details:  Needle Size:  21 G  Approach:  Lateral  Location:  Shoulder  Site:  L subacromial bursa  Medications:  40 mg triamcinolone acetonide 40 mg/mL  Aspirate amount (mL):  0  Patient tolerance:  Patient tolerated the procedure well with no immediate complications

## 2023-11-07 ENCOUNTER — TELEPHONE (OUTPATIENT)
Dept: FAMILY MEDICINE | Facility: CLINIC | Age: 68
End: 2023-11-07
Payer: MEDICARE

## 2023-11-07 NOTE — TELEPHONE ENCOUNTER
----- Message from Natalie Hinton sent at 11/7/2023  3:36 PM CST -----  Type:  Pharmacy   RX    Name of Caller:Karely with pharmacy   Pharmacy Name:[id number] - [address] [facility] AT [facility] [address]   Phone: [phone]  Fax: [phone]  Prescription Name:Narcan Nasal Spray    Additional Information: will Dr brown send in a script for pt as a backup (emergencies) please call or fax

## 2023-11-08 DIAGNOSIS — M43.27 FUSION OF SPINE OF LUMBOSACRAL REGION: ICD-10-CM

## 2023-11-08 NOTE — TELEPHONE ENCOUNTER
Contacted pt in regards of refill that she needs. Pt stated that [facility] did not have her pain medication. I stated to pt that [facility]. Pt voiced understanding

## 2023-11-08 NOTE — TELEPHONE ENCOUNTER
Returned pt's call, I stated to pt that we are waiting on  to sign off on her pain medication. Pt voiced understanding

## 2023-11-09 RX ORDER — OXYCODONE AND ACETAMINOPHEN 10; 325 MG/1; MG/1
1 TABLET ORAL EVERY 6 HOURS PRN
Qty: 120 TABLET | Refills: 0 | Status: SHIPPED | OUTPATIENT
Start: 2023-11-09 | End: 2023-11-25

## 2023-11-15 ENCOUNTER — TELEPHONE (OUTPATIENT)
Dept: FAMILY MEDICINE | Facility: CLINIC | Age: 68
End: 2023-11-15
Payer: MEDICARE

## 2023-11-17 ENCOUNTER — TELEPHONE (OUTPATIENT)
Dept: FAMILY MEDICINE | Facility: CLINIC | Age: 68
End: 2023-11-17
Payer: MEDICARE

## 2023-11-17 RX ORDER — GABAPENTIN 100 MG/1
100 CAPSULE ORAL 3 TIMES DAILY
Qty: 90 CAPSULE | Refills: 3 | Status: SHIPPED | OUTPATIENT
Start: 2023-11-17

## 2023-11-17 RX ORDER — GLIPIZIDE 2.5 MG/1
TABLET, EXTENDED RELEASE ORAL
Qty: 90 TABLET | Refills: 3 | Status: SHIPPED | OUTPATIENT
Start: 2023-11-17 | End: 2024-01-02 | Stop reason: SDUPTHER

## 2023-11-17 NOTE — TELEPHONE ENCOUNTER
Attempted pt. LVM to call the office to discuss. Dr. Santiago can refill either the Valium or Ambien, not both.

## 2023-11-17 NOTE — TELEPHONE ENCOUNTER
I sent in a refill of your gabapentin and Glucotrol.  These of the ones that are absolutely needed.  I can send in a refill of your Valium or zolpidem Ambien
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
## 2023-11-21 NOTE — TELEPHONE ENCOUNTER
Left third v/m to inform pt that dr sent over her Gabapentin and Gluclotrol and will be willing to send over Ambien and Valium if needed.

## 2023-11-24 ENCOUNTER — TELEPHONE (OUTPATIENT)
Dept: FAMILY MEDICINE | Facility: CLINIC | Age: 68
End: 2023-11-24
Payer: MEDICARE

## 2023-11-24 ENCOUNTER — TELEPHONE (OUTPATIENT)
Dept: PAIN MEDICINE | Facility: CLINIC | Age: 68
End: 2023-11-24
Payer: MEDICARE

## 2023-11-24 DIAGNOSIS — R73.9 HYPERGLYCEMIA: ICD-10-CM

## 2023-11-24 DIAGNOSIS — E11.9 TYPE 2 DIABETES MELLITUS WITHOUT COMPLICATION, WITHOUT LONG-TERM CURRENT USE OF INSULIN: Primary | ICD-10-CM

## 2023-11-24 DIAGNOSIS — G45.9 TIA (TRANSIENT ISCHEMIC ATTACK): ICD-10-CM

## 2023-11-24 NOTE — TELEPHONE ENCOUNTER
----- Message from Neftaly Landeros sent at 11/24/2023 11:15 AM CST -----  Type:  Patient Returning Call    Who Called: pt  Who Left Message for Patient:  Does the patient know what this is regarding?: yes  Would the patient rather a call back or a response via [facility]? call  Best Call Back Number: [phone]  Additional Information: pt said to let Dr. Beckman know she missed her blood work appts and breast test appt; said for Dr. Beckman to go ahead and fill her meds
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
## 2023-11-25 RX ORDER — NALOXONE HYDROCHLORIDE 4 MG/.1ML
SPRAY NASAL
Qty: 1 EACH | Refills: 11 | Status: SHIPPED | OUTPATIENT
Start: 2023-11-25

## 2023-11-25 RX ORDER — HYDROCODONE BITARTRATE AND ACETAMINOPHEN 10; 325 MG/1; MG/1
1 TABLET ORAL EVERY 8 HOURS PRN
Qty: 90 TABLET | Refills: 0 | Status: SHIPPED | OUTPATIENT
Start: 2023-11-25 | End: 2023-12-05

## 2023-11-27 ENCOUNTER — TELEPHONE (OUTPATIENT)
Dept: NEUROSURGERY | Facility: CLINIC | Age: 68
End: 2023-11-27
Payer: MEDICARE

## 2023-11-27 NOTE — TELEPHONE ENCOUNTER
Returned call to pharmacy, spoke with Regina the pharmacist, she wanted to know if it was okay for them to fill the Hydrocodone due to the pt just having Percocet 10 mg filled on 11/9 for a 30 day supply. I stated to pharmacist I will inform the provider of this information and I will contact her back. Regina voiced understanding
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
## 2023-11-28 ENCOUNTER — PATIENT OUTREACH (OUTPATIENT)
Dept: ADMINISTRATIVE | Facility: OTHER | Age: 68
End: 2023-11-28
Payer: MEDICARE

## 2023-11-28 NOTE — PROGRESS NOTES
CHW - Follow Up    This Community Health Worker completed a follow up visit with BEVERLY [id number] over the phone today.  Pt/Caregiver reported: No updates reported at this time.   Community Health Worker provided: Patient was provided my new contact information via email.   Follow up required: No   No future outreach task assigned

## 2023-11-29 ENCOUNTER — TELEPHONE (OUTPATIENT)
Dept: FAMILY MEDICINE | Facility: CLINIC | Age: 68
End: 2023-11-29
Payer: MEDICARE

## 2023-11-29 ENCOUNTER — TELEPHONE (OUTPATIENT)
Dept: NEUROSURGERY | Facility: CLINIC | Age: 68
End: 2023-11-29
Payer: MEDICARE

## 2023-11-29 NOTE — TELEPHONE ENCOUNTER
----- Message from Neftaly Landeros sent at 11/29/2023  3:28 PM CST -----  Pt Requesting Call Back    Who called: pt  Who called for pt:  Best call back #:  [phone]  Add notes: pt said it's regarding her getting her rx

## 2023-11-29 NOTE — TELEPHONE ENCOUNTER
"Contacted the pt informed the pt  per Lon hameed,  "We can't fill the hydrocodone until the 7th due to her just picking up the percocet. The system is blocking it."  I stated to pharmacist that I will inform the pt again of it.Pharmacist voiced understanding   "

## 2023-11-29 NOTE — TELEPHONE ENCOUNTER
Returned pt's call, pt stated that she did not get her hydrocodone and the pharmacy will not fill it for. Pt is requesting for me to call to see what is going on with her her prescription.  I stated that I will call the pharmacy to see what is going on. Pt voiced understanding

## 2023-11-30 NOTE — TELEPHONE ENCOUNTER
Which prescriptions does she need?  Call the pharmacy and they can send me refills request    Many of her prescriptions do not need refills.

## 2023-12-04 ENCOUNTER — TELEPHONE (OUTPATIENT)
Dept: NEUROSURGERY | Facility: CLINIC | Age: 68
End: 2023-12-04
Payer: MEDICARE

## 2023-12-04 NOTE — TELEPHONE ENCOUNTER
Care Due:                  Date            Visit Type   Department     Provider  --------------------------------------------------------------------------------                                EP -                              PRIMARY      [facility]  Last Visit: 10-      CARE ([facility])   MEDICINE       Alon Santiago                              EP -                              PRIMARY      [facility]  Next Visit: 03-      CARE ([facility])   MEDICINE       Alon Santiago                                                            Last  Test          Frequency    Reason                     Performed    Due Date  --------------------------------------------------------------------------------    CBC.........  12 months..  diclofenac...............  02- 02-    CMP.........  12 months..  atorvastatin, diclofenac,   02- 02-                             glipiZIDE, ibandronate...    HBA1C.......  6 months...  glipiZIDE................  04-   10-    Lipid Panel.  12 months..  atorvastatin.............  02- 02-    [facility] Embedded Care Due Messages. Reference number: [id number].   12/04/2023 4:03:47 PM CST

## 2023-12-04 NOTE — TELEPHONE ENCOUNTER
Returned pt's call, pt stated that she would like  to send in another prescription for her Oxycodone  since she didn't get the Hydrocodone. I stated to pt that I will send a message to  about the prescription. Pt voiced understanding

## 2023-12-04 NOTE — TELEPHONE ENCOUNTER
Patient requesting refills    ----- Message from Ulises Franz sent at 12/4/2023  3:14 PM CST -----  Contact: pt  Type: Requesting to speak with nurse        Who Called: PT  Regarding:    zolpidem (AMBIEN) 5 MG Tab   buPROPion (WELLBUTRIN XL) 300 MG 24 hr tablet   diazePAM (VALIUM) 5 MG tablet  promethazine (PHENERGAN) 25 MG tablet    Would the patient rather a call back or a response via [facility]? Call back  Best Call Back Number: [phone]  Additional Information: [id number] - [place] [address] AT [facility] [address]   Phone: [phone]  Fax: [phone]    Questions about mammo and bloodwork

## 2023-12-05 RX ORDER — ZOLPIDEM TARTRATE 5 MG/1
5 TABLET ORAL NIGHTLY
Qty: 30 TABLET | Refills: 1 | Status: SHIPPED | OUTPATIENT
Start: 2023-12-05 | End: 2024-02-15

## 2023-12-05 RX ORDER — OXYCODONE AND ACETAMINOPHEN 10; 325 MG/1; MG/1
1 TABLET ORAL EVERY 8 HOURS PRN
Qty: 90 TABLET | Refills: 0 | Status: SHIPPED | OUTPATIENT
Start: 2023-12-05 | End: 2023-12-28 | Stop reason: SDUPTHER

## 2023-12-05 RX ORDER — DIAZEPAM 5 MG/1
TABLET ORAL
Qty: 30 TABLET | Refills: 2 | Status: SHIPPED | OUTPATIENT
Start: 2023-12-05

## 2023-12-05 RX ORDER — BUPROPION HYDROCHLORIDE 300 MG/1
300 TABLET ORAL DAILY
Qty: 90 TABLET | Refills: 3 | Status: SHIPPED | OUTPATIENT
Start: 2023-12-05

## 2023-12-05 RX ORDER — PROMETHAZINE HYDROCHLORIDE 25 MG/1
TABLET ORAL
Qty: 25 TABLET | Refills: 0 | Status: SHIPPED | OUTPATIENT
Start: 2023-12-05 | End: 2024-01-24

## 2023-12-05 NOTE — TELEPHONE ENCOUNTER
Pt has been notified that wellbutrin, valium, phenergan and ambien were sent to [facility] today.

## 2023-12-06 ENCOUNTER — TELEPHONE (OUTPATIENT)
Dept: NEUROSURGERY | Facility: CLINIC | Age: 68
End: 2023-12-06
Payer: MEDICARE

## 2023-12-17 ENCOUNTER — HOSPITAL ENCOUNTER (EMERGENCY)
Facility: HOSPITAL | Age: 68
Discharge: HOME | End: 2023-12-17
Attending: STUDENT IN AN ORGANIZED HEALTH CARE EDUCATION/TRAINING PROGRAM
Payer: MEDICARE

## 2023-12-17 VITALS
HEART RATE: 83 BPM | OXYGEN SATURATION: 97 % | WEIGHT: 182 LBS | BODY MASS INDEX: 35.73 KG/M2 | RESPIRATION RATE: 20 BRPM | HEIGHT: 60 IN | TEMPERATURE: 99 F | SYSTOLIC BLOOD PRESSURE: 147 MMHG | DIASTOLIC BLOOD PRESSURE: 83 MMHG

## 2023-12-17 DIAGNOSIS — J20.9 ACUTE BRONCHITIS, UNSPECIFIED ORGANISM: Primary | ICD-10-CM

## 2023-12-17 DIAGNOSIS — R06.00 DYSPNEA: ICD-10-CM

## 2023-12-17 DIAGNOSIS — R05.9 COUGH: ICD-10-CM

## 2023-12-17 LAB
ALBUMIN SERPL BCP-MCNC: 3.6 G/DL (ref 3.5–5.2)
ALP SERPL-CCNC: 55 U/L (ref 38–126)
ALT SERPL W/O P-5'-P-CCNC: 24 U/L (ref 10–44)
ANION GAP SERPL CALC-SCNC: 10 MMOL/L (ref 8–16)
AST SERPL-CCNC: 34 U/L (ref 15–46)
BASOPHILS # BLD AUTO: 0.01 K/UL (ref 0–0.2)
BASOPHILS NFR BLD: 0.2 % (ref 0–1.9)
BILIRUB SERPL-MCNC: 0.3 MG/DL (ref 0.1–1)
CALCIUM SERPL-MCNC: 9 MG/DL (ref 8.7–10.5)
CHLORIDE SERPL-SCNC: 109 MMOL/L (ref 95–110)
CO2 SERPL-SCNC: 25 MMOL/L (ref 23–29)
CREAT SERPL-MCNC: 1.08 MG/DL (ref 0.5–1.4)
DIFFERENTIAL METHOD: ABNORMAL
EOSINOPHIL # BLD AUTO: 0 K/UL (ref 0–0.5)
EOSINOPHIL NFR BLD: 0.9 % (ref 0–8)
ERYTHROCYTE [DISTWIDTH] IN BLOOD BY AUTOMATED COUNT: 13.3 % (ref 11.5–14.5)
EST. GFR  (NO RACE VARIABLE): 55.6 ML/MIN/1.73 M^2
GLUCOSE SERPL-MCNC: 126 MG/DL (ref 70–110)
HCT VFR BLD AUTO: 33.4 % (ref 37–48.5)
HGB BLD-MCNC: 10.8 G/DL (ref 12–16)
IMM GRANULOCYTES # BLD AUTO: 0.03 K/UL (ref 0–0.04)
IMM GRANULOCYTES NFR BLD AUTO: 0.6 % (ref 0–0.5)
INFLUENZA A, MOLECULAR: NEGATIVE
INFLUENZA B, MOLECULAR: NEGATIVE
LYMPHOCYTES # BLD AUTO: 1.3 K/UL (ref 1–4.8)
LYMPHOCYTES NFR BLD: 28.6 % (ref 18–48)
MCH RBC QN AUTO: 31.4 PG (ref 27–31)
MCHC RBC AUTO-ENTMCNC: 32.3 G/DL (ref 32–36)
MCV RBC AUTO: 97 FL (ref 82–98)
MONOCYTES # BLD AUTO: 0.2 K/UL (ref 0.3–1)
MONOCYTES NFR BLD: 3.6 % (ref 4–15)
NEUTROPHILS # BLD AUTO: 3.1 K/UL (ref 1.8–7.7)
NEUTROPHILS NFR BLD: 66.1 % (ref 38–73)
NRBC BLD-RTO: 0 /100 WBC
NT-PROBNP SERPL-MCNC: 486 PG/ML (ref 5–900)
PLATELET # BLD AUTO: 203 K/UL (ref 150–450)
PMV BLD AUTO: 9.3 FL (ref 9.2–12.9)
POTASSIUM SERPL-SCNC: 3.4 MMOL/L (ref 3.5–5.1)
PROT SERPL-MCNC: 6.8 G/DL (ref 6–8.4)
RBC # BLD AUTO: 3.44 M/UL (ref 4–5.4)
SARS-COV-2 RDRP RESP QL NAA+PROBE: NEGATIVE
SODIUM SERPL-SCNC: 144 MMOL/L (ref 136–145)
SPECIMEN SOURCE: NORMAL
TROPONIN I SERPL-MCNC: <0.012 NG/ML (ref 0.01–0.03)
UUN UR-MCNC: 6 MG/DL (ref 7–17)
WBC # BLD AUTO: 4.68 K/UL (ref 3.9–12.7)

## 2023-12-17 PROCEDURE — 84484 ASSAY OF TROPONIN QUANT: CPT | Mod: ER | Performed by: STUDENT IN AN ORGANIZED HEALTH CARE EDUCATION/TRAINING PROGRAM

## 2023-12-17 PROCEDURE — 99285 EMERGENCY DEPT VISIT HI MDM: CPT | Mod: 25,ER

## 2023-12-17 PROCEDURE — 85025 COMPLETE CBC W/AUTO DIFF WBC: CPT | Mod: ER | Performed by: STUDENT IN AN ORGANIZED HEALTH CARE EDUCATION/TRAINING PROGRAM

## 2023-12-17 PROCEDURE — 83880 ASSAY OF NATRIURETIC PEPTIDE: CPT | Mod: ER | Performed by: STUDENT IN AN ORGANIZED HEALTH CARE EDUCATION/TRAINING PROGRAM

## 2023-12-17 PROCEDURE — 87502 INFLUENZA DNA AMP PROBE: CPT | Mod: ER | Performed by: STUDENT IN AN ORGANIZED HEALTH CARE EDUCATION/TRAINING PROGRAM

## 2023-12-17 PROCEDURE — 93005 ELECTROCARDIOGRAM TRACING: CPT | Mod: ER

## 2023-12-17 PROCEDURE — U0002 COVID-19 LAB TEST NON-CDC: HCPCS | Mod: ER | Performed by: STUDENT IN AN ORGANIZED HEALTH CARE EDUCATION/TRAINING PROGRAM

## 2023-12-17 PROCEDURE — 80053 COMPREHEN METABOLIC PANEL: CPT | Mod: ER | Performed by: STUDENT IN AN ORGANIZED HEALTH CARE EDUCATION/TRAINING PROGRAM

## 2023-12-17 RX ORDER — AZITHROMYCIN 250 MG/1
250 TABLET, FILM COATED ORAL DAILY
Qty: 6 TABLET | Refills: 0 | Status: SHIPPED | OUTPATIENT
Start: 2023-12-17

## 2023-12-18 ENCOUNTER — TELEPHONE (OUTPATIENT)
Dept: ANESTHESIOLOGY | Facility: HOSPITAL | Age: 68
End: 2023-12-18
Payer: MEDICARE
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
## 2023-12-18 NOTE — ED PROVIDER NOTES
"ED Provider Note - 2023    History     Chief Complaint   Patient presents with    Cough     States that she was seen in the Urgent care 1 week ago for chest pain/cough/"throwing up yellow and white phlegm" and told to come to ED but did not because she did not have transport. states that symptoms have not resolved. +Subjective fever. Alkaseltzer and either tylenol or motrin taken at 2 pm. Pt unsure which. Triage temp 99.1 HR - 77        HPI     Christine Castro is a 68 y.o. year old female with past medical and surgical history as seen below, presenting with chief complaint of cough. Ongoing for 2 weeks. Multiple sick contacts. Took some dane-seltzer this evening without much effect, but otherwise has not taken any medications for illness.    Associated symptoms include Fever, Chills, Cough, and SOB.    Patient denies Chest pain.    Past Medical History:   Diagnosis Date    Anticoagulant long-term use     Arthritis     Asthma     CHF (congestive heart failure)     [facility]    Colon cancer     colon    COPD (chronic obstructive pulmonary disease)     Coronary artery disease     Depression     Diabetes mellitus, type 2     GERD (gastroesophageal reflux disease)     Myocardial infarction     AGE 19 [facility] WITHBABY DELIVERY    Thyroid disease      Past Surgical History:   Procedure Laterality Date    ABDOMINAL SURGERY      CAUDAL EPIDURAL STEROID INJECTION N/A 2022    Procedure: Injection-steroid-epidural-caudal;  Surgeon: Nilam Santiago MD;  Location: [facility] PAIN MGT;  Service: Pain Management;  Laterality: N/A;     SECTION      CHOLECYSTECTOMY      COLON SURGERY      COLONOSCOPY      --- repeat in 3-5 years    COLONOSCOPY N/A 2017    Procedure: COLONOSCOPY;  Surgeon: Corrina Flores MD;  Location: [facility] ENDO;  Service: Endoscopy;  Laterality: N/A;    COLONOSCOPY N/A 4/10/2018    Procedure: COLONOSCOPY golytely;  Surgeon: Corrina Flores MD;  Location: [facility] ENDO;  Service: " Endoscopy;  Laterality: N/A;    DECOMPRESSION OF CERVICAL SPINE BY ANTERIOR APPROACH WITH FUSION N/A 12/14/2021    Procedure: DECOMPRESSION AND FUSION, SPINE, CERVICAL, ANTERIOR APPROACH C3-4 C5- 6 C6-7 ACDF;  Surgeon: Ishaan Fisher MD;  Location: [facility] OR;  Service: Neurosurgery;  Laterality: N/A;    ECTOPIC PREGNANCY SURGERY      EPIDURAL STEROID INJECTION INTO LUMBAR SPINE N/A 11/4/2020    Procedure: Injection-steroid-epidural-lumbar--L5-S1 InterLaminar;  Surgeon: Nilam Santiago MD;  Location: [facility] PAIN T;  Service: Pain Management;  Laterality: N/A;    ESOPHAGOGASTRODUODENOSCOPY N/A 2/28/2019    Procedure: ESOPHAGOGASTRODUODENOSCOPY (EGD);  Surgeon: Corrina Flores MD;  Location: [facility];  Service: Endoscopy;  Laterality: N/A;    FRACTURE SURGERY      left leg    FUSION OF SPINE WITH INSTRUMENTATION Left 1/11/2021    Procedure: FUSION, SPINE, WITH INSTRUMENTATION Stage 1 Left L4-5 OLIF DORA Stage 2 L4-5 Laminectomy, medial Facetectomy, foraminotomy, L4-5 MIS Instrumentation;  Surgeon: Ishaan Fisher MD;  Location: [facility] OR;  Service: Neurosurgery;  Laterality: Left;  Procedure: Stage 1 Left L4-5 OLIF DORA  Stage 2 L4-5 Laminectomy, medial Facetectomy, foraminotomy, L4-5 MIS Instrumentation  Surgery TIme: 4 Hrs    GASTRIC BYPASS      HERNIA REPAIR      INJECTION OF ANESTHETIC AGENT AROUND GENITOFEMORAL NERVE Left 7/29/2020    Procedure: BLOCK, NERVE, LEFT SHOULDER ARTICULAR;  Surgeon: Nilam Santiago MD;  Location: [facility] PAIN T;  Service: Pain Management;  Laterality: Left;    JOINT REPLACEMENT      KNEE ARTHROPLASTY Left 5/8/2019    Procedure: ARTHROPLASTY, KNEE;  Surgeon: Roldan Greenwood MD;  Location: [facility] OR;  Service: Orthopedics;  Laterality: Left;  aNvid notified    KNEE ARTHROPLASTY Right 11/20/2019    Procedure: ARTHROPLASTY, KNEE;  Surgeon: Roldan Greenwood MD;  Location: [facility] OR;  Service: Orthopedics;  Laterality: Right;  Navid notified, confirmed case Navid 11/19/19 KB [id number]     OOPHORECTOMY      RADIOFREQUENCY THERMOCOAGULATION Left 8/12/2020    Procedure: RADIOFREQUENCY THERMAL COAGULATION--Left Suprascapular, Axillary, and Lateral Pectoral Articular Branch RFA;  Surgeon: Nilam Santiago MD;  Location: [facility] PAIN MGT;  Service: Pain Management;  Laterality: Left;    TONSILLECTOMY      TRANSFORAMINAL EPIDURAL INJECTION OF STEROID Right 8/25/2021    Procedure: Injection,steroid,epidural,transforaminal approach; Levels: L5;  Surgeon: Nilam Santiago MD;  Location: [facility] PAIN MGT;  Service: Pain Management;  Laterality: Right;  No pacemaker. Patient is diabetic. Patient is taking Aspirin but can continue per Dr. Santiago.     TUBAL LIGATION           Family History   Problem Relation Age of Onset    Hyperlipidemia Mother     Hypertension Mother     Diabetes Mother     Stroke Mother     Hypertension Sister     Hypertension Brother     Diabetes Brother     Breast cancer Maternal Aunt     Breast cancer Maternal Aunt     Breast cancer Cousin      Social History     Tobacco Use    Smoking status: Never     Passive exposure: Never    Smokeless tobacco: Never   Substance Use Topics    Alcohol use: Yes     Comment: very rare    Drug use: No     Comment: CBD oil sometimes     Social Determinants of Health with Concerns     Financial Resource Strain: Medium Risk (5/4/2023)    Overall Financial Resource Strain (CARDIA)     Difficulty of Paying Living Expenses: Somewhat hard   Physical Activity: Inactive (5/4/2023)    Exercise Vital Sign     Days of Exercise per Week: 0 days     Minutes of Exercise per Session: 0 min   Social Connections: Socially Isolated (5/4/2023)    Social Connection and Isolation Panel [NHANES]     Frequency of Communication with Friends and Family: Once a week     Frequency of Social Gatherings with Friends and Family: Never     Attends Holiness Services: Never     Active Member of Clubs or Organizations: No     Attends Club or Organization Meetings: Never     Marital Status:        Review of patient's allergies indicates:   Allergen Reactions    Adhesive      Adhesive tape    Latex, natural rubber      Adhesive from latex tape    Ibuprofen Other (See Comments)     Causes asthma flare??       Review of Systems     A full Review of Systems (ROS) was performed and was negative unless otherwise stated in the HPI.      Physical Exam     Vitals:    12/17/23 1925 12/17/23 2132   BP: 128/79 (!) 147/83   BP Location:  Left arm   Patient Position:  Lying   Pulse: 77 83   Resp: 19 20   Temp: 99.1 °F (37.3 °C)    TempSrc: Oral    SpO2: 98% 97%   Weight: 82.6 kg (182 lb)    Height: 5' (1.524 m)         Physical Exam    Nursing note and vitals reviewed.  Constitutional: She appears well-developed and well-nourished. No distress.   HENT:   Head: Normocephalic and atraumatic.   Right Ear: External ear normal.   Left Ear: External ear normal.   Nose: Nose normal.   Mouth/Throat: Oropharynx is clear and moist.   Eyes: Conjunctivae and EOM are normal. Pupils are equal, round, and reactive to light.   Neck: Neck supple.   Normal range of motion.  Cardiovascular:  Normal rate, regular rhythm, normal heart sounds and intact distal pulses.           Pulmonary/Chest: No accessory muscle usage or stridor. No respiratory distress. She has no wheezes. She has no rhonchi. She has rales in the left lower field.   Abdominal: Abdomen is soft. Bowel sounds are normal. There is no abdominal tenderness.   Musculoskeletal:         General: No tenderness or edema. Normal range of motion.      Cervical back: Normal range of motion and neck supple.     Neurological: She is alert and oriented to person, place, and time. She has normal strength. No cranial nerve deficit or sensory deficit.   Skin: Skin is warm and dry. No rash noted.   Psychiatric: She has a normal mood and affect. Thought content normal.         Lab Results- Independently reviewed by myself      Labs Reviewed   CBC W/ AUTO DIFFERENTIAL - Abnormal; Notable for  the following components:       Result Value    RBC 3.44 (*)     Hemoglobin 10.8 (*)     Hematocrit 33.4 (*)     MCH 31.4 (*)     Immature Granulocytes 0.6 (*)     Mono # 0.2 (*)     Mono % 3.6 (*)     All other components within normal limits   COMPREHENSIVE METABOLIC PANEL - Abnormal; Notable for the following components:    Potassium 3.4 (*)     Glucose 126 (*)     BUN 6 (*)     eGFR 55.6 (*)     All other components within normal limits   INFLUENZA A & B BY MOLECULAR   SARS-COV-2 RNA AMPLIFICATION, QUAL    Narrative:     Is the patient symptomatic?->Yes   TROPONIN I   NT-PRO NATRIURETIC PEPTIDE           Imaging     Imaging Results              X-Ray Chest PA And Lateral (Final result)  Result time 12/17/23 21:05:09      Final result by Devin Hess MD (12/17/23 21:05:09)                   Impression:      No acute abnormality.      Electronically signed by: Devin Hess  Date:    12/17/2023  Time:    21:05               Narrative:    EXAMINATION:  XR CHEST PA AND LATERAL    CLINICAL HISTORY:  Cough, unspecified    TECHNIQUE:  PA and lateral views of the chest were performed.    COMPARISON:  Multiple priors.    FINDINGS:  The lungs are clear, with normal appearance of pulmonary vasculature and no pleural effusion or pneumothorax.    The cardiac silhouette is normal in size. The hilar and mediastinal contours are unremarkable.    Bones are intact.                                    X-Rays:   Independently Interpreted Readings:   Chest X-Ray: No acute abnormalities.  No infiltrates.               ED Course         Procedures         Orders Placed This Encounter    Influenza A & B by Molecular    X-Ray Chest PA And Lateral    COVID-19 Rapid Screening    CBC auto differential    Comprehensive metabolic panel    Troponin I    NT-Pro Natriuretic Peptide    EKG 12-lead    Insert Saline lock IV    azithromycin (Z-MARY) 250 MG tablet          ED Course as of 12/18/23 0440   Sun Dec 17, 2023   2052 Independent  Interpretation of EKG:  Rhythm: Normal Sinus  Rate: 78  Axis: Normal  QTC: 440  No STEMI   [KB]   2120 CBC auto differential(!)  CBC: No significant acute anemia, platelet disorder, or leukocytosis.   [KB]   2138 Comprehensive metabolic panel(!)  CMP: Normal electrolytes, renal function, liver enzymes   [KB]      ED Course User Index  [KB] Aly Pond MD              Medical Decision Making       The patient's list of active medical problems, social history, medications, and allergies as documented per RN staff has been reviewed.     Comorbidities taken into consideration for development of diagnosis and treatment plan include CAD, CHF, and DM.      Medical Decision Making  Patient presenting with cough. Patient afebrile.  No hypoxia.   Lung exam within normal limits.  Patient is well appearing, nontoxic and has normal vital signs.    History, physical exam, and all other findings were discussed with the patient.  At this time, it is felt that the most likely explanation for the patient's symptoms is bronchitis versus pneumonia.  I also considered RSV, pneumothorax, PE, asthma, PTA, RPA, Marcelo's, Strep throat, but this appears less likely considering the data gathered thus far. I have instructed the patient to return to the ER at any time if there are any new or worsening symptoms.  Supportive treatment options were discussed.      Patient will follow up with PCP closely.   The patient expressed understanding of and agreement with this plan.  Opportunity was given for questions prior to discharge and all stated questions were answered to the patient's satisfaction.  Return if new or worsening symptoms develop.       Amount and/or Complexity of Data Reviewed  Labs: ordered. Decision-making details documented in ED Course.  Radiology: ordered and independent interpretation performed.    Risk  Prescription drug management.                ED Prescriptions       Medication Sig Dispense Start Date End Date Auth.  Provider    azithromycin (Z-MARY) 250 MG tablet Take 1 tablet (250 mg total) by mouth once daily. Take first 2 tablets together, then 1 every day until finished. 6 tablet 12/17/2023 -- Aly Pond MD              Clinical Impression       Follow-up Information       Follow up With Specialties Details Why Contact Info    Alon Santiago MD Family Medicine Schedule an appointment as soon as possible for a visit in 3 days For follow-up on today's visit. [address] [place] [address] [id number] [phone]      [facility] - Emergency Dept Emergency Medicine Go to  As needed, If symptoms worsen [address] [facility] [address] [phone]            Referrals:  No orders of the defined types were placed in this encounter.      Disposition   ED Disposition Condition    Discharge Stable            Diagnosis    ICD-10-CM ICD-9-CM   1. Acute bronchitis, unspecified organism  J20.9 466.0   2. Cough  R05.9 786.2   3. Dyspnea  R06.00 786.09           Aly Pond MD        12/18/2023          DISCLAIMER: This note was prepared with [employer] voice recognition transcription software. Garbled syntax, mangled pronouns, and other bizarre constructions may be attributed to that software system.       Aly Pond MD  12/18/23 4513

## 2023-12-19 NOTE — TELEPHONE ENCOUNTER
We have no room to put anyone she was seen by the ER doctors and by report she was actually doing okay

## 2023-12-26 ENCOUNTER — TELEPHONE (OUTPATIENT)
Dept: NEUROSURGERY | Facility: CLINIC | Age: 68
End: 2023-12-26
Payer: MEDICARE

## 2023-12-26 ENCOUNTER — TELEPHONE (OUTPATIENT)
Dept: FAMILY MEDICINE | Facility: CLINIC | Age: 68
End: 2023-12-26
Payer: MEDICARE

## 2023-12-26 RX ORDER — ESCITALOPRAM OXALATE 20 MG/1
TABLET ORAL
Qty: 90 TABLET | Refills: 3 | Status: SHIPPED | OUTPATIENT
Start: 2023-12-26

## 2023-12-26 NOTE — TELEPHONE ENCOUNTER
Returned pt's call, pt stated that she is sick right now and she has been sick for a month now. She stated that she does not know what she has, but she wants to make sure that she can still get her surgery completed on January 9, 2024 with . I stated to pt that I will send a message to her PCP office because it looks like her pre-op clearance was not completed yet either. I stated to pt that I will give her a call back. Pt voiced understanding

## 2023-12-26 NOTE — TELEPHONE ENCOUNTER
----- Message from Chandu Stark MA sent at 12/26/2023  1:27 PM CST -----  Good Afternoon,  Pt is having surgery with  on January 9, 2024. She will need to have a pre-op clearance done, she will need the following test done    CMP  CBC  PT/PTT  Urinalysis     EKG  Chest xray.  The pt is also sick and needs to be cleared for that also. She stated she does not what she has.      Thanks,    Chandu Stark

## 2023-12-26 NOTE — TELEPHONE ENCOUNTER
Okay to use a daily change for preop clearance? Pt is scheduled for surgery with Dr. Fisher on 1/9/24.

## 2023-12-26 NOTE — TELEPHONE ENCOUNTER
Care Due:                  Date            Visit Type   Department     Provider  --------------------------------------------------------------------------------                                EP -                              PRIMARY      [facility] FAMILY  Last Visit: 10-      CARE ([facility])   MEDICINE       Alon Santiago  Next Visit: None Scheduled  None         None Found                                                            Last  Test          Frequency    Reason                     Performed    Due Date  --------------------------------------------------------------------------------    Mg Level....  12 months..  ibandronate..............  Not Found    Overdue    Phosphate...  12 months..  ibandronate..............  Not Found    Overdue    Vitamin D...  12 months..  ibandronate..............  Not Found    Overdue    [employer] Embedded Care Due Messages. Reference number: [id number].   12/26/2023 5:57:11 AM CST

## 2023-12-27 NOTE — TELEPHONE ENCOUNTER
Please read all the messages below    She is currently scheduled 1/2/24  Sick with cough     Do you want us to add her tomorrow? Thursday?  Or wait until 1/2/24    Also please order the test below and advise

## 2023-12-29 RX ORDER — OXYCODONE AND ACETAMINOPHEN 10; 325 MG/1; MG/1
1 TABLET ORAL EVERY 8 HOURS PRN
Qty: 90 TABLET | Refills: 0 | Status: SHIPPED | OUTPATIENT
Start: 2023-12-29 | End: 2024-01-08 | Stop reason: SDUPTHER

## 2023-12-29 NOTE — TELEPHONE ENCOUNTER
Pt called back office I have informed pt that she is scheduled to see MD on 1/2/24. For this appt will all so be her for her pre op.

## 2024-01-02 ENCOUNTER — OFFICE VISIT (OUTPATIENT)
Dept: FAMILY MEDICINE | Facility: CLINIC | Age: 69
End: 2024-01-02
Payer: MEDICARE

## 2024-01-02 ENCOUNTER — TELEPHONE (OUTPATIENT)
Dept: FAMILY MEDICINE | Facility: CLINIC | Age: 69
End: 2024-01-02

## 2024-01-02 VITALS
SYSTOLIC BLOOD PRESSURE: 136 MMHG | BODY MASS INDEX: 42.42 KG/M2 | HEART RATE: 87 BPM | OXYGEN SATURATION: 98 % | WEIGHT: 216.06 LBS | HEIGHT: 60 IN | TEMPERATURE: 98 F | DIASTOLIC BLOOD PRESSURE: 84 MMHG

## 2024-01-02 DIAGNOSIS — Z23 FLU VACCINE NEED: Primary | ICD-10-CM

## 2024-01-02 DIAGNOSIS — F11.20 OPIOID DEPENDENCE, UNCOMPLICATED: ICD-10-CM

## 2024-01-02 DIAGNOSIS — E11.9 TYPE 2 DIABETES MELLITUS WITHOUT COMPLICATION, WITHOUT LONG-TERM CURRENT USE OF INSULIN: ICD-10-CM

## 2024-01-02 DIAGNOSIS — G95.9 CHRONIC MYELOPATHY: ICD-10-CM

## 2024-01-02 DIAGNOSIS — I70.0 AORTIC ATHEROSCLEROSIS: ICD-10-CM

## 2024-01-02 DIAGNOSIS — N18.31 CHRONIC KIDNEY DISEASE, STAGE 3A: ICD-10-CM

## 2024-01-02 DIAGNOSIS — I50.9 HEART FAILURE, UNSPECIFIED HF CHRONICITY, UNSPECIFIED HEART FAILURE TYPE: ICD-10-CM

## 2024-01-02 DIAGNOSIS — E11.40 TYPE 2 DIABETES MELLITUS WITH DIABETIC NEUROPATHY, WITHOUT LONG-TERM CURRENT USE OF INSULIN: ICD-10-CM

## 2024-01-02 DIAGNOSIS — Z23 NEED FOR PNEUMOCOCCAL 20-VALENT CONJUGATE VACCINATION: ICD-10-CM

## 2024-01-02 DIAGNOSIS — E66.01 MORBID (SEVERE) OBESITY DUE TO EXCESS CALORIES: ICD-10-CM

## 2024-01-02 DIAGNOSIS — J40 BRONCHITIS: ICD-10-CM

## 2024-01-02 PROBLEM — C18.9 MALIGNANT NEOPLASM OF COLON, UNSPECIFIED PART OF COLON: Status: RESOLVED | Noted: 2023-07-20 | Resolved: 2024-01-02

## 2024-01-02 PROCEDURE — 3079F DIAST BP 80-89 MM HG: CPT | Mod: CPTII,S$GLB,, | Performed by: FAMILY MEDICINE

## 2024-01-02 PROCEDURE — 90677 PCV20 VACCINE IM: CPT | Mod: S$GLB,,, | Performed by: FAMILY MEDICINE

## 2024-01-02 PROCEDURE — 1125F AMNT PAIN NOTED PAIN PRSNT: CPT | Mod: CPTII,S$GLB,, | Performed by: FAMILY MEDICINE

## 2024-01-02 PROCEDURE — 1157F ADVNC CARE PLAN IN RCRD: CPT | Mod: CPTII,S$GLB,, | Performed by: FAMILY MEDICINE

## 2024-01-02 PROCEDURE — 90694 VACC AIIV4 NO PRSRV 0.5ML IM: CPT | Mod: S$GLB,,, | Performed by: FAMILY MEDICINE

## 2024-01-02 PROCEDURE — 99214 OFFICE O/P EST MOD 30 MIN: CPT | Mod: 25,S$GLB,, | Performed by: FAMILY MEDICINE

## 2024-01-02 PROCEDURE — G0008 ADMIN INFLUENZA VIRUS VAC: HCPCS | Mod: S$GLB,,, | Performed by: FAMILY MEDICINE

## 2024-01-02 PROCEDURE — 3288F FALL RISK ASSESSMENT DOCD: CPT | Mod: CPTII,S$GLB,, | Performed by: FAMILY MEDICINE

## 2024-01-02 PROCEDURE — 3075F SYST BP GE 130 - 139MM HG: CPT | Mod: CPTII,S$GLB,, | Performed by: FAMILY MEDICINE

## 2024-01-02 PROCEDURE — 1100F PTFALLS ASSESS-DOCD GE2>/YR: CPT | Mod: CPTII,S$GLB,, | Performed by: FAMILY MEDICINE

## 2024-01-02 PROCEDURE — 1159F MED LIST DOCD IN RCRD: CPT | Mod: CPTII,S$GLB,, | Performed by: FAMILY MEDICINE

## 2024-01-02 PROCEDURE — 3008F BODY MASS INDEX DOCD: CPT | Mod: CPTII,S$GLB,, | Performed by: FAMILY MEDICINE

## 2024-01-02 PROCEDURE — G0009 ADMIN PNEUMOCOCCAL VACCINE: HCPCS | Mod: S$GLB,,, | Performed by: FAMILY MEDICINE

## 2024-01-02 RX ORDER — DOXYCYCLINE HYCLATE 100 MG
100 TABLET ORAL 2 TIMES DAILY
Qty: 20 TABLET | Refills: 0 | Status: SHIPPED | OUTPATIENT
Start: 2024-01-02

## 2024-01-02 RX ORDER — GLIPIZIDE 2.5 MG/1
TABLET, EXTENDED RELEASE ORAL
Qty: 90 TABLET | Refills: 3 | Status: SHIPPED | OUTPATIENT
Start: 2024-01-02

## 2024-01-02 RX ORDER — LEVOTHYROXINE SODIUM 100 UG/1
100 TABLET ORAL DAILY
Qty: 90 TABLET | Refills: 3 | Status: SHIPPED | OUTPATIENT
Start: 2024-01-02

## 2024-01-02 RX ORDER — PREDNISONE 20 MG/1
TABLET ORAL
Qty: 3 TABLET | Refills: 0 | Status: SHIPPED | OUTPATIENT
Start: 2024-01-02 | End: 2024-01-02 | Stop reason: SDUPTHER

## 2024-01-02 RX ORDER — BUSPIRONE HYDROCHLORIDE 10 MG/1
10 TABLET ORAL DAILY
Qty: 90 TABLET | Refills: 3 | Status: SHIPPED | OUTPATIENT
Start: 2024-01-02

## 2024-01-02 RX ORDER — PREDNISONE 20 MG/1
TABLET ORAL
Qty: 3 TABLET | Refills: 0 | Status: SHIPPED | OUTPATIENT
Start: 2024-01-02

## 2024-01-04 ENCOUNTER — TELEPHONE (OUTPATIENT)
Dept: NEUROSURGERY | Facility: CLINIC | Age: 69
End: 2024-01-04
Payer: MEDICARE

## 2024-01-08 NOTE — PROGRESS NOTES
Patient ID: Christine Castro is a 68 y.o. female.    Chief Complaint: Cough and Nasal Congestion    HPI       Christine Castro is a 68 y.o. female leading up cough nasal congestion postnasal drip.  Diagnosed with bronchitis at your facility.  Chest x-ray done negative.  Continues to have occasional cough states that she is still feeling run down and not able to have surgery scheduled for January 9th.  Patient also complaining of hip pain-points to lateral hip-trochanter.  Would like steroid injection.    Review of Symptoms        Physical Exam    Vitals:    01/02/24 1511   BP: 136/84   BP Location: Left arm   Patient Position: Sitting   Pulse: 87   Temp: 97.9 °F (36.6 °C)   TempSrc: Oral   SpO2: 98%   Weight: 98 kg (216 lb 0.8 oz)   Height: 5' (1.524 m)        Constitutional:   Oriented to person, place, and time.appears well-developed and well-nourished.   No distress.     HENT:   Head: Normocephalic and atraumatic.     Right Ear: Tympanic membrane, external ear and ear canal normal     Left Ear: Tympanic membrane, external ear and ear canal normal    Nose: External Normal. Normal turbinates, No rhinorrhea     Mouth/Throat: Uvula is midline, oropharynx is clear and moist and mucous membranes are normal.     Neck: supple no anterior cervical adenopathy    Carotid artery:  Bruit    NEG []   POS[]    Eyes:   Conjunctivae are normal. Right eye exhibits no discharge. Left eye exhibits no discharge. No scleral icterus. No periorbital edema     Cardiovascular:  Regular rate and rhythm with normal S1 and S2     Pulmonary/Chest:   Clear to auscultation bilaterally without wheezes, rhonchi or rales    Musculoskeletal:  No edema. No obvious deformity No wasting     Neurological:   Alert and oriented to person, place, and time. Coordination normal.     Skin:   Skin is warm and dry.   No diaphoresis.   No rash noted.    Psychiatric: Normal mood and affect. Behavior is normal. Judgment and thought content normal.        Assessment / Plan:      ICD-10-CM ICD-9-CM   1. Flu vaccine need  Z23 V04.81   2. Need for pneumococcal 20-valent conjugate vaccination  Z23 V03.82   3. Type 2 diabetes mellitus with diabetic neuropathy, without long-term current use of insulin  E11.40 250.60     357.2   4. Opioid dependence, uncomplicated  F11.20 304.00   5. Chronic myelopathy  G95.9 336.9   6. Chronic kidney disease, stage 3a  N18.31 585.3   7. Type 2 diabetes mellitus without complication, without long-term current use of insulin  E11.9 250.00   8. Morbid (severe) obesity due to excess calories  E66.01 278.01   9. Heart failure, unspecified HF chronicity, unspecified heart failure type  I50.9 428.9   10. Aortic atherosclerosis  I70.0 440.0   11. Bronchitis  J40 490     Flu vaccine need  -     Influenza (FLUAD) - Quadrivalent (Adjuvanted) *Preferred* (65+) (PF)    Need for pneumococcal 20-valent conjugate vaccination  -     (In Office Administered) Pneumococcal Conjugate Vaccine (20 Valent) (IM) (Preferred)    Type 2 diabetes mellitus with diabetic neuropathy, without long-term current use of insulin    Opioid dependence, uncomplicated    Chronic myelopathy    Chronic kidney disease, stage 3a    Type 2 diabetes mellitus without complication, without long-term current use of insulin    Morbid (severe) obesity due to excess calories    Heart failure, unspecified HF chronicity, unspecified heart failure type    Aortic atherosclerosis    Bronchitis  -     X-Ray Chest PA And Lateral; Future; Expected date: 01/02/2024    Other orders  -     doxycycline (VIBRA-TABS) 100 MG tablet; Take 1 tablet (100 mg total) by mouth 2 (two) times daily. Any generic brand capsule or tablet  Dispense: 20 tablet; Refill: 0  -     Discontinue: predniSONE (DELTASONE) 20 MG tablet; One tablet daily by mouth for three days  Dispense: 3 tablet; Refill: 0  -     busPIRone (BUSPAR) 10 MG tablet; Take 1 tablet (10 mg total) by mouth Daily. Pt is taking 10 mg once daily   Dispense: 90 tablet; Refill: 3  -     glipiZIDE (GLUCOTROL) 2.5 MG TR24; TAKE 1 TABLET(2.5 MG) BY MOUTH DAILY WITH BREAKFAST  Dispense: 90 tablet; Refill: 3  -     predniSONE (DELTASONE) 20 MG tablet; One tablet daily by mouth for three days  Dispense: 3 tablet; Refill: 0    Suggested not getting a steroid injection because upcoming surgery.  Did prescribe three prednisone for a few days  Continue doxycycline repeat chest x-ray  Will/send message to neurosurgery about her desire not to have her upcoming surgery.

## 2024-01-08 NOTE — TELEPHONE ENCOUNTER
Returned pt's call, pt stated that she will like to hold off on her surgery due to her being sick right now and she needs a refill on her medication. I stated to pt that I will get the surgery department to put her surgery case in the depot. Pt's voiced understanding

## 2024-01-09 RX ORDER — OXYCODONE AND ACETAMINOPHEN 10; 325 MG/1; MG/1
1 TABLET ORAL EVERY 8 HOURS PRN
Qty: 90 TABLET | Refills: 0 | Status: SHIPPED | OUTPATIENT
Start: 2024-01-09 | End: 2024-01-26 | Stop reason: SDUPTHER

## 2024-01-10 NOTE — TELEPHONE ENCOUNTER
Care Due:                  Date            Visit Type   Department     Provider  --------------------------------------------------------------------------------                                EP -                              PRIMARY      [facility] FAMILY  Last Visit: 01-      CARE ([facility])   MEDICINE       Alon Santiago                              EP -                              PRIMARY      [facility] FAMILY  Next Visit: 03-      CARE ([facility])   MEDICINE       Alon Santiago                                                            Last  Test          Frequency    Reason                     Performed    Due Date  --------------------------------------------------------------------------------    TSH.........  12 months..  levothyroxine............  04- 04-    [facility] Embedded Care Due Messages. Reference number: [id number].   1/10/2024 5:31:42 PM CST

## 2024-01-11 RX ORDER — DICLOFENAC SODIUM 10 MG/G
GEL TOPICAL
Qty: 300 G | Refills: 5 | Status: SHIPPED | OUTPATIENT
Start: 2024-01-11

## 2024-01-16 ENCOUNTER — TELEPHONE (OUTPATIENT)
Dept: FAMILY MEDICINE | Facility: CLINIC | Age: 69
End: 2024-01-16
Payer: MEDICARE

## 2024-01-24 RX ORDER — PROMETHAZINE HYDROCHLORIDE 25 MG/1
TABLET ORAL
Qty: 25 TABLET | Refills: 0 | Status: SHIPPED | OUTPATIENT
Start: 2024-01-24

## 2024-01-25 NOTE — TELEPHONE ENCOUNTER
No care due was identified.  [facility] Embedded Care Due Messages. Reference number: [id number].   1/24/2024 6:34:13 PM CST

## 2024-01-26 RX ORDER — OXYCODONE AND ACETAMINOPHEN 10; 325 MG/1; MG/1
1 TABLET ORAL EVERY 8 HOURS PRN
Qty: 90 TABLET | Refills: 0 | Status: SHIPPED | OUTPATIENT
Start: 2024-01-26 | End: 2024-02-20 | Stop reason: SDUPTHER

## 2024-01-30 ENCOUNTER — HOSPITAL ENCOUNTER (OUTPATIENT)
Dept: RADIOLOGY | Facility: HOSPITAL | Age: 69
Discharge: HOME | End: 2024-01-30
Attending: FAMILY MEDICINE
Payer: MEDICARE

## 2024-01-30 DIAGNOSIS — Z12.31 ENCOUNTER FOR SCREENING MAMMOGRAM FOR BREAST CANCER: ICD-10-CM

## 2024-01-30 DIAGNOSIS — J40 BRONCHITIS: ICD-10-CM

## 2024-01-30 PROCEDURE — 77063 BREAST TOMOSYNTHESIS BI: CPT | Mod: 26,,, | Performed by: RADIOLOGY

## 2024-01-30 PROCEDURE — 77067 SCR MAMMO BI INCL CAD: CPT | Mod: 26,,, | Performed by: RADIOLOGY

## 2024-01-30 PROCEDURE — 71046 X-RAY EXAM CHEST 2 VIEWS: CPT | Mod: TC,FY,PN

## 2024-01-30 PROCEDURE — 71046 X-RAY EXAM CHEST 2 VIEWS: CPT | Mod: 26,,, | Performed by: RADIOLOGY

## 2024-01-30 PROCEDURE — 77067 SCR MAMMO BI INCL CAD: CPT | Mod: TC,PN

## 2024-02-21 RX ORDER — OXYCODONE AND ACETAMINOPHEN 10; 325 MG/1; MG/1
1 TABLET ORAL EVERY 8 HOURS PRN
Qty: 90 TABLET | Refills: 0 | Status: SHIPPED | OUTPATIENT
Start: 2024-02-21 | End: 2024-03-14 | Stop reason: SDUPTHER

## 2024-03-05 ENCOUNTER — PATIENT MESSAGE (OUTPATIENT)
Dept: ADMINISTRATIVE | Facility: HOSPITAL | Age: 69
End: 2024-03-05
Payer: MEDICARE

## 2024-03-14 NOTE — TELEPHONE ENCOUNTER
Returned pt's call, pt is requesting to know what insurance does  take other than [employer]. I stated to pt that  takes all insurance but medicaid. Pt voiced understanding

## 2024-03-18 ENCOUNTER — TELEPHONE (OUTPATIENT)
Dept: NEUROSURGERY | Facility: CLINIC | Age: 69
End: 2024-03-18
Payer: MEDICARE

## 2024-03-18 NOTE — TELEPHONE ENCOUNTER
Care Due:                  Date            Visit Type   Department     Provider  --------------------------------------------------------------------------------                                EP -                              PRIMARY      [facility] FAMILY  Last Visit: 01-      CARE ([facility])   MEDICINE       Alon Santiago                               -                              PRIMARY      [facility] FAMILY  Next Visit: 06-      CARE ([facility])   MEDICINE       Alon Santiago                                                            Last  Test          Frequency    Reason                     Performed    Due Date  --------------------------------------------------------------------------------    Mg Level....  12 months..  ibandronate..............  Not Found    Overdue    Phosphate...  12 months..  ibandronate..............  Not Found    Overdue    TSH.........  12 months..  levothyroxine............  04- 04-    Vitamin D...  12 months..  ergocalciferol,            Not Found    Overdue                             ibandronate..............    [employer] Embedded Care Due Messages. Reference number: [id number].   3/18/2024 1:22:17 PM CDT

## 2024-03-18 NOTE — TELEPHONE ENCOUNTER
Returned pt's call, I stated to pt that I have sent a refill request to  and I am waiting on him to sign off on the prescription. Pt voiced understanding

## 2024-03-18 NOTE — TELEPHONE ENCOUNTER
----- Message from Khris Li sent at 3/18/2024 12:43 PM CDT -----  Contact: pt  .Type:  Needs Medical Advice    Who Called: pt    Pharmacy name and phone #:  [id number] - [place] - [address] AT [facility] [address]   Phone: [phone]  Fax: [phone]  Would the patient rather a call back or a response via [facility]?  Call back  Best Call Back Number: [phone]  Additional Information:  Pt. Needs a refill on her   promethazine (PHENERGAN) 25 MG tablet, zolpidem (AMBIEN) 5 MG Tab and diazePAM (VALIUM) 5 MG tablet

## 2024-03-19 RX ORDER — OXYCODONE AND ACETAMINOPHEN 10; 325 MG/1; MG/1
1 TABLET ORAL EVERY 8 HOURS PRN
Qty: 90 TABLET | Refills: 0 | Status: SHIPPED | OUTPATIENT
Start: 2024-03-19 | End: 2024-04-18 | Stop reason: SDUPTHER

## 2024-03-20 RX ORDER — ZOLPIDEM TARTRATE 5 MG/1
5 TABLET ORAL NIGHTLY
Qty: 30 TABLET | Refills: 0 | Status: SHIPPED | OUTPATIENT
Start: 2024-03-20 | End: 2024-04-28

## 2024-03-21 ENCOUNTER — TELEPHONE (OUTPATIENT)
Dept: ORTHOPEDICS | Facility: CLINIC | Age: 69
End: 2024-03-21
Payer: MEDICARE

## 2024-03-21 ENCOUNTER — TELEPHONE (OUTPATIENT)
Dept: NEUROSURGERY | Facility: CLINIC | Age: 69
End: 2024-03-21
Payer: MEDICARE

## 2024-03-21 NOTE — TELEPHONE ENCOUNTER
Returned pt's call, pt stated that she is unsure on what she was calling for. Pt voiced understanding

## 2024-03-21 NOTE — TELEPHONE ENCOUNTER
----- Message from Ulises Franz sent at 3/21/2024 10:00 AM CDT -----  Contact: pt  Type: Requesting to speak with nurse        Who Called: PT  Regarding: seeking medical advice. Did not explain what was needed.  Would the patient rather a call back or a response via [facility]? Call back  Best Call Back Number:  [phone]  Additional Information:

## 2024-03-22 ENCOUNTER — OFFICE VISIT (OUTPATIENT)
Dept: FAMILY MEDICINE | Facility: CLINIC | Age: 69
End: 2024-03-22
Payer: MEDICARE

## 2024-03-22 VITALS
WEIGHT: 225.06 LBS | SYSTOLIC BLOOD PRESSURE: 136 MMHG | BODY MASS INDEX: 44.19 KG/M2 | HEART RATE: 79 BPM | DIASTOLIC BLOOD PRESSURE: 82 MMHG | HEIGHT: 60 IN | TEMPERATURE: 99 F | OXYGEN SATURATION: 97 %

## 2024-03-22 DIAGNOSIS — R61 NIGHT SWEATS: ICD-10-CM

## 2024-03-22 DIAGNOSIS — E03.9 ACQUIRED HYPOTHYROIDISM: Chronic | ICD-10-CM

## 2024-03-22 DIAGNOSIS — E11.40 TYPE 2 DIABETES MELLITUS WITH DIABETIC NEUROPATHY, WITHOUT LONG-TERM CURRENT USE OF INSULIN: Primary | ICD-10-CM

## 2024-03-22 DIAGNOSIS — R10.32 LLQ ABDOMINAL PAIN: ICD-10-CM

## 2024-03-22 DIAGNOSIS — M06.09 RHEUMATOID ARTHRITIS WITHOUT RHEUMATOID FACTOR, MULTIPLE SITES: ICD-10-CM

## 2024-03-22 DIAGNOSIS — F33.41 MAJOR DEPRESSIVE DISORDER, RECURRENT, IN PARTIAL REMISSION: ICD-10-CM

## 2024-03-22 DIAGNOSIS — R29.898 WEAKNESS OF BOTH LOWER EXTREMITIES: ICD-10-CM

## 2024-03-22 PROCEDURE — 99214 OFFICE O/P EST MOD 30 MIN: CPT | Mod: S$GLB,,, | Performed by: PHYSICIAN ASSISTANT

## 2024-03-25 PROBLEM — F33.41 MAJOR DEPRESSIVE DISORDER, RECURRENT, IN PARTIAL REMISSION: Status: ACTIVE | Noted: 2024-03-25

## 2024-03-25 NOTE — PROGRESS NOTES
" Patient ID: Christine Castro is a 68 y.o. female.     Chief Complaint: Chills and Night Sweats    68 year old female with history of RA, chronic pain, multiple surgeries, and DM2 presents to clinic with complaints of night sweats, congestion, chills, multi-joint pain, abdominal pain, and "black" toenails for the last 2 weeks. Denies known sick contacts. Denies weight loss, fever, headache, dysuria, changes in BM, CP, SOB, N/V/D. Has tried no additional OTC therapies.         Review of Systems  Review of Systems   Constitutional:  Positive for chills and malaise/fatigue. Negative for fever.   HENT:  Negative for ear pain and sinus pain.    Eyes:  Negative for discharge.   Respiratory:  Negative for cough and wheezing.    Cardiovascular:  Negative for chest pain and leg swelling.   Gastrointestinal:  Positive for abdominal pain. Negative for diarrhea, nausea and vomiting.   Genitourinary:  Negative for dysuria, flank pain, frequency and urgency.   Musculoskeletal:  Positive for myalgias.   Skin:  Negative for rash.   Neurological:  Negative for weakness and headaches.   Psychiatric/Behavioral:  Negative for depression.        Currently Medications  Current Outpatient Medications on File Prior to Visit   Medication Sig Dispense Refill    albuterol (PROVENTIL/VENTOLIN HFA) 90 mcg/actuation inhaler INHALE 2 PUFFS BY MOUTH EVERY 4 HOURS AS NEEDED FOR WHEEZING 18 g 3    aspirin (ECOTRIN) 81 MG EC tablet Take 1 tablet (81 mg total) by mouth once daily.  0    atorvastatin (LIPITOR) 40 MG tablet TAKE 1 TABLET(40 MG) BY MOUTH EVERY DAY FOR CHOLESTEROL 90 tablet 3    buPROPion (WELLBUTRIN XL) 300 MG 24 hr tablet Take 1 tablet (300 mg total) by mouth once daily. 90 tablet 3    busPIRone (BUSPAR) 10 MG tablet Take 1 tablet (10 mg total) by mouth Daily. Pt is taking 10 mg once daily 90 tablet 3    diabetic supplies, miscellan. Misc Lancets for 1-2 times a day testing    dispense brand covered by insurance t (Patient taking " differently: Lancets for 1-2 times a day testing    dispense brand covered by insurance t) 60 each 3    diazePAM (VALIUM) 5 MG tablet TAKE 1 TABLET BY MOUTH EVERY 24 HOURS AS NEEDED FOR ANXIETY 30 tablet 2    diclofenac sodium (VOLTAREN) 1 % Gel APPLY 2 GRAMS TOPICALLY TO THE AFFECTED AREA FOUR TIMES DAILY 300 g 5    doxycycline (VIBRA-TABS) 100 MG tablet Take 1 tablet (100 mg total) by mouth 2 (two) times daily. Any generic brand capsule or tablet 20 tablet 0    ergocalciferol (ERGOCALCIFEROL) 50,000 unit Cap TAKE 1 CAPSULE BY MOUTH EVERY 7 DAYS 12 capsule 3    EScitalopram oxalate (LEXAPRO) 20 MG tablet TAKE 1 TABLET(20 MG) BY MOUTH EVERY DAY 90 tablet 3    furosemide (LASIX) 20 MG tablet TAKE 1 TABLET(20 MG) BY MOUTH DAILY AS NEEDED 30 tablet 3    gabapentin (NEURONTIN) 100 MG capsule Take 1 capsule (100 mg total) by mouth 3 (three) times daily. 90 capsule 3    glipiZIDE (GLUCOTROL) 2.5 MG TR24 TAKE 1 TABLET(2.5 MG) BY MOUTH DAILY WITH BREAKFAST 90 tablet 3    ibandronate (BONIVA) 150 mg tablet TAKE 1 TABLET BY MOUTH EVERY 30 DAYS FOR OSTEOPOROSIS 3 tablet 1    levothyroxine (SYNTHROID) 100 MCG tablet Take 1 tablet (100 mcg total) by mouth once daily. 90 tablet 3    methocarbamoL (ROBAXIN) 750 MG Tab Take 1 tablet (750 mg total) by mouth 3 (three) times daily as needed (muscle spasms). 60 tablet 0    naloxone (NARCAN) 4 mg/actuation Spry 4mg by nasal route as needed for opioid overdose; may repeat every 2-3 minutes in alternating nostrils until medical help arrives. Call 911 1 each 11    omeprazole (PRILOSEC) 40 MG capsule Take 1 capsule (40 mg total) by mouth every morning. 90 capsule 2    oxyCODONE-acetaminophen (PERCOCET)  mg per tablet Take 1 tablet by mouth every 8 (eight) hours as needed for Pain. 90 tablet 0    potassium chloride (KLOR-CON) 10 MEQ TbSR TAKE 1 TABLET BY MOUTH EVERY DAY WHEN YOU TAKE LASIX 90 tablet 0    predniSONE (DELTASONE) 20 MG tablet One tablet daily by mouth for three days 3  tablet 0    promethazine (PHENERGAN) 25 MG tablet TAKE 1 TABLET(25 MG) BY MOUTH EVERY 6 HOURS AS NEEDED 25 tablet 0    SUPREP BOWEL PREP KIT 17.5-3.13-1.6 gram SolR use as directed      TRUE METRIX GLUCOSE METER Misc USE TWICE DAILY TO CHECK BLOOD SUGAR 1 each 0    TRUE METRIX GLUCOSE TEST STRIP Strp TO TEST ONCE TO TWICE DAILY 50 strip 11    TRUE METRIX GLUCOSE TEST STRIP Strp TEST BLOOD SUGAR EVERY  strip 3    TRUEPLUS LANCETS 30 gauge Misc USE TO TEST ONCE TO TWICE D      zolpidem (AMBIEN) 5 MG Tab Take 1 tablet (5 mg total) by mouth every evening. 30 tablet 0    azithromycin (Z-MARY) 250 MG tablet Take 1 tablet (250 mg total) by mouth once daily. Take first 2 tablets together, then 1 every day until finished. (Patient not taking: Reported on 1/2/2024) 6 tablet 0     No current facility-administered medications on file prior to visit.       Physical  Exam  Vitals:    03/22/24 1053   BP: 136/82   BP Location: Left arm   Patient Position: Sitting   Pulse: 79   Temp: 98.6 °F (37 °C)   SpO2: 97%   Weight: 102.1 kg (225 lb 1.4 oz)   Height: 5' (1.524 m)      Body mass index is 43.96 kg/m².  Wt Readings from Last 3 Encounters:   03/22/24 102.1 kg (225 lb 1.4 oz)   01/02/24 98 kg (216 lb 0.8 oz)   12/17/23 82.6 kg (182 lb)       Physical Exam  Vitals and nursing note reviewed.   Constitutional:       General: She is not in acute distress.     Appearance: She is morbidly obese. She is not ill-appearing.      Comments: Ambulates with walker   HENT:      Head: Normocephalic and atraumatic.      Right Ear: External ear normal.      Left Ear: External ear normal.      Nose: Nose normal.      Mouth/Throat:      Mouth: Mucous membranes are moist.   Eyes:      Extraocular Movements: Extraocular movements intact.      Conjunctiva/sclera: Conjunctivae normal.   Cardiovascular:      Rate and Rhythm: Normal rate and regular rhythm.      Pulses: Normal pulses.      Heart sounds: No murmur heard.  Pulmonary:      Effort:  "Pulmonary effort is normal. No respiratory distress.      Breath sounds: No wheezing.   Abdominal:      General: Bowel sounds are normal. There is no distension.      Palpations: Abdomen is soft. There is no mass.      Tenderness: There is abdominal tenderness in the left lower quadrant.   Musculoskeletal:         General: No swelling.      Cervical back: Normal range of motion.   Skin:     Coloration: Skin is not jaundiced.      Findings: No rash.   Neurological:      General: No focal deficit present.      Mental Status: She is alert and oriented to person, place, and time.   Psychiatric:         Mood and Affect: Mood normal.         Thought Content: Thought content normal.         Labs:    Complete Blood Count  Lab Results   Component Value Date    RBC 3.44 (L) 12/17/2023    HGB 10.8 (L) 12/17/2023    HCT 33.4 (L) 12/17/2023    MCV 97 12/17/2023    MCH 31.4 (H) 12/17/2023    MCHC 32.3 12/17/2023    RDW 13.3 12/17/2023     12/17/2023    MPV 9.3 12/17/2023    GRAN 3.1 12/17/2023    GRAN 66.1 12/17/2023    LYMPH 1.3 12/17/2023    LYMPH 28.6 12/17/2023    MONO 0.2 (L) 12/17/2023    MONO 3.6 (L) 12/17/2023    EOS 0.0 12/17/2023    BASO 0.01 12/17/2023    EOSINOPHIL 0.9 12/17/2023    BASOPHIL 0.2 12/17/2023    DIFFMETHOD Automated 12/17/2023       Comprehensive Metabolic Panel  Lab Results   Component Value Date     (H) 12/17/2023    BUN 6 (L) 12/17/2023    CREATININE 1.08 12/17/2023     12/17/2023    K 3.4 (L) 12/17/2023     12/17/2023    PROT 6.8 12/17/2023    ALBUMIN 3.6 12/17/2023    BILITOT 0.3 12/17/2023    AST 34 12/17/2023    ALKPHOS 55 12/17/2023    CO2 25 12/17/2023    ALT 24 12/17/2023    ANIONGAP 10 12/17/2023       TSH  No results found for: "TSH"    Imaging:  Mammo Digital Screening Bilat w/ Ector  Narrative: Result:   Mammo Digital Screening Bilat w/ Ector     History:  Patient is 68 y.o. and is seen for a screening mammogram.    Films Compared:  Prior images (if available) were " compared.     Findings:  This procedure was performed using tomosynthesis. Computer-aided detection   was utilized in the interpretation of this examination.  The breasts have scattered areas of fibroglandular density. There is no   evidence of suspicious masses, calcifications, or other abnormal findings.    Benign fibrocystic changes and benign bilateral breast calcifications.  Impression: Bilateral  There is no mammographic evidence of malignancy.    BI-RADS Category:   Overall: 2 - Benign       Recommendation:  Routine screening mammogram in 1 year is recommended.    Your estimated lifetime risk of breast cancer (to age 85) based on   Tyrer-Cuzick risk assessment model is Tyrer-Cuzick: 9.29 %. According to   the [employer] Society, patients with a lifetime breast cancer risk   of 20% or higher might benefit from supplemental screening tests.      Assessment/Plan:    1. Type 2 diabetes mellitus with diabetic neuropathy, without long-term current use of insulin  Comments:  - Due labs  - Follow pending labs and imaging  Orders:  -     Microalbumin/Creatinine Ratio, Urine; Future; Expected date: 03/22/2024  -     CBC Auto Differential; Future; Expected date: 03/22/2024  -     Comprehensive Metabolic Panel; Future; Expected date: 03/22/2024  -     TSH; Future; Expected date: 03/22/2024  -     Hemoglobin A1C; Future; Expected date: 03/22/2024  -     Lipid Panel; Future; Expected date: 03/22/2024    2. LLQ abdominal pain  Comments:  - Follow after x-ray  Orders:  -     X-Ray Abdomen AP 1 View; Future; Expected date: 03/22/2024    3. Major depressive disorder, recurrent, in partial remission  Comments:  - Chronic, stable  - continue wellbutrin, buspar, valium, and lexapro as prescribed  - Follow with PCP    4. Rheumatoid arthritis without rheumatoid factor, multiple sites  Comments:  - On chronic pain medication  - Follow with rheum    5. Night sweats  Comments:  - Labs today    6. Acquired hypothyroidism    7.  Weakness of both lower extremities         Discussed how to stay healthy including: diet, exercise, refraining from smoking and discussed screening exams / tests needed for age, sex and family Hx.    RTC Pending labs/imaging    Rosa Langston PA-C

## 2024-03-28 NOTE — TELEPHONE ENCOUNTER
Re: Missing Information  Dear  and office staff, we are missing information from your office on patient Robbin Fernando, MRN: [id number], : 1966. We are unable to optimize your patient for surgery without the order being completed.    Please fax the following missing items as noted below:    [] Labs  [x] EKG-need tracing   [] Medical Clearance  [] Cardiac Clearance  [x] History and Physical  [] Chest X-ray  [] Other     Please fax back as soon as possible to [phone].  DO NOT USE THIS FORM AS AN ORDER. If you have any questions, please contact the [facility] Chart Room at [phone] as soon as possible to avoid any delay in service for your patient.    [facility] Chart Room  (P) [phone]  (F) [phone]    [facility] Order Requirements state:  ALL ORDERS MUST BE DATED, LEGIBLE AND CONTAIN:  Full Patient Legal Name (as appears on ’s license)  Patient’s Date of Birth (as appears on ’s license)  Pre-admission testing as per anesthesia guidelines  Diagnosis and procedural consent.  (No spelling errors or abbreviations)  Physician/provider name  Physician/Provider CMS Approved Signature    All documentation (ie. History and Physical, labs) MUST contain name and date of birth on EACH page.    Thank you for helping us provide you and your patient with the best possible experience!   ----- Message from Roldan Greenwood MD sent at 5/30/2019  9:36 AM CDT -----  Would you please have this patient come to the clinic sometime today for a wound check.  She could be on either schedule    Thanks

## 2024-04-02 ENCOUNTER — HOSPITAL ENCOUNTER (EMERGENCY)
Facility: HOSPITAL | Age: 69
Discharge: HOME | End: 2024-04-02
Attending: EMERGENCY MEDICINE
Payer: MEDICARE

## 2024-04-02 VITALS
OXYGEN SATURATION: 100 % | HEIGHT: 60 IN | SYSTOLIC BLOOD PRESSURE: 149 MMHG | RESPIRATION RATE: 20 BRPM | DIASTOLIC BLOOD PRESSURE: 89 MMHG | WEIGHT: 290 LBS | TEMPERATURE: 98 F | HEART RATE: 83 BPM | BODY MASS INDEX: 56.93 KG/M2

## 2024-04-02 DIAGNOSIS — N20.0 KIDNEY STONE: Primary | ICD-10-CM

## 2024-04-02 DIAGNOSIS — N20.0 KIDNEY STONE ON RIGHT SIDE: ICD-10-CM

## 2024-04-02 DIAGNOSIS — R10.9 LEFT FLANK PAIN: ICD-10-CM

## 2024-04-02 DIAGNOSIS — R11.0 NAUSEA: ICD-10-CM

## 2024-04-02 LAB
ALBUMIN SERPL BCP-MCNC: 3.8 G/DL (ref 3.5–5.2)
ALP SERPL-CCNC: 69 U/L (ref 38–126)
ALT SERPL W/O P-5'-P-CCNC: 19 U/L (ref 10–44)
ANION GAP SERPL CALC-SCNC: 9 MMOL/L (ref 8–16)
AST SERPL-CCNC: 32 U/L (ref 15–46)
BASOPHILS # BLD AUTO: 0.02 K/UL (ref 0–0.2)
BASOPHILS NFR BLD: 0.3 % (ref 0–1.9)
BILIRUB SERPL-MCNC: 0.5 MG/DL (ref 0.1–1)
CALCIUM SERPL-MCNC: 9 MG/DL (ref 8.7–10.5)
CHLORIDE SERPL-SCNC: 107 MMOL/L (ref 95–110)
CO2 SERPL-SCNC: 26 MMOL/L (ref 23–29)
CREAT SERPL-MCNC: 1.16 MG/DL (ref 0.5–1.4)
DIFFERENTIAL METHOD BLD: ABNORMAL
EOSINOPHIL # BLD AUTO: 0.1 K/UL (ref 0–0.5)
EOSINOPHIL NFR BLD: 1.6 % (ref 0–8)
ERYTHROCYTE [DISTWIDTH] IN BLOOD BY AUTOMATED COUNT: 13.7 % (ref 11.5–14.5)
EST. GFR  (NO RACE VARIABLE): 51 ML/MIN/1.73 M^2
GLUCOSE SERPL-MCNC: 105 MG/DL (ref 70–110)
HCT VFR BLD AUTO: 31.8 % (ref 37–48.5)
HGB BLD-MCNC: 10.3 G/DL (ref 12–16)
IMM GRANULOCYTES # BLD AUTO: 0.01 K/UL (ref 0–0.04)
IMM GRANULOCYTES NFR BLD AUTO: 0.1 % (ref 0–0.5)
LIPASE SERPL-CCNC: 40 U/L (ref 23–300)
LYMPHOCYTES # BLD AUTO: 0.9 K/UL (ref 1–4.8)
LYMPHOCYTES NFR BLD: 12.6 % (ref 18–48)
MAGNESIUM SERPL-MCNC: 1.8 MG/DL (ref 1.6–2.6)
MCH RBC QN AUTO: 31.3 PG (ref 27–31)
MCHC RBC AUTO-ENTMCNC: 32.4 G/DL (ref 32–36)
MCV RBC AUTO: 97 FL (ref 82–98)
MONOCYTES # BLD AUTO: 0.2 K/UL (ref 0.3–1)
MONOCYTES NFR BLD: 3.1 % (ref 4–15)
NEUTROPHILS # BLD AUTO: 5.8 K/UL (ref 1.8–7.7)
NEUTROPHILS NFR BLD: 82.3 % (ref 38–73)
NRBC BLD-RTO: 0 /100 WBC
PLATELET # BLD AUTO: 199 K/UL (ref 150–450)
PMV BLD AUTO: 9.8 FL (ref 9.2–12.9)
POTASSIUM SERPL-SCNC: 4.2 MMOL/L (ref 3.5–5.1)
PROT SERPL-MCNC: 7.1 G/DL (ref 6–8.4)
RBC # BLD AUTO: 3.29 M/UL (ref 4–5.4)
SODIUM SERPL-SCNC: 142 MMOL/L (ref 136–145)
UUN UR-MCNC: 18 MG/DL (ref 7–17)
WBC # BLD AUTO: 7.07 K/UL (ref 3.9–12.7)

## 2024-04-02 PROCEDURE — 99285 EMERGENCY DEPT VISIT HI MDM: CPT | Mod: 25,ER

## 2024-04-02 PROCEDURE — 25000003 PHARM REV CODE 250: Mod: ER

## 2024-04-02 PROCEDURE — 96375 TX/PRO/DX INJ NEW DRUG ADDON: CPT | Mod: ER

## 2024-04-02 PROCEDURE — 85025 COMPLETE CBC W/AUTO DIFF WBC: CPT | Mod: ER

## 2024-04-02 PROCEDURE — 83735 ASSAY OF MAGNESIUM: CPT | Mod: ER

## 2024-04-02 PROCEDURE — 63600175 PHARM REV CODE 636 W HCPCS: Mod: ER

## 2024-04-02 PROCEDURE — 96374 THER/PROPH/DIAG INJ IV PUSH: CPT | Mod: 59,ER

## 2024-04-02 PROCEDURE — 93010 ELECTROCARDIOGRAM REPORT: CPT | Mod: ,,, | Performed by: INTERNAL MEDICINE

## 2024-04-02 PROCEDURE — 80053 COMPREHEN METABOLIC PANEL: CPT | Mod: ER

## 2024-04-02 PROCEDURE — 25500020 PHARM REV CODE 255: Mod: ER | Performed by: EMERGENCY MEDICINE

## 2024-04-02 PROCEDURE — 93005 ELECTROCARDIOGRAM TRACING: CPT | Mod: ER

## 2024-04-02 PROCEDURE — 83690 ASSAY OF LIPASE: CPT | Mod: ER

## 2024-04-02 RX ORDER — HYDROCODONE BITARTRATE AND ACETAMINOPHEN 5; 325 MG/1; MG/1
2 TABLET ORAL
Status: COMPLETED | OUTPATIENT
Start: 2024-04-02 | End: 2024-04-02

## 2024-04-02 RX ORDER — ONDANSETRON 4 MG/1
4 TABLET, ORALLY DISINTEGRATING ORAL EVERY 6 HOURS PRN
Qty: 28 TABLET | Refills: 0 | Status: SHIPPED | OUTPATIENT
Start: 2024-04-02

## 2024-04-02 RX ORDER — TAMSULOSIN HYDROCHLORIDE 0.4 MG/1
0.4 CAPSULE ORAL DAILY
Qty: 14 CAPSULE | Refills: 0 | Status: SHIPPED | OUTPATIENT
Start: 2024-04-02 | End: 2024-04-16

## 2024-04-02 RX ORDER — ONDANSETRON HYDROCHLORIDE 2 MG/ML
4 INJECTION, SOLUTION INTRAVENOUS
Status: COMPLETED | OUTPATIENT
Start: 2024-04-02 | End: 2024-04-02

## 2024-04-02 RX ORDER — HYDROCODONE BITARTRATE AND ACETAMINOPHEN 5; 325 MG/1; MG/1
1 TABLET ORAL EVERY 6 HOURS PRN
Qty: 12 TABLET | Refills: 0 | Status: SHIPPED | OUTPATIENT
Start: 2024-04-02

## 2024-04-02 RX ORDER — TAMSULOSIN HYDROCHLORIDE 0.4 MG/1
0.4 CAPSULE ORAL
Status: COMPLETED | OUTPATIENT
Start: 2024-04-02 | End: 2024-04-02

## 2024-04-02 RX ORDER — KETOROLAC TROMETHAMINE 30 MG/ML
15 INJECTION, SOLUTION INTRAMUSCULAR; INTRAVENOUS
Status: COMPLETED | OUTPATIENT
Start: 2024-04-02 | End: 2024-04-02

## 2024-04-02 RX ADMIN — KETOROLAC TROMETHAMINE 15 MG: 30 INJECTION, SOLUTION INTRAMUSCULAR at 07:04

## 2024-04-02 RX ADMIN — IOHEXOL 100 ML: 350 INJECTION, SOLUTION INTRAVENOUS at 07:04

## 2024-04-02 RX ADMIN — ONDANSETRON 4 MG: 2 INJECTION, SOLUTION INTRAMUSCULAR; INTRAVENOUS at 06:04

## 2024-04-02 RX ADMIN — TAMSULOSIN HYDROCHLORIDE 0.4 MG: 0.4 CAPSULE ORAL at 08:04

## 2024-04-02 RX ADMIN — HYDROCODONE BITARTRATE AND ACETAMINOPHEN 2 TABLET: 5; 325 TABLET ORAL at 08:04

## 2024-04-03 LAB
OHS QRS DURATION: 84 MS
OHS QTC CALCULATION: 435 MS

## 2024-04-03 NOTE — ED PROVIDER NOTES
Encounter Date: 2024       History     Chief Complaint   Patient presents with    Abdominal Pain     PT presents to the ED with C/O left sided abdominal pain  x 1-2 weeks. Pain is worsening. +Nausea with no vomiting. Denies any urinary symptoms. Afebrile.      Patient is a 68-year-old female with a past medical history of anticoagulant long-term use, asthma, CHF, colon cancer, COPD, CAD, depression, type 2 diabetes mellitus, GERD, MI, and thyroid disease who presents to emergency room for left-sided abdominal/flank pain over the past 1-2 weeks.  Patient states that pain is worsening in severity.  No injury or inciting event.  Endorses mild nausea.  No vomiting.  Denies dysuria, hematuria, changes in bowel movements, blood in stool, fever, body aches, chills, weakness, fatigue, changes in appetite, or others at this time.  No treatment attempted prior to arrival.    The history is provided by the patient. No  was used.     Review of patient's allergies indicates:   Allergen Reactions    Adhesive      Adhesive tape    Latex, natural rubber      Adhesive from latex tape    Ibuprofen Other (See Comments)     Causes asthma flare??     Past Medical History:   Diagnosis Date    Anticoagulant long-term use     Arthritis     Asthma     CHF (congestive heart failure)     [facility]    Colon cancer     colon    COPD (chronic obstructive pulmonary disease)     Coronary artery disease     Depression     Diabetes mellitus, type 2     GERD (gastroesophageal reflux disease)     Myocardial infarction     AGE 19 [facility] WITHBABY DELIVERY    Thyroid disease      Past Surgical History:   Procedure Laterality Date    ABDOMINAL SURGERY      CAUDAL EPIDURAL STEROID INJECTION N/A 2022    Procedure: Injection-steroid-epidural-caudal;  Surgeon: Nilam Santiago MD;  Location: [facility];  Service: Pain Management;  Laterality: N/A;     SECTION      CHOLECYSTECTOMY      COLON SURGERY       COLONOSCOPY      2014--- repeat in 3-5 years    COLONOSCOPY N/A 4/18/2017    Procedure: COLONOSCOPY;  Surgeon: Corrina Flores MD;  Location: [facility] ENDO;  Service: Endoscopy;  Laterality: N/A;    COLONOSCOPY N/A 4/10/2018    Procedure: COLONOSCOPY golytely;  Surgeon: Corrina Flores MD;  Location: [facility] ENDO;  Service: Endoscopy;  Laterality: N/A;    DECOMPRESSION OF CERVICAL SPINE BY ANTERIOR APPROACH WITH FUSION N/A 12/14/2021    Procedure: DECOMPRESSION AND FUSION, SPINE, CERVICAL, ANTERIOR APPROACH C3-4 C5- 6 C6-7 ACDF;  Surgeon: Ishaan Fisher MD;  Location: [facility] OR;  Service: Neurosurgery;  Laterality: N/A;    ECTOPIC PREGNANCY SURGERY      EPIDURAL STEROID INJECTION INTO LUMBAR SPINE N/A 11/4/2020    Procedure: Injection-steroid-epidural-lumbar--L5-S1 InterLaminar;  Surgeon: Nilam Santiago MD;  Location: [facility] PAIN MGT;  Service: Pain Management;  Laterality: N/A;    ESOPHAGOGASTRODUODENOSCOPY N/A 2/28/2019    Procedure: ESOPHAGOGASTRODUODENOSCOPY (EGD);  Surgeon: Corrina Flores MD;  Location: [facility] ENDO;  Service: Endoscopy;  Laterality: N/A;    FRACTURE SURGERY      left leg    FUSION OF SPINE WITH INSTRUMENTATION Left 1/11/2021    Procedure: FUSION, SPINE, WITH INSTRUMENTATION Stage 1 Left L4-5 OLIF DORA Stage 2 L4-5 Laminectomy, medial Facetectomy, foraminotomy, L4-5 MIS Instrumentation;  Surgeon: Ishaan Fisher MD;  Location: [facility] OR;  Service: Neurosurgery;  Laterality: Left;  Procedure: Stage 1 Left L4-5 OLIF DORA  Stage 2 L4-5 Laminectomy, medial Facetectomy, foraminotomy, L4-5 MIS Instrumentation  Surgery TIme: 4 Hrs    GASTRIC BYPASS      HERNIA REPAIR      INJECTION OF ANESTHETIC AGENT AROUND GENITOFEMORAL NERVE Left 7/29/2020    Procedure: BLOCK, NERVE, LEFT SHOULDER ARTICULAR;  Surgeon: Nilam Santiago MD;  Location: [facility] PAIN MGT;  Service: Pain Management;  Laterality: Left;    JOINT REPLACEMENT      KNEE ARTHROPLASTY Left 5/8/2019    Procedure: ARTHROPLASTY, KNEE;  Surgeon: Roldan BYNUM  MD Krystyna;  Location: [facility] OR;  Service: Orthopedics;  Laterality: Left;  Navid notified    KNEE ARTHROPLASTY Right 11/20/2019    Procedure: ARTHROPLASTY, KNEE;  Surgeon: Roldan Greenwood MD;  Location: [facility] OR;  Service: Orthopedics;  Laterality: Right;  Navid notified, confirmed case Navid 11/19/19 KB [id number]    OOPHORECTOMY      RADIOFREQUENCY THERMOCOAGULATION Left 8/12/2020    Procedure: RADIOFREQUENCY THERMAL COAGULATION--Left Suprascapular, Axillary, and Lateral Pectoral Articular Branch RFA;  Surgeon: Nilam Santiago MD;  Location: [facility] PAIN MGT;  Service: Pain Management;  Laterality: Left;    TONSILLECTOMY      TRANSFORAMINAL EPIDURAL INJECTION OF STEROID Right 8/25/2021    Procedure: Injection,steroid,epidural,transforaminal approach; Levels: L5;  Surgeon: Nilam Santiago MD;  Location: [facility] PAIN MGT;  Service: Pain Management;  Laterality: Right;  No pacemaker. Patient is diabetic. Patient is taking Aspirin but can continue per Dr. Santiago.     TUBAL LIGATION       Family History   Problem Relation Age of Onset    Hyperlipidemia Mother     Hypertension Mother     Diabetes Mother     Stroke Mother     Hypertension Sister     Hypertension Brother     Diabetes Brother     Breast cancer Maternal Aunt     Breast cancer Maternal Aunt     Breast cancer Cousin      Social History     Tobacco Use    Smoking status: Never     Passive exposure: Never    Smokeless tobacco: Never   Substance Use Topics    Alcohol use: Yes     Comment: very rare    Drug use: No     Comment: CBD oil sometimes     Review of Systems   Constitutional:  Negative for chills, diaphoresis, fatigue and fever.   HENT:  Negative for congestion, sore throat and trouble swallowing.    Respiratory:  Negative for cough and shortness of breath.    Cardiovascular:  Negative for chest pain and palpitations.   Gastrointestinal:  Positive for abdominal pain (left-sided) and nausea. Negative for blood in stool, constipation, diarrhea and vomiting.    Genitourinary:  Positive for flank pain (left). Negative for difficulty urinating, dysuria, frequency and urgency.   Musculoskeletal:  Negative for back pain and myalgias.   Skin:  Negative for color change, rash and wound.   Neurological:  Negative for weakness, light-headedness, numbness and headaches.       Physical Exam     Initial Vitals [04/02/24 1831]   BP Pulse Resp Temp SpO2   (!) 167/99 91 20 98.2 °F (36.8 °C) 99 %      MAP       --         Physical Exam    Nursing note and vitals reviewed.  Constitutional: She appears well-developed. She is not diaphoretic. She is Obese . No distress.   Patient lying in bed, appears uncomfortable due to pain.  Awake and alert.  Maintaining airway appropriately.  Speaking in complete sentences.   HENT:   Head: Normocephalic and atraumatic.   Right Ear: External ear normal.   Left Ear: External ear normal.   Eyes: Conjunctivae and EOM are normal. Pupils are equal, round, and reactive to light.   Neck: Neck supple.   Normal range of motion.  Cardiovascular:  Normal rate, regular rhythm and normal heart sounds.     Exam reveals no friction rub.       No murmur heard.  Pulmonary/Chest: Breath sounds normal. No respiratory distress. She has no wheezes. She has no rhonchi. She has no rales.   Abdominal: Abdomen is soft. Bowel sounds are normal. She exhibits no distension. There is abdominal tenderness (diffuse left-sided abdominal tenderness) in the left upper quadrant and left lower quadrant. No hernia.     There is no rebound and no guarding.   Musculoskeletal:         General: No tenderness or edema. Normal range of motion.      Cervical back: Normal range of motion and neck supple.     Neurological: She is alert and oriented to person, place, and time. She has normal strength.   Skin: Skin is warm and dry.   Psychiatric: She has a normal mood and affect. Her behavior is normal. Thought content normal.         ED Course   Procedures  Labs Reviewed   CBC W/ AUTO DIFFERENTIAL  - Abnormal; Notable for the following components:       Result Value    RBC 3.29 (*)     Hemoglobin 10.3 (*)     Hematocrit 31.8 (*)     MCH 31.3 (*)     Lymph # 0.9 (*)     Mono # 0.2 (*)     Gran % 82.3 (*)     Lymph % 12.6 (*)     Mono % 3.1 (*)     All other components within normal limits   COMPREHENSIVE METABOLIC PANEL - Abnormal; Notable for the following components:    BUN 18 (*)     eGFR 51.0 (*)     All other components within normal limits   MAGNESIUM   LIPASE   LIPASE   URINALYSIS, REFLEX TO URINE CULTURE          Imaging Results              CT Abdomen Pelvis With IV Contrast NO Oral Contrast (Final result)  Result time 04/02/24 20:21:04      Final result by Natalia Blakely MD (04/02/24 20:21:04)                   Impression:      Stone at the left UVJ with mild hydroureter      Electronically signed by: Natalia Blakely  Date:    04/02/2024  Time:    20:21               Narrative:    EXAMINATION:  CT ABDOMEN PELVIS WITH IV CONTRAST    CLINICAL HISTORY:  Abdominal pain, acute, nonlocalized;    TECHNIQUE:  Low dose axial images, sagittal and coronal reformations were obtained from the lung bases to the pubic symphysis following the IV administration of four mL of Omnipaque 350 iterative technique automated exposure control    COMPARISON:  None.    FINDINGS:  Liver borderline enlarged.  Gallbladder absent.  Spleen normal size. Pancreas fatty atrophic    Kidneys without hydronephrosis. Mild left hydroureter with 3 mm stone at the left UVJ. Urinary bladder decompressed    Surgical bowel change with no obstructive or inflammatory findings. Appendix normal caliber. No fluid collection seen    No aortic aneurysm                                       Medications   ketorolac injection 15 mg (15 mg Intravenous Given 4/2/24 1903)   ondansetron injection 4 mg (4 mg Intravenous Given 4/2/24 1845)   iohexoL (OMNIPAQUE 350) injection 100 mL (100 mLs Intravenous Given 4/2/24 1949)   tamsulosin 24 hr capsule 0.4  mg (0.4 mg Oral Given 4/2/24 2047)   HYDROcodone-acetaminophen 5-325 mg per tablet 2 tablet (2 tablets Oral Given 4/2/24 2047)     Medical Decision Making  Patient presents to emergency room for abdominal pain and nausea.  Vital signs stable.  Physical exam as stated above.    Differential Diagnosis includes, but is not limited to AAA, aortic dissection, mesenteric ischemia, perforated viscous, MI/ACS, SBO/volvulus, incarcerated/strangulated hernia, intussusception, ileus, appendicitis, cholecystitis, cholangitis, diverticulitis, esophagitis, hepatitis, nephrolithiasis, pancreatitis, gastroenteritis, colitis, IBD/IBS, biliary colic, GERD, PUD, constipation, UTI/pyelonephritis, or  disorder.  Patient blood pressure stable and they are clinically well-appearing. Patient still having bowel movements and passing gas.  I do not suspect bowel obstruction.  Abdominal exam relatively unremarkable.  No right lower quadrant tenderness that would suggest appendicitis.  Lab work unremarkable.  This is not pancreatitis or hepatitis.  CT abdomen with signs of kidney stone at left UVJ.  It is 3 mm.  Patient given Toradol and Zofran with relief in symptoms.  Will prescribe pain medication, Flomax, and Zofran to take upon discharge.  Referral placed to Urology.    I see no indication of an emergent process beyond that addressed during our encounter. Patient stable for discharge at this time. I have counseled the patient regarding follow up with Urology and gave strict return precautions. I have discussed the final diagnosis and gave instructions regarding prescribed medications. Patient verbalized understanding and is agreeable.     Problems Addressed:  Kidney stone: acute illness or injury with systemic symptoms  Kidney stone on right side: acute illness or injury with systemic symptoms  Left flank pain: acute illness or injury with systemic symptoms    Amount and/or Complexity of Data Reviewed  Labs: ordered. Decision-making  details documented in ED Course.     Details: Urinalysis was ordered.  However, patient states she is unable to urinate.  I do not suspect infection at this time.  Radiology: ordered. Decision-making details documented in ED Course.    Risk  Prescription drug management.  Risk Details: Comorbidities taken into consideration during the patient's evaluation and treatment include arthritis, asthma, CHF, COPD, CAD, depression, diabetes mellitus type 2, MI, and thyroid disease.  Social determinants of health taken into consideration during development of our treatment plan include difficulty in obtaining follow-up and obtaining medications; health literacy.                 ED Course as of 04/02/24 2112 Tue Apr 02, 2024 1912 CBC auto differential(!)  CBC relatively unremarkable.  No increase in white blood cells.  H/H 10.3/31.8.  Platelet count within normal limits.  No increase in granulocytes. [BJ]   1917 Magnesium  Magnesium within normal limits. [BJ]   1917 Comprehensive metabolic panel(!)  CMP relatively unremarkable.  BUN elevated to 18.  GFR 51.  LFTs within normal limits.  Creatinine within normal limits.  Electrolytes within normal limits. [BJ]   1937 Lipase  Lipase within normal limits. [BJ]   2024 CT Abdomen Pelvis With IV Contrast NO Oral Contrast  Impression:     Stone at the left UVJ with mild hydroureter. [BJ]      ED Course User Index  [BJ] Jeni Starks PA-C                           Clinical Impression:  Final diagnoses:  [N20.0] Kidney stone (Primary)  [R11.0] Nausea  [N20.0] Kidney stone on right side  [R10.9] Left flank pain          ED Disposition Condition    Discharge Stable          ED Prescriptions       Medication Sig Dispense Start Date End Date Auth. Provider    tamsulosin (FLOMAX) 0.4 mg Cap Take 1 capsule (0.4 mg total) by mouth once daily. for 14 days 14 capsule 4/2/2024 4/16/2024 Jeni Starks PA-C    ondansetron (ZOFRAN-ODT) 4 MG TbDL Take 1 tablet (4 mg total) by mouth every 6  (six) hours as needed (Nausea). 28 tablet 4/2/2024 -- Jeni Starks PA-C    HYDROcodone-acetaminophen (NORCO) 5-325 mg per tablet Take 1 tablet by mouth every 6 (six) hours as needed for Pain. 12 tablet 4/2/2024 -- Jeni Starks PA-C          Follow-up Information       Follow up With Specialties Details Why Contact Info Additional Information    [facility]   [address]  [facility] [address] [phone] Please park in [address] or [address] and use [address]. Take [facility] elevators.             Jeni Starks PA-C  04/02/24 2935

## 2024-04-03 NOTE — ED NOTES
Patient asked about obtaining a urine sample but states that she does not feel like she can. Notified JEF Ngo

## 2024-04-03 NOTE — DISCHARGE INSTRUCTIONS
Please take Flomax to help pass your kidney stone.  Take the Zofran for nausea and Norco for pain.  Do not take Norco with any other opioids such as Percocet.    Thank you for allowing me and my emergency team to take care of you here today! I hope you feel better soon. Please do not hesitate to return with any additional concerns that may arise from this or any new problem you encounter.    Our goal in the emergency department is to always give you outstanding care and exceptional service. If you receive a survey by mail or e-mail in the next week regarding your experience in our ED, we would greatly appreciate you completing it. Your feedback provides us with a way to recognize our staff who give very good care and it helps us learn how to improve when your experience was below the excellence we aspire to be!    Brook [place]au, PA-C [facility]   Emergency Room Physician Assistant

## 2024-04-03 NOTE — ED NOTES
Review of patient's allergies indicates:   Allergen Reactions    Adhesive      Adhesive tape    Latex, natural rubber      Adhesive from latex tape    Ibuprofen Other (See Comments)     Causes asthma flare??        Patient has verified the spelling of the name and  on armband.   APPEARANCE: Patient is alert, calm, oriented x 4, and does not appear distressed.  SKIN: Skin is normal for race, warm, and dry. Normal skin turgor and mucous membranes moist.  CARDIAC: Normal rate and rhythm, no murmur heard.   RESPIRATORY:Normal rate and effort. Breath sounds clear bilaterally throughout chest. Respirations are equal and unlabored.    GASTRO: Bowel sounds normal, abdomen is soft, no abdominal distention, but c/o generalized abdominal pain with c/o nausea.   MUSCLE: Full ROM. No bony tenderness or soft tissue tenderness. No obvious deformity.  PERIPHERAL VASCULAR: peripheral pulses present. Normal cap refill. No edema. Warm to touch.  NEURO: Kash coma scale: eyes open spontaneously-4, oriented & converses-5, obeys commands-6. C/o a generalized headache.   MENTAL STATUS: awake, alert and aware of environment.  EYE: No overt deficits noted. No drainage. Sclera WNL  ENT: EARS: no obvious drainage. NOSE: no active bleeding. THROAT: no redness or swelling.  : Voids without complication per patient

## 2024-04-04 ENCOUNTER — OFFICE VISIT (OUTPATIENT)
Dept: FAMILY MEDICINE | Facility: CLINIC | Age: 69
End: 2024-04-04
Payer: MEDICARE

## 2024-04-04 VITALS
OXYGEN SATURATION: 96 % | HEART RATE: 97 BPM | HEIGHT: 60 IN | SYSTOLIC BLOOD PRESSURE: 138 MMHG | WEIGHT: 216.38 LBS | TEMPERATURE: 98 F | DIASTOLIC BLOOD PRESSURE: 86 MMHG | BODY MASS INDEX: 42.48 KG/M2

## 2024-04-04 DIAGNOSIS — N20.0 LEFT NEPHROLITHIASIS: ICD-10-CM

## 2024-04-04 DIAGNOSIS — R10.32 LLQ ABDOMINAL PAIN: Primary | ICD-10-CM

## 2024-04-04 DIAGNOSIS — E66.01 CLASS 3 SEVERE OBESITY DUE TO EXCESS CALORIES WITH SERIOUS COMORBIDITY AND BODY MASS INDEX (BMI) OF 40.0 TO 44.9 IN ADULT: ICD-10-CM

## 2024-04-04 PROCEDURE — 99214 OFFICE O/P EST MOD 30 MIN: CPT | Mod: S$GLB,,, | Performed by: PHYSICIAN ASSISTANT

## 2024-04-05 PROBLEM — E66.813 CLASS 3 SEVERE OBESITY DUE TO EXCESS CALORIES WITH SERIOUS COMORBIDITY AND BODY MASS INDEX (BMI) OF 40.0 TO 44.9 IN ADULT: Status: ACTIVE | Noted: 2023-07-20

## 2024-04-05 NOTE — PROGRESS NOTES
Patient ID: Christine Castro is a 69 y.o. female.     Chief Complaint: Annual Exam    69 year old female with history of chronic pain, DM2, CHF presents to clinic for ED follow up. Pt was seen here 1 week ago for nonspecific pain to the left side of abdomen. Pt was given orders for labs and imaging, which she did not complete. Pt presented to ED on 4/2/2024 with worsening abdominal pain. Pt taken for abdominal CT and was found to have kidney stone to the left kidney. She was given pain medication as well as flomax to help stone pass. Pt reports that pain has improved, though she did not see a stone. Wants to know if she should take the medication. I advised it does appear she has a stone and I agree with all prescriptions. Denies fever, chills, dysuria, N/V/D.         Review of Systems  Review of Systems   Constitutional:  Negative for fever.   HENT:  Negative for ear pain and sinus pain.    Eyes:  Negative for discharge.   Respiratory:  Negative for cough and wheezing.    Cardiovascular:  Negative for chest pain and leg swelling.   Gastrointestinal:  Positive for abdominal pain. Negative for diarrhea, nausea and vomiting.   Genitourinary:  Negative for urgency.   Musculoskeletal:  Negative for myalgias.   Skin:  Negative for rash.   Neurological:  Negative for weakness and headaches.   Psychiatric/Behavioral:  Negative for depression.        Currently Medications  Current Outpatient Medications on File Prior to Visit   Medication Sig Dispense Refill    albuterol (PROVENTIL/VENTOLIN HFA) 90 mcg/actuation inhaler INHALE 2 PUFFS BY MOUTH EVERY 4 HOURS AS NEEDED FOR WHEEZING 18 g 3    aspirin (ECOTRIN) 81 MG EC tablet Take 1 tablet (81 mg total) by mouth once daily.  0    atorvastatin (LIPITOR) 40 MG tablet TAKE 1 TABLET(40 MG) BY MOUTH EVERY DAY FOR CHOLESTEROL 90 tablet 3    azithromycin (Z-MARY) 250 MG tablet Take 1 tablet (250 mg total) by mouth once daily. Take first 2 tablets together, then 1 every day until  finished. 6 tablet 0    buPROPion (WELLBUTRIN XL) 300 MG 24 hr tablet Take 1 tablet (300 mg total) by mouth once daily. 90 tablet 3    busPIRone (BUSPAR) 10 MG tablet Take 1 tablet (10 mg total) by mouth Daily. Pt is taking 10 mg once daily 90 tablet 3    diabetic supplies, miscellan. Misc Lancets for 1-2 times a day testing    dispense brand covered by insurance t (Patient taking differently: Lancets for 1-2 times a day testing    dispense brand covered by insurance t) 60 each 3    diazePAM (VALIUM) 5 MG tablet TAKE 1 TABLET BY MOUTH EVERY 24 HOURS AS NEEDED FOR ANXIETY 30 tablet 2    diclofenac sodium (VOLTAREN) 1 % Gel APPLY 2 GRAMS TOPICALLY TO THE AFFECTED AREA FOUR TIMES DAILY 300 g 5    doxycycline (VIBRA-TABS) 100 MG tablet Take 1 tablet (100 mg total) by mouth 2 (two) times daily. Any generic brand capsule or tablet 20 tablet 0    ergocalciferol (ERGOCALCIFEROL) 50,000 unit Cap TAKE 1 CAPSULE BY MOUTH EVERY 7 DAYS 12 capsule 3    EScitalopram oxalate (LEXAPRO) 20 MG tablet TAKE 1 TABLET(20 MG) BY MOUTH EVERY DAY 90 tablet 3    furosemide (LASIX) 20 MG tablet TAKE 1 TABLET(20 MG) BY MOUTH DAILY AS NEEDED 30 tablet 3    gabapentin (NEURONTIN) 100 MG capsule Take 1 capsule (100 mg total) by mouth 3 (three) times daily. 90 capsule 3    glipiZIDE (GLUCOTROL) 2.5 MG TR24 TAKE 1 TABLET(2.5 MG) BY MOUTH DAILY WITH BREAKFAST 90 tablet 3    HYDROcodone-acetaminophen (NORCO) 5-325 mg per tablet Take 1 tablet by mouth every 6 (six) hours as needed for Pain. 12 tablet 0    ibandronate (BONIVA) 150 mg tablet TAKE 1 TABLET BY MOUTH EVERY 30 DAYS FOR OSTEOPOROSIS 3 tablet 1    levothyroxine (SYNTHROID) 100 MCG tablet Take 1 tablet (100 mcg total) by mouth once daily. 90 tablet 3    methocarbamoL (ROBAXIN) 750 MG Tab Take 1 tablet (750 mg total) by mouth 3 (three) times daily as needed (muscle spasms). 60 tablet 0    naloxone (NARCAN) 4 mg/actuation Spry 4mg by nasal route as needed for opioid overdose; may repeat every 2-3  minutes in alternating nostrils until medical help arrives. Call 911 1 each 11    omeprazole (PRILOSEC) 40 MG capsule Take 1 capsule (40 mg total) by mouth every morning. 90 capsule 2    ondansetron (ZOFRAN-ODT) 4 MG TbDL Take 1 tablet (4 mg total) by mouth every 6 (six) hours as needed (Nausea). 28 tablet 0    oxyCODONE-acetaminophen (PERCOCET)  mg per tablet Take 1 tablet by mouth every 8 (eight) hours as needed for Pain. 90 tablet 0    potassium chloride (KLOR-CON) 10 MEQ TbSR TAKE 1 TABLET BY MOUTH EVERY DAY WHEN YOU TAKE LASIX 90 tablet 0    predniSONE (DELTASONE) 20 MG tablet One tablet daily by mouth for three days 3 tablet 0    promethazine (PHENERGAN) 25 MG tablet TAKE 1 TABLET(25 MG) BY MOUTH EVERY 6 HOURS AS NEEDED 25 tablet 0    SUPREP BOWEL PREP KIT 17.5-3.13-1.6 gram SolR use as directed      tamsulosin (FLOMAX) 0.4 mg Cap Take 1 capsule (0.4 mg total) by mouth once daily. for 14 days 14 capsule 0    TRUE METRIX GLUCOSE METER Misc USE TWICE DAILY TO CHECK BLOOD SUGAR 1 each 0    TRUE METRIX GLUCOSE TEST STRIP Strp TO TEST ONCE TO TWICE DAILY 50 strip 11    TRUE METRIX GLUCOSE TEST STRIP Strp TEST BLOOD SUGAR EVERY  strip 3    TRUEPLUS LANCETS 30 gauge Misc USE TO TEST ONCE TO TWICE D      zolpidem (AMBIEN) 5 MG Tab Take 1 tablet (5 mg total) by mouth every evening. 30 tablet 0     No current facility-administered medications on file prior to visit.       Physical  Exam  Vitals:    04/04/24 1146   BP: 138/86   BP Location: Right arm   Patient Position: Sitting   Pulse: 97   Temp: 98.2 °F (36.8 °C)   SpO2: 96%   Weight: 98.1 kg (216 lb 6.1 oz)   Height: 5' (1.524 m)      Body mass index is 42.26 kg/m².  Wt Readings from Last 3 Encounters:   04/04/24 98.1 kg (216 lb 6.1 oz)   04/02/24 131.5 kg (290 lb)   03/22/24 102.1 kg (225 lb 1.4 oz)       Physical Exam  Vitals and nursing note reviewed.   Constitutional:       General: She is not in acute distress.     Appearance: She is morbidly obese.  She is not ill-appearing.      Comments: Ambulates with walker; malodorous, disheveled    HENT:      Head: Normocephalic and atraumatic.      Right Ear: External ear normal.      Left Ear: External ear normal.      Nose: Nose normal.      Mouth/Throat:      Mouth: Mucous membranes are moist.   Eyes:      Extraocular Movements: Extraocular movements intact.      Conjunctiva/sclera: Conjunctivae normal.   Cardiovascular:      Rate and Rhythm: Normal rate and regular rhythm.      Pulses: Normal pulses.      Heart sounds: No murmur heard.  Pulmonary:      Effort: Pulmonary effort is normal. No respiratory distress.      Breath sounds: No wheezing.   Abdominal:      General: There is no distension.      Palpations: Abdomen is soft. There is no mass.      Tenderness: There is no abdominal tenderness.   Musculoskeletal:         General: No swelling.      Cervical back: Normal range of motion.   Skin:     Coloration: Skin is not jaundiced.      Findings: No rash.   Neurological:      General: No focal deficit present.      Mental Status: She is alert and oriented to person, place, and time.   Psychiatric:         Attention and Perception: She is inattentive.         Mood and Affect: Mood normal.         Speech: Speech is slurred.         Behavior: Behavior is slowed.         Thought Content: Thought content normal.         Labs:    Complete Blood Count  Lab Results   Component Value Date    RBC 3.29 (L) 04/02/2024    HGB 10.3 (L) 04/02/2024    HCT 31.8 (L) 04/02/2024    MCV 97 04/02/2024    MCH 31.3 (H) 04/02/2024    MCHC 32.4 04/02/2024    RDW 13.7 04/02/2024     04/02/2024    MPV 9.8 04/02/2024    GRAN 5.8 04/02/2024    GRAN 82.3 (H) 04/02/2024    LYMPH 0.9 (L) 04/02/2024    LYMPH 12.6 (L) 04/02/2024    MONO 0.2 (L) 04/02/2024    MONO 3.1 (L) 04/02/2024    EOS 0.1 04/02/2024    BASO 0.02 04/02/2024    EOSINOPHIL 1.6 04/02/2024    BASOPHIL 0.3 04/02/2024    DIFFMETHOD Automated 04/02/2024       Comprehensive  "Metabolic Panel  Lab Results   Component Value Date     04/02/2024    BUN 18 (H) 04/02/2024    CREATININE 1.16 04/02/2024     04/02/2024    K 4.2 04/02/2024     04/02/2024    PROT 7.1 04/02/2024    ALBUMIN 3.8 04/02/2024    BILITOT 0.5 04/02/2024    AST 32 04/02/2024    ALKPHOS 69 04/02/2024    CO2 26 04/02/2024    ALT 19 04/02/2024    ANIONGAP 9 04/02/2024       TSH  No results found for: "TSH"    Imaging:  CT Abdomen Pelvis With IV Contrast NO Oral Contrast  Narrative: EXAMINATION:  CT ABDOMEN PELVIS WITH IV CONTRAST    CLINICAL HISTORY:  Abdominal pain, acute, nonlocalized;    TECHNIQUE:  Low dose axial images, sagittal and coronal reformations were obtained from the lung bases to the pubic symphysis following the IV administration of four mL of Omnipaque 350 iterative technique automated exposure control    COMPARISON:  None.    FINDINGS:  Liver borderline enlarged.  Gallbladder absent.  Spleen normal size. Pancreas fatty atrophic    Kidneys without hydronephrosis. Mild left hydroureter with 3 mm stone at the left UVJ. Urinary bladder decompressed    Surgical bowel change with no obstructive or inflammatory findings. Appendix normal caliber. No fluid collection seen    No aortic aneurysm  Impression: Stone at the left UVJ with mild hydroureter    Electronically signed by: Natalia Blakely  Date:    04/02/2024  Time:    20:21      Assessment/Plan:    1. LLQ abdominal pain  Comments:  - Dx L nephrolithiasis    2. Left nephrolithiasis  Comments:  - Continue flomax as direted  - Follow if no relief    3. Class 3 severe obesity due to excess calories with serious comorbidity and body mass index (BMI) of 40.0 to 44.9 in adult  Comments:  - Advised on healthy diet and exercise  - Advised on increased physical activity         Discussed how to stay healthy including: diet, exercise, refraining from smoking and discussed screening exams / tests needed for age, sex and family Hx.    RTC 3-6 months " with PCP    Rosa Langston PA-C

## 2024-04-15 NOTE — PROGRESS NOTES
Subjective:       Patient ID: Christine Castro is a 69 y.o. female.    Chief Complaint: kidney stone     This is a 69 y.o.  female patient that is new to me.  The patient was referred to me by DOMENICO Ott for left ureteral stone. Reported to the ED on 4/2/24 with an increase in left sided abdominal/ flank pain that has progressively gotten worse within the past 2 weeks. Was discharged with tamsulosin, zofran, and norco.  CT scan 4/2/24: Kidneys without hydronephrosis. Mild left hydroureter with 3 mm stone at the left UVJ.   Today patient reports that left sided pain has resolved within the last couple of days. Denies nausea, vomiting, fevers, chills, LUTS, hematuria, dysuria.   Reports that she has been taking the tamsulosin daily, she has not noticed a stone being passed.  Risk factors for urolithiasis: cola soft drink intake, family history of stone disease, and poor fluid intake. Mother had kidney stones. Denies ever having kidney stones before. Denies family history or urologic cancers.        Lab Results   Component Value Date    CREATININE 1.16 04/02/2024       ---  PMH/PSH/Medications/Allergies/Social history reviewed and as in chart.    Review of Systems   Constitutional:  Negative for chills and fever.   Respiratory:  Negative for shortness of breath.    Cardiovascular:  Negative for chest pain and palpitations.   Gastrointestinal:  Negative for abdominal pain, constipation, diarrhea, nausea and vomiting.   Genitourinary:  Negative for difficulty urinating, dysuria, flank pain, frequency, hematuria, pelvic pain, urgency and vaginal pain.   Neurological:  Negative for dizziness and weakness.   Psychiatric/Behavioral:  Negative for agitation, confusion and sleep disturbance.      Objective:      Physical Exam  HENT:      Head: Normocephalic.   Pulmonary:      Effort: Pulmonary effort is normal.   Abdominal:      General: Abdomen is flat.      Palpations: Abdomen is soft.   Musculoskeletal:          General: Normal range of motion.      Cervical back: Normal range of motion.   Skin:     General: Skin is warm and dry.   Neurological:      Mental Status: She is alert and oriented to person, place, and time.      Motor: Weakness present.       Assessment:     Problem Noted   Left Ureteral Stone 4/16/2024       Plan:     Options for small ureteral stone discussed:  MET (medical expulsive therapy), ureteroscopy/stent, ESWL.  Risks, benefits and alternatives of each discussed.  Patient wishes for MET. Continue taking tamsulosin daily, strain urine, drink 2L of water per day.  Rx sent for tamsulosin x30 days.  If intractable pain, nausea and vomiting limiting PO intake, fever needs to go to ED.     Strategies for stone prevention discussed.  This includes limiting salt and red meat, hydration (preferentially with water) to ensure at least 2.5 liters of urine output daily, adding citrate to diet with lemon juice or suitable alternative, limit soda and tea, and only consume recommended normal doses of OTC vitamins/supplements. Discussed eliminating sodas from her diet.    CT renal stone in 1 month  Follow-up 1 month after CT scan.    NEIDA Lozano    I spent a total of 25 minutes on the day of the visit.This includes face to face time and non-face to face time preparing to see the patient (eg, review of tests), obtaining and/or reviewing separately obtained history, documenting clinical information in the electronic or other health record, independently interpreting results and communicating results to the patient/family/caregiver, or care coordinator.

## 2024-04-16 ENCOUNTER — LAB VISIT (OUTPATIENT)
Dept: LAB | Facility: HOSPITAL | Age: 69
End: 2024-04-16
Attending: PHYSICIAN ASSISTANT
Payer: MEDICARE

## 2024-04-16 ENCOUNTER — OFFICE VISIT (OUTPATIENT)
Dept: UROLOGY | Facility: CLINIC | Age: 69
End: 2024-04-16
Payer: MEDICARE

## 2024-04-16 ENCOUNTER — HOSPITAL ENCOUNTER (OUTPATIENT)
Dept: RADIOLOGY | Facility: HOSPITAL | Age: 69
Discharge: HOME | End: 2024-04-16
Attending: PHYSICIAN ASSISTANT
Payer: MEDICARE

## 2024-04-16 VITALS
HEIGHT: 60 IN | HEART RATE: 75 BPM | BODY MASS INDEX: 42.26 KG/M2 | DIASTOLIC BLOOD PRESSURE: 76 MMHG | RESPIRATION RATE: 16 BRPM | TEMPERATURE: 98 F | SYSTOLIC BLOOD PRESSURE: 103 MMHG

## 2024-04-16 DIAGNOSIS — R10.32 LLQ ABDOMINAL PAIN: ICD-10-CM

## 2024-04-16 DIAGNOSIS — E03.9 HYPOTHYROIDISM (ACQUIRED): ICD-10-CM

## 2024-04-16 DIAGNOSIS — E11.40 TYPE 2 DIABETES MELLITUS WITH DIABETIC NEUROPATHY, WITHOUT LONG-TERM CURRENT USE OF INSULIN: ICD-10-CM

## 2024-04-16 DIAGNOSIS — N20.1 LEFT URETERAL STONE: Primary | ICD-10-CM

## 2024-04-16 DIAGNOSIS — E87.6 HYPOKALEMIA: Primary | ICD-10-CM

## 2024-04-16 DIAGNOSIS — N20.0 KIDNEY STONE: ICD-10-CM

## 2024-04-16 LAB
ALBUMIN SERPL BCP-MCNC: 3.1 G/DL (ref 3.5–5.2)
ALP SERPL-CCNC: 66 U/L (ref 55–135)
ALT SERPL W/O P-5'-P-CCNC: 15 U/L (ref 10–44)
ANION GAP SERPL CALC-SCNC: 7 MMOL/L (ref 8–16)
AST SERPL-CCNC: 20 U/L (ref 10–40)
BASOPHILS # BLD AUTO: 0.02 K/UL (ref 0–0.2)
BASOPHILS NFR BLD: 0.4 % (ref 0–1.9)
BILIRUB SERPL-MCNC: 0.2 MG/DL (ref 0.1–1)
BUN SERPL-MCNC: 12 MG/DL (ref 8–23)
CALCIUM SERPL-MCNC: 8.8 MG/DL (ref 8.7–10.5)
CHLORIDE SERPL-SCNC: 113 MMOL/L (ref 95–110)
CHOLEST SERPL-MCNC: 147 MG/DL (ref 120–199)
CHOLEST/HDLC SERPL: 3.3 {RATIO} (ref 2–5)
CO2 SERPL-SCNC: 22 MMOL/L (ref 23–29)
CREAT SERPL-MCNC: 0.9 MG/DL (ref 0.5–1.4)
DIFFERENTIAL METHOD BLD: ABNORMAL
EOSINOPHIL # BLD AUTO: 0.3 K/UL (ref 0–0.5)
EOSINOPHIL NFR BLD: 5.3 % (ref 0–8)
ERYTHROCYTE [DISTWIDTH] IN BLOOD BY AUTOMATED COUNT: 14.1 % (ref 11.5–14.5)
EST. GFR  (NO RACE VARIABLE): >60 ML/MIN/1.73 M^2
ESTIMATED AVG GLUCOSE: 105 MG/DL (ref 68–131)
GLUCOSE SERPL-MCNC: 50 MG/DL (ref 70–110)
HBA1C MFR BLD: 5.3 % (ref 4–5.6)
HCT VFR BLD AUTO: 32.7 % (ref 37–48.5)
HDLC SERPL-MCNC: 44 MG/DL (ref 40–75)
HDLC SERPL: 29.9 % (ref 20–50)
HGB BLD-MCNC: 10.4 G/DL (ref 12–16)
IMM GRANULOCYTES # BLD AUTO: 0.01 K/UL (ref 0–0.04)
IMM GRANULOCYTES NFR BLD AUTO: 0.2 % (ref 0–0.5)
LDLC SERPL CALC-MCNC: 86.2 MG/DL (ref 63–159)
LYMPHOCYTES # BLD AUTO: 1.8 K/UL (ref 1–4.8)
LYMPHOCYTES NFR BLD: 32.6 % (ref 18–48)
MCH RBC QN AUTO: 30.8 PG (ref 27–31)
MCHC RBC AUTO-ENTMCNC: 31.8 G/DL (ref 32–36)
MCV RBC AUTO: 97 FL (ref 82–98)
MONOCYTES # BLD AUTO: 0.3 K/UL (ref 0.3–1)
MONOCYTES NFR BLD: 6.2 % (ref 4–15)
NEUTROPHILS # BLD AUTO: 3.1 K/UL (ref 1.8–7.7)
NEUTROPHILS NFR BLD: 55.3 % (ref 38–73)
NONHDLC SERPL-MCNC: 103 MG/DL
NRBC BLD-RTO: 0 /100 WBC
PLATELET # BLD AUTO: 225 K/UL (ref 150–450)
PMV BLD AUTO: 10.1 FL (ref 9.2–12.9)
POTASSIUM SERPL-SCNC: 3.1 MMOL/L (ref 3.5–5.1)
PROT SERPL-MCNC: 6.5 G/DL (ref 6–8.4)
RBC # BLD AUTO: 3.38 M/UL (ref 4–5.4)
SODIUM SERPL-SCNC: 142 MMOL/L (ref 136–145)
T4 FREE SERPL-MCNC: 0.76 NG/DL (ref 0.71–1.51)
TRIGL SERPL-MCNC: 84 MG/DL (ref 30–150)
TSH SERPL DL<=0.005 MIU/L-ACNC: 8.15 UIU/ML (ref 0.4–4)
WBC # BLD AUTO: 5.52 K/UL (ref 3.9–12.7)

## 2024-04-16 PROCEDURE — 99215 OFFICE O/P EST HI 40 MIN: CPT | Mod: PBBFAC,25,PO

## 2024-04-16 PROCEDURE — 85025 COMPLETE CBC W/AUTO DIFF WBC: CPT | Performed by: PHYSICIAN ASSISTANT

## 2024-04-16 PROCEDURE — 74018 RADEX ABDOMEN 1 VIEW: CPT | Mod: 26,,, | Performed by: RADIOLOGY

## 2024-04-16 PROCEDURE — 84443 ASSAY THYROID STIM HORMONE: CPT | Performed by: PHYSICIAN ASSISTANT

## 2024-04-16 PROCEDURE — 84439 ASSAY OF FREE THYROXINE: CPT | Performed by: PHYSICIAN ASSISTANT

## 2024-04-16 PROCEDURE — 80053 COMPREHEN METABOLIC PANEL: CPT | Performed by: PHYSICIAN ASSISTANT

## 2024-04-16 PROCEDURE — 99999 PR PBB SHADOW E&M-EST. PATIENT-LVL V: CPT | Mod: PBBFAC,,,

## 2024-04-16 PROCEDURE — 80061 LIPID PANEL: CPT | Performed by: PHYSICIAN ASSISTANT

## 2024-04-16 PROCEDURE — 36415 COLL VENOUS BLD VENIPUNCTURE: CPT | Performed by: PHYSICIAN ASSISTANT

## 2024-04-16 PROCEDURE — 83036 HEMOGLOBIN GLYCOSYLATED A1C: CPT | Performed by: PHYSICIAN ASSISTANT

## 2024-04-16 PROCEDURE — 74018 RADEX ABDOMEN 1 VIEW: CPT | Mod: TC,FY

## 2024-04-16 RX ORDER — TAMSULOSIN HYDROCHLORIDE 0.4 MG/1
0.4 CAPSULE ORAL DAILY
Qty: 30 CAPSULE | Refills: 0 | Status: SHIPPED | OUTPATIENT
Start: 2024-04-16 | End: 2024-05-20

## 2024-04-16 NOTE — PATIENT INSTRUCTIONS
If intractable pain, nausea and vomiting limiting PO intake, fever needs to go to ED.     Strategies for stone prevention discussed.  This includes limiting salt and red meat, hydration (preferentially with water) to ensure at least 2.5 liters of urine output daily, adding citrate to diet with lemon juice or suitable alternative, limit soda and tea, and only consume recommended normal doses of OTC vitamins/supplements.    Take tamsulosin 0.4mg daily in the morning.

## 2024-04-16 NOTE — PROGRESS NOTES
Please call patient and inform them that no acute process are seen. Moderate amount of stool throughout the colon which may be seen with constipation

## 2024-04-17 ENCOUNTER — TELEPHONE (OUTPATIENT)
Dept: FAMILY MEDICINE | Facility: CLINIC | Age: 69
End: 2024-04-17
Payer: MEDICARE

## 2024-04-17 DIAGNOSIS — N18.31 CHRONIC KIDNEY DISEASE, STAGE 3A: Primary | ICD-10-CM

## 2024-04-17 NOTE — TELEPHONE ENCOUNTER
Pt has not been taking thyroid med like she was supposed to. She resumed regular dosage yesterday. She has been notified to take 20 meq of potassium today only. Any repeat labs?

## 2024-04-17 NOTE — TELEPHONE ENCOUNTER
LM for pt to call back clinic    ----- Message from Rosa Langston PA-C sent at 4/16/2024  3:47 PM CDT -----  Elevated TSH, subclinically low T4. Are you taking medication correctly? Potassium is low. Please call patient and tell her to take 2 potassium tablets today (20mg). Please advise

## 2024-04-20 RX ORDER — OXYCODONE AND ACETAMINOPHEN 10; 325 MG/1; MG/1
1 TABLET ORAL EVERY 8 HOURS PRN
Qty: 90 TABLET | Refills: 0 | Status: SHIPPED | OUTPATIENT
Start: 2024-04-20 | End: 2024-05-20 | Stop reason: SDUPTHER

## 2024-04-27 NOTE — TELEPHONE ENCOUNTER
No care due was identified.  Health [facility] Embedded Care Due Messages. Reference number: [id number].   4/27/2024 4:26:30 PM CDT  
Detail Level: Detailed
Quality 226: Preventive Care And Screening: Tobacco Use: Screening And Cessation Intervention: Patient screened for tobacco use and is an ex/non-smoker
Quality 130: Documentation Of Current Medications In The Medical Record: Current Medications Documented
Quality 431: Preventive Care And Screening: Unhealthy Alcohol Use - Screening: Patient not identified as an unhealthy alcohol user when screened for unhealthy alcohol use using a systematic screening method

## 2024-04-28 RX ORDER — ZOLPIDEM TARTRATE 5 MG/1
5 TABLET ORAL NIGHTLY
Qty: 30 TABLET | Refills: 3 | Status: SHIPPED | OUTPATIENT
Start: 2024-04-28

## 2024-04-30 DIAGNOSIS — Z00.00 ENCOUNTER FOR MEDICARE ANNUAL WELLNESS EXAM: ICD-10-CM

## 2024-05-10 ENCOUNTER — TELEPHONE (OUTPATIENT)
Dept: FAMILY MEDICINE | Facility: CLINIC | Age: 69
End: 2024-05-10
Payer: MEDICARE

## 2024-05-10 NOTE — TELEPHONE ENCOUNTER
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 Please email this clearance to [email or web address]  As soon as possible.  Would the patient rather a call back or a response via [facility]? Call back  Best Call Back Number:  [phone]  Additional Information:    ISRRAEL Caicedo I wrote letter and emailed to [employer]

## 2024-05-10 NOTE — LETTER
May 10, 2024    [employer]             [facility]  [address] [address]  Phone: [phone]  Fax: [phone] Re: Christine Castro  [address] [address]     To Whom It May Concern:    Ms Castro is a long time patient of mine with many ailments. 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 Can you please provide her with a handicap sticker for his garbage can? Please feel free to reach out to my office with any questions or concerns.    Sincerely,      Dr Alon Santiago

## 2024-05-16 ENCOUNTER — HOSPITAL ENCOUNTER (OUTPATIENT)
Dept: RADIOLOGY | Facility: HOSPITAL | Age: 69
Discharge: HOME | End: 2024-05-16
Payer: MEDICARE

## 2024-05-16 DIAGNOSIS — N20.1 LEFT URETERAL STONE: ICD-10-CM

## 2024-05-16 PROCEDURE — 74176 CT ABD & PELVIS W/O CONTRAST: CPT | Mod: TC,HCNC,PN

## 2024-05-16 PROCEDURE — 74176 CT ABD & PELVIS W/O CONTRAST: CPT | Mod: 26,HCNC,, | Performed by: RADIOLOGY

## 2024-05-17 NOTE — PROGRESS NOTES
Gastroenterology Consultation Note      Requesting Provider:  Josh Strange DO    Chief Complaint:  New Patient (Anemia, Cscope Consult )       History of Present Illness:    Jeri Strange is a  45 year old    female seen today for  New Patient (Anemia, Cscope Consult )    Patient very pleasant 45-year-old female who is due for colon cancer screening.  Patient's vegetarian and almost sounds like vegan, and has had iron deficiency anemia.  The patient has started taking iron supplement and also has been changing her diet to include more vegetables that are high in iron.  The patient's iron levels have gone up and her anemia has resolved.  Patient's not reporting any blood in her stools.  She is not having any abdominal discomfort or change in her bowel habits.  She has no family history of colon cancer.  I had a long discussion with the patient about colon cancer screening.  She was reluctant to do anything but after having a long discussion with her, she understands the importance of colonoscopy.    Problem List:    Patient Active Problem List   Diagnosis   • Thyromegaly   • Dense breasts   • Multiple thyroid nodules   • Iron deficiency       PMHx:  Past Medical History:   Diagnosis Date   • Allergy        PSHx:  No past surgical history on file.    Allergies:    ALLERGIES:   Allergen Reactions   • Penicillins RASH       Medications:    Current Outpatient Medications   Medication Sig Dispense Refill   • electrolyte/PEG 3350 (TriLyte) 420 g solution Take 4,000 mLs by mouth 1 time for 1 dose. 4000 mL 0   • fluticasone (FLONASE) 50 MCG/ACT nasal spray Spray 1 spray in each nostril daily. 16 g 6   • triamcinolone (ARISTOCORT) 0.1 % cream Apply topically 2 times daily. 45 g 1     No current facility-administered medications for this visit.       Social Hx:  Social History     Tobacco Use   • Smoking status: Never   • Smokeless tobacco: Never   Vaping Use   • Vaping Use: never used   Substance Use Topics   •  Subjective:       Patient ID: Christine Castro is a 69 y.o. female.    Chief Complaint: kidney stone     This is a 69 y.o.  female patient that is new to me.  The patient was referred to me by DOMENICO Ott for left ureteral stone. Reported to the ED on 4/2/24 with an increase in left sided abdominal/ flank pain that has progressively gotten worse within the past 2 weeks. Was discharged with tamsulosin, zofran, and norco.  CT scan 4/2/24: Kidneys without hydronephrosis. Mild left hydroureter with 3 mm stone at the left UVJ.   4/16/24: Today patient reports that left sided pain has resolved within the last couple of days. Denies nausea, vomiting, fevers, chills, LUTS, hematuria, dysuria.   Reports that she has been taking the tamsulosin daily, she has not noticed a stone being passed.  Risk factors for urolithiasis: cola soft drink intake, family history of stone disease, and poor fluid intake. Mother had kidney stones. Denies ever having kidney stones before. Denies family history or urologic cancers.  5/20/24: Reports that she is nauseous and vomiting but this has been going on for years. Reports that she has generalized pain constantly. She takes promethazine, Prilosec for nausea. She follows pain management for pain. Reports that she had hiatal hernia surgery as well as gastric bypass in 2000 by Dr. العراقي in [place]. Reports that she did not see the stone pass. Denies LUTS, dysuria, hematuria, fevers, chills, flank pain.  CT 5/16/24 shows No urinary calculi or obstruction is identified. Nonobstructing urinary calculus at the left UVJ has passed since the prior study performed 04/02/2024. Hiatal hernia present.        Lab Results   Component Value Date    CREATININE 0.9 04/16/2024       ---  PMH/PSH/Medications/Allergies/Social history reviewed and as in chart.    Review of Systems   Constitutional:  Negative for chills and fever.   Respiratory:  Negative for shortness of breath.    Cardiovascular:  Negative  Alcohol use: Yes     Alcohol/week: 5.0 standard drinks     Types: 5 Glasses of wine per week   • Drug use: Not Currently       GI Family History:  To the patient's knowledge there is no GI or hepatic disorders in either first or second-degree relatives    Review of Systems:   12 point review of systems undertaken and is negative except for that stated in HPI      Physical Exam:  WNWD individual in NAD, A0x3  Blood pressure 118/81, pulse 69, temperature 99.1 °F (37.3 °C), temperature source Oral, resp. rate 16, height 5' 3\" (1.6 m), weight 56.7 kg (125 lb), last menstrual period 08/28/2022, SpO2 99 %.     Wt Readings from Last 3 Encounters:   04/12/23 56.7 kg (125 lb)   08/29/22 56.7 kg (125 lb)   05/11/22 55.8 kg (123 lb)       Body mass index is 22.14 kg/m².    HEENT: NCAT, EOMI, PERRLA, No Jaundice  Neck: Supple, No JVD or RICHARD.  No Supraclavicular adenopathy, No mass.  Trachea midline  Back:  No CVA or Spinal Tenderness,   Lungs:  YELENA/BVBS, clear to auscultation and percussion B/L  CV:  RRR; +S1 & S2.  No S3 or S4; No M/G/R/T  Abdomen: Soft, NT, NABS, No HSM, No mass or Ascites.  Ext: No C/C/E  Neuro: CN II-XII Grossly intact; DTR's equal; No Asterixis  Musculoskeletal: Equal strength throughout  Skin: No rash      Impression: 45-year-old female who is due for colon cancer screening.  1. GI: Patient's low iron levels were likely multifactorial related to her menstrual period and her nutritional intake of iron.  As the patient's been doing iron supplements and changing her diet to include more vegetables with high iron, her iron levels have improved and her anemia has resolved.  I discussed with her doing a screening colonoscopy.  The patient would like to wait till the summer to have this done.  I went over the procedure itself, the prep instructions and she is agreeable to proceed.  We will plan on doing this over the summer for her.        Thank you for allowing me to participate in the care of this  for chest pain and palpitations.   Gastrointestinal:  Positive for nausea and vomiting. Negative for abdominal pain, constipation and diarrhea.   Genitourinary:  Negative for difficulty urinating, dysuria, flank pain, frequency, hematuria, pelvic pain, urgency and vaginal pain.   Neurological:  Negative for dizziness and weakness.   Psychiatric/Behavioral:  Negative for agitation, confusion and sleep disturbance.        Objective:      Physical Exam  HENT:      Head: Normocephalic.   Pulmonary:      Effort: Pulmonary effort is normal.   Abdominal:      General: Abdomen is flat.      Palpations: Abdomen is soft.   Musculoskeletal:         General: Normal range of motion.      Cervical back: Normal range of motion.   Skin:     General: Skin is warm and dry.   Neurological:      Mental Status: She is alert and oriented to person, place, and time.      Motor: Weakness present.       Assessment:     Problem Noted   History of Kidney Stones 5/20/2024   Nausea and Vomiting 5/20/2024   Left Ureteral Stone 4/16/2024       Plan:     Referral placed for GI and advised patient to follow-up with PCP.   If intractable pain, nausea and vomiting limiting PO intake, fever needs to go to ED.     Strategies for stone prevention discussed.  This includes limiting salt and red meat, hydration (preferentially with water) to ensure at least 2.5 liters of urine output daily, adding citrate to diet with lemon juice or suitable alternative, limit soda and tea, and only consume recommended normal doses of OTC vitamins/supplements. Discussed eliminating sodas from her diet.    FAHAD and KUB scheduled for 6 months  Follow-up 6 months to review FAHAD and KUB.    NEIDA Lozano    I spent a total of 25 minutes on the day of the visit.This includes face to face time and non-face to face time preparing to see the patient (eg, review of tests), obtaining and/or reviewing separately obtained history, documenting clinical information in the electronic  or other health record, independently interpreting results and communicating results to the patient/family/caregiver, or care coordinator.               patient.      Jose Sepulveda MD, FASGE  4/12/2023  Cc: Josh Strange, DO        Patient Privacy Notice     The 21st Century Cures Act makes medical notes like these available to patients in the interest of transparency. Please be advised that this is a medical document. Medical documents are intended to carry relevant information and the clinical opinion of the practitioner. The medical note is intended as medical provider to provider communication, and may appear blunt or direct. It is written in medical language, and may contain abbreviations or verbiage that are unfamiliar    This note was generated in part using \"Dragon\" voice recognition technology. The report was reviewed by this physician, but still may have unintentional errors due to inherent limitations of voice recognition technology

## 2024-05-20 ENCOUNTER — OFFICE VISIT (OUTPATIENT)
Dept: UROLOGY | Facility: CLINIC | Age: 69
End: 2024-05-20
Payer: MEDICARE

## 2024-05-20 VITALS — SYSTOLIC BLOOD PRESSURE: 107 MMHG | HEART RATE: 98 BPM | DIASTOLIC BLOOD PRESSURE: 79 MMHG

## 2024-05-20 DIAGNOSIS — Z87.442 HISTORY OF KIDNEY STONES: ICD-10-CM

## 2024-05-20 DIAGNOSIS — R11.2 NAUSEA AND VOMITING, UNSPECIFIED VOMITING TYPE: Primary | ICD-10-CM

## 2024-05-20 PROBLEM — N20.1 LEFT URETERAL STONE: Status: RESOLVED | Noted: 2024-04-16 | Resolved: 2024-05-20

## 2024-05-20 PROCEDURE — 1157F ADVNC CARE PLAN IN RCRD: CPT | Mod: HCNC,CPTII,S$GLB,

## 2024-05-20 PROCEDURE — 99213 OFFICE O/P EST LOW 20 MIN: CPT | Mod: HCNC,S$GLB,,

## 2024-05-20 PROCEDURE — 3061F NEG MICROALBUMINURIA REV: CPT | Mod: HCNC,CPTII,S$GLB,

## 2024-05-20 PROCEDURE — 3066F NEPHROPATHY DOC TX: CPT | Mod: HCNC,CPTII,S$GLB,

## 2024-05-20 PROCEDURE — 3044F HG A1C LEVEL LT 7.0%: CPT | Mod: HCNC,CPTII,S$GLB,

## 2024-05-20 PROCEDURE — 1159F MED LIST DOCD IN RCRD: CPT | Mod: HCNC,CPTII,S$GLB,

## 2024-05-20 PROCEDURE — 3078F DIAST BP <80 MM HG: CPT | Mod: HCNC,CPTII,S$GLB,

## 2024-05-20 PROCEDURE — 3074F SYST BP LT 130 MM HG: CPT | Mod: HCNC,CPTII,S$GLB,

## 2024-05-20 PROCEDURE — 99999 PR PBB SHADOW E&M-EST. PATIENT-LVL V: CPT | Mod: PBBFAC,HCNC,,

## 2024-05-20 PROCEDURE — 1125F AMNT PAIN NOTED PAIN PRSNT: CPT | Mod: HCNC,CPTII,S$GLB,

## 2024-05-20 PROCEDURE — 1160F RVW MEDS BY RX/DR IN RCRD: CPT | Mod: HCNC,CPTII,S$GLB,

## 2024-05-21 RX ORDER — OXYCODONE AND ACETAMINOPHEN 10; 325 MG/1; MG/1
1 TABLET ORAL EVERY 8 HOURS PRN
Qty: 90 TABLET | Refills: 0 | Status: SHIPPED | OUTPATIENT
Start: 2024-05-21 | End: 2024-06-18 | Stop reason: SDUPTHER

## 2024-06-04 NOTE — TELEPHONE ENCOUNTER
Care Due:                  Date            Visit Type   Department     Provider  --------------------------------------------------------------------------------                                EP -                              PRIMARY      [facility] FAMILY  Last Visit: 01-      CARE ([facility])   MEDICINE       Alon Santiago  Next Visit: None Scheduled  None         None Found                                                            Last  Test          Frequency    Reason                     Performed    Due Date  --------------------------------------------------------------------------------    Phosphate...  12 months..  ibandronate..............  Not Found    Overdue    Vitamin D...  12 months..  ergocalciferol,            Not Found    Overdue                             ibandronate..............    [employer] Embedded Care Due Messages. Reference number: [id number].   6/04/2024 4:20:20 PM CDT

## 2024-06-05 RX ORDER — POTASSIUM CHLORIDE 750 MG/1
TABLET, EXTENDED RELEASE ORAL
Qty: 90 TABLET | Refills: 0 | Status: SHIPPED | OUTPATIENT
Start: 2024-06-05

## 2024-06-05 RX ORDER — DIAZEPAM 5 MG/1
TABLET ORAL
Qty: 30 TABLET | Refills: 1 | Status: SHIPPED | OUTPATIENT
Start: 2024-06-05

## 2024-06-06 RX ORDER — GABAPENTIN 100 MG/1
100 CAPSULE ORAL 3 TIMES DAILY
Qty: 90 CAPSULE | Refills: 3 | Status: SHIPPED | OUTPATIENT
Start: 2024-06-06

## 2024-06-06 RX ORDER — GLIPIZIDE 2.5 MG/1
TABLET, EXTENDED RELEASE ORAL
Qty: 90 TABLET | Refills: 3 | Status: SHIPPED | OUTPATIENT
Start: 2024-06-06

## 2024-06-06 RX ORDER — ATORVASTATIN CALCIUM 40 MG/1
TABLET, FILM COATED ORAL
Qty: 90 TABLET | Refills: 3 | Status: SHIPPED | OUTPATIENT
Start: 2024-06-06

## 2024-06-06 RX ORDER — ESCITALOPRAM OXALATE 20 MG/1
TABLET ORAL
Qty: 90 TABLET | Refills: 3 | Status: SHIPPED | OUTPATIENT
Start: 2024-06-06

## 2024-06-06 RX ORDER — LEVOTHYROXINE SODIUM 100 UG/1
100 TABLET ORAL DAILY
Qty: 90 TABLET | Refills: 3 | Status: SHIPPED | OUTPATIENT
Start: 2024-06-06
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
## 2024-06-06 NOTE — TELEPHONE ENCOUNTER
No care due was identified.  [facility] Embedded Care Due Messages. Reference number: [id number].   6/06/2024 3:41:22 PM CDT

## 2024-06-07 ENCOUNTER — TELEPHONE (OUTPATIENT)
Dept: FAMILY MEDICINE | Facility: CLINIC | Age: 69
End: 2024-06-07
Payer: MEDICARE

## 2024-06-07 NOTE — TELEPHONE ENCOUNTER
Spoke with [facility] they will not fill gabapentin b/c pt fills at [facility] and b/c of interaction with ambien

## 2024-06-07 NOTE — TELEPHONE ENCOUNTER
----- Message from Mayda Santana sent at 6/7/2024  8:39 AM CDT -----  Type:  Pharmacy Calling to Clarify an RX    Name of Caller:  Pharmacy Name:[facility]   Prescription Name: gabapentin (NEURONTIN) 100 MG capsule   What do they need to clarify?:can not fill  Best Call Back Number:[phone]  Additional Information: would like a call back after 9am

## 2024-06-19 RX ORDER — OXYCODONE AND ACETAMINOPHEN 10; 325 MG/1; MG/1
1 TABLET ORAL EVERY 8 HOURS PRN
Qty: 90 TABLET | Refills: 0 | Status: SHIPPED | OUTPATIENT
Start: 2024-06-19

## 2024-06-28 ENCOUNTER — PATIENT OUTREACH (OUTPATIENT)
Dept: ADMINISTRATIVE | Facility: OTHER | Age: 69
End: 2024-06-28
Payer: MEDICARE

## 2024-06-28 NOTE — PROGRESS NOTES
CHW - Case Closure    This Community Health Worker spoke to BEVERLY Bowling over the phone today.   Pt/Caregiver reported: No updates reported   Pt/Caregiver denied any additional needs at this time and agrees with episode closure at this time.  Provided BEVERLY Bowling with Community Health Worker's contact information and encouraged him/her to contact this Community Health Worker if additional needs arise.

## 2024-07-05 ENCOUNTER — TELEPHONE (OUTPATIENT)
Dept: FAMILY MEDICINE | Facility: CLINIC | Age: 69
End: 2024-07-05
Payer: MEDICARE

## 2024-07-05 DIAGNOSIS — M62.81 MUSCLE WEAKNESS: Primary | ICD-10-CM

## 2024-07-05 DIAGNOSIS — M19.90 ARTHRITIS: ICD-10-CM

## 2024-07-05 NOTE — TELEPHONE ENCOUNTER
----- Message from Gina aCnas sent at 7/5/2024  8:02 AM CDT -----  Type:  Request for a Riding Mobile     Who Called: Pt   Would the patient rather a call back or a response via [facility]? Call back   Best Call Back Number: [phone]  Additional Information: Please be advised, pt states that she would like to request to put in an order for her for a riding mobile to get around, pt states she has a convention to go to towards the end of the month and would like to get it as soon as possible

## 2024-07-18 ENCOUNTER — TELEPHONE (OUTPATIENT)
Dept: FAMILY MEDICINE | Facility: CLINIC | Age: 69
End: 2024-07-18
Payer: MEDICARE

## 2024-07-18 ENCOUNTER — TELEPHONE (OUTPATIENT)
Dept: PHYSICAL MEDICINE AND REHAB | Facility: CLINIC | Age: 69
End: 2024-07-18
Payer: MEDICARE

## 2024-07-18 DIAGNOSIS — K21.9 GASTROESOPHAGEAL REFLUX DISEASE WITHOUT ESOPHAGITIS: ICD-10-CM

## 2024-07-18 RX ORDER — OMEPRAZOLE 40 MG/1
40 CAPSULE, DELAYED RELEASE ORAL EVERY MORNING
Qty: 90 CAPSULE | Refills: 2 | Status: SHIPPED | OUTPATIENT
Start: 2024-07-18

## 2024-07-18 RX ORDER — OXYCODONE AND ACETAMINOPHEN 10; 325 MG/1; MG/1
1 TABLET ORAL EVERY 8 HOURS PRN
Qty: 90 TABLET | Refills: 0 | Status: SHIPPED | OUTPATIENT
Start: 2024-07-18

## 2024-07-18 RX ORDER — FUROSEMIDE 20 MG/1
20 TABLET ORAL DAILY PRN
Qty: 30 TABLET | Refills: 3 | Status: SHIPPED | OUTPATIENT
Start: 2024-07-18

## 2024-07-18 NOTE — TELEPHONE ENCOUNTER
----- Message from Cristiana Estrella sent at 7/18/2024  9:22 AM CDT -----  Regarding: Refill  Contact: [phone]  Type:  RX Refill Request    Who Called: Christine  Refill or New Rx: refill  RX Name and Strength:Omeprozole and lasix  How is the patient currently taking it? (ex. 1XDay):  Is this a 30 day or 90 day RX:  Preferred Pharmacy with phone number: [facility] [phone]  Local or Mail Order: local  Ordering Provider:[facility]  Would the patient rather a call back or a response via [facility]? Call  Best Call Back Number: [phone]  Additional Information:  Patient stated rx was supposed to be called in 2 weeks ago. Patient also need to speak to someone about her motorized wheelchair

## 2024-07-18 NOTE — TELEPHONE ENCOUNTER
Care Due:                  Date            Visit Type   Department     Provider  --------------------------------------------------------------------------------                                EP -                              PRIMARY      [facility] FAMILY  Last Visit: 01-      CARE ([facility])   MEDICINE       Alon Santiago  Next Visit: None Scheduled  None         None Found                                                            Last  Test          Frequency    Reason                     Performed    Due Date  --------------------------------------------------------------------------------    HBA1C.......  6 months...  glipiZIDE................  04-   10-    [facility] Embedded Care Due Messages. Reference number: [id number].   7/18/2024 9:41:44 AM CDT

## 2024-07-18 NOTE — TELEPHONE ENCOUNTER
No care due was identified.  [facility] Embedded Care Due Messages. Reference number: [id number].   7/18/2024 9:50:05 AM CDT

## 2024-07-22 ENCOUNTER — OFFICE VISIT (OUTPATIENT)
Dept: GASTROENTEROLOGY | Facility: CLINIC | Age: 69
End: 2024-07-22
Payer: MEDICARE

## 2024-07-22 VITALS — HEIGHT: 60 IN | BODY MASS INDEX: 41.12 KG/M2 | WEIGHT: 209.44 LBS

## 2024-07-22 DIAGNOSIS — R11.2 NAUSEA AND VOMITING, UNSPECIFIED VOMITING TYPE: ICD-10-CM

## 2024-07-22 DIAGNOSIS — K21.9 GASTROESOPHAGEAL REFLUX DISEASE, UNSPECIFIED WHETHER ESOPHAGITIS PRESENT: Primary | ICD-10-CM

## 2024-07-22 PROCEDURE — 3288F FALL RISK ASSESSMENT DOCD: CPT | Mod: HCNC,CPTII,S$GLB, | Performed by: NURSE PRACTITIONER

## 2024-07-22 PROCEDURE — 99999 PR PBB SHADOW E&M-EST. PATIENT-LVL V: CPT | Mod: PBBFAC,HCNC,, | Performed by: NURSE PRACTITIONER

## 2024-07-22 PROCEDURE — 1125F AMNT PAIN NOTED PAIN PRSNT: CPT | Mod: HCNC,CPTII,S$GLB, | Performed by: NURSE PRACTITIONER

## 2024-07-22 PROCEDURE — 3008F BODY MASS INDEX DOCD: CPT | Mod: HCNC,CPTII,S$GLB, | Performed by: NURSE PRACTITIONER

## 2024-07-22 PROCEDURE — 1160F RVW MEDS BY RX/DR IN RCRD: CPT | Mod: HCNC,CPTII,S$GLB, | Performed by: NURSE PRACTITIONER

## 2024-07-22 PROCEDURE — 99214 OFFICE O/P EST MOD 30 MIN: CPT | Mod: HCNC,S$GLB,, | Performed by: NURSE PRACTITIONER

## 2024-07-22 PROCEDURE — 3044F HG A1C LEVEL LT 7.0%: CPT | Mod: HCNC,CPTII,S$GLB, | Performed by: NURSE PRACTITIONER

## 2024-07-22 PROCEDURE — 3066F NEPHROPATHY DOC TX: CPT | Mod: HCNC,CPTII,S$GLB, | Performed by: NURSE PRACTITIONER

## 2024-07-22 PROCEDURE — 3061F NEG MICROALBUMINURIA REV: CPT | Mod: HCNC,CPTII,S$GLB, | Performed by: NURSE PRACTITIONER

## 2024-07-22 PROCEDURE — 1159F MED LIST DOCD IN RCRD: CPT | Mod: HCNC,CPTII,S$GLB, | Performed by: NURSE PRACTITIONER

## 2024-07-22 PROCEDURE — 1100F PTFALLS ASSESS-DOCD GE2>/YR: CPT | Mod: HCNC,CPTII,S$GLB, | Performed by: NURSE PRACTITIONER

## 2024-07-22 PROCEDURE — 1157F ADVNC CARE PLAN IN RCRD: CPT | Mod: HCNC,CPTII,S$GLB, | Performed by: NURSE PRACTITIONER

## 2024-07-22 RX ORDER — SUCRALFATE 1 G/10ML
1 SUSPENSION ORAL 3 TIMES DAILY
Qty: 900 ML | Refills: 0 | Status: SHIPPED | OUTPATIENT
Start: 2024-07-22 | End: 2024-08-21

## 2024-07-22 NOTE — PROGRESS NOTES
GASTROENTEROLOGY CLINIC NOTE    Chief Complaint: The primary encounter diagnosis was Gastroesophageal reflux disease, unspecified whether esophagitis present. A diagnosis of Nausea and vomiting, unspecified vomiting type was also pertinent to this visit.  Referring provider/PCP: Alon Santiago MD    HPI  Christine Castro is a 69 y.o. female who is a new patient to me who presents to clinic for nausea, vomiting, and reflux.    Symptoms occurring intermittently for years.       Reflux: Taking omeprazole 40mg but continues with heartburn and hiccups  Dysphagia/Odynophagia: Feels like food sitting in epigastrium. Occurs with all kinds of food  Nausea/Vomiting: Nausea and vomiting occur intermittently. When vomiting occurs it starts almost immediately after eating.   Appetite Changes: No  Abdominal Pain: Epigastric discomfort  Bowel Habits: Constipation. Bowel movements occur every three days.   GI Bleeding: Denies hematochezia, hematemesis, melena, BRBPR, Black/tarry stool, coffee ground emesis  Unexplained Weight Loss: No     GLP-1s: No  NSAIDs: ASA 81 mg  Anticoagulation or Antiplatelet: No      History of H.pylori:  H.pylori Treatment:  Prior Upper Endoscopy: 2019 [place]  Impression:           - Normal esophagus.                         - A gastroenterostomy was found, characterized by                         erosion and erythema. Biopsied.                         - Normal examined duodenum.    Pathology:    Stomach (biopsy): Benign oxyntic mucosa mucosa with minimal chronic inflammation Negative for active inflammation  Helicobacter pylori stain is negative    Prior Colonoscopy: 2022 with [facility]  Diverticulosis  5 year recall recommended (Due 2027)    Family h/o Colon Cancer: No  Family h/o Crohn's Disease or Ulcerative Colitis: No  Family h/o Celiac Sprue: No  Abdominal Surgeries: Gastric bypass, cholecystectomy, Hernia Repair    Review of Systems   Constitutional:  Negative for weight loss.   HENT:   Negative for sore throat.    Eyes:  Negative for blurred vision.   Respiratory:  Negative for cough.    Cardiovascular:  Negative for chest pain.   Gastrointestinal:  Positive for abdominal pain, constipation, heartburn, nausea and vomiting. Negative for blood in stool, diarrhea and melena.   Genitourinary:  Negative for dysuria.   Musculoskeletal:  Negative for myalgias.   Skin:  Negative for rash.   Neurological:  Negative for headaches.   Endo/Heme/Allergies:  Negative for environmental allergies.   Psychiatric/Behavioral:  Negative for suicidal ideas. The patient is not nervous/anxious.        Past Medical History: has a past medical history of Anticoagulant long-term use, Arthritis, Asthma, CHF (congestive heart failure), Colon cancer, COPD (chronic obstructive pulmonary disease), Coronary artery disease, Depression, Diabetes mellitus, type 2, GERD (gastroesophageal reflux disease), Left ureteral stone, Myocardial infarction, Nausea and vomiting, and Thyroid disease.    Past Surgical History: has a past surgical history that includes  section; Tonsillectomy; Colon surgery; Cholecystectomy; Ectopic pregnancy surgery; Tubal ligation; Gastric bypass; Colonoscopy; Colonoscopy (N/A, 2017); Colonoscopy (N/A, 4/10/2018); Esophagogastroduodenoscopy (N/A, 2019); Knee Arthroplasty (Left, 2019); Oophorectomy; Knee Arthroplasty (Right, 2019); Abdominal surgery; Fracture surgery; Hernia repair; Injection of anesthetic agent around genitofemoral nerve (Left, 2020); Radiofrequency thermocoagulation (Left, 2020); Epidural steroid injection into lumbar spine (N/A, 2020); Joint replacement; Fusion of spine with instrumentation (Left, 2021); Transforaminal epidural injection of steroid (Right, 2021); Decompression of cervical spine by anterior approach with fusion (N/A, 2021); and Caudal epidural steroid injection (N/A, 2022).    Family History:family history  includes Breast cancer in her cousin, maternal aunt, and maternal aunt; Diabetes in her brother and mother; Hyperlipidemia in her mother; Hypertension in her brother, mother, and sister; Stroke in her mother.    Allergies:   Review of patient's allergies indicates:   Allergen Reactions    Adhesive      Adhesive tape    Latex, natural rubber      Adhesive from latex tape    Ibuprofen Other (See Comments)     Causes asthma flare??       Social History: reports that she has never smoked. She has never been exposed to tobacco smoke. She has never used smokeless tobacco. She reports current alcohol use. She reports that she does not use drugs.    Home medications:   Current Outpatient Medications on File Prior to Visit   Medication Sig Dispense Refill    albuterol (PROVENTIL/VENTOLIN HFA) 90 mcg/actuation inhaler INHALE 2 PUFFS BY MOUTH EVERY 4 HOURS AS NEEDED FOR WHEEZING 18 g 3    aspirin (ECOTRIN) 81 MG EC tablet Take 1 tablet (81 mg total) by mouth once daily.  0    atorvastatin (LIPITOR) 40 MG tablet TAKE 1 TABLET(40 MG) BY MOUTH EVERY DAY FOR CHOLESTEROL 90 tablet 3    azithromycin (Z-MARY) 250 MG tablet Take 1 tablet (250 mg total) by mouth once daily. Take first 2 tablets together, then 1 every day until finished. 6 tablet 0    buPROPion (WELLBUTRIN XL) 300 MG 24 hr tablet Take 1 tablet (300 mg total) by mouth once daily. 90 tablet 3    busPIRone (BUSPAR) 10 MG tablet Take 1 tablet (10 mg total) by mouth Daily. Pt is taking 10 mg once daily 90 tablet 3    diabetic supplies, miscellan. Misc Lancets for 1-2 times a day testing    dispense brand covered by insurance t (Patient taking differently: Lancets for 1-2 times a day testing    dispense brand covered by insurance t) 60 each 3    diazePAM (VALIUM) 5 MG tablet TAKE 1 TABLET BY MOUTH EVERY 24 HOURS AS NEEDED FOR ANXIETY 30 tablet 1    diclofenac sodium (VOLTAREN) 1 % Gel APPLY 2 GRAMS TOPICALLY TO THE AFFECTED AREA FOUR TIMES DAILY 300 g 5    doxycycline  (VIBRA-TABS) 100 MG tablet Take 1 tablet (100 mg total) by mouth 2 (two) times daily. Any generic brand capsule or tablet 20 tablet 0    ergocalciferol (ERGOCALCIFEROL) 50,000 unit Cap TAKE 1 CAPSULE BY MOUTH EVERY 7 DAYS 12 capsule 3    EScitalopram oxalate (LEXAPRO) 20 MG tablet TAKE 1 TABLET(20 MG) BY MOUTH EVERY DAY 90 tablet 3    furosemide (LASIX) 20 MG tablet Take 1 tablet (20 mg total) by mouth daily as needed. 30 tablet 3    gabapentin (NEURONTIN) 100 MG capsule Take 1 capsule (100 mg total) by mouth 3 (three) times daily. 90 capsule 3    glipiZIDE (GLUCOTROL) 2.5 MG TR24 TAKE 1 TABLET(2.5 MG) BY MOUTH DAILY WITH BREAKFAST 90 tablet 3    HYDROcodone-acetaminophen (NORCO) 5-325 mg per tablet Take 1 tablet by mouth every 6 (six) hours as needed for Pain. 12 tablet 0    ibandronate (BONIVA) 150 mg tablet TAKE 1 TABLET BY MOUTH EVERY 30 DAYS FOR OSTEOPOROSIS 3 tablet 1    levothyroxine (SYNTHROID) 100 MCG tablet Take 1 tablet (100 mcg total) by mouth once daily. 90 tablet 3    methocarbamoL (ROBAXIN) 750 MG Tab Take 1 tablet (750 mg total) by mouth 3 (three) times daily as needed (muscle spasms). 60 tablet 0    naloxone (NARCAN) 4 mg/actuation Spry 4mg by nasal route as needed for opioid overdose; may repeat every 2-3 minutes in alternating nostrils until medical help arrives. Call 911 1 each 11    omeprazole (PRILOSEC) 40 MG capsule Take 1 capsule (40 mg total) by mouth every morning. 90 capsule 2    ondansetron (ZOFRAN-ODT) 4 MG TbDL Take 1 tablet (4 mg total) by mouth every 6 (six) hours as needed (Nausea). 28 tablet 0    oxyCODONE-acetaminophen (PERCOCET)  mg per tablet Take 1 tablet by mouth every 8 (eight) hours as needed for Pain. 90 tablet 0    potassium chloride (KLOR-CON) 10 MEQ TbSR TAKE 1 TABLET BY MOUTH EVERY DAY WHEN YOU TAKE FUROSEMIDE 90 tablet 0    predniSONE (DELTASONE) 20 MG tablet One tablet daily by mouth for three days 3 tablet 0    promethazine (PHENERGAN) 25 MG tablet TAKE 1  TABLET(25 MG) BY MOUTH EVERY 6 HOURS AS NEEDED 25 tablet 0    SUPREP BOWEL PREP KIT 17.5-3.13-1.6 gram SolR use as directed      TRUE METRIX GLUCOSE METER Misc USE TWICE DAILY TO CHECK BLOOD SUGAR 1 each 0    TRUE METRIX GLUCOSE TEST STRIP Strp TO TEST ONCE TO TWICE DAILY 50 strip 11    TRUE METRIX GLUCOSE TEST STRIP Strp TEST BLOOD SUGAR EVERY  strip 3    TRUEPLUS LANCETS 30 gauge Misc USE TO TEST ONCE TO TWICE D      zolpidem (AMBIEN) 5 MG Tab TAKE 1 TABLET(5 MG) BY MOUTH EVERY EVENING 30 tablet 3    tamsulosin (FLOMAX) 0.4 mg Cap Take 1 capsule (0.4 mg total) by mouth once daily. 30 capsule 0     No current facility-administered medications on file prior to visit.       Vital signs:  Ht 5' (1.524 m)   Wt 95 kg (209 lb 7 oz)   BMI 40.90 kg/m²     Physical Exam  Vitals reviewed.   Constitutional:       Appearance: Normal appearance.   HENT:      Head: Normocephalic.   Pulmonary:      Effort: Pulmonary effort is normal. No respiratory distress.   Abdominal:      General: There is no distension.      Palpations: Abdomen is soft.      Tenderness: There is no abdominal tenderness.   Skin:     General: Skin is warm.   Neurological:      Mental Status: She is alert and oriented to person, place, and time.   Psychiatric:         Behavior: Behavior normal.         Thought Content: Thought content normal.         Routine labs:  Lab Results   Component Value Date    WBC 5.52 04/16/2024    HGB 10.4 (L) 04/16/2024    HCT 32.7 (L) 04/16/2024    MCV 97 04/16/2024     04/16/2024     Lab Results   Component Value Date    INR 1.0 11/23/2021     Lab Results   Component Value Date    IRON 64 03/07/2018    FERRITIN 72 03/07/2018    FERRITIN 70 03/07/2018    TIBC 270 03/07/2018     Lab Results   Component Value Date     04/16/2024    K 3.1 (L) 04/16/2024     (H) 04/16/2024    CO2 22 (L) 04/16/2024    BUN 12 04/16/2024    CREATININE 0.9 04/16/2024     Lab Results   Component Value Date    ALBUMIN 3.1 (L)  "04/16/2024    ALT 15 04/16/2024    AST 20 04/16/2024    ALKPHOS 66 04/16/2024    BILITOT 0.2 04/16/2024     No results found for: "GLUCOSE"  Lab Results   Component Value Date    TSH 8.147 (H) 04/16/2024     Lab Results   Component Value Date    CALCIUM 8.8 04/16/2024    PHOS 4.3 05/20/2019       Imaging:  CT Renal Stone Study ABD Pelvis WO  Narrative: EXAMINATION:  CT RENAL STONE STUDY ABD PELVIS WO    CLINICAL HISTORY:  Nephrolithiasis, uncomplicated;left ureteral stone; Calculus of ureter    TECHNIQUE:  Low dose axial images, sagittal and coronal reformations were obtained from the lung bases to the pubic symphysis.  Contrast was not administered.    COMPARISON:  04/02/2024    FINDINGS:  The lung bases are clear and heart size is normal.    No gross parenchymal abnormalities are identified in the liver or spleen.  The pancreas is atrophic but otherwise unremarkable.  The adrenals appear normal.  The gallbladder is surgically absent.  Additional postsurgical changes are also visible in the body of the stomach.  A moderate hiatal hernia is also visible and unchanged since the prior study.  No gross parenchymal abnormality, intrarenal calculi or hydronephrosis are identified in either kidney.  The obstructing urinary calculus at the left UVJ seen on 04/02/2024 has passed.  No free intraperitoneal fluid or lymph node enlargement is identified.    Images obtained through the pelvis demonstrate no gross abnormality in an incompletely distended urinary bladder.  The uterus and ovaries are surgically absent.  No free pelvic fluid is visible.  Additional postsurgical changes are also visible in the lower lumbar spine at L4 and L5.  Impression: No urinary calculi or obstruction is identified.  Nonobstructing urinary calculus at the left UVJ has passed since the prior study performed 04/02/2024.    Postsurgical changes including prior cholecystectomy, hysterectomy and gastric surgery.    Electronically signed by: Ruddy" Radha  Date:    05/16/2024  Time:    10:51      I have reviewed prior labs, imaging, and notes.      Assessment:  1. Gastroesophageal reflux disease, unspecified whether esophagitis present    2. Nausea and vomiting, unspecified vomiting type      Intermittent nausea and vomiting accompanied by heartburn.   Taking omeprazole 40 mg daily but having breakthrough symptoms.     Plan:  Orders Placed This Encounter    sucralfate (CARAFATE) 100 mg/mL suspension    Case Request Endoscopy: EGD (ESOPHAGOGASTRODUODENOSCOPY)     Continue omeprazole as previously prescribed  Carafate TID  EGD    Consider UGI series pending EGD results and symptoms as patient with several abdominal surgeries.     Plan of care discussed with patient who is in agreement and verbalized understanding.     I have explained the planned procedures to the patient.The risks, benefits and alternatives of the procedure were also explained in detail. Patient verbalized understanding, all questions were answered. The patient agrees to proceed as planned    Follow Up: BRIANA Huerta, APRN,FNP-BC  [facility]    (Portions of this note were dictated using voice recognition software and may contain dictation related errors in spelling/grammar/syntax not found on text review)

## 2024-07-22 NOTE — PATIENT INSTRUCTIONS
IMPORTANT INFORMATION TO KNOW BEFORE YOUR PROCEDURE    [facility] 2nd Floor         If your procedure requires the administration of anesthesia, it is necessary for a responsible adult to drive you home. (Medical Transportation, Uber, Lyft, Taxi, etc. may ONLY be used if a responsible adult is present to accompany you home.  The responsible adult CAN'T be the  of the service).      person must be available to return to pick you up within 15 minutes of being notified of discharge.       Please bring a picture ID, insurance card, & copayment      Take Medications as directed below:    If you are taking any injectable medication (s) for weight loss and/or diabetes weekly, hold for 8 days prior to your scheduled procedure. After the procedure, your provider will inform you  of when to resume injection.      If you begin taking any blood thinning medications or injectable weight loss/diabetes medications (other than insulin) , please contact the endoscopy scheduling department listed below as soon as possible.    If you are diabetic see the attached instruction sheet regarding your medication.     If you take HEART, BLOOD PRESSURE, SEIZURE, PAIN, LUNG (including inhalers/nebulizers), ANTI-REJECTION (transplant patients), or PSYCHIATRIC medications, please take at your regular times with a sip of water or as directed by the scheduling nurse.     Important contact information:    Endoscopy [phone] Hours of operation Monday-Friday 8:00-4:30pm.    Questions about insurance or financial obligations call [phone] or [phone].    If you have questions regarding the prep or need to reschedule, please call [phone]. After hours questions requiring immediate assistance, contact [facility] nurse line at [phone] or [phone].   NOTE:     On occasion, unforeseen circumstances may cause a delay in your procedure start time. We respect your time and  appreciate your patience during these circumstances.          Date of procedure: 08/28/2024 with Dr. Ochoa    Location of Department: [address], [place] [address]   Take the Elevators to 2nd Floor Endoscopy Procedural Area    How to prep:    Day Before Procedure:   · You may have a light evening meal.   · No solid food after 7:00 pm.   · Continue drinking clear liquids.       Day of the Procedure:      · You may have water/clear liquids until 4 hours before your procedure or as directed by the scheduling nurse. See below for list.    What You CANNOT do:   · Do not drink milk or anything colored red.  · Do not drink alcohol.  · No gum chewing or candy morning of procedure.    Liquids That Are OK to Drink:   · Water  · Sports drinks (Gatorade, Power-Aid)  · Coffee or tea (no cream or nondairy creamer)  · Clear juices without pulp (apple, white grape)  · Gelatin desserts (no fruit or toppings)  · Clear soda (sprite, coke, ginger ale)  · Chicken broth (until 12 midnight the night before procedure)

## 2024-08-14 ENCOUNTER — OFFICE VISIT (OUTPATIENT)
Dept: PHYSICAL MEDICINE AND REHAB | Facility: CLINIC | Age: 69
End: 2024-08-14
Payer: MEDICARE

## 2024-08-14 VITALS
WEIGHT: 209.13 LBS | BODY MASS INDEX: 41.06 KG/M2 | HEIGHT: 60 IN | DIASTOLIC BLOOD PRESSURE: 68 MMHG | HEART RATE: 62 BPM | SYSTOLIC BLOOD PRESSURE: 99 MMHG

## 2024-08-14 DIAGNOSIS — R29.898 WEAKNESS OF BOTH LEGS: ICD-10-CM

## 2024-08-14 DIAGNOSIS — M54.42 CHRONIC BILATERAL LOW BACK PAIN WITH BILATERAL SCIATICA: ICD-10-CM

## 2024-08-14 DIAGNOSIS — M15.9 GENERALIZED OSTEOARTHRITIS: ICD-10-CM

## 2024-08-14 DIAGNOSIS — M21.70 ACQUIRED LEG LENGTH DISCREPANCY: ICD-10-CM

## 2024-08-14 DIAGNOSIS — Z98.1 STATUS POST CERVICAL SPINAL FUSION: ICD-10-CM

## 2024-08-14 DIAGNOSIS — Z98.1 STATUS POST LUMBAR SPINAL FUSION: ICD-10-CM

## 2024-08-14 DIAGNOSIS — M81.0 OSTEOPOROSIS, UNSPECIFIED OSTEOPOROSIS TYPE, UNSPECIFIED PATHOLOGICAL FRACTURE PRESENCE: ICD-10-CM

## 2024-08-14 DIAGNOSIS — M54.41 CHRONIC BILATERAL LOW BACK PAIN WITH BILATERAL SCIATICA: ICD-10-CM

## 2024-08-14 DIAGNOSIS — Z86.79 HISTORY OF CONGESTIVE HEART FAILURE: ICD-10-CM

## 2024-08-14 DIAGNOSIS — M75.102 LEFT ROTATOR CUFF TEAR ARTHROPATHY: ICD-10-CM

## 2024-08-14 DIAGNOSIS — R53.81 DEBILITY: ICD-10-CM

## 2024-08-14 DIAGNOSIS — R26.9 GAIT DISORDER: Primary | ICD-10-CM

## 2024-08-14 DIAGNOSIS — R06.09 DOE (DYSPNEA ON EXERTION): ICD-10-CM

## 2024-08-14 DIAGNOSIS — M12.812 LEFT ROTATOR CUFF TEAR ARTHROPATHY: ICD-10-CM

## 2024-08-14 DIAGNOSIS — G89.29 CHRONIC BILATERAL LOW BACK PAIN WITH BILATERAL SCIATICA: ICD-10-CM

## 2024-08-14 DIAGNOSIS — R29.6 FREQUENT FALLS: ICD-10-CM

## 2024-08-14 PROCEDURE — 99999 PR PBB SHADOW E&M-EST. PATIENT-LVL IV: CPT | Mod: PBBFAC,HCNC,, | Performed by: PHYSICAL MEDICINE & REHABILITATION

## 2024-08-14 NOTE — PROGRESS NOTES
Subjective:       Patient ID: Christine Castro is a 69 y.o. female.    Chief Complaint: No chief complaint on file.        HPI    Mrs. Castro is a 69-year-old black female with multiple medical problems who is presenting to the Physical Medicine clinic for evaluation for a power mobility device.  Her past medical history significant for HTN, DM, diabetic neuropathy, CAD status post MI, CHF, hypothyroidism, TIAs, status post gastric bypass, generalized osteoarthritis, status bilateral TKA, leg length discrepancy (left leg 1-2 inches shorter), chronic low back pain with bilateral radiculopathy status post multiple epidural steroid injections and status post L4-5 laminectomy and fusion in 01/2021, chronic neck pain with myelopathy, status post C3-C7 ACDF in 12/2021, osteoporosis, debility, frequent falls .     The patient lives alone in a single-story home with ramp access.  Some of her friends check on her few times per week.  She is independent with feeding herself but has trouble getting to the kitchen.  She is independent with toileting but has trouble getting to the bathroom.  She is independent with bathing using a transfer tub bench.  She can ambulate with a rolling walker or Rollator walker about 10 ft.  She is restricted by shortness of breath, leg weakness, chronic low back pain bilateral sciatica (up to 10/10), leg length discrepancy since her youth and impaired balance.  She reports history of falls occasionally few times per week with history of recent facial trauma but no serious injuries so far.  She cannot propel a manual wheelchair due to shortness of breath, bilateral upper extremity weakness, chronic left shoulder pain (10/10) status post arthroscopic procedure, bilateral hand pain (7-8/10).  She is able to use a scooter in [employer] stores without significant problems.      Past Medical History:   Diagnosis Date    Anticoagulant long-term use     Arthritis     Asthma     CHF (congestive heart  failure)     [facility]    Colon cancer     colon    COPD (chronic obstructive pulmonary disease)     Coronary artery disease     Depression     Diabetes mellitus, type 2     GERD (gastroesophageal reflux disease)     Left ureteral stone 04/16/2024    Myocardial infarction     AGE 19 MIGUELANGEL WITHBABY DELIVERY    Nausea and vomiting 05/20/2024    Thyroid disease         Review of patient's allergies indicates:   Allergen Reactions    Adhesive      Adhesive tape    Latex, natural rubber      Adhesive from latex tape    Ibuprofen Other (See Comments)     Causes asthma flare??        Review of Systems   Constitutional:  Positive for fatigue. Negative for chills and fever.   Eyes:  Positive for visual disturbance.   Respiratory:  Positive for shortness of breath.    Cardiovascular:  Negative for chest pain.   Gastrointestinal:  Negative for nausea and vomiting.   Genitourinary:  Positive for difficulty urinating.   Musculoskeletal:  Positive for arthralgias, back pain, gait problem and neck pain.   Neurological:  Positive for weakness and headaches. Negative for dizziness.   Psychiatric/Behavioral:  Negative for behavioral problems.              Objective:      Physical Exam  Vitals reviewed.   Constitutional:       General: She is not in acute distress.     Appearance: She is well-developed.      Comments: Coming to the clinic in a manual wheelchair propelled by my medical assistant.   HENT:      Head: Normocephalic and atraumatic.   Eyes:      Extraocular Movements: Extraocular movements intact.   Neck:      Comments: Mild pain at end range.  Cardiovascular:      Rate and Rhythm: Normal rate and regular rhythm.      Heart sounds: Normal heart sounds.   Pulmonary:      Effort: Pulmonary effort is normal.      Breath sounds: Normal breath sounds.   Abdominal:      Palpations: Abdomen is soft.   Musculoskeletal:      Cervical back: Normal range of motion.      Comments: BUE:  ROM:   RUE: full.   LUE: decreased at  shoulder (30 deg active abduction).  +ve severe Heberden's & Maral's nodes.  Strength:    RUE: 3/5 at shoulder abduction, 4 elbow flexion, 4 elbow extension, 4 hand .   LUE: 2/5 at shoulder abduction, 4 elbow flexion, 4 elbow extension, 4 hand .  Sensation to pinprick:   RUE: intact.   LUE: intact.  DTR:    RUE: +1 biceps, +1 triceps.   LUE:  +1 biceps, +1 triceps.      BLE:  Knees:   RLE: healed TKA.    LLE: Healed TKA.  Strength:    RLE: 4/5 at hip flexion, 4 knee extension, 4 ankle DF, 4 ankle PF.   LLE: 4-/5 at hip flexion, 4 knee extension, 4 ankle DF,  4 ankle PF.  Sensation to pinprick:     RLE: intact.     LLE: intact.  DTR:     RLE: +1 knee, +1 ankle.    LLE: +2 knee, +1 ankle.  SLR (sitting):      RLE: +ve.      LLE: +ve.     -ve mild tenderness over lumbar spine.     Skin:     General: Skin is warm.   Neurological:      Mental Status: She is alert and oriented to person, place, and time.   Psychiatric:         Mood and Affect: Mood normal.         Behavior: Behavior normal.         Thought Content: Thought content normal.         Assessment:       1. Gait disorder    2. History of congestive heart failure    3. CASTANEDA (dyspnea on exertion)    4. Chronic bilateral low back pain with bilateral sciatica    5. Status post lumbar spinal fusion (L4-L5, 1/2021)    6. Weakness of both legs    7. Status post cervical spinal fusion (ACDF at C3-C7, 12/2021)    8. Left rotator cuff tear arthropathy    9. Generalized osteoarthritis    10. Acquired leg length discrepancy    11. Osteoporosis, unspecified osteoporosis type, unspecified pathological fracture presence    12. Frequent falls    13. Debility        Summary/Plan:             - The patient was seen today for mobility evaluation for a power mobility device due to significant impairment at home.  - The patient has multifactorial gait impairment.  - The patient is not able to ambulate safely at home.  - The patient is unable to use a walker functional  distances due to CASTANEDA because of CHF, bilateral lower extremity weakness, chronic low back pain with bilateral radiculopathy, leg length discrepancy and impaired balance  - The patient is unable to use an optimally-configured manual wheelchair at home due to do it because of CHF, bilateral upper extremity weakness , left rotator cuff arthropathy, bilateral hand pain due to advanced OA.  - The patient has history of falls, which could be detrimental to her health especially due to osteoporosis.  - The patient has intact cognition and should be able to use a power mobility device well at home.  - A prescription for an electric scooter was generated.  - The patient has enough upper & lower extremity strength to be able to transfer to and from the power mobility device.  - The patient has enough range of motion & strength in BUE to allow functional operation of the tiller.  - The patient has good trunk balance and should be able to maintain a safe posture while operating a power mobility device.  - This will allow the patient to go safely to the kitchen, dining room or living room for feeding & socialization.  It should also help with energy conservation and reducing the risk of falls.  - The patient is to return the Physical Medicine/Mobility clinic prn.        This note was partly generated with [employer] voice recognition software. I apologize for any possible typographical errors.

## 2024-08-16 ENCOUNTER — TELEPHONE (OUTPATIENT)
Dept: NEUROLOGY | Facility: CLINIC | Age: 69
End: 2024-08-16
Payer: MEDICARE

## 2024-08-16 NOTE — TELEPHONE ENCOUNTER
I called the patient.    She said [employer] has not accept her insurance.  I told her I will fax all the paperwork to [facility].

## 2024-08-16 NOTE — TELEPHONE ENCOUNTER
----- Message from Samia Caballero sent at 8/15/2024  3:50 PM CDT -----  Regarding: FW: question /order  Contact: @ [phone]  Please advise.  ----- Message -----  From: Yahaira Drake  Sent: 8/15/2024   3:22 PM CDT  To: Mario MURPHY Staff  Subject: question /order                                  Pt called in regards to speaking with someone about the order for her wheelchair and her insurance only having a certain company .....Please call and adv @ [phone]

## 2024-08-18 RX ORDER — OXYCODONE AND ACETAMINOPHEN 10; 325 MG/1; MG/1
1 TABLET ORAL EVERY 8 HOURS PRN
Qty: 90 TABLET | Refills: 0 | Status: SHIPPED | OUTPATIENT
Start: 2024-08-18

## 2024-08-21 ENCOUNTER — TELEPHONE (OUTPATIENT)
Dept: ENDOSCOPY | Facility: HOSPITAL | Age: 69
End: 2024-08-21
Payer: MEDICARE

## 2024-08-21 ENCOUNTER — PATIENT MESSAGE (OUTPATIENT)
Dept: ENDOSCOPY | Facility: HOSPITAL | Age: 69
End: 2024-08-21
Payer: MEDICARE

## 2024-08-21 NOTE — TELEPHONE ENCOUNTER
Spoke to patient for pre-call to confirm scheduled Upper Endoscopy (EGD) and patient verbalized understanding of the following:       Date of Procedure (s)  verified 8/28/24  Arrival Time 8:00 AM verified.  Location of Procedure(s) [place] 2nd Floor verified.  NPO status reinforced. Ok to continue clear liquids up until 4 hours prior to the Endoscopy procedure.       Pt confirmed receipt of prep instructions and Rx prep (if applicable).  Instructions provided to patient via [facility]  Pt confirmed ride home after procedure if procedure requires anesthesia.   Pre-call screening questionnaire reviewed and completed with patient.   Appointment details are tentative, including check-in time.  If the patient begins taking any blood thinning medications, injectable weight loss/diabetes medications (other than insulin), or Adipex (phentermine) patient was instructed to contact the endoscopy scheduling department as soon as possible.  Patient was advised to call the endoscopy scheduling department if any questions or concerns arise.        Endoscopy Scheduling Department

## 2024-08-27 ENCOUNTER — NURSE TRIAGE (OUTPATIENT)
Dept: ADMINISTRATIVE | Facility: CLINIC | Age: 69
End: 2024-08-27
Payer: MEDICARE
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
## 2024-09-05 ENCOUNTER — TELEPHONE (OUTPATIENT)
Dept: ENDOSCOPY | Facility: HOSPITAL | Age: 69
End: 2024-09-05
Payer: MEDICARE

## 2024-09-05 VITALS — WEIGHT: 209 LBS | HEIGHT: 61 IN | BODY MASS INDEX: 39.46 KG/M2

## 2024-09-05 DIAGNOSIS — K21.9 GASTROESOPHAGEAL REFLUX DISEASE, UNSPECIFIED WHETHER ESOPHAGITIS PRESENT: ICD-10-CM

## 2024-09-05 DIAGNOSIS — R11.2 NAUSEA AND VOMITING, UNSPECIFIED VOMITING TYPE: Primary | ICD-10-CM

## 2024-09-05 NOTE — TELEPHONE ENCOUNTER
Spoke to patient to schedule procedure(s) Upper Endoscopy (EGD)       Physician to perform procedure(s) Dr. SURJIT Ochoa  Date of Procedure (s) 9/12/24  Arrival Time 12:30 PM  Time of Procedure(s) 1:30 PM   Location of Procedure(s) [place] 2nd Floor  Type of Rx Prep sent to patient: N/A  Instructions provided to patient via [facility]    Patient was informed on the following information and verbalized understanding. Screening questionnaire reviewed with patient and complete. If procedure requires anesthesia, a responsible adult needs to be present to accompany the patient home, patient cannot drive after receiving anesthesia. Appointment details are tentative, especially check-in time. Patient will receive a prep-op call 7 days prior to confirm check-in time for procedure. If applicable the patient should contact their pharmacy to verify Rx for procedure prep is ready for pick-up. Patient was advised to call the scheduling department at [phone] if pharmacy states no Rx is available. Patient was advised to call the endoscopy scheduling department if any questions or concerns arise.       Endoscopy Scheduling Department

## 2024-09-06 ENCOUNTER — TELEPHONE (OUTPATIENT)
Dept: NEUROSURGERY | Facility: CLINIC | Age: 69
End: 2024-09-06
Payer: MEDICARE

## 2024-09-06 DIAGNOSIS — M43.27 FUSION OF SPINE OF LUMBOSACRAL REGION: Primary | ICD-10-CM

## 2024-09-09 ENCOUNTER — TELEPHONE (OUTPATIENT)
Dept: ENDOSCOPY | Facility: HOSPITAL | Age: 69
End: 2024-09-09
Payer: MEDICARE

## 2024-09-09 ENCOUNTER — TELEPHONE (OUTPATIENT)
Dept: GASTROENTEROLOGY | Facility: CLINIC | Age: 69
End: 2024-09-09
Payer: MEDICARE

## 2024-09-09 NOTE — TELEPHONE ENCOUNTER
Contacted Pt for Endoscopy precall to confirm scheduled procedure(s) Upper Endoscopy (EGD) on 9/12/24. Pt did not answer. Left voicemail for pt to call Endoscopy Scheduling to confirm.

## 2024-09-09 NOTE — TELEPHONE ENCOUNTER
Attempted to return call. Left Vm and call back number    ----- Message from Ly Landaverde sent at 9/9/2024 11:52 AM CDT -----  Type:  Needs Medical Advice/time and instruction     Who Called: pt       Would the patient rather a call back or a response via [facility]? Call   Best Call Back Number: [phone]  Additional Information: pt requesting a call back to discuss procedure time have to arrange transportation

## 2024-09-11 ENCOUNTER — TELEPHONE (OUTPATIENT)
Dept: ENDOSCOPY | Facility: HOSPITAL | Age: 69
End: 2024-09-11
Payer: MEDICARE

## 2024-09-11 NOTE — TELEPHONE ENCOUNTER
Spoke to patient for pre-call to confirm scheduled Upper Endoscopy (EGD) and patient verbalized understanding of the following:       Date of Procedure (s)  verified 9/12/24  Arrival Time 9:10 AM verified.  Location of Procedure(s) [address] verified.  NPO status reinforced. Ok to continue clear liquids up until 4 hours prior to the Endoscopy procedure.     Pt confirmed receipt of prep instructions and Rx prep (if applicable).  Instructions provided to patient via Email  Pt confirmed ride home after procedure if procedure requires anesthesia.   Pre-call screening questionnaire reviewed and completed with patient.   Appointment details are tentative, including check-in time.  If the patient begins taking any blood thinning medications, injectable weight loss/diabetes medications (other than insulin), or Adipex (phentermine) patient was instructed to contact the endoscopy scheduling department as soon as possible.  Patient was advised to call the endoscopy scheduling department if any questions or concerns arise.       [facility] Endoscopy Scheduling Department

## 2024-09-12 ENCOUNTER — TELEPHONE (OUTPATIENT)
Dept: GASTROENTEROLOGY | Facility: CLINIC | Age: 69
End: 2024-09-12
Payer: MEDICARE

## 2024-09-12 ENCOUNTER — ANESTHESIA (OUTPATIENT)
Dept: ENDOSCOPY | Facility: HOSPITAL | Age: 69
End: 2024-09-12
Payer: MEDICARE

## 2024-09-12 ENCOUNTER — ANESTHESIA EVENT (OUTPATIENT)
Dept: ENDOSCOPY | Facility: HOSPITAL | Age: 69
End: 2024-09-12
Payer: MEDICARE

## 2024-09-12 NOTE — TELEPHONE ENCOUNTER
----- Message from Verito Corado MA sent at 9/12/2024 12:02 PM CDT -----    ----- Message -----  From: Luciana Mcclellan  Sent: 9/12/2024  11:36 AM CDT  To: Verito Corado MA      ----- Message -----  From: Tiara Mustafa RN  Sent: 9/12/2024  10:27 AM CDT  To: [facility] Endoscopy Schedulers    Pt needs to reschedule

## 2024-09-12 NOTE — TELEPHONE ENCOUNTER
Spoke with pt and rescheduled on sept 26 2024. Went over instructions thru the phone. Pt stated she also have the instructions with her. Verbal understanding
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
## 2024-09-13 ENCOUNTER — TELEPHONE (OUTPATIENT)
Dept: GASTROENTEROLOGY | Facility: CLINIC | Age: 69
End: 2024-09-13
Payer: MEDICARE

## 2024-09-13 NOTE — TELEPHONE ENCOUNTER
----- Message from Noni Mack sent at 9/13/2024  4:14 PM CDT -----  Contact: pt  Type:  Procedure Information     Who Called: Pt   Would the patient rather a call back or a response via [facility]? call  Best Call Back Number: [phone]  Additional Information:   Pt requesting a call regarding procedure time due to transportation

## 2024-09-16 RX ORDER — OXYCODONE AND ACETAMINOPHEN 10; 325 MG/1; MG/1
1 TABLET ORAL EVERY 8 HOURS PRN
Qty: 90 TABLET | Refills: 0 | Status: SHIPPED | OUTPATIENT
Start: 2024-09-16

## 2024-09-16 RX ORDER — DIAZEPAM 5 MG/1
TABLET ORAL
Qty: 30 TABLET | Refills: 1 | Status: SHIPPED | OUTPATIENT
Start: 2024-09-16

## 2024-09-16 NOTE — TELEPHONE ENCOUNTER
Care Due:                  Date            Visit Type   Department     Provider  --------------------------------------------------------------------------------                                EP -                              PRIMARY      [facility] FAMILY  Last Visit: 01-      CARE ([facility])   MEDICINE       Alon Santiago                              EP -                              PRIMARY      [facility] FAMILY  Next Visit: 10-      CARE ([facility])   MEDICINE       Alon Santiago                                                            Last  Test          Frequency    Reason                     Performed    Due Date  --------------------------------------------------------------------------------    HBA1C.......  6 months...  glipiZIDE................  04-   10-    [facility] Embedded Care Due Messages. Reference number: [id number].   9/16/2024 12:21:47 PM CDT

## 2024-09-19 ENCOUNTER — OFFICE VISIT (OUTPATIENT)
Dept: ORTHOPEDICS | Facility: CLINIC | Age: 69
End: 2024-09-19
Payer: MEDICARE

## 2024-09-19 VITALS — BODY MASS INDEX: 39.49 KG/M2 | HEIGHT: 61 IN

## 2024-09-19 DIAGNOSIS — M51.36 LUMBAR DEGENERATIVE DISC DISEASE: ICD-10-CM

## 2024-09-19 DIAGNOSIS — Z96.653 HISTORY OF BILATERAL KNEE REPLACEMENT: ICD-10-CM

## 2024-09-19 DIAGNOSIS — M19.012 PRIMARY OSTEOARTHRITIS OF LEFT SHOULDER: ICD-10-CM

## 2024-09-19 DIAGNOSIS — M17.0 PRIMARY OSTEOARTHRITIS OF BOTH KNEES: Primary | ICD-10-CM

## 2024-09-19 PROCEDURE — 99213 OFFICE O/P EST LOW 20 MIN: CPT | Mod: HCNC,S$GLB,, | Performed by: ORTHOPAEDIC SURGERY

## 2024-09-19 PROCEDURE — 1159F MED LIST DOCD IN RCRD: CPT | Mod: HCNC,CPTII,S$GLB, | Performed by: ORTHOPAEDIC SURGERY

## 2024-09-19 PROCEDURE — 3061F NEG MICROALBUMINURIA REV: CPT | Mod: HCNC,CPTII,S$GLB, | Performed by: ORTHOPAEDIC SURGERY

## 2024-09-19 PROCEDURE — 1157F ADVNC CARE PLAN IN RCRD: CPT | Mod: HCNC,CPTII,S$GLB, | Performed by: ORTHOPAEDIC SURGERY

## 2024-09-19 PROCEDURE — 3066F NEPHROPATHY DOC TX: CPT | Mod: HCNC,CPTII,S$GLB, | Performed by: ORTHOPAEDIC SURGERY

## 2024-09-19 PROCEDURE — 1125F AMNT PAIN NOTED PAIN PRSNT: CPT | Mod: HCNC,CPTII,S$GLB, | Performed by: ORTHOPAEDIC SURGERY

## 2024-09-19 PROCEDURE — 1160F RVW MEDS BY RX/DR IN RCRD: CPT | Mod: HCNC,CPTII,S$GLB, | Performed by: ORTHOPAEDIC SURGERY

## 2024-09-19 PROCEDURE — 99999 PR PBB SHADOW E&M-EST. PATIENT-LVL IV: CPT | Mod: PBBFAC,HCNC,, | Performed by: ORTHOPAEDIC SURGERY

## 2024-09-19 PROCEDURE — 3044F HG A1C LEVEL LT 7.0%: CPT | Mod: HCNC,CPTII,S$GLB, | Performed by: ORTHOPAEDIC SURGERY

## 2024-09-19 PROCEDURE — 3288F FALL RISK ASSESSMENT DOCD: CPT | Mod: HCNC,CPTII,S$GLB, | Performed by: ORTHOPAEDIC SURGERY

## 2024-09-19 PROCEDURE — 1100F PTFALLS ASSESS-DOCD GE2>/YR: CPT | Mod: HCNC,CPTII,S$GLB, | Performed by: ORTHOPAEDIC SURGERY

## 2024-09-19 PROCEDURE — 3008F BODY MASS INDEX DOCD: CPT | Mod: HCNC,CPTII,S$GLB, | Performed by: ORTHOPAEDIC SURGERY

## 2024-09-19 NOTE — PROGRESS NOTES
Subjective:      Patient ID: Christine Castro is a 69 y.o. female.    Chief Complaint: Pain of the Right Hip and Pain of the Right Leg      HPI:  About 5 years postop  The patient is seen for postop follow-up of bilateral  TKA.  Pain control has been satisfactory  They feel that they are ambulating with difficulty  Postoperative complaints include:  The patient complains of back pain with right lower extremity radicular pain.  She states that her neurosurgeon has advised that she consider spine surgery, but that she deferred it for personal reasons.  She also complains of intractable left shoulder pain.      Current Outpatient Medications:     albuterol (PROVENTIL/VENTOLIN HFA) 90 mcg/actuation inhaler, INHALE 2 PUFFS BY MOUTH EVERY 4 HOURS AS NEEDED FOR WHEEZING, Disp: 18 g, Rfl: 3    aspirin (ECOTRIN) 81 MG EC tablet, Take 1 tablet (81 mg total) by mouth once daily., Disp: , Rfl: 0    atorvastatin (LIPITOR) 40 MG tablet, TAKE 1 TABLET(40 MG) BY MOUTH EVERY DAY FOR CHOLESTEROL, Disp: 90 tablet, Rfl: 3    azithromycin (Z-MARY) 250 MG tablet, Take 1 tablet (250 mg total) by mouth once daily. Take first 2 tablets together, then 1 every day until finished., Disp: 6 tablet, Rfl: 0    buPROPion (WELLBUTRIN XL) 300 MG 24 hr tablet, Take 1 tablet (300 mg total) by mouth once daily., Disp: 90 tablet, Rfl: 3    busPIRone (BUSPAR) 10 MG tablet, Take 1 tablet (10 mg total) by mouth Daily. Pt is taking 10 mg once daily, Disp: 90 tablet, Rfl: 3    diabetic supplies, miscellan. Misc, Lancets for 1-2 times a day testing    dispense brand covered by insurance t (Patient taking differently: Lancets for 1-2 times a day testing    dispense brand covered by insurance t), Disp: 60 each, Rfl: 3    diazePAM (VALIUM) 5 MG tablet, TAKE 1 TABLET BY MOUTH EVERY 24 HOURS AS NEEDED FOR ANXIETY, Disp: 30 tablet, Rfl: 1    diclofenac sodium (VOLTAREN) 1 % Gel, APPLY 2 GRAMS TOPICALLY TO THE AFFECTED AREA FOUR TIMES DAILY, Disp: 300 g, Rfl: 5     doxycycline (VIBRA-TABS) 100 MG tablet, Take 1 tablet (100 mg total) by mouth 2 (two) times daily. Any generic brand capsule or tablet, Disp: 20 tablet, Rfl: 0    ergocalciferol (ERGOCALCIFEROL) 50,000 unit Cap, TAKE 1 CAPSULE BY MOUTH EVERY 7 DAYS, Disp: 12 capsule, Rfl: 3    EScitalopram oxalate (LEXAPRO) 20 MG tablet, TAKE 1 TABLET(20 MG) BY MOUTH EVERY DAY, Disp: 90 tablet, Rfl: 3    furosemide (LASIX) 20 MG tablet, Take 1 tablet (20 mg total) by mouth daily as needed., Disp: 30 tablet, Rfl: 3    gabapentin (NEURONTIN) 100 MG capsule, Take 1 capsule (100 mg total) by mouth 3 (three) times daily., Disp: 90 capsule, Rfl: 3    glipiZIDE (GLUCOTROL) 2.5 MG TR24, TAKE 1 TABLET(2.5 MG) BY MOUTH DAILY WITH BREAKFAST, Disp: 90 tablet, Rfl: 3    HYDROcodone-acetaminophen (NORCO) 5-325 mg per tablet, Take 1 tablet by mouth every 6 (six) hours as needed for Pain., Disp: 12 tablet, Rfl: 0    ibandronate (BONIVA) 150 mg tablet, TAKE 1 TABLET BY MOUTH EVERY 30 DAYS FOR OSTEOPOROSIS, Disp: 3 tablet, Rfl: 1    levothyroxine (SYNTHROID) 100 MCG tablet, Take 1 tablet (100 mcg total) by mouth once daily., Disp: 90 tablet, Rfl: 3    methocarbamoL (ROBAXIN) 750 MG Tab, Take 1 tablet (750 mg total) by mouth 3 (three) times daily as needed (muscle spasms)., Disp: 60 tablet, Rfl: 0    naloxone (NARCAN) 4 mg/actuation Spry, 4mg by nasal route as needed for opioid overdose; may repeat every 2-3 minutes in alternating nostrils until medical help arrives. Call 911, Disp: 1 each, Rfl: 11    omeprazole (PRILOSEC) 40 MG capsule, Take 1 capsule (40 mg total) by mouth every morning., Disp: 90 capsule, Rfl: 2    ondansetron (ZOFRAN-ODT) 4 MG TbDL, Take 1 tablet (4 mg total) by mouth every 6 (six) hours as needed (Nausea)., Disp: 28 tablet, Rfl: 0    oxyCODONE-acetaminophen (PERCOCET)  mg per tablet, Take 1 tablet by mouth every 8 (eight) hours as needed for Pain., Disp: 90 tablet, Rfl: 0    potassium chloride (KLOR-CON) 10 MEQ TbSR, TAKE 1  "TABLET BY MOUTH EVERY DAY WHEN YOU TAKE FUROSEMIDE, Disp: 90 tablet, Rfl: 0    predniSONE (DELTASONE) 20 MG tablet, One tablet daily by mouth for three days, Disp: 3 tablet, Rfl: 0    promethazine (PHENERGAN) 25 MG tablet, TAKE 1 TABLET(25 MG) BY MOUTH EVERY 6 HOURS AS NEEDED, Disp: 25 tablet, Rfl: 0    SUPREP BOWEL PREP KIT 17.5-3.13-1.6 gram SolR, use as directed, Disp: , Rfl:     TRUE METRIX GLUCOSE METER Misc, USE TWICE DAILY TO CHECK BLOOD SUGAR, Disp: 1 each, Rfl: 0    TRUE METRIX GLUCOSE TEST STRIP Strp, TO TEST ONCE TO TWICE DAILY, Disp: 50 strip, Rfl: 11    TRUE METRIX GLUCOSE TEST STRIP Strp, TEST BLOOD SUGAR EVERY DAY, Disp: 100 strip, Rfl: 3    TRUEPLUS LANCETS 30 gauge Misc, USE TO TEST ONCE TO TWICE D, Disp: , Rfl:     zolpidem (AMBIEN) 5 MG Tab, TAKE 1 TABLET(5 MG) BY MOUTH EVERY EVENING, Disp: 30 tablet, Rfl: 3    tamsulosin (FLOMAX) 0.4 mg Cap, Take 1 capsule (0.4 mg total) by mouth once daily., Disp: 30 capsule, Rfl: 0  Review of patient's allergies indicates:   Allergen Reactions    Adhesive      Adhesive tape    Latex, natural rubber      Adhesive from latex tape    Ibuprofen Other (See Comments)     Causes asthma flare??       Ht 5' 1" (1.549 m)   BMI 39.49 kg/m²     Review of Systems   Constitutional: Negative for fever and weight loss.   HENT:  Negative for congestion.    Eyes:  Negative for visual disturbance.   Cardiovascular:  Negative for chest pain.   Respiratory:  Negative for shortness of breath.    Hematologic/Lymphatic: Negative for bleeding problem. Does not bruise/bleed easily.   Skin:  Negative for poor wound healing.   Musculoskeletal:  Positive for back pain and joint pain.   Gastrointestinal:  Negative for abdominal pain.   Genitourinary:  Negative for dysuria.   Neurological:  Negative for seizures.   Psychiatric/Behavioral:  Negative for altered mental status.    Allergic/Immunologic: Negative for persistent infections.           Objective:    Ortho Exam          Alert, " oriented, no acute distress  Sclera-Normal  Respiratory distress-none  Gait able to stand and bear full weight    Right knee    Incision:  Cleanly healed  Range of motion:  0-140  Valgus/varus stability- stable  Swelling-none  Distal perfusion-intact  Distal neurologic-intact    The left knee is identical    Left shoulder    Diffusely tender  Prominent distal clavicle  Global decreased range of motion and rotator strength    Imaging:  Left shoulder radiographs show advanced loss of joint space with sclerosis of the humeral head    Assessment:             1. Primary osteoarthritis of both knees    2. History of bilateral knee replacement    3. Primary osteoarthritis of left shoulder    4. Lumbar degenerative disc disease        The knees are functioning satisfactorily.  No further intervention is anticipated.    The left shoulder might benefit from consideration of arthroplasty    The patient has a complex degenerative problem of her lumbar spine.  Extensive reconstruction would probably necessary to improve radiculopathic symptoms.        Plan:          No follow-ups on file.    I explained my diagnostic impression and the reasoning behind it in detail, using layman's terms.  Models and/or pictures were used to help in the explanation.    I arranged for the patient to see Dr. Heart who discussed the possibility of intervention for the left shoulder.    I contacted the patient's neurosurgeon via secure chat regarding my assessment.    X-ray with me next visit

## 2024-09-20 ENCOUNTER — TELEPHONE (OUTPATIENT)
Dept: ENDOSCOPY | Facility: HOSPITAL | Age: 69
End: 2024-09-20
Payer: MEDICARE

## 2024-09-20 NOTE — TELEPHONE ENCOUNTER
Spoke to pt for pre-call to confirm scheduled Upper Endoscopy (EGD) and patient verbalized understanding of the following:       Date of Procedure (s)  verified 9/26/24  Arrival Time 8:50 AM verified.  Location of Procedure(s) [place] 2nd Floor verified.  NPO status reinforced. Ok to continue clear liquids up until 4 hours prior to the Endoscopy procedure.   Denies blood thinners and GLP-1s.  Pt confirmed receipt of prep instructions and Rx prep (if applicable).  Instructions provided to patient via [facility]  Pt confirmed ride home after procedure if procedure requires anesthesia.   Pre-call screening questionnaire reviewed and completed with patient.   Appointment details are tentative, including check-in time.  If the patient begins taking any blood thinning medications, injectable weight loss/diabetes medications (other than insulin), or Adipex (phentermine) patient was instructed to contact the endoscopy scheduling department as soon as possible.  Patient was advised to call the endoscopy scheduling department if any questions or concerns arise.        Endoscopy Scheduling Department     
DISPLAY PLAN FREE TEXT
difficulty sleeping

## 2024-09-24 ENCOUNTER — TELEPHONE (OUTPATIENT)
Dept: GASTROENTEROLOGY | Facility: CLINIC | Age: 69
End: 2024-09-24
Payer: MEDICARE

## 2024-09-24 NOTE — TELEPHONE ENCOUNTER
Unable to to leave a VM for pt to call the office back.         ----- Message from Ly Landaverde sent at 9/24/2024 10:13 AM CDT -----  Type:  Needs Medical Advice/procedure questions     Who Called: pt     Would the patient rather a call back or a response via [facility]? Call   Best Call Back Number: [phone]  Additional Information: pt requesting a call to discuss procedure.

## 2024-09-26 ENCOUNTER — HOSPITAL ENCOUNTER (OUTPATIENT)
Facility: HOSPITAL | Age: 69
Discharge: HOME | End: 2024-09-26
Attending: INTERNAL MEDICINE | Admitting: INTERNAL MEDICINE
Payer: MEDICARE

## 2024-09-26 ENCOUNTER — TELEPHONE (OUTPATIENT)
Dept: PHYSICAL MEDICINE AND REHAB | Facility: CLINIC | Age: 69
End: 2024-09-26
Payer: MEDICARE

## 2024-09-26 VITALS
WEIGHT: 193 LBS | SYSTOLIC BLOOD PRESSURE: 112 MMHG | RESPIRATION RATE: 17 BRPM | BODY MASS INDEX: 37.89 KG/M2 | HEART RATE: 75 BPM | OXYGEN SATURATION: 100 % | TEMPERATURE: 98 F | HEIGHT: 60 IN | DIASTOLIC BLOOD PRESSURE: 66 MMHG

## 2024-09-26 DIAGNOSIS — K21.9 GERD (GASTROESOPHAGEAL REFLUX DISEASE): ICD-10-CM

## 2024-09-26 LAB — POCT GLUCOSE: 75 MG/DL (ref 70–110)

## 2024-09-26 PROCEDURE — 25000003 PHARM REV CODE 250: Mod: HCNC | Performed by: NURSE ANESTHETIST, CERTIFIED REGISTERED

## 2024-09-26 PROCEDURE — 88305 TISSUE EXAM BY PATHOLOGIST: CPT | Mod: 26,HCNC,, | Performed by: PATHOLOGY

## 2024-09-26 PROCEDURE — 37000009 HC ANESTHESIA EA ADD 15 MINS: Mod: HCNC | Performed by: INTERNAL MEDICINE

## 2024-09-26 PROCEDURE — 43239 EGD BIOPSY SINGLE/MULTIPLE: CPT | Mod: HCNC | Performed by: INTERNAL MEDICINE

## 2024-09-26 PROCEDURE — 37000008 HC ANESTHESIA 1ST 15 MINUTES: Mod: HCNC | Performed by: INTERNAL MEDICINE

## 2024-09-26 PROCEDURE — 43239 EGD BIOPSY SINGLE/MULTIPLE: CPT | Mod: HCNC,,, | Performed by: INTERNAL MEDICINE

## 2024-09-26 PROCEDURE — 25000003 PHARM REV CODE 250: Mod: HCNC | Performed by: INTERNAL MEDICINE

## 2024-09-26 PROCEDURE — 63600175 PHARM REV CODE 636 W HCPCS: Mod: HCNC | Performed by: NURSE ANESTHETIST, CERTIFIED REGISTERED

## 2024-09-26 PROCEDURE — 88305 TISSUE EXAM BY PATHOLOGIST: CPT | Mod: HCNC | Performed by: PATHOLOGY

## 2024-09-26 PROCEDURE — 27201012 HC FORCEPS, HOT/COLD, DISP: Mod: HCNC | Performed by: INTERNAL MEDICINE

## 2024-09-26 RX ORDER — LIDOCAINE HYDROCHLORIDE 20 MG/ML
INJECTION INTRAVENOUS
Status: DISCONTINUED | OUTPATIENT
Start: 2024-09-26 | End: 2024-09-26

## 2024-09-26 RX ORDER — PROPOFOL 10 MG/ML
VIAL (ML) INTRAVENOUS
Status: DISCONTINUED | OUTPATIENT
Start: 2024-09-26 | End: 2024-09-26

## 2024-09-26 RX ORDER — SODIUM CHLORIDE 9 MG/ML
INJECTION, SOLUTION INTRAVENOUS CONTINUOUS
Status: DISCONTINUED | OUTPATIENT
Start: 2024-09-26 | End: 2024-09-26 | Stop reason: HOSPADM

## 2024-09-26 RX ADMIN — PROPOFOL 20 MG: 10 INJECTION, EMULSION INTRAVENOUS at 09:09

## 2024-09-26 RX ADMIN — PROPOFOL 20 MG: 10 INJECTION, EMULSION INTRAVENOUS at 10:09

## 2024-09-26 RX ADMIN — PROPOFOL 50 MG: 10 INJECTION, EMULSION INTRAVENOUS at 09:09

## 2024-09-26 RX ADMIN — LIDOCAINE HYDROCHLORIDE 50 MG: 20 INJECTION, SOLUTION INTRAVENOUS at 09:09

## 2024-09-26 RX ADMIN — SODIUM CHLORIDE: 9 INJECTION, SOLUTION INTRAVENOUS at 09:09

## 2024-09-26 NOTE — [employer] PATIENT INSTRUCTIONS
Discharge Summary/Instructions after an Endoscopic Procedure  Patient Name: Christine Castro  Patient MRN: [id number]  Patient YOB: 1955 Thursday, September 26, 2024  Robert Ochoa MD  Dear patient,  As a result of recent federal legislation (The Federal Cures Act), you may   receive lab or pathology results from your procedure in your [facility]   account before your physician is able to contact you. Your physician or   their representative will relay the results to you with their   recommendations at their soonest availability.  Thank you,  Your health is very important to us during the Covid Crisis. Following your   procedure today, you will receive a daily text for 2 weeks asking about   signs or symptoms of Covid 19.  Please respond to this text when you   receive it so we can follow up and keep you as safe as possible.   RESTRICTIONS:  During your procedure today, you received medications for sedation.  These   medications may affect your judgment, balance and coordination.  Therefore,   for 24 hours, you have the following restrictions:   - DO NOT drive a car, operate machinery, make legal/financial decisions,   sign important papers or drink alcohol.    ACTIVITY:  Today: no heavy lifting, straining or running due to procedural   sedation/anesthesia.  The following day: return to full activity including work.  DIET:  Eat and drink normally unless instructed otherwise.     TREATMENT FOR COMMON SIDE EFFECTS:  - Mild abdominal pain, nausea, belching, bloating or excessive gas:  rest,   eat lightly and use a heating pad.  - Sore Throat: treat with throat lozenges and/or gargle with warm salt   water.  - Because air was used during the procedure, expelling large amounts of air   from your rectum or belching is normal.  - If a bowel prep was taken, you may not have a bowel movement for 1-3 days.    This is normal.  SYMPTOMS TO WATCH FOR AND REPORT TO YOUR PHYSICIAN:  1. Abdominal pain or bloating,  other than gas cramps.  2. Chest pain.  3. Back pain.  4. Signs of infection such as: chills or fever occurring within 24 hours   after the procedure.  5. Rectal bleeding, which would show as bright red, maroon, or black stools.   (A tablespoon of blood from the rectum is not serious, especially if   hemorrhoids are present.)  6. Vomiting.  7. Weakness or dizziness.  GO DIRECTLY TO THE NEAREST EMERGENCY ROOM IF YOU HAVE ANY OF THE FOLLOWING:      Difficulty breathing              Chills and/or fever over 101 F   Persistent vomiting and/or vomiting blood   Severe abdominal pain   Severe chest pain   Black, tarry stools   Bleeding- more than one tablespoon   Any other symptom or condition that you feel may need urgent attention  Your doctor recommends these additional instructions:  If any biopsies were taken, your doctors clinic will contact you in 1 to 2   weeks with any results.  - Discharge patient to home.   - Resume previous diet.   - Continue present medications.   - Await pathology results.   - Return to GI office PRN.  For questions, problems or results please call your physician - Robert Ochoa MD.  EMERGENCY PHONE NUMBER: [phone],  LAB RESULTS: [phone]  IF A COMPLICATION OR EMERGENCY SITUATION ARISES AND YOU ARE UNABLE TO REACH   YOUR PHYSICIAN - GO DIRECTLY TO THE EMERGENCY ROOM.  Robert Ochoa MD  9/26/2024 10:17:25 AM  This report has been verified and signed electronically.  Dear patient,  As a result of recent federal legislation (The Federal Cures Act), you may   receive lab or pathology results from your procedure in your [facility]   account before your physician is able to contact you. Your physician or   their representative will relay the results to you with their   recommendations at their soonest availability.  Thank you,  [employer]ATION

## 2024-09-26 NOTE — PLAN OF CARE
Dr. Ochoa visited at bedside, discussed findings and recommendations with patient and family member; all questions asked and answered. Verbalized understanding of information given. Handout provided at time of discharge.

## 2024-09-26 NOTE — H&P
Short Stay Endoscopy History and Physical    PCP - Alon Santiago MD    Procedure - EGD  ASA - III  Mallampati - per anesthesia  History of Anesthesia problems - no  Family history Anesthesia problems - no     HPI:  This is a 69 y.o. female here for evaluation of : GERD. N/V    ROS:  Constitutional: No fevers, chills, No weight loss  ENT: No allergies  CV: No chest pain  Pulm: No shortness of breath  GI: see HPI  Derm: No rash    Medical History:  has a past medical history of Anticoagulant long-term use, Arthritis, Asthma, CHF (congestive heart failure), Colon cancer, COPD (chronic obstructive pulmonary disease), Coronary artery disease, Depression, Diabetes mellitus, type 2, GERD (gastroesophageal reflux disease), Left ureteral stone (2024), Myocardial infarction, Nausea and vomiting (2024), and Thyroid disease.    Surgical History:  has a past surgical history that includes  section; Tonsillectomy; Colon surgery; Cholecystectomy; Ectopic pregnancy surgery; Tubal ligation; Gastric bypass; Colonoscopy; Colonoscopy (N/A, 2017); Colonoscopy (N/A, 4/10/2018); Esophagogastroduodenoscopy (N/A, 2019); Knee Arthroplasty (Left, 2019); Oophorectomy; Knee Arthroplasty (Right, 2019); Abdominal surgery; Fracture surgery; Hernia repair; Injection of anesthetic agent around genitofemoral nerve (Left, 2020); Radiofrequency thermocoagulation (Left, 2020); Epidural steroid injection into lumbar spine (N/A, 2020); Joint replacement; Fusion of spine with instrumentation (Left, 2021); Transforaminal epidural injection of steroid (Right, 2021); Decompression of cervical spine by anterior approach with fusion (N/A, 2021); and Caudal epidural steroid injection (N/A, 2022).    Family History: family history includes Breast cancer in her cousin, maternal aunt, and maternal aunt; Diabetes in her brother and mother; Hyperlipidemia in her mother; Hypertension  in her brother, mother, and sister; Stroke in her mother.. Otherwise no colon cancer, inflammatory bowel disease, or GI malignancies.    Social History:  reports that she has never smoked. She has never been exposed to tobacco smoke. She has never used smokeless tobacco. She reports current alcohol use. She reports that she does not use drugs.    Review of patient's allergies indicates:   Allergen Reactions    Adhesive      Adhesive tape    Latex, natural rubber      Adhesive from latex tape    Ibuprofen Other (See Comments)     Causes asthma flare??       Medications:   Medications Prior to Admission   Medication Sig Dispense Refill Last Dose    albuterol (PROVENTIL/VENTOLIN HFA) 90 mcg/actuation inhaler INHALE 2 PUFFS BY MOUTH EVERY 4 HOURS AS NEEDED FOR WHEEZING 18 g 3     aspirin (ECOTRIN) 81 MG EC tablet Take 1 tablet (81 mg total) by mouth once daily.  0     atorvastatin (LIPITOR) 40 MG tablet TAKE 1 TABLET(40 MG) BY MOUTH EVERY DAY FOR CHOLESTEROL 90 tablet 3     azithromycin (Z-MARY) 250 MG tablet Take 1 tablet (250 mg total) by mouth once daily. Take first 2 tablets together, then 1 every day until finished. 6 tablet 0     buPROPion (WELLBUTRIN XL) 300 MG 24 hr tablet Take 1 tablet (300 mg total) by mouth once daily. 90 tablet 3     busPIRone (BUSPAR) 10 MG tablet Take 1 tablet (10 mg total) by mouth Daily. Pt is taking 10 mg once daily 90 tablet 3     diabetic supplies, miscellan. Misc Lancets for 1-2 times a day testing    dispense brand covered by insurance t (Patient taking differently: Lancets for 1-2 times a day testing    dispense brand covered by insurance t) 60 each 3     diazePAM (VALIUM) 5 MG tablet TAKE 1 TABLET BY MOUTH EVERY 24 HOURS AS NEEDED FOR ANXIETY 30 tablet 1     diclofenac sodium (VOLTAREN) 1 % Gel APPLY 2 GRAMS TOPICALLY TO THE AFFECTED AREA FOUR TIMES DAILY 300 g 5     doxycycline (VIBRA-TABS) 100 MG tablet Take 1 tablet (100 mg total) by mouth 2 (two) times daily. Any generic brand  capsule or tablet 20 tablet 0     ergocalciferol (ERGOCALCIFEROL) 50,000 unit Cap TAKE 1 CAPSULE BY MOUTH EVERY 7 DAYS 12 capsule 3     EScitalopram oxalate (LEXAPRO) 20 MG tablet TAKE 1 TABLET(20 MG) BY MOUTH EVERY DAY 90 tablet 3     furosemide (LASIX) 20 MG tablet Take 1 tablet (20 mg total) by mouth daily as needed. 30 tablet 3     gabapentin (NEURONTIN) 100 MG capsule Take 1 capsule (100 mg total) by mouth 3 (three) times daily. 90 capsule 3     glipiZIDE (GLUCOTROL) 2.5 MG TR24 TAKE 1 TABLET(2.5 MG) BY MOUTH DAILY WITH BREAKFAST 90 tablet 3     HYDROcodone-acetaminophen (NORCO) 5-325 mg per tablet Take 1 tablet by mouth every 6 (six) hours as needed for Pain. 12 tablet 0     ibandronate (BONIVA) 150 mg tablet TAKE 1 TABLET BY MOUTH EVERY 30 DAYS FOR OSTEOPOROSIS 3 tablet 1     levothyroxine (SYNTHROID) 100 MCG tablet Take 1 tablet (100 mcg total) by mouth once daily. 90 tablet 3     methocarbamoL (ROBAXIN) 750 MG Tab Take 1 tablet (750 mg total) by mouth 3 (three) times daily as needed (muscle spasms). 60 tablet 0     naloxone (NARCAN) 4 mg/actuation Spry 4mg by nasal route as needed for opioid overdose; may repeat every 2-3 minutes in alternating nostrils until medical help arrives. Call 911 1 each 11     omeprazole (PRILOSEC) 40 MG capsule Take 1 capsule (40 mg total) by mouth every morning. 90 capsule 2     ondansetron (ZOFRAN-ODT) 4 MG TbDL Take 1 tablet (4 mg total) by mouth every 6 (six) hours as needed (Nausea). 28 tablet 0     oxyCODONE-acetaminophen (PERCOCET)  mg per tablet Take 1 tablet by mouth every 8 (eight) hours as needed for Pain. 90 tablet 0     potassium chloride (KLOR-CON) 10 MEQ TbSR TAKE 1 TABLET BY MOUTH EVERY DAY WHEN YOU TAKE FUROSEMIDE 90 tablet 0     predniSONE (DELTASONE) 20 MG tablet One tablet daily by mouth for three days 3 tablet 0     promethazine (PHENERGAN) 25 MG tablet TAKE 1 TABLET(25 MG) BY MOUTH EVERY 6 HOURS AS NEEDED 25 tablet 0     SUPREP BOWEL PREP KIT  17.5-3.13-1.6 gram SolR use as directed       tamsulosin (FLOMAX) 0.4 mg Cap Take 1 capsule (0.4 mg total) by mouth once daily. 30 capsule 0     TRUE METRIX GLUCOSE METER Misc USE TWICE DAILY TO CHECK BLOOD SUGAR 1 each 0     TRUE METRIX GLUCOSE TEST STRIP Strp TO TEST ONCE TO TWICE DAILY 50 strip 11     TRUE METRIX GLUCOSE TEST STRIP Strp TEST BLOOD SUGAR EVERY  strip 3     TRUEPLUS LANCETS 30 gauge Misc USE TO TEST ONCE TO TWICE D       zolpidem (AMBIEN) 5 MG Tab TAKE 1 TABLET(5 MG) BY MOUTH EVERY EVENING 30 tablet 3          Objective Findings:    Vital Signs: see nursing notes  Physical Exam:  General Appearance: Well appearing in no acute distress  Eyes:    No scleral icterus  ENT: Neck supple  Lungs: CTA anteriorly  Heart:  S1, S2 normal, no murmurs heard  Abdomen: Soft, non tender, non distended with positive bowel sounds. No hepatosplenomegaly, ascites, or mass  Extremities: no edema  Skin: No rash      Labs:  Lab Results   Component Value Date    WBC 5.52 04/16/2024    HGB 10.4 (L) 04/16/2024    HCT 32.7 (L) 04/16/2024     04/16/2024    CHOL 147 04/16/2024    TRIG 84 04/16/2024    HDL 44 04/16/2024    ALT 15 04/16/2024    AST 20 04/16/2024     04/16/2024    K 3.1 (L) 04/16/2024     (H) 04/16/2024    CREATININE 0.9 04/16/2024    BUN 12 04/16/2024    CO2 22 (L) 04/16/2024    TSH 8.147 (H) 04/16/2024    INR 1.0 11/23/2021    HGBA1C 5.3 04/16/2024    MICROALBUR 1.0 02/20/2018       I have explained the risks and benefits of endoscopy procedures to the patient including but not limited to bleeding, perforation, infection, and death.    Robert Ochoa MD

## 2024-09-26 NOTE — TELEPHONE ENCOUNTER
----- Message from Natalie Singh sent at 9/26/2024 11:31 AM CDT -----  Regarding: Advise  Contact: [phone]  CARYN GUEVARA calling regarding Patient Advice (message) for # pt is calling to speak with nurse regarding a walking device pls advise

## 2024-09-26 NOTE — TRANSFER OF CARE
Anesthesia Transfer of Care Note    Patient: Christine Castro    Procedure(s) Performed: Procedure(s) (LRB):  EGD (ESOPHAGOGASTRODUODENOSCOPY) (N/A)    Patient location: GI    Anesthesia Type: general    Transport from OR: Transported from OR on room air with adequate spontaneous ventilation    Post pain: adequate analgesia    Post assessment: no apparent anesthetic complications and tolerated procedure well    Post vital signs: stable    Level of consciousness: responds to stimulation and sedated    Nausea/Vomiting: no nausea/vomiting    Complications: none    Transfer of care protocol was followed      Last vitals: Visit Vitals  BP (!) 157/85   Pulse 68   Temp 36.5 °C (97.7 °F)   Resp 20   Ht 5' (1.524 m)   Wt 87.5 kg (193 lb)   SpO2 100%   Breastfeeding No   BMI 37.69 kg/m²

## 2024-09-26 NOTE — ANESTHESIA PREPROCEDURE EVALUATION
[facility]  Anesthesia Pre-Operative Evaluation         Patient Name: Christine Castro  YOB: 1955  MRN: [id number]    SUBJECTIVE:     Pre-operative evaluation for Procedure(s) (LRB):  EGD (ESOPHAGOGASTRODUODENOSCOPY) (N/A)     09/26/2024    Christine Castro is a 69 y.o. female w/ a significant PMHx of DM2, CHF (last EF 60%, no diastolic dysfxn 2019), prior MI, CAD, TIA, CKD3, hx of gastric bypass.    Patient now presents for the above procedure(s).    TTE: 2019  Normal left ventricular systolic function. The estimated ejection fraction is 60%  Normal LV diastolic function.  Concentric left ventricular remodeling.  Normal right ventricular systolic function.  Normal central venous pressure (3 mm Hg).  The estimated PA systolic pressure is 28 mm Hg  Negative bubble study    Patient Active Problem List   Diagnosis    Acquired hypothyroidism    History of congestive heart failure    Major depressive disorder    Normocytic anemia    History of myocardial infarction    Epigastric pain    Primary osteoarthritis of left knee    TIA (transient ischemic attack)    History of gastric bypass    Coronary artery disease involving native coronary artery of native heart    UTI (urinary tract infection)    History of right knee joint replacement    Chronic left shoulder pain    Primary osteoarthritis of left shoulder    Chronic pain    Lumbar radiculopathy    DDD (degenerative disc disease), lumbar    Pain    Cervical spondylosis with myelopathy    Frequent falls    Weakness of both lower extremities    At high risk for falls    Class 3 severe obesity due to excess calories with serious comorbidity and body mass index (BMI) of 40.0 to 44.9 in adult    Rheumatoid arthritis without rheumatoid factor, multiple sites    Heart failure, unspecified HF chronicity, unspecified heart failure type    Aortic atherosclerosis    Type 2 diabetes mellitus with diabetic neuropathy, without long-term current use of  insulin    Opioid dependence, uncomplicated    Chronic myelopathy    Chronic kidney disease, stage 3a    Type 2 diabetes mellitus without complication, without long-term current use of insulin    Major depressive disorder, recurrent, in partial remission    History of kidney stones    Nausea and vomiting       Review of patient's allergies indicates:   Allergen Reactions    Adhesive      Adhesive tape    Latex, natural rubber      Adhesive from latex tape    Ibuprofen Other (See Comments)     Causes asthma flare??       Current Inpatient Medications:      No current facility-administered medications on file prior to encounter.     Current Outpatient Medications on File Prior to Encounter   Medication Sig Dispense Refill    albuterol (PROVENTIL/VENTOLIN HFA) 90 mcg/actuation inhaler INHALE 2 PUFFS BY MOUTH EVERY 4 HOURS AS NEEDED FOR WHEEZING 18 g 3    aspirin (ECOTRIN) 81 MG EC tablet Take 1 tablet (81 mg total) by mouth once daily.  0    atorvastatin (LIPITOR) 40 MG tablet TAKE 1 TABLET(40 MG) BY MOUTH EVERY DAY FOR CHOLESTEROL 90 tablet 3    azithromycin (Z-MARY) 250 MG tablet Take 1 tablet (250 mg total) by mouth once daily. Take first 2 tablets together, then 1 every day until finished. 6 tablet 0    buPROPion (WELLBUTRIN XL) 300 MG 24 hr tablet Take 1 tablet (300 mg total) by mouth once daily. 90 tablet 3    busPIRone (BUSPAR) 10 MG tablet Take 1 tablet (10 mg total) by mouth Daily. Pt is taking 10 mg once daily 90 tablet 3    diabetic supplies, miscellan. Misc Lancets for 1-2 times a day testing    dispense brand covered by insurance t (Patient taking differently: Lancets for 1-2 times a day testing    dispense brand covered by insurance t) 60 each 3    diclofenac sodium (VOLTAREN) 1 % Gel APPLY 2 GRAMS TOPICALLY TO THE AFFECTED AREA FOUR TIMES DAILY 300 g 5    doxycycline (VIBRA-TABS) 100 MG tablet Take 1 tablet (100 mg total) by mouth 2 (two) times daily. Any generic brand capsule or tablet 20 tablet 0     ergocalciferol (ERGOCALCIFEROL) 50,000 unit Cap TAKE 1 CAPSULE BY MOUTH EVERY 7 DAYS 12 capsule 3    EScitalopram oxalate (LEXAPRO) 20 MG tablet TAKE 1 TABLET(20 MG) BY MOUTH EVERY DAY 90 tablet 3    furosemide (LASIX) 20 MG tablet Take 1 tablet (20 mg total) by mouth daily as needed. 30 tablet 3    gabapentin (NEURONTIN) 100 MG capsule Take 1 capsule (100 mg total) by mouth 3 (three) times daily. 90 capsule 3    glipiZIDE (GLUCOTROL) 2.5 MG TR24 TAKE 1 TABLET(2.5 MG) BY MOUTH DAILY WITH BREAKFAST 90 tablet 3    HYDROcodone-acetaminophen (NORCO) 5-325 mg per tablet Take 1 tablet by mouth every 6 (six) hours as needed for Pain. 12 tablet 0    ibandronate (BONIVA) 150 mg tablet TAKE 1 TABLET BY MOUTH EVERY 30 DAYS FOR OSTEOPOROSIS 3 tablet 1    levothyroxine (SYNTHROID) 100 MCG tablet Take 1 tablet (100 mcg total) by mouth once daily. 90 tablet 3    methocarbamoL (ROBAXIN) 750 MG Tab Take 1 tablet (750 mg total) by mouth 3 (three) times daily as needed (muscle spasms). 60 tablet 0    naloxone (NARCAN) 4 mg/actuation Spry 4mg by nasal route as needed for opioid overdose; may repeat every 2-3 minutes in alternating nostrils until medical help arrives. Call 911 1 each 11    omeprazole (PRILOSEC) 40 MG capsule Take 1 capsule (40 mg total) by mouth every morning. 90 capsule 2    ondansetron (ZOFRAN-ODT) 4 MG TbDL Take 1 tablet (4 mg total) by mouth every 6 (six) hours as needed (Nausea). 28 tablet 0    potassium chloride (KLOR-CON) 10 MEQ TbSR TAKE 1 TABLET BY MOUTH EVERY DAY WHEN YOU TAKE FUROSEMIDE 90 tablet 0    predniSONE (DELTASONE) 20 MG tablet One tablet daily by mouth for three days 3 tablet 0    promethazine (PHENERGAN) 25 MG tablet TAKE 1 TABLET(25 MG) BY MOUTH EVERY 6 HOURS AS NEEDED 25 tablet 0    SUPREP BOWEL PREP KIT 17.5-3.13-1.6 gram SolR use as directed      tamsulosin (FLOMAX) 0.4 mg Cap Take 1 capsule (0.4 mg total) by mouth once daily. 30 capsule 0    TRUE METRIX GLUCOSE METER Misc USE TWICE DAILY TO  CHECK BLOOD SUGAR 1 each 0    TRUE METRIX GLUCOSE TEST STRIP Strp TO TEST ONCE TO TWICE DAILY 50 strip 11    TRUE METRIX GLUCOSE TEST STRIP Strp TEST BLOOD SUGAR EVERY  strip 3    TRUEPLUS LANCETS 30 gauge Misc USE TO TEST ONCE TO TWICE D      zolpidem (AMBIEN) 5 MG Tab TAKE 1 TABLET(5 MG) BY MOUTH EVERY EVENING 30 tablet 3       Past Surgical History:   Procedure Laterality Date    ABDOMINAL SURGERY      CAUDAL EPIDURAL STEROID INJECTION N/A 2022    Procedure: Injection-steroid-epidural-caudal;  Surgeon: Nilam Santiago MD;  [place] [facility] [place] [facility]  Service: Pain Management;  Laterality: N/A;     SECTION      CHOLECYSTECTOMY      COLON SURGERY      COLONOSCOPY      --- repeat in 3-5 years    COLONOSCOPY N/A 2017    Procedure: COLONOSCOPY;  Surgeon: Corrina Flores MD;  Location: [facility];  Service: Endoscopy;  Laterality: N/A;    COLONOSCOPY N/A 4/10/2018    Procedure: COLONOSCOPY golytely;  Surgeon: Corrina Flores MD;  Location: [facility];  Service: Endoscopy;  Laterality: N/A;    DECOMPRESSION OF CERVICAL SPINE BY ANTERIOR APPROACH WITH FUSION N/A 2021    Procedure: DECOMPRESSION AND FUSION, SPINE, CERVICAL, ANTERIOR APPROACH C3-4 C5- 6 C6-7 ACDF;  Surgeon: Ishaan Fisher MD;  Location: [facility] OR;  Service: Neurosurgery;  Laterality: N/A;    ECTOPIC PREGNANCY SURGERY      EPIDURAL STEROID INJECTION INTO LUMBAR SPINE N/A 2020    Procedure: Injection-steroid-epidural-lumbar--L5-S1 InterLaminar;  Surgeon: Nilam Santiago MD;  Location: [facility];  Service: Pain Management;  Laterality: N/A;    ESOPHAGOGASTRODUODENOSCOPY N/A 2019    Procedure: ESOPHAGOGASTRODUODENOSCOPY (EGD);  Surgeon: Corrina Flores MD;  Location: [facility] ENDO;  Service: Endoscopy;  Laterality: N/A;    FRACTURE SURGERY      left leg    FUSION OF SPINE WITH INSTRUMENTATION Left 2021    Procedure: FUSION, SPINE, WITH INSTRUMENTATION Stage 1 Left L4-5 OLIF DORA Stage 2 L4-5 Laminectomy, medial  Facetectomy, foraminotomy, L4-5 MIS Instrumentation;  Surgeon: Ishaan Fisher MD;  Location: [facility] OR;  Service: Neurosurgery;  Laterality: Left;  Procedure: Stage 1 Left L4-5 OLIF DORA  Stage 2 L4-5 Laminectomy, medial Facetectomy, foraminotomy, L4-5 MIS Instrumentation  Surgery TIme: 4 Hrs    GASTRIC BYPASS      HERNIA REPAIR      INJECTION OF ANESTHETIC AGENT AROUND GENITOFEMORAL NERVE Left 7/29/2020    Procedure: BLOCK, NERVE, LEFT SHOULDER ARTICULAR;  Surgeon: Nilam Santiago MD;  Location: [facility] PAIN [facility]GT;  Service: Pain Management;  Laterality: Left;    JOINT REPLACEMENT      KNEE ARTHROPLASTY Left 5/8/2019    Procedure: ARTHROPLASTY, KNEE;  Surgeon: Roldan Greenwood MD;  Location: [facility] OR;  Service: Orthopedics;  Laterality: Left;  Navid notified    KNEE ARTHROPLASTY Right 11/20/2019    Procedure: ARTHROPLASTY, KNEE;  Surgeon: Roldan Greenwood MD;  Location: [facility] OR;  Service: Orthopedics;  Laterality: Right;  Navid notified, confirmed case Navid 11/19/19 [id number]    OOPHORECTOMY      RADIOFREQUENCY THERMOCOAGULATION Left 8/12/2020    Procedure: RADIOFREQUENCY THERMAL COAGULATION--Left Suprascapular, Axillary, and Lateral Pectoral Articular Branch RFA;  Surgeon: Nilam Santiago MD;  Location: [facility] PAIN [facility];  Service: Pain Management;  Laterality: Left;    TONSILLECTOMY      TRANSFORAMINAL EPIDURAL INJECTION OF STEROID Right 8/25/2021    Procedure: Injection,steroid,epidural,transforaminal approach; Levels: L5;  Surgeon: Nilam Santiago MD;  Location: [facility] PAIN [facility];  Service: Pain Management;  Laterality: Right;  No pacemaker. Patient is diabetic. Patient is taking Aspirin but can continue per Dr. Santiago.     TUBAL LIGATION         OBJECTIVE:     Vital Signs Range (Last 24H):         Significant Labs:  Lab Results   Component Value Date    WBC 5.52 04/16/2024    HGB 10.4 (L) 04/16/2024    HCT 32.7 (L) 04/16/2024     04/16/2024    CHOL 147 04/16/2024    TRIG 84 04/16/2024    HDL 44 04/16/2024    ALT 15  04/16/2024    AST 20 04/16/2024     04/16/2024    K 3.1 (L) 04/16/2024     (H) 04/16/2024    CREATININE 0.9 04/16/2024    BUN 12 04/16/2024    CO2 22 (L) 04/16/2024    TSH 8.147 (H) 04/16/2024    INR 1.0 11/23/2021    HGBA1C 5.3 04/16/2024    MICROALBUR 1.0 02/20/2018       Diagnostic Studies: No relevant studies.    EKG:   Results for orders placed or performed during the hospital encounter of 04/02/24   EKG 12-lead    Collection Time: 04/02/24  6:47 PM   Result Value Ref Range    QRS Duration 84 ms    [facility] QTC Calculation 435 ms    Narrative    Test Reason : R10.9,    Vent. Rate : 085 BPM     Atrial Rate : 085 BPM     P-R Int : 168 ms          QRS Dur : 084 ms      QT Int : 366 ms       P-R-T Axes : 054 001 005 degrees     QTc Int : 435 ms    Normal sinus rhythm  Possible Anterior infarct (cited on or before 23-NOV-2021)  Abnormal ECG  When compared with ECG of 17-DEC-2023 20:37,  No significant change was found  Confirmed by Emma HURTADO, Damian BOWMAN ([id number]) on 4/3/2024 5:29:42 PM    Referred By: LIANNA   SELF           Confirmed By:Damian Carter MD       ASSESSMENT/PLAN:           Pre-op Assessment    I have reviewed the Patient Summary Reports.     I have reviewed the Nursing Notes. I have reviewed the NPO Status.   I have reviewed the Medications.     Review of Systems  Anesthesia Hx:  No problems with previous Anesthesia               Denies Personal Hx of Anesthesia complications.                    Social:  Non-Smoker       Hematology/Oncology:       -- Anemia:                  Denies Current/Recent Cancer                Cardiovascular:  Exercise tolerance: poor   Hypertension  Past MI CAD     Denies Dysrhythmias.   CHF    hyperlipidemia   ECG has been reviewed.  Functional Capacity low / < 4 METS                         Pulmonary:    Denies COPD.  Denies Asthma.   Denies Shortness of breath.   Denies Sleep Apnea.                Renal/:   Denies Chronic Renal Disease.                 Hepatic/GI:    Hiatal Hernia, GERD   Hx of gastric bypass          Musculoskeletal:  Arthritis               Neurological:  Denies TIA.  Denies CVA.    Denies Seizures.    Cervical and lumbar radiculopathy                            Endocrine:  Diabetes Hypothyroidism          Psych:  Psychiatric History                  Physical Exam  General: Well nourished, Cooperative, Alert and Oriented    Airway:  Mallampati: II   Mouth Opening: Normal  TM Distance: Normal  Tongue: Normal  Neck ROM: Normal ROM    Dental:  Edentulous        Anesthesia Plan  Type of Anesthesia, risks & benefits discussed:    Anesthesia Type: Gen Natural Airway  Intra-op Monitoring Plan: Standard ASA Monitors  Post Op Pain Control Plan: multimodal analgesia  Induction:  IV  Informed Consent: Informed consent signed with the Patient and all parties understand the risks and agree with anesthesia plan.  All questions answered.   ASA Score: 3  Day of Surgery Review of History & Physical: H&P Update referred to the surgeon/provider.    Ready For Surgery From Anesthesia Perspective.     .

## 2024-09-27 LAB
FINAL PATHOLOGIC DIAGNOSIS: NORMAL
GROSS: NORMAL
Lab: NORMAL

## 2024-09-29 RX ORDER — GABAPENTIN 100 MG/1
100 CAPSULE ORAL 3 TIMES DAILY
Qty: 90 CAPSULE | Refills: 3 | Status: SHIPPED | OUTPATIENT
Start: 2024-09-29

## 2024-09-29 NOTE — TELEPHONE ENCOUNTER
No care due was identified.  [facility] Embedded Care Due Messages. Reference number: [id number].   9/29/2024 3:11:42 PM CDT

## 2024-09-30 DIAGNOSIS — M19.012 PRIMARY OSTEOARTHRITIS OF LEFT SHOULDER: Primary | ICD-10-CM

## 2024-10-02 ENCOUNTER — OFFICE VISIT (OUTPATIENT)
Dept: NEUROSURGERY | Facility: CLINIC | Age: 69
End: 2024-10-02
Payer: MEDICARE

## 2024-10-02 ENCOUNTER — HOSPITAL ENCOUNTER (OUTPATIENT)
Dept: RADIOLOGY | Facility: HOSPITAL | Age: 69
Discharge: HOME | End: 2024-10-02
Attending: NEUROLOGICAL SURGERY
Payer: MEDICARE

## 2024-10-02 VITALS
BODY MASS INDEX: 37.87 KG/M2 | HEIGHT: 60 IN | WEIGHT: 192.88 LBS | DIASTOLIC BLOOD PRESSURE: 80 MMHG | HEART RATE: 73 BPM | SYSTOLIC BLOOD PRESSURE: 129 MMHG

## 2024-10-02 DIAGNOSIS — M43.27 FUSION OF SPINE OF LUMBOSACRAL REGION: ICD-10-CM

## 2024-10-02 DIAGNOSIS — M48.07 SPINAL STENOSIS, LUMBOSACRAL REGION: Primary | ICD-10-CM

## 2024-10-02 DIAGNOSIS — Z98.1 ARTHRODESIS STATUS: ICD-10-CM

## 2024-10-02 DIAGNOSIS — M21.70 LEG LENGTH DISCREPANCY: ICD-10-CM

## 2024-10-02 PROCEDURE — 72110 X-RAY EXAM L-2 SPINE 4/>VWS: CPT | Mod: TC,HCNC,PN

## 2024-10-02 PROCEDURE — 1125F AMNT PAIN NOTED PAIN PRSNT: CPT | Mod: HCNC,CPTII,S$GLB, | Performed by: NEUROLOGICAL SURGERY

## 2024-10-02 PROCEDURE — 3008F BODY MASS INDEX DOCD: CPT | Mod: HCNC,CPTII,S$GLB, | Performed by: NEUROLOGICAL SURGERY

## 2024-10-02 PROCEDURE — 99214 OFFICE O/P EST MOD 30 MIN: CPT | Mod: HCNC,S$GLB,, | Performed by: NEUROLOGICAL SURGERY

## 2024-10-02 PROCEDURE — 1159F MED LIST DOCD IN RCRD: CPT | Mod: HCNC,CPTII,S$GLB, | Performed by: NEUROLOGICAL SURGERY

## 2024-10-02 PROCEDURE — 3074F SYST BP LT 130 MM HG: CPT | Mod: HCNC,CPTII,S$GLB, | Performed by: NEUROLOGICAL SURGERY

## 2024-10-02 PROCEDURE — 1157F ADVNC CARE PLAN IN RCRD: CPT | Mod: HCNC,CPTII,S$GLB, | Performed by: NEUROLOGICAL SURGERY

## 2024-10-02 PROCEDURE — 3079F DIAST BP 80-89 MM HG: CPT | Mod: HCNC,CPTII,S$GLB, | Performed by: NEUROLOGICAL SURGERY

## 2024-10-02 PROCEDURE — 3061F NEG MICROALBUMINURIA REV: CPT | Mod: HCNC,CPTII,S$GLB, | Performed by: NEUROLOGICAL SURGERY

## 2024-10-02 PROCEDURE — 72110 X-RAY EXAM L-2 SPINE 4/>VWS: CPT | Mod: 26,HCNC,, | Performed by: RADIOLOGY

## 2024-10-02 PROCEDURE — 99999 PR PBB SHADOW E&M-EST. PATIENT-LVL V: CPT | Mod: PBBFAC,HCNC,, | Performed by: NEUROLOGICAL SURGERY

## 2024-10-02 PROCEDURE — 72082 X-RAY EXAM ENTIRE SPI 2/3 VW: CPT | Mod: TC,HCNC,PN

## 2024-10-02 PROCEDURE — 3066F NEPHROPATHY DOC TX: CPT | Mod: HCNC,CPTII,S$GLB, | Performed by: NEUROLOGICAL SURGERY

## 2024-10-02 PROCEDURE — 3044F HG A1C LEVEL LT 7.0%: CPT | Mod: HCNC,CPTII,S$GLB, | Performed by: NEUROLOGICAL SURGERY

## 2024-10-02 PROCEDURE — 1100F PTFALLS ASSESS-DOCD GE2>/YR: CPT | Mod: HCNC,CPTII,S$GLB, | Performed by: NEUROLOGICAL SURGERY

## 2024-10-02 PROCEDURE — 3288F FALL RISK ASSESSMENT DOCD: CPT | Mod: HCNC,CPTII,S$GLB, | Performed by: NEUROLOGICAL SURGERY

## 2024-10-02 NOTE — PROGRESS NOTES
NEUROSURGICAL PROGRESS NOTE    DATE OF SERVICE:  10/02/2024    ATTENDING PHYSICIAN:  Ishaan Fisher MD    SUBJECTIVE:  01/27/23  This is a very pleasant 67 y.o. female, 1 year and 1 month status post C3-C7 anterior instrumented fusion for cervical spondylosis with myelopathy.  She does not complain of significant neck pain at this time.  She is dropping things at occasion.  She is mainly complaining of the lumbosacral pain.  She is unable to stand for more than a few minutes.  She tends to lean forward and falls due to the severe pain.  She takes Percocet 10 mg 3 times daily.  Today in clinic she appears quite drowsy.  No new onset of motor weakness or numbness.     INTERIM HISTORY:  10/02/23    She is unable to stand upright.  When she walks she leans forward.  She has severe low back pain radiating down her right leg more than left leg.  No new onset of motor weakness or sphincter dysfunction symptoms.  She is also complaining of severe shoulder pain.  Pain can reach 10 out of 10.  She is taking Percocet 10 mg 3 times daily.    INTERIM HISTORY:  10/02/24  She continued to have difficulty standing upright for long period of time.  She does not have difficulty bending forward.  When she stands upright she has severe lumbosacral pain radiating down the posterolateral leg all the way down to her foot on the right side.                PAST MEDICAL HISTORY:  Active Ambulatory Problems     Diagnosis Date Noted    Acquired hypothyroidism     History of congestive heart failure     Major depressive disorder 12/04/2015    Normocytic anemia 04/10/2018    History of myocardial infarction 08/30/2018    Epigastric pain 02/28/2019    Primary osteoarthritis of left knee 05/08/2019    TIA (transient ischemic attack) 05/19/2019    History of gastric bypass 05/19/2019    Coronary artery disease involving native coronary artery of native heart     UTI (urinary tract infection) 05/19/2019    History of right knee joint replacement  11/20/2019    Chronic left shoulder pain 07/29/2020    Primary osteoarthritis of left shoulder 07/29/2020    Chronic pain 11/04/2020    Lumbar radiculopathy 11/04/2020    DDD (degenerative disc disease), lumbar 01/11/2021    Pain 08/25/2021    Cervical spondylosis with myelopathy 12/14/2021    Frequent falls 06/15/2022    Weakness of both lower extremities 06/13/2023    At high risk for falls 06/13/2023    Class 3 severe obesity due to excess calories with serious comorbidity and body mass index (BMI) of 40.0 to 44.9 in adult 07/20/2023    Rheumatoid arthritis without rheumatoid factor, multiple sites 07/20/2023    Heart failure, unspecified HF chronicity, unspecified heart failure type 07/20/2023    Aortic atherosclerosis 07/20/2023    Type 2 diabetes mellitus with diabetic neuropathy, without long-term current use of insulin 01/02/2024    Opioid dependence, uncomplicated 01/02/2024    Chronic myelopathy 01/02/2024    Chronic kidney disease, stage 3a 01/02/2024    Type 2 diabetes mellitus without complication, without long-term current use of insulin 01/02/2024    Major depressive disorder, recurrent, in partial remission 03/25/2024    History of kidney stones 05/20/2024    Nausea and vomiting 05/20/2024     Resolved Ambulatory Problems     Diagnosis Date Noted    Primary osteoarthritis     Diabetes mellitus, type 2     History of colon cancer 04/18/2017    Malfunction of device 05/25/2017    BMI 39.0-39.9,adult 04/02/2019    S/P TKR (total knee replacement), left 07/02/2019    Weakness of left lower extremity 07/02/2019    Gait, antalgic 07/02/2019    Decreased range of motion (ROM) of left knee 07/02/2019    Primary osteoarthritis of right knee 11/20/2019    Aspiration pneumonia of right lower lobe 12/17/2021    Malignant neoplasm of colon, unspecified part of colon 07/20/2023    Left ureteral stone 04/16/2024     Past Medical History:   Diagnosis Date    Anticoagulant long-term use     Arthritis     Asthma      CHF (congestive heart failure)     Colon cancer     COPD (chronic obstructive pulmonary disease)     Coronary artery disease     Depression     GERD (gastroesophageal reflux disease)     Myocardial infarction     Thyroid disease        PAST SURGICAL HISTORY:  Past Surgical History:   Procedure Laterality Date    ABDOMINAL SURGERY      CAUDAL EPIDURAL STEROID INJECTION N/A 2022    Procedure: Injection-steroid-epidural-caudal;  Surgeon: Nilam Santiago MD;  Location: [facility];  Service: Pain Management;  Laterality: N/A;     SECTION      CHOLECYSTECTOMY      COLON SURGERY      COLONOSCOPY      --- repeat in 3-5 years    COLONOSCOPY N/A 2017    Procedure: COLONOSCOPY;  Surgeon: Corrina Flores MD;  Location: [facility];  Service: Endoscopy;  Laterality: N/A;    COLONOSCOPY N/A 04/10/2018    Procedure: COLONOSCOPY golytely;  Surgeon: Corrina Flores MD;  Location: [facility];  Service: Endoscopy;  Laterality: N/A;    DECOMPRESSION OF CERVICAL SPINE BY ANTERIOR APPROACH WITH FUSION N/A 2021    Procedure: DECOMPRESSION AND FUSION, SPINE, CERVICAL, ANTERIOR APPROACH C3-4 C5- 6 C6-7 ACDF;  Surgeon: Ishaan Fisher MD;  Location: [facility];  Service: Neurosurgery;  Laterality: N/A;    ECTOPIC PREGNANCY SURGERY      EPIDURAL STEROID INJECTION INTO LUMBAR SPINE N/A 2020    Procedure: Injection-steroid-epidural-lumbar--L5-S1 InterLaminar;  Surgeon: Nilam Santiago MD;  Location: [facility];  Service: Pain Management;  Laterality: N/A;    ESOPHAGOGASTRODUODENOSCOPY N/A 2019    Procedure: ESOPHAGOGASTRODUODENOSCOPY (EGD);  Surgeon: Corrina Flores MD;  Location: [facility];  Service: Endoscopy;  Laterality: N/A;    ESOPHAGOGASTRODUODENOSCOPY      w/biopsy    ESOPHAGOGASTRODUODENOSCOPY N/A 2024    Procedure: EGD (ESOPHAGOGASTRODUODENOSCOPY);  Surgeon: Robert Ochoa MD;  Location: [facility];  Service: Endoscopy;  Laterality: N/A;  Dulce Cheung  [id number]  9/9/24-LVM for precall, portal-DS  9/10 pt not confirmed- [facility]  9/11-precall complete, instructions emailed to [email or web address]  9/11-Lvm notifying pt of new arrival time (1010am)-[facility]  9/20/24-Precall complete    FRACTURE SURGERY      left leg    FUSION OF SPINE WITH INSTRUMENTATION Left 01/11/2021    Procedure: FUSION, SPINE, WITH INSTRUMENTATION Stage 1 Left L4-5 OLIF DORA Stage 2 L4-5 Laminectomy, medial Facetectomy, foraminotomy, L4-5 MIS Instrumentation;  Surgeon: Ishaan Fisher MD;  Location: [facility] OR;  Service: Neurosurgery;  Laterality: Left;  Procedure: Stage 1 Left L4-5 OLIF DORA  Stage 2 L4-5 Laminectomy, medial Facetectomy, foraminotomy, L4-5 MIS Instrumentation  Surgery TIme: 4 Hrs    GASTRIC BYPASS      HERNIA REPAIR      INJECTION OF ANESTHETIC AGENT AROUND GENITOFEMORAL NERVE Left 07/29/2020    Procedure: BLOCK, NERVE, LEFT SHOULDER ARTICULAR;  Surgeon: Nilam Santiago MD;  Location: [facility] PAIN [facility]GT;  Service: Pain Management;  Laterality: Left;    JOINT REPLACEMENT      KNEE ARTHROPLASTY Left 05/08/2019    Procedure: ARTHROPLASTY, KNEE;  Surgeon: Roldan Greenwood MD;  Location: [facility] OR;  Service: Orthopedics;  Laterality: Left;  Navid notified    KNEE ARTHROPLASTY Right 11/20/2019    Procedure: ARTHROPLASTY, KNEE;  Surgeon: Roldan Greenwood MD;  Location: [facility] OR;  Service: Orthopedics;  Laterality: Right;  Navid notified, confirmed case Navid 11/19/19 KB [id number]    OOPHORECTOMY      RADIOFREQUENCY THERMOCOAGULATION Left 08/12/2020    Procedure: RADIOFREQUENCY THERMAL COAGULATION--Left Suprascapular, Axillary, and Lateral Pectoral Articular Branch RFA;  Surgeon: Nilam Santiago MD;  Location: [facility]IN [facility];  Service: Pain Management;  Laterality: Left;    TONSILLECTOMY      TRANSFORAMINAL EPIDURAL INJECTION OF STEROID Right 08/25/2021    Procedure: Injection,steroid,epidural,transforaminal approach; Levels: L5;  Surgeon: Nilam Santiago MD;  Location: [facility]IN [facility];   Service: Pain Management;  Laterality: Right;  No pacemaker. Patient is diabetic. Patient is taking Aspirin but can continue per Dr. Santiago.     TUBAL LIGATION         SOCIAL HISTORY:   Social History     Socioeconomic History    Marital status:    Tobacco Use    Smoking status: Never     Passive exposure: Never    Smokeless tobacco: Never   Substance and Sexual Activity    Alcohol use: Yes     Comment: very rare    Drug use: No     Comment: CBD oil sometimes    Sexual activity: Never     Social Drivers of Health     Financial Resource Strain: Medium Risk (5/4/2023)    Overall Financial Resource Strain (CARDIA)     Difficulty of Paying Living Expenses: Somewhat hard   Food Insecurity: No Food Insecurity (5/4/2023)    Hunger Vital Sign     Worried About Running Out of Food in the Last Year: Never true     Ran Out of Food in the Last Year: Never true   Transportation Needs: No Transportation Needs (5/4/2023)    PRAPARE - Transportation     Lack of Transportation (Medical): No     Lack of Transportation (Non-Medical): No   Physical Activity: Inactive (5/4/2023)    Exercise Vital Sign     Days of Exercise per Week: 0 days     Minutes of Exercise per Session: 0 min   Stress: No Stress Concern Present (5/4/2023)    [employer] - Occupational Stress Questionnaire     Feeling of Stress : Not at all   Housing Stability: Low Risk  (5/4/2023)    Housing Stability Vital Sign     Unable to Pay for Housing in the Last Year: No     Number of Places Lived in the Last Year: 1     Unstable Housing in the Last Year: No       FAMILY HISTORY:  Family History   Problem Relation Name Age of Onset    Hyperlipidemia Mother      Hypertension Mother      Diabetes Mother      Stroke Mother      Hypertension Sister      Hypertension Brother      Diabetes Brother      Breast cancer Maternal Aunt      Breast cancer Maternal Aunt      Breast cancer Cousin         CURRENTS MEDICATIONS:  Current Outpatient  Medications on File Prior to Visit   Medication Sig Dispense Refill    albuterol (PROVENTIL/VENTOLIN HFA) 90 mcg/actuation inhaler INHALE 2 PUFFS BY MOUTH EVERY 4 HOURS AS NEEDED FOR WHEEZING 18 g 3    atorvastatin (LIPITOR) 40 MG tablet TAKE 1 TABLET(40 MG) BY MOUTH EVERY DAY FOR CHOLESTEROL 90 tablet 3    azithromycin (Z-MARY) 250 MG tablet Take 1 tablet (250 mg total) by mouth once daily. Take first 2 tablets together, then 1 every day until finished. 6 tablet 0    buPROPion (WELLBUTRIN XL) 300 MG 24 hr tablet Take 1 tablet (300 mg total) by mouth once daily. 90 tablet 3    busPIRone (BUSPAR) 10 MG tablet Take 1 tablet (10 mg total) by mouth Daily. Pt is taking 10 mg once daily 90 tablet 3    diabetic supplies, miscellan. Misc Lancets for 1-2 times a day testing    dispense brand covered by insurance t 60 each 3    diazePAM (VALIUM) 5 MG tablet TAKE 1 TABLET BY MOUTH EVERY 24 HOURS AS NEEDED FOR ANXIETY 30 tablet 1    diclofenac sodium (VOLTAREN) 1 % Gel APPLY 2 GRAMS TOPICALLY TO THE AFFECTED AREA FOUR TIMES DAILY 300 g 5    doxycycline (VIBRA-TABS) 100 MG tablet Take 1 tablet (100 mg total) by mouth 2 (two) times daily. Any generic brand capsule or tablet 20 tablet 0    ergocalciferol (ERGOCALCIFEROL) 50,000 unit Cap TAKE 1 CAPSULE BY MOUTH EVERY 7 DAYS 12 capsule 3    EScitalopram oxalate (LEXAPRO) 20 MG tablet TAKE 1 TABLET(20 MG) BY MOUTH EVERY DAY 90 tablet 3    furosemide (LASIX) 20 MG tablet Take 1 tablet (20 mg total) by mouth daily as needed. 30 tablet 3    gabapentin (NEURONTIN) 100 MG capsule TAKE 1 CAPSULE(100 MG) BY MOUTH THREE TIMES DAILY 90 capsule 3    glipiZIDE (GLUCOTROL) 2.5 MG TR24 TAKE 1 TABLET(2.5 MG) BY MOUTH DAILY WITH BREAKFAST 90 tablet 3    HYDROcodone-acetaminophen (NORCO) 5-325 mg per tablet Take 1 tablet by mouth every 6 (six) hours as needed for Pain. 12 tablet 0    ibandronate (BONIVA) 150 mg tablet TAKE 1 TABLET BY MOUTH EVERY 30 DAYS FOR OSTEOPOROSIS 3 tablet 1    levothyroxine  (SYNTHROID) 100 MCG tablet Take 1 tablet (100 mcg total) by mouth once daily. 90 tablet 3    methocarbamoL (ROBAXIN) 750 MG Tab Take 1 tablet (750 mg total) by mouth 3 (three) times daily as needed (muscle spasms). 60 tablet 0    naloxone (NARCAN) 4 mg/actuation Spry 4mg by nasal route as needed for opioid overdose; may repeat every 2-3 minutes in alternating nostrils until medical help arrives. Call 911 1 each 11    omeprazole (PRILOSEC) 40 MG capsule Take 1 capsule (40 mg total) by mouth every morning. 90 capsule 2    ondansetron (ZOFRAN-ODT) 4 MG TbDL Take 1 tablet (4 mg total) by mouth every 6 (six) hours as needed (Nausea). 28 tablet 0    oxyCODONE-acetaminophen (PERCOCET)  mg per tablet Take 1 tablet by mouth every 8 (eight) hours as needed for Pain. 90 tablet 0    potassium chloride (KLOR-CON) 10 MEQ TbSR TAKE 1 TABLET BY MOUTH EVERY DAY WHEN YOU TAKE FUROSEMIDE 90 tablet 0    predniSONE (DELTASONE) 20 MG tablet One tablet daily by mouth for three days 3 tablet 0    promethazine (PHENERGAN) 25 MG tablet TAKE 1 TABLET(25 MG) BY MOUTH EVERY 6 HOURS AS NEEDED 25 tablet 0    SUPREP BOWEL PREP KIT 17.5-3.13-1.6 gram SolR use as directed      TRUE METRIX GLUCOSE METER Misc USE TWICE DAILY TO CHECK BLOOD SUGAR 1 each 0    TRUE METRIX GLUCOSE TEST STRIP Strp TO TEST ONCE TO TWICE DAILY 50 strip 11    TRUE METRIX GLUCOSE TEST STRIP Strp TEST BLOOD SUGAR EVERY  strip 3    TRUEPLUS LANCETS 30 gauge Misc USE TO TEST ONCE TO TWICE D      zolpidem (AMBIEN) 5 MG Tab TAKE 1 TABLET(5 MG) BY MOUTH EVERY EVENING 30 tablet 3    aspirin (ECOTRIN) 81 MG EC tablet Take 1 tablet (81 mg total) by mouth once daily.  0    tamsulosin (FLOMAX) 0.4 mg Cap Take 1 capsule (0.4 mg total) by mouth once daily. 30 capsule 0     No current facility-administered medications on file prior to visit.       ALLERGIES:  Review of patient's allergies indicates:   Allergen Reactions    Adhesive      Adhesive tape    Latex, natural rubber       Adhesive from latex tape    Ibuprofen Other (See Comments)     Causes asthma flare??       REVIEW OF SYSTEMS:  Review of Systems   Constitutional:  Negative for diaphoresis, fever and weight loss.   Respiratory:  Negative for shortness of breath.    Cardiovascular:  Negative for chest pain.   Gastrointestinal:  Negative for blood in stool.   Genitourinary:  Negative for hematuria.   Endo/Heme/Allergies:  Does not bruise/bleed easily.   All other systems reviewed and are negative.        OBJECTIVE:    PHYSICAL EXAMINATION:   Vitals:    10/02/24 1604   BP: 129/80   Pulse: 73       Physical Exam:  Vitals reviewed.    Constitutional: She appears well-developed and well-nourished.     Eyes: Pupils are equal, round, and reactive to light. Conjunctivae and EOM are normal.     Cardiovascular: Normal distal pulses and no edema.     Abdominal: Soft.     Skin: Skin displays no rash on trunk and no rash on extremities. Skin displays no lesions on trunk and no lesions on extremities.     Psych/Behavior: She is alert. She is oriented to person, place, and time. She has a normal mood and affect.     Musculoskeletal:        Neck: Range of motion is limited.     Neurological:        DTRs: Tricep reflexes are 2+ on the right side and 2+ on the left side. Bicep reflexes are 2+ on the right side and 2+ on the left side. Brachioradialis reflexes are 2+ on the right side and 2+ on the left side. Patellar reflexes are 2+ on the right side and 2+ on the left side. Achilles reflexes are 2+ on the right side and 2+ on the left side.       Back Exam     Muscle Strength   Right Quadriceps:  5/5   Left Quadriceps:  5/5   Right Hamstrings:  5/5   Left Hamstrings:  5/5               Neurological Exam  Mental Status  Alert. Oriented to person, place, and time. Speech is normal.    Cranial Nerves  CN III, IV, VI: Extraocular movements intact bilaterally. Pupils equal round and reactive to light bilaterally.    Motor   Normal muscle tone.                                                Right                     Left  Deltoid                                   5                            Biceps                                   5                          5   Triceps                                  5                          5   Interossei                              5                          5   Iliopsoas                               5                          5   Quadriceps                           5                          5   Hamstring                             5                          5   Gastrocnemius                     5                           5   Anterior tibialis                      5                          5   Posterior tibialis                    5                          5   Peroneal                               5                          5  Did not evaluate left shoulder.  She is unable to do active abduction of the left shoulder due to a left shoulder injury.    Sensory  Light touch is normal in upper and lower extremities. Pinprick is normal in upper and lower extremities.     Reflexes                                            Right                      Left  Brachioradialis                    2+                         2+  Biceps                                 2+                         2+  Triceps                                2+                         2+  Patellar                                2+                         2+  Achilles                                2+                         2+  Right Plantar: normal  Left Plantar: normal    Right pathological reflexes: Bobo's absent. Ankle clonus absent.  Left pathological reflexes: Bobo's absent. Ankle clonus absent.        DIAGNOSTIC DATA:  I personally interpreted the following imaging:   Lumbar spine MRI January 2023 shows L4-5 fusion, L5-S1 degenerative spondylolisthesis with foraminal and lateral recess stenosis, L3-4 moderate stenosis  Lumbar x-ray today  shows grade 1-2 L5-S1 spondylolisthesis  Preoperative scoliosis film was showing a pelvic incidence of 71°    ASSESSMENT:  This is a 69 y.o. female with     Problem List Items Addressed This Visit    None  Visit Diagnoses       Spinal stenosis, lumbosacral region    -  Primary    Relevant Orders    MRI Lumbar Spine Without Contrast    Leg length discrepancy        Relevant Orders    X-Ray Hip to Ankle    Arthrodesis status        Relevant Orders    CT Lumbar Spine Without Contrast              PLAN:  Repeat lumbar spine MRI and CT lumbar spine.  CT lumbar spine needed for assessing arthrodesis status  If no motor change in the lumbar spine lumbar compared to last year I would proceed with:     a removal of L4-5 instrumentation, L3-S1 posterior segmental instrumentation using Globus Creo system, L3-4 and L5-S1 transforaminal interbody fusion with placement of hyperlordotic spacer Globus Altera cages, L3-S1 posterolateral arthrodesis using autograft harvested from the same incision allograft DBM, sacral pelvic fixation, open SI joint fusion using Si-Bone Granite screw system with a 4 nitin construct.      We have discussed the risks of surgery including death, coma, bleeding, infection, failure of surgery, CSF leak, nerve root injury, spinal cord injury, ureter injury, weakness, paralysis, peripheral neuropathy, malplaced hardware, migration of hardware, non-union, need for reoperation. Patient understands the risks and would like to proceed with surgery.     More than 50% of the time was spent on discussing conservative management treatments (medication, physical therapy exercises) and possible interventions (spinal injections and surgical procedures). Care coordination was discussed.    30 min          Ishaan Fisher MD  Cell:[phone]

## 2024-10-04 RX ORDER — ZOLPIDEM TARTRATE 5 MG/1
5 TABLET ORAL NIGHTLY
Qty: 30 TABLET | Refills: 1 | Status: SHIPPED | OUTPATIENT
Start: 2024-10-04

## 2024-10-04 NOTE — TELEPHONE ENCOUNTER
No care due was identified.  Health [facility] Embedded Care Due Messages. Reference number: [id number].   10/03/2024 8:55:13 PM CDT

## 2024-10-11 RX ORDER — POTASSIUM CHLORIDE 750 MG/1
TABLET, EXTENDED RELEASE ORAL
Qty: 90 TABLET | Refills: 0 | Status: SHIPPED | OUTPATIENT
Start: 2024-10-11

## 2024-10-11 NOTE — TELEPHONE ENCOUNTER
No care due was identified.  Health [facility] Embedded Care Due Messages. Reference number: [id number].   10/11/2024 2:37:21 PM CDT

## 2024-10-14 ENCOUNTER — HOSPITAL ENCOUNTER (EMERGENCY)
Facility: HOSPITAL | Age: 69
Discharge: HOME | End: 2024-10-14
Attending: EMERGENCY MEDICINE
Payer: MEDICARE

## 2024-10-14 ENCOUNTER — TELEPHONE (OUTPATIENT)
Dept: FAMILY MEDICINE | Facility: CLINIC | Age: 69
End: 2024-10-14
Payer: MEDICARE

## 2024-10-14 VITALS
DIASTOLIC BLOOD PRESSURE: 79 MMHG | RESPIRATION RATE: 18 BRPM | SYSTOLIC BLOOD PRESSURE: 123 MMHG | BODY MASS INDEX: 38.87 KG/M2 | OXYGEN SATURATION: 100 % | WEIGHT: 198 LBS | HEART RATE: 85 BPM | TEMPERATURE: 99 F | HEIGHT: 60 IN
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
## 2024-10-14 DIAGNOSIS — S52.592A OTHER CLOSED FRACTURE OF DISTAL END OF LEFT RADIUS, INITIAL ENCOUNTER: ICD-10-CM

## 2024-10-14 DIAGNOSIS — M25.512 ACUTE PAIN OF LEFT SHOULDER: ICD-10-CM

## 2024-10-14 DIAGNOSIS — S52.592A OTHER CLOSED FRACTURE OF DISTAL END OF LEFT RADIUS, INITIAL ENCOUNTER: Primary | ICD-10-CM

## 2024-10-14 DIAGNOSIS — M25.532 LEFT WRIST PAIN: ICD-10-CM

## 2024-10-14 PROCEDURE — 29105 APPLICATION LONG ARM SPLINT: CPT | Mod: HCNC,RT,ER

## 2024-10-14 PROCEDURE — 99283 EMERGENCY DEPT VISIT LOW MDM: CPT | Mod: 25,HCNC,ER

## 2024-10-14 PROCEDURE — 25000003 PHARM REV CODE 250: Mod: HCNC,ER | Performed by: NURSE PRACTITIONER

## 2024-10-14 PROCEDURE — 25000003 PHARM REV CODE 250: Mod: HCNC,ER | Performed by: PHYSICIAN ASSISTANT

## 2024-10-14 RX ORDER — OXYCODONE HYDROCHLORIDE 5 MG/1
5 TABLET ORAL EVERY 4 HOURS PRN
Qty: 18 TABLET | Refills: 0 | Status: SHIPPED | OUTPATIENT
Start: 2024-10-14 | End: 2024-10-17

## 2024-10-14 RX ORDER — OXYCODONE AND ACETAMINOPHEN 10; 325 MG/1; MG/1
1 TABLET ORAL EVERY 8 HOURS PRN
Qty: 90 TABLET | Refills: 0 | Status: SHIPPED | OUTPATIENT
Start: 2024-10-14 | End: 2024-10-14 | Stop reason: SDUPTHER

## 2024-10-14 RX ORDER — ACETAMINOPHEN 325 MG/1
650 TABLET ORAL
Status: COMPLETED | OUTPATIENT
Start: 2024-10-14 | End: 2024-10-14

## 2024-10-14 RX ORDER — OXYCODONE AND ACETAMINOPHEN 10; 325 MG/1; MG/1
1 TABLET ORAL EVERY 8 HOURS PRN
Qty: 90 TABLET | Refills: 0 | OUTPATIENT
Start: 2024-10-16 | End: 2024-10-19

## 2024-10-14 RX ORDER — OXYCODONE HYDROCHLORIDE 5 MG/1
5 TABLET ORAL EVERY 4 HOURS PRN
Qty: 18 TABLET | Refills: 0 | OUTPATIENT
Start: 2024-10-14 | End: 2024-10-17

## 2024-10-14 RX ORDER — OXYCODONE HYDROCHLORIDE 5 MG/1
5 TABLET ORAL
Status: COMPLETED | OUTPATIENT
Start: 2024-10-14 | End: 2024-10-14

## 2024-10-14 RX ADMIN — OXYCODONE HYDROCHLORIDE 5 MG: 5 TABLET ORAL at 11:10

## 2024-10-14 RX ADMIN — ACETAMINOPHEN 650 MG: 325 TABLET ORAL at 11:10

## 2024-10-14 NOTE — TELEPHONE ENCOUNTER
----- Message from Noni sent at 10/14/2024 10:50 AM CDT -----  Contact: pt  Type:  Needs Medical Advice    Who Called: pt   Symptoms (please be specific): falling down, legs  sore/hurting   How long has patient had these symptoms:  awhile   Pharmacy name and phone #:  [id number] - [place], [place] - [address] [facility] AT [facility] [address]  [address] [facility] [address]  Phone: [phone] Fax: [phone]  Would the patient rather a call back or a response via [facility]? call  Best Call Back Number: [phone]  Additional Information:

## 2024-10-14 NOTE — FIRST PROVIDER EVALUATION
" Emergency Department TeleTriage Encounter Note      CHIEF COMPLAINT    Chief Complaint   Patient presents with    Fall     Pt reports she fell yesterday x 2 because her legs give out on her. Pt denies hitting head. - LOC. PT reports "Im hurting all over but what really hurts in left wrist, arm and leg"       VITAL SIGNS   Initial Vitals [10/14/24 1025]   BP Pulse Resp Temp SpO2   123/79 85 20 98.9 °F (37.2 °C) 100 %      MAP       --            ALLERGIES    Review of patient's allergies indicates:   Allergen Reactions    Adhesive      Adhesive tape    Latex, natural rubber      Adhesive from latex tape    Ibuprofen Other (See Comments)     Causes asthma flare??       PROVIDER TRIAGE NOTE  Patient presents with pain in the left wrist secondary to fall yesterday when her legs gave out. No syncope. Denies head injury. Also reports generalized aching to the left upper arm and leg.       ORDERS  Labs Reviewed - No data to display    ED Orders (720h ago, onward)      None              Virtual Visit Note: The provider triage portion of this emergency department evaluation and documentation was performed via [employer], a HIPAA-compliant telemedicine application, in concert with a tele-presenter in the room. A face to face patient evaluation with one of my colleagues will occur once the patient is placed in an emergency department room.      DISCLAIMER: This note was prepared with [employer] voice recognition transcription software. Garbled syntax, mangled pronouns, and other bizarre constructions may be attributed to that software system.    "

## 2024-10-14 NOTE — ED NOTES
Patient arrived to Exam 7 via wheelchair from home with c/o left shoulder and left wrist pain s/p fall. Patient states her legs gave out which is common if she does use walker. Patient denies LOC, hitting head, or syncope. Upon assessment, patients L wrist is swollen and deformed. Radial and brachial pulses intact, extremity warm to touch.

## 2024-10-14 NOTE — ED PROVIDER NOTES
"Encounter Date: 10/14/2024       History     Chief Complaint   Patient presents with    Fall     Pt reports she fell yesterday x 2 because her legs give out on her. Pt denies hitting head. - LOC. PT reports "Im hurting all over but what really hurts in left wrist, arm and leg"     69-year-old female which presents to the emergency room with left wrist and shoulder pain after she fell yesterday.  Patient denies hitting her head or any loss of consciousness.  She states that her legs give out on her all the time and she does not like using the assistive devices to help her walk.  Patient states that she has had multiple broken bones in her life due to her falls.  No other complaints at this time.    The history is provided by the patient.     Review of patient's allergies indicates:   Allergen Reactions    Adhesive      Adhesive tape    Latex, natural rubber      Adhesive from latex tape    Ibuprofen Other (See Comments)     Causes asthma flare??     Past Medical History:   Diagnosis Date    Anticoagulant long-term use     Arthritis     Asthma     CHF (congestive heart failure)     [facility]    Colon cancer     colon    COPD (chronic obstructive pulmonary disease)     Coronary artery disease     Depression     Diabetes mellitus, type 2     GERD (gastroesophageal reflux disease)     Left ureteral stone 2024    Myocardial infarction     AGE 19 [facility] WITHBABY DELIVERY    Nausea and vomiting 2024    Thyroid disease      Past Surgical History:   Procedure Laterality Date    ABDOMINAL SURGERY      CAUDAL EPIDURAL STEROID INJECTION N/A 2022    Procedure: Injection-steroid-epidural-caudal;  Surgeon: Nilam Santiago MD;  Location: [facility] PAIN MGT;  Service: Pain Management;  Laterality: N/A;     SECTION      CHOLECYSTECTOMY      COLON SURGERY      COLONOSCOPY      --- repeat in 3-5 years    COLONOSCOPY N/A 2017    Procedure: COLONOSCOPY;  Surgeon: Corrina Flores MD;  Location: [facility] " ENDO;  Service: Endoscopy;  Laterality: N/A;    COLONOSCOPY N/A 04/10/2018    Procedure: COLONOSCOPY golytely;  Surgeon: Corrina Flores MD;  Location: [facility] ENDO;  Service: Endoscopy;  Laterality: N/A;    DECOMPRESSION OF CERVICAL SPINE BY ANTERIOR APPROACH WITH FUSION N/A 12/14/2021    Procedure: DECOMPRESSION AND FUSION, SPINE, CERVICAL, ANTERIOR APPROACH C3-4 C5- 6 C6-7 ACDF;  Surgeon: Ishaan Fisher MD;  Location: [facility] OR;  Service: Neurosurgery;  Laterality: N/A;    ECTOPIC PREGNANCY SURGERY      EPIDURAL STEROID INJECTION INTO LUMBAR SPINE N/A 11/04/2020    Procedure: Injection-steroid-epidural-lumbar--L5-S1 InterLaminar;  Surgeon: Nilam Santiago MD;  Location: [facility] PAIN MGT;  Service: Pain Management;  Laterality: N/A;    ESOPHAGOGASTRODUODENOSCOPY N/A 02/28/2019    Procedure: ESOPHAGOGASTRODUODENOSCOPY (EGD);  Surgeon: Corrina Flores MD;  Location: [facility] ENDO;  Service: Endoscopy;  Laterality: N/A;    ESOPHAGOGASTRODUODENOSCOPY      w/biopsy    ESOPHAGOGASTRODUODENOSCOPY N/A 9/26/2024    Procedure: EGD (ESOPHAGOGASTRODUODENOSCOPY);  Surgeon: Robert Ochoa MD;  Location: [facility];  Service: Endoscopy;  Laterality: N/A;  Ref Jonatan De [id number]-[id number]-[id number]  9/9/24-LVM for precall, portal-DS  9/10 pt not confirmed- B  9/11-precall complete, instructions emailed to [email or web address]  9/11-Lvm notifying pt of new arrival time (1010am)-[facility]  9/20/24-Precall complete    FRACTURE SURGERY      left leg    FUSION OF SPINE WITH INSTRUMENTATION Left 01/11/2021    Procedure: FUSION, SPINE, WITH INSTRUMENTATION Stage 1 Left L4-5 OLIF DORA Stage 2 L4-5 Laminectomy, medial Facetectomy, foraminotomy, L4-5 MIS Instrumentation;  Surgeon: Ishaan Fisher MD;  Location: [facility] OR;  Service: Neurosurgery;  Laterality: Left;  Procedure: Stage 1 Left L4-5 OLIF DORA  Stage 2 L4-5 Laminectomy, medial Facetectomy, foraminotomy, L4-5 MIS Instrumentation  Surgery TIme: 4 Hrs    GASTRIC BYPASS      HERNIA REPAIR       INJECTION OF ANESTHETIC AGENT AROUND GENITOFEMORAL NERVE Left 07/29/2020    Procedure: BLOCK, NERVE, LEFT SHOULDER ARTICULAR;  Surgeon: Nilam Santiago MD;  Location: [facility] PAIN MGT;  Service: Pain Management;  Laterality: Left;    JOINT REPLACEMENT      KNEE ARTHROPLASTY Left 05/08/2019    Procedure: ARTHROPLASTY, KNEE;  Surgeon: Roldan Greenwood MD;  Location: [facility] OR;  Service: Orthopedics;  Laterality: Left;  Navid notified    KNEE ARTHROPLASTY Right 11/20/2019    Procedure: ARTHROPLASTY, KNEE;  Surgeon: Roldan Greenwood MD;  Location: [facility] OR;  Service: Orthopedics;  Laterality: Right;  Navid notified, confirmed case Navid 11/19/19 KB [id number]    OOPHORECTOMY      RADIOFREQUENCY THERMOCOAGULATION Left 08/12/2020    Procedure: RADIOFREQUENCY THERMAL COAGULATION--Left Suprascapular, Axillary, and Lateral Pectoral Articular Branch RFA;  Surgeon: Nilam Santiago MD;  Location: [facility] PAIN MGT;  Service: Pain Management;  Laterality: Left;    TONSILLECTOMY      TRANSFORAMINAL EPIDURAL INJECTION OF STEROID Right 08/25/2021    Procedure: Injection,steroid,epidural,transforaminal approach; Levels: L5;  Surgeon: Nilam Santiago MD;  Location: [facility] PAIN MGT;  Service: Pain Management;  Laterality: Right;  No pacemaker. Patient is diabetic. Patient is taking Aspirin but can continue per Dr. Santiago.     TUBAL LIGATION       Family History   Problem Relation Name Age of Onset    Hyperlipidemia Mother      Hypertension Mother      Diabetes Mother      Stroke Mother      Hypertension Sister      Hypertension Brother      Diabetes Brother      Breast cancer Maternal Aunt      Breast cancer Maternal Aunt      Breast cancer Cousin       Social History     Tobacco Use    Smoking status: Never     Passive exposure: Never    Smokeless tobacco: Never   Substance Use Topics    Alcohol use: Yes     Comment: very rare    Drug use: No     Comment: CBD oil sometimes     Review of Systems   Constitutional:  Negative for fever.   HENT:   Negative for sore throat.    Respiratory:  Negative for shortness of breath.    Cardiovascular:  Negative for chest pain.   Gastrointestinal:  Negative for nausea.   Genitourinary:  Negative for dysuria.   Musculoskeletal:  Positive for arthralgias, joint swelling and myalgias. Negative for back pain.   Skin:  Negative for rash.   Neurological:  Negative for weakness.   Hematological:  Does not bruise/bleed easily.   All other systems reviewed and are negative.    Physical Exam     Initial Vitals [10/14/24 1025]   BP Pulse Resp Temp SpO2   123/79 85 20 98.9 °F (37.2 °C) 100 %      MAP       --         Physical Exam    Nursing note and vitals reviewed.  Constitutional: She appears well-developed and well-nourished.   HENT:   Head: Normocephalic and atraumatic.   Eyes: Conjunctivae and EOM are normal. Pupils are equal, round, and reactive to light.   Neck:   Normal range of motion.  Cardiovascular:  Normal rate, regular rhythm, normal heart sounds and intact distal pulses.     Exam reveals no gallop and no friction rub.       No murmur heard.  Pulmonary/Chest: Breath sounds normal. No respiratory distress. She has no wheezes. She has no rhonchi. She has no rales. She exhibits no tenderness.   Musculoskeletal:      Right shoulder: Normal.      Left shoulder: Decreased range of motion (Secondary to pain).      Right forearm: Normal.      Left forearm: Edema, tenderness and bony tenderness present.      Cervical back: Normal range of motion.      Comments: Neurovascularly intact     Neurological: She is alert and oriented to person, place, and time. She has normal strength. GCS score is 15. GCS eye subscore is 4. GCS verbal subscore is 5. GCS motor subscore is 6.   Skin: Skin is warm. Capillary refill takes less than 2 seconds. No rash noted. No erythema.   Psychiatric: She has a normal mood and affect.       ED Course   Orthopedic Injury    Date/Time: 10/14/2024 11:55 AM    Performed by: Jessica Magallanes  FNP  Authorized by: Eloisa Carrera MD    Location procedure was performed:  [facility] EMERGENCY DEPARTMENT  Pre-operative diagnosis:  Distal radius fracture  Post-operative diagnosis:  Distal radius fracture  Consent Done?:  Not Needed  Injury:     Injury location:  Wrist    Location details:  Left wrist    Injury type:  Fracture    Fracture type: distal radius      Fracture type: distal radius        Pre-procedure assessment:     Neurovascular status: Neurovascularly intact      Distal perfusion: normal      Neurological function: normal      Range of motion: reduced      Range of motion comment:  Secondary to pain    Local anesthesia used?: No      Patient sedated?: No        Selections made in this section will also lock the Injury type section above.:     Manipulation performed?: No      Immobilization:  Splint    Splint type:  Sugar tong    Supplies used:  Cotton padding, Ortho-Glass and elastic bandage    Complications: No      Specimens: No      Implants: No    Post-procedure assessment:     Neurovascular status: Neurovascularly intact      Distal perfusion: normal      Neurological function: normal      Range of motion: splinted      Patient tolerance:  Patient tolerated the procedure well with no immediate complications    Labs Reviewed - No data to display       Imaging Results              X-Ray Shoulder Trauma Left (Final result)  Result time 10/14/24 12:34:23      Final result by Guillermo StricklandMattMD gale (10/14/24 12:34:23)                   Impression:      No acute fracture or dislocation.      Electronically signed by: Guillermo Strickland MD  Date:    10/14/2024  Time:    12:34               Narrative:    EXAMINATION:  XR SHOULDER TRAUMA 3 VIEW LEFT    CLINICAL HISTORY:  Unspecified fall, initial encounter    TECHNIQUE:  Standard radiography performed.  Three views.    COMPARISON:  None    FINDINGS:  Severe DJD involving the left glenohumeral joint.  No fractures or dislocations.                                        X-Ray Wrist Complete Left (Final result)  Result time 10/14/24 11:15:56      Final result by Aj Denney MD (10/14/24 11:15:56)                   Impression:      Acute comminuted fracture of the distal radial metadiaphysis with apex anterior angulation. The fracture plane does not appear to be intra-articular.      Electronically signed by: Aj Denney  Date:    10/14/2024  Time:    11:15               Narrative:    EXAMINATION:  XR WRIST COMPLETE 3 VIEWS LEFT    CLINICAL HISTORY:  Pain in left wrist    TECHNIQUE:  PA, lateral, and oblique views of the left wrist were performed.    COMPARISON:  None    FINDINGS:  Acute comminuted fracture of the distal radial metadiaphysis with apex anterior angulation.  The fracture plane does not appear to be intra-articular.                                       Medications   acetaminophen tablet 650 mg (650 mg Oral Given 10/14/24 1155)   oxyCODONE immediate release tablet 5 mg (5 mg Oral Given 10/14/24 1155)     Medical Decision Making  69-year-old female which presents to the emergency room with left wrist and shoulder pain after she fell yesterday.  No loss of consciousness or reports of her hitting her head.  X-rays reveal a distal radius fracture.  Patient was placed in a sugar-tong splint and referred to ortho for further evaluation.  She was neurovascularly intact.  On exam there is no evidence of compartment syndrome. Patient given strict return precautions and voiced understanding of all discharge instructions. Pt was stable at discharge.       Differential diagnosis: Distal radius fracture, distal ulna fracture, humerus fracture, contusion, sprain    Problems Addressed:  Acute pain of left shoulder: acute illness or injury  Left wrist pain: acute illness or injury  Other closed fracture of distal end of left radius, initial encounter: acute illness or injury    Amount and/or Complexity of Data Reviewed  Radiology: ordered.    Risk  Prescription  drug management.               ED Course as of 10/14/24 1253   Mon Oct 14, 2024   1101 BP: 123/79 [AT]   1101 Temp: 98.9 °F (37.2 °C) [AT]   1101 Temp Source: Oral [AT]   1101 Pulse: 85 [AT]   1101 Resp: 20 [AT]   1101 SpO2: 100 % [AT]      ED Course User Index  [AT] Jessica Magallanes FNP                           Clinical Impression:  Final diagnoses:  [M25.532] Left wrist pain  [S52.592A] Other closed fracture of distal end of left radius, initial encounter (Primary)  [W19.XXXA] Fall  [M25.512] Acute pain of left shoulder          ED Disposition Condition    Discharge Stable          ED Prescriptions       Medication Sig Dispense Start Date End Date Auth. Provider    oxyCODONE (ROXICODONE) 5 MG immediate release tablet Take 1 tablet (5 mg total) by mouth every 4 (four) hours as needed for Pain. 18 tablet 10/14/2024 10/17/2024 Jessica Magallanes FNP          Follow-up Information       Follow up With Specialties Details Why Contact Info    Alon Santiago MD Family Medicine Schedule an appointment as soon as possible for a visit  As needed [address] [facility] [address]  [phone]               Jessica Magallanes FNP  10/14/24 1256

## 2024-10-14 NOTE — DISCHARGE INSTRUCTIONS
You can take tylenol 1000mg every 6 hours to help with pain too. Please ice the area for the next 48 hours and follow up with ortho for further evaluation. No lifting, pushing, or picking items up with the left arm.

## 2024-10-14 NOTE — TELEPHONE ENCOUNTER
----- Message from Noni sent at 10/14/2024 10:53 AM CDT -----  Contact: pt  Type:  Needs Medical Advice    Who Called: pt  Symptoms (please be specific): falling/legs weak   Would the patient rather a call back or a response via [employer]? call  Best Call Back Number: [phone]  Additional Information:   Pt requesting a call regarding falling and concerns

## 2024-10-15 NOTE — TELEPHONE ENCOUNTER
Ms. Castro stated that she fell down Sunday, and decided to go to ER yesterday- due to being in severe pain. She broke her arm as a result of the fall. She wanted to let you know. She is already scheduled for follow up with you on 10/21/24.

## 2024-10-19 ENCOUNTER — HOSPITAL ENCOUNTER (EMERGENCY)
Facility: HOSPITAL | Age: 69
Discharge: HOME | End: 2024-10-19
Attending: EMERGENCY MEDICINE
Payer: MEDICARE

## 2024-10-19 VITALS
BODY MASS INDEX: 38.87 KG/M2 | TEMPERATURE: 98 F | SYSTOLIC BLOOD PRESSURE: 102 MMHG | DIASTOLIC BLOOD PRESSURE: 65 MMHG | RESPIRATION RATE: 18 BRPM | WEIGHT: 198 LBS | OXYGEN SATURATION: 97 % | HEART RATE: 89 BPM | HEIGHT: 60 IN

## 2024-10-19 DIAGNOSIS — S52.592D OTHER CLOSED FRACTURE OF DISTAL END OF LEFT RADIUS WITH ROUTINE HEALING, SUBSEQUENT ENCOUNTER: Primary | ICD-10-CM
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
## 2024-10-19 DIAGNOSIS — M25.532 LEFT WRIST PAIN: ICD-10-CM

## 2024-10-19 PROCEDURE — 25000003 PHARM REV CODE 250: Mod: HCNC,ER | Performed by: PHYSICIAN ASSISTANT

## 2024-10-19 PROCEDURE — 99283 EMERGENCY DEPT VISIT LOW MDM: CPT | Mod: 25,HCNC,ER

## 2024-10-19 PROCEDURE — 29105 APPLICATION LONG ARM SPLINT: CPT | Mod: HCNC,RT,ER

## 2024-10-19 RX ORDER — OXYCODONE AND ACETAMINOPHEN 10; 325 MG/1; MG/1
1 TABLET ORAL EVERY 12 HOURS PRN
Qty: 3 TABLET | Refills: 0 | Status: SHIPPED | OUTPATIENT
Start: 2024-10-19 | End: 2024-10-21

## 2024-10-19 RX ORDER — HYDROCODONE BITARTRATE AND ACETAMINOPHEN 5; 325 MG/1; MG/1
1 TABLET ORAL
Status: COMPLETED | OUTPATIENT
Start: 2024-10-19 | End: 2024-10-19

## 2024-10-19 RX ADMIN — HYDROCODONE BITARTRATE AND ACETAMINOPHEN 1 TABLET: 5; 325 TABLET ORAL at 06:10

## 2024-10-19 NOTE — ED PROVIDER NOTES
"Encounter Date: 10/19/2024       History     Chief Complaint   Patient presents with    Arm Pain     Pt reports she took off splint to left hand/arm due to pain. Reports "lost" her mediation and pain was unbearable so she self removed the splint that was placed on her a few days ago for a fracture. Reports dose of tylenol approx 4-5 hours pta     69 year old patient presents with pain in left wrist.  Patient had a fall and broke her distal radius 5 days ago.  Patient was given pain medication but states she had a hard time getting them and she states that the police had to go to the pharmacy and get them for.  Patient also states that she had medication prescribed by a different doctor on the 16th.  Patient had 90 Percocet sent to the pharmacy.  Patient states that she lost this medication.  Patient states she does have an appointment Monday with the orthopedic specialist.  Patient states she took off her splint here because it was bothering her.      The history is provided by the patient.     Review of patient's allergies indicates:   Allergen Reactions    Adhesive      Adhesive tape    Latex, natural rubber      Adhesive from latex tape    Ibuprofen Other (See Comments)     Causes asthma flare??     Past Medical History:   Diagnosis Date    Anticoagulant long-term use     Arthritis     Asthma     CHF (congestive heart failure)     [facility]    Colon cancer     colon    COPD (chronic obstructive pulmonary disease)     Coronary artery disease     Depression     Diabetes mellitus, type 2     GERD (gastroesophageal reflux disease)     Left ureteral stone 04/16/2024    Myocardial infarction     AGE 19 [facility]BABY DELIVERY    Nausea and vomiting 05/20/2024    Thyroid disease      Past Surgical History:   Procedure Laterality Date    ABDOMINAL SURGERY      CAUDAL EPIDURAL STEROID INJECTION N/A 06/29/2022    Procedure: Injection-steroid-epidural-caudal;  Surgeon: Nilam Santiago MD;  Location: [facility] PAIN MGT; "  Service: Pain Management;  Laterality: N/A;     SECTION      CHOLECYSTECTOMY      COLON SURGERY      COLONOSCOPY      --- repeat in 3-5 years    COLONOSCOPY N/A 2017    Procedure: COLONOSCOPY;  Surgeon: Corrina Flores MD;  Location: [facility] ENDO;  Service: Endoscopy;  Laterality: N/A;    COLONOSCOPY N/A 04/10/2018    Procedure: COLONOSCOPY golytely;  Surgeon: Corrina Flores MD;  Location: [facility] ENDO;  Service: Endoscopy;  Laterality: N/A;    DECOMPRESSION OF CERVICAL SPINE BY ANTERIOR APPROACH WITH FUSION N/A 2021    Procedure: DECOMPRESSION AND FUSION, SPINE, CERVICAL, ANTERIOR APPROACH C3-4 C5- 6 C6-7 ACDF;  Surgeon: Ishaan Fisher MD;  Location: [facility] OR;  Service: Neurosurgery;  Laterality: N/A;    ECTOPIC PREGNANCY SURGERY      EPIDURAL STEROID INJECTION INTO LUMBAR SPINE N/A 2020    Procedure: Injection-steroid-epidural-lumbar--L5-S1 InterLaminar;  Surgeon: Nilam Santiago MD;  Location: [facility] PAIN MGT;  Service: Pain Management;  Laterality: N/A;    ESOPHAGOGASTRODUODENOSCOPY N/A 2019    Procedure: ESOPHAGOGASTRODUODENOSCOPY (EGD);  Surgeon: Corrina Flores MD;  Location: [facility];  Service: Endoscopy;  Laterality: N/A;    ESOPHAGOGASTRODUODENOSCOPY      w/biopsy    ESOPHAGOGASTRODUODENOSCOPY N/A 2024    Procedure: EGD (ESOPHAGOGASTRODUODENOSCOPY);  Surgeon: Robert Ochoa MD;  Location: [facility] ENDO;  Service: Endoscopy;  Laterality: N/A;  Ref Jonatan De [id number]-Uloxci-QNao  24-LVM for precall, portal-DS  9/10 pt not confirmed- RMB  -precall complete, instructions emailed to [email or web address]  -Lvm notifying pt of new arrival time (1010am)-[facility]  24-Precall complete    FRACTURE SURGERY      left leg    FUSION OF SPINE WITH INSTRUMENTATION Left 2021    Procedure: FUSION, SPINE, WITH INSTRUMENTATION Stage 1 Left L4-5 OLIF DORA Stage 2 L4-5 Laminectomy, medial Facetectomy, foraminotomy, L4-5 MIS Instrumentation;  Surgeon: Ishaan  BIANCA Fisher MD;  Location: [facility] OR;  Service: Neurosurgery;  Laterality: Left;  Procedure: Stage 1 Left L4-5 OLIF DORA  Stage 2 L4-5 Laminectomy, medial Facetectomy, foraminotomy, L4-5 MIS Instrumentation  Surgery TIme: 4 Hrs    GASTRIC BYPASS      HERNIA REPAIR      INJECTION OF ANESTHETIC AGENT AROUND GENITOFEMORAL NERVE Left 07/29/2020    Procedure: BLOCK, NERVE, LEFT SHOULDER ARTICULAR;  Surgeon: Nilam Santiago MD;  Location: [facility] PAIN MGT;  Service: Pain Management;  Laterality: Left;    JOINT REPLACEMENT      KNEE ARTHROPLASTY Left 05/08/2019    Procedure: ARTHROPLASTY, KNEE;  Surgeon: Roldan Greenwood MD;  Location: [facility] OR;  Service: Orthopedics;  Laterality: Left;  Navid notified    KNEE ARTHROPLASTY Right 11/20/2019    Procedure: ARTHROPLASTY, KNEE;  Surgeon: Roldan Greenwood MD;  Location: [facility] OR;  Service: Orthopedics;  Laterality: Right;  Navid notified, confirmed case Navid 11/19/19 KB [id number]    OOPHORECTOMY      RADIOFREQUENCY THERMOCOAGULATION Left 08/12/2020    Procedure: RADIOFREQUENCY THERMAL COAGULATION--Left Suprascapular, Axillary, and Lateral Pectoral Articular Branch RFA;  Surgeon: Nilam Santiago MD;  Location: [facility] PAIN MGT;  Service: Pain Management;  Laterality: Left;    TONSILLECTOMY      TRANSFORAMINAL EPIDURAL INJECTION OF STEROID Right 08/25/2021    Procedure: Injection,steroid,epidural,transforaminal approach; Levels: L5;  Surgeon: Nilam Santiago MD;  Location: [facility] PAIN MGT;  Service: Pain Management;  Laterality: Right;  No pacemaker. Patient is diabetic. Patient is taking Aspirin but can continue per Dr. Santiago.     TUBAL LIGATION       Family History   Problem Relation Name Age of Onset    Hyperlipidemia Mother      Hypertension Mother      Diabetes Mother      Stroke Mother      Hypertension Sister      Hypertension Brother      Diabetes Brother      Breast cancer Maternal Aunt      Breast cancer Maternal Aunt      Breast cancer Cousin       Social History     Tobacco Use     Smoking status: Never     Passive exposure: Never    Smokeless tobacco: Never   Substance Use Topics    Alcohol use: Yes     Comment: very rare    Drug use: No     Comment: CBD oil sometimes     Review of Systems   Constitutional: Negative.    HENT: Negative.     Eyes: Negative.    Respiratory: Negative.     Cardiovascular: Negative.    Gastrointestinal: Negative.    Endocrine: Negative.    Genitourinary: Negative.    Musculoskeletal:  Positive for joint swelling.   Skin: Negative.    Allergic/Immunologic: Negative.    Neurological: Negative.    Hematological: Negative.    Psychiatric/Behavioral: Negative.         Physical Exam     Initial Vitals [10/19/24 1634]   BP Pulse Resp Temp SpO2   102/65 (!) 116 18 98.2 °F (36.8 °C) 97 %      MAP       --         Physical Exam    Constitutional: She appears well-developed and well-nourished.   HENT:   Head: Normocephalic.   Eyes: Conjunctivae and lids are normal.   Neck:    Full passive range of motion without pain.     Cardiovascular:  Normal rate and regular rhythm.           Pulmonary/Chest: Effort normal and breath sounds normal.   Musculoskeletal:      Left wrist: Swelling and deformity present.      Cervical back: Full passive range of motion without pain.           ED Course   Splint Application    Date/Time: 10/19/2024 6:16 PM    Performed by: Donnie Gaines PA-C  Authorized by: Sommer Landaverde MD  Consent Done: Yes  Consent: Verbal consent obtained.  Risks and benefits: risks, benefits and alternatives were discussed  Consent given by: patient  Patient understanding: patient states understanding of the procedure being performed  Patient consent: the patient's understanding of the procedure matches consent given  Procedure consent: procedure consent matches procedure scheduled  Patient identity confirmed: verbally with patient  Location details: right arm  Splint type: sugar tong  Supplies used: Ortho-Glass  Post-procedure: The splinted body part was  neurovascularly unchanged following the procedure.  Patient tolerance: Patient tolerated the procedure well with no immediate complications        Labs Reviewed - No data to display       Imaging Results    None          Medications   HYDROcodone-acetaminophen 5-325 mg per tablet 1 tablet (1 tablet Oral Given 10/19/24 1800)     Medical Decision Making                                    Clinical Impression:  Final diagnoses:  [W19.XXXD] Fall, subsequent encounter  [S59.443Y] Other closed fracture of distal end of left radius with routine healing, subsequent encounter (Primary)  [M25.532] Left wrist pain          ED Disposition Condition    Discharge Stable          ED Prescriptions       Medication Sig Dispense Start Date End Date Auth. Provider    oxyCODONE-acetaminophen (PERCOCET)  mg per tablet Take 1 tablet by mouth every 12 (twelve) hours as needed for Pain. 3 tablet 10/19/2024 10/21/2024 Donnie Gaines PA-C          Follow-up Information       Follow up With Specialties Details Why Contact Info    orthopedic specialist in 2 days.                 Donnie Gaines PA-C  10/19/24 1286

## 2024-10-21 ENCOUNTER — OFFICE VISIT (OUTPATIENT)
Dept: FAMILY MEDICINE | Facility: CLINIC | Age: 69
End: 2024-10-21
Payer: MEDICARE

## 2024-10-21 VITALS
TEMPERATURE: 98 F | SYSTOLIC BLOOD PRESSURE: 118 MMHG | WEIGHT: 202.94 LBS | HEART RATE: 79 BPM | HEIGHT: 60 IN | BODY MASS INDEX: 39.84 KG/M2 | OXYGEN SATURATION: 98 % | DIASTOLIC BLOOD PRESSURE: 80 MMHG

## 2024-10-21 DIAGNOSIS — N18.31 CHRONIC KIDNEY DISEASE, STAGE 3A: ICD-10-CM

## 2024-10-21 DIAGNOSIS — E11.40 TYPE 2 DIABETES MELLITUS WITH DIABETIC NEUROPATHY, WITHOUT LONG-TERM CURRENT USE OF INSULIN: Primary | ICD-10-CM

## 2024-10-21 DIAGNOSIS — K21.9 GASTROESOPHAGEAL REFLUX DISEASE WITHOUT ESOPHAGITIS: ICD-10-CM

## 2024-10-21 DIAGNOSIS — E66.813 CLASS 3 SEVERE OBESITY DUE TO EXCESS CALORIES WITH SERIOUS COMORBIDITY AND BODY MASS INDEX (BMI) OF 40.0 TO 44.9 IN ADULT: ICD-10-CM

## 2024-10-21 DIAGNOSIS — E66.01 CLASS 3 SEVERE OBESITY DUE TO EXCESS CALORIES WITH SERIOUS COMORBIDITY AND BODY MASS INDEX (BMI) OF 40.0 TO 44.9 IN ADULT: ICD-10-CM

## 2024-10-21 DIAGNOSIS — M19.90 ARTHRITIS: ICD-10-CM

## 2024-10-21 DIAGNOSIS — F33.41 MAJOR DEPRESSIVE DISORDER, RECURRENT, IN PARTIAL REMISSION: ICD-10-CM

## 2024-10-21 PROCEDURE — 3061F NEG MICROALBUMINURIA REV: CPT | Mod: CPTII,S$GLB,, | Performed by: FAMILY MEDICINE

## 2024-10-21 PROCEDURE — 3044F HG A1C LEVEL LT 7.0%: CPT | Mod: CPTII,S$GLB,, | Performed by: FAMILY MEDICINE

## 2024-10-21 PROCEDURE — 3008F BODY MASS INDEX DOCD: CPT | Mod: CPTII,S$GLB,, | Performed by: FAMILY MEDICINE

## 2024-10-21 PROCEDURE — 3288F FALL RISK ASSESSMENT DOCD: CPT | Mod: CPTII,S$GLB,, | Performed by: FAMILY MEDICINE

## 2024-10-21 PROCEDURE — 99214 OFFICE O/P EST MOD 30 MIN: CPT | Mod: 25,S$GLB,, | Performed by: FAMILY MEDICINE

## 2024-10-21 PROCEDURE — 96372 THER/PROPH/DIAG INJ SC/IM: CPT | Mod: S$GLB,,, | Performed by: FAMILY MEDICINE

## 2024-10-21 PROCEDURE — 1100F PTFALLS ASSESS-DOCD GE2>/YR: CPT | Mod: CPTII,S$GLB,, | Performed by: FAMILY MEDICINE

## 2024-10-21 PROCEDURE — 1157F ADVNC CARE PLAN IN RCRD: CPT | Mod: CPTII,S$GLB,, | Performed by: FAMILY MEDICINE

## 2024-10-21 PROCEDURE — 1160F RVW MEDS BY RX/DR IN RCRD: CPT | Mod: CPTII,S$GLB,, | Performed by: FAMILY MEDICINE

## 2024-10-21 PROCEDURE — 1125F AMNT PAIN NOTED PAIN PRSNT: CPT | Mod: CPTII,S$GLB,, | Performed by: FAMILY MEDICINE

## 2024-10-21 PROCEDURE — 3074F SYST BP LT 130 MM HG: CPT | Mod: CPTII,S$GLB,, | Performed by: FAMILY MEDICINE

## 2024-10-21 PROCEDURE — 1159F MED LIST DOCD IN RCRD: CPT | Mod: CPTII,S$GLB,, | Performed by: FAMILY MEDICINE

## 2024-10-21 PROCEDURE — 3066F NEPHROPATHY DOC TX: CPT | Mod: CPTII,S$GLB,, | Performed by: FAMILY MEDICINE

## 2024-10-21 PROCEDURE — 3079F DIAST BP 80-89 MM HG: CPT | Mod: CPTII,S$GLB,, | Performed by: FAMILY MEDICINE

## 2024-10-21 RX ORDER — KETOROLAC TROMETHAMINE 30 MG/ML
60 INJECTION, SOLUTION INTRAMUSCULAR; INTRAVENOUS ONCE
Status: COMPLETED | OUTPATIENT
Start: 2024-10-21 | End: 2024-10-21

## 2024-10-21 RX ADMIN — KETOROLAC TROMETHAMINE 60 MG: 30 INJECTION, SOLUTION INTRAMUSCULAR; INTRAVENOUS at 03:10

## 2024-10-22 DIAGNOSIS — R52 PAIN: Primary | ICD-10-CM

## 2024-10-22 NOTE — PROGRESS NOTES
SUBJECTIVE:      Chief Complaint: Injury of the Left Shoulder and Injury of the Left Wrist      History of Present Illness:  Patient is a  69 y.o. who presents today due to a fracture of left distal radius.   NIMESH:  Fall  DOI:  10/13/2024(10 days)  Current tx:  Sugar-tong splint   Pain has been controlled   Reports new onset numbness/tingling over the past 3-4 days      Past Medical History:   Diagnosis Date    Anticoagulant long-term use     Arthritis     Asthma     CHF (congestive heart failure)     [facility]    Colon cancer     colon    COPD (chronic obstructive pulmonary disease)     Coronary artery disease     Depression     Diabetes mellitus, type 2     GERD (gastroesophageal reflux disease)     Left ureteral stone 2024    Myocardial infarction     AGE 19 [facility] WITHBABY DELIVERY    Nausea and vomiting 2024    Thyroid disease      Past Surgical History:   Procedure Laterality Date    ABDOMINAL SURGERY      CAUDAL EPIDURAL STEROID INJECTION N/A 2022    Procedure: Injection-steroid-epidural-caudal;  Surgeon: Nilam Santiago MD;  Location: [facility] PAIN MGT;  Service: Pain Management;  Laterality: N/A;     SECTION      CHOLECYSTECTOMY      COLON SURGERY      COLONOSCOPY      --- repeat in 3-5 years    COLONOSCOPY N/A 2017    Procedure: COLONOSCOPY;  Surgeon: Corrina Flores MD;  Location: [facility] ENDO;  Service: Endoscopy;  Laterality: N/A;    COLONOSCOPY N/A 04/10/2018    Procedure: COLONOSCOPY golytely;  Surgeon: Corrina Flores MD;  Location: [facility] ENDO;  Service: Endoscopy;  Laterality: N/A;    DECOMPRESSION OF CERVICAL SPINE BY ANTERIOR APPROACH WITH FUSION N/A 2021    Procedure: DECOMPRESSION AND FUSION, SPINE, CERVICAL, ANTERIOR APPROACH C3-4 C5- 6 C6-7 ACDF;  Surgeon: Ishaan Fisher MD;  Location: [facility] OR;  Service: Neurosurgery;  Laterality: N/A;    ECTOPIC PREGNANCY SURGERY      EPIDURAL STEROID INJECTION INTO LUMBAR SPINE N/A 2020    Procedure:  Injection-steroid-epidural-lumbar--L5-S1 InterLaminar;  Surgeon: Nilam Santiago MD;  Location: [facility] PAIN MGT;  Service: Pain Management;  Laterality: N/A;    ESOPHAGOGASTRODUODENOSCOPY N/A 02/28/2019    Procedure: ESOPHAGOGASTRODUODENOSCOPY (EGD);  Surgeon: Corrina Flores MD;  Location: [facility] ENDO;  Service: Endoscopy;  Laterality: N/A;    ESOPHAGOGASTRODUODENOSCOPY      w/biopsy    ESOPHAGOGASTRODUODENOSCOPY N/A 9/26/2024    Procedure: EGD (ESOPHAGOGASTRODUODENOSCOPY);  Surgeon: Robert Ochoa MD;  Location: [facility] ENDO;  Service: Endoscopy;  Laterality: N/A;  Ref Samyier [id number]  9/9/24-LVM for precall, portal-DS  9/10 pt not confirmed- [facility]  9/11-precall complete, instructions emailed to [email or web address]  9/11-Lvm notifying pt of new arrival time (1010am)-[facility] [place]  9/20/24-Precall complete    FRACTURE SURGERY      left leg    FUSION OF SPINE WITH INSTRUMENTATION Left 01/11/2021    Procedure: FUSION, SPINE, WITH INSTRUMENTATION Stage 1 Left L4-5 OLIF DORA Stage 2 L4-5 Laminectomy, medial Facetectomy, foraminotomy, L4-5 MIS Instrumentation;  Surgeon: Ihsaan Fisher MD;  Location: [facility] OR;  Service: Neurosurgery;  Laterality: Left;  Procedure: Stage 1 Left L4-5 OLIF DORA  Stage 2 L4-5 Laminectomy, medial Facetectomy, foraminotomy, L4-5 MIS Instrumentation  Surgery TIme: 4 Hrs    GASTRIC BYPASS      HERNIA REPAIR      INJECTION OF ANESTHETIC AGENT AROUND GENITOFEMORAL NERVE Left 07/29/2020    Procedure: BLOCK, NERVE, LEFT SHOULDER ARTICULAR;  Surgeon: Nilam Santiago MD;  Location: [facility] PAIN MGT;  Service: Pain Management;  Laterality: Left;    JOINT REPLACEMENT      KNEE ARTHROPLASTY Left 05/08/2019    Procedure: ARTHROPLASTY, KNEE;  Surgeon: Roldan Greenwood MD;  Location: [facility] OR;  Service: Orthopedics;  Laterality: Left;  Navid notified    KNEE ARTHROPLASTY Right 11/20/2019    Procedure: ARTHROPLASTY, KNEE;  Surgeon: Roldan Greenwood MD;  Location: [facility];  Service: Orthopedics;   Laterality: Right;  Navid notified, confirmed case Navid 11/19/19 KB [id number]    OOPHORECTOMY      RADIOFREQUENCY THERMOCOAGULATION Left 08/12/2020    Procedure: RADIOFREQUENCY THERMAL COAGULATION--Left Suprascapular, Axillary, and Lateral Pectoral Articular Branch RFA;  Surgeon: Nilam Santiago MD;  Location: [facility] PAIN MGT;  Service: Pain Management;  Laterality: Left;    TONSILLECTOMY      TRANSFORAMINAL EPIDURAL INJECTION OF STEROID Right 08/25/2021    Procedure: Injection,steroid,epidural,transforaminal approach; Levels: L5;  Surgeon: Nilam Santiago MD;  Location: [facility] PAIN MGT;  Service: Pain Management;  Laterality: Right;  No pacemaker. Patient is diabetic. Patient is taking Aspirin but can continue per Dr. Santiago.     TUBAL LIGATION       Review of patient's allergies indicates:   Allergen Reactions    Adhesive      Adhesive tape    Latex, natural rubber      Adhesive from latex tape    Ibuprofen Other (See Comments)     Causes asthma flare??     Social History     Social History Narrative    Not on file     Family History   Problem Relation Name Age of Onset    Hyperlipidemia Mother      Hypertension Mother      Diabetes Mother      Stroke Mother      Hypertension Sister      Hypertension Brother      Diabetes Brother      Breast cancer Maternal Aunt      Breast cancer Maternal Aunt      Breast cancer Cousin           Current Outpatient Medications:     albuterol (PROVENTIL/VENTOLIN HFA) 90 mcg/actuation inhaler, INHALE 2 PUFFS BY MOUTH EVERY 4 HOURS AS NEEDED FOR WHEEZING, Disp: 18 g, Rfl: 3    aspirin (ECOTRIN) 81 MG EC tablet, Take 1 tablet (81 mg total) by mouth once daily., Disp: , Rfl: 0    atorvastatin (LIPITOR) 40 MG tablet, TAKE 1 TABLET(40 MG) BY MOUTH EVERY DAY FOR CHOLESTEROL, Disp: 90 tablet, Rfl: 3    azithromycin (Z-MARY) 250 MG tablet, Take 1 tablet (250 mg total) by mouth once daily. Take first 2 tablets together, then 1 every day until finished., Disp: 6 tablet, Rfl: 0    buPROPion (WELLBUTRIN  XL) 300 MG 24 hr tablet, Take 1 tablet (300 mg total) by mouth once daily., Disp: 90 tablet, Rfl: 3    busPIRone (BUSPAR) 10 MG tablet, Take 1 tablet (10 mg total) by mouth Daily. Pt is taking 10 mg once daily, Disp: 90 tablet, Rfl: 3    diabetic supplies, miscellan. Misc, Lancets for 1-2 times a day testing    dispense brand covered by insurance t, Disp: 60 each, Rfl: 3    diazePAM (VALIUM) 5 MG tablet, TAKE 1 TABLET BY MOUTH EVERY 24 HOURS AS NEEDED FOR ANXIETY, Disp: 30 tablet, Rfl: 1    diclofenac sodium (VOLTAREN) 1 % Gel, APPLY 2 GRAMS TOPICALLY TO THE AFFECTED AREA FOUR TIMES DAILY, Disp: 300 g, Rfl: 5    ergocalciferol (ERGOCALCIFEROL) 50,000 unit Cap, TAKE 1 CAPSULE BY MOUTH EVERY 7 DAYS, Disp: 12 capsule, Rfl: 3    EScitalopram oxalate (LEXAPRO) 20 MG tablet, TAKE 1 TABLET(20 MG) BY MOUTH EVERY DAY, Disp: 90 tablet, Rfl: 3    furosemide (LASIX) 20 MG tablet, Take 1 tablet (20 mg total) by mouth daily as needed., Disp: 30 tablet, Rfl: 3    gabapentin (NEURONTIN) 100 MG capsule, TAKE 1 CAPSULE(100 MG) BY MOUTH THREE TIMES DAILY, Disp: 90 capsule, Rfl: 3    glipiZIDE (GLUCOTROL) 2.5 MG TR24, TAKE 1 TABLET(2.5 MG) BY MOUTH DAILY WITH BREAKFAST, Disp: 90 tablet, Rfl: 3    ibandronate (BONIVA) 150 mg tablet, TAKE 1 TABLET BY MOUTH EVERY 30 DAYS FOR OSTEOPOROSIS, Disp: 3 tablet, Rfl: 1    levothyroxine (SYNTHROID) 100 MCG tablet, Take 1 tablet (100 mcg total) by mouth once daily., Disp: 90 tablet, Rfl: 3    omeprazole (PRILOSEC) 40 MG capsule, Take 1 capsule (40 mg total) by mouth every morning., Disp: 90 capsule, Rfl: 2    ondansetron (ZOFRAN-ODT) 4 MG TbDL, Take 1 tablet (4 mg total) by mouth every 6 (six) hours as needed (Nausea)., Disp: 28 tablet, Rfl: 0    potassium chloride (KLOR-CON) 10 MEQ TbSR, TAKE 1 TABLET BY MOUTH EVERY DAY WHEN YOU TAKE FUROSEMIDE, Disp: 90 tablet, Rfl: 0    promethazine (PHENERGAN) 25 MG tablet, TAKE 1 TABLET(25 MG) BY MOUTH EVERY 6 HOURS AS NEEDED, Disp: 25 tablet, Rfl: 0    TRUE  METRIX GLUCOSE METER Misc, USE TWICE DAILY TO CHECK BLOOD SUGAR, Disp: 1 each, Rfl: 0    TRUE METRIX GLUCOSE TEST STRIP Strp, TO TEST ONCE TO TWICE DAILY, Disp: 50 strip, Rfl: 11    TRUE METRIX GLUCOSE TEST STRIP Strp, TEST BLOOD SUGAR EVERY DAY, Disp: 100 strip, Rfl: 3    TRUEPLUS LANCETS 30 gauge Misc, USE TO TEST ONCE TO TWICE D, Disp: , Rfl:     zolpidem (AMBIEN) 5 MG Tab, TAKE 1 TABLET(5 MG) BY MOUTH EVERY EVENING, Disp: 30 tablet, Rfl: 1    HYDROcodone-acetaminophen (NORCO) 5-325 mg per tablet, Take 1 tablet by mouth every 8 (eight) hours as needed for Pain., Disp: 20 tablet, Rfl: 0    naloxone (NARCAN) 4 mg/actuation Spry, 4mg by nasal route as needed for opioid overdose; may repeat every 2-3 minutes in alternating nostrils until medical help arrives. Call 911 (Patient not taking: Reported on 10/23/2024), Disp: 1 each, Rfl: 11    predniSONE (DELTASONE) 20 MG tablet, One tablet daily by mouth for three days (Patient not taking: Reported on 10/23/2024), Disp: 3 tablet, Rfl: 0    SUPREP BOWEL PREP KIT 17.5-3.13-1.6 gram SolR, use as directed (Patient not taking: Reported on 10/23/2024), Disp: , Rfl:     tamsulosin (FLOMAX) 0.4 mg Cap, Take 1 capsule (0.4 mg total) by mouth once daily. (Patient not taking: Reported on 10/21/2024), Disp: 30 capsule, Rfl: 0    traMADoL (ULTRAM) 50 mg tablet, Take 1 tablet (50 mg total) by mouth every 8 (eight) hours as needed for Pain., Disp: 20 tablet, Rfl: 0    Review of Systems   Musculoskeletal:  Positive for joint pain, joint swelling, muscle weakness, myalgias and stiffness.   Neurological:  Negative for numbness and paresthesias.       OBJECTIVE:      Vital Signs (Most Recent):  Vitals:    10/23/24 0927   Weight: 92 kg (202 lb 13.2 oz)   Height: 5' (1.524 m)     Body mass index is 39.61 kg/m².    Physical Exam:  LEFT hand, wrist Examination:  Observation/Inspection:  Swelling  moderate   Deformity  none  Discoloration  none   Scars   none    Atrophy  none  Strength  Unable  to assess  TTP at fracture site  able to pinch skin over fracture site    Neurovascular Exam:  Digits WWP, brisk CR < 3s throughout  NVI motor/LTS to M/R/U nerves, radial pulse 2+    ROM hand decreased secondary to pain and swelling  ROM wrist decreased secondary to pain and swelling  ROM elbow full, painless        Diagnostic Results:  Imaging - I independently interpreted the patient's imaging. Xrays of the patient's left wrist which demonstrate an acute fracture of distal radius.   Compared to previous xr, no significant displacement or angulation  No dislocations or significant degenerative changes.        ASSESSMENT/PLAN:      1. Other closed fracture of distal end of left radius, initial encounter        69 y.o. y/o female with left distal radius fracture. We discussed tx options including operative and nonoperative tx. The pt expressed concern in regards to healing time and stability of her wrist, therefore, opted for surgical management.    Plan: The patient and I had a thorough discussion today.  We discussed the working diagnosis as well as several other potential alternative diagnoses.    We discussed conservative and surgical treatment options. Conservative options include rest, activity modifications, workplace modifications, po and topical analgesia (OTC and rx), formal or home PT, and others. Surgical treatment was also mentioned.    At this time, the patient would like to proceed with the following, which I agree with.    Surgery: Pt has failed conservative tx including rest, ice, NSAIDs, PT, CSI. Pt has elected to proceed with surgical intervention. The surgery was explained as well as risks and benefits, and consents were signed for ORIF L distal radius.   Medications: continue current pain regimen  Work status: no using the LUE at this time including no lifting, gripping, pushing, pulling  Pt would like to discuss chronic L shoulder pain at a future appt. She understands that fx tx takes  precedence    All of the patient's questions were answered and the patient will contact us if they have any questions or concerns in the interim.    Case discussed with Dr Sly Thomas PA-C  [facility]  Orthopedic Surgery

## 2024-10-23 ENCOUNTER — HOSPITAL ENCOUNTER (OUTPATIENT)
Dept: RADIOLOGY | Facility: HOSPITAL | Age: 69
Discharge: HOME | End: 2024-10-23
Payer: MEDICARE

## 2024-10-23 ENCOUNTER — OFFICE VISIT (OUTPATIENT)
Dept: ORTHOPEDICS | Facility: CLINIC | Age: 69
End: 2024-10-23
Payer: MEDICARE

## 2024-10-23 VITALS — BODY MASS INDEX: 39.82 KG/M2 | WEIGHT: 202.81 LBS | HEIGHT: 60 IN

## 2024-10-23 DIAGNOSIS — R52 PAIN: ICD-10-CM

## 2024-10-23 DIAGNOSIS — S52.592A OTHER CLOSED FRACTURE OF DISTAL END OF LEFT RADIUS, INITIAL ENCOUNTER: ICD-10-CM

## 2024-10-23 PROCEDURE — 3061F NEG MICROALBUMINURIA REV: CPT | Mod: HCNC,CPTII,S$GLB,

## 2024-10-23 PROCEDURE — 99215 OFFICE O/P EST HI 40 MIN: CPT | Mod: HCNC,S$GLB,,

## 2024-10-23 PROCEDURE — 73030 X-RAY EXAM OF SHOULDER: CPT | Mod: 26,HCNC,LT, | Performed by: RADIOLOGY

## 2024-10-23 PROCEDURE — 3288F FALL RISK ASSESSMENT DOCD: CPT | Mod: HCNC,CPTII,S$GLB,

## 2024-10-23 PROCEDURE — 3066F NEPHROPATHY DOC TX: CPT | Mod: HCNC,CPTII,S$GLB,

## 2024-10-23 PROCEDURE — 1100F PTFALLS ASSESS-DOCD GE2>/YR: CPT | Mod: HCNC,CPTII,S$GLB,

## 2024-10-23 PROCEDURE — 1157F ADVNC CARE PLAN IN RCRD: CPT | Mod: HCNC,CPTII,S$GLB,

## 2024-10-23 PROCEDURE — 1159F MED LIST DOCD IN RCRD: CPT | Mod: HCNC,CPTII,S$GLB,

## 2024-10-23 PROCEDURE — 1125F AMNT PAIN NOTED PAIN PRSNT: CPT | Mod: HCNC,CPTII,S$GLB,

## 2024-10-23 PROCEDURE — 73110 X-RAY EXAM OF WRIST: CPT | Mod: 26,HCNC,LT, | Performed by: RADIOLOGY

## 2024-10-23 PROCEDURE — 73030 X-RAY EXAM OF SHOULDER: CPT | Mod: TC,HCNC,PN,LT

## 2024-10-23 PROCEDURE — 99999 PR PBB SHADOW E&M-EST. PATIENT-LVL III: CPT | Mod: PBBFAC,HCNC,,

## 2024-10-23 PROCEDURE — 3008F BODY MASS INDEX DOCD: CPT | Mod: HCNC,CPTII,S$GLB,

## 2024-10-23 PROCEDURE — 73110 X-RAY EXAM OF WRIST: CPT | Mod: TC,HCNC,PN,LT

## 2024-10-23 PROCEDURE — 3044F HG A1C LEVEL LT 7.0%: CPT | Mod: HCNC,CPTII,S$GLB,

## 2024-10-23 RX ORDER — HYDROCODONE BITARTRATE AND ACETAMINOPHEN 5; 325 MG/1; MG/1
1 TABLET ORAL EVERY 8 HOURS PRN
Qty: 20 TABLET | Refills: 0 | Status: SHIPPED | OUTPATIENT
Start: 2024-10-23

## 2024-10-23 RX ORDER — TRAMADOL HYDROCHLORIDE 50 MG/1
50 TABLET ORAL EVERY 8 HOURS PRN
Qty: 20 TABLET | Refills: 0 | Status: SHIPPED | OUTPATIENT
Start: 2024-10-23

## 2024-10-24 ENCOUNTER — TELEPHONE (OUTPATIENT)
Dept: FAMILY MEDICINE | Facility: CLINIC | Age: 69
End: 2024-10-24
Payer: MEDICARE

## 2024-10-26 RX ORDER — MUPIROCIN 20 MG/G
OINTMENT TOPICAL
Status: CANCELLED | OUTPATIENT
Start: 2024-10-26

## 2024-10-26 RX ORDER — CEFAZOLIN SODIUM 2 G/50ML
2 SOLUTION INTRAVENOUS
Status: CANCELLED | OUTPATIENT
Start: 2024-10-26

## 2024-10-28 ENCOUNTER — HOSPITAL ENCOUNTER (OUTPATIENT)
Dept: RADIOLOGY | Facility: HOSPITAL | Age: 69
Discharge: HOME | End: 2024-10-28
Attending: NEUROLOGICAL SURGERY
Payer: MEDICARE

## 2024-10-28 ENCOUNTER — TELEPHONE (OUTPATIENT)
Dept: FAMILY MEDICINE | Facility: CLINIC | Age: 69
End: 2024-10-28
Payer: MEDICARE

## 2024-10-28 ENCOUNTER — TELEPHONE (OUTPATIENT)
Dept: ORTHOPEDICS | Facility: CLINIC | Age: 69
End: 2024-10-28
Payer: MEDICARE

## 2024-10-28 ENCOUNTER — HOSPITAL ENCOUNTER (OUTPATIENT)
Dept: RADIOLOGY | Facility: HOSPITAL | Age: 69
Discharge: HOME | End: 2024-10-28
Payer: MEDICARE

## 2024-10-28 ENCOUNTER — ANESTHESIA EVENT (OUTPATIENT)
Dept: SURGERY | Facility: HOSPITAL | Age: 69
End: 2024-10-28
Payer: MEDICARE

## 2024-10-28 DIAGNOSIS — Z98.1 ARTHRODESIS STATUS: ICD-10-CM

## 2024-10-28 DIAGNOSIS — M48.07 SPINAL STENOSIS, LUMBOSACRAL REGION: ICD-10-CM

## 2024-10-28 DIAGNOSIS — M19.012 PRIMARY OSTEOARTHRITIS OF LEFT SHOULDER: ICD-10-CM

## 2024-10-28 PROCEDURE — 72131 CT LUMBAR SPINE W/O DYE: CPT | Mod: 26,HCNC,, | Performed by: RADIOLOGY

## 2024-10-28 PROCEDURE — 73030 X-RAY EXAM OF SHOULDER: CPT | Mod: TC,HCNC,FY,PN,LT

## 2024-10-28 PROCEDURE — 73030 X-RAY EXAM OF SHOULDER: CPT | Mod: 26,HCNC,LT, | Performed by: RADIOLOGY

## 2024-10-28 PROCEDURE — 72131 CT LUMBAR SPINE W/O DYE: CPT | Mod: TC,HCNC,PN

## 2024-10-28 RX ORDER — ONDANSETRON 4 MG/1
4 TABLET, ORALLY DISINTEGRATING ORAL EVERY 6 HOURS PRN
Qty: 28 TABLET | Refills: 0 | Status: SHIPPED | OUTPATIENT
Start: 2024-10-28

## 2024-10-29 ENCOUNTER — TELEPHONE (OUTPATIENT)
Dept: NEUROSURGERY | Facility: CLINIC | Age: 69
End: 2024-10-29
Payer: MEDICARE

## 2024-10-29 ENCOUNTER — ANESTHESIA (OUTPATIENT)
Dept: SURGERY | Facility: HOSPITAL | Age: 69
End: 2024-10-29
Payer: MEDICARE

## 2024-10-29 ENCOUNTER — HOSPITAL ENCOUNTER (OUTPATIENT)
Facility: HOSPITAL | Age: 69
Discharge: HOME | End: 2024-10-29
Attending: ORTHOPAEDIC SURGERY | Admitting: ORTHOPAEDIC SURGERY
Payer: MEDICARE

## 2024-10-29 VITALS
HEART RATE: 63 BPM | RESPIRATION RATE: 12 BRPM | OXYGEN SATURATION: 95 % | SYSTOLIC BLOOD PRESSURE: 121 MMHG | DIASTOLIC BLOOD PRESSURE: 58 MMHG | TEMPERATURE: 98 F

## 2024-10-29 DIAGNOSIS — S62.102A CLOSED FRACTURE OF LEFT WRIST, INITIAL ENCOUNTER: Primary | ICD-10-CM

## 2024-10-29 DIAGNOSIS — S52.592A OTHER CLOSED FRACTURE OF DISTAL END OF LEFT RADIUS, INITIAL ENCOUNTER: ICD-10-CM

## 2024-10-29 LAB
ANION GAP SERPL CALC-SCNC: 12 MMOL/L (ref 8–16)
BASOPHILS # BLD AUTO: 0.04 K/UL (ref 0–0.2)
BASOPHILS NFR BLD: 0.6 % (ref 0–1.9)
BUN SERPL-MCNC: 16 MG/DL (ref 8–23)
CALCIUM SERPL-MCNC: 9.6 MG/DL (ref 8.7–10.5)
CHLORIDE SERPL-SCNC: 108 MMOL/L (ref 95–110)
CO2 SERPL-SCNC: 17 MMOL/L (ref 23–29)
CREAT SERPL-MCNC: 1 MG/DL (ref 0.5–1.4)
DIFFERENTIAL METHOD BLD: ABNORMAL
EOSINOPHIL # BLD AUTO: 0.3 K/UL (ref 0–0.5)
EOSINOPHIL NFR BLD: 3.7 % (ref 0–8)
ERYTHROCYTE [DISTWIDTH] IN BLOOD BY AUTOMATED COUNT: 13.2 % (ref 11.5–14.5)
EST. GFR  (NO RACE VARIABLE): >60 ML/MIN/1.73 M^2
GLUCOSE SERPL-MCNC: 116 MG/DL (ref 70–110)
HCT VFR BLD AUTO: 36.9 % (ref 37–48.5)
HGB BLD-MCNC: 12.3 G/DL (ref 12–16)
IMM GRANULOCYTES # BLD AUTO: 0.02 K/UL (ref 0–0.04)
IMM GRANULOCYTES NFR BLD AUTO: 0.3 % (ref 0–0.5)
LYMPHOCYTES # BLD AUTO: 1.7 K/UL (ref 1–4.8)
LYMPHOCYTES NFR BLD: 23.7 % (ref 18–48)
MCH RBC QN AUTO: 31.9 PG (ref 27–31)
MCHC RBC AUTO-ENTMCNC: 33.3 G/DL (ref 32–36)
MCV RBC AUTO: 96 FL (ref 82–98)
MONOCYTES # BLD AUTO: 0.3 K/UL (ref 0.3–1)
MONOCYTES NFR BLD: 4.7 % (ref 4–15)
NEUTROPHILS # BLD AUTO: 4.7 K/UL (ref 1.8–7.7)
NEUTROPHILS NFR BLD: 67 % (ref 38–73)
NRBC BLD-RTO: 0 /100 WBC
PLATELET # BLD AUTO: 151 K/UL (ref 150–450)
PLATELET BLD QL SMEAR: ABNORMAL
PMV BLD AUTO: 10.6 FL (ref 9.2–12.9)
POCT GLUCOSE: 104 MG/DL (ref 70–110)
POTASSIUM SERPL-SCNC: 4.2 MMOL/L (ref 3.5–5.1)
RBC # BLD AUTO: 3.85 M/UL (ref 4–5.4)
SODIUM SERPL-SCNC: 137 MMOL/L (ref 136–145)
WBC # BLD AUTO: 6.97 K/UL (ref 3.9–12.7)

## 2024-10-29 PROCEDURE — 63600175 PHARM REV CODE 636 W HCPCS: Mod: HCNC

## 2024-10-29 PROCEDURE — 37000009 HC ANESTHESIA EA ADD 15 MINS: Mod: HCNC | Performed by: ORTHOPAEDIC SURGERY

## 2024-10-29 PROCEDURE — D9220A PRA ANESTHESIA: Mod: CRNA,,, | Performed by: NURSE ANESTHETIST, CERTIFIED REGISTERED

## 2024-10-29 PROCEDURE — 64415 NJX AA&/STRD BRCH PLXS IMG: CPT | Mod: HCNC | Performed by: ANESTHESIOLOGY

## 2024-10-29 PROCEDURE — 25609 OPTX DST RD XART FX/EP SEP3+: CPT | Mod: AS,HCNC,LT,

## 2024-10-29 PROCEDURE — 25000003 PHARM REV CODE 250: Mod: HCNC

## 2024-10-29 PROCEDURE — 27201423 OPTIME MED/SURG SUP & DEVICES STERILE SUPPLY: Mod: HCNC | Performed by: ORTHOPAEDIC SURGERY

## 2024-10-29 PROCEDURE — C1769 GUIDE WIRE: HCPCS | Mod: HCNC | Performed by: ORTHOPAEDIC SURGERY

## 2024-10-29 PROCEDURE — 71000015 HC POSTOP RECOV 1ST HR: Mod: HCNC | Performed by: ORTHOPAEDIC SURGERY

## 2024-10-29 PROCEDURE — 63600175 PHARM REV CODE 636 W HCPCS: Mod: HCNC | Performed by: ANESTHESIOLOGY

## 2024-10-29 PROCEDURE — C1713 ANCHOR/SCREW BN/BN,TIS/BN: HCPCS | Mod: HCNC | Performed by: ORTHOPAEDIC SURGERY

## 2024-10-29 PROCEDURE — 85025 COMPLETE CBC W/AUTO DIFF WBC: CPT | Mod: HCNC | Performed by: ORTHOPAEDIC SURGERY

## 2024-10-29 PROCEDURE — D9220A PRA ANESTHESIA: Mod: ANES,,, | Performed by: ANESTHESIOLOGY

## 2024-10-29 PROCEDURE — 25609 OPTX DST RD XART FX/EP SEP3+: CPT | Mod: HCNC,LT,, | Performed by: ORTHOPAEDIC SURGERY

## 2024-10-29 PROCEDURE — 36000711: Mod: HCNC | Performed by: ORTHOPAEDIC SURGERY

## 2024-10-29 PROCEDURE — 80048 BASIC METABOLIC PNL TOTAL CA: CPT | Mod: HCNC | Performed by: ORTHOPAEDIC SURGERY

## 2024-10-29 PROCEDURE — 37000008 HC ANESTHESIA 1ST 15 MINUTES: Mod: HCNC | Performed by: ORTHOPAEDIC SURGERY

## 2024-10-29 PROCEDURE — 36415 COLL VENOUS BLD VENIPUNCTURE: CPT | Mod: HCNC | Performed by: ORTHOPAEDIC SURGERY

## 2024-10-29 PROCEDURE — 36000710: Mod: HCNC | Performed by: ORTHOPAEDIC SURGERY

## 2024-10-29 PROCEDURE — 71000016 HC POSTOP RECOV ADDL HR: Mod: HCNC | Performed by: ORTHOPAEDIC SURGERY

## 2024-10-29 DEVICE — SCREW BNE LOK HEXLB 3.5X12: Type: IMPLANTABLE DEVICE | Site: WRIST | Status: FUNCTIONAL

## 2024-10-29 DEVICE — SCREW BONE 2.3 X 18MM: Type: IMPLANTABLE DEVICE | Site: WRIST | Status: FUNCTIONAL

## 2024-10-29 DEVICE — SCREW BONE LOK CONG 2.3X20MM: Type: IMPLANTABLE DEVICE | Site: WRIST | Status: FUNCTIONAL

## 2024-10-29 DEVICE — SCREW BNE N LOK HEXLB 3.5X12: Type: IMPLANTABLE DEVICE | Site: WRIST | Status: FUNCTIONAL

## 2024-10-29 DEVICE — SCREW BNE LOK HEXLB 3.5X14: Type: IMPLANTABLE DEVICE | Site: WRIST | Status: FUNCTIONAL

## 2024-10-29 DEVICE — SCREW BONE 2.3 X 20MM: Type: IMPLANTABLE DEVICE | Site: WRIST | Status: FUNCTIONAL

## 2024-10-29 DEVICE — SCREW BONE 2.3 X 16MM: Type: IMPLANTABLE DEVICE | Site: WRIST | Status: FUNCTIONAL

## 2024-10-29 DEVICE — SCREW BNE N LOK HEXLB 3.5X14: Type: IMPLANTABLE DEVICE | Site: WRIST | Status: FUNCTIONAL

## 2024-10-29 DEVICE — PLATE ACU-LOC 2 VDF LT NARROW: Type: IMPLANTABLE DEVICE | Site: WRIST | Status: FUNCTIONAL

## 2024-10-29 RX ORDER — ACETAMINOPHEN 10 MG/ML
INJECTION, SOLUTION INTRAVENOUS
Status: DISCONTINUED | OUTPATIENT
Start: 2024-10-29 | End: 2024-10-29

## 2024-10-29 RX ORDER — OXYCODONE HYDROCHLORIDE 5 MG/1
5 TABLET ORAL
Status: DISCONTINUED | OUTPATIENT
Start: 2024-10-29 | End: 2024-10-29 | Stop reason: HOSPADM

## 2024-10-29 RX ORDER — OXYCODONE HYDROCHLORIDE 5 MG/1
10 TABLET ORAL EVERY 4 HOURS PRN
Status: DISCONTINUED | OUTPATIENT
Start: 2024-10-29 | End: 2024-10-29 | Stop reason: HOSPADM

## 2024-10-29 RX ORDER — FENTANYL CITRATE 50 UG/ML
INJECTION, SOLUTION INTRAMUSCULAR; INTRAVENOUS
Status: DISCONTINUED | OUTPATIENT
Start: 2024-10-29 | End: 2024-10-29

## 2024-10-29 RX ORDER — LIDOCAINE HYDROCHLORIDE 20 MG/ML
INJECTION, SOLUTION EPIDURAL; INFILTRATION; INTRACAUDAL; PERINEURAL
Status: DISCONTINUED | OUTPATIENT
Start: 2024-10-29 | End: 2024-10-29

## 2024-10-29 RX ORDER — HYDROCODONE BITARTRATE AND ACETAMINOPHEN 5; 325 MG/1; MG/1
1 TABLET ORAL EVERY 4 HOURS PRN
Qty: 20 TABLET | Refills: 0 | Status: SHIPPED | OUTPATIENT
Start: 2024-10-29

## 2024-10-29 RX ORDER — MIDAZOLAM HYDROCHLORIDE 1 MG/ML
INJECTION INTRAMUSCULAR; INTRAVENOUS
Status: DISCONTINUED | OUTPATIENT
Start: 2024-10-29 | End: 2024-10-29

## 2024-10-29 RX ORDER — CEFAZOLIN SODIUM 1 G/3ML
INJECTION, POWDER, FOR SOLUTION INTRAMUSCULAR; INTRAVENOUS
Status: DISCONTINUED | OUTPATIENT
Start: 2024-10-29 | End: 2024-10-29

## 2024-10-29 RX ORDER — PROPOFOL 10 MG/ML
VIAL (ML) INTRAVENOUS CONTINUOUS PRN
Status: DISCONTINUED | OUTPATIENT
Start: 2024-10-29 | End: 2024-10-29

## 2024-10-29 RX ORDER — HYDROCODONE BITARTRATE AND ACETAMINOPHEN 5; 325 MG/1; MG/1
1 TABLET ORAL EVERY 4 HOURS PRN
Status: DISCONTINUED | OUTPATIENT
Start: 2024-10-29 | End: 2024-10-29 | Stop reason: HOSPADM

## 2024-10-29 RX ORDER — GLUCAGON 1 MG
1 KIT INJECTION
Status: DISCONTINUED | OUTPATIENT
Start: 2024-10-29 | End: 2024-10-29 | Stop reason: HOSPADM

## 2024-10-29 RX ORDER — ONDANSETRON HYDROCHLORIDE 2 MG/ML
4 INJECTION, SOLUTION INTRAVENOUS DAILY PRN
Status: DISCONTINUED | OUTPATIENT
Start: 2024-10-29 | End: 2024-10-29 | Stop reason: HOSPADM

## 2024-10-29 RX ORDER — MUPIROCIN 20 MG/G
OINTMENT TOPICAL
Status: DISCONTINUED | OUTPATIENT
Start: 2024-10-29 | End: 2024-10-29 | Stop reason: HOSPADM

## 2024-10-29 RX ORDER — SODIUM CHLORIDE 0.9 % (FLUSH) 0.9 %
10 SYRINGE (ML) INJECTION
Status: DISCONTINUED | OUTPATIENT
Start: 2024-10-29 | End: 2024-10-29 | Stop reason: HOSPADM

## 2024-10-29 RX ORDER — HYDROMORPHONE HYDROCHLORIDE 2 MG/ML
0.2 INJECTION, SOLUTION INTRAMUSCULAR; INTRAVENOUS; SUBCUTANEOUS EVERY 5 MIN PRN
Status: DISCONTINUED | OUTPATIENT
Start: 2024-10-29 | End: 2024-10-29 | Stop reason: HOSPADM

## 2024-10-29 RX ORDER — BUPIVACAINE HYDROCHLORIDE 5 MG/ML
INJECTION, SOLUTION EPIDURAL; INTRACAUDAL
Status: COMPLETED | OUTPATIENT
Start: 2024-10-29 | End: 2024-10-29

## 2024-10-29 RX ORDER — ONDANSETRON 8 MG/1
8 TABLET, ORALLY DISINTEGRATING ORAL EVERY 8 HOURS PRN
Status: DISCONTINUED | OUTPATIENT
Start: 2024-10-29 | End: 2024-10-29 | Stop reason: HOSPADM

## 2024-10-29 RX ORDER — CEFAZOLIN 2 G/1
2 INJECTION, POWDER, FOR SOLUTION INTRAMUSCULAR; INTRAVENOUS
Status: DISCONTINUED | OUTPATIENT
Start: 2024-10-29 | End: 2024-10-29 | Stop reason: HOSPADM

## 2024-10-29 RX ORDER — ACETAMINOPHEN 325 MG/1
650 TABLET ORAL EVERY 4 HOURS PRN
Status: DISCONTINUED | OUTPATIENT
Start: 2024-10-29 | End: 2024-10-29 | Stop reason: HOSPADM

## 2024-10-29 RX ADMIN — MIDAZOLAM HYDROCHLORIDE 2 MG: 1 INJECTION, SOLUTION INTRAMUSCULAR; INTRAVENOUS at 12:10

## 2024-10-29 RX ADMIN — PROPOFOL 30 MG: 10 INJECTION, EMULSION INTRAVENOUS at 01:10

## 2024-10-29 RX ADMIN — LIDOCAINE HYDROCHLORIDE 80 MG: 20 INJECTION, SOLUTION EPIDURAL; INFILTRATION; INTRACAUDAL; PERINEURAL at 01:10

## 2024-10-29 RX ADMIN — ACETAMINOPHEN 1000 MG: 10 INJECTION INTRAVENOUS at 02:10

## 2024-10-29 RX ADMIN — CEFAZOLIN 2 G: 330 INJECTION, POWDER, FOR SOLUTION INTRAMUSCULAR; INTRAVENOUS at 01:10

## 2024-10-29 RX ADMIN — PROPOFOL 125 MCG/KG/MIN: 10 INJECTION, EMULSION INTRAVENOUS at 01:10

## 2024-10-29 RX ADMIN — BUPIVACAINE HYDROCHLORIDE 20 ML: 5 INJECTION, SOLUTION EPIDURAL; INTRACAUDAL; PERINEURAL at 12:10

## 2024-10-29 RX ADMIN — SODIUM CHLORIDE: 0.9 INJECTION, SOLUTION INTRAVENOUS at 01:10

## 2024-10-29 RX ADMIN — FENTANYL CITRATE 25 MCG: 50 INJECTION, SOLUTION INTRAMUSCULAR; INTRAVENOUS at 02:10

## 2024-10-31 ENCOUNTER — TELEPHONE (OUTPATIENT)
Dept: FAMILY MEDICINE | Facility: CLINIC | Age: 69
End: 2024-10-31
Payer: MEDICARE

## 2024-11-04 ENCOUNTER — TELEPHONE (OUTPATIENT)
Dept: FAMILY MEDICINE | Facility: CLINIC | Age: 69
End: 2024-11-04
Payer: MEDICARE

## 2024-11-04 NOTE — TELEPHONE ENCOUNTER
"Incomplete Diabetes Digital Medicine Enrollment  Received: Today  [facility], System Message  [facility] Staff  Christine Castro was enrolled in the Hypertension Digital Medicine program by Alon Santiago on 10/21/2024 but but has not completed the program consent questionnaire or setup of the required Digital Medicine devices.    Please reach out to the patient and inform them that Dr. Alon Santiago is expecting their at home readings. The patient should log into their [facility] account to complete the "Diabetes Digital Medicine Patient Consent" questionnaire.    Instructions will automatically be sent via [facility] message after consent is obtained.     If the patient has technical issues, please have her follow up with the Digital Medicine Support Line at [phone].  "

## 2024-11-06 ENCOUNTER — TELEPHONE (OUTPATIENT)
Dept: ORTHOPEDICS | Facility: CLINIC | Age: 69
End: 2024-11-06
Payer: MEDICARE

## 2024-11-06 NOTE — TELEPHONE ENCOUNTER
Return pt callback to r/s appointment time on 11/12. Offered pt 9:30am on 11/12. Pt agreed and gave verbal understanding.call ended ----- Message from Kat sent at 11/6/2024  8:09 AM CST -----  Type:  Needs Medical Advice    Who Called:  pt     Would the patient rather a call back or a response via [employer]?  Call back   Best Call Back Number:  [phone]  Pt has a appointment on 11/12/24@7:30 and the pt would like to know if this can be rescheduled  for a later time on that same date if possible

## 2024-11-08 ENCOUNTER — TELEPHONE (OUTPATIENT)
Dept: ORTHOPEDICS | Facility: CLINIC | Age: 69
End: 2024-11-08
Payer: MEDICARE

## 2024-11-08 DIAGNOSIS — S52.592A OTHER CLOSED FRACTURE OF DISTAL END OF LEFT RADIUS, INITIAL ENCOUNTER: Primary | ICD-10-CM

## 2024-11-11 DIAGNOSIS — G89.28 OTHER CHRONIC POSTPROCEDURAL PAIN: Primary | ICD-10-CM

## 2024-11-11 RX ORDER — OXYCODONE AND ACETAMINOPHEN 10; 325 MG/1; MG/1
1 TABLET ORAL EVERY 8 HOURS PRN
Qty: 90 TABLET | Refills: 0 | Status: SHIPPED | OUTPATIENT
Start: 2024-11-11

## 2024-11-12 ENCOUNTER — TELEPHONE (OUTPATIENT)
Dept: ORTHOPEDICS | Facility: CLINIC | Age: 69
End: 2024-11-12
Payer: MEDICARE

## 2024-11-12 NOTE — TELEPHONE ENCOUNTER
----- Message from Kat sent at 11/12/2024  9:13 AM CST -----  Type:  Needs Medical Advice    Who Called:  pt     Would the patient rather a call back or a response via [facility]?  Call back   Best Call Back Number:[phone]  Additional Information:  pt had a appointment for a post-op scheduled for 11/12/24 and she forgot about the appointment and she would like to have it rescheduled for a different date this week if possible

## 2024-11-14 ENCOUNTER — TELEPHONE (OUTPATIENT)
Dept: ORTHOPEDICS | Facility: CLINIC | Age: 69
End: 2024-11-14
Payer: MEDICARE

## 2024-11-14 ENCOUNTER — TELEPHONE (OUTPATIENT)
Dept: PHYSICAL MEDICINE AND REHAB | Facility: CLINIC | Age: 69
End: 2024-11-14
Payer: MEDICARE

## 2024-11-14 DIAGNOSIS — S52.592A OTHER CLOSED FRACTURE OF DISTAL END OF LEFT RADIUS, INITIAL ENCOUNTER: Primary | ICD-10-CM

## 2024-11-14 NOTE — TELEPHONE ENCOUNTER
Return pt callback to schedule left wrist fx po. Offered pt first available on 11/18 at 11:10 . Pt agreed and gave verbal understanding.call ended ----- Message from Ly sent at 11/14/2024 12:55 PM CST -----  Type:  Needs Medical Advice/post op     Who Called: pt    Would the patient rather a call back or a response via [employer]? Call   Best Call Back Number: [phone]  Additional Information: pt requesting to reschedule post op appointment for middle of the day.

## 2024-11-14 NOTE — TELEPHONE ENCOUNTER
----- Message from Ly sent at 11/14/2024 12:59 PM CST -----  Type:  Needs Medical Advice    Who Called: pt    Would the patient rather a call back or a response via [facility]? Call   Best Call Back Number: [phone]  Additional Information: pt requesting a call back from nurse

## 2024-11-18 ENCOUNTER — HOSPITAL ENCOUNTER (OUTPATIENT)
Dept: RADIOLOGY | Facility: HOSPITAL | Age: 69
Discharge: HOME | End: 2024-11-18
Attending: PHYSICIAN ASSISTANT
Payer: MEDICARE

## 2024-11-18 ENCOUNTER — TELEPHONE (OUTPATIENT)
Dept: ORTHOPEDICS | Facility: CLINIC | Age: 69
End: 2024-11-18
Payer: MEDICARE

## 2024-11-18 ENCOUNTER — OFFICE VISIT (OUTPATIENT)
Dept: ORTHOPEDICS | Facility: CLINIC | Age: 69
End: 2024-11-18
Payer: MEDICARE

## 2024-11-18 VITALS — HEIGHT: 60 IN | BODY MASS INDEX: 39.61 KG/M2

## 2024-11-18 DIAGNOSIS — M25.532 LEFT WRIST PAIN: Primary | ICD-10-CM

## 2024-11-18 DIAGNOSIS — S62.109D CLOSED FRACTURE OF WRIST WITH ROUTINE HEALING, UNSPECIFIED LATERALITY, SUBSEQUENT ENCOUNTER: Primary | ICD-10-CM

## 2024-11-18 DIAGNOSIS — S52.592A OTHER CLOSED FRACTURE OF DISTAL END OF LEFT RADIUS, INITIAL ENCOUNTER: ICD-10-CM

## 2024-11-18 DIAGNOSIS — S62.102D CLOSED FRACTURE OF LEFT WRIST WITH ROUTINE HEALING, SUBSEQUENT ENCOUNTER: ICD-10-CM

## 2024-11-18 PROCEDURE — 1157F ADVNC CARE PLAN IN RCRD: CPT | Mod: HCNC,CPTII,S$GLB, | Performed by: ORTHOPAEDIC SURGERY

## 2024-11-18 PROCEDURE — 1159F MED LIST DOCD IN RCRD: CPT | Mod: HCNC,CPTII,S$GLB, | Performed by: ORTHOPAEDIC SURGERY

## 2024-11-18 PROCEDURE — 3061F NEG MICROALBUMINURIA REV: CPT | Mod: HCNC,CPTII,S$GLB, | Performed by: ORTHOPAEDIC SURGERY

## 2024-11-18 PROCEDURE — 99024 POSTOP FOLLOW-UP VISIT: CPT | Mod: HCNC,S$GLB,, | Performed by: ORTHOPAEDIC SURGERY

## 2024-11-18 PROCEDURE — 73110 X-RAY EXAM OF WRIST: CPT | Mod: TC,HCNC,PN,LT

## 2024-11-18 PROCEDURE — 99999 PR PBB SHADOW E&M-EST. PATIENT-LVL IV: CPT | Mod: PBBFAC,HCNC,, | Performed by: ORTHOPAEDIC SURGERY

## 2024-11-18 PROCEDURE — 3044F HG A1C LEVEL LT 7.0%: CPT | Mod: HCNC,CPTII,S$GLB, | Performed by: ORTHOPAEDIC SURGERY

## 2024-11-18 PROCEDURE — 3066F NEPHROPATHY DOC TX: CPT | Mod: HCNC,CPTII,S$GLB, | Performed by: ORTHOPAEDIC SURGERY

## 2024-11-18 PROCEDURE — 1125F AMNT PAIN NOTED PAIN PRSNT: CPT | Mod: HCNC,CPTII,S$GLB, | Performed by: ORTHOPAEDIC SURGERY

## 2024-11-18 PROCEDURE — 1101F PT FALLS ASSESS-DOCD LE1/YR: CPT | Mod: HCNC,CPTII,S$GLB, | Performed by: ORTHOPAEDIC SURGERY

## 2024-11-18 PROCEDURE — 3288F FALL RISK ASSESSMENT DOCD: CPT | Mod: HCNC,CPTII,S$GLB, | Performed by: ORTHOPAEDIC SURGERY

## 2024-11-18 PROCEDURE — 73110 X-RAY EXAM OF WRIST: CPT | Mod: 26,HCNC,LT, | Performed by: RADIOLOGY

## 2024-11-18 RX ORDER — TRAMADOL HYDROCHLORIDE 50 MG/1
50 TABLET ORAL EVERY 6 HOURS PRN
Qty: 30 TABLET | Refills: 0 | Status: SHIPPED | OUTPATIENT
Start: 2024-11-18 | End: 2024-11-28

## 2024-11-18 NOTE — PROGRESS NOTES
Subjective:      Patient ID: Christine Castro is a 69 y.o. female.  Chief Complaint: Post-op Evaluation (Left wrist ORIF)      HPI  Christine Castro is a  69 y.o. female presenting today for post op visit.  She is s/p ORIF left distal radius fracture 3 weeks postop she is doing well she did miss 1 of her postop visits   .     Review of patient's allergies indicates:   Allergen Reactions    Adhesive      Adhesive tape    Latex, natural rubber      Adhesive from latex tape    Ibuprofen Other (See Comments)     Causes asthma flare??         Current Outpatient Medications   Medication Sig Dispense Refill    albuterol (PROVENTIL/VENTOLIN HFA) 90 mcg/actuation inhaler INHALE 2 PUFFS BY MOUTH EVERY 4 HOURS AS NEEDED FOR WHEEZING 18 g 3    aspirin (ECOTRIN) 81 MG EC tablet Take 1 tablet (81 mg total) by mouth once daily.  0    atorvastatin (LIPITOR) 40 MG tablet TAKE 1 TABLET(40 MG) BY MOUTH EVERY DAY FOR CHOLESTEROL 90 tablet 3    azithromycin (Z-MARY) 250 MG tablet Take 1 tablet (250 mg total) by mouth once daily. Take first 2 tablets together, then 1 every day until finished. 6 tablet 0    buPROPion (WELLBUTRIN XL) 300 MG 24 hr tablet Take 1 tablet (300 mg total) by mouth once daily. 90 tablet 3    busPIRone (BUSPAR) 10 MG tablet Take 1 tablet (10 mg total) by mouth Daily. Pt is taking 10 mg once daily 90 tablet 3    diabetic supplies, miscellan. Misc Lancets for 1-2 times a day testing    dispense brand covered by insurance t 60 each 3    diazePAM (VALIUM) 5 MG tablet TAKE 1 TABLET BY MOUTH EVERY 24 HOURS AS NEEDED FOR ANXIETY 30 tablet 1    diclofenac sodium (VOLTAREN) 1 % Gel APPLY 2 GRAMS TOPICALLY TO THE AFFECTED AREA FOUR TIMES DAILY 300 g 5    ergocalciferol (ERGOCALCIFEROL) 50,000 unit Cap TAKE 1 CAPSULE BY MOUTH EVERY 7 DAYS 12 capsule 3    EScitalopram oxalate (LEXAPRO) 20 MG tablet TAKE 1 TABLET(20 MG) BY MOUTH EVERY DAY 90 tablet 3    furosemide (LASIX) 20 MG tablet Take 1 tablet (20 mg total) by mouth daily  as needed. 30 tablet 3    gabapentin (NEURONTIN) 100 MG capsule TAKE 1 CAPSULE(100 MG) BY MOUTH THREE TIMES DAILY 90 capsule 3    glipiZIDE (GLUCOTROL) 2.5 MG TR24 TAKE 1 TABLET(2.5 MG) BY MOUTH DAILY WITH BREAKFAST 90 tablet 3    HYDROcodone-acetaminophen (NORCO) 5-325 mg per tablet Take 1 tablet by mouth every 8 (eight) hours as needed for Pain. 20 tablet 0    HYDROcodone-acetaminophen (NORCO) 5-325 mg per tablet Take 1 tablet by mouth every 4 (four) hours as needed for Pain. 20 tablet 0    ibandronate (BONIVA) 150 mg tablet TAKE 1 TABLET BY MOUTH EVERY 30 DAYS FOR OSTEOPOROSIS 3 tablet 1    levothyroxine (SYNTHROID) 100 MCG tablet Take 1 tablet (100 mcg total) by mouth once daily. 90 tablet 3    naloxone (NARCAN) 4 mg/actuation Spry 4mg by nasal route as needed for opioid overdose; may repeat every 2-3 minutes in alternating nostrils until medical help arrives. Call 911 1 each 11    omeprazole (PRILOSEC) 40 MG capsule Take 1 capsule (40 mg total) by mouth every morning. 90 capsule 2    ondansetron (ZOFRAN-ODT) 4 MG TbDL Take 1 tablet (4 mg total) by mouth every 6 (six) hours as needed (Nausea). 28 tablet 0    oxyCODONE-acetaminophen (PERCOCET)  mg per tablet Take 1 tablet by mouth every 8 (eight) hours as needed for Pain. 90 tablet 0    potassium chloride (KLOR-CON) 10 MEQ TbSR TAKE 1 TABLET BY MOUTH EVERY DAY WHEN YOU TAKE FUROSEMIDE 90 tablet 0    predniSONE (DELTASONE) 20 MG tablet One tablet daily by mouth for three days 3 tablet 0    promethazine (PHENERGAN) 25 MG tablet TAKE 1 TABLET(25 MG) BY MOUTH EVERY 6 HOURS AS NEEDED 25 tablet 0    SUPREP BOWEL PREP KIT 17.5-3.13-1.6 gram SolR use as directed      traMADoL (ULTRAM) 50 mg tablet Take 1 tablet (50 mg total) by mouth every 8 (eight) hours as needed for Pain. 20 tablet 0    traMADoL (ULTRAM) 50 mg tablet Take 1 tablet by mouth every 8 hours as needed for pain 20 tablet 0    TRUE METRIX GLUCOSE METER [facility] USE TWICE DAILY TO CHECK BLOOD SUGAR 1 each 0     TRUE METRIX GLUCOSE TEST STRIP Strp TO TEST ONCE TO TWICE DAILY 50 strip 11    TRUE METRIX GLUCOSE TEST STRIP Strp TEST BLOOD SUGAR EVERY  strip 3    TRUEPLUS LANCETS 30 gauge Misc USE TO TEST ONCE TO TWICE D      zolpidem (AMBIEN) 5 MG Tab TAKE 1 TABLET(5 MG) BY MOUTH EVERY EVENING 30 tablet 1    tamsulosin (FLOMAX) 0.4 mg Cap Take 1 capsule (0.4 mg total) by mouth once daily. (Patient not taking: Reported on 10/21/2024) 30 capsule 0    traMADoL (ULTRAM) 50 mg tablet Take 1 tablet (50 mg total) by mouth every 6 (six) hours as needed for Pain. 30 tablet 0     No current facility-administered medications for this visit.       Past Medical History:   Diagnosis Date    Anticoagulant long-term use     Arthritis     Asthma     CHF (congestive heart failure)     [facility]    Colon cancer     colon    COPD (chronic obstructive pulmonary disease)     Coronary artery disease     Depression     Diabetes mellitus, type 2     GERD (gastroesophageal reflux disease)     Left ureteral stone 2024    Myocardial infarction     AGE 19 [facility] WITHBABY DELIVERY    Nausea and vomiting 2024    Thyroid disease        Past Surgical History:   Procedure Laterality Date    ABDOMINAL SURGERY      CAUDAL EPIDURAL STEROID INJECTION N/A 2022    Procedure: Injection-steroid-epidural-caudal;  Surgeon: Nilam Santiago MD;  Location: [facility] PAIN MGT;  Service: Pain Management;  Laterality: N/A;     SECTION      CHOLECYSTECTOMY      COLON SURGERY      COLONOSCOPY      --- repeat in 3-5 years    COLONOSCOPY N/A 2017    Procedure: COLONOSCOPY;  Surgeon: Corrina Flores MD;  Location: [facility] ENDO;  Service: Endoscopy;  Laterality: N/A;    COLONOSCOPY N/A 04/10/2018    Procedure: COLONOSCOPY golytely;  Surgeon: Corrina Flores MD;  Location: [facility] ENDO;  Service: Endoscopy;  Laterality: N/A;    DECOMPRESSION OF CERVICAL SPINE BY ANTERIOR APPROACH WITH FUSION N/A 2021    Procedure: DECOMPRESSION AND  FUSION, SPINE, CERVICAL, ANTERIOR APPROACH C3-4 C5- 6 C6-7 ACDF;  Surgeon: Ishaan Fisher MD;  Location: [facility] OR;  Service: Neurosurgery;  Laterality: N/A;    ECTOPIC PREGNANCY SURGERY      EPIDURAL STEROID INJECTION INTO LUMBAR SPINE N/A 11/04/2020    Procedure: Injection-steroid-epidural-lumbar--L5-S1 InterLaminar;  Surgeon: Nilam Santiago MD;  Location: [facility] PAIN MGT;  Service: Pain Management;  Laterality: N/A;    ESOPHAGOGASTRODUODENOSCOPY N/A 02/28/2019    Procedure: ESOPHAGOGASTRODUODENOSCOPY (EGD);  Surgeon: Corrina Flores MD;  Location: [facility] ENDO;  Service: Endoscopy;  Laterality: N/A;    ESOPHAGOGASTRODUODENOSCOPY      w/biopsy    ESOPHAGOGASTRODUODENOSCOPY N/A 9/26/2024    Procedure: EGD (ESOPHAGOGASTRODUODENOSCOPY);  Surgeon: Robert Ochoa MD;  Location: [facility] ENDO;  Service: Endoscopy;  Laterality: N/A;  Ref Jonatan [id number]  9/9/24-LVM for precall, portal-DS  9/10 pt not confirmed- [facility]  9/11-precall complete, instructions emailed to [email or web address]  9/11-Lvm notifying pt of new arrival time (1010am)-[facility]  9/20/24-Precall complete    FRACTURE SURGERY      left leg    FUSION OF SPINE WITH INSTRUMENTATION Left 01/11/2021    Procedure: FUSION, SPINE, WITH INSTRUMENTATION Stage 1 Left L4-5 OLIF DORA Stage 2 L4-5 Laminectomy, medial Facetectomy, foraminotomy, L4-5 MIS Instrumentation;  Surgeon: Ishaan Fisher MD;  Location: [facility] OR;  Service: Neurosurgery;  Laterality: Left;  Procedure: Stage 1 Left L4-5 OLIF DORA  Stage 2 L4-5 Laminectomy, medial Facetectomy, foraminotomy, L4-5 MIS Instrumentation  Surgery TIme: 4 Hrs    GASTRIC BYPASS      HERNIA REPAIR      INJECTION OF ANESTHETIC AGENT AROUND GENITOFEMORAL NERVE Left 07/29/2020    Procedure: BLOCK, NERVE, LEFT SHOULDER ARTICULAR;  Surgeon: Nilam Santiago MD;  Location: [facility] PAIN MGT;  Service: Pain Management;  Laterality: Left;    JOINT REPLACEMENT      KNEE ARTHROPLASTY Left 05/08/2019    Procedure:  ARTHROPLASTY, KNEE;  Surgeon: Roldan Greenwood MD;  Location: [facility] OR;  Service: Orthopedics;  Laterality: Left;  Navid notified    KNEE ARTHROPLASTY Right 11/20/2019    Procedure: ARTHROPLASTY, KNEE;  Surgeon: Roldan Greenwood MD;  Location: [facility] OR;  Service: Orthopedics;  Laterality: Right;  Navid notified, confirmed case Navid 11/19/19 [id number]    OOPHORECTOMY      OPEN REDUCTION AND INTERNAL FIXATION (ORIF) OF FRACTURE OF DISTAL RADIUS Left 10/29/2024    Procedure: ORIF, FRACTURE, RADIUS, DISTAL;  Surgeon: Ketan Heart Jr., MD;  Location: [facility] OR;  Service: Orthopedics;  Laterality: Left;  artrex, mini C arm    RADIOFREQUENCY THERMOCOAGULATION Left 08/12/2020    Procedure: RADIOFREQUENCY THERMAL COAGULATION--Left Suprascapular, Axillary, and Lateral Pectoral Articular Branch RFA;  Surgeon: Nilam Santiago MD;  Location: [facility] PAIN MGT;  Service: Pain Management;  Laterality: Left;    TONSILLECTOMY      TRANSFORAMINAL EPIDURAL INJECTION OF STEROID Right 08/25/2021    Procedure: Injection,steroid,epidural,transforaminal approach; Levels: L5;  Surgeon: Nilam Santiago MD;  Location: [facility] PAIN MGT;  Service: Pain Management;  Laterality: Right;  No pacemaker. Patient is diabetic. Patient is taking Aspirin but can continue per Dr. Santiago.     TUBAL LIGATION         OBJECTIVE:   PHYSICAL EXAM:  Height: 5' (152.4 cm)    Vitals:    11/18/24 1129   Height: 5' (1.524 m)   PainSc:   8   PainLoc: Wrist     Ortho/SPM Exam  Examination left wrist after removal of the splint the incision looks good well-healed Steri-Strips in place no evidence of infection range of motion wrist fingers slightly decreased    RADIOGRAPHS:  AP lateral x-ray left wrist shows well-aligned distal radius fracture hardware intact  Comments: I have personally reviewed the imaging and I agree with the above radiologist's report.    ASSESSMENT/PLAN:     IMPRESSION:  Status post ORIF left distal radius fracture    PLAN:  Routine wound care   Gentle  range of motion wrist fingers   Wrist splint for full-time use except for bathing and showering   OT ordered   No heavy lifting or weight-bearing       FOLLOW UP:  3-4 weeks    Disclaimer: This note has been generated using voice-recognition software. There may be typographical errors that have been missed during proof-reading.

## 2024-11-19 ENCOUNTER — TELEPHONE (OUTPATIENT)
Dept: NEUROSURGERY | Facility: CLINIC | Age: 69
End: 2024-11-19
Payer: MEDICARE

## 2024-11-19 NOTE — TELEPHONE ENCOUNTER
Attempted to contact pt to confirm audio appointment with  tomorrow(11/20), no answer left detailed message.

## 2024-11-20 ENCOUNTER — OFFICE VISIT (OUTPATIENT)
Dept: NEUROSURGERY | Facility: CLINIC | Age: 69
End: 2024-11-20
Payer: MEDICARE

## 2024-11-20 DIAGNOSIS — Z98.1 S/P LUMBAR FUSION: ICD-10-CM

## 2024-11-20 DIAGNOSIS — M53.3 SI (SACROILIAC) JOINT DYSFUNCTION: Primary | ICD-10-CM

## 2024-11-20 NOTE — PROGRESS NOTES
Established Patient - Audio Only Telehealth Visit     The patient location is: home  The chief complaint leading to consultation is: low back pain  Visit type: Virtual visit with audio only (telephone)  Total time spent with patient: 30 min       The reason for the audio only service rather than synchronous audio and video virtual visit was related to technical difficulties or patient preference/necessity.     Each patient to whom I provide medical services by telemedicine is:  (1) informed of the relationship between the physician and patient and the respective role of any other health care provider with respect to management of the patient; and (2) notified that they may decline to receive medical services by telemedicine and may withdraw from such care at any time. Patient verbally consented to receive this service via voice-only telephone call.     01/27/23  This is a very pleasant 67 y.o. female, 1 year and 1 month status post C3-C7 anterior instrumented fusion for cervical spondylosis with myelopathy.  She does not complain of significant neck pain at this time.  She is dropping things at occasion.  She is mainly complaining of the lumbosacral pain.  She is unable to stand for more than a few minutes.  She tends to lean forward and falls due to the severe pain.  She takes Percocet 10 mg 3 times daily.  Today in clinic she appears quite drowsy.  No new onset of motor weakness or numbness.     INTERIM HISTORY:  10/02/23     She is unable to stand upright.  When she walks she leans forward.  She has severe low back pain radiating down her right leg more than left leg.  No new onset of motor weakness or sphincter dysfunction symptoms.  She is also complaining of severe shoulder pain.  Pain can reach 10 out of 10.  She is taking Percocet 10 mg 3 times daily.     INTERIM HISTORY:  10/02/24  She continued to have difficulty standing upright for long period of time.  She does not have difficulty bending forward.  When  she stands upright she has severe lumbosacral pain radiating down the posterolateral leg all the way down to her foot on the right side.    HPI:  Complains of right more than left low back and hip pain.  Pain is affecting her quality of life and functional status.  Pain is affecting her ability to stand and walk for long period of time.  She is recovering from a left radial fracture operated by Dr. Heart on 10/29/2024.  Patient has too much difficulty mobilizing to go to physical therapy.    CT of the lumbar spine reviewed showing L4-5 fusion, facet arthropathy at L5-S1, vacuum phenomenon in the L5-S1 disc space, severe bilateral sacroiliac joint degeneration with vacuum phenomenon, no severe stenosis     Assessment and plan:    She appears to have sacroiliac joint dysfunction  Home health physical therapy ordered  Bilateral therapeutic sacral iliac joint injections  Follow up after the injections          This service was not originating from a related E/M service provided within the previous 7 days nor will  to an E/M service or procedure within the next 24 hours or my soonest available appointment.  Prevailing standard of care was able to be met in this audio-only visit.

## 2024-11-22 ENCOUNTER — TELEPHONE (OUTPATIENT)
Dept: NEUROSURGERY | Facility: CLINIC | Age: 69
End: 2024-11-22
Payer: MEDICARE

## 2024-11-22 ENCOUNTER — PATIENT MESSAGE (OUTPATIENT)
Dept: UROLOGY | Facility: CLINIC | Age: 69
End: 2024-11-22
Payer: MEDICARE

## 2024-11-22 DIAGNOSIS — M53.3 SACROILIAC JOINT DYSFUNCTION OF BOTH SIDES: Primary | ICD-10-CM

## 2024-12-05 DIAGNOSIS — G89.28 OTHER CHRONIC POSTPROCEDURAL PAIN: ICD-10-CM

## 2024-12-05 RX ORDER — OXYCODONE AND ACETAMINOPHEN 10; 325 MG/1; MG/1
1 TABLET ORAL EVERY 8 HOURS PRN
Qty: 90 TABLET | Refills: 0 | Status: SHIPPED | OUTPATIENT
Start: 2024-12-05

## 2024-12-09 ENCOUNTER — TELEPHONE (OUTPATIENT)
Dept: NEUROSURGERY | Facility: CLINIC | Age: 69
End: 2024-12-09
Payer: MEDICARE

## 2024-12-10 RX ORDER — BUPROPION HYDROCHLORIDE 300 MG/1
300 TABLET ORAL
Qty: 90 TABLET | Refills: 3 | Status: SHIPPED | OUTPATIENT
Start: 2024-12-10

## 2024-12-10 RX ORDER — BUSPIRONE HYDROCHLORIDE 10 MG/1
10 TABLET ORAL
Qty: 90 TABLET | Refills: 3 | Status: SHIPPED | OUTPATIENT
Start: 2024-12-10

## 2024-12-11 NOTE — TELEPHONE ENCOUNTER
Provider Staff:  Action required for this patient    Requires labs      Please see care gap opportunities below in Care Due Message.    Thanks!  [facility]     Appointments      Date Provider   Last Visit   10/21/2024 Alon Santiaog MD   Next Visit   2/24/2025 Alon Santiago MD     Refill Decision Note   Christine Castro  is requesting a refill authorization.  Brief Assessment and Rationale for Refill:  Approve     Medication Therapy Plan:        Comments:     Note composed:9:03 PM 12/10/2024

## 2024-12-13 DIAGNOSIS — R52 PAIN: Primary | ICD-10-CM

## 2024-12-16 ENCOUNTER — HOSPITAL ENCOUNTER (OUTPATIENT)
Dept: RADIOLOGY | Facility: HOSPITAL | Age: 69
Discharge: HOME | End: 2024-12-16
Payer: MEDICARE

## 2024-12-16 ENCOUNTER — OFFICE VISIT (OUTPATIENT)
Dept: ORTHOPEDICS | Facility: CLINIC | Age: 69
End: 2024-12-16
Payer: MEDICARE

## 2024-12-16 VITALS — BODY MASS INDEX: 39.82 KG/M2 | WEIGHT: 202.81 LBS | HEIGHT: 60 IN

## 2024-12-16 DIAGNOSIS — R52 PAIN: ICD-10-CM

## 2024-12-16 DIAGNOSIS — Z87.81 S/P ORIF (OPEN REDUCTION INTERNAL FIXATION) FRACTURE: ICD-10-CM

## 2024-12-16 DIAGNOSIS — S62.102D CLOSED FRACTURE OF LEFT WRIST WITH ROUTINE HEALING, SUBSEQUENT ENCOUNTER: Primary | ICD-10-CM

## 2024-12-16 DIAGNOSIS — Z98.890 S/P ORIF (OPEN REDUCTION INTERNAL FIXATION) FRACTURE: ICD-10-CM

## 2024-12-16 PROCEDURE — 1157F ADVNC CARE PLAN IN RCRD: CPT | Mod: HCNC,CPTII,S$GLB,

## 2024-12-16 PROCEDURE — 1160F RVW MEDS BY RX/DR IN RCRD: CPT | Mod: HCNC,CPTII,S$GLB,

## 2024-12-16 PROCEDURE — 3044F HG A1C LEVEL LT 7.0%: CPT | Mod: HCNC,CPTII,S$GLB,

## 2024-12-16 PROCEDURE — 99024 POSTOP FOLLOW-UP VISIT: CPT | Mod: HCNC,S$GLB,,

## 2024-12-16 PROCEDURE — 1125F AMNT PAIN NOTED PAIN PRSNT: CPT | Mod: HCNC,CPTII,S$GLB,

## 2024-12-16 PROCEDURE — 73110 X-RAY EXAM OF WRIST: CPT | Mod: 26,HCNC,LT, | Performed by: RADIOLOGY

## 2024-12-16 PROCEDURE — 3061F NEG MICROALBUMINURIA REV: CPT | Mod: HCNC,CPTII,S$GLB,

## 2024-12-16 PROCEDURE — 1159F MED LIST DOCD IN RCRD: CPT | Mod: HCNC,CPTII,S$GLB,

## 2024-12-16 PROCEDURE — 99999 PR PBB SHADOW E&M-EST. PATIENT-LVL IV: CPT | Mod: PBBFAC,HCNC,,

## 2024-12-16 PROCEDURE — 73110 X-RAY EXAM OF WRIST: CPT | Mod: TC,HCNC,PN,LT

## 2024-12-16 PROCEDURE — 3066F NEPHROPATHY DOC TX: CPT | Mod: HCNC,CPTII,S$GLB,

## 2024-12-16 NOTE — PROGRESS NOTES
Subjective:      Patient ID: Christine Castro is a 69 y.o. female.    Chief Complaint: Post-op Evaluation of the Left Wrist      HPI:  Christine Castro is here for a postop visit.     Surgery: ORIF of left distal radius  Date of Surgery:  10/29/2024 (6 weeks)  Surgeon: Dr. Heart    She is doing well.   Has not begun OT, but plans to begin by the end of this week  Pain has been controlled.   Post surgical complaints include: occasional swelling.   No n/v, fever, chills, SOB, leg pain, surgical site irritation or drainage.      Past Medical History:   Diagnosis Date    Anticoagulant long-term use     Arthritis     Asthma     CHF (congestive heart failure)     [facility]    Colon cancer     colon    COPD (chronic obstructive pulmonary disease)     Coronary artery disease     Depression     Diabetes mellitus, type 2     GERD (gastroesophageal reflux disease)     Left ureteral stone 04/16/2024    Myocardial infarction     AGE 19 [facility]BABY DELIVERY    Nausea and vomiting 05/20/2024    Thyroid disease        Current Outpatient Medications:     albuterol (PROVENTIL/VENTOLIN HFA) 90 mcg/actuation inhaler, INHALE 2 PUFFS BY MOUTH EVERY 4 HOURS AS NEEDED FOR WHEEZING, Disp: 18 g, Rfl: 3    aspirin (ECOTRIN) 81 MG EC tablet, Take 1 tablet (81 mg total) by mouth once daily., Disp: , Rfl: 0    atorvastatin (LIPITOR) 40 MG tablet, TAKE 1 TABLET(40 MG) BY MOUTH EVERY DAY FOR CHOLESTEROL, Disp: 90 tablet, Rfl: 3    azithromycin (Z-MARY) 250 MG tablet, Take 1 tablet (250 mg total) by mouth once daily. Take first 2 tablets together, then 1 every day until finished., Disp: 6 tablet, Rfl: 0    buPROPion (WELLBUTRIN XL) 300 MG 24 hr tablet, TAKE 1 TABLET(300 MG) BY MOUTH EVERY DAY, Disp: 90 tablet, Rfl: 3    busPIRone (BUSPAR) 10 MG tablet, TAKE 1 TABLET(10 MG) BY MOUTH DAILY, Disp: 90 tablet, Rfl: 3    diabetic supplies, miscellan. Misc, Lancets for 1-2 times a day testing    dispense brand covered by insurance t, Disp: 60  each, Rfl: 3    diazePAM (VALIUM) 5 MG tablet, TAKE 1 TABLET BY MOUTH EVERY 24 HOURS AS NEEDED FOR ANXIETY, Disp: 30 tablet, Rfl: 1    diclofenac sodium (VOLTAREN) 1 % Gel, APPLY 2 GRAMS TOPICALLY TO THE AFFECTED AREA FOUR TIMES DAILY, Disp: 300 g, Rfl: 5    ergocalciferol (ERGOCALCIFEROL) 50,000 unit Cap, TAKE 1 CAPSULE BY MOUTH EVERY 7 DAYS, Disp: 12 capsule, Rfl: 3    EScitalopram oxalate (LEXAPRO) 20 MG tablet, TAKE 1 TABLET(20 MG) BY MOUTH EVERY DAY, Disp: 90 tablet, Rfl: 3    furosemide (LASIX) 20 MG tablet, Take 1 tablet (20 mg total) by mouth daily as needed., Disp: 30 tablet, Rfl: 3    gabapentin (NEURONTIN) 100 MG capsule, TAKE 1 CAPSULE(100 MG) BY MOUTH THREE TIMES DAILY, Disp: 90 capsule, Rfl: 3    glipiZIDE (GLUCOTROL) 2.5 MG TR24, TAKE 1 TABLET(2.5 MG) BY MOUTH DAILY WITH BREAKFAST, Disp: 90 tablet, Rfl: 3    HYDROcodone-acetaminophen (NORCO) 5-325 mg per tablet, Take 1 tablet by mouth every 8 (eight) hours as needed for Pain., Disp: 20 tablet, Rfl: 0    HYDROcodone-acetaminophen (NORCO) 5-325 mg per tablet, Take 1 tablet by mouth every 4 (four) hours as needed for Pain., Disp: 20 tablet, Rfl: 0    ibandronate (BONIVA) 150 mg tablet, TAKE 1 TABLET BY MOUTH EVERY 30 DAYS FOR OSTEOPOROSIS, Disp: 3 tablet, Rfl: 1    levothyroxine (SYNTHROID) 100 MCG tablet, Take 1 tablet (100 mcg total) by mouth once daily., Disp: 90 tablet, Rfl: 3    naloxone (NARCAN) 4 mg/actuation Spry, 4mg by nasal route as needed for opioid overdose; may repeat every 2-3 minutes in alternating nostrils until medical help arrives. Call 911, Disp: 1 each, Rfl: 11    omeprazole (PRILOSEC) 40 MG capsule, Take 1 capsule (40 mg total) by mouth every morning., Disp: 90 capsule, Rfl: 2    ondansetron (ZOFRAN-ODT) 4 MG TbDL, Take 1 tablet (4 mg total) by mouth every 6 (six) hours as needed (Nausea)., Disp: 28 tablet, Rfl: 0    oxyCODONE-acetaminophen (PERCOCET)  mg per tablet, Take 1 tablet by mouth every 8 (eight) hours as needed for  Pain., Disp: 90 tablet, Rfl: 0    potassium chloride (KLOR-CON) 10 MEQ TbSR, TAKE 1 TABLET BY MOUTH EVERY DAY WHEN YOU TAKE FUROSEMIDE, Disp: 90 tablet, Rfl: 0    predniSONE (DELTASONE) 20 MG tablet, One tablet daily by mouth for three days, Disp: 3 tablet, Rfl: 0    promethazine (PHENERGAN) 25 MG tablet, TAKE 1 TABLET(25 MG) BY MOUTH EVERY 6 HOURS AS NEEDED, Disp: 25 tablet, Rfl: 0    SUPREP BOWEL PREP KIT 17.5-3.13-1.6 gram SolR, use as directed, Disp: , Rfl:     tamsulosin (FLOMAX) 0.4 mg Cap, Take 1 capsule (0.4 mg total) by mouth once daily. (Patient not taking: Reported on 10/21/2024), Disp: 30 capsule, Rfl: 0    traMADoL (ULTRAM) 50 mg tablet, Take 1 tablet (50 mg total) by mouth every 8 (eight) hours as needed for Pain., Disp: 20 tablet, Rfl: 0    traMADoL (ULTRAM) 50 mg tablet, Take 1 tablet by mouth every 8 hours as needed for pain, Disp: 20 tablet, Rfl: 0    TRUE METRIX GLUCOSE METER Misc, USE TWICE DAILY TO CHECK BLOOD SUGAR, Disp: 1 each, Rfl: 0    TRUE METRIX GLUCOSE TEST STRIP Strp, TO TEST ONCE TO TWICE DAILY, Disp: 50 strip, Rfl: 11    TRUE METRIX GLUCOSE TEST STRIP Strp, TEST BLOOD SUGAR EVERY DAY, Disp: 100 strip, Rfl: 3    TRUEPLUS LANCETS 30 gauge Misc, USE TO TEST ONCE TO TWICE D, Disp: , Rfl:     zolpidem (AMBIEN) 5 MG Tab, TAKE 1 TABLET(5 MG) BY MOUTH EVERY EVENING, Disp: 30 tablet, Rfl: 1  Review of patient's allergies indicates:   Allergen Reactions    Adhesive      Adhesive tape    Latex, natural rubber      Adhesive from latex tape    Ibuprofen Other (See Comments)     Causes asthma flare??       Ht 5' (1.524 m)   Wt 92 kg (202 lb 13.2 oz)   BMI 39.61 kg/m²     Review of Systems   Constitutional: Negative for chills and fever.   HENT:  Negative for congestion and sore throat.    Eyes:  Negative for discharge and double vision.   Cardiovascular:  Negative for chest pain, palpitations and syncope.   Respiratory:  Negative for cough and shortness of breath.    Endocrine: Negative for cold  intolerance and heat intolerance.   Skin:  Negative for dry skin and rash.   Musculoskeletal:  Positive for joint pain, joint swelling and stiffness.   Gastrointestinal:  Negative for abdominal pain, nausea and vomiting.   Neurological:  Negative for focal weakness, numbness and paresthesias.         Objective:   Ortho Exam   left wrist:  Significant for  incision.   Wound margins are well approximated and healing nicely.   No redness, warmth, drainage, or other signs of infection.   mild  swelling.   ROM wrist and fingers decreased secondary to stiffness.     strength not tested due to decreased ROM.    Sensation intact.   Pulses present.    GEN: Well developed, well nourished. AAOX3. No acute distress.   Breathing unlabored.  Mood and affect pleasant.     Imaging:   I independently interpreted the patient's imaging. Xrays of the patient's left wrist demonstrate ORIF hardware without any radiolucency or screws backing out    Assessment/Plan:     1. Closed fracture of left wrist with routine healing, subsequent encounter    2. S/P ORIF (open reduction internal fixation) fracture          Wound care: Regular wound care explained with soap and water. Patient encouraged to continue digit range-of-motion exercises as well as scar massage once wound fully heals.  Pain Management: continue current pain regimen  DME: Wean out of brace and start using hand for light activities.  Continue wearing brace when using the hand and leaving the house  Occupational therapy:  Begin OT  Work status: NWB including no lifting, gripping, pushing, pulling with operative extremity.    Follow up:  6 weeks with Herlinda Thomas PA-C or Dr Heart with repeat x-ray    All of the patient's questions were answered and the patient will contact us if they have any questions or concerns in the interim.      Herlinda Thomas PA-C  [facility]  Orthopedic Surgery

## 2024-12-30 ENCOUNTER — OFFICE VISIT (OUTPATIENT)
Dept: NEUROSURGERY | Facility: CLINIC | Age: 69
End: 2024-12-30
Payer: MEDICARE

## 2024-12-30 ENCOUNTER — TELEPHONE (OUTPATIENT)
Dept: NEUROSURGERY | Facility: CLINIC | Age: 69
End: 2024-12-30

## 2024-12-30 VITALS
HEART RATE: 81 BPM | BODY MASS INDEX: 39.17 KG/M2 | HEIGHT: 60 IN | SYSTOLIC BLOOD PRESSURE: 121 MMHG | WEIGHT: 199.5 LBS | DIASTOLIC BLOOD PRESSURE: 69 MMHG

## 2024-12-30 DIAGNOSIS — M53.3 SACROILIAC JOINT DYSFUNCTION OF BOTH SIDES: Primary | ICD-10-CM

## 2024-12-30 PROCEDURE — 3044F HG A1C LEVEL LT 7.0%: CPT | Mod: HCNC,CPTII,S$GLB, | Performed by: PHYSICIAN ASSISTANT

## 2024-12-30 PROCEDURE — 3061F NEG MICROALBUMINURIA REV: CPT | Mod: HCNC,CPTII,S$GLB, | Performed by: PHYSICIAN ASSISTANT

## 2024-12-30 PROCEDURE — 1101F PT FALLS ASSESS-DOCD LE1/YR: CPT | Mod: HCNC,CPTII,S$GLB, | Performed by: PHYSICIAN ASSISTANT

## 2024-12-30 PROCEDURE — 99999 PR PBB SHADOW E&M-EST. PATIENT-LVL V: CPT | Mod: PBBFAC,HCNC,, | Performed by: PHYSICIAN ASSISTANT

## 2024-12-30 PROCEDURE — 1125F AMNT PAIN NOTED PAIN PRSNT: CPT | Mod: HCNC,CPTII,S$GLB, | Performed by: PHYSICIAN ASSISTANT

## 2024-12-30 PROCEDURE — 3074F SYST BP LT 130 MM HG: CPT | Mod: HCNC,CPTII,S$GLB, | Performed by: PHYSICIAN ASSISTANT

## 2024-12-30 PROCEDURE — 3008F BODY MASS INDEX DOCD: CPT | Mod: HCNC,CPTII,S$GLB, | Performed by: PHYSICIAN ASSISTANT

## 2024-12-30 PROCEDURE — 3066F NEPHROPATHY DOC TX: CPT | Mod: HCNC,CPTII,S$GLB, | Performed by: PHYSICIAN ASSISTANT

## 2024-12-30 PROCEDURE — 1160F RVW MEDS BY RX/DR IN RCRD: CPT | Mod: HCNC,CPTII,S$GLB, | Performed by: PHYSICIAN ASSISTANT

## 2024-12-30 PROCEDURE — 1159F MED LIST DOCD IN RCRD: CPT | Mod: HCNC,CPTII,S$GLB, | Performed by: PHYSICIAN ASSISTANT

## 2024-12-30 PROCEDURE — 1157F ADVNC CARE PLAN IN RCRD: CPT | Mod: HCNC,CPTII,S$GLB, | Performed by: PHYSICIAN ASSISTANT

## 2024-12-30 PROCEDURE — 3078F DIAST BP <80 MM HG: CPT | Mod: HCNC,CPTII,S$GLB, | Performed by: PHYSICIAN ASSISTANT

## 2024-12-30 PROCEDURE — 3288F FALL RISK ASSESSMENT DOCD: CPT | Mod: HCNC,CPTII,S$GLB, | Performed by: PHYSICIAN ASSISTANT

## 2024-12-30 PROCEDURE — 99214 OFFICE O/P EST MOD 30 MIN: CPT | Mod: HCNC,S$GLB,, | Performed by: PHYSICIAN ASSISTANT

## 2024-12-30 NOTE — PROGRESS NOTES
Subjective:     Patient ID:  Christine Castro is a 69 y.o. female.    Phillip    Chief Complaint:  Low back pain and bilateral leg pain    HPI    Christine Castro is a 69 y.o. female who presents for follow up.  Patient continues to have pain across the low back that is constant.  Pain radiates to the bilateral hips down the lateral legs to the feet.  The leg pain is comes and goes in the back pain stays.  The pain is worse with activity such as standing walking bending.  She fell about a month ago and broke her left arm.    Patient denies any recent accidents or trauma, no saddle anesthesias, and no bowel or bladder incontinence.      Review of Systems:  Constitution: Negative for chills, fever, night sweats and weight loss.   Musculoskeletal: Negative for falls.   Gastrointestinal: Negative for bowel incontinence, nausea and vomiting.   Genitourinary: Negative for bladder incontinence.   Neurological: Negative for disturbances in coordination and loss of balance.      Objective:      Vitals:    12/30/24 1106   BP: 121/69   Pulse: 81   Weight: 90.5 kg (199 lb 8.3 oz)   Height: 5' (1.524 m)   PainSc:   3   PainLoc: Back         Physical Exam:      General:  Christine Castro is well-developed, well-nourished, appears stated age, in no acute distress, alert and oriented to person, place, and time.    Pulmonary/Chest:  Respiratory effort normal  Abdominal: Exhibits no distension  Psychiatric:  Normal mood and affect.  Behavior is normal.  Judgement and thought content normal      Musculoskeletal:      Lumbar ROM:   Pain in lumbar flexion, extension, left lateral bending, and right lateral bending.    Lumbar Spine Palpation:  Moderate tenderness to low back palpation.      SI Joint Tests    Positive BL Sally's Test  Positive BL ANNA Test  Positive BL Gaenslen's Test  Negative BLThigh Thrust Test  Positive BL Distraction Test        On gross examination of the bilateral upper extremities, patient has full painfree ROM  with no signs of clubbing, cyanosis, edema, or weakness.       CT Interpretation:     CT of the lumbar spine reviewed showing L4-5 fusion, facet arthropathy at L5-S1, vacuum phenomenon in the L5-S1 disc space, severe bilateral sacroiliac joint degeneration with vacuum phenomenon, no severe stenosis     Assessment:          1. Sacroiliac joint dysfunction of both sides            Plan:             BL SI joint dysfunction    -Will message Dr. Fisher to see if he would like the BL SI Joint injections scheduled.      Follow-Up:  Follow up if symptoms worsen or fail to improve. If there are any questions prior to this, the patient was instructed to contact the office.       Mayda Musa [facility], PA-C  Neurosurgery  [facility]  12/30/2024

## 2024-12-30 NOTE — TELEPHONE ENCOUNTER
Dr. Fisher,    Patient came in for SI joint exam.    SI Joint Tests     Positive BL Sally's Test  Positive BL ANNA Test  Positive BL Gaenslen's Test  Negative BLThigh Thrust Test  Positive BL Distraction Test    Do you want to schedule her for BL SI joint injections?    Mayda Musa, [facility], PA-C  Neurosurgery  [facility]  12/30/2024

## 2024-12-31 ENCOUNTER — EXTERNAL HOME HEALTH (OUTPATIENT)
Dept: HOME HEALTH SERVICES | Facility: HOSPITAL | Age: 69
End: 2024-12-31
Payer: MEDICARE

## 2025-01-02 ENCOUNTER — TELEPHONE (OUTPATIENT)
Dept: ORTHOPEDICS | Facility: CLINIC | Age: 70
End: 2025-01-02
Payer: MEDICARE

## 2025-01-02 NOTE — TELEPHONE ENCOUNTER
----- Message from Erica sent at 1/2/2025 11:07 AM CST -----  Good Morning,    The physical therapy department received your order to get the attached patient scheduled for an evaluation. We have reached out to the patient and scheduled her for an evaluation; however, they have cancelled their appointment both times 12/20/2024 and 01/02/2025. We called patient this morning after she canceled her appointment today but we left message on voicemail.    We wanted you to be aware that your patient has not started therapy. If you speak with them again about starting therapy please have them reach out to us at [phone] to get scheduled?.      Sincerely,  Erica Davalos

## 2025-01-05 DIAGNOSIS — F41.9 ANXIETY: Primary | ICD-10-CM

## 2025-01-05 DIAGNOSIS — G47.00 INSOMNIA, UNSPECIFIED TYPE: ICD-10-CM

## 2025-01-05 RX ORDER — DIAZEPAM 5 MG/1
TABLET ORAL
Qty: 30 TABLET | Refills: 2 | Status: SHIPPED | OUTPATIENT
Start: 2025-01-05 | End: 2025-01-09 | Stop reason: SDUPTHER

## 2025-01-05 RX ORDER — ZOLPIDEM TARTRATE 5 MG/1
5 TABLET ORAL NIGHTLY
Qty: 30 TABLET | Refills: 2 | Status: SHIPPED | OUTPATIENT
Start: 2025-01-05 | End: 2025-01-09 | Stop reason: SDUPTHER

## 2025-01-05 NOTE — TELEPHONE ENCOUNTER
Care Due:                  Date            Visit Type   Department     Provider  --------------------------------------------------------------------------------                                EP -                              PRIMARY      [facility]  Last Visit: 10-      CARE ([facility])   MEDICINE       Alon Santiago                              EP -                              PRIMARY      [facility]  Next Visit: 02-      CARE ([facility])   [facility]       Alon Santiago                                                            Last  Test          Frequency    Reason                     Performed    Due Date  --------------------------------------------------------------------------------    Mg Level....  12 months..  ibandronate..............  04- 03-    Phosphate...  12 months..  ibandronate..............  Not Found    Overdue    Vitamin D...  12 months..  ergocalciferol...........  Not Found    Overdue    [employer] Embedded Care Due Messages. Reference number: [id number].   1/05/2025 3:51:16 PM CST

## 2025-01-09 DIAGNOSIS — F41.9 ANXIETY: ICD-10-CM

## 2025-01-09 DIAGNOSIS — M53.3 SACROILIAC JOINT DYSFUNCTION OF BOTH SIDES: Primary | ICD-10-CM

## 2025-01-09 DIAGNOSIS — G47.00 INSOMNIA, UNSPECIFIED TYPE: ICD-10-CM

## 2025-01-09 DIAGNOSIS — G89.28 OTHER CHRONIC POSTPROCEDURAL PAIN: ICD-10-CM

## 2025-01-09 RX ORDER — OXYCODONE AND ACETAMINOPHEN 10; 325 MG/1; MG/1
1 TABLET ORAL EVERY 8 HOURS PRN
Qty: 90 TABLET | Refills: 0 | Status: CANCELLED | OUTPATIENT
Start: 2025-01-09

## 2025-01-09 NOTE — TELEPHONE ENCOUNTER
No care due was identified.  [facility] Embedded Care Due Messages. Reference number: [id number].   1/09/2025 9:01:49 AM CST

## 2025-01-09 NOTE — TELEPHONE ENCOUNTER
----- Message from Gina sent at 1/9/2025  8:51 AM CST -----  Type:  RX Refill Request    Who Called: Pt   Refill or New Rx: Refill   RX Name and Strength: There's a third prescription that needs to be refilled too, but pt couldn't recall name in the moment   diazePAM (VALIUM) 5 MG tablet  zolpidem (AMBIEN) 5 MG Tab  Preferred Pharmacy with phone number:  [id number] - [place], [place] - [address] [facility] AT [facility] [address]  [address] [facility] [address]  Phone: [phone] Fax: [phone]  Local or Mail Order: Local   Ordering Provider: [facility]   Would the patient rather a call back or a response via [facility]? Call back   Best Call Back Number: [phone]  Additional Information: Please be advised, pt states that she was told by pharmacy that refills hasn't been sent over yet, pt would like a call back as soon as possible once they have been sent over

## 2025-01-10 DIAGNOSIS — G89.28 OTHER CHRONIC POSTPROCEDURAL PAIN: ICD-10-CM

## 2025-01-10 RX ORDER — ZOLPIDEM TARTRATE 5 MG/1
5 TABLET ORAL NIGHTLY
Qty: 30 TABLET | Refills: 2 | Status: SHIPPED | OUTPATIENT
Start: 2025-01-10

## 2025-01-10 RX ORDER — DIAZEPAM 5 MG/1
TABLET ORAL
Qty: 30 TABLET | Refills: 2 | Status: SHIPPED | OUTPATIENT
Start: 2025-01-10

## 2025-01-10 RX ORDER — OXYCODONE AND ACETAMINOPHEN 10; 325 MG/1; MG/1
1 TABLET ORAL EVERY 8 HOURS PRN
Qty: 90 TABLET | Refills: 0 | Status: SHIPPED | OUTPATIENT
Start: 2025-01-10

## 2025-01-13 DIAGNOSIS — Z00.00 ENCOUNTER FOR MEDICARE ANNUAL WELLNESS EXAM: ICD-10-CM

## 2025-01-28 ENCOUNTER — TELEPHONE (OUTPATIENT)
Dept: ORTHOPEDICS | Facility: CLINIC | Age: 70
End: 2025-01-28
Payer: MEDICARE

## 2025-01-28 DIAGNOSIS — Z98.890 POST-OPERATIVE STATE: Primary | ICD-10-CM

## 2025-02-11 DIAGNOSIS — G89.28 OTHER CHRONIC POSTPROCEDURAL PAIN: ICD-10-CM

## 2025-02-11 NOTE — TELEPHONE ENCOUNTER
Returned pt's call, pt stated that she wants to make sure she gets her medication. I stated to pt that I sent a refill request over to . Pt voiced understanding

## 2025-02-12 RX ORDER — OXYCODONE AND ACETAMINOPHEN 10; 325 MG/1; MG/1
1 TABLET ORAL EVERY 8 HOURS PRN
Qty: 90 TABLET | Refills: 0 | Status: SHIPPED | OUTPATIENT
Start: 2025-02-12

## 2025-02-21 ENCOUNTER — TELEPHONE (OUTPATIENT)
Dept: ADMINISTRATIVE | Facility: CLINIC | Age: 70
End: 2025-02-21
Payer: MEDICARE

## 2025-02-21 NOTE — TELEPHONE ENCOUNTER
Called pt; no answer; left message informing patient I was calling to confirm her virtual EAWV on 2/22/25 at 9:00am and to see if she needed any help with setting up for virtual appt or e-pre check and to login 10 minutes prior to scheduled appt; left my name & number for pt to return my call if she has any questions or concerns; sent message through portal

## 2025-02-22 ENCOUNTER — TELEPHONE (OUTPATIENT)
Dept: ADMINISTRATIVE | Facility: CLINIC | Age: 70
End: 2025-02-22
Payer: MEDICARE

## 2025-02-22 NOTE — TELEPHONE ENCOUNTER
Called pt; no answer; left message informing patient I was calling to confirm pt's virtual EAWV today at 9:00am and to see if pt needed any help with setting up for virtual visit or e-pre check and to login 10 minutes prior to scheduled appt; left my name & number for pt to return my call if pt had any questions or concerns; sent message through portal

## 2025-02-24 ENCOUNTER — TELEPHONE (OUTPATIENT)
Dept: FAMILY MEDICINE | Facility: CLINIC | Age: 70
End: 2025-02-24

## 2025-02-24 ENCOUNTER — OFFICE VISIT (OUTPATIENT)
Dept: FAMILY MEDICINE | Facility: CLINIC | Age: 70
End: 2025-02-24
Payer: MEDICARE

## 2025-02-24 VITALS
SYSTOLIC BLOOD PRESSURE: 130 MMHG | TEMPERATURE: 98 F | HEIGHT: 60 IN | DIASTOLIC BLOOD PRESSURE: 84 MMHG | WEIGHT: 197 LBS | HEART RATE: 68 BPM | BODY MASS INDEX: 38.68 KG/M2 | OXYGEN SATURATION: 97 %

## 2025-02-24 DIAGNOSIS — N18.31 CHRONIC KIDNEY DISEASE, STAGE 3A: ICD-10-CM

## 2025-02-24 DIAGNOSIS — Z12.31 ENCOUNTER FOR SCREENING MAMMOGRAM FOR BREAST CANCER: Primary | ICD-10-CM

## 2025-02-24 DIAGNOSIS — I50.9 HEART FAILURE, UNSPECIFIED HF CHRONICITY, UNSPECIFIED HEART FAILURE TYPE: ICD-10-CM

## 2025-02-24 DIAGNOSIS — E66.813 CLASS 3 SEVERE OBESITY DUE TO EXCESS CALORIES WITH SERIOUS COMORBIDITY AND BODY MASS INDEX (BMI) OF 40.0 TO 44.9 IN ADULT: ICD-10-CM

## 2025-02-24 DIAGNOSIS — G95.9 CHRONIC MYELOPATHY: ICD-10-CM

## 2025-02-24 DIAGNOSIS — M06.09 RHEUMATOID ARTHRITIS WITHOUT RHEUMATOID FACTOR, MULTIPLE SITES: ICD-10-CM

## 2025-02-24 DIAGNOSIS — M25.512 CHRONIC LEFT SHOULDER PAIN: ICD-10-CM

## 2025-02-24 DIAGNOSIS — G89.29 CHRONIC LEFT SHOULDER PAIN: ICD-10-CM

## 2025-02-24 DIAGNOSIS — E11.40 TYPE 2 DIABETES MELLITUS WITH DIABETIC NEUROPATHY, WITHOUT LONG-TERM CURRENT USE OF INSULIN: ICD-10-CM

## 2025-02-24 DIAGNOSIS — F11.20 OPIOID DEPENDENCE, UNCOMPLICATED: ICD-10-CM

## 2025-02-24 DIAGNOSIS — E66.01 CLASS 3 SEVERE OBESITY DUE TO EXCESS CALORIES WITH SERIOUS COMORBIDITY AND BODY MASS INDEX (BMI) OF 40.0 TO 44.9 IN ADULT: ICD-10-CM

## 2025-02-24 PROCEDURE — 3075F SYST BP GE 130 - 139MM HG: CPT | Mod: CPTII,S$GLB,, | Performed by: FAMILY MEDICINE

## 2025-02-24 PROCEDURE — 1159F MED LIST DOCD IN RCRD: CPT | Mod: CPTII,S$GLB,, | Performed by: FAMILY MEDICINE

## 2025-02-24 PROCEDURE — 1157F ADVNC CARE PLAN IN RCRD: CPT | Mod: CPTII,S$GLB,, | Performed by: FAMILY MEDICINE

## 2025-02-24 PROCEDURE — 99214 OFFICE O/P EST MOD 30 MIN: CPT | Mod: S$GLB,,, | Performed by: FAMILY MEDICINE

## 2025-02-24 PROCEDURE — 1160F RVW MEDS BY RX/DR IN RCRD: CPT | Mod: CPTII,S$GLB,, | Performed by: FAMILY MEDICINE

## 2025-02-24 PROCEDURE — 3288F FALL RISK ASSESSMENT DOCD: CPT | Mod: CPTII,S$GLB,, | Performed by: FAMILY MEDICINE

## 2025-02-24 PROCEDURE — 3079F DIAST BP 80-89 MM HG: CPT | Mod: CPTII,S$GLB,, | Performed by: FAMILY MEDICINE

## 2025-02-24 PROCEDURE — 3008F BODY MASS INDEX DOCD: CPT | Mod: CPTII,S$GLB,, | Performed by: FAMILY MEDICINE

## 2025-02-24 PROCEDURE — 1125F AMNT PAIN NOTED PAIN PRSNT: CPT | Mod: CPTII,S$GLB,, | Performed by: FAMILY MEDICINE

## 2025-02-24 PROCEDURE — 1100F PTFALLS ASSESS-DOCD GE2>/YR: CPT | Mod: CPTII,S$GLB,, | Performed by: FAMILY MEDICINE

## 2025-02-24 PROCEDURE — G2211 COMPLEX E/M VISIT ADD ON: HCPCS | Mod: S$GLB,,, | Performed by: FAMILY MEDICINE

## 2025-02-24 RX ORDER — DULOXETIN HYDROCHLORIDE 60 MG/1
60 CAPSULE, DELAYED RELEASE ORAL DAILY
Qty: 90 CAPSULE | Refills: 1 | Status: SHIPPED | OUTPATIENT
Start: 2025-02-24 | End: 2026-02-24

## 2025-02-24 RX ORDER — DULOXETIN HYDROCHLORIDE 30 MG/1
30 CAPSULE, DELAYED RELEASE ORAL DAILY
Qty: 30 CAPSULE | Refills: 0 | Status: SHIPPED | OUTPATIENT
Start: 2025-02-24 | End: 2025-03-26

## 2025-03-08 NOTE — PROGRESS NOTES
" Patient ID: Christine Castro is a 69 y.o. female.    Chief Complaint: Follow-up    HPI    Christine Castro is a 69 y.o. female     History of Present Illness    CHIEF COMPLAINT:  Patient presents today for follow-up    WEIGHT LOSS AND SKIN CONCERNS:  She reports significant weight loss but continues to experience discomfort. She has excess skin causing severe discomfort, particularly in the breast area, which she describes as "pulling" and causing mobility issues including falls. She desires plastic surgery for skin removal, specifically in the breast area. She reports persistent itching affecting the neck area.    NEUROLOGICAL SYMPTOMS:  She reports decreased sensation in feet, only feeling light tickle with stimulation. She describes her feet as feeling "dead" all the time, with coldness, stickiness, and swelling.    DEPRESSION:  She reports her current antidepressant is not effectively managing her depression symptoms.    PAST MEDICAL HISTORY:  History includes cancer since 2007, asthma, arthritis, and multiple surgeries.    SOCIAL HISTORY:  She reports regular [employer] consumption since 2007.         Vitals:    02/24/25 1447   BP: 130/84   BP Location: Right arm   Patient Position: Sitting   Pulse: 68   Temp: 98 °F (36.7 °C)   TempSrc: Oral   SpO2: 97%   Weight: 89.4 kg (196 lb 15.7 oz)   Height: 5' (1.524 m)            Review of Symptoms      Physical Exam    Constitutional:  Oriented to person, place, and time.appears well-developed and well-nourished.  No distress.      HENT  Head: Normocephalic and atraumatic  Right Ear: External ear normal.   Left Ear: External ear normal.   Nose: External nose normal.   Mouth:  Moist mucus membranes.    Eyes:  Conjunctivae are normal. Right eye exhibits no discharge.  Left eye exhibits no discharge. No scleral icterus.  No periorbital edema    Cardiovascular:  Regular rate and rhythm with normal S1 and S2     Pulmonary/Chest:   Clear to auscultation bilaterally without " "wheezes, rhonchi or rales      Musculoskeletal:  No edema. No obvious deformity No wasting       Neurological:  Alert and oriented to person, place, and time.   Coordination normal.     Skin:   Skin is warm and dry.  No diaphoresis.   No rash noted.     Psychiatric: Normal mood and affect. Behavior is normal.  Judgment and thought content normal.     Physical Exam    Extremities: Cold feet bilaterally.  Skin: Black toenails.  Neurological: Decreased sensation in right foot.         Complete Blood Count  Lab Results   Component Value Date    RBC 3.85 (L) 10/29/2024    HGB 12.3 10/29/2024    HCT 36.9 (L) 10/29/2024    MCV 96 10/29/2024    MCH 31.9 (H) 10/29/2024    MCHC 33.3 10/29/2024    RDW 13.2 10/29/2024     10/29/2024    MPV 10.6 10/29/2024    GRAN 4.7 10/29/2024    GRAN 67.0 10/29/2024    LYMPH 1.7 10/29/2024    LYMPH 23.7 10/29/2024    MONO 0.3 10/29/2024    MONO 4.7 10/29/2024    EOS 0.3 10/29/2024    BASO 0.04 10/29/2024    EOSINOPHIL 3.7 10/29/2024    BASOPHIL 0.6 10/29/2024    DIFFMETHOD Automated 10/29/2024       Comprehensive Metabolic Panel  Lab Results   Component Value Date     (H) 10/29/2024    BUN 16 10/29/2024    CREATININE 1.0 10/29/2024     10/29/2024    K 4.2 10/29/2024     10/29/2024    CO2 17 (L) 10/29/2024    ANIONGAP 12 10/29/2024       TSH  No results found for: "TSH"    Assessment / Plan:      ICD-10-CM ICD-9-CM   1. Encounter for screening mammogram for breast cancer  Z12.31 V76.12   2. Type 2 diabetes mellitus with diabetic neuropathy, without long-term current use of insulin  E11.40 250.60     357.2   3. Class 3 severe obesity due to excess calories with serious comorbidity and body mass index (BMI) of 40.0 to 44.9 in adult  E66.813 278.01    E66.01 V85.41    Z68.41    4. Rheumatoid arthritis without rheumatoid factor, multiple sites  M06.09 714.0   5. Opioid dependence, uncomplicated  F11.20 304.00   6. Chronic myelopathy  G95.9 336.9   7. Heart failure, " unspecified HF chronicity, unspecified heart failure type  I50.9 428.9   8. Chronic kidney disease, stage 3a  N18.31 585.3   9. Chronic left shoulder pain  M25.512 719.41    G89.29 338.29     Encounter for screening mammogram for breast cancer  -     Mammo Digital Screening Bilat w/ Ector (XPD); Future; Expected date: 02/24/2025    Type 2 diabetes mellitus with diabetic neuropathy, without long-term current use of insulin  -     Lipid Panel; Future; Expected date: 02/24/2025  -     TSH; Future; Expected date: 02/24/2025  -     Comprehensive Metabolic Panel; Future; Expected date: 02/24/2025  -     Microalbumin/Creatinine Ratio, Urine; Future; Expected date: 02/24/2025  -     Hemoglobin A1C; Future; Expected date: 02/24/2025  -     CBC Auto Differential; Future; Expected date: 02/24/2025  -     Comprehensive Metabolic Panel; Future  -     CBC Auto Differential; Future  -     Lipid Panel; Future  -     TSH; Future  -     Hemoglobin A1C; Future    Class 3 severe obesity due to excess calories with serious comorbidity and body mass index (BMI) of 40.0 to 44.9 in adult  -     Lipid Panel; Future; Expected date: 02/24/2025  -     TSH; Future; Expected date: 02/24/2025  -     Comprehensive Metabolic Panel; Future; Expected date: 02/24/2025  -     Microalbumin/Creatinine Ratio, Urine; Future; Expected date: 02/24/2025  -     Hemoglobin A1C; Future; Expected date: 02/24/2025  -     CBC Auto Differential; Future; Expected date: 02/24/2025  -     Comprehensive Metabolic Panel; Future  -     CBC Auto Differential; Future  -     Lipid Panel; Future  -     TSH; Future  -     Hemoglobin A1C; Future    Rheumatoid arthritis without rheumatoid factor, multiple sites  -     Comprehensive Metabolic Panel; Future  -     CBC Auto Differential; Future  -     Lipid Panel; Future  -     TSH; Future  -     Hemoglobin A1C; Future    Opioid dependence, uncomplicated    Chronic myelopathy  -     Lipid Panel; Future; Expected date: 02/24/2025  -      TSH; Future; Expected date: 02/24/2025  -     Comprehensive Metabolic Panel; Future; Expected date: 02/24/2025  -     Microalbumin/Creatinine Ratio, Urine; Future; Expected date: 02/24/2025  -     Hemoglobin A1C; Future; Expected date: 02/24/2025  -     CBC Auto Differential; Future; Expected date: 02/24/2025  -     Comprehensive Metabolic Panel; Future  -     CBC Auto Differential; Future  -     Lipid Panel; Future  -     TSH; Future  -     Hemoglobin A1C; Future    Heart failure, unspecified HF chronicity, unspecified heart failure type  -     Lipid Panel; Future; Expected date: 02/24/2025  -     TSH; Future; Expected date: 02/24/2025  -     Comprehensive Metabolic Panel; Future; Expected date: 02/24/2025  -     Microalbumin/Creatinine Ratio, Urine; Future; Expected date: 02/24/2025  -     Hemoglobin A1C; Future; Expected date: 02/24/2025  -     CBC Auto Differential; Future; Expected date: 02/24/2025    Chronic kidney disease, stage 3a  -     Lipid Panel; Future; Expected date: 02/24/2025  -     TSH; Future; Expected date: 02/24/2025  -     Comprehensive Metabolic Panel; Future; Expected date: 02/24/2025  -     Microalbumin/Creatinine Ratio, Urine; Future; Expected date: 02/24/2025  -     Hemoglobin A1C; Future; Expected date: 02/24/2025  -     CBC Auto Differential; Future; Expected date: 02/24/2025  -     Comprehensive Metabolic Panel; Future  -     CBC Auto Differential; Future  -     Lipid Panel; Future  -     TSH; Future  -     Hemoglobin A1C; Future    Chronic left shoulder pain  -     Ambulatory referral/consult to Orthopedics; Future; Expected date: 03/03/2025    Other orders  -     DULoxetine (CYMBALTA) 30 MG capsule; Take 1 capsule (30 mg total) by mouth once daily. Dc lexapro  Dispense: 30 capsule; Refill: 0  -     DULoxetine (CYMBALTA) 60 MG capsule; Take 1 capsule (60 mg total) by mouth once daily.  Dispense: 90 capsule; Refill: 1        Assessment & Plan    - Considering plastic surgery  consultation for breast reduction to address excess skin and associated discomfort  - Evaluating need for glucose monitoring device to improve diabetes management  - Assessing depression symptoms and current medication efficacy  - Noting reports of persistent pruritus and cold, numb feet; will evaluate for potential underlying causes    DEPRESSION:  - Evaluated the patient's mood and inquired about depression symptoms.  - Noted that the patient reports feeling depressed and that the current medication is not maintaining stability.  - Acknowledged the patient's feelings and history of depression.  - Determined that the current depression medication is not effective, indicating a need for medication adjustment.  - Will reassess and adjust the treatment plan accordingly.    NEUROPATHY:  - Performed a sensory test on the patient's feet, confirming reduced sensation.  - Noted that the patient reports cold, sticky feet and reduced sensation.  - Conducted a physical exam to assess the extent of neuropathy.  - Will monitor the progression of symptoms and adjust treatment as necessary.    ASTHMA:  - Acknowledged the patient's history of asthma.  - Noted previous treatment with steroids for asthma management.  - Will review current asthma control and adjust treatment if needed.    ARTHRITIS:  - Noted the patient's history of arthritis treatment, including past use of steroids.  - Will assess the current status of arthritis and consider adjusting treatment if necessary.    BREAST REDUCTION:  - Explained to the patient that breast reduction surgery is typically considered cosmetic and may not be covered by insurance.  - Referred the patient to a plastic surgeon for breast reduction consultation.    EXCESS SKIN:  - Acknowledged the patient's reports of excess skin causing discomfort, pain, and mobility issues.  - Discussed the possibility of excess skin removal with the plastic surgeon.    TOENAIL CONDITION:  - Noted that the  patient has black toenails.  - Will examine the toenails and consider further diagnostic tests or treatment if necessary.    CANCER HISTORY:  - Acknowledged the patient's cancer history from 2007.  - Will ensure appropriate follow-up and screening based on the patient's cancer history.    OTHER INSTRUCTIONS:  - Discussed importance of water intake for overall health.  - Patient to increase water intake.         This note was generated with the assistance of ambient listening technology. Verbal consent was obtained by the patient and accompanying visitor(s) for the recording of patient appointment to facilitate this note. I attest to having reviewed and edited the generated note for accuracy, though some syntax or spelling errors may persist. Please contact the author of this note for any clarification.

## 2025-03-11 ENCOUNTER — PATIENT MESSAGE (OUTPATIENT)
Dept: ADMINISTRATIVE | Facility: HOSPITAL | Age: 70
End: 2025-03-11
Payer: MEDICARE

## 2025-03-17 DIAGNOSIS — G89.28 OTHER CHRONIC POSTPROCEDURAL PAIN: ICD-10-CM

## 2025-03-18 ENCOUNTER — TELEPHONE (OUTPATIENT)
Dept: FAMILY MEDICINE | Facility: CLINIC | Age: 70
End: 2025-03-18
Payer: MEDICARE

## 2025-03-18 ENCOUNTER — TELEPHONE (OUTPATIENT)
Dept: ORTHOPEDICS | Facility: CLINIC | Age: 70
End: 2025-03-18
Payer: MEDICARE

## 2025-03-18 DIAGNOSIS — G89.28 OTHER CHRONIC POSTPROCEDURAL PAIN: ICD-10-CM

## 2025-03-18 RX ORDER — OXYCODONE AND ACETAMINOPHEN 10; 325 MG/1; MG/1
1 TABLET ORAL EVERY 8 HOURS PRN
Qty: 90 TABLET | Refills: 0 | Status: SHIPPED | OUTPATIENT
Start: 2025-03-18 | End: 2025-03-18 | Stop reason: SDUPTHER

## 2025-03-18 RX ORDER — OXYCODONE AND ACETAMINOPHEN 10; 325 MG/1; MG/1
1 TABLET ORAL EVERY 8 HOURS PRN
Qty: 90 TABLET | Refills: 0 | Status: SHIPPED | OUTPATIENT
Start: 2025-03-18

## 2025-03-18 NOTE — TELEPHONE ENCOUNTER
----- Message from Gabriela sent at 3/18/2025 10:18 AM CDT -----  Type:  Needs Medical AdviceWho Called: ptWould the patient rather a call back or a response via [facility]? [facility] Call Back Number:  [phone] Information: pt wanting to speak with office regarding her medications

## 2025-03-20 NOTE — TELEPHONE ENCOUNTER
"Attempted pt- recording picked up stating "the number you have dialed, has not been recognized." Will attempt contacting pt again.  "

## 2025-04-14 ENCOUNTER — TELEPHONE (OUTPATIENT)
Dept: FAMILY MEDICINE | Facility: CLINIC | Age: 70
End: 2025-04-14
Payer: MEDICARE

## 2025-04-14 ENCOUNTER — TELEPHONE (OUTPATIENT)
Dept: ORTHOPEDICS | Facility: CLINIC | Age: 70
End: 2025-04-14
Payer: MEDICARE

## 2025-04-14 DIAGNOSIS — G89.28 OTHER CHRONIC POSTPROCEDURAL PAIN: ICD-10-CM

## 2025-04-14 RX ORDER — OXYCODONE AND ACETAMINOPHEN 10; 325 MG/1; MG/1
1 TABLET ORAL EVERY 8 HOURS PRN
Qty: 90 TABLET | Refills: 0 | Status: SHIPPED | OUTPATIENT
Start: 2025-04-18 | End: 2025-05-18

## 2025-04-14 RX ORDER — DULOXETIN HYDROCHLORIDE 30 MG/1
CAPSULE, DELAYED RELEASE ORAL
Qty: 30 CAPSULE | Refills: 0 | OUTPATIENT
Start: 2025-04-14

## 2025-04-14 NOTE — TELEPHONE ENCOUNTER
"----- Message from Tiera sent at 4/14/2025 12:00 PM CDT -----  Regarding: Call back  "Type:  Patient Call BackWho Called:PTWhat is the reqeust in detail:Requesting call back. Please adviseCan the clinic reply by [facility]?no [facility] Call Back Number:[phone]  Additional Information:    I spoke with the pt - and scheduled her to see dr boykin this week  "

## 2025-04-14 NOTE — TELEPHONE ENCOUNTER
Provider Staff:  Action required for this patient    Requires labs      Please see care gap opportunities below in Care Due Message.    Thanks!  [facility]     Appointments      Date Provider   Last Visit   2/24/2025 Alon Santiago MD   Next Visit   8/25/2025 Alon Santiago MD     Refill Decision Note   Christine Castro  is requesting a refill authorization.  Brief Assessment and Rationale for Refill:  Quick Discontinue     Medication Therapy Plan:  FOVS; PATIENT WAS INCREASED TO 60MG ON  02/24/25      Comments:     Note composed:10:35 AM 04/14/2025

## 2025-04-14 NOTE — TELEPHONE ENCOUNTER
Care Due:                  Date            Visit Type   Department     Provider  --------------------------------------------------------------------------------                                EP -                              PRIMARY      [facility]  Last Visit: 02-      CARE ([facility])   MEDICINE       Alon Santiago                               -                              PRIMARY      [facility]  Next Visit: 08-      CARE ([facility])   MEDICINE       Alon Santiago                                                            Last  Test          Frequency    Reason                     Performed    Due Date  --------------------------------------------------------------------------------    CMP.........  12 months..  atorvastatin, ibandronate  04- 04-    HBA1C.......  6 months...  glipiZIDE................  04-   10-    Lipid Panel.  12 months..  atorvastatin.............  04- 04-    Mg Level....  12 months..  ibandronate..............  04- 03-    Phosphate...  12 months..  ibandronate..............  Not Found    Overdue    TSH.........  12 months..  levothyroxine............  04- 04-    Vitamin D...  12 months..  ergocalciferol...........  Not Found    Overdue    [employer] Embedded Care Due Messages. Reference number: [id number].   4/14/2025 5:24:44 AM CDT

## 2025-04-14 NOTE — TELEPHONE ENCOUNTER
"Called pt to reschedule appointment from 1/30. Offered pt first available on 4/15, pt declined offer and asked for an appointment next week. Offered pt 4/21 at 9:00 with  . Pt agreed and gave verbal understanding.call ended ----- Message from Tiera sent at 4/14/2025 11:58 AM CDT -----  Regarding: Appointment  "Type:  Patient Call BackWho Called:PTWhat is the reqeust in detail:Requesting call back in regards to appt she missed 1/30. Please adviseCan the clinic reply by [facility]?no [facility] Call Back Number: [phone] Information:  "

## 2025-04-15 ENCOUNTER — TELEPHONE (OUTPATIENT)
Dept: PHYSICAL MEDICINE AND REHAB | Facility: CLINIC | Age: 70
End: 2025-04-15
Payer: MEDICARE

## 2025-04-16 ENCOUNTER — OFFICE VISIT (OUTPATIENT)
Dept: FAMILY MEDICINE | Facility: CLINIC | Age: 70
End: 2025-04-16
Payer: MEDICARE

## 2025-04-16 VITALS
DIASTOLIC BLOOD PRESSURE: 60 MMHG | SYSTOLIC BLOOD PRESSURE: 130 MMHG | BODY MASS INDEX: 38.65 KG/M2 | HEART RATE: 74 BPM | HEIGHT: 60 IN | OXYGEN SATURATION: 95 % | WEIGHT: 196.88 LBS | TEMPERATURE: 99 F

## 2025-04-16 DIAGNOSIS — Z98.890 HISTORY OF BILATERAL BREAST REDUCTION SURGERY: ICD-10-CM

## 2025-04-16 DIAGNOSIS — F41.9 ANXIETY: ICD-10-CM

## 2025-04-16 DIAGNOSIS — N64.4 PAINFUL BREASTS: Primary | ICD-10-CM

## 2025-04-16 DIAGNOSIS — N64.89 PENDULOUS BREAST: ICD-10-CM

## 2025-04-16 DIAGNOSIS — M06.09 RHEUMATOID ARTHRITIS WITHOUT RHEUMATOID FACTOR, MULTIPLE SITES: ICD-10-CM

## 2025-04-16 DIAGNOSIS — M19.012 PRIMARY OSTEOARTHRITIS OF LEFT SHOULDER: ICD-10-CM

## 2025-04-16 DIAGNOSIS — J44.89 OTHER SPECIFIED CHRONIC OBSTRUCTIVE PULMONARY DISEASE: ICD-10-CM

## 2025-04-16 DIAGNOSIS — Z78.0 POSTMENOPAUSAL: ICD-10-CM

## 2025-04-16 DIAGNOSIS — K21.9 GASTROESOPHAGEAL REFLUX DISEASE WITHOUT ESOPHAGITIS: ICD-10-CM

## 2025-04-16 RX ORDER — GLIPIZIDE 2.5 MG/1
TABLET, EXTENDED RELEASE ORAL
Qty: 90 TABLET | Refills: 3 | Status: SHIPPED | OUTPATIENT
Start: 2025-04-16

## 2025-04-16 RX ORDER — IBANDRONATE SODIUM 150 MG/1
TABLET, FILM COATED ORAL
Qty: 3 TABLET | Refills: 3 | Status: SHIPPED | OUTPATIENT
Start: 2025-04-16

## 2025-04-16 RX ORDER — OMEPRAZOLE 40 MG/1
40 CAPSULE, DELAYED RELEASE ORAL EVERY MORNING
Qty: 90 CAPSULE | Refills: 3 | Status: SHIPPED | OUTPATIENT
Start: 2025-04-16

## 2025-04-16 RX ORDER — DIAZEPAM 5 MG/1
TABLET ORAL
Qty: 30 TABLET | Refills: 2 | Status: SHIPPED | OUTPATIENT
Start: 2025-04-16

## 2025-04-16 RX ORDER — DULOXETIN HYDROCHLORIDE 60 MG/1
60 CAPSULE, DELAYED RELEASE ORAL DAILY
Qty: 90 CAPSULE | Refills: 3 | Status: SHIPPED | OUTPATIENT
Start: 2025-04-16 | End: 2026-04-16

## 2025-04-16 RX ORDER — TRIAMCINOLONE ACETONIDE 40 MG/ML
80 INJECTION, SUSPENSION INTRA-ARTICULAR; INTRAMUSCULAR ONCE
Status: COMPLETED | OUTPATIENT
Start: 2025-04-16 | End: 2025-04-16

## 2025-04-16 RX ADMIN — TRIAMCINOLONE ACETONIDE 80 MG: 40 INJECTION, SUSPENSION INTRA-ARTICULAR; INTRAMUSCULAR at 04:04

## 2025-04-19 NOTE — PROGRESS NOTES
Patient ID: Christine Castro is a 70 y.o. female.    Chief Complaint: Breast Problem, Shoulder Pain, Arm Pain, and Hip Pain    HPI    Christine Castro is a 70 y.o. female     History of Present Illness    CHIEF COMPLAINT:  70-year-old female presents today for multiple concerns including left shoulder pain, right hip pain, and breast-related discomfort.    MUSCULOSKELETAL:  She reports right-sided back cracking and multiple arthritic sites throughout her body. She experiences hip pain requiring steroid injection for management.  Would like a steroid shot in right hip today.    BREAST CONCERNS:  She reports significant breast tissue sagging following weight loss causing postural issues by pulling her forward, making it difficult to maintain an upright posture despite conscious efforts. She experiences significant sleep disturbance due to breast tissue positioning, describing difficulty with repositioning and finding a comfortable sleeping position.    MEDICAL HISTORY:  She has a history of cancer in 2007 and blood disease.    FAMILY HISTORY:  Multiple relatives with history of cancer.    SOCIAL HISTORY:  She currently has people living with her.         Vitals:    04/16/25 1531   BP: 130/60   BP Location: Right arm   Patient Position: Sitting   Pulse: 74   Temp: 98.5 °F (36.9 °C)   TempSrc: Oral   SpO2: 95%   Weight: 89.3 kg (196 lb 13.9 oz)   Height: 5' (1.524 m)            Review of Symptoms      Physical Exam    Constitutional:  Oriented to person, place, and time.appears well-developed and well-nourished.  No distress.      HENT  Head: Normocephalic and atraumatic  Right Ear: External ear normal.   Left Ear: External ear normal.   Nose: External nose normal.   Mouth:  Moist mucus membranes.    Eyes:  Conjunctivae are normal. Right eye exhibits no discharge.  Left eye exhibits no discharge. No scleral icterus.  No periorbital edema    Cardiovascular:  Regular rate and rhythm with normal S1 and S2  "    Pulmonary/Chest:   Clear to auscultation bilaterally without wheezes, rhonchi or rales      Musculoskeletal:  Moves slowly due to arthritic pain uses walker often     Tenderness to palpation right trochanter are       Neurological:  Alert and oriented to person, place, and time.   Coordination normal.     Skin:   Skin is warm and dry.  No diaphoresis.   No rash noted.     Psychiatric: Normal mood and affect. Behavior is normal.  Judgment and thought content normal.     Physical Exam              Complete Blood Count  Lab Results   Component Value Date    RBC 3.85 (L) 10/29/2024    HGB 12.3 10/29/2024    HCT 36.9 (L) 10/29/2024    MCV 96 10/29/2024    MCH 31.9 (H) 10/29/2024    MCHC 33.3 10/29/2024    RDW 13.2 10/29/2024     10/29/2024    MPV 10.6 10/29/2024    GRAN 4.7 10/29/2024    GRAN 67.0 10/29/2024    LYMPH 1.7 10/29/2024    LYMPH 23.7 10/29/2024    MONO 0.3 10/29/2024    MONO 4.7 10/29/2024    EOS 0.3 10/29/2024    BASO 0.04 10/29/2024    EOSINOPHIL 3.7 10/29/2024    BASOPHIL 0.6 10/29/2024    DIFFMETHOD Automated 10/29/2024       Comprehensive Metabolic Panel  Lab Results   Component Value Date     (H) 10/29/2024    BUN 16 10/29/2024    CREATININE 1.0 10/29/2024     10/29/2024    K 4.2 10/29/2024     10/29/2024    CO2 17 (L) 10/29/2024    ANIONGAP 12 10/29/2024       TSH  No results found for: "TSH"    Assessment / Plan:      ICD-10-CM ICD-9-CM   1. Painful breasts  N64.4 611.71   2. Anxiety  F41.9 300.00   3. Gastroesophageal reflux disease without esophagitis  K21.9 530.81   4. Postmenopausal  Z78.0 V49.81   5. Other specified chronic obstructive pulmonary disease  J44.89 496   6. Rheumatoid arthritis without rheumatoid factor, multiple sites  M06.09 714.0   7. Primary osteoarthritis of left shoulder  M19.012 715.11   8. History of bilateral breast reduction surgery  Z98.890 V45.89   9. Pendulous breast  N64.89 611.89     Painful breasts  -     Ambulatory referral/consult to " Plastic Surgery; Future; Expected date: 04/23/2025    Anxiety  -     diazePAM (VALIUM) 5 MG tablet; TAKE 1 TABLET BY MOUTH EVERY 24 HOURS AS NEEDED FOR ANXIETY  Dispense: 30 tablet; Refill: 2    Gastroesophageal reflux disease without esophagitis  -     omeprazole (PRILOSEC) 40 MG capsule; Take 1 capsule (40 mg total) by mouth every morning.  Dispense: 90 capsule; Refill: 3    Postmenopausal  -     DXA Bone Density Appendicular Skeleton; Future; Expected date: 04/16/2025    Other specified chronic obstructive pulmonary disease    Rheumatoid arthritis without rheumatoid factor, multiple sites  -     triamcinolone acetonide injection 80 mg    Primary osteoarthritis of left shoulder  -     triamcinolone acetonide injection 80 mg    History of bilateral breast reduction surgery    Pendulous breast  -     Ambulatory referral/consult to Plastic Surgery; Future; Expected date: 04/23/2025    Other orders  -     DULoxetine (CYMBALTA) 60 MG capsule; Take 1 capsule (60 mg total) by mouth once daily.  Dispense: 90 capsule; Refill: 3  -     glipiZIDE (GLUCOTROL) 2.5 MG TR24; TAKE 1 TABLET(2.5 MG) BY MOUTH DAILY WITH BREAKFAST  Dispense: 90 tablet; Refill: 3  -     ibandronate (BONIVA) 150 mg tablet; TAKE 1 TABLET BY MOUTH EVERY 30 DAYS FOR OSTEOPOROSIS  Dispense: 3 tablet; Refill: 3        Assessment & Plan    - Assessed multiple complaints including shoulder pain, arm symptoms, knee issues, and right hip pain.  - Evaluated concerns about breast tissue causing back pain and discomfort.  - Determined steroid injection in hip may provide temporary relief for pain.    LEFT SHOULDER PAIN:  - Evaluated the patient's left shoulder pain and its impact on arm function.  - Noted that the pain is affecting the patient's arm functionality.  - Instructed the patient to monitor and report any changes in pain intensity or arm mobility.    RIGHT HIP PAIN:  Administered injection steroids for arthritis    GENERALIZED OSTEOARTHRITIS:  -  Confirmed the diagnosis of osteoarthritis affecting multiple joints throughout the patient's body.  - Acknowledged the chronic nature of the condition and its impact on the patient's quality of life.  - Discussed potential management strategies for multiple joint arthritis, including pain management and lifestyle modifications.  - Recommend regular follow-ups to monitor the progression of osteoarthritis and adjust treatment as necessary.    RIGHT-SIDED SCIATICA:  - Noted the patient's report of back pain on the right side, potentially indicating sciatica.  - Confirmed the patient's upcoming appointment with a back specialist for further evaluation and treatment.  - Advised the patient to keep a pain diary and note any changes in symptoms before the specialist appointment.    BREAST PTOSIS:  - Evaluated the patient's significant breast ptosis, noting its impact on comfort, posture, and daily activities.  - Referred the patient to breast surgery for evaluation of excess breast tissue and potential surgical intervention.  - Discussed the potential benefits and risks of breast reduction surgery with the patient.  - Advised the patient to document any changes in breast-related discomfort or associated back pain.    HISTORY OF CANCER:      - Recommend regular cancer screenings    - Advised the patient to report any new or unusual symptoms that could be related to cancer recurrence.    HISTORY OF BLOOD DISEASE:  - Ordered labs to be completed before the next visit to assess current blood health status.  - Noted the patient's mention of a blood disease in addition to cancer history.  - Instructed the patient to complete all ordered labs promptly to ensure comprehensive health evaluation.    FAMILY HISTORY OF CANCER:  - Documented the patient's reported family history of cancer.  - Discussed the importance of regular cancer screenings and preventive measures based on family history.  - Advised the patient to provide more  detailed family history information at the next visit, if possible.    FOLLOW-UP:  - Follow up in 6 months.         This note was generated with the assistance of ambient listening technology. Verbal consent was obtained by the patient and accompanying visitor(s) for the recording of patient appointment to facilitate this note. I attest to having reviewed and edited the generated note for accuracy, though some syntax or spelling errors may persist. Please contact the author of this note for any clarification.

## 2025-04-23 ENCOUNTER — HOSPITAL ENCOUNTER (OUTPATIENT)
Dept: RADIOLOGY | Facility: HOSPITAL | Age: 70
Discharge: HOME | End: 2025-04-23
Attending: FAMILY MEDICINE
Payer: MEDICARE

## 2025-04-23 DIAGNOSIS — Z12.31 ENCOUNTER FOR SCREENING MAMMOGRAM FOR BREAST CANCER: ICD-10-CM

## 2025-04-23 PROCEDURE — 77063 BREAST TOMOSYNTHESIS BI: CPT | Mod: 26,HCNC,, | Performed by: RADIOLOGY

## 2025-04-23 PROCEDURE — 77063 BREAST TOMOSYNTHESIS BI: CPT | Mod: TC,HCNC,PN

## 2025-04-23 PROCEDURE — 77067 SCR MAMMO BI INCL CAD: CPT | Mod: 26,HCNC,, | Performed by: RADIOLOGY

## 2025-04-24 ENCOUNTER — TELEPHONE (OUTPATIENT)
Dept: PHYSICAL MEDICINE AND REHAB | Facility: CLINIC | Age: 70
End: 2025-04-24
Payer: MEDICARE

## 2025-04-24 ENCOUNTER — RESULTS FOLLOW-UP (OUTPATIENT)
Dept: FAMILY MEDICINE | Facility: CLINIC | Age: 70
End: 2025-04-24

## 2025-04-24 DIAGNOSIS — D64.9 ANEMIA, UNSPECIFIED TYPE: Primary | ICD-10-CM

## 2025-04-24 NOTE — TELEPHONE ENCOUNTER
"----- Message from Tirea sent at 4/14/2025 12:05 PM CDT -----  Regarding: call back  "Type:  Patient Call BackWho Called:PTWhat is the reqeust in detail:Requesting call back in regards to her equipment. Please adviseCan the clinic reply by [facility]?no Best Call Back Number: [phone] Information:  "

## 2025-05-02 ENCOUNTER — TELEPHONE (OUTPATIENT)
Dept: FAMILY MEDICINE | Facility: CLINIC | Age: 70
End: 2025-05-02
Payer: MEDICARE

## 2025-05-02 NOTE — TELEPHONE ENCOUNTER
----- Message from Alon Santiago MD sent at 4/30/2025  2:36 AM CDT -----  Your blood work last week shows that you are anemic.  I would like to repeat your blood tests and are levels in about four weeks    They has been ordered.    Please contact patient to schedule labs  ----- Message -----  From: Lab, Background User  Sent: 4/23/2025   2:38 PM CDT  To: Alon Santiago MD

## 2025-05-14 DIAGNOSIS — G89.28 OTHER CHRONIC POSTPROCEDURAL PAIN: ICD-10-CM

## 2025-05-14 RX ORDER — OXYCODONE AND ACETAMINOPHEN 10; 325 MG/1; MG/1
1 TABLET ORAL EVERY 8 HOURS PRN
Qty: 90 TABLET | Refills: 0 | Status: SHIPPED | OUTPATIENT
Start: 2025-05-14 | End: 2025-06-13

## 2025-05-16 ENCOUNTER — TELEPHONE (OUTPATIENT)
Dept: FAMILY MEDICINE | Facility: CLINIC | Age: 70
End: 2025-05-16
Payer: MEDICARE

## 2025-05-16 NOTE — TELEPHONE ENCOUNTER
Your blood work last week shows that you are anemic.  I would like to repeat your blood tests and are levels in about four weeks     They has been ordered.     Please contact patient to schedule labs

## 2025-05-17 NOTE — TELEPHONE ENCOUNTER
No care due was identified.  [facility] Embedded Care Due Messages. Reference number: [id number].   5/17/2025 11:07:18 AM CDT

## 2025-05-18 NOTE — TELEPHONE ENCOUNTER
Refill Routing Note   Medication(s) are not appropriate for processing by [facility] for the following reason(s):        Outside of protocol    [facility] action(s):  Route      Medication Therapy Plan: PRN usage outside of [facility] protocol      Appointments  past 12m or future 3m with PCP    Date Provider   Last Visit   4/16/2025 Alon Santaigo MD   Next Visit   8/25/2025 Alon Santiago MD   ED visits in past 90 days: 0        Note composed:2:27 PM 05/18/2025

## 2025-05-19 RX ORDER — POTASSIUM CHLORIDE 750 MG/1
TABLET, EXTENDED RELEASE ORAL
Qty: 90 TABLET | Refills: 0 | Status: SHIPPED | OUTPATIENT
Start: 2025-05-19

## 2025-05-23 ENCOUNTER — LAB VISIT (OUTPATIENT)
Dept: LAB | Facility: HOSPITAL | Age: 70
End: 2025-05-23
Attending: FAMILY MEDICINE
Payer: MEDICARE

## 2025-05-23 DIAGNOSIS — D64.9 ANEMIA, UNSPECIFIED TYPE: ICD-10-CM

## 2025-05-23 LAB
ABSOLUTE EOSINOPHIL (OHS): 0.13 K/UL
ABSOLUTE MONOCYTE (OHS): 0.2 K/UL (ref 0.3–1)
ABSOLUTE NEUTROPHIL COUNT (OHS): 2.85 K/UL (ref 1.8–7.7)
BASOPHILS # BLD AUTO: 0.02 K/UL
BASOPHILS NFR BLD AUTO: 0.4 %
ERYTHROCYTE [DISTWIDTH] IN BLOOD BY AUTOMATED COUNT: 13.4 % (ref 11.5–14.5)
HCT VFR BLD AUTO: 33.7 % (ref 37–48.5)
HGB BLD-MCNC: 10.8 GM/DL (ref 12–16)
IMM GRANULOCYTES # BLD AUTO: 0.01 K/UL (ref 0–0.04)
IMM GRANULOCYTES NFR BLD AUTO: 0.2 % (ref 0–0.5)
IRON SATN MFR SERPL: 21 % (ref 20–50)
IRON SERPL-MCNC: 89 UG/DL (ref 30–160)
LYMPHOCYTES # BLD AUTO: 1.31 K/UL (ref 1–4.8)
MCH RBC QN AUTO: 31.3 PG (ref 27–31)
MCHC RBC AUTO-ENTMCNC: 32 G/DL (ref 32–36)
MCV RBC AUTO: 98 FL (ref 82–98)
NUCLEATED RBC (/100WBC) (OHS): 0 /100 WBC
PLATELET # BLD AUTO: 183 K/UL (ref 150–450)
PMV BLD AUTO: 9.8 FL (ref 9.2–12.9)
RBC # BLD AUTO: 3.45 M/UL (ref 4–5.4)
RELATIVE EOSINOPHIL (OHS): 2.9 %
RELATIVE LYMPHOCYTE (OHS): 29 % (ref 18–48)
RELATIVE MONOCYTE (OHS): 4.4 % (ref 4–15)
RELATIVE NEUTROPHIL (OHS): 63.1 % (ref 38–73)
RETICS/RBC NFR AUTO: 2.1 % (ref 0.5–2.5)
TIBC SERPL-MCNC: 416 UG/DL (ref 250–450)
TRANSFERRIN SERPL-MCNC: 281 MG/DL (ref 200–375)
WBC # BLD AUTO: 4.52 K/UL (ref 3.9–12.7)

## 2025-05-23 PROCEDURE — 36415 COLL VENOUS BLD VENIPUNCTURE: CPT | Mod: HCNC

## 2025-05-23 PROCEDURE — 84466 ASSAY OF TRANSFERRIN: CPT | Mod: HCNC

## 2025-05-23 PROCEDURE — 85025 COMPLETE CBC W/AUTO DIFF WBC: CPT | Mod: HCNC

## 2025-05-23 PROCEDURE — 85045 AUTOMATED RETICULOCYTE COUNT: CPT | Mod: HCNC

## 2025-05-25 ENCOUNTER — RESULTS FOLLOW-UP (OUTPATIENT)
Dept: FAMILY MEDICINE | Facility: CLINIC | Age: 70
End: 2025-05-25

## 2025-05-27 ENCOUNTER — TELEPHONE (OUTPATIENT)
Dept: PLASTIC SURGERY | Facility: CLINIC | Age: 70
End: 2025-05-27
Payer: MEDICARE

## 2025-05-30 ENCOUNTER — TELEPHONE (OUTPATIENT)
Dept: NEUROSURGERY | Facility: CLINIC | Age: 70
End: 2025-05-30
Payer: MEDICARE

## 2025-05-30 DIAGNOSIS — Z98.1 S/P LUMBAR FUSION: Primary | ICD-10-CM

## 2025-06-04 ENCOUNTER — OFFICE VISIT (OUTPATIENT)
Dept: NEUROSURGERY | Facility: CLINIC | Age: 70
End: 2025-06-04
Payer: MEDICARE

## 2025-06-04 ENCOUNTER — HOSPITAL ENCOUNTER (OUTPATIENT)
Dept: RADIOLOGY | Facility: HOSPITAL | Age: 70
Discharge: HOME | End: 2025-06-04
Attending: NEUROLOGICAL SURGERY
Payer: MEDICARE

## 2025-06-04 VITALS — HEIGHT: 60 IN | BODY MASS INDEX: 38.65 KG/M2 | WEIGHT: 196.88 LBS

## 2025-06-04 DIAGNOSIS — Z98.1 STATUS POST LUMBAR SPINAL FUSION: ICD-10-CM

## 2025-06-04 DIAGNOSIS — L98.9 LESION OF SUBCUTANEOUS TISSUE: ICD-10-CM

## 2025-06-04 DIAGNOSIS — Z98.1 STATUS POST CERVICAL SPINAL FUSION: Primary | ICD-10-CM

## 2025-06-04 DIAGNOSIS — Z98.1 S/P LUMBAR FUSION: ICD-10-CM

## 2025-06-04 PROCEDURE — 3044F HG A1C LEVEL LT 7.0%: CPT | Mod: CPTII,HCNC,S$GLB, | Performed by: NEUROLOGICAL SURGERY

## 2025-06-04 PROCEDURE — 72100 X-RAY EXAM L-S SPINE 2/3 VWS: CPT | Mod: TC,FY

## 2025-06-04 PROCEDURE — 1100F PTFALLS ASSESS-DOCD GE2>/YR: CPT | Mod: CPTII,HCNC,S$GLB, | Performed by: NEUROLOGICAL SURGERY

## 2025-06-04 PROCEDURE — 3008F BODY MASS INDEX DOCD: CPT | Mod: CPTII,HCNC,S$GLB, | Performed by: NEUROLOGICAL SURGERY

## 2025-06-04 PROCEDURE — 3061F NEG MICROALBUMINURIA REV: CPT | Mod: CPTII,HCNC,S$GLB, | Performed by: NEUROLOGICAL SURGERY

## 2025-06-04 PROCEDURE — 99999 PR PBB SHADOW E&M-EST. PATIENT-LVL IV: CPT | Mod: PBBFAC,HCNC,, | Performed by: NEUROLOGICAL SURGERY

## 2025-06-04 PROCEDURE — 1125F AMNT PAIN NOTED PAIN PRSNT: CPT | Mod: CPTII,HCNC,S$GLB, | Performed by: NEUROLOGICAL SURGERY

## 2025-06-04 PROCEDURE — 3066F NEPHROPATHY DOC TX: CPT | Mod: CPTII,HCNC,S$GLB, | Performed by: NEUROLOGICAL SURGERY

## 2025-06-04 PROCEDURE — 1159F MED LIST DOCD IN RCRD: CPT | Mod: CPTII,HCNC,S$GLB, | Performed by: NEUROLOGICAL SURGERY

## 2025-06-04 PROCEDURE — 1157F ADVNC CARE PLAN IN RCRD: CPT | Mod: CPTII,HCNC,S$GLB, | Performed by: NEUROLOGICAL SURGERY

## 2025-06-04 PROCEDURE — 99214 OFFICE O/P EST MOD 30 MIN: CPT | Mod: HCNC,S$GLB,, | Performed by: NEUROLOGICAL SURGERY

## 2025-06-04 PROCEDURE — 72100 X-RAY EXAM L-S SPINE 2/3 VWS: CPT | Mod: 26,,, | Performed by: RADIOLOGY

## 2025-06-04 PROCEDURE — 3288F FALL RISK ASSESSMENT DOCD: CPT | Mod: CPTII,HCNC,S$GLB, | Performed by: NEUROLOGICAL SURGERY

## 2025-06-04 NOTE — PROGRESS NOTES
NEUROSURGICAL PROGRESS NOTE    DATE OF SERVICE:  06/04/2025    ATTENDING PHYSICIAN:  Ishaan Fisher MD    SUBJECTIVE:    INTERIM HISTORY:  She has a history of C3 through C7 to her fusion and L5-S1 fusion.  She has lumbarization of S1, bilateral SI joint degeneration.    She does not have too much back pain today.  She is mainly complaining of left shoulder pain.  She also noted a palpable left lateral forearm growth.  She had left distal radius ORIF with Dr. Heart in October 2024.  She missed her follow up appointments.      Regarding her low back condition she reports some right posterolateral leg pain and balance difficulty.              PAST MEDICAL HISTORY:  Active Ambulatory Problems     Diagnosis Date Noted    Acquired hypothyroidism     History of congestive heart failure     Major depressive disorder 12/04/2015    Normocytic anemia 04/10/2018    History of myocardial infarction 08/30/2018    Epigastric pain 02/28/2019    Primary osteoarthritis of left knee 05/08/2019    TIA (transient ischemic attack) 05/19/2019    History of gastric bypass 05/19/2019    Coronary artery disease involving native coronary artery of native heart     UTI (urinary tract infection) 05/19/2019    History of right knee joint replacement 11/20/2019    Chronic left shoulder pain 07/29/2020    Primary osteoarthritis of left shoulder 07/29/2020    Chronic pain 11/04/2020    Lumbar radiculopathy 11/04/2020    DDD (degenerative disc disease), lumbar 01/11/2021    Pain 08/25/2021    Cervical spondylosis with myelopathy 12/14/2021    Frequent falls 06/15/2022    Weakness of both lower extremities 06/13/2023    At high risk for falls 06/13/2023    Class 3 severe obesity due to excess calories with serious comorbidity and body mass index (BMI) of 40.0 to 44.9 in adult 07/20/2023    Rheumatoid arthritis without rheumatoid factor, multiple sites 07/20/2023    Heart failure, unspecified HF chronicity, unspecified heart failure type 07/20/2023     Aortic atherosclerosis 2023    Type 2 diabetes mellitus with diabetic neuropathy, without long-term current use of insulin 2024    Opioid dependence, uncomplicated 2024    Chronic myelopathy 2024    Chronic kidney disease, stage 3a 2024    Type 2 diabetes mellitus without complication, without long-term current use of insulin 2024    Major depressive disorder, recurrent, in partial remission 2024    History of kidney stones 2024    Nausea and vomiting 2024    Closed fracture of left wrist 10/29/2024    Other specified chronic obstructive pulmonary disease 2025     Resolved Ambulatory Problems     Diagnosis Date Noted    Primary osteoarthritis     Diabetes mellitus, type 2     History of colon cancer 2017    Malfunction of device 2017    BMI 39.0-39.9,adult 2019    S/P TKR (total knee replacement), left 2019    Weakness of left lower extremity 2019    Gait, antalgic 2019    Decreased range of motion (ROM) of left knee 2019    Primary osteoarthritis of right knee 2019    Aspiration pneumonia of right lower lobe 2021    Malignant neoplasm of colon, unspecified part of colon 2023    Left ureteral stone 2024     Past Medical History:   Diagnosis Date    Anticoagulant long-term use     Arthritis     Asthma     CHF (congestive heart failure)     Colon cancer     COPD (chronic obstructive pulmonary disease)     Coronary artery disease     Depression     GERD (gastroesophageal reflux disease)     Myocardial infarction     Thyroid disease        PAST SURGICAL HISTORY:  Past Surgical History:   Procedure Laterality Date    ABDOMINAL SURGERY      CAUDAL EPIDURAL STEROID INJECTION N/A 2022    Procedure: Injection-steroid-epidural-caudal;  Surgeon: Nilam Santiago MD;  Location: [facility] PAIN MGT;  Service: Pain Management;  Laterality: N/A;     SECTION      CHOLECYSTECTOMY      COLON SURGERY       COLONOSCOPY      2014--- repeat in 3-5 years    COLONOSCOPY N/A 04/18/2017    Procedure: COLONOSCOPY;  Surgeon: Corrina Flores MD;  Location: [facility] ENDO;  Service: Endoscopy;  Laterality: N/A;    COLONOSCOPY N/A 04/10/2018    Procedure: COLONOSCOPY golytely;  Surgeon: Corrina Flores MD;  Location: [facility] ENDO;  Service: Endoscopy;  Laterality: N/A;    DECOMPRESSION OF CERVICAL SPINE BY ANTERIOR APPROACH WITH FUSION N/A 12/14/2021    Procedure: DECOMPRESSION AND FUSION, SPINE, CERVICAL, ANTERIOR APPROACH C3-4 C5- 6 C6-7 ACDF;  Surgeon: Ishaan Fisher MD;  Location: [facility] OR;  Service: Neurosurgery;  Laterality: N/A;    ECTOPIC PREGNANCY SURGERY      EPIDURAL STEROID INJECTION INTO LUMBAR SPINE N/A 11/04/2020    Procedure: Injection-steroid-epidural-lumbar--L5-S1 InterLaminar;  Surgeon: Nilam Santiago MD;  Location: [facility] PAIN MGT;  Service: Pain Management;  Laterality: N/A;    ESOPHAGOGASTRODUODENOSCOPY N/A 02/28/2019    Procedure: ESOPHAGOGASTRODUODENOSCOPY (EGD);  Surgeon: Corrina Flores MD;  Location: [facility] ENDO;  Service: Endoscopy;  Laterality: N/A;    ESOPHAGOGASTRODUODENOSCOPY      w/biopsy    ESOPHAGOGASTRODUODENOSCOPY N/A 9/26/2024    Procedure: EGD (ESOPHAGOGASTRODUODENOSCOPY);  Surgeon: Robert Ochoa MD;  Location: [facility] ENDO;  Service: Endoscopy;  Laterality: N/A;  Ref Jonatan De [id number]  9/9/24-LVM for precall, portal-DS  9/10 pt not confirmed- [facility]  9/11-precall complete, instructions emailed to [email or web address]  9/11-Lvm notifying pt of new arrival time (1010am)-[facility]  9/20/24-Precall complete    FRACTURE SURGERY      left leg    FUSION OF SPINE WITH INSTRUMENTATION Left 01/11/2021    Procedure: FUSION, SPINE, WITH INSTRUMENTATION Stage 1 Left L4-5 OLIF DORA Stage 2 L4-5 Laminectomy, medial Facetectomy, foraminotomy, L4-5 MIS Instrumentation;  Surgeon: Ishaan Fisher MD;  Location: [facility];  Service: Neurosurgery;  Laterality: Left;  Procedure: Stage 1 Left L4-5  OLIF DORA  Stage 2 L4-5 Laminectomy, medial Facetectomy, foraminotomy, L4-5 MIS Instrumentation  Surgery TIme: 4 Hrs    GASTRIC BYPASS      HERNIA REPAIR      INJECTION OF ANESTHETIC AGENT AROUND GENITOFEMORAL NERVE Left 07/29/2020    Procedure: BLOCK, NERVE, LEFT SHOULDER ARTICULAR;  Surgeon: Nilam Santiago MD;  Location: [facility] PAIN MGT;  Service: Pain Management;  Laterality: Left;    JOINT REPLACEMENT      KNEE ARTHROPLASTY Left 05/08/2019    Procedure: ARTHROPLASTY, KNEE;  Surgeon: Roldan Greenwood MD;  Location: [facility] OR;  Service: Orthopedics;  Laterality: Left;  Navid notified    KNEE ARTHROPLASTY Right 11/20/2019    Procedure: ARTHROPLASTY, KNEE;  Surgeon: Roldan Greenwood MD;  Location: [facility] OR;  Service: Orthopedics;  Laterality: Right;  Navid notified, confirmed case Navid 11/19/19 KB [id number]    OOPHORECTOMY      OPEN REDUCTION AND INTERNAL FIXATION (ORIF) OF FRACTURE OF DISTAL RADIUS Left 10/29/2024    Procedure: ORIF, FRACTURE, RADIUS, DISTAL;  Surgeon: Ketan Heart Jr., MD;  Location: [facility] OR;  Service: Orthopedics;  Laterality: Left;  artrex, mini C arm    RADIOFREQUENCY THERMOCOAGULATION Left 08/12/2020    Procedure: RADIOFREQUENCY THERMAL COAGULATION--Left Suprascapular, Axillary, and Lateral Pectoral Articular Branch RFA;  Surgeon: Nilam Santiago MD;  Location: [facility] PAIN T;  Service: Pain Management;  Laterality: Left;    TONSILLECTOMY      TRANSFORAMINAL EPIDURAL INJECTION OF STEROID Right 08/25/2021    Procedure: Injection,steroid,epidural,transforaminal approach; Levels: L5;  Surgeon: Nilam Santiago MD;  Location: [facility] PAIN T;  Service: Pain Management;  Laterality: Right;  No pacemaker. Patient is diabetic. Patient is taking Aspirin but can continue per Dr. Santiago.     TUBAL LIGATION         SOCIAL HISTORY:   Social History[1]    FAMILY HISTORY:  Family History   Problem Relation Name Age of Onset    Hyperlipidemia Mother      Hypertension Mother      Diabetes Mother      Stroke Mother       Hypertension Sister      Hypertension Brother      Diabetes Brother      Breast cancer Maternal Aunt      Breast cancer Maternal Aunt      Breast cancer Cousin         CURRENTS MEDICATIONS:  Medications Ordered Prior to Encounter[2]    ALLERGIES:  Review of patient's allergies indicates:   Allergen Reactions    Adhesive      Adhesive tape    Latex, natural rubber      Adhesive from latex tape    Ibuprofen Other (See Comments)     Causes asthma flare??       REVIEW OF SYSTEMS:  Review of Systems   Constitutional:  Negative for diaphoresis, fever and weight loss.   Respiratory:  Negative for shortness of breath.    Cardiovascular:  Negative for chest pain.   Gastrointestinal:  Negative for blood in stool.   Genitourinary:  Negative for hematuria.   Endo/Heme/Allergies:  Does not bruise/bleed easily.   All other systems reviewed and are negative.        OBJECTIVE:    PHYSICAL EXAMINATION:   There were no vitals filed for this visit.    Physical Exam:  Vitals reviewed.    Constitutional: She appears well-developed and well-nourished.     Eyes: Pupils are equal, round, and reactive to light. Conjunctivae and EOM are normal.     Cardiovascular: Normal distal pulses and no edema.     Abdominal: Soft.     Skin: Skin displays no rash on trunk and no rash on extremities. Skin displays no lesions on trunk and no lesions on extremities.     Psych/Behavior: She is alert. She is oriented to person, place, and time. She has a normal mood and affect.     Musculoskeletal:        Neck: Range of motion is full.       Right Ankle Exam     Muscle Strength   Dorsiflexion:  5/5       Left Ankle Exam     Muscle Strength   Dorsiflexion:  5/5       Right Shoulder Exam     Muscle Strength   Abduction: 5/5       Left Shoulder Exam     Muscle Strength   Abduction: 5/5                  Neurological Exam  Mental Status  Alert. Oriented to person, place, and time.    Cranial Nerves  CN III, IV, VI: Extraocular movements intact bilaterally.  Pupils equal round and reactive to light bilaterally.    Motor                                               Right                     Left   Shoulder abduction               5                          5  Elbow flexion                         5                          5  Elbow extension                    5                          5  Finger extension                    5                          5  Hip flexion                              5                          5  Knee extension                      5                          5  Dorsiflexion                            5                          5    She has a palpable subcutaneous lesion in the lateral portion of her forearm.  The mass is firm and slightly painful.    DIAGNOSTIC DATA:  I personally interpreted the following imaging:   Prior CT of the cervical spine and lumbar spine reviewed  CT of the lumbar spine reviewed showing L5-S1 fusion, facet arthropathy at S1-S2, vacuum phenomenon in the S1-2 disc space, severe bilateral sacroiliac joint degeneration with vacuum phenomenon, no severe stenosis     ASSESSMENT:  This is a 70 y.o. female with     Problem List Items Addressed This Visit    None  Visit Diagnoses         Status post cervical spinal fusion    -  Primary      Status post lumbar spinal fusion          Lesion of subcutaneous tissue                  PLAN:  I will refer her back to Dr. Heart for her let shoulder pain and left forearm subcutaneous lesion.  All questions answered    More than 50% of the time was spent on discussing conservative management treatments (medication, physical therapy exercises) and possible interventions (spinal injections and surgical procedures). Care coordination was discussed.    30 min        Ishaan Fisher MD  Cell:[phone]           [1]   Social History  Socioeconomic History    Marital status:    Tobacco Use    Smoking status: Never     Passive exposure: Never    Smokeless tobacco: Never   Substance  and Sexual Activity    Alcohol use: Yes     Comment: very rare    Drug use: No     Comment: CBD oil sometimes    Sexual activity: Never     Social Drivers of Health     Financial Resource Strain: Medium Risk (5/4/2023)    Overall Financial Resource Strain (CARDIA)     Difficulty of Paying Living Expenses: Somewhat hard   Food Insecurity: No Food Insecurity (5/4/2023)    Hunger Vital Sign     Worried About Running Out of Food in the Last Year: Never true     Ran Out of Food in the Last Year: Never true   Transportation Needs: No Transportation Needs (5/4/2023)    PRAPARE - Transportation     Lack of Transportation (Medical): No     Lack of Transportation (Non-Medical): No   Physical Activity: Inactive (5/4/2023)    Exercise Vital Sign     Days of Exercise per Week: 0 days     Minutes of Exercise per Session: 0 min   Stress: No Stress Concern Present (5/4/2023)    [employer] [place] [employer] - Occupational Stress Questionnaire     Feeling of Stress : Not at all   Housing Stability: Low Risk  (5/4/2023)    Housing Stability Vital Sign     Unable to Pay for Housing in the Last Year: No     Number of Places Lived in the Last Year: 1     Unstable Housing in the Last Year: No   [2]   Current Outpatient Medications on File Prior to Visit   Medication Sig Dispense Refill    albuterol (PROVENTIL/VENTOLIN HFA) 90 mcg/actuation inhaler INHALE 2 PUFFS BY MOUTH EVERY 4 HOURS AS NEEDED FOR WHEEZING 18 g 3    aspirin (ECOTRIN) 81 MG EC tablet Take 1 tablet (81 mg total) by mouth once daily.  0    atorvastatin (LIPITOR) 40 MG tablet TAKE 1 TABLET(40 MG) BY MOUTH EVERY DAY FOR CHOLESTEROL 90 tablet 3    diabetic supplies, miscellan. Misc Lancets for 1-2 times a day testing    dispense brand covered by insurance t 60 each 3    diazePAM (VALIUM) 5 MG tablet TAKE 1 TABLET BY MOUTH EVERY 24 HOURS AS NEEDED FOR ANXIETY 30 tablet 0    DULoxetine (CYMBALTA) 60 MG capsule Take 1 capsule (60 mg total) by mouth once daily. 90  capsule 3    furosemide (LASIX) 20 MG tablet Take 1 tablet (20 mg total) by mouth daily as needed. 30 tablet 3    gabapentin (NEURONTIN) 100 MG capsule TAKE 1 CAPSULE(100 MG) BY MOUTH THREE TIMES DAILY 90 capsule 3    glipiZIDE (GLUCOTROL) 2.5 MG TR24 TAKE 1 TABLET(2.5 MG) BY MOUTH DAILY WITH BREAKFAST 90 tablet 3    ibandronate (BONIVA) 150 mg tablet TAKE 1 TABLET BY MOUTH EVERY 30 DAYS FOR OSTEOPOROSIS 3 tablet 3    levothyroxine (SYNTHROID) 100 MCG tablet Take 1 tablet (100 mcg total) by mouth once daily. 90 tablet 3    omeprazole (PRILOSEC) 40 MG capsule Take 1 capsule (40 mg total) by mouth every morning. 90 capsule 3    ondansetron (ZOFRAN-ODT) 4 MG TbDL Take 1 tablet (4 mg total) by mouth every 6 (six) hours as needed (Nausea). 28 tablet 0    oxyCODONE-acetaminophen (PERCOCET)  mg per tablet Take 1 tablet by mouth every 8 (eight) hours as needed for Pain. 90 tablet 0    potassium chloride (KLOR-CON) 10 MEQ TbSR TAKE 1 TABLET BY MOUTH EVERY DAY WHEN YOU TAKE FUROSEMIDE 90 tablet 0    promethazine (PHENERGAN) 25 MG tablet TAKE 1 TABLET(25 MG) BY MOUTH EVERY 6 HOURS AS NEEDED 25 tablet 0    traMADoL (ULTRAM) 50 mg tablet Take 1 tablet by mouth every 8 hours as needed for pain 20 tablet 0    zolpidem (AMBIEN) 5 MG Tab Take 1 tablet (5 mg total) by mouth every evening. 30 tablet 2     No current facility-administered medications on file prior to visit.

## 2025-06-17 DIAGNOSIS — G89.4 CHRONIC PAIN SYNDROME: Primary | ICD-10-CM

## 2025-06-17 DIAGNOSIS — S62.102D CLOSED FRACTURE OF LEFT WRIST WITH ROUTINE HEALING, SUBSEQUENT ENCOUNTER: Primary | ICD-10-CM

## 2025-06-17 RX ORDER — OXYCODONE AND ACETAMINOPHEN 10; 325 MG/1; MG/1
1 TABLET ORAL EVERY 8 HOURS PRN
Qty: 90 TABLET | Refills: 0 | Status: SHIPPED | OUTPATIENT
Start: 2025-06-17

## 2025-06-19 ENCOUNTER — TELEPHONE (OUTPATIENT)
Dept: FAMILY MEDICINE | Facility: CLINIC | Age: 70
End: 2025-06-19
Payer: MEDICARE

## 2025-06-19 DIAGNOSIS — F41.9 ANXIETY: ICD-10-CM

## 2025-06-19 DIAGNOSIS — E11.9 TYPE 2 DIABETES MELLITUS WITHOUT COMPLICATION, WITHOUT LONG-TERM CURRENT USE OF INSULIN: ICD-10-CM

## 2025-06-19 RX ORDER — INSULIN PUMP SYRINGE, 3 ML
EACH MISCELLANEOUS
Qty: 1 EACH | Refills: 0 | Status: CANCELLED | OUTPATIENT
Start: 2025-06-19

## 2025-06-19 RX ORDER — DIAZEPAM 5 MG/1
TABLET ORAL
Qty: 30 TABLET | Refills: 0 | Status: SHIPPED | OUTPATIENT
Start: 2025-06-19

## 2025-06-19 RX ORDER — INSULIN PUMP SYRINGE, 3 ML
EACH MISCELLANEOUS
Qty: 1 EACH | Refills: 0 | Status: SHIPPED | OUTPATIENT
Start: 2025-06-19

## 2025-06-19 RX ORDER — ONDANSETRON 4 MG/1
4 TABLET, ORALLY DISINTEGRATING ORAL EVERY 6 HOURS PRN
Qty: 28 TABLET | Refills: 0 | Status: SHIPPED | OUTPATIENT
Start: 2025-06-19

## 2025-06-19 NOTE — TELEPHONE ENCOUNTER
Pt requested a refill of Zofran and glucometer with testing supplies to be sent to [facility] pharmacy.

## 2025-06-19 NOTE — TELEPHONE ENCOUNTER
Care Due:                  Date            Visit Type   Department     Provider  --------------------------------------------------------------------------------                                EP -                              PRIMARY      [facility] FAMILY  Last Visit: 04-      CARE ([facility])   MEDICINE       Alon Santiago                              EP -                              PRIMARY      [facility] FAMILY  Next Visit: 08-      CARE ([facility])   MEDICINE       Alon Santiago                                                            Last  Test          Frequency    Reason                     Performed    Due Date  --------------------------------------------------------------------------------    Mg Level....  12 months..  ibandronate..............  04- 03-    Phosphate...  12 months..  ibandronate..............  Not Found    Overdue    [employer] Embedded Care Due Messages. Reference number: [id number].   6/19/2025 3:03:54 PM CDT

## 2025-06-19 NOTE — TELEPHONE ENCOUNTER
No care due was identified.  [facility] Embedded Care Due Messages. Reference number: [id number].   6/19/2025 3:04:40 PM CDT

## 2025-06-19 NOTE — TELEPHONE ENCOUNTER
Copied from CRM #[id number]. Topic: General Inquiry - Patient Advice  >> Jun 19, 2025  2:28 PM Marisa wrote:  Type: General Call Back     Name of Caller:Pt  Symptoms:breast concerns  Would the patient rather a call back or a response via [facility]? Call back  Best Call Back Number:[phone]  Additional Information:

## 2025-06-23 ENCOUNTER — TELEPHONE (OUTPATIENT)
Dept: PHYSICAL MEDICINE AND REHAB | Facility: CLINIC | Age: 70
End: 2025-06-23
Payer: MEDICARE

## 2025-06-23 RX ORDER — ATORVASTATIN CALCIUM 40 MG/1
40 TABLET, FILM COATED ORAL
Qty: 90 TABLET | Refills: 3 | Status: SHIPPED | OUTPATIENT
Start: 2025-06-23

## 2025-06-23 NOTE — TELEPHONE ENCOUNTER
Refill Decision Note   Christinejanneth Aroraky  is requesting a refill authorization.  Brief Assessment and Rationale for Refill:  Approve     Medication Therapy Plan:        Comments:     Note composed:11:25 AM 06/23/2025

## 2025-06-23 NOTE — TELEPHONE ENCOUNTER
No care due was identified.  [facility] Embedded Care Due Messages. Reference number: [id number].   6/23/2025 5:48:57 AM CDT

## 2025-06-26 ENCOUNTER — HOSPITAL ENCOUNTER (EMERGENCY)
Facility: HOSPITAL | Age: 70
Discharge: HOME | End: 2025-06-26
Attending: EMERGENCY MEDICINE
Payer: MEDICARE

## 2025-06-26 VITALS
SYSTOLIC BLOOD PRESSURE: 107 MMHG | OXYGEN SATURATION: 99 % | BODY MASS INDEX: 35.34 KG/M2 | TEMPERATURE: 99 F | HEART RATE: 100 BPM | RESPIRATION RATE: 18 BRPM | DIASTOLIC BLOOD PRESSURE: 77 MMHG | HEIGHT: 60 IN | WEIGHT: 180 LBS

## 2025-06-26 DIAGNOSIS — R22.32 ARM MASS, LEFT: Primary | ICD-10-CM

## 2025-06-26 PROCEDURE — 99281 EMR DPT VST MAYX REQ PHY/QHP: CPT | Mod: HCNC,ER

## 2025-06-26 NOTE — ED PROVIDER NOTES
"Encounter Date: 2025       History     Chief Complaint   Patient presents with    Arm Pain     Reports area of swelling to left arm for 1 week. Raised, soft, movable knot noted to left forearm. No redness noted.      Pleasant 70-year-old female presents to ED with concern of a small "lump" to left forearm that she noticed roughly 1 week ago.  She reports area will occasionally calls mild discomfort but she denies any pain to the site at this time.  No recent injury or trauma.  No other acute complaints at this time    The history is provided by the patient.     Review of patient's allergies indicates:   Allergen Reactions    Adhesive      Adhesive tape    Latex, natural rubber      Adhesive from latex tape    Ibuprofen Other (See Comments)     Causes asthma flare??     Past Medical History:   Diagnosis Date    Anticoagulant long-term use     Arthritis     Asthma     CHF (congestive heart failure)     [facility]    Colon cancer     colon    COPD (chronic obstructive pulmonary disease)     Coronary artery disease     Depression     Diabetes mellitus, type 2     GERD (gastroesophageal reflux disease)     Left ureteral stone 2024    Myocardial infarction     AGE 19 MIGUELANGEL WITHBABY DELIVERY    Nausea and vomiting 2024    Thyroid disease      Past Surgical History:   Procedure Laterality Date    ABDOMINAL SURGERY      CAUDAL EPIDURAL STEROID INJECTION N/A 2022    Procedure: Injection-steroid-epidural-caudal;  Surgeon: Nilam Santiago MD;  Location: [facility] PAIN MGT;  Service: Pain Management;  Laterality: N/A;     SECTION      CHOLECYSTECTOMY      COLON SURGERY      COLONOSCOPY      --- repeat in 3-5 years    COLONOSCOPY N/A 2017    Procedure: COLONOSCOPY;  Surgeon: Corrina Flores MD;  Location: [facility] ENDO;  Service: Endoscopy;  Laterality: N/A;    COLONOSCOPY N/A 04/10/2018    Procedure: COLONOSCOPY golytely;  Surgeon: Corrina Flores MD;  Location: [facility] ENDO;  Service: " Endoscopy;  Laterality: N/A;    DECOMPRESSION OF CERVICAL SPINE BY ANTERIOR APPROACH WITH FUSION N/A 12/14/2021    Procedure: DECOMPRESSION AND FUSION, SPINE, CERVICAL, ANTERIOR APPROACH C3-4 C5- 6 C6-7 ACDF;  Surgeon: Ishaan Fisher MD;  Location: [facility] OR;  Service: Neurosurgery;  Laterality: N/A;    ECTOPIC PREGNANCY SURGERY      EPIDURAL STEROID INJECTION INTO LUMBAR SPINE N/A 11/04/2020    Procedure: Injection-steroid-epidural-lumbar--L5-S1 InterLaminar;  Surgeon: Nilam Santiago MD;  Location: [facility] PAIN [facility]GT;  Service: Pain Management;  Laterality: N/A;    ESOPHAGOGASTRODUODENOSCOPY N/A 02/28/2019    Procedure: ESOPHAGOGASTRODUODENOSCOPY (EGD);  Surgeon: Corrina Flores MD;  Location: [facility] ENDO;  Service: Endoscopy;  Laterality: N/A;    ESOPHAGOGASTRODUODENOSCOPY      w/biopsy    ESOPHAGOGASTRODUODENOSCOPY N/A 9/26/2024    Procedure: EGD (ESOPHAGOGASTRODUODENOSCOPY);  Surgeon: Robert Ochoa MD;  Location: [facility] ENDO;  Service: Endoscopy;  Laterality: N/A;  Ref Jonatan De [id number]-Wplphu-OLju  9/9/24-LVM for precall, portal-DS  9/10 pt not confirmed- [facility]  9/11-precall complete, instructions emailed to [email or web address]  9/11-Lvm notifying pt of new arrival time (1010am)-[facility]  9/20/24-Precall complete    FRACTURE SURGERY      left leg    FUSION OF SPINE WITH INSTRUMENTATION Left 01/11/2021    Procedure: FUSION, SPINE, WITH INSTRUMENTATION Stage 1 Left L4-5 OLIF DORA Stage 2 L4-5 Laminectomy, medial Facetectomy, foraminotomy, L4-5 MIS Instrumentation;  Surgeon: Ishaan Fisher MD;  Location: [facility] OR;  Service: Neurosurgery;  Laterality: Left;  Procedure: Stage 1 Left L4-5 OLIF DORA  Stage 2 L4-5 Laminectomy, medial Facetectomy, foraminotomy, L4-5 MIS Instrumentation  Surgery TIme: 4 Hrs    GASTRIC BYPASS      HERNIA REPAIR      INJECTION OF ANESTHETIC AGENT AROUND GENITOFEMORAL NERVE Left 07/29/2020    Procedure: BLOCK, NERVE, LEFT SHOULDER ARTICULAR;  Surgeon: Nilam Santiago MD;  Location:  [facility] PAIN MGT;  Service: Pain Management;  Laterality: Left;    JOINT REPLACEMENT      KNEE ARTHROPLASTY Left 05/08/2019    Procedure: ARTHROPLASTY, KNEE;  Surgeon: Roldan Greenwood MD;  Location: [facility] OR;  Service: Orthopedics;  Laterality: Left;  Navid notified    KNEE ARTHROPLASTY Right 11/20/2019    Procedure: ARTHROPLASTY, KNEE;  Surgeon: Roldan Greenwood MD;  Location: [facility] OR;  Service: Orthopedics;  Laterality: Right;  Navid notified, confirmed case Navid 11/19/19 KB [id number]    OOPHORECTOMY      OPEN REDUCTION AND INTERNAL FIXATION (ORIF) OF FRACTURE OF DISTAL RADIUS Left 10/29/2024    Procedure: ORIF, FRACTURE, RADIUS, DISTAL;  Surgeon: Ketan Heart Jr., MD;  Location: [facility] OR;  Service: Orthopedics;  Laterality: Left;  artrex, mini C arm    RADIOFREQUENCY THERMOCOAGULATION Left 08/12/2020    Procedure: RADIOFREQUENCY THERMAL COAGULATION--Left Suprascapular, Axillary, and Lateral Pectoral Articular Branch RFA;  Surgeon: Nilam Santiago MD;  Location: [facility] PAIN MGT;  Service: Pain Management;  Laterality: Left;    TONSILLECTOMY      TRANSFORAMINAL EPIDURAL INJECTION OF STEROID Right 08/25/2021    Procedure: Injection,steroid,epidural,transforaminal approach; Levels: L5;  Surgeon: Nilam Santiago MD;  Location: [facility] PAIN MGT;  Service: Pain Management;  Laterality: Right;  No pacemaker. Patient is diabetic. Patient is taking Aspirin but can continue per Dr. Santiago.     TUBAL LIGATION       Family History   Problem Relation Name Age of Onset    Hyperlipidemia Mother      Hypertension Mother      Diabetes Mother      Stroke Mother      Hypertension Sister      Hypertension Brother      Diabetes Brother      Breast cancer Maternal Aunt      Breast cancer Maternal Aunt      Breast cancer Cousin       Social History[1]  Review of Systems   Skin:  Negative for rash and wound.        Mass       Physical Exam     Initial Vitals [06/26/25 1625]   BP Pulse Resp Temp SpO2   107/77 100 18 99.2 °F (37.3 °C) 99 %     "  MAP       --         Physical Exam    Vitals reviewed.  Constitutional: She appears well-developed and well-nourished. She is active. She does not have a sickly appearance. She does not appear ill. No distress.   HENT:   Head: Normocephalic and atraumatic.   Neck:   Normal range of motion.  Musculoskeletal:      Cervical back: Normal range of motion.      Comments: Left mid forearm noted have small, roughly 2 cm diameter mobile, soft mass along the ulnar aspect.  No skin changes, erythema or warmth.  Nontender.     Neurological: She is alert. GCS eye subscore is 4. GCS verbal subscore is 5. GCS motor subscore is 6.   Skin: Skin is warm and dry.   Psychiatric: She has a normal mood and affect. Her speech is normal and behavior is normal.         ED Course   Procedures  Labs Reviewed - No data to display       Imaging Results    None          Medications - No data to display  Medical Decision Making  Patient presents with concern of a "lump" to left forearm.  Afebrile with vitals unremarkable.  Patient in no distress    DDx:  Including but not limited to mass, cyst, lipoma               ED Course as of 06/26/25 1650   Thu Jun 26, 2025   1635 Lengthy discussion was made with patient.  Small mobile mass appears benign with no evidence of infection.  It does not appear to be fluid filled.  Mass does resemble lipoma.  No emergent intervention required at this time.  She states she will plan to follow up with her orthopedic to discuss further management if warranted.  ED return precautions were discussed.  Patient states understanding and agrees with plan [KS]      ED Course User Index  [KS] Aly Bullard PA-C                           Clinical Impression:  Final diagnoses:  [R22.32] Arm mass, left (Primary)          ED Disposition Condition    Discharge Stable          ED Prescriptions    None       Follow-up Information       Follow up With Specialties Details Why Contact Info    Alon Santiago MD Family " Medicine   [address] [facility] [address]  [phone]      Ketan Heart Jr., MD Hand Surgery, Orthopedic Surgery   [address] [facility]  [address]  [facility] [id number] [phone]                     [1]   Social History  Tobacco Use    Smoking status: Never     Passive exposure: Never    Smokeless tobacco: Never   Substance Use Topics    Alcohol use: Yes     Comment: very rare    Drug use: No     Comment: CBD oil sometimes        Aly Bullard PA-C  06/26/25 3606

## 2025-06-26 NOTE — DISCHARGE INSTRUCTIONS

## 2025-06-27 ENCOUNTER — PATIENT OUTREACH (OUTPATIENT)
Facility: OTHER | Age: 70
End: 2025-06-27
Payer: MEDICARE

## 2025-06-27 NOTE — PROGRESS NOTES
Roxy Young  ED Navigator  Emergency Department    [place] [facility] [place]  Role: Community Health Worker    Date: 06/27/2025  Patient Name: Christine Castro  MRN: [id number]  PCP: Alon Santiago MD    Assessment:     Christine Castro is a 70 y.o. female who has presented to ED for left arm mass. Patient has visited the ED 1 times in the past 3 months. Patient did not contact PCP.     ED Navigator Initial Assessment    ED Navigator Enrollment Documentation  Consent to Services  Does patient consent to completing the assessment?: Yes  Contact  Method of Initial Contact: Phone  Transportation  Insurance Coverage  Do you have coverage/adequate coverage?: Yes  Specialist Appointment  Did the patient come to the ED to see a specialist?: No  Does the patient have a pending specialist referral?: No  Does the patient have a specialist appointment made?: No  PCP Follow Up Appointment  Medications  Is patient able to afford medication?: Yes  Psychological  Food  Communication/Education  Other Financial Concerns  Other Social Barriers/Concerns  Primary Barrier         Social History     Socioeconomic History    Marital status:    Tobacco Use    Smoking status: Never     Passive exposure: Never    Smokeless tobacco: Never   Substance and Sexual Activity    Alcohol use: Yes     Comment: very rare    Drug use: No     Comment: CBD oil sometimes    Sexual activity: Never     Social Drivers of Health     Financial Resource Strain: Medium Risk (5/4/2023)    Overall Financial Resource Strain (CARDIA)     Difficulty of Paying Living Expenses: Somewhat hard   Food Insecurity: Unknown (6/27/2025)    Hunger Vital Sign     Worried About Running Out of Food in the Last Year: Never true   Transportation Needs: No Transportation Needs (6/27/2025)    PRAPARE - Transportation     Lack of Transportation (Medical): No     Lack of Transportation (Non-Medical): No   Physical Activity: Inactive (5/4/2023)    Exercise Vital Sign     Days  of Exercise per Week: 0 days     Minutes of Exercise per Session: 0 min   Stress: No Stress Concern Present (5/4/2023)    [employer] [place] [employer] - Occupational Stress Questionnaire     Feeling of Stress : Not at all   Housing Stability: Low Risk  (5/4/2023)    Housing Stability Vital Sign     Unable to Pay for Housing in the Last Year: No     Number of Places Lived in the Last Year: 1     Unstable Housing in the Last Year: No       Plan:     Patient was contacted for post ED discharge navigation. They have an appointment scheduled with Dr. Daniel York on 7/10/25 at 9:00. ED navigator will remind patient of appointment.

## 2025-06-30 NOTE — TELEPHONE ENCOUNTER
No care due was identified.  [facility] Embedded Care Due Messages. Reference number: [id number].   6/30/2025 10:17:36 AM CDT

## 2025-07-01 ENCOUNTER — TELEPHONE (OUTPATIENT)
Dept: CARDIOLOGY | Facility: CLINIC | Age: 70
End: 2025-07-01
Payer: MEDICARE

## 2025-07-01 RX ORDER — GABAPENTIN 100 MG/1
100 CAPSULE ORAL 3 TIMES DAILY
Qty: 90 CAPSULE | Refills: 3 | Status: SHIPPED | OUTPATIENT
Start: 2025-07-01

## 2025-07-02 ENCOUNTER — TELEPHONE (OUTPATIENT)
Dept: CARDIOLOGY | Facility: CLINIC | Age: 70
End: 2025-07-02
Payer: MEDICARE

## 2025-07-02 NOTE — TELEPHONE ENCOUNTER
Copied from CRM #[id number]. Topic: General Inquiry - Return Call  >> Jul 1, 2025  4:37 PM Gina wrote:  Type:  Patient Returning Call    Who Called: Pt   Who Left Message for Patient: Yesika Carmona MA  Does the patient know what this is regarding?: Returning a missed call   Would the patient rather a call back or a response via [facility]? Call back   Best Call Back Number: [phone]  >> Jul 1, 2025  4:56 PM Med Assistant Josiane wrote:    ----- Message -----  From: Gina Canas  Sent: 7/1/2025   4:39 PM CDT  To: Laurel Sanchez Staff

## 2025-07-08 ENCOUNTER — OFFICE VISIT (OUTPATIENT)
Dept: PLASTIC SURGERY | Facility: CLINIC | Age: 70
End: 2025-07-08
Payer: MEDICARE

## 2025-07-08 VITALS — SYSTOLIC BLOOD PRESSURE: 129 MMHG | DIASTOLIC BLOOD PRESSURE: 73 MMHG | HEART RATE: 75 BPM

## 2025-07-08 DIAGNOSIS — N64.4 PAINFUL BREASTS: ICD-10-CM

## 2025-07-08 DIAGNOSIS — Z82.49 FAMILY HISTORY OF CARDIAC DISORDER: Primary | ICD-10-CM

## 2025-07-08 DIAGNOSIS — N64.89 PENDULOUS BREAST: ICD-10-CM

## 2025-07-08 PROCEDURE — 3044F HG A1C LEVEL LT 7.0%: CPT | Mod: CPTII,HCNC,S$GLB, | Performed by: SURGERY

## 2025-07-08 PROCEDURE — 3066F NEPHROPATHY DOC TX: CPT | Mod: CPTII,HCNC,S$GLB, | Performed by: SURGERY

## 2025-07-08 PROCEDURE — 3061F NEG MICROALBUMINURIA REV: CPT | Mod: CPTII,HCNC,S$GLB, | Performed by: SURGERY

## 2025-07-08 PROCEDURE — 1157F ADVNC CARE PLAN IN RCRD: CPT | Mod: CPTII,HCNC,S$GLB, | Performed by: SURGERY

## 2025-07-08 PROCEDURE — 3288F FALL RISK ASSESSMENT DOCD: CPT | Mod: CPTII,HCNC,S$GLB, | Performed by: SURGERY

## 2025-07-08 PROCEDURE — 3074F SYST BP LT 130 MM HG: CPT | Mod: CPTII,HCNC,S$GLB, | Performed by: SURGERY

## 2025-07-08 PROCEDURE — 1125F AMNT PAIN NOTED PAIN PRSNT: CPT | Mod: CPTII,HCNC,S$GLB, | Performed by: SURGERY

## 2025-07-08 PROCEDURE — 99999 PR PBB SHADOW E&M-EST. PATIENT-LVL III: CPT | Mod: PBBFAC,HCNC,, | Performed by: SURGERY

## 2025-07-08 PROCEDURE — 1159F MED LIST DOCD IN RCRD: CPT | Mod: CPTII,HCNC,S$GLB, | Performed by: SURGERY

## 2025-07-08 PROCEDURE — 1101F PT FALLS ASSESS-DOCD LE1/YR: CPT | Mod: CPTII,HCNC,S$GLB, | Performed by: SURGERY

## 2025-07-08 PROCEDURE — 99204 OFFICE O/P NEW MOD 45 MIN: CPT | Mod: HCNC,S$GLB,, | Performed by: SURGERY

## 2025-07-08 PROCEDURE — 3078F DIAST BP <80 MM HG: CPT | Mod: CPTII,HCNC,S$GLB, | Performed by: SURGERY

## 2025-07-08 NOTE — PROGRESS NOTES
Patient presents to [facility] with a chief complaint of chronic neck and back pain secondary to heavy pendulous breasts.  Patient has tried ibuprofen other nonsteroidals as well as Tylenol without relief of the pain.  Patient was also prescribed in his currently being treated with oxycodone due to her upper neck and back pain.  Patient also complains of chronic macerating skin rashes which the patient treats with over-the-counter creams.  Patient also complains of deep shoulder grooving secondary to her brassiere strap.  Of note, the patient lost over 300 lb but still has extremely large pendulous breasts.  Physical examination shows the patient to be 5 ft in height weighs 180 lb and have 44 triple D cup breasts.  Her shoulder show grooving  Her breasts show macromastia   Skin beneath her breasts were moist with odor.  There was evidence of previous rashes.  Past medical history is not significant for hypertension or diabetes.  Patient does state that she had a myocardial infarction in 1974.  Patient states that she has seen a cardiologist who state that her examination is completely normal.  We will however need to get a Cardiology consult before proceeding.  Patient denies smoking patient denies any allergies.  Had a long discussion with the patient regarding free nipple grafting versus 3 dimensional tattoos.  Patient's breasts are too long for a standard bilateral breast reduction.  Patient will need to choose which she would like to do either free nipple or tattoos.  This patient will need medical clearance and cardiology clearance prior to any surgical intervention.

## 2025-07-10 ENCOUNTER — TELEPHONE (OUTPATIENT)
Dept: FAMILY MEDICINE | Facility: CLINIC | Age: 70
End: 2025-07-10
Payer: MEDICARE

## 2025-07-10 NOTE — TELEPHONE ENCOUNTER
Copied from CRM #[id number]. Topic: General Inquiry - Patient Advice  >> Jul 10, 2025 11:23 AM Ike wrote:  Type:  Needs Medical Advice    Who Called: Christine     Symptoms (please be specific): Pt stated that she would like for someone in the office to give her a call as soon as possible. This is in reference to her medications. Please follow up with the patient.     Would the patient rather a call back or a response via [facility]? Callback     Best Call Back Number: .[phone]    Additional Information:

## 2025-07-11 ENCOUNTER — OFFICE VISIT (OUTPATIENT)
Dept: CARDIOLOGY | Facility: CLINIC | Age: 70
End: 2025-07-11
Payer: MEDICARE

## 2025-07-11 VITALS
DIASTOLIC BLOOD PRESSURE: 76 MMHG | HEART RATE: 91 BPM | HEIGHT: 63 IN | BODY MASS INDEX: 33.84 KG/M2 | WEIGHT: 191 LBS | SYSTOLIC BLOOD PRESSURE: 108 MMHG

## 2025-07-11 DIAGNOSIS — G45.9 TIA (TRANSIENT ISCHEMIC ATTACK): ICD-10-CM

## 2025-07-11 DIAGNOSIS — I25.118 CORONARY ARTERY DISEASE OF NATIVE ARTERY OF NATIVE HEART WITH STABLE ANGINA PECTORIS: Chronic | ICD-10-CM

## 2025-07-11 DIAGNOSIS — I25.2 HISTORY OF MYOCARDIAL INFARCTION: ICD-10-CM

## 2025-07-11 DIAGNOSIS — I70.0 AORTIC ATHEROSCLEROSIS: ICD-10-CM

## 2025-07-11 DIAGNOSIS — E11.40 TYPE 2 DIABETES MELLITUS WITH DIABETIC NEUROPATHY, WITHOUT LONG-TERM CURRENT USE OF INSULIN: ICD-10-CM

## 2025-07-11 DIAGNOSIS — Z82.49 FAMILY HISTORY OF CARDIAC DISORDER: ICD-10-CM

## 2025-07-11 DIAGNOSIS — Z86.79 HISTORY OF CONGESTIVE HEART FAILURE: ICD-10-CM

## 2025-07-11 DIAGNOSIS — R07.2 PRECORDIAL CHEST PAIN: Primary | ICD-10-CM

## 2025-07-11 PROCEDURE — 3008F BODY MASS INDEX DOCD: CPT | Mod: CPTII,HCNC,S$GLB, | Performed by: STUDENT IN AN ORGANIZED HEALTH CARE EDUCATION/TRAINING PROGRAM

## 2025-07-11 PROCEDURE — 1157F ADVNC CARE PLAN IN RCRD: CPT | Mod: CPTII,HCNC,S$GLB, | Performed by: STUDENT IN AN ORGANIZED HEALTH CARE EDUCATION/TRAINING PROGRAM

## 2025-07-11 PROCEDURE — 99999 PR PBB SHADOW E&M-EST. PATIENT-LVL III: CPT | Mod: PBBFAC,HCNC,, | Performed by: STUDENT IN AN ORGANIZED HEALTH CARE EDUCATION/TRAINING PROGRAM

## 2025-07-11 PROCEDURE — 3066F NEPHROPATHY DOC TX: CPT | Mod: CPTII,HCNC,S$GLB, | Performed by: STUDENT IN AN ORGANIZED HEALTH CARE EDUCATION/TRAINING PROGRAM

## 2025-07-11 PROCEDURE — 3288F FALL RISK ASSESSMENT DOCD: CPT | Mod: CPTII,HCNC,S$GLB, | Performed by: STUDENT IN AN ORGANIZED HEALTH CARE EDUCATION/TRAINING PROGRAM

## 2025-07-11 PROCEDURE — 93000 ELECTROCARDIOGRAM COMPLETE: CPT | Mod: HCNC,S$GLB,, | Performed by: STUDENT IN AN ORGANIZED HEALTH CARE EDUCATION/TRAINING PROGRAM

## 2025-07-11 PROCEDURE — 3074F SYST BP LT 130 MM HG: CPT | Mod: CPTII,HCNC,S$GLB, | Performed by: STUDENT IN AN ORGANIZED HEALTH CARE EDUCATION/TRAINING PROGRAM

## 2025-07-11 PROCEDURE — 3078F DIAST BP <80 MM HG: CPT | Mod: CPTII,HCNC,S$GLB, | Performed by: STUDENT IN AN ORGANIZED HEALTH CARE EDUCATION/TRAINING PROGRAM

## 2025-07-11 PROCEDURE — 3044F HG A1C LEVEL LT 7.0%: CPT | Mod: CPTII,HCNC,S$GLB, | Performed by: STUDENT IN AN ORGANIZED HEALTH CARE EDUCATION/TRAINING PROGRAM

## 2025-07-11 PROCEDURE — 1159F MED LIST DOCD IN RCRD: CPT | Mod: CPTII,HCNC,S$GLB, | Performed by: STUDENT IN AN ORGANIZED HEALTH CARE EDUCATION/TRAINING PROGRAM

## 2025-07-11 PROCEDURE — 3061F NEG MICROALBUMINURIA REV: CPT | Mod: CPTII,HCNC,S$GLB, | Performed by: STUDENT IN AN ORGANIZED HEALTH CARE EDUCATION/TRAINING PROGRAM

## 2025-07-11 PROCEDURE — 1101F PT FALLS ASSESS-DOCD LE1/YR: CPT | Mod: CPTII,HCNC,S$GLB, | Performed by: STUDENT IN AN ORGANIZED HEALTH CARE EDUCATION/TRAINING PROGRAM

## 2025-07-11 PROCEDURE — 99205 OFFICE O/P NEW HI 60 MIN: CPT | Mod: HCNC,S$GLB,, | Performed by: STUDENT IN AN ORGANIZED HEALTH CARE EDUCATION/TRAINING PROGRAM

## 2025-07-11 PROCEDURE — 1160F RVW MEDS BY RX/DR IN RCRD: CPT | Mod: CPTII,HCNC,S$GLB, | Performed by: STUDENT IN AN ORGANIZED HEALTH CARE EDUCATION/TRAINING PROGRAM

## 2025-07-11 RX ORDER — NITROGLYCERIN 0.4 MG/1
0.4 TABLET SUBLINGUAL EVERY 5 MIN PRN
Qty: 20 TABLET | Refills: 12 | Status: SHIPPED | OUTPATIENT
Start: 2025-07-11 | End: 2025-08-10

## 2025-07-14 ENCOUNTER — TELEPHONE (OUTPATIENT)
Dept: PHYSICAL MEDICINE AND REHAB | Facility: CLINIC | Age: 70
End: 2025-07-14
Payer: MEDICARE

## 2025-07-14 DIAGNOSIS — G89.4 CHRONIC PAIN SYNDROME: ICD-10-CM

## 2025-07-14 LAB
OHS QRS DURATION: 84 MS
OHS QTC CALCULATION: 439 MS

## 2025-07-14 RX ORDER — OXYCODONE AND ACETAMINOPHEN 10; 325 MG/1; MG/1
1 TABLET ORAL EVERY 8 HOURS PRN
Qty: 90 TABLET | Refills: 0 | Status: SHIPPED | OUTPATIENT
Start: 2025-07-14

## 2025-07-14 NOTE — TELEPHONE ENCOUNTER
Copied from CRM #[id number]. Topic: Medications - Medication Refill  >> Jul 14, 2025  2:40 PM Natalie wrote:  oxyCODONE-acetaminophen (PERCOCET)  mg per tablet      [id number] - [place], [place] - [address] [facility] AT [facility] [address]  [address] [facility] [address]  Phone: [phone] Fax: [phone]

## 2025-07-14 NOTE — TELEPHONE ENCOUNTER
Copied from CRM #[id number]. Topic: General Inquiry - Patient Advice  >> Jul 14, 2025 11:47 AM Micaela Mack wrote:  Type: Advice    Who Called: Patient     Would the patient rather a call back or a response via [employer]? Call Back    Best Call Back Number: [phone] / [phone]    Additional Information: Pt is asking for a return call from MA or nurse in regards to prescription for a scooter. Please give pt a call.

## 2025-07-18 ENCOUNTER — TELEPHONE (OUTPATIENT)
Dept: PLASTIC SURGERY | Facility: CLINIC | Age: 70
End: 2025-07-18
Payer: MEDICARE

## 2025-07-24 ENCOUNTER — HOSPITAL ENCOUNTER (OUTPATIENT)
Dept: CARDIOLOGY | Facility: HOSPITAL | Age: 70
Discharge: HOME | End: 2025-07-24
Attending: STUDENT IN AN ORGANIZED HEALTH CARE EDUCATION/TRAINING PROGRAM
Payer: MEDICARE

## 2025-07-24 ENCOUNTER — HOSPITAL ENCOUNTER (EMERGENCY)
Facility: HOSPITAL | Age: 70
Discharge: HOME | End: 2025-07-24
Attending: EMERGENCY MEDICINE
Payer: MEDICARE

## 2025-07-24 VITALS
SYSTOLIC BLOOD PRESSURE: 131 MMHG | RESPIRATION RATE: 19 BRPM | HEIGHT: 63 IN | BODY MASS INDEX: 33.84 KG/M2 | WEIGHT: 190.13 LBS | TEMPERATURE: 98 F | HEIGHT: 63 IN | WEIGHT: 191 LBS | OXYGEN SATURATION: 99 % | HEART RATE: 65 BPM | DIASTOLIC BLOOD PRESSURE: 82 MMHG | BODY MASS INDEX: 33.69 KG/M2

## 2025-07-24 DIAGNOSIS — R07.2 PRECORDIAL CHEST PAIN: ICD-10-CM

## 2025-07-24 DIAGNOSIS — R22.32 ARM MASS, LEFT: Primary | ICD-10-CM

## 2025-07-24 DIAGNOSIS — S49.92XA SHOULDER INJURY, LEFT, INITIAL ENCOUNTER: ICD-10-CM

## 2025-07-24 DIAGNOSIS — S59.919A FOREARM INJURY: ICD-10-CM

## 2025-07-24 PROCEDURE — 93306 TTE W/DOPPLER COMPLETE: CPT | Mod: 26,HCNC,, | Performed by: INTERNAL MEDICINE

## 2025-07-24 PROCEDURE — 99283 EMERGENCY DEPT VISIT LOW MDM: CPT | Mod: 25,HCNC,ER

## 2025-07-24 PROCEDURE — 93306 TTE W/DOPPLER COMPLETE: CPT | Mod: HCNC,PN

## 2025-07-24 RX ORDER — LIDOCAINE 50 MG/G
1 PATCH TOPICAL DAILY
Qty: 3 PATCH | Refills: 0 | Status: SHIPPED | OUTPATIENT
Start: 2025-07-24 | End: 2025-07-27

## 2025-07-24 NOTE — DISCHARGE INSTRUCTIONS
Thank you for letting me care for you today - it was nice to meet you and I hope you feel better soon. I have placed a referral to Orthopedics for follow up care. You can call [phone] ([phone]) to schedule. You can also schedule on the [facility] My[facility] wei.     Over the counter: You can also take 500-1000mg of Tylenol every 6-8 hours. No more than 3000mg of Tylenol per day.      I sent in the following medication to your pharmacy:   Lidoderm patches: You can apply this to the area that is causing pain and leave it on for up to 12 hours. If you find this is helpful, you can also buy these over the counter.     Voltaren Gel: You can rub this on the area that is causing you pain 4 times per day. You can rub this on the area that is causing you pain 4 times per day.  If you find this is helpful, you can also buy them over the counter.       Our goal at [facility] is to always give you outstanding care and exceptional service. You may receive a survey by mail or email in the next week about your experience in our ED. We would greatly appreciate you completing and returning the survey. Your feedback provides us with a way to recognize our staff who give very good care and it helps us learn how to improve when your experience was below our aspiration of excellence.     All the best,     Sarah Leyva, MPH, PA-C  Emergency Department Physician Assistant  [facility], [facility], [facility]

## 2025-07-24 NOTE — ED NOTES
Assumed care of pt who came in reporting L-shoulder pain now 8/10 that is chronic > many months but worse this week after she fell twice. She denies dizziness but does not remember how she fell. She did hit her head but does not report LOC. She denies any chest pain SOB and has no other complaints.

## 2025-07-24 NOTE — ED PROVIDER NOTES
"Encounter Date: 7/24/2025       History     Chief Complaint   Patient presents with    Shoulder Pain     Pt C/O L shoulder pain X months. Pt reports fall onto shoulder X 1 month PTA.      Patient is a 70 y.o. year old female who currently presents for evaluation of multiple concerns.     Firstly, patient says that she fell out of bed last week and hit her left shoulder on the nightstand. Reports she has been having left shoulder pain since this time. Patient says that she hit her head on the nightstand as well, but says that she didn't hit it hard. Denies any hematomas to the head. Was not evaluated medically after the incident. Is not on blood thinner. Denies any ongoing head pain, focal neuro deficits, nausea, vomiting.     Secondly, patient reports that she has a "lump" on her left forearm. Says that she first noticed this last month. Patient had lump evaluated in the ED on 6/26. Area was not I&D'd, patient referred to ortho surgery for follow up. Patient says that she doesn't have follow up until next week and inquires if its possible for lump to be drained today. Patient says the area isn't causing pain and hasn't grown in size. Denies any spontaneous drainage from area.  Says that she doesn't like how it looks cosmetically.     Denies fever, chills, focal neurological weakness, altered mental status, neck pain/stiffness, back pain, chest pain, shortness of breath, vision changes, temporal pain, nose bleeds, rhinorrhea, ear discharge, wheezing, stridor, drooling, NVD, abdominal pain, constipation, urinary problems, joint problems, rashes, or any other complaints at this time.        The history is provided by the patient.     Review of patient's allergies indicates:   Allergen Reactions    Adhesive      Adhesive tape    Latex, natural rubber      Adhesive from latex tape    Ibuprofen Other (See Comments)     Causes asthma flare??     Past Medical History:   Diagnosis Date    Anticoagulant long-term use     " Arthritis     Asthma     CHF (congestive heart failure)     [facility]    Colon cancer     colon    COPD (chronic obstructive pulmonary disease)     Coronary artery disease     Depression     Diabetes mellitus, type 2     GERD (gastroesophageal reflux disease)     Left ureteral stone 2024    Myocardial infarction     AGE 19 MIGUELANGEL WITHBABY DELIVERY    Nausea and vomiting 2024    Thyroid disease      Past Surgical History:   Procedure Laterality Date    ABDOMINAL SURGERY      CAUDAL EPIDURAL STEROID INJECTION N/A 2022    Procedure: Injection-steroid-epidural-caudal;  Surgeon: Nilam Santiago MD;  Location: [facility] PAIN T;  Service: Pain Management;  Laterality: N/A;     SECTION      CHOLECYSTECTOMY      COLON SURGERY      COLONOSCOPY      --- repeat in 3-5 years    COLONOSCOPY N/A 2017    Procedure: COLONOSCOPY;  Surgeon: Corrina Flores MD;  Location: [facility] ENDO;  Service: Endoscopy;  Laterality: N/A;    COLONOSCOPY N/A 04/10/2018    Procedure: COLONOSCOPY golytely;  Surgeon: Corrina Flores MD;  Location: [facility] ENDO;  Service: Endoscopy;  Laterality: N/A;    DECOMPRESSION OF CERVICAL SPINE BY ANTERIOR APPROACH WITH FUSION N/A 2021    Procedure: DECOMPRESSION AND FUSION, SPINE, CERVICAL, ANTERIOR APPROACH C3-4 C5- 6 C6-7 ACDF;  Surgeon: Ishaan Fisher MD;  Location: [facility];  Service: Neurosurgery;  Laterality: N/A;    ECTOPIC PREGNANCY SURGERY      EPIDURAL STEROID INJECTION INTO LUMBAR SPINE N/A 2020    Procedure: Injection-steroid-epidural-lumbar--L5-S1 InterLaminar;  Surgeon: Nilam Santiago MD;  Location: [facility] PAIN MGT;  Service: Pain Management;  Laterality: N/A;    ESOPHAGOGASTRODUODENOSCOPY N/A 2019    Procedure: ESOPHAGOGASTRODUODENOSCOPY (EGD);  Surgeon: Corrina Flores MD;  Location: [facility] ENDO;  Service: Endoscopy;  Laterality: N/A;    ESOPHAGOGASTRODUODENOSCOPY      w/biopsy    ESOPHAGOGASTRODUODENOSCOPY N/A 2024    Procedure: EGD  (ESOPHAGOGASTRODUODENOSCOPY);  Surgeon: Robert Ochoa MD;  Location: [facility] ENDO;  Service: Endoscopy;  Laterality: N/A;  Ref Jonatan De [id number]  9/9/24-LVM for precall, portal-DS  9/10 pt not confirmed- B  9/11-precall complete, instructions emailed to [email or web address]  9/11-Lvm notifying pt of new arrival time (1010am)-[facility]  9/20/24-Precall complete    FRACTURE SURGERY      left leg    FUSION OF SPINE WITH INSTRUMENTATION Left 01/11/2021    Procedure: FUSION, SPINE, WITH INSTRUMENTATION Stage 1 Left L4-5 OLIF DORA Stage 2 L4-5 Laminectomy, medial Facetectomy, foraminotomy, L4-5 MIS Instrumentation;  Surgeon: Ishaan Fisher MD;  Location: [facility] OR;  Service: Neurosurgery;  Laterality: Left;  Procedure: Stage 1 Left L4-5 OLIF DORA  Stage 2 L4-5 Laminectomy, medial Facetectomy, foraminotomy, L4-5 MIS Instrumentation  Surgery TIme: 4 Hrs    GASTRIC BYPASS      HERNIA REPAIR      INJECTION OF ANESTHETIC AGENT AROUND GENITOFEMORAL NERVE Left 07/29/2020    Procedure: BLOCK, NERVE, LEFT SHOULDER ARTICULAR;  Surgeon: Nilam Santiago MD;  Location: [facility] PAIN MGT;  Service: Pain Management;  Laterality: Left;    JOINT REPLACEMENT      KNEE ARTHROPLASTY Left 05/08/2019    Procedure: ARTHROPLASTY, KNEE;  Surgeon: Roldan Greenwood MD;  Location: [facility] OR;  Service: Orthopedics;  Laterality: Left;  Navid notified    KNEE ARTHROPLASTY Right 11/20/2019    Procedure: ARTHROPLASTY, KNEE;  Surgeon: Roldan Greenwood MD;  Location: [facility] OR;  Service: Orthopedics;  Laterality: Right;  Navid notified, confirmed case Navid 11/19/19 [phone] [id number]    OOPHORECTOMY      OPEN REDUCTION AND INTERNAL FIXATION (ORIF) OF FRACTURE OF DISTAL RADIUS Left 10/29/2024    Procedure: ORIF, FRACTURE, RADIUS, DISTAL;  Surgeon: Ketan Heart Jr., MD;  Location: [facility] OR;  Service: Orthopedics;  Laterality: Left;  artrex, mini C arm    RADIOFREQUENCY THERMOCOAGULATION Left 08/12/2020    Procedure: RADIOFREQUENCY THERMAL  COAGULATION--Left Suprascapular, Axillary, and Lateral Pectoral Articular Branch RFA;  Surgeon: Nilam Santiago MD;  Location: [facility] PAIN MGT;  Service: Pain Management;  Laterality: Left;    TONSILLECTOMY      TRANSFORAMINAL EPIDURAL INJECTION OF STEROID Right 08/25/2021    Procedure: Injection,steroid,epidural,transforaminal approach; Levels: L5;  Surgeon: Nilam Santiago MD;  Location: [facility] PAIN MGT;  Service: Pain Management;  Laterality: Right;  No pacemaker. Patient is diabetic. Patient is taking Aspirin but can continue per Dr. Santiago.     TUBAL LIGATION       Family History   Problem Relation Name Age of Onset    Hyperlipidemia Mother      Hypertension Mother      Diabetes Mother      Stroke Mother      Hypertension Sister      Hypertension Brother      Diabetes Brother      Breast cancer Maternal Aunt      Breast cancer Maternal Aunt      Breast cancer Cousin       Social History[1]  Review of Systems   Constitutional:  Negative for chills and fever.   Musculoskeletal:  Positive for arthralgias (left shoulder). Negative for back pain, joint swelling, neck pain and neck stiffness.   Skin:         Subcutaneous lump, left forearm.        Physical Exam     Initial Vitals [07/24/25 1428]   BP Pulse Resp Temp SpO2   131/82 65 19 98.2 °F (36.8 °C) 99 %      MAP       --         Physical Exam    Constitutional: She appears well-developed and well-nourished.   HENT:   Head: Normocephalic and atraumatic.   No indications of head trauma on exam  No Wilhelm sign  No hemotympanum  TMs are clear without blood or otorrhea   No mastoid tenderness  Scalp is free of laceration or other deformity  Mid face is stable and free of tenderness on palpation  No malocclusion noted  No septal hematoma or rhinorrhea noted      Musculoskeletal:        Arms:       Comments: No midline tenderness to C, T, L spine.     Generalized tenderness over anterior left shoulder. No deformity to shoulder. No tenderness or deformity to clavicle.  Decreased active ROM of shoulder, full passive ROM of shoulder.      2x2cm subcutaneous mass to left forearm.  Areas firm and mobile.  No overlying warmth or erythema.     Neurological: She is alert.         ED Course   Procedures  Labs Reviewed - No data to display       Imaging Results              X-Ray Forearm Left (Final result)  Result time 07/24/25 16:11:44      Final result by Navid Angelo MD (07/24/25 16:11:44)                   Impression:      No acute fracture or dislocation.      Electronically signed by: Navid Angelo MD  Date:    07/24/2025  Time:    16:11               Narrative:    EXAMINATION:  XR FOREARM LEFT    CLINICAL HISTORY:  XR FOREARM LEFTUnspecified injury of unspecified forearm, initial encounter    COMPARISON:  None    FINDINGS:  Three views of the left forearm were obtained.    No evidence of acute fracture or dislocation.  Bony mineralization is normal.  Soft tissues are unremarkable.   Postoperative changes distal radius with plate and screw fixation.  Mild degenerative change.                                       X-Ray Shoulder Trauma Left (Final result)  Result time 07/24/25 16:12:21      Final result by Navid Angelo MD (07/24/25 16:12:21)                   Impression:      No acute fracture or dislocation.      Electronically signed by: Navid Angelo MD  Date:    07/24/2025  Time:    16:12               Narrative:    EXAMINATION:  XR SHOULDER TRAUMA 3 VIEW LEFT    CLINICAL HISTORY:  XR SHOULDER TRAUMA 3 VIEW LEFTUnspecified injury of left shoulder and upper arm, initial encounter    COMPARISON:  None    FINDINGS:  Three views of the left shoulder were obtained.    No evidence of acute fracture or dislocation.  Advanced degenerative changes of the glenohumeral joint with joint space narrowing, sclerosis and subchondral cystic changes.  Cannot entirely exclude avascular necrosis of left humeral head.  Mild AC joint degenerative changes.  Narrowing of the rotator interval  suggest prior rotator cuff injury.                                       Medications - No data to display  Medical Decision Making  Patient is an afebrile, nontoxic appearing 70 y.o. female who presents for evaluation following an witnessed fall from bed that occurred last week with c/o left shoulder pain. The patient's GCS is 15. There are no focal neurological deficits, there was not no nausea/vomiting after the incident. There is not occipital, parietal or temporal scalp hematoma; there was not LOC; Patient acting normal per caregiver. There was not was not a severe mechanism or fall from height greater than 3 feet. There is not a palpable skull fracture. There are are not signs of AMS, patient is without agitation, somnolence, repetitive questioning, or slow response to verbal communication. Patient is not on blood thinners. Patient does not have a history of bleed disorders.     Differential Diagnosis includes but not limited to:  Scalp contusion, concussion, skull fracture , intracranial hemorrhage such as subdural hematoma, subarachnoid hemorrhage or epidural hematoma, soft tissue injury, paraspinal or spinal injury, fracture, dislocation, rotator cuff injury, lipoma, cyst, abscess, inclusion cyst, cellulitis    All historical, clinical, and radiographic findings were reviewed with the patient/family in detail.  Given that fall happened over 1 week ago and that there are no signs of head trauma or focal neuro deficits on exam, I do not believe that head CT is necessary at this time. Patient received xrays to evaluate for dislocation/fracture/other injuries. Xrays showed without acute bony abnormalities. Subcutaneous mass on patients forearm is likely a lipoma. Discussed with patient that emergent I&D is not necessary and that she should follow up with her PCP/ortho surgeon. Stable gait and tolerating po. At this point behavior remains appropriate and I have no suspicion for significant concussion at present.  There has been no vomiting or seizure activity. No focal neurological deficits on serial exams.     The patient was instructed to return immediately to the emergency department for nausea, vomiting, inability to tolerate by mouth, persistent headaches, any abnormal behaviors or excessive lethargy, or with any other concerns. The patient instructed to follow up with PCP in the next 2 days. All remaining questions and concerns were addressed at that time.  Patient/family has been counseled regarding the need for follow-up as well as the indication to return to the emergency room should new or worrisome developments occur. Pt agrees with assessment, disposition and treatment plan and has no further questions or complaints at this time.      Amount and/or Complexity of Data Reviewed  Radiology: ordered and independent interpretation performed. Decision-making details documented in ED Course.    Risk  Prescription drug management.               ED Course as of 07/24/25 1749   Thu Jul 24, 2025   1624 Xray shoulder independently reviewed:     No evidence of acute fracture or dislocation.  Advanced degenerative changes of the glenohumeral joint with joint space narrowing, sclerosis and subchondral cystic changes.  Cannot entirely exclude avascular necrosis of left humeral head.  Mild AC joint degenerative changes.  Narrowing of the rotator interval suggest prior rotator cuff injury. [OB]   1625 Xray forearm independently reviewed: No acute abnormalities noted.  [OB]      ED Course User Index  [OB] Sarah Leyva PA-C                               Clinical Impression:  Final diagnoses:  [S49.92XA] Shoulder injury, left, initial encounter  [S59.919A] Forearm injury  [R22.32] Arm mass, left (Primary)          ED Disposition Condition    Discharge Stable          ED Prescriptions       Medication Sig Dispense Start Date End Date Auth. Provider    LIDOcaine (LIDODERM) 5 % Place 1 patch onto the skin once daily. Remove & Discard  patch within 12 hours or as directed by MD for 3 days 3 patch 7/24/2025 7/27/2025 Sarah Leyva PA-C          Follow-up Information    None                [1]   Social History  Tobacco Use    Smoking status: Never     Passive exposure: Never    Smokeless tobacco: Never   Substance Use Topics    Alcohol use: Yes     Comment: very rare    Drug use: No     Comment: CBD oil sometimes        Sarah Leyva PA-C  07/24/25 174

## 2025-07-25 ENCOUNTER — TELEPHONE (OUTPATIENT)
Dept: CARDIOLOGY | Facility: CLINIC | Age: 70
End: 2025-07-25
Payer: MEDICARE

## 2025-07-25 ENCOUNTER — TELEPHONE (OUTPATIENT)
Dept: ORTHOPEDICS | Facility: CLINIC | Age: 70
End: 2025-07-25
Payer: MEDICARE

## 2025-07-25 LAB
AORTIC SIZE INDEX (SOV): 1.5 CM/M2
AORTIC SIZE INDEX: 1.5 CM/M2
APICAL FOUR CHAMBER EJECTION FRACTION: 68 %
APICAL TWO CHAMBER EJECTION FRACTION: 62 %
ASCENDING AORTA: 2.9 CM
AV INDEX (PROSTH): 0.85
AV MEAN GRADIENT: 5 MMHG
AV PEAK GRADIENT: 10 MMHG
AV REGURGITATION PRESSURE HALF TIME: 713 MS
AV VALVE AREA BY VELOCITY RATIO: 2.4 CM²
AV VALVE AREA: 2.7 CM²
AV VELOCITY RATIO: 0.75
BSA FOR ECHO PROCEDURE: 1.96 M2
CV ECHO LV RWT: 0.26 CM
DOP CALC AO PEAK VEL: 1.6 M/S
DOP CALC AO VTI: 26.7 CM
DOP CALC LVOT AREA: 3.1 CM2
DOP CALC LVOT DIAMETER: 2 CM
DOP CALC LVOT PEAK VEL: 1.2 M/S
DOP CALC LVOT STROKE VOLUME: 71 CM3
DOP CALC MV VTI: 26.9 CM
DOP CALCLVOT PEAK VEL VTI: 22.6 CM
E WAVE DECELERATION TIME: 219 MSEC
E/A RATIO: 0.9
E/E' RATIO: 7 M/S
ECHO LV POSTERIOR WALL: 0.6 CM (ref 0.6–1.1)
FRACTIONAL SHORTENING: 37 % (ref 28–44)
INTERVENTRICULAR SEPTUM: 0.8 CM (ref 0.6–1.1)
IVC DIAMETER: 1.15 CM
LEFT ATRIUM AREA SYSTOLIC (APICAL 2 CHAMBER): 11.39 CM2
LEFT ATRIUM AREA SYSTOLIC (APICAL 4 CHAMBER): 18.1 CM2
LEFT ATRIUM VOLUME INDEX MOD: 18 ML/M2
LEFT ATRIUM VOLUME MOD: 34 ML
LEFT INTERNAL DIMENSION IN SYSTOLE: 2.9 CM (ref 2.1–4)
LEFT VENTRICLE DIASTOLIC VOLUME INDEX: 51.58 ML/M2
LEFT VENTRICLE DIASTOLIC VOLUME: 98 ML
LEFT VENTRICLE END DIASTOLIC VOLUME APICAL 2 CHAMBER: 48.59 ML
LEFT VENTRICLE END DIASTOLIC VOLUME APICAL 4 CHAMBER: 74.11 ML
LEFT VENTRICLE END SYSTOLIC VOLUME APICAL 2 CHAMBER: 21.93 ML
LEFT VENTRICLE END SYSTOLIC VOLUME APICAL 4 CHAMBER: 47.9 ML
LEFT VENTRICLE MASS INDEX: 52.3 G/M2
LEFT VENTRICLE SYSTOLIC VOLUME INDEX: 17.4 ML/M2
LEFT VENTRICLE SYSTOLIC VOLUME: 33 ML
LEFT VENTRICULAR INTERNAL DIMENSION IN DIASTOLE: 4.6 CM (ref 3.5–6)
LEFT VENTRICULAR MASS: 99.3 G
LV LATERAL E/E' RATIO: 5.7 M/S
LV SEPTAL E/E' RATIO: 9.5 M/S
LVED V (TEICH): 97.63 ML
LVES V (TEICH): 32.56 ML
LVOT MG: 2.89 MMHG
LVOT MV: 0.8 CM/S
Lab: 1.8 CM/M
Lab: 1.8 CM/M
MV A" WAVE DURATION": 83.73 MSEC
MV MEAN GRADIENT: 1 MMHG
MV PEAK A VEL: 0.63 M/S
MV PEAK E VEL: 0.57 M/S
MV PEAK GRADIENT: 2 MMHG
MV STENOSIS PRESSURE HALF TIME: 63.65 MS
MV VALVE AREA BY CONTINUITY EQUATION: 2.64 CM2
MV VALVE AREA P 1/2 METHOD: 3.46 CM2
OHS CV CPX PATIENT HEIGHT IN: 63
OHS CV RV/LV RATIO: 0.72 CM
OHS LV EJECTION FRACTION SIMPSONS BIPLANE MOD: 62 %
PISA AR MAX VEL: 3.38 M/S
PISA MRMAX VEL: 3.41 M/S
PISA TR MAX VEL: 2.3 M/S
PULM VEIN S/D RATIO: 1.46
PV PEAK D VEL: 0.48 M/S
PV PEAK GRADIENT: 4 MMHG
PV PEAK S VEL: 0.7 M/S
PV PEAK VELOCITY: 1.05 M/S
RA PRESSURE ESTIMATED: 3 MMHG
RA VOL SYS: 44.55 ML
RIGHT ATRIAL AREA: 15.7 CM2
RIGHT ATRIUM END SYSTOLIC VOLUME APICAL 4 CHAMBER INDEX BSA: 22.41 ML/M2
RIGHT ATRIUM VOLUME AREA LENGTH APICAL 4 CHAMBER: 42.57 ML
RIGHT VENTRICLE DIASTOLIC BASEL DIMENSION: 3.3 CM
RV TB RVSP: 5 MMHG
RV TISSUE DOPPLER FREE WALL SYSTOLIC VELOCITY 1 (APICAL 4 CHAMBER VIEW): 8.96 CM/S
SINUS: 2.9 CM
STJ: 2.9 CM
TDI LATERAL: 0.1 M/S
TDI SEPTAL: 0.06 M/S
TDI: 0.08 M/S
TR MAX PG: 20 MMHG
TRICUSPID ANNULAR PLANE SYSTOLIC EXCURSION: 1.8 CM
TV REST PULMONARY ARTERY PRESSURE: 24 MMHG
Z-SCORE OF LEFT VENTRICULAR DIMENSION IN END DIASTOLE: -1.48
Z-SCORE OF LEFT VENTRICULAR DIMENSION IN END SYSTOLE: -0.99

## 2025-07-25 NOTE — TELEPHONE ENCOUNTER
----- Message from Daniel York MD sent at 7/25/2025  2:22 PM CDT -----  Please contact the patient and let them know that their results were fine and do not require any change in treatment.  ----- Message -----  From: Reddy Mendoza MD  Sent: 7/25/2025   2:07 PM CDT  To: Daniel York MD

## 2025-07-28 ENCOUNTER — OFFICE VISIT (OUTPATIENT)
Dept: ORTHOPEDICS | Facility: CLINIC | Age: 70
End: 2025-07-28
Payer: MEDICARE

## 2025-07-28 ENCOUNTER — HOSPITAL ENCOUNTER (OUTPATIENT)
Facility: HOSPITAL | Age: 70
Discharge: HOME | End: 2025-07-28
Attending: ORTHOPAEDIC SURGERY
Payer: MEDICARE

## 2025-07-28 DIAGNOSIS — S49.92XA SHOULDER INJURY, LEFT, INITIAL ENCOUNTER: ICD-10-CM

## 2025-07-28 DIAGNOSIS — L98.9 LESION OF SUBCUTANEOUS TISSUE: ICD-10-CM

## 2025-07-28 DIAGNOSIS — R22.32 ARM MASS, LEFT: ICD-10-CM

## 2025-07-28 DIAGNOSIS — S62.102D CLOSED FRACTURE OF LEFT WRIST WITH ROUTINE HEALING, SUBSEQUENT ENCOUNTER: ICD-10-CM

## 2025-07-28 DIAGNOSIS — M06.09 RHEUMATOID ARTHRITIS WITHOUT RHEUMATOID FACTOR, MULTIPLE SITES: Primary | ICD-10-CM

## 2025-07-28 PROCEDURE — 73110 X-RAY EXAM OF WRIST: CPT | Mod: 26,HCNC,LT, | Performed by: RADIOLOGY

## 2025-07-28 PROCEDURE — 73110 X-RAY EXAM OF WRIST: CPT | Mod: TC,HCNC,PN,LT

## 2025-07-28 PROCEDURE — 99999 PR PBB SHADOW E&M-EST. PATIENT-LVL IV: CPT | Mod: PBBFAC,HCNC,, | Performed by: ORTHOPAEDIC SURGERY

## 2025-07-28 NOTE — PROGRESS NOTES
CC:  Soft tissue mass left forearm        HPI:  Christine Castro is a very pleasant 70 y.o. female with enlarging mass left forearm for the past several months   It does bother her causes some pain no history of infection         PAST MEDICAL HISTORY:   Past Medical History:   Diagnosis Date    Anticoagulant long-term use     Arthritis     Asthma     CHF (congestive heart failure)     [facility]    Colon cancer     colon    COPD (chronic obstructive pulmonary disease)     Coronary artery disease     Depression     Diabetes mellitus, type 2     GERD (gastroesophageal reflux disease)     Left ureteral stone 2024    Myocardial infarction     AGE 19 MIGUELANGEL WITHBABY DELIVERY    Nausea and vomiting 2024    Thyroid disease      PAST SURGICAL HISTORY:   Past Surgical History:   Procedure Laterality Date    ABDOMINAL SURGERY      CAUDAL EPIDURAL STEROID INJECTION N/A 2022    Procedure: Injection-steroid-epidural-caudal;  Surgeon: Nilam Santiago MD;  Location: [facility] PAIN MGT;  Service: Pain Management;  Laterality: N/A;     SECTION      CHOLECYSTECTOMY      COLON SURGERY      COLONOSCOPY      --- repeat in 3-5 years    COLONOSCOPY N/A 2017    Procedure: COLONOSCOPY;  Surgeon: Corrina Flores MD;  Location: [facility] ENDO;  Service: Endoscopy;  Laterality: N/A;    COLONOSCOPY N/A 04/10/2018    Procedure: COLONOSCOPY golytely;  Surgeon: Corrina Flores MD;  Location: [facility] ENDO;  Service: Endoscopy;  Laterality: N/A;    DECOMPRESSION OF CERVICAL SPINE BY ANTERIOR APPROACH WITH FUSION N/A 2021    Procedure: DECOMPRESSION AND FUSION, SPINE, CERVICAL, ANTERIOR APPROACH C3-4 C5- 6 C6-7 ACDF;  Surgeon: Ishaan Fisher MD;  Location: [facility] OR;  Service: Neurosurgery;  Laterality: N/A;    ECTOPIC PREGNANCY SURGERY      EPIDURAL STEROID INJECTION INTO LUMBAR SPINE N/A 2020    Procedure: Injection-steroid-epidural-lumbar--L5-S1 InterLaminar;  Surgeon: Nilam Santaigo MD;  Location:  [facility] PAIN MGT;  Service: Pain Management;  Laterality: N/A;    ESOPHAGOGASTRODUODENOSCOPY N/A 02/28/2019    Procedure: ESOPHAGOGASTRODUODENOSCOPY (EGD);  Surgeon: Corrina Flores MD;  Location: [facility] ENDO;  Service: Endoscopy;  Laterality: N/A;    ESOPHAGOGASTRODUODENOSCOPY      w/biopsy    ESOPHAGOGASTRODUODENOSCOPY N/A 9/26/2024    Procedure: EGD (ESOPHAGOGASTRODUODENOSCOPY);  Surgeon: Robert Ochoa MD;  Location: [facility] ENDO;  Service: Endoscopy;  Laterality: N/A;  Ref Jonatan [email or web address]  9/9/24-LVM for precall, portal-DS  9/10 pt not confirmed- [facility]  9/11-precall complete, instructions emailed to [email or web address]  9/11-Lvm notifying pt of new arrival time (1010am)-[facility]  9/20/24-Precall complete    FRACTURE SURGERY      left leg    FUSION OF SPINE WITH INSTRUMENTATION Left 01/11/2021    Procedure: FUSION, SPINE, WITH INSTRUMENTATION Stage 1 Left L4-5 OLIF DORA Stage 2 L4-5 Laminectomy, medial Facetectomy, foraminotomy, L4-5 MIS Instrumentation;  Surgeon: Ishaan Fisher MD;  Location: [facility] OR;  Service: Neurosurgery;  Laterality: Left;  Procedure: Stage 1 Left L4-5 OLIF DORA  Stage 2 L4-5 Laminectomy, medial Facetectomy, foraminotomy, L4-5 MIS Instrumentation  Surgery TIme: 4 Hrs    GASTRIC BYPASS      HERNIA REPAIR      INJECTION OF ANESTHETIC AGENT AROUND GENITOFEMORAL NERVE Left 07/29/2020    Procedure: BLOCK, NERVE, LEFT SHOULDER ARTICULAR;  Surgeon: Nilam Santiago MD;  Location: [facility] PAIN MGT;  Service: Pain Management;  Laterality: Left;    JOINT REPLACEMENT      KNEE ARTHROPLASTY Left 05/08/2019    Procedure: ARTHROPLASTY, KNEE;  Surgeon: Roldan Greenwood MD;  Location: [facility] OR;  Service: Orthopedics;  Laterality: Left;  Navid notified    KNEE ARTHROPLASTY Right 11/20/2019    Procedure: ARTHROPLASTY, KNEE;  Surgeon: Roldan Greenwood MD;  Location: [facility] OR;  Service: Orthopedics;  Laterality: Right;  Navid notified, confirmed case Navid 11/19/19 KB [id number]    OOPHORECTOMY       OPEN REDUCTION AND INTERNAL FIXATION (ORIF) OF FRACTURE OF DISTAL RADIUS Left 10/29/2024    Procedure: ORIF, FRACTURE, RADIUS, DISTAL;  Surgeon: Ketan Heart Jr., MD;  Location: [facility] OR;  Service: Orthopedics;  Laterality: Left;  artrex, mini C arm    RADIOFREQUENCY THERMOCOAGULATION Left 08/12/2020    Procedure: RADIOFREQUENCY THERMAL COAGULATION--Left Suprascapular, Axillary, and Lateral Pectoral Articular Branch RFA;  Surgeon: Nilam Santiago MD;  Location: [facility] PAIN MGT;  Service: Pain Management;  Laterality: Left;    TONSILLECTOMY      TRANSFORAMINAL EPIDURAL INJECTION OF STEROID Right 08/25/2021    Procedure: Injection,steroid,epidural,transforaminal approach; Levels: L5;  Surgeon: Nilam Santiago MD;  Location: [facility] PAIN MGT;  Service: Pain Management;  Laterality: Right;  No pacemaker. Patient is diabetic. Patient is taking Aspirin but can continue per Dr. Santiago.     TUBAL LIGATION       FAMILY HISTORY:   Family History   Problem Relation Name Age of Onset    Hyperlipidemia Mother      Hypertension Mother      Diabetes Mother      Stroke Mother      Hypertension Sister      Hypertension Brother      Diabetes Brother      Breast cancer Maternal Aunt      Breast cancer Maternal Aunt      Breast cancer Cousin       SOCIAL HISTORY: Social History[1]    MEDICATIONS: Current Medications[2]  ALLERGIES:   Review of patient's allergies indicates:   Allergen Reactions    Adhesive      Adhesive tape    Latex, natural rubber      Adhesive from latex tape    Ibuprofen Other (See Comments)     Causes asthma flare??       Review of Systems:  Constitutional: no fever or chills  ENT: no nasal congestion or sore throat  Respiratory: no cough or shortness of breath  Cardiovascular: no chest pain or palpitations  Gastrointestinal: no nausea or vomiting, PUD, GERD, NSAID intolerance  Genitourinary: no hematuria or dysuria  Integument/Breast: no rash or pruritis  Hematologic/Lymphatic: no easy bruising or  "lymphadenopathy  Musculoskeletal: see HPI  Neurological: no seizures or tremors  Behavioral/Psych: no auditory or visual hallucinations      Physical Exam   Vitals:    07/28/25 1438   PainSc:   5   PainLoc: Generalized       Constitutional: Oriented to person, place, and time. Appears well-developed and well-nourished.   HENT:   Head: Normocephalic and atraumatic.   Nose: Nose normal.   Eyes: No scleral icterus.   Neck: Normal range of motion.   Cardiovascular: Normal rate and regular rhythm.    Pulses:       Radial pulses are 2+ on the right side, and 2+ on the left side.   Pulmonary/Chest: Effort normal and breath sounds normal.   Abdominal: Soft.   Neurological: Alert and oriented to person, place, and time.   Skin: Skin is warm.   Psychiatric: Normal mood and affect.     MUSCULOSKELETAL UPPER EXTREMITY:  Examination left forearm soft tissue masses noted on the extensor surface overlying the mid shaft of the ulna   Mildly tender   Multilobulated   No evidence of infection               Diagnostic Studies:  Soft tissue mass left forearm probable rheumatoid nodule        Assessment:  Rheumatoid nodule left forearm    Plan:  Patient would like to set up surgery for excisional biopsy mass left forearm      The risks and benefits of surgery were discussed with the patient today and they understand.  The consent was signed in the office for surgery.      Ketan Herat MD (Jay)  [facility]  Orthopedic Upper Extremity Surgery            [1]   Social History  Socioeconomic History    Marital status:    Tobacco Use    Smoking status: Never     Passive exposure: Never    Smokeless tobacco: Never   Substance and Sexual Activity    Alcohol use: Yes     Comment: very rare    Drug use: No     Comment: CBD oil sometimes    Sexual activity: Never     Social Drivers of Health     Financial Resource Strain: Medium Risk (5/4/2023)    Overall Financial Resource Strain (CARDIA)     Difficulty of Paying Living " Expenses: Somewhat hard   Food Insecurity: Unknown (6/27/2025)    Hunger Vital Sign     Worried About Running Out of Food in the Last Year: Never true   Transportation Needs: No Transportation Needs (6/27/2025)    PRAPARE - Transportation     Lack of Transportation (Medical): No     Lack of Transportation (Non-Medical): No   Physical Activity: Inactive (5/4/2023)    Exercise Vital Sign     Days of Exercise per Week: 0 days     Minutes of Exercise per Session: 0 min   Stress: No Stress Concern Present (5/4/2023)    [employer] [place] [employer] - Occupational Stress Questionnaire     Feeling of Stress : Not at all   Housing Stability: Low Risk  (5/4/2023)    Housing Stability Vital Sign     Unable to Pay for Housing in the Last Year: No     Number of Places Lived in the Last Year: 1     Unstable Housing in the Last Year: No   [2]   Current Outpatient Medications:     albuterol (PROVENTIL/VENTOLIN HFA) 90 mcg/actuation inhaler, INHALE 2 PUFFS BY MOUTH EVERY 4 HOURS AS NEEDED FOR WHEEZING, Disp: 18 g, Rfl: 3    aspirin (ECOTRIN) 81 MG EC tablet, Take 1 tablet (81 mg total) by mouth once daily., Disp: , Rfl: 0    atorvastatin (LIPITOR) 40 MG tablet, TAKE 1 TABLET BY MOUTH ONCE DAILY FOR CHOLESTEROL, Disp: 90 tablet, Rfl: 3    blood sugar diagnostic (BLOOD GLUCOSE TEST) Strp, Use strip to test glucose daily, Disp: 90 each, Rfl: 3    blood-glucose meter kit, Dispense meter  brand covered by insurance, Disp: 1 each, Rfl: 0    diabetic supplies, miscellan. Misc, Lancets for 1-2 times a day testing    dispense brand covered by insurance t, Disp: 60 each, Rfl: 3    diabetic supplies, miscellan. Misc, Lancets for daily testing., Disp: 90 each, Rfl: 3    diazePAM (VALIUM) 5 MG tablet, TAKE 1 TABLET BY MOUTH EVERY 24 HOURS AS NEEDED FOR ANXIETY, Disp: 30 tablet, Rfl: 0    DULoxetine (CYMBALTA) 60 MG capsule, Take 1 capsule (60 mg total) by mouth once daily., Disp: 90 capsule, Rfl: 3    furosemide (LASIX) 20 MG tablet,  Take 1 tablet (20 mg total) by mouth daily as needed., Disp: 30 tablet, Rfl: 3    gabapentin (NEURONTIN) 100 MG capsule, TAKE 1 CAPSULE(100 MG) BY MOUTH THREE TIMES DAILY, Disp: 90 capsule, Rfl: 3    glipiZIDE (GLUCOTROL) 2.5 MG TR24, TAKE 1 TABLET(2.5 MG) BY MOUTH DAILY WITH BREAKFAST, Disp: 90 tablet, Rfl: 3    ibandronate (BONIVA) 150 mg tablet, TAKE 1 TABLET BY MOUTH EVERY 30 DAYS FOR OSTEOPOROSIS, Disp: 3 tablet, Rfl: 3    levothyroxine (SYNTHROID) 100 MCG tablet, Take 1 tablet (100 mcg total) by mouth once daily., Disp: 90 tablet, Rfl: 3    nitroGLYCERIN (NITROSTAT) 0.4 MG SL tablet, Place 1 tablet (0.4 mg total) under the tongue every 5 (five) minutes as needed for Chest pain., Disp: 20 tablet, Rfl: 12    omeprazole (PRILOSEC) 40 MG capsule, Take 1 capsule (40 mg total) by mouth every morning., Disp: 90 capsule, Rfl: 3    ondansetron (ZOFRAN-ODT) 4 MG TbDL, Take 1 tablet (4 mg total) by mouth every 6 (six) hours as needed (Nausea)., Disp: 28 tablet, Rfl: 0    oxyCODONE-acetaminophen (PERCOCET)  mg per tablet, Take 1 tablet by mouth every 8 (eight) hours as needed for Pain., Disp: 90 tablet, Rfl: 0    potassium chloride (KLOR-CON) 10 MEQ TbSR, TAKE 1 TABLET BY MOUTH EVERY DAY WHEN YOU TAKE FUROSEMIDE, Disp: 90 tablet, Rfl: 0    promethazine (PHENERGAN) 25 MG tablet, TAKE 1 TABLET(25 MG) BY MOUTH EVERY 6 HOURS AS NEEDED, Disp: 25 tablet, Rfl: 0    traMADoL (ULTRAM) 50 mg tablet, Take 1 tablet by mouth every 8 hours as needed for pain, Disp: 20 tablet, Rfl: 0    zolpidem (AMBIEN) 5 MG Tab, Take 1 tablet (5 mg total) by mouth every evening., Disp: 30 tablet, Rfl: 2

## 2025-07-29 ENCOUNTER — HOSPITAL ENCOUNTER (OUTPATIENT)
Dept: RADIOLOGY | Facility: HOSPITAL | Age: 70
Discharge: HOME | End: 2025-07-29
Attending: STUDENT IN AN ORGANIZED HEALTH CARE EDUCATION/TRAINING PROGRAM
Payer: MEDICARE

## 2025-07-29 ENCOUNTER — HOSPITAL ENCOUNTER (OUTPATIENT)
Dept: CARDIOLOGY | Facility: HOSPITAL | Age: 70
Discharge: HOME | End: 2025-07-29
Attending: STUDENT IN AN ORGANIZED HEALTH CARE EDUCATION/TRAINING PROGRAM
Payer: MEDICARE

## 2025-07-29 DIAGNOSIS — F41.9 ANXIETY: ICD-10-CM

## 2025-07-29 DIAGNOSIS — R07.2 PRECORDIAL CHEST PAIN: ICD-10-CM

## 2025-07-29 LAB
CV STRESS BASE HR: 67 BPM
DIASTOLIC BLOOD PRESSURE: 73 MMHG
OHS CV CPX 85 PERCENT MAX PREDICTED HEART RATE MALE: 127
OHS CV CPX MAX PREDICTED HEART RATE: 149
OHS CV CPX PATIENT IS FEMALE: 1
OHS CV CPX PATIENT IS MALE: 0
OHS CV CPX PEAK DIASTOLIC BLOOD PRESSURE: 66 MMHG
OHS CV CPX PEAK HEAR RATE: 92 BPM
OHS CV CPX PEAK RATE PRESSURE PRODUCT: NORMAL
OHS CV CPX PEAK SYSTOLIC BLOOD PRESSURE: 119 MMHG
OHS CV CPX PERCENT MAX PREDICTED HEART RATE ACHIEVED: 64
OHS CV CPX RATE PRESSURE PRODUCT PRESENTING: 7906
SYSTOLIC BLOOD PRESSURE: 118 MMHG

## 2025-07-29 PROCEDURE — 93017 CV STRESS TEST TRACING ONLY: CPT | Mod: HCNC,PN

## 2025-07-29 PROCEDURE — A9502 TC99M TETROFOSMIN: HCPCS | Mod: HCNC,PN | Performed by: STUDENT IN AN ORGANIZED HEALTH CARE EDUCATION/TRAINING PROGRAM

## 2025-07-29 PROCEDURE — 78452 HT MUSCLE IMAGE SPECT MULT: CPT | Mod: 26,HCNC,, | Performed by: RADIOLOGY

## 2025-07-29 PROCEDURE — 63600175 PHARM REV CODE 636 W HCPCS: Mod: HCNC,PN | Performed by: STUDENT IN AN ORGANIZED HEALTH CARE EDUCATION/TRAINING PROGRAM

## 2025-07-29 PROCEDURE — 78452 HT MUSCLE IMAGE SPECT MULT: CPT | Mod: TC,HCNC,PN

## 2025-07-29 RX ORDER — CEFAZOLIN SODIUM 2 G/50ML
2 SOLUTION INTRAVENOUS
OUTPATIENT
Start: 2025-07-29

## 2025-07-29 RX ORDER — REGADENOSON 0.08 MG/ML
0.4 INJECTION, SOLUTION INTRAVENOUS ONCE
Status: COMPLETED | OUTPATIENT
Start: 2025-07-29 | End: 2025-07-29

## 2025-07-29 RX ADMIN — TETROFOSMIN 10 MILLICURIE: 1.38 INJECTION, POWDER, LYOPHILIZED, FOR SOLUTION INTRAVENOUS at 07:07

## 2025-07-29 RX ADMIN — TETROFOSMIN 30 MILLICURIE: 1.38 INJECTION, POWDER, LYOPHILIZED, FOR SOLUTION INTRAVENOUS at 08:07

## 2025-07-29 RX ADMIN — REGADENOSON 0.4 MG: 0.08 INJECTION, SOLUTION INTRAVENOUS at 08:07

## 2025-07-30 ENCOUNTER — TELEPHONE (OUTPATIENT)
Dept: FAMILY MEDICINE | Facility: CLINIC | Age: 70
End: 2025-07-30
Payer: MEDICARE

## 2025-07-30 ENCOUNTER — TELEPHONE (OUTPATIENT)
Dept: CARDIOLOGY | Facility: CLINIC | Age: 70
End: 2025-07-30
Payer: MEDICARE

## 2025-07-30 RX ORDER — DIAZEPAM 5 MG/1
TABLET ORAL
Qty: 30 TABLET | Refills: 3 | Status: SHIPPED | OUTPATIENT
Start: 2025-07-30

## 2025-07-30 NOTE — TELEPHONE ENCOUNTER
----- Message from Daniel York MD sent at 7/29/2025 11:17 PM CDT -----  Can you let her know her stress test was negative for blockages.  ----- Message -----  From: Chip Malone MD  Sent: 7/29/2025   4:44 PM CDT  To: Daniel York MD

## 2025-07-30 NOTE — TELEPHONE ENCOUNTER
DEDRAM for pt to call back clinic    Copied from CRM #[id number]. Topic: General Inquiry - Return Call  >> Jul 30, 2025  2:44 PM Raven wrote:  .Type: Patient Call Back    Who called:pt    What is the request in detail:a call back in regards to her current medication and upcoming wei    Can the clinic reply by [facility]?    Would the patient rather a call back or a response via [facility]? call    Best call back number:[phone]    Additional Information:

## 2025-07-30 NOTE — TELEPHONE ENCOUNTER
No care due was identified.  [facility] Embedded Care Due Messages. Reference number: [id number].   7/29/2025 8:37:05 PM CDT

## 2025-07-31 ENCOUNTER — OFFICE VISIT (OUTPATIENT)
Dept: PHYSICAL MEDICINE AND REHAB | Facility: CLINIC | Age: 70
End: 2025-07-31
Payer: MEDICARE

## 2025-07-31 DIAGNOSIS — Z86.79 HISTORY OF CONGESTIVE HEART FAILURE: ICD-10-CM

## 2025-07-31 DIAGNOSIS — R29.6 FREQUENT FALLS: ICD-10-CM

## 2025-07-31 DIAGNOSIS — R26.9 GAIT DISORDER: Primary | ICD-10-CM

## 2025-07-31 DIAGNOSIS — Z98.1 STATUS POST LUMBAR SPINAL FUSION: ICD-10-CM

## 2025-07-31 DIAGNOSIS — M54.42 CHRONIC BILATERAL LOW BACK PAIN WITH BILATERAL SCIATICA: ICD-10-CM

## 2025-07-31 DIAGNOSIS — M15.9 GENERALIZED OSTEOARTHRITIS: ICD-10-CM

## 2025-07-31 DIAGNOSIS — M21.70 ACQUIRED LEG LENGTH DISCREPANCY: ICD-10-CM

## 2025-07-31 DIAGNOSIS — M12.812 LEFT ROTATOR CUFF TEAR ARTHROPATHY: ICD-10-CM

## 2025-07-31 DIAGNOSIS — M54.41 CHRONIC BILATERAL LOW BACK PAIN WITH BILATERAL SCIATICA: ICD-10-CM

## 2025-07-31 DIAGNOSIS — R29.898 WEAKNESS OF BOTH LEGS: ICD-10-CM

## 2025-07-31 DIAGNOSIS — M81.0 OSTEOPOROSIS, UNSPECIFIED OSTEOPOROSIS TYPE, UNSPECIFIED PATHOLOGICAL FRACTURE PRESENCE: ICD-10-CM

## 2025-07-31 DIAGNOSIS — R06.09 DOE (DYSPNEA ON EXERTION): ICD-10-CM

## 2025-07-31 DIAGNOSIS — Z98.1 STATUS POST CERVICAL SPINAL FUSION: ICD-10-CM

## 2025-07-31 DIAGNOSIS — R53.81 DEBILITY: ICD-10-CM

## 2025-07-31 DIAGNOSIS — M75.102 LEFT ROTATOR CUFF TEAR ARTHROPATHY: ICD-10-CM

## 2025-07-31 DIAGNOSIS — G89.29 CHRONIC BILATERAL LOW BACK PAIN WITH BILATERAL SCIATICA: ICD-10-CM

## 2025-07-31 RX ORDER — BUPROPION HYDROCHLORIDE 150 MG/1
150 TABLET ORAL DAILY
Qty: 90 TABLET | Refills: 3 | Status: SHIPPED | OUTPATIENT
Start: 2025-07-31 | End: 2026-07-31

## 2025-07-31 RX ORDER — LEVOTHYROXINE SODIUM 100 UG/1
100 TABLET ORAL DAILY
Qty: 90 TABLET | Refills: 3 | Status: SHIPPED | OUTPATIENT
Start: 2025-07-31

## 2025-07-31 NOTE — TELEPHONE ENCOUNTER
Spoke to pt. Pt stated she needed help with setting up the surgery for a breast reduction. Provided the phone number for the Plastic Surgeon's office. Pt also stated that the duloxetine is not working and wants to go back to Wellbutrin. Pt also needs a refill of her levothyroxine sent to the [facility] pharmacy.

## 2025-07-31 NOTE — PROGRESS NOTES
Subjective:       Patient ID: Christine Castro is a 70 y.o. female.    Chief Complaint: No chief complaint on file.    A telemedicine Virtual Visit is being done today.    The patient location is: home  The chief complaint leading to consultation is:  Mobility evaluation  Visit type: Audiovisual  Total time spent with patient: 20 min  Each patient to whom he or she provides medical services by telemedicine is:  (1) informed of the relationship between the physician and patient and the respective role of any other health care provider with respect to management of the patient; and (2) notified that he or she may decline to receive medical services by telemedicine and may withdraw from such care at any time.          HPI    Mrs. Castro is a 70-year-old black female with multiple medical problems who is presenting to the Physical Medicine clinic for evaluation for a power mobility device.  Her past medical history significant for HTN, DM, diabetic neuropathy, CAD status post MI, CHF, hypothyroidism, TIAs, status post gastric bypass, generalized osteoarthritis, status bilateral TKA, leg length discrepancy (left leg 1-2 inches shorter), chronic low back pain with bilateral radiculopathy status post multiple epidural steroid injections and status post L4-5 laminectomy and fusion in 01/2021, chronic neck pain with myelopathy, status post C3-C7 ACDF in 12/2021, osteoporosis, debility, frequent falls .     The patient lives alone in a single-story home with ramp access.  Some of her friends check on her few times per week **.  She is independent with feeding herself but has trouble getting to the kitchen.  She is independent with toileting but has trouble getting to the bathroom.  She is independent with bathing using a transfer tub bench.  She can ambulate with a rolling walker or Rollator walker about 10-15 ft.  She is restricted by shortness of breath, leg weakness, chronic low back pain with bilateral sciatica (up to  8/10), leg length discrepancy since her youth and impaired balance.  She reports history of falls occasionally few times per week with history of recent head and shoulder trauma.  She cannot propel a manual wheelchair due to shortness of breath, bilateral upper extremity weakness, chronic left shoulder pain (9/10) status post arthroscopic procedure, bilateral hand pain (6/10).  She is able to use a scooter in department stores without significant problems.      Past Medical History:   Diagnosis Date    Anticoagulant long-term use     Arthritis     Asthma     CHF (congestive heart failure)     [facility]    Colon cancer     colon    COPD (chronic obstructive pulmonary disease)     Coronary artery disease     Depression     Diabetes mellitus, type 2     GERD (gastroesophageal reflux disease)     Left ureteral stone 04/16/2024    Myocardial infarction     AGE 19 MIGUELANGEL WITHBABY DELIVERY    Nausea and vomiting 05/20/2024    Thyroid disease         Review of patient's allergies indicates:   Allergen Reactions    Adhesive      Adhesive tape    Latex, natural rubber      Adhesive from latex tape    Ibuprofen Other (See Comments)     Causes asthma flare??        Review of Systems   Constitutional:  Positive for fatigue. Negative for chills and fever.   Eyes:  Positive for visual disturbance.   Respiratory:  Positive for shortness of breath.    Cardiovascular:  Negative for chest pain.   Gastrointestinal:  Positive for nausea and vomiting. Negative for constipation.   Genitourinary:  Positive for difficulty urinating.   Musculoskeletal:  Positive for arthralgias, back pain, gait problem and neck pain.   Neurological:  Positive for dizziness, weakness and headaches.   Psychiatric/Behavioral:  Negative for behavioral problems.              Objective:      Physical Exam  Vitals reviewed.   Constitutional:       General: She is not in acute distress.     Appearance: She is well-developed.      Comments: (physical exam of  8/14/2024 visit was used.  The patient reports no subjective change in her strength)   HENT:      Head: Normocephalic and atraumatic.   Eyes:      Extraocular Movements: Extraocular movements intact.   Musculoskeletal:      Cervical back: Normal range of motion.      Comments: BUE:  ROM:   RUE: full.   LUE: decreased at shoulder (30 deg active abduction).  +ve severe Heberden's & Maral's nodes.  Strength:    RUE: 3/5 at shoulder abduction, 4 elbow flexion, 4 elbow extension, 4 hand .   LUE: 2/5 at shoulder abduction, 4 elbow flexion, 4 elbow extension, 4 hand .  Sensation to pinprick:   RUE: intact.   LUE: intact.  DTR:    RUE: +1 biceps, +1 triceps.   LUE:  +1 biceps, +1 triceps.      BLE:  Knees:   RLE: healed TKA.    LLE: Healed TKA.  Strength:    RLE: 4/5 at hip flexion, 4 knee extension, 4 ankle DF, 4 ankle PF.   LLE: 4-/5 at hip flexion, 4 knee extension, 4 ankle DF,  4 ankle PF.  Sensation to pinprick:     RLE: intact.     LLE: intact.  DTR:     RLE: +1 knee, +1 ankle.    LLE: +2 knee, +1 ankle.  SLR (sitting):      RLE: +ve.      LLE: +ve.     -ve mild tenderness over lumbar spine.     Neurological:      Mental Status: She is alert.   Psychiatric:         Mood and Affect: Mood normal.         Behavior: Behavior normal.         Thought Content: Thought content normal.         Assessment:       1. Gait disorder    2. History of congestive heart failure    3. CASTANEDA (dyspnea on exertion)    4. Chronic bilateral low back pain with bilateral sciatica    5. Status post lumbar spinal fusion (L4-L5, 1/2021)    6. Weakness of both legs    7. Status post cervical spinal fusion (ACDF at C3-C7, 12/2021)    8. Left rotator cuff tear arthropathy    9. Generalized osteoarthritis    10. Acquired leg length discrepancy    11. Osteoporosis, unspecified osteoporosis type, unspecified pathological fracture presence    12. Frequent falls    13. Debility        Summary/Plan:             - The patient was seen today for  mobility evaluation for a power mobility device due to significant impairment at home.  - The patient has multifactorial gait impairment.  - The patient is not able to ambulate safely at home.  - The patient is unable to use a walker functional distances due to CASTANEDA because of CHF, bilateral lower extremity weakness, chronic low back pain with bilateral radiculopathy, leg length discrepancy and impaired balance  - The patient is unable to use an optimally-configured manual wheelchair at home due to do it because of CHF, bilateral upper extremity weakness , left rotator cuff arthropathy, bilateral hand pain due to advanced OA.  - The patient has history of falls, which could be detrimental to her health especially due to osteoporosis.  - The patient has intact cognition and should be able to use a power mobility device well at home.  - A prescription for an electric scooter was generated.  - The patient has enough upper & lower extremity strength to be able to transfer to and from the power mobility device.  - The patient has enough range of motion & strength in BUE to allow functional operation of the tiller.  - The patient has good trunk balance and should be able to maintain a safe posture while operating a power mobility device.  - This will allow the patient to go safely to the kitchen, dining room or living room for feeding & socialization.  It should also help with energy conservation and reducing the risk of falls.  - The patient is to return the Physical Medicine/Mobility clinic prn.        This note was partly generated with [employer] voice recognition software. I apologize for any possible typographical errors.    This note was partly generated with [employer] voice recognition software. I apologize for any possible typographical errors.

## 2025-08-05 ENCOUNTER — TELEPHONE (OUTPATIENT)
Dept: FAMILY MEDICINE | Facility: CLINIC | Age: 70
End: 2025-08-05
Payer: MEDICARE

## 2025-08-05 RX ORDER — FLUOXETINE 10 MG/1
10 CAPSULE ORAL DAILY
Qty: 30 CAPSULE | Refills: 4 | Status: SHIPPED | OUTPATIENT
Start: 2025-08-05 | End: 2026-08-05

## 2025-08-05 RX ORDER — ONDANSETRON 4 MG/1
4 TABLET, ORALLY DISINTEGRATING ORAL EVERY 6 HOURS PRN
Qty: 28 TABLET | Refills: 0 | Status: SHIPPED | OUTPATIENT
Start: 2025-08-05

## 2025-08-05 RX ORDER — IBANDRONATE SODIUM 150 MG/1
TABLET, FILM COATED ORAL
Qty: 3 TABLET | Refills: 3 | Status: CANCELLED | OUTPATIENT
Start: 2025-08-05

## 2025-08-05 NOTE — TELEPHONE ENCOUNTER
Copied from CRM #[id number]. Topic: General Inquiry - Return Call  >> Aug 5, 2025  9:07 JERMAIN Ohara wrote:  Type:  Patient Returning Call    Who Called:Pt   Who Left Message for Patient: Mari   Does the patient know what this is regarding?:no   Would the patient rather a call back or a response via [facility]? Call back   Best Call Back Number:[phone]   Additional Information:

## 2025-08-05 NOTE — TELEPHONE ENCOUNTER
"I spoke with pt. She called to request another medication to take along with Wellbutrin for her "nerves". She stated that you've prescribed something in the past that she can take as needed. She did state that it's not Valium.   "

## 2025-08-05 NOTE — TELEPHONE ENCOUNTER
No care due was identified.  [facility] Embedded Care Due Messages. Reference number: [id number].   8/05/2025 11:29:44 AM CDT

## 2025-08-07 DIAGNOSIS — G89.4 CHRONIC PAIN SYNDROME: ICD-10-CM

## 2025-08-07 NOTE — TELEPHONE ENCOUNTER
Copied from CRM #[id number]. Topic: Medications - Medication Refill  >> Aug 7, 2025 12:20 PM Greg wrote:  Type: RX Refill Request    Who Called: patient    Have you contacted your pharmacy:    Refill or New Rx:oxyCODONE-acetaminophen (PERCOCET)  mg per tablet    RX Name and Strength:    How is the patient currently taking it? (ex. 1XDay):    Is this a 30 day or 90 day RX:    Preferred Pharmacy with phone number:[facility] #[id number] - [address] [facility]   Phone: [phone]  Fax: [phone]          Local or Mail Order:Local    Ordering Provider:Phillip Quiros    Would the patient rather a call back or a response via [facility]? call    Best Call Back Number:[phone]    Additional Information:

## 2025-08-09 RX ORDER — OXYCODONE AND ACETAMINOPHEN 10; 325 MG/1; MG/1
1 TABLET ORAL EVERY 8 HOURS PRN
Qty: 90 TABLET | Refills: 0 | Status: SHIPPED | OUTPATIENT
Start: 2025-08-09

## 2025-08-11 LAB
LEFT EYE DM RETINOPATHY: NEGATIVE
RIGHT EYE DM RETINOPATHY: NEGATIVE

## 2025-08-18 ENCOUNTER — PATIENT MESSAGE (OUTPATIENT)
Dept: PLASTIC SURGERY | Facility: CLINIC | Age: 70
End: 2025-08-18
Payer: MEDICARE

## 2025-08-18 ENCOUNTER — TELEPHONE (OUTPATIENT)
Dept: PLASTIC SURGERY | Facility: CLINIC | Age: 70
End: 2025-08-18
Payer: MEDICARE

## 2025-08-20 ENCOUNTER — TELEPHONE (OUTPATIENT)
Dept: FAMILY MEDICINE | Facility: CLINIC | Age: 70
End: 2025-08-20
Payer: MEDICARE

## 2025-08-20 DIAGNOSIS — R53.81 DEBILITY: Primary | ICD-10-CM

## 2025-08-21 ENCOUNTER — PATIENT OUTREACH (OUTPATIENT)
Dept: ADMINISTRATIVE | Facility: HOSPITAL | Age: 70
End: 2025-08-21
Payer: MEDICARE

## 2025-08-22 ENCOUNTER — TELEPHONE (OUTPATIENT)
Dept: FAMILY MEDICINE | Facility: CLINIC | Age: 70
End: 2025-08-22
Payer: MEDICARE

## 2025-08-22 ENCOUNTER — LAB VISIT (OUTPATIENT)
Dept: LAB | Facility: HOSPITAL | Age: 70
End: 2025-08-22
Attending: FAMILY MEDICINE
Payer: MEDICARE

## 2025-08-22 DIAGNOSIS — I50.9 HEART FAILURE, UNSPECIFIED HF CHRONICITY, UNSPECIFIED HEART FAILURE TYPE: ICD-10-CM

## 2025-08-22 DIAGNOSIS — E11.40 TYPE 2 DIABETES MELLITUS WITH DIABETIC NEUROPATHY, WITHOUT LONG-TERM CURRENT USE OF INSULIN: ICD-10-CM

## 2025-08-22 DIAGNOSIS — N18.31 CHRONIC KIDNEY DISEASE, STAGE 3A: ICD-10-CM

## 2025-08-22 DIAGNOSIS — G95.9 CHRONIC MYELOPATHY: ICD-10-CM

## 2025-08-22 DIAGNOSIS — E66.813 CLASS 3 SEVERE OBESITY DUE TO EXCESS CALORIES WITH SERIOUS COMORBIDITY AND BODY MASS INDEX (BMI) OF 40.0 TO 44.9 IN ADULT: ICD-10-CM

## 2025-08-22 LAB
ABSOLUTE EOSINOPHIL (OHS): 0.19 K/UL
ABSOLUTE MONOCYTE (OHS): 0.3 K/UL (ref 0.3–1)
ABSOLUTE NEUTROPHIL COUNT (OHS): 2.97 K/UL (ref 1.8–7.7)
ALBUMIN SERPL BCP-MCNC: 4 G/DL (ref 3.5–5.2)
ALP SERPL-CCNC: 75 UNIT/L (ref 38–126)
ALT SERPL W/O P-5'-P-CCNC: 19 UNIT/L (ref 10–44)
ANION GAP (OHS): 10 MMOL/L (ref 8–16)
AST SERPL-CCNC: 27 UNIT/L (ref 15–46)
BASOPHILS # BLD AUTO: 0.03 K/UL
BASOPHILS NFR BLD AUTO: 0.6 %
BILIRUB SERPL-MCNC: 0.3 MG/DL (ref 0.1–1)
BUN SERPL-MCNC: 14 MG/DL (ref 7–17)
CALCIUM SERPL-MCNC: 8.8 MG/DL (ref 8.7–10.5)
CHLORIDE SERPL-SCNC: 106 MMOL/L (ref 95–110)
CHOLEST SERPL-MCNC: 164 MG/DL (ref 120–199)
CHOLEST/HDLC SERPL: 3 {RATIO} (ref 2–5)
CO2 SERPL-SCNC: 23 MMOL/L (ref 23–29)
CREAT SERPL-MCNC: 1 MG/DL (ref 0.5–1.4)
EAG (OHS): 105 MG/DL (ref 68–131)
ERYTHROCYTE [DISTWIDTH] IN BLOOD BY AUTOMATED COUNT: 13.6 % (ref 11.5–14.5)
GFR SERPLBLD CREATININE-BSD FMLA CKD-EPI: 60 ML/MIN/1.73/M2
GLUCOSE SERPL-MCNC: 91 MG/DL (ref 70–110)
HBA1C MFR BLD: 5.3 % (ref 4–5.6)
HCT VFR BLD AUTO: 34.3 % (ref 37–48.5)
HDLC SERPL-MCNC: 55 MG/DL (ref 40–75)
HDLC SERPL: 33.5 % (ref 20–50)
HGB BLD-MCNC: 10.9 GM/DL (ref 12–16)
IMM GRANULOCYTES # BLD AUTO: 0.01 K/UL (ref 0–0.04)
IMM GRANULOCYTES NFR BLD AUTO: 0.2 % (ref 0–0.5)
LDLC SERPL CALC-MCNC: 98 MG/DL (ref 63–159)
LYMPHOCYTES # BLD AUTO: 1.91 K/UL (ref 1–4.8)
MCH RBC QN AUTO: 30.7 PG (ref 27–31)
MCHC RBC AUTO-ENTMCNC: 31.8 G/DL (ref 32–36)
MCV RBC AUTO: 97 FL (ref 82–98)
NONHDLC SERPL-MCNC: 109 MG/DL
NUCLEATED RBC (/100WBC) (OHS): 0 /100 WBC
PLATELET # BLD AUTO: 220 K/UL (ref 150–450)
PMV BLD AUTO: 9.8 FL (ref 9.2–12.9)
POTASSIUM SERPL-SCNC: 4 MMOL/L (ref 3.5–5.1)
PROT SERPL-MCNC: 7.4 GM/DL (ref 6–8.4)
RBC # BLD AUTO: 3.55 M/UL (ref 4–5.4)
RELATIVE EOSINOPHIL (OHS): 3.5 %
RELATIVE LYMPHOCYTE (OHS): 35.3 % (ref 18–48)
RELATIVE MONOCYTE (OHS): 5.5 % (ref 4–15)
RELATIVE NEUTROPHIL (OHS): 54.9 % (ref 38–73)
SODIUM SERPL-SCNC: 139 MMOL/L (ref 136–145)
T4 FREE SERPL-MCNC: 1.2 NG/DL (ref 0.71–1.51)
TRIGL SERPL-MCNC: 55 MG/DL (ref 30–150)
TSH SERPL-ACNC: 0.03 UIU/ML (ref 0.4–4)
WBC # BLD AUTO: 5.41 K/UL (ref 3.9–12.7)

## 2025-08-22 PROCEDURE — 80061 LIPID PANEL: CPT | Mod: HCNC,PN

## 2025-08-22 PROCEDURE — 36415 COLL VENOUS BLD VENIPUNCTURE: CPT | Mod: HCNC,PN

## 2025-08-22 PROCEDURE — 84443 ASSAY THYROID STIM HORMONE: CPT | Mod: HCNC,PN

## 2025-08-22 PROCEDURE — 83036 HEMOGLOBIN GLYCOSYLATED A1C: CPT | Mod: HCNC,PN

## 2025-08-22 PROCEDURE — 84439 ASSAY OF FREE THYROXINE: CPT | Mod: HCNC,PN

## 2025-08-22 PROCEDURE — 80053 COMPREHEN METABOLIC PANEL: CPT | Mod: HCNC,PN

## 2025-08-22 PROCEDURE — 85025 COMPLETE CBC W/AUTO DIFF WBC: CPT | Mod: HCNC,PN

## 2025-08-25 ENCOUNTER — OFFICE VISIT (OUTPATIENT)
Dept: FAMILY MEDICINE | Facility: CLINIC | Age: 70
End: 2025-08-25
Payer: MEDICARE

## 2025-08-25 VITALS
SYSTOLIC BLOOD PRESSURE: 124 MMHG | OXYGEN SATURATION: 97 % | TEMPERATURE: 98 F | BODY MASS INDEX: 31.89 KG/M2 | DIASTOLIC BLOOD PRESSURE: 78 MMHG | HEIGHT: 63 IN | HEART RATE: 88 BPM | WEIGHT: 180 LBS

## 2025-08-25 DIAGNOSIS — I25.10 CORONARY ARTERY DISEASE INVOLVING NATIVE CORONARY ARTERY OF NATIVE HEART WITHOUT ANGINA PECTORIS: Chronic | ICD-10-CM

## 2025-08-25 DIAGNOSIS — Z59.12 INADEQUATE HOUSING UTILITIES: ICD-10-CM

## 2025-08-25 DIAGNOSIS — Z59.819 HOUSING INSTABILITY: ICD-10-CM

## 2025-08-25 DIAGNOSIS — G47.00 INSOMNIA, UNSPECIFIED TYPE: ICD-10-CM

## 2025-08-25 DIAGNOSIS — Z74.8 ASSISTANCE NEEDED WITH TRANSPORTATION: ICD-10-CM

## 2025-08-25 DIAGNOSIS — E03.9 HYPOTHYROIDISM, UNSPECIFIED TYPE: ICD-10-CM

## 2025-08-25 DIAGNOSIS — D64.9 NORMOCYTIC ANEMIA: Chronic | ICD-10-CM

## 2025-08-25 DIAGNOSIS — Z00.00 ROUTINE HEALTH MAINTENANCE: Primary | ICD-10-CM

## 2025-08-25 DIAGNOSIS — R73.9 HYPERGLYCEMIA: ICD-10-CM

## 2025-08-25 DIAGNOSIS — M19.90 ARTHRITIS: ICD-10-CM

## 2025-08-25 DIAGNOSIS — Z59.41 FOOD INSECURITY: ICD-10-CM

## 2025-08-25 RX ORDER — GLIPIZIDE 2.5 MG/1
TABLET, EXTENDED RELEASE ORAL
Qty: 90 TABLET | Refills: 3 | Status: SHIPPED | OUTPATIENT
Start: 2025-08-25

## 2025-08-25 RX ORDER — TRIAMCINOLONE ACETONIDE 40 MG/ML
80 INJECTION, SUSPENSION INTRA-ARTICULAR; INTRAMUSCULAR ONCE
Status: COMPLETED | OUTPATIENT
Start: 2025-08-25 | End: 2025-08-25

## 2025-08-25 RX ORDER — ZOLPIDEM TARTRATE 5 MG/1
5 TABLET ORAL NIGHTLY
Qty: 30 TABLET | Refills: 2 | Status: SHIPPED | OUTPATIENT
Start: 2025-08-25

## 2025-08-25 RX ORDER — PROMETHAZINE HYDROCHLORIDE 25 MG/1
TABLET ORAL
Qty: 25 TABLET | Refills: 0 | Status: SHIPPED | OUTPATIENT
Start: 2025-08-25

## 2025-08-25 RX ORDER — LEVOTHYROXINE SODIUM 88 UG/1
88 TABLET ORAL
Qty: 90 TABLET | Refills: 3 | Status: SHIPPED | OUTPATIENT
Start: 2025-08-25 | End: 2026-08-25

## 2025-08-25 RX ADMIN — TRIAMCINOLONE ACETONIDE 80 MG: 40 INJECTION, SUSPENSION INTRA-ARTICULAR; INTRAMUSCULAR at 04:08

## 2025-08-25 SDOH — SOCIAL DETERMINANTS OF HEALTH (SDOH): FOOD INSECURITY: Z59.41

## 2025-08-25 SDOH — SOCIAL DETERMINANTS OF HEALTH (SDOH): INADEQUATE HOUSING UTILITIES: Z59.12

## 2025-08-25 SDOH — SOCIAL DETERMINANTS OF HEALTH (SDOH): HOUSING INSTABILITY UNSPECIFIED: Z59.819

## 2025-08-26 ENCOUNTER — PATIENT OUTREACH (OUTPATIENT)
Dept: ADMINISTRATIVE | Facility: OTHER | Age: 70
End: 2025-08-26
Payer: MEDICARE

## 2025-09-05 ENCOUNTER — HOSPITAL ENCOUNTER (OUTPATIENT)
Dept: RADIOLOGY | Facility: HOSPITAL | Age: 70
Discharge: HOME | End: 2025-09-05
Attending: FAMILY MEDICINE
Payer: MEDICARE

## 2025-09-05 DIAGNOSIS — Z78.0 POSTMENOPAUSAL: ICD-10-CM

## 2025-09-05 PROCEDURE — 77081 DXA BONE DENSITY APPENDICULR: CPT | Mod: TC,HCNC,PN

## 2025-09-05 PROCEDURE — 77081 DXA BONE DENSITY APPENDICULR: CPT | Mod: 26,HCNC,, | Performed by: RADIOLOGY

## (undated) DEVICE — BANDAGE MATRIX HK LOOP 3IN 5YD

## (undated) DEVICE — SYR B-D DISP CONTROL 10CC100/C

## (undated) DEVICE — COVER BACK TABLE 72X21

## (undated) DEVICE — APPLICATOR CHLORAPREP ORN 26ML

## (undated) DEVICE — KIT PIN STEINMANN PFC .025X5IN
Type: IMPLANTABLE DEVICE | Site: KNEE | Status: NON-FUNCTIONAL
Removed: 2019-11-20

## (undated) DEVICE — GOWN POLY REINF BRTH SLV LG

## (undated) DEVICE — GLOVE 6.5 PROTEXIS PI BLUE

## (undated) DEVICE — BURR MIS CURVED 3.0MM

## (undated) DEVICE — GELATIN SURGIPOWDER ABSORBABLE

## (undated) DEVICE — TOWEL OR NONABSORB ADH 17X26

## (undated) DEVICE — DRAPE PLASTIC U 60X72

## (undated) DEVICE — PAD PREP 50/CA

## (undated) DEVICE — BLADE SURG #15 CARBON STEEL

## (undated) DEVICE — SUT 0 VICRYL / UR6 (J603)

## (undated) DEVICE — INSTRUMENT SUCTION FRAZIER 12F

## (undated) DEVICE — SEE MEDLINE ITEM 157125

## (undated) DEVICE — BOWL MIXING BONE CEMENT

## (undated) DEVICE — SPONGE DERMACEA GAUZE 4X4

## (undated) DEVICE — GOWN POLY REINF BRTH SLV XL

## (undated) DEVICE — ALCOHOL 70% ISOP W/GREEN 16OZ

## (undated) DEVICE — PAD CAST SPECIALIST STRL 3

## (undated) DEVICE — GAUZE SPONGE 4X4 12PLY

## (undated) DEVICE — ELECTRODE REM PLYHSV RETURN 9

## (undated) DEVICE — COVER OVERHEAD SURG LT BLUE

## (undated) DEVICE — CONTAINER SPECIMEN STR 3 0Z

## (undated) DEVICE — DRESSING TRANS 8X12 TEGADERM

## (undated) DEVICE — STAPLER SKIN ROTATING HEAD

## (undated) DEVICE — DRAPE STERI INSTRUMENT 1018

## (undated) DEVICE — DRAPE STERI-DRAPE 1000 17X11IN

## (undated) DEVICE — NDL HYPO REG 25G X 1 1/2

## (undated) DEVICE — GLOVE BIOGEL PI MICRO INDIC 7

## (undated) DEVICE — SEE MEDLINE ITEM 157150

## (undated) DEVICE — DRESSING LEUKOPLAST FLEX 1X3IN

## (undated) DEVICE — SEE MEDLINE ITEM 152523

## (undated) DEVICE — HOOD T-5 TEAR AWAY STERILE

## (undated) DEVICE — SEE MEDLINE ITEM 157116

## (undated) DEVICE — KIT SPINAL PATIENT CARE JACK

## (undated) DEVICE — DRAPE MINI C-ARM 54 X 64

## (undated) DEVICE — SUT CTD VICRYL CT-1 27

## (undated) DEVICE — SEE MEDLINE ITEM 152764

## (undated) DEVICE — GUIDEWIRE ORTHO .054 X 6 IN
Type: IMPLANTABLE DEVICE | Site: WRIST | Status: NON-FUNCTIONAL
Removed: 2024-10-29

## (undated) DEVICE — GAUZE SPONGE PEANUT STRL

## (undated) DEVICE — MANIFOLD 4 PORT

## (undated) DEVICE — SUT CTD VICRYL 3-0 CR/SH

## (undated) DEVICE — DRAPE C-ARM/MOBILE XRAY 44X80

## (undated) DEVICE — TOWEL OR XRAY BLUE 17X26IN

## (undated) DEVICE — PACK BASIC

## (undated) DEVICE — SYS ILLUMINATION MARS3V SPINE

## (undated) DEVICE — BANDAGE ACE DOUBLE STER 6IN

## (undated) DEVICE — Device

## (undated) DEVICE — GLOVE SENSICARE PI MICRO 7.5

## (undated) DEVICE — SEE MEDLINE ITEM 157117

## (undated) DEVICE — BANDAGE ACE ELASTIC 6"

## (undated) DEVICE — BIT DRILL QUICK RELEASE 2.0MM

## (undated) DEVICE — SPONGE COTTON TRAY 4X4IN

## (undated) DEVICE — DRESSING ADH FILM TELFA 2X3IN

## (undated) DEVICE — GOWN SURGICAL XX LARGE X LONG

## (undated) DEVICE — HEMOSTAT SURGICEL 4X8IN

## (undated) DEVICE — GLOVE 8.5 PROTEXIS PI BLUE

## (undated) DEVICE — NDL 18GA X1 1/2 REG BEVEL

## (undated) DEVICE — GLOVE PROTEXIS HYDROGEL SZ8.5

## (undated) DEVICE — BIT DRILL QUICK RELEASE 2.8MM

## (undated) DEVICE — DRESSING TELFA N ADH 3X8

## (undated) DEVICE — BLADE SURG CARBON STEEL SZ11

## (undated) DEVICE — SUT 4-0 ETHILON 18 PS-2

## (undated) DEVICE — DRESSING XEROFORM FOIL PK 1X8

## (undated) DEVICE — DRESSING XEROFORM NONADH 1X8IN

## (undated) DEVICE — DRESSING SURGICAL 1/2X1/2

## (undated) DEVICE — PACK SURGERY START

## (undated) DEVICE — GLOVE BIOGEL ECLIPSE SZ 7.5

## (undated) DEVICE — SOL NACL IRR 3000ML

## (undated) DEVICE — SYS DURASEAL SPINE

## (undated) DEVICE — SOL IRR 0.9% NACL 500ML PB

## (undated) DEVICE — SEE MEDLINE ITEM 146313

## (undated) DEVICE — PULSAVAC ZIMMER

## (undated) DEVICE — BLADE SCALP OPHTL BEVEL STR

## (undated) DEVICE — PACK ECLIPSE BASIC III SURG

## (undated) DEVICE — BLADE 90X13X1.27MM

## (undated) DEVICE — SUT VICRYL 2 0 CT 2

## (undated) DEVICE — DRESSING TRANS 4X4 TEGADERM

## (undated) DEVICE — SUT MCRYL PLUS 4-0 PS2 27IN

## (undated) DEVICE — DRESSING AQUACEL AG ADV 3.5X12

## (undated) DEVICE — SUT SILK 0 STRANDS 30IN BLK

## (undated) DEVICE — CANNULA

## (undated) DEVICE — DRAPE INCISE IOBAN 2 23X23IN

## (undated) DEVICE — TAPE SILK 3IN

## (undated) DEVICE — PAD PREP CUFFED NS 24X48IN

## (undated) DEVICE — ADHESIVE MASTISOL VIAL 48/BX

## (undated) DEVICE — COVER SNAP KAP 30

## (undated) DEVICE — BLADE SAG DUAL 25MM

## (undated) DEVICE — GLOVE PROTEXIS PI CLASSIC 6.0

## (undated) DEVICE — SEE MEDLINE ITEM 152622

## (undated) DEVICE — PAD CAST 2 IN X 4YDS STERILE

## (undated) DEVICE — NDL 22GA X1 1/2 REG BEVEL

## (undated) DEVICE — TOURNIQUET SB QC DP 44X4IN

## (undated) DEVICE — DRAPE HAND STERILE

## (undated) DEVICE — DRESSING AQUACEL AG ADV 3.5X6

## (undated) DEVICE — KIT EVACUATOR 3-SPRING 1/8 DRN

## (undated) DEVICE — DRESSING AQUACEL SACRAL 8 X 7

## (undated) DEVICE — BLADE 4IN EDGE INSULATED

## (undated) DEVICE — TRAY FOLEY 16FR INFECTION CONT

## (undated) DEVICE — SEE MEDLINE ITEM 156905

## (undated) DEVICE — SUT 3-0 VICRYL / SH (J416)

## (undated) DEVICE — DRESSING TELFA PAD N ADH 2X3

## (undated) DEVICE — DRILL QUICK RELEASE 2.8MM 5IN

## (undated) DEVICE — GLOVE BIOGEL SKINSENSE PI 7.0

## (undated) DEVICE — SUT 2/0 36IN COATED VICRYL

## (undated) DEVICE — BANDAGE ESMARK ELASTIC ST 4X9

## (undated) DEVICE — DRAPE STERI U-SHAPED 47X51IN

## (undated) DEVICE — GLOVE PROTEXIS HYDROGEL SZ8

## (undated) DEVICE — STOCKINET 4INX48

## (undated) DEVICE — DRESSING AQUACEL FOAM 4X4IN

## (undated) DEVICE — CORD BIPOLAR 12 FOOT

## (undated) DEVICE — DRESSING AQUACEL FOAM 5 X 5

## (undated) DEVICE — CAUTERY TIP POLISHER

## (undated) DEVICE — SHEET THYROID W/ISO-BAC

## (undated) DEVICE — KIT CONFIDENCE W/O NEEDLES

## (undated) DEVICE — DRAPE LEICA MICROSCOPE 48X120

## (undated) DEVICE — DRESSING TRANS 4X4 3/4

## (undated) DEVICE — SUT VICRYL PLUS 0 CT1 18IN

## (undated) DEVICE — SPLINT PLASTER F.S. 3INX15IN

## (undated) DEVICE — SEE MEDLINE ITEM 157148

## (undated) DEVICE — TUBING SUC UNIV W/CONN 12FT

## (undated) DEVICE — SEE MEDLINE ITEM 146292

## (undated) DEVICE — SURGICAL PATTIES

## (undated) DEVICE — KIT PIN STEINMANN PFC .025X5IN
Type: IMPLANTABLE DEVICE | Site: KNEE | Status: NON-FUNCTIONAL
Removed: 2019-05-08

## (undated) DEVICE — VIPER PRIME STYLET

## (undated) DEVICE — SEE MEDLINE ITEM 156955

## (undated) DEVICE — BLADE ELECTRO EDGE INSULATED

## (undated) DEVICE — TOURNIQUET SB QC DP 24X4IN

## (undated) DEVICE — SEE MEDLINE ITEM 157131

## (undated) DEVICE — BLADE SAW SAG 25.40MM X 1.27MM